# Patient Record
Sex: FEMALE | Race: BLACK OR AFRICAN AMERICAN | NOT HISPANIC OR LATINO | Employment: OTHER | URBAN - METROPOLITAN AREA
[De-identification: names, ages, dates, MRNs, and addresses within clinical notes are randomized per-mention and may not be internally consistent; named-entity substitution may affect disease eponyms.]

---

## 2017-01-18 ENCOUNTER — OFFICE VISIT (OUTPATIENT)
Dept: FAMILY MEDICINE | Facility: CLINIC | Age: 72
End: 2017-01-18
Payer: MEDICARE

## 2017-01-18 VITALS
OXYGEN SATURATION: 98 % | WEIGHT: 272.5 LBS | HEART RATE: 72 BPM | SYSTOLIC BLOOD PRESSURE: 134 MMHG | HEIGHT: 65 IN | TEMPERATURE: 98 F | BODY MASS INDEX: 45.4 KG/M2 | DIASTOLIC BLOOD PRESSURE: 84 MMHG

## 2017-01-18 DIAGNOSIS — E11.311 DIABETIC MACULAR EDEMA, BOTH EYES: ICD-10-CM

## 2017-01-18 DIAGNOSIS — E11.22 TYPE 2 DIABETES MELLITUS WITH STAGE 3 CHRONIC KIDNEY DISEASE, WITH LONG-TERM CURRENT USE OF INSULIN: ICD-10-CM

## 2017-01-18 DIAGNOSIS — E03.9 HYPOTHYROIDISM (ACQUIRED): ICD-10-CM

## 2017-01-18 DIAGNOSIS — G47.33 OBSTRUCTIVE SLEEP APNEA ON CPAP: ICD-10-CM

## 2017-01-18 DIAGNOSIS — I27.20 PULMONARY HYPERTENSION: ICD-10-CM

## 2017-01-18 DIAGNOSIS — E11.40 TYPE 2 DIABETES, CONTROLLED, WITH NEUROPATHY: ICD-10-CM

## 2017-01-18 DIAGNOSIS — Z79.4 LONG-TERM INSULIN USE: ICD-10-CM

## 2017-01-18 DIAGNOSIS — E66.01 MORBID OBESITY WITH BMI OF 40.0-44.9, ADULT: ICD-10-CM

## 2017-01-18 DIAGNOSIS — I50.32 CHRONIC DIASTOLIC HEART FAILURE: Primary | ICD-10-CM

## 2017-01-18 DIAGNOSIS — Z86.73 HX-TIA (TRANSIENT ISCHEMIC ATTACK): ICD-10-CM

## 2017-01-18 DIAGNOSIS — E55.9 VITAMIN D DEFICIENCY DISEASE: ICD-10-CM

## 2017-01-18 DIAGNOSIS — N25.81 SECONDARY RENAL HYPERPARATHYROIDISM: ICD-10-CM

## 2017-01-18 DIAGNOSIS — S20.00XA BRUISE OF BREAST: ICD-10-CM

## 2017-01-18 DIAGNOSIS — D57.3 SICKLE CELL TRAIT: ICD-10-CM

## 2017-01-18 DIAGNOSIS — N18.30 TYPE 2 DIABETES MELLITUS WITH STAGE 3 CHRONIC KIDNEY DISEASE, WITH LONG-TERM CURRENT USE OF INSULIN: ICD-10-CM

## 2017-01-18 DIAGNOSIS — Z79.4 TYPE 2 DIABETES MELLITUS WITH STAGE 3 CHRONIC KIDNEY DISEASE, WITH LONG-TERM CURRENT USE OF INSULIN: ICD-10-CM

## 2017-01-18 DIAGNOSIS — H40.89 GLAUCOMA ASSOCIATED WITH VASCULAR DISORDER OF EYE: ICD-10-CM

## 2017-01-18 DIAGNOSIS — E78.5 HYPERLIPIDEMIA LDL GOAL <100: ICD-10-CM

## 2017-01-18 DIAGNOSIS — I10 ESSENTIAL HYPERTENSION: ICD-10-CM

## 2017-01-18 PROCEDURE — 99499 UNLISTED E&M SERVICE: CPT | Mod: S$GLB,,, | Performed by: NURSE PRACTITIONER

## 2017-01-18 PROCEDURE — 3075F SYST BP GE 130 - 139MM HG: CPT | Mod: S$GLB,,, | Performed by: NURSE PRACTITIONER

## 2017-01-18 PROCEDURE — 1157F ADVNC CARE PLAN IN RCRD: CPT | Mod: S$GLB,,, | Performed by: NURSE PRACTITIONER

## 2017-01-18 PROCEDURE — 1126F AMNT PAIN NOTED NONE PRSNT: CPT | Mod: S$GLB,,, | Performed by: NURSE PRACTITIONER

## 2017-01-18 PROCEDURE — 1159F MED LIST DOCD IN RCRD: CPT | Mod: S$GLB,,, | Performed by: NURSE PRACTITIONER

## 2017-01-18 PROCEDURE — 99999 PR PBB SHADOW E&M-EST. PATIENT-LVL IV: CPT | Mod: PBBFAC,,, | Performed by: NURSE PRACTITIONER

## 2017-01-18 PROCEDURE — 1160F RVW MEDS BY RX/DR IN RCRD: CPT | Mod: S$GLB,,, | Performed by: NURSE PRACTITIONER

## 2017-01-18 PROCEDURE — 3046F HEMOGLOBIN A1C LEVEL >9.0%: CPT | Mod: S$GLB,,, | Performed by: NURSE PRACTITIONER

## 2017-01-18 PROCEDURE — 3079F DIAST BP 80-89 MM HG: CPT | Mod: S$GLB,,, | Performed by: NURSE PRACTITIONER

## 2017-01-18 PROCEDURE — 3066F NEPHROPATHY DOC TX: CPT | Mod: S$GLB,,, | Performed by: NURSE PRACTITIONER

## 2017-01-18 PROCEDURE — 99214 OFFICE O/P EST MOD 30 MIN: CPT | Mod: S$GLB,,, | Performed by: NURSE PRACTITIONER

## 2017-01-18 NOTE — PROGRESS NOTES
Subjective:       Patient ID: Erica Leblanc is a 71 y.o. female.    Chief Complaint: Depression and bruise on breast    HPI Comments: 71-year-old female presents to the clinic today for follow-up on depression.  She states she feels much better since she started Celexa.  She is sleeping very good.  She denies any suicidal thoughts, hallucinations, or homicidal ideations.  She says she's getting out of the house and doing more things now.  She states last Thursday while she was walking she dropped her keys and went down to pick them up and slipped and landed on her right breast.  She has a large bruise noted to her left breast.  She denies hitting her head.  She denies any neck or back pain.  She denies any cardiac chest pain, heart palpitations, shortness of breath, or swelling to lower extremities.  She denies any headaches, dizziness, or blurred vision. She says her blood sugar has been running in the 120's.     Past Medical History   Diagnosis Date    Cataracts, bilateral     CHF (congestive heart failure)     CKD (chronic kidney disease) stage 3, GFR 30-59 ml/min     Diabetic retinopathy of both eyes     Edema     Glaucoma     History of colonic polyps     Hx-TIA (transient ischemic attack) 2008    Hyperlipidemia LDL goal < 100     Hypertension     Hypothyroidism     Major depressive disorder, single episode, mild 2016    Obesity     Obstructive sleep apnea on CPAP     Osteopenia     Proteinuria     Sickle cell trait     Type II or unspecified type diabetes mellitus with renal manifestations, uncontrolled     Urge incontinence 2016    Vitamin D deficiency disease      Past Surgical History   Procedure Laterality Date    Breast reduction Bilateral age 31    Cholecystectomy       section, low transverse       x1    Refractive surgery      Cataracts Bilateral     Eye surgery  2014, 2014     vitrectomy    Hysterectomy       MARISELA (patient is unsure if  ovaries removed)    Eye surgery Right 08/17/2016      reports that she has never smoked. She has never used smokeless tobacco. She reports that she does not drink alcohol or use illicit drugs.  Review of Systems   Respiratory: Negative for cough, shortness of breath and wheezing.    Cardiovascular: Negative for chest pain, palpitations and leg swelling.   Gastrointestinal: Negative for abdominal pain, diarrhea, nausea and vomiting.   Musculoskeletal: Negative for back pain, gait problem, neck pain and neck stiffness.   Skin:        Bruise left breast    Neurological: Negative for dizziness, light-headedness and headaches.       Objective:      Physical Exam   Constitutional: She is oriented to person, place, and time. She appears well-developed and well-nourished. No distress.   Eyes: Conjunctivae and EOM are normal. Pupils are equal, round, and reactive to light. Right eye exhibits no discharge. Left eye exhibits no discharge. No scleral icterus.   Neck: Normal range of motion. Neck supple. No JVD present.   Cardiovascular: Normal rate, regular rhythm and normal heart sounds.  Exam reveals no gallop and no friction rub.    No murmur heard.  Pulmonary/Chest: Effort normal and breath sounds normal. No respiratory distress. She has no wheezes. She has no rales.   Abdominal: Soft. Bowel sounds are normal. There is no tenderness. There is no rebound and no guarding.   Musculoskeletal: Normal range of motion. She exhibits no edema.   Neurological: She is alert and oriented to person, place, and time.   Skin: Skin is warm and dry. She is not diaphoretic.   Large bruise noted to left breast    Psychiatric: She has a normal mood and affect.       Assessment:       1. Chronic diastolic heart failure    2. Diabetic macular edema, both eyes    3. 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    4. Hyperlipidemia LDL goal <100    5. Hx-TIA (transient ischemic attack)    6. Essential hypertension    7. Hypothyroidism  (acquired)    8. Long-term insulin use    9. Morbid obesity with BMI of 40.0-44.9, adult    10. Obstructive sleep apnea on CPAP    11. Secondary renal hyperparathyroidism    12. Type 2 diabetes mellitus with stage 3 chronic kidney disease, with long-term current use of insulin    13. Type 2 diabetes, controlled, with neuropathy    14. Vitamin D deficiency disease    15. Sickle cell trait    16. Pulmonary hypertension    17. Bruise of breast        Plan:         Chronic diastolic heart failure  - The current medical regimen is effective;  continue present plan and medications.    Diabetic macular edema, both eyes  - followed by Dr. Interiano     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  - followed by Dr. Interiano     Hyperlipidemia LDL goal <100  - The current medical regimen is effective;  continue present plan and medications.    Hx-TIA (transient ischemic attack)  - stable    Essential hypertension  - The current medical regimen is effective;  continue present plan and medications.    Hypothyroidism (acquired)  - The current medical regimen is effective;  continue present plan and medications.    Long-term insulin use  - continue to monitor blood sugars    Morbid obesity with BMI of 40.0-44.9, adult  - The patient is asked to make an attempt to improve diet and exercise patterns to aid in medical management of this problem.    Obstructive sleep apnea on CPAP  - uses CPAP machine every night    Secondary renal hyperparathyroidism  - stable observe     Type 2 diabetes mellitus with stage 3 chronic kidney disease, with long-term current use of insulin  - continue current medications    Type 2 diabetes, controlled, with neuropathy  - continue current medications     Vitamin D deficiency disease  - continue vitamin d     Sickle cell trait  - stable observe    Pulmonary hypertension  - The current medical regimen is effective;  continue present plan and medications.    Bruise of breast  - observe explained to  patient will take awhile to completely resolve

## 2017-01-18 NOTE — MR AVS SNAPSHOT
[facility]  [address] [place] [address]  Phone: [phone]  Fax: [phone]                  Erica Leblanc   2017 2:40 PM   Office Visit    Description:  Female : 1945   Provider:  TAYLOR Lopez   Department:  [facility]           Reason for Visit     Depression     bruise on breast           Diagnoses this Visit        Comments    Chronic diastolic heart failure    -  Primary     Diabetic macular edema, both eyes         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         Hyperlipidemia LDL goal <100         Hx-TIA (transient ischemic attack)         Essential hypertension         Hypothyroidism (acquired)         Long-term insulin use         Morbid obesity with BMI of 40.0-44.9, adult         Obstructive sleep apnea on CPAP         Secondary renal hyperparathyroidism         Type 2 diabetes mellitus with stage 3 chronic kidney disease, with long-term current use of insulin         Type 2 diabetes, controlled, with neuropathy         Vitamin D deficiency disease         Sickle cell trait         Pulmonary hypertension                To Do List           Future Appointments        Provider Department Dept Phone    2017 10:40 AM Surinder Joseph MD [facility] [phone]    3/27/2017 2:40 PM Sendy Elaine MD [facility] [phone]    6/15/2017 4:00 PM Katia Wong MD [facility] [phone]      Goals (5 Years of Data)              16    HEMOGLOBIN A1C < 7.0   9.1  7.3  6.7    Related Problems    Type 2 diabetes with stage 3 chronic kidney disease GFR 30-59      [facility] On Call     [facility] On Call Nurse Care Line -  Assistance  Registered nurses in the [facility] provide clinical advisement, health education, appointment booking, and other advisory services.  Call for this free service at [phone].             Medications            Message regarding Medications     Verify the changes and/or additions to your medication regime listed below are the same as discussed with your clinician today.  If any of these changes or additions are incorrect, please notify your healthcare provider.             Verify that the below list of medications is an accurate representation of the medications you are currently taking.  If none reported, the list may be blank. If incorrect, please contact your healthcare provider. Carry this list with you in case of emergency.           Current Medications     amlodipine (NORVASC) 10 MG tablet Take 1 tablet (10 mg total) by mouth once daily.    atorvastatin (LIPITOR) 10 MG tablet TAKE 1 TABLET EVERY DAY    citalopram (CELEXA) 10 MG tablet Take 1 tablet (10 mg total) by mouth once daily.    clopidogrel (PLAVIX) 75 mg tablet TAKE 1 TABLET ONE TIME DAILY    docusate sodium (COLACE) 100 MG capsule Take 100 mg by mouth 2 (two) times daily.    ergocalciferol (ERGOCALCIFEROL) 50,000 unit Cap Take 50,000 Units by mouth every 7 days.     furosemide (LASIX) 40 MG tablet TAKE 1 TABLET TWICE DAILY    HUMALOG 100 unit/mL injection 20 Units.     hydrALAZINE (APRESOLINE) 50 MG tablet Take 1 tablet (50 mg total) by mouth 3 (three) times daily.    LANCETS MISC     levothyroxine (SYNTHROID) 75 MCG tablet Take 1 tablet (75 mcg total) by mouth before breakfast.    losartan (COZAAR) 100 MG tablet Take 1 tablet (100 mg total) by mouth once daily.    metoprolol tartrate (LOPRESSOR) 100 MG tablet Take 1 tablet (100 mg total) by mouth 2 (two) times daily.    oxybutynin (DITROPAN-XL) 10 MG 24 hr tablet Take 1 tablet (10 mg total) by mouth once daily.    potassium chloride SA (K-DUR,KLOR-CON) 20 MEQ tablet TAKE 1 TABLET ONE TIME DAILY    VGO 20 [place]     VICTOZA 0.6 mg/0.1 mL (18 mg/3 mL) PnIj 1.2 mg every morning.            Clinical Reference Information           Vital Signs - Last Recorded  Most recent update: 1/18/2017  3:17 PM by  "Marcos Thompson, FNP-C    BP Pulse Temp Ht Wt SpO2    134/84 72 98.4 °F (36.9 °C) (Oral) 5' 5" (1.651 m) 123.6 kg (272 lb 7.8 oz) 98%    BMI                45.34 kg/m2          Blood Pressure          Most Recent Value    BP  134/84      Allergies as of 1/18/2017     Ace Inhibitors      Immunizations Administered on Date of Encounter - 1/18/2017     None      "

## 2017-01-20 DIAGNOSIS — G47.33 OBSTRUCTIVE SLEEP APNEA ON CPAP: ICD-10-CM

## 2017-01-20 DIAGNOSIS — I27.20 PULMONARY HYPERTENSION: ICD-10-CM

## 2017-01-20 RX ORDER — ATORVASTATIN CALCIUM 10 MG/1
TABLET, FILM COATED ORAL
Qty: 90 TABLET | Refills: 0 | Status: SHIPPED | OUTPATIENT
Start: 2017-01-20 | End: 2017-02-24

## 2017-01-20 NOTE — TELEPHONE ENCOUNTER
----- Message from Sherry Mckinley sent at 1/20/2017  3:07 PM CST -----  Contact: pt called  Pt called, requesting a RX refill for hydralazine. Pt of Dr. Orellana and LOV 12/5/16. Pt will like a 90 day supply to [facility]. Ph for pt is [phone]. Thank you

## 2017-01-23 RX ORDER — HYDRALAZINE HYDROCHLORIDE 50 MG/1
50 TABLET, FILM COATED ORAL 3 TIMES DAILY
Qty: 270 TABLET | Refills: 3 | Status: SHIPPED | OUTPATIENT
Start: 2017-01-23 | End: 2018-11-09 | Stop reason: SDUPTHER

## 2017-01-24 RX ORDER — POTASSIUM CHLORIDE 20 MEQ/1
TABLET, EXTENDED RELEASE ORAL
Qty: 90 TABLET | Refills: 0 | Status: SHIPPED | OUTPATIENT
Start: 2017-01-24 | End: 2017-03-28 | Stop reason: SDUPTHER

## 2017-01-24 RX ORDER — FUROSEMIDE 40 MG/1
40 TABLET ORAL 2 TIMES DAILY
Qty: 180 TABLET | Refills: 3 | Status: SHIPPED | OUTPATIENT
Start: 2017-01-24 | End: 2018-02-22 | Stop reason: SDUPTHER

## 2017-01-24 RX ORDER — CLOPIDOGREL BISULFATE 75 MG/1
TABLET ORAL
Qty: 90 TABLET | Refills: 0 | Status: SHIPPED | OUTPATIENT
Start: 2017-01-24 | End: 2017-03-28 | Stop reason: SDUPTHER

## 2017-02-08 DIAGNOSIS — E04.2 MULTINODULAR GOITER: Primary | ICD-10-CM

## 2017-02-10 ENCOUNTER — HOSPITAL ENCOUNTER (OUTPATIENT)
Dept: RADIOLOGY | Facility: HOSPITAL | Age: 72
Discharge: HOME | End: 2017-02-10
Attending: INTERNAL MEDICINE
Payer: MEDICARE

## 2017-02-10 DIAGNOSIS — E04.2 MULTINODULAR GOITER: ICD-10-CM

## 2017-02-10 PROCEDURE — 76536 US EXAM OF HEAD AND NECK: CPT | Mod: TC

## 2017-02-10 PROCEDURE — 76536 US EXAM OF HEAD AND NECK: CPT | Mod: 26,,, | Performed by: RADIOLOGY

## 2017-02-14 ENCOUNTER — OFFICE VISIT (OUTPATIENT)
Dept: NEPHROLOGY | Facility: CLINIC | Age: 72
End: 2017-02-14
Payer: MEDICARE

## 2017-02-14 ENCOUNTER — LAB VISIT (OUTPATIENT)
Dept: LAB | Facility: HOSPITAL | Age: 72
End: 2017-02-14
Attending: INTERNAL MEDICINE
Payer: MEDICARE

## 2017-02-14 VITALS
WEIGHT: 264.56 LBS | HEIGHT: 65 IN | DIASTOLIC BLOOD PRESSURE: 90 MMHG | SYSTOLIC BLOOD PRESSURE: 142 MMHG | OXYGEN SATURATION: 95 % | HEART RATE: 81 BPM | BODY MASS INDEX: 44.08 KG/M2

## 2017-02-14 DIAGNOSIS — E11.22 CONTROLLED TYPE 2 DIABETES MELLITUS WITH STAGE 3 CHRONIC KIDNEY DISEASE, WITH LONG-TERM CURRENT USE OF INSULIN: Primary | ICD-10-CM

## 2017-02-14 DIAGNOSIS — N18.30 ANEMIA OF CHRONIC RENAL FAILURE, STAGE 3 (MODERATE): ICD-10-CM

## 2017-02-14 DIAGNOSIS — D63.1 ANEMIA OF CHRONIC RENAL FAILURE, STAGE 3 (MODERATE): ICD-10-CM

## 2017-02-14 DIAGNOSIS — Z79.4 CONTROLLED TYPE 2 DIABETES MELLITUS WITH STAGE 3 CHRONIC KIDNEY DISEASE, WITH LONG-TERM CURRENT USE OF INSULIN: Primary | ICD-10-CM

## 2017-02-14 DIAGNOSIS — N18.30 CONTROLLED TYPE 2 DIABETES MELLITUS WITH STAGE 3 CHRONIC KIDNEY DISEASE, WITH LONG-TERM CURRENT USE OF INSULIN: Primary | ICD-10-CM

## 2017-02-14 DIAGNOSIS — E03.9 UNSPECIFIED HYPOTHYROIDISM: ICD-10-CM

## 2017-02-14 DIAGNOSIS — N18.30 CHRONIC KIDNEY DISEASE, STAGE III (MODERATE): ICD-10-CM

## 2017-02-14 DIAGNOSIS — I12.9 HYPERTENSIVE KIDNEY DISEASE WITH CHRONIC KIDNEY DISEASE, STAGE 1-4 OR UNSPECIFIED CHRONIC KIDNEY DISEASE: ICD-10-CM

## 2017-02-14 DIAGNOSIS — E55.9 UNSPECIFIED VITAMIN D DEFICIENCY: ICD-10-CM

## 2017-02-14 LAB
25(OH)D3+25(OH)D2 SERPL-MCNC: 89 NG/ML
ALBUMIN SERPL BCP-MCNC: 3 G/DL
ALP SERPL-CCNC: 105 U/L
ALT SERPL W/O P-5'-P-CCNC: 29 U/L
ANION GAP SERPL CALC-SCNC: 9 MMOL/L
AST SERPL-CCNC: 19 U/L
BILIRUB SERPL-MCNC: 0.3 MG/DL
BUN SERPL-MCNC: 33 MG/DL
CALCIUM SERPL-MCNC: 8.8 MG/DL
CHLORIDE SERPL-SCNC: 108 MMOL/L
CHOLEST/HDLC SERPL: 2.4 {RATIO}
CO2 SERPL-SCNC: 25 MMOL/L
CREAT SERPL-MCNC: 2.4 MG/DL
EST. GFR  (AFRICAN AMERICAN): 22.9 ML/MIN/1.73 M^2
EST. GFR  (NON AFRICAN AMERICAN): 19.9 ML/MIN/1.73 M^2
GLUCOSE SERPL-MCNC: 123 MG/DL
HDL/CHOLESTEROL RATIO: 41.4 %
HDLC SERPL-MCNC: 133 MG/DL
HDLC SERPL-MCNC: 55 MG/DL
LDLC SERPL CALC-MCNC: 64 MG/DL
NONHDLC SERPL-MCNC: 78 MG/DL
POTASSIUM SERPL-SCNC: 4.3 MMOL/L
PROT SERPL-MCNC: 7 G/DL
SODIUM SERPL-SCNC: 142 MMOL/L
T4 FREE SERPL-MCNC: 1.35 NG/DL
TRIGL SERPL-MCNC: 70 MG/DL
TSH SERPL DL<=0.005 MIU/L-ACNC: 1.79 UIU/ML

## 2017-02-14 PROCEDURE — 1159F MED LIST DOCD IN RCRD: CPT | Mod: S$GLB,,, | Performed by: INTERNAL MEDICINE

## 2017-02-14 PROCEDURE — 1126F AMNT PAIN NOTED NONE PRSNT: CPT | Mod: S$GLB,,, | Performed by: INTERNAL MEDICINE

## 2017-02-14 PROCEDURE — 99214 OFFICE O/P EST MOD 30 MIN: CPT | Mod: S$GLB,,, | Performed by: INTERNAL MEDICINE

## 2017-02-14 PROCEDURE — 3045F PR MOST RECENT HEMOGLOBIN A1C LEVEL 7.0-9.0%: CPT | Mod: S$GLB,,, | Performed by: INTERNAL MEDICINE

## 2017-02-14 PROCEDURE — 1160F RVW MEDS BY RX/DR IN RCRD: CPT | Mod: S$GLB,,, | Performed by: INTERNAL MEDICINE

## 2017-02-14 PROCEDURE — 3066F NEPHROPATHY DOC TX: CPT | Mod: S$GLB,,, | Performed by: INTERNAL MEDICINE

## 2017-02-14 PROCEDURE — 3077F SYST BP >= 140 MM HG: CPT | Mod: S$GLB,,, | Performed by: INTERNAL MEDICINE

## 2017-02-14 PROCEDURE — 99999 PR PBB SHADOW E&M-EST. PATIENT-LVL II: CPT | Mod: PBBFAC,,, | Performed by: INTERNAL MEDICINE

## 2017-02-14 PROCEDURE — 1157F ADVNC CARE PLAN IN RCRD: CPT | Mod: S$GLB,,, | Performed by: INTERNAL MEDICINE

## 2017-02-14 PROCEDURE — 3080F DIAST BP >= 90 MM HG: CPT | Mod: S$GLB,,, | Performed by: INTERNAL MEDICINE

## 2017-02-14 PROCEDURE — 99499 UNLISTED E&M SERVICE: CPT | Mod: S$GLB,,, | Performed by: INTERNAL MEDICINE

## 2017-02-15 LAB
ESTIMATED AVG GLUCOSE: 163 MG/DL
HBA1C MFR BLD HPLC: 7.3 %

## 2017-02-16 NOTE — PROGRESS NOTES
Subjective:       Patient ID: Erica Leblanc is a 71 y.o. Black or  female who presents for follow-up evaluation of Chronic Kidney Disease    HPI      Ms. Leblanc is a 71 year old woman with past medical history of diabetes, hypertension presenting for follow up of chronic kidney disease. Patient denies any complaints since last visit (lost to f/u), denies any fever, chest pain, shortness of breath, abdominal pain, diarrhea, dysuria/hematuria. She reports her blood sugars have been moderately controlled with dietary discretion and insulin regimen per Endocrinology; blood pressure has been better controlled.    Review of Systems   Constitutional: Negative for appetite change, fatigue and fever.   Respiratory: Negative for chest tightness and shortness of breath.    Cardiovascular: Negative for chest pain and leg swelling.   Gastrointestinal: Negative for abdominal pain, constipation, diarrhea, nausea and vomiting.   Genitourinary: Negative for difficulty urinating, dysuria, flank pain, frequency, hematuria and urgency.   Musculoskeletal: Negative for arthralgias, joint swelling and myalgias.   Skin: Negative for rash and wound.   Neurological: Negative for dizziness, weakness and light-headedness.   All other systems reviewed and are negative.      Objective:      Physical Exam   Constitutional: She appears well-developed and well-nourished.   Cardiovascular: Normal rate, regular rhythm and normal heart sounds.  Exam reveals no gallop and no friction rub.    No murmur heard.  Pulmonary/Chest: Effort normal and breath sounds normal. No respiratory distress. She has no wheezes. She has no rales.   Abdominal: Soft. Bowel sounds are normal. There is no tenderness.   Musculoskeletal: She exhibits no edema.   Neurological: She is alert.   Skin: Skin is warm and dry. No rash noted. No erythema.   Psychiatric: She has a normal mood and affect.       Assessment:       1. Controlled type 2 diabetes  mellitus with stage 3 chronic kidney disease, with long-term current use of insulin    2. Chronic kidney disease, stage III (moderate)    3. Hypertensive kidney disease with chronic kidney disease, stage 1-4 or unspecified chronic kidney disease    4. Anemia of chronic renal failure, stage 3 (moderate)        Plan:      Ms. Leblanc is a 71 year old woman with past medical history of diabetes, hypertension presenting for follow up of chronic kidney disease stage III likely secondary to diabetic nephropathy.  Creatinine elevated though stable since November 2016, will continue to trend renal panel along with proteinuria (continue ARB).  Stressed importance of blood pressure/glycemic monitoring/control, along with weight loss/exercise, to prevent any further progression of kidney disease, patient voiced understanding.   - Hypertension: BP near goal, advised patient to record BP diary, stressed importance of medication compliance and weight loss/exercise, patient voiced understanding.   - Anemia: Hgb at goal, no indication for erythropoetin therapy  - Bone/mineral metabolism: patient with secondary hyperparathyroidism, along with VitD deficiency (continue daily supplement), will trend PTH (at goal for stage CKD)    Return to clinic 4-6 months pending renal panel, then with renal/heme panel, iron/TIBC/ferritin, urinalysis/culture, urine protein/creatinine ratio, PTH prior to next visit

## 2017-02-24 ENCOUNTER — HOSPITAL ENCOUNTER (EMERGENCY)
Facility: HOSPITAL | Age: 72
Discharge: HOME | End: 2017-02-24
Attending: EMERGENCY MEDICINE
Payer: MEDICARE

## 2017-02-24 VITALS
SYSTOLIC BLOOD PRESSURE: 165 MMHG | HEIGHT: 65 IN | BODY MASS INDEX: 41.65 KG/M2 | RESPIRATION RATE: 18 BRPM | WEIGHT: 250 LBS | OXYGEN SATURATION: 94 % | TEMPERATURE: 99 F | HEART RATE: 71 BPM | DIASTOLIC BLOOD PRESSURE: 79 MMHG

## 2017-02-24 DIAGNOSIS — S16.1XXA NECK STRAIN, INITIAL ENCOUNTER: ICD-10-CM
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
## 2017-02-24 PROCEDURE — 99283 EMERGENCY DEPT VISIT LOW MDM: CPT

## 2017-02-24 RX ORDER — METHOCARBAMOL 500 MG/1
1000 TABLET, FILM COATED ORAL 3 TIMES DAILY
Qty: 30 TABLET | Refills: 0 | Status: SHIPPED | OUTPATIENT
Start: 2017-02-24 | End: 2017-03-01

## 2017-02-24 RX ORDER — HYDROCODONE BITARTRATE AND ACETAMINOPHEN 5; 325 MG/1; MG/1
1 TABLET ORAL EVERY 4 HOURS PRN
Qty: 18 TABLET | Refills: 0 | Status: SHIPPED | OUTPATIENT
Start: 2017-02-24 | End: 2017-03-27
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
## 2017-03-27 ENCOUNTER — OFFICE VISIT (OUTPATIENT)
Dept: FAMILY MEDICINE | Facility: CLINIC | Age: 72
End: 2017-03-27
Payer: MEDICARE

## 2017-03-27 VITALS
HEART RATE: 76 BPM | WEIGHT: 269.81 LBS | OXYGEN SATURATION: 95 % | BODY MASS INDEX: 44.95 KG/M2 | DIASTOLIC BLOOD PRESSURE: 78 MMHG | SYSTOLIC BLOOD PRESSURE: 134 MMHG | TEMPERATURE: 98 F | HEIGHT: 65 IN

## 2017-03-27 DIAGNOSIS — M54.2 NECK PAIN: ICD-10-CM

## 2017-03-27 DIAGNOSIS — I10 ESSENTIAL HYPERTENSION: ICD-10-CM

## 2017-03-27 DIAGNOSIS — F33.0 MILD EPISODE OF RECURRENT MAJOR DEPRESSIVE DISORDER: ICD-10-CM

## 2017-03-27 DIAGNOSIS — E66.01 MORBID OBESITY WITH BMI OF 40.0-44.9, ADULT: ICD-10-CM
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
## 2017-03-27 DIAGNOSIS — Z23 NEED FOR STREPTOCOCCUS PNEUMONIAE VACCINATION: ICD-10-CM

## 2017-03-27 DIAGNOSIS — N25.81 SECONDARY RENAL HYPERPARATHYROIDISM: ICD-10-CM

## 2017-03-27 DIAGNOSIS — Z79.4 TYPE 2 DIABETES MELLITUS WITH STAGE 3 CHRONIC KIDNEY DISEASE, WITH LONG-TERM CURRENT USE OF INSULIN: Primary | ICD-10-CM

## 2017-03-27 DIAGNOSIS — N18.30 TYPE 2 DIABETES MELLITUS WITH STAGE 3 CHRONIC KIDNEY DISEASE, WITH LONG-TERM CURRENT USE OF INSULIN: Primary | ICD-10-CM

## 2017-03-27 DIAGNOSIS — E11.22 TYPE 2 DIABETES MELLITUS WITH STAGE 3 CHRONIC KIDNEY DISEASE, WITH LONG-TERM CURRENT USE OF INSULIN: Primary | ICD-10-CM

## 2017-03-27 DIAGNOSIS — E11.3519 CONTROLLED TYPE 2 DIABETES MELLITUS WITH PROLIFERATIVE RETINOPATHY AND MACULAR EDEMA, WITH LONG-TERM CURRENT USE OF INSULIN, UNSPECIFIED LATERALITY: ICD-10-CM

## 2017-03-27 DIAGNOSIS — E78.5 HYPERLIPIDEMIA LDL GOAL <100: ICD-10-CM

## 2017-03-27 DIAGNOSIS — E11.40 TYPE 2 DIABETES, CONTROLLED, WITH NEUROPATHY: ICD-10-CM

## 2017-03-27 DIAGNOSIS — G44.309 POST-TRAUMATIC HEADACHE, NOT INTRACTABLE, UNSPECIFIED CHRONICITY PATTERN: ICD-10-CM

## 2017-03-27 DIAGNOSIS — Z79.4 LONG-TERM INSULIN USE: ICD-10-CM

## 2017-03-27 DIAGNOSIS — G47.33 OBSTRUCTIVE SLEEP APNEA ON CPAP: ICD-10-CM

## 2017-03-27 DIAGNOSIS — E03.9 HYPOTHYROIDISM (ACQUIRED): ICD-10-CM

## 2017-03-27 DIAGNOSIS — I50.32 CHRONIC DIASTOLIC HEART FAILURE: ICD-10-CM

## 2017-03-27 DIAGNOSIS — Z79.4 CONTROLLED TYPE 2 DIABETES MELLITUS WITH PROLIFERATIVE RETINOPATHY AND MACULAR EDEMA, WITH LONG-TERM CURRENT USE OF INSULIN, UNSPECIFIED LATERALITY: ICD-10-CM

## 2017-03-27 DIAGNOSIS — D57.3 SICKLE CELL TRAIT: ICD-10-CM

## 2017-03-27 PROCEDURE — 3075F SYST BP GE 130 - 139MM HG: CPT | Mod: S$GLB,,, | Performed by: INTERNAL MEDICINE

## 2017-03-27 PROCEDURE — 99499 UNLISTED E&M SERVICE: CPT | Mod: S$GLB,,, | Performed by: INTERNAL MEDICINE

## 2017-03-27 PROCEDURE — 1126F AMNT PAIN NOTED NONE PRSNT: CPT | Mod: S$GLB,,, | Performed by: INTERNAL MEDICINE

## 2017-03-27 PROCEDURE — 1159F MED LIST DOCD IN RCRD: CPT | Mod: S$GLB,,, | Performed by: INTERNAL MEDICINE

## 2017-03-27 PROCEDURE — 3066F NEPHROPATHY DOC TX: CPT | Mod: S$GLB,,, | Performed by: INTERNAL MEDICINE

## 2017-03-27 PROCEDURE — 99999 PR PBB SHADOW E&M-EST. PATIENT-LVL III: CPT | Mod: PBBFAC,,, | Performed by: INTERNAL MEDICINE

## 2017-03-27 PROCEDURE — 3078F DIAST BP <80 MM HG: CPT | Mod: S$GLB,,, | Performed by: INTERNAL MEDICINE

## 2017-03-27 PROCEDURE — 1160F RVW MEDS BY RX/DR IN RCRD: CPT | Mod: S$GLB,,, | Performed by: INTERNAL MEDICINE

## 2017-03-27 PROCEDURE — 99215 OFFICE O/P EST HI 40 MIN: CPT | Mod: S$GLB,,, | Performed by: INTERNAL MEDICINE

## 2017-03-27 PROCEDURE — G0009 ADMIN PNEUMOCOCCAL VACCINE: HCPCS | Mod: S$GLB,,, | Performed by: INTERNAL MEDICINE

## 2017-03-27 PROCEDURE — 1157F ADVNC CARE PLAN IN RCRD: CPT | Mod: S$GLB,,, | Performed by: INTERNAL MEDICINE

## 2017-03-27 PROCEDURE — 90732 PPSV23 VACC 2 YRS+ SUBQ/IM: CPT | Mod: S$GLB,,, | Performed by: INTERNAL MEDICINE

## 2017-03-27 PROCEDURE — 3045F PR MOST RECENT HEMOGLOBIN A1C LEVEL 7.0-9.0%: CPT | Mod: S$GLB,,, | Performed by: INTERNAL MEDICINE

## 2017-03-27 RX ORDER — LEVOTHYROXINE SODIUM 50 UG/1
50 TABLET ORAL
COMMUNITY
End: 2020-08-10 | Stop reason: SDUPTHER
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
## 2017-03-28 RX ORDER — POTASSIUM CHLORIDE 20 MEQ/1
TABLET, EXTENDED RELEASE ORAL
Qty: 90 TABLET | Refills: 1 | Status: SHIPPED | OUTPATIENT
Start: 2017-03-28 | End: 2017-03-28 | Stop reason: SDUPTHER

## 2017-03-28 RX ORDER — ATORVASTATIN CALCIUM 10 MG/1
10 TABLET, FILM COATED ORAL DAILY
COMMUNITY
Start: 2017-03-28 | End: 2017-03-28 | Stop reason: SDUPTHER

## 2017-03-28 RX ORDER — ATORVASTATIN CALCIUM 10 MG/1
10 TABLET, FILM COATED ORAL DAILY
Qty: 90 TABLET | Refills: 1 | Status: SHIPPED | OUTPATIENT
Start: 2017-03-28 | End: 2017-03-28 | Stop reason: SDUPTHER

## 2017-03-28 RX ORDER — CLOPIDOGREL BISULFATE 75 MG/1
TABLET ORAL
Qty: 90 TABLET | Refills: 1 | Status: SHIPPED | OUTPATIENT
Start: 2017-03-28 | End: 2017-03-28 | Stop reason: SDUPTHER

## 2017-03-28 NOTE — TELEPHONE ENCOUNTER
I queued up the medications, but I do not know which [facility] to send it to - please give me the correct pharmacy.

## 2017-03-28 NOTE — TELEPHONE ENCOUNTER
[facility] is requesting a refill on patient Potassium, Clopidogrel and Atorvastatin please advise.

## 2017-03-29 RX ORDER — POTASSIUM CHLORIDE 20 MEQ/1
TABLET, EXTENDED RELEASE ORAL
Qty: 90 TABLET | Refills: 1 | Status: SHIPPED | OUTPATIENT
Start: 2017-03-29 | End: 2017-03-29 | Stop reason: SDUPTHER

## 2017-03-29 RX ORDER — CLOPIDOGREL BISULFATE 75 MG/1
TABLET ORAL
Qty: 90 TABLET | Refills: 1 | Status: SHIPPED | OUTPATIENT
Start: 2017-03-29 | End: 2017-09-29 | Stop reason: SDUPTHER

## 2017-03-29 RX ORDER — ATORVASTATIN CALCIUM 10 MG/1
10 TABLET, FILM COATED ORAL DAILY
Qty: 90 TABLET | Refills: 1 | Status: SHIPPED | OUTPATIENT
Start: 2017-03-29 | End: 2017-03-29 | Stop reason: SDUPTHER

## 2017-03-29 RX ORDER — POTASSIUM CHLORIDE 20 MEQ/1
TABLET, EXTENDED RELEASE ORAL
Qty: 90 TABLET | Refills: 1 | Status: SHIPPED | OUTPATIENT
Start: 2017-03-29 | End: 2017-10-03 | Stop reason: SDUPTHER

## 2017-03-29 RX ORDER — CLOPIDOGREL BISULFATE 75 MG/1
TABLET ORAL
Qty: 90 TABLET | Refills: 1 | Status: SHIPPED | OUTPATIENT
Start: 2017-03-29 | End: 2017-03-29 | Stop reason: SDUPTHER

## 2017-03-29 RX ORDER — ATORVASTATIN CALCIUM 10 MG/1
10 TABLET, FILM COATED ORAL DAILY
Qty: 90 TABLET | Refills: 1 | Status: SHIPPED | OUTPATIENT
Start: 2017-03-29 | End: 2017-09-29 | Stop reason: SDUPTHER

## 2017-03-30 ENCOUNTER — OFFICE VISIT (OUTPATIENT)
Dept: FAMILY MEDICINE | Facility: CLINIC | Age: 72
End: 2017-03-30
Payer: MEDICARE

## 2017-03-30 VITALS
DIASTOLIC BLOOD PRESSURE: 90 MMHG | SYSTOLIC BLOOD PRESSURE: 166 MMHG | BODY MASS INDEX: 45.12 KG/M2 | WEIGHT: 270.81 LBS | HEIGHT: 65 IN | HEART RATE: 84 BPM | OXYGEN SATURATION: 97 % | TEMPERATURE: 99 F

## 2017-03-30 DIAGNOSIS — E03.9 HYPOTHYROIDISM (ACQUIRED): ICD-10-CM

## 2017-03-30 DIAGNOSIS — H40.89 GLAUCOMA ASSOCIATED WITH VASCULAR DISORDER OF EYE: ICD-10-CM

## 2017-03-30 DIAGNOSIS — E11.3513 PROLIFERATIVE DIABETIC RETINOPATHY OF BOTH EYES WITH MACULAR EDEMA ASSOCIATED WITH TYPE 2 DIABETES MELLITUS: ICD-10-CM

## 2017-03-30 DIAGNOSIS — D57.3 SICKLE CELL TRAIT: ICD-10-CM

## 2017-03-30 DIAGNOSIS — H35.033 HYPERTENSIVE RETINOPATHY OF BOTH EYES: ICD-10-CM

## 2017-03-30 DIAGNOSIS — F33.0 MILD EPISODE OF RECURRENT MAJOR DEPRESSIVE DISORDER: ICD-10-CM

## 2017-03-30 DIAGNOSIS — G47.33 OBSTRUCTIVE SLEEP APNEA ON CPAP: ICD-10-CM

## 2017-03-30 DIAGNOSIS — N25.81 SECONDARY RENAL HYPERPARATHYROIDISM: ICD-10-CM

## 2017-03-30 DIAGNOSIS — I27.20 PULMONARY HYPERTENSION: ICD-10-CM

## 2017-03-30 DIAGNOSIS — I10 ESSENTIAL HYPERTENSION: ICD-10-CM

## 2017-03-30 DIAGNOSIS — E11.311 DIABETIC MACULAR EDEMA, BOTH EYES: ICD-10-CM

## 2017-03-30 DIAGNOSIS — N18.30 TYPE 2 DIABETES MELLITUS WITH STAGE 3 CHRONIC KIDNEY DISEASE, WITH LONG-TERM CURRENT USE OF INSULIN: ICD-10-CM

## 2017-03-30 DIAGNOSIS — N39.46 MIXED INCONTINENCE URGE AND STRESS: ICD-10-CM

## 2017-03-30 DIAGNOSIS — E11.3519 CONTROLLED TYPE 2 DIABETES MELLITUS WITH PROLIFERATIVE RETINOPATHY AND MACULAR EDEMA, WITH LONG-TERM CURRENT USE OF INSULIN, UNSPECIFIED LATERALITY: ICD-10-CM

## 2017-03-30 DIAGNOSIS — I77.1 TORTUOUS AORTA: ICD-10-CM

## 2017-03-30 DIAGNOSIS — E66.01 MORBID OBESITY WITH BMI OF 45.0-49.9, ADULT: ICD-10-CM

## 2017-03-30 DIAGNOSIS — E11.40 TYPE 2 DIABETES, CONTROLLED, WITH NEUROPATHY: ICD-10-CM

## 2017-03-30 DIAGNOSIS — Z00.00 ENCOUNTER FOR PREVENTIVE HEALTH EXAMINATION: Primary | ICD-10-CM

## 2017-03-30 DIAGNOSIS — E78.5 HYPERLIPIDEMIA LDL GOAL <100: ICD-10-CM

## 2017-03-30 DIAGNOSIS — E55.9 VITAMIN D DEFICIENCY DISEASE: ICD-10-CM

## 2017-03-30 DIAGNOSIS — I67.89 CEREBRAL MICROVASCULAR DISEASE: ICD-10-CM

## 2017-03-30 DIAGNOSIS — Z79.4 TYPE 2 DIABETES MELLITUS WITH STAGE 3 CHRONIC KIDNEY DISEASE, WITH LONG-TERM CURRENT USE OF INSULIN: ICD-10-CM

## 2017-03-30 DIAGNOSIS — Z79.4 CONTROLLED TYPE 2 DIABETES MELLITUS WITH PROLIFERATIVE RETINOPATHY AND MACULAR EDEMA, WITH LONG-TERM CURRENT USE OF INSULIN, UNSPECIFIED LATERALITY: ICD-10-CM

## 2017-03-30 DIAGNOSIS — I50.32 CHRONIC DIASTOLIC HEART FAILURE: ICD-10-CM

## 2017-03-30 DIAGNOSIS — Z79.4 LONG-TERM INSULIN USE: ICD-10-CM

## 2017-03-30 DIAGNOSIS — E11.22 TYPE 2 DIABETES MELLITUS WITH STAGE 3 CHRONIC KIDNEY DISEASE, WITH LONG-TERM CURRENT USE OF INSULIN: ICD-10-CM

## 2017-03-30 PROCEDURE — 3080F DIAST BP >= 90 MM HG: CPT | Mod: S$GLB,,, | Performed by: NURSE PRACTITIONER

## 2017-03-30 PROCEDURE — 99499 UNLISTED E&M SERVICE: CPT | Mod: S$GLB,,, | Performed by: NURSE PRACTITIONER

## 2017-03-30 PROCEDURE — 3077F SYST BP >= 140 MM HG: CPT | Mod: S$GLB,,, | Performed by: NURSE PRACTITIONER

## 2017-03-30 PROCEDURE — G0439 PPPS, SUBSEQ VISIT: HCPCS | Mod: S$GLB,,, | Performed by: NURSE PRACTITIONER

## 2017-03-30 PROCEDURE — 99999 PR PBB SHADOW E&M-EST. PATIENT-LVL IV: CPT | Mod: PBBFAC,,, | Performed by: NURSE PRACTITIONER

## 2017-03-30 NOTE — PATIENT INSTRUCTIONS
Counseling and Referral of Other Preventative  (Italic type indicates deductible and co-insurance are waived)    Patient Name: Erica Moulton  Today's Date: 3/30/2017      SERVICE LIMITATIONS RECOMMENDATION    Vaccines    · Pneumococcal (once after 65)    · Influenza (annually)    · Hepatitis B (if medium/high risk)    · Prevnar 13      Hepatitis B medium/high risk factors:       - End-stage renal disease       - Hemophiliacs who received Factor VII or         IX concentrates       - Clients of institutions for the mentally             retarded       - Persons who live in the same house as          a HepB carrier       - Homosexual men       - Illicit injectable drug abusers     Pneumococcal: Done, no repeat necessary     Influenza: Done, repeat in one year     Hepatitis B: Done, no repeat necessary     Prevnar 13: Done, no repeat necessary    Mammogram (biennial age 50-74)  Annually (age 40 or over)  Last done 11/2016, recommend to repeat every 1  years    Pap (up to age 70 and after 70 if unknown history or abnormal study last 10 years)    N/A     no clinical risk factors     Colorectal cancer screening (to age 75)    · Fecal occult blood test (annual)  · Flexible sigmoidoscopy (5y)  · Screening colonoscopy (10y)  · Barium enema   Last done 12/2012, recommend to repeat every 5  years    Diabetes self-management training (no USPSTF recommendations)  Requires referral by treating physician for patient with diabetes or renal disease. 10 hours of initial DSMT sessions of no less than 30 minutes each in a continuous 12-month period. 2 hours of follow-up DSMT in subsequent years.  Done this year, repeat every year    Bone mass measurements (age 65 & older, biennial)  Requires diagnosis related to osteoporosis or estrogen deficiency. Biennial benefit unless patient has history of long-term glucocorticoid  Last done 2/2016, recommend to repeat every 2  years    Glaucoma screening (no USPSTF recommendation)   Diabetes mellitus, family history   , age 50 or over    American, age 65 or over  completed 10/2016    Medical nutrition therapy for diabetes or renal disease (no recommended schedule)  Requires referral by treating physician for patient with diabetes or renal disease or kidney transplant within the past 3 years.  Can be provided in same year as diabetes self-management training (DSMT), and CMS recommends medical nutrition therapy take place after DSMT. Up to 3 hours for initial year and 2 hours in subsequent years.  Done this year, repeat every year    Cardiovascular screening blood tests (every 5 years)  · Fasting lipid panel  Order as a panel if possible  Done this year, repeat every year    Diabetes screening tests (at least every 3 years, Medicare covers annually or at 6-month intervals for prediabetic patients)  · Fasting blood sugar (FBS) or glucose tolerance test (GTT)  Patient must be diagnosed with one of the following:       - Hypertension       - Dyslipidemia       - Obesity (BMI 30kg/m2)       - Previous elevated impaired FBS or GTT       ... or any two of the following:       - Overweight (BMI 25 but <30)       - Family history of diabetes       - Age 65 or older       - History of gestational diabetes or birth of baby weighing more than 9 pounds  Done this year, repeat every year    Abdominal aortic aneurysm screening (once)  · Sonogram   Limited to patients who meet one of the following criteria:       - Men who are 65-75 years old and have smoked more than 100 cigarette in their lifetime       - Anyone with a family history of abdominal aortic aneurysm       - Anyone recommended for screening by the USPSTF  N/A    HIV screening (annually for increased risk patients)  · HIV-1 and HIV-2 by EIA, or JOVITA, rapid antibody test or oral mucosa transudate  Patients must be at increased risk for HIV infection per USPSTF guidelines or pregnant. Tests covered annually for patient at  increased risk or as requested by the patient. Pregnant patients may receive up to 3 tests during pregnancy.  no clinical risk factors     Smoking cessation counseling (up to 8 sessions per year)  Patients must be asymptomatic of tobacco-related conditions to receive as a preventative service.  non tobacco user     Subsequent annual wellness visit  At least 12 months since last AWV  Return in one year     The following information is provided to all patients.  This information is to help you find resources for any of the problems found today that may be affecting your health:                Living healthy guide: www.[facility].[place].[facility]      Understanding Diabetes: www.diabetes.org      Eating healthy: www.cdc.gov/healthyweight      CDC home safety checklist: www.cdc.gov/steadi/patient.html      Agency on Aging: www.goea.[place].[facility]      Alcoholics anonymous (AA): www.aa.org      Physical Activity: www.teresita.nih.gov/ft4qvxo      Tobacco use: www.quitwithusla.org

## 2017-03-31 ENCOUNTER — TELEPHONE (OUTPATIENT)
Dept: FAMILY MEDICINE | Facility: CLINIC | Age: 72
End: 2017-03-31

## 2017-03-31 DIAGNOSIS — G47.33 OBSTRUCTIVE SLEEP APNEA ON CPAP: Primary | ICD-10-CM

## 2017-03-31 NOTE — TELEPHONE ENCOUNTER
----- Message from Yu Banegas sent at 3/31/2017  1:55 PM CDT -----  Regarding: cpap Rx  Can you please send me an updated Rx for supplies and pressure setting?

## 2017-04-03 PROBLEM — I77.1 TORTUOUS AORTA: Status: ACTIVE | Noted: 2017-04-03

## 2017-04-03 NOTE — PROGRESS NOTES
"Erica Brennon Moulton presented for a  Medicare AWV and comprehensive Health Risk Assessment today. The following components were reviewed and updated:    · Medical history  · Family History  · Social history  · Allergies and Current Medications  · Health Risk Assessment  · Health Maintenance  · Care Team     ** See Completed Assessments for Annual Wellness Visit within the encounter summary.**       The following assessments were completed:  · Living Situation  · CAGE  · Depression Screening  · Timed Get Up and Go  · Whisper Test  · Cognitive Function Screening  · Nutrition Screening  · ADL Screening  · PAQ Screening    Vitals:    03/30/17 1502   BP: (!) 166/90   BP Location: Right arm   Patient Position: Sitting   BP Method: Manual   Pulse: 84   Temp: 98.6 °F (37 °C)   TempSrc: Oral   SpO2: 97%   Weight: 122.8 kg (270 lb 13.4 oz)   Height: 5' 5" (1.651 m)     Body mass index is 45.07 kg/(m^2).  Physical Exam   Cardiovascular: Normal rate.    Pulmonary/Chest: Effort normal.   Musculoskeletal: Normal range of motion.   Neurological: She is alert.   Skin: Skin is warm.   Psychiatric: She has a normal mood and affect. Her behavior is normal. Thought content normal.   Vitals reviewed.        Diagnoses and health risks identified today and associated recommendations/orders:    1. Encounter for preventive health examination  Education provided about preventive health examinations and procedures; discussed patient's health concerns, holistically addressed patient's health plan.  Reviewed medical record per discussion with patient for health risks, which are addressed in this documentation.     2. Mild episode of recurrent major depressive disorder  Reports citalopram effective, denies homicidal or suicidal ideation.   Discussed family and social dynamics, current situations in her life.   Monitor.     3. Pulmonary hypertension  Stable, followed by cardiology; monitor.     4. Chronic diastolic heart failure  Stable, followed " by [facility] cardiology, discussed worsening symptoms; advised about fluid retention; continue as advised.     5. Tortuous aorta  Stable. Asymptomatic;  monitor.     6. Sickle cell trait  Stable. Asymptomatic. Observe.     7. Type 2 diabetes mellitus with stage 3 chronic kidney disease, with long-term current use of insulin  Blood sugar control close to goal. Continue current regimen. Followed by endocrinology. She is up-to-date on her eye exam. Neuropathy pain control. Stable kidney function. Observe. Patient counseled to avoid/minimize the use of anti-inflammatory medication.     8. Secondary renal hyperparathyroidism  Stable, followed by [facility] endocrinology and nephrology, denies worsening symptoms; continue as advised.     9. Type 2 diabetes, controlled, with neuropathy  Blood sugar control close to goal. Continue current regimen. Followed by endocrinology. She is up-to-date on her eye exam. Neuropathy pain control. Stable kidney function. Observe. Patient counseled to avoid/minimize the use of anti-inflammatory medication.     10. Long-term insulin use  Blood sugar control close to goal. Continue current regimen. Followed by endocrinology. She is up-to-date on her eye exam. Neuropathy pain control. Stable kidney function. Observe. Patient counseled to avoid/minimize the use of anti-inflammatory medication.     11. Controlled type 2 diabetes mellitus with proliferative retinopathy and macular edema, with long-term current use of insulin, unspecified laterality  Blood sugar control close to goal. Continue current regimen. Followed by endocrinology. She is up-to-date on her eye exam. Neuropathy pain control. Stable kidney function. Observe. Patient counseled to avoid/minimize the use of anti-inflammatory medication.     12. Diabetic macular edema, both eyes  Stable, followed by [facility] ophthalmology, discussed worsening symptoms; continue as advised.     13. Proliferative diabetic retinopathy of both eyes with macular edema  associated with type 2 diabetes mellitus  Stable, followed by [facility] ophthalmology, discussed worsening symptoms; continue as advised.     14. Morbid Obesity   We discussed diet and exercise for weight loss.  Educated about diet, activities, and ways to avoid sedentary lifestyle.   Reminded patient of [employer]'s [employer]ne[employer] benefits with access to fitness centers and classes.     15. 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     16. Mixed incontinence urge and stress  Evaluated by OB/GYN, taking oxybutynin as directed. Continue as advised.     17. Hypertensive retinopathy of both eyes  Stable, followed by [facility] ophthalmology, discussed worsening symptoms; continue as advised.     18. Cerebral microvascular disease  Stable, asymptomatic; monitor.     19. Obstructive sleep apnea on CPAP  Contacted pulmonology to advise patient about CPAP status, pulmonary (Dr. Linares) advised that I put an order in for CPAP titration.  Discussed with patient, patient verbalized an understanding.   - CPAP titration (Must have diagnosis of HUMBERTO from previous sleep study.); Future    20. Vitamin D deficiency disease  Vitamin D level 89. Continue as advised.     21. Essential hypertension  Presently not at goal, discussed factors affecting blood pressure. Patient advised about weight modifications, dietary and lifestyle changes.     22. Hyperlipidemia LDL goal <100  At goal, continue as directed.     23. Hypothyroidism (acquired)  At goal, continue as directed.       Return in about 11 weeks (around 6/15/2017) for urology follow up .    Sánchez Piña Jr, NP

## 2017-04-05 ENCOUNTER — TELEPHONE (OUTPATIENT)
Dept: FAMILY MEDICINE | Facility: CLINIC | Age: 72
End: 2017-04-05

## 2017-04-13 DIAGNOSIS — E11.9 DIABETES MELLITUS WITHOUT COMPLICATION: ICD-10-CM

## 2017-05-25 ENCOUNTER — LAB VISIT (OUTPATIENT)
Dept: LAB | Facility: HOSPITAL | Age: 72
End: 2017-05-25
Attending: STUDENT IN AN ORGANIZED HEALTH CARE EDUCATION/TRAINING PROGRAM
Payer: MEDICARE

## 2017-05-25 DIAGNOSIS — E55.9 UNSPECIFIED VITAMIN D DEFICIENCY: ICD-10-CM

## 2017-05-25 DIAGNOSIS — E03.9 UNSPECIFIED HYPOTHYROIDISM: ICD-10-CM

## 2017-05-25 LAB
ALBUMIN SERPL BCP-MCNC: 2.5 G/DL
ALP SERPL-CCNC: 148 U/L
ALT SERPL W/O P-5'-P-CCNC: 74 U/L
ANION GAP SERPL CALC-SCNC: 7 MMOL/L
AST SERPL-CCNC: 34 U/L
BILIRUB SERPL-MCNC: 0.3 MG/DL
BUN SERPL-MCNC: 46 MG/DL
CALCIUM SERPL-MCNC: 8.5 MG/DL
CHLORIDE SERPL-SCNC: 106 MMOL/L
CHOLEST/HDLC SERPL: 2.6 {RATIO}
CO2 SERPL-SCNC: 28 MMOL/L
CREAT SERPL-MCNC: 2.4 MG/DL
EST. GFR  (AFRICAN AMERICAN): 22.7 ML/MIN/1.73 M^2
EST. GFR  (NON AFRICAN AMERICAN): 19.7 ML/MIN/1.73 M^2
ESTIMATED AVG GLUCOSE: 203 MG/DL
GLUCOSE SERPL-MCNC: 291 MG/DL
HBA1C MFR BLD HPLC: 8.7 %
HDL/CHOLESTEROL RATIO: 38.5 %
HDLC SERPL-MCNC: 130 MG/DL
HDLC SERPL-MCNC: 50 MG/DL
LDLC SERPL CALC-MCNC: 65.6 MG/DL
NONHDLC SERPL-MCNC: 80 MG/DL
POTASSIUM SERPL-SCNC: 4.8 MMOL/L
PROT SERPL-MCNC: 6.4 G/DL
SODIUM SERPL-SCNC: 141 MMOL/L
T4 FREE SERPL-MCNC: 1.16 NG/DL
TRIGL SERPL-MCNC: 72 MG/DL
TSH SERPL DL<=0.005 MIU/L-ACNC: 1.78 UIU/ML

## 2017-05-25 PROCEDURE — 84439 ASSAY OF FREE THYROXINE: CPT

## 2017-05-25 PROCEDURE — 36415 COLL VENOUS BLD VENIPUNCTURE: CPT | Mod: PO

## 2017-05-25 PROCEDURE — 80053 COMPREHEN METABOLIC PANEL: CPT

## 2017-05-25 PROCEDURE — 80061 LIPID PANEL: CPT

## 2017-05-25 PROCEDURE — 84443 ASSAY THYROID STIM HORMONE: CPT

## 2017-05-25 PROCEDURE — 83036 HEMOGLOBIN GLYCOSYLATED A1C: CPT

## 2017-06-06 DIAGNOSIS — G47.33 OBSTRUCTIVE SLEEP APNEA ON CPAP: ICD-10-CM

## 2017-06-06 DIAGNOSIS — I10 UNSPECIFIED ESSENTIAL HYPERTENSION: ICD-10-CM

## 2017-06-06 DIAGNOSIS — Z86.73 HX-TIA (TRANSIENT ISCHEMIC ATTACK): ICD-10-CM

## 2017-06-06 DIAGNOSIS — N18.30 CKD (CHRONIC KIDNEY DISEASE) STAGE 3, GFR 30-59 ML/MIN: ICD-10-CM

## 2017-06-06 RX ORDER — LOSARTAN POTASSIUM 100 MG/1
TABLET ORAL
Qty: 90 TABLET | Refills: 3 | Status: SHIPPED | OUTPATIENT
Start: 2017-06-06 | End: 2018-05-09 | Stop reason: SDUPTHER

## 2017-06-12 ENCOUNTER — HOSPITAL ENCOUNTER (INPATIENT)
Facility: HOSPITAL | Age: 72
LOS: 2 days | Discharge: HOME | DRG: 291 | End: 2017-06-14
Attending: EMERGENCY MEDICINE | Admitting: EMERGENCY MEDICINE
Payer: MEDICARE

## 2017-06-12 DIAGNOSIS — I16.0 HYPERTENSIVE URGENCY: ICD-10-CM

## 2017-06-12 DIAGNOSIS — R06.00 DYSPNEA, UNSPECIFIED TYPE: ICD-10-CM

## 2017-06-12 DIAGNOSIS — I50.9 CHF, ACUTE ON CHRONIC: ICD-10-CM

## 2017-06-12 DIAGNOSIS — R09.02 HYPOXIA: ICD-10-CM

## 2017-06-12 DIAGNOSIS — I50.33 DIASTOLIC CHF, ACUTE ON CHRONIC: Primary | ICD-10-CM

## 2017-06-12 PROBLEM — Z86.73 HISTORY OF TIAS: Status: ACTIVE | Noted: 2017-06-12

## 2017-06-12 LAB
ALBUMIN SERPL BCP-MCNC: 2.7 G/DL
ALP SERPL-CCNC: 129 U/L
ALT SERPL W/O P-5'-P-CCNC: 42 U/L
ANION GAP SERPL CALC-SCNC: 7 MMOL/L
AST SERPL-CCNC: 27 U/L
BASOPHILS # BLD AUTO: 0.01 K/UL
BASOPHILS NFR BLD: 0.1 %
BILIRUB SERPL-MCNC: 0.3 MG/DL
BNP SERPL-MCNC: 294 PG/ML
BUN SERPL-MCNC: 44 MG/DL
CALCIUM SERPL-MCNC: 9 MG/DL
CHLORIDE SERPL-SCNC: 108 MMOL/L
CO2 SERPL-SCNC: 27 MMOL/L
CREAT SERPL-MCNC: 2.2 MG/DL
DIASTOLIC DYSFUNCTION: YES
DIFFERENTIAL METHOD: ABNORMAL
EOSINOPHIL # BLD AUTO: 0.1 K/UL
EOSINOPHIL NFR BLD: 1.2 %
ERYTHROCYTE [DISTWIDTH] IN BLOOD BY AUTOMATED COUNT: 14.9 %
EST. GFR  (AFRICAN AMERICAN): 25 ML/MIN/1.73 M^2
EST. GFR  (NON AFRICAN AMERICAN): 22 ML/MIN/1.73 M^2
ESTIMATED PA SYSTOLIC PRESSURE: 17.64
GLOBAL PERICARDIAL EFFUSION: ABNORMAL
GLUCOSE SERPL-MCNC: 212 MG/DL
HCT VFR BLD AUTO: 35.4 %
HGB BLD-MCNC: 11.9 G/DL
INR PPP: 0.9
LYMPHOCYTES # BLD AUTO: 0.8 K/UL
LYMPHOCYTES NFR BLD: 8.8 %
MCH RBC QN AUTO: 26.3 PG
MCHC RBC AUTO-ENTMCNC: 33.6 %
MCV RBC AUTO: 78 FL
MITRAL VALVE MOBILITY: NORMAL
MONOCYTES # BLD AUTO: 0.9 K/UL
MONOCYTES NFR BLD: 9.4 %
NEUTROPHILS # BLD AUTO: 7.4 K/UL
NEUTROPHILS NFR BLD: 80.3 %
PLATELET # BLD AUTO: 217 K/UL
PMV BLD AUTO: 11.2 FL
POCT GLUCOSE: 103 MG/DL (ref 70–110)
POCT GLUCOSE: 216 MG/DL (ref 70–110)
POCT GLUCOSE: 268 MG/DL (ref 70–110)
POTASSIUM SERPL-SCNC: 3.9 MMOL/L
PROT SERPL-MCNC: 6.7 G/DL
PROTHROMBIN TIME: 9.9 SEC
RBC # BLD AUTO: 4.53 M/UL
RETIRED EF AND QEF - SEE NOTES: 70 (ref 55–65)
SODIUM SERPL-SCNC: 142 MMOL/L
TRICUSPID VALVE REGURGITATION: ABNORMAL
TROPONIN I SERPL DL<=0.01 NG/ML-MCNC: 0.02 NG/ML
TROPONIN I SERPL DL<=0.01 NG/ML-MCNC: 0.06 NG/ML
WBC # BLD AUTO: 9.23 K/UL

## 2017-06-12 PROCEDURE — 63600175 PHARM REV CODE 636 W HCPCS: Performed by: HOSPITALIST

## 2017-06-12 PROCEDURE — 85610 PROTHROMBIN TIME: CPT

## 2017-06-12 PROCEDURE — 25000003 PHARM REV CODE 250: Performed by: HOSPITALIST

## 2017-06-12 PROCEDURE — 25000003 PHARM REV CODE 250: Performed by: EMERGENCY MEDICINE

## 2017-06-12 PROCEDURE — 63600175 PHARM REV CODE 636 W HCPCS: Performed by: EMERGENCY MEDICINE

## 2017-06-12 PROCEDURE — 36415 COLL VENOUS BLD VENIPUNCTURE: CPT

## 2017-06-12 PROCEDURE — 93005 ELECTROCARDIOGRAM TRACING: CPT

## 2017-06-12 PROCEDURE — 85025 COMPLETE CBC W/AUTO DIFF WBC: CPT

## 2017-06-12 PROCEDURE — 83880 ASSAY OF NATRIURETIC PEPTIDE: CPT

## 2017-06-12 PROCEDURE — 93306 TTE W/DOPPLER COMPLETE: CPT

## 2017-06-12 PROCEDURE — 21400001 HC TELEMETRY ROOM

## 2017-06-12 PROCEDURE — 99291 CRITICAL CARE FIRST HOUR: CPT

## 2017-06-12 PROCEDURE — 80053 COMPREHEN METABOLIC PANEL: CPT

## 2017-06-12 PROCEDURE — 93306 TTE W/DOPPLER COMPLETE: CPT | Mod: 26,,, | Performed by: INTERNAL MEDICINE

## 2017-06-12 PROCEDURE — 84484 ASSAY OF TROPONIN QUANT: CPT | Mod: 91

## 2017-06-12 RX ORDER — IBUPROFEN 200 MG
24 TABLET ORAL
Status: DISCONTINUED | OUTPATIENT
Start: 2017-06-12 | End: 2017-06-14 | Stop reason: HOSPADM

## 2017-06-12 RX ORDER — ATORVASTATIN CALCIUM 10 MG/1
10 TABLET, FILM COATED ORAL DAILY
Status: DISCONTINUED | OUTPATIENT
Start: 2017-06-12 | End: 2017-06-14 | Stop reason: HOSPADM

## 2017-06-12 RX ORDER — FAMOTIDINE 10 MG/ML
20 INJECTION INTRAVENOUS DAILY
Status: DISCONTINUED | OUTPATIENT
Start: 2017-06-12 | End: 2017-06-12

## 2017-06-12 RX ORDER — GLUCAGON 1 MG
1 KIT INJECTION
Status: DISCONTINUED | OUTPATIENT
Start: 2017-06-12 | End: 2017-06-14 | Stop reason: HOSPADM

## 2017-06-12 RX ORDER — OXYBUTYNIN CHLORIDE 5 MG/1
10 TABLET, EXTENDED RELEASE ORAL DAILY
Status: DISCONTINUED | OUTPATIENT
Start: 2017-06-12 | End: 2017-06-14 | Stop reason: HOSPADM

## 2017-06-12 RX ORDER — IBUPROFEN 200 MG
16 TABLET ORAL
Status: DISCONTINUED | OUTPATIENT
Start: 2017-06-12 | End: 2017-06-14 | Stop reason: HOSPADM

## 2017-06-12 RX ORDER — CLOPIDOGREL BISULFATE 75 MG/1
75 TABLET ORAL DAILY
Status: DISCONTINUED | OUTPATIENT
Start: 2017-06-12 | End: 2017-06-14 | Stop reason: HOSPADM

## 2017-06-12 RX ORDER — METOPROLOL TARTRATE 50 MG/1
100 TABLET ORAL 2 TIMES DAILY
Status: DISCONTINUED | OUTPATIENT
Start: 2017-06-12 | End: 2017-06-14 | Stop reason: HOSPADM

## 2017-06-12 RX ORDER — HYDRALAZINE HYDROCHLORIDE 25 MG/1
75 TABLET, FILM COATED ORAL 3 TIMES DAILY
Status: DISCONTINUED | OUTPATIENT
Start: 2017-06-12 | End: 2017-06-14 | Stop reason: HOSPADM

## 2017-06-12 RX ORDER — FUROSEMIDE 10 MG/ML
40 INJECTION INTRAMUSCULAR; INTRAVENOUS 2 TIMES DAILY
Status: DISCONTINUED | OUTPATIENT
Start: 2017-06-12 | End: 2017-06-14

## 2017-06-12 RX ORDER — SODIUM CHLORIDE 0.9 % (FLUSH) 0.9 %
3 SYRINGE (ML) INJECTION EVERY 8 HOURS
Status: DISCONTINUED | OUTPATIENT
Start: 2017-06-12 | End: 2017-06-14 | Stop reason: HOSPADM

## 2017-06-12 RX ORDER — HYDRALAZINE HYDROCHLORIDE 25 MG/1
50 TABLET, FILM COATED ORAL 3 TIMES DAILY
Status: DISCONTINUED | OUTPATIENT
Start: 2017-06-12 | End: 2017-06-12

## 2017-06-12 RX ORDER — ENOXAPARIN SODIUM 100 MG/ML
30 INJECTION SUBCUTANEOUS EVERY 24 HOURS
Status: DISCONTINUED | OUTPATIENT
Start: 2017-06-12 | End: 2017-06-14 | Stop reason: HOSPADM

## 2017-06-12 RX ORDER — LEVOTHYROXINE SODIUM 50 UG/1
50 TABLET ORAL
Status: DISCONTINUED | OUTPATIENT
Start: 2017-06-13 | End: 2017-06-14 | Stop reason: HOSPADM

## 2017-06-12 RX ORDER — AMOXICILLIN 250 MG
1 CAPSULE ORAL 2 TIMES DAILY
Status: DISCONTINUED | OUTPATIENT
Start: 2017-06-12 | End: 2017-06-14 | Stop reason: HOSPADM

## 2017-06-12 RX ORDER — INSULIN ASPART 100 [IU]/ML
1-10 INJECTION, SOLUTION INTRAVENOUS; SUBCUTANEOUS
Status: DISCONTINUED | OUTPATIENT
Start: 2017-06-12 | End: 2017-06-14 | Stop reason: HOSPADM

## 2017-06-12 RX ORDER — CITALOPRAM 10 MG/1
10 TABLET ORAL DAILY
Status: DISCONTINUED | OUTPATIENT
Start: 2017-06-12 | End: 2017-06-14 | Stop reason: HOSPADM

## 2017-06-12 RX ORDER — ASPIRIN 325 MG
325 TABLET, DELAYED RELEASE (ENTERIC COATED) ORAL ONCE
Status: COMPLETED | OUTPATIENT
Start: 2017-06-12 | End: 2017-06-12

## 2017-06-12 RX ORDER — FUROSEMIDE 10 MG/ML
40 INJECTION INTRAMUSCULAR; INTRAVENOUS
Status: COMPLETED | OUTPATIENT
Start: 2017-06-12 | End: 2017-06-12

## 2017-06-12 RX ORDER — LEVOTHYROXINE SODIUM 50 UG/1
50 TABLET ORAL DAILY
Status: DISCONTINUED | OUTPATIENT
Start: 2017-06-12 | End: 2017-06-12

## 2017-06-12 RX ADMIN — Medication 3 ML: at 10:06

## 2017-06-12 RX ADMIN — METOPROLOL TARTRATE 100 MG: 50 TABLET ORAL at 09:06

## 2017-06-12 RX ADMIN — OXYBUTYNIN CHLORIDE 10 MG: 5 TABLET, FILM COATED, EXTENDED RELEASE ORAL at 01:06

## 2017-06-12 RX ADMIN — REGULAR STRENGTH 325 MG: 325 TABLET ORAL at 10:06

## 2017-06-12 RX ADMIN — Medication 3 ML: at 01:06

## 2017-06-12 RX ADMIN — FUROSEMIDE 40 MG: 10 INJECTION, SOLUTION INTRAMUSCULAR; INTRAVENOUS at 05:06

## 2017-06-12 RX ADMIN — FUROSEMIDE 40 MG: 10 INJECTION, SOLUTION INTRAMUSCULAR; INTRAVENOUS at 06:06

## 2017-06-12 RX ADMIN — INSULIN ASPART 3 UNITS: 100 INJECTION, SOLUTION INTRAVENOUS; SUBCUTANEOUS at 09:06

## 2017-06-12 RX ADMIN — NITROGLYCERIN 1 INCH: 20 OINTMENT TOPICAL at 06:06

## 2017-06-12 RX ADMIN — DOCUSATE SODIUM AND SENNOSIDES 1 TABLET: 8.6; 5 TABLET, FILM COATED ORAL at 01:06

## 2017-06-12 RX ADMIN — HYDRALAZINE HYDROCHLORIDE 75 MG: 25 TABLET ORAL at 09:06

## 2017-06-12 RX ADMIN — ATORVASTATIN CALCIUM 10 MG: 10 TABLET, FILM COATED ORAL at 01:06

## 2017-06-12 RX ADMIN — HYDRALAZINE HYDROCHLORIDE 50 MG: 25 TABLET ORAL at 01:06

## 2017-06-12 RX ADMIN — CLOPIDOGREL BISULFATE 75 MG: 75 TABLET ORAL at 01:06

## 2017-06-12 RX ADMIN — ENOXAPARIN SODIUM 30 MG: 100 INJECTION SUBCUTANEOUS at 05:06

## 2017-06-12 RX ADMIN — INSULIN DETEMIR 15 UNITS: 100 INJECTION, SOLUTION SUBCUTANEOUS at 09:06

## 2017-06-12 RX ADMIN — METOPROLOL TARTRATE 100 MG: 50 TABLET ORAL at 10:06

## 2017-06-12 RX ADMIN — CITALOPRAM HYDROBROMIDE 10 MG: 10 TABLET ORAL at 01:06

## 2017-06-12 RX ADMIN — DOCUSATE SODIUM AND SENNOSIDES 1 TABLET: 8.6; 5 TABLET, FILM COATED ORAL at 09:06

## 2017-06-12 NOTE — ASSESSMENT & PLAN NOTE
Pt uses victoza and humalog at home, hemoglobin A1c 8.7% (5/2017)  Uncontrolled  Will use basal-bolus insulin diabetic diet

## 2017-06-12 NOTE — PLAN OF CARE
06/12/17 1301   Discharge Assessment   Assessment Type Discharge Planning Assessment   Confirmed/corrected address and phone number on facesheet? Yes   Assessment information obtained from? Patient   Expected Length of Stay (days) 2   Communicated expected length of stay with patient/caregiver yes   If Healthcare Directive is received, is it scanned into Epic? no (comment)   Prior to hospitilization cognitive status: No Deficits;Alert/Oriented   Prior to hospitalization functional status: Independent   Current cognitive status: Alert/Oriented;No Deficits   Current Functional Status: Independent   Arrived From admitted as an inpatient   Lives With child(evonne), adult   Able to Return to Prior Arrangements yes   Is patient able to care for self after discharge? Yes   How many people do you have in your home that can help with your care after discharge? 1   Who are your caregiver(s) and their phone number(s)? (Anita Cain (Daughter) Alexandria Del Rosario (Son)  [phone]  )   Patient's perception of discharge disposition admitted as an inpatient   Readmission Within The Last 30 Days no previous admission in last 30 days   Patient currently being followed by outpatient case management? No   Patient currently receives home health services? No   Does the patient currently use HME? Yes   Equipment Currently Used at Home bedside commode;glucometer;CPAP;other (see comments)  (b/p cuff)   Do you have any problems affording any of your prescribed medications? No   Is the patient taking medications as prescribed? yes   Do you have any financial concerns preventing you from receiving the healthcare you need? No   Does the patient have transportation to healthcare appointments? Yes   Transportation Available car;family or friend will provide   On Dialysis? No   Does the patient receive services at the Coumadin Clinic? No  (Plavix)   Discharge Plan A Home with family   Discharge Plan B Home with family   Patient/Family In Agreement With  "Plan yes   TN completed discharge needs assessment. TN provided and reviewed with patient "Blue My Health Packet" , "Help At Home" and "Discharge Planning Begins on Admission" handouts. TN discussed with patient the things the patient is responsible for to manage patient's  healthcare at home. Patient verbalized understanding & teachback implemented. Patient prefers after noon doctor appointments.  .  [id number] - [place] - [address] AT [facility] [address]  [address]  [place] [address]  Phone: [phone] Fax: [phone]    [id number] - [place] - [address]  [address]  [place] [address]  Phone: [phone] Fax: [phone]    [facility] - [place], [place] - [address] [facility] [address]  [address] [facility] [address]  Phone: [phone] Fax: [phone]        "

## 2017-06-12 NOTE — ED NOTES
Pt report received from KIKI Soto. Pt is awake in bed, siderails are up x2 call light in reach, pt family at bedside.

## 2017-06-12 NOTE — ED TRIAGE NOTES
"Pt arrived via EMS after C/O  Of SOB. States she has sleep apnea. When she went to use the machine her mask was split so she couldn't use it. Later she started experiencing SOB and "mouth breathing". When she went to the restroom her breathing became worse. States it subsided just before EMS arrived. After taking her BP and Glucose, EMS urged her to come to the ER.  "

## 2017-06-12 NOTE — ED PROVIDER NOTES
"Encounter Date: 6/12/2017    SCRIBE #1 NOTE: I, Juan Carlos Zimmer, am scribing for, and in the presence of,  Nathaniel Mack MD. I have scribed the following portions of the note - Other sections scribed: HPI and ROS.       History     Chief Complaint   Patient presents with    Shortness of Breath     SOB for past hour, 88% on room air per EMS, 99% on 4L NC     Review of patient's allergies indicates:   Allergen Reactions    Ace inhibitors Other (See Comments)     Other reaction(s): cough     CC: Shortness of Breath     HPI: This 72 y.o F with DM type II, HTN, CKD, obesity, TIA, HLD, obstructive sleep apnea, hypothyroidism and CHF presents to the ED via EMS for emergent evaluation of SOB which began at approximately 0000 today. The pt reports her CPAP machine mask broke at approximately 2300 yesterday. The pt also reports mid sternal "pressure." Pt notes SOB is exacerbated when lying down flat. Pt is not currently SOB. Per EMS, pt was 88% on RA upon arrival. Pt's daughter reports intermittent increased lower extremity swelling, which is worse at night. Pt is not compliant with BP medication and furosemide. Pt denies recent long car rides, recent hospitalization and abdominal pain. Pt was given 4L NC by EMS with relief.      The history is provided by the patient and a relative. No  was used.     Past Medical History:   Diagnosis Date    Cataracts, bilateral     CHF (congestive heart failure)     CKD (chronic kidney disease) stage 3, GFR 30-59 ml/min     CKD (chronic kidney disease) stage 3, GFR 30-59 ml/min     Controlled type 2 diabetes mellitus with proteinuria or albuminuria     Depression     Diabetes with neurologic complications     Diabetic retinopathy of both eyes     Edema     Glaucoma     History of colonic polyps     Hx-TIA (transient ischemic attack) 11/2008    Hyperlipidemia LDL goal < 100     Hypertension     Hypothyroidism     Major depressive disorder, single " "episode, mild 2016    Mixed incontinence urge and stress     Obesity     Obstructive sleep apnea on CPAP     Osteopenia     Proteinuria     Sickle cell trait     Trouble in sleeping     Type 2 diabetes mellitus with ophthalmic manifestations     Type 2 diabetes with stage 3 chronic kidney disease GFR 30-59     Type II or unspecified type diabetes mellitus with renal manifestations, uncontrolled     Urge incontinence 2016    Urge incontinence     Vitamin D deficiency disease      Past Surgical History:   Procedure Laterality Date    breast reduction Bilateral age 31    cataracts Bilateral      SECTION, LOW TRANSVERSE      x1    CHOLECYSTECTOMY      EYE SURGERY  2014, 2014    vitrectomy    EYE SURGERY Right 2016    HYSTERECTOMY  1986    TAHBSO (patient is unsure if ovaries removed)    REFRACTIVE SURGERY       Family History   Problem Relation Age of Onset    Leukemia Father     Ovarian cancer Sister 35    Stroke Mother     Diabetes Mother     Hypertension Mother     Diabetes Paternal Grandmother     Breast cancer Maternal Aunt 65    HIV Brother     Achondroplasia Sister     Parkinsonism Maternal Aunt     Esophageal cancer Maternal Uncle      smoker    Amblyopia Neg Hx     Blindness Neg Hx     Cataracts Neg Hx     Glaucoma Neg Hx     Macular degeneration Neg Hx     Retinal detachment Neg Hx     Strabismus Neg Hx     Thyroid disease Neg Hx     Colon cancer Neg Hx      Social History   Substance Use Topics    Smoking status: Never Smoker    Smokeless tobacco: Never Used    Alcohol use No     Review of Systems   Constitutional: Negative for chills and fever.   HENT: Negative for sore throat and trouble swallowing.    Eyes: Negative for visual disturbance.   Respiratory: Positive for shortness of breath.    Cardiovascular: Positive for chest pain (midsternal, "pressure").   Gastrointestinal: Negative for abdominal pain, nausea and vomiting. "   Genitourinary: Negative for dysuria and urgency.   Musculoskeletal: Negative for back pain.        (+) bilateral lower extremity edema   Skin: Negative for rash.   Neurological: Negative for weakness.       Physical Exam     Initial Vitals [06/12/17 0625]   BP Pulse Resp Temp SpO2   (!) 200/100 86 (!) 22 98.9 °F (37.2 °C) 99 %     Physical Exam    Nursing note and vitals reviewed.  Constitutional: She appears well-developed and well-nourished.   Mild distress with tachypnea   Eyes: EOM are normal. Pupils are equal, round, and reactive to light.   Neck: Normal range of motion. Neck supple. No thyromegaly present. No JVD present.   Cardiovascular: Normal rate, regular rhythm, normal heart sounds and intact distal pulses. Exam reveals no gallop and no friction rub.    No murmur heard.  Pulmonary/Chest: She is in respiratory distress. She has rales.   Abdominal: Soft. Bowel sounds are normal. There is no tenderness.   Musculoskeletal: Normal range of motion. She exhibits edema. She exhibits no tenderness.   Neurological: She is alert and oriented to person, place, and time. She has normal strength.   Skin: Skin is warm and dry.         ED Course   Critical Care  Date/Time: 6/12/2017 9:37 AM  Performed by: CHANTELLE ALONZO.  Authorized by: CHANTELLE ALONZO   Direct patient critical care time: 20 minutes  Additional history critical care time: 10 minutes  Ordering / reviewing critical care time: 10 minutes  Documentation critical care time: 8 minutes  Consulting other physicians critical care time: 5 minutes  Other critical care time: 5 (admission) minutes  Total critical care time (exclusive of procedural time) : 58 minutes  Critical care time was exclusive of separately billable procedures and treating other patients and teaching time.  Critical care was necessary to treat or prevent imminent or life-threatening deterioration of the following conditions: respiratory failure and cardiac failure.  Critical care  was time spent personally by me on the following activities: development of treatment plan with patient or surrogate, interpretation of cardiac output measurements, evaluation of patient's response to treatment, examination of patient, obtaining history from patient or surrogate, ordering and performing treatments and interventions, ordering and review of laboratory studies, ordering and review of radiographic studies, pulse oximetry, re-evaluation of patient's condition and review of old charts.        Labs Reviewed   CBC W/ AUTO DIFFERENTIAL - Abnormal; Notable for the following:        Result Value    Hemoglobin 11.9 (*)     Hematocrit 35.4 (*)     MCV 78 (*)     MCH 26.3 (*)     RDW 14.9 (*)     Lymph # 0.8 (*)     Gran% 80.3 (*)     Lymph% 8.8 (*)     All other components within normal limits   COMPREHENSIVE METABOLIC PANEL - Abnormal; Notable for the following:     Glucose 212 (*)     BUN, Bld 44 (*)     Creatinine 2.2 (*)     Albumin 2.7 (*)     Anion Gap 7 (*)     eGFR if  25 (*)     eGFR if non  22 (*)     All other components within normal limits   B-TYPE NATRIURETIC PEPTIDE - Abnormal; Notable for the following:      (*)     All other components within normal limits   TROPONIN I   PROTIME-INR     EKG Readings: (Independently Interpreted)   Initial Reading: No STEMI. Rhythm: Normal Sinus Rhythm. Heart Rate: 79. Ectopy: No Ectopy. Conduction: Normal. ST Segments: Normal ST Segments. Axis: Right Axis Deviation.       X-Rays:   Independently Interpreted Readings:   Chest X-Ray: Cardiomegaly present.  Increased vascular markings consistent with CHF are present.       patient presents with shortness of breath.  Worse with lying down.  Chart review shows diastolic heart failure.  Patient presented malignantly hypertensive.  Also her CPAP mask broke last night.  Combination of effects caused the patient to be hypoxic.  Patient states she has been taking her Lasix 80 mg once  daily but has not been taking her other blood pressure medication.  We'll admit for hypertensive urgency and diuresis.  Patient denied having chest pain but did feel some discomfort and heaviness on the chest associated with breathing.  We will trend serial troponins to rule out acute coronary syndrome.          Scribe Attestation:   Scribe #1: I performed the above scribed service and the documentation accurately describes the services I performed. I attest to the accuracy of the note.    Attending Attestation:           Physician Attestation for Scribe:  Physician Attestation Statement for Scribe #1: I, Nathaniel Mack MD, reviewed documentation, as scribed by Juan Carlos Zimmer in my presence, and it is both accurate and complete.                 ED Course     Clinical Impression:   The primary encounter diagnosis was Diastolic CHF, acute on chronic. Diagnoses of Hypertensive urgency, Dyspnea, unspecified type, Hypoxia, and CHF, acute on chronic were also pertinent to this visit.          Nathaniel Mack MD  06/12/17 1673

## 2017-06-12 NOTE — ASSESSMENT & PLAN NOTE
Possibly not compliant with diet and medications  Cardiac diet  IV lasix BID  Strict I/Os  Daily weights  Case management assisting with home CHF program  ECHO reviewed:      1 - Normal left ventricular systolic function (EF 65-70%).     2 - No diagnostic regional wall motion abnormalities.     3 - Concentric remodeling.     4 - Impaired LV relaxation, elevated LAP (grade 2 diastolic dysfunction).     5 - Trivial tricuspid regurgitation.     6 - The estimated PA systolic pressure is greater than 18 mmHg.

## 2017-06-12 NOTE — SUBJECTIVE & OBJECTIVE
Past Medical History:   Diagnosis Date    Cataracts, bilateral     CHF (congestive heart failure)     CKD (chronic kidney disease) stage 3, GFR 30-59 ml/min     CKD (chronic kidney disease) stage 3, GFR 30-59 ml/min     Controlled type 2 diabetes mellitus with proteinuria or albuminuria     Depression     Diabetes with neurologic complications     Diabetic retinopathy of both eyes     Edema     Glaucoma     History of colonic polyps     Hx-TIA (transient ischemic attack) 2008    Hyperlipidemia LDL goal < 100     Hypertension     Hypothyroidism     Major depressive disorder, single episode, mild 2016    Mixed incontinence urge and stress     Obesity     Obstructive sleep apnea on CPAP     Osteopenia     Proteinuria     Sickle cell trait     Trouble in sleeping     Type 2 diabetes mellitus with ophthalmic manifestations     Type 2 diabetes with stage 3 chronic kidney disease GFR 30-59     Type II or unspecified type diabetes mellitus with renal manifestations, uncontrolled     Urge incontinence 2016    Urge incontinence     Vitamin D deficiency disease        Past Surgical History:   Procedure Laterality Date    breast reduction Bilateral age 31    cataracts Bilateral      SECTION, LOW TRANSVERSE      x1    CHOLECYSTECTOMY      EYE SURGERY  2014, 2014    vitrectomy    EYE SURGERY Right 2016    HYSTERECTOMY      TASO (patient is unsure if ovaries removed)    REFRACTIVE SURGERY         Review of patient's allergies indicates:   Allergen Reactions    Ace inhibitors Other (See Comments)     Other reaction(s): cough       No current facility-administered medications on file prior to encounter.      Current Outpatient Prescriptions on File Prior to Encounter   Medication Sig    atorvastatin (LIPITOR) 10 MG tablet Take 1 tablet (10 mg total) by mouth once daily.    citalopram (CELEXA) 10 MG tablet Take 1 tablet (10 mg total) by mouth once daily.     clopidogrel (PLAVIX) 75 mg tablet TAKE 1 TABLET ONE TIME DAILY    docusate sodium (COLACE) 100 MG capsule Take 100 mg by mouth 2 (two) times daily.    ergocalciferol (ERGOCALCIFEROL) 50,000 unit Cap Take 50,000 Units by mouth every 14 (fourteen) days.     furosemide (LASIX) 40 MG tablet Take 1 tablet (40 mg total) by mouth 2 (two) times daily.    hydrALAZINE (APRESOLINE) 50 MG tablet Take 1 tablet (50 mg total) by mouth 3 (three) times daily.    levothyroxine (SYNTHROID) 50 MCG tablet Take 50 mcg by mouth once daily.    losartan (COZAAR) 100 MG tablet TAKE 1 TABLET ONE TIME DAILY    metoprolol tartrate (LOPRESSOR) 100 MG tablet Take 1 tablet (100 mg total) by mouth 2 (two) times daily.    oxybutynin (DITROPAN-XL) 10 MG 24 hr tablet Take 1 tablet (10 mg total) by mouth once daily.    potassium chloride SA (K-DUR,KLOR-CON) 20 MEQ tablet TAKE 1 TABLET ONE TIME DAILY    VGO 20 [place]     HUMALOG 100 unit/mL injection 20 Units.     LANCETS MISC     VICTOZA 0.6 mg/0.1 mL (18 mg/3 mL) PnIj 1.2 mg every morning.      Family History     Problem Relation (Age of Onset)    Achondroplasia Sister    Breast cancer Maternal Aunt (65)    Diabetes Mother, Paternal Grandmother    Esophageal cancer Maternal Uncle    HIV Brother    Hypertension Mother    Leukemia Father    Ovarian cancer Sister (35)    Parkinsonism Maternal Aunt    Stroke Mother        Social History Main Topics    Smoking status: Never Smoker    Smokeless tobacco: Never Used    Alcohol use No    Drug use: No    Sexual activity: Not Currently     Partners: Male     Birth control/ protection: Post-menopausal, Surgical     Review of Systems   Constitutional: Negative.    HENT: Negative.    Eyes: Negative.    Respiratory: Positive for apnea, chest tightness and shortness of breath.    Cardiovascular: Positive for chest pain and leg swelling.   Gastrointestinal: Negative.    Endocrine: Negative.    Genitourinary: Positive for urgency.   Musculoskeletal:  Positive for arthralgias.   Neurological: Negative.    Psychiatric/Behavioral: Negative.      Objective:     Vital Signs (Most Recent):  Temp: 98.4 °F (36.9 °C) (06/12/17 1304)  Pulse: 67 (06/12/17 1304)  Resp: 19 (06/12/17 1304)  BP: (!) 172/87 (06/12/17 1304)  SpO2: 96 % (06/12/17 1304) Vital Signs (24h Range):  Temp:  [98.2 °F (36.8 °C)-98.9 °F (37.2 °C)] 98.4 °F (36.9 °C)  Pulse:  [67-86] 67  Resp:  [18-22] 19  SpO2:  [96 %-100 %] 96 %  BP: (163-200)/() 172/87     Weight: 113.4 kg (250 lb)  Body mass index is 41.6 kg/m².    Physical Exam   Constitutional: She is oriented to person, place, and time. She appears well-developed and well-nourished. No distress.   HENT:   Head: Normocephalic and atraumatic.   Mouth/Throat: Oropharynx is clear and moist.   Eyes: Conjunctivae and EOM are normal.   Neck: Neck supple. No JVD present.   Cardiovascular: Normal rate, regular rhythm, normal heart sounds and intact distal pulses.    No murmur heard.  Pulmonary/Chest: Effort normal. No respiratory distress. She has rales.   Abdominal: Soft. Bowel sounds are normal. She exhibits no distension. There is no tenderness.   Musculoskeletal: Normal range of motion. She exhibits edema. She exhibits no deformity.   Neurological: She is alert and oriented to person, place, and time.   Skin: Skin is warm and dry. Capillary refill takes less than 2 seconds. No rash noted.   Psychiatric: She has a normal mood and affect. Her behavior is normal.        Significant Labs:   CBC:   Recent Labs  Lab 06/12/17  0703   WBC 9.23   HGB 11.9*   HCT 35.4*        CMP:   Recent Labs  Lab 06/12/17  0703      K 3.9      CO2 27   *   BUN 44*   CREATININE 2.2*   CALCIUM 9.0   PROT 6.7   ALBUMIN 2.7*   BILITOT 0.3   ALKPHOS 129   AST 27   ALT 42   ANIONGAP 7*   EGFRNONAA 22*     Cardiac Markers:   Recent Labs  Lab 06/12/17  0703   *     POCT Glucose: No results for input(s): POCTGLUCOSE in the last 48 hours.  TSH:    Recent Labs  Lab 05/25/17  1004   TSH 1.785     Urine Culture: No results for input(s): LABURIN in the last 48 hours.  Urine Studies: No results for input(s): COLORU, APPEARANCEUA, PHUR, SPECGRAV, PROTEINUA, GLUCUA, KETONESU, BILIRUBINUA, OCCULTUA, NITRITE, UROBILINOGEN, LEUKOCYTESUR, RBCUA, WBCUA, BACTERIA, SQUAMEPITHEL, HYALINECASTS in the last 48 hours.    Invalid input(s): [place]    Significant Imaging: I have reviewed all pertinent imaging results/findings within the past 24 hours.

## 2017-06-12 NOTE — ASSESSMENT & PLAN NOTE
Acute respiratory failure with hypoxia secondary to acute on chronic diastolic heart failure   Wean oxygen as tolerated

## 2017-06-12 NOTE — H&P
[facility] Hospital Medicine  History & Physical    Patient Name: Erica Moulton  MRN: [id number]  Admission Date: 6/12/2017  Attending Physician: Jackie Ramirez MD   Primary Care Provider: Sendy Elaine MD         Patient information was obtained from patient and ER records.     Subjective:     Principal Problem:Diastolic CHF, acute on chronic    Chief Complaint:   Chief Complaint   Patient presents with    Shortness of Breath     SOB for past hour, 88% on room air per EMS, 99% on 4L NC        HPI: 73 yo female with HTN, chronic diastolic CHF, DM2 with retinopathy, neuropathy and nephropathy, obesity, CKD, TIA, HLP, hypothyroidism, and HUMBERTO on cpap Qhs presented from home with c/o worsening SOB one hour prior to arrival. On EMS arrival she was 88% on room air. She does not use home oxygen.  She reports midsternal chest pressure with orthopnea, peripheral edema and PATRICIA. Her CPAP mask broke last night and symptoms began this morning. Oxygen saturation improved with oxygen and now comfortable. CXR c/w pulmonary edema. BP on admit 200/100. Patient is non-complaint with medications.     Past Medical History:   Diagnosis Date    Cataracts, bilateral     CHF (congestive heart failure)     CKD (chronic kidney disease) stage 3, GFR 30-59 ml/min     CKD (chronic kidney disease) stage 3, GFR 30-59 ml/min     Controlled type 2 diabetes mellitus with proteinuria or albuminuria     Depression     Diabetes with neurologic complications     Diabetic retinopathy of both eyes     Edema     Glaucoma     History of colonic polyps     Hx-TIA (transient ischemic attack) 11/2008    Hyperlipidemia LDL goal < 100     Hypertension     Hypothyroidism     Major depressive disorder, single episode, mild 2/17/2016    Mixed incontinence urge and stress     Obesity     Obstructive sleep apnea on CPAP     Osteopenia     Proteinuria     Sickle cell trait     Trouble in sleeping     Type 2 diabetes  mellitus with ophthalmic manifestations     Type 2 diabetes with stage 3 chronic kidney disease GFR 30-59     Type II or unspecified type diabetes mellitus with renal manifestations, uncontrolled     Urge incontinence 2016    Urge incontinence     Vitamin D deficiency disease        Past Surgical History:   Procedure Laterality Date    breast reduction Bilateral age 31    cataracts Bilateral      SECTION, LOW TRANSVERSE      x1    CHOLECYSTECTOMY      EYE SURGERY  2014, 2014    vitrectomy    EYE SURGERY Right 2016    HYSTERECTOMY  1986    TAHBSO (patient is unsure if ovaries removed)    REFRACTIVE SURGERY         Review of patient's allergies indicates:   Allergen Reactions    Ace inhibitors Other (See Comments)     Other reaction(s): cough       No current facility-administered medications on file prior to encounter.      Current Outpatient Prescriptions on File Prior to Encounter   Medication Sig    atorvastatin (LIPITOR) 10 MG tablet Take 1 tablet (10 mg total) by mouth once daily.    citalopram (CELEXA) 10 MG tablet Take 1 tablet (10 mg total) by mouth once daily.    clopidogrel (PLAVIX) 75 mg tablet TAKE 1 TABLET ONE TIME DAILY    docusate sodium (COLACE) 100 MG capsule Take 100 mg by mouth 2 (two) times daily.    ergocalciferol (ERGOCALCIFEROL) 50,000 unit Cap Take 50,000 Units by mouth every 14 (fourteen) days.     furosemide (LASIX) 40 MG tablet Take 1 tablet (40 mg total) by mouth 2 (two) times daily.    hydrALAZINE (APRESOLINE) 50 MG tablet Take 1 tablet (50 mg total) by mouth 3 (three) times daily.    levothyroxine (SYNTHROID) 50 MCG tablet Take 50 mcg by mouth once daily.    losartan (COZAAR) 100 MG tablet TAKE 1 TABLET ONE TIME DAILY    metoprolol tartrate (LOPRESSOR) 100 MG tablet Take 1 tablet (100 mg total) by mouth 2 (two) times daily.    oxybutynin (DITROPAN-XL) 10 MG 24 hr tablet Take 1 tablet (10 mg total) by mouth once daily.    potassium chloride  SA (K-DUR,KLOR-CON) 20 MEQ tablet TAKE 1 TABLET ONE TIME DAILY    VGO 20 [place]     HUMALOG 100 unit/mL injection 20 Units.     LANCETS MISC     VICTOZA 0.6 mg/0.1 mL (18 mg/3 mL) PnIj 1.2 mg every morning.      Family History     Problem Relation (Age of Onset)    Achondroplasia Sister    Breast cancer Maternal Aunt (65)    Diabetes Mother, Paternal Grandmother    Esophageal cancer Maternal Uncle    HIV Brother    Hypertension Mother    Leukemia Father    Ovarian cancer Sister (35)    Parkinsonism Maternal Aunt    Stroke Mother        Social History Main Topics    Smoking status: Never Smoker    Smokeless tobacco: Never Used    Alcohol use No    Drug use: No    Sexual activity: Not Currently     Partners: Male     Birth control/ protection: Post-menopausal, Surgical     Review of Systems   Constitutional: Negative.    HENT: Negative.    Eyes: Negative.    Respiratory: Positive for apnea, chest tightness and shortness of breath.    Cardiovascular: Positive for chest pain and leg swelling.   Gastrointestinal: Negative.    Endocrine: Negative.    Genitourinary: Positive for urgency.   Musculoskeletal: Positive for arthralgias.   Neurological: Negative.    Psychiatric/Behavioral: Negative.      Objective:     Vital Signs (Most Recent):  Temp: 98.4 °F (36.9 °C) (06/12/17 1304)  Pulse: 67 (06/12/17 1304)  Resp: 19 (06/12/17 1304)  BP: (!) 172/87 (06/12/17 1304)  SpO2: 96 % (06/12/17 1304) Vital Signs (24h Range):  Temp:  [98.2 °F (36.8 °C)-98.9 °F (37.2 °C)] 98.4 °F (36.9 °C)  Pulse:  [67-86] 67  Resp:  [18-22] 19  SpO2:  [96 %-100 %] 96 %  BP: (163-200)/() 172/87     Weight: 113.4 kg (250 lb)  Body mass index is 41.6 kg/m².    Physical Exam   Constitutional: She is oriented to person, place, and time. She appears well-developed and well-nourished. No distress.   HENT:   Head: Normocephalic and atraumatic.   Mouth/Throat: Oropharynx is clear and moist.   Eyes: Conjunctivae and EOM are normal.   Neck: Neck  supple. No JVD present.   Cardiovascular: Normal rate, regular rhythm, normal heart sounds and intact distal pulses.    No murmur heard.  Pulmonary/Chest: Effort normal. No respiratory distress. She has rales.   Abdominal: Soft. Bowel sounds are normal. She exhibits no distension. There is no tenderness.   Musculoskeletal: Normal range of motion. She exhibits edema. She exhibits no deformity.   Neurological: She is alert and oriented to person, place, and time.   Skin: Skin is warm and dry. Capillary refill takes less than 2 seconds. No rash noted.   Psychiatric: She has a normal mood and affect. Her behavior is normal.        Significant Labs:   CBC:   Recent Labs  Lab 06/12/17  0703   WBC 9.23   HGB 11.9*   HCT 35.4*        CMP:   Recent Labs  Lab 06/12/17  0703      K 3.9      CO2 27   *   BUN 44*   CREATININE 2.2*   CALCIUM 9.0   PROT 6.7   ALBUMIN 2.7*   BILITOT 0.3   ALKPHOS 129   AST 27   ALT 42   ANIONGAP 7*   EGFRNONAA 22*     Cardiac Markers:   Recent Labs  Lab 06/12/17  0703   *     POCT Glucose: No results for input(s): POCTGLUCOSE in the last 48 hours.  TSH:   Recent Labs  Lab 05/25/17  1004   TSH 1.785     Urine Culture: No results for input(s): LABURIN in the last 48 hours.  Urine Studies: No results for input(s): COLORU, APPEARANCEUA, PHUR, SPECGRAV, PROTEINUA, GLUCUA, KETONESU, BILIRUBINUA, OCCULTUA, NITRITE, UROBILINOGEN, LEUKOCYTESUR, RBCUA, WBCUA, BACTERIA, SQUAMEPITHEL, HYALINECASTS in the last 48 hours.    Invalid input(s): WRIGHTSUR    Significant Imaging: I have reviewed all pertinent imaging results/findings within the past 24 hours.    Assessment/Plan:     * Diastolic CHF, acute on chronic    Possibly not compliant with diet and medications  Cardiac diet  IV lasix BID  Strict I/Os  Daily weights  Case management assisting with home CHF program  ECHO reviewed:      1 - Normal left ventricular systolic function (EF 65-70%).     2 - No diagnostic regional wall  motion abnormalities.     3 - Concentric remodeling.     4 - Impaired LV relaxation, elevated LAP (grade 2 diastolic dysfunction).     5 - Trivial tricuspid regurgitation.     6 - The estimated PA systolic pressure is greater than 18 mmHg.           Edema    Elevated LE  IV lasix        Hypoxia    Acute respiratory failure with hypoxia secondary to acute on chronic diastolic heart failure   Wean oxygen as tolerated           Controlled type 2 diabetes with renal manifestation    Pt uses victoza and humalog at home, hemoglobin A1c 8.7% (5/2017)  Uncontrolled  Will use basal-bolus insulin diabetic diet          Type 2 diabetes, controlled, with neuropathy    See above          Type 2 diabetes, controlled, with retinopathy    See above          Obstructive sleep apnea on CPAP    cpap qhs          Hypertensive emergency    Possibly non-compliant with diet and medication  Resume hydralazine and lopressor  Losartan held with Cr 2.2  IV diuresis             Hypothyroidism (acquired)    Resume synthroid  Chemically euthyroid          Hyperlipidemia LDL goal <100    Resume statin  Lipid panel in am          Morbid obesity with BMI of 45.0-49.9, adult    Counseled on weight loss and dietary modifications           History of TIAs    On lavix and statin  BP control  Weight management                                         VTE Risk Mitigation         Ordered     enoxaparin injection 30 mg  Daily     Route:  Subcutaneous        06/12/17 1042     Medium Risk of VTE  Once      06/12/17 1042        Jackie Ramirez MD  Department of Hospital Medicine   [facility]

## 2017-06-12 NOTE — ASSESSMENT & PLAN NOTE
Possibly non-compliant with diet and medication  Resume hydralazine and lopressor  Losartan held with Cr 2.2  IV diuresis

## 2017-06-12 NOTE — HPI
71 yo female with HTN, chronic diastolic CHF, DM2 with retinopathy, neuropathy and nephropathy, obesity, CKD, TIA, HLP, hypothyroidism, and HUMBERTO on cpap Qhs presented from home with c/o worsening SOB one hour prior to arrival. On EMS arrival she was 88% on room air. She does not use home oxygen.  She reports midsternal chest pressure with orthopnea, peripheral edema and PATRICIA. Her CPAP mask broke last night and symptoms began this morning. Oxygen saturation improved with oxygen and now comfortable. CXR c/w pulmonary edema. BP on admit 200/100. Patient is non-complaint with medications.

## 2017-06-13 LAB
ALBUMIN SERPL BCP-MCNC: 2.5 G/DL
ALP SERPL-CCNC: 96 U/L
ALT SERPL W/O P-5'-P-CCNC: 35 U/L
ANION GAP SERPL CALC-SCNC: 10 MMOL/L
AST SERPL-CCNC: 21 U/L
BASOPHILS # BLD AUTO: 0.02 K/UL
BASOPHILS NFR BLD: 0.2 %
BILIRUB SERPL-MCNC: 0.5 MG/DL
BUN SERPL-MCNC: 38 MG/DL
CALCIUM SERPL-MCNC: 9.1 MG/DL
CHLORIDE SERPL-SCNC: 106 MMOL/L
CHOLEST/HDLC SERPL: 2.8 {RATIO}
CO2 SERPL-SCNC: 27 MMOL/L
CREAT SERPL-MCNC: 2.2 MG/DL
DIFFERENTIAL METHOD: ABNORMAL
EOSINOPHIL # BLD AUTO: 0.1 K/UL
EOSINOPHIL NFR BLD: 1.2 %
ERYTHROCYTE [DISTWIDTH] IN BLOOD BY AUTOMATED COUNT: 15.2 %
EST. GFR  (AFRICAN AMERICAN): 25 ML/MIN/1.73 M^2
EST. GFR  (NON AFRICAN AMERICAN): 22 ML/MIN/1.73 M^2
GLUCOSE SERPL-MCNC: 142 MG/DL
HCT VFR BLD AUTO: 35.8 %
HDL/CHOLESTEROL RATIO: 35.4 %
HDLC SERPL-MCNC: 144 MG/DL
HDLC SERPL-MCNC: 51 MG/DL
HGB BLD-MCNC: 11.7 G/DL
LDLC SERPL CALC-MCNC: 75.2 MG/DL
LYMPHOCYTES # BLD AUTO: 1 K/UL
LYMPHOCYTES NFR BLD: 11.6 %
MCH RBC QN AUTO: 25.8 PG
MCHC RBC AUTO-ENTMCNC: 32.7 %
MCV RBC AUTO: 79 FL
MONOCYTES # BLD AUTO: 0.9 K/UL
MONOCYTES NFR BLD: 10.1 %
NEUTROPHILS # BLD AUTO: 6.6 K/UL
NEUTROPHILS NFR BLD: 76.6 %
NONHDLC SERPL-MCNC: 93 MG/DL
PLATELET # BLD AUTO: 219 K/UL
PMV BLD AUTO: 11.6 FL
POCT GLUCOSE: 166 MG/DL (ref 70–110)
POCT GLUCOSE: 232 MG/DL (ref 70–110)
POCT GLUCOSE: 265 MG/DL (ref 70–110)
POCT GLUCOSE: 314 MG/DL (ref 70–110)
POTASSIUM SERPL-SCNC: 3.8 MMOL/L
PROT SERPL-MCNC: 6.6 G/DL
RBC # BLD AUTO: 4.53 M/UL
SODIUM SERPL-SCNC: 143 MMOL/L
TRIGL SERPL-MCNC: 89 MG/DL
WBC # BLD AUTO: 8.68 K/UL

## 2017-06-13 PROCEDURE — 25000003 PHARM REV CODE 250: Performed by: EMERGENCY MEDICINE

## 2017-06-13 PROCEDURE — 94660 CPAP INITIATION&MGMT: CPT

## 2017-06-13 PROCEDURE — 36415 COLL VENOUS BLD VENIPUNCTURE: CPT

## 2017-06-13 PROCEDURE — 27000190 HC CPAP FULL FACE MASK W/VALVE

## 2017-06-13 PROCEDURE — 27000221 HC OXYGEN, UP TO 24 HOURS

## 2017-06-13 PROCEDURE — 25000003 PHARM REV CODE 250: Performed by: HOSPITALIST

## 2017-06-13 PROCEDURE — 94761 N-INVAS EAR/PLS OXIMETRY MLT: CPT

## 2017-06-13 PROCEDURE — 85025 COMPLETE CBC W/AUTO DIFF WBC: CPT

## 2017-06-13 PROCEDURE — 99900035 HC TECH TIME PER 15 MIN (STAT)

## 2017-06-13 PROCEDURE — 80061 LIPID PANEL: CPT

## 2017-06-13 PROCEDURE — 80053 COMPREHEN METABOLIC PANEL: CPT

## 2017-06-13 PROCEDURE — 63600175 PHARM REV CODE 636 W HCPCS: Performed by: EMERGENCY MEDICINE

## 2017-06-13 PROCEDURE — 21400001 HC TELEMETRY ROOM

## 2017-06-13 RX ADMIN — DOCUSATE SODIUM AND SENNOSIDES 1 TABLET: 8.6; 5 TABLET, FILM COATED ORAL at 09:06

## 2017-06-13 RX ADMIN — ATORVASTATIN CALCIUM 10 MG: 10 TABLET, FILM COATED ORAL at 08:06

## 2017-06-13 RX ADMIN — INSULIN DETEMIR 15 UNITS: 100 INJECTION, SOLUTION SUBCUTANEOUS at 09:06

## 2017-06-13 RX ADMIN — HYDRALAZINE HYDROCHLORIDE 75 MG: 25 TABLET ORAL at 09:06

## 2017-06-13 RX ADMIN — Medication 3 ML: at 06:06

## 2017-06-13 RX ADMIN — OXYBUTYNIN CHLORIDE 10 MG: 5 TABLET, FILM COATED, EXTENDED RELEASE ORAL at 08:06

## 2017-06-13 RX ADMIN — CITALOPRAM HYDROBROMIDE 10 MG: 10 TABLET ORAL at 08:06

## 2017-06-13 RX ADMIN — HYDRALAZINE HYDROCHLORIDE 75 MG: 25 TABLET ORAL at 02:06

## 2017-06-13 RX ADMIN — ENOXAPARIN SODIUM 30 MG: 100 INJECTION SUBCUTANEOUS at 05:06

## 2017-06-13 RX ADMIN — Medication 3 ML: at 02:06

## 2017-06-13 RX ADMIN — DOCUSATE SODIUM AND SENNOSIDES 1 TABLET: 8.6; 5 TABLET, FILM COATED ORAL at 08:06

## 2017-06-13 RX ADMIN — HYDRALAZINE HYDROCHLORIDE 75 MG: 25 TABLET ORAL at 05:06

## 2017-06-13 RX ADMIN — INSULIN ASPART 2 UNITS: 100 INJECTION, SOLUTION INTRAVENOUS; SUBCUTANEOUS at 05:06

## 2017-06-13 RX ADMIN — FUROSEMIDE 40 MG: 10 INJECTION, SOLUTION INTRAMUSCULAR; INTRAVENOUS at 05:06

## 2017-06-13 RX ADMIN — CLOPIDOGREL BISULFATE 75 MG: 75 TABLET ORAL at 08:06

## 2017-06-13 RX ADMIN — Medication 3 ML: at 10:06

## 2017-06-13 RX ADMIN — METOPROLOL TARTRATE 100 MG: 50 TABLET ORAL at 09:06

## 2017-06-13 RX ADMIN — LEVOTHYROXINE SODIUM 50 MCG: 50 TABLET ORAL at 05:06

## 2017-06-13 RX ADMIN — INSULIN ASPART 4 UNITS: 100 INJECTION, SOLUTION INTRAVENOUS; SUBCUTANEOUS at 08:06

## 2017-06-13 RX ADMIN — FUROSEMIDE 40 MG: 10 INJECTION, SOLUTION INTRAMUSCULAR; INTRAVENOUS at 08:06

## 2017-06-13 RX ADMIN — INSULIN ASPART 6 UNITS: 100 INJECTION, SOLUTION INTRAVENOUS; SUBCUTANEOUS at 12:06

## 2017-06-13 RX ADMIN — INSULIN ASPART 4 UNITS: 100 INJECTION, SOLUTION INTRAVENOUS; SUBCUTANEOUS at 09:06

## 2017-06-13 RX ADMIN — METOPROLOL TARTRATE 100 MG: 50 TABLET ORAL at 08:06

## 2017-06-13 NOTE — PLAN OF CARE
Problem: Patient Care Overview  Goal: Plan of Care Review  Patient resting in bed. No distress noted. Is on 2 liters oxygen. Will continue to monitor blood pressure and blood glucose levels. Is going to bathroom to urinate often. Call light in reach. Will continue to monitor.Shae

## 2017-06-13 NOTE — PLAN OF CARE
Problem: Diabetes, Type 2 (Adult)  Goal: Signs and Symptoms of Listed Potential Problems Will be Absent, Minimized or Managed (Diabetes, Type 2)  Signs and symptoms of listed potential problems will be absent, minimized or managed by discharge/transition of care (reference Diabetes, Type 2 (Adult) CPG).   Outcome: Ongoing (interventions implemented as appropriate)   06/12/17 2353   Diabetes, Type 2   Problems Assessed (Type 2 Diabetes) all   Problems Present (Type 2 Diabetes) none       Problem: Fall Risk (Adult)  Goal: Absence of Falls  Patient will demonstrate the desired outcomes by discharge/transition of care.   Outcome: Ongoing (interventions implemented as appropriate)   06/12/17 2353   Fall Risk (Adult)   Absence of Falls making progress toward outcome       Problem: Patient Care Overview  Goal: Plan of Care Review  Outcome: Ongoing (interventions implemented as appropriate)   06/12/17 2353   Coping/Psychosocial   Plan Of Care Reviewed With patient       Problem: Cardiac: Heart Failure (Adult)  Goal: Signs and Symptoms of Listed Potential Problems Will be Absent, Minimized or Managed (Cardiac: Heart Failure)  Signs and symptoms of listed potential problems will be absent, minimized or managed by discharge/transition of care (reference Cardiac: Heart Failure (Adult) CPG).  Outcome: Ongoing (interventions implemented as appropriate)   06/12/17 2353   Cardiac: Heart Failure   Problems Assessed (Heart Failure) all   Problems Present (Heart Failure) none

## 2017-06-13 NOTE — NURSING
Pt lying in bed AAOx3 watching TV.  Resp even NL.  Pt denies pain/sob.  Plan of care discussed with pt.  Advised pt she will be NPO after midnight for a fasting lipid in the am.  Pt ambulating to the bathroom with a steady gait.  Pt voids CYU.  Advised pt to call for assistance.  Call light in reach.  No distress noted.

## 2017-06-13 NOTE — PROGRESS NOTES
[place] called and spoke to Claudia at [facility] regarding cpap face mask and tubing..  Says will need to see mask first, since pt received one last yeat in  April 2017..Rossy Gonzales RN, BSN, [facility]  6/13/2017

## 2017-06-13 NOTE — ASSESSMENT & PLAN NOTE
Possibly not compliant with diet and medications  Cardiac diet  IV lasix BID  Strict I/Os  Daily weights  Case management assisting with home CHF program  ECHO reviewed:      1 - Normal left ventricular systolic function (EF 65-70%).     2 - No diagnostic regional wall motion abnormalities.     3 - Concentric remodeling.     4 - Impaired LV relaxation, elevated LAP (grade 2 diastolic dysfunction).     5 - Trivial tricuspid regurgitation.     6 - The estimated PA systolic pressure is greater than 18 mmHg.   improving with IV lasix,

## 2017-06-13 NOTE — PROGRESS NOTES
[facility] Hospital Medicine  Progress Note    Patient Name: Erica Moulton  MRN: [id number]  Patient Class: IP- Inpatient   Admission Date: 6/12/2017  Length of Stay: 1 days  Attending Physician: Avani Hernandez MD  Primary Care Provider: Sendy Elaine MD        Subjective:     Principal Problem:Diastolic CHF, acute on chronic    HPI:  73 yo female with HTN, chronic diastolic CHF, DM2 with retinopathy, neuropathy and nephropathy, obesity, CKD, TIA, HLP, hypothyroidism, and HUMBERTO on cpap Qhs presented from home with c/o worsening SOB one hour prior to arrival. On EMS arrival she was 88% on room air. She does not use home oxygen.  She reports midsternal chest pressure with orthopnea, peripheral edema and PATRICIA. Her CPAP mask broke last night and symptoms began this morning. Oxygen saturation improved with oxygen and now comfortable. CXR c/w pulmonary edema. BP on admit 200/100. Patient is non-complaint with medications.       Hospital Course:  73 yo female with HTN, chronic diastolic CHF, DM2 with retinopathy, neuropathy and nephropathy, obesity, CKD 3, TIA, HLP, hypothyroidism, and HUMBERTO on cpap Qhs presented from home with c/o worsening SOB one hour prior to arrival. On EMS arrival she was 88% on room air. She does not use home oxygen.  She reports midsternal chest pressure with orthopnea, peripheral edema and PATRICIA. Her CPAP mask broke ,Oxygen saturation improved with oxygen and now comfortable. CXR c/w pulmonary edema. BP on admit 200/100. Patient is non-complaint with medications.   She had improvement with IV lasix,SOB improved.    Past Medical History:   Diagnosis Date    Cataracts, bilateral     CHF (congestive heart failure)     CKD (chronic kidney disease) stage 3, GFR 30-59 ml/min     CKD (chronic kidney disease) stage 3, GFR 30-59 ml/min     Controlled type 2 diabetes mellitus with proteinuria or albuminuria     Depression     Diabetes with neurologic complications      Diabetic retinopathy of both eyes     Edema     Glaucoma     History of colonic polyps     Hx-TIA (transient ischemic attack) 2008    Hyperlipidemia LDL goal < 100     Hypertension     Hypothyroidism     Major depressive disorder, single episode, mild 2016    Mixed incontinence urge and stress     Obesity     Obstructive sleep apnea on CPAP     Osteopenia     Proteinuria     Sickle cell trait     Trouble in sleeping     Type 2 diabetes mellitus with ophthalmic manifestations     Type 2 diabetes with stage 3 chronic kidney disease GFR 30-59     Type II or unspecified type diabetes mellitus with renal manifestations, uncontrolled     Urge incontinence 2016    Urge incontinence     Vitamin D deficiency disease        Past Surgical History:   Procedure Laterality Date    breast reduction Bilateral age 31    cataracts Bilateral      SECTION, LOW TRANSVERSE      x1    CHOLECYSTECTOMY      EYE SURGERY  2014, 2014    vitrectomy    EYE SURGERY Right 2016    HYSTERECTOMY      TAHBSO (patient is unsure if ovaries removed)    REFRACTIVE SURGERY         Review of patient's allergies indicates:   Allergen Reactions    Ace inhibitors Other (See Comments)     Other reaction(s): cough       No current facility-administered medications on file prior to encounter.      Current Outpatient Prescriptions on File Prior to Encounter   Medication Sig    atorvastatin (LIPITOR) 10 MG tablet Take 1 tablet (10 mg total) by mouth once daily.    citalopram (CELEXA) 10 MG tablet Take 1 tablet (10 mg total) by mouth once daily.    clopidogrel (PLAVIX) 75 mg tablet TAKE 1 TABLET ONE TIME DAILY    docusate sodium (COLACE) 100 MG capsule Take 100 mg by mouth 2 (two) times daily.    ergocalciferol (ERGOCALCIFEROL) 50,000 unit Cap Take 50,000 Units by mouth every 14 (fourteen) days.     furosemide (LASIX) 40 MG tablet Take 1 tablet (40 mg total) by mouth 2 (two) times daily.     hydrALAZINE (APRESOLINE) 50 MG tablet Take 1 tablet (50 mg total) by mouth 3 (three) times daily.    levothyroxine (SYNTHROID) 50 MCG tablet Take 50 mcg by mouth once daily.    losartan (COZAAR) 100 MG tablet TAKE 1 TABLET ONE TIME DAILY    metoprolol tartrate (LOPRESSOR) 100 MG tablet Take 1 tablet (100 mg total) by mouth 2 (two) times daily.    oxybutynin (DITROPAN-XL) 10 MG 24 hr tablet Take 1 tablet (10 mg total) by mouth once daily.    potassium chloride SA (K-DUR,KLOR-CON) 20 MEQ tablet TAKE 1 TABLET ONE TIME DAILY    VGO 20 [place]     HUMALOG 100 unit/mL injection 20 Units.     LANCETS MISC     VICTOZA 0.6 mg/0.1 mL (18 mg/3 mL) PnIj 1.2 mg every morning.      Family History     Problem Relation (Age of Onset)    Achondroplasia Sister    Breast cancer Maternal Aunt (65)    Diabetes Mother, Paternal Grandmother    Esophageal cancer Maternal Uncle    HIV Brother    Hypertension Mother    Leukemia Father    Ovarian cancer Sister (35)    Parkinsonism Maternal Aunt    Stroke Mother        Social History Main Topics    Smoking status: Never Smoker    Smokeless tobacco: Never Used    Alcohol use No    Drug use: No    Sexual activity: Not Currently     Partners: Male     Birth control/ protection: Post-menopausal, Surgical     Review of Systems   Constitutional: Negative.    HENT: Negative.    Eyes: Negative.    Respiratory: Positive for apnea and shortness of breath. Negative for chest tightness.    Cardiovascular: Negative for chest pain and leg swelling.   Gastrointestinal: Negative.    Endocrine: Negative.    Genitourinary: Negative for urgency.   Musculoskeletal: Negative for arthralgias.   Neurological: Negative.    Psychiatric/Behavioral: Negative.      Objective:     Vital Signs (Most Recent):  Temp: 98.6 °F (37 °C) (06/13/17 0843)  Pulse: 77 (06/13/17 0843)  Resp: 18 (06/13/17 0843)  BP: 136/69 (06/13/17 0843)  SpO2: 95 % (06/13/17 0956) Vital Signs (24h Range):  Temp:  [98.6 °F (37 °C)-99.2 °F  (37.3 °C)] 98.6 °F (37 °C)  Pulse:  [67-78] 77  Resp:  [18-19] 18  SpO2:  [95 %-99 %] 95 %  BP: (136-187)/(69-96) 136/69     Weight: 118.5 kg (261 lb 3.2 oz)  Body mass index is 43.47 kg/m².    Physical Exam   Constitutional: She is oriented to person, place, and time. She appears well-developed and well-nourished. No distress.   HENT:   Head: Normocephalic and atraumatic.   Mouth/Throat: Oropharynx is clear and moist.   Eyes: Conjunctivae and EOM are normal.   Neck: Neck supple. No JVD present.   Cardiovascular: Normal rate, regular rhythm, normal heart sounds and intact distal pulses.    No murmur heard.  Pulmonary/Chest: Effort normal. No respiratory distress. She has rales.   Abdominal: Soft. Bowel sounds are normal. She exhibits no distension. There is no tenderness.   Musculoskeletal: Normal range of motion. She exhibits edema. She exhibits no deformity.   Neurological: She is alert and oriented to person, place, and time.   Skin: Skin is warm and dry. Capillary refill takes less than 2 seconds. No rash noted.   Psychiatric: She has a normal mood and affect. Her behavior is normal.        Significant Labs:   CBC:     Recent Labs  Lab 06/12/17  0703 06/13/17  0432   WBC 9.23 8.68   HGB 11.9* 11.7*   HCT 35.4* 35.8*    219     CMP:     Recent Labs  Lab 06/12/17  0703 06/13/17  0432    143   K 3.9 3.8    106   CO2 27 27   * 142*   BUN 44* 38*   CREATININE 2.2* 2.2*   CALCIUM 9.0 9.1   PROT 6.7 6.6   ALBUMIN 2.7* 2.5*   BILITOT 0.3 0.5   ALKPHOS 129 96   AST 27 21   ALT 42 35   ANIONGAP 7* 10   EGFRNONAA 22* 22*     Cardiac Markers:     Recent Labs  Lab 06/12/17  0703   *     POCT Glucose:     Recent Labs  Lab 06/12/17  2059 06/13/17  0818 06/13/17  1220   POCTGLUCOSE 268* 232* 265*     TSH:     Recent Labs  Lab 05/25/17  1004   TSH 1.785     Urine Culture: No results for input(s): LABURIN in the last 48 hours.  Urine Studies: No results for input(s): COLORU, APPEARANCEUA, PHUR,  SPECGRAV, PROTEINUA, GLUCUA, KETONESU, BILIRUBINUA, OCCULTUA, NITRITE, UROBILINOGEN, LEUKOCYTESUR, RBCUA, WBCUA, BACTERIA, SQUAMEPITHEL, HYALINECASTS in the last 48 hours.    Invalid input(s): [place]    Significant Imaging: I have reviewed all pertinent imaging results/findings within the past 24 hours.    Assessment/Plan:      History of TIAs    On lavix and statin  BP control  Weight management           Hypoxia    Acute respiratory failure with hypoxia secondary to acute on chronic diastolic heart failure   Wean oxygen as tolerated           Type 2 diabetes, controlled, with neuropathy    See above          Controlled type 2 diabetes with renal manifestation    Pt uses victoza and humalog at home, hemoglobin A1c 8.7% (5/2017)  Uncontrolled  Will use basal-bolus insulin diabetic diet          Type 2 diabetes, controlled, with retinopathy    See above          Hypothyroidism (acquired)    Resume synthroid  Chemically euthyroid          Obstructive sleep apnea on CPAP    Bipap qhs          Hyperlipidemia LDL goal <100    Resume statin  Lipid panel in am          Edema    Elevated LE  IV lasix        Sickle cell trait              Morbid obesity with BMI of 45.0-49.9, adult    Counseled on weight loss and dietary modifications           Hypertensive emergency    Possibly non-compliant with diet and medication  Resume hydralazine and lopressor  Losartan held with Cr 2.2  IV diuresis             Type 2 diabetes with stage 3 chronic kidney disease GFR 30-59              * Diastolic CHF, acute on chronic    Possibly not compliant with diet and medications  Cardiac diet  IV lasix BID  Strict I/Os  Daily weights  Case management assisting with home CHF program  ECHO reviewed:      1 - Normal left ventricular systolic function (EF 65-70%).     2 - No diagnostic regional wall motion abnormalities.     3 - Concentric remodeling.     4 - Impaired LV relaxation, elevated LAP (grade 2 diastolic dysfunction).     5 - Trivial  tricuspid regurgitation.     6 - The estimated PA systolic pressure is greater than 18 mmHg.   improving with IV lasix,            VTE Risk Mitigation         Ordered     enoxaparin injection 30 mg  Daily     Route:  Subcutaneous        06/12/17 1042     Medium Risk of VTE  Once      06/12/17 1042          Avani Hernandez MD  Department of Hospital Medicine   [facility]

## 2017-06-13 NOTE — HOSPITAL COURSE
73 yo female with HTN, chronic diastolic CHF, DM2 with retinopathy, neuropathy and nephropathy, obesity, CKD 3, TIA, HLP, hypothyroidism, and HUMBERTO on cpap Qhs presented from home with c/o worsening SOB one hour prior to arrival. On EMS arrival she was 88% on room air. She does not use home oxygen.  She reports midsternal chest pressure with orthopnea, peripheral edema and PATRICIA. Her CPAP mask was broken ,Oxygen saturation improved with supplemental oxygen , CXR c/w pulmonary edema. BP on admit 200/100. Patient is non-complaint with medications.   She has daija started on IV lasix with good urine out put,,SOB much improved.her brocken CPAP mask has been replaced by SW,her elevated blood pressure much better controlled,she has been consulted for compliance with medication,diet and CPAP mashine,she has been discharged home with follow up with PCP in next few days.

## 2017-06-13 NOTE — SUBJECTIVE & OBJECTIVE
Past Medical History:   Diagnosis Date    Cataracts, bilateral     CHF (congestive heart failure)     CKD (chronic kidney disease) stage 3, GFR 30-59 ml/min     CKD (chronic kidney disease) stage 3, GFR 30-59 ml/min     Controlled type 2 diabetes mellitus with proteinuria or albuminuria     Depression     Diabetes with neurologic complications     Diabetic retinopathy of both eyes     Edema     Glaucoma     History of colonic polyps     Hx-TIA (transient ischemic attack) 2008    Hyperlipidemia LDL goal < 100     Hypertension     Hypothyroidism     Major depressive disorder, single episode, mild 2016    Mixed incontinence urge and stress     Obesity     Obstructive sleep apnea on CPAP     Osteopenia     Proteinuria     Sickle cell trait     Trouble in sleeping     Type 2 diabetes mellitus with ophthalmic manifestations     Type 2 diabetes with stage 3 chronic kidney disease GFR 30-59     Type II or unspecified type diabetes mellitus with renal manifestations, uncontrolled     Urge incontinence 2016    Urge incontinence     Vitamin D deficiency disease        Past Surgical History:   Procedure Laterality Date    breast reduction Bilateral age 31    cataracts Bilateral      SECTION, LOW TRANSVERSE      x1    CHOLECYSTECTOMY      EYE SURGERY  2014, 2014    vitrectomy    EYE SURGERY Right 2016    HYSTERECTOMY      TASO (patient is unsure if ovaries removed)    REFRACTIVE SURGERY         Review of patient's allergies indicates:   Allergen Reactions    Ace inhibitors Other (See Comments)     Other reaction(s): cough       No current facility-administered medications on file prior to encounter.      Current Outpatient Prescriptions on File Prior to Encounter   Medication Sig    atorvastatin (LIPITOR) 10 MG tablet Take 1 tablet (10 mg total) by mouth once daily.    citalopram (CELEXA) 10 MG tablet Take 1 tablet (10 mg total) by mouth once daily.     clopidogrel (PLAVIX) 75 mg tablet TAKE 1 TABLET ONE TIME DAILY    docusate sodium (COLACE) 100 MG capsule Take 100 mg by mouth 2 (two) times daily.    ergocalciferol (ERGOCALCIFEROL) 50,000 unit Cap Take 50,000 Units by mouth every 14 (fourteen) days.     furosemide (LASIX) 40 MG tablet Take 1 tablet (40 mg total) by mouth 2 (two) times daily.    hydrALAZINE (APRESOLINE) 50 MG tablet Take 1 tablet (50 mg total) by mouth 3 (three) times daily.    levothyroxine (SYNTHROID) 50 MCG tablet Take 50 mcg by mouth once daily.    losartan (COZAAR) 100 MG tablet TAKE 1 TABLET ONE TIME DAILY    metoprolol tartrate (LOPRESSOR) 100 MG tablet Take 1 tablet (100 mg total) by mouth 2 (two) times daily.    oxybutynin (DITROPAN-XL) 10 MG 24 hr tablet Take 1 tablet (10 mg total) by mouth once daily.    potassium chloride SA (K-DUR,KLOR-CON) 20 MEQ tablet TAKE 1 TABLET ONE TIME DAILY    VGO 20 [place]     HUMALOG 100 unit/mL injection 20 Units.     LANCETS MISC     VICTOZA 0.6 mg/0.1 mL (18 mg/3 mL) PnIj 1.2 mg every morning.      Family History     Problem Relation (Age of Onset)    Achondroplasia Sister    Breast cancer Maternal Aunt (65)    Diabetes Mother, Paternal Grandmother    Esophageal cancer Maternal Uncle    HIV Brother    Hypertension Mother    Leukemia Father    Ovarian cancer Sister (35)    Parkinsonism Maternal Aunt    Stroke Mother        Social History Main Topics    Smoking status: Never Smoker    Smokeless tobacco: Never Used    Alcohol use No    Drug use: No    Sexual activity: Not Currently     Partners: Male     Birth control/ protection: Post-menopausal, Surgical     Review of Systems   Constitutional: Negative.    HENT: Negative.    Eyes: Negative.    Respiratory: Positive for apnea and shortness of breath. Negative for chest tightness.    Cardiovascular: Negative for chest pain and leg swelling.   Gastrointestinal: Negative.    Endocrine: Negative.    Genitourinary: Negative for urgency.    Musculoskeletal: Negative for arthralgias.   Neurological: Negative.    Psychiatric/Behavioral: Negative.      Objective:     Vital Signs (Most Recent):  Temp: 98.6 °F (37 °C) (06/13/17 0843)  Pulse: 77 (06/13/17 0843)  Resp: 18 (06/13/17 0843)  BP: 136/69 (06/13/17 0843)  SpO2: 95 % (06/13/17 0956) Vital Signs (24h Range):  Temp:  [98.6 °F (37 °C)-99.2 °F (37.3 °C)] 98.6 °F (37 °C)  Pulse:  [67-78] 77  Resp:  [18-19] 18  SpO2:  [95 %-99 %] 95 %  BP: (136-187)/(69-96) 136/69     Weight: 118.5 kg (261 lb 3.2 oz)  Body mass index is 43.47 kg/m².    Physical Exam   Constitutional: She is oriented to person, place, and time. She appears well-developed and well-nourished. No distress.   HENT:   Head: Normocephalic and atraumatic.   Mouth/Throat: Oropharynx is clear and moist.   Eyes: Conjunctivae and EOM are normal.   Neck: Neck supple. No JVD present.   Cardiovascular: Normal rate, regular rhythm, normal heart sounds and intact distal pulses.    No murmur heard.  Pulmonary/Chest: Effort normal. No respiratory distress. She has rales.   Abdominal: Soft. Bowel sounds are normal. She exhibits no distension. There is no tenderness.   Musculoskeletal: Normal range of motion. She exhibits edema. She exhibits no deformity.   Neurological: She is alert and oriented to person, place, and time.   Skin: Skin is warm and dry. Capillary refill takes less than 2 seconds. No rash noted.   Psychiatric: She has a normal mood and affect. Her behavior is normal.        Significant Labs:   CBC:     Recent Labs  Lab 06/12/17  0703 06/13/17  0432   WBC 9.23 8.68   HGB 11.9* 11.7*   HCT 35.4* 35.8*    219     CMP:     Recent Labs  Lab 06/12/17  0703 06/13/17  0432    143   K 3.9 3.8    106   CO2 27 27   * 142*   BUN 44* 38*   CREATININE 2.2* 2.2*   CALCIUM 9.0 9.1   PROT 6.7 6.6   ALBUMIN 2.7* 2.5*   BILITOT 0.3 0.5   ALKPHOS 129 96   AST 27 21   ALT 42 35   ANIONGAP 7* 10   EGFRNONAA 22* 22*     Cardiac Markers:      Recent Labs  Lab 06/12/17  0703   *     POCT Glucose:     Recent Labs  Lab 06/12/17  2059 06/13/17  0818 06/13/17  1220   POCTGLUCOSE 268* 232* 265*     TSH:     Recent Labs  Lab 05/25/17  1004   TSH 1.785     Urine Culture: No results for input(s): LABURIN in the last 48 hours.  Urine Studies: No results for input(s): COLORU, APPEARANCEUA, PHUR, SPECGRAV, PROTEINUA, GLUCUA, KETONESU, BILIRUBINUA, OCCULTUA, NITRITE, UROBILINOGEN, LEUKOCYTESUR, RBCUA, WBCUA, BACTERIA, SQUAMEPITHEL, HYALINECASTS in the last 48 hours.    Invalid input(s): [place]    Significant Imaging: I have reviewed all pertinent imaging results/findings within the past 24 hours.

## 2017-06-13 NOTE — PROGRESS NOTES
TN called to [facility] [phone], to inform them the defective cpap mask was brought to hospital.  TN spoke to Teetee.  She says she willl talkk with Brett, the  Supervisor..Rossy Gonzales RN, BSN, [facility]  6/13/2017

## 2017-06-13 NOTE — NURSING
Patient received to unit. No distress noted. Will complete admission. Oriented to unit. Call light in reach. Will continue to monitor.Shae

## 2017-06-13 NOTE — NURSING
Dr Fajardo asked that we investigate changing broken bipap mask. Spoke to pt who stated that [facility] was here this AM and refused to give new mask until she turns in old broken mask. Pt obtained old mask which is at bedside. [facility] called explained situation they will call back.

## 2017-06-14 VITALS
HEIGHT: 65 IN | HEART RATE: 76 BPM | WEIGHT: 258.19 LBS | BODY MASS INDEX: 43.02 KG/M2 | OXYGEN SATURATION: 92 % | TEMPERATURE: 99 F | DIASTOLIC BLOOD PRESSURE: 72 MMHG | RESPIRATION RATE: 18 BRPM | SYSTOLIC BLOOD PRESSURE: 121 MMHG

## 2017-06-14 PROBLEM — J96.01 ACUTE RESPIRATORY FAILURE WITH HYPOXIA: Status: ACTIVE | Noted: 2017-06-12

## 2017-06-14 PROBLEM — N18.30 CKD (CHRONIC KIDNEY DISEASE), STAGE III: Status: ACTIVE | Noted: 2017-06-14

## 2017-06-14 LAB
ANION GAP SERPL CALC-SCNC: 6 MMOL/L
BUN SERPL-MCNC: 45 MG/DL
CALCIUM SERPL-MCNC: 8.9 MG/DL
CHLORIDE SERPL-SCNC: 102 MMOL/L
CO2 SERPL-SCNC: 31 MMOL/L
CREAT SERPL-MCNC: 2.7 MG/DL
EST. GFR  (AFRICAN AMERICAN): 20 ML/MIN/1.73 M^2
EST. GFR  (NON AFRICAN AMERICAN): 17 ML/MIN/1.73 M^2
GLUCOSE SERPL-MCNC: 261 MG/DL
POCT GLUCOSE: 385 MG/DL (ref 70–110)
POTASSIUM SERPL-SCNC: 4.1 MMOL/L
SODIUM SERPL-SCNC: 139 MMOL/L

## 2017-06-14 PROCEDURE — 25000003 PHARM REV CODE 250: Performed by: EMERGENCY MEDICINE

## 2017-06-14 PROCEDURE — 25000003 PHARM REV CODE 250: Performed by: HOSPITALIST

## 2017-06-14 PROCEDURE — 80048 BASIC METABOLIC PNL TOTAL CA: CPT

## 2017-06-14 PROCEDURE — 36415 COLL VENOUS BLD VENIPUNCTURE: CPT

## 2017-06-14 RX ORDER — FUROSEMIDE 40 MG/1
40 TABLET ORAL 2 TIMES DAILY
Status: DISCONTINUED | OUTPATIENT
Start: 2017-06-14 | End: 2017-06-14 | Stop reason: HOSPADM

## 2017-06-14 RX ADMIN — METOPROLOL TARTRATE 100 MG: 50 TABLET ORAL at 09:06

## 2017-06-14 RX ADMIN — CLOPIDOGREL BISULFATE 75 MG: 75 TABLET ORAL at 09:06

## 2017-06-14 RX ADMIN — DOCUSATE SODIUM AND SENNOSIDES 1 TABLET: 8.6; 5 TABLET, FILM COATED ORAL at 09:06

## 2017-06-14 RX ADMIN — ATORVASTATIN CALCIUM 10 MG: 10 TABLET, FILM COATED ORAL at 09:06

## 2017-06-14 RX ADMIN — CITALOPRAM HYDROBROMIDE 10 MG: 10 TABLET ORAL at 09:06

## 2017-06-14 RX ADMIN — INSULIN ASPART 10 UNITS: 100 INJECTION, SOLUTION INTRAVENOUS; SUBCUTANEOUS at 09:06

## 2017-06-14 RX ADMIN — HYDRALAZINE HYDROCHLORIDE 75 MG: 25 TABLET ORAL at 05:06

## 2017-06-14 RX ADMIN — Medication 3 ML: at 06:06

## 2017-06-14 RX ADMIN — OXYBUTYNIN CHLORIDE 10 MG: 5 TABLET, FILM COATED, EXTENDED RELEASE ORAL at 09:06

## 2017-06-14 RX ADMIN — LEVOTHYROXINE SODIUM 50 MCG: 50 TABLET ORAL at 05:06

## 2017-06-14 RX ADMIN — FUROSEMIDE 40 MG: 40 TABLET ORAL at 09:06

## 2017-06-14 NOTE — PLAN OF CARE
06/14/17 1057   Final Note   Assessment Type Final Discharge Note   Discharge Disposition Home   Discharge planning education complete? Yes   What phone number can be called within the next 1-3 days to see how you are doing after discharge? (See chart.)   Hospital Follow Up  Appt(s) scheduled? Yes   Discharge plans and expectations educations in teach back method with documentation complete? Yes   Offered [facility] -- Bedside Delivery? n/a   Discharge/Hospital Encounter Summary to (non[facility]) PCP No   Referral to Outpatient Case Management complete? No   Referral to / orders for Home Health Complete? No   30 day supply of medicines given at discharge, if documented non-compliance / non-adherence? No   Any social issues identified prior to discharge? No   Did you assess the readiness or willingness of the family or caregiver to support self management of care? Yes   Right Care Referral Info   Post Acute Recommendation No Care

## 2017-06-14 NOTE — PROGRESS NOTES
Telemetry nurse informed that pt is ready for d/c from [facility]..Rossy Gonzales RN, BSN, STN [facility]  6/14/2017

## 2017-06-14 NOTE — PLAN OF CARE
Problem: Diabetes, Type 2 (Adult)  Goal: Signs and Symptoms of Listed Potential Problems Will be Absent, Minimized or Managed (Diabetes, Type 2)  Signs and symptoms of listed potential problems will be absent, minimized or managed by discharge/transition of care (reference Diabetes, Type 2 (Adult) CPG).   Outcome: Ongoing (interventions implemented as appropriate)   06/13/17 2231   Diabetes, Type 2   Problems Assessed (Type 2 Diabetes) all   Problems Present (Type 2 Diabetes) none       Problem: Fall Risk (Adult)  Goal: Absence of Falls  Patient will demonstrate the desired outcomes by discharge/transition of care.   Outcome: Ongoing (interventions implemented as appropriate)   06/13/17 2231   Fall Risk (Adult)   Absence of Falls making progress toward outcome       Problem: Patient Care Overview  Goal: Plan of Care Review  Outcome: Ongoing (interventions implemented as appropriate)   06/13/17 2231   Coping/Psychosocial   Plan Of Care Reviewed With patient       Problem: Cardiac: Heart Failure (Adult)  Goal: Signs and Symptoms of Listed Potential Problems Will be Absent, Minimized or Managed (Cardiac: Heart Failure)  Signs and symptoms of listed potential problems will be absent, minimized or managed by discharge/transition of care (reference Cardiac: Heart Failure (Adult) CPG).   Outcome: Ongoing (interventions implemented as appropriate)   06/13/17 2231   Cardiac: Heart Failure   Problems Assessed (Heart Failure) all   Problems Present (Heart Failure) none

## 2017-06-14 NOTE — NURSING
Pt lying in bed AAOx3 with family at BS.  Resp even NL at rest.  Pt denies pain/sob.  +2 edema noted to BLE's.  Plan of care discussed with pt.  Advised pt to call for assistance.  Call light in reach.

## 2017-06-14 NOTE — PROGRESS NOTES
Digital Medicine Heart Failure Program consult complete. Explained program details including home monitoring expectations, scale + router setup, weekly PharmD calls, and follow up appointment with labs. Addressed pt's questions and concerns to his/her satisfaction. Reviewed blue educational folder (Welcome letter, PharmD contact number, Heart Failure Zones, and program booklet.)    Patient lives in  House with 5 other relatives that are very supportive. Patient has a master degree in Social Work.  She still works part time with psy patients.  She is enthusiastic about starting the program.  She will see Dr. Rice 1 time.     [facility] Complaint: H&P on admit:         Chief Complaint   Patient presents with    Shortness of Breath       SOB for past hour, 88% on room air per EMS, 99% on 4L NC         HPI: 73 yo female with HTN, chronic diastolic CHF, DM2 with retinopathy, neuropathy and nephropathy, obesity, CKD, TIA, HLP, hypothyroidism, and HUMBERTO on cpap Qhs presented from home with c/o worsening SOB one hour prior to arrival. On EMS arrival she was 88% on room air. She does not use home oxygen.  She reports midsternal chest pressure with orthopnea, peripheral edema and PATRICIA. Her CPAP mask broke last night and symptoms began this morning. Oxygen saturation improved with oxygen and now comfortable. CXR c/w pulmonary edema. BP on admit 200/100. Patient is non-complaint with medications.           Past Medical History:   Diagnosis Date    Cataracts, bilateral      CHF (congestive heart failure)      CKD (chronic kidney disease) stage 3, GFR 30-59 ml/min      CKD (chronic kidney disease) stage 3, GFR 30-59 ml/min      Controlled type 2 diabetes mellitus with proteinuria or albuminuria      Depression      Diabetes with neurologic complications      Diabetic retinopathy of both eyes      Edema      Glaucoma      History of colonic polyps      Hx-TIA (transient ischemic attack) 11/2008    Hyperlipidemia LDL goal  < 100      Hypertension      Hypothyroidism      Major depressive disorder, single episode, mild 2/17/2016    Mixed incontinence urge and stress      Obesity      Obstructive sleep apnea on CPAP      Osteopenia      Proteinuria      Sickle cell trait      Trouble in sleeping      Type 2 diabetes mellitus with ophthalmic manifestations      Type 2 diabetes with stage 3 chronic kidney disease GFR 30-59      Type II or unspecified type diabetes mellitus with renal manifestations, uncontrolled      Urge incontinence 1/11/2016    Urge incontinence      Vitamin D deficiency disease       t verbalized understanding of all discussed and gave consent to enroll in 30-day monitoring program.        Issues: Patient denies any issues.    Contact information:  [phone] (home)   [phone] (mobile)    Extended Emergency Contact Information  Primary Emergency Contact: Anita Cain   [facility]  Home Phone: [phone]  Mobile Phone: [phone]  Relation: Daughter  Secondary Emergency Contact: Alexandria Del Rosario   [facility]  Home Phone: [phone]  Mobile Phone: [phone]  Relation: Son    Consults placed: Dietary  Readmission Risk: No Value exists for the : [facility]#610%    linked to pt with most recent inpatient dry weight.  [id number] LBS  Follow-up appointment scheduled on Dr. Rice 6/20/2017 at 2:20pm with labs at [facility] 6/20/2017 at 1:30 p.m.  ..Rossy Gonzales, RN, BSN, [facility]  6/14/2017

## 2017-06-14 NOTE — NURSING
Pt discharged home.  Pt teaching and dc instructions given including chf.  Pt verbalized understanding.  Tele removed.  IV removed left forearm tip in tact.  Pt tolerated well.  Pt awaiting son for ride home.  Bed locked and low.  Call light in reach.

## 2017-06-14 NOTE — PROGRESS NOTES
WRITTEN DISCHARGE INFORMATION:   Things that YOU are responsible for to Manage Your Care At Home:  1. Getting your prescriptions filled.  2. Taking you medications as directed. DO NOT MISS ANY DOSES!  3. Going to your follow-up doctor appointments. This is important because it allows the doctor to monitor your progress and to determine if any changes need to be made to your treatment plan.      Follow-up Information     Julius Nichole MD On 6/20/2017.    Specialty:  Cardiology  Why:  NEW CHF ENROLLED PATIENT: 2:20 pm follow up from the hospital  Contact information:  [address]  [address]  [place] [address]  [phone]             Marcos Thompson, FNP-C On 6/21/2017.    Specialty:  Urgent Care  Why:  work with Dr. Elaine.  NP will see at 4:00 pm  follow up from the hospital  Contact information:  [address]  [place] [address]  [phone]             [facility] LAB On 6/20/2017.    Why:  CHF PROGRAM NON FASTING LAB:  BNP, MG, CMP AT 1:30PM BEFORE SEEING DR. NICHOLE  Contact information:  [address] [facility]  1st floor lab  at [facility].  Non fasitng: BNP, CMP and MG                                                                    Help at Home  After discharge for assistance [facility] On Call Nurse Care Line 24/7 assistance  [phone]     Thank you for choosing [facility] for your care.  Please answer any calls you may receive from [facility] we want to continue to support you as you manage your healthcare needs.Sincerely, Your [facility] Healthcare Manager is,  Rossy Gonzales RN [facility] [phone]

## 2017-06-14 NOTE — ASSESSMENT & PLAN NOTE
Possibly non-compliant with diet and medication  Resume hydralazine and lopressor    IV diuresis   Resolved.

## 2017-06-14 NOTE — DISCHARGE SUMMARY
[facility] Hospital Medicine  Discharge Summary      Patient Name: Erica Moulton  MRN: [id number]  Admission Date: 6/12/2017  Hospital Length of Stay: 2 days  Discharge Date and Time:  06/14/2017 7:23 AM  Attending Physician: Avani Hernandez MD   Discharging Provider: Avani Hernandez MD  Primary Care Provider: Sendy Elaine MD      HPI:   73 yo female with HTN, chronic diastolic CHF, DM2 with retinopathy, neuropathy and nephropathy, obesity, CKD, TIA, HLP, hypothyroidism, and HUMBERTO on cpap Qhs presented from home with c/o worsening SOB one hour prior to arrival. On EMS arrival she was 88% on room air. She does not use home oxygen.  She reports midsternal chest pressure with orthopnea, peripheral edema and PATRICIA. Her CPAP mask broke last night and symptoms began this morning. Oxygen saturation improved with oxygen and now comfortable. CXR c/w pulmonary edema. BP on admit 200/100. Patient is non-complaint with medications.       * No surgery found *      Indwelling Lines/Drains at time of discharge:   Lines/Drains/Airways          No matching active lines, drains, or airways        Hospital Course:   73 yo female with HTN, chronic diastolic CHF, DM2 with retinopathy, neuropathy and nephropathy, obesity, CKD 3, TIA, HLP, hypothyroidism, and HUMBERTO on cpap Qhs presented from home with c/o worsening SOB one hour prior to arrival. On EMS arrival she was 88% on room air. She does not use home oxygen.  She reports midsternal chest pressure with orthopnea, peripheral edema and PATRICIA. Her CPAP mask was broken ,Oxygen saturation improved with supplemental oxygen , CXR c/w pulmonary edema. BP on admit 200/100. Patient is non-complaint with medications.   She has daija started on IV lasix with good urine out put,,SOB much improved.her brocken CPAP mask has been replaced by SW,her elevated blood pressure much better controlled,she has been consulted for compliance with medication,diet and CPAP  esteban,she has been discharged home with follow up with PCP in next few days.     Consults:     Significant Diagnostic Studies: Labs:   BMP:   Recent Labs  Lab 06/13/17  0432 06/14/17  0531   * 261*    139   K 3.8 4.1    102   CO2 27 31*   BUN 38* 45*   CREATININE 2.2* 2.7*   CALCIUM 9.1 8.9   , CBC   Recent Labs  Lab 06/13/17  0432   WBC 8.68   HGB 11.7*   HCT 35.8*       and Troponin   Recent Labs  Lab 06/12/17  1139   TROPONINI 0.062*     Radiology: X-Ray: CXR: X-Ray Chest 1 View (CXR): No results found for this visit on 06/12/17.    Pending Diagnostic Studies:     None        Final Active Diagnoses:    Diagnosis Date Noted POA    PRINCIPAL PROBLEM:  Diastolic CHF, acute on chronic [I50.33] 06/12/2017 Yes    CKD (chronic kidney disease), stage III [N18.3] 06/14/2017 Yes    Acute respiratory failure with hypoxia [J96.01] 06/12/2017 Yes    History of TIAs [Z86.73] 06/12/2017 Not Applicable    Controlled type 2 diabetes with renal manifestation [E11.29] 10/08/2014 Yes    Type 2 diabetes, controlled, with neuropathy [E11.40] 10/08/2014 Yes    Type 2 diabetes, controlled, with retinopathy [E11.319] 10/08/2014 Yes    Hypertensive emergency [I16.1]  Yes    Hyperlipidemia LDL goal <100 [E78.5]  Yes    Pulmonary edema, acute [J81.0]  Yes    Hypothyroidism (acquired) [E03.9]  Yes    Obstructive sleep apnea on CPAP [G47.33, Z99.89]  Not Applicable    Morbid obesity with BMI of 45.0-49.9, adult [E66.01, Z68.42]  Not Applicable    Type 2 diabetes with stage 3 chronic kidney disease GFR 30-59 [E11.22, N18.3]  Yes      Problems Resolved During this Admission:    Diagnosis Date Noted Date Resolved POA      History of TIAs    On lavix and statin  BP control  Weight management           Acute respiratory failure with hypoxia    Acute respiratory failure with hypoxia secondary to acute on chronic diastolic heart failure   Wean oxygen as tolerated           Type 2 diabetes, controlled, with  neuropathy    See above          Controlled type 2 diabetes with renal manifestation    Pt uses victoza and humalog at home, hemoglobin A1c 8.7% (5/2017)  Uncontrolled  Will use basal-bolus insulin diabetic diet          Type 2 diabetes, controlled, with retinopathy    See above          Hypothyroidism (acquired)    Resume synthroid  Chemically euthyroid          Obstructive sleep apnea on CPAP    Bipap qhs          Hyperlipidemia LDL goal <100    Resume statin            Pulmonary edema, acute    Elevated LE  IV lasix  Much improved         Morbid obesity with BMI of 45.0-49.9, adult    Counseled on weight loss and dietary modifications           Hypertensive emergency    Possibly non-compliant with diet and medication  Resume hydralazine and lopressor    IV diuresis   Resolved.            Type 2 diabetes with stage 3 chronic kidney disease GFR 30-59    On SSI,basal insulin.          * Diastolic CHF, acute on chronic    Possibly not compliant with diet and medications  Cardiac diet  IV lasix BID  Strict I/Os  Daily weights  Case management assisting with home CHF program  ECHO reviewed:      1 - Normal left ventricular systolic function (EF 65-70%).     2 - No diagnostic regional wall motion abnormalities.     3 - Concentric remodeling.     4 - Impaired LV relaxation, elevated LAP (grade 2 diastolic dysfunction).     5 - Trivial tricuspid regurgitation.     6 - The estimated PA systolic pressure is greater than 18 mmHg.   improving with IV lasix,              Discharged Condition: stable    Disposition: Home or Self Care    Follow Up:  Follow-up Information     Sendy Elaine MD In 1 week.    Specialties:  Internal Medicine, Pediatrics  Contact information:  [address] [place] [address] [phone]                 Patient Instructions:     Diet general     Activity as tolerated       Medications:  Reconciled Home Medications:   Current Discharge Medication List      CONTINUE these medications which have  NOT CHANGED    Details   atorvastatin (LIPITOR) 10 MG tablet Take 1 tablet (10 mg total) by mouth once daily.  Qty: 90 tablet, Refills: 1      citalopram (CELEXA) 10 MG tablet Take 1 tablet (10 mg total) by mouth once daily.  Qty: 90 tablet, Refills: 2    Associated Diagnoses: Moderate single current episode of major depressive disorder      clopidogrel (PLAVIX) 75 mg tablet TAKE 1 TABLET ONE TIME DAILY  Qty: 90 tablet, Refills: 1      docusate sodium (COLACE) 100 MG capsule Take 100 mg by mouth 2 (two) times daily.      ergocalciferol (ERGOCALCIFEROL) 50,000 unit Cap Take 50,000 Units by mouth every 14 (fourteen) days.       furosemide (LASIX) 40 MG tablet Take 1 tablet (40 mg total) by mouth 2 (two) times daily.  Qty: 180 tablet, Refills: 3      hydrALAZINE (APRESOLINE) 50 MG tablet Take 1 tablet (50 mg total) by mouth 3 (three) times daily.  Qty: 270 tablet, Refills: 3    Associated Diagnoses: Pulmonary hypertension; Obstructive sleep apnea on CPAP      levothyroxine (SYNTHROID) 50 MCG tablet Take 50 mcg by mouth once daily.      losartan (COZAAR) 100 MG tablet TAKE 1 TABLET ONE TIME DAILY  Qty: 90 tablet, Refills: 3    Associated Diagnoses: Obstructive sleep apnea on CPAP; Unspecified essential hypertension; CKD (chronic kidney disease) stage 3, GFR 30-59 ml/min; Hx-TIA (transient ischemic attack)      metoprolol tartrate (LOPRESSOR) 100 MG tablet Take 1 tablet (100 mg total) by mouth 2 (two) times daily.  Qty: 180 tablet, Refills: 3    Associated Diagnoses: Type II or unspecified type diabetes mellitus with renal manifestations, uncontrolled      oxybutynin (DITROPAN-XL) 10 MG 24 hr tablet Take 1 tablet (10 mg total) by mouth once daily.  Qty: 90 tablet, Refills: 3      potassium chloride SA (K-DUR,KLOR-CON) 20 MEQ tablet TAKE 1 TABLET ONE TIME DAILY  Qty: 90 tablet, Refills: 1      VGO 20 [place] Refills: 3      HUMALOG 100 unit/mL injection 20 Units.   Refills: 6      LANCETS MISC       VICTOZA 0.6 mg/0.1 mL (18  mg/3 mL) PnIj 1.2 mg every morning.            Time spent on the discharge of patient: 30  minutes    Avani Hernandez MD  Department of Hospital Medicine  [facility]

## 2017-06-15 ENCOUNTER — TELEPHONE (OUTPATIENT)
Dept: OTHER | Facility: OTHER | Age: 72
End: 2017-06-15

## 2017-06-15 ENCOUNTER — OFFICE VISIT (OUTPATIENT)
Dept: UROLOGY | Facility: CLINIC | Age: 72
End: 2017-06-15
Payer: MEDICARE

## 2017-06-15 VITALS
HEIGHT: 65 IN | BODY MASS INDEX: 42.99 KG/M2 | WEIGHT: 258 LBS | SYSTOLIC BLOOD PRESSURE: 116 MMHG | DIASTOLIC BLOOD PRESSURE: 78 MMHG

## 2017-06-15 DIAGNOSIS — I50.32 CHRONIC DIASTOLIC HEART FAILURE: Primary | ICD-10-CM

## 2017-06-15 DIAGNOSIS — N39.46 MIXED INCONTINENCE URGE AND STRESS: Primary | ICD-10-CM

## 2017-06-15 DIAGNOSIS — R33.9 INCOMPLETE BLADDER EMPTYING: ICD-10-CM

## 2017-06-15 PROCEDURE — 99999 PR PBB SHADOW E&M-EST. PATIENT-LVL III: CPT | Mod: PBBFAC,,, | Performed by: UROLOGY

## 2017-06-15 PROCEDURE — 87086 URINE CULTURE/COLONY COUNT: CPT

## 2017-06-15 PROCEDURE — 1126F AMNT PAIN NOTED NONE PRSNT: CPT | Mod: S$GLB,,, | Performed by: UROLOGY

## 2017-06-15 PROCEDURE — 99214 OFFICE O/P EST MOD 30 MIN: CPT | Mod: S$GLB,,, | Performed by: UROLOGY

## 2017-06-15 PROCEDURE — 1159F MED LIST DOCD IN RCRD: CPT | Mod: S$GLB,,, | Performed by: UROLOGY

## 2017-06-15 PROCEDURE — 87077 CULTURE AEROBIC IDENTIFY: CPT

## 2017-06-15 PROCEDURE — 87186 SC STD MICRODIL/AGAR DIL: CPT

## 2017-06-15 PROCEDURE — 87088 URINE BACTERIA CULTURE: CPT

## 2017-06-15 RX ORDER — OXYBUTYNIN CHLORIDE 15 MG/1
15 TABLET, EXTENDED RELEASE ORAL DAILY
Qty: 30 TABLET | Refills: 11 | Status: SHIPPED | OUTPATIENT
Start: 2017-06-15 | End: 2018-01-22 | Stop reason: SDUPTHER

## 2017-06-15 NOTE — PROGRESS NOTES
"  Subjective:       Erica Moulton is a 72 y.o. female who is an established patient of Dr. Barker and Dr Lerma was seen for evaluation of UI.      She is a previous pt of [facility] and saw Dr Lerma in 3/16. She has known mixed incontinence as well as chronic constipation. She has been wearing one diaper per day. She was recommended to stop Urecholine at last visit. She continues to have UUI issues. WILLIE is less bothersome.     She was noted to have PVR of 250cc by I&O cath (1/16). She denies feelings of KATY. PVR on US (2/16) was 9cc.     She was given trial of Ditropan 5mg at last visit. She had some improvement. She feels that increased stress increased her urinary frequency. She had large volume UUI when she was doing a work required function. Ditropan increased to 10mg and doing well with this. Rare UUI.     PVR (bladder scan) last visit - 90cc (~45min after void), today - 16cc.      The following portions of the patient's history were reviewed and updated as appropriate: allergies, current medications, past family history, past medical history, past social history, past surgical history and problem list.    Review of Systems  Constitutional: no fever or chills  ENT: no nasal congestion or sore throat  Respiratory: no cough or shortness of breath  Cardiovascular: no chest pain or palpitations  Gastrointestinal: no nausea or vomiting, tolerating diet  Genitourinary: as per HPI  Hematologic/Lymphatic: no easy bruising or lymphadenopathy  Musculoskeletal: no arthralgias or myalgias  Skin: no rashes or lesions  Neurological: no seizures or tremors  Behavioral/Psych: no auditory or visual hallucinations       Objective:    Vitals:   Ht 5' 5" (1.651 m)   Wt 117 kg (258 lb)   BMI 42.93 kg/m²     Physical Exam   General: well developed, well nourished in no acute distress  Head: normocephalic, atraumatic  Neck: supple, trachea midline, no obvious enlargement of thyroid  HEENT: EOMI, mucus membranes moist, sclera " anicteric, no hearing impairment  Lungs: symmetric expansion, non-labored breathing  Cardiovascular: regular rate and rhythm, normal pulses  Abdomen: soft, non tender, non distended, no palpable masses, no hepatosplenomegaly, no hernias, no CVA tenderness  Musculoskeletal: no peripheral edema, normal ROM in bilateral upper and lower extremities  Lymphatics: no cervical or inguinal lymphadenopathy  Skin: no rashes or lesions  Neuro: alert and oriented x 3, no gross deficits  Psych: normal judgment and insight, normal mood/affect and non-anxious  Genitourinary:   patient declined exam      Lab Review   Urine analysis today in clinic shows - ++protein, +nit, +LE, 5-10 RBC    Lab Results   Component Value Date    WBC 8.68 06/13/2017    HGB 11.7 (L) 06/13/2017    HCT 35.8 (L) 06/13/2017    MCV 79 (L) 06/13/2017     06/13/2017     Lab Results   Component Value Date    CREATININE 2.7 (H) 06/14/2017    BUN 45 (H) 06/14/2017       Imaging  Images and reports were personally reviewed by me and discussed with patient  US reviewed       Assessment/Plan:      1. Mixed incontinence urge and stress    - Stopped Urecholine previously   - Tight glucose control   - Weight loss   - Unable to check PVR 2/2 to unable to void   - Increase to Ditropan XL 15mg   - Continue stool softener (Colace)   - UCx today   - Timed voiding      2. Incomplete bladder emptying    - PVR acceptable       Follow up in 6 months with PVR

## 2017-06-15 NOTE — TELEPHONE ENCOUNTER
HPI:   71 yo female with HTN, chronic diastolic CHF, DM2 with retinopathy, neuropathy and nephropathy, obesity, CKD, TIA, HLP, hypothyroidism, and HUMBERTO on cpap Qhs presented from home with c/o worsening SOB one hour prior to arrival. On EMS arrival she was 88% on room air. She does not use home oxygen.  She reports midsternal chest pressure with orthopnea, peripheral edema and PATRICIA. Her CPAP mask broke last night and symptoms began this morning. Oxygen saturation improved with oxygen and now comfortable. CXR c/w pulmonary edema. BP on admit 200/100. Patient is non-complaint with medications.     Hospital Course:   71 yo female with HTN, chronic diastolic CHF, DM2 with retinopathy, neuropathy and nephropathy, obesity, CKD 3, TIA, HLP, hypothyroidism, and HUMBERTO on cpap Qhs presented from home with c/o worsening SOB one hour prior to arrival. On EMS arrival she was 88% on room air. She does not use home oxygen.  She reports midsternal chest pressure with orthopnea, peripheral edema and PATRICIA. Her CPAP mask was broken ,Oxygen saturation improved with supplemental oxygen , CXR c/w pulmonary edema. BP on admit 200/100. Patient is non-complaint with medications.   She has daija started on IV lasix with good urine out put,,SOB much improved.her brocken CPAP mask has been replaced by SW,her elevated blood pressure much better controlled,she has been consulted for compliance with medication,diet and CPAP mashine,she has been discharged home with follow up with PCP in next few days.        Ms. Moulton is enrolled in the CHF Digital Medicine program. She has not submitted a weight this morning. She is unable to speak to me when I called because she is dealing with her dog that has become ill. She asked that we call back later today or tomorrow.

## 2017-06-16 ENCOUNTER — TELEPHONE (OUTPATIENT)
Dept: OTHER | Facility: OTHER | Age: 72
End: 2017-06-16

## 2017-06-16 RX ORDER — AMLODIPINE BESYLATE 10 MG/1
10 TABLET ORAL DAILY
COMMUNITY
End: 2018-03-08 | Stop reason: SDUPTHER

## 2017-06-16 NOTE — TELEPHONE ENCOUNTER
Mrs Moulton was able to speak with us this morning for on-boarding into our CHF program.  She has not set up her scale nor has she weighed at home on her own scale.  She reports that she has not yet had a chance but will do so today.  We discussed better locations for her scale set up.  Reviewed with her how to weigh each morning to obtain a dry weight and which button to press.  We also discussed the purpose of our program and her 30 day enrollments.  She denies any HF symptoms today.  We have reviewed her medication list today.  It varies somewhat from hospital discharge.  She reports all of the medications and doses currently on her list.  The exceptions are her lasix dose, which was listed at 40 mg BID on her medication list and d/c summary.  She states that she is taking lasix 80/40 with an additional 40 mg in the evening if she has afternoon swelling.  We went over the discharge medications again to clarify and she continues to repeat the dose of 80/40 as her home dose.  I explained that we will adjust accordingly over the course of our program.  She also reports that she uses amoldipine 10 mg QD.  This was initially prescribed as Maryam (amlodipine/olmesartan) but insurance did not cover the combo med so she was switched to 2 separate components-amlodipine and losartan so that her co-pay was minimal.  Amlodipine was also not listed on her d/c meds, so I have updated today per her report.  She states that she does eat out and was eating Chinese food 2-3 x weekly.  She understands the high sodium content in that type of food and is working to change her eating habits.  We discussed her fluid restriction and I have asked her to monitor that closely.  She states that she will review our information provided at discharge regarding her diet and will also locate our card.  I have provided our phone number as well for her.  Will await her BLIP reading in the morning to being trending her weight.

## 2017-06-17 ENCOUNTER — TELEPHONE (OUTPATIENT)
Dept: OTHER | Facility: OTHER | Age: 72
End: 2017-06-17

## 2017-06-17 LAB — BACTERIA UR CULT: NORMAL

## 2017-06-17 NOTE — TELEPHONE ENCOUNTER
No BLIP transmission from Ms Brennon Moulton.  LVM asking for her to weigh or callback to troubleshoot the scale if she has weighed today.

## 2017-06-19 ENCOUNTER — TELEPHONE (OUTPATIENT)
Dept: OTHER | Facility: OTHER | Age: 72
End: 2017-06-19

## 2017-06-19 NOTE — TELEPHONE ENCOUNTER
"Ms. Willett's weight has transmitted for the first time this morning and is similar to hospital discharge weight. She says she is taking her Lasix the way she is supposed to "80 mg in the morning and 80 mg at night." Reiterated that discharge orders say Lasix 40 mg BID. She has already told us that she is taking 80 QAM and 40 QPM, but today says she is taking 80 mg BID. Asked that she not take 80 mg in the evening as she would then be doubling her discharge dose of Lasix. She will report to lab and cardiology appointments tomorrow and we will have a better idea of the appropriate dose from here. She denies LE/ABD swelling and denies any SOB. Asked that she call back with any clinical changes and we will call once lab results and clinic notes are reviewed.     Weight: 117.4 kg (258 lb 12.8 oz) (6/19/2017  8:34 AM)    "

## 2017-06-20 ENCOUNTER — LAB VISIT (OUTPATIENT)
Dept: LAB | Facility: HOSPITAL | Age: 72
End: 2017-06-20
Attending: INTERNAL MEDICINE
Payer: MEDICARE

## 2017-06-20 ENCOUNTER — TELEPHONE (OUTPATIENT)
Dept: UROLOGY | Facility: CLINIC | Age: 72
End: 2017-06-20

## 2017-06-20 ENCOUNTER — OFFICE VISIT (OUTPATIENT)
Dept: CARDIOLOGY | Facility: CLINIC | Age: 72
End: 2017-06-20
Payer: MEDICARE

## 2017-06-20 VITALS
BODY MASS INDEX: 42.99 KG/M2 | DIASTOLIC BLOOD PRESSURE: 77 MMHG | OXYGEN SATURATION: 95 % | SYSTOLIC BLOOD PRESSURE: 170 MMHG | HEIGHT: 65 IN | HEART RATE: 69 BPM | WEIGHT: 258 LBS

## 2017-06-20 DIAGNOSIS — G47.33 OBSTRUCTIVE SLEEP APNEA ON CPAP: ICD-10-CM

## 2017-06-20 DIAGNOSIS — R06.00 DYSPNEA, UNSPECIFIED TYPE: ICD-10-CM

## 2017-06-20 DIAGNOSIS — E11.22 TYPE 2 DIABETES MELLITUS WITH STAGE 3 CHRONIC KIDNEY DISEASE, WITH LONG-TERM CURRENT USE OF INSULIN: ICD-10-CM

## 2017-06-20 DIAGNOSIS — E66.01 MORBID OBESITY WITH BMI OF 40.0-44.9, ADULT: ICD-10-CM

## 2017-06-20 DIAGNOSIS — N18.30 TYPE 2 DIABETES MELLITUS WITH STAGE 3 CHRONIC KIDNEY DISEASE, WITH LONG-TERM CURRENT USE OF INSULIN: ICD-10-CM

## 2017-06-20 DIAGNOSIS — I50.33 DIASTOLIC CHF, ACUTE ON CHRONIC: Primary | ICD-10-CM

## 2017-06-20 DIAGNOSIS — R09.02 HYPOXIA: ICD-10-CM

## 2017-06-20 DIAGNOSIS — I50.32 CHRONIC DIASTOLIC HEART FAILURE: ICD-10-CM

## 2017-06-20 DIAGNOSIS — Z86.73 HX-TIA (TRANSIENT ISCHEMIC ATTACK): ICD-10-CM

## 2017-06-20 DIAGNOSIS — Z79.4 TYPE 2 DIABETES MELLITUS WITH STAGE 3 CHRONIC KIDNEY DISEASE, WITH LONG-TERM CURRENT USE OF INSULIN: ICD-10-CM

## 2017-06-20 DIAGNOSIS — I16.0 HYPERTENSIVE URGENCY: ICD-10-CM

## 2017-06-20 DIAGNOSIS — E78.5 HYPERLIPIDEMIA LDL GOAL <100: ICD-10-CM

## 2017-06-20 LAB
ALBUMIN SERPL BCP-MCNC: 2.8 G/DL
ALP SERPL-CCNC: 103 U/L
ALT SERPL W/O P-5'-P-CCNC: 36 U/L
ANION GAP SERPL CALC-SCNC: 10 MMOL/L
AST SERPL-CCNC: 32 U/L
BILIRUB SERPL-MCNC: 0.4 MG/DL
BNP SERPL-MCNC: 225 PG/ML
BUN SERPL-MCNC: 43 MG/DL
CALCIUM SERPL-MCNC: 9 MG/DL
CHLORIDE SERPL-SCNC: 107 MMOL/L
CO2 SERPL-SCNC: 26 MMOL/L
CREAT SERPL-MCNC: 2.6 MG/DL
EST. GFR  (AFRICAN AMERICAN): 21 ML/MIN/1.73 M^2
EST. GFR  (NON AFRICAN AMERICAN): 18 ML/MIN/1.73 M^2
GLUCOSE SERPL-MCNC: 88 MG/DL
MAGNESIUM SERPL-MCNC: 2 MG/DL
POTASSIUM SERPL-SCNC: 4 MMOL/L
PROT SERPL-MCNC: 7.3 G/DL
SODIUM SERPL-SCNC: 143 MMOL/L

## 2017-06-20 PROCEDURE — 1159F MED LIST DOCD IN RCRD: CPT | Mod: S$GLB,,, | Performed by: INTERNAL MEDICINE

## 2017-06-20 PROCEDURE — 3066F NEPHROPATHY DOC TX: CPT | Mod: S$GLB,,, | Performed by: INTERNAL MEDICINE

## 2017-06-20 PROCEDURE — 99999 PR PBB SHADOW E&M-EST. PATIENT-LVL III: CPT | Mod: PBBFAC,,, | Performed by: INTERNAL MEDICINE

## 2017-06-20 PROCEDURE — 99499 UNLISTED E&M SERVICE: CPT | Mod: S$GLB,,, | Performed by: INTERNAL MEDICINE

## 2017-06-20 PROCEDURE — 3045F PR MOST RECENT HEMOGLOBIN A1C LEVEL 7.0-9.0%: CPT | Mod: S$GLB,,, | Performed by: INTERNAL MEDICINE

## 2017-06-20 PROCEDURE — 1126F AMNT PAIN NOTED NONE PRSNT: CPT | Mod: S$GLB,,, | Performed by: INTERNAL MEDICINE

## 2017-06-20 PROCEDURE — 99214 OFFICE O/P EST MOD 30 MIN: CPT | Mod: S$GLB,,, | Performed by: INTERNAL MEDICINE

## 2017-06-20 RX ORDER — SULFAMETHOXAZOLE AND TRIMETHOPRIM 800; 160 MG/1; MG/1
1 TABLET ORAL 2 TIMES DAILY
Qty: 14 TABLET | Refills: 0 | Status: SHIPPED | OUTPATIENT
Start: 2017-06-20 | End: 2017-06-20

## 2017-06-20 RX ORDER — SULFAMETHOXAZOLE AND TRIMETHOPRIM 400; 80 MG/1; MG/1
1 TABLET ORAL 2 TIMES DAILY
Qty: 14 TABLET | Refills: 0 | Status: SHIPPED | OUTPATIENT
Start: 2017-06-20 | End: 2017-08-04

## 2017-06-20 NOTE — PROGRESS NOTES
Subjective:    Patient ID:  Erica Leblanc is a 72 y.o. female who presents for evaluation of No chief complaint on file.      HPI  Patient is here for follow-up from recent hospitalization for diastolic heart failure and hypertensive emergency.  Had remotely seen cardiology at [facility] After Hospital Admission.  She's Noncompliant with Medicines and Salt Restriction.  She's a Former  Here.  We Emphasized That There Is No Excuse for Not Taking These Medicines.  She's Had No Recent Ischemic Workup with Mild Troponin Elevation the Hospital.  She's Agreeable to Take a Nuclear Stress.  She Denies Any Current Chest Pain but Does Have Significant Shortness of Breath on Exertion Relieved with Rest.  She Denies Any Current PND, Orthopnea or Lower Edema.  She's Better after Diuresis in the Hospital.  She's Expresses No Dizziness, Presyncope or Syncope.      Review of Systems   Constitution: Negative.   HENT: Negative.    Eyes: Negative.    Cardiovascular: Positive for dyspnea on exertion. Negative for chest pain, irregular heartbeat, leg swelling, near-syncope, orthopnea, palpitations, paroxysmal nocturnal dyspnea and syncope.   Respiratory: Positive for shortness of breath.    Skin: Negative.    Musculoskeletal: Negative.    Gastrointestinal: Negative for abdominal pain, constipation and diarrhea.   Genitourinary: Negative for dysuria.   Neurological: Negative.    Psychiatric/Behavioral: Negative.      Past Medical History:   Diagnosis Date    Acute respiratory failure with hypoxia     Cataracts, bilateral     CHF (congestive heart failure)     CKD (chronic kidney disease) stage 3, GFR 30-59 ml/min     CKD (chronic kidney disease) stage 3, GFR 30-59 ml/min     Controlled type 2 diabetes mellitus with proteinuria or albuminuria     Depression     Diabetes with neurologic complications     Diabetic retinopathy of both eyes     Edema     Glaucoma     History of colonic polyps     Hx-TIA (transient  ischemic attack) 2008    Hyperlipidemia LDL goal < 100     Hypertension     Hypothyroidism     Major depressive disorder, single episode, mild 2016    Mixed incontinence urge and stress     Obesity     Obstructive sleep apnea on CPAP     Osteopenia     Proteinuria     Sickle cell trait     Trouble in sleeping     Type 2 diabetes mellitus with ophthalmic manifestations     Type 2 diabetes with stage 3 chronic kidney disease GFR 30-59     Type II or unspecified type diabetes mellitus with renal manifestations, uncontrolled     Urge incontinence 2016    Urge incontinence     Vitamin D deficiency disease      Past Surgical History:   Procedure Laterality Date    breast reduction Bilateral age 31    cataracts Bilateral      SECTION, LOW TRANSVERSE      x1    CHOLECYSTECTOMY      EYE SURGERY  2014, 2014    vitrectomy    EYE SURGERY Right 2016    HYSTERECTOMY      TAHBSO (patient is unsure if ovaries removed)    REFRACTIVE SURGERY       Social History   Substance Use Topics    Smoking status: Never Smoker    Smokeless tobacco: Never Used    Alcohol use No     Family History   Problem Relation Age of Onset    Leukemia Father     Ovarian cancer Sister 35    Stroke Mother     Diabetes Mother     Hypertension Mother     Diabetes Paternal Grandmother     Breast cancer Maternal Aunt 65    HIV Brother     Achondroplasia Sister     Parkinsonism Maternal Aunt     Esophageal cancer Maternal Uncle      smoker    Amblyopia Neg Hx     Blindness Neg Hx     Cataracts Neg Hx     Glaucoma Neg Hx     Macular degeneration Neg Hx     Retinal detachment Neg Hx     Strabismus Neg Hx     Thyroid disease Neg Hx     Colon cancer Neg Hx         Objective:    Physical Exam   Constitutional: She is oriented to person, place, and time. She appears well-developed and well-nourished.   HENT:   Head: Normocephalic and atraumatic.   Eyes: Conjunctivae and EOM are normal.  Pupils are equal, round, and reactive to light.   Neck: Normal range of motion. Neck supple. No thyromegaly present.   Cardiovascular: Normal rate and regular rhythm.    No murmur heard.  Pulmonary/Chest: Effort normal and breath sounds normal. No respiratory distress.   Abdominal: Soft. Bowel sounds are normal.   Musculoskeletal: She exhibits no edema.   Neurological: She is alert and oriented to person, place, and time.   Skin: Skin is warm and dry.   Psychiatric: She has a normal mood and affect. Her behavior is normal.           Echo: 6-17  CONCLUSIONS     1 - Normal left ventricular systolic function (EF 65-70%).     2 - No diagnostic regional wall motion abnormalities.     3 - Concentric remodeling.     4 - Impaired LV relaxation, elevated LAP (grade 2 diastolic dysfunction).     5 - Trivial tricuspid regurgitation.     6 - The estimated PA systolic pressure is greater than 18 mmHg.     Assessment:       1. Diastolic CHF, acute on chronic    2. Hypertensive urgency    3. Dyspnea, unspecified type    4. Hypoxia    5. Type 2 diabetes mellitus with stage 3 chronic kidney disease, with long-term current use of insulin    6. Hyperlipidemia LDL goal <100    7. Morbid obesity with BMI of 40.0-44.9, adult    8. Hx-TIA (transient ischemic attack)    9. Obstructive sleep apnea on CPAP         Plan:       -Continue current medical therapy  -Emphasized compliance with medicines and sodium restriction  -Encouraged exercise as tolerated  -Plan for baseline ischemic workup with nuclear stress recurrent exertional symptoms and risk factors with mildly elevated troponin the hospital    Return to clinic in one month with testing ASAP

## 2017-06-21 ENCOUNTER — TELEPHONE (OUTPATIENT)
Dept: OTHER | Facility: OTHER | Age: 72
End: 2017-06-21

## 2017-06-21 NOTE — TELEPHONE ENCOUNTER
Attempted to reach Ms Leblanc this morning before her appointments (PCP, Nephrology).  Weight is stable, no apparent changes from cardiology visit yesterday.  Labs are stable although cr slightly higher.  Would like to continue lasix 80/40 vs 80 BID.  Patient to have started sulfmeth/trimeth for UTI.  LVM for call back.  Weight: 117.8 kg (259 lb 12.8 oz) (6/21/2017  7:00 AM)

## 2017-06-22 ENCOUNTER — TELEPHONE (OUTPATIENT)
Dept: OTHER | Facility: OTHER | Age: 72
End: 2017-06-22

## 2017-06-22 NOTE — TELEPHONE ENCOUNTER
Ms. Moulton's weight is up today, only by 1 lb. She does notice RONNELL, but denies ABD distension. She has no changes in breathing and slept well with no PND/orthopnea overnight. She drank over 2 L of fluids yesterday. Reminded her to stay closer to 1.5 L of fluids to prevent retention. With symptoms returning, will have Ms. Moulton take Lasix 80 mg BID today only and return to 80/40 tomorrow unless instructed otherwise. She did not see PCP or nephrologist yesterday, but will be seeing PCP on Monday.     Weight: 118.3 kg (260 lb 12.8 oz) (6/22/2017  8:22 AM)

## 2017-06-23 ENCOUNTER — TELEPHONE (OUTPATIENT)
Dept: UROLOGY | Facility: CLINIC | Age: 72
End: 2017-06-23

## 2017-06-23 NOTE — TELEPHONE ENCOUNTER
----- Message from Kelsie Ortega sent at 6/23/2017  9:13 AM CDT -----  Contact: self  Calling for results of urine culture .       [phone]      LL

## 2017-06-25 ENCOUNTER — TELEPHONE (OUTPATIENT)
Dept: OTHER | Facility: OTHER | Age: 72
End: 2017-06-25

## 2017-06-25 NOTE — TELEPHONE ENCOUNTER
Ms. Leblanc's weight is 6 lbs less than yesterday's weight, she weighed a second time and weight was closer to usual range. Weight is still lowest to date, but only by 3-4 lbs over the past 10 days. She has no LH/dizziness/weakness. She confirms Lasix 80 in the morning and 40 in the evening. She has no LE/ABD swelling or SOB. She has an appointment with her PCP tomorrow and stress test scheduled for Tuesday. Will continue to monitor weight and check in. If weight continues to decline, will discuss reducing lasix dose and checking BMP.       Weight: 115.8 kg (255 lb 6.4 oz) (6/25/2017  9:42 AM)

## 2017-06-26 DIAGNOSIS — I50.32 CHRONIC DIASTOLIC HEART FAILURE: ICD-10-CM

## 2017-06-26 DIAGNOSIS — I50.32 CHRONIC DIASTOLIC HEART FAILURE: Primary | ICD-10-CM

## 2017-06-26 PROBLEM — I50.33 DIASTOLIC CHF, ACUTE ON CHRONIC: Status: RESOLVED | Noted: 2017-06-12 | Resolved: 2017-06-26

## 2017-06-26 RX ORDER — METOPROLOL TARTRATE 100 MG/1
100 TABLET ORAL 2 TIMES DAILY
Qty: 180 TABLET | Refills: 0 | Status: SHIPPED | OUTPATIENT
Start: 2017-06-26 | End: 2017-06-26 | Stop reason: SDUPTHER

## 2017-06-26 RX ORDER — METOPROLOL TARTRATE 100 MG/1
TABLET ORAL
Qty: 180 TABLET | Refills: 0 | OUTPATIENT
Start: 2017-06-26

## 2017-06-26 RX ORDER — METOPROLOL TARTRATE 100 MG/1
100 TABLET ORAL 2 TIMES DAILY
Qty: 30 TABLET | Refills: 0 | Status: SHIPPED | OUTPATIENT
Start: 2017-06-26 | End: 2017-09-27 | Stop reason: SDUPTHER

## 2017-06-28 ENCOUNTER — OFFICE VISIT (OUTPATIENT)
Dept: FAMILY MEDICINE | Facility: CLINIC | Age: 72
End: 2017-06-28
Payer: MEDICARE

## 2017-06-28 ENCOUNTER — PATIENT MESSAGE (OUTPATIENT)
Dept: FAMILY MEDICINE | Facility: CLINIC | Age: 72
End: 2017-06-28

## 2017-06-28 VITALS
WEIGHT: 257.5 LBS | DIASTOLIC BLOOD PRESSURE: 62 MMHG | HEIGHT: 65 IN | OXYGEN SATURATION: 93 % | TEMPERATURE: 98 F | SYSTOLIC BLOOD PRESSURE: 138 MMHG | BODY MASS INDEX: 42.9 KG/M2 | HEART RATE: 74 BPM

## 2017-06-28 DIAGNOSIS — I50.32 CHRONIC DIASTOLIC HEART FAILURE: ICD-10-CM

## 2017-06-28 DIAGNOSIS — G47.33 OBSTRUCTIVE SLEEP APNEA ON CPAP: ICD-10-CM

## 2017-06-28 DIAGNOSIS — E66.01 MORBID OBESITY WITH BMI OF 45.0-49.9, ADULT: ICD-10-CM

## 2017-06-28 DIAGNOSIS — I77.1 TORTUOUS AORTA: ICD-10-CM

## 2017-06-28 DIAGNOSIS — J81.0 PULMONARY EDEMA, ACUTE: ICD-10-CM

## 2017-06-28 DIAGNOSIS — Z79.4 LONG-TERM INSULIN USE: ICD-10-CM

## 2017-06-28 PROCEDURE — 99499 UNLISTED E&M SERVICE: CPT | Mod: S$GLB,,, | Performed by: NURSE PRACTITIONER

## 2017-06-28 PROCEDURE — 99999 PR PBB SHADOW E&M-EST. PATIENT-LVL III: CPT | Mod: PBBFAC,,, | Performed by: NURSE PRACTITIONER

## 2017-06-28 PROCEDURE — 99213 OFFICE O/P EST LOW 20 MIN: CPT | Mod: S$GLB,,, | Performed by: NURSE PRACTITIONER

## 2017-06-28 NOTE — PROGRESS NOTES
Transitional Care Note  Subjective:       Patient ID: Erica Leblanc is a 72 y.o. female.  Chief Complaint: Follow-up (hospital follow up)    Family and/or Caretaker present at visit?  No.  Diagnostic tests reviewed/disposition: I have reviewed all completed as well as pending diagnostic tests at the time of discharge. Have a stress test scheduled for 07/05/2017.  Disease/illness education: Heart Failure. She states she understands, however she does not accept the diagnosis.   Home health/community services discussion/referrals: Patient does not have home health established from hospital visit.  They do not need home health.  If needed, we will set up home health for the patient.   Establishment or re-establishment of referral orders for community resources: No other necessary community resources.   Discussion with other health care providers: No discussion with other health care providers necessary.   HPI  Review of Systems   Constitutional: Negative for diaphoresis and fatigue.   Respiratory: Negative for cough, chest tightness, shortness of breath and wheezing.    Cardiovascular: Negative for chest pain, palpitations and leg swelling.   Neurological: Negative for dizziness, syncope, weakness, light-headedness and headaches.   All other systems reviewed and are negative.      Objective:      Physical Exam   Constitutional: She is oriented to person, place, and time.   Neck: Normal carotid pulses and no JVD present. Carotid bruit is not present.   Cardiovascular: Normal rate, regular rhythm and normal heart sounds.    Pulmonary/Chest: Effort normal and breath sounds normal. No respiratory distress. She has no decreased breath sounds.   Neurological: She is alert and oriented to person, place, and time.   Skin: She is not diaphoretic. No pallor.   Psychiatric: She has a normal mood and affect. Her speech is normal and behavior is normal.       Assessment:       1. Chronic diastolic heart failure    2.  "Obstructive sleep apnea on CPAP    3. Morbid obesity with BMI of 45.0-49.9, adult    4. Long-term insulin use    5. Pulmonary edema, acute    6. Tortuous aorta        Plan:           Following up with Cardiology 07/20/2017  Following up with Nephrology 07/18/2017  Registered with a CHF program, she receives a call daily and to weigh daily. She is on Lasix.  Uses low salt.    Erica was seen today for follow-up.    Diagnoses and all orders for this visit:    Chronic diastolic heart failure  -     Brain natriuretic peptide; Future  -     Comprehensive metabolic panel; Future    Obstructive sleep apnea on CPAP    Morbid obesity with BMI of 45.0-49.9, adult    Long-term insulin use    Pulmonary edema, acute      Problem List Items Addressed This Visit     Tortuous aorta    Overview     "There is mild to moderate tortuosity of the thoracic aorta" - Xray Chest 7-8-2011         Current Assessment & Plan     Stable. Continue to monitor           Pulmonary edema, acute    Current Assessment & Plan     Stable and controlled. Continue current treatment plan as previously prescribed with your PCP.              Obstructive sleep apnea on CPAP    Current Assessment & Plan     Stable. Uses CPAP at night         Morbid obesity with BMI of 45.0-49.9, adult    Current Assessment & Plan     The patient is asked to make an attempt to improve diet and exercise patterns to aid in medical management of this problem.           Long-term insulin use    Overview     - on constant insulin delivery system (Ve-ga)   - followed by endocrinology, Dr. Rossi         Current Assessment & Plan     Stable  Have a Ve-ga which constantly delivers insulin  Followed by Endocrinology         Chronic diastolic heart failure    Current Assessment & Plan     Stable.   Followed by Cardiology  She has a test scheduled for 07/05/2017         Relevant Orders    Brain natriuretic peptide    Comprehensive metabolic panel      Other Visit Diagnoses    None.   "       Had in-dept conversation with patient about diagnosis. She is familiar with CHF. She reports now taking medications as prescribed.   Return if symptoms worsen or fail to improve.

## 2017-06-28 NOTE — ASSESSMENT & PLAN NOTE
Stable and controlled. Continue current treatment plan as previously prescribed with your PCP.

## 2017-06-29 ENCOUNTER — TELEPHONE (OUTPATIENT)
Dept: OTHER | Facility: OTHER | Age: 72
End: 2017-06-29

## 2017-06-29 NOTE — TELEPHONE ENCOUNTER
Ms. Leblanc's weight has remained stable the past few days. She confirms she is only taking lasix 80/40 and has not taken any more doses of 80 BID. She is complaining of an upset stomach and headache this morning but no HF s/s. She denies RONNELL and SOB/PATRICIA, but said PCP was concerned about her pulse ox being 93% yesterday. They have scheduled repeat BNP and CMP for her to do today. We will review these labs as well. Asked that she call back with any clinical changes and we will continue to monitor daily weights.       Weight: 116.9 kg (257 lb 12.8 oz) (6/29/2017  9:14 AM)

## 2017-07-02 ENCOUNTER — TELEPHONE (OUTPATIENT)
Dept: OTHER | Facility: OTHER | Age: 72
End: 2017-07-02

## 2017-07-02 NOTE — TELEPHONE ENCOUNTER
Ms Leblanc has lost about 2.5 lbs overnight.  She has no LH, dizziness or dizziness upon standing or at rest.  She has no complaints today regarding her weight loss.  She does report a cough today.  States that is started yesterday.  Her cough - non productive, and infrequent, does not bother her at night while lying down.  She denies white foamy mucus.  She does not have a BP machine at home.  States that she has been taking all medications.  Verified her lasix dose at 80/40.  No questions or concerns.  I asked her to call back today or at anytime if her cough become worse.  Weight: 115.6 kg (254 lb 12.8 oz) (7/2/2017 10:24 AM)

## 2017-07-05 ENCOUNTER — HOSPITAL ENCOUNTER (OUTPATIENT)
Dept: RADIOLOGY | Facility: HOSPITAL | Age: 72
Discharge: HOME | End: 2017-07-05
Attending: INTERNAL MEDICINE
Payer: MEDICARE

## 2017-07-05 ENCOUNTER — HOSPITAL ENCOUNTER (OUTPATIENT)
Dept: CARDIOLOGY | Facility: HOSPITAL | Age: 72
Discharge: HOME | End: 2017-07-05
Attending: INTERNAL MEDICINE
Payer: MEDICARE

## 2017-07-05 DIAGNOSIS — R06.00 DYSPNEA, UNSPECIFIED TYPE: ICD-10-CM

## 2017-07-05 DIAGNOSIS — I50.33 DIASTOLIC CHF, ACUTE ON CHRONIC: ICD-10-CM

## 2017-07-05 LAB — DIASTOLIC DYSFUNCTION: NO

## 2017-07-05 PROCEDURE — 93018 CV STRESS TEST I&R ONLY: CPT | Mod: ,,, | Performed by: INTERNAL MEDICINE

## 2017-07-05 PROCEDURE — 93016 CV STRESS TEST SUPVJ ONLY: CPT | Mod: ,,, | Performed by: INTERNAL MEDICINE

## 2017-07-05 PROCEDURE — 78452 HT MUSCLE IMAGE SPECT MULT: CPT | Mod: 26,,, | Performed by: INTERNAL MEDICINE

## 2017-07-05 PROCEDURE — 78452 HT MUSCLE IMAGE SPECT MULT: CPT | Mod: TC

## 2017-07-05 PROCEDURE — 93017 CV STRESS TEST TRACING ONLY: CPT

## 2017-07-05 PROCEDURE — 63600175 PHARM REV CODE 636 W HCPCS

## 2017-07-05 RX ORDER — REGADENOSON 0.08 MG/ML
INJECTION, SOLUTION INTRAVENOUS
Status: DISPENSED
Start: 2017-07-05 | End: 2017-07-05

## 2017-07-06 ENCOUNTER — TELEPHONE (OUTPATIENT)
Dept: OTHER | Facility: OTHER | Age: 72
End: 2017-07-06

## 2017-07-06 NOTE — TELEPHONE ENCOUNTER
Ms. Leblanc's weight is down 5 lbs this morning. She says she took lasix 80 mg BID yesterday because she knew she exceeded 50 oz fluid restriction. Explained that we don't want her to take additional doses of lasix if she is asymptomatic and if weight is within dry weight range. She did not have any RONNELL or ABD bloating yesterday. She also had no SOB. She had a stress test done yesterday and is awaiting results. She denies any LH/dizziness or weakness/fatigue. Instructed her to resume her usual dose of 80/40 today. Will not hold any doses as she mentioned eating egg drop soup. Advised against this as it is high in sodium, but she seemed to think it was not. Will monitor weights and follow up with any concerning changes.        Weight: 114.9 kg (253 lb 6.4 oz) (7/6/2017  8:04 AM)

## 2017-07-08 ENCOUNTER — TELEPHONE (OUTPATIENT)
Dept: OTHER | Facility: OTHER | Age: 72
End: 2017-07-08

## 2017-07-10 ENCOUNTER — TELEPHONE (OUTPATIENT)
Dept: OTHER | Facility: OTHER | Age: 72
End: 2017-07-10

## 2017-07-10 NOTE — TELEPHONE ENCOUNTER
Ms. Leblanc's weight remains stable. She has not used any additional doses of Lasix since her self-dosed pulse last week. She has not had a full BM in about 3 days but says this is due to running out of her stool softener. She denies RONNELL, does have some ABD bloating related to constipation. She denies SOB/PATRICIA. She will finish the CHF program on Friday. Explained that as long as her weight remains stable, we will check in once more to complete exit interview. I have asked that she call back with any change in symptoms.       Weight: 116.9 kg (257 lb 12.8 oz) (7/10/2017  6:34 AM)

## 2017-07-11 ENCOUNTER — TELEPHONE (OUTPATIENT)
Dept: OTHER | Facility: OTHER | Age: 72
End: 2017-07-11

## 2017-07-11 NOTE — TELEPHONE ENCOUNTER
Ms. Leblanc's weight is up 5 lbs this morning. She is surprised by the increase in weight because she has no symptoms. She denies RONNELL, SOB/PATRICIA/PND. She still has ABD distension from lack of BM. She was not able to get any Miralax yesterday but will try to get it today. She also ate spaghetti with red gravy cooked by her daughter in law. She said she did used canned/allyson sauce to make the red sauce. She also drank more fluids yesterday and may have exceeded 50 oz. She denies missing her PM dose of Lasix, but says she did take it later than usual as she was working late yesterday. Instructed her to take Lasix 80 mg BID today and return to 80/40 tomorrow unless instructed otherwise. Also advised she avoid any leftovers and stick to 50 oz fluid restriction. Asked that she call back with any clinical changes today.      Weight: 119.2 kg (262 lb 12.8 oz) (7/11/2017  6:42 AM)

## 2017-07-13 ENCOUNTER — TELEPHONE (OUTPATIENT)
Dept: OTHER | Facility: OTHER | Age: 72
End: 2017-07-13

## 2017-07-13 NOTE — TELEPHONE ENCOUNTER
Ms. Leblanc has submitted a 2 lb weight gain today after submitted a drop in weight yesterday. She has had a BM and feels that is the reason her weight decline yesterday. She did not take additional dose of Lasix as instructed on Tuesday due fluid loss from frequent BMs. She has continued to eat processed foods; had a friedman and turkey sandwich for breakfast yesterday. Reviewed low sodium food options again as she seems to eat a lot of processed foods. Advised she review the sample meal plan provided to her by the program as her final day in the program is tomorrow. Also asked that she ensure she is drinking adequate fluids but not exceeding 50 oz/day. Explained that her kidney function has declined and the best way to maintain fluid balance is by controlling her salt and fluid intake rather than requiring additional lasix doses. She verbalized understanding of this. Instructed her to take Lasix 80 mg BID today. We will make final check in tomorrow to complete exit interview.       Weight: 118.7 kg (261 lb 9.6 oz) (7/13/2017  6:22 AM)

## 2017-07-14 ENCOUNTER — TELEPHONE (OUTPATIENT)
Dept: OTHER | Facility: OTHER | Age: 72
End: 2017-07-14

## 2017-07-14 NOTE — TELEPHONE ENCOUNTER
I spoke to patient and offered an apt for Monday- the patient declined and stated that she wanted to know if the oxybutynin could make her retain fluid, that is why she spoke to the consultant prior. She then states that she took a stool softener and produced a BM and felt much better and her abd bloating went down significantly. I told her that if she changed her mind about an apt, to give me a call back and I would work her in and I would pass along this information to you.

## 2017-07-14 NOTE — TELEPHONE ENCOUNTER
Mrs Leblanc has submitted a similar weight as yesterday to our program.  She has an issue today with additional ABD bloating.  She does not feel short of breath but can see and feel some distention.  Her pants, however, are not tighter that normal.  She is wondering if her increased dose of oxybutynin is making her retain fluid.  She did take an increased dose of lasix yesterday but she feels that she is not completely voiding each time she goes.  She states that her prior urinary urgency was making it difficult and embarrassing for her to work.  She is trying to put herself on a voiding schedule now to try and relieve her bladder even if she does not feel the urge.  We discussed the fine balance of avoiding ABD distention/fluid retention and urinary incontinence.  Will alert Dr Wong today she is does not have time to call the office about these concerns.  She reports having a BM now x 3 days after taking dulcolax.  She will return to her scheduled lasix dose of 80/40 as it sounds as though she has ABD distention and some weight gain secondary dietary choices but also to bladder retention for her UI medication.  She does have a home scale to continue to weigh on daily.  We again discussed diet and reviewed some lower sodium choices.   She knows to return her scale on 7/20 with Dr Rice's appointment in the hospital.   She has no other questions or concerns today.  Exit interview complete as today is her final day in our program.  Weight: 118.6 kg (261 lb 6.4 oz) (7/14/2017  6:48 AM)

## 2017-07-18 ENCOUNTER — OFFICE VISIT (OUTPATIENT)
Dept: NEPHROLOGY | Facility: CLINIC | Age: 72
End: 2017-07-18
Payer: MEDICARE

## 2017-07-18 VITALS
HEIGHT: 65 IN | OXYGEN SATURATION: 96 % | HEART RATE: 65 BPM | DIASTOLIC BLOOD PRESSURE: 80 MMHG | SYSTOLIC BLOOD PRESSURE: 160 MMHG | BODY MASS INDEX: 44.63 KG/M2 | WEIGHT: 267.88 LBS

## 2017-07-18 DIAGNOSIS — N18.30 CONTROLLED TYPE 2 DIABETES MELLITUS WITH STAGE 3 CHRONIC KIDNEY DISEASE, WITH LONG-TERM CURRENT USE OF INSULIN: Primary | ICD-10-CM

## 2017-07-18 DIAGNOSIS — E11.22 CONTROLLED TYPE 2 DIABETES MELLITUS WITH STAGE 3 CHRONIC KIDNEY DISEASE, WITH LONG-TERM CURRENT USE OF INSULIN: Primary | ICD-10-CM

## 2017-07-18 DIAGNOSIS — D63.1 ANEMIA OF CHRONIC RENAL FAILURE, STAGE 3 (MODERATE): ICD-10-CM

## 2017-07-18 DIAGNOSIS — N18.30 ANEMIA OF CHRONIC RENAL FAILURE, STAGE 3 (MODERATE): ICD-10-CM

## 2017-07-18 DIAGNOSIS — N39.0 UTI (URINARY TRACT INFECTION), UNCOMPLICATED: ICD-10-CM

## 2017-07-18 DIAGNOSIS — I12.9 HYPERTENSIVE KIDNEY DISEASE WITH CHRONIC KIDNEY DISEASE, STAGE 1-4 OR UNSPECIFIED CHRONIC KIDNEY DISEASE: ICD-10-CM

## 2017-07-18 DIAGNOSIS — Z79.4 CONTROLLED TYPE 2 DIABETES MELLITUS WITH STAGE 3 CHRONIC KIDNEY DISEASE, WITH LONG-TERM CURRENT USE OF INSULIN: Primary | ICD-10-CM

## 2017-07-18 PROCEDURE — 99999 PR PBB SHADOW E&M-EST. PATIENT-LVL II: CPT | Mod: PBBFAC,,, | Performed by: INTERNAL MEDICINE

## 2017-07-18 PROCEDURE — 99499 UNLISTED E&M SERVICE: CPT | Mod: S$GLB,,, | Performed by: INTERNAL MEDICINE

## 2017-07-18 PROCEDURE — 99214 OFFICE O/P EST MOD 30 MIN: CPT | Mod: S$GLB,,, | Performed by: INTERNAL MEDICINE

## 2017-07-18 PROCEDURE — 3066F NEPHROPATHY DOC TX: CPT | Mod: S$GLB,,, | Performed by: INTERNAL MEDICINE

## 2017-07-18 PROCEDURE — 1159F MED LIST DOCD IN RCRD: CPT | Mod: S$GLB,,, | Performed by: INTERNAL MEDICINE

## 2017-07-18 PROCEDURE — 3045F PR MOST RECENT HEMOGLOBIN A1C LEVEL 7.0-9.0%: CPT | Mod: S$GLB,,, | Performed by: INTERNAL MEDICINE

## 2017-07-18 PROCEDURE — 1126F AMNT PAIN NOTED NONE PRSNT: CPT | Mod: S$GLB,,, | Performed by: INTERNAL MEDICINE

## 2017-07-18 NOTE — PROGRESS NOTES
Subjective:       Patient ID: Erica Leblanc is a 72 y.o. Black or  female who presents for follow-up evaluation of Chronic Kidney Disease    HPI      Ms. Leblanc is a 72 year old woman with past medical history of diabetes, hypertension presenting for follow up of chronic kidney disease.  Patient recently admitted for decompensated heart failure (diastolic dysfunction), symptoms improved with diuresis and better BP control.  Patient denies any complaints since discharge, denies any fever, chest pain, shortness of breath, abdominal pain, diarrhea, dysuria/hematuria. She reports her blood sugars have been moderately controlled with dietary discretion and insulin regimen per Endocrinology; blood pressure has been better controlled (did not take meds until now).  Patient recently treated for UTI, reports occasional incontinence (followed by Urology).     Review of Systems   Constitutional: Negative for appetite change, fatigue and fever.   Respiratory: Negative for chest tightness and shortness of breath.    Cardiovascular: Negative for chest pain and leg swelling.   Gastrointestinal: Negative for abdominal pain, constipation, diarrhea, nausea and vomiting.   Genitourinary: Negative for difficulty urinating, dysuria, flank pain, frequency, hematuria and urgency.   Musculoskeletal: Negative for arthralgias, joint swelling and myalgias.   Skin: Negative for rash and wound.   Neurological: Negative for dizziness, weakness and light-headedness.   All other systems reviewed and are negative.      Objective:      Physical Exam   Constitutional: She appears well-developed and well-nourished.   Cardiovascular: Normal rate, regular rhythm and normal heart sounds.  Exam reveals no gallop and no friction rub.    No murmur heard.  Pulmonary/Chest: Effort normal and breath sounds normal. No respiratory distress. She has no wheezes. She has no rales.   Abdominal: Soft. Bowel sounds are normal. There is no  tenderness.   Musculoskeletal: She exhibits no edema.   Neurological: She is alert.   Skin: Skin is warm and dry. No rash noted. No erythema.   Psychiatric: She has a normal mood and affect.       Assessment:       1. Controlled type 2 diabetes mellitus with stage 3 chronic kidney disease, with long-term current use of insulin    2. Hypertensive kidney disease with chronic kidney disease, stage 1-4 or unspecified chronic kidney disease    3. Anemia of chronic renal failure, stage 3 (moderate)    4. UTI (urinary tract infection), uncomplicated        Plan:      Ms. Leblanc is a 72 year old woman with past medical history of diabetes, hypertension presenting for follow up of chronic kidney disease stage III likely secondary to diabetic nephropathy.  Creatinine previously elevated though stable since November 2016, will continue to trend renal panel along with proteinuria (continue ARB).  Recent elevation likely due to UTI, TMP/SMX, v. secondary to cardiomyopathy, will repeat.  Stressed importance of blood pressure/glycemic monitoring/control, along with weight loss/exercise, to prevent any further progression of kidney disease, patient voiced understanding.   - Hypertension: BP at goal, advised patient to record BP diary, stressed importance of medication compliance and weight loss/exercise, patient voiced understanding.   - Anemia: Hgb at goal, no indication for erythropoetin therapy  - Bone/mineral metabolism: patient with secondary hyperparathyroidism, along with VitD deficiency (continue daily supplement), will trend PTH (at goal for stage CKD)    Return to clinic 4-6 months pending renal panel, then with renal/heme panel, iron/TIBC/ferritin, urinalysis/culture, urine protein/creatinine ratio, PTH prior to next visit

## 2017-07-20 ENCOUNTER — OFFICE VISIT (OUTPATIENT)
Dept: CARDIOLOGY | Facility: CLINIC | Age: 72
End: 2017-07-20
Payer: MEDICARE

## 2017-07-20 VITALS
SYSTOLIC BLOOD PRESSURE: 166 MMHG | DIASTOLIC BLOOD PRESSURE: 77 MMHG | HEIGHT: 65 IN | HEART RATE: 69 BPM | OXYGEN SATURATION: 95 % | WEIGHT: 263 LBS | BODY MASS INDEX: 43.82 KG/M2

## 2017-07-20 DIAGNOSIS — R09.02 HYPOXIA: ICD-10-CM

## 2017-07-20 DIAGNOSIS — E78.5 HYPERLIPIDEMIA LDL GOAL <100: ICD-10-CM

## 2017-07-20 DIAGNOSIS — E11.22 TYPE 2 DIABETES MELLITUS WITH STAGE 3 CHRONIC KIDNEY DISEASE, WITH LONG-TERM CURRENT USE OF INSULIN: ICD-10-CM

## 2017-07-20 DIAGNOSIS — I16.0 HYPERTENSIVE URGENCY: ICD-10-CM

## 2017-07-20 DIAGNOSIS — R06.00 DYSPNEA, UNSPECIFIED TYPE: ICD-10-CM

## 2017-07-20 DIAGNOSIS — I50.33 DIASTOLIC CHF, ACUTE ON CHRONIC: Primary | ICD-10-CM

## 2017-07-20 DIAGNOSIS — E66.01 MORBID OBESITY WITH BMI OF 40.0-44.9, ADULT: ICD-10-CM

## 2017-07-20 DIAGNOSIS — N18.30 TYPE 2 DIABETES MELLITUS WITH STAGE 3 CHRONIC KIDNEY DISEASE, WITH LONG-TERM CURRENT USE OF INSULIN: ICD-10-CM

## 2017-07-20 DIAGNOSIS — G47.33 OBSTRUCTIVE SLEEP APNEA ON CPAP: ICD-10-CM

## 2017-07-20 DIAGNOSIS — Z79.4 TYPE 2 DIABETES MELLITUS WITH STAGE 3 CHRONIC KIDNEY DISEASE, WITH LONG-TERM CURRENT USE OF INSULIN: ICD-10-CM

## 2017-07-20 DIAGNOSIS — Z86.73 HX-TIA (TRANSIENT ISCHEMIC ATTACK): ICD-10-CM

## 2017-07-20 PROCEDURE — 1159F MED LIST DOCD IN RCRD: CPT | Mod: S$GLB,,, | Performed by: INTERNAL MEDICINE

## 2017-07-20 PROCEDURE — 3045F PR MOST RECENT HEMOGLOBIN A1C LEVEL 7.0-9.0%: CPT | Mod: S$GLB,,, | Performed by: INTERNAL MEDICINE

## 2017-07-20 PROCEDURE — 1126F AMNT PAIN NOTED NONE PRSNT: CPT | Mod: S$GLB,,, | Performed by: INTERNAL MEDICINE

## 2017-07-20 PROCEDURE — 3066F NEPHROPATHY DOC TX: CPT | Mod: S$GLB,,, | Performed by: INTERNAL MEDICINE

## 2017-07-20 PROCEDURE — 99214 OFFICE O/P EST MOD 30 MIN: CPT | Mod: S$GLB,,, | Performed by: INTERNAL MEDICINE

## 2017-07-20 PROCEDURE — 99999 PR PBB SHADOW E&M-EST. PATIENT-LVL III: CPT | Mod: PBBFAC,,, | Performed by: INTERNAL MEDICINE

## 2017-07-20 PROCEDURE — 99499 UNLISTED E&M SERVICE: CPT | Mod: S$GLB,,, | Performed by: INTERNAL MEDICINE

## 2017-07-20 NOTE — PROGRESS NOTES
Subjective:    Patient ID:  Erica Leblanc is a 72 y.o. female who presents for evaluation of Follow-up and Results      HPI   previous history:  Patient is here for follow-up from recent hospitalization for diastolic heart failure and hypertensive emergency.  Had remotely seen cardiology at [facility] After Hospital Admission.  She's Noncompliant with Medicines and Salt Restriction.  She's a Former  Here.  We Emphasized That There Is No Excuse for Not Taking These Medicines.  She's Had No Recent Ischemic Workup with Mild Troponin Elevation the Hospital.  She's Agreeable to Take a Nuclear Stress.  She Denies Any Current Chest Pain but Does Have Significant Shortness of Breath on Exertion Relieved with Rest.  She Denies Any Current PND, Orthopnea or Lower Edema.  She's Better after Diuresis in the Hospital.  She's Expresses No Dizziness, Presyncope or Syncope.    Today:  Here for follow-up of diastolic heart failure and previous admission for hypertensive emergency.  She had a follow-up nuclear stress test for risk stratification that was within normal limits as below.  She denies any worsening cardiopulmonary complaints.  Again we talked about mainly health maintenance issues.  We discussed compliance with medicines.  We also discussed sodium restriction.  She's willing to give up the Chinese buffet.  She denies any chest pain, shortness breath or palpitations.  She's not expressing PND, orthopnea or lower edema.  She's not expressing dizziness, presyncope or syncope.  She's had no issues with her medicines.  She does have a gym membership through her insurance that she is agreeable to try and participate.    Review of Systems   Constitution: Negative.   HENT: Negative.    Eyes: Negative.    Cardiovascular: Positive for dyspnea on exertion. Negative for chest pain, irregular heartbeat, leg swelling, near-syncope, orthopnea, palpitations, paroxysmal nocturnal dyspnea and syncope.   Respiratory: Positive  for shortness of breath.    Skin: Negative.    Musculoskeletal: Negative.    Gastrointestinal: Negative for abdominal pain, constipation and diarrhea.   Genitourinary: Negative for dysuria.   Neurological: Negative.    Psychiatric/Behavioral: Negative.         Objective:    Physical Exam   Constitutional: She is oriented to person, place, and time. She appears well-developed and well-nourished.   HENT:   Head: Normocephalic and atraumatic.   Eyes: Conjunctivae and EOM are normal. Pupils are equal, round, and reactive to light.   Neck: Normal range of motion. Neck supple. No thyromegaly present.   Cardiovascular: Normal rate and regular rhythm.    No murmur heard.  Pulmonary/Chest: Effort normal and breath sounds normal. No respiratory distress.   Abdominal: Soft. Bowel sounds are normal.   Musculoskeletal: She exhibits no edema.   Neurological: She is alert and oriented to person, place, and time.   Skin: Skin is warm and dry.   Psychiatric: She has a normal mood and affect. Her behavior is normal.           Echo: 6-17  CONCLUSIONS     1 - Normal left ventricular systolic function (EF 65-70%).     2 - No diagnostic regional wall motion abnormalities.     3 - Concentric remodeling.     4 - Impaired LV relaxation, elevated LAP (grade 2 diastolic dysfunction).     5 - Trivial tricuspid regurgitation.     6 - The estimated PA systolic pressure is greater than 18 mmHg.     NST:  Impression: NORMAL MYOCARDIAL PERFUSION  1. The perfusion scan is free of evidence for myocardial ischemia or injury.   2. Resting wall motion is physiologic.   3. Visually estimated LV function is normal.   4. The ventricular volumes are normal at rest and stress.   5. The extracardiac distribution of radioactivity is normal.     Assessment:       1. Diastolic CHF, acute on chronic    2. Dyspnea, unspecified type    3. Hypertensive urgency    4. Hypoxia    5. Type 2 diabetes mellitus with stage 3 chronic kidney disease, with long-term current  use of insulin    6. Hyperlipidemia LDL goal <100    7. Morbid obesity with BMI of 40.0-44.9, adult    8. Hx-TIA (transient ischemic attack)    9. Obstructive sleep apnea on CPAP         Plan:       -Continue current medical therapy  -Emphasized compliance with medicines and sodium restriction  -Encouraged exercise as tolerated      Return to clinic in 3 mos

## 2017-08-04 ENCOUNTER — HOSPITAL ENCOUNTER (EMERGENCY)
Facility: HOSPITAL | Age: 72
Discharge: HOME | End: 2017-08-04
Attending: EMERGENCY MEDICINE
Payer: MEDICARE

## 2017-08-04 VITALS
HEART RATE: 60 BPM | SYSTOLIC BLOOD PRESSURE: 159 MMHG | BODY MASS INDEX: 41.65 KG/M2 | HEIGHT: 65 IN | TEMPERATURE: 99 F | WEIGHT: 250 LBS | RESPIRATION RATE: 18 BRPM | OXYGEN SATURATION: 95 % | DIASTOLIC BLOOD PRESSURE: 75 MMHG
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
## 2017-08-04 DIAGNOSIS — R07.89 CHEST WALL PAIN: Primary | ICD-10-CM

## 2017-08-04 DIAGNOSIS — R73.09 ELEVATED GLUCOSE: ICD-10-CM

## 2017-08-04 LAB
ALBUMIN SERPL BCP-MCNC: 2.7 G/DL
ALP SERPL-CCNC: 127 U/L
ALT SERPL W/O P-5'-P-CCNC: 70 U/L
ANION GAP SERPL CALC-SCNC: 8 MMOL/L
AST SERPL-CCNC: 62 U/L
BASOPHILS # BLD AUTO: 0.01 K/UL
BASOPHILS NFR BLD: 0.2 %
BILIRUB SERPL-MCNC: 0.3 MG/DL
BUN SERPL-MCNC: 46 MG/DL
CALCIUM SERPL-MCNC: 8.6 MG/DL
CHLORIDE SERPL-SCNC: 104 MMOL/L
CO2 SERPL-SCNC: 29 MMOL/L
CREAT SERPL-MCNC: 2.9 MG/DL
DIFFERENTIAL METHOD: ABNORMAL
EOSINOPHIL # BLD AUTO: 0.1 K/UL
EOSINOPHIL NFR BLD: 1.2 %
ERYTHROCYTE [DISTWIDTH] IN BLOOD BY AUTOMATED COUNT: 15.6 %
EST. GFR  (AFRICAN AMERICAN): 18 ML/MIN/1.73 M^2
EST. GFR  (NON AFRICAN AMERICAN): 16 ML/MIN/1.73 M^2
GLUCOSE SERPL-MCNC: 308 MG/DL
GLUCOSE SERPL-MCNC: 323 MG/DL (ref 70–110)
HCT VFR BLD AUTO: 35.8 %
HGB BLD-MCNC: 11.5 G/DL
LYMPHOCYTES # BLD AUTO: 1 K/UL
LYMPHOCYTES NFR BLD: 15.6 %
MCH RBC QN AUTO: 25.6 PG
MCHC RBC AUTO-ENTMCNC: 32.1 G/DL
MCV RBC AUTO: 80 FL
MONOCYTES # BLD AUTO: 0.7 K/UL
MONOCYTES NFR BLD: 11.4 %
NEUTROPHILS # BLD AUTO: 4.6 K/UL
NEUTROPHILS NFR BLD: 71.6 %
PLATELET # BLD AUTO: 324 K/UL
PMV BLD AUTO: 10.6 FL
POTASSIUM SERPL-SCNC: 4.2 MMOL/L
PROT SERPL-MCNC: 6.9 G/DL
RBC # BLD AUTO: 4.5 M/UL
SODIUM SERPL-SCNC: 141 MMOL/L
WBC # BLD AUTO: 6.41 K/UL

## 2017-08-04 PROCEDURE — 82962 GLUCOSE BLOOD TEST: CPT

## 2017-08-04 PROCEDURE — 80053 COMPREHEN METABOLIC PANEL: CPT

## 2017-08-04 PROCEDURE — 93005 ELECTROCARDIOGRAM TRACING: CPT

## 2017-08-04 PROCEDURE — 85025 COMPLETE CBC W/AUTO DIFF WBC: CPT

## 2017-08-04 PROCEDURE — 99285 EMERGENCY DEPT VISIT HI MDM: CPT

## 2017-08-04 RX ORDER — METHOCARBAMOL 500 MG/1
500 TABLET, FILM COATED ORAL 3 TIMES DAILY
Qty: 6 TABLET | Refills: 0 | Status: SHIPPED | OUTPATIENT
Start: 2017-08-04 | End: 2017-08-06
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
## 2017-08-07 LAB — POCT GLUCOSE: 323 MG/DL (ref 70–110)

## 2017-08-08 ENCOUNTER — OUTPATIENT CASE MANAGEMENT (OUTPATIENT)
Dept: ADMINISTRATIVE | Facility: OTHER | Age: 72
End: 2017-08-08

## 2017-08-08 NOTE — PROGRESS NOTES
For your information:    The following patient has been assigned to Baylee Kitchen, RN with Outpatient Complex Care Management for high risk screening.    Reason: High Risk    Please contact [facility] at [phone] with any questions.    Thank you,  Gini Strong

## 2017-08-14 ENCOUNTER — OUTPATIENT CASE MANAGEMENT (OUTPATIENT)
Dept: ADMINISTRATIVE | Facility: OTHER | Age: 72
End: 2017-08-14

## 2017-08-15 ENCOUNTER — OUTPATIENT CASE MANAGEMENT (OUTPATIENT)
Dept: ADMINISTRATIVE | Facility: OTHER | Age: 72
End: 2017-08-15

## 2017-08-15 NOTE — PROGRESS NOTES
Pt reports that she has [facility] at this [facility].

## 2017-09-27 DIAGNOSIS — I50.32 CHRONIC DIASTOLIC HEART FAILURE: ICD-10-CM

## 2017-09-27 RX ORDER — METOPROLOL TARTRATE 100 MG/1
TABLET ORAL
Qty: 180 TABLET | Refills: 0 | Status: SHIPPED | OUTPATIENT
Start: 2017-09-27 | End: 2018-01-18 | Stop reason: SDUPTHER

## 2017-09-29 DIAGNOSIS — E78.5 HYPERLIPIDEMIA LDL GOAL <100: Primary | ICD-10-CM

## 2017-09-29 RX ORDER — ATORVASTATIN CALCIUM 10 MG/1
10 TABLET, FILM COATED ORAL DAILY
Qty: 90 TABLET | Refills: 0 | Status: SHIPPED | OUTPATIENT
Start: 2017-09-29 | End: 2018-04-04 | Stop reason: SDUPTHER

## 2017-09-29 RX ORDER — CLOPIDOGREL BISULFATE 75 MG/1
TABLET ORAL
Qty: 90 TABLET | Refills: 0 | Status: SHIPPED | OUTPATIENT
Start: 2017-09-29 | End: 2018-04-18 | Stop reason: SDUPTHER

## 2017-09-29 NOTE — TELEPHONE ENCOUNTER
[facility] is requesting a 90 day refill on patient Clopidogrel 75 mg and Atorvastatin 10 mg  please advise.

## 2017-10-03 DIAGNOSIS — F32.1 MODERATE SINGLE CURRENT EPISODE OF MAJOR DEPRESSIVE DISORDER: ICD-10-CM

## 2017-10-03 RX ORDER — CITALOPRAM 10 MG/1
10 TABLET ORAL DAILY
Qty: 90 TABLET | Refills: 0 | Status: SHIPPED | OUTPATIENT
Start: 2017-10-03 | End: 2018-02-07 | Stop reason: SDUPTHER

## 2017-10-03 RX ORDER — POTASSIUM CHLORIDE 20 MEQ/1
TABLET, EXTENDED RELEASE ORAL
Qty: 90 TABLET | Refills: 0 | Status: SHIPPED | OUTPATIENT
Start: 2017-10-03 | End: 2018-05-05 | Stop reason: SDUPTHER

## 2017-10-03 NOTE — TELEPHONE ENCOUNTER
[facility] is requesting a 90 day refill on patient Citalopram and Potassium please advise.

## 2017-10-11 ENCOUNTER — LAB VISIT (OUTPATIENT)
Dept: LAB | Facility: HOSPITAL | Age: 72
End: 2017-10-11
Attending: NURSE PRACTITIONER
Payer: MEDICARE

## 2017-10-11 ENCOUNTER — OFFICE VISIT (OUTPATIENT)
Dept: FAMILY MEDICINE | Facility: CLINIC | Age: 72
End: 2017-10-11
Payer: MEDICARE

## 2017-10-11 VITALS
BODY MASS INDEX: 42.86 KG/M2 | TEMPERATURE: 99 F | SYSTOLIC BLOOD PRESSURE: 132 MMHG | HEART RATE: 62 BPM | OXYGEN SATURATION: 95 % | HEIGHT: 65 IN | DIASTOLIC BLOOD PRESSURE: 72 MMHG | WEIGHT: 257.25 LBS

## 2017-10-11 DIAGNOSIS — N18.30 CKD (CHRONIC KIDNEY DISEASE), STAGE III: ICD-10-CM

## 2017-10-11 DIAGNOSIS — I50.32 CHRONIC DIASTOLIC HEART FAILURE: Primary | ICD-10-CM

## 2017-10-11 DIAGNOSIS — E66.01 MORBID OBESITY WITH BMI OF 45.0-49.9, ADULT: ICD-10-CM

## 2017-10-11 DIAGNOSIS — E03.9 HYPOTHYROIDISM (ACQUIRED): ICD-10-CM

## 2017-10-11 DIAGNOSIS — E78.5 HYPERLIPIDEMIA LDL GOAL <100: ICD-10-CM

## 2017-10-11 DIAGNOSIS — N39.46 MIXED INCONTINENCE URGE AND STRESS: ICD-10-CM

## 2017-10-11 DIAGNOSIS — H40.89 GLAUCOMA ASSOCIATED WITH VASCULAR DISORDER OF EYE: ICD-10-CM

## 2017-10-11 DIAGNOSIS — N25.81 SECONDARY RENAL HYPERPARATHYROIDISM: ICD-10-CM

## 2017-10-11 DIAGNOSIS — Z12.39 SCREENING FOR BREAST CANCER: ICD-10-CM

## 2017-10-11 DIAGNOSIS — E11.22 CONTROLLED TYPE 2 DIABETES MELLITUS WITH STAGE 3 CHRONIC KIDNEY DISEASE, WITH LONG-TERM CURRENT USE OF INSULIN: ICD-10-CM

## 2017-10-11 DIAGNOSIS — Z79.4 CONTROLLED TYPE 2 DIABETES MELLITUS WITH STAGE 3 CHRONIC KIDNEY DISEASE, WITH LONG-TERM CURRENT USE OF INSULIN: ICD-10-CM

## 2017-10-11 DIAGNOSIS — I27.20 PULMONARY HYPERTENSION: ICD-10-CM

## 2017-10-11 DIAGNOSIS — G47.33 OBSTRUCTIVE SLEEP APNEA ON CPAP: ICD-10-CM

## 2017-10-11 DIAGNOSIS — N18.30 CONTROLLED TYPE 2 DIABETES MELLITUS WITH STAGE 3 CHRONIC KIDNEY DISEASE, WITH LONG-TERM CURRENT USE OF INSULIN: ICD-10-CM

## 2017-10-11 DIAGNOSIS — E11.3513 PROLIFERATIVE DIABETIC RETINOPATHY OF BOTH EYES WITH MACULAR EDEMA ASSOCIATED WITH TYPE 2 DIABETES MELLITUS: ICD-10-CM

## 2017-10-11 DIAGNOSIS — Z23 NEED FOR IMMUNIZATION AGAINST INFLUENZA: ICD-10-CM

## 2017-10-11 DIAGNOSIS — H35.033 HYPERTENSIVE RETINOPATHY OF BOTH EYES: ICD-10-CM

## 2017-10-11 DIAGNOSIS — F33.0 MILD EPISODE OF RECURRENT MAJOR DEPRESSIVE DISORDER: ICD-10-CM

## 2017-10-11 PROBLEM — J96.01 ACUTE RESPIRATORY FAILURE WITH HYPOXIA: Status: RESOLVED | Noted: 2017-06-12 | Resolved: 2017-10-11

## 2017-10-11 LAB
CHOLEST SERPL-MCNC: 130 MG/DL
CHOLEST/HDLC SERPL: 2.5 {RATIO}
ESTIMATED AVG GLUCOSE: 212 MG/DL
HBA1C MFR BLD HPLC: 9 %
HDLC SERPL-MCNC: 53 MG/DL
HDLC SERPL: 40.8 %
LDLC SERPL CALC-MCNC: 60.4 MG/DL
NONHDLC SERPL-MCNC: 77 MG/DL
TRIGL SERPL-MCNC: 83 MG/DL

## 2017-10-11 PROCEDURE — 36415 COLL VENOUS BLD VENIPUNCTURE: CPT | Mod: PO

## 2017-10-11 PROCEDURE — 80061 LIPID PANEL: CPT

## 2017-10-11 PROCEDURE — 83036 HEMOGLOBIN GLYCOSYLATED A1C: CPT

## 2017-10-11 PROCEDURE — 99214 OFFICE O/P EST MOD 30 MIN: CPT | Mod: S$GLB,,, | Performed by: NURSE PRACTITIONER

## 2017-10-11 PROCEDURE — G0008 ADMIN INFLUENZA VIRUS VAC: HCPCS | Mod: S$GLB,,, | Performed by: NURSE PRACTITIONER

## 2017-10-11 PROCEDURE — 99999 PR PBB SHADOW E&M-EST. PATIENT-LVL V: CPT | Mod: PBBFAC,,, | Performed by: NURSE PRACTITIONER

## 2017-10-11 PROCEDURE — 90662 IIV NO PRSV INCREASED AG IM: CPT | Mod: S$GLB,,, | Performed by: NURSE PRACTITIONER

## 2017-10-11 PROCEDURE — 99499 UNLISTED E&M SERVICE: CPT | Mod: S$GLB,,, | Performed by: NURSE PRACTITIONER

## 2017-10-11 NOTE — PATIENT INSTRUCTIONS
Call Dr. Viveros and schedule a follow up appointment   Watch diet closely  Try to exercise more   Continue all current medications

## 2017-10-11 NOTE — PROGRESS NOTES
Subjective:       Patient ID: Erica Leblanc is a 72 y.o. female.    Chief Complaint: Diabetes (F/U)    72-year-old female presents to the clinic today for follow-up on diabetes, hypertension, and chronic kidney disease. Her blood sugars have been averaging 120-130's. She has fair dietary habits.  She walks 3 times a week with her granddaughter for about 20 minutes.  She is compliant with all of her medications.  Her diabetes is followed by Dr. Rossi. 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  She denies any headaches, dizziness, or blurred vision.  She request her hemoglobin A1c and lipid panel be drawn today so Dr. Rossi will have the results.      Past Medical History:   Diagnosis Date    Acute respiratory failure with hypoxia     Cataracts, bilateral     CHF (congestive heart failure)     CKD (chronic kidney disease) stage 3, GFR 30-59 ml/min     CKD (chronic kidney disease) stage 3, GFR 30-59 ml/min     Controlled type 2 diabetes mellitus with proteinuria or albuminuria     Depression     Diabetes with neurologic complications     Diabetic retinopathy of both eyes     Edema     Glaucoma     History of colonic polyps     Hx-TIA (transient ischemic attack) 11/2008    Hyperlipidemia LDL goal < 100     Hypertension     Hypothyroidism     Major depressive disorder, single episode, mild 2/17/2016    Mixed incontinence urge and stress     Obesity     Obstructive sleep apnea on CPAP     Osteopenia     Proteinuria     Sickle cell trait     Trouble in sleeping     Type 2 diabetes mellitus with ophthalmic manifestations     Type 2 diabetes with stage 3 chronic kidney disease GFR 30-59     Type II or unspecified type diabetes mellitus with renal manifestations, uncontrolled(250.42)     Urge incontinence 1/11/2016    Urge incontinence     Vitamin D deficiency disease      Past Surgical  History:   Procedure Laterality Date    breast reduction Bilateral age 31    cataracts Bilateral      SECTION, LOW TRANSVERSE      x1    CHOLECYSTECTOMY      EYE SURGERY  2014, 2014    vitrectomy    EYE SURGERY Right 2016    HYSTERECTOMY  1986    TAHBSO (patient is unsure if ovaries removed)    REFRACTIVE SURGERY        reports that she has never smoked. She has never used smokeless tobacco. She reports that she does not drink alcohol or use drugs.  Review of Systems   Respiratory: Negative for cough, shortness of breath and wheezing.    Cardiovascular: Negative for chest pain, palpitations and leg swelling.   Gastrointestinal: Negative for abdominal pain, diarrhea, nausea and vomiting.   Musculoskeletal: Negative for gait problem, neck pain and neck stiffness.   Neurological: Negative for dizziness, light-headedness and headaches.       Objective:      Physical Exam   Constitutional: She is oriented to person, place, and time. She appears well-developed and well-nourished. No distress.   Eyes: Conjunctivae and EOM are normal. Pupils are equal, round, and reactive to light. Right eye exhibits no discharge. Left eye exhibits no discharge. No scleral icterus.   Neck: Normal range of motion. Neck supple. No JVD present.   Cardiovascular: Normal rate, regular rhythm and normal heart sounds.  Exam reveals no gallop and no friction rub.    No murmur heard.  Pulmonary/Chest: Effort normal and breath sounds normal. No respiratory distress. She has no wheezes. She has no rales.   Abdominal: Soft. Bowel sounds are normal. There is no tenderness.   Musculoskeletal: Normal range of motion. She exhibits no edema.   Protective Sensation (w/ 10 gram monofilament):  Right: Intact  Left: Intact    Visual Inspection:  Normal visual exam     Pedal Pulses:   Right: Present  Left: Present    Posterior tibialis:   Right:Present  Left: Present     Neurological: She is alert and oriented to person, place, and  time.   Skin: Skin is warm and dry. She is not diaphoretic.   Psychiatric: She has a normal mood and affect.       Assessment:       1. Chronic diastolic heart failure    2. CKD (chronic kidney disease), stage III    3. Controlled type 2 diabetes mellitus with stage 3 chronic kidney disease, with long-term current use of insulin    4. 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    5. Hyperlipidemia LDL goal <100    6. Hypertensive retinopathy of both eyes    7. Hypothyroidism (acquired)    8. Mild episode of recurrent major depressive disorder    9. Mixed incontinence urge and stress    10. Morbid obesity with BMI of 45.0-49.9, adult    11. Obstructive sleep apnea on CPAP    12. 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 Pulmonary hypertension    14. Secondary renal hyperparathyroidism    15. Need for immunization against influenza    16. Screening for breast cancer        Plan:         Chronic diastolic heart failure  - The current medical regimen is effective;  continue present plan and medications.  - followed by Dr. Rice    CKD (chronic kidney disease), stage III  - followed by Dr. Serrato   - avoid all anti-inflammatories     Controlled type 2 diabetes mellitus with stage 3 chronic kidney disease, with long-term current use of insulin  -     Hemoglobin A1c; Future; Expected date: 10/11/2017  - on [employer] Delivery system followed by Dr. 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  - followed by opthalmology     Hyperlipidemia LDL goal <100  -     Lipid panel; Future; Expected date: 10/11/2017  - The current medical regimen is effective;  continue present plan and medications.    Hypertensive retinopathy of both eyes  - followed by ophthalmology    Hypothyroidism (acquired)  - The current medical regimen is effective;  continue present  plan and medications.    Mild episode of recurrent major depressive disorder  - The current medical regimen is effective;  continue present plan and medications.    Mixed incontinence urge and stress  - followed by Dr. Wong     Morbid obesity with BMI of 45.0-49.9, adult  - The patient is asked to make an attempt to improve diet and exercise patterns to aid in medical management of this problem.    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 Rice    Secondary renal hyperparathyroidism  - stable asymptomatic observe    Need for immunization against influenza  -     Influenza - High Dose (65+) (PF) (IM)    Screening for breast cancer  -     Mammo Digital Screening Bilat with CAD; Future; Expected date: 10/11/2017

## 2017-10-13 ENCOUNTER — TELEPHONE (OUTPATIENT)
Dept: FAMILY MEDICINE | Facility: CLINIC | Age: 72
End: 2017-10-13

## 2017-10-13 NOTE — TELEPHONE ENCOUNTER
I spoke with the patient and explained that her HgBA1C has gone from 8.7 to 9.0. I told her that her cholesterol was at goal. She said she would call and schedule a appointment with Dr. Rossi for further evaluation.

## 2017-12-21 ENCOUNTER — TELEPHONE (OUTPATIENT)
Dept: ENDOCRINOLOGY | Facility: CLINIC | Age: 72
End: 2017-12-21

## 2017-12-21 NOTE — TELEPHONE ENCOUNTER
Left message on patient's voicemail to return call to clinic regarding scheduling Diabetes management appointment with Liana Cruz NP for elevated A1c. Waiting to hear back.

## 2018-01-06 ENCOUNTER — LAB VISIT (OUTPATIENT)
Dept: LAB | Facility: HOSPITAL | Age: 73
End: 2018-01-06
Attending: INTERNAL MEDICINE
Payer: MEDICARE

## 2018-01-06 DIAGNOSIS — E03.9 MYXEDEMA HEART DISEASE: ICD-10-CM

## 2018-01-06 DIAGNOSIS — I51.9 MYXEDEMA HEART DISEASE: ICD-10-CM

## 2018-01-06 DIAGNOSIS — E11.29 TYPE II DIABETES MELLITUS WITH RENAL MANIFESTATIONS: Primary | ICD-10-CM

## 2018-01-06 DIAGNOSIS — E55.9 AVITAMINOSIS D: ICD-10-CM

## 2018-01-06 LAB
25(OH)D3+25(OH)D2 SERPL-MCNC: 73 NG/ML
ALBUMIN SERPL BCP-MCNC: 2.5 G/DL
ALP SERPL-CCNC: 139 U/L
ALT SERPL W/O P-5'-P-CCNC: 34 U/L
ANION GAP SERPL CALC-SCNC: 9 MMOL/L
AST SERPL-CCNC: 17 U/L
BILIRUB SERPL-MCNC: 0.4 MG/DL
BUN SERPL-MCNC: 42 MG/DL
CALCIUM SERPL-MCNC: 8.8 MG/DL
CHLORIDE SERPL-SCNC: 99 MMOL/L
CHOLEST SERPL-MCNC: 122 MG/DL
CHOLEST/HDLC SERPL: 2.7 {RATIO}
CO2 SERPL-SCNC: 29 MMOL/L
CREAT SERPL-MCNC: 3.1 MG/DL
EST. GFR  (AFRICAN AMERICAN): 16.7 ML/MIN/1.73 M^2
EST. GFR  (NON AFRICAN AMERICAN): 14.5 ML/MIN/1.73 M^2
ESTIMATED AVG GLUCOSE: 292 MG/DL
GLUCOSE SERPL-MCNC: 367 MG/DL
HBA1C MFR BLD HPLC: 11.8 %
HDLC SERPL-MCNC: 46 MG/DL
HDLC SERPL: 37.7 %
LDLC SERPL CALC-MCNC: 57.6 MG/DL
NONHDLC SERPL-MCNC: 76 MG/DL
POTASSIUM SERPL-SCNC: 4.8 MMOL/L
PROT SERPL-MCNC: 6.8 G/DL
SODIUM SERPL-SCNC: 137 MMOL/L
T4 FREE SERPL-MCNC: 1.21 NG/DL
TRIGL SERPL-MCNC: 92 MG/DL
TSH SERPL DL<=0.005 MIU/L-ACNC: 2.24 UIU/ML

## 2018-01-06 PROCEDURE — 36415 COLL VENOUS BLD VENIPUNCTURE: CPT | Mod: PO

## 2018-01-06 PROCEDURE — 80061 LIPID PANEL: CPT

## 2018-01-06 PROCEDURE — 82306 VITAMIN D 25 HYDROXY: CPT

## 2018-01-06 PROCEDURE — 84443 ASSAY THYROID STIM HORMONE: CPT

## 2018-01-06 PROCEDURE — 84439 ASSAY OF FREE THYROXINE: CPT

## 2018-01-06 PROCEDURE — 83036 HEMOGLOBIN GLYCOSYLATED A1C: CPT

## 2018-01-06 PROCEDURE — 80053 COMPREHEN METABOLIC PANEL: CPT

## 2018-01-18 ENCOUNTER — TELEPHONE (OUTPATIENT)
Dept: FAMILY MEDICINE | Facility: CLINIC | Age: 73
End: 2018-01-18

## 2018-01-18 DIAGNOSIS — Z12.31 ENCOUNTER FOR SCREENING MAMMOGRAM FOR MALIGNANT NEOPLASM OF BREAST: Primary | ICD-10-CM

## 2018-01-18 DIAGNOSIS — I50.32 CHRONIC DIASTOLIC HEART FAILURE: ICD-10-CM

## 2018-01-18 RX ORDER — METOPROLOL TARTRATE 100 MG/1
TABLET ORAL
Qty: 180 TABLET | Refills: 0 | Status: SHIPPED | OUTPATIENT
Start: 2018-01-18 | End: 2018-03-23 | Stop reason: SDUPTHER

## 2018-01-22 ENCOUNTER — CLINICAL SUPPORT (OUTPATIENT)
Dept: DIABETES | Facility: CLINIC | Age: 73
End: 2018-01-22
Payer: MEDICARE

## 2018-01-22 ENCOUNTER — OFFICE VISIT (OUTPATIENT)
Dept: UROLOGY | Facility: CLINIC | Age: 73
End: 2018-01-22
Payer: MEDICARE

## 2018-01-22 VITALS
DIASTOLIC BLOOD PRESSURE: 70 MMHG | HEART RATE: 60 BPM | SYSTOLIC BLOOD PRESSURE: 138 MMHG | HEIGHT: 65 IN | RESPIRATION RATE: 18 BRPM | BODY MASS INDEX: 41.8 KG/M2 | WEIGHT: 250.88 LBS

## 2018-01-22 VITALS — BODY MASS INDEX: 39.85 KG/M2 | WEIGHT: 239.5 LBS

## 2018-01-22 DIAGNOSIS — R33.9 INCOMPLETE BLADDER EMPTYING: ICD-10-CM

## 2018-01-22 DIAGNOSIS — E11.40 TYPE 2 DIABETES, CONTROLLED, WITH NEUROPATHY: Primary | ICD-10-CM

## 2018-01-22 DIAGNOSIS — N39.46 MIXED INCONTINENCE URGE AND STRESS: Primary | ICD-10-CM

## 2018-01-22 PROCEDURE — G0108 DIAB MANAGE TRN  PER INDIV: HCPCS | Mod: S$GLB,,, | Performed by: DIETITIAN, REGISTERED

## 2018-01-22 PROCEDURE — 99999 PR PBB SHADOW E&M-EST. PATIENT-LVL III: CPT | Mod: PBBFAC,,, | Performed by: UROLOGY

## 2018-01-22 PROCEDURE — 99214 OFFICE O/P EST MOD 30 MIN: CPT | Mod: S$GLB,,, | Performed by: UROLOGY

## 2018-01-22 RX ORDER — OXYBUTYNIN CHLORIDE 15 MG/1
15 TABLET, EXTENDED RELEASE ORAL DAILY
Qty: 90 TABLET | Refills: 3 | Status: SHIPPED | OUTPATIENT
Start: 2018-01-22 | End: 2019-01-28 | Stop reason: SDUPTHER

## 2018-01-22 NOTE — PROGRESS NOTES
Diabetes Education  Author: Shara Zaman RD  Date: 1/22/2018    Diabetes Education Visit  Diabetes Education Record Assessment/Progress: Initial  Pt visit today focused onCHO awareness and counting , meal planning, exercise and SMBG  Diabetes Type  Diabetes Type : Type II    Diabetes History  Diabetes Diagnosis: >10 years    Nutrition- diet hx  Meal Planning: skipping meals, drinks regular soda, diet drinks  What type of sweetener do you use?: Equal, Splenda  What type of beverages do you drink?: diet soda/tea, water, milk, juice  Meal Plan 24 Hour Recall - Breakfast: 9-10am: boiled egg, toast, or grits, scrambled eggs, ham, water or diet drink  Meal Plan 24 Hour Recall - Lunch: noon; chili w beans, salad, water  Meal Plan 24 Hour Recall - Dinner: 6pm: chili w beans  Meal Plan 24 Hour Recall - Snack: banana    Monitoring   Self Monitoring : not checked at all because lacked desire to check it  Blood Glucose Logs: No    Exercise   Exercise Type: none    Current Diabetes Treatment   Current Treatment: Diet, Insulin, Injectable    Social History  Preferred Learning Method: Face to Face  Primary Support: Self  Educational Level: College Graduate  Occupation: retired   Smoking Status: Never a Smoker  Alcohol Use: Never    PHQ-2 Total Score: 0   DDS-2 Score  ( > 3 = SIGNIFICANT DISTRESS): 2  Barriers to Change: None  Learning Challenges : None  Readiness to Learn : Acceptance    Cultural Influences  Cultural Influences: No    Diabetes Education Assessment/Progress  Diabetes Disease Process (diabetes disease process and treatment options): Discussion, Individual Session, Written Materials Provided, Instructed, Demonstrates Understanding/Competency(verbalizes/demonstrates)  -Reviewed diabetes progression and self-management; Provided copy of Diabetes and Chronic Kidney Disease, Knowing your Numbers and High/Low BG guides    Nutrition (Incorporating nutritional management into one's lifestyle): Discussion,  "Individual Session, Written Materials Provided, Instructed, Comprehends Key Points  -diet hx indicates a regular intake of high CHO foods and limited intake of non-starchy veg, fresh fruit and lean meats/dairy. Pt stated in recent past she just ate/drank  what she wanted without concern of BG  -Reviewed eating 3 meals daily (30-45 gCHO/meal), spacing meals 4-5 hours apart, choosing appropriate sources of CHO with acceptable serving sizes, label reading and using the plate method of meal planning.Rec'd limiting snacks to mostly 0-5g CHO or if consumes  a between meal CHO snack,limit it to no more than 15gCHO +1oz protein/snack.  -Rec'd lower intake of sodium and K+ foods d/t CKD. Pt stated understanding of the recommendations    Physical Activity (incorporating physical activity into one's lifestyle): Discussion, Individual Session, Written Materials Provided, Instructed, Comprehends Key Points  -Discussed benefits of physical activity on BG control. Encouraged starting with small manageable goals such as waking 2 to 3 ten-minute walKs per day while keeping 3 components in mind: Frequency- 3-5x/wk, Duration- 30 min, Intensity- can say name but "not sing a song"    Medications (states correct name, dose, onset, peak, duration, side effects & timing of meds): Discussion, Individual Session, Instructed, Comprehends Key Points, Written Materials Provided  -Pt state she has been stretching out her VGO due to cost. Urged pt to discuss these medication chances with PCP    Monitoring (monitoring blood glucose/other parameters & using results): Discussion, Individual Session, Written Materials Provided, Instructed, Comprehends Key Points  -pt admits to never checking BG and is totally unaware of her BG readings before taking I nsulin. Reviewed goals of SMBG, BG desired readings and keeping BG log    Acute Complications (preventing, detecting, and treating acute complications): Discussion, Individual Session, Written Materials " "Provided, Instructed, Comprehends Key Points  -Reviewed s/s, causes, and treatment of hyperglycemia and hypoglycemia and use of  "rule of 15" w/ hypoglycemia.Pt encourage to carry hypoglycemia treatment at all time and to have hypoglycemic treatment supplies at bedside and while out/traveling.    Chronic Complications (preventing, detecting, and treating chronic complications): Discussion, Individual Session, Written Materials Provided, Instructed, Comprehends Key Points  -reviewed care schedule and foot care    Clinical (diabetes and other pertinent medical history): Discussion, Individual Session  Cognitive (knowledge of self-management skills, functional health literacy): Discussion, Individual Session  Psychosocial (emotional response to diabetes): Not Covered/Deferred  Diabetes Distress and Support Systems: Not Covered/Deferred  Behavioral (readiness for change, lifestyle practices, self-care behaviors): Discussion, Individual Session, Instructed    Goals  Patient has selected/evaluated goals during today's session: Yes, selected  Healthy Eating: Set (limit CHO intake to 30-45 g/meal)  Start Date: 01/22/18  Monitoring: Set (SMBG 2x daily)  Start Date: 01/22/18  Diabetes Care Plan/Intervention  Education Plan/Intervention: Individual Follow-Up DSMT    Diabetes Meal Plan  Restrictions: Restricted Carbohydrate, Low Potassium, Low Sodium  Carbohydrate Per Meal: 30-45g  Carbohydrate Per Snack : 15-20g    Education Units of Time   Time Spent: 60 min      Health Maintenance Topics with due status: Not Due       Topic Last Completion Date    TETANUS VACCINE 08/08/2016    Eye Exam 06/20/2017    Foot Exam 10/11/2017    Lipid Panel 01/06/2018    Hemoglobin A1c 01/06/2018     Health Maintenance Due   Topic Date Due    Zoster Vaccine  02/25/2006    Mammogram  11/09/2017    Colonoscopy  01/16/2018    DEXA SCAN  02/22/2018     "

## 2018-01-22 NOTE — PROGRESS NOTES
"  Subjective:       Erica Leblanc is a 72 y.o. female who is an established patient of Dr. Barker and Dr Lerma was seen for evaluation of UI.      She is a previous pt of [facility] and saw Dr Lerma in 3/16. She has known mixed incontinence as well as chronic constipation. She has been wearing one diaper per day. She was recommended to stop Urecholine at last visit. She continues to have UUI issues. WILLIE is less bothersome.     She was noted to have PVR of 250cc by I&O cath (1/16). She denies feelings of KATY. PVR on US (2/16) was 9cc.     She was given trial of Ditropan 5mg initially. She had some improvement. She feels that increased stress increased her urinary frequency. She had large volume UUI when she was doing a work required function. Ditropan increased to 15mg and doing well with this. Rare UUI. She is having insensate UI as well. Still wearing Depends.      PVR (bladder scan) last visit - 90cc (~45min after void), last visit - 16cc.      The following portions of the patient's history were reviewed and updated as appropriate: allergies, current medications, past family history, past medical history, past social history, past surgical history and problem list.    Review of Systems  Constitutional: no fever or chills  ENT: no nasal congestion or sore throat  Respiratory: no cough or shortness of breath  Cardiovascular: no chest pain or palpitations  Gastrointestinal: no nausea or vomiting, tolerating diet  Genitourinary: as per HPI  Hematologic/Lymphatic: no easy bruising or lymphadenopathy  Musculoskeletal: no arthralgias or myalgias  Skin: no rashes or lesions  Neurological: no seizures or tremors  Behavioral/Psych: no auditory or visual hallucinations       Objective:    Vitals:   /70   Pulse 60   Resp 18   Ht 5' 5" (1.651 m)   Wt 113.8 kg (250 lb 14.1 oz)   BMI 41.75 kg/m²     Physical Exam   General: well developed, well nourished in no acute distress  Head: normocephalic, " atraumatic  Neck: supple, trachea midline, no obvious enlargement of thyroid  HEENT: EOMI, mucus membranes moist, sclera anicteric, no hearing impairment  Lungs: symmetric expansion, non-labored breathing  Cardiovascular: regular rate and rhythm, normal pulses  Abdomen: soft, non tender, non distended, no palpable masses, no hepatosplenomegaly, no hernias, no CVA tenderness  Musculoskeletal: no peripheral edema, normal ROM in bilateral upper and lower extremities  Lymphatics: no cervical or inguinal lymphadenopathy  Skin: no rashes or lesions  Neuro: alert and oriented x 3, no gross deficits  Psych: normal judgment and insight, normal mood/affect and non-anxious  Genitourinary:   patient declined exam      Lab Review   Urine analysis today in clinic shows - +++protein, 100 glucose, small ketones, 50 RBC, ++LE      Lab Results   Component Value Date    WBC 6.41 08/04/2017    HGB 11.5 (L) 08/04/2017    HCT 35.8 (L) 08/04/2017    MCV 80 (L) 08/04/2017     08/04/2017     Lab Results   Component Value Date    CREATININE 3.1 (H) 01/06/2018    BUN 42 (H) 01/06/2018       Imaging  Images and reports were personally reviewed by me and discussed with patient  US reviewed       Assessment/Plan:      1. Mixed incontinence urge and stress    - Stopped Urecholine previously   - Tight glucose control   - Weight loss   - Increase to Ditropan XL 15mg - doing well with this. Still with significant UUI. Wearing Depends.    - Complicated by Lasix   - Consider Myrbetriq addition - she declines for now   - Continue stool softener (Colace) (one episode of FI)   - Timed voiding   - Cysto/UDS? InterStim?      2. Incomplete bladder emptying    - PVR acceptable       Follow up in 6 months with PVR

## 2018-02-01 ENCOUNTER — PES CALL (OUTPATIENT)
Dept: ADMINISTRATIVE | Facility: CLINIC | Age: 73
End: 2018-02-01

## 2018-02-07 DIAGNOSIS — F32.1 MODERATE SINGLE CURRENT EPISODE OF MAJOR DEPRESSIVE DISORDER: ICD-10-CM

## 2018-02-07 RX ORDER — CITALOPRAM 10 MG/1
10 TABLET ORAL DAILY
Qty: 90 TABLET | Refills: 0 | Status: SHIPPED | OUTPATIENT
Start: 2018-02-07 | End: 2018-04-13 | Stop reason: SDUPTHER

## 2018-02-12 NOTE — TELEPHONE ENCOUNTER
Yes, her last colonoscopy was in 2013 which is 5 years ago. Does she want to have her colonoscopy on the [address]?  She is also due for her BMD.

## 2018-02-12 NOTE — TELEPHONE ENCOUNTER
Patient stated that she is scheduled for her mammogram on 3/31/18. Also patient stated that she thought her colonoscopy was every 5 years.

## 2018-02-22 DIAGNOSIS — Z12.31 ENCOUNTER FOR SCREENING MAMMOGRAM FOR MALIGNANT NEOPLASM OF BREAST: ICD-10-CM

## 2018-02-22 DIAGNOSIS — E11.22 CONTROLLED TYPE 2 DIABETES MELLITUS WITH STAGE 3 CHRONIC KIDNEY DISEASE, WITH LONG-TERM CURRENT USE OF INSULIN: ICD-10-CM

## 2018-02-22 DIAGNOSIS — M94.9 DISORDER OF BONE AND CARTILAGE: ICD-10-CM

## 2018-02-22 DIAGNOSIS — E78.5 HYPERLIPIDEMIA LDL GOAL <100: ICD-10-CM

## 2018-02-22 DIAGNOSIS — N18.30 CONTROLLED TYPE 2 DIABETES MELLITUS WITH STAGE 3 CHRONIC KIDNEY DISEASE, WITH LONG-TERM CURRENT USE OF INSULIN: ICD-10-CM

## 2018-02-22 DIAGNOSIS — M89.9 DISORDER OF BONE AND CARTILAGE: ICD-10-CM

## 2018-02-22 DIAGNOSIS — H35.033 HYPERTENSIVE RETINOPATHY OF BOTH EYES: Primary | ICD-10-CM

## 2018-02-22 DIAGNOSIS — Z79.4 CONTROLLED TYPE 2 DIABETES MELLITUS WITH STAGE 3 CHRONIC KIDNEY DISEASE, WITH LONG-TERM CURRENT USE OF INSULIN: ICD-10-CM

## 2018-02-22 RX ORDER — FUROSEMIDE 40 MG/1
40 TABLET ORAL 2 TIMES DAILY
Qty: 30 TABLET | Refills: 0 | Status: SHIPPED | OUTPATIENT
Start: 2018-02-22 | End: 2018-03-08 | Stop reason: SDUPTHER

## 2018-02-22 NOTE — TELEPHONE ENCOUNTER
----- Message from Delma Pittman sent at 2/22/2018 10:29 AM CST -----  Contact: self   Patient needs a prescription for her generic Lasix 40mg called into [facility] until her mail order prescription comes in.

## 2018-03-03 ENCOUNTER — LAB VISIT (OUTPATIENT)
Dept: LAB | Facility: HOSPITAL | Age: 73
End: 2018-03-03
Attending: INTERNAL MEDICINE
Payer: MEDICARE

## 2018-03-03 DIAGNOSIS — Z79.4 CONTROLLED TYPE 2 DIABETES MELLITUS WITH STAGE 3 CHRONIC KIDNEY DISEASE, WITH LONG-TERM CURRENT USE OF INSULIN: ICD-10-CM

## 2018-03-03 DIAGNOSIS — E78.5 HYPERLIPIDEMIA LDL GOAL <100: ICD-10-CM

## 2018-03-03 DIAGNOSIS — E11.22 CONTROLLED TYPE 2 DIABETES MELLITUS WITH STAGE 3 CHRONIC KIDNEY DISEASE, WITH LONG-TERM CURRENT USE OF INSULIN: ICD-10-CM

## 2018-03-03 DIAGNOSIS — N18.30 CONTROLLED TYPE 2 DIABETES MELLITUS WITH STAGE 3 CHRONIC KIDNEY DISEASE, WITH LONG-TERM CURRENT USE OF INSULIN: ICD-10-CM

## 2018-03-03 LAB
ALBUMIN SERPL BCP-MCNC: 2.7 G/DL
ALP SERPL-CCNC: 132 U/L
ALT SERPL W/O P-5'-P-CCNC: 120 U/L
ANION GAP SERPL CALC-SCNC: 8 MMOL/L
AST SERPL-CCNC: 99 U/L
BASOPHILS # BLD AUTO: 0.03 K/UL
BASOPHILS NFR BLD: 0.4 %
BILIRUB SERPL-MCNC: 0.5 MG/DL
BUN SERPL-MCNC: 54 MG/DL
CALCIUM SERPL-MCNC: 8.7 MG/DL
CHLORIDE SERPL-SCNC: 107 MMOL/L
CHOLEST SERPL-MCNC: 113 MG/DL
CHOLEST/HDLC SERPL: 2.2 {RATIO}
CO2 SERPL-SCNC: 28 MMOL/L
CREAT SERPL-MCNC: 2.4 MG/DL
DIFFERENTIAL METHOD: ABNORMAL
EOSINOPHIL # BLD AUTO: 0 K/UL
EOSINOPHIL NFR BLD: 0.3 %
ERYTHROCYTE [DISTWIDTH] IN BLOOD BY AUTOMATED COUNT: 15.9 %
EST. GFR  (AFRICAN AMERICAN): 22.6 ML/MIN/1.73 M^2
EST. GFR  (NON AFRICAN AMERICAN): 19.6 ML/MIN/1.73 M^2
ESTIMATED AVG GLUCOSE: 226 MG/DL
GLUCOSE SERPL-MCNC: 65 MG/DL
HBA1C MFR BLD HPLC: 9.5 %
HCT VFR BLD AUTO: 34.7 %
HDLC SERPL-MCNC: 51 MG/DL
HDLC SERPL: 45.1 %
HGB BLD-MCNC: 10.9 G/DL
IMM GRANULOCYTES # BLD AUTO: 0.02 K/UL
IMM GRANULOCYTES NFR BLD AUTO: 0.3 %
LDLC SERPL CALC-MCNC: 50.6 MG/DL
LYMPHOCYTES # BLD AUTO: 1 K/UL
LYMPHOCYTES NFR BLD: 13.7 %
MCH RBC QN AUTO: 25.9 PG
MCHC RBC AUTO-ENTMCNC: 31.4 G/DL
MCV RBC AUTO: 82 FL
MONOCYTES # BLD AUTO: 0.9 K/UL
MONOCYTES NFR BLD: 12 %
NEUTROPHILS # BLD AUTO: 5.3 K/UL
NEUTROPHILS NFR BLD: 73.3 %
NONHDLC SERPL-MCNC: 62 MG/DL
NRBC BLD-RTO: 0 /100 WBC
PLATELET # BLD AUTO: 239 K/UL
PMV BLD AUTO: 10.9 FL
POTASSIUM SERPL-SCNC: 4.2 MMOL/L
PROT SERPL-MCNC: 6.9 G/DL
RBC # BLD AUTO: 4.21 M/UL
SODIUM SERPL-SCNC: 143 MMOL/L
TRIGL SERPL-MCNC: 57 MG/DL
WBC # BLD AUTO: 7.22 K/UL

## 2018-03-03 PROCEDURE — 80053 COMPREHEN METABOLIC PANEL: CPT

## 2018-03-03 PROCEDURE — 83036 HEMOGLOBIN GLYCOSYLATED A1C: CPT

## 2018-03-03 PROCEDURE — 85025 COMPLETE CBC W/AUTO DIFF WBC: CPT

## 2018-03-03 PROCEDURE — 36415 COLL VENOUS BLD VENIPUNCTURE: CPT | Mod: PO

## 2018-03-03 PROCEDURE — 80061 LIPID PANEL: CPT

## 2018-03-08 ENCOUNTER — OFFICE VISIT (OUTPATIENT)
Dept: FAMILY MEDICINE | Facility: CLINIC | Age: 73
End: 2018-03-08
Payer: MEDICARE

## 2018-03-08 VITALS
SYSTOLIC BLOOD PRESSURE: 136 MMHG | TEMPERATURE: 98 F | BODY MASS INDEX: 44.13 KG/M2 | HEIGHT: 65 IN | DIASTOLIC BLOOD PRESSURE: 72 MMHG | OXYGEN SATURATION: 95 % | HEART RATE: 67 BPM | WEIGHT: 264.88 LBS

## 2018-03-08 DIAGNOSIS — H35.033 HYPERTENSIVE RETINOPATHY OF BOTH EYES: ICD-10-CM

## 2018-03-08 DIAGNOSIS — N18.30 TYPE 2 DIABETES MELLITUS WITH STAGE 3 CHRONIC KIDNEY DISEASE, WITH LONG-TERM CURRENT USE OF INSULIN: ICD-10-CM

## 2018-03-08 DIAGNOSIS — Z12.11 COLON CANCER SCREENING: ICD-10-CM

## 2018-03-08 DIAGNOSIS — E66.01 MORBID OBESITY WITH BMI OF 45.0-49.9, ADULT: ICD-10-CM

## 2018-03-08 DIAGNOSIS — E03.9 HYPOTHYROIDISM (ACQUIRED): ICD-10-CM

## 2018-03-08 DIAGNOSIS — E55.9 VITAMIN D DEFICIENCY DISEASE: ICD-10-CM

## 2018-03-08 DIAGNOSIS — G47.33 OBSTRUCTIVE SLEEP APNEA ON CPAP: ICD-10-CM

## 2018-03-08 DIAGNOSIS — M94.9 DISORDER OF BONE AND CARTILAGE: ICD-10-CM

## 2018-03-08 DIAGNOSIS — M89.9 DISORDER OF BONE AND CARTILAGE: ICD-10-CM

## 2018-03-08 DIAGNOSIS — N39.46 MIXED INCONTINENCE URGE AND STRESS: ICD-10-CM

## 2018-03-08 DIAGNOSIS — E11.22 TYPE 2 DIABETES MELLITUS WITH STAGE 3 CHRONIC KIDNEY DISEASE, WITH LONG-TERM CURRENT USE OF INSULIN: ICD-10-CM

## 2018-03-08 DIAGNOSIS — Z79.4 UNCONTROLLED TYPE 2 DIABETES MELLITUS WITH RETINOPATHY, WITH LONG-TERM CURRENT USE OF INSULIN, MACULAR EDEMA PRESENCE UNSPECIFIED, UNSPECIFIED LATERALITY, UNSPECIFIED RETINOPATHY SEVERITY: ICD-10-CM

## 2018-03-08 DIAGNOSIS — N18.30 CKD (CHRONIC KIDNEY DISEASE), STAGE III: ICD-10-CM

## 2018-03-08 DIAGNOSIS — I67.89 CEREBRAL MICROVASCULAR DISEASE: ICD-10-CM

## 2018-03-08 DIAGNOSIS — I77.1 TORTUOUS AORTA: ICD-10-CM

## 2018-03-08 DIAGNOSIS — R33.9 INCOMPLETE BLADDER EMPTYING: ICD-10-CM

## 2018-03-08 DIAGNOSIS — H40.89 GLAUCOMA ASSOCIATED WITH VASCULAR DISORDER OF EYE: ICD-10-CM

## 2018-03-08 DIAGNOSIS — N25.81 SECONDARY RENAL HYPERPARATHYROIDISM: ICD-10-CM

## 2018-03-08 DIAGNOSIS — E78.5 HYPERLIPIDEMIA LDL GOAL <100: ICD-10-CM

## 2018-03-08 DIAGNOSIS — Z79.4 LONG-TERM INSULIN USE: ICD-10-CM

## 2018-03-08 DIAGNOSIS — E11.65 UNCONTROLLED TYPE 2 DIABETES MELLITUS WITH RETINOPATHY, WITH LONG-TERM CURRENT USE OF INSULIN, MACULAR EDEMA PRESENCE UNSPECIFIED, UNSPECIFIED LATERALITY, UNSPECIFIED RETINOPATHY SEVERITY: ICD-10-CM

## 2018-03-08 DIAGNOSIS — Z00.00 ROUTINE PHYSICAL EXAMINATION: Primary | ICD-10-CM

## 2018-03-08 DIAGNOSIS — Z79.4 TYPE 2 DIABETES MELLITUS WITH STAGE 3 CHRONIC KIDNEY DISEASE, WITH LONG-TERM CURRENT USE OF INSULIN: ICD-10-CM

## 2018-03-08 DIAGNOSIS — I27.20 PULMONARY HYPERTENSION: ICD-10-CM

## 2018-03-08 DIAGNOSIS — E11.319 UNCONTROLLED TYPE 2 DIABETES MELLITUS WITH RETINOPATHY, WITH LONG-TERM CURRENT USE OF INSULIN, MACULAR EDEMA PRESENCE UNSPECIFIED, UNSPECIFIED LATERALITY, UNSPECIFIED RETINOPATHY SEVERITY: ICD-10-CM

## 2018-03-08 DIAGNOSIS — I50.32 CHRONIC DIASTOLIC HEART FAILURE: ICD-10-CM

## 2018-03-08 PROCEDURE — 99999 PR PBB SHADOW E&M-EST. PATIENT-LVL V: CPT | Mod: PBBFAC,,, | Performed by: NURSE PRACTITIONER

## 2018-03-08 PROCEDURE — 3075F SYST BP GE 130 - 139MM HG: CPT | Mod: S$GLB,,, | Performed by: NURSE PRACTITIONER

## 2018-03-08 PROCEDURE — 99499 UNLISTED E&M SERVICE: CPT | Mod: S$GLB,,, | Performed by: NURSE PRACTITIONER

## 2018-03-08 PROCEDURE — 3078F DIAST BP <80 MM HG: CPT | Mod: S$GLB,,, | Performed by: NURSE PRACTITIONER

## 2018-03-08 PROCEDURE — 99397 PER PM REEVAL EST PAT 65+ YR: CPT | Mod: S$GLB,,, | Performed by: NURSE PRACTITIONER

## 2018-03-08 RX ORDER — AMLODIPINE BESYLATE 10 MG/1
10 TABLET ORAL DAILY
Qty: 90 TABLET | Refills: 3 | Status: SHIPPED | OUTPATIENT
Start: 2018-03-08 | End: 2018-08-31 | Stop reason: SDUPTHER

## 2018-03-08 RX ORDER — FUROSEMIDE 40 MG/1
40 TABLET ORAL 2 TIMES DAILY
Qty: 30 TABLET | Refills: 0 | Status: SHIPPED | OUTPATIENT
Start: 2018-03-08 | End: 2018-03-12 | Stop reason: SDUPTHER

## 2018-03-08 RX ORDER — FUROSEMIDE 40 MG/1
40 TABLET ORAL 2 TIMES DAILY
Qty: 30 TABLET | Refills: 0 | Status: SHIPPED | OUTPATIENT
Start: 2018-03-08 | End: 2018-03-08 | Stop reason: SDUPTHER

## 2018-03-08 RX ORDER — AMLODIPINE BESYLATE 10 MG/1
TABLET ORAL
Qty: 90 TABLET | Refills: 3 | Status: CANCELLED | OUTPATIENT
Start: 2018-03-08

## 2018-03-08 NOTE — PROGRESS NOTES
Subjective:       Patient ID: Erica Leblanc is a 73 y.o. female.    Chief Complaint: Annual Exam    73-year-old female resents clinic today for follow-up on diabetes, hypertension, and chronic kidney disease.  She is here for her routine annual visit.  She says she has poor dietary habits.  She states she is currently not exercising.  She states her blood sugars run anywhere from .  She saw Dr. Viveros today and her Victozia was increased from 1.2 to 1.8 and her Humalog was increased from 20 units to 40 units.  She denies any cardiac chest pain, heart palpitations, or shortness of breath.  She has chronic pedal edema.  She denies any headaches, dizziness, or blurred vision.  She says she does not have any colon polys would like to just check her stool.       Past Medical History:   Diagnosis Date    Acute respiratory failure with hypoxia     Cataracts, bilateral     CHF (congestive heart failure)     CKD (chronic kidney disease) stage 3, GFR 30-59 ml/min     CKD (chronic kidney disease) stage 3, GFR 30-59 ml/min     Controlled type 2 diabetes mellitus with proteinuria or albuminuria     Depression     Diabetes with neurologic complications     Diabetic retinopathy of both eyes     Edema     Glaucoma     History of colonic polyps     Hx-TIA (transient ischemic attack) 11/2008    Hyperlipidemia LDL goal < 100     Hypertension     Hypothyroidism     Major depressive disorder, single episode, mild 2/17/2016    Mixed incontinence urge and stress     Obesity     Obstructive sleep apnea on CPAP     Osteopenia     Proteinuria     Sickle cell trait     Trouble in sleeping     Type 2 diabetes mellitus with ophthalmic manifestations     Type 2 diabetes with stage 3 chronic kidney disease GFR 30-59     Type II or unspecified type diabetes mellitus with renal manifestations, uncontrolled(250.42)     Urge incontinence 1/11/2016    Urge incontinence     Vitamin D deficiency disease       Past Surgical History:   Procedure Laterality Date    breast reduction Bilateral age 31    cataracts Bilateral      SECTION, LOW TRANSVERSE      x1    CHOLECYSTECTOMY      EYE SURGERY  2014, 2014    vitrectomy    EYE SURGERY Right 2016    HYSTERECTOMY  1986    TAHBSO (patient is unsure if ovaries removed)    REFRACTIVE SURGERY        reports that she has never smoked. She has never used smokeless tobacco. She reports that she does not drink alcohol or use drugs.  Review of Systems   Respiratory: Negative for cough, shortness of breath and wheezing.    Cardiovascular: Positive for leg swelling. Negative for chest pain and palpitations.   Gastrointestinal: Negative for abdominal pain, blood in stool, constipation, diarrhea, nausea and vomiting.   Musculoskeletal: Negative for gait problem.   Skin: Negative for rash.   Neurological: Negative for dizziness, light-headedness and headaches.       Objective:      Physical Exam   Constitutional: She is oriented to person, place, and time. She appears well-developed and well-nourished. No distress.   Eyes: Conjunctivae and EOM are normal. Pupils are equal, round, and reactive to light. Right eye exhibits no discharge. Left eye exhibits no discharge. No scleral icterus.   Neck: Normal range of motion. Neck supple. No JVD present.   Cardiovascular: Normal rate, regular rhythm and normal heart sounds.    No murmur heard.  Pulmonary/Chest: Effort normal and breath sounds normal. No respiratory distress. She has no wheezes. She has no rales.   Abdominal: Soft. Bowel sounds are normal. There is no tenderness.   Musculoskeletal: Normal range of motion. She exhibits edema.   Plus one pedal edema which is chronic    Neurological: She is alert and oriented to person, place, and time.   Skin: Skin is warm and dry. She is not diaphoretic.   Psychiatric: She has a normal mood and affect.       Assessment:       1. Routine physical examination    2. Cerebral  microvascular disease    3. Chronic diastolic heart failure    4. CKD (chronic kidney disease), stage III    5. 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    6. Hyperlipidemia LDL goal <100    7. Hypothyroidism (acquired)    8. Incomplete bladder emptying    9. Long-term insulin use    10. Mixed incontinence urge and stress    11. Morbid obesity with BMI of 45.0-49.9, adult    12. Obstructive sleep apnea on CPAP    13. Secondary renal hyperparathyroidism    14. Tortuous aorta    15. Pulmonary hypertension    16. Type 2 diabetes mellitus with stage 3 chronic kidney disease, with long-term current use of insulin    17. Vitamin D deficiency disease    18. Disorder of bone and cartilage    19. Colon cancer screening    20. Uncontrolled type 2 diabetes mellitus with retinopathy, with long-term current use of insulin, macular edema presence unspecified, unspecified laterality, unspecified retinopathy severity    21. Insulin dependent type 2 diabetes mellitus, uncontrolled        Plan:         Routine physical examination    Cerebral microvascular disease  - stable on Plavix    Chronic diastolic heart failure  - The current medical regimen is effective;  continue present plan and medications.  - followed by     CKD (chronic kidney disease), stage III  - followed by Dr. 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 Mcgraw    Hyperlipidemia LDL goal <100  - The current medical regimen is effective;  continue present plan and medications.    Hypothyroidism (acquired)  - The current medical regimen is effective;  continue present plan and medications.    Incomplete bladder emptying  - followed by Dr. Wong    Long-term insulin use  - continue to monitor blood sugars closely    Mixed incontinence urge and stress  - followed by Dr. Wong    Morbid obesity with BMI of 45.0-49.9, adult  - The patient is asked to  make an attempt to improve diet and exercise patterns to aid in medical management of this problem.    Obstructive sleep apnea on CPAP  - uses CPAP machine every night    Secondary renal hyperparathyroidism  - stable observe asymptomatic     Tortuous aorta  - Stable / Asymptomatic is on blood pressure and cholesterol lowering medications    Pulmonary hypertension  - The current medical regimen is effective;  continue present plan and medications.  - followed by Dr. Rice    Type 2 diabetes mellitus with stage 3 chronic kidney disease, with long-term current use of insulin  - continue current medications   - followed by Dr. Viveros     Vitamin D deficiency disease  - The current medical regimen is effective;  continue present plan and medications.    Disorder of bone and cartilage  - will schedule dexa scan ]    Colon cancer screening  -     Fecal Immunochemical Test (iFOBT); Future; Expected date: 03/08/2018    Uncontrolled type 2 diabetes mellitus with retinopathy, with long-term current use of insulin, macular edema presence unspecified, unspecified laterality, unspecified retinopathy severity  - continue current medications  - followed by Dr. Viveros     Insulin dependent type 2 diabetes mellitus, uncontrolled  - watch blood sugars closely

## 2018-03-08 NOTE — PATIENT INSTRUCTIONS
Watch diet closely  Try to start a exercise program   Call and schedule a appointment with Dr. Aquino

## 2018-03-12 DIAGNOSIS — H35.033 HYPERTENSIVE RETINOPATHY OF BOTH EYES: ICD-10-CM

## 2018-03-13 ENCOUNTER — HOSPITAL ENCOUNTER (OUTPATIENT)
Dept: RADIOLOGY | Facility: CLINIC | Age: 73
Discharge: HOME | End: 2018-03-13
Attending: INTERNAL MEDICINE
Payer: MEDICARE

## 2018-03-13 DIAGNOSIS — M94.9 DISORDER OF BONE AND CARTILAGE: ICD-10-CM

## 2018-03-13 DIAGNOSIS — M89.9 DISORDER OF BONE AND CARTILAGE: ICD-10-CM

## 2018-03-13 PROCEDURE — 77080 DXA BONE DENSITY AXIAL: CPT | Mod: TC,PO

## 2018-03-13 PROCEDURE — 77080 DXA BONE DENSITY AXIAL: CPT | Mod: 26,,, | Performed by: INTERNAL MEDICINE

## 2018-03-13 RX ORDER — FUROSEMIDE 40 MG/1
40 TABLET ORAL 2 TIMES DAILY
Qty: 30 TABLET | Refills: 0 | Status: SHIPPED | OUTPATIENT
Start: 2018-03-13 | End: 2018-03-26 | Stop reason: SDUPTHER

## 2018-03-14 ENCOUNTER — OFFICE VISIT (OUTPATIENT)
Dept: CARDIOLOGY | Facility: CLINIC | Age: 73
End: 2018-03-14
Payer: MEDICARE

## 2018-03-14 VITALS
WEIGHT: 257.94 LBS | BODY MASS INDEX: 42.92 KG/M2 | SYSTOLIC BLOOD PRESSURE: 172 MMHG | RESPIRATION RATE: 22 BRPM | DIASTOLIC BLOOD PRESSURE: 100 MMHG | OXYGEN SATURATION: 91 % | HEART RATE: 73 BPM

## 2018-03-14 DIAGNOSIS — R09.02 HYPOXIA: ICD-10-CM

## 2018-03-14 DIAGNOSIS — E78.5 HYPERLIPIDEMIA LDL GOAL <100: ICD-10-CM

## 2018-03-14 DIAGNOSIS — E11.22 TYPE 2 DIABETES MELLITUS WITH STAGE 3 CHRONIC KIDNEY DISEASE, WITH LONG-TERM CURRENT USE OF INSULIN: ICD-10-CM

## 2018-03-14 DIAGNOSIS — N18.30 TYPE 2 DIABETES MELLITUS WITH STAGE 3 CHRONIC KIDNEY DISEASE, WITH LONG-TERM CURRENT USE OF INSULIN: ICD-10-CM

## 2018-03-14 DIAGNOSIS — E66.01 MORBID OBESITY WITH BMI OF 40.0-44.9, ADULT: ICD-10-CM

## 2018-03-14 DIAGNOSIS — G47.33 OBSTRUCTIVE SLEEP APNEA ON CPAP: ICD-10-CM

## 2018-03-14 DIAGNOSIS — Z79.4 TYPE 2 DIABETES MELLITUS WITH STAGE 3 CHRONIC KIDNEY DISEASE, WITH LONG-TERM CURRENT USE OF INSULIN: ICD-10-CM

## 2018-03-14 DIAGNOSIS — R06.00 DYSPNEA, UNSPECIFIED TYPE: ICD-10-CM

## 2018-03-14 DIAGNOSIS — I50.33 DIASTOLIC CHF, ACUTE ON CHRONIC: Primary | ICD-10-CM

## 2018-03-14 DIAGNOSIS — I16.0 HYPERTENSIVE URGENCY: ICD-10-CM

## 2018-03-14 DIAGNOSIS — I67.81 ACUTE CEREBROVASCULAR INSUFFICIENCY: ICD-10-CM

## 2018-03-14 DIAGNOSIS — I67.89 CEREBRAL MICROVASCULAR DISEASE: ICD-10-CM

## 2018-03-14 DIAGNOSIS — Z86.73 HX-TIA (TRANSIENT ISCHEMIC ATTACK): ICD-10-CM

## 2018-03-14 PROCEDURE — 3080F DIAST BP >= 90 MM HG: CPT | Mod: CPTII,S$GLB,, | Performed by: INTERNAL MEDICINE

## 2018-03-14 PROCEDURE — 99499 UNLISTED E&M SERVICE: CPT | Mod: S$GLB,,, | Performed by: INTERNAL MEDICINE

## 2018-03-14 PROCEDURE — 3077F SYST BP >= 140 MM HG: CPT | Mod: CPTII,S$GLB,, | Performed by: INTERNAL MEDICINE

## 2018-03-14 PROCEDURE — 99999 PR PBB SHADOW E&M-EST. PATIENT-LVL III: CPT | Mod: PBBFAC,,, | Performed by: INTERNAL MEDICINE

## 2018-03-14 PROCEDURE — 99214 OFFICE O/P EST MOD 30 MIN: CPT | Mod: S$GLB,,, | Performed by: INTERNAL MEDICINE

## 2018-03-14 PROCEDURE — 93000 ELECTROCARDIOGRAM COMPLETE: CPT | Mod: S$GLB,,, | Performed by: INTERNAL MEDICINE

## 2018-03-14 NOTE — PROGRESS NOTES
Subjective:    Patient ID:  Erica Leblanc is a 73 y.o. female who presents for evaluation of Follow-up      HPI   previous history:  Here for follow-up of diastolic heart failure and previous admission for hypertensive emergency.  She had a follow-up nuclear stress test for risk stratification that was within normal limits as below.  She denies any worsening cardiopulmonary complaints.  Again we talked about mainly health maintenance issues.  We discussed compliance with medicines.  We also discussed sodium restriction.  She's willing to give up the Chinese buffet.  She denies any chest pain, shortness breath or palpitations.  She's not expressing PND, orthopnea or lower edema.  She's not expressing dizziness, presyncope or syncope.  She's had no issues with her medicines.  She does have a gym membership through her insurance that she is agreeable to try and participate.    Today:  Here for follow-up of diastolic heart failure.  She has a history of noncompliance and presentations with volume overload and hypertensive emergency.  She was last seen in clinic 7-17.  She's been compliant with medicines and trying to restrict sodium.  She recently ran out of [place] and was accumulate fluid.  She just recently got some medicines back and her blood pressure is been stable.  She says she's been compliant with all her medicines and staying out of the hospital.  She denies any current PND, orthopnea or lower edema.  She is not expressing dizziness, presyncope or syncope.  She's mainly limited by back pain and unable to exercise regularly.      Review of Systems   Constitution: Negative.   HENT: Negative.    Eyes: Negative.    Cardiovascular: Positive for dyspnea on exertion. Negative for chest pain, irregular heartbeat, leg swelling, near-syncope, orthopnea, palpitations, paroxysmal nocturnal dyspnea and syncope.   Respiratory: Positive for shortness of breath.    Skin: Negative.    Musculoskeletal: Negative.     Gastrointestinal: Negative for abdominal pain, constipation and diarrhea.   Genitourinary: Negative for dysuria.   Neurological: Negative.    Psychiatric/Behavioral: Negative.         Objective:    Physical Exam   Constitutional: She is oriented to person, place, and time. She appears well-developed and well-nourished.   HENT:   Head: Normocephalic and atraumatic.   Eyes: Conjunctivae and EOM are normal. Pupils are equal, round, and reactive to light.   Neck: Normal range of motion. Neck supple. No thyromegaly present.   Cardiovascular: Normal rate and regular rhythm.    No murmur heard.  Pulmonary/Chest: Effort normal and breath sounds normal. No respiratory distress.   Abdominal: Soft. Bowel sounds are normal.   Musculoskeletal: She exhibits no edema.   Neurological: She is alert and oriented to person, place, and time.   Skin: Skin is warm and dry.   Psychiatric: She has a normal mood and affect. Her behavior is normal.           Echo: 6-17  CONCLUSIONS     1 - Normal left ventricular systolic function (EF 65-70%).     2 - No diagnostic regional wall motion abnormalities.     3 - Concentric remodeling.     4 - Impaired LV relaxation, elevated LAP (grade 2 diastolic dysfunction).     5 - Trivial tricuspid regurgitation.     6 - The estimated PA systolic pressure is greater than 18 mmHg.     NST: 7-17  Impression: NORMAL MYOCARDIAL PERFUSION  1. The perfusion scan is free of evidence for myocardial ischemia or injury.   2. Resting wall motion is physiologic.   3. Visually estimated LV function is normal.   4. The ventricular volumes are normal at rest and stress.   5. The extracardiac distribution of radioactivity is normal.     LDL-50    3-18  Assessment:       1. Diastolic CHF, acute on chronic    2. Dyspnea, unspecified type    3. Hypertensive urgency    4. Hypoxia    5. Type 2 diabetes mellitus with stage 3 chronic kidney disease, with long-term current use of insulin    6. Hyperlipidemia LDL goal <100    7.  Morbid obesity with BMI of 40.0-44.9, adult    8. Hx-TIA (transient ischemic attack)    9. Obstructive sleep apnea on CPAP         Plan:       -Continue current medical therapy  -Emphasized compliance with medicines and sodium restriction  -Encouraged exercise as tolerated  -Check carotid ultrasound (*call for any abnormal results)    Return to clinic in 6 mos

## 2018-03-22 ENCOUNTER — TELEPHONE (OUTPATIENT)
Dept: FAMILY MEDICINE | Facility: CLINIC | Age: 73
End: 2018-03-22

## 2018-03-22 NOTE — TELEPHONE ENCOUNTER
Patient Result Comments     Written by Sendy Elaine MD on 3/16/2018  5:13 PM   Your Bone Mineral Density test showed osteopenia, but no need for prescription medication at this time - please see attached report for details. I recommend getting adequate calcium in your diet daily and vitamin D supplement 2000 units daily. See attached report for additional details.   It is recommended that we check a back X-ray to rule out a compression fracture - please call to schedule at your earliest convenience.       Informed the patient of these results.  Patient stated she is taking 1000 units daily of vitamin D and she is taking the 50,000 unit of vitamin D, monthly.  Scheduled an x-ray of the back, per .  .Patient verbalized understandings.

## 2018-03-23 ENCOUNTER — LAB VISIT (OUTPATIENT)
Dept: LAB | Facility: HOSPITAL | Age: 73
End: 2018-03-23
Attending: INTERNAL MEDICINE
Payer: MEDICARE

## 2018-03-23 DIAGNOSIS — I50.32 CHRONIC DIASTOLIC HEART FAILURE: ICD-10-CM

## 2018-03-23 DIAGNOSIS — Z12.11 COLON CANCER SCREENING: ICD-10-CM

## 2018-03-23 PROCEDURE — 82274 ASSAY TEST FOR BLOOD FECAL: CPT

## 2018-03-23 RX ORDER — METOPROLOL TARTRATE 100 MG/1
TABLET ORAL
Qty: 180 TABLET | Refills: 0 | Status: SHIPPED | OUTPATIENT
Start: 2018-03-23 | End: 2018-04-18 | Stop reason: SDUPTHER

## 2018-03-26 DIAGNOSIS — H35.033 HYPERTENSIVE RETINOPATHY OF BOTH EYES: ICD-10-CM

## 2018-03-26 LAB — HEMOCCULT STL QL IA: NEGATIVE

## 2018-03-26 RX ORDER — FUROSEMIDE 40 MG/1
40 TABLET ORAL 2 TIMES DAILY
Qty: 60 TABLET | Refills: 0 | Status: SHIPPED | OUTPATIENT
Start: 2018-03-26 | End: 2018-03-27 | Stop reason: SDUPTHER

## 2018-03-26 NOTE — TELEPHONE ENCOUNTER
----- Message from Renata Gallardo sent at 3/26/2018  1:14 PM CDT -----  Contact: Self   Patient returned your call. Please call again at [phone].

## 2018-03-26 NOTE — TELEPHONE ENCOUNTER
----- Message from Renata Gallardo sent at 3/26/2018  1:14 PM CDT -----  Contact: Self   Patient returned your call. Please call again at [phone].

## 2018-03-27 ENCOUNTER — TELEPHONE (OUTPATIENT)
Dept: FAMILY MEDICINE | Facility: CLINIC | Age: 73
End: 2018-03-27

## 2018-03-27 DIAGNOSIS — H35.033 HYPERTENSIVE RETINOPATHY OF BOTH EYES: ICD-10-CM

## 2018-03-27 RX ORDER — FUROSEMIDE 40 MG/1
40 TABLET ORAL 2 TIMES DAILY
Qty: 60 TABLET | Refills: 0 | Status: SHIPPED | OUTPATIENT
Start: 2018-03-27 | End: 2018-04-19 | Stop reason: SDUPTHER

## 2018-03-27 NOTE — TELEPHONE ENCOUNTER
I left a message on her voice mail that her fecal blood test was negative and no further testing needed to be done. If she had any questions please feel free to call me back at the office.

## 2018-03-29 ENCOUNTER — TELEPHONE (OUTPATIENT)
Dept: FAMILY MEDICINE | Facility: CLINIC | Age: 73
End: 2018-03-29

## 2018-03-29 ENCOUNTER — HOSPITAL ENCOUNTER (OUTPATIENT)
Dept: RADIOLOGY | Facility: HOSPITAL | Age: 73
Discharge: HOME | End: 2018-03-29
Attending: INTERNAL MEDICINE
Payer: MEDICARE

## 2018-03-29 ENCOUNTER — OFFICE VISIT (OUTPATIENT)
Dept: FAMILY MEDICINE | Facility: CLINIC | Age: 73
End: 2018-03-29
Payer: MEDICARE

## 2018-03-29 VITALS
DIASTOLIC BLOOD PRESSURE: 70 MMHG | HEART RATE: 71 BPM | SYSTOLIC BLOOD PRESSURE: 142 MMHG | HEIGHT: 65 IN | OXYGEN SATURATION: 94 % | BODY MASS INDEX: 43.33 KG/M2 | WEIGHT: 260.06 LBS

## 2018-03-29 DIAGNOSIS — Z79.4 UNCONTROLLED TYPE 2 DIABETES MELLITUS WITH RETINOPATHY, WITH LONG-TERM CURRENT USE OF INSULIN, MACULAR EDEMA PRESENCE UNSPECIFIED, UNSPECIFIED LATERALITY, UNSPECIFIED RETINOPATHY SEVERITY: ICD-10-CM

## 2018-03-29 DIAGNOSIS — M54.9 BACK PAIN, UNSPECIFIED BACK LOCATION, UNSPECIFIED BACK PAIN LATERALITY, UNSPECIFIED CHRONICITY: Primary | ICD-10-CM

## 2018-03-29 DIAGNOSIS — M54.9 BACK PAIN, UNSPECIFIED BACK LOCATION, UNSPECIFIED BACK PAIN LATERALITY, UNSPECIFIED CHRONICITY: ICD-10-CM

## 2018-03-29 DIAGNOSIS — E11.40 TYPE 2 DIABETES, CONTROLLED, WITH NEUROPATHY: ICD-10-CM

## 2018-03-29 DIAGNOSIS — N25.81 SECONDARY RENAL HYPERPARATHYROIDISM: ICD-10-CM

## 2018-03-29 DIAGNOSIS — E55.9 VITAMIN D DEFICIENCY DISEASE: ICD-10-CM

## 2018-03-29 DIAGNOSIS — Z79.4 LONG-TERM INSULIN USE: ICD-10-CM

## 2018-03-29 DIAGNOSIS — E11.65 UNCONTROLLED TYPE 2 DIABETES MELLITUS WITH RETINOPATHY, WITH LONG-TERM CURRENT USE OF INSULIN, MACULAR EDEMA PRESENCE UNSPECIFIED, UNSPECIFIED LATERALITY, UNSPECIFIED RETINOPATHY SEVERITY: ICD-10-CM

## 2018-03-29 DIAGNOSIS — Z79.4 TYPE 2 DIABETES MELLITUS WITH STAGE 3 CHRONIC KIDNEY DISEASE, WITH LONG-TERM CURRENT USE OF INSULIN: ICD-10-CM

## 2018-03-29 DIAGNOSIS — I77.1 TORTUOUS AORTA: ICD-10-CM

## 2018-03-29 DIAGNOSIS — I50.32 CHRONIC DIASTOLIC HEART FAILURE: ICD-10-CM

## 2018-03-29 DIAGNOSIS — E66.01 MORBID OBESITY WITH BODY MASS INDEX (BMI) OF 40.0 TO 44.9 IN ADULT: ICD-10-CM

## 2018-03-29 DIAGNOSIS — E11.3513 PROLIFERATIVE DIABETIC RETINOPATHY OF BOTH EYES WITH MACULAR EDEMA ASSOCIATED WITH TYPE 2 DIABETES MELLITUS: ICD-10-CM

## 2018-03-29 DIAGNOSIS — F33.0 MILD EPISODE OF RECURRENT MAJOR DEPRESSIVE DISORDER: ICD-10-CM

## 2018-03-29 DIAGNOSIS — M85.80 OSTEOPENIA, UNSPECIFIED LOCATION: ICD-10-CM

## 2018-03-29 DIAGNOSIS — Z00.00 ENCOUNTER FOR PREVENTIVE HEALTH EXAMINATION: Primary | ICD-10-CM

## 2018-03-29 DIAGNOSIS — E03.9 HYPOTHYROIDISM (ACQUIRED): ICD-10-CM

## 2018-03-29 DIAGNOSIS — Z12.31 ENCOUNTER FOR SCREENING MAMMOGRAM FOR MALIGNANT NEOPLASM OF BREAST: ICD-10-CM

## 2018-03-29 DIAGNOSIS — D57.3 SICKLE CELL TRAIT: ICD-10-CM

## 2018-03-29 DIAGNOSIS — E11.319 UNCONTROLLED TYPE 2 DIABETES MELLITUS WITH RETINOPATHY, WITH LONG-TERM CURRENT USE OF INSULIN, MACULAR EDEMA PRESENCE UNSPECIFIED, UNSPECIFIED LATERALITY, UNSPECIFIED RETINOPATHY SEVERITY: ICD-10-CM

## 2018-03-29 DIAGNOSIS — N18.30 TYPE 2 DIABETES MELLITUS WITH STAGE 3 CHRONIC KIDNEY DISEASE, WITH LONG-TERM CURRENT USE OF INSULIN: ICD-10-CM

## 2018-03-29 DIAGNOSIS — E11.22 TYPE 2 DIABETES MELLITUS WITH STAGE 3 CHRONIC KIDNEY DISEASE, WITH LONG-TERM CURRENT USE OF INSULIN: ICD-10-CM

## 2018-03-29 DIAGNOSIS — H40.89 GLAUCOMA ASSOCIATED WITH VASCULAR DISORDER OF EYE: ICD-10-CM

## 2018-03-29 DIAGNOSIS — G47.33 OBSTRUCTIVE SLEEP APNEA ON CPAP: ICD-10-CM

## 2018-03-29 PROCEDURE — 77067 SCR MAMMO BI INCL CAD: CPT | Mod: TC,PO

## 2018-03-29 PROCEDURE — 77063 BREAST TOMOSYNTHESIS BI: CPT | Mod: 26,,, | Performed by: RADIOLOGY

## 2018-03-29 PROCEDURE — 99999 PR PBB SHADOW E&M-EST. PATIENT-LVL V: CPT | Mod: PBBFAC,,, | Performed by: NURSE PRACTITIONER

## 2018-03-29 PROCEDURE — 99499 UNLISTED E&M SERVICE: CPT | Mod: S$GLB,,, | Performed by: NURSE PRACTITIONER

## 2018-03-29 PROCEDURE — 72070 X-RAY EXAM THORAC SPINE 2VWS: CPT | Mod: 26,,, | Performed by: RADIOLOGY

## 2018-03-29 PROCEDURE — 72070 X-RAY EXAM THORAC SPINE 2VWS: CPT | Mod: TC,FY,PO

## 2018-03-29 PROCEDURE — G0439 PPPS, SUBSEQ VISIT: HCPCS | Mod: S$GLB,,, | Performed by: NURSE PRACTITIONER

## 2018-03-29 PROCEDURE — 72100 X-RAY EXAM L-S SPINE 2/3 VWS: CPT | Mod: TC,FY,PO

## 2018-03-29 PROCEDURE — 77067 SCR MAMMO BI INCL CAD: CPT | Mod: 26,,, | Performed by: RADIOLOGY

## 2018-03-29 PROCEDURE — 72100 X-RAY EXAM L-S SPINE 2/3 VWS: CPT | Mod: 26,,, | Performed by: RADIOLOGY

## 2018-03-29 NOTE — TELEPHONE ENCOUNTER
I spoke with the patient and explained that her x-rays showed arthritis otherwise normal. Patient verbalized understanding of above.

## 2018-03-29 NOTE — PATIENT INSTRUCTIONS
Counseling and Referral of Other Preventative  (Italic type indicates deductible and co-insurance are waived)    Patient Name: Erica Leblanc  Today's Date: 3/29/2018    Health Maintenance       Date Due Completion Date    Zoster Vaccine 02/25/2006 ---    Mammogram 11/09/2017 11/9/2016    Override on 8/7/2008: Done    Eye Exam 06/20/2018 6/20/2017    Override on 1/3/2011: Done    Hemoglobin A1c 09/03/2018 3/3/2018    Foot Exam 10/11/2018 10/11/2017    Override on 10/10/2016: Done    Lipid Panel 03/03/2019 3/3/2018    High Dose Statin 03/14/2019 3/14/2018    Fecal Occult Blood Test (FOBT)/[employer] 03/23/2019 3/23/2018    DEXA SCAN 03/13/2020 3/13/2018    Override on 8/7/2008: Done    TETANUS VACCINE 08/08/2026 8/8/2016        No orders of the defined types were placed in this encounter.    The following information is provided to all patients.  This information is to help you find resources for any of the problems found today that may be affecting your health:                Living healthy guide: www.[facility].[place].gov      Understanding Diabetes: www.diabetes.org      Eating healthy: www.cdc.gov/healthyweight      CDC home safety checklist: www.cdc.gov/steadi/patient.html      Agency on Aging: www.goea.[place].[facility]      Alcoholics anonymous (AA): www.aa.org      Physical Activity: www.teresita.nih.gov/rh3qjpj      Tobacco use: www.quitwithusla.org

## 2018-03-29 NOTE — PROGRESS NOTES
"Erica Leblanc presented for a  Medicare AWV and comprehensive Health Risk Assessment today. The following components were reviewed and updated:    · Medical history  · Family History  · Social history  · Allergies and Current Medications  · Health Risk Assessment  · Health Maintenance  · Care Team     ** See Completed Assessments for Annual Wellness Visit within the encounter summary.**       The following assessments were completed:  · Living Situation  · CAGE  · Depression Screening  · Timed Get Up and Go  · Whisper Test  · Cognitive Function Screening  · Nutrition Screening  · ADL Screening  · PAQ Screening    Vitals:    03/29/18 1334   BP: (!) 142/70   BP Location: Left arm   Patient Position: Sitting   BP Method: X-Large (Manual)   Pulse: 71   SpO2: (!) 94%   Weight: 117.9 kg (260 lb 0.5 oz)   Height: 5' 5" (1.651 m)     Body mass index is 43.27 kg/m².  Physical Exam   Constitutional: She is oriented to person, place, and time.   Cardiovascular: Normal rate, regular rhythm and normal heart sounds.    Pulmonary/Chest: Effort normal and breath sounds normal. She has no wheezes.   Musculoskeletal: Normal range of motion.   Neurological: She is alert and oriented to person, place, and time.   Skin: Skin is warm.   Psychiatric: She has a normal mood and affect. Her behavior is normal. Thought content normal.   Vitals reviewed.        Diagnoses and health risks identified today and associated recommendations/orders:    1. Encounter for preventive health examination  Education provided about preventive health examinations and procedures; addressed and discussed patient's health concerns. Additionally, reviewed medical record for risk factors and documented the results during this encounter.    2. Insulin dependent type 2 diabetes mellitus, uncontrolled  Education provided about diabetes, management of blood glucose with diet and activities, monitoring for worsening effects of diabetes.  Reviewed most recent Ha1c " and informed patient of complications associated with uncontrolled diabetes.     3. Uncontrolled type 2 diabetes mellitus with retinopathy, with long-term current use of insulin, macular edema presence unspecified, unspecified laterality, unspecified retinopathy severity  Education provided about diabetes, management of blood glucose with diet and activities, monitoring for worsening effects of diabetes.  Reviewed most recent Ha1c and informed patient of complications associated with uncontrolled diabetes. She's followed by [facility] ophthalmology dept.     4. Proliferative diabetic retinopathy of both eyes with macular edema associated with type 2 diabetes mellitus  Education provided about diabetes, management of blood glucose with diet and activities, monitoring for worsening effects of diabetes.  Reviewed most recent Ha1c and informed patient of complications associated with uncontrolled diabetes. She's followed by [facility] ophthalmology de[facility].     5. Type 2 diabetes mellitus with stage 3 chronic kidney disease, with long-term current use of insulin  Education provided about diabetes, management of blood glucose with diet and activities, monitoring for worsening effects of diabetes.  Reviewed most recent Ha1c and informed patient of complications associated with uncontrolled diabetes. She's followed by [facility] nephrology dept.     6. Type 2 diabetes, controlled, with neuropathy  Education provided about diabetes, management of blood glucose with diet and activities, monitoring for worsening effects of diabetes.  Reviewed most recent Ha1c and informed patient of complications associated with uncontrolled diabetes. She's followed by [facility] diabetic, endocrinology, neurology, and podiatry departments.     7. Long-term insulin use  Stable, using as directed. We discussed current medicinal regimen; continue as advised.     8. Tortuous aorta  This is a chronic medical condition that is stable under the current  regimen.    9. Chronic diastolic heart failure  Stable, followed by [facility] cardiology dept, denies worsening symptoms; continue as advised.     10. Mild episode of recurrent major depressive disorder   Denies homicidal or suicidal ideation, we discussed family and social dynamics, stress relieving activities. Continue as advised.     11. Secondary renal hyperparathyroidism  Stable, followed by [facility] nephrology dept; continue as advised.     12. Morbid obesity with body mass index (BMI) of 40.0 to 44.9 in adult  Patient aware of health complications and risks associated with sedentary lifestyle. Patient aware of [employer] benefits with access to fitness centers and classes.     13. 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     14. Sickle cell trait  Stable, asymptomatic; monitor.     15. Osteopenia, unspecified location  This is a chronic medical condition that is stable under the current regimen.    16. Hypothyroidism (acquired)  Clinically euthyroid 2.240 (January 2018). This is a chronic medical condition that is stable under the current regimen.    17. Obstructive sleep apnea on CPAP  This is a chronic medical condition that is stable under the current regimen.    18. Vitamin D deficiency disease  Stable, taking ergocalciferol as directed; we discussed diet and activities.       Provided Erica with a 5-10 year written screening schedule and personal prevention plan. Recommendations were developed using the USPSTF age appropriate recommendations. Education, counseling, and referrals were provided as needed. After Visit Summary printed and given to patient which includes a list of additional screenings\tests needed.    Follow-up in about 1 year (around 3/29/2019) for assessment .    Sánchez Piña Jr, NP

## 2018-04-04 DIAGNOSIS — E78.5 HYPERLIPIDEMIA LDL GOAL <100: ICD-10-CM

## 2018-04-04 RX ORDER — ATORVASTATIN CALCIUM 10 MG/1
10 TABLET, FILM COATED ORAL DAILY
Qty: 90 TABLET | Refills: 1 | Status: SHIPPED | OUTPATIENT
Start: 2018-04-04 | End: 2018-09-07 | Stop reason: SDUPTHER

## 2018-04-09 ENCOUNTER — TELEPHONE (OUTPATIENT)
Dept: FAMILY MEDICINE | Facility: CLINIC | Age: 73
End: 2018-04-09

## 2018-04-09 DIAGNOSIS — G47.33 OBSTRUCTIVE SLEEP APNEA: Primary | ICD-10-CM

## 2018-04-09 NOTE — TELEPHONE ENCOUNTER
----- Message from Rossy Wagoner sent at 4/9/2018  9:48 AM CDT -----  Contact: self  Refill request for CPAP equipment---   To [facility].   Pt call back # [phone].

## 2018-04-13 DIAGNOSIS — E11.9 DIABETES MELLITUS WITHOUT COMPLICATION: ICD-10-CM

## 2018-04-13 DIAGNOSIS — F32.1 MODERATE SINGLE CURRENT EPISODE OF MAJOR DEPRESSIVE DISORDER: ICD-10-CM

## 2018-04-13 RX ORDER — CITALOPRAM 10 MG/1
TABLET ORAL
Qty: 90 TABLET | Refills: 0 | Status: SHIPPED | OUTPATIENT
Start: 2018-04-13 | End: 2018-06-19 | Stop reason: SDUPTHER

## 2018-04-18 DIAGNOSIS — I50.32 CHRONIC DIASTOLIC HEART FAILURE: ICD-10-CM

## 2018-04-19 DIAGNOSIS — H35.033 HYPERTENSIVE RETINOPATHY OF BOTH EYES: ICD-10-CM

## 2018-04-19 RX ORDER — METOPROLOL TARTRATE 100 MG/1
TABLET ORAL
Qty: 180 TABLET | Refills: 1 | Status: SHIPPED | OUTPATIENT
Start: 2018-04-19 | End: 2018-10-24 | Stop reason: SDUPTHER

## 2018-04-19 RX ORDER — CLOPIDOGREL BISULFATE 75 MG/1
TABLET ORAL
Qty: 90 TABLET | Refills: 1 | Status: SHIPPED | OUTPATIENT
Start: 2018-04-19 | End: 2018-09-20 | Stop reason: SDUPTHER

## 2018-04-19 NOTE — TELEPHONE ENCOUNTER
----- Message from Kira Marlow sent at 4/19/2018  4:21 PM CDT -----  Contact: [facility] [phone]   Pharmacy is following up on the furosemide (LASIX) 40 MG tablet pt is almost out of this medication pt is requesting a 2 week supply sent [facility] [address] [place] [address] [phone] AT [facility]  Please call  at your earliest convenience.  Thanks !

## 2018-04-20 RX ORDER — FUROSEMIDE 40 MG/1
40 TABLET ORAL 2 TIMES DAILY
Qty: 60 TABLET | Refills: 0 | Status: SHIPPED | OUTPATIENT
Start: 2018-04-20 | End: 2018-04-27 | Stop reason: SDUPTHER

## 2018-04-27 DIAGNOSIS — H35.033 HYPERTENSIVE RETINOPATHY OF BOTH EYES: ICD-10-CM

## 2018-04-27 RX ORDER — FUROSEMIDE 40 MG/1
40 TABLET ORAL 2 TIMES DAILY
Qty: 180 TABLET | Refills: 2 | Status: SHIPPED | OUTPATIENT
Start: 2018-04-27 | End: 2018-11-02 | Stop reason: SDUPTHER

## 2018-05-01 ENCOUNTER — LAB VISIT (OUTPATIENT)
Dept: LAB | Facility: HOSPITAL | Age: 73
End: 2018-05-01
Attending: INTERNAL MEDICINE
Payer: MEDICARE

## 2018-05-01 ENCOUNTER — OFFICE VISIT (OUTPATIENT)
Dept: NEPHROLOGY | Facility: CLINIC | Age: 73
End: 2018-05-01
Payer: MEDICARE

## 2018-05-01 VITALS
SYSTOLIC BLOOD PRESSURE: 150 MMHG | OXYGEN SATURATION: 95 % | DIASTOLIC BLOOD PRESSURE: 80 MMHG | BODY MASS INDEX: 44.34 KG/M2 | HEIGHT: 65 IN | WEIGHT: 266.13 LBS | HEART RATE: 78 BPM

## 2018-05-01 DIAGNOSIS — N18.30 ANEMIA OF CHRONIC RENAL FAILURE, STAGE 3 (MODERATE): ICD-10-CM

## 2018-05-01 DIAGNOSIS — D63.1 ANEMIA OF CHRONIC RENAL FAILURE, STAGE 3 (MODERATE): ICD-10-CM

## 2018-05-01 DIAGNOSIS — I12.9 HYPERTENSIVE KIDNEY DISEASE WITH CHRONIC KIDNEY DISEASE, STAGE 1-4 OR UNSPECIFIED CHRONIC KIDNEY DISEASE: ICD-10-CM

## 2018-05-01 LAB
BACTERIA #/AREA URNS AUTO: ABNORMAL /HPF
BILIRUB UR QL STRIP: NEGATIVE
CLARITY UR REFRACT.AUTO: ABNORMAL
COLOR UR AUTO: YELLOW
CREAT UR-MCNC: 47 MG/DL
GLUCOSE UR QL STRIP: NEGATIVE
HGB UR QL STRIP: NEGATIVE
HYALINE CASTS UR QL AUTO: 0 /LPF
KETONES UR QL STRIP: NEGATIVE
LEUKOCYTE ESTERASE UR QL STRIP: ABNORMAL
MICROSCOPIC COMMENT: ABNORMAL
NITRITE UR QL STRIP: NEGATIVE
PH UR STRIP: 6 [PH] (ref 5–8)
PROT UR QL STRIP: ABNORMAL
PROT UR-MCNC: 72 MG/DL
PROT/CREAT RATIO, UR: 1.53
RBC #/AREA URNS AUTO: 0 /HPF (ref 0–4)
SP GR UR STRIP: 1.01 (ref 1–1.03)
SQUAMOUS #/AREA URNS AUTO: 5 /HPF
URN SPEC COLLECT METH UR: ABNORMAL
UROBILINOGEN UR STRIP-ACNC: NEGATIVE EU/DL
WBC #/AREA URNS AUTO: 17 /HPF (ref 0–5)

## 2018-05-01 PROCEDURE — 3077F SYST BP >= 140 MM HG: CPT | Mod: CPTII,S$GLB,, | Performed by: INTERNAL MEDICINE

## 2018-05-01 PROCEDURE — 99213 OFFICE O/P EST LOW 20 MIN: CPT | Mod: S$GLB,,, | Performed by: INTERNAL MEDICINE

## 2018-05-01 PROCEDURE — 81001 URINALYSIS AUTO W/SCOPE: CPT

## 2018-05-01 PROCEDURE — 99499 UNLISTED E&M SERVICE: CPT | Mod: S$PBB,,, | Performed by: INTERNAL MEDICINE

## 2018-05-01 PROCEDURE — 84156 ASSAY OF PROTEIN URINE: CPT

## 2018-05-01 PROCEDURE — 3079F DIAST BP 80-89 MM HG: CPT | Mod: CPTII,S$GLB,, | Performed by: INTERNAL MEDICINE

## 2018-05-01 PROCEDURE — 3046F HEMOGLOBIN A1C LEVEL >9.0%: CPT | Mod: CPTII,S$GLB,, | Performed by: INTERNAL MEDICINE

## 2018-05-01 PROCEDURE — 99999 PR PBB SHADOW E&M-EST. PATIENT-LVL II: CPT | Mod: PBBFAC,,, | Performed by: INTERNAL MEDICINE

## 2018-05-01 NOTE — PROGRESS NOTES
Subjective:       Patient ID: Erica Leblanc is a 73 y.o. Black or  female who presents for follow-up evaluation of Chronic Kidney Disease    HPI      Ms. Leblanc is a 73 year old woman with past medical history of diabetes, hypertension presenting for follow up of chronic kidney disease.  Patient denies any fever, chest pain, shortness of breath, abdominal pain, diarrhea, dysuria/hematuria. She reports her blood sugars have been moderately controlled with dietary discretion and insulin regimen per Endocrinology; blood pressure has been better controlled.      Review of Systems   Constitutional: Negative for appetite change, fatigue and fever.   Respiratory: Negative for chest tightness and shortness of breath.    Cardiovascular: Negative for chest pain and leg swelling.   Gastrointestinal: Negative for abdominal pain, constipation, diarrhea, nausea and vomiting.   Genitourinary: Negative for difficulty urinating, dysuria, flank pain, frequency, hematuria and urgency.   Musculoskeletal: Negative for arthralgias, joint swelling and myalgias.   Skin: Negative for rash and wound.   Neurological: Negative for dizziness, weakness and light-headedness.   All other systems reviewed and are negative.      Objective:      Physical Exam   Constitutional: She appears well-developed and well-nourished.   Cardiovascular: Normal rate, regular rhythm and normal heart sounds.  Exam reveals no gallop and no friction rub.    No murmur heard.  Pulmonary/Chest: Effort normal and breath sounds normal. No respiratory distress. She has no wheezes. She has no rales.   Abdominal: Soft. Bowel sounds are normal. There is no tenderness.   Musculoskeletal: She exhibits no edema.   Neurological: She is alert.   Skin: Skin is warm and dry. No rash noted. No erythema.   Psychiatric: She has a normal mood and affect.       Assessment:       1. Uncontrolled type 2 diabetes mellitus with stage 3 chronic kidney disease, with  long-term current use of insulin    2. Hypertensive kidney disease with chronic kidney disease, stage 1-4 or unspecified chronic kidney disease    3. Anemia of chronic renal failure, stage 3 (moderate)        Plan:      Ms. Leblanc is a 73 year old woman with past medical history of diabetes, hypertension presenting for follow up of chronic kidney disease stage III likely secondary to diabetic nephropathy.  Creatinine previously elevated though stable since November 2016, will continue to trend renal panel along with proteinuria (continue ARB).  Prior elevation likely due to UTI, TMP/SMX, v. secondary to cardiomyopathy, improved since last visit, will repeat to trend.  Stressed importance of blood pressure/glycemic monitoring/control, along with weight loss/exercise, to prevent any further progression of kidney disease, patient voiced understanding.   - Hypertension: BP at goal, advised patient to record BP diary, stressed importance of medication compliance and weight loss/exercise, patient voiced understanding.   - Anemia: Hgb at goal, no indication for erythropoetin therapy  - Bone/mineral metabolism: patient with secondary hyperparathyroidism, along with VitD deficiency (continue daily supplement), will trend PTH (at goal for stage CKD)    Return to clinic 4-6 months pending renal panel, then with renal/heme panel, iron/TIBC/ferritin, urinalysis/culture, urine protein/creatinine ratio, PTH prior to next visit

## 2018-05-02 ENCOUNTER — HOSPITAL ENCOUNTER (OUTPATIENT)
Dept: CARDIOLOGY | Facility: HOSPITAL | Age: 73
Discharge: HOME | End: 2018-05-02
Attending: INTERNAL MEDICINE
Payer: MEDICARE

## 2018-05-02 DIAGNOSIS — I67.81 ACUTE CEREBROVASCULAR INSUFFICIENCY: ICD-10-CM

## 2018-05-02 DIAGNOSIS — Z86.73 HX-TIA (TRANSIENT ISCHEMIC ATTACK): ICD-10-CM

## 2018-05-02 DIAGNOSIS — I50.33 DIASTOLIC CHF, ACUTE ON CHRONIC: ICD-10-CM

## 2018-05-02 DIAGNOSIS — I67.89 CEREBRAL MICROVASCULAR DISEASE: ICD-10-CM

## 2018-05-02 DIAGNOSIS — I16.0 HYPERTENSIVE URGENCY: ICD-10-CM

## 2018-05-02 LAB — INTERNAL CAROTID STENOSIS: NORMAL

## 2018-05-02 PROCEDURE — 93880 EXTRACRANIAL BILAT STUDY: CPT

## 2018-05-02 PROCEDURE — 93880 EXTRACRANIAL BILAT STUDY: CPT | Mod: 26,,, | Performed by: INTERNAL MEDICINE

## 2018-05-07 RX ORDER — POTASSIUM CHLORIDE 20 MEQ/1
TABLET, EXTENDED RELEASE ORAL
Qty: 90 TABLET | Refills: 0 | Status: SHIPPED | OUTPATIENT
Start: 2018-05-07 | End: 2018-07-10 | Stop reason: SDUPTHER

## 2018-05-09 DIAGNOSIS — N18.30 CKD (CHRONIC KIDNEY DISEASE) STAGE 3, GFR 30-59 ML/MIN: ICD-10-CM

## 2018-05-09 DIAGNOSIS — I10 ESSENTIAL HYPERTENSION: ICD-10-CM

## 2018-05-09 DIAGNOSIS — G47.33 OBSTRUCTIVE SLEEP APNEA ON CPAP: ICD-10-CM

## 2018-05-09 DIAGNOSIS — Z86.73 HX-TIA (TRANSIENT ISCHEMIC ATTACK): ICD-10-CM

## 2018-05-09 RX ORDER — LOSARTAN POTASSIUM 100 MG/1
TABLET ORAL
Qty: 90 TABLET | Refills: 3 | Status: SHIPPED | OUTPATIENT
Start: 2018-05-09 | End: 2019-05-30 | Stop reason: SDUPTHER

## 2018-06-11 ENCOUNTER — HOSPITAL ENCOUNTER (EMERGENCY)
Facility: HOSPITAL | Age: 73
Discharge: HOME | End: 2018-06-11
Attending: EMERGENCY MEDICINE
Payer: MEDICARE

## 2018-06-11 VITALS
DIASTOLIC BLOOD PRESSURE: 86 MMHG | RESPIRATION RATE: 20 BRPM | BODY MASS INDEX: 41.65 KG/M2 | HEIGHT: 65 IN | OXYGEN SATURATION: 95 % | HEART RATE: 62 BPM | SYSTOLIC BLOOD PRESSURE: 185 MMHG | TEMPERATURE: 98 F | WEIGHT: 250 LBS

## 2018-06-11 DIAGNOSIS — L03.113 CELLULITIS OF RIGHT FOREARM: Primary | ICD-10-CM

## 2018-06-11 LAB — GLUCOSE SERPL-MCNC: 213 MG/DL (ref 70–110)

## 2018-06-11 PROCEDURE — 96372 THER/PROPH/DIAG INJ SC/IM: CPT

## 2018-06-11 PROCEDURE — 63600175 PHARM REV CODE 636 W HCPCS: Performed by: EMERGENCY MEDICINE

## 2018-06-11 PROCEDURE — 25000003 PHARM REV CODE 250: Performed by: EMERGENCY MEDICINE

## 2018-06-11 PROCEDURE — 99284 EMERGENCY DEPT VISIT MOD MDM: CPT | Mod: 25

## 2018-06-11 PROCEDURE — 82962 GLUCOSE BLOOD TEST: CPT

## 2018-06-11 RX ORDER — FAMOTIDINE 20 MG/1
20 TABLET, FILM COATED ORAL 2 TIMES DAILY
Qty: 20 TABLET | Refills: 0 | Status: SHIPPED | OUTPATIENT
Start: 2018-06-11 | End: 2018-08-06

## 2018-06-11 RX ORDER — DIPHENHYDRAMINE HCL 25 MG
25 CAPSULE ORAL EVERY 6 HOURS PRN
Qty: 20 CAPSULE | Refills: 0 | Status: SHIPPED | OUTPATIENT
Start: 2018-06-11 | End: 2018-08-06

## 2018-06-11 RX ORDER — MUPIROCIN 20 MG/G
OINTMENT TOPICAL 3 TIMES DAILY
Qty: 1 TUBE | Refills: 0 | Status: SHIPPED | OUTPATIENT
Start: 2018-06-11 | End: 2018-06-21

## 2018-06-11 RX ORDER — DIPHENHYDRAMINE HYDROCHLORIDE 50 MG/ML
25 INJECTION INTRAMUSCULAR; INTRAVENOUS
Status: COMPLETED | OUTPATIENT
Start: 2018-06-11 | End: 2018-06-11

## 2018-06-11 RX ORDER — MUPIROCIN 20 MG/G
OINTMENT TOPICAL
Status: COMPLETED | OUTPATIENT
Start: 2018-06-11 | End: 2018-06-11

## 2018-06-11 RX ORDER — SULFAMETHOXAZOLE AND TRIMETHOPRIM 800; 160 MG/1; MG/1
2 TABLET ORAL 2 TIMES DAILY
Qty: 40 TABLET | Refills: 0 | Status: SHIPPED | OUTPATIENT
Start: 2018-06-11 | End: 2018-06-21

## 2018-06-11 RX ADMIN — DIPHENHYDRAMINE HYDROCHLORIDE 25 MG: 50 INJECTION INTRAMUSCULAR; INTRAVENOUS at 06:06

## 2018-06-11 RX ADMIN — MUPIROCIN: 20 OINTMENT TOPICAL at 06:06

## 2018-06-11 NOTE — ED PROVIDER NOTES
Encounter Date: 6/11/2018       History     Chief Complaint   Patient presents with    Insect Bite     pt reports being stung by bee on friday right hand and forearm, c/o itching/swelling and warm to touch     73-year-old female chief complaint right forearm redness pain and swelling. Patient states she was bit by a bee Friday.  She has been cleaning her arm with alcohol and rubbing it with tobacco.  Patient states usually tobacco applied to the arm makes it feel better.  This time it is making everything worse.  The redness pain and swelling is getting worse.            Review of patient's allergies indicates:   Allergen Reactions    Ace inhibitors Other (See Comments)     Other reaction(s): cough     Past Medical History:   Diagnosis Date    Acute respiratory failure with hypoxia     Cataracts, bilateral     CHF (congestive heart failure)     CKD (chronic kidney disease) stage 3, GFR 30-59 ml/min     CKD (chronic kidney disease) stage 3, GFR 30-59 ml/min     Controlled type 2 diabetes mellitus with proteinuria or albuminuria     Depression     Diabetes with neurologic complications     Diabetic retinopathy of both eyes     Edema     Glaucoma     History of colonic polyps     Hx-TIA (transient ischemic attack) 11/2008    Hyperlipidemia LDL goal < 100     Hypertension     Hypothyroidism     Major depressive disorder, single episode, mild 2/17/2016    Mixed incontinence urge and stress     Obesity     Obstructive sleep apnea on CPAP     Osteopenia     Proteinuria     Sickle cell trait     Trouble in sleeping     Type 2 diabetes mellitus with ophthalmic manifestations     Type 2 diabetes with stage 3 chronic kidney disease GFR 30-59     Type II or unspecified type diabetes mellitus with renal manifestations, uncontrolled(250.42)     Urge incontinence 1/11/2016    Urge incontinence     Vitamin D deficiency disease      Past Surgical History:   Procedure Laterality Date    breast  reduction Bilateral age 31    cataracts Bilateral      SECTION, LOW TRANSVERSE      x1    CHOLECYSTECTOMY      EYE SURGERY  2014, 2014    vitrectomy    EYE SURGERY Right 2016    HYSTERECTOMY  1986    TAHBSO (patient is unsure if ovaries removed)    OOPHORECTOMY      REFRACTIVE SURGERY      TOTAL REDUCTION MAMMOPLASTY       Family History   Problem Relation Age of Onset    Leukemia Father     Ovarian cancer Sister 35    Stroke Mother     Diabetes Mother     Hypertension Mother     Diabetes Paternal Grandmother     Breast cancer Maternal Aunt 65    HIV Brother     Achondroplasia Sister     Parkinsonism Maternal Aunt     Esophageal cancer Maternal Uncle         smoker    Amblyopia Neg Hx     Blindness Neg Hx     Cataracts Neg Hx     Glaucoma Neg Hx     Macular degeneration Neg Hx     Retinal detachment Neg Hx     Strabismus Neg Hx     Thyroid disease Neg Hx     Colon cancer Neg Hx      Social History   Substance Use Topics    Smoking status: Never Smoker    Smokeless tobacco: Never Used    Alcohol use No     Review of Systems   Constitutional: Negative for fever.   HENT: Negative for sore throat and trouble swallowing.    Respiratory: Negative for shortness of breath and wheezing.    Cardiovascular: Negative for chest pain.   Gastrointestinal: Negative for nausea.   Genitourinary: Negative for dysuria.   Musculoskeletal: Negative for back pain.   Skin: Positive for rash and wound.   Neurological: Negative for weakness.   Hematological: Does not bruise/bleed easily.   All other systems reviewed and are negative.      Physical Exam     Initial Vitals [18 1653]   BP Pulse Resp Temp SpO2   (!) 138/93 70 20 97.9 °F (36.6 °C) 95 %      MAP       --         Physical Exam    Nursing note and vitals reviewed.  Constitutional: She appears well-developed and well-nourished.   HENT:   Head: Normocephalic and atraumatic.   Right Ear: External ear normal.   Left Ear: External  ear normal.   Eyes: Conjunctivae and EOM are normal. Pupils are equal, round, and reactive to light. Right eye exhibits no discharge. Left eye exhibits no discharge.   Neck: Normal range of motion. Neck supple. No JVD present.   Cardiovascular: Normal rate, regular rhythm, normal heart sounds and intact distal pulses. Exam reveals no gallop and no friction rub.    No murmur heard.  Pulmonary/Chest: Breath sounds normal. No respiratory distress. She has no wheezes. She has no rhonchi. She has no rales. She exhibits no tenderness.   Abdominal: Soft. Bowel sounds are normal. She exhibits no distension. There is no tenderness. There is no rebound.   Musculoskeletal: Normal range of motion. She exhibits no edema or tenderness.   Lymphadenopathy:     She has no cervical adenopathy.   Neurological: She is alert and oriented to person, place, and time. She has normal strength and normal reflexes. She displays normal reflexes. No cranial nerve deficit or sensory deficit.   Skin: Skin is warm and dry. Capillary refill takes less than 2 seconds. Rash noted. No abscess noted. There is erythema. No pallor.        Psychiatric: She has a normal mood and affect.         ED Course   Procedures  Labs Reviewed   POCT GLUCOSE - Abnormal; Notable for the following:        Result Value    POC Glucose 213 (*)     All other components within normal limits   POCT GLUCOSE - Abnormal; Notable for the following:     POCT Glucose 213 (*)     All other components within normal limits          No orders to display                        Medical decision making   Chief complaint: ight forearm redness pain and swelling. Patient states she was bit by a bee Friday.  She has been cleaning her arm with alcohol and rubbing it with tobacco.  Patient states usually tobacco applied to the arm makes it feel better.  Differential diagnosis:  Cellulitis, abscess, and allergic reaction  Treatment in the ED Physical Exam, Benadryl and Bactroban  Patient reports  feeling better after medication.    Discussed outpatient treatment plan.    Fill and take prescriptions as directed.  Return to the ED if symptoms worsen or do not resolve.   Answered questions and discussed discharge plan.    Patient feels much better and is ready for discharge.  Follow up with PCP in 1 days.       Clinical Impression:   The encounter diagnosis was Cellulitis of right forearm.                             Linnea Bermudez DO  06/14/18 0754

## 2018-06-12 LAB — POCT GLUCOSE: 213 MG/DL (ref 70–110)

## 2018-06-19 DIAGNOSIS — F32.1 MODERATE SINGLE CURRENT EPISODE OF MAJOR DEPRESSIVE DISORDER: ICD-10-CM

## 2018-06-20 RX ORDER — CITALOPRAM 10 MG/1
TABLET ORAL
Qty: 90 TABLET | Refills: 0 | Status: SHIPPED | OUTPATIENT
Start: 2018-06-20 | End: 2018-08-22 | Stop reason: SDUPTHER

## 2018-07-10 RX ORDER — POTASSIUM CHLORIDE 20 MEQ/1
TABLET, EXTENDED RELEASE ORAL
Qty: 90 TABLET | Refills: 1 | Status: SHIPPED | OUTPATIENT
Start: 2018-07-10 | End: 2018-11-12 | Stop reason: SDUPTHER

## 2018-07-13 ENCOUNTER — OFFICE VISIT (OUTPATIENT)
Dept: FAMILY MEDICINE | Facility: CLINIC | Age: 73
End: 2018-07-13
Payer: MEDICARE

## 2018-07-13 VITALS
WEIGHT: 265.75 LBS | HEART RATE: 67 BPM | HEIGHT: 65 IN | OXYGEN SATURATION: 95 % | DIASTOLIC BLOOD PRESSURE: 70 MMHG | SYSTOLIC BLOOD PRESSURE: 120 MMHG | BODY MASS INDEX: 44.28 KG/M2 | TEMPERATURE: 98 F

## 2018-07-13 DIAGNOSIS — Z79.4 TYPE 2 DIABETES MELLITUS WITH STAGE 3 CHRONIC KIDNEY DISEASE, WITH LONG-TERM CURRENT USE OF INSULIN: Primary | ICD-10-CM

## 2018-07-13 DIAGNOSIS — N18.30 TYPE 2 DIABETES MELLITUS WITH STAGE 3 CHRONIC KIDNEY DISEASE, WITH LONG-TERM CURRENT USE OF INSULIN: Primary | ICD-10-CM

## 2018-07-13 DIAGNOSIS — Z71.89 ADVANCED DIRECTIVES, COUNSELING/DISCUSSION: ICD-10-CM

## 2018-07-13 DIAGNOSIS — E78.5 HYPERLIPIDEMIA LDL GOAL <100: ICD-10-CM

## 2018-07-13 DIAGNOSIS — E11.40 TYPE 2 DIABETES, CONTROLLED, WITH NEUROPATHY: ICD-10-CM

## 2018-07-13 DIAGNOSIS — E11.65 UNCONTROLLED TYPE 2 DIABETES MELLITUS WITH RETINOPATHY, WITH LONG-TERM CURRENT USE OF INSULIN, MACULAR EDEMA PRESENCE UNSPECIFIED, UNSPECIFIED LATERALITY, UNSPECIFIED RETINOPATHY SEVERITY: ICD-10-CM

## 2018-07-13 DIAGNOSIS — I27.20 PULMONARY HYPERTENSION: ICD-10-CM

## 2018-07-13 DIAGNOSIS — Z79.4 UNCONTROLLED TYPE 2 DIABETES MELLITUS WITH RETINOPATHY, WITH LONG-TERM CURRENT USE OF INSULIN, MACULAR EDEMA PRESENCE UNSPECIFIED, UNSPECIFIED LATERALITY, UNSPECIFIED RETINOPATHY SEVERITY: ICD-10-CM

## 2018-07-13 DIAGNOSIS — E66.01 MORBID OBESITY WITH BODY MASS INDEX (BMI) OF 40.0 TO 44.9 IN ADULT: ICD-10-CM

## 2018-07-13 DIAGNOSIS — I77.1 TORTUOUS AORTA: ICD-10-CM

## 2018-07-13 DIAGNOSIS — E11.319 UNCONTROLLED TYPE 2 DIABETES MELLITUS WITH RETINOPATHY, WITH LONG-TERM CURRENT USE OF INSULIN, MACULAR EDEMA PRESENCE UNSPECIFIED, UNSPECIFIED LATERALITY, UNSPECIFIED RETINOPATHY SEVERITY: ICD-10-CM

## 2018-07-13 DIAGNOSIS — E03.9 HYPOTHYROIDISM (ACQUIRED): ICD-10-CM

## 2018-07-13 DIAGNOSIS — E11.22 TYPE 2 DIABETES MELLITUS WITH STAGE 3 CHRONIC KIDNEY DISEASE, WITH LONG-TERM CURRENT USE OF INSULIN: Primary | ICD-10-CM

## 2018-07-13 PROCEDURE — 99999 PR PBB SHADOW E&M-EST. PATIENT-LVL IV: CPT | Mod: PBBFAC,,, | Performed by: INTERNAL MEDICINE

## 2018-07-13 PROCEDURE — 3046F HEMOGLOBIN A1C LEVEL >9.0%: CPT | Mod: CPTII,S$GLB,, | Performed by: INTERNAL MEDICINE

## 2018-07-13 PROCEDURE — 99215 OFFICE O/P EST HI 40 MIN: CPT | Mod: S$GLB,,, | Performed by: INTERNAL MEDICINE

## 2018-07-13 PROCEDURE — 3074F SYST BP LT 130 MM HG: CPT | Mod: CPTII,S$GLB,, | Performed by: INTERNAL MEDICINE

## 2018-07-13 PROCEDURE — 3078F DIAST BP <80 MM HG: CPT | Mod: CPTII,S$GLB,, | Performed by: INTERNAL MEDICINE

## 2018-07-13 NOTE — PROGRESS NOTES
HISTORY OF PRESENT ILLNESS:  Erica Leblanc is a 73 y.o. female who presents to the clinic today for Diabetes and Hypertension  .  The patient presents to clinic today for follow-up of her type 2 diabetes mellitus complicated by chronic kidney disease stage 3, neuropathy, and retinopathy as well as hyperlipidemia and hypothyroidism.  She is followed by endocrinology.  Her diabetes has not been well controlled.  She admits to sometimes poor dietary habits.  She does not exercise regularly.  She does report being compliant with medication.  She states she has been under a lot of stress.  Her blood pressures have not been well controlled.  She states that she has been without air conditioning for the last 2 months.  She did not really have the money to get it fixed.  She had to borrow money and there was a lot of stress surrounding this occurrence.  She is very overdue for eye exam.  She has known retinal disease. She denies any significant changes in her vision but her vision is overall poor.  She denies cardiac chest pain.  She has mild baseline shortness of breath.  Mood is fair.  She denies suicidal homicidal ideation.  She sleeps okay at night.      PAST MEDICAL HISTORY:  Past Medical History:   Diagnosis Date    Acute respiratory failure with hypoxia     Cataracts, bilateral     CHF (congestive heart failure)     CKD (chronic kidney disease) stage 3, GFR 30-59 ml/min     CKD (chronic kidney disease) stage 3, GFR 30-59 ml/min     Controlled type 2 diabetes mellitus with proteinuria or albuminuria     Depression     Diabetes with neurologic complications     Diabetic retinopathy of both eyes     Edema     Glaucoma     History of colonic polyps     Hx-TIA (transient ischemic attack) 11/2008    Hyperlipidemia LDL goal < 100     Hypertension     Hypothyroidism     Major depressive disorder, single episode, mild 2/17/2016    Mixed incontinence urge and stress     Obesity     Obstructive sleep  apnea on CPAP     Osteopenia     Proteinuria     Sickle cell trait     Trouble in sleeping     Type 2 diabetes mellitus with ophthalmic manifestations     Type 2 diabetes with stage 3 chronic kidney disease GFR 30-59     Type II or unspecified type diabetes mellitus with renal manifestations, uncontrolled(250.42)     Urge incontinence 2016    Urge incontinence     Vitamin D deficiency disease        PAST SURGICAL HISTORY:  Past Surgical History:   Procedure Laterality Date    breast reduction Bilateral age 31    cataracts Bilateral      SECTION, LOW TRANSVERSE      x1    CHOLECYSTECTOMY      EYE SURGERY  2014, 2014    vitrectomy    EYE SURGERY Right 2016    HYSTERECTOMY  1986    TAHBSO (patient is unsure if ovaries removed)    OOPHORECTOMY      REFRACTIVE SURGERY      TOTAL REDUCTION MAMMOPLASTY         SOCIAL HISTORY:  Social History     Social History    Marital status: Single     Spouse name: N/A    Number of children: 5    Years of education: N/A     Occupational History     [employer]     part-time     Social History Main Topics    Smoking status: Never Smoker    Smokeless tobacco: Never Used    Alcohol use No    Drug use: No    Sexual activity: Not Currently     Partners: Male     Birth control/ protection: Post-menopausal, Surgical     Other Topics Concern    Not on file     Social History Narrative    No narrative on file       FAMILY HISTORY:  Family History   Problem Relation Age of Onset    Leukemia Father     Ovarian cancer Sister 35    Stroke Mother     Diabetes Mother     Hypertension Mother     Diabetes Paternal Grandmother     Breast cancer Maternal Aunt 65    HIV Brother     Achondroplasia Sister     Parkinsonism Maternal Aunt     Esophageal cancer Maternal Uncle         smoker    Amblyopia Neg Hx     Blindness Neg Hx     Cataracts Neg Hx     Glaucoma Neg Hx     Macular degeneration Neg Hx     Retinal  detachment Neg Hx     Strabismus Neg Hx     Thyroid disease Neg Hx     Colon cancer Neg Hx        ALLERGIES AND MEDICATIONS: updated and reviewed.  Review of patient's allergies indicates:   Allergen Reactions    Ace inhibitors Other (See Comments)     Other reaction(s): cough     Medication List with Changes/Refills   Current Medications    AMLODIPINE (NORVASC) 10 MG TABLET    Take 1 tablet (10 mg total) by mouth once daily.    ATORVASTATIN (LIPITOR) 10 MG TABLET    Take 1 tablet (10 mg total) by mouth once daily.    CITALOPRAM (CELEXA) 10 MG TABLET    TAKE 1 TABLET EVERY DAY    CLOPIDOGREL (PLAVIX) 75 MG TABLET    TAKE 1 TABLET EVERY DAY    DIPHENHYDRAMINE (BENADRYL) 25 MG CAPSULE    Take 1 each (25 mg total) by mouth every 6 (six) hours as needed for Itching.    DOCUSATE SODIUM (COLACE) 100 MG CAPSULE    Take 200 mg by mouth once daily.     ERGOCALCIFEROL (ERGOCALCIFEROL) 50,000 UNIT CAP    Take 50,000 Units by mouth every 30 days.     FAMOTIDINE (PEPCID) 20 MG TABLET    Take 1 tablet (20 mg total) by mouth 2 (two) times daily.    FUROSEMIDE (LASIX) 40 MG TABLET    Take 1 tablet (40 mg total) by mouth 2 (two) times daily.    HUMALOG 100 UNIT/ML INJECTION    Inject 40 Units into the skin once daily.     HYDRALAZINE (APRESOLINE) 50 MG TABLET    Take 1 tablet (50 mg total) by mouth 3 (three) times daily.    LANCETS MISC        LEVOTHYROXINE (SYNTHROID) 50 MCG TABLET    Take 50 mcg by mouth once daily.    LOSARTAN (COZAAR) 100 MG TABLET    TAKE 1 TABLET EVERY DAY    METOPROLOL TARTRATE (LOPRESSOR) 100 MG TABLET    TAKE 1 TABLET TWICE DAILY    MULTIVITAMIN WITH MINERALS (HAIR,SKIN AND NAILS ORAL)    Take 3 tablets by mouth once daily.    OXYBUTYNIN (DITROPAN XL) 15 MG TR24    Take 1 tablet (15 mg total) by mouth once daily.    POTASSIUM CHLORIDE SA (K-DUR,KLOR-CON) 20 MEQ TABLET    TAKE 1 TABLET EVERY DAY    VGO 20 DENNYS        VICTOZA 0.6 MG/0.1 ML (18 MG/3 ML) PNIJ    1.8 mg every morning.           CARE  "TEAM:  Patient Care Team:  Sendy Elaine MD as PCP - General (Internal Medicine)  Brittanie Orellana MD (Cardiology)  Nicole Gray DPM as Consulting Physician (Podiatry)  Surinder Joseph MD as Consulting Physician (Nephrology)  Katia Wong MD as Consulting Physician (Urology)  Coleen Gillis MD as Consulting Physician (Obstetrics)  LILLIANA Coello MD as Consulting Physician (Ophthalmology)  Yolis Rossi MD as Consulting Physician (Endocrinology)         REVIEW OF SYSTEMS:  Review of Systems   Constitutional: Positive for fatigue (- chronic). Negative for chills, fever and unexpected weight change.   HENT: Negative for congestion, ear discharge, ear pain and postnasal drip.    Eyes: Positive for visual disturbance (- chronic - overdue for eye exam...). Negative for photophobia and pain.   Respiratory: Negative for cough, shortness of breath and wheezing.    Cardiovascular: Negative for chest pain, palpitations and leg swelling.        - reports blood pressures controlled at home   Gastrointestinal: Negative for abdominal pain, constipation, diarrhea, nausea and vomiting.   Endocrine:        -  Blood sugars are elevated - followed by endocrinology.   Genitourinary: Negative for dysuria, frequency, urgency and vaginal discharge.   Musculoskeletal: Positive for arthralgias (- chronic). Negative for back pain, joint swelling and neck stiffness.   Skin: Negative for rash.   Neurological: Negative for weakness and headaches.   Psychiatric/Behavioral: Negative for dysphoric mood and sleep disturbance. The patient is not nervous/anxious.          PHYSICAL EXAM:   Vitals:    07/13/18 1049   BP: 120/70   Pulse:    Temp:      Weight: 120.5 kg (265 lb 12.2 oz)   Height: 5' 5" (165.1 cm)   Body mass index is 44.23 kg/m².     General appearance - alert, well appearing, and in no distress and morbidly obese  Mental status - alert, oriented to person, place, and time, normal mood, behavior, " speech, dress, motor activity, and thought processes  Eyes - not examined  Mouth - mucous membranes moist, pharynx normal without lesions  Neck - supple, no significant adenopathy, carotids upstroke normal bilaterally, no bruits, thyroid exam: thyroid is normal in size without nodules or tenderness  Lymphatics - no palpable lymphadenopathy  Chest - clear to auscultation, no wheezes, rales or rhonchi, symmetric air entry  Heart - normal rate and regular rhythm, no gallops noted  Back exam - mild limited range of motion, no significant pain with motion noted during exam  Neurological - alert, oriented, normal speech, no focal findings or movement disorder noted, cranial nerves II through XII intact  Musculoskeletal - no muscular tenderness noted, Moderate osteoarthritic changes noted to both knee joints. No joint effusions noted.   Extremities - pedal edema trace +  Skin - normal coloration and turgor, no rashes, no suspicious skin lesions noted      ASSESSMENT AND PLAN:  1. Type 2 diabetes mellitus with stage 3 chronic kidney disease, with long-term current use of insulin/2. Type 2 diabetes, controlled, with neuropathy/3. Uncontrolled type 2 diabetes mellitus with retinopathy, with long-term current use of insulin, macular edema presence unspecified, unspecified laterality, unspecified retinopathy severity  Diabetes currently is not controlled for age and comorbid conditions. We discussed diabetic diet and regular exercise. We discussed home blood sugar monitoring, if appropriate. She is followed by endocrinology.   - Ambulatory referral to Podiatry  - Ambulatory referral to Ophthalmology    4. Hyperlipidemia LDL goal <100  We discussed low fat diet and regular exercise.The current medical regimen is effective;  continue present plan and medications.      5. Hypothyroidism (acquired)  Patient is clinically euthyroid. Continue current regimen.     6. Pulmonary hypertension  Stable. Observe.     7. Tortuous  aorta  Patient with tortuous Aorta.  Stable/asymptomatic. Currently stable on lipid lowering medication and b/p monitoring.      8. Morbid obesity with body mass index (BMI) of 40.0 to 44.9 in adult  The patient is asked to make an attempt to improve diet and exercise patterns to aid in medical management of this problem.     9. Advanced directives, counseling/discussion  I had a discussion today with the patient regarding advanced directives.  Advanced directives were discussed due to patient's age and/or chronic medical conditions. Prognosis based on current condition: fair. Paperwork was given to complete living will and medical POA. [place] was discussed only. Approximately 10 minute(s) spent on counseling/discussion regarding end of life decision making.            Follow-up in about 6 months (around 1/13/2019), or if symptoms worsen or fail to improve, for annual exam. or sooner as needed.

## 2018-07-17 ENCOUNTER — TELEPHONE (OUTPATIENT)
Dept: FAMILY MEDICINE | Facility: CLINIC | Age: 73
End: 2018-07-17

## 2018-07-17 NOTE — TELEPHONE ENCOUNTER
Spoke with the pt, I informed her that she will need to come in and sign a PATRICIA.  Patient verbalized understandings.

## 2018-08-06 ENCOUNTER — OFFICE VISIT (OUTPATIENT)
Dept: FAMILY MEDICINE | Facility: CLINIC | Age: 73
End: 2018-08-06
Payer: MEDICARE

## 2018-08-06 VITALS
SYSTOLIC BLOOD PRESSURE: 198 MMHG | WEIGHT: 268.94 LBS | DIASTOLIC BLOOD PRESSURE: 94 MMHG | TEMPERATURE: 98 F | HEART RATE: 58 BPM | OXYGEN SATURATION: 96 % | HEIGHT: 65 IN | BODY MASS INDEX: 44.81 KG/M2

## 2018-08-06 DIAGNOSIS — E11.22 TYPE 2 DIABETES MELLITUS WITH STAGE 3 CHRONIC KIDNEY DISEASE, WITH LONG-TERM CURRENT USE OF INSULIN: ICD-10-CM

## 2018-08-06 DIAGNOSIS — S80.822A BLISTER OF LEFT LOWER LEG, INITIAL ENCOUNTER: Primary | ICD-10-CM

## 2018-08-06 DIAGNOSIS — Z79.4 TYPE 2 DIABETES MELLITUS WITH STAGE 3 CHRONIC KIDNEY DISEASE, WITH LONG-TERM CURRENT USE OF INSULIN: ICD-10-CM

## 2018-08-06 DIAGNOSIS — N18.30 TYPE 2 DIABETES MELLITUS WITH STAGE 3 CHRONIC KIDNEY DISEASE, WITH LONG-TERM CURRENT USE OF INSULIN: ICD-10-CM

## 2018-08-06 DIAGNOSIS — I10 HYPERTENSION, UNSPECIFIED TYPE: ICD-10-CM

## 2018-08-06 PROCEDURE — 3080F DIAST BP >= 90 MM HG: CPT | Mod: CPTII,S$GLB,, | Performed by: NURSE PRACTITIONER

## 2018-08-06 PROCEDURE — 3046F HEMOGLOBIN A1C LEVEL >9.0%: CPT | Mod: CPTII,S$GLB,, | Performed by: NURSE PRACTITIONER

## 2018-08-06 PROCEDURE — 99214 OFFICE O/P EST MOD 30 MIN: CPT | Mod: S$GLB,,, | Performed by: NURSE PRACTITIONER

## 2018-08-06 PROCEDURE — 3077F SYST BP >= 140 MM HG: CPT | Mod: CPTII,S$GLB,, | Performed by: NURSE PRACTITIONER

## 2018-08-06 PROCEDURE — 99999 PR PBB SHADOW E&M-EST. PATIENT-LVL V: CPT | Mod: PBBFAC,,, | Performed by: NURSE PRACTITIONER

## 2018-08-06 RX ORDER — MUPIROCIN 20 MG/G
OINTMENT TOPICAL 3 TIMES DAILY
Qty: 15 G | Refills: 0 | Status: SHIPPED | OUTPATIENT
Start: 2018-08-06 | End: 2018-08-16

## 2018-08-06 RX ORDER — SULFAMETHOXAZOLE AND TRIMETHOPRIM 800; 160 MG/1; MG/1
1 TABLET ORAL 2 TIMES DAILY
Qty: 20 TABLET | Refills: 0 | Status: SHIPPED | OUTPATIENT
Start: 2018-08-06 | End: 2018-08-16

## 2018-08-06 NOTE — PATIENT INSTRUCTIONS
Blister (Adult)  A blister is a raised area of skin with clear, watery fluid inside. A blister can occur when the skin is damaged. Blisters can hurt when they are pressed, or if they break open.  Blisters can be caused in many ways. This can happen if the skin is rubbed too hard or often. Or they can occur if the skin is hurt by the sun, a virus, or even a medicine.  Most blisters need little treatment. They often dry up and go away in a few days to weeks after the cause is stopped. A blister may need to be cleaned. A broken (open) blister may be bandaged to prevent infection. Blisters caused by insect bites or drug reactions may be more serious. These should be looked at by a healthcare provider.  Home care  Your healthcare provider may prescribe pain medicine. He or she may also prescribe an antibiotic cream or ointment to put on an open blister. Follow all instructions when using these medicines.  General care:  · Follow all instructions on how to care for the blister. If a bandage was put on, change the bandage as instructed. .  · If the blister breaks, the area will leak a clear fluid for a day or 2. Wash the area with soap and water every day or as advised by your healthcare provider.  · You may use over-the-counter pain medicines to control pain, unless another medicine was prescribed. If you have chronic liver or kidney disease, talk with your healthcare provider before using these medicines. Also talk with your provider if you've had a stomach ulcer or gastrointestinal bleeding.  Follow-up care  Follow up with your healthcare provider, or as advised.  When to seek medical advice  Call your healthcare provider right away if any of these occur:  · Fever of 100.4°F (38°C) or higher  · Redness or swelling that is new or gets worse  · Foul-smelling fluid leaking from the blister  · Pain doesnt go away, or gets worse  · Increase in size of the blister  · Blister doesnt get better after several days  Date Last  Reviewed: 9/1/2016  © 8771-7115 [employer], [employer]. [address] [place] [address], [place] [address]. All rights reserved. This information is not intended as a substitute for professional medical care. Always follow your healthcare professional's instructions.

## 2018-08-07 NOTE — PROGRESS NOTES
Subjective:       Patient ID: Erica Leblanc is a 73 y.o. female.    Chief Complaint: Blister (on right leg)    Patient is a 73  year old  female known to me who presented to clinic with complaints of blister to the left lower leg. Patient with a history of diabetes was concerned with a blister that appeared on her left lower leg. The blister recently ruptured and drained clear fluid. Patient states the area surrounding is mildly tender to touch. No other complaints.          Review of Systems   Constitutional: Negative for chills, diaphoresis, fatigue and fever.   Respiratory: Negative for cough, chest tightness and shortness of breath.    Cardiovascular: Negative for chest pain, palpitations and leg swelling.   Musculoskeletal: Negative for arthralgias, back pain and myalgias.   Skin: Positive for wound. Negative for color change and rash.   Neurological: Negative for dizziness, weakness, light-headedness and headaches.       Objective:      Physical Exam   Constitutional: She is oriented to person, place, and time. Vital signs are normal. She appears well-developed and well-nourished.   Cardiovascular: Normal rate, regular rhythm and normal heart sounds.   Pulmonary/Chest: Effort normal and breath sounds normal.   Neurological: She is alert and oriented to person, place, and time.   Skin: Skin is warm, dry and intact. Lesion noted. There is erythema (mild).        Psychiatric: She has a normal mood and affect.       .    Assessment:       1. Blister of left lower leg, initial encounter    2. Type 2 diabetes mellitus with stage 3 chronic kidney disease, with long-term current use of insulin    3. Hypertension, unspecified type        Plan:       Erica was seen today for blister.    Diagnoses and all orders for this visit:    Blister of left lower leg, initial encounter  -     mupirocin (BACTROBAN) 2 % ointment; Apply topically 3 (three) times daily. for 10 days  -     sulfamethoxazole-trimethoprim 800-160mg  (BACTRIM DS) 800-160 mg Tab; Take 1 tablet by mouth 2 (two) times daily. for 10 days  Will treat with antibiotics secondary to Diabetes    Type 2 diabetes mellitus with stage 3 chronic kidney disease, with long-term current use of insulin  The current medical regimen is effective;  continue present plan and medications.     Hypertension, unspecified type  .Discussed sodium restriction, maintaining ideal body weight and regular exercise program as physiologic means to achieve blood pressure control. The patient will strive towards this. The current medical regimen is effective; continue present plan and medications. atient was educated that both decongestant and anti-inflammatory medication may raise blood pressure

## 2018-08-22 DIAGNOSIS — F32.1 MODERATE SINGLE CURRENT EPISODE OF MAJOR DEPRESSIVE DISORDER: ICD-10-CM

## 2018-08-22 RX ORDER — CITALOPRAM 10 MG/1
TABLET ORAL
Qty: 90 TABLET | Refills: 1 | Status: SHIPPED | OUTPATIENT
Start: 2018-08-22 | End: 2019-01-25 | Stop reason: SDUPTHER

## 2018-08-23 ENCOUNTER — OFFICE VISIT (OUTPATIENT)
Dept: PODIATRY | Facility: CLINIC | Age: 73
End: 2018-08-23
Payer: MEDICARE

## 2018-08-23 VITALS
WEIGHT: 268.94 LBS | SYSTOLIC BLOOD PRESSURE: 130 MMHG | BODY MASS INDEX: 44.81 KG/M2 | HEIGHT: 65 IN | DIASTOLIC BLOOD PRESSURE: 80 MMHG

## 2018-08-23 DIAGNOSIS — M20.41 HAMMER TOES OF BOTH FEET: ICD-10-CM

## 2018-08-23 DIAGNOSIS — M20.12 HALLUX ABDUCTO VALGUS, LEFT: ICD-10-CM

## 2018-08-23 DIAGNOSIS — E11.9 COMPREHENSIVE DIABETIC FOOT EXAMINATION, TYPE 2 DM, ENCOUNTER FOR: Primary | ICD-10-CM

## 2018-08-23 DIAGNOSIS — L90.9 PLANTAR FAT PAD ATROPHY: ICD-10-CM

## 2018-08-23 DIAGNOSIS — M20.11 HALLUX ABDUCTO VALGUS, RIGHT: ICD-10-CM

## 2018-08-23 DIAGNOSIS — B35.1 NAIL DERMATOPHYTOSIS: ICD-10-CM

## 2018-08-23 DIAGNOSIS — M20.42 HAMMER TOES OF BOTH FEET: ICD-10-CM

## 2018-08-23 DIAGNOSIS — L97.212 VENOUS STASIS ULCER OF RIGHT CALF WITH FAT LAYER EXPOSED WITHOUT VARICOSE VEINS: ICD-10-CM

## 2018-08-23 DIAGNOSIS — I87.2 VENOUS STASIS ULCER OF RIGHT CALF WITH FAT LAYER EXPOSED WITHOUT VARICOSE VEINS: ICD-10-CM

## 2018-08-23 PROCEDURE — 99999 PR PBB SHADOW E&M-EST. PATIENT-LVL III: CPT | Mod: PBBFAC,,, | Performed by: PODIATRIST

## 2018-08-23 PROCEDURE — 3046F HEMOGLOBIN A1C LEVEL >9.0%: CPT | Mod: CPTII,S$GLB,, | Performed by: PODIATRIST

## 2018-08-23 PROCEDURE — 99214 OFFICE O/P EST MOD 30 MIN: CPT | Mod: S$GLB,,, | Performed by: PODIATRIST

## 2018-08-23 PROCEDURE — 3079F DIAST BP 80-89 MM HG: CPT | Mod: CPTII,S$GLB,, | Performed by: PODIATRIST

## 2018-08-23 PROCEDURE — 99499 UNLISTED E&M SERVICE: CPT | Mod: HCNC,S$GLB,, | Performed by: PODIATRIST

## 2018-08-23 PROCEDURE — 3075F SYST BP GE 130 - 139MM HG: CPT | Mod: CPTII,S$GLB,, | Performed by: PODIATRIST

## 2018-08-23 NOTE — PATIENT INSTRUCTIONS
Please keep football dressing clean, dry, and intact.    If dressing gets wet please contact our office.    Wear special shoe every time foot is placed on the floor.    Elevate affected foot as much as possible    Stay hydrated.      Nutrition and MyPlate: Protein Foods  This group includes foods that are high in protein. Protein helps the body build new cells and keeps tissues healthy. Most Americans get enough protein without even trying. It can be harder for vegetarians, but plenty of non-meat foods are rich in protein, too. Its best to get protein from a variety of sources.    Nutrient-Rich Choices  Theres a lot more to this food group than just meat and beans. It also includes nuts, seeds, and eggs. There are all sorts of nutrient-rich choices:  · Chicken and turkey with the skin removed  · Fish and shellfish  · Lean beef, pork, or lamb (without visible fat)  · Soy products, such as tofu, soybeans (edamame), tempeh, or soymilk  · Black beans, kidney beans, hayes beans, chickpeas (garbanzo beans), and lentils (Note: beans and peas count as both a protein and a vegetable)  · Peanuts, almonds, walnuts, sesame seeds, and sunflower  seeds, as well as foods made from these (such as peanut butter or tahini)  · Eggs and foods made with eggs (such as quiche or frittata)  What Makes Meat and Beans Less Healthy?  · Fatty meat is not healthy. Before you cook meat, trim off all the fat you can see. Chicken and turkey skin is also high in fat, and should be removed before cooking.  · Breading and frying make food less healthy. This includes dishes like fried chicken, fried fish, and refried beans.  · Sausage and lunch meats tend to be high in fat and salt. Buy low-fat, low-sodium versions.  One Small Change  Make a meal that includes a non-meat source of protein (such as tofu, lentils, or any other food listed above). Have a better idea? Write it here:  _____________________________________________________________  ©  7820-9442 [employer]. [address] [place] [address], [place] [address]. All rights reserved. This information is not intended as a substitute for professional medical care. Always follow your healthcare professional's instructions.            Recommend lotions: eucerin, eucerin for diabetics, aquaphor, A&D ointment, gold bond for diabetics, sween, [place]'s Bees all purpose baby ointment,  urea 40 with aloe (found on amazon.com)    Shoe recommendations: (try 6pm.com, zappos.com , nordstromrack.Good Deal, or shoes.com for discounted prices) you can visit [employer]SW shoes in [place]  or [employer] in the [facility] (there are also several shoe brand outlets in the [facility])    Asics (GT 2000 or gel foundations), new balance stability type shoes, saucony (stabil c3),  Hernandez (GTS or Beast or transcend), vionic, propet (tennis shoe)    sofft brand, clarks, crocs, aerosoles, naturalizers, SAS, ecco, born, elvia storey, rockports (dress shoes)    Vionic, burkenstocks, fitflops, propet (sandals)  Nike comfort thong sandals, crocs, propet (house shoes)    Nail Home remedy:  Vicks Vapor rub to nails for easier managability    Occasional soaks for 15-20 mins in luke warm water with 1 cup of listerine and 1 cup of apple cider vinegar are ok You may add several drops of oil of oregano or tea tree oil as well        Diabetes: Inspecting Your Feet  Diabetes increases your chances of developing foot problems. So inspect your feet every day. This helps you find small skin irritations before they become serious infections. If you have trouble seeing the bottoms of your feet, use a mirror or ask a family member or friend to help.     Pressure spots on the bottom of the foot are common areas where problems develop.   How to check your feet  Below are tips to help you look for foot problems. Try to check your feet at the same time each day, such as when you get out of bed in the morning:  · Check the top of each foot. The tops of toes, back  of the heel, and outer edge of the foot can get a lot of rubbing from poor-fitting shoes.  · Check the bottom of each foot. Daily wear and tear often leads to problems at pressure spots.  · Check the toes and nails. Fungal infections often occur between toes. Toenail problems can also be a sign of fungal infections or lead to breaks in the skin.  · Check your shoes, too. Loose objects inside a shoe can injure the foot. Use your hand to feel inside your shoes for things like goldy, loose stitching, or rough areas that could irritate your skin.  Warning signs  Look for any color changes in the foot. Redness with streaks can signal a severe infection, which needs immediate medical attention. Tell your doctor right away if you have any of these problems:  · Swelling, sometimes with color changes, may be a sign of poor blood flow or infection. Symptoms include tenderness and an increase in the size of your foot.  · Warm or hot areas on your feet may be signs of infection. A foot that is cold may not be getting enough blood.  · Sensations such as burning, tingling, or pins and needles can be signs of a problem. Also check for areas that may be numb.  · Hot spots are caused by friction or pressure. Look for hot spots in areas that get a lot of rubbing. Hot spots can turn into blisters, calluses, or sores.  · Cracks and sores are caused by dry or irritated skin. They are a sign that the skin is breaking down, which can lead to infection.  · Toenail problems to watch for include nails growing into the skin (ingrown toenail) and causing redness or pain. Thick, yellow, or discolored nails can signal a fungal infection.  · Drainage and odor can develop from untreated sores and ulcers. Call your doctor right away if you notice white or yellow drainage, bleeding, or unpleasant odor.   © 9024-7356 [employer]. [address] [place] [address], [place] [address]. All rights reserved. This information is not intended as a  substitute for professional medical care. Always follow your healthcare professional's instructions.        Step-by-Step:  Inspecting Your Feet (Diabetes)    Date Last Reviewed: 10/1/2016  © 3307-4240 [employer]. [address] [place] [address], [place] [address]. All rights reserved. This information is not intended as a substitute for professional medical care. Always follow your healthcare professional's instructions.

## 2018-08-23 NOTE — PROGRESS NOTES
"Subjective:      Patient ID: Erica Leblanc is a 73 y.o. female.    Chief Complaint: Diabetes Mellitus (pcp [place] 7-13-18); Diabetic Foot Exam; and Nail Care    Erica is a 73 y.o. female who presents to the clinic for evaluation and treatment of high risk feet. Erica has a past medical history of Acute respiratory failure with hypoxia, Cataracts, bilateral, CHF (congestive heart failure), CKD (chronic kidney disease) stage 3, GFR 30-59 ml/min, CKD (chronic kidney disease) stage 3, GFR 30-59 ml/min, Controlled type 2 diabetes mellitus with proteinuria or albuminuria, Depression, Diabetes with neurologic complications, Diabetic retinopathy of both eyes, Edema, Glaucoma, History of colonic polyps, TIA (transient ischemic attack) (11/2008), Hyperlipidemia LDL goal < 100, Hypertension, Hypothyroidism, Major depressive disorder, single episode, mild (2/17/2016), Mixed incontinence urge and stress, Obesity, Obstructive sleep apnea on CPAP, Osteopenia, Proteinuria, Sickle cell trait, Trouble in sleeping, Type 2 diabetes mellitus with ophthalmic manifestations, Type 2 diabetes with stage 3 chronic kidney disease GFR 30-59, Type II or unspecified type diabetes mellitus with renal manifestations, uncontrolled(250.42), Urge incontinence (1/11/2016), Urge incontinence, and Vitamin D deficiency disease. The patient has no major complaints with feet. Chief concern is how to care for feet as a diabetic.    Patient relates that she burned posterior right leg at nail salon ~ 2 weeks ago.  She was seen by NP who placed her on 10 days of PO abx and a "salve."     This patient has documented high risk feet requiring routine maintenance secondary to diabetes mellitis and those secondary complications of diabetes, as mentioned..    PCP: Sendy Elaine MD    Date Last Seen by PCP:   Chief Complaint   Patient presents with    Diabetes Mellitus     pcp [place] 7-13-18    Diabetic Foot Exam    Nail Care     Current shoe gear:  " Casual slide on shoes     Hemoglobin A1C   Date Value Ref Range Status   03/03/2018 9.5 (H) 4.0 - 5.6 % Final     Comment:     According to ADA guidelines, hemoglobin A1c <7.0% represents  optimal control in non-pregnant diabetic patients. Different  metrics may apply to specific patient populations.   Standards of Medical Care in Diabetes-2016.  For the purpose of screening for the presence of diabetes:  <5.7%     Consistent with the absence of diabetes  5.7-6.4%  Consistent with increasing risk for diabetes   (prediabetes)  >or=6.5%  Consistent with diabetes  Currently, no consensus exists for use of hemoglobin A1c  for diagnosis of diabetes for children.  This Hemoglobin A1c assay has significant interference with fetal   hemoglobin   (HbF). The results are invalid for patients with abnormal amounts of   HbF,   including those with known Hereditary Persistence   of Fetal Hemoglobin. Heterozygous hemoglobin variants (HbAS, HbAC,   HbAD, HbAE, HbA2) do not significantly interfere with this assay;   however, presence of multiple variants in a sample may impact the %   interference.     01/06/2018 11.8 (H) 4.0 - 5.6 % Final     Comment:     According to ADA guidelines, hemoglobin A1c <7.0% represents  optimal control in non-pregnant diabetic patients. Different  metrics may apply to specific patient populations.   Standards of Medical Care in Diabetes-2016.  For the purpose of screening for the presence of diabetes:  <5.7%     Consistent with the absence of diabetes  5.7-6.4%  Consistent with increasing risk for diabetes   (prediabetes)  >or=6.5%  Consistent with diabetes  Currently, no consensus exists for use of hemoglobin A1c  for diagnosis of diabetes for children.  This Hemoglobin A1c assay has significant interference with fetal   hemoglobin   (HbF). The results are invalid for patients with abnormal amounts of   HbF,   including those with known Hereditary Persistence   of Fetal Hemoglobin. Heterozygous  hemoglobin variants (HbAS, HbAC,   HbAD, HbAE, HbA2) do not significantly interfere with this assay;   however, presence of multiple variants in a sample may impact the %   interference.     10/11/2017 9.0 (H) 4.0 - 5.6 % Final     Comment:     According to ADA guidelines, hemoglobin A1c <7.0% represents  optimal control in non-pregnant diabetic patients. Different  metrics may apply to specific patient populations.   Standards of Medical Care in Diabetes-2016.  For the purpose of screening for the presence of diabetes:  <5.7%     Consistent with the absence of diabetes  5.7-6.4%  Consistent with increasing risk for diabetes   (prediabetes)  >or=6.5%  Consistent with diabetes  Currently, no consensus exists for use of hemoglobin A1c  for diagnosis of diabetes for children.  This Hemoglobin A1c assay has significant interference with fetal   hemoglobin   (HbF). The results are invalid for patients with abnormal amounts of   HbF,   including those with known Hereditary Persistence   of Fetal Hemoglobin. Heterozygous hemoglobin variants (HbAS, HbAC,   HbAD, HbAE, HbA2) do not significantly interfere with this assay;   however, presence of multiple variants in a sample may impact the %   interference.         Patient Active Problem List   Diagnosis    Type 2 diabetes with stage 3 chronic kidney disease GFR 30-59    Morbid obesity with body mass index (BMI) of 40.0 to 44.9 in adult    Sickle cell trait    Osteopenia    Hyperlipidemia LDL goal <100    Obstructive sleep apnea on CPAP    Hypothyroidism (acquired)    Hx-TIA (transient ischemic attack)    Vitamin D deficiency disease    Proliferative diabetic retinopathy, both eyes    Diabetic macular edema, both eyes    Hypertensive retinopathy of both eyes    Cerebral microvascular disease    CME (cystoid macular edema)    Hyphema    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    Pulmonary hypertension    Long-term insulin use    Type 2 diabetes,  controlled, with neuropathy    Secondary renal hyperparathyroidism    Incomplete bladder emptying    Slow transit constipation    Postmenopausal atrophic vaginitis    Rectocele    Exotropia of right eye    Mixed incontinence urge and stress    Strabismus    Chronic diastolic heart failure    Mild episode of recurrent major depressive disorder    Tortuous aorta    History of TIAs    CKD (chronic kidney disease), stage III    Uncontrolled type 2 diabetes mellitus with retinopathy     Current Outpatient Medications on File Prior to Visit   Medication Sig Dispense Refill    amLODIPine (NORVASC) 10 MG tablet Take 1 tablet (10 mg total) by mouth once daily. 90 tablet 3    atorvastatin (LIPITOR) 10 MG tablet Take 1 tablet (10 mg total) by mouth once daily. 90 tablet 1    citalopram (CELEXA) 10 MG tablet TAKE 1 TABLET EVERY DAY 90 tablet 1    clopidogrel (PLAVIX) 75 mg tablet TAKE 1 TABLET EVERY DAY 90 tablet 1    docusate sodium (COLACE) 100 MG capsule Take 200 mg by mouth once daily.       ergocalciferol (ERGOCALCIFEROL) 50,000 unit Cap Take 50,000 Units by mouth every 30 days.       furosemide (LASIX) 40 MG tablet Take 1 tablet (40 mg total) by mouth 2 (two) times daily. 180 tablet 2    HUMALOG 100 unit/mL injection Inject 40 Units into the skin once daily.   6    hydrALAZINE (APRESOLINE) 50 MG tablet Take 1 tablet (50 mg total) by mouth 3 (three) times daily. 270 tablet 3    LANCETS MISC       levothyroxine (SYNTHROID) 50 MCG tablet Take 50 mcg by mouth once daily.      losartan (COZAAR) 100 MG tablet TAKE 1 TABLET EVERY DAY 90 tablet 3    metoprolol tartrate (LOPRESSOR) 100 MG tablet TAKE 1 TABLET TWICE DAILY 180 tablet 1    MULTIVITAMIN WITH MINERALS (HAIR,SKIN AND NAILS ORAL) Take 3 tablets by mouth once daily.      oxybutynin (DITROPAN XL) 15 MG TR24 Take 1 tablet (15 mg total) by mouth once daily. 90 tablet 3    potassium chloride SA (K-DUR,KLOR-CON) 20 MEQ tablet TAKE 1 TABLET EVERY  DAY 90 tablet 1    VGO 20 [place]   3    VICTOZA 0.6 mg/0.1 mL (18 mg/3 mL) PnIj 1.8 mg every morning.        No current facility-administered medications on file prior to visit.      Review of patient's allergies indicates:   Allergen Reactions    Ace inhibitors Other (See Comments)     Other reaction(s): cough     Past Surgical History:   Procedure Laterality Date    breast reduction Bilateral age 31    cataracts Bilateral      SECTION, LOW TRANSVERSE      x1    CHOLECYSTECTOMY      EYE SURGERY  2014, 2014    vitrectomy    EYE SURGERY Right 2016    HYSTERECTOMY  1986    TAHBSO (patient is unsure if ovaries removed)    OOPHORECTOMY      REFRACTIVE SURGERY      TOTAL REDUCTION MAMMOPLASTY       Family History   Problem Relation Age of Onset    Leukemia Father     Ovarian cancer Sister 35    Stroke Mother     Diabetes Mother     Hypertension Mother     Diabetes Paternal Grandmother     Breast cancer Maternal Aunt 65    HIV Brother     Achondroplasia Sister     Parkinsonism Maternal Aunt     Esophageal cancer Maternal Uncle         smoker    Amblyopia Neg Hx     Blindness Neg Hx     Cataracts Neg Hx     Glaucoma Neg Hx     Macular degeneration Neg Hx     Retinal detachment Neg Hx     Strabismus Neg Hx     Thyroid disease Neg Hx     Colon cancer Neg Hx        Review of Systems   Constitution: Negative for chills, fever and weakness.   Cardiovascular: Positive for leg swelling. Negative for chest pain and claudication.   Respiratory: Negative for cough and shortness of breath.    Skin: Positive for dry skin and nail changes. Negative for itching and rash.   Musculoskeletal: Positive for arthritis and joint pain. Negative for falls, joint swelling, muscle cramps and muscle weakness.   Gastrointestinal: Negative for diarrhea, nausea and vomiting.   Neurological: Positive for numbness and paresthesias. Negative for tremors.   Psychiatric/Behavioral: Negative for altered  "mental status and hallucinations.         Objective:       Vitals:    08/23/18 0906   BP: 130/80   Weight: 122 kg (268 lb 15.4 oz)   Height: 5' 5" (1.651 m)   PainSc:   8   PainLoc: Leg     Physical Exam   Constitutional:   General: Pt. is well-developed, well-nourished, appears stated age, in no acute distress, alert and oriented x 3. No evidence of depression, anxiety, or agitation. Calm, cooperative, and communicative. Appropriate interactions and affect.       Cardiovascular:   Pulses:       Dorsalis pedis pulses are 1+ on the right side, and 1+ on the left side.        Posterior tibial pulses are 1+ on the right side, and 1+ on the left side.   There is decreased digital hair.    Musculoskeletal:        Right ankle: She exhibits normal range of motion and no swelling. No tenderness. Achilles tendon exhibits no pain and no defect.        Left ankle: She exhibits normal range of motion and no swelling. No tenderness. Achilles tendon exhibits no pain and no defect.        Right foot: There is normal range of motion and no tenderness.        Left foot: There is normal range of motion and no tenderness.   Muscle strength is 5/5 in all groups bilaterally.    Decreased stride, station of gait.  apropulsive toe off.  Increased angle and base of gait.    Patient has hammertoes of digits 2-5 bilateral partially reducible without symptom today.    Visible and palpable bunion without pain at dorsomedial 1st metatarsal head right and left.  Hallux abducted right and left partially reducible, tracks laterally without being track bound.  No ecchymosis, erythema, edema, or cardinal signs infection or signs of trauma same foot.    Fat pad atrophy to heels and met heads bilateral     Neurological: No sensory deficit.   Lamar-Gloria 5.07 monofilament is intact bilateral feet. Sharp/dull sensation is also intact Bilateral feet.           Skin: Skin is warm and dry. Lesion (posterior right leg as pictured) noted. No abrasion, " no ecchymosis and no rash noted. She is not diaphoretic. No cyanosis or erythema. No pallor. Nails show no clubbing.   Xerosis bilaterally     Toenails 1-5 bilaterally thickened by 2-3 mm, discolored/yellowed, dystrophic, brittle with subungual debris.    Interdigital Spaces clean, dry and without evidence of break in skin integrity   Psychiatric: She has a normal mood and affect. Her speech is normal.   Nursing note and vitals reviewed.                  Assessment:       Encounter Diagnoses   Name Primary?    Comprehensive diabetic foot examination, type 2 DM, encounter for Yes    Venous stasis ulcer of right calf with fat layer exposed without varicose veins     Hammer toes of both feet     Plantar fat pad atrophy     Hallux abducto valgus, left     Hallux abducto valgus, right     Nail dermatophytosis          Plan:       Erica was seen today for diabetes mellitus, diabetic foot exam and nail care.    Diagnoses and all orders for this visit:    Comprehensive diabetic foot examination, type 2 DM, encounter for    Venous stasis ulcer of right calf with fat layer exposed without varicose veins    Hammer toes of both feet    Plantar fat pad atrophy    Hallux abducto valgus, left    Hallux abducto valgus, right    Nail dermatophytosis      I counseled the patient on her conditions, their implications and medical management. Greater than 20 minutes spent on education about the diabetic foot, neuropathy, and prevention of limb loss.    Greater than 50% of this visit spent on counseling and coordination of care.    Education about the diabetic foot, neuropathy, and prevention of limb loss.    Shoe inspection. Diabetic Foot Education. Patient reminded of the importance of good nutrition and blood sugar control to help prevent podiatric complications of diabetes. Patient instructed on proper foot hygeine. We discussed wearing proper shoe gear, daily foot inspections, never walking without protective shoe gear, never  putting sharp instruments to feet.      Discussed condition and treatment options with pt, as well as poor results with most treatments. Discussed filing nails, using antifungal topical ointment vs oral treatment options. Instructed patient on the importance of keeping fungus off of skin while treating nails. Patient instructed to use absorbent cotton socks and change them if they become sweaty; or wear an open-toe shoe or sandal. Wash the feet at least once a day with soap and water. Patient wants to try home remedies.  Instructed patient that it takes time for symptoms to completely dissipate. Patient instructed to use lysol or over-the-counter antifungal powders or sprays to shoes daily and allow them to air dry, switching shoes from every other day would be optimal. Patient is to avoid barefoot walking in  high-risk environments (public showers, gyms and locker rooms) may prevent future infections.     Discussed wound healing cycle, skin integrity, ways to care for skin.Counseled patient on the effects of  PVD and high blood glucose on healing. She verbalizes understanding that it can increase the chances of delayed healing and this prolonged exposure leads to infection or progression of infection which subsequently can result in loss of limb.    Adequate vitamin supplementation, protein intake, and hydration - discussed with patient    The wound is cleansed of foreign material as much as possible and the base inspected for bone or abscess. None noted    Dressings: [id number]  Offloading: Unna boot and darco shoe    Follow-up: 1 week but should call [facility] immediately if any signs of infection, such as fever, chills, sweats, increased redness or pain.    Short-term goals include maintaining good offloading and minimizing bioburden, promoting granulation and epithelialization to healing.  Long-term goals include keeping the wound healed by good offloading and medical management under the direction of  internist.    Procedures

## 2018-08-23 NOTE — LETTER
August 23, 2018      Sendy Elaine MD  [address] [place]  [place] [address]           [facility]  [address]  [place] [address]  Phone: [phone]          Patient: Erica Leblanc   MR Number: [id number]   YOB: 1945   Date of Visit: 8/23/2018       Dear Dr. Sendy Elaine:    Thank you for referring Erica Leblanc to me for evaluation. Attached you will find relevant portions of my assessment and plan of care.    If you have questions, please do not hesitate to call me. I look forward to following Erica Leblanc along with you.    Sincerely,    Nicole Gray DPM    Enclosure  CC:  No Recipients    If you would like to receive this communication electronically, please contact [email or web address] or [phone] to request more information on [employer] access.    For providers and/or their staff who would like to refer a patient to [facility], please contact us through our one-stop-shop provider referral line, [facility], at [phone].    If you feel you have received this communication in error or would no longer like to receive these types of communications, please e-mail [email or web address]

## 2018-08-24 ENCOUNTER — PATIENT MESSAGE (OUTPATIENT)
Dept: ENDOSCOPY | Facility: HOSPITAL | Age: 73
End: 2018-08-24

## 2018-08-24 ENCOUNTER — TELEPHONE (OUTPATIENT)
Dept: FAMILY MEDICINE | Facility: CLINIC | Age: 73
End: 2018-08-24

## 2018-08-24 DIAGNOSIS — E11.65 UNCONTROLLED TYPE 2 DIABETES MELLITUS WITH RETINOPATHY, WITH LONG-TERM CURRENT USE OF INSULIN, MACULAR EDEMA PRESENCE UNSPECIFIED, UNSPECIFIED LATERALITY, UNSPECIFIED RETINOPATHY SEVERITY: Primary | ICD-10-CM

## 2018-08-24 DIAGNOSIS — Z79.4 UNCONTROLLED TYPE 2 DIABETES MELLITUS WITH RETINOPATHY, WITH LONG-TERM CURRENT USE OF INSULIN, MACULAR EDEMA PRESENCE UNSPECIFIED, UNSPECIFIED LATERALITY, UNSPECIFIED RETINOPATHY SEVERITY: Primary | ICD-10-CM

## 2018-08-24 DIAGNOSIS — E11.319 UNCONTROLLED TYPE 2 DIABETES MELLITUS WITH RETINOPATHY, WITH LONG-TERM CURRENT USE OF INSULIN, MACULAR EDEMA PRESENCE UNSPECIFIED, UNSPECIFIED LATERALITY, UNSPECIFIED RETINOPATHY SEVERITY: Primary | ICD-10-CM

## 2018-08-24 NOTE — TELEPHONE ENCOUNTER
----- Message from Abida Cabrera sent at 8/24/2018  1:47 PM CDT -----  Contact: Self/ [phone]  Pt returning call to office. Thank you.

## 2018-08-24 NOTE — TELEPHONE ENCOUNTER
Please call patient - she needs to reschedule her eye appointment. Does she want to see the retina specialist that comes to [place]?  \  Spoke with the pt, she would be interested in seeing this specialist, from [facility].  Patient also states that she will see another provider, if they come to the [place] office.  Patient verbalized understandings.

## 2018-08-24 NOTE — TELEPHONE ENCOUNTER
Left a message for the message with the bottom details listed.            ----- Message from Sendy Elaine MD sent at 8/24/2018  9:23 AM CDT -----  Please call patient - she needs to reschedule her eye appointment. Does she want to see the retina specialist that comes to [facility]?

## 2018-08-24 NOTE — TELEPHONE ENCOUNTER
----- Message from Abida Cabrera sent at 8/24/2018  1:47 PM CDT -----  Contact: Self/ [phone]  Pt returning call to office. Thank you.

## 2018-08-30 ENCOUNTER — OFFICE VISIT (OUTPATIENT)
Dept: UROLOGY | Facility: CLINIC | Age: 73
End: 2018-08-30
Payer: MEDICARE

## 2018-08-30 ENCOUNTER — OFFICE VISIT (OUTPATIENT)
Dept: PODIATRY | Facility: CLINIC | Age: 73
End: 2018-08-30
Payer: MEDICARE

## 2018-08-30 VITALS
HEART RATE: 68 BPM | SYSTOLIC BLOOD PRESSURE: 138 MMHG | DIASTOLIC BLOOD PRESSURE: 60 MMHG | WEIGHT: 269.81 LBS | RESPIRATION RATE: 14 BRPM | BODY MASS INDEX: 44.95 KG/M2 | HEIGHT: 65 IN

## 2018-08-30 VITALS — WEIGHT: 268 LBS | BODY MASS INDEX: 44.6 KG/M2

## 2018-08-30 DIAGNOSIS — E11.22 TYPE 2 DIABETES MELLITUS WITH STAGE 3 CHRONIC KIDNEY DISEASE, WITH LONG-TERM CURRENT USE OF INSULIN: ICD-10-CM

## 2018-08-30 DIAGNOSIS — R33.9 INCOMPLETE BLADDER EMPTYING: ICD-10-CM

## 2018-08-30 DIAGNOSIS — N39.46 MIXED INCONTINENCE URGE AND STRESS: Primary | ICD-10-CM

## 2018-08-30 DIAGNOSIS — N18.30 TYPE 2 DIABETES MELLITUS WITH STAGE 3 CHRONIC KIDNEY DISEASE, WITH LONG-TERM CURRENT USE OF INSULIN: ICD-10-CM

## 2018-08-30 DIAGNOSIS — I87.2 VENOUS STASIS ULCER OF RIGHT CALF WITH FAT LAYER EXPOSED WITHOUT VARICOSE VEINS: Primary | ICD-10-CM

## 2018-08-30 DIAGNOSIS — Z79.4 TYPE 2 DIABETES MELLITUS WITH STAGE 3 CHRONIC KIDNEY DISEASE, WITH LONG-TERM CURRENT USE OF INSULIN: ICD-10-CM

## 2018-08-30 DIAGNOSIS — L97.212 VENOUS STASIS ULCER OF RIGHT CALF WITH FAT LAYER EXPOSED WITHOUT VARICOSE VEINS: Primary | ICD-10-CM

## 2018-08-30 PROCEDURE — 99214 OFFICE O/P EST MOD 30 MIN: CPT | Mod: S$GLB,,, | Performed by: UROLOGY

## 2018-08-30 PROCEDURE — 3078F DIAST BP <80 MM HG: CPT | Mod: CPTII,S$GLB,, | Performed by: PODIATRIST

## 2018-08-30 PROCEDURE — 3078F DIAST BP <80 MM HG: CPT | Mod: CPTII,S$GLB,, | Performed by: UROLOGY

## 2018-08-30 PROCEDURE — 99214 OFFICE O/P EST MOD 30 MIN: CPT | Mod: S$GLB,,, | Performed by: PODIATRIST

## 2018-08-30 PROCEDURE — 3074F SYST BP LT 130 MM HG: CPT | Mod: CPTII,S$GLB,, | Performed by: PODIATRIST

## 2018-08-30 PROCEDURE — 3075F SYST BP GE 130 - 139MM HG: CPT | Mod: CPTII,S$GLB,, | Performed by: UROLOGY

## 2018-08-30 PROCEDURE — 99999 PR PBB SHADOW E&M-EST. PATIENT-LVL III: CPT | Mod: PBBFAC,,, | Performed by: PODIATRIST

## 2018-08-30 PROCEDURE — 99499 UNLISTED E&M SERVICE: CPT | Mod: HCNC,S$GLB,, | Performed by: PODIATRIST

## 2018-08-30 PROCEDURE — 3046F HEMOGLOBIN A1C LEVEL >9.0%: CPT | Mod: CPTII,S$GLB,, | Performed by: PODIATRIST

## 2018-08-30 PROCEDURE — 99999 PR PBB SHADOW E&M-EST. PATIENT-LVL III: CPT | Mod: PBBFAC,,, | Performed by: UROLOGY

## 2018-08-30 NOTE — PROGRESS NOTES
Subjective:      Patient ID: Erica Leblanc is a 73 y.o. female.    Chief Complaint: Wound Care (right leg )    Erica Leblanc is a 73 y.o. female returns to clinic for follow up of tight leg ulcers.  Patient has left football dressing intact, however she relates urine was able to get onto the dressing secondary to incontinence.  Patient denies pain. No new pedal complaints.     This patient has documented high risk feet requiring routine maintenance secondary to diabetes mellitis and those secondary complications of diabetes, as mentioned..    PCP: Sendy Elaine MD    Date Last Seen by PCP:   Chief Complaint   Patient presents with    Wound Care     right leg      Current shoe gear:  Casual slide on shoes     Hemoglobin A1C   Date Value Ref Range Status   03/03/2018 9.5 (H) 4.0 - 5.6 % Final     Comment:     According to ADA guidelines, hemoglobin A1c <7.0% represents  optimal control in non-pregnant diabetic patients. Different  metrics may apply to specific patient populations.   Standards of Medical Care in Diabetes-2016.  For the purpose of screening for the presence of diabetes:  <5.7%     Consistent with the absence of diabetes  5.7-6.4%  Consistent with increasing risk for diabetes   (prediabetes)  >or=6.5%  Consistent with diabetes  Currently, no consensus exists for use of hemoglobin A1c  for diagnosis of diabetes for children.  This Hemoglobin A1c assay has significant interference with fetal   hemoglobin   (HbF). The results are invalid for patients with abnormal amounts of   HbF,   including those with known Hereditary Persistence   of Fetal Hemoglobin. Heterozygous hemoglobin variants (HbAS, HbAC,   HbAD, HbAE, HbA2) do not significantly interfere with this assay;   however, presence of multiple variants in a sample may impact the %   interference.     01/06/2018 11.8 (H) 4.0 - 5.6 % Final     Comment:     According to ADA guidelines, hemoglobin A1c <7.0% represents  optimal control in  non-pregnant diabetic patients. Different  metrics may apply to specific patient populations.   Standards of Medical Care in Diabetes-2016.  For the purpose of screening for the presence of diabetes:  <5.7%     Consistent with the absence of diabetes  5.7-6.4%  Consistent with increasing risk for diabetes   (prediabetes)  >or=6.5%  Consistent with diabetes  Currently, no consensus exists for use of hemoglobin A1c  for diagnosis of diabetes for children.  This Hemoglobin A1c assay has significant interference with fetal   hemoglobin   (HbF). The results are invalid for patients with abnormal amounts of   HbF,   including those with known Hereditary Persistence   of Fetal Hemoglobin. Heterozygous hemoglobin variants (HbAS, HbAC,   HbAD, HbAE, HbA2) do not significantly interfere with this assay;   however, presence of multiple variants in a sample may impact the %   interference.     10/11/2017 9.0 (H) 4.0 - 5.6 % Final     Comment:     According to ADA guidelines, hemoglobin A1c <7.0% represents  optimal control in non-pregnant diabetic patients. Different  metrics may apply to specific patient populations.   Standards of Medical Care in Diabetes-2016.  For the purpose of screening for the presence of diabetes:  <5.7%     Consistent with the absence of diabetes  5.7-6.4%  Consistent with increasing risk for diabetes   (prediabetes)  >or=6.5%  Consistent with diabetes  Currently, no consensus exists for use of hemoglobin A1c  for diagnosis of diabetes for children.  This Hemoglobin A1c assay has significant interference with fetal   hemoglobin   (HbF). The results are invalid for patients with abnormal amounts of   HbF,   including those with known Hereditary Persistence   of Fetal Hemoglobin. Heterozygous hemoglobin variants (HbAS, HbAC,   HbAD, HbAE, HbA2) do not significantly interfere with this assay;   however, presence of multiple variants in a sample may impact the %   interference.         Patient Active  Problem List   Diagnosis    Type 2 diabetes with stage 3 chronic kidney disease GFR 30-59    Morbid obesity with body mass index (BMI) of 40.0 to 44.9 in adult    Sickle cell trait    Osteopenia    Hyperlipidemia LDL goal <100    Obstructive sleep apnea on CPAP    Hypothyroidism (acquired)    Hx-TIA (transient ischemic attack)    Vitamin D deficiency disease    Proliferative diabetic retinopathy, both eyes    Diabetic macular edema, both eyes    Hypertensive retinopathy of both eyes    Cerebral microvascular disease    CME (cystoid macular edema)    Hyphema    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    Pulmonary hypertension    Long-term insulin use    Type 2 diabetes, controlled, with neuropathy    Secondary renal hyperparathyroidism    Incomplete bladder emptying    Slow transit constipation    Postmenopausal atrophic vaginitis    Rectocele    Exotropia of right eye    Mixed incontinence urge and stress    Strabismus    Chronic diastolic heart failure    Mild episode of recurrent major depressive disorder    Tortuous aorta    History of TIAs    CKD (chronic kidney disease), stage III    Uncontrolled type 2 diabetes mellitus with retinopathy     Current Outpatient Medications on File Prior to Visit   Medication Sig Dispense Refill    atorvastatin (LIPITOR) 10 MG tablet Take 1 tablet (10 mg total) by mouth once daily. 90 tablet 1    citalopram (CELEXA) 10 MG tablet TAKE 1 TABLET EVERY DAY 90 tablet 1    clopidogrel (PLAVIX) 75 mg tablet TAKE 1 TABLET EVERY DAY 90 tablet 1    docusate sodium (COLACE) 100 MG capsule Take 200 mg by mouth once daily.       ergocalciferol (ERGOCALCIFEROL) 50,000 unit Cap Take 50,000 Units by mouth every 30 days.       furosemide (LASIX) 40 MG tablet Take 1 tablet (40 mg total) by mouth 2 (two) times daily. 180 tablet 2    HUMALOG 100 unit/mL injection Inject 40 Units into the skin once daily.   6    hydrALAZINE (APRESOLINE) 50 MG tablet  Take 1 tablet (50 mg total) by mouth 3 (three) times daily. 270 tablet 3    LANCETS MISC       levothyroxine (SYNTHROID) 50 MCG tablet Take 50 mcg by mouth once daily.      losartan (COZAAR) 100 MG tablet TAKE 1 TABLET EVERY DAY 90 tablet 3    metoprolol tartrate (LOPRESSOR) 100 MG tablet TAKE 1 TABLET TWICE DAILY 180 tablet 1    MULTIVITAMIN WITH MINERALS (HAIR,SKIN AND NAILS ORAL) Take 3 tablets by mouth once daily.      oxybutynin (DITROPAN XL) 15 MG TR24 Take 1 tablet (15 mg total) by mouth once daily. 90 tablet 3    potassium chloride SA (K-DUR,KLOR-CON) 20 MEQ tablet TAKE 1 TABLET EVERY DAY 90 tablet 1    VGO 20 [place]   3    VICTOZA 0.6 mg/0.1 mL (18 mg/3 mL) PnIj 1.8 mg every morning.        No current facility-administered medications on file prior to visit.      Review of patient's allergies indicates:   Allergen Reactions    Ace inhibitors Other (See Comments)     Other reaction(s): cough     Past Surgical History:   Procedure Laterality Date    breast reduction Bilateral age 31    cataracts Bilateral      SECTION, LOW TRANSVERSE      x1    CHOLECYSTECTOMY      EYE SURGERY  2014, 2014    vitrectomy    EYE SURGERY Right 2016    HYSTERECTOMY  1986    TAHBSO (patient is unsure if ovaries removed)    OOPHORECTOMY      REFRACTIVE SURGERY      TOTAL REDUCTION MAMMOPLASTY       Family History   Problem Relation Age of Onset    Leukemia Father     Ovarian cancer Sister 35    Stroke Mother     Diabetes Mother     Hypertension Mother     Diabetes Paternal Grandmother     Breast cancer Maternal Aunt 65    HIV Brother     Achondroplasia Sister     Parkinsonism Maternal Aunt     Esophageal cancer Maternal Uncle         smoker    Amblyopia Neg Hx     Blindness Neg Hx     Cataracts Neg Hx     Glaucoma Neg Hx     Macular degeneration Neg Hx     Retinal detachment Neg Hx     Strabismus Neg Hx     Thyroid disease Neg Hx     Colon cancer Neg Hx        Review of Systems    Constitution: Negative for chills, fever and weakness.   Cardiovascular: Positive for leg swelling. Negative for chest pain and claudication.   Respiratory: Negative for cough and shortness of breath.    Skin: Positive for dry skin and nail changes. Negative for itching and rash.   Musculoskeletal: Positive for arthritis and joint pain. Negative for falls, joint swelling, muscle cramps and muscle weakness.   Gastrointestinal: Negative for diarrhea, nausea and vomiting.   Neurological: Positive for numbness and paresthesias. Negative for tremors.   Psychiatric/Behavioral: Negative for altered mental status and hallucinations.         Objective:       Vitals:    08/30/18 1036   Weight: 121.6 kg (268 lb)   PainSc:   5   PainLoc: Leg     Physical Exam   Constitutional:   General: Pt. is well-developed, well-nourished, appears stated age, in no acute distress, alert and oriented x 3. No evidence of depression, anxiety, or agitation. Calm, cooperative, and communicative. Appropriate interactions and affect.       Cardiovascular:   Pulses:       Dorsalis pedis pulses are 1+ on the right side, and 1+ on the left side.        Posterior tibial pulses are 1+ on the right side, and 1+ on the left side.   There is decreased digital hair.    Musculoskeletal:        Right ankle: She exhibits normal range of motion and no swelling. No tenderness. Achilles tendon exhibits no pain and no defect.        Left ankle: She exhibits normal range of motion and no swelling. No tenderness. Achilles tendon exhibits no pain and no defect.        Right foot: There is normal range of motion and no tenderness.        Left foot: There is normal range of motion and no tenderness.   Muscle strength is 5/5 in all groups bilaterally.    Decreased stride, station of gait.  apropulsive toe off.  Increased angle and base of gait.    Patient has hammertoes of digits 2-5 bilateral partially reducible without symptom today.    Visible and palpable bunion  without pain at dorsomedial 1st metatarsal head right and left.  Hallux abducted right and left partially reducible, tracks laterally without being track bound.  No ecchymosis, erythema, edema, or cardinal signs infection or signs of trauma same foot.    Fat pad atrophy to heels and met heads bilateral     Neurological: No sensory deficit.   Stratford-Gloria 5.07 monofilament is intact bilateral feet. Sharp/dull sensation is also intact Bilateral feet.           Skin: Skin is warm and dry. Lesion (posterior right leg as pictured) noted. No abrasion, no ecchymosis and no rash noted. She is not diaphoretic. No cyanosis or erythema. No pallor. Nails show no clubbing.   Xerosis bilaterally     Toenails 1-5 bilaterally thickened by 2-3 mm, discolored/yellowed, dystrophic, brittle with subungual debris.    Interdigital Spaces clean, dry and without evidence of break in skin integrity   Psychiatric: She has a normal mood and affect. Her speech is normal.   Nursing note and vitals reviewed.    08/30 08/23              Assessment:       Encounter Diagnoses   Name Primary?    Venous stasis ulcer of right calf with fat layer exposed without varicose veins Yes    Type 2 diabetes mellitus with stage 3 chronic kidney disease, with long-term current use of insulin          Plan:       Erica was seen today for wound care.    Diagnoses and all orders for this visit:    Venous stasis ulcer of right calf with fat layer exposed without varicose veins  -     Ambulatory consult to Wound Clinic  -     POCT Glucose    Type 2 diabetes mellitus with stage 3 chronic kidney disease, with long-term current use of insulin  -     Ambulatory consult to Wound Clinic  -     POCT Glucose      I counseled the patient on her conditions, their implications and medical management. Greater than 20 minutes spent on education about the diabetic foot, neuropathy, and prevention of limb loss.    Greater than 50% of this visit spent on counseling  and coordination of care.    Education about the diabetic foot, neuropathy, and prevention of limb loss.    Shoe inspection. Diabetic Foot Education. Patient reminded of the importance of good nutrition and blood sugar control to help prevent podiatric complications of diabetes. Patient instructed on proper foot hygeine. We discussed wearing proper shoe gear, daily foot inspections, never walking without protective shoe gear, never putting sharp instruments to feet.      Discussed condition and treatment options with pt, as well as poor results with most treatments. Discussed filing nails, using antifungal topical ointment vs oral treatment options. Instructed patient on the importance of keeping fungus off of skin while treating nails. Patient instructed to use absorbent cotton socks and change them if they become sweaty; or wear an open-toe shoe or sandal. Wash the feet at least once a day with soap and water. Patient wants to try home remedies.  Instructed patient that it takes time for symptoms to completely dissipate. Patient instructed to use lysol or over-the-counter antifungal powders or sprays to shoes daily and allow them to air dry, switching shoes from every other day would be optimal. Patient is to avoid barefoot walking in  high-risk environments (public showers, gyms and locker rooms) may prevent future infections.     Discussed wound healing cycle, skin integrity, ways to care for skin.Counseled patient on the effects of  PVD and high blood glucose on healing. She verbalizes understanding that it can increase the chances of delayed healing and this prolonged exposure leads to infection or progression of infection which subsequently can result in loss of limb.    Adequate vitamin supplementation, protein intake, and hydration - discussed with patient    The wound is cleansed of foreign material as much as possible and the base inspected for bone or abscess. None noted    Some improvement noted in LE  edema and wound edges. Due to her incontinence she should have more frequent dressing changes and does not qualify for HH     Dressings: jacqueline  Offloading: Unna boot and darco shoe    Follow-up: 1 week in [facility] for more frequent dressing changes but should call [facility] immediately if any signs of infection, such as fever, chills, sweats, increased redness or pain.    Short-term goals include maintaining good offloading and minimizing bioburden, promoting granulation and epithelialization to healing.  Long-term goals include keeping the wound healed by good offloading and medical management under the direction of internist.    Procedures

## 2018-08-30 NOTE — PATIENT INSTRUCTIONS
Please keep football dressing clean, dry, and intact.  If dressing gets wet please contact our office.    Wear special shoe every time foot is placed on the floor.    Elevate affected foot as much as possible    Stay hydrated.      Nutrition and MyPlate: Protein Foods  This group includes foods that are high in protein. Protein helps the body build new cells and keeps tissues healthy. Most Americans get enough protein without even trying. It can be harder for vegetarians, but plenty of non-meat foods are rich in protein, too. Its best to get protein from a variety of sources.    Nutrient-Rich Choices  Theres a lot more to this food group than just meat and beans. It also includes nuts, seeds, and eggs. There are all sorts of nutrient-rich choices:  · Chicken and turkey with the skin removed  · Fish and shellfish  · Lean beef, pork, or lamb (without visible fat)  · Soy products, such as tofu, soybeans (edamame), tempeh, or soymilk  · Black beans, kidney beans, hayes beans, chickpeas (garbanzo beans), and lentils (Note: beans and peas count as both a protein and a vegetable)  · Peanuts, almonds, walnuts, sesame seeds, and sunflower  seeds, as well as foods made from these (such as peanut butter or tahini)  · Eggs and foods made with eggs (such as quiche or frittata)  What Makes Meat and Beans Less Healthy?  · Fatty meat is not healthy. Before you cook meat, trim off all the fat you can see. Chicken and turkey skin is also high in fat, and should be removed before cooking.  · Breading and frying make food less healthy. This includes dishes like fried chicken, fried fish, and refried beans.  · Sausage and lunch meats tend to be high in fat and salt. Buy low-fat, low-sodium versions.  One Small Change  Make a meal that includes a non-meat source of protein (such as tofu, lentils, or any other food listed above). Have a better idea? Write it here:  _____________________________________________________________  ©  3628-7692 [email or web address]. [address] [place] [address], [place] [address]. All rights reserved. This information is not intended as a substitute for professional medical care. Always follow your healthcare professional's instructions.

## 2018-08-30 NOTE — PROGRESS NOTES
"  Subjective:       Erica Leblanc is a 73 y.o. female who is an established patient of Dr. Barker and Dr Lerma was seen for evaluation of UI.      She is a previous pt of [facility] and saw Dr Lerma in 3/16. She has known mixed incontinence as well as chronic constipation. She has been wearing one diaper per day. She was recommended to stop Urecholine at last visit. She continues to have UUI issues. WILLIE is less bothersome.     She was noted to have PVR of 250cc by I&O cath (1/16). She denies feelings of KATY. PVR on US (2/16) was 9cc.     She was given trial of Ditropan 5mg initially. She had some improvement. She feels that increased stress increased her urinary frequency. She had large volume UUI when she was doing a work required function. Ditropan increased to 15mg and doing well with this. Rare UUI. She is having insensate UI as well. Still wearing Depends.      She is able to make it bathroom a little better. She is doing timed voiding at work.     PVR (bladder scan) last visit - 90cc (~45min after void), last visit - 16cc. Today - 54cc    UCx:  1/16 - neg  7/17 - >100k E coli      The following portions of the patient's history were reviewed and updated as appropriate: allergies, current medications, past family history, past medical history, past social history, past surgical history and problem list.    Review of Systems  Constitutional: no fever or chills  ENT: no nasal congestion or sore throat  Respiratory: no cough or shortness of breath  Cardiovascular: no chest pain or palpitations  Gastrointestinal: no nausea or vomiting, tolerating diet  Genitourinary: as per HPI  Hematologic/Lymphatic: no easy bruising or lymphadenopathy  Musculoskeletal: no arthralgias or myalgias  Skin: no rashes or lesions  Neurological: no seizures or tremors  Behavioral/Psych: no auditory or visual hallucinations       Objective:    Vitals:   Ht 5' 5" (1.651 m)   Wt 122.4 kg (269 lb 13.5 oz)   BMI 44.90 kg/m²     Physical " Exam   General: well developed, well nourished in no acute distress  Head: normocephalic, atraumatic  Neck: supple, trachea midline, no obvious enlargement of thyroid  HEENT: EOMI, mucus membranes moist, sclera anicteric, no hearing impairment  Lungs: symmetric expansion, non-labored breathing  Cardiovascular: regular rate and rhythm, normal pulses  Abdomen: soft, non tender, non distended, no palpable masses, no hepatosplenomegaly, no hernias, no CVA tenderness  Musculoskeletal: no peripheral edema, normal ROM in bilateral upper and lower extremities  Lymphatics: no cervical or inguinal lymphadenopathy  Skin: no rashes or lesions  Neuro: alert and oriented x 3, no gross deficits  Psych: normal judgment and insight, normal mood/affect and non-anxious  Genitourinary:   patient declined exam      Lab Review   Urine analysis today in clinic shows - trace protein, 500 glucose, 50 RBC, +LE      Lab Results   Component Value Date    WBC 7.22 03/03/2018    HGB 10.9 (L) 03/03/2018    HCT 34.7 (L) 03/03/2018    MCV 82 03/03/2018     03/03/2018     Lab Results   Component Value Date    CREATININE 2.4 (H) 03/03/2018    BUN 54 (H) 03/03/2018       Imaging  Images and reports were personally reviewed by me and discussed with patient  US reviewed       Assessment/Plan:      1. Mixed incontinence urge and stress    - Stopped Urecholine previously   - Tight glucose control   - Weight loss   - Increased to Ditropan XL 15mg - doing well with this. Still with significant UUI. Wearing Depends.    - Complicated by Lasix   - Consider Myrbetriq addition - she declines for now as things are stable   - Continue stool softener (Colace) (one episode of FI)   - Timed voiding - doing well with this   - Cysto/UDS? InterStim?       2. Incomplete bladder emptying    - PVR acceptable       Follow up in 6 months with PVR

## 2018-08-31 RX ORDER — AMLODIPINE BESYLATE 10 MG/1
10 TABLET ORAL DAILY
Qty: 90 TABLET | Refills: 2 | Status: SHIPPED | OUTPATIENT
Start: 2018-08-31 | End: 2018-09-05 | Stop reason: SDUPTHER

## 2018-09-05 ENCOUNTER — HOSPITAL ENCOUNTER (OUTPATIENT)
Dept: WOUND CARE | Facility: HOSPITAL | Age: 73
Discharge: HOME | End: 2018-09-05
Attending: PODIATRIST
Payer: MEDICARE

## 2018-09-05 ENCOUNTER — TELEPHONE (OUTPATIENT)
Dept: OPHTHALMOLOGY | Facility: CLINIC | Age: 73
End: 2018-09-05

## 2018-09-05 DIAGNOSIS — L97.912 DIABETIC ULCER OF RIGHT LOWER LEG ASSOCIATED WITH TYPE 2 DIABETES MELLITUS, WITH FAT LAYER EXPOSED: Primary | ICD-10-CM

## 2018-09-05 DIAGNOSIS — I10 ESSENTIAL HYPERTENSION: ICD-10-CM

## 2018-09-05 DIAGNOSIS — E11.622 DIABETIC ULCER OF RIGHT LOWER LEG ASSOCIATED WITH TYPE 2 DIABETES MELLITUS, WITH FAT LAYER EXPOSED: Primary | ICD-10-CM

## 2018-09-05 DIAGNOSIS — I27.20 PULMONARY HYPERTENSION: Primary | ICD-10-CM

## 2018-09-05 PROCEDURE — 11042 DBRDMT SUBQ TIS 1ST 20SQCM/<: CPT | Mod: ,,, | Performed by: FAMILY MEDICINE

## 2018-09-05 PROCEDURE — 99215 OFFICE O/P EST HI 40 MIN: CPT | Mod: 25,,, | Performed by: FAMILY MEDICINE

## 2018-09-05 PROCEDURE — 99213 OFFICE O/P EST LOW 20 MIN: CPT | Performed by: FAMILY MEDICINE

## 2018-09-05 PROCEDURE — 11042 DBRDMT SUBQ TIS 1ST 20SQCM/<: CPT | Performed by: FAMILY MEDICINE

## 2018-09-05 PROCEDURE — 87205 SMEAR GRAM STAIN: CPT

## 2018-09-05 PROCEDURE — 87070 CULTURE OTHR SPECIMN AEROBIC: CPT

## 2018-09-05 RX ORDER — AMLODIPINE BESYLATE 10 MG/1
10 TABLET ORAL DAILY
Qty: 90 TABLET | Refills: 1 | Status: SHIPPED | OUTPATIENT
Start: 2018-09-05 | End: 2019-10-14 | Stop reason: SDUPTHER

## 2018-09-05 NOTE — TELEPHONE ENCOUNTER
Spoke with pt regarding message need to schedule retina clinic appt, pt [place] [facility]ello do [place] [facility] [place] an appt with Dr. Amaral  On 10/3/18 @ 2:45.  Sending appt letter to pt.

## 2018-09-05 NOTE — TELEPHONE ENCOUNTER
----- Message from Taylor Negron sent at 9/4/2018 11:52 AM CDT -----  Contact: Sera Rios calling to speak to a nurse regarding pt meds. [phone].

## 2018-09-05 NOTE — TELEPHONE ENCOUNTER
----- Message from Damaris Christianson sent at 9/5/2018  4:07 PM CDT -----  Contact: Erica  Patient Returning Call from [facility]    Who Left Message for Patient:Miranda  Communication Preference:[phone] cell  Additional Information:

## 2018-09-07 DIAGNOSIS — E78.5 HYPERLIPIDEMIA LDL GOAL <100: ICD-10-CM

## 2018-09-07 LAB
BACTERIA THROAT CULT: NORMAL
GRAM STN SPEC: NORMAL
GRAM STN SPEC: NORMAL

## 2018-09-07 RX ORDER — ATORVASTATIN CALCIUM 10 MG/1
10 TABLET, FILM COATED ORAL DAILY
Qty: 90 TABLET | Refills: 1 | Status: SHIPPED | OUTPATIENT
Start: 2018-09-07 | End: 2019-02-13 | Stop reason: SDUPTHER

## 2018-09-11 ENCOUNTER — TELEPHONE (OUTPATIENT)
Dept: WOUND CARE | Facility: HOSPITAL | Age: 73
End: 2018-09-11

## 2018-09-11 NOTE — TELEPHONE ENCOUNTER
----- Message from Lizz Marie MD sent at 9/7/2018 11:43 AM CDT -----  Culture negative.  Antibiotics not needed at this time  Blood works hows a1c is 8.2. Pt needs to f/u with pcp for diabetes control.

## 2018-09-12 ENCOUNTER — HOSPITAL ENCOUNTER (OUTPATIENT)
Dept: WOUND CARE | Facility: HOSPITAL | Age: 73
Discharge: HOME | End: 2018-09-12
Attending: FAMILY MEDICINE
Payer: MEDICARE

## 2018-09-12 DIAGNOSIS — L97.912 DIABETIC ULCER OF RIGHT LOWER LEG ASSOCIATED WITH TYPE 2 DIABETES MELLITUS, WITH FAT LAYER EXPOSED: Primary | ICD-10-CM

## 2018-09-12 DIAGNOSIS — E11.622 DIABETIC ULCER OF RIGHT LOWER LEG ASSOCIATED WITH TYPE 2 DIABETES MELLITUS, WITH FAT LAYER EXPOSED: Primary | ICD-10-CM

## 2018-09-12 DIAGNOSIS — I10 ESSENTIAL HYPERTENSION: ICD-10-CM

## 2018-09-12 PROCEDURE — 99214 OFFICE O/P EST MOD 30 MIN: CPT | Mod: 25,,, | Performed by: FAMILY MEDICINE

## 2018-09-12 PROCEDURE — 11042 DBRDMT SUBQ TIS 1ST 20SQCM/<: CPT | Mod: ,,, | Performed by: FAMILY MEDICINE

## 2018-09-12 PROCEDURE — 11042 DBRDMT SUBQ TIS 1ST 20SQCM/<: CPT | Performed by: FAMILY MEDICINE

## 2018-09-12 PROCEDURE — 25000003 PHARM REV CODE 250

## 2018-09-17 ENCOUNTER — TELEPHONE (OUTPATIENT)
Dept: ENDOSCOPY | Facility: HOSPITAL | Age: 73
End: 2018-09-17

## 2018-09-17 NOTE — TELEPHONE ENCOUNTER
2nd attempt- Left voicemail for pt to call endoscopy scheduling to schedule a follow-up colonoscopy.  Pt had a colonoscopy performed in 12/2012 and it was recommended to be repeated in 5 years.

## 2018-09-18 ENCOUNTER — TELEPHONE (OUTPATIENT)
Dept: ENDOSCOPY | Facility: HOSPITAL | Age: 73
End: 2018-09-18

## 2018-09-18 DIAGNOSIS — Z86.010 ENCOUNTER FOR COLONOSCOPY DUE TO HISTORY OF COLONIC POLYP: Primary | ICD-10-CM

## 2018-09-18 DIAGNOSIS — Z12.11 ENCOUNTER FOR COLONOSCOPY DUE TO HISTORY OF COLONIC POLYP: Primary | ICD-10-CM

## 2018-09-18 NOTE — TELEPHONE ENCOUNTER
Pt needs to be scheduled for a colonoscopy.  Pt is currently taking Plavix.  Per endoscopy protocol it should be held x 5 days prior to procedure.  Ok for pt to hold?  Please advise.  Thanks

## 2018-09-19 ENCOUNTER — TELEPHONE (OUTPATIENT)
Dept: ENDOSCOPY | Facility: HOSPITAL | Age: 73
End: 2018-09-19

## 2018-09-19 ENCOUNTER — HOSPITAL ENCOUNTER (OUTPATIENT)
Dept: WOUND CARE | Facility: HOSPITAL | Age: 73
Discharge: HOME | End: 2018-09-19
Attending: FAMILY MEDICINE
Payer: MEDICARE

## 2018-09-19 DIAGNOSIS — E04.9 GOITER: Primary | ICD-10-CM

## 2018-09-19 DIAGNOSIS — I10 ESSENTIAL HYPERTENSION: ICD-10-CM

## 2018-09-19 DIAGNOSIS — E11.622 DIABETIC ULCER OF RIGHT LOWER LEG ASSOCIATED WITH TYPE 2 DIABETES MELLITUS, WITH FAT LAYER EXPOSED: Primary | ICD-10-CM

## 2018-09-19 DIAGNOSIS — L97.912 DIABETIC ULCER OF RIGHT LOWER LEG ASSOCIATED WITH TYPE 2 DIABETES MELLITUS, WITH FAT LAYER EXPOSED: Primary | ICD-10-CM

## 2018-09-19 PROCEDURE — 25000003 PHARM REV CODE 250

## 2018-09-19 PROCEDURE — 99214 OFFICE O/P EST MOD 30 MIN: CPT | Mod: 25,,, | Performed by: FAMILY MEDICINE

## 2018-09-19 PROCEDURE — 11042 DBRDMT SUBQ TIS 1ST 20SQCM/<: CPT | Performed by: FAMILY MEDICINE

## 2018-09-19 PROCEDURE — 11042 DBRDMT SUBQ TIS 1ST 20SQCM/<: CPT | Mod: ,,, | Performed by: FAMILY MEDICINE

## 2018-09-19 NOTE — TELEPHONE ENCOUNTER
Ok to hold Plavix x 5 days prior to colonoscopy.  Pt has a f/u with cardiology on 10/9/18.  Pt instructed that we need to wait for pt to see cardiology prior to scheduling the procedure.

## 2018-09-19 NOTE — TELEPHONE ENCOUNTER
Sendy Elaine MD routed this conversation to Me    Sendy Elaine MD          9/18/18 2:38 PM   Note      Ok to hold plavix for 5 days for colonoscopy.                 9/18/18 2:29 PM   You routed this conversation to Sendy Elaine MD    Me          9/18/18 2:28 PM   Note      Pt needs to be scheduled for a colonoscopy.  Pt is currently taking Plavix.  Per endoscopy protocol it should be held x 5 days prior to procedure.  Ok for pt to hold?  Please advise.  Thanks

## 2018-09-21 RX ORDER — CLOPIDOGREL BISULFATE 75 MG/1
TABLET ORAL
Qty: 90 TABLET | Refills: 0 | Status: SHIPPED | OUTPATIENT
Start: 2018-09-21 | End: 2019-05-22 | Stop reason: SDUPTHER

## 2018-09-26 ENCOUNTER — HOSPITAL ENCOUNTER (OUTPATIENT)
Dept: WOUND CARE | Facility: HOSPITAL | Age: 73
Discharge: HOME | End: 2018-09-26
Attending: FAMILY MEDICINE
Payer: MEDICARE

## 2018-09-26 DIAGNOSIS — I10 ESSENTIAL HYPERTENSION: ICD-10-CM

## 2018-09-26 DIAGNOSIS — L97.912 DIABETIC ULCER OF RIGHT LOWER LEG ASSOCIATED WITH TYPE 2 DIABETES MELLITUS, WITH FAT LAYER EXPOSED: Primary | ICD-10-CM

## 2018-09-26 DIAGNOSIS — E11.622 DIABETIC ULCER OF RIGHT LOWER LEG ASSOCIATED WITH TYPE 2 DIABETES MELLITUS, WITH FAT LAYER EXPOSED: Primary | ICD-10-CM

## 2018-09-26 PROCEDURE — 99214 OFFICE O/P EST MOD 30 MIN: CPT | Mod: 25,,, | Performed by: FAMILY MEDICINE

## 2018-09-26 PROCEDURE — 25000003 PHARM REV CODE 250

## 2018-09-26 PROCEDURE — 17250 CHEM CAUT OF GRANLTJ TISSUE: CPT | Performed by: FAMILY MEDICINE

## 2018-09-27 ENCOUNTER — TELEPHONE (OUTPATIENT)
Dept: FAMILY MEDICINE | Facility: CLINIC | Age: 73
End: 2018-09-27

## 2018-09-27 NOTE — TELEPHONE ENCOUNTER
Patient has HUMBERTO and should have a CPAP machine (I ordered new supplies 4/2018). If she is having problems I would recommend a consultation with the sleep specialists for re-evalaution.

## 2018-09-27 NOTE — TELEPHONE ENCOUNTER
----- Message from Eunice Gutierrez sent at 9/27/2018  3:02 PM CDT -----  Contact: Self/[phone]  Patient would like to speak to the staff about a Sleep Study. She did not leave a detailed message. Thank you.

## 2018-10-02 ENCOUNTER — TELEPHONE (OUTPATIENT)
Dept: FAMILY MEDICINE | Facility: CLINIC | Age: 73
End: 2018-10-02

## 2018-10-02 NOTE — TELEPHONE ENCOUNTER
----- Message from Rossy Wagoner sent at 10/1/2018  2:00 PM CDT -----  Contact: self - [phone]  Pt returned staff's call.

## 2018-10-02 NOTE — TELEPHONE ENCOUNTER
----- Message from Yen Camp sent at 10/2/2018 12:29 PM CDT -----  Contact: Self  Pt is returning a call. Please call pt at [phone]

## 2018-10-03 ENCOUNTER — HOSPITAL ENCOUNTER (EMERGENCY)
Facility: HOSPITAL | Age: 73
Discharge: HOME | End: 2018-10-03
Attending: EMERGENCY MEDICINE
Payer: MEDICARE

## 2018-10-03 ENCOUNTER — TELEPHONE (OUTPATIENT)
Dept: FAMILY MEDICINE | Facility: CLINIC | Age: 73
End: 2018-10-03

## 2018-10-03 ENCOUNTER — OFFICE VISIT (OUTPATIENT)
Dept: OPHTHALMOLOGY | Facility: CLINIC | Age: 73
End: 2018-10-03
Attending: OPHTHALMOLOGY
Payer: MEDICARE

## 2018-10-03 VITALS
RESPIRATION RATE: 18 BRPM | BODY MASS INDEX: 41.65 KG/M2 | TEMPERATURE: 98 F | WEIGHT: 250 LBS | OXYGEN SATURATION: 97 % | SYSTOLIC BLOOD PRESSURE: 200 MMHG | HEART RATE: 87 BPM | HEIGHT: 65 IN | DIASTOLIC BLOOD PRESSURE: 97 MMHG

## 2018-10-03 VITALS — DIASTOLIC BLOOD PRESSURE: 99 MMHG | HEART RATE: 80 BPM | SYSTOLIC BLOOD PRESSURE: 210 MMHG

## 2018-10-03 DIAGNOSIS — E11.3593 TYPE 2 DIABETES MELLITUS WITH BOTH EYES AFFECTED BY PROLIFERATIVE RETINOPATHY WITHOUT MACULAR EDEMA, WITH LONG-TERM CURRENT USE OF INSULIN: Primary | ICD-10-CM

## 2018-10-03 DIAGNOSIS — G47.33 OBSTRUCTIVE SLEEP APNEA ON CPAP: Primary | ICD-10-CM

## 2018-10-03 DIAGNOSIS — Z79.4 TYPE 2 DIABETES MELLITUS WITH BOTH EYES AFFECTED BY PROLIFERATIVE RETINOPATHY WITHOUT MACULAR EDEMA, WITH LONG-TERM CURRENT USE OF INSULIN: Primary | ICD-10-CM

## 2018-10-03 DIAGNOSIS — I10 ELEVATED BLOOD PRESSURE READING WITH DIAGNOSIS OF HYPERTENSION: Primary | ICD-10-CM

## 2018-10-03 LAB
LEFT EYE DM RETINOPATHY: POSITIVE
POCT GLUCOSE: 131 MG/DL (ref 70–110)
RIGHT EYE DM RETINOPATHY: POSITIVE

## 2018-10-03 PROCEDURE — 25000003 PHARM REV CODE 250: Performed by: NURSE PRACTITIONER

## 2018-10-03 PROCEDURE — 92226 PR SPECIAL EYE EXAM, SUBSEQUENT: CPT | Mod: 50,S$PBB,, | Performed by: OPHTHALMOLOGY

## 2018-10-03 PROCEDURE — 92014 COMPRE OPH EXAM EST PT 1/>: CPT | Mod: S$PBB,,, | Performed by: OPHTHALMOLOGY

## 2018-10-03 PROCEDURE — 92226 PR SPECIAL EYE EXAM, SUBSEQUENT: CPT | Mod: 50,PBBFAC,PO | Performed by: OPHTHALMOLOGY

## 2018-10-03 PROCEDURE — 92134 CPTRZ OPH DX IMG PST SGM RTA: CPT | Mod: PBBFAC,PO | Performed by: OPHTHALMOLOGY

## 2018-10-03 PROCEDURE — 99283 EMERGENCY DEPT VISIT LOW MDM: CPT | Mod: 25,27

## 2018-10-03 PROCEDURE — 99213 OFFICE O/P EST LOW 20 MIN: CPT | Mod: PBBFAC,PO | Performed by: OPHTHALMOLOGY

## 2018-10-03 PROCEDURE — 99999 PR PBB SHADOW E&M-EST. PATIENT-LVL III: CPT | Mod: PBBFAC,,, | Performed by: OPHTHALMOLOGY

## 2018-10-03 RX ORDER — AMLODIPINE BESYLATE 5 MG/1
10 TABLET ORAL
Status: COMPLETED | OUTPATIENT
Start: 2018-10-03 | End: 2018-10-03

## 2018-10-03 RX ORDER — METOPROLOL TARTRATE 50 MG/1
100 TABLET ORAL
Status: COMPLETED | OUTPATIENT
Start: 2018-10-03 | End: 2018-10-03

## 2018-10-03 RX ORDER — HYDRALAZINE HYDROCHLORIDE 25 MG/1
50 TABLET, FILM COATED ORAL
Status: COMPLETED | OUTPATIENT
Start: 2018-10-03 | End: 2018-10-03

## 2018-10-03 RX ORDER — LOSARTAN POTASSIUM 25 MG/1
100 TABLET ORAL
Status: COMPLETED | OUTPATIENT
Start: 2018-10-03 | End: 2018-10-03

## 2018-10-03 RX ADMIN — HYDRALAZINE HYDROCHLORIDE 50 MG: 25 TABLET, FILM COATED ORAL at 05:10

## 2018-10-03 RX ADMIN — LOSARTAN POTASSIUM 100 MG: 25 TABLET, FILM COATED ORAL at 05:10

## 2018-10-03 RX ADMIN — METOPROLOL TARTRATE 100 MG: 50 TABLET ORAL at 05:10

## 2018-10-03 RX ADMIN — AMLODIPINE BESYLATE 10 MG: 5 TABLET ORAL at 05:10

## 2018-10-03 NOTE — PROGRESS NOTES
HPI     DLS: 3/29/16     Pt here to establish care on [facility]    Pt states that vision and eyes are doing about the same as last visit no   major  changes notices.  Happy with current Va OU.  No f/f/pain OU.   No   diplopia.     Eyemed(s) - Thera tears - ou prn      1. PDR OU S/P PRP OU     2. DME OU S/P Focal OU   S/P Avastin OD x8 (6/12/14)   S/p Avastin OS x5 (8/18/15)    3. ERM OU   S/P PPV/MP/SF6 OS 1/8/14   S/P 25gPPV/MP/AFX OD 6/4/14; 7/10/14      Hemoglobin A1C       Date                     Value               Ref Range             Status                09/05/2018               8.2 (H)             4.0 - 5.6 %           Final                03/03/2018               9.5 (H)             4.0 - 5.6 %         Final                   01/06/2018               11.8 (H)            4.0 - 5.6 %           Final                  Last edited by Sorin Amaral MD on 10/3/2018  4:36 PM. (History)        [place] SDOCT:   OD: good quality, few temporal cystic changes, irregular contour, no sig thickening, improved since 2016 scan  OS: good quality, few temporal cystic changes, irregular contour, no sig thickening, improved since 2016 scan        Assessment /Plan     For exam results, see Encounter Report.    Type 2 diabetes mellitus with both eyes affected by proliferative retinopathy without macular edema, with long-term current use of insulin  -     Posterior Segment OCT Retina-Both eyes  -     Posterior Segment OCT Retina-Both eyes; Future    PDR stable OU s/p PRP, vitrectomy OU  Excellent results  ME improved since last visit in 2016    H/o strabismus surgery    Diabetic Retinopathy discussed in detail, all questions answered  Stressed importance of good BS/BP/Chol Control  RTC 6 months, sooner PRN

## 2018-10-03 NOTE — TELEPHONE ENCOUNTER
----- Message from Renata Gallardo sent at 10/3/2018  2:48 PM CDT -----  Contact: Self   Patient returned your call. Please call again at [phone] ( Patient says she is in the clinic but down stairs)

## 2018-10-03 NOTE — ED PROVIDER NOTES
Encounter Date: 10/3/2018    SCRIBE #1 NOTE: I, Dereck Seo, am scribing for, and in the presence of,  Toussaint Battley, FNP. I have scribed the following portions of the note - Other sections scribed: HPI, ROS, PE.       History     Chief Complaint   Patient presents with    Elevated Blood Pressure     pt reports she was at the urgent care where she was told her BP was elevated. pt reports last dose of BP medicine was taken last night around 11pm. pt denies headache, SOB, chest pain, dizziness or any other associated symptoms     This is a 73 y.o. female who presents to the ED with a complaint of elevated blood pressure that began this morning. Patient notes that she missed her HTN medication today, with her last dose taken last night at 11:00 P.M. Patient notes that she was seen at an urgent care for this issue earlier today and was told to visit the ED to bring her blood pressure down. The patient is asymptomatic, and denies any chest pain, SOB, headache, dizziness, weakness, or visual disturbance.       The history is provided by the patient. No  was used.   Hypertension    This is a chronic problem. Pertinent negatives include no chest pain, no orthopnea, no palpitations, no PND, no anxiety, no confusion, no malaise/fatigue, no blurred vision, no headaches, no tinnitus, no neck pain, no peripheral edema, no dizziness and no shortness of breath.     Review of patient's allergies indicates:   Allergen Reactions    Ace inhibitors Other (See Comments)     Other reaction(s): cough     Past Medical History:   Diagnosis Date    Acute respiratory failure with hypoxia     Cataracts, bilateral     CHF (congestive heart failure)     CKD (chronic kidney disease) stage 3, GFR 30-59 ml/min     CKD (chronic kidney disease) stage 3, GFR 30-59 ml/min     Controlled type 2 diabetes mellitus with proteinuria or albuminuria     Depression     Diabetes with neurologic complications     Diabetic  retinopathy of both eyes     Edema     Glaucoma     History of colonic polyps     Hx-TIA (transient ischemic attack) 2008    Hyperlipidemia LDL goal < 100     Hypertension     Hypothyroidism     Major depressive disorder, single episode, mild 2016    Mixed incontinence urge and stress     Obesity     Obstructive sleep apnea on CPAP     Osteopenia     Proteinuria     Sickle cell trait     Trouble in sleeping     Type 2 diabetes mellitus with ophthalmic manifestations     Type 2 diabetes with stage 3 chronic kidney disease GFR 30-59     Type II or unspecified type diabetes mellitus with renal manifestations, uncontrolled(250.42)     Urge incontinence 2016    Urge incontinence     Vitamin D deficiency disease      Past Surgical History:   Procedure Laterality Date    breast reduction Bilateral age 31    cataracts Bilateral      SECTION, LOW TRANSVERSE      x1    CHOLECYSTECTOMY      COLONOSCOPY N/A 2012    Performed by Keron Gunderson MD at [facility] (4TH FLR)    EYE SURGERY  2014, 2014    vitrectomy    EYE SURGERY Right 2016    HYSTERECTOMY  1986    TAHBSO (patient is unsure if ovaries removed)    OOPHORECTOMY      REFRACTIVE SURGERY      REPAIR, RETINAL DETACHMENT, WITH VITRECTOMY Right 2014    Performed by LILLIANA Coello MD at [facility] OR 1ST FLR    REPAIR, RETINAL DETACHMENT, WITH VITRECTOMY Left 2014    Performed by LILLIANA Coello MD at [facility] OR 1ST FLR    REPAIR-RETINA (VITRECTOMY) Right 2014    Performed by LILL[facility]NA Coello [facility] at [facility] OR 1ST FLR    STRABISMUS REPAIR/ADJUSTABLE SUTURES Bilateral 2016    Performed by RABIA Danielson Jr., MD at [facility] OR 1ST FLR    TOTAL REDUCTION MAMMOPLASTY       Family History   Problem Relation Age of Onset    Leukemia Father     Ovarian cancer Sister 35    Stroke Mother     Diabetes Mother     Hypertension Mother     Diabetes Paternal Grandmother     Breast cancer  Maternal Aunt 65    HIV Brother     Achondroplasia Sister     Parkinsonism Maternal Aunt     Esophageal cancer Maternal Uncle         smoker    Amblyopia Neg Hx     Blindness Neg Hx     Cataracts Neg Hx     Glaucoma Neg Hx     Macular degeneration Neg Hx     Retinal detachment Neg Hx     Strabismus Neg Hx     Thyroid disease Neg Hx     Colon cancer Neg Hx      Social History     Tobacco Use    Smoking status: Never Smoker    Smokeless tobacco: Never Used   Substance Use Topics    Alcohol use: No     Alcohol/week: 0.0 oz    Drug use: No     Review of Systems   Constitutional: Negative.  Negative for appetite change, fever and malaise/fatigue.   HENT: Negative.  Negative for congestion, dental problem, ear discharge, hearing loss, mouth sores, rhinorrhea, tinnitus and trouble swallowing.    Eyes: Negative.  Negative for blurred vision, pain, discharge and visual disturbance.   Respiratory: Negative.  Negative for shortness of breath.    Cardiovascular: Negative.  Negative for chest pain, palpitations, orthopnea and PND.   Gastrointestinal: Negative.  Negative for abdominal distention, abdominal pain, constipation, diarrhea, nausea, rectal pain and vomiting.   Endocrine: Negative.    Genitourinary: Negative.  Negative for dyspareunia, dysuria, hematuria, vaginal bleeding, vaginal discharge and vaginal pain.   Musculoskeletal: Negative for back pain and neck pain.   Skin: Negative.  Negative for rash.   Allergic/Immunologic: Negative.    Neurological: Negative.  Negative for dizziness, facial asymmetry, speech difficulty, weakness, light-headedness and headaches.   Hematological: Negative.    Psychiatric/Behavioral: Negative.  Negative for agitation, confusion, dysphoric mood and sleep disturbance.   All other systems reviewed and are negative.      Physical Exam     Initial Vitals [10/03/18 1724]   BP Pulse Resp Temp SpO2   (!) 200/97 87 18 97.8 °F (36.6 °C) 97 %      MAP       --         Physical  Exam    Nursing note and vitals reviewed.  Constitutional: She appears well-developed and well-nourished. She is not diaphoretic.  Non-toxic appearance. She does not appear ill. No distress.   HENT:   Head: Normocephalic and atraumatic.   Right Ear: External ear normal.   Left Ear: External ear normal.   Nose: Nose normal.   Mouth/Throat: Oropharynx is clear and moist. No oropharyngeal exudate.   Eyes: Conjunctivae and EOM are normal. Pupils are equal, round, and reactive to light. Right eye exhibits no discharge. Left eye exhibits no discharge.   Neck: Normal range of motion. Neck supple.   Cardiovascular: Normal rate, regular rhythm, normal heart sounds and intact distal pulses. Exam reveals no gallop and no friction rub.    No murmur heard.  Pulmonary/Chest: Effort normal and breath sounds normal. No respiratory distress. She has no wheezes. She has no rhonchi. She has no rales. She exhibits no tenderness.   Musculoskeletal: Normal range of motion.   Neurological: She is alert and oriented to person, place, and time. She has normal strength and normal reflexes. She displays no atrophy, no tremor and normal reflexes. No cranial nerve deficit or sensory deficit. She exhibits normal muscle tone. She displays a negative Romberg sign. She displays no seizure activity. Coordination and gait normal. GCS score is 15. GCS eye subscore is 4. GCS verbal subscore is 5. GCS motor subscore is 6.   Skin: Skin is warm and dry. Capillary refill takes less than 2 seconds. No rash noted.   Psychiatric: She has a normal mood and affect. Her behavior is normal. Judgment and thought content normal.         ED Course   Procedures  Labs Reviewed   POCT GLUCOSE - Abnormal; Notable for the following components:       Result Value    POCT Glucose 131 (*)     All other components within normal limits   POCT GLUCOSE          Imaging Results    None          Medical Decision Making:   Initial Assessment:   Elevated blood pressure with  diagnosis of hypertension  Differential Diagnosis:   Headache, neurologic abnormality  Clinical Tests:   Lab Tests: Ordered  ED Management:  The patient is asymptomatic for hypertension.  Therefore per ACEP guidelines no acute management of the patient's blood pressure is warranted.  The patient will be given her missed dose of blood pressure medicines in the ER with instructions to follow up with her primary care provider in the next 1-2 days, return to the ER as needed if symptoms worsen or fail to improve, and take her blood pressure medications every day as prescribed.  Patient verbalized understanding of discharge instructions and treatment plan.            Scribe Attestation:   Scribe #1: I performed the above scribed service and the documentation accurately describes the services I performed. I attest to the accuracy of the note.               Clinical Impression:     1. Elevated blood pressure reading with diagnosis of hypertension                                   Toussaint Battley III, [facility]  10/03/18 4763

## 2018-10-03 NOTE — TELEPHONE ENCOUNTER
Spoke with pt states she needs order order for a new CPAP machine. States supplies for her machine has been discontinued. Please advise.

## 2018-10-03 NOTE — PROGRESS NOTES
1650- Pt has uncontrolled high bp, currently asymptomatic. Pt stated that she missed 2 doses of her bp meds because she was busy and rushing. She also reported eating salty food recently. Informed pt of health risks associated with uncontrolled high blood pressure. Strongly encouraged med compliance and to avoid salty foods. Pt expressed understanding. Recommended pt go to Urgent Care or ED for bp control, otherwise pt could closely monitor bp at home after taking bp medication. Pt expressed understanding and plans to go to nearby ED for eval/treatment.---Patsy

## 2018-10-04 ENCOUNTER — HOSPITAL ENCOUNTER (OUTPATIENT)
Dept: WOUND CARE | Facility: HOSPITAL | Age: 73
Discharge: HOME | End: 2018-10-04
Attending: FAMILY MEDICINE
Payer: MEDICARE

## 2018-10-04 DIAGNOSIS — N18.30 CKD (CHRONIC KIDNEY DISEASE), STAGE III: ICD-10-CM

## 2018-10-04 DIAGNOSIS — E66.01 MORBID OBESITY WITH BODY MASS INDEX (BMI) OF 40.0 TO 44.9 IN ADULT: ICD-10-CM

## 2018-10-04 DIAGNOSIS — E11.622 DIABETIC ULCER OF RIGHT LOWER LEG ASSOCIATED WITH TYPE 2 DIABETES MELLITUS, WITH FAT LAYER EXPOSED: Primary | ICD-10-CM

## 2018-10-04 DIAGNOSIS — L97.912 DIABETIC ULCER OF RIGHT LOWER LEG ASSOCIATED WITH TYPE 2 DIABETES MELLITUS, WITH FAT LAYER EXPOSED: Primary | ICD-10-CM

## 2018-10-04 PROCEDURE — 99213 OFFICE O/P EST LOW 20 MIN: CPT | Performed by: INTERNAL MEDICINE

## 2018-10-04 PROCEDURE — 99214 OFFICE O/P EST MOD 30 MIN: CPT | Mod: ,,, | Performed by: INTERNAL MEDICINE

## 2018-10-04 PROCEDURE — 25000003 PHARM REV CODE 250

## 2018-10-10 ENCOUNTER — HOSPITAL ENCOUNTER (OUTPATIENT)
Dept: WOUND CARE | Facility: HOSPITAL | Age: 73
Discharge: HOME | End: 2018-10-10
Attending: FAMILY MEDICINE
Payer: MEDICARE

## 2018-10-10 DIAGNOSIS — L97.912 DIABETIC ULCER OF RIGHT LOWER LEG ASSOCIATED WITH TYPE 2 DIABETES MELLITUS, WITH FAT LAYER EXPOSED: Primary | ICD-10-CM

## 2018-10-10 DIAGNOSIS — I10 ESSENTIAL HYPERTENSION: ICD-10-CM

## 2018-10-10 DIAGNOSIS — E11.622 DIABETIC ULCER OF RIGHT LOWER LEG ASSOCIATED WITH TYPE 2 DIABETES MELLITUS, WITH FAT LAYER EXPOSED: Primary | ICD-10-CM

## 2018-10-10 PROCEDURE — 11042 DBRDMT SUBQ TIS 1ST 20SQCM/<: CPT | Mod: ,,, | Performed by: FAMILY MEDICINE

## 2018-10-10 PROCEDURE — 11045 DBRDMT SUBQ TISS EACH ADDL: CPT | Mod: ,,, | Performed by: FAMILY MEDICINE

## 2018-10-10 PROCEDURE — 11045 DBRDMT SUBQ TISS EACH ADDL: CPT | Performed by: FAMILY MEDICINE

## 2018-10-10 PROCEDURE — 11042 DBRDMT SUBQ TIS 1ST 20SQCM/<: CPT | Performed by: FAMILY MEDICINE

## 2018-10-10 PROCEDURE — 25000003 PHARM REV CODE 250

## 2018-10-10 PROCEDURE — 99214 OFFICE O/P EST MOD 30 MIN: CPT | Mod: 25,,, | Performed by: FAMILY MEDICINE

## 2018-10-17 ENCOUNTER — HOSPITAL ENCOUNTER (OUTPATIENT)
Dept: WOUND CARE | Facility: HOSPITAL | Age: 73
Discharge: HOME | End: 2018-10-17
Attending: FAMILY MEDICINE
Payer: MEDICARE

## 2018-10-17 DIAGNOSIS — E11.622 DIABETIC ULCER OF RIGHT LOWER LEG ASSOCIATED WITH TYPE 2 DIABETES MELLITUS, WITH FAT LAYER EXPOSED: Primary | ICD-10-CM

## 2018-10-17 DIAGNOSIS — L97.912 DIABETIC ULCER OF RIGHT LOWER LEG ASSOCIATED WITH TYPE 2 DIABETES MELLITUS, WITH FAT LAYER EXPOSED: Primary | ICD-10-CM

## 2018-10-17 DIAGNOSIS — N18.30 CKD (CHRONIC KIDNEY DISEASE), STAGE III: ICD-10-CM

## 2018-10-17 DIAGNOSIS — I10 ESSENTIAL HYPERTENSION: ICD-10-CM

## 2018-10-17 PROCEDURE — 11045 DBRDMT SUBQ TISS EACH ADDL: CPT | Performed by: FAMILY MEDICINE

## 2018-10-17 PROCEDURE — 11042 DBRDMT SUBQ TIS 1ST 20SQCM/<: CPT | Performed by: FAMILY MEDICINE

## 2018-10-17 PROCEDURE — 99214 OFFICE O/P EST MOD 30 MIN: CPT | Mod: 25,,, | Performed by: FAMILY MEDICINE

## 2018-10-17 PROCEDURE — 25000003 PHARM REV CODE 250

## 2018-10-17 PROCEDURE — 11042 DBRDMT SUBQ TIS 1ST 20SQCM/<: CPT | Mod: ,,, | Performed by: FAMILY MEDICINE

## 2018-10-17 PROCEDURE — 11045 DBRDMT SUBQ TISS EACH ADDL: CPT | Mod: ,,, | Performed by: FAMILY MEDICINE

## 2018-10-24 ENCOUNTER — HOSPITAL ENCOUNTER (OUTPATIENT)
Dept: WOUND CARE | Facility: HOSPITAL | Age: 73
Discharge: HOME | End: 2018-10-24
Attending: FAMILY MEDICINE
Payer: MEDICARE

## 2018-10-24 ENCOUNTER — HOSPITAL ENCOUNTER (OUTPATIENT)
Dept: CARDIOLOGY | Facility: HOSPITAL | Age: 73
Discharge: HOME | End: 2018-10-24
Attending: FAMILY MEDICINE
Payer: MEDICARE

## 2018-10-24 DIAGNOSIS — E11.622 DIABETIC ULCER OF RIGHT LOWER LEG ASSOCIATED WITH TYPE 2 DIABETES MELLITUS, WITH FAT LAYER EXPOSED: ICD-10-CM

## 2018-10-24 DIAGNOSIS — E11.622 DIABETIC ULCER OF RIGHT LOWER LEG ASSOCIATED WITH TYPE 2 DIABETES MELLITUS, WITH FAT LAYER EXPOSED: Primary | ICD-10-CM

## 2018-10-24 DIAGNOSIS — I10 ESSENTIAL HYPERTENSION: ICD-10-CM

## 2018-10-24 DIAGNOSIS — I50.32 CHRONIC DIASTOLIC HEART FAILURE: ICD-10-CM

## 2018-10-24 DIAGNOSIS — L97.912 DIABETIC ULCER OF RIGHT LOWER LEG ASSOCIATED WITH TYPE 2 DIABETES MELLITUS, WITH FAT LAYER EXPOSED: Primary | ICD-10-CM

## 2018-10-24 DIAGNOSIS — L97.912 DIABETIC ULCER OF RIGHT LOWER LEG ASSOCIATED WITH TYPE 2 DIABETES MELLITUS, WITH FAT LAYER EXPOSED: ICD-10-CM

## 2018-10-24 DIAGNOSIS — I73.9 PVD (PERIPHERAL VASCULAR DISEASE): ICD-10-CM

## 2018-10-24 LAB
LEFT ABI: 1.36
LEFT ARM BP: 151 MMHG
LEFT DORSALIS PEDIS: 206 MMHG
LEFT POSTERIOR TIBIAL: 194 MMHG
LEFT TBI: 1.19
LEFT TOE PRESSURE: 180 MMHG
RIGHT ABI: 1.68
RIGHT ARM BP: 146 MMHG
RIGHT DORSALIS PEDIS: 203 MMHG
RIGHT POSTERIOR TIBIAL: 254 MMHG
RIGHT TBI: 1.29
RIGHT TOE PRESSURE: 195 MMHG

## 2018-10-24 PROCEDURE — 99214 OFFICE O/P EST MOD 30 MIN: CPT | Mod: 25,,, | Performed by: FAMILY MEDICINE

## 2018-10-24 PROCEDURE — 11042 DBRDMT SUBQ TIS 1ST 20SQCM/<: CPT | Performed by: FAMILY MEDICINE

## 2018-10-24 PROCEDURE — 25000003 PHARM REV CODE 250

## 2018-10-24 PROCEDURE — 93922 UPR/L XTREMITY ART 2 LEVELS: CPT

## 2018-10-24 PROCEDURE — 93922 UPR/L XTREMITY ART 2 LEVELS: CPT | Mod: 26,,, | Performed by: INTERNAL MEDICINE

## 2018-10-24 PROCEDURE — 11042 DBRDMT SUBQ TIS 1ST 20SQCM/<: CPT | Mod: ,,, | Performed by: FAMILY MEDICINE

## 2018-10-24 PROCEDURE — 11045 DBRDMT SUBQ TISS EACH ADDL: CPT | Performed by: FAMILY MEDICINE

## 2018-10-24 RX ORDER — METOPROLOL TARTRATE 100 MG/1
TABLET ORAL
Qty: 180 TABLET | Refills: 0 | Status: SHIPPED | OUTPATIENT
Start: 2018-10-24 | End: 2018-12-26 | Stop reason: SDUPTHER

## 2018-10-30 ENCOUNTER — OFFICE VISIT (OUTPATIENT)
Dept: CARDIOLOGY | Facility: CLINIC | Age: 73
End: 2018-10-30
Payer: MEDICARE

## 2018-10-30 VITALS
BODY MASS INDEX: 42.19 KG/M2 | HEART RATE: 78 BPM | DIASTOLIC BLOOD PRESSURE: 74 MMHG | RESPIRATION RATE: 20 BRPM | WEIGHT: 253.5 LBS | OXYGEN SATURATION: 98 % | SYSTOLIC BLOOD PRESSURE: 122 MMHG

## 2018-10-30 DIAGNOSIS — I50.33 DIASTOLIC CHF, ACUTE ON CHRONIC: Primary | ICD-10-CM

## 2018-10-30 DIAGNOSIS — E11.622 DIABETIC ULCER OF RIGHT LOWER LEG ASSOCIATED WITH TYPE 2 DIABETES MELLITUS, WITH FAT LAYER EXPOSED: ICD-10-CM

## 2018-10-30 DIAGNOSIS — E11.22 TYPE 2 DIABETES MELLITUS WITH STAGE 3 CHRONIC KIDNEY DISEASE, WITH LONG-TERM CURRENT USE OF INSULIN: ICD-10-CM

## 2018-10-30 DIAGNOSIS — I67.81 ACUTE CEREBROVASCULAR INSUFFICIENCY: ICD-10-CM

## 2018-10-30 DIAGNOSIS — N18.30 TYPE 2 DIABETES MELLITUS WITH STAGE 3 CHRONIC KIDNEY DISEASE, WITH LONG-TERM CURRENT USE OF INSULIN: ICD-10-CM

## 2018-10-30 DIAGNOSIS — R06.02 SOB (SHORTNESS OF BREATH): ICD-10-CM

## 2018-10-30 DIAGNOSIS — L97.912 DIABETIC ULCER OF RIGHT LOWER LEG ASSOCIATED WITH TYPE 2 DIABETES MELLITUS, WITH FAT LAYER EXPOSED: ICD-10-CM

## 2018-10-30 DIAGNOSIS — Z79.4 TYPE 2 DIABETES MELLITUS WITH STAGE 3 CHRONIC KIDNEY DISEASE, WITH LONG-TERM CURRENT USE OF INSULIN: ICD-10-CM

## 2018-10-30 DIAGNOSIS — E78.5 HYPERLIPIDEMIA LDL GOAL <100: ICD-10-CM

## 2018-10-30 DIAGNOSIS — E66.01 MORBID OBESITY WITH BMI OF 40.0-44.9, ADULT: ICD-10-CM

## 2018-10-30 DIAGNOSIS — I16.0 HYPERTENSIVE URGENCY: ICD-10-CM

## 2018-10-30 PROCEDURE — 3074F SYST BP LT 130 MM HG: CPT | Mod: CPTII,,, | Performed by: INTERNAL MEDICINE

## 2018-10-30 PROCEDURE — 3078F DIAST BP <80 MM HG: CPT | Mod: CPTII,,, | Performed by: INTERNAL MEDICINE

## 2018-10-30 PROCEDURE — 99214 OFFICE O/P EST MOD 30 MIN: CPT | Mod: S$PBB,,, | Performed by: INTERNAL MEDICINE

## 2018-10-30 PROCEDURE — 99999 PR PBB SHADOW E&M-EST. PATIENT-LVL III: CPT | Mod: PBBFAC,,, | Performed by: INTERNAL MEDICINE

## 2018-10-30 PROCEDURE — 3045F PR MOST RECENT HEMOGLOBIN A1C LEVEL 7.0-9.0%: CPT | Mod: CPTII,,, | Performed by: INTERNAL MEDICINE

## 2018-10-30 PROCEDURE — 1101F PT FALLS ASSESS-DOCD LE1/YR: CPT | Mod: CPTII,,, | Performed by: INTERNAL MEDICINE

## 2018-10-30 PROCEDURE — 93000 ELECTROCARDIOGRAM COMPLETE: CPT | Mod: S$GLB,,, | Performed by: INTERNAL MEDICINE

## 2018-10-30 PROCEDURE — 99213 OFFICE O/P EST LOW 20 MIN: CPT | Mod: PBBFAC | Performed by: INTERNAL MEDICINE

## 2018-10-30 PROCEDURE — 99499 UNLISTED E&M SERVICE: CPT | Mod: HCNC,S$GLB,, | Performed by: INTERNAL MEDICINE

## 2018-10-30 NOTE — PROGRESS NOTES
Subjective:    Patient ID:  Erica Leblanc is a 73 y.o. female who presents for evaluation of Follow-up (6 mo )      HPI   previous history:  Here for follow-up of diastolic heart failure.  She has a history of noncompliance and presentations with volume overload and hypertensive emergency.  She was last seen in clinic 7-17.  She's been compliant with medicines and trying to restrict sodium.  She recently ran out of Lasix and was accumulate fluid.  She just recently got some medicines back and her blood pressure is been stable.  She says she's been compliant with all her medicines and staying out of the hospital.  She denies any current PND, orthopnea or lower edema.  She is not expressing dizziness, presyncope or syncope.  She's mainly limited by back pain and unable to exercise regularly.    Today:  Here for follow-up of diastolic heart failure.  She says she has been compliant with medicines and sodium restriction.  She has been unable to do any exercise secondary to diabetic foot wound being followed in Wound Care Clinic.  She had 1 wound that healed and is currently wrapped for another layer that got exposed.  She denies any PND orthopnea.  She has experienced now dizziness to the point of presyncope or syncope nurse fluid in the swelling for lower extremities stable.        Review of Systems   Constitution: Negative.   HENT: Negative.    Eyes: Negative.    Cardiovascular: Positive for dyspnea on exertion. Negative for chest pain, irregular heartbeat, leg swelling, near-syncope, orthopnea, palpitations, paroxysmal nocturnal dyspnea and syncope.   Respiratory: Positive for shortness of breath.    Skin: Negative.    Musculoskeletal: Negative.    Gastrointestinal: Negative for abdominal pain, constipation and diarrhea.   Genitourinary: Negative for dysuria.   Neurological: Negative.    Psychiatric/Behavioral: Negative.         Objective:    Physical Exam   Constitutional: She is oriented to person, place, and  time. She appears well-developed and well-nourished.   HENT:   Head: Normocephalic and atraumatic.   Eyes: Conjunctivae and EOM are normal. Pupils are equal, round, and reactive to light.   Neck: Normal range of motion. Neck supple. No thyromegaly present.   Cardiovascular: Normal rate and regular rhythm.   No murmur heard.  Pulmonary/Chest: Effort normal and breath sounds normal. No respiratory distress.   Abdominal: Soft. Bowel sounds are normal.   Musculoskeletal: She exhibits no edema.   Neurological: She is alert and oriented to person, place, and time.   Skin: Skin is warm and dry.   Psychiatric: She has a normal mood and affect. Her behavior is normal.           Echo: 6-17  CONCLUSIONS     1 - Normal left ventricular systolic function (EF 65-70%).     2 - No diagnostic regional wall motion abnormalities.     3 - Concentric remodeling.     4 - Impaired LV relaxation, elevated LAP (grade 2 diastolic dysfunction).     5 - Trivial tricuspid regurgitation.     6 - The estimated PA systolic pressure is greater than 18 mmHg.     NST: 7-17  Impression: NORMAL MYOCARDIAL PERFUSION  1. The perfusion scan is free of evidence for myocardial ischemia or injury.   2. Resting wall motion is physiologic.   3. Visually estimated LV function is normal.   4. The ventricular volumes are normal at rest and stress.   5. The extracardiac distribution of radioactivity is normal.     Carotid ultrasound:  5-18  CONCLUSIONS   There is 0 - 19% right Internal Carotid stenosis.  There is 0 - 19% left Internal Carotid stenosis.    PAM:  10-18  · Elevated bilateral PAM suggesting calcified noncompressible vessels     LDL-50    3-18    Assessment:       1. Diastolic CHF, acute on chronic    2. Hypertensive urgency    3. Diabetic ulcer of right lower leg associated with type 2 diabetes mellitus, with fat layer exposed    4. Acute cerebrovascular insufficiency     5. Type 2 diabetes mellitus with stage 3 chronic kidney disease, with long-term  current use of insulin    6. Hyperlipidemia LDL goal <100    7. Morbid obesity with BMI of 40.0-44.9, adult         Plan:       -Continue current medical therapy  -Emphasized compliance with medicines and sodium restriction  -Encouraged exercise as tolerated  -check lower extremity arterial ultrasound  -follow-up with wound care clinic, if poor wound healing will consider angio      Return to clinic in 6 mos

## 2018-10-31 ENCOUNTER — HOSPITAL ENCOUNTER (OUTPATIENT)
Dept: WOUND CARE | Facility: HOSPITAL | Age: 73
Discharge: HOME | End: 2018-10-31
Attending: FAMILY MEDICINE
Payer: MEDICARE

## 2018-10-31 DIAGNOSIS — I73.9 PVD (PERIPHERAL VASCULAR DISEASE): ICD-10-CM

## 2018-10-31 DIAGNOSIS — L97.912 DIABETIC ULCER OF RIGHT LOWER LEG ASSOCIATED WITH TYPE 2 DIABETES MELLITUS, WITH FAT LAYER EXPOSED: Primary | ICD-10-CM

## 2018-10-31 DIAGNOSIS — I10 ESSENTIAL HYPERTENSION: ICD-10-CM

## 2018-10-31 DIAGNOSIS — E11.622 DIABETIC ULCER OF RIGHT LOWER LEG ASSOCIATED WITH TYPE 2 DIABETES MELLITUS, WITH FAT LAYER EXPOSED: Primary | ICD-10-CM

## 2018-10-31 PROCEDURE — 17250 CHEM CAUT OF GRANLTJ TISSUE: CPT | Mod: ,,, | Performed by: FAMILY MEDICINE

## 2018-10-31 PROCEDURE — 17250 CHEM CAUT OF GRANLTJ TISSUE: CPT

## 2018-10-31 PROCEDURE — 99214 OFFICE O/P EST MOD 30 MIN: CPT | Mod: 25,,, | Performed by: FAMILY MEDICINE

## 2018-10-31 PROCEDURE — 25000003 PHARM REV CODE 250

## 2018-11-02 DIAGNOSIS — H35.033 HYPERTENSIVE RETINOPATHY OF BOTH EYES: ICD-10-CM

## 2018-11-02 RX ORDER — FUROSEMIDE 40 MG/1
40 TABLET ORAL 2 TIMES DAILY
Qty: 180 TABLET | Refills: 2 | Status: ON HOLD | OUTPATIENT
Start: 2018-11-02 | End: 2019-07-25 | Stop reason: HOSPADM

## 2018-11-05 ENCOUNTER — TELEPHONE (OUTPATIENT)
Dept: ENDOSCOPY | Facility: HOSPITAL | Age: 73
End: 2018-11-05

## 2018-11-07 ENCOUNTER — HOSPITAL ENCOUNTER (OUTPATIENT)
Dept: WOUND CARE | Facility: HOSPITAL | Age: 73
Discharge: HOME | End: 2018-11-07
Attending: FAMILY MEDICINE
Payer: MEDICARE

## 2018-11-07 DIAGNOSIS — I73.9 PVD (PERIPHERAL VASCULAR DISEASE): ICD-10-CM

## 2018-11-07 DIAGNOSIS — L97.912 DIABETIC ULCER OF RIGHT LOWER LEG ASSOCIATED WITH TYPE 2 DIABETES MELLITUS, WITH FAT LAYER EXPOSED: Primary | ICD-10-CM

## 2018-11-07 DIAGNOSIS — I10 ESSENTIAL HYPERTENSION: ICD-10-CM

## 2018-11-07 DIAGNOSIS — E11.622 DIABETIC ULCER OF RIGHT LOWER LEG ASSOCIATED WITH TYPE 2 DIABETES MELLITUS, WITH FAT LAYER EXPOSED: Primary | ICD-10-CM

## 2018-11-07 PROCEDURE — 25000003 PHARM REV CODE 250

## 2018-11-07 PROCEDURE — 11042 DBRDMT SUBQ TIS 1ST 20SQCM/<: CPT | Mod: ,,, | Performed by: FAMILY MEDICINE

## 2018-11-07 PROCEDURE — 11045 DBRDMT SUBQ TISS EACH ADDL: CPT | Performed by: FAMILY MEDICINE

## 2018-11-07 PROCEDURE — 11042 DBRDMT SUBQ TIS 1ST 20SQCM/<: CPT | Performed by: FAMILY MEDICINE

## 2018-11-07 PROCEDURE — 11045 DBRDMT SUBQ TISS EACH ADDL: CPT | Mod: ,,, | Performed by: FAMILY MEDICINE

## 2018-11-07 PROCEDURE — 99214 OFFICE O/P EST MOD 30 MIN: CPT | Mod: 25,,, | Performed by: FAMILY MEDICINE

## 2018-11-09 ENCOUNTER — HOSPITAL ENCOUNTER (OUTPATIENT)
Dept: WOUND CARE | Facility: HOSPITAL | Age: 73
Discharge: HOME | End: 2018-11-09
Attending: FAMILY MEDICINE
Payer: MEDICARE

## 2018-11-09 DIAGNOSIS — G47.33 OBSTRUCTIVE SLEEP APNEA ON CPAP: ICD-10-CM

## 2018-11-09 DIAGNOSIS — I27.20 PULMONARY HYPERTENSION: ICD-10-CM

## 2018-11-09 PROCEDURE — 29581 APPL MULTLAYER CMPRN SYS LEG: CPT

## 2018-11-09 RX ORDER — HYDRALAZINE HYDROCHLORIDE 50 MG/1
50 TABLET, FILM COATED ORAL 3 TIMES DAILY
Qty: 270 TABLET | Refills: 3 | Status: SHIPPED | OUTPATIENT
Start: 2018-11-09 | End: 2018-11-12 | Stop reason: SDUPTHER

## 2018-11-09 NOTE — TELEPHONE ENCOUNTER
----- Message from Renata Gallardo sent at 11/9/2018  1:36 PM CST -----  Contact: Self   Patient need a refill on her medication. Please call at [phone]      hydrALAZINE (APRESOLINE) 50 MG tablet      [facility] [place], [place] [facility] [address]

## 2018-11-12 ENCOUNTER — HOSPITAL ENCOUNTER (OUTPATIENT)
Dept: WOUND CARE | Facility: HOSPITAL | Age: 73
Discharge: HOME | End: 2018-11-12
Attending: FAMILY MEDICINE
Payer: MEDICARE

## 2018-11-12 DIAGNOSIS — G47.33 OBSTRUCTIVE SLEEP APNEA ON CPAP: ICD-10-CM

## 2018-11-12 DIAGNOSIS — I27.20 PULMONARY HYPERTENSION: ICD-10-CM

## 2018-11-12 PROCEDURE — 29581 APPL MULTLAYER CMPRN SYS LEG: CPT | Performed by: FAMILY MEDICINE

## 2018-11-12 RX ORDER — HYDRALAZINE HYDROCHLORIDE 50 MG/1
50 TABLET, FILM COATED ORAL 3 TIMES DAILY
Qty: 90 TABLET | Refills: 0 | Status: SHIPPED | OUTPATIENT
Start: 2018-11-12 | End: 2018-12-11 | Stop reason: SDUPTHER

## 2018-11-12 NOTE — TELEPHONE ENCOUNTER
----- Message from Eunice Gutierrez sent at 11/12/2018 10:09 AM CST -----  Contact: Self/[phone]  Patient would like the staff to send her prescription:  hydrALAZINE (APRESOLINE) 50 MG tablet to:      [facility] [id number]  [place] - [address] AT [facility]  [address]  [place] [address]  Phone: [phone] Fax: [phone]    She stated that she's completely out and can't wait for her prescription from [facility]. Thank you.

## 2018-11-13 RX ORDER — POTASSIUM CHLORIDE 1500 MG/1
TABLET, EXTENDED RELEASE ORAL
Qty: 90 TABLET | Refills: 0 | Status: SHIPPED | OUTPATIENT
Start: 2018-11-13 | End: 2019-01-16 | Stop reason: SDUPTHER

## 2018-11-14 ENCOUNTER — HOSPITAL ENCOUNTER (OUTPATIENT)
Dept: WOUND CARE | Facility: HOSPITAL | Age: 73
Discharge: HOME | End: 2018-11-14
Attending: FAMILY MEDICINE
Payer: MEDICARE

## 2018-11-14 DIAGNOSIS — E11.622 DIABETIC ULCER OF RIGHT LOWER LEG ASSOCIATED WITH TYPE 2 DIABETES MELLITUS, WITH FAT LAYER EXPOSED: Primary | ICD-10-CM

## 2018-11-14 DIAGNOSIS — I10 ESSENTIAL HYPERTENSION: ICD-10-CM

## 2018-11-14 DIAGNOSIS — I73.9 PVD (PERIPHERAL VASCULAR DISEASE): ICD-10-CM

## 2018-11-14 DIAGNOSIS — L97.912 DIABETIC ULCER OF RIGHT LOWER LEG ASSOCIATED WITH TYPE 2 DIABETES MELLITUS, WITH FAT LAYER EXPOSED: Primary | ICD-10-CM

## 2018-11-14 PROCEDURE — 99214 OFFICE O/P EST MOD 30 MIN: CPT | Mod: ,,, | Performed by: FAMILY MEDICINE

## 2018-11-14 PROCEDURE — 99213 OFFICE O/P EST LOW 20 MIN: CPT | Performed by: FAMILY MEDICINE

## 2018-11-20 ENCOUNTER — HOSPITAL ENCOUNTER (OUTPATIENT)
Dept: CARDIOLOGY | Facility: HOSPITAL | Age: 73
Discharge: HOME | End: 2018-11-20
Attending: INTERNAL MEDICINE
Payer: MEDICARE

## 2018-11-20 DIAGNOSIS — L97.912 DIABETIC ULCER OF RIGHT LOWER LEG ASSOCIATED WITH TYPE 2 DIABETES MELLITUS, WITH FAT LAYER EXPOSED: ICD-10-CM

## 2018-11-20 DIAGNOSIS — E11.622 DIABETIC ULCER OF RIGHT LOWER LEG ASSOCIATED WITH TYPE 2 DIABETES MELLITUS, WITH FAT LAYER EXPOSED: ICD-10-CM

## 2018-11-20 PROCEDURE — 93925 LOWER EXTREMITY STUDY: CPT | Mod: 50

## 2018-11-20 PROCEDURE — 93925 LOWER EXTREMITY STUDY: CPT | Mod: 26,,, | Performed by: INTERNAL MEDICINE

## 2018-11-21 ENCOUNTER — HOSPITAL ENCOUNTER (OUTPATIENT)
Dept: WOUND CARE | Facility: HOSPITAL | Age: 73
Discharge: HOME | End: 2018-11-21
Attending: FAMILY MEDICINE
Payer: MEDICARE

## 2018-11-21 DIAGNOSIS — L97.912 DIABETIC ULCER OF RIGHT LOWER LEG ASSOCIATED WITH TYPE 2 DIABETES MELLITUS, WITH FAT LAYER EXPOSED: Primary | ICD-10-CM

## 2018-11-21 DIAGNOSIS — I10 ESSENTIAL HYPERTENSION: ICD-10-CM

## 2018-11-21 DIAGNOSIS — I73.9 PVD (PERIPHERAL VASCULAR DISEASE): ICD-10-CM

## 2018-11-21 DIAGNOSIS — E11.622 DIABETIC ULCER OF RIGHT LOWER LEG ASSOCIATED WITH TYPE 2 DIABETES MELLITUS, WITH FAT LAYER EXPOSED: Primary | ICD-10-CM

## 2018-11-21 PROCEDURE — 99214 OFFICE O/P EST MOD 30 MIN: CPT | Mod: 25,,, | Performed by: FAMILY MEDICINE

## 2018-11-21 PROCEDURE — 11042 DBRDMT SUBQ TIS 1ST 20SQCM/<: CPT | Mod: ,,, | Performed by: FAMILY MEDICINE

## 2018-11-21 PROCEDURE — 11042 DBRDMT SUBQ TIS 1ST 20SQCM/<: CPT | Performed by: FAMILY MEDICINE

## 2018-11-23 LAB
LEFT ANT TIBIAL SYS PSV: 88 CM/S
LEFT CFA PSV: 113 CM/S
LEFT EXTERNAL ILIAC PSV: 72 CM/S
LEFT PERONEAL SYS PSV: 52 CM/S
LEFT POPLITEAL PSV: 88 CM/S
LEFT POST TIBIAL SYS PSV: 34 CM/S
LEFT PROFUNDA SYS PSV: 63 CM/S
LEFT SUPER FEMORAL DIST SYS PSV: 79 CM/S
LEFT SUPER FEMORAL MID SYS PSV: 105 CM/S
LEFT SUPER FEMORAL OSTIAL SYS PSV: 106 CM/S
LEFT SUPER FEMORAL PROX SYS PSV: 146 CM/S
LEFT TIB/PER TRUNK SYS PSV: 58 CM/S
OHS CV LEFT LOWER EXTREMITY ABI (NO CALC): 1.36
RIGHT ANT TIBIAL SYS PSV: 88 CM/S
RIGHT CFA PSV: 91 CM/S
RIGHT EXTERNAL ILLIAC PSV: 131 CM/S
RIGHT PERONEAL SYS PSV: 48 CM/S
RIGHT POPLITEAL PSV: 91 CM/S
RIGHT POST TIBIAL SYS PSV: 46 CM/S
RIGHT PROFUNDA SYS PSV: 109 CM/S
RIGHT SUPER FEMORAL DIST SYS PSV: 95 CM/S
RIGHT SUPER FEMORAL MID SYS PSV: 121 CM/S
RIGHT SUPER FEMORAL OSTIAL SYS PSV: 113 CM/S
RIGHT SUPER FEMORAL PROX SYS PSV: 126 CM/S
RIGHT TIB/PER TRUNK SYS PSV: 93 CM/S

## 2018-11-28 ENCOUNTER — HOSPITAL ENCOUNTER (OUTPATIENT)
Dept: WOUND CARE | Facility: HOSPITAL | Age: 73
Discharge: HOME | End: 2018-11-28
Attending: FAMILY MEDICINE
Payer: MEDICARE

## 2018-11-28 DIAGNOSIS — L97.912 DIABETIC ULCER OF RIGHT LOWER LEG ASSOCIATED WITH TYPE 2 DIABETES MELLITUS, WITH FAT LAYER EXPOSED: Primary | ICD-10-CM

## 2018-11-28 DIAGNOSIS — I10 ESSENTIAL HYPERTENSION: ICD-10-CM

## 2018-11-28 DIAGNOSIS — I73.9 PVD (PERIPHERAL VASCULAR DISEASE): ICD-10-CM

## 2018-11-28 DIAGNOSIS — E11.622 DIABETIC ULCER OF RIGHT LOWER LEG ASSOCIATED WITH TYPE 2 DIABETES MELLITUS, WITH FAT LAYER EXPOSED: Primary | ICD-10-CM

## 2018-11-28 PROCEDURE — 11042 DBRDMT SUBQ TIS 1ST 20SQCM/<: CPT | Performed by: FAMILY MEDICINE

## 2018-11-28 PROCEDURE — 99214 OFFICE O/P EST MOD 30 MIN: CPT | Mod: 25,,, | Performed by: FAMILY MEDICINE

## 2018-11-28 PROCEDURE — 11045 DBRDMT SUBQ TISS EACH ADDL: CPT | Performed by: FAMILY MEDICINE

## 2018-11-28 PROCEDURE — 11045 DBRDMT SUBQ TISS EACH ADDL: CPT | Mod: ,,, | Performed by: FAMILY MEDICINE

## 2018-11-28 PROCEDURE — 11042 DBRDMT SUBQ TIS 1ST 20SQCM/<: CPT | Mod: ,,, | Performed by: FAMILY MEDICINE

## 2018-11-28 PROCEDURE — 25000003 PHARM REV CODE 250

## 2018-12-05 ENCOUNTER — HOSPITAL ENCOUNTER (OUTPATIENT)
Dept: WOUND CARE | Facility: HOSPITAL | Age: 73
Discharge: HOME | End: 2018-12-05
Attending: FAMILY MEDICINE
Payer: MEDICARE

## 2018-12-05 DIAGNOSIS — E11.622 DIABETIC ULCER OF RIGHT LOWER LEG ASSOCIATED WITH TYPE 2 DIABETES MELLITUS, WITH FAT LAYER EXPOSED: Primary | ICD-10-CM

## 2018-12-05 DIAGNOSIS — I10 ESSENTIAL HYPERTENSION: ICD-10-CM

## 2018-12-05 DIAGNOSIS — L97.912 DIABETIC ULCER OF RIGHT LOWER LEG ASSOCIATED WITH TYPE 2 DIABETES MELLITUS, WITH FAT LAYER EXPOSED: Primary | ICD-10-CM

## 2018-12-05 DIAGNOSIS — I73.9 PVD (PERIPHERAL VASCULAR DISEASE): ICD-10-CM

## 2018-12-05 PROCEDURE — 25000003 PHARM REV CODE 250

## 2018-12-05 PROCEDURE — 11042 DBRDMT SUBQ TIS 1ST 20SQCM/<: CPT | Performed by: FAMILY MEDICINE

## 2018-12-05 PROCEDURE — 11042 DBRDMT SUBQ TIS 1ST 20SQCM/<: CPT | Mod: ,,, | Performed by: FAMILY MEDICINE

## 2018-12-05 PROCEDURE — 99214 OFFICE O/P EST MOD 30 MIN: CPT | Mod: 25,,, | Performed by: FAMILY MEDICINE

## 2018-12-05 PROCEDURE — 11045 DBRDMT SUBQ TISS EACH ADDL: CPT | Mod: ,,, | Performed by: FAMILY MEDICINE

## 2018-12-05 PROCEDURE — 11045 DBRDMT SUBQ TISS EACH ADDL: CPT | Performed by: FAMILY MEDICINE

## 2018-12-11 DIAGNOSIS — I27.20 PULMONARY HYPERTENSION: ICD-10-CM

## 2018-12-11 DIAGNOSIS — G47.33 OBSTRUCTIVE SLEEP APNEA ON CPAP: ICD-10-CM

## 2018-12-12 ENCOUNTER — HOSPITAL ENCOUNTER (OUTPATIENT)
Dept: WOUND CARE | Facility: HOSPITAL | Age: 73
Discharge: HOME | End: 2018-12-12
Attending: FAMILY MEDICINE
Payer: MEDICARE

## 2018-12-12 DIAGNOSIS — L97.212 NON-PRESSURE CHRONIC ULCER OF RIGHT CALF WITH FAT LAYER EXPOSED: Primary | ICD-10-CM

## 2018-12-12 DIAGNOSIS — E11.622 TYPE 2 DIABETES MELLITUS WITH OTHER SKIN ULCER: ICD-10-CM

## 2018-12-12 DIAGNOSIS — L98.499 TYPE 2 DIABETES MELLITUS WITH OTHER SKIN ULCER: ICD-10-CM

## 2018-12-12 PROCEDURE — 11042 DBRDMT SUBQ TIS 1ST 20SQCM/<: CPT | Performed by: FAMILY MEDICINE

## 2018-12-12 PROCEDURE — 25000003 PHARM REV CODE 250

## 2018-12-12 RX ORDER — HYDRALAZINE HYDROCHLORIDE 50 MG/1
50 TABLET, FILM COATED ORAL 3 TIMES DAILY
Qty: 90 TABLET | Refills: 2 | Status: SHIPPED | OUTPATIENT
Start: 2018-12-12 | End: 2019-08-15

## 2018-12-15 ENCOUNTER — LAB VISIT (OUTPATIENT)
Dept: LAB | Facility: HOSPITAL | Age: 73
End: 2018-12-15
Attending: INTERNAL MEDICINE
Payer: MEDICARE

## 2018-12-15 DIAGNOSIS — E11.29 TYPE II DIABETES MELLITUS WITH RENAL MANIFESTATIONS: Primary | ICD-10-CM

## 2018-12-15 DIAGNOSIS — E11.40 DIABETIC NEUROPATHY: ICD-10-CM

## 2018-12-15 DIAGNOSIS — E11.69 DIABETES MELLITUS ASSOCIATED WITH HORMONAL ETIOLOGY: ICD-10-CM

## 2018-12-15 LAB
25(OH)D3+25(OH)D2 SERPL-MCNC: 46 NG/ML
ALBUMIN SERPL BCP-MCNC: 2.9 G/DL
ALP SERPL-CCNC: 109 U/L
ALT SERPL W/O P-5'-P-CCNC: 33 U/L
ANION GAP SERPL CALC-SCNC: 10 MMOL/L
AST SERPL-CCNC: 18 U/L
BILIRUB SERPL-MCNC: 0.4 MG/DL
BUN SERPL-MCNC: 39 MG/DL
CALCIUM SERPL-MCNC: 9.3 MG/DL
CHLORIDE SERPL-SCNC: 110 MMOL/L
CO2 SERPL-SCNC: 22 MMOL/L
CREAT SERPL-MCNC: 2.6 MG/DL
EST. GFR  (AFRICAN AMERICAN): 20.5 ML/MIN/1.73 M^2
EST. GFR  (NON AFRICAN AMERICAN): 17.8 ML/MIN/1.73 M^2
ESTIMATED AVG GLUCOSE: 192 MG/DL
GLUCOSE SERPL-MCNC: 191 MG/DL
HBA1C MFR BLD HPLC: 8.3 %
POTASSIUM SERPL-SCNC: 4.6 MMOL/L
PROT SERPL-MCNC: 7.5 G/DL
SODIUM SERPL-SCNC: 142 MMOL/L
T4 FREE SERPL-MCNC: 1.12 NG/DL
TSH SERPL DL<=0.005 MIU/L-ACNC: 1.72 UIU/ML

## 2018-12-15 PROCEDURE — 82306 VITAMIN D 25 HYDROXY: CPT

## 2018-12-15 PROCEDURE — 80053 COMPREHEN METABOLIC PANEL: CPT

## 2018-12-15 PROCEDURE — 84443 ASSAY THYROID STIM HORMONE: CPT

## 2018-12-15 PROCEDURE — 84439 ASSAY OF FREE THYROXINE: CPT

## 2018-12-15 PROCEDURE — 36415 COLL VENOUS BLD VENIPUNCTURE: CPT | Mod: PO

## 2018-12-15 PROCEDURE — 83036 HEMOGLOBIN GLYCOSYLATED A1C: CPT

## 2018-12-19 ENCOUNTER — HOSPITAL ENCOUNTER (OUTPATIENT)
Dept: WOUND CARE | Facility: HOSPITAL | Age: 73
Discharge: HOME | End: 2018-12-19
Attending: FAMILY MEDICINE
Payer: MEDICARE

## 2018-12-19 DIAGNOSIS — I10 ESSENTIAL HYPERTENSION: ICD-10-CM

## 2018-12-19 DIAGNOSIS — L97.212 NON-PRESSURE CHRONIC ULCER OF RIGHT CALF WITH FAT LAYER EXPOSED: Primary | ICD-10-CM

## 2018-12-19 DIAGNOSIS — E11.622 DIABETIC ULCER OF RIGHT LOWER LEG ASSOCIATED WITH TYPE 2 DIABETES MELLITUS, WITH FAT LAYER EXPOSED: ICD-10-CM

## 2018-12-19 DIAGNOSIS — L97.912 DIABETIC ULCER OF RIGHT LOWER LEG ASSOCIATED WITH TYPE 2 DIABETES MELLITUS, WITH FAT LAYER EXPOSED: ICD-10-CM

## 2018-12-19 PROCEDURE — 99214 OFFICE O/P EST MOD 30 MIN: CPT | Mod: 25,,, | Performed by: FAMILY MEDICINE

## 2018-12-19 PROCEDURE — 11042 DBRDMT SUBQ TIS 1ST 20SQCM/<: CPT | Performed by: FAMILY MEDICINE

## 2018-12-19 PROCEDURE — 11045 DBRDMT SUBQ TISS EACH ADDL: CPT | Performed by: FAMILY MEDICINE

## 2018-12-19 PROCEDURE — 11042 DBRDMT SUBQ TIS 1ST 20SQCM/<: CPT | Mod: ,,, | Performed by: FAMILY MEDICINE

## 2018-12-19 PROCEDURE — 25000003 PHARM REV CODE 250

## 2018-12-19 PROCEDURE — 11045 DBRDMT SUBQ TISS EACH ADDL: CPT | Mod: ,,, | Performed by: FAMILY MEDICINE

## 2018-12-26 ENCOUNTER — HOSPITAL ENCOUNTER (OUTPATIENT)
Dept: WOUND CARE | Facility: HOSPITAL | Age: 73
Discharge: HOME | End: 2018-12-26
Attending: FAMILY MEDICINE
Payer: MEDICARE

## 2018-12-26 DIAGNOSIS — L97.912 DIABETIC ULCER OF RIGHT LOWER LEG ASSOCIATED WITH TYPE 2 DIABETES MELLITUS, WITH FAT LAYER EXPOSED: ICD-10-CM

## 2018-12-26 DIAGNOSIS — E11.622 DIABETIC ULCER OF RIGHT LOWER LEG ASSOCIATED WITH TYPE 2 DIABETES MELLITUS, WITH FAT LAYER EXPOSED: ICD-10-CM

## 2018-12-26 DIAGNOSIS — I10 ESSENTIAL HYPERTENSION: ICD-10-CM

## 2018-12-26 DIAGNOSIS — I50.32 CHRONIC DIASTOLIC HEART FAILURE: ICD-10-CM

## 2018-12-26 DIAGNOSIS — L97.212 NON-PRESSURE CHRONIC ULCER OF RIGHT CALF WITH FAT LAYER EXPOSED: Primary | ICD-10-CM

## 2018-12-26 PROCEDURE — 25000003 PHARM REV CODE 250

## 2018-12-26 PROCEDURE — 99214 OFFICE O/P EST MOD 30 MIN: CPT | Mod: 25,,, | Performed by: FAMILY MEDICINE

## 2018-12-26 PROCEDURE — 11045 DBRDMT SUBQ TISS EACH ADDL: CPT | Performed by: FAMILY MEDICINE

## 2018-12-26 PROCEDURE — 11042 DBRDMT SUBQ TIS 1ST 20SQCM/<: CPT | Mod: ,,, | Performed by: FAMILY MEDICINE

## 2018-12-26 PROCEDURE — 11042 DBRDMT SUBQ TIS 1ST 20SQCM/<: CPT | Performed by: FAMILY MEDICINE

## 2018-12-26 PROCEDURE — 11045 DBRDMT SUBQ TISS EACH ADDL: CPT | Mod: ,,, | Performed by: FAMILY MEDICINE

## 2018-12-27 RX ORDER — METOPROLOL TARTRATE 100 MG/1
TABLET ORAL
Qty: 180 TABLET | Refills: 0 | Status: SHIPPED | OUTPATIENT
Start: 2018-12-27 | End: 2019-02-27 | Stop reason: SDUPTHER

## 2019-01-02 ENCOUNTER — HOSPITAL ENCOUNTER (OUTPATIENT)
Dept: WOUND CARE | Facility: HOSPITAL | Age: 74
Discharge: HOME | End: 2019-01-02
Attending: FAMILY MEDICINE
Payer: MEDICARE

## 2019-01-02 ENCOUNTER — TELEPHONE (OUTPATIENT)
Dept: ENDOSCOPY | Facility: HOSPITAL | Age: 74
End: 2019-01-02

## 2019-01-02 DIAGNOSIS — E11.622 DIABETIC ULCER OF RIGHT LOWER LEG ASSOCIATED WITH TYPE 2 DIABETES MELLITUS, WITH FAT LAYER EXPOSED: ICD-10-CM

## 2019-01-02 DIAGNOSIS — L97.912 DIABETIC ULCER OF RIGHT LOWER LEG ASSOCIATED WITH TYPE 2 DIABETES MELLITUS, WITH FAT LAYER EXPOSED: ICD-10-CM

## 2019-01-02 DIAGNOSIS — I10 ESSENTIAL HYPERTENSION: ICD-10-CM

## 2019-01-02 DIAGNOSIS — L97.212 NON-PRESSURE CHRONIC ULCER OF RIGHT CALF WITH FAT LAYER EXPOSED: Primary | ICD-10-CM

## 2019-01-02 PROCEDURE — 11045 DBRDMT SUBQ TISS EACH ADDL: CPT | Performed by: FAMILY MEDICINE

## 2019-01-02 PROCEDURE — 99214 PR OFFICE/OUTPT VISIT, EST, LEVL IV, 30-39 MIN: ICD-10-PCS | Mod: 25,,, | Performed by: FAMILY MEDICINE

## 2019-01-02 PROCEDURE — 11042 DBRDMT SUBQ TIS 1ST 20SQCM/<: CPT | Mod: ,,, | Performed by: FAMILY MEDICINE

## 2019-01-02 PROCEDURE — 11042 PR DEBRIDEMENT, SKIN, SUB-Q TISSUE,=<20 SQ CM: ICD-10-PCS | Mod: ,,, | Performed by: FAMILY MEDICINE

## 2019-01-02 PROCEDURE — 11042 DBRDMT SUBQ TIS 1ST 20SQCM/<: CPT | Performed by: FAMILY MEDICINE

## 2019-01-02 PROCEDURE — 11045 PR DEB SUBQ TISSUE ADD-ON: ICD-10-PCS | Mod: ,,, | Performed by: FAMILY MEDICINE

## 2019-01-02 PROCEDURE — 25000003 PHARM REV CODE 250

## 2019-01-02 PROCEDURE — 99214 OFFICE O/P EST MOD 30 MIN: CPT | Mod: 25,,, | Performed by: FAMILY MEDICINE

## 2019-01-02 PROCEDURE — 11045 DBRDMT SUBQ TISS EACH ADDL: CPT | Mod: ,,, | Performed by: FAMILY MEDICINE

## 2019-01-09 ENCOUNTER — HOSPITAL ENCOUNTER (OUTPATIENT)
Dept: WOUND CARE | Facility: HOSPITAL | Age: 74
Discharge: HOME | End: 2019-01-09
Attending: FAMILY MEDICINE
Payer: MEDICARE

## 2019-01-09 DIAGNOSIS — E11.622 DIABETIC ULCER OF RIGHT LOWER LEG ASSOCIATED WITH TYPE 2 DIABETES MELLITUS, WITH FAT LAYER EXPOSED: ICD-10-CM

## 2019-01-09 DIAGNOSIS — I10 ESSENTIAL HYPERTENSION: ICD-10-CM

## 2019-01-09 DIAGNOSIS — L97.212 NON-PRESSURE CHRONIC ULCER OF RIGHT CALF WITH FAT LAYER EXPOSED: Primary | ICD-10-CM

## 2019-01-09 DIAGNOSIS — L97.912 DIABETIC ULCER OF RIGHT LOWER LEG ASSOCIATED WITH TYPE 2 DIABETES MELLITUS, WITH FAT LAYER EXPOSED: ICD-10-CM

## 2019-01-09 PROCEDURE — 15271 PR SKIN SUB GRAFT TRNK/ARM/LEG: ICD-10-PCS | Mod: ,,, | Performed by: FAMILY MEDICINE

## 2019-01-09 PROCEDURE — 99214 OFFICE O/P EST MOD 30 MIN: CPT | Mod: 25,,, | Performed by: FAMILY MEDICINE

## 2019-01-09 PROCEDURE — 15272 PR SKIN SUB GRAFT T/A/L ADD-ON: ICD-10-PCS | Mod: ,,, | Performed by: FAMILY MEDICINE

## 2019-01-09 PROCEDURE — 15272 SKIN SUB GRAFT T/A/L ADD-ON: CPT | Performed by: FAMILY MEDICINE

## 2019-01-09 PROCEDURE — 25000003 PHARM REV CODE 250

## 2019-01-09 PROCEDURE — 15272 SKIN SUB GRAFT T/A/L ADD-ON: CPT | Mod: ,,, | Performed by: FAMILY MEDICINE

## 2019-01-09 PROCEDURE — 11042 DBRDMT SUBQ TIS 1ST 20SQCM/<: CPT

## 2019-01-09 PROCEDURE — 15271 SKIN SUB GRAFT TRNK/ARM/LEG: CPT | Mod: ,,, | Performed by: FAMILY MEDICINE

## 2019-01-09 PROCEDURE — 99214 PR OFFICE/OUTPT VISIT, EST, LEVL IV, 30-39 MIN: ICD-10-PCS | Mod: 25,,, | Performed by: FAMILY MEDICINE

## 2019-01-09 PROCEDURE — 15271 SKIN SUB GRAFT TRNK/ARM/LEG: CPT | Performed by: FAMILY MEDICINE

## 2019-01-15 ENCOUNTER — PES CALL (OUTPATIENT)
Dept: ADMINISTRATIVE | Facility: CLINIC | Age: 74
End: 2019-01-15

## 2019-01-16 ENCOUNTER — HOSPITAL ENCOUNTER (OUTPATIENT)
Dept: WOUND CARE | Facility: HOSPITAL | Age: 74
Discharge: HOME | End: 2019-01-16
Attending: FAMILY MEDICINE
Payer: MEDICARE

## 2019-01-16 DIAGNOSIS — I10 ESSENTIAL HYPERTENSION: ICD-10-CM

## 2019-01-16 DIAGNOSIS — L97.912 DIABETIC ULCER OF RIGHT LOWER LEG ASSOCIATED WITH TYPE 2 DIABETES MELLITUS, WITH FAT LAYER EXPOSED: ICD-10-CM

## 2019-01-16 DIAGNOSIS — E11.622 DIABETIC ULCER OF RIGHT LOWER LEG ASSOCIATED WITH TYPE 2 DIABETES MELLITUS, WITH FAT LAYER EXPOSED: ICD-10-CM

## 2019-01-16 DIAGNOSIS — L97.212 NON-PRESSURE CHRONIC ULCER OF RIGHT CALF WITH FAT LAYER EXPOSED: Primary | ICD-10-CM

## 2019-01-16 PROCEDURE — 11042 PR DEBRIDEMENT, SKIN, SUB-Q TISSUE,=<20 SQ CM: ICD-10-PCS | Mod: ,,, | Performed by: FAMILY MEDICINE

## 2019-01-16 PROCEDURE — 25000003 PHARM REV CODE 250

## 2019-01-16 PROCEDURE — 11042 DBRDMT SUBQ TIS 1ST 20SQCM/<: CPT | Performed by: FAMILY MEDICINE

## 2019-01-16 PROCEDURE — 99214 OFFICE O/P EST MOD 30 MIN: CPT | Mod: 25,,, | Performed by: FAMILY MEDICINE

## 2019-01-16 PROCEDURE — 99214 PR OFFICE/OUTPT VISIT, EST, LEVL IV, 30-39 MIN: ICD-10-PCS | Mod: 25,,, | Performed by: FAMILY MEDICINE

## 2019-01-16 PROCEDURE — 11042 DBRDMT SUBQ TIS 1ST 20SQCM/<: CPT | Mod: ,,, | Performed by: FAMILY MEDICINE

## 2019-01-16 RX ORDER — POTASSIUM CHLORIDE 1500 MG/1
TABLET, EXTENDED RELEASE ORAL
Qty: 90 TABLET | Refills: 0 | Status: SHIPPED | OUTPATIENT
Start: 2019-01-16 | End: 2019-03-20 | Stop reason: SDUPTHER

## 2019-01-24 ENCOUNTER — HOSPITAL ENCOUNTER (OUTPATIENT)
Dept: WOUND CARE | Facility: HOSPITAL | Age: 74
Discharge: HOME | End: 2019-01-24
Attending: FAMILY MEDICINE
Payer: MEDICARE

## 2019-01-24 DIAGNOSIS — E11.622 DIABETIC ULCER OF RIGHT LOWER LEG ASSOCIATED WITH TYPE 2 DIABETES MELLITUS, WITH FAT LAYER EXPOSED: ICD-10-CM

## 2019-01-24 DIAGNOSIS — L97.912 DIABETIC ULCER OF RIGHT LOWER LEG ASSOCIATED WITH TYPE 2 DIABETES MELLITUS, WITH FAT LAYER EXPOSED: ICD-10-CM

## 2019-01-24 DIAGNOSIS — L97.212 NON-PRESSURE CHRONIC ULCER OF RIGHT CALF WITH FAT LAYER EXPOSED: Primary | ICD-10-CM

## 2019-01-24 PROCEDURE — 99214 OFFICE O/P EST MOD 30 MIN: CPT | Mod: ,,, | Performed by: INTERNAL MEDICINE

## 2019-01-24 PROCEDURE — 99214 PR OFFICE/OUTPT VISIT, EST, LEVL IV, 30-39 MIN: ICD-10-PCS | Mod: ,,, | Performed by: INTERNAL MEDICINE

## 2019-01-24 PROCEDURE — 99214 OFFICE O/P EST MOD 30 MIN: CPT | Performed by: INTERNAL MEDICINE

## 2019-01-25 DIAGNOSIS — F32.1 MODERATE SINGLE CURRENT EPISODE OF MAJOR DEPRESSIVE DISORDER: ICD-10-CM

## 2019-01-25 RX ORDER — CITALOPRAM 10 MG/1
TABLET ORAL
Qty: 90 TABLET | Refills: 0 | Status: SHIPPED | OUTPATIENT
Start: 2019-01-25 | End: 2019-04-01 | Stop reason: SDUPTHER

## 2019-01-28 RX ORDER — OXYBUTYNIN CHLORIDE 15 MG/1
TABLET, EXTENDED RELEASE ORAL
Qty: 90 TABLET | Refills: 3 | Status: SHIPPED | OUTPATIENT
Start: 2019-01-28 | End: 2020-11-30

## 2019-01-30 ENCOUNTER — HOSPITAL ENCOUNTER (OUTPATIENT)
Dept: WOUND CARE | Facility: HOSPITAL | Age: 74
Discharge: HOME | End: 2019-01-30
Attending: FAMILY MEDICINE
Payer: MEDICARE

## 2019-01-30 DIAGNOSIS — E11.622 DIABETIC ULCER OF RIGHT LOWER LEG ASSOCIATED WITH TYPE 2 DIABETES MELLITUS, WITH FAT LAYER EXPOSED: ICD-10-CM

## 2019-01-30 DIAGNOSIS — L97.912 DIABETIC ULCER OF RIGHT LOWER LEG ASSOCIATED WITH TYPE 2 DIABETES MELLITUS, WITH FAT LAYER EXPOSED: ICD-10-CM

## 2019-01-30 DIAGNOSIS — I10 ESSENTIAL HYPERTENSION: ICD-10-CM

## 2019-01-30 DIAGNOSIS — L97.212 NON-PRESSURE CHRONIC ULCER OF RIGHT CALF WITH FAT LAYER EXPOSED: Primary | ICD-10-CM

## 2019-01-30 PROCEDURE — 15271 SKIN SUB GRAFT TRNK/ARM/LEG: CPT | Mod: HCNC,,, | Performed by: FAMILY MEDICINE

## 2019-01-30 PROCEDURE — 25000003 PHARM REV CODE 250: Mod: HCNC

## 2019-01-30 PROCEDURE — 11042 DBRDMT SUBQ TIS 1ST 20SQCM/<: CPT

## 2019-01-30 PROCEDURE — 99214 PR OFFICE/OUTPT VISIT, EST, LEVL IV, 30-39 MIN: ICD-10-PCS | Mod: 25,HCNC,, | Performed by: FAMILY MEDICINE

## 2019-01-30 PROCEDURE — 99214 OFFICE O/P EST MOD 30 MIN: CPT | Mod: 25,HCNC,, | Performed by: FAMILY MEDICINE

## 2019-01-30 PROCEDURE — 15271 SKIN SUB GRAFT TRNK/ARM/LEG: CPT | Performed by: FAMILY MEDICINE

## 2019-01-30 PROCEDURE — 15272 SKIN SUB GRAFT T/A/L ADD-ON: CPT

## 2019-01-30 PROCEDURE — 15271 PR SKIN SUB GRAFT TRNK/ARM/LEG: ICD-10-PCS | Mod: HCNC,,, | Performed by: FAMILY MEDICINE

## 2019-02-06 ENCOUNTER — HOSPITAL ENCOUNTER (OUTPATIENT)
Dept: WOUND CARE | Facility: HOSPITAL | Age: 74
Discharge: HOME | End: 2019-02-06
Attending: FAMILY MEDICINE
Payer: MEDICARE

## 2019-02-06 ENCOUNTER — HOSPITAL ENCOUNTER (OUTPATIENT)
Dept: RADIOLOGY | Facility: HOSPITAL | Age: 74
Discharge: HOME | End: 2019-02-06
Attending: INTERNAL MEDICINE
Payer: MEDICARE

## 2019-02-06 DIAGNOSIS — I10 ESSENTIAL HYPERTENSION: ICD-10-CM

## 2019-02-06 DIAGNOSIS — L97.212 NON-PRESSURE CHRONIC ULCER OF RIGHT CALF WITH FAT LAYER EXPOSED: Primary | ICD-10-CM

## 2019-02-06 DIAGNOSIS — E11.622 DIABETIC ULCER OF RIGHT LOWER LEG ASSOCIATED WITH TYPE 2 DIABETES MELLITUS, WITH FAT LAYER EXPOSED: ICD-10-CM

## 2019-02-06 DIAGNOSIS — L97.912 DIABETIC ULCER OF RIGHT LOWER LEG ASSOCIATED WITH TYPE 2 DIABETES MELLITUS, WITH FAT LAYER EXPOSED: ICD-10-CM

## 2019-02-06 DIAGNOSIS — E04.9 GOITER: ICD-10-CM

## 2019-02-06 PROCEDURE — 99214 OFFICE O/P EST MOD 30 MIN: CPT | Mod: 25,HCNC,, | Performed by: FAMILY MEDICINE

## 2019-02-06 PROCEDURE — 76536 US EXAM OF HEAD AND NECK: CPT | Mod: 26,HCNC,, | Performed by: RADIOLOGY

## 2019-02-06 PROCEDURE — 17250 CHEM CAUT OF GRANLTJ TISSUE: CPT | Performed by: FAMILY MEDICINE

## 2019-02-06 PROCEDURE — 76536 US EXAM OF HEAD AND NECK: CPT | Mod: TC,HCNC

## 2019-02-06 PROCEDURE — 17250 CHEM CAUT OF GRANLTJ TISSUE: CPT | Mod: HCNC,,, | Performed by: FAMILY MEDICINE

## 2019-02-06 PROCEDURE — 99214 PR OFFICE/OUTPT VISIT, EST, LEVL IV, 30-39 MIN: ICD-10-PCS | Mod: 25,HCNC,, | Performed by: FAMILY MEDICINE

## 2019-02-06 PROCEDURE — 25000003 PHARM REV CODE 250: Mod: HCNC

## 2019-02-06 PROCEDURE — 17250 PR CHEM CAUTERY GRANULATN TISSUE: ICD-10-PCS | Mod: HCNC,,, | Performed by: FAMILY MEDICINE

## 2019-02-06 PROCEDURE — 76536 US SOFT TISSUE HEAD NECK THYROID: ICD-10-PCS | Mod: 26,HCNC,, | Performed by: RADIOLOGY

## 2019-02-12 ENCOUNTER — OFFICE VISIT (OUTPATIENT)
Dept: NEPHROLOGY | Facility: CLINIC | Age: 74
End: 2019-02-12
Payer: MEDICARE

## 2019-02-12 VITALS
BODY MASS INDEX: 40.73 KG/M2 | HEIGHT: 65 IN | SYSTOLIC BLOOD PRESSURE: 140 MMHG | HEART RATE: 81 BPM | WEIGHT: 244.5 LBS | DIASTOLIC BLOOD PRESSURE: 80 MMHG | OXYGEN SATURATION: 95 %

## 2019-02-12 DIAGNOSIS — N18.4 CONTROLLED TYPE 2 DIABETES MELLITUS WITH STAGE 4 CHRONIC KIDNEY DISEASE, WITH LONG-TERM CURRENT USE OF INSULIN: Primary | ICD-10-CM

## 2019-02-12 DIAGNOSIS — D63.1 ANEMIA OF CHRONIC RENAL FAILURE, STAGE 4 (SEVERE): ICD-10-CM

## 2019-02-12 DIAGNOSIS — I12.9 HYPERTENSIVE KIDNEY DISEASE WITH CHRONIC KIDNEY DISEASE, STAGE 1-4 OR UNSPECIFIED CHRONIC KIDNEY DISEASE: ICD-10-CM

## 2019-02-12 DIAGNOSIS — E11.22 CONTROLLED TYPE 2 DIABETES MELLITUS WITH STAGE 4 CHRONIC KIDNEY DISEASE, WITH LONG-TERM CURRENT USE OF INSULIN: Primary | ICD-10-CM

## 2019-02-12 DIAGNOSIS — Z79.4 CONTROLLED TYPE 2 DIABETES MELLITUS WITH STAGE 4 CHRONIC KIDNEY DISEASE, WITH LONG-TERM CURRENT USE OF INSULIN: Primary | ICD-10-CM

## 2019-02-12 DIAGNOSIS — N18.4 ANEMIA OF CHRONIC RENAL FAILURE, STAGE 4 (SEVERE): ICD-10-CM

## 2019-02-12 DIAGNOSIS — N18.4 CHRONIC KIDNEY DISEASE (CKD), STAGE IV (SEVERE): ICD-10-CM

## 2019-02-12 PROCEDURE — 99999 PR PBB SHADOW E&M-EST. PATIENT-LVL II: ICD-10-PCS | Mod: PBBFAC,HCNC,, | Performed by: INTERNAL MEDICINE

## 2019-02-12 PROCEDURE — 3045F PR MOST RECENT HEMOGLOBIN A1C LEVEL 7.0-9.0%: CPT | Mod: HCNC,CPTII,S$GLB, | Performed by: INTERNAL MEDICINE

## 2019-02-12 PROCEDURE — 99999 PR PBB SHADOW E&M-EST. PATIENT-LVL II: CPT | Mod: PBBFAC,HCNC,, | Performed by: INTERNAL MEDICINE

## 2019-02-12 PROCEDURE — 99499 RISK ADDL DX/OHS AUDIT: ICD-10-PCS | Mod: S$PBB,,, | Performed by: INTERNAL MEDICINE

## 2019-02-12 PROCEDURE — 3079F DIAST BP 80-89 MM HG: CPT | Mod: HCNC,CPTII,S$GLB, | Performed by: INTERNAL MEDICINE

## 2019-02-12 PROCEDURE — 3077F SYST BP >= 140 MM HG: CPT | Mod: HCNC,CPTII,S$GLB, | Performed by: INTERNAL MEDICINE

## 2019-02-12 PROCEDURE — 99214 PR OFFICE/OUTPT VISIT, EST, LEVL IV, 30-39 MIN: ICD-10-PCS | Mod: HCNC,S$GLB,, | Performed by: INTERNAL MEDICINE

## 2019-02-12 PROCEDURE — 99214 OFFICE O/P EST MOD 30 MIN: CPT | Mod: HCNC,S$GLB,, | Performed by: INTERNAL MEDICINE

## 2019-02-12 PROCEDURE — 3045F PR MOST RECENT HEMOGLOBIN A1C LEVEL 7.0-9.0%: ICD-10-PCS | Mod: HCNC,CPTII,S$GLB, | Performed by: INTERNAL MEDICINE

## 2019-02-12 PROCEDURE — 3079F PR MOST RECENT DIASTOLIC BLOOD PRESSURE 80-89 MM HG: ICD-10-PCS | Mod: HCNC,CPTII,S$GLB, | Performed by: INTERNAL MEDICINE

## 2019-02-12 PROCEDURE — 99499 UNLISTED E&M SERVICE: CPT | Mod: S$PBB,,, | Performed by: INTERNAL MEDICINE

## 2019-02-12 PROCEDURE — 1101F PT FALLS ASSESS-DOCD LE1/YR: CPT | Mod: HCNC,CPTII,S$GLB, | Performed by: INTERNAL MEDICINE

## 2019-02-12 PROCEDURE — 3077F PR MOST RECENT SYSTOLIC BLOOD PRESSURE >= 140 MM HG: ICD-10-PCS | Mod: HCNC,CPTII,S$GLB, | Performed by: INTERNAL MEDICINE

## 2019-02-12 PROCEDURE — 1101F PR PT FALLS ASSESS DOC 0-1 FALLS W/OUT INJ PAST YR: ICD-10-PCS | Mod: HCNC,CPTII,S$GLB, | Performed by: INTERNAL MEDICINE

## 2019-02-12 NOTE — PROGRESS NOTES
Subjective:       Patient ID: Erica Leblanc is a 73 y.o. Black or  female who presents for follow-up evaluation of Chronic Kidney Disease    HPI      Ms. Leblanc is a 73 year old woman with past medical history of diabetes, hypertension presenting for follow up of chronic kidney disease.  Patient reports lower extremity swelling, has follow up with wound management on right foot.  She otherwise denies any fever, chest pain, shortness of breath, abdominal pain, diarrhea, dysuria/hematuria.  She reports her blood sugars have been better controlled with dietary discretion and insulin regimen per Endocrinology (also with active weight loss); blood pressure has been better controlled.      Review of Systems   Constitutional: Negative for appetite change, fatigue and fever.   Respiratory: Negative for chest tightness and shortness of breath.    Cardiovascular: Positive for leg swelling. Negative for chest pain.   Gastrointestinal: Negative for abdominal pain, constipation, diarrhea, nausea and vomiting.   Genitourinary: Negative for difficulty urinating, dysuria, flank pain, frequency, hematuria and urgency.   Musculoskeletal: Negative for arthralgias, joint swelling and myalgias.   Skin: Negative for rash and wound.   Neurological: Negative for dizziness, weakness and light-headedness.   All other systems reviewed and are negative.      Objective:      Physical Exam   Constitutional: She appears well-developed and well-nourished.   Cardiovascular: Normal rate, regular rhythm and normal heart sounds. Exam reveals no gallop and no friction rub.   No murmur heard.  Pulmonary/Chest: Effort normal and breath sounds normal. No respiratory distress. She has no wheezes. She has no rales.   Abdominal: Soft. Bowel sounds are normal. There is no tenderness.   Musculoskeletal: She exhibits edema (2+ bilateral lower extremity).   Neurological: She is alert.   Skin: Skin is warm and dry. No rash noted. No  erythema.   Psychiatric: She has a normal mood and affect.       Assessment:       1. Controlled type 2 diabetes mellitus with stage 4 chronic kidney disease, with long-term current use of insulin    2. Chronic kidney disease (CKD), stage IV (severe)    3. Hypertensive kidney disease with chronic kidney disease, stage 1-4 or unspecified chronic kidney disease    4. Anemia of chronic renal failure, stage 4 (severe)        Plan:      Ms. Leblanc is a 73 year old woman with past medical history of diabetes, hypertension presenting for follow up of chronic kidney disease stage III likely secondary to diabetic nephropathy.  Creatinine previously elevated though stable since November 2016, will continue to trend renal panel along with proteinuria (continue ARB).  Prior elevation likely due to UTI, TMP/SMX, v. secondary to cardiomyopathy, improved since last visit, will repeat to trend.  Stressed importance of blood pressure/glycemic monitoring/control, along with weight loss/exercise, to prevent any further progression of kidney disease, patient voiced understanding.   - Hypertension: BP at goal, advised patient to record BP diary, stressed importance of medication compliance and weight loss/exercise, patient voiced understanding.  Will increase pm dose of furosemide x3days, will phone review BP diary/daily weights  - Anemia: Hgb at goal, no indication for erythropoetin therapy  - Bone/mineral metabolism: patient with secondary hyperparathyroidism, along with VitD deficiency (continue daily supplement), will trend PTH (at goal for stage CKD)    Return to clinic 4-6 months pending renal panel next week, then with renal/heme panel, iron/TIBC/ferritin, urinalysis/culture, urine protein/creatinine ratio, PTH prior to next visit

## 2019-02-13 ENCOUNTER — HOSPITAL ENCOUNTER (OUTPATIENT)
Dept: WOUND CARE | Facility: HOSPITAL | Age: 74
Discharge: HOME | End: 2019-02-13
Attending: FAMILY MEDICINE
Payer: MEDICARE

## 2019-02-13 DIAGNOSIS — L30.9 DERMATITIS: ICD-10-CM

## 2019-02-13 DIAGNOSIS — I10 ESSENTIAL HYPERTENSION: ICD-10-CM

## 2019-02-13 DIAGNOSIS — L97.212 NON-PRESSURE CHRONIC ULCER OF RIGHT CALF WITH FAT LAYER EXPOSED: Primary | ICD-10-CM

## 2019-02-13 DIAGNOSIS — E11.622 DIABETIC ULCER OF RIGHT LOWER LEG ASSOCIATED WITH TYPE 2 DIABETES MELLITUS, WITH FAT LAYER EXPOSED: ICD-10-CM

## 2019-02-13 DIAGNOSIS — E78.5 HYPERLIPIDEMIA LDL GOAL <100: ICD-10-CM

## 2019-02-13 DIAGNOSIS — L97.912 DIABETIC ULCER OF RIGHT LOWER LEG ASSOCIATED WITH TYPE 2 DIABETES MELLITUS, WITH FAT LAYER EXPOSED: ICD-10-CM

## 2019-02-13 PROCEDURE — 11000 PR DEBRIDEMENT, INFECTED SKIN, UP TO 10% BSA: ICD-10-PCS | Mod: HCNC,,, | Performed by: FAMILY MEDICINE

## 2019-02-13 PROCEDURE — 11000 DBRDMT ECZ/INFECTED SKIN<10%: CPT | Mod: HCNC,,, | Performed by: FAMILY MEDICINE

## 2019-02-13 PROCEDURE — 11000 DBRDMT ECZ/INFECTED SKIN<10%: CPT | Performed by: FAMILY MEDICINE

## 2019-02-13 PROCEDURE — 99214 PR OFFICE/OUTPT VISIT, EST, LEVL IV, 30-39 MIN: ICD-10-PCS | Mod: 25,HCNC,, | Performed by: FAMILY MEDICINE

## 2019-02-13 PROCEDURE — 87075 CULTR BACTERIA EXCEPT BLOOD: CPT | Mod: HCNC

## 2019-02-13 PROCEDURE — 87070 CULTURE OTHR SPECIMN AEROBIC: CPT | Mod: HCNC

## 2019-02-13 PROCEDURE — 99214 OFFICE O/P EST MOD 30 MIN: CPT | Performed by: FAMILY MEDICINE

## 2019-02-13 PROCEDURE — 99214 OFFICE O/P EST MOD 30 MIN: CPT | Mod: 25,HCNC,, | Performed by: FAMILY MEDICINE

## 2019-02-14 RX ORDER — ATORVASTATIN CALCIUM 10 MG/1
TABLET, FILM COATED ORAL
Qty: 90 TABLET | Refills: 0 | Status: SHIPPED | OUTPATIENT
Start: 2019-02-14 | End: 2019-04-17 | Stop reason: SDUPTHER

## 2019-02-17 LAB
BACTERIA SPEC AEROBE CULT: NO GROWTH
BACTERIA SPEC ANAEROBE CULT: NORMAL

## 2019-02-20 ENCOUNTER — HOSPITAL ENCOUNTER (OUTPATIENT)
Dept: WOUND CARE | Facility: HOSPITAL | Age: 74
Discharge: HOME | End: 2019-02-20
Attending: FAMILY MEDICINE
Payer: MEDICARE

## 2019-02-20 DIAGNOSIS — L97.912 DIABETIC ULCER OF RIGHT LOWER LEG ASSOCIATED WITH TYPE 2 DIABETES MELLITUS, WITH FAT LAYER EXPOSED: ICD-10-CM

## 2019-02-20 DIAGNOSIS — I10 ESSENTIAL HYPERTENSION: ICD-10-CM

## 2019-02-20 DIAGNOSIS — L97.212 NON-PRESSURE CHRONIC ULCER OF RIGHT CALF WITH FAT LAYER EXPOSED: Primary | ICD-10-CM

## 2019-02-20 DIAGNOSIS — E11.622 DIABETIC ULCER OF RIGHT LOWER LEG ASSOCIATED WITH TYPE 2 DIABETES MELLITUS, WITH FAT LAYER EXPOSED: ICD-10-CM

## 2019-02-20 PROCEDURE — 17250 CHEM CAUT OF GRANLTJ TISSUE: CPT | Performed by: FAMILY MEDICINE

## 2019-02-20 PROCEDURE — 17250 CHEM CAUT OF GRANLTJ TISSUE: CPT | Mod: HCNC,,, | Performed by: FAMILY MEDICINE

## 2019-02-20 PROCEDURE — 17250 PR CHEM CAUTERY GRANULATN TISSUE: ICD-10-PCS | Mod: HCNC,,, | Performed by: FAMILY MEDICINE

## 2019-02-20 PROCEDURE — 99214 OFFICE O/P EST MOD 30 MIN: CPT | Mod: 25,HCNC,, | Performed by: FAMILY MEDICINE

## 2019-02-20 PROCEDURE — 25000003 PHARM REV CODE 250: Mod: HCNC

## 2019-02-20 PROCEDURE — 99214 PR OFFICE/OUTPT VISIT, EST, LEVL IV, 30-39 MIN: ICD-10-PCS | Mod: 25,HCNC,, | Performed by: FAMILY MEDICINE

## 2019-02-21 ENCOUNTER — OFFICE VISIT (OUTPATIENT)
Dept: UROLOGY | Facility: CLINIC | Age: 74
End: 2019-02-21
Payer: MEDICARE

## 2019-02-21 VITALS — BODY MASS INDEX: 40.65 KG/M2 | HEIGHT: 65 IN | WEIGHT: 244 LBS

## 2019-02-21 DIAGNOSIS — N39.0 RECURRENT UTI: ICD-10-CM

## 2019-02-21 DIAGNOSIS — R33.9 INCOMPLETE BLADDER EMPTYING: ICD-10-CM

## 2019-02-21 DIAGNOSIS — N39.46 MIXED INCONTINENCE URGE AND STRESS: Primary | ICD-10-CM

## 2019-02-21 PROCEDURE — 1101F PT FALLS ASSESS-DOCD LE1/YR: CPT | Mod: HCNC,CPTII,S$GLB, | Performed by: UROLOGY

## 2019-02-21 PROCEDURE — 99999 PR PBB SHADOW E&M-EST. PATIENT-LVL III: CPT | Mod: PBBFAC,HCNC,, | Performed by: UROLOGY

## 2019-02-21 PROCEDURE — 99214 OFFICE O/P EST MOD 30 MIN: CPT | Mod: HCNC,S$GLB,, | Performed by: UROLOGY

## 2019-02-21 PROCEDURE — 99999 PR PBB SHADOW E&M-EST. PATIENT-LVL III: ICD-10-PCS | Mod: PBBFAC,HCNC,, | Performed by: UROLOGY

## 2019-02-21 PROCEDURE — 1101F PR PT FALLS ASSESS DOC 0-1 FALLS W/OUT INJ PAST YR: ICD-10-PCS | Mod: HCNC,CPTII,S$GLB, | Performed by: UROLOGY

## 2019-02-21 PROCEDURE — 87186 SC STD MICRODIL/AGAR DIL: CPT | Mod: HCNC

## 2019-02-21 PROCEDURE — 87088 URINE BACTERIA CULTURE: CPT | Mod: HCNC

## 2019-02-21 PROCEDURE — 99214 PR OFFICE/OUTPT VISIT, EST, LEVL IV, 30-39 MIN: ICD-10-PCS | Mod: HCNC,S$GLB,, | Performed by: UROLOGY

## 2019-02-21 PROCEDURE — 87086 URINE CULTURE/COLONY COUNT: CPT | Mod: HCNC

## 2019-02-21 PROCEDURE — 87077 CULTURE AEROBIC IDENTIFY: CPT | Mod: HCNC

## 2019-02-21 NOTE — PROGRESS NOTES
"  Subjective:       Erica Leblanc is a 73 y.o. female who is an established patient of Dr. Barker and Dr Lerma was seen for evaluation of UI.      She is a previous pt of [facility] and saw Dr Lerma in 3/16. She has known mixed incontinence as well as chronic constipation. She has been wearing one diaper per day. She was recommended to stop Urecholine at last visit. She continues to have UUI issues. WILLIE is less bothersome.     She was noted to have PVR of 250cc by I&O cath (1/16). She denies feelings of KATY. PVR on US (2/16) was 9cc.     She was given trial of Ditropan 5mg initially. She had some improvement. She feels that increased stress increased her urinary frequency. She had large volume UUI when she was doing a work required function. Ditropan increased to 15mg and doing well with this. Rare UUI. She is having insensate UI as well. Still wearing Depends.      She is able to make it bathroom a little better. She is doing timed voiding at work.     Increased frequency due to increased fluid intake.     PVR (bladder scan) last visit - 90cc (~45min after void), last visit - 16cc, 54cc.    UCx:  1/16 - neg  7/17 - >100k E coli      The following portions of the patient's history were reviewed and updated as appropriate: allergies, current medications, past family history, past medical history, past social history, past surgical history and problem list.    Review of Systems  Constitutional: no fever or chills  ENT: no nasal congestion or sore throat  Respiratory: no cough or shortness of breath  Cardiovascular: no chest pain or palpitations  Gastrointestinal: no nausea or vomiting, tolerating diet  Genitourinary: as per HPI  Hematologic/Lymphatic: no easy bruising or lymphadenopathy  Musculoskeletal: no arthralgias or myalgias  Skin: no rashes or lesions  Neurological: no seizures or tremors  Behavioral/Psych: no auditory or visual hallucinations       Objective:    Vitals:   Ht 5' 5" (1.651 m)   Wt 110.7 kg " (244 lb)   BMI 40.60 kg/m²     Physical Exam   General: well developed, well nourished in no acute distress  Head: normocephalic, atraumatic  Neck: supple, trachea midline, no obvious enlargement of thyroid  HEENT: EOMI, mucus membranes moist, sclera anicteric, no hearing impairment  Lungs: symmetric expansion, non-labored breathing  Cardiovascular: regular rate and rhythm, normal pulses  Abdomen: soft, non tender, non distended, no palpable masses, no hepatosplenomegaly, no hernias, no CVA tenderness  Musculoskeletal: no peripheral edema, normal ROM in bilateral upper and lower extremities  Lymphatics: no cervical or inguinal lymphadenopathy  Skin: no rashes or lesions  Neuro: alert and oriented x 3, no gross deficits  Psych: normal judgment and insight, normal mood/affect and non-anxious  Genitourinary:   patient declined exam      Lab Review   Urine analysis today in clinic shows - protein 500, glucose 250, RBC 5-10, trace LE, +nit      Lab Results   Component Value Date    WBC 5.68 09/05/2018    HGB 12.0 09/05/2018    HCT 36.8 (L) 09/05/2018    MCV 81 (L) 09/05/2018     09/05/2018     Lab Results   Component Value Date    CREATININE 2.6 (H) 12/15/2018    BUN 39 (H) 12/15/2018       Imaging  Images and reports were personally reviewed by me and discussed with patient  US reviewed       Assessment/Plan:      1. Mixed incontinence urge and stress    - Stopped Urecholine previously   - Tight glucose control   - Weight loss   - Increased to Ditropan XL 15mg - doing well with this. Still with significant UUI. Wearing Depends.    - Complicated by Lasix   - Consider Myrbetriq addition - she declines for now as things are stable   - Continue stool softener (Colace) (one episode of FI)   - Timed voiding - doing well with this   - Cysto/UDS? InterStim?       2. Incomplete bladder emptying    - PVR acceptable     3. Recurrent UTI   - Likely related to UI/Depends   - Discussed Estrace   - UCx today      Follow up in  6 months with PVR

## 2019-02-23 LAB — BACTERIA UR CULT: NORMAL

## 2019-02-25 ENCOUNTER — TELEPHONE (OUTPATIENT)
Dept: UROLOGY | Facility: CLINIC | Age: 74
End: 2019-02-25

## 2019-02-25 ENCOUNTER — TELEPHONE (OUTPATIENT)
Dept: UROLOGY | Facility: HOSPITAL | Age: 74
End: 2019-02-25

## 2019-02-25 RX ORDER — SULFAMETHOXAZOLE AND TRIMETHOPRIM 400; 80 MG/1; MG/1
1 TABLET ORAL 2 TIMES DAILY
Qty: 14 TABLET | Refills: 0 | Status: SHIPPED | OUTPATIENT
Start: 2019-02-25 | End: 2019-03-04

## 2019-02-27 ENCOUNTER — HOSPITAL ENCOUNTER (OUTPATIENT)
Dept: WOUND CARE | Facility: HOSPITAL | Age: 74
Discharge: HOME | End: 2019-02-27
Attending: FAMILY MEDICINE
Payer: MEDICARE

## 2019-02-27 DIAGNOSIS — E11.622 DIABETIC ULCER OF RIGHT LOWER LEG ASSOCIATED WITH TYPE 2 DIABETES MELLITUS, WITH FAT LAYER EXPOSED: ICD-10-CM

## 2019-02-27 DIAGNOSIS — I50.32 CHRONIC DIASTOLIC HEART FAILURE: ICD-10-CM

## 2019-02-27 DIAGNOSIS — L97.212 NON-PRESSURE CHRONIC ULCER OF RIGHT CALF WITH FAT LAYER EXPOSED: Primary | ICD-10-CM

## 2019-02-27 DIAGNOSIS — I10 ESSENTIAL HYPERTENSION: ICD-10-CM

## 2019-02-27 DIAGNOSIS — L97.912 DIABETIC ULCER OF RIGHT LOWER LEG ASSOCIATED WITH TYPE 2 DIABETES MELLITUS, WITH FAT LAYER EXPOSED: ICD-10-CM

## 2019-02-27 PROCEDURE — 15271 SKIN SUB GRAFT TRNK/ARM/LEG: CPT | Mod: HCNC,,, | Performed by: FAMILY MEDICINE

## 2019-02-27 PROCEDURE — 15271 SKIN SUB GRAFT TRNK/ARM/LEG: CPT | Performed by: FAMILY MEDICINE

## 2019-02-27 PROCEDURE — 99214 PR OFFICE/OUTPT VISIT, EST, LEVL IV, 30-39 MIN: ICD-10-PCS | Mod: 25,HCNC,, | Performed by: FAMILY MEDICINE

## 2019-02-27 PROCEDURE — 99214 OFFICE O/P EST MOD 30 MIN: CPT | Mod: 25,HCNC,, | Performed by: FAMILY MEDICINE

## 2019-02-27 PROCEDURE — 15271 PR SKIN SUB GRAFT TRNK/ARM/LEG: ICD-10-PCS | Mod: HCNC,,, | Performed by: FAMILY MEDICINE

## 2019-02-28 RX ORDER — METOPROLOL TARTRATE 100 MG/1
TABLET ORAL
Qty: 180 TABLET | Refills: 0 | Status: SHIPPED | OUTPATIENT
Start: 2019-02-28 | End: 2019-08-27

## 2019-03-06 ENCOUNTER — HOSPITAL ENCOUNTER (OUTPATIENT)
Dept: WOUND CARE | Facility: HOSPITAL | Age: 74
Discharge: HOME | End: 2019-03-06
Attending: FAMILY MEDICINE
Payer: MEDICARE

## 2019-03-06 DIAGNOSIS — L97.912 DIABETIC ULCER OF RIGHT LOWER LEG ASSOCIATED WITH TYPE 2 DIABETES MELLITUS, WITH FAT LAYER EXPOSED: ICD-10-CM

## 2019-03-06 DIAGNOSIS — L97.212 NON-PRESSURE CHRONIC ULCER OF RIGHT CALF WITH FAT LAYER EXPOSED: Primary | ICD-10-CM

## 2019-03-06 DIAGNOSIS — E11.622 DIABETIC ULCER OF RIGHT LOWER LEG ASSOCIATED WITH TYPE 2 DIABETES MELLITUS, WITH FAT LAYER EXPOSED: ICD-10-CM

## 2019-03-06 DIAGNOSIS — I10 ESSENTIAL HYPERTENSION: ICD-10-CM

## 2019-03-06 PROCEDURE — 99214 PR OFFICE/OUTPT VISIT, EST, LEVL IV, 30-39 MIN: ICD-10-PCS | Mod: HCNC,,, | Performed by: FAMILY MEDICINE

## 2019-03-06 PROCEDURE — 99215 OFFICE O/P EST HI 40 MIN: CPT | Performed by: FAMILY MEDICINE

## 2019-03-06 PROCEDURE — 99214 OFFICE O/P EST MOD 30 MIN: CPT | Mod: HCNC,,, | Performed by: FAMILY MEDICINE

## 2019-03-20 ENCOUNTER — HOSPITAL ENCOUNTER (OUTPATIENT)
Dept: WOUND CARE | Facility: HOSPITAL | Age: 74
Discharge: HOME | End: 2019-03-20
Attending: FAMILY MEDICINE
Payer: MEDICARE

## 2019-03-20 DIAGNOSIS — E11.622 DIABETIC ULCER OF RIGHT LOWER LEG ASSOCIATED WITH TYPE 2 DIABETES MELLITUS, WITH FAT LAYER EXPOSED: ICD-10-CM

## 2019-03-20 DIAGNOSIS — L97.212 NON-PRESSURE CHRONIC ULCER OF RIGHT CALF WITH FAT LAYER EXPOSED: Primary | ICD-10-CM

## 2019-03-20 DIAGNOSIS — I10 ESSENTIAL HYPERTENSION: ICD-10-CM

## 2019-03-20 DIAGNOSIS — L97.912 DIABETIC ULCER OF RIGHT LOWER LEG ASSOCIATED WITH TYPE 2 DIABETES MELLITUS, WITH FAT LAYER EXPOSED: ICD-10-CM

## 2019-03-20 PROCEDURE — 99214 PR OFFICE/OUTPT VISIT, EST, LEVL IV, 30-39 MIN: ICD-10-PCS | Mod: HCNC,,, | Performed by: FAMILY MEDICINE

## 2019-03-20 PROCEDURE — 99214 OFFICE O/P EST MOD 30 MIN: CPT | Performed by: FAMILY MEDICINE

## 2019-03-20 PROCEDURE — 99214 OFFICE O/P EST MOD 30 MIN: CPT | Mod: HCNC,,, | Performed by: FAMILY MEDICINE

## 2019-03-21 RX ORDER — POTASSIUM CHLORIDE 1500 MG/1
TABLET, EXTENDED RELEASE ORAL
Qty: 90 TABLET | Refills: 0 | Status: ON HOLD | OUTPATIENT
Start: 2019-03-21 | End: 2019-07-25 | Stop reason: HOSPADM

## 2019-03-22 ENCOUNTER — PATIENT OUTREACH (OUTPATIENT)
Dept: ADMINISTRATIVE | Facility: HOSPITAL | Age: 74
End: 2019-03-22

## 2019-03-22 DIAGNOSIS — E78.5 HYPERLIPIDEMIA, UNSPECIFIED HYPERLIPIDEMIA TYPE: ICD-10-CM

## 2019-03-22 DIAGNOSIS — E11.9 DIABETES MELLITUS WITHOUT COMPLICATION: Primary | ICD-10-CM

## 2019-03-22 DIAGNOSIS — Z12.11 SPECIAL SCREENING FOR MALIGNANT NEOPLASM OF COLON: ICD-10-CM

## 2019-03-22 DIAGNOSIS — Z12.31 ENCOUNTER FOR SCREENING MAMMOGRAM FOR MALIGNANT NEOPLASM OF BREAST: ICD-10-CM

## 2019-03-23 ENCOUNTER — LAB VISIT (OUTPATIENT)
Dept: LAB | Facility: HOSPITAL | Age: 74
End: 2019-03-23
Attending: INTERNAL MEDICINE
Payer: MEDICARE

## 2019-03-23 DIAGNOSIS — E78.5 HYPERLIPEMIA: ICD-10-CM

## 2019-03-23 DIAGNOSIS — E11.40 DIABETIC NEUROPATHY: Primary | ICD-10-CM

## 2019-03-23 LAB
ALBUMIN SERPL BCP-MCNC: 2.6 G/DL
ALP SERPL-CCNC: 124 U/L
ALT SERPL W/O P-5'-P-CCNC: 39 U/L
ANION GAP SERPL CALC-SCNC: 9 MMOL/L
AST SERPL-CCNC: 22 U/L
BILIRUB SERPL-MCNC: 0.6 MG/DL
BUN SERPL-MCNC: 47 MG/DL
CALCIUM SERPL-MCNC: 8.7 MG/DL
CHLORIDE SERPL-SCNC: 111 MMOL/L
CHOLEST SERPL-MCNC: 123 MG/DL
CHOLEST/HDLC SERPL: 2.2 {RATIO}
CO2 SERPL-SCNC: 21 MMOL/L
CREAT SERPL-MCNC: 3 MG/DL
EST. GFR  (AFRICAN AMERICAN): 17.1 ML/MIN/1.73 M^2
EST. GFR  (NON AFRICAN AMERICAN): 14.8 ML/MIN/1.73 M^2
ESTIMATED AVG GLUCOSE: 189 MG/DL
GLUCOSE SERPL-MCNC: 146 MG/DL
HBA1C MFR BLD HPLC: 8.2 %
HDLC SERPL-MCNC: 57 MG/DL
HDLC SERPL: 46.3 %
LDLC SERPL CALC-MCNC: 52.8 MG/DL
NONHDLC SERPL-MCNC: 66 MG/DL
POTASSIUM SERPL-SCNC: 4.6 MMOL/L
PROT SERPL-MCNC: 7.1 G/DL
SODIUM SERPL-SCNC: 141 MMOL/L
T4 FREE SERPL-MCNC: 1.13 NG/DL
TRIGL SERPL-MCNC: 66 MG/DL
TSH SERPL DL<=0.005 MIU/L-ACNC: 3.3 UIU/ML

## 2019-03-23 PROCEDURE — 36415 COLL VENOUS BLD VENIPUNCTURE: CPT | Mod: HCNC,PO

## 2019-03-23 PROCEDURE — 80053 COMPREHEN METABOLIC PANEL: CPT | Mod: HCNC

## 2019-03-23 PROCEDURE — 80061 LIPID PANEL: CPT | Mod: HCNC

## 2019-03-23 PROCEDURE — 84443 ASSAY THYROID STIM HORMONE: CPT | Mod: HCNC

## 2019-03-23 PROCEDURE — 84439 ASSAY OF FREE THYROXINE: CPT | Mod: HCNC

## 2019-03-23 PROCEDURE — 83036 HEMOGLOBIN GLYCOSYLATED A1C: CPT | Mod: HCNC

## 2019-03-27 ENCOUNTER — HOSPITAL ENCOUNTER (OUTPATIENT)
Dept: WOUND CARE | Facility: HOSPITAL | Age: 74
Discharge: HOME | End: 2019-03-27
Attending: FAMILY MEDICINE
Payer: MEDICARE

## 2019-03-27 DIAGNOSIS — E11.622 DIABETIC ULCER OF RIGHT LOWER LEG ASSOCIATED WITH TYPE 2 DIABETES MELLITUS, WITH FAT LAYER EXPOSED: Primary | ICD-10-CM

## 2019-03-27 DIAGNOSIS — I10 ESSENTIAL HYPERTENSION: ICD-10-CM

## 2019-03-27 DIAGNOSIS — L97.912 DIABETIC ULCER OF RIGHT LOWER LEG ASSOCIATED WITH TYPE 2 DIABETES MELLITUS, WITH FAT LAYER EXPOSED: Primary | ICD-10-CM

## 2019-03-27 PROCEDURE — 17250 CHEM CAUT OF GRANLTJ TISSUE: CPT | Mod: HCNC,,, | Performed by: FAMILY MEDICINE

## 2019-03-27 PROCEDURE — 17250 PR CHEM CAUTERY GRANULATN TISSUE: ICD-10-PCS | Mod: HCNC,,, | Performed by: FAMILY MEDICINE

## 2019-03-27 PROCEDURE — 99214 PR OFFICE/OUTPT VISIT, EST, LEVL IV, 30-39 MIN: ICD-10-PCS | Mod: 25,HCNC,, | Performed by: FAMILY MEDICINE

## 2019-03-27 PROCEDURE — 17250 CHEM CAUT OF GRANLTJ TISSUE: CPT | Performed by: FAMILY MEDICINE

## 2019-03-27 PROCEDURE — 99214 OFFICE O/P EST MOD 30 MIN: CPT | Mod: 25,HCNC,, | Performed by: FAMILY MEDICINE

## 2019-04-01 DIAGNOSIS — F32.1 MODERATE SINGLE CURRENT EPISODE OF MAJOR DEPRESSIVE DISORDER: ICD-10-CM

## 2019-04-01 RX ORDER — CITALOPRAM 10 MG/1
TABLET ORAL
Qty: 90 TABLET | Refills: 0 | Status: SHIPPED | OUTPATIENT
Start: 2019-04-01 | End: 2019-06-03 | Stop reason: SDUPTHER

## 2019-04-03 ENCOUNTER — HOSPITAL ENCOUNTER (OUTPATIENT)
Dept: WOUND CARE | Facility: HOSPITAL | Age: 74
Discharge: HOME | End: 2019-04-03
Attending: FAMILY MEDICINE
Payer: MEDICARE

## 2019-04-03 DIAGNOSIS — L30.9 ACUTE DERMATITIS: ICD-10-CM

## 2019-04-03 DIAGNOSIS — E11.622 DIABETIC ULCER OF RIGHT LOWER LEG ASSOCIATED WITH TYPE 2 DIABETES MELLITUS, WITH FAT LAYER EXPOSED: Primary | ICD-10-CM

## 2019-04-03 DIAGNOSIS — L97.912 DIABETIC ULCER OF RIGHT LOWER LEG ASSOCIATED WITH TYPE 2 DIABETES MELLITUS, WITH FAT LAYER EXPOSED: Primary | ICD-10-CM

## 2019-04-03 DIAGNOSIS — I10 ESSENTIAL HYPERTENSION: ICD-10-CM

## 2019-04-03 DIAGNOSIS — L97.212 NON-PRESSURE CHRONIC ULCER OF RIGHT CALF WITH FAT LAYER EXPOSED: ICD-10-CM

## 2019-04-03 PROCEDURE — 11000 DBRDMT ECZ/INFECTED SKIN<10%: CPT | Mod: HCNC,,, | Performed by: FAMILY MEDICINE

## 2019-04-03 PROCEDURE — 11000 DBRDMT ECZ/INFECTED SKIN<10%: CPT | Performed by: FAMILY MEDICINE

## 2019-04-03 PROCEDURE — 99214 PR OFFICE/OUTPT VISIT, EST, LEVL IV, 30-39 MIN: ICD-10-PCS | Mod: 25,HCNC,, | Performed by: FAMILY MEDICINE

## 2019-04-03 PROCEDURE — 99214 OFFICE O/P EST MOD 30 MIN: CPT | Mod: 25,HCNC,, | Performed by: FAMILY MEDICINE

## 2019-04-03 PROCEDURE — 11000 PR DEBRIDEMENT, INFECTED SKIN, UP TO 10% BSA: ICD-10-PCS | Mod: HCNC,,, | Performed by: FAMILY MEDICINE

## 2019-04-05 ENCOUNTER — HOSPITAL ENCOUNTER (OUTPATIENT)
Dept: RADIOLOGY | Facility: HOSPITAL | Age: 74
Discharge: HOME | End: 2019-04-05
Attending: INTERNAL MEDICINE
Payer: MEDICARE

## 2019-04-05 ENCOUNTER — OFFICE VISIT (OUTPATIENT)
Dept: FAMILY MEDICINE | Facility: CLINIC | Age: 74
End: 2019-04-05
Payer: MEDICARE

## 2019-04-05 VITALS
BODY MASS INDEX: 42.76 KG/M2 | WEIGHT: 256.63 LBS | TEMPERATURE: 98 F | SYSTOLIC BLOOD PRESSURE: 144 MMHG | HEIGHT: 65 IN | DIASTOLIC BLOOD PRESSURE: 80 MMHG | HEART RATE: 69 BPM | OXYGEN SATURATION: 92 %

## 2019-04-05 DIAGNOSIS — E03.9 HYPOTHYROIDISM (ACQUIRED): ICD-10-CM

## 2019-04-05 DIAGNOSIS — N18.4 ANEMIA OF CHRONIC KIDNEY FAILURE, STAGE 4 (SEVERE): ICD-10-CM

## 2019-04-05 DIAGNOSIS — I11.0 HYPERTENSIVE HEART DISEASE WITH CHRONIC DIASTOLIC CONGESTIVE HEART FAILURE: ICD-10-CM

## 2019-04-05 DIAGNOSIS — N25.81 SECONDARY RENAL HYPERPARATHYROIDISM: ICD-10-CM

## 2019-04-05 DIAGNOSIS — I77.1 TORTUOUS AORTA: ICD-10-CM

## 2019-04-05 DIAGNOSIS — I50.32 CHRONIC DIASTOLIC HEART FAILURE: ICD-10-CM

## 2019-04-05 DIAGNOSIS — E66.01 MORBID OBESITY WITH BODY MASS INDEX (BMI) OF 40.0 TO 44.9 IN ADULT: ICD-10-CM

## 2019-04-05 DIAGNOSIS — Z12.11 ENCOUNTER FOR FIT (FECAL IMMUNOCHEMICAL TEST) SCREENING: ICD-10-CM

## 2019-04-05 DIAGNOSIS — D63.1 ANEMIA OF CHRONIC KIDNEY FAILURE, STAGE 4 (SEVERE): ICD-10-CM

## 2019-04-05 DIAGNOSIS — Z79.4 LONG-TERM INSULIN USE: ICD-10-CM

## 2019-04-05 DIAGNOSIS — Z12.31 ENCOUNTER FOR SCREENING MAMMOGRAM FOR BREAST CANCER: ICD-10-CM

## 2019-04-05 DIAGNOSIS — E55.9 VITAMIN D DEFICIENCY DISEASE: ICD-10-CM

## 2019-04-05 DIAGNOSIS — Z12.31 ENCOUNTER FOR SCREENING MAMMOGRAM FOR MALIGNANT NEOPLASM OF BREAST: ICD-10-CM

## 2019-04-05 DIAGNOSIS — I50.32 HYPERTENSIVE HEART DISEASE WITH CHRONIC DIASTOLIC CONGESTIVE HEART FAILURE: ICD-10-CM

## 2019-04-05 DIAGNOSIS — E78.5 HYPERLIPIDEMIA LDL GOAL <100: ICD-10-CM

## 2019-04-05 DIAGNOSIS — I27.20 PULMONARY HYPERTENSION: ICD-10-CM

## 2019-04-05 DIAGNOSIS — D57.3 SICKLE CELL TRAIT: ICD-10-CM

## 2019-04-05 DIAGNOSIS — F33.0 MILD EPISODE OF RECURRENT MAJOR DEPRESSIVE DISORDER: ICD-10-CM

## 2019-04-05 DIAGNOSIS — Z00.00 ROUTINE MEDICAL EXAM: Primary | ICD-10-CM

## 2019-04-05 PROBLEM — N18.30 CKD (CHRONIC KIDNEY DISEASE), STAGE III: Status: RESOLVED | Noted: 2017-06-14 | Resolved: 2019-04-05

## 2019-04-05 PROCEDURE — 99499 RISK ADDL DX/OHS AUDIT: ICD-10-PCS | Mod: HCNC,S$GLB,, | Performed by: INTERNAL MEDICINE

## 2019-04-05 PROCEDURE — 77067 SCR MAMMO BI INCL CAD: CPT | Mod: 26,HCNC,, | Performed by: RADIOLOGY

## 2019-04-05 PROCEDURE — 3079F DIAST BP 80-89 MM HG: CPT | Mod: HCNC,CPTII,S$GLB, | Performed by: INTERNAL MEDICINE

## 2019-04-05 PROCEDURE — 3045F PR MOST RECENT HEMOGLOBIN A1C LEVEL 7.0-9.0%: ICD-10-PCS | Mod: HCNC,CPTII,S$GLB, | Performed by: INTERNAL MEDICINE

## 2019-04-05 PROCEDURE — 99999 PR PBB SHADOW E&M-EST. PATIENT-LVL III: ICD-10-PCS | Mod: PBBFAC,HCNC,, | Performed by: INTERNAL MEDICINE

## 2019-04-05 PROCEDURE — 99499 UNLISTED E&M SERVICE: CPT | Mod: HCNC,S$GLB,, | Performed by: INTERNAL MEDICINE

## 2019-04-05 PROCEDURE — 3077F PR MOST RECENT SYSTOLIC BLOOD PRESSURE >= 140 MM HG: ICD-10-PCS | Mod: HCNC,CPTII,S$GLB, | Performed by: INTERNAL MEDICINE

## 2019-04-05 PROCEDURE — 77067 MAMMO DIGITAL SCREENING BILAT WITH TOMOSYNTHESIS_CAD: ICD-10-PCS | Mod: 26,HCNC,, | Performed by: RADIOLOGY

## 2019-04-05 PROCEDURE — 3079F PR MOST RECENT DIASTOLIC BLOOD PRESSURE 80-89 MM HG: ICD-10-PCS | Mod: HCNC,CPTII,S$GLB, | Performed by: INTERNAL MEDICINE

## 2019-04-05 PROCEDURE — 99397 PER PM REEVAL EST PAT 65+ YR: CPT | Mod: HCNC,S$GLB,, | Performed by: INTERNAL MEDICINE

## 2019-04-05 PROCEDURE — 77063 BREAST TOMOSYNTHESIS BI: CPT | Mod: 26,HCNC,, | Performed by: RADIOLOGY

## 2019-04-05 PROCEDURE — 77063 MAMMO DIGITAL SCREENING BILAT WITH TOMOSYNTHESIS_CAD: ICD-10-PCS | Mod: 26,HCNC,, | Performed by: RADIOLOGY

## 2019-04-05 PROCEDURE — 77067 SCR MAMMO BI INCL CAD: CPT | Mod: TC,HCNC,PO

## 2019-04-05 PROCEDURE — 99999 PR PBB SHADOW E&M-EST. PATIENT-LVL III: CPT | Mod: PBBFAC,HCNC,, | Performed by: INTERNAL MEDICINE

## 2019-04-05 PROCEDURE — 3045F PR MOST RECENT HEMOGLOBIN A1C LEVEL 7.0-9.0%: CPT | Mod: HCNC,CPTII,S$GLB, | Performed by: INTERNAL MEDICINE

## 2019-04-05 PROCEDURE — 3077F SYST BP >= 140 MM HG: CPT | Mod: HCNC,CPTII,S$GLB, | Performed by: INTERNAL MEDICINE

## 2019-04-05 PROCEDURE — 99397 PR PREVENTIVE VISIT,EST,65 & OVER: ICD-10-PCS | Mod: HCNC,S$GLB,, | Performed by: INTERNAL MEDICINE

## 2019-04-05 RX ORDER — ERGOCALCIFEROL 1.25 MG/1
50000 CAPSULE ORAL
Qty: 90 CAPSULE | Refills: 3 | Status: ON HOLD | OUTPATIENT
Start: 2019-04-05 | End: 2019-07-25 | Stop reason: HOSPADM

## 2019-04-05 NOTE — PROGRESS NOTES
HISTORY OF PRESENT ILLNESS:  Erica Leblanc is a 74 y.o. female who presents to the clinic today for a routine medical physical exam. Her last physical exam was approximately 1 years(s) ago.        PAST MEDICAL HISTORY:  Past Medical History:   Diagnosis Date    Acute respiratory failure with hypoxia     Anemia of chronic kidney failure, stage 4 (severe) 2019    Cataracts, bilateral     CHF (congestive heart failure)     CKD (chronic kidney disease) stage 3, GFR 30-59 ml/min     CKD (chronic kidney disease) stage 3, GFR 30-59 ml/min     Controlled type 2 diabetes mellitus with proteinuria or albuminuria     Depression     Diabetes with neurologic complications     Diabetic retinopathy of both eyes     Edema     Glaucoma     History of colonic polyps     Hx-TIA (transient ischemic attack) 2008    Hyperlipidemia LDL goal < 100     Hypertension     Hypothyroidism     Major depressive disorder, single episode, mild 2016    Mixed incontinence urge and stress     Obesity     Obstructive sleep apnea on CPAP     Osteopenia     Proteinuria     Sickle cell trait     Trouble in sleeping     Type 2 diabetes mellitus with ophthalmic manifestations     Type 2 diabetes with stage 3 chronic kidney disease GFR 30-59     Type II or unspecified type diabetes mellitus with renal manifestations, uncontrolled(250.42)     Uncontrolled type 2 diabetes mellitus with peripheral circulatory disorder 2019    Urge incontinence 2016    Urge incontinence     Vitamin D deficiency disease        PAST SURGICAL HISTORY:  Past Surgical History:   Procedure Laterality Date    BREAST BIOPSY      breast reduction Bilateral age 31    cataracts Bilateral      SECTION, LOW TRANSVERSE      x1    CHOLECYSTECTOMY      COLONOSCOPY N/A 2012    Performed by Keron Gunderson [facility] at [facility] ([address] [place] [address])    EYE SURGERY  2014, 2014    vitrectomy    EYE SURGERY Right 2016     HYSTERECTOMY  1986    TAHBSO (patient is unsure if ovaries removed)    OOPHORECTOMY      REFRACTIVE SURGERY      REPAIR, RETINAL DETACHMENT, WITH VITRECTOMY Right 6/4/2014    Performed by [facility]ILL[facility]ello [facility] at [facility]    REPAIR, RETINAL DETACHMENT, WITH VITRECTOMY Left 1/8/2014    Performed by [facility] at [facility]    REPAIR-RETINA (VITRECTOMY) Right 7/16/2014    [facility]    STRABISMUS REPAIR/ADJUSTABLE SUTURES Bilateral 8/17/2016    Performed by RABIA Danielson Jr., MD at [facility] OR [facility]    TOTAL REDUCTION MAMMOPLASTY      approx 10 yrs ago       SOCIAL HISTORY:  Social History     Socioeconomic History    Marital status: Single     Spouse name: Not on file    Number of children: 5    Years of education: Not on file    Highest education level: Not on file   Occupational History    Occupation:      Employer: [employer]     Comment: part-time   Social Needs    Financial resource strain: Not on file    Food insecurity:     Worry: Not on file     Inability: Not on file    Transportation needs:     Medical: Not on file     Non-medical: Not on file   Tobacco Use    Smoking status: Never Smoker    Smokeless tobacco: Never Used   Substance and Sexual Activity    Alcohol use: No     Alcohol/week: 0.0 oz    Drug use: No    Sexual activity: Not Currently     Partners: Male     Birth control/protection: Post-menopausal, Surgical   Lifestyle    Physical activity:     Days per week: Not on file     Minutes per session: Not on file    Stress: Not on file   Relationships    Social connections:     Talks on phone: Not on file     Gets together: Not on file     Attends Muslim service: Not on file     Active member of club or organization: Not on file     Attends meetings of clubs or organizations: Not on file     Relationship status: Not on file   Other Topics Concern    Not on file   Social  History Narrative    Not on file       FAMILY HISTORY:  Family History   Problem Relation Age of Onset    Leukemia Father     Ovarian cancer Sister 35    Stroke Mother     Diabetes Mother     Hypertension Mother     Diabetes Paternal Grandmother     Breast cancer Maternal Aunt 65    HIV Brother     Achondroplasia Sister     Parkinsonism Maternal Aunt     Esophageal cancer Maternal Uncle         smoker    Amblyopia Neg Hx     Blindness Neg Hx     Cataracts Neg Hx     Glaucoma Neg Hx     Macular degeneration Neg Hx     Retinal detachment Neg Hx     Strabismus Neg Hx     Thyroid disease Neg Hx     Colon cancer Neg Hx        ALLERGIES AND MEDICATIONS: updated and reviewed.  Review of patient's allergies indicates:   Allergen Reactions    Ace inhibitors Other (See Comments)     Other reaction(s): cough     Medication List with Changes/Refills   Current Medications    AMLODIPINE (NORVASC) 10 MG TABLET    Take 1 tablet (10 mg total) by mouth once daily.    ATORVASTATIN (LIPITOR) 10 MG TABLET    TAKE 1 TABLET EVERY DAY    CITALOPRAM (CELEXA) 10 MG TABLET    TAKE 1 TABLET EVERY DAY    CLOPIDOGREL (PLAVIX) 75 MG TABLET    TAKE 1 TABLET EVERY DAY    DOCUSATE SODIUM (COLACE) 100 MG CAPSULE    Take 200 mg by mouth once daily.     FUROSEMIDE (LASIX) 40 MG TABLET    Take 1 tablet (40 mg total) by mouth 2 (two) times daily.    HUMALOG 100 UNIT/ML INJECTION    Inject 40 Units into the skin once daily.     HYDRALAZINE (APRESOLINE) 50 MG TABLET    Take 1 tablet (50 mg total) by mouth 3 (three) times daily.    KLOR-CON M20 20 MEQ TABLET    TAKE 1 TABLET EVERY DAY    LANCETS MISC        LEVOTHYROXINE (SYNTHROID) 50 MCG TABLET    Take 50 mcg by mouth once daily.    LOSARTAN (COZAAR) 100 MG TABLET    TAKE 1 TABLET EVERY DAY    METOPROLOL TARTRATE (LOPRESSOR) 100 MG TABLET    TAKE 1 TABLET TWICE DAILY    MULTIVITAMIN WITH MINERALS (HAIR,SKIN AND NAILS ORAL)    Take 3 tablets by mouth once daily.    OXYBUTYNIN  (DITROPAN XL) 15 MG TR24    TAKE 1 TABLET EVERY DAY    VGO 20 DENNYS        VICTOZA 0.6 MG/0.1 ML (18 MG/3 ML) PNIJ    1.8 mg every morning.    Changed and/or Refilled Medications    Modified Medication Previous Medication    ERGOCALCIFEROL (ERGOCALCIFEROL) 50,000 UNIT CAP ergocalciferol (ERGOCALCIFEROL) 50,000 unit Cap       Take 1 capsule (50,000 Units total) by mouth every 30 days.    Take 50,000 Units by mouth every 30 days.          CARE TEAM:  Patient Care Team:  Sendy Elaine MD as PCP - General (Internal Medicine)  Brittanie Orellana MD (Cardiology)  Nicole Gray DPM as Consulting Physician (Podiatry)  Surinder Joseph MD as Consulting Physician (Nephrology)  Katia Wong MD as Consulting Physician (Urology)  Coleen Gillis MD as Consulting Physician (Obstetrics)  LILLIANA Coello MD as Consulting Physician (Ophthalmology)  Yolis Rossi MD as Consulting Physician (Endocrinology)           SCREENING HISTORY:  Health Maintenance       Date Due Completion Date    Fecal Occult Blood Test (FOBT)/FitKit 03/23/2019 3/23/2018    Mammogram 03/29/2019 3/29/2018    Override on 8/7/2008: Done    Hemoglobin A1c 06/23/2019 3/23/2019    Foot Exam 08/23/2019 8/23/2018 (Done)    Override on 8/23/2018: Done    Override on 10/10/2016: Done    Eye Exam 10/03/2019 10/3/2018    Override on 1/3/2011: Done    DEXA SCAN 03/13/2020 3/13/2018    Override on 8/7/2008: Done    Lipid Panel 03/23/2020 3/23/2019    High Dose Statin 04/05/2020 4/5/2019    TETANUS VACCINE 08/08/2026 8/8/2016            REVIEW OF SYSTEMS:   The patient reports: fair dietary habits.  The patient reports : that they are unable to exercise because of wound issues on LEs.  Review of Systems   Constitutional: Positive for fatigue. Negative for chills, fever and unexpected weight change.   HENT: Negative for congestion and postnasal drip.    Eyes: Negative for pain and visual disturbance.   Respiratory: Negative for cough, shortness  "of breath and wheezing.    Cardiovascular: Positive for leg swelling. Negative for chest pain and palpitations.   Gastrointestinal: Negative for abdominal pain, constipation, diarrhea, nausea and vomiting.   Genitourinary: Negative for dysuria.   Musculoskeletal: Positive for arthralgias. Negative for back pain.   Skin:        Currently in wound care for LE ulcers.   Neurological: Negative for weakness and headaches.   Psychiatric/Behavioral: Positive for dysphoric mood. Negative for sleep disturbance. The patient is not nervous/anxious.      Breast ROS: negative for breast lumps             Physical Examination:   Vitals:    04/05/19 0933   BP: (!) 144/80   Pulse: 69   Temp: 98.3 °F (36.8 °C)     Weight: 116.4 kg (256 lb 9.9 oz)   Height: 5' 5" (165.1 cm)   Body mass index is 42.7 kg/m².      Patient did not require to have a chaperone present during the exam today.  General appearance - alert, well appearing, and in no distress and morbidly obese.   Mental status - alert, oriented to person, place, and time, normal mood, behavior, speech, dress, motor activity, and thought processes  Eyes - pupils equal and reactive, extraocular eye movements intact, sclera anicteric  Mouth - mucous membranes moist, pharynx normal without lesions  Neck - supple, no significant adenopathy, carotids upstroke normal bilaterally, no bruits, thyroid exam: thyroid is normal in size without nodules or tenderness  Lymphatics - no palpable lymphadenopathy  Chest - clear to auscultation, no wheezes, rales or rhonchi, symmetric air entry  Heart - normal rate and regular rhythm, no gallops noted  Abdomen - not examined  Breasts - not examined  Back exam - Mild limited range of motion, no pain with motion noted during exam; in depth exam deferred   Neurological - alert, oriented, normal speech, no focal findings or movement disorder noted, cranial nerves II through XII intact  Musculoskeletal - not examined  Extremities - pedal edema 1 +  Skin " - normal coloration and turgor, no rashes, no suspicious skin lesions noted; BLEs wrapped and not examined      Lab Results   Component Value Date    HGBA1C 8.2 (H) 03/23/2019    HGBA1C 8.3 (H) 12/15/2018    HGBA1C 8.2 (H) 09/05/2018      Lab Results   Component Value Date    CHOL 123 03/23/2019    CHOL 113 (L) 03/03/2018    CHOL 122 01/06/2018     Lab Results   Component Value Date    LDLCALC 52.8 (L) 03/23/2019    LDLCALC 50.6 (L) 03/03/2018    LDLCALC 57.6 (L) 01/06/2018          ASSESSMENT AND PLAN:  1. Routine medical exam  Counseled on age appropriate medical preventative services including age appropriate cancer screenings, age appropriate eye and dental exams, over all nutritional health, need for a consistent exercise regimen, and an over all push towards maintaining a vigorous and active lifestyle.  Counseled on age appropriate vaccines and discussed upcoming health care needs based on age/gender. Discussed good sleep hygiene and stress management.     2. Uncontrolled type 2 diabetes with stage 4 chronic kidney disease GFR 15-29/3. Type 2 diabetes, uncontrolled, with neuropathy/4. Uncontrolled diabetes mellitus with proliferative retinopathy/5. Uncontrolled type 2 diabetes mellitus with peripheral circulatory disorder/6. Long-term insulin use  Diabetes currently is not controlled for age and comorbid conditions. We discussed diabetic diet and regular exercise. We discussed home blood sugar monitoring, if appropriate. Continue current medication regimen. and Followed by endocrinology.  Diabetic complications addressed: Stable decreased kidney function. Observe. Patient counseled to avoid/minimize the use of anti-inflammatory  Medication. Discussed to stay well hydrated. Also discussed with patient that good control of blood pressure and/or diabetes, if present, will help to prevent progression., Neuropathy pain controlled. and Patient denies claudication symptoms at this time.  Patient was counseled on the  need for yearly diabetic retinopathy exam and yearly diabetic foot exam.     7. Hypertensive heart disease with chronic diastolic congestive heart failure/8. Chronic diastolic heart failure/9. Pulmonary hypertension  BP not at goal. She is on maximum dosage of most of her medications. We discussed low salt diet and regular exercise. I asked her to follow up with cardiology for further evaluation and treatment options.    10. Hyperlipidemia LDL goal <100  We discussed low fat diet and regular exercise.The current medical regimen is effective;  continue present plan and medications.      11. Hypothyroidism (acquired)  Patient is clinically euthyroid. Continue current regimen.     12. Sickle cell trait  Stable. Asymptomatic. Observe. The patient is up to date on pneumonia vaccination.     13. Secondary renal hyperparathyroidism  Stable. Asymptomatic. Observe.     14. Tortuous aorta  Patient with tortuous Aorta.  Stable/asymptomatic. Currently stable on lipid lowering medication and b/p monitoring.      15. Mild episode of recurrent major depressive disorder  The current medical regimen is effective;  continue present plan and medications.     16. Anemia of chronic kidney failure, stage 4 (severe)  Stable. Asymptomatic. Observe.     17. Vitamin D deficiency disease  The current medical regimen is effective;  continue present plan and medications.   - ergocalciferol (ERGOCALCIFEROL) 50,000 unit Cap; Take 1 capsule (50,000 Units total) by mouth every 30 days.  Dispense: 90 capsule; Refill: 3    18. Morbid obesity with body mass index (BMI) of 40.0 to 44.9 in adult  The patient is asked to make an attempt to improve diet and exercise patterns to aid in medical management of this problem.     19. Encounter for FIT (fecal immunochemical test) screening    - Fecal Immunochemical Test (iFOBT); Future    20. Encounter for screening mammogram for breast cancer    - Mammo Digital Screening Bilat; Future          Follow up in  about 6 months (around 10/5/2019), or if symptoms worsen or fail to improve, for follow up chronic medical conditions.. or sooner as needed.

## 2019-04-10 ENCOUNTER — HOSPITAL ENCOUNTER (OUTPATIENT)
Dept: WOUND CARE | Facility: HOSPITAL | Age: 74
Discharge: HOME | End: 2019-04-10
Attending: FAMILY MEDICINE
Payer: MEDICARE

## 2019-04-10 DIAGNOSIS — I89.0 LYMPHEDEMA: ICD-10-CM

## 2019-04-10 DIAGNOSIS — L97.912 DIABETIC ULCER OF RIGHT LOWER LEG ASSOCIATED WITH TYPE 2 DIABETES MELLITUS, WITH FAT LAYER EXPOSED: Primary | ICD-10-CM

## 2019-04-10 DIAGNOSIS — E11.622 DIABETIC ULCER OF RIGHT LOWER LEG ASSOCIATED WITH TYPE 2 DIABETES MELLITUS, WITH FAT LAYER EXPOSED: Primary | ICD-10-CM

## 2019-04-10 DIAGNOSIS — I10 ESSENTIAL HYPERTENSION: ICD-10-CM

## 2019-04-10 PROCEDURE — 99214 OFFICE O/P EST MOD 30 MIN: CPT | Performed by: FAMILY MEDICINE

## 2019-04-10 PROCEDURE — 99214 OFFICE O/P EST MOD 30 MIN: CPT | Mod: HCNC,,, | Performed by: FAMILY MEDICINE

## 2019-04-10 PROCEDURE — 99214 PR OFFICE/OUTPT VISIT, EST, LEVL IV, 30-39 MIN: ICD-10-PCS | Mod: HCNC,,, | Performed by: FAMILY MEDICINE

## 2019-04-13 ENCOUNTER — LAB VISIT (OUTPATIENT)
Dept: LAB | Facility: HOSPITAL | Age: 74
End: 2019-04-13
Attending: INTERNAL MEDICINE
Payer: MEDICARE

## 2019-04-13 DIAGNOSIS — Z12.11 ENCOUNTER FOR FIT (FECAL IMMUNOCHEMICAL TEST) SCREENING: ICD-10-CM

## 2019-04-13 PROCEDURE — 82274 ASSAY TEST FOR BLOOD FECAL: CPT | Mod: HCNC

## 2019-04-15 LAB — HEMOCCULT STL QL IA: NEGATIVE

## 2019-04-17 ENCOUNTER — HOSPITAL ENCOUNTER (OUTPATIENT)
Dept: WOUND CARE | Facility: HOSPITAL | Age: 74
Discharge: HOME | End: 2019-04-17
Attending: FAMILY MEDICINE
Payer: MEDICARE

## 2019-04-17 DIAGNOSIS — E78.5 HYPERLIPIDEMIA LDL GOAL <100: ICD-10-CM

## 2019-04-17 DIAGNOSIS — I89.0 LYMPHEDEMA: ICD-10-CM

## 2019-04-17 DIAGNOSIS — E11.622 DIABETIC ULCER OF RIGHT LOWER LEG ASSOCIATED WITH TYPE 2 DIABETES MELLITUS, WITH FAT LAYER EXPOSED: Primary | ICD-10-CM

## 2019-04-17 DIAGNOSIS — L97.912 DIABETIC ULCER OF RIGHT LOWER LEG ASSOCIATED WITH TYPE 2 DIABETES MELLITUS, WITH FAT LAYER EXPOSED: Primary | ICD-10-CM

## 2019-04-17 DIAGNOSIS — I10 ESSENTIAL HYPERTENSION: ICD-10-CM

## 2019-04-17 PROCEDURE — 99213 OFFICE O/P EST LOW 20 MIN: CPT | Performed by: FAMILY MEDICINE

## 2019-04-17 RX ORDER — ATORVASTATIN CALCIUM 10 MG/1
TABLET, FILM COATED ORAL
Qty: 90 TABLET | Refills: 1 | Status: SHIPPED | OUTPATIENT
Start: 2019-04-17 | End: 2020-09-30

## 2019-04-24 ENCOUNTER — HOSPITAL ENCOUNTER (OUTPATIENT)
Dept: WOUND CARE | Facility: HOSPITAL | Age: 74
Discharge: HOME | End: 2019-04-24
Attending: FAMILY MEDICINE
Payer: MEDICARE

## 2019-04-24 DIAGNOSIS — L97.912 DIABETIC ULCER OF RIGHT LOWER LEG ASSOCIATED WITH TYPE 2 DIABETES MELLITUS, WITH FAT LAYER EXPOSED: Primary | ICD-10-CM

## 2019-04-24 DIAGNOSIS — I89.0 LYMPHEDEMA: ICD-10-CM

## 2019-04-24 DIAGNOSIS — E11.622 DIABETIC ULCER OF RIGHT LOWER LEG ASSOCIATED WITH TYPE 2 DIABETES MELLITUS, WITH FAT LAYER EXPOSED: Primary | ICD-10-CM

## 2019-04-24 DIAGNOSIS — I10 ESSENTIAL HYPERTENSION: ICD-10-CM

## 2019-04-24 PROCEDURE — 99214 OFFICE O/P EST MOD 30 MIN: CPT | Mod: HCNC,,, | Performed by: FAMILY MEDICINE

## 2019-04-24 PROCEDURE — 99214 PR OFFICE/OUTPT VISIT, EST, LEVL IV, 30-39 MIN: ICD-10-PCS | Mod: HCNC,,, | Performed by: FAMILY MEDICINE

## 2019-04-24 PROCEDURE — 99213 OFFICE O/P EST LOW 20 MIN: CPT | Performed by: FAMILY MEDICINE

## 2019-05-15 ENCOUNTER — HOSPITAL ENCOUNTER (OUTPATIENT)
Dept: WOUND CARE | Facility: HOSPITAL | Age: 74
Discharge: HOME | End: 2019-05-15
Attending: FAMILY MEDICINE
Payer: MEDICARE

## 2019-05-15 ENCOUNTER — TELEPHONE (OUTPATIENT)
Dept: NEPHROLOGY | Facility: CLINIC | Age: 74
End: 2019-05-15

## 2019-05-15 DIAGNOSIS — I10 ESSENTIAL HYPERTENSION: ICD-10-CM

## 2019-05-15 DIAGNOSIS — N39.0 UTI (URINARY TRACT INFECTION), UNCOMPLICATED: ICD-10-CM

## 2019-05-15 DIAGNOSIS — L97.912 DIABETIC ULCER OF RIGHT LOWER LEG ASSOCIATED WITH TYPE 2 DIABETES MELLITUS, WITH FAT LAYER EXPOSED: Primary | ICD-10-CM

## 2019-05-15 DIAGNOSIS — I89.0 LYMPHEDEMA: ICD-10-CM

## 2019-05-15 DIAGNOSIS — N18.4 CHRONIC KIDNEY DISEASE, STAGE IV (SEVERE): Primary | ICD-10-CM

## 2019-05-15 DIAGNOSIS — E11.622 DIABETIC ULCER OF RIGHT LOWER LEG ASSOCIATED WITH TYPE 2 DIABETES MELLITUS, WITH FAT LAYER EXPOSED: Primary | ICD-10-CM

## 2019-05-15 PROCEDURE — 99214 PR OFFICE/OUTPT VISIT, EST, LEVL IV, 30-39 MIN: ICD-10-PCS | Mod: HCNC,,, | Performed by: FAMILY MEDICINE

## 2019-05-15 PROCEDURE — 99214 OFFICE O/P EST MOD 30 MIN: CPT | Performed by: FAMILY MEDICINE

## 2019-05-15 PROCEDURE — 99214 OFFICE O/P EST MOD 30 MIN: CPT | Mod: HCNC,,, | Performed by: FAMILY MEDICINE

## 2019-05-22 ENCOUNTER — HOSPITAL ENCOUNTER (OUTPATIENT)
Dept: WOUND CARE | Facility: HOSPITAL | Age: 74
Discharge: HOME | End: 2019-05-22
Attending: FAMILY MEDICINE
Payer: MEDICARE

## 2019-05-22 DIAGNOSIS — E11.622 DIABETIC ULCER OF RIGHT LOWER LEG ASSOCIATED WITH TYPE 2 DIABETES MELLITUS, WITH FAT LAYER EXPOSED: Primary | ICD-10-CM

## 2019-05-22 DIAGNOSIS — I10 ESSENTIAL HYPERTENSION: ICD-10-CM

## 2019-05-22 DIAGNOSIS — I89.0 LYMPHEDEMA: ICD-10-CM

## 2019-05-22 DIAGNOSIS — L97.912 DIABETIC ULCER OF RIGHT LOWER LEG ASSOCIATED WITH TYPE 2 DIABETES MELLITUS, WITH FAT LAYER EXPOSED: Primary | ICD-10-CM

## 2019-05-22 PROCEDURE — 99214 PR OFFICE/OUTPT VISIT, EST, LEVL IV, 30-39 MIN: ICD-10-PCS | Mod: HCNC,,, | Performed by: FAMILY MEDICINE

## 2019-05-22 PROCEDURE — 99214 OFFICE O/P EST MOD 30 MIN: CPT | Mod: HCNC,,, | Performed by: FAMILY MEDICINE

## 2019-05-22 PROCEDURE — 99213 OFFICE O/P EST LOW 20 MIN: CPT | Performed by: FAMILY MEDICINE

## 2019-05-22 RX ORDER — CLOPIDOGREL BISULFATE 75 MG/1
TABLET ORAL
Qty: 90 TABLET | Refills: 1 | Status: SHIPPED | OUTPATIENT
Start: 2019-05-22 | End: 2020-02-05

## 2019-05-27 ENCOUNTER — TELEPHONE (OUTPATIENT)
Dept: NEPHROLOGY | Facility: CLINIC | Age: 74
End: 2019-05-27

## 2019-05-27 ENCOUNTER — TELEPHONE (OUTPATIENT)
Dept: PODIATRY | Facility: CLINIC | Age: 74
End: 2019-05-27

## 2019-05-27 RX ORDER — AMOXICILLIN AND CLAVULANATE POTASSIUM 500; 125 MG/1; MG/1
1 TABLET, FILM COATED ORAL 2 TIMES DAILY
Qty: 20 TABLET | Refills: 1 | Status: SHIPPED | OUTPATIENT
Start: 2019-05-27 | End: 2019-05-27

## 2019-05-27 RX ORDER — AMOXICILLIN AND CLAVULANATE POTASSIUM 500; 125 MG/1; MG/1
1 TABLET, FILM COATED ORAL 2 TIMES DAILY
Qty: 20 TABLET | Refills: 1 | Status: SHIPPED | OUTPATIENT
Start: 2019-05-27 | End: 2019-06-06

## 2019-05-27 NOTE — TELEPHONE ENCOUNTER
----- Message from Rossy [email or web address] sent at 5/27/2019  3:23 PM CDT -----  ..Type:  Sooner Appointment Request    Patient is requesting a sooner appointment.  Patient declined first available appointment listed as well as another facility and provider .  Patient will not accept being placed on the waitlist and is requesting a message be sent to doctor.    Name of Caller: Erica    When is the first available appointment?  06/17    Symptoms: toe nail clipping    Would the patient rather a call back or a response via [facility]? Call back     Best Call Back Number: [phone]

## 2019-05-29 ENCOUNTER — HOSPITAL ENCOUNTER (OUTPATIENT)
Dept: WOUND CARE | Facility: HOSPITAL | Age: 74
Discharge: HOME | End: 2019-05-29
Attending: FAMILY MEDICINE
Payer: MEDICARE

## 2019-05-29 DIAGNOSIS — I10 ESSENTIAL HYPERTENSION: ICD-10-CM

## 2019-05-29 DIAGNOSIS — E11.622 DIABETIC ULCER OF RIGHT LOWER LEG ASSOCIATED WITH TYPE 2 DIABETES MELLITUS, WITH FAT LAYER EXPOSED: Primary | ICD-10-CM

## 2019-05-29 DIAGNOSIS — L97.912 DIABETIC ULCER OF RIGHT LOWER LEG ASSOCIATED WITH TYPE 2 DIABETES MELLITUS, WITH FAT LAYER EXPOSED: Primary | ICD-10-CM

## 2019-05-29 DIAGNOSIS — I89.0 LYMPHEDEMA: ICD-10-CM

## 2019-05-29 PROCEDURE — 99214 PR OFFICE/OUTPT VISIT, EST, LEVL IV, 30-39 MIN: ICD-10-PCS | Mod: HCNC,,, | Performed by: FAMILY MEDICINE

## 2019-05-29 PROCEDURE — 99214 OFFICE O/P EST MOD 30 MIN: CPT | Mod: HCNC,,, | Performed by: FAMILY MEDICINE

## 2019-05-29 PROCEDURE — 99213 OFFICE O/P EST LOW 20 MIN: CPT | Performed by: FAMILY MEDICINE

## 2019-05-30 DIAGNOSIS — N18.30 CKD (CHRONIC KIDNEY DISEASE) STAGE 3, GFR 30-59 ML/MIN: ICD-10-CM

## 2019-05-30 DIAGNOSIS — Z86.73 HX-TIA (TRANSIENT ISCHEMIC ATTACK): ICD-10-CM

## 2019-05-30 DIAGNOSIS — I10 ESSENTIAL HYPERTENSION: ICD-10-CM

## 2019-05-30 DIAGNOSIS — G47.33 OBSTRUCTIVE SLEEP APNEA ON CPAP: ICD-10-CM

## 2019-05-30 RX ORDER — LOSARTAN POTASSIUM 100 MG/1
TABLET ORAL
Qty: 90 TABLET | Refills: 3 | Status: ON HOLD | OUTPATIENT
Start: 2019-05-30 | End: 2019-07-25 | Stop reason: HOSPADM

## 2019-06-03 DIAGNOSIS — F32.1 MODERATE SINGLE CURRENT EPISODE OF MAJOR DEPRESSIVE DISORDER: ICD-10-CM

## 2019-06-03 RX ORDER — CITALOPRAM 10 MG/1
TABLET ORAL
Qty: 90 TABLET | Refills: 1 | Status: SHIPPED | OUTPATIENT
Start: 2019-06-03 | End: 2019-12-30

## 2019-06-05 ENCOUNTER — HOSPITAL ENCOUNTER (OUTPATIENT)
Dept: WOUND CARE | Facility: HOSPITAL | Age: 74
Discharge: HOME | End: 2019-06-05
Attending: FAMILY MEDICINE
Payer: MEDICARE

## 2019-06-05 ENCOUNTER — OFFICE VISIT (OUTPATIENT)
Dept: CARDIOLOGY | Facility: CLINIC | Age: 74
End: 2019-06-05
Payer: MEDICARE

## 2019-06-05 VITALS
HEART RATE: 62 BPM | WEIGHT: 277.75 LBS | DIASTOLIC BLOOD PRESSURE: 85 MMHG | SYSTOLIC BLOOD PRESSURE: 191 MMHG | BODY MASS INDEX: 46.28 KG/M2 | HEIGHT: 65 IN | OXYGEN SATURATION: 96 %

## 2019-06-05 DIAGNOSIS — I89.0 LYMPHEDEMA: ICD-10-CM

## 2019-06-05 DIAGNOSIS — E66.01 MORBID OBESITY WITH BODY MASS INDEX (BMI) OF 40.0 TO 44.9 IN ADULT: ICD-10-CM

## 2019-06-05 DIAGNOSIS — E78.5 HYPERLIPIDEMIA LDL GOAL <100: ICD-10-CM

## 2019-06-05 DIAGNOSIS — I50.33 DIASTOLIC CHF, ACUTE ON CHRONIC: Primary | ICD-10-CM

## 2019-06-05 DIAGNOSIS — L97.912 DIABETIC ULCER OF RIGHT LOWER LEG ASSOCIATED WITH TYPE 2 DIABETES MELLITUS, WITH FAT LAYER EXPOSED: Primary | ICD-10-CM

## 2019-06-05 DIAGNOSIS — I10 ESSENTIAL HYPERTENSION: ICD-10-CM

## 2019-06-05 DIAGNOSIS — I10 HTN (HYPERTENSION): ICD-10-CM

## 2019-06-05 DIAGNOSIS — E11.622 DIABETIC ULCER OF RIGHT LOWER LEG ASSOCIATED WITH TYPE 2 DIABETES MELLITUS, WITH FAT LAYER EXPOSED: Primary | ICD-10-CM

## 2019-06-05 DIAGNOSIS — E66.01 MORBID OBESITY WITH BMI OF 40.0-44.9, ADULT: ICD-10-CM

## 2019-06-05 DIAGNOSIS — N18.30 TYPE 2 DIABETES MELLITUS WITH STAGE 3 CHRONIC KIDNEY DISEASE, WITH LONG-TERM CURRENT USE OF INSULIN: ICD-10-CM

## 2019-06-05 DIAGNOSIS — Z79.4 TYPE 2 DIABETES MELLITUS WITH STAGE 3 CHRONIC KIDNEY DISEASE, WITH LONG-TERM CURRENT USE OF INSULIN: ICD-10-CM

## 2019-06-05 DIAGNOSIS — E11.22 TYPE 2 DIABETES MELLITUS WITH STAGE 3 CHRONIC KIDNEY DISEASE, WITH LONG-TERM CURRENT USE OF INSULIN: ICD-10-CM

## 2019-06-05 PROCEDURE — 99499 RISK ADDL DX/OHS AUDIT: ICD-10-PCS | Mod: HCNC,S$GLB,, | Performed by: INTERNAL MEDICINE

## 2019-06-05 PROCEDURE — 3045F PR MOST RECENT HEMOGLOBIN A1C LEVEL 7.0-9.0%: ICD-10-PCS | Mod: HCNC,CPTII,S$GLB, | Performed by: INTERNAL MEDICINE

## 2019-06-05 PROCEDURE — 99999 PR PBB SHADOW E&M-EST. PATIENT-LVL III: ICD-10-PCS | Mod: PBBFAC,HCNC,, | Performed by: INTERNAL MEDICINE

## 2019-06-05 PROCEDURE — 99214 OFFICE O/P EST MOD 30 MIN: CPT | Performed by: FAMILY MEDICINE

## 2019-06-05 PROCEDURE — 3077F SYST BP >= 140 MM HG: CPT | Mod: HCNC,CPTII,S$GLB, | Performed by: INTERNAL MEDICINE

## 2019-06-05 PROCEDURE — 99214 PR OFFICE/OUTPT VISIT, EST, LEVL IV, 30-39 MIN: ICD-10-PCS | Mod: HCNC,,, | Performed by: FAMILY MEDICINE

## 2019-06-05 PROCEDURE — 99214 OFFICE O/P EST MOD 30 MIN: CPT | Mod: HCNC,,, | Performed by: FAMILY MEDICINE

## 2019-06-05 PROCEDURE — 3079F DIAST BP 80-89 MM HG: CPT | Mod: HCNC,CPTII,S$GLB, | Performed by: INTERNAL MEDICINE

## 2019-06-05 PROCEDURE — 3079F PR MOST RECENT DIASTOLIC BLOOD PRESSURE 80-89 MM HG: ICD-10-PCS | Mod: HCNC,CPTII,S$GLB, | Performed by: INTERNAL MEDICINE

## 2019-06-05 PROCEDURE — 1101F PT FALLS ASSESS-DOCD LE1/YR: CPT | Mod: HCNC,CPTII,S$GLB, | Performed by: INTERNAL MEDICINE

## 2019-06-05 PROCEDURE — 99999 PR PBB SHADOW E&M-EST. PATIENT-LVL III: CPT | Mod: PBBFAC,HCNC,, | Performed by: INTERNAL MEDICINE

## 2019-06-05 PROCEDURE — 3045F PR MOST RECENT HEMOGLOBIN A1C LEVEL 7.0-9.0%: CPT | Mod: HCNC,CPTII,S$GLB, | Performed by: INTERNAL MEDICINE

## 2019-06-05 PROCEDURE — 93000 EKG 12-LEAD: ICD-10-PCS | Mod: HCNC,S$GLB,, | Performed by: INTERNAL MEDICINE

## 2019-06-05 PROCEDURE — 99214 OFFICE O/P EST MOD 30 MIN: CPT | Mod: HCNC,S$GLB,, | Performed by: INTERNAL MEDICINE

## 2019-06-05 PROCEDURE — 1101F PR PT FALLS ASSESS DOC 0-1 FALLS W/OUT INJ PAST YR: ICD-10-PCS | Mod: HCNC,CPTII,S$GLB, | Performed by: INTERNAL MEDICINE

## 2019-06-05 PROCEDURE — 99499 UNLISTED E&M SERVICE: CPT | Mod: HCNC,S$GLB,, | Performed by: INTERNAL MEDICINE

## 2019-06-05 PROCEDURE — 99214 PR OFFICE/OUTPT VISIT, EST, LEVL IV, 30-39 MIN: ICD-10-PCS | Mod: HCNC,S$GLB,, | Performed by: INTERNAL MEDICINE

## 2019-06-05 PROCEDURE — 3077F PR MOST RECENT SYSTOLIC BLOOD PRESSURE >= 140 MM HG: ICD-10-PCS | Mod: HCNC,CPTII,S$GLB, | Performed by: INTERNAL MEDICINE

## 2019-06-05 PROCEDURE — 93000 ELECTROCARDIOGRAM COMPLETE: CPT | Mod: HCNC,S$GLB,, | Performed by: INTERNAL MEDICINE

## 2019-06-05 RX ORDER — METOLAZONE 2.5 MG/1
2.5 TABLET ORAL DAILY
Qty: 30 TABLET | Refills: 0 | Status: SHIPPED | OUTPATIENT
Start: 2019-06-05 | End: 2019-07-19

## 2019-06-05 RX ORDER — METOLAZONE 2.5 MG/1
2.5 TABLET ORAL DAILY
Qty: 30 TABLET | Refills: 0 | Status: SHIPPED | OUTPATIENT
Start: 2019-06-05 | End: 2019-06-05 | Stop reason: SDUPTHER

## 2019-06-05 NOTE — PROGRESS NOTES
Subjective:    Patient ID:  Erica Leblanc is a 74 y.o. female who presents for evaluation of No chief complaint on file.      HPI     previous history:  Here for follow-up of diastolic heart failure.  She says she has been compliant with medicines and sodium restriction.  She has been unable to do any exercise secondary to diabetic foot wound being followed in [facility].  She had 1 wound that healed and is currently wrapped for another layer that got exposed.  She denies any PND orthopnea.  She has experienced now dizziness to the point of presyncope or syncope nurse fluid in the swelling for lower extremities stable.    Today:  Her follow-up of diastolic heart failure.  Since we saw her last she has gained over 20 lb says she is retaining fluid.  She did initially double up on her Lasix but was told to decrease when she followed up with wound care clinic.  She denies any worsening cardiopulmonary complaints.  She has experienced no PND, orthopnea or worsening lower extremity edema currently.  She denies any dizziness, presyncope or syncope.  She says she has been compliant with medicines well as sodium restriction.        Review of Systems   Constitution: Negative.   HENT: Negative.    Eyes: Negative.    Cardiovascular: Positive for dyspnea on exertion. Negative for chest pain, irregular heartbeat, leg swelling, near-syncope, orthopnea, palpitations, paroxysmal nocturnal dyspnea and syncope.   Respiratory: Positive for shortness of breath.    Skin: Negative.    Musculoskeletal: Negative.    Gastrointestinal: Negative for abdominal pain, constipation and diarrhea.   Genitourinary: Negative for dysuria.   Neurological: Negative.    Psychiatric/Behavioral: Negative.         Objective:    Physical Exam   Constitutional: She is oriented to person, place, and time. She appears well-developed and well-nourished.   HENT:   Head: Normocephalic and atraumatic.   Eyes: Pupils are equal, round, and reactive to  light. Conjunctivae and EOM are normal.   Neck: Normal range of motion. Neck supple. No thyromegaly present.   Cardiovascular: Normal rate and regular rhythm.   No murmur heard.  Pulmonary/Chest: Effort normal and breath sounds normal. No respiratory distress.   Abdominal: Soft. Bowel sounds are normal.   Musculoskeletal: She exhibits no edema.   Neurological: She is alert and oriented to person, place, and time.   Skin: Skin is warm and dry.   Psychiatric: She has a normal mood and affect. Her behavior is normal.           Echo: 6-17  CONCLUSIONS     1 - Normal left ventricular systolic function (EF 65-70%).     2 - No diagnostic regional wall motion abnormalities.     3 - Concentric remodeling.     4 - Impaired LV relaxation, elevated LAP (grade 2 diastolic dysfunction).     5 - Trivial tricuspid regurgitation.     6 - The estimated PA systolic pressure is greater than 18 mmHg.     NST: 7-17  Impression: NORMAL MYOCARDIAL PERFUSION  1. The perfusion scan is free of evidence for myocardial ischemia or injury.   2. Resting wall motion is physiologic.   3. Visually estimated LV function is normal.   4. The ventricular volumes are normal at rest and stress.   5. The extracardiac distribution of radioactivity is normal.     Carotid ultrasound:  5-18  CONCLUSIONS   There is 0 - 19% right Internal Carotid stenosis.  There is 0 - 19% left Internal Carotid stenosis.    PAM:  10-18  · Elevated bilateral PAM suggesting calcified noncompressible vessels     LDL-53    3-19    Lower extremity arterial ultrasound:  · No hemodynamically significant plaque bilaterally  · PAM consistent with medial calcinosis  Assessment:       1. Diastolic CHF, acute on chronic    2. Type 2 diabetes mellitus with stage 3 chronic kidney disease, with long-term current use of insulin    3. Hyperlipidemia LDL goal <100    4. Morbid obesity with BMI of 40.0-44.9, adult    5. Uncontrolled type 2 diabetes mellitus with peripheral circulatory disorder     6. Morbid obesity with body mass index (BMI) of 40.0 to 44.9 in adult         Plan:       -Continue current medical therapy  -Emphasized compliance with medicines and sodium restriction  -Encouraged exercise as tolerated  -follow-up with wound care clinic, if poor wound healing will consider angio  -enroll digital hypertension clinic  -renal artery ultrasound  -metolazone x5 days in addition to current Lasix regimen  -worsening issues go to ED    Return to clinic in 6 mos

## 2019-06-12 ENCOUNTER — HOSPITAL ENCOUNTER (OUTPATIENT)
Dept: WOUND CARE | Facility: HOSPITAL | Age: 74
Discharge: HOME | End: 2019-06-12
Attending: FAMILY MEDICINE
Payer: MEDICARE

## 2019-06-12 ENCOUNTER — HOSPITAL ENCOUNTER (OUTPATIENT)
Dept: CARDIOLOGY | Facility: HOSPITAL | Age: 74
Discharge: HOME | End: 2019-06-12
Attending: INTERNAL MEDICINE
Payer: MEDICARE

## 2019-06-12 DIAGNOSIS — I10 ESSENTIAL HYPERTENSION: ICD-10-CM

## 2019-06-12 DIAGNOSIS — I89.0 LYMPHEDEMA: ICD-10-CM

## 2019-06-12 DIAGNOSIS — I10 HTN (HYPERTENSION): ICD-10-CM

## 2019-06-12 DIAGNOSIS — I73.9 PVD (PERIPHERAL VASCULAR DISEASE): ICD-10-CM

## 2019-06-12 DIAGNOSIS — E11.622 DIABETIC ULCER OF RIGHT LOWER LEG ASSOCIATED WITH TYPE 2 DIABETES MELLITUS, WITH FAT LAYER EXPOSED: Primary | ICD-10-CM

## 2019-06-12 DIAGNOSIS — L97.912 DIABETIC ULCER OF RIGHT LOWER LEG ASSOCIATED WITH TYPE 2 DIABETES MELLITUS, WITH FAT LAYER EXPOSED: Primary | ICD-10-CM

## 2019-06-12 LAB
ABDOMINAL AORTA PROX EDV: 12 CM/S
ABDOMINAL AORTA PROX PSV: 94 CM/S
LEFT RENAL DIST DIAS: 18 CM/S
LEFT RENAL DIST SYS: 68 CM/S
LEFT RENAL MID DIAS: 20 CM/S
LEFT RENAL MID SYS: 46 CM/S
LEFT RENAL PROX DIAS: 17 CM/S
LEFT RENAL PROX RAR: 0.83
LEFT RENAL PROX SYS: 78 CM/S
LEFT RENAL ULTRASOUND ACCELERATION TIME MEASUREMENT 1: 144 MS
LEFT RENAL ULTRASOUND ACCELERATION TIME MEASUREMENT 2: 114 MS
LEFT RENAL ULTRASOUND ACCELERATION TIME MEASUREMENT 3: 24 MS
LEFT RENAL ULTRASOUND ACCELERATION TIME MEASUREMENT AVERAGE: 144 MS
LEFT RENAL ULTRASOUND KIDNEY SIZE MEASUREMENT 1: 11.8 CM
LEFT RENAL ULTRASOUND KIDNEY SIZE MEASUREMENT 2: 6.2 CM
LEFT RENAL ULTRASOUND KIDNEY SIZE MEASUREMENT 3: 4.7 CM
LEFT RENAL ULTRASOUND KIDNEY SIZE MEASUREMENT AVERAGE: 11.8 CM
LEFT RENAL ULTRASOUND RESISTIVE INDEX MEASUREMENT 1: 1
LEFT RENAL ULTRASOUND RESISTIVE INDEX MEASUREMENT 2: 0.64
LEFT RENAL ULTRASOUND RESISTIVE INDEX MEASUREMENT 3: 1
LEFT RENAL ULTRASOUND RESISTIVE INDEX MEASUREMENT AVERAGE: 1
OHS CV LEFT RENAL RAR: 0.83
OHS CV RIGHT RENAL RAR: 1.33
OHS CV US LEFT RENAL HIGHEST EDV: 20
OHS CV US LEFT RENAL HIGHEST PSV: 78
OHS CV US RIGHT RENAL HIGHEST EDV: 19
OHS CV US RIGHT RENAL HIGHEST PSV: 125
RIGHT RENAL DIST DIAS: 9 CM/S
RIGHT RENAL DIST SYS: 52 CM/S
RIGHT RENAL MID DIAS: 19 CM/S
RIGHT RENAL MID SYS: 125 CM/S
RIGHT RENAL ULTRASOUND ACCELERATION TIME MEASUREMENT 1: 42 MS
RIGHT RENAL ULTRASOUND ACCELERATION TIME MEASUREMENT 2: 84 MS
RIGHT RENAL ULTRASOUND ACCELERATION TIME MEASUREMENT 3: 60 MS
RIGHT RENAL ULTRASOUND ACCELERATION TIME MEASUREMENT AVERAGE: 84 MS
RIGHT RENAL ULTRASOUND KIDNEY SIZE MEASUREMENT 1: 12 CM
RIGHT RENAL ULTRASOUND KIDNEY SIZE MEASUREMENT 2: 4.5 CM
RIGHT RENAL ULTRASOUND KIDNEY SIZE MEASUREMENT 3: 5.5 CM
RIGHT RENAL ULTRASOUND KIDNEY SIZE MEASUREMENT AVERAGE: 12 CM
RIGHT RENAL ULTRASOUND RESISTIVE INDEX MEASUREMENT 1: 1
RIGHT RENAL ULTRASOUND RESISTIVE INDEX MEASUREMENT 2: 1
RIGHT RENAL ULTRASOUND RESISTIVE INDEX MEASUREMENT 3: 1
RIGHT RENAL ULTRASOUND RESISTIVE INDEX MEASUREMENT AVERAGE: 1

## 2019-06-12 PROCEDURE — 93975 VASCULAR STUDY: CPT | Mod: 26,HCNC,, | Performed by: INTERNAL MEDICINE

## 2019-06-12 PROCEDURE — 99214 OFFICE O/P EST MOD 30 MIN: CPT | Mod: 25,HCNC,, | Performed by: FAMILY MEDICINE

## 2019-06-12 PROCEDURE — 99215 OFFICE O/P EST HI 40 MIN: CPT | Performed by: FAMILY MEDICINE

## 2019-06-12 PROCEDURE — 99214 PR OFFICE/OUTPT VISIT, EST, LEVL IV, 30-39 MIN: ICD-10-PCS | Mod: 25,HCNC,, | Performed by: FAMILY MEDICINE

## 2019-06-12 PROCEDURE — 93975 CV US RENAL ARTERY STENOSIS HYPERTENSION COMPLETE (CUPID ONLY): ICD-10-PCS | Mod: 26,HCNC,, | Performed by: INTERNAL MEDICINE

## 2019-06-12 PROCEDURE — 93975 VASCULAR STUDY: CPT | Mod: 50,HCNC

## 2019-06-19 ENCOUNTER — HOSPITAL ENCOUNTER (OUTPATIENT)
Dept: WOUND CARE | Facility: HOSPITAL | Age: 74
Discharge: HOME | End: 2019-06-19
Attending: FAMILY MEDICINE
Payer: MEDICARE

## 2019-06-19 ENCOUNTER — OFFICE VISIT (OUTPATIENT)
Dept: CARDIOLOGY | Facility: CLINIC | Age: 74
End: 2019-06-19
Payer: MEDICARE

## 2019-06-19 VITALS
RESPIRATION RATE: 16 BRPM | DIASTOLIC BLOOD PRESSURE: 90 MMHG | WEIGHT: 262.38 LBS | HEART RATE: 87 BPM | BODY MASS INDEX: 43.66 KG/M2 | SYSTOLIC BLOOD PRESSURE: 148 MMHG | OXYGEN SATURATION: 89 %

## 2019-06-19 DIAGNOSIS — I73.9 PVD (PERIPHERAL VASCULAR DISEASE): ICD-10-CM

## 2019-06-19 DIAGNOSIS — I50.32 CHRONIC DIASTOLIC HEART FAILURE: ICD-10-CM

## 2019-06-19 DIAGNOSIS — I11.0 HYPERTENSIVE HEART DISEASE WITH CHRONIC DIASTOLIC CONGESTIVE HEART FAILURE: ICD-10-CM

## 2019-06-19 DIAGNOSIS — E78.5 HYPERLIPIDEMIA LDL GOAL <100: Primary | ICD-10-CM

## 2019-06-19 DIAGNOSIS — I50.32 HYPERTENSIVE HEART DISEASE WITH CHRONIC DIASTOLIC CONGESTIVE HEART FAILURE: ICD-10-CM

## 2019-06-19 DIAGNOSIS — I89.0 LYMPHEDEMA: ICD-10-CM

## 2019-06-19 DIAGNOSIS — I10 ESSENTIAL HYPERTENSION: ICD-10-CM

## 2019-06-19 DIAGNOSIS — E11.622 DIABETIC ULCER OF RIGHT LOWER LEG ASSOCIATED WITH TYPE 2 DIABETES MELLITUS, WITH FAT LAYER EXPOSED: Primary | ICD-10-CM

## 2019-06-19 DIAGNOSIS — I77.1 TORTUOUS AORTA: ICD-10-CM

## 2019-06-19 DIAGNOSIS — L97.912 DIABETIC ULCER OF RIGHT LOWER LEG ASSOCIATED WITH TYPE 2 DIABETES MELLITUS, WITH FAT LAYER EXPOSED: Primary | ICD-10-CM

## 2019-06-19 PROCEDURE — 99214 OFFICE O/P EST MOD 30 MIN: CPT | Mod: HCNC,S$GLB,, | Performed by: INTERNAL MEDICINE

## 2019-06-19 PROCEDURE — 3080F PR MOST RECENT DIASTOLIC BLOOD PRESSURE >= 90 MM HG: ICD-10-PCS | Mod: HCNC,CPTII,S$GLB, | Performed by: INTERNAL MEDICINE

## 2019-06-19 PROCEDURE — 1101F PR PT FALLS ASSESS DOC 0-1 FALLS W/OUT INJ PAST YR: ICD-10-PCS | Mod: HCNC,CPTII,S$GLB, | Performed by: INTERNAL MEDICINE

## 2019-06-19 PROCEDURE — 3077F SYST BP >= 140 MM HG: CPT | Mod: HCNC,CPTII,S$GLB, | Performed by: INTERNAL MEDICINE

## 2019-06-19 PROCEDURE — 99214 OFFICE O/P EST MOD 30 MIN: CPT | Performed by: FAMILY MEDICINE

## 2019-06-19 PROCEDURE — 99999 PR PBB SHADOW E&M-EST. PATIENT-LVL III: ICD-10-PCS | Mod: PBBFAC,HCNC,, | Performed by: INTERNAL MEDICINE

## 2019-06-19 PROCEDURE — 99214 PR OFFICE/OUTPT VISIT, EST, LEVL IV, 30-39 MIN: ICD-10-PCS | Mod: HCNC,S$GLB,, | Performed by: INTERNAL MEDICINE

## 2019-06-19 PROCEDURE — 99999 PR PBB SHADOW E&M-EST. PATIENT-LVL III: CPT | Mod: PBBFAC,HCNC,, | Performed by: INTERNAL MEDICINE

## 2019-06-19 PROCEDURE — 3077F PR MOST RECENT SYSTOLIC BLOOD PRESSURE >= 140 MM HG: ICD-10-PCS | Mod: HCNC,CPTII,S$GLB, | Performed by: INTERNAL MEDICINE

## 2019-06-19 PROCEDURE — 1101F PT FALLS ASSESS-DOCD LE1/YR: CPT | Mod: HCNC,CPTII,S$GLB, | Performed by: INTERNAL MEDICINE

## 2019-06-19 PROCEDURE — 3080F DIAST BP >= 90 MM HG: CPT | Mod: HCNC,CPTII,S$GLB, | Performed by: INTERNAL MEDICINE

## 2019-06-19 NOTE — PROGRESS NOTES
CARDIOVASCULAR CONSULTATION    REASON FOR CONSULT:   Erica Leblanc is a 74 y.o. female who presents for establishing care with me..      HISTORY OF PRESENT ILLNESS:     Patient is a very pleasant 74-year-old lady with a past medical history of heart failure with preserved ejection fraction, diabetes, chronic kidney disease, leg wounds, hypertension who is here for follow-up.  She denies any chest pains at rest on exertion, orthopnea, PND.  Denies any claudication like symptoms.  States that she goes to the [facility] Care [facility] regularly.  Has been doing salt restriction diet.      PAST MEDICAL HISTORY:     Past Medical History:   Diagnosis Date    Acute respiratory failure with hypoxia     Anemia of chronic kidney failure, stage 4 (severe) 4/5/2019    Cataracts, bilateral     CHF (congestive heart failure)     CKD (chronic kidney disease) stage 3, GFR 30-59 ml/min     CKD (chronic kidney disease) stage 3, GFR 30-59 ml/min     Controlled type 2 diabetes mellitus with proteinuria or albuminuria     Depression     Diabetes with neurologic complications     Diabetic retinopathy of both eyes     Edema     Glaucoma     History of colonic polyps     Hx-TIA (transient ischemic attack) 11/2008    Hyperlipidemia LDL goal < 100     Hypertension     Hypothyroidism     Major depressive disorder, single episode, mild 2/17/2016    Mixed incontinence urge and stress     Obesity     Obstructive sleep apnea on CPAP     Osteopenia     Proteinuria     Sickle cell trait     Trouble in sleeping     Type 2 diabetes mellitus with ophthalmic manifestations     Type 2 diabetes with stage 3 chronic kidney disease GFR 30-59     Type II or unspecified type diabetes mellitus with renal manifestations, uncontrolled(250.42)     Uncontrolled type 2 diabetes mellitus with peripheral circulatory disorder 4/5/2019    Urge incontinence 1/11/2016    Urge incontinence     Vitamin D deficiency disease        PAST  SURGICAL HISTORY:     Past Surgical History:   Procedure Laterality Date    BREAST BIOPSY      breast reduction Bilateral age 31    cataracts Bilateral      SECTION, LOW TRANSVERSE      x1    CHOLECYSTECTOMY      COLONOSCOPY N/A 2012    Performed by Keron Gunderson [facility] at [facility] (4TH FLR)    EYE SURGERY  2014, 2014    vitrectomy    EYE SURGERY Right 2016    HYSTERECTOMY  1986    TAHBSO (patient is unsure if ovaries removed)    OOPHORECTOMY      REFRACTIVE SURGERY      REPAIR, RETINAL DETACHMENT, WITH VITRECTOMY Right 2014    Performed by LILLIANA Coello MD at [facility] OR 1ST FLR    REPAIR, RETINAL DETACHMENT, WITH VITRECTOMY Left 2014    Performed by LILLIANA Coello MD at [facility] OR 1ST FLR    REPAIR-RETINA (VITRECTOMY) Right 2014    Performed by LILLIANA Coello MD at [facility] OR 1ST FLR    STRABISMUS REPAIR/ADJUSTABLE SUTURES Bilateral 2016    Performed by RABIA Danielson Jr., MD at [facility] OR 1ST FLR    TOTAL REDUCTION MAMMOPLASTY      approx 10 yrs ago       ALLERGIES AND MEDICATION:     Review of patient's allergies indicates:   Allergen Reactions    Ace inhibitors Other (See Comments)     Other reaction(s): cough        Medication List           Accurate as of 19  8:58 AM. If you have any questions, ask your nurse or doctor.               CONTINUE taking these medications    amLODIPine 10 MG tablet  Commonly known as:  NORVASC  Take 1 tablet (10 mg total) by mouth once daily.     atorvastatin 10 MG tablet  Commonly known as:  LIPITOR  TAKE 1 TABLET EVERY DAY     citalopram 10 MG tablet  Commonly known as:  CELEXA  TAKE 1 TABLET EVERY DAY     clopidogrel 75 mg tablet  Commonly known as:  PLAVIX  TAKE 1 TABLET EVERY DAY     docusate sodium 100 MG capsule  Commonly known as:  COLACE     ergocalciferol 50,000 unit Cap  Commonly known as:  ERGOCALCIFEROL  Take 1 capsule (50,000 Units total) by mouth every 30 days.     furosemide 40 MG  tablet  Commonly known as:  LASIX  Take 1 tablet (40 mg total) by mouth 2 (two) times daily.     HAIR,SKIN AND NAILS ORAL     HumaLOG U-100 Insulin 100 unit/mL injection  Generic drug:  insulin lispro     hydrALAZINE 50 MG tablet  Commonly known as:  APRESOLINE  Take 1 tablet (50 mg total) by mouth 3 (three) times daily.     KLOR-CON M20 20 MEQ tablet  Generic drug:  potassium chloride SA  TAKE 1 TABLET EVERY DAY     LANCETS MISC     levothyroxine 50 MCG tablet  Commonly known as:  SYNTHROID     losartan 100 MG tablet  Commonly known as:  COZAAR  TAKE 1 TABLET EVERY DAY     metOLazone 2.5 MG tablet  Commonly known as:  ZAROXOLYN  Take 1 tablet (2.5 mg total) by mouth once daily. X5 days 30 min prior to a.m. Lasix dose     metoprolol tartrate 100 MG tablet  Commonly known as:  LOPRESSOR  TAKE 1 TABLET TWICE DAILY     oxybutynin 15 MG Tr24  Commonly known as:  DITROPAN XL  TAKE 1 TABLET EVERY DAY     V-GO 20 [place]  Generic drug:  sub-q insulin device, 20 unit     VICTOZA 0.6 mg/0.1 mL (18 mg/3 mL) Pnij  Generic drug:  liraglutide 0.6 mg/0.1 mL (18 mg/3 mL) subq PNIJ            SOCIAL HISTORY:     Social History     Socioeconomic History    Marital status: Single     Spouse name: Not on file    Number of children: 5    Years of education: Not on file    Highest education level: Not on file   Occupational History    Occupation:      Employer: [employer]     Comment: part-time   Social Needs    Financial resource strain: Not on file    Food insecurity:     Worry: Not on file     Inability: Not on file    Transportation needs:     Medical: Not on file     Non-medical: Not on file   Tobacco Use    Smoking status: Never Smoker    Smokeless tobacco: Never Used   Substance and Sexual Activity    Alcohol use: No     Alcohol/week: 0.0 oz    Drug use: No    Sexual activity: Not Currently     Partners: Male     Birth control/protection: Post-menopausal, Surgical   Lifestyle    Physical  activity:     Days per week: Not on file     Minutes per session: Not on file    Stress: Not on file   Relationships    Social connections:     Talks on phone: Not on file     Gets together: Not on file     Attends Taoism service: Not on file     Active member of club or organization: Not on file     Attends meetings of clubs or organizations: Not on file     Relationship status: Not on file   Other Topics Concern    Not on file   Social History Narrative    Not on file       FAMILY HISTORY:     Family History   Problem Relation Age of Onset    Leukemia Father     Ovarian cancer Sister 35    Stroke Mother     Diabetes Mother     Hypertension Mother     Diabetes Paternal Grandmother     Breast cancer Maternal Aunt 65    HIV Brother     Achondroplasia Sister     Parkinsonism Maternal Aunt     Esophageal cancer Maternal Uncle         smoker    Amblyopia Neg Hx     Blindness Neg Hx     Cataracts Neg Hx     Glaucoma Neg Hx     Macular degeneration Neg Hx     Retinal detachment Neg Hx     Strabismus Neg Hx     Thyroid disease Neg Hx     Colon cancer Neg Hx        REVIEW OF SYSTEMS:   Review of Systems   Constitutional: Negative.    HENT: Negative.    Eyes: Negative.    Respiratory: Positive for shortness of breath.    Cardiovascular: Negative.    Gastrointestinal: Negative.    Genitourinary: Negative.    Musculoskeletal: Negative.    Skin: Negative.    Neurological: Negative.    Endo/Heme/Allergies: Negative.        A 10 point review of systems was performed and all the pertinent positives have been mentioned. Rest of review of systems was negative.        PHYSICAL EXAM:     Vitals:    06/19/19 0844   BP: (!) 148/90   Pulse: 87   Resp: 16    Body mass index is 43.66 kg/m².  Weight: 119 kg (262 lb 5.6 oz)         Physical Exam   Constitutional: She is oriented to person, place, and time. She appears well-developed and well-nourished. She is active.   HENT:   Head: Normocephalic and atraumatic.    Right Ear: Hearing normal.   Left Ear: Hearing normal.   Nose: Nose normal.   Mouth/Throat: Mucous membranes are normal.   Eyes: Pupils are equal, round, and reactive to light. Conjunctivae and lids are normal.   Neck: Normal range of motion. Neck supple.   Cardiovascular: Normal rate, regular rhythm, normal heart sounds, intact distal pulses and normal pulses.   Pulmonary/Chest: Effort normal and breath sounds normal.   Abdominal: Soft. Normal appearance. There is no tenderness.   Musculoskeletal: She exhibits edema. She exhibits no deformity.   Neurological: She is alert and oriented to person, place, and time.   Skin: Skin is warm, dry and intact.   Psychiatric: She has a normal mood and affect. Her speech is normal.   Nursing note and vitals reviewed.        DATA:     Laboratory:  CBC:  Recent Labs   Lab 03/03/18  0833 09/05/18  1656 02/21/19  1520   WHITE BLOOD CELL COUNT 7.22 5.68 6.12   HEMOGLOBIN 10.9 L 12.0 11.2 L   HEMATOCRIT 34.7 L 36.8 L 34.9 L   PLATELETS 239 240 223       CHEMISTRIES:  Recent Labs   Lab 06/20/17  1345  02/21/19  1520 03/23/19  1102 05/22/19  1659   GLUCOSE 88   < > 283 H 146 H 144 H   SODIUM 143   < > 140 141 138   POTASSIUM 4.0   < > 4.6 4.6 4.4   BUN BLD 43 H   < > 47 H 47 H 43 H   CREATININE 2.6 H   < > 3.0 H 3.0 H 3.3 H   EGFR IF  21 A   < > 17 A 17.1 A 15 A   EGFR IF NON- 18 A   < > 15 A 14.8 A 13 A   CALCIUM 9.0   < > 8.5 L 8.7 8.9   MAGNESIUM 2.0  --   --   --   --     < > = values in this interval not displayed.       CARDIAC BIOMARKERS:  Recent Labs   Lab 06/12/17  0703 06/12/17  1139   TROPONIN I 0.019 0.062 H       COAGS:  Recent Labs   Lab 06/12/17  0703   INR 0.9       LIPIDS/LFTS:  Recent Labs   Lab 01/06/18  0840 03/03/18  0833 09/05/18  1656 12/15/18  1012 03/23/19  1102   CHOLESTEROL 122 113 L  --   --  123   TRIGLYCERIDES 92 57  --   --  66   HDL 46 51  --   --  57   LDL CHOLESTEROL 57.6 L 50.6 L  --   --  52.8 L   NON-HDL CHOLESTEROL  76 62  --   --  66   AST 17 99 H 37 18 22   ALT 34 120 H 35 33 39       Hemoglobin A1C   Date Value Ref Range Status   03/23/2019 8.2 (H) 4.0 - 5.6 % Final     Comment:     ADA Screening Guidelines:  5.7-6.4%  Consistent with prediabetes  >or=6.5%  Consistent with diabetes  High levels of fetal hemoglobin interfere with the HbA1C  assay. Heterozygous hemoglobin variants (HbS, HgC, etc)do  not significantly interfere with this assay.   However, presence of multiple variants may affect accuracy.     12/15/2018 8.3 (H) 4.0 - 5.6 % Final     Comment:     ADA Screening Guidelines:  5.7-6.4%  Consistent with prediabetes  >or=6.5%  Consistent with diabetes  High levels of fetal hemoglobin interfere with the HbA1C  assay. Heterozygous hemoglobin variants (HbS, HgC, etc)do  not significantly interfere with this assay.   However, presence of multiple variants may affect accuracy.     09/05/2018 8.2 (H) 4.0 - 5.6 % Final     Comment:     ADA Screening Guidelines:  5.7-6.4%  Consistent with prediabetes  >or=6.5%  Consistent with diabetes  High levels of fetal hemoglobin interfere with the HbA1C  assay. Heterozygous hemoglobin variants (HbS, HgC, etc)do  not significantly interfere with this assay.   However, presence of multiple variants may affect accuracy.         TSH  Recent Labs   Lab 01/06/18  0840 12/15/18  1012 03/23/19  1102   TSH 2.240 1.722 3.298       The ASCVD Risk score (Norma SADIQ Jr., et al., 2013) failed to calculate for the following reasons:    The valid total cholesterol range is 130 to 320 mg/dL           Cardiovascular Testing:      Echo: 6-17  CONCLUSIONS     1 - Normal left ventricular systolic function (EF 65-70%).     2 - No diagnostic regional wall motion abnormalities.     3 - Concentric remodeling.     4 - Impaired LV relaxation, elevated LAP (grade 2 diastolic dysfunction).     5 - Trivial tricuspid regurgitation.     6 - The estimated PA systolic pressure is greater than 18 mmHg.      NST:  7-17  Impression: NORMAL MYOCARDIAL PERFUSION  1. The perfusion scan is free of evidence for myocardial ischemia or injury.   2. Resting wall motion is physiologic.   3. Visually estimated LV function is normal.   4. The ventricular volumes are normal at rest and stress.   5. The extracardiac distribution of radioactivity is normal.      Carotid ultrasound:  5-18  CONCLUSIONS   There is 0 - 19% right Internal Carotid stenosis.  There is 0 - 19% left Internal Carotid stenosis.     PAM:  10-18  · Elevated bilateral PAM suggesting calcified noncompressible vessels     LDL-53    3-19     Lower extremity arterial ultrasound:  · No hemodynamically significant plaque bilaterally  · PAM consistent with medial calcinosis    Renal artery ultrasound in June 2019:  · This was a technically difficult study. This study was limited due to excessive bowel gas.  · There is insignificant stenosis (0-59%) in the Right Renal Artery.  · Elevated right renal resistive index suggestive of intrinsic kidney disease.  · Right kidney 12.00 cm.  · There is insignificant stenosis (0-59%) in the Left Renal Artery.  · Elevated left renal resistive index suggestive of intrinsic kidney disease.  · Left kidney 11.80 cm.    Ultrasound legs in November 2018:    · No hemodynamically significant plaque bilaterally  · PAM consistent with medial calcinosis    ABIs in October 2018:    · Elevated bilateral PAM suggesting calcified noncompressible vessels          ASSESSMENT AND PLAN     Patient Active Problem List   Diagnosis    Morbid obesity with body mass index (BMI) of 40.0 to 44.9 in adult    Sickle cell trait    Osteopenia    Hyperlipidemia LDL goal <100    Obstructive sleep apnea on CPAP    Hypothyroidism (acquired)    Hx-TIA (transient ischemic attack)    Vitamin D deficiency disease    Proliferative diabetic retinopathy, both eyes    Diabetic macular edema, both eyes    Hypertensive retinopathy of both eyes    Cerebral microvascular  disease    CME (cystoid macular edema)    Hyphema    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    Pulmonary hypertension    Long-term insulin use    Type 2 diabetes, controlled, with neuropathy    Secondary renal hyperparathyroidism    Incomplete bladder emptying    Slow transit constipation    Postmenopausal atrophic vaginitis    Rectocele    Exotropia of right eye    Mixed incontinence urge and stress    Strabismus    Chronic diastolic heart failure    Mild episode of recurrent major depressive disorder    Tortuous aorta    History of TIAs    Uncontrolled diabetes mellitus with proliferative retinopathy    Diabetic ulcer of right lower leg associated with type 2 diabetes mellitus, with fat layer exposed    Non-pressure chronic ulcer of right calf with fat layer exposed    Recurrent UTI    Anemia of chronic kidney failure, stage 4 (severe)    Uncontrolled type 2 diabetes mellitus with peripheral circulatory disorder    Hypertensive heart disease with chronic diastolic congestive heart failure       1.  Hypertension:  Uncontrolled.  Increase hydralazine to 75 mg t.i.d..    2.  Heart failure with preserved ejection fraction.  On Lasix.  Have told patient to take daily weights and if weight increases more than 3-5 lb to take metolazone on top of her Lasix for 5 days.    3.  Diabetes:  Being managed by primary    4.  Continue follow-up with Wound care clinic.    5.  Dyslipidemia:  On Lipitor      Thank you very much for involving me in the care of your patient.  Please do not hesitate to contact me if there are any questions.      Murali Li MD, FACC, [facility]  Interventional Cardiologist, [facility].     Follow-up in 2 months      This note was dictated with the help of speech recognition software.  There might be un-intended errors and/or substitutions.

## 2019-06-19 NOTE — PATIENT INSTRUCTIONS

## 2019-06-26 ENCOUNTER — HOSPITAL ENCOUNTER (OUTPATIENT)
Dept: WOUND CARE | Facility: HOSPITAL | Age: 74
Discharge: HOME | End: 2019-06-26
Attending: FAMILY MEDICINE
Payer: MEDICARE

## 2019-06-26 PROCEDURE — 99214 OFFICE O/P EST MOD 30 MIN: CPT | Performed by: FAMILY MEDICINE

## 2019-06-27 RX ORDER — METOLAZONE 2.5 MG/1
TABLET ORAL
Qty: 30 TABLET | Refills: 0 | OUTPATIENT
Start: 2019-06-27

## 2019-07-02 ENCOUNTER — TELEPHONE (OUTPATIENT)
Dept: NEPHROLOGY | Facility: CLINIC | Age: 74
End: 2019-07-02

## 2019-07-02 DIAGNOSIS — N18.4 CHRONIC KIDNEY DISEASE (CKD), STAGE IV (SEVERE): Primary | ICD-10-CM

## 2019-07-03 ENCOUNTER — HOSPITAL ENCOUNTER (OUTPATIENT)
Dept: WOUND CARE | Facility: HOSPITAL | Age: 74
Discharge: HOME | End: 2019-07-03
Attending: INTERNAL MEDICINE
Payer: MEDICARE

## 2019-07-03 DIAGNOSIS — L97.212 NON-PRESSURE CHRONIC ULCER OF RIGHT CALF WITH FAT LAYER EXPOSED: Primary | ICD-10-CM

## 2019-07-03 DIAGNOSIS — L97.912 DIABETIC ULCER OF RIGHT LOWER LEG ASSOCIATED WITH TYPE 2 DIABETES MELLITUS, WITH FAT LAYER EXPOSED: ICD-10-CM

## 2019-07-03 DIAGNOSIS — E11.622 DIABETIC ULCER OF RIGHT LOWER LEG ASSOCIATED WITH TYPE 2 DIABETES MELLITUS, WITH FAT LAYER EXPOSED: ICD-10-CM

## 2019-07-03 DIAGNOSIS — I50.32 CHRONIC DIASTOLIC HEART FAILURE: ICD-10-CM

## 2019-07-03 PROCEDURE — 99214 PR OFFICE/OUTPT VISIT, EST, LEVL IV, 30-39 MIN: ICD-10-PCS | Mod: HCNC,,, | Performed by: INTERNAL MEDICINE

## 2019-07-03 PROCEDURE — 99214 OFFICE O/P EST MOD 30 MIN: CPT | Performed by: INTERNAL MEDICINE

## 2019-07-03 PROCEDURE — 99214 OFFICE O/P EST MOD 30 MIN: CPT | Mod: HCNC,,, | Performed by: INTERNAL MEDICINE

## 2019-07-10 ENCOUNTER — HOSPITAL ENCOUNTER (OUTPATIENT)
Dept: WOUND CARE | Facility: HOSPITAL | Age: 74
Discharge: HOME | End: 2019-07-10
Attending: FAMILY MEDICINE
Payer: MEDICARE

## 2019-07-10 DIAGNOSIS — E11.622 DIABETIC ULCER OF RIGHT LOWER LEG ASSOCIATED WITH TYPE 2 DIABETES MELLITUS, WITH FAT LAYER EXPOSED: ICD-10-CM

## 2019-07-10 DIAGNOSIS — L97.212 NON-PRESSURE CHRONIC ULCER OF RIGHT CALF WITH FAT LAYER EXPOSED: Primary | ICD-10-CM

## 2019-07-10 DIAGNOSIS — L97.912 DIABETIC ULCER OF RIGHT LOWER LEG ASSOCIATED WITH TYPE 2 DIABETES MELLITUS, WITH FAT LAYER EXPOSED: ICD-10-CM

## 2019-07-10 DIAGNOSIS — I10 ESSENTIAL HYPERTENSION: ICD-10-CM

## 2019-07-10 PROCEDURE — 99214 PR OFFICE/OUTPT VISIT, EST, LEVL IV, 30-39 MIN: ICD-10-PCS | Mod: HCNC,,, | Performed by: FAMILY MEDICINE

## 2019-07-10 PROCEDURE — 99214 OFFICE O/P EST MOD 30 MIN: CPT | Mod: HCNC,,, | Performed by: FAMILY MEDICINE

## 2019-07-10 PROCEDURE — 99214 OFFICE O/P EST MOD 30 MIN: CPT | Performed by: FAMILY MEDICINE

## 2019-07-19 ENCOUNTER — HOSPITAL ENCOUNTER (INPATIENT)
Facility: HOSPITAL | Age: 74
LOS: 6 days | Discharge: NURSING, REHAB OR LONG-TERM CARE | DRG: 638 | End: 2019-07-25
Attending: EMERGENCY MEDICINE | Admitting: HOSPITALIST
Payer: MEDICARE

## 2019-07-19 DIAGNOSIS — R47.01 EXPRESSIVE APHASIA: ICD-10-CM

## 2019-07-19 DIAGNOSIS — R41.82 ALTERED MENTAL STATUS: ICD-10-CM

## 2019-07-19 DIAGNOSIS — N18.9 CHRONIC KIDNEY DISEASE, UNSPECIFIED CKD STAGE: ICD-10-CM

## 2019-07-19 DIAGNOSIS — N39.0 URINARY TRACT INFECTION WITHOUT HEMATURIA, SITE UNSPECIFIED: ICD-10-CM

## 2019-07-19 DIAGNOSIS — E16.2 HYPOGLYCEMIA: Primary | ICD-10-CM

## 2019-07-19 PROBLEM — B99.9 INFECTIOUS ENCEPHALOPATHY: Status: ACTIVE | Noted: 2019-07-19

## 2019-07-19 PROBLEM — G93.49 INFECTIOUS ENCEPHALOPATHY: Status: ACTIVE | Noted: 2019-07-19

## 2019-07-19 LAB
ALBUMIN SERPL BCP-MCNC: 2.7 G/DL (ref 3.5–5.2)
ALP SERPL-CCNC: 118 U/L (ref 55–135)
ALT SERPL W/O P-5'-P-CCNC: 28 U/L (ref 10–44)
ANION GAP SERPL CALC-SCNC: 13 MMOL/L (ref 8–16)
AST SERPL-CCNC: 34 U/L (ref 10–40)
BACTERIA #/AREA URNS HPF: ABNORMAL /HPF
BASOPHILS # BLD AUTO: 0.01 K/UL (ref 0–0.2)
BASOPHILS NFR BLD: 0.2 % (ref 0–1.9)
BILIRUB SERPL-MCNC: 0.9 MG/DL (ref 0.1–1)
BILIRUB UR QL STRIP: NEGATIVE
BNP SERPL-MCNC: 2592 PG/ML (ref 0–99)
BUN SERPL-MCNC: 57 MG/DL (ref 8–23)
CALCIUM SERPL-MCNC: 8.9 MG/DL (ref 8.7–10.5)
CHLORIDE SERPL-SCNC: 105 MMOL/L (ref 95–110)
CLARITY UR: ABNORMAL
CO2 SERPL-SCNC: 25 MMOL/L (ref 23–29)
COLOR UR: YELLOW
CREAT SERPL-MCNC: 3.6 MG/DL (ref 0.5–1.4)
DIFFERENTIAL METHOD: ABNORMAL
EOSINOPHIL # BLD AUTO: 0 K/UL (ref 0–0.5)
EOSINOPHIL NFR BLD: 0 % (ref 0–8)
ERYTHROCYTE [DISTWIDTH] IN BLOOD BY AUTOMATED COUNT: 17.8 % (ref 11.5–14.5)
EST. GFR  (AFRICAN AMERICAN): 14 ML/MIN/1.73 M^2
EST. GFR  (NON AFRICAN AMERICAN): 12 ML/MIN/1.73 M^2
GLUCOSE SERPL-MCNC: 53 MG/DL (ref 70–110)
GLUCOSE UR QL STRIP: NEGATIVE
HCT VFR BLD AUTO: 42.9 % (ref 37–48.5)
HGB BLD-MCNC: 13.8 G/DL (ref 12–16)
HGB UR QL STRIP: NEGATIVE
HYALINE CASTS #/AREA URNS LPF: 0 /LPF
KETONES UR QL STRIP: NEGATIVE
LACTATE SERPL-SCNC: 1.9 MMOL/L (ref 0.5–2.2)
LACTATE SERPL-SCNC: 2.5 MMOL/L (ref 0.5–2.2)
LEUKOCYTE ESTERASE UR QL STRIP: ABNORMAL
LYMPHOCYTES # BLD AUTO: 0.5 K/UL (ref 1–4.8)
LYMPHOCYTES NFR BLD: 8.3 % (ref 18–48)
MAGNESIUM SERPL-MCNC: 2 MG/DL (ref 1.6–2.6)
MCH RBC QN AUTO: 24.9 PG (ref 27–31)
MCHC RBC AUTO-ENTMCNC: 32.2 G/DL (ref 32–36)
MCV RBC AUTO: 77 FL (ref 82–98)
MICROSCOPIC COMMENT: ABNORMAL
MONOCYTES # BLD AUTO: 0.7 K/UL (ref 0.3–1)
MONOCYTES NFR BLD: 12.3 % (ref 4–15)
NEUTROPHILS # BLD AUTO: 4.4 K/UL (ref 1.8–7.7)
NEUTROPHILS NFR BLD: 79.4 % (ref 38–73)
NITRITE UR QL STRIP: NEGATIVE
PH UR STRIP: 7 [PH] (ref 5–8)
PLATELET # BLD AUTO: 240 K/UL (ref 150–350)
PMV BLD AUTO: 9.8 FL (ref 9.2–12.9)
POCT GLUCOSE: 105 MG/DL (ref 70–110)
POCT GLUCOSE: 106 MG/DL (ref 70–110)
POCT GLUCOSE: 107 MG/DL (ref 70–110)
POCT GLUCOSE: 110 MG/DL (ref 70–110)
POCT GLUCOSE: 54 MG/DL (ref 70–110)
POCT GLUCOSE: 56 MG/DL (ref 70–110)
POCT GLUCOSE: 64 MG/DL (ref 70–110)
POTASSIUM SERPL-SCNC: 4 MMOL/L (ref 3.5–5.1)
PROT SERPL-MCNC: 8 G/DL (ref 6–8.4)
PROT UR QL STRIP: ABNORMAL
RBC # BLD AUTO: 5.55 M/UL (ref 4–5.4)
RBC #/AREA URNS HPF: 4 /HPF (ref 0–4)
SODIUM SERPL-SCNC: 143 MMOL/L (ref 136–145)
SP GR UR STRIP: 1.01 (ref 1–1.03)
SQUAMOUS #/AREA URNS HPF: 2 /HPF
TROPONIN I SERPL DL<=0.01 NG/ML-MCNC: 0.06 NG/ML (ref 0–0.03)
URN SPEC COLLECT METH UR: ABNORMAL
UROBILINOGEN UR STRIP-ACNC: NEGATIVE EU/DL
WBC # BLD AUTO: 5.55 K/UL (ref 3.9–12.7)
WBC #/AREA URNS HPF: 35 /HPF (ref 0–5)
WBC CLUMPS URNS QL MICRO: ABNORMAL

## 2019-07-19 PROCEDURE — 94761 N-INVAS EAR/PLS OXIMETRY MLT: CPT | Mod: HCNC

## 2019-07-19 PROCEDURE — 25000003 PHARM REV CODE 250: Mod: HCNC | Performed by: EMERGENCY MEDICINE

## 2019-07-19 PROCEDURE — 87186 SC STD MICRODIL/AGAR DIL: CPT | Mod: HCNC

## 2019-07-19 PROCEDURE — 27000190 HC CPAP FULL FACE MASK W/VALVE: Mod: HCNC

## 2019-07-19 PROCEDURE — 93010 ELECTROCARDIOGRAM REPORT: CPT | Mod: HCNC,,, | Performed by: INTERNAL MEDICINE

## 2019-07-19 PROCEDURE — 87077 CULTURE AEROBIC IDENTIFY: CPT | Mod: HCNC

## 2019-07-19 PROCEDURE — 85025 COMPLETE CBC W/AUTO DIFF WBC: CPT | Mod: HCNC

## 2019-07-19 PROCEDURE — 87088 URINE BACTERIA CULTURE: CPT | Mod: HCNC

## 2019-07-19 PROCEDURE — 96365 THER/PROPH/DIAG IV INF INIT: CPT | Mod: HCNC

## 2019-07-19 PROCEDURE — 84484 ASSAY OF TROPONIN QUANT: CPT | Mod: HCNC

## 2019-07-19 PROCEDURE — 96375 TX/PRO/DX INJ NEW DRUG ADDON: CPT | Mod: HCNC

## 2019-07-19 PROCEDURE — 99285 EMERGENCY DEPT VISIT HI MDM: CPT | Mod: 25,HCNC

## 2019-07-19 PROCEDURE — 87040 BLOOD CULTURE FOR BACTERIA: CPT | Mod: HCNC

## 2019-07-19 PROCEDURE — 83605 ASSAY OF LACTIC ACID: CPT | Mod: 91,HCNC

## 2019-07-19 PROCEDURE — 93010 EKG 12-LEAD: ICD-10-PCS | Mod: HCNC,,, | Performed by: INTERNAL MEDICINE

## 2019-07-19 PROCEDURE — 81000 URINALYSIS NONAUTO W/SCOPE: CPT | Mod: HCNC

## 2019-07-19 PROCEDURE — 25000003 PHARM REV CODE 250: Mod: HCNC | Performed by: HOSPITALIST

## 2019-07-19 PROCEDURE — 93005 ELECTROCARDIOGRAM TRACING: CPT | Mod: HCNC

## 2019-07-19 PROCEDURE — 82962 GLUCOSE BLOOD TEST: CPT | Mod: 59,HCNC

## 2019-07-19 PROCEDURE — 94660 CPAP INITIATION&MGMT: CPT | Mod: HCNC

## 2019-07-19 PROCEDURE — 87086 URINE CULTURE/COLONY COUNT: CPT | Mod: HCNC

## 2019-07-19 PROCEDURE — 83880 ASSAY OF NATRIURETIC PEPTIDE: CPT | Mod: HCNC

## 2019-07-19 PROCEDURE — 21400001 HC TELEMETRY ROOM: Mod: HCNC

## 2019-07-19 PROCEDURE — 80053 COMPREHEN METABOLIC PANEL: CPT | Mod: HCNC

## 2019-07-19 PROCEDURE — 83735 ASSAY OF MAGNESIUM: CPT | Mod: HCNC

## 2019-07-19 PROCEDURE — 99900035 HC TECH TIME PER 15 MIN (STAT): Mod: HCNC

## 2019-07-19 PROCEDURE — 63600175 PHARM REV CODE 636 W HCPCS: Mod: HCNC | Performed by: EMERGENCY MEDICINE

## 2019-07-19 RX ORDER — ATORVASTATIN CALCIUM 10 MG/1
10 TABLET, FILM COATED ORAL DAILY
Status: DISCONTINUED | OUTPATIENT
Start: 2019-07-20 | End: 2019-07-25 | Stop reason: HOSPADM

## 2019-07-19 RX ORDER — DOCUSATE SODIUM 100 MG/1
200 CAPSULE, LIQUID FILLED ORAL DAILY
Status: DISCONTINUED | OUTPATIENT
Start: 2019-07-20 | End: 2019-07-25 | Stop reason: HOSPADM

## 2019-07-19 RX ORDER — GLUCAGON 1 MG
1 KIT INJECTION
Status: DISCONTINUED | OUTPATIENT
Start: 2019-07-19 | End: 2019-07-25 | Stop reason: HOSPADM

## 2019-07-19 RX ORDER — ONDANSETRON 2 MG/ML
4 INJECTION INTRAMUSCULAR; INTRAVENOUS EVERY 8 HOURS PRN
Status: DISCONTINUED | OUTPATIENT
Start: 2019-07-19 | End: 2019-07-25 | Stop reason: HOSPADM

## 2019-07-19 RX ORDER — DEXTROSE 50 % IN WATER (D50W) INTRAVENOUS SYRINGE
25
Status: COMPLETED | OUTPATIENT
Start: 2019-07-19 | End: 2019-07-19

## 2019-07-19 RX ORDER — SODIUM CHLORIDE 0.9 % (FLUSH) 0.9 %
10 SYRINGE (ML) INJECTION
Status: DISCONTINUED | OUTPATIENT
Start: 2019-07-19 | End: 2019-07-25 | Stop reason: HOSPADM

## 2019-07-19 RX ORDER — CITALOPRAM 10 MG/1
10 TABLET ORAL DAILY
Status: DISCONTINUED | OUTPATIENT
Start: 2019-07-20 | End: 2019-07-21

## 2019-07-19 RX ORDER — INSULIN ASPART 100 [IU]/ML
0-5 INJECTION, SOLUTION INTRAVENOUS; SUBCUTANEOUS EVERY 6 HOURS PRN
Status: DISCONTINUED | OUTPATIENT
Start: 2019-07-19 | End: 2019-07-25 | Stop reason: HOSPADM

## 2019-07-19 RX ORDER — CLOPIDOGREL BISULFATE 75 MG/1
75 TABLET ORAL DAILY
Status: DISCONTINUED | OUTPATIENT
Start: 2019-07-20 | End: 2019-07-25 | Stop reason: HOSPADM

## 2019-07-19 RX ORDER — HYDRALAZINE HYDROCHLORIDE 25 MG/1
50 TABLET, FILM COATED ORAL 3 TIMES DAILY
Status: DISCONTINUED | OUTPATIENT
Start: 2019-07-19 | End: 2019-07-25 | Stop reason: HOSPADM

## 2019-07-19 RX ORDER — AMLODIPINE BESYLATE 5 MG/1
10 TABLET ORAL DAILY
Status: DISCONTINUED | OUTPATIENT
Start: 2019-07-20 | End: 2019-07-25 | Stop reason: HOSPADM

## 2019-07-19 RX ORDER — HYDRALAZINE HYDROCHLORIDE 20 MG/ML
10 INJECTION INTRAMUSCULAR; INTRAVENOUS
Status: DISCONTINUED | OUTPATIENT
Start: 2019-07-19 | End: 2019-07-19

## 2019-07-19 RX ORDER — DEXTROSE MONOHYDRATE AND SODIUM CHLORIDE 5; .9 G/100ML; G/100ML
INJECTION, SOLUTION INTRAVENOUS CONTINUOUS
Status: DISCONTINUED | OUTPATIENT
Start: 2019-07-19 | End: 2019-07-20

## 2019-07-19 RX ORDER — METOPROLOL TARTRATE 50 MG/1
100 TABLET ORAL 2 TIMES DAILY
Status: DISCONTINUED | OUTPATIENT
Start: 2019-07-19 | End: 2019-07-24

## 2019-07-19 RX ORDER — ASPIRIN 325 MG
325 TABLET ORAL
Status: DISCONTINUED | OUTPATIENT
Start: 2019-07-19 | End: 2019-07-19

## 2019-07-19 RX ORDER — ASPIRIN 600 MG/1
300 SUPPOSITORY RECTAL
Status: COMPLETED | OUTPATIENT
Start: 2019-07-19 | End: 2019-07-19

## 2019-07-19 RX ORDER — DEXTROSE 50 % IN WATER (D50W) INTRAVENOUS SYRINGE
Status: DISPENSED
Start: 2019-07-19 | End: 2019-07-19

## 2019-07-19 RX ORDER — LEVOTHYROXINE SODIUM 50 UG/1
50 TABLET ORAL DAILY
Status: DISCONTINUED | OUTPATIENT
Start: 2019-07-20 | End: 2019-07-25 | Stop reason: HOSPADM

## 2019-07-19 RX ADMIN — ASPIRIN 300 MG: 600 SUPPOSITORY RECTAL at 02:07

## 2019-07-19 RX ADMIN — DEXTROSE AND SODIUM CHLORIDE: 5; .9 INJECTION, SOLUTION INTRAVENOUS at 08:07

## 2019-07-19 RX ADMIN — Medication 25 G: at 10:07

## 2019-07-19 RX ADMIN — HYDRALAZINE HYDROCHLORIDE 50 MG: 25 TABLET ORAL at 08:07

## 2019-07-19 RX ADMIN — METOPROLOL TARTRATE 100 MG: 50 TABLET ORAL at 08:07

## 2019-07-19 RX ADMIN — CEFTRIAXONE SODIUM 2 G: 2 INJECTION, POWDER, FOR SOLUTION INTRAMUSCULAR; INTRAVENOUS at 01:07

## 2019-07-19 NOTE — SUBJECTIVE & OBJECTIVE
Past Medical History:   Diagnosis Date    Acute respiratory failure with hypoxia     Anemia of chronic kidney failure, stage 4 (severe) 2019    Cataracts, bilateral     CHF (congestive heart failure)     CKD (chronic kidney disease) stage 3, GFR 30-59 ml/min     CKD (chronic kidney disease) stage 3, GFR 30-59 ml/min     Controlled type 2 diabetes mellitus with proteinuria or albuminuria     Depression     Diabetes with neurologic complications     Diabetic retinopathy of both eyes     Edema     Glaucoma     History of colonic polyps     Hx-TIA (transient ischemic attack) 2008    Hyperlipidemia LDL goal < 100     Hypertension     Hypothyroidism     Major depressive disorder, single episode, mild 2016    Mixed incontinence urge and stress     Obesity     Obstructive sleep apnea on CPAP     19:  Home CPAP machine broken, per patient & son    Osteopenia     Proteinuria     Sickle cell trait     Trouble in sleeping     Type 2 diabetes mellitus with ophthalmic manifestations     Type 2 diabetes with stage 3 chronic kidney disease GFR 30-59     Type II or unspecified type diabetes mellitus with renal manifestations, uncontrolled(250.42)     Uncontrolled type 2 diabetes mellitus with peripheral circulatory disorder 2019    Urge incontinence 2016    Urge incontinence     Venous stasis ulcer     bilateral lower legs    Vitamin D deficiency disease        Past Surgical History:   Procedure Laterality Date    BREAST BIOPSY      breast reduction Bilateral age 31    BREAST SURGERY      cataracts Bilateral      SECTION, LOW TRANSVERSE      x1    CHOLECYSTECTOMY      COLONOSCOPY N/A 2012    Performed [facility] at [facility] ([facility])    EYE SURGERY  2014, 2014    vitrectomy    EYE SURGERY Right 2016    HYSTERECTOMY  1986    TAHBSO (patient is unsure if ovaries removed)    OOPHORECTOMY      REFRACTIVE SURGERY      REPAIR,  RETINAL DETACHMENT, WITH VITRECTOMY Right 6/4/2014    Performed by LILLIANA Coello MD at [facility] OR 1ST FLR    REPAIR, RETINAL DETACHMENT, WITH VITRECTOMY Left 1/8/2014    Performed by LILLIANA Coello MD at [facility] OR 1ST FLR    REPAIR-RETINA (VITRECTOMY) Right 7/16/2014    Performed by LILLIANA Coello MD at [facility] OR 1ST FLR    STRABISMUS REPAIR/ADJUSTABLE SUTURES Bilateral 8/17/2016    Performed by RABIA Danielson Jr., MD at [facility] OR 1ST FLR    TOTAL REDUCTION MAMMOPLASTY      approx 10 yrs ago       Review of patient's allergies indicates:   Allergen Reactions    Ace inhibitors Other (See Comments)     Other reaction(s): cough       No current facility-administered medications on file prior to encounter.      Current Outpatient Medications on File Prior to Encounter   Medication Sig    amLODIPine (NORVASC) 10 MG tablet Take 1 tablet (10 mg total) by mouth once daily.    atorvastatin (LIPITOR) 10 MG tablet TAKE 1 TABLET EVERY DAY    citalopram (CELEXA) 10 MG tablet TAKE 1 TABLET EVERY DAY    clopidogrel (PLAVIX) 75 mg tablet TAKE 1 TABLET EVERY DAY    docusate sodium (COLACE) 100 MG capsule Take 200 mg by mouth once daily.     ergocalciferol (ERGOCALCIFEROL) 50,000 unit Cap Take 1 capsule (50,000 Units total) by mouth every 30 days.    furosemide (LASIX) 40 MG tablet Take 1 tablet (40 mg total) by mouth 2 (two) times daily.    HUMALOG 100 unit/mL injection Inject 40 Units into the skin once daily.     hydrALAZINE (APRESOLINE) 50 MG tablet Take 1 tablet (50 mg total) by mouth 3 (three) times daily.    KLOR-CON M20 20 mEq tablet TAKE 1 TABLET EVERY DAY    levothyroxine (SYNTHROID) 50 MCG tablet Take 50 mcg by mouth once daily.    losartan (COZAAR) 100 MG tablet TAKE 1 TABLET EVERY DAY    metoprolol tartrate (LOPRESSOR) 100 MG tablet TAKE 1 TABLET TWICE DAILY    MULTIVITAMIN WITH MINERALS (HAIR,SKIN AND NAILS ORAL) Take 3 tablets by mouth once daily.    oxybutynin (DITROPAN XL) 15 MG TR24  TAKE 1 TABLET EVERY DAY    VICTOZA 0.6 mg/0.1 mL (18 mg/3 mL) PnIj 1.8 mg every morning.     LANCETS MISC     VGO 20 [place]     [DISCONTINUED] metOLazone (ZAROXOLYN) 2.5 MG tablet Take 1 tablet (2.5 mg total) by mouth once daily. X5 days 30 min prior to a.m. Lasix dose     Family History     Problem Relation (Age of Onset)    Achondroplasia Sister    Breast cancer Maternal Aunt (65)    Diabetes Mother, Paternal Grandmother    Esophageal cancer Maternal Uncle    HIV Brother    Hypertension Mother    Leukemia Father    Ovarian cancer Sister (35)    Parkinsonism Maternal Aunt    Stroke Mother        Tobacco Use    Smoking status: Never Smoker    Smokeless tobacco: Never Used   Substance and Sexual Activity    Alcohol use: No     Alcohol/week: 0.0 oz    Drug use: No    Sexual activity: Not Currently     Partners: Male     Birth control/protection: Post-menopausal, Surgical     Review of Systems   Constitutional: Positive for fatigue.   HENT: Negative.    Eyes: Negative.    Respiratory: Positive for shortness of breath.    Cardiovascular: Positive for leg swelling.   Gastrointestinal: Positive for abdominal pain.   Endocrine: Negative.    Genitourinary: Positive for difficulty urinating.   Musculoskeletal: Positive for back pain and gait problem.   Neurological: Positive for weakness.   Psychiatric/Behavioral: Positive for confusion.     Objective:     Vital Signs (Most Recent):  Temp: 98.2 °F (36.8 °C) (07/19/19 1757)  Pulse: 83 (07/19/19 1757)  Resp: 18 (07/19/19 1757)  BP: (!) 152/98 (07/19/19 1808)  SpO2: 95 % (07/19/19 1116) Vital Signs (24h Range):  Temp:  [97.5 °F (36.4 °C)-98.2 °F (36.8 °C)] 98.2 °F (36.8 °C)  Pulse:  [75-83] 83  Resp:  [18-40] 18  SpO2:  [93 %-96 %] 95 %  BP: (152-198)/() 152/98     Weight: 121.1 kg (266 lb 15.6 oz)  Body mass index is 44.43 kg/m².    Physical Exam   Constitutional: She appears well-developed and well-nourished. No distress.   HENT:   Head: Normocephalic and  atraumatic.   Mouth/Throat: Oropharynx is clear and moist.   Eyes: Pupils are equal, round, and reactive to light. EOM are normal.   Neck: Normal range of motion. Neck supple. No JVD present.   Cardiovascular: Normal rate, regular rhythm, normal heart sounds and intact distal pulses.   Pulmonary/Chest: Effort normal and breath sounds normal. No respiratory distress.   Abdominal: Soft. Bowel sounds are normal. She exhibits no distension. There is tenderness. There is no rebound.   Musculoskeletal: Normal range of motion. She exhibits edema, tenderness and deformity.   Neurological: She is alert. Coordination abnormal.   Skin: Skin is warm and dry. Capillary refill takes 2 to 3 seconds.   Diabetic healing ulcer to BLE   Psychiatric: She has a normal mood and affect. Her behavior is normal.         CRANIAL NERVES     CN III, IV, VI   Pupils are equal, round, and reactive to light.  Extraocular motions are normal.        Significant Labs:   A1C:   Recent Labs   Lab 03/23/19  1102   HGBA1C 8.2*     Blood Culture: No results for input(s): LABBLOO in the last 48 hours.  CBC:   Recent Labs   Lab 07/19/19  0934   WBC 5.55   HGB 13.8   HCT 42.9        CMP:   Recent Labs   Lab 07/19/19  0934      K 4.0      CO2 25   GLU 53*   BUN 57*   CREATININE 3.6*   CALCIUM 8.9   PROT 8.0   ALBUMIN 2.7*   BILITOT 0.9   ALKPHOS 118   AST 34   ALT 28   ANIONGAP 13   EGFRNONAA 12*     Cardiac Markers:   Recent Labs   Lab 07/19/19  0934   BNP 2,592*     Coagulation: No results for input(s): PT, INR, APTT in the last 48 hours.  Lactic Acid:   Recent Labs   Lab 07/19/19  1033 07/19/19  1243   LACTATE 2.5* 1.9     Lipid Panel: No results for input(s): CHOL, HDL, LDLCALC, TRIG, CHOLHDL in the last 48 hours.  Magnesium:   Recent Labs   Lab 07/19/19  0934   MG 2.0     POCT Glucose:   Recent Labs   Lab 07/19/19  1052 07/19/19  1143 07/19/19  1759   POCTGLUCOSE 106 105 110     Troponin:   Recent Labs   Lab 07/19/19  0934   TROPONINI  0.059*     TSH:   Recent Labs   Lab 03/23/19  1102   TSH 3.298     Urine Culture: No results for input(s): LABURIN in the last 48 hours.  Urine Studies:   Recent Labs   Lab 07/19/19  1020   COLORU Yellow   APPEARANCEUA Hazy*   PHUR 7.0   SPECGRAV 1.010   PROTEINUA 3+*   GLUCUA Negative   KETONESU Negative   BILIRUBINUA Negative   OCCULTUA Negative   NITRITE Negative   UROBILINOGEN Negative   LEUKOCYTESUR 3+*   RBCUA 4   WBCUA 35*   BACTERIA Moderate*   SQUAMEPITHEL 2   HYALINECASTS 0       Significant Imaging: Head CT:  Impression       1. No CT findings to suggest an acute major vascular distribution infarct or intracranial hemorrhage.  2. Mild chronic microvascular ischemic changes.  3. Worsening opacification of the left maxillary antrum.

## 2019-07-19 NOTE — HPI
74-year-old female who presents with decreased level of consciousness that was noticed by her son this morning.  She was unresponsive this morning.  Throughout the day yesterday, she was very lethargic.  This is a typical for her according to both her son and her brother who are both at the bedside.  She has been having sleeping difficulty due to a broken CPAP and her son felt that her increased sleeping yesterday was merely her catching up on missed sleep.Per EMS, her initial CBG was 27, but this improved to 126 after .5 AMP of D50.  Her brother reports associated slurred speech and confusion. HPI limited by the pt's confusion.    Pt admitted on D5 IVF and frequent glucose checks. Neuro checks. Urine culture pending. Start Rocephin.

## 2019-07-19 NOTE — H&P
[facility] Hospital Medicine  History & Physical    Patient Name: Erica Leblanc  MRN: [id number]  Admission Date: 7/19/2019  Attending Physician: Jackie Ramirez MD   Primary Care Provider: Sendy Elaine MD         Patient information was obtained from patient and ER records.     Subjective:     Principal Problem:Hypoglycemia    Chief Complaint:   Chief Complaint   Patient presents with    Hypoglycemia     per EMS pt lethargic with CBG of 27, 1/2 amp of D50 given . On arrival EMS report CBG 69        HPI: 74-year-old female who presents with decreased level of consciousness that was noticed by her son this morning.  She was unresponsive this morning.  Throughout the day yesterday, she was very lethargic.  This is a typical for her according to both her son and her brother who are both at the bedside.  She has been having sleeping difficulty due to a broken CPAP and her son felt that her increased sleeping yesterday was merely her catching up on missed sleep.Per EMS, her initial CBG was 27, but this improved to 126 after .5 AMP of D50.  Her brother reports associated slurred speech and confusion. HPI limited by the pt's confusion.    Pt admitted on D5 IVF and frequent glucose checks. Neuro checks. Urine culture pending. Start Rocephin.     Past Medical History:   Diagnosis Date    Acute respiratory failure with hypoxia     Anemia of chronic kidney failure, stage 4 (severe) 4/5/2019    Cataracts, bilateral     CHF (congestive heart failure)     CKD (chronic kidney disease) stage 3, GFR 30-59 ml/min     CKD (chronic kidney disease) stage 3, GFR 30-59 ml/min     Controlled type 2 diabetes mellitus with proteinuria or albuminuria     Depression     Diabetes with neurologic complications     Diabetic retinopathy of both eyes     Edema     Glaucoma     History of colonic polyps     Hx-TIA (transient ischemic attack) 11/2008    Hyperlipidemia LDL goal < 100      Hypertension     Hypothyroidism     Major depressive disorder, single episode, mild 2016    Mixed incontinence urge and stress     Obesity     Obstructive sleep apnea on CPAP     19:  Home CPAP machine broken, per patient & son    Osteopenia     Proteinuria     Sickle cell trait     Trouble in sleeping     Type 2 diabetes mellitus with ophthalmic manifestations     Type 2 diabetes with stage 3 chronic kidney disease GFR 30-59     Type II or unspecified type diabetes mellitus with renal manifestations, uncontrolled(250.42)     Uncontrolled type 2 diabetes mellitus with peripheral circulatory disorder 2019    Urge incontinence 2016    Urge incontinence     Venous stasis ulcer     bilateral lower legs    Vitamin D deficiency disease        Past Surgical History:   Procedure Laterality Date    BREAST BIOPSY      breast reduction Bilateral age 31    BREAST SURGERY      cataracts Bilateral      SECTION, LOW TRANSVERSE      x1    CHOLECYSTECTOMY      COLONOSCOPY N/A 2012    Performed by Keron Gunderson MD at [facility] (4TH FLR)    EYE SURGERY  2014, 2014    vitrectomy    EYE SURGERY Right 2016    HYSTERECTOMY  1986    TAHBSO (patient is unsure if ovaries removed)    OOPHORECTOMY      REFRACTIVE SURGERY      REPAIR, RETINAL DETACHMENT, WITH VITRECTOMY Right 2014    Performed by LILLIANA Coello MD at [facility] OR 1ST FLR    REPAIR, RETINAL DETACHMENT, WITH VITRECTOMY Left 2014    Performed by LILLIANA Coello MD at [facility] OR 1ST FLR    REPAIR-RETINA (VITRECTOMY) Right 2014    Performed by LILLIANA Coello MD at [facility] OR 1ST FLR    STRABISMUS REPAIR/ADJUSTABLE SUTURES Bilateral 2016    Performed by RABIA Danielson Jr., MD at [facility] OR 1ST FLR    TOTAL REDUCTION MAMMOPLASTY      approx 10 yrs ago       Review of patient's allergies indicates:   Allergen Reactions    Ace inhibitors Other (See Comments)     Other reaction(s):  cough       No current facility-administered medications on file prior to encounter.      Current Outpatient Medications on File Prior to Encounter   Medication Sig    amLODIPine (NORVASC) 10 MG tablet Take 1 tablet (10 mg total) by mouth once daily.    atorvastatin (LIPITOR) 10 MG tablet TAKE 1 TABLET EVERY DAY    citalopram (CELEXA) 10 MG tablet TAKE 1 TABLET EVERY DAY    clopidogrel (PLAVIX) 75 mg tablet TAKE 1 TABLET EVERY DAY    docusate sodium (COLACE) 100 MG capsule Take 200 mg by mouth once daily.     ergocalciferol (ERGOCALCIFEROL) 50,000 unit Cap Take 1 capsule (50,000 Units total) by mouth every 30 days.    furosemide (LASIX) 40 MG tablet Take 1 tablet (40 mg total) by mouth 2 (two) times daily.    HUMALOG 100 unit/mL injection Inject 40 Units into the skin once daily.     hydrALAZINE (APRESOLINE) 50 MG tablet Take 1 tablet (50 mg total) by mouth 3 (three) times daily.    KLOR-CON M20 20 mEq tablet TAKE 1 TABLET EVERY DAY    levothyroxine (SYNTHROID) 50 MCG tablet Take 50 mcg by mouth once daily.    losartan (COZAAR) 100 MG tablet TAKE 1 TABLET EVERY DAY    metoprolol tartrate (LOPRESSOR) 100 MG tablet TAKE 1 TABLET TWICE DAILY    MULTIVITAMIN WITH MINERALS (HAIR,SKIN AND NAILS ORAL) Take 3 tablets by mouth once daily.    oxybutynin (DITROPAN XL) 15 MG TR24 TAKE 1 TABLET EVERY DAY    VICTOZA 0.6 mg/0.1 mL (18 mg/3 mL) PnIj 1.8 mg every morning.     LANCETS MISC     VGO 20 [place]     [DISCONTINUED] metOLazone (ZAROXOLYN) 2.5 MG tablet Take 1 tablet (2.5 mg total) by mouth once daily. X5 days 30 min prior to a.m. Lasix dose     Family History     Problem Relation (Age of Onset)    Achondroplasia Sister    Breast cancer Maternal Aunt (65)    Diabetes Mother, Paternal Grandmother    Esophageal cancer Maternal Uncle    HIV Brother    Hypertension Mother    Leukemia Father    Ovarian cancer Sister (35)    Parkinsonism Maternal Aunt    Stroke Mother        Tobacco Use    Smoking status: Never  Smoker    Smokeless tobacco: Never Used   Substance and Sexual Activity    Alcohol use: No     Alcohol/week: 0.0 oz    Drug use: No    Sexual activity: Not Currently     Partners: Male     Birth control/protection: Post-menopausal, Surgical     Review of Systems   Constitutional: Positive for fatigue.   HENT: Negative.    Eyes: Negative.    Respiratory: Positive for shortness of breath.    Cardiovascular: Positive for leg swelling.   Gastrointestinal: Positive for abdominal pain.   Endocrine: Negative.    Genitourinary: Positive for difficulty urinating.   Musculoskeletal: Positive for back pain and gait problem.   Neurological: Positive for weakness.   Psychiatric/Behavioral: Positive for confusion.     Objective:     Vital Signs (Most Recent):  Temp: 98.2 °F (36.8 °C) (07/19/19 1757)  Pulse: 83 (07/19/19 1757)  Resp: 18 (07/19/19 1757)  BP: (!) 152/98 (07/19/19 1808)  SpO2: 95 % (07/19/19 1116) Vital Signs (24h Range):  Temp:  [97.5 °F (36.4 °C)-98.2 °F (36.8 °C)] 98.2 °F (36.8 °C)  Pulse:  [75-83] 83  Resp:  [18-40] 18  SpO2:  [93 %-96 %] 95 %  BP: (152-198)/() 152/98     Weight: 121.1 kg (266 lb 15.6 oz)  Body mass index is 44.43 kg/m².    Physical Exam   Constitutional: She appears well-developed and well-nourished. No distress.   HENT:   Head: Normocephalic and atraumatic.   Mouth/Throat: Oropharynx is clear and moist.   Eyes: Pupils are equal, round, and reactive to light. EOM are normal.   Neck: Normal range of motion. Neck supple. No JVD present.   Cardiovascular: Normal rate, regular rhythm, normal heart sounds and intact distal pulses.   Pulmonary/Chest: Effort normal and breath sounds normal. No respiratory distress.   Abdominal: Soft. Bowel sounds are normal. She exhibits no distension. There is tenderness. There is no rebound.   Musculoskeletal: Normal range of motion. She exhibits edema, tenderness and deformity.   Neurological: She is alert. Coordination abnormal.   Skin: Skin is warm and  dry. Capillary refill takes 2 to 3 seconds.   Diabetic healing ulcer to BLE   Psychiatric: She has a normal mood and affect. Her behavior is normal.         CRANIAL NERVES     CN III, IV, VI   Pupils are equal, round, and reactive to light.  Extraocular motions are normal.        Significant Labs:   A1C:   Recent Labs   Lab 03/23/19  1102   HGBA1C 8.2*     Blood Culture: No results for input(s): LABBLOO in the last 48 hours.  CBC:   Recent Labs   Lab 07/19/19  0934   WBC 5.55   HGB 13.8   HCT 42.9        CMP:   Recent Labs   Lab 07/19/19  0934      K 4.0      CO2 25   GLU 53*   BUN 57*   CREATININE 3.6*   CALCIUM 8.9   PROT 8.0   ALBUMIN 2.7*   BILITOT 0.9   ALKPHOS 118   AST 34   ALT 28   ANIONGAP 13   EGFRNONAA 12*     Cardiac Markers:   Recent Labs   Lab 07/19/19  0934   BNP 2,592*     Coagulation: No results for input(s): PT, INR, APTT in the last 48 hours.  Lactic Acid:   Recent Labs   Lab 07/19/19  1033 07/19/19  1243   LACTATE 2.5* 1.9     Lipid Panel: No results for input(s): CHOL, HDL, LDLCALC, TRIG, CHOLHDL in the last 48 hours.  Magnesium:   Recent Labs   Lab 07/19/19  0934   MG 2.0     POCT Glucose:   Recent Labs   Lab 07/19/19  1052 07/19/19  1143 07/19/19  1759   POCTGLUCOSE 106 105 110     Troponin:   Recent Labs   Lab 07/19/19  0934   TROPONINI 0.059*     TSH:   Recent Labs   Lab 03/23/19  1102   TSH 3.298     Urine Culture: No results for input(s): LABURIN in the last 48 hours.  Urine Studies:   Recent Labs   Lab 07/19/19  1020   COLORU Yellow   APPEARANCEUA Hazy*   PHUR 7.0   SPECGRAV 1.010   PROTEINUA 3+*   GLUCUA Negative   KETONESU Negative   BILIRUBINUA Negative   OCCULTUA Negative   NITRITE Negative   UROBILINOGEN Negative   LEUKOCYTESUR 3+*   RBCUA 4   WBCUA 35*   BACTERIA Moderate*   SQUAMEPITHEL 2   HYALINECASTS 0       Significant Imaging: Head CT:  Impression       1. No CT findings to suggest an acute major vascular distribution infarct or intracranial hemorrhage.  2.  Mild chronic microvascular ischemic changes.  3. Worsening opacification of the left maxillary antrum.         Assessment/Plan:     * Hypoglycemia  Stop home insulin  SSI  Glucose checks Q 4 hours until stable  D5 IVF  Resume diabetic diet       Obstructive sleep apnea on CPAP  Unsure if patient complaint with device       Hypothyroidism (acquired)  Resume synthroid       Infectious encephalopathy    Secondary to UTI      Urinary tract infection without hematuria    Await urine culture  Rocephin     Hypertensive heart disease with chronic diastolic congestive heart failure  Uncontrolled  Resume home meds except acei        Uncontrolled type 2 diabetes mellitus with peripheral circulatory disorder  SSI for now until blood sugars stable      Anemia of chronic kidney failure, stage 4 (severe)  Stable  Monitor       Diabetic ulcer of right lower leg associated with type 2 diabetes mellitus, with fat layer exposed  Pt goes to outpatient wound care  Consult wound care       Mild episode of recurrent major depressive disorder  Resume home meds       Slow transit constipation  Resume home regimen         VTE Risk Mitigation (From admission, onward)        Ordered     IP VTE HIGH RISK PATIENT  Once      07/19/19 1644     Place CHANDNI hose  Until discontinued      07/19/19 1644     Place sequential compression device  Until discontinued      07/19/19 1644             Jackie Ramirez MD  Department of Hospital Medicine   [facility]

## 2019-07-19 NOTE — NURSING
Report received from ED nurse Qi. Pt is being admitted for hypoglycemia. Yesterday pt stated to son that all she wanted to do was sleep.  EMS was called to pt's residence,  and when she arrived to ED was diaphoretic. Pt's glucose was 20 when EMS arrived and 1/2 amp of glucose was given and glucose was 150. Once pt arrived to ED, blood glucose was in the 50s. 1/2 amp glucose was given and insulin pump removed. Pt was also diaphoretic. Pt is incontinent, BLE diabetic wound ulcers that she is seeing wound care for. In and out cath was performed and pt positive for UTI, and 2g Rocephin given to pt. Pt is having some expressive dysphagia. Also, pt is NPO now because pt was having difficulty chewing food while in the ED and finally stated that she could not swallow the food that was in her mouth.

## 2019-07-19 NOTE — ED NOTES
Patient noted to be having difficulty swallowing at this time.  MD made aware.  Verbal orders to make patient NPO and hold ASA

## 2019-07-19 NOTE — ED PROVIDER NOTES
Encounter Date: 7/19/2019    SCRIBE #1 NOTE: I, Teetee Berrios, am scribing for, and in the presence of,  Dr. Pretty. I have scribed the entire note.       History     Chief Complaint   Patient presents with    Hypoglycemia     per EMS pt lethargic with CBG of 27, 1/2 amp of D50 given . On arrival EMS report CBG 69     Time of initial exam: 09:33    The patient is a 74-year-old female who presents with decreased level of consciousness that was noticed by her son this morning.  She was unresponsive this morning.  Throughout the day yesterday, she was very lethargic.  This is a typical for her according to both her son and her brother who are both at the bedside.  She has been having sleeping difficulty due to a broken CPAP and her son felt that her increased sleeping yesterday was merely her catching up on missed sleep.Per EMS, her initial CBG was 27, but this improved to 126 after .5 AMP of D50.  Her brother reports associated slurred speech and confusion. HPI limited by the pt's confusion.      The history is provided by the patient, the EMS personnel and a relative.     Review of patient's allergies indicates:   Allergen Reactions    Ace inhibitors Other (See Comments)     Other reaction(s): cough     Past Medical History:   Diagnosis Date    Acute respiratory failure with hypoxia     Anemia of chronic kidney failure, stage 4 (severe) 4/5/2019    Cataracts, bilateral     CHF (congestive heart failure)     CKD (chronic kidney disease) stage 3, GFR 30-59 ml/min     CKD (chronic kidney disease) stage 3, GFR 30-59 ml/min     Controlled type 2 diabetes mellitus with proteinuria or albuminuria     Depression     Diabetes with neurologic complications     Diabetic retinopathy of both eyes     Edema     Glaucoma     History of colonic polyps     Hx-TIA (transient ischemic attack) 11/2008    Hyperlipidemia LDL goal < 100     Hypertension     Hypothyroidism     Major depressive disorder, single  episode, mild 2016    Mixed incontinence urge and stress     Obesity     Obstructive sleep apnea on CPAP     Osteopenia     Proteinuria     Sickle cell trait     Trouble in sleeping     Type 2 diabetes mellitus with ophthalmic manifestations     Type 2 diabetes with stage 3 chronic kidney disease GFR 30-59     Type II or unspecified type diabetes mellitus with renal manifestations, uncontrolled(250.42)     Uncontrolled type 2 diabetes mellitus with peripheral circulatory disorder 2019    Urge incontinence 2016    Urge incontinence     Vitamin D deficiency disease      Past Surgical History:   Procedure Laterality Date    BREAST BIOPSY      breast reduction Bilateral age 31    cataracts Bilateral      SECTION, LOW TRANSVERSE      x1    CHOLECYSTECTOMY      COLONOSCOPY N/A 2012    Performed by Keron Gunderson MD at [facility] ENDO (4TH FLR)    EYE SURGERY  2014, 2014    vitrectomy    EYE SURGERY Right 2016    HYSTERECTOMY  1986    TAHBSO (patient is unsure if ovaries removed)    OOPHORECTOMY      REFRACTIVE SURGERY      REPAIR, RETINAL DETACHMENT, WITH VITRECTOMY Right 2014    Performed by LILLIANA Coello MD at [facility] OR 1ST FLR    REPAIR, RETINAL DETACHMENT, WITH VITRECTOMY Left 2014    Performed by LILL[facility]NA Coello MD at [facility] OR 1ST FLR    REPAIR-RETINA (VITRECTOMY) Right 2014    Performed by LILLIANA Coello MD at [facility] OR 1ST FLR    STRABISMUS REPAIR/ADJUSTABLE SUTURES Bilateral 2016    Performed by RABIA Danielson Jr., MD at [facility] OR 1ST FLR    TOTAL REDUCTION MAMMOPLASTY      approx 10 yrs ago     Family History   Problem Relation Age of Onset    Leukemia Father     Ovarian cancer Sister 35    Stroke Mother     Diabetes Mother     Hypertension Mother     Diabetes Paternal Grandmother     Breast cancer Maternal Aunt 65    HIV Brother     Achondroplasia Sister     Parkinsonism Maternal Aunt     Esophageal  cancer Maternal Uncle         smoker    Amblyopia Neg Hx     Blindness Neg Hx     Cataracts Neg Hx     Glaucoma Neg Hx     Macular degeneration Neg Hx     Retinal detachment Neg Hx     Strabismus Neg Hx     Thyroid disease Neg Hx     Colon cancer Neg Hx      Social History     Tobacco Use    Smoking status: Never Smoker    Smokeless tobacco: Never Used   Substance Use Topics    Alcohol use: No     Alcohol/week: 0.0 oz    Drug use: No     Review of Systems   Constitutional: Positive for fatigue (lethargy). Negative for chills, diaphoresis and fever.   HENT: Negative for sore throat and trouble swallowing.    Eyes: Negative for visual disturbance.   Respiratory: Negative for cough and shortness of breath.    Cardiovascular: Negative for chest pain.   Gastrointestinal: Negative for abdominal pain, diarrhea, nausea and vomiting.   Genitourinary: Negative for dysuria.   Musculoskeletal: Negative for arthralgias and myalgias.   Skin: Negative for rash.   Neurological: Positive for speech difficulty (slurred). Negative for dizziness, weakness, light-headedness, numbness and headaches.   Psychiatric/Behavioral: Positive for confusion.       Physical Exam     Initial Vitals [07/19/19 0933]   BP Pulse Resp Temp SpO2   (!) 198/95 75 (!) 22 97.5 °F (36.4 °C) 96 %      MAP       --         Physical Exam    Nursing note and vitals reviewed.  Constitutional: She appears well-developed. She is not diaphoretic. She appears distressed.   HENT:   Head: Normocephalic and atraumatic.   Mouth/Throat: Oropharynx is clear and moist.   Eyes: Conjunctivae and EOM are normal. Pupils are equal, round, and reactive to light. Right eye exhibits no discharge. Left eye exhibits no discharge. No scleral icterus. Right eye exhibits no nystagmus. Left eye exhibits no nystagmus.   Neck: Normal range of motion. Neck supple. No neck rigidity. Carotid bruit is not present. No JVD present.   Cardiovascular: Normal rate and regular rhythm.  Exam reveals no gallop and no friction rub.    No murmur heard.  Pulses:       Radial pulses are 1+ on the right side, and 1+ on the left side.        Dorsalis pedis pulses are 1+ on the right side, and 1+ on the left side.   Pulmonary/Chest: Effort normal and breath sounds normal. No stridor. No respiratory distress. She has no decreased breath sounds. She has no wheezes. She has no rhonchi. She has no rales.   Abdominal: Soft. She exhibits no distension. There is no tenderness.   Neurological: She is alert and oriented to person, place, and time. She has normal strength. No sensory deficit. Coordination normal. GCS score is 15. GCS eye subscore is 4. GCS verbal subscore is 5. GCS motor subscore is 6.   Expressive aphasia present.   Skin: Skin is warm and dry. No pallor.   Compression dressings to both lower legs.  Dressings were clean, dry, and intact prior to removal.  Venous stasis changes with several ulcers without drainage, erythema, or tenderness underlying dressings.         ED Course   Procedures  Labs Reviewed   CBC W/ AUTO DIFFERENTIAL - Abnormal; Notable for the following components:       Result Value    RBC 5.55 (*)     Mean Corpuscular Volume 77 (*)     Mean Corpuscular Hemoglobin 24.9 (*)     RDW 17.8 (*)     Lymph # 0.5 (*)     Gran% 79.4 (*)     Lymph% 8.3 (*)     All other components within normal limits   COMPREHENSIVE METABOLIC PANEL - Abnormal; Notable for the following components:    Glucose 53 (*)     BUN, Bld 57 (*)     Creatinine 3.6 (*)     Albumin 2.7 (*)     eGFR if  14 (*)     eGFR if non  12 (*)     All other components within normal limits   URINALYSIS, REFLEX TO URINE CULTURE - Abnormal; Notable for the following components:    Appearance, UA Hazy (*)     Protein, UA 3+ (*)     Leukocytes, UA 3+ (*)     All other components within normal limits    Narrative:     Preferred Collection Type->Urine, Catheterized   TROPONIN I - Abnormal; Notable for the  following components:    Troponin I 0.059 (*)     All other components within normal limits   B-TYPE NATRIURETIC PEPTIDE - Abnormal; Notable for the following components:    BNP 2,592 (*)     All other components within normal limits   LACTIC ACID, PLASMA - Abnormal; Notable for the following components:    Lactate (Lactic Acid) 2.5 (*)     All other components within normal limits   URINALYSIS MICROSCOPIC - Abnormal; Notable for the following components:    WBC, UA 35 (*)     WBC Clumps, UA Few (*)     Bacteria Moderate (*)     All other components within normal limits    Narrative:     Preferred Collection Type->Urine, Catheterized   POCT GLUCOSE - Abnormal; Notable for the following components:    POCT Glucose 54 (*)     All other components within normal limits   POCT GLUCOSE - Abnormal; Notable for the following components:    POCT Glucose 56 (*)     All other components within normal limits   POCT GLUCOSE - Abnormal; Notable for the following components:    POCT Glucose 64 (*)     All other components within normal limits   CULTURE, URINE   CULTURE, BLOOD   CULTURE, BLOOD   MAGNESIUM   LACTIC ACID, PLASMA   POCT GLUCOSE   POCT GLUCOSE     EKG Readings: (Independently Interpreted)   7/19/19 10:32    Normal sinus rhythm.  Ventricular rate 80 beats per minute. Right axis deviation.  Normal QRS and QT intervals.  No ST segment elevation or depression.  Inferolateral T-wave flattening.  Poor anterior R-wave progression.  No significant changes when compared to most recent previous study.       Imaging Results    None          Medical Decision Making:   ED Management:  13:19    Consult: Dr. Ramirez. Discussed pt's presentaion, exam, and work up. She agrees to admit the patient.    Medical decision making:  This was an emergent evaluation of this elderly patient presenting after being found with altered mental status by family.  Patient had profound hypoglycemia in the field.  She received IV dextrose prior to  arrival.  There was improvement in her mental status with this therapy.  On arrival, she was awake and alert but had expressive aphasia.  She was not made a stroke code due to hypoglycemia and unclear time of onset of symptoms. She had no motor or sensory deficits on arrival.  CT of the brain is negative for acute intracranial processes.  She had refractory hypoglycemia requiring additional IV dextrose administration.  She has acute kidney injury superimposed on chronic kidney disease.  She has a urinary tract infection.  IV Rocephin was administered.  Lactic acid is mildly elevated.  Aggressive IV fluid hydration was not initiated due to concern for volume overload.  The patient is being admitted to the hospitalist service for further evaluation and management.              Attending Attestation:         Attending Critical Care:   Critical Care Times:   Direct Patient Care (initial evaluation, reassessments, and time considering the case)................................................................20 minutes.   Ordering, Reviewing, and Interpreting Diagnostic Studies...............................................................................................................5 minutes.   Documentation..................................................................................................................................................................................5 minutes.   Consultation with other Physicians. .................................................................................................................................................5 minutes.   ==============================================================  · Total Critical Care Time - exclusive of procedural time: 35 minutes.  ==============================================================  Critical care was necessary to treat or prevent imminent or life-threatening deterioration of the following conditions: metabolic crisis and  renal failure.   Critical care was time spent personally by me on the following activities: obtaining history from patient or relative, examination of patient, review of old charts, ordering lab, x-rays, and/or EKG, development of treatment plan with patient or relative, ordering and performing treatments and interventions, evaluation of patient's response to treatment, discussion with consultants, interpretation of cardiac measurements and re-evaluation of patient's conition.   Critical Care Condition: potentially life-threatening               ED Course as of Jul 19 1323   Fri Jul 19, 2019   1301 Patient is awake and alert. Currently eating. She has a persistent expressive aphasia.    [LP]      ED Course User Index  [LP] Floyd Pretty III, MD     Clinical Impression:     1. Hypoglycemia    2. Altered mental status    3. Chronic kidney disease, unspecified CKD stage    4. Urinary tract infection without hematuria, site unspecified    5. Expressive aphasia            Disposition:   Disposition: Admitted  Condition: Fair                        Floyd Pretty III, MD  07/19/19 3123

## 2019-07-20 LAB
ESTIMATED AVG GLUCOSE: 163 MG/DL (ref 68–131)
HBA1C MFR BLD HPLC: 7.3 % (ref 4–5.6)
POCT GLUCOSE: 160 MG/DL (ref 70–110)
POCT GLUCOSE: 176 MG/DL (ref 70–110)
POCT GLUCOSE: 182 MG/DL (ref 70–110)
POCT GLUCOSE: 212 MG/DL (ref 70–110)
POCT GLUCOSE: 244 MG/DL (ref 70–110)
POCT GLUCOSE: 283 MG/DL (ref 70–110)

## 2019-07-20 PROCEDURE — 83036 HEMOGLOBIN GLYCOSYLATED A1C: CPT | Mod: HCNC

## 2019-07-20 PROCEDURE — 21400001 HC TELEMETRY ROOM: Mod: HCNC

## 2019-07-20 PROCEDURE — 27000221 HC OXYGEN, UP TO 24 HOURS: Mod: HCNC

## 2019-07-20 PROCEDURE — 99900035 HC TECH TIME PER 15 MIN (STAT): Mod: HCNC

## 2019-07-20 PROCEDURE — 36415 COLL VENOUS BLD VENIPUNCTURE: CPT | Mod: HCNC

## 2019-07-20 PROCEDURE — 63600175 PHARM REV CODE 636 W HCPCS: Mod: HCNC | Performed by: HOSPITALIST

## 2019-07-20 PROCEDURE — 94761 N-INVAS EAR/PLS OXIMETRY MLT: CPT | Mod: HCNC

## 2019-07-20 PROCEDURE — 25000003 PHARM REV CODE 250: Mod: HCNC | Performed by: HOSPITALIST

## 2019-07-20 RX ORDER — FUROSEMIDE 10 MG/ML
40 INJECTION INTRAMUSCULAR; INTRAVENOUS 2 TIMES DAILY
Status: DISCONTINUED | OUTPATIENT
Start: 2019-07-20 | End: 2019-07-21

## 2019-07-20 RX ADMIN — CEFTRIAXONE 1 G: 1 INJECTION, SOLUTION INTRAVENOUS at 01:07

## 2019-07-20 RX ADMIN — FUROSEMIDE 40 MG: 10 INJECTION, SOLUTION INTRAVENOUS at 06:07

## 2019-07-20 RX ADMIN — HYDRALAZINE HYDROCHLORIDE 50 MG: 25 TABLET ORAL at 08:07

## 2019-07-20 RX ADMIN — DOCUSATE SODIUM 200 MG: 100 CAPSULE, LIQUID FILLED ORAL at 08:07

## 2019-07-20 RX ADMIN — AMLODIPINE BESYLATE 10 MG: 5 TABLET ORAL at 08:07

## 2019-07-20 RX ADMIN — CITALOPRAM HYDROBROMIDE 10 MG: 10 TABLET ORAL at 08:07

## 2019-07-20 RX ADMIN — HYDRALAZINE HYDROCHLORIDE 50 MG: 25 TABLET ORAL at 09:07

## 2019-07-20 RX ADMIN — INSULIN ASPART 3 UNITS: 100 INJECTION, SOLUTION INTRAVENOUS; SUBCUTANEOUS at 04:07

## 2019-07-20 RX ADMIN — LEVOTHYROXINE SODIUM 50 MCG: 50 TABLET ORAL at 06:07

## 2019-07-20 RX ADMIN — HYDRALAZINE HYDROCHLORIDE 50 MG: 25 TABLET ORAL at 04:07

## 2019-07-20 RX ADMIN — METOPROLOL TARTRATE 100 MG: 50 TABLET ORAL at 09:07

## 2019-07-20 RX ADMIN — ATORVASTATIN CALCIUM 10 MG: 10 TABLET, FILM COATED ORAL at 08:07

## 2019-07-20 RX ADMIN — CLOPIDOGREL BISULFATE 75 MG: 75 TABLET ORAL at 08:07

## 2019-07-20 RX ADMIN — METOPROLOL TARTRATE 100 MG: 50 TABLET ORAL at 08:07

## 2019-07-20 RX ADMIN — INSULIN ASPART 2 UNITS: 100 INJECTION, SOLUTION INTRAVENOUS; SUBCUTANEOUS at 01:07

## 2019-07-20 RX ADMIN — DEXTROSE AND SODIUM CHLORIDE: 5; .9 INJECTION, SOLUTION INTRAVENOUS at 06:07

## 2019-07-20 NOTE — NURSING
Patient bedside report has been completed and given to KIKI Hanley. Chart check has been completed. NAD noted. Pt is currently in the shower and being bathed by family members using the sera steady.

## 2019-07-20 NOTE — PLAN OF CARE
Problem: Skin Injury Risk Increased  Goal: Skin Health and Integrity    Intervention: Optimize Skin Protection     07/20/19 0405   Prevent Additional Skin Injury   Head of Bed (HOB) HOB elevated   Pressure Reduction Devices pressure-redistributing mattress utilized   Pressure Reduction Techniques weight shift assistance provided   Monitor and Manage Hypervolemia   Skin Protection adhesive use limited;tubing/devices free from skin contact;pouching devices used;incontinence pads utilized         Problem: Diabetes Comorbidity  Goal: Blood Glucose Level Within Desired Range    Intervention: Maintain Glycemic Control     07/20/19 0405   Monitor and Manage Ketoacidosis   Glycemic Management blood glucose monitoring;oral hydration promoted

## 2019-07-20 NOTE — NURSING
Report received from KIKI Arias. Patient awake and alert. NAD noted. Safety maintained with a lot of family at bedside. Will continue to monitor.

## 2019-07-20 NOTE — PLAN OF CARE
07/20/19 0916   Discharge Assessment   Assessment Type Discharge Planning Assessment   Confirmed/corrected address and phone number on facesheet? Yes   Assessment information obtained from? Patient   Communicated expected length of stay with patient/caregiver no   Prior to hospitilization cognitive status: Alert/Oriented   Prior to hospitalization functional status: Independent;Assistive Equipment;Needs Assistance  (Shower chair, CPAP-broken now; son and family help when needed; some assist needed recently; family transports)   Current cognitive status: Alert/Oriented   Current Functional Status: Independent;Assistive Equipment;Needs Assistance  (Shower chair, CPAP-broken now; son and family help when needed; some assist needed recently; family transports)   Facility Arrived From: Home   Lives With child(evonne), adult  (Alexandria-son)   Able to Return to Prior Arrangements yes   Is patient able to care for self after discharge? Yes  (With family assist and DME)   Who are your caregiver(s) and their phone number(s)? Aid[place]: [phone]   Patient's perception of discharge disposition home or selfcare   Readmission Within the Last 30 Days no previous admission in last 30 days   Patient currently being followed by outpatient case management? No   Patient currently receives any other outside agency services? No   Equipment Currently Used at Home shower chair;CPAP  (CPAP broken)   Do you have any problems affording any of your prescribed medications? No   Is the patient taking medications as prescribed? yes   Does the patient have transportation home? Yes   Transportation Anticipated family or friend will provide   Does the patient receive services at the [facility] Clinic? No   Discharge Plan A Home with family   Discharge Plan B Other  (TBD)   DME Needed Upon Discharge  other (see comments)  (TBD)   Patient/Family in Agreement with Plan yes   SW Role explained to patient; two patient identifiers recognized; SW contact  information placed on Communication board. Discussed patient managing health care at home; determined who would be helping patient at home with recovery: Alexandria-son lives in-home and will help with recovery at home    PCP: Sendy Elaine MD Does not have preference for appointment times    Extended Emergency Contact Information  Primary Emergency Contact: Anita Cain   [facility]  Home Phone: [phone]  Mobile Phone: [phone]  Relation: Daughter  Secondary Emergency Contact: Alexandria Del Rosario   [facility]  Home Phone: [phone]  Mobile Phone: [phone]  Relation: Son     [facility] - [place], [place] - [address]  [address] [address]  Phone: [phone] Fax: [phone]    [id number] - [place] - [address]  [address]  [place] [address]  Phone: [phone] Fax: [phone]    Payor: [employer] MANAGED MEDICARE / Plan: [employer] TOTAL CARE ADVANTAGE / Product Type: Medicare Advantage /

## 2019-07-20 NOTE — PLAN OF CARE
07/20/19 0916   Discharge Assessment   Assessment Type Discharge Planning Assessment   Confirmed/corrected address and phone number on facesheet? Yes   Assessment information obtained from? Patient   Communicated expected length of stay with patient/caregiver no   Prior to hospitilization cognitive status: Alert/Oriented   Prior to hospitalization functional status: Independent;Assistive Equipment;Needs Assistance  (Shower chair, CPAP-broken now; son and family help when needed; some assist needed recently; family transports)   Current cognitive status: Alert/Oriented   Current Functional Status: Independent;Assistive Equipment;Needs Assistance  (Shower chair, CPAP-broken now; son and family help when needed; some assist needed recently; family transports)   Facility Arrived From: Home   Lives With child(evonne), adult  (Alexandria-son)   Able to Return to Prior Arrangements yes   Is patient able to care for self after discharge? Yes  (With family assist and DME)   Who are your caregiver(s) and their phone number(s)? Aidenson: [phone]   Patient's perception of discharge disposition home or selfcare   Readmission Within the Last 30 Days no previous admission in last 30 days   Patient currently being followed by outpatient case management? No   Patient currently receives any other outside agency services? No   Equipment Currently Used at Home shower chair;CPAP  (CPAP broken)   Do you have any problems affording any of your prescribed medications? No   Is the patient taking medications as prescribed? yes   Does the patient have transportation home? Yes   Transportation Anticipated family or friend will provide   Does the patient receive services at the [facility]madin Clinic? No   Discharge Plan A Home with family   Discharge Plan B Other  (TBD)   DME Needed Upon Discharge  other (see comments)  (TBD)   Patient/Family in Agreement with Plan yes

## 2019-07-21 LAB
BACTERIA UR CULT: ABNORMAL
POCT GLUCOSE: 129 MG/DL (ref 70–110)
POCT GLUCOSE: 155 MG/DL (ref 70–110)
POCT GLUCOSE: 165 MG/DL (ref 70–110)
POCT GLUCOSE: 211 MG/DL (ref 70–110)

## 2019-07-21 PROCEDURE — 27000221 HC OXYGEN, UP TO 24 HOURS: Mod: HCNC

## 2019-07-21 PROCEDURE — 21400001 HC TELEMETRY ROOM: Mod: HCNC

## 2019-07-21 PROCEDURE — 25000003 PHARM REV CODE 250: Mod: HCNC | Performed by: HOSPITALIST

## 2019-07-21 PROCEDURE — 99900035 HC TECH TIME PER 15 MIN (STAT): Mod: HCNC

## 2019-07-21 PROCEDURE — S0171 BUMETANIDE 0.5 MG: HCPCS | Mod: HCNC | Performed by: HOSPITALIST

## 2019-07-21 PROCEDURE — 94761 N-INVAS EAR/PLS OXIMETRY MLT: CPT | Mod: HCNC

## 2019-07-21 PROCEDURE — 94660 CPAP INITIATION&MGMT: CPT | Mod: HCNC

## 2019-07-21 PROCEDURE — 63600175 PHARM REV CODE 636 W HCPCS: Mod: HCNC | Performed by: HOSPITALIST

## 2019-07-21 RX ORDER — BUMETANIDE 0.25 MG/ML
2 INJECTION INTRAMUSCULAR; INTRAVENOUS 2 TIMES DAILY
Status: DISCONTINUED | OUTPATIENT
Start: 2019-07-21 | End: 2019-07-24

## 2019-07-21 RX ORDER — POLYETHYLENE GLYCOL 3350 17 G/17G
17 POWDER, FOR SOLUTION ORAL DAILY
Status: DISCONTINUED | OUTPATIENT
Start: 2019-07-21 | End: 2019-07-25 | Stop reason: HOSPADM

## 2019-07-21 RX ORDER — CIPROFLOXACIN 250 MG/1
250 TABLET, FILM COATED ORAL EVERY 24 HOURS
Status: DISCONTINUED | OUTPATIENT
Start: 2019-07-22 | End: 2019-07-25 | Stop reason: HOSPADM

## 2019-07-21 RX ADMIN — ATORVASTATIN CALCIUM 10 MG: 10 TABLET, FILM COATED ORAL at 08:07

## 2019-07-21 RX ADMIN — HYDRALAZINE HYDROCHLORIDE 50 MG: 25 TABLET ORAL at 08:07

## 2019-07-21 RX ADMIN — CITALOPRAM HYDROBROMIDE 10 MG: 10 TABLET ORAL at 08:07

## 2019-07-21 RX ADMIN — BUMETANIDE 2 MG: 0.25 INJECTION INTRAMUSCULAR; INTRAVENOUS at 09:07

## 2019-07-21 RX ADMIN — CLOPIDOGREL BISULFATE 75 MG: 75 TABLET ORAL at 08:07

## 2019-07-21 RX ADMIN — AMLODIPINE BESYLATE 10 MG: 5 TABLET ORAL at 08:07

## 2019-07-21 RX ADMIN — HYDRALAZINE HYDROCHLORIDE 50 MG: 25 TABLET ORAL at 09:07

## 2019-07-21 RX ADMIN — FUROSEMIDE 40 MG: 10 INJECTION, SOLUTION INTRAVENOUS at 08:07

## 2019-07-21 RX ADMIN — METOPROLOL TARTRATE 100 MG: 50 TABLET ORAL at 09:07

## 2019-07-21 RX ADMIN — LEVOTHYROXINE SODIUM 50 MCG: 50 TABLET ORAL at 06:07

## 2019-07-21 RX ADMIN — METOPROLOL TARTRATE 100 MG: 50 TABLET ORAL at 08:07

## 2019-07-21 RX ADMIN — POLYETHYLENE GLYCOL 3350 17 G: 17 POWDER, FOR SOLUTION ORAL at 03:07

## 2019-07-21 RX ADMIN — DOCUSATE SODIUM 200 MG: 100 CAPSULE, LIQUID FILLED ORAL at 08:07

## 2019-07-21 NOTE — PROGRESS NOTES
[facility] Hospital Medicine  Progress Note    Patient Name: Erica Leblanc  MRN: [id number]  Patient Class: IP- Inpatient   Admission Date: 7/19/2019  Length of Stay: 2 days  Attending Physician: Jackie Ramirez MD  Primary Care Provider: Sendy Elaine MD        Subjective:     Principal Problem:Hypoglycemia      HPI:  74-year-old female who presents with decreased level of consciousness that was noticed by her son this morning.  She was unresponsive this morning.  Throughout the day yesterday, she was very lethargic.  This is a typical for her according to both her son and her brother who are both at the bedside.  She has been having sleeping difficulty due to a broken CPAP and her son felt that her increased sleeping yesterday was merely her catching up on missed sleep.Per EMS, her initial CBG was 27, but this improved to 126 after .5 AMP of D50.  Her brother reports associated slurred speech and confusion. HPI limited by the pt's confusion.    Pt admitted on D5 IVF and frequent glucose checks. Neuro checks. Urine culture pending. Start Rocephin.     Overview/Hospital Course:  Pt admitted with hypoglycemia and AMS. D5 IVF dc'd. Resumed diabetic diet. Slowly resume insulin regimen. Await urine culture. Continue IV rocephin.     Interval History: pt mental status improved, c/o increased fluid retention     Review of Systems   Constitutional: Positive for fatigue.   HENT: Negative.    Eyes: Negative.    Respiratory: Positive for shortness of breath.    Cardiovascular: Positive for leg swelling.   Gastrointestinal: Positive for abdominal pain.   Endocrine: Negative.    Genitourinary: Positive for difficulty urinating.   Musculoskeletal: Positive for back pain and gait problem.   Neurological: Positive for weakness.   Psychiatric/Behavioral: Positive for confusion.     Objective:     Vital Signs (Most Recent):  Temp: 98.3 °F (36.8 °C) (07/21/19 0900)  Pulse: 63 (07/21/19 0900)  Resp: 17  (07/21/19 0900)  BP: 114/62 (07/21/19 0900)  SpO2: (!) 94 % (07/21/19 0900) Vital Signs (24h Range):  Temp:  [97.2 °F (36.2 °C)-98.6 °F (37 °C)] 98.3 °F (36.8 °C)  Pulse:  [57-66] 63  Resp:  [17-20] 17  SpO2:  [94 %-97 %] 94 %  BP: (114-143)/(62-78) 114/62     Weight: 121 kg (266 lb 12.1 oz)  Body mass index is 44.39 kg/m².    Intake/Output Summary (Last 24 hours) at 7/21/2019 1030  Last data filed at 7/20/2019 1800  Gross per 24 hour   Intake 860 ml   Output --   Net 860 ml      Physical Exam   Constitutional: She appears well-developed and well-nourished. No distress.   HENT:   Head: Normocephalic and atraumatic.   Mouth/Throat: Oropharynx is clear and moist.   Eyes: Pupils are equal, round, and reactive to light. EOM are normal.   Neck: Normal range of motion. Neck supple. No JVD present.   Cardiovascular: Normal rate, regular rhythm, normal heart sounds and intact distal pulses.   Pulmonary/Chest: Effort normal and breath sounds normal. No respiratory distress.   Abdominal: Soft. Bowel sounds are normal. She exhibits no distension. There is tenderness. There is no rebound.   Musculoskeletal: Normal range of motion. She exhibits edema, tenderness and deformity.   Neurological: She is alert. Coordination abnormal.   Skin: Skin is warm and dry. Capillary refill takes 2 to 3 seconds.   Diabetic healing ulcer to BLE   Psychiatric: She has a normal mood and affect. Her behavior is normal.       Significant Labs: All pertinent labs within the past 24 hours have been reviewed.    Significant Imaging: I have reviewed and interpreted all pertinent imaging results/findings within the past 24 hours.      Assessment/Plan:      * Hypoglycemia  Stop home insulin  SSI  Glucose checks Q ac hs  D5 IVF - dc'd  Resume diabetic diet       Obstructive sleep apnea on CPAP  Unsure if patient complaint with device       Hypothyroidism (acquired)  Resume synthroid       Infectious encephalopathy    Secondary to UTI      Urinary tract  infection without hematuria    Await urine culture  Rocephin     Hypertensive heart disease with chronic diastolic congestive heart failure  Uncontrolled  Resume home meds except acei        Uncontrolled type 2 diabetes mellitus with peripheral circulatory disorder  SSI for now until blood sugars stable      Anemia of chronic kidney failure, stage 4 (severe)  Stable  Monitor       Diabetic ulcer of right lower leg associated with type 2 diabetes mellitus, with fat layer exposed  Pt goes to outpatient wound care  Consult wound care       Mild episode of recurrent major depressive disorder  Resume home meds       Slow transit constipation  Resume home regimen         VTE Risk Mitigation (From admission, onward)        Ordered     IP VTE HIGH RISK PATIENT  Once      07/19/19 1644     Place CHANDNI hose  Until discontinued      07/19/19 1644     Place sequential compression device  Until discontinued      07/19/19 1644                Jackie Ramirez MD  Department of Hospital Medicine   [facility]

## 2019-07-21 NOTE — NURSING
Report received from KIKI Levi. Patient awake and alert. NAD noted. Safety maintained with family at bedside. Will continue to monitor.

## 2019-07-21 NOTE — SUBJECTIVE & OBJECTIVE
Interval History: pt mental status improved, c/o increased fluid retention     Review of Systems   Constitutional: Positive for fatigue.   HENT: Negative.    Eyes: Negative.    Respiratory: Positive for shortness of breath.    Cardiovascular: Positive for leg swelling.   Gastrointestinal: Positive for abdominal pain.   Endocrine: Negative.    Genitourinary: Positive for difficulty urinating.   Musculoskeletal: Positive for back pain and gait problem.   Neurological: Positive for weakness.   Psychiatric/Behavioral: Positive for confusion.     Objective:     Vital Signs (Most Recent):  Temp: 98.3 °F (36.8 °C) (07/21/19 0900)  Pulse: 63 (07/21/19 0900)  Resp: 17 (07/21/19 0900)  BP: 114/62 (07/21/19 0900)  SpO2: (!) 94 % (07/21/19 0900) Vital Signs (24h Range):  Temp:  [97.2 °F (36.2 °C)-98.6 °F (37 °C)] 98.3 °F (36.8 °C)  Pulse:  [57-66] 63  Resp:  [17-20] 17  SpO2:  [94 %-97 %] 94 %  BP: (114-143)/(62-78) 114/62     Weight: 121 kg (266 lb 12.1 oz)  Body mass index is 44.39 kg/m².    Intake/Output Summary (Last 24 hours) at 7/21/2019 1030  Last data filed at 7/20/2019 1800  Gross per 24 hour   Intake 860 ml   Output --   Net 860 ml      Physical Exam   Constitutional: She appears well-developed and well-nourished. No distress.   HENT:   Head: Normocephalic and atraumatic.   Mouth/Throat: Oropharynx is clear and moist.   Eyes: Pupils are equal, round, and reactive to light. EOM are normal.   Neck: Normal range of motion. Neck supple. No JVD present.   Cardiovascular: Normal rate, regular rhythm, normal heart sounds and intact distal pulses.   Pulmonary/Chest: Effort normal and breath sounds normal. No respiratory distress.   Abdominal: Soft. Bowel sounds are normal. She exhibits no distension. There is tenderness. There is no rebound.   Musculoskeletal: Normal range of motion. She exhibits edema, tenderness and deformity.   Neurological: She is alert. Coordination abnormal.   Skin: Skin is warm and dry. Capillary  refill takes 2 to 3 seconds.   Diabetic healing ulcer to BLE   Psychiatric: She has a normal mood and affect. Her behavior is normal.       Significant Labs: All pertinent labs within the past 24 hours have been reviewed.    Significant Imaging: I have reviewed and interpreted all pertinent imaging results/findings within the past 24 hours.

## 2019-07-21 NOTE — PLAN OF CARE
Problem: Diabetes Comorbidity  Goal: Blood Glucose Level Within Desired Range    Intervention: Maintain Glycemic Control     07/21/19 0421   Monitor and Manage Ketoacidosis   Glycemic Management blood glucose monitoring;insulin dose matched to carbohydrate intake

## 2019-07-21 NOTE — NURSING
Dr. Ramirez notified of patient's fall. PT consulted to assist patient with getting up at the bedside.

## 2019-07-21 NOTE — HOSPITAL COURSE
Pt admitted with hypoglycemia and AMS. Started on D5 IVF which was eventually dc'd. Resumed diabetic diet. Slowly resumed insulin regimen. Started on IV rocephin. BS improved and stable. Switched to oral cipro for UTI. PT/OT consulted.  Changed to bumex as she does not seem to be diuresing on lasix well. Planned for SNF placement. PPD placed. Patient accepted to SNF. Stable for d/c. SNF orders in with CPAP and wound care.

## 2019-07-21 NOTE — NURSING
Patient wants to get up in the chair. Stated at home she is ambulatory. Assisted patient to the side of the bed just to sit. Pt wanted to stand on her own with the nurse standing in front and recliner chair to the side. Patient bended over and toppled over. Assisted to the floor.

## 2019-07-22 PROBLEM — L97.921 NON-PRESSURE CHRONIC ULCER LEFT LOWER LEG, LIMITED TO BREAKDOWN SKIN: Status: ACTIVE | Noted: 2019-07-22

## 2019-07-22 LAB
POCT GLUCOSE: 121 MG/DL (ref 70–110)
POCT GLUCOSE: 181 MG/DL (ref 70–110)
POCT GLUCOSE: 209 MG/DL (ref 70–110)
POCT GLUCOSE: 220 MG/DL (ref 70–110)

## 2019-07-22 PROCEDURE — 30200315 PPD INTRADERMAL TEST REV CODE 302: Mod: HCNC | Performed by: HOSPITALIST

## 2019-07-22 PROCEDURE — 21400001 HC TELEMETRY ROOM: Mod: HCNC

## 2019-07-22 PROCEDURE — 99900035 HC TECH TIME PER 15 MIN (STAT): Mod: HCNC

## 2019-07-22 PROCEDURE — 25000003 PHARM REV CODE 250: Mod: HCNC | Performed by: HOSPITALIST

## 2019-07-22 PROCEDURE — S0171 BUMETANIDE 0.5 MG: HCPCS | Mod: HCNC | Performed by: HOSPITALIST

## 2019-07-22 PROCEDURE — 86580 TB INTRADERMAL TEST: CPT | Mod: HCNC | Performed by: HOSPITALIST

## 2019-07-22 PROCEDURE — 94660 CPAP INITIATION&MGMT: CPT | Mod: HCNC

## 2019-07-22 PROCEDURE — 97161 PT EVAL LOW COMPLEX 20 MIN: CPT | Mod: HCNC | Performed by: PHYSICAL THERAPIST

## 2019-07-22 RX ADMIN — ATORVASTATIN CALCIUM 10 MG: 10 TABLET, FILM COATED ORAL at 08:07

## 2019-07-22 RX ADMIN — CLOPIDOGREL BISULFATE 75 MG: 75 TABLET ORAL at 08:07

## 2019-07-22 RX ADMIN — METOPROLOL TARTRATE 100 MG: 50 TABLET ORAL at 08:07

## 2019-07-22 RX ADMIN — LEVOTHYROXINE SODIUM 50 MCG: 50 TABLET ORAL at 06:07

## 2019-07-22 RX ADMIN — BUMETANIDE 2 MG: 0.25 INJECTION INTRAMUSCULAR; INTRAVENOUS at 08:07

## 2019-07-22 RX ADMIN — HYDRALAZINE HYDROCHLORIDE 50 MG: 25 TABLET ORAL at 08:07

## 2019-07-22 RX ADMIN — BUMETANIDE 2 MG: 0.25 INJECTION INTRAMUSCULAR; INTRAVENOUS at 09:07

## 2019-07-22 RX ADMIN — Medication 5 UNITS: at 06:07

## 2019-07-22 RX ADMIN — HYDRALAZINE HYDROCHLORIDE 50 MG: 25 TABLET ORAL at 09:07

## 2019-07-22 RX ADMIN — CIPROFLOXACIN HYDROCHLORIDE 250 MG: 250 TABLET, FILM COATED ORAL at 08:07

## 2019-07-22 RX ADMIN — HYDRALAZINE HYDROCHLORIDE 50 MG: 25 TABLET ORAL at 03:07

## 2019-07-22 RX ADMIN — AMLODIPINE BESYLATE 10 MG: 5 TABLET ORAL at 08:07

## 2019-07-22 NOTE — NURSING
0739- bedside rounding report received from maura whelan rn on patients progress and updated handoff report sheet no acute distress. Patient clean and dry . Rails up x 3 call light in reach . Diaper dry and clean.       0921- ate all of breakfast meal took all of am medications without any problems . Denies pain legs elevated has wound care consult for lower legs . No acute change on telemetry . Call light in reach.

## 2019-07-22 NOTE — PLAN OF CARE
Problem: Fall Injury Risk  Goal: Absence of Fall and Fall-Related Injury    Intervention: Identify and Manage Contributors to Fall Injury Risk     07/22/19 0415   Manage Acute Allergic Reaction   Medication Review/Management medications reviewed   Identify and Manage Contributors to Fall Injury Risk   Self-Care Promotion independence encouraged;BADL personal objects within reach;BADL personal routines maintained         Problem: Skin Injury Risk Increased  Goal: Skin Health and Integrity    Intervention: Optimize Skin Protection     07/22/19 0415   Prevent Additional Skin Injury   Head of Bed (HOB) HOB elevated   Pressure Reduction Devices pressure-redistributing mattress utilized   Pressure Reduction Techniques weight shift assistance provided   Monitor and Manage Hypervolemia   Skin Protection adhesive use limited;tubing/devices free from skin contact;incontinence pads utilized         Problem: Diabetes Comorbidity  Goal: Blood Glucose Level Within Desired Range    Intervention: Maintain Glycemic Control     07/22/19 0415   Monitor and Manage Ketoacidosis   Glycemic Management blood glucose monitoring;insulin dose matched to carbohydrate intake

## 2019-07-22 NOTE — PLAN OF CARE
Problem: Physical Therapy Goal  Goal: Physical Therapy Goal  Goals to be met by: 2019    Patient will increase functional independence with mobility by performin. Supine to sit with Modified [place]  2. Sit to supine with Modified [place]  3. Sit to stand transfer with Modified [place]  4. Gait  x 350 feet with Modified [place] using Rolling Walker.    Recommend: SNF at time of discharge.        Outcome: Ongoing (interventions implemented as appropriate)  Lex Liu, PT  2019

## 2019-07-22 NOTE — PLAN OF CARE
Problem: Occupational Therapy Goal  Goal: Occupational Therapy Goal  Goals to be met by: 8/5/19    Patient will increase functional independence with ADLs by performing:    UE Dressing with Modified [place].  LE Dressing with Stand-by Assistance.  Grooming while standing at sink with Contact Guard Assistance.  Toileting from toilet with Contact Guard Assistance for hygiene and clothing management.   Rolling to Bilateral with Stand-by Assistance.   Supine to sit with Contact Guard Assistance.  Step transfer with Contact Guard Assistance  Toilet transfer to toilet with Contact Guard Assistance.  Upper extremity exercise program x15 reps per handout, with assistance as needed.    Outcome: Ongoing (interventions implemented as appropriate)  OT eval is complete. Patient will benefit from OT to address functional deficits.     Comments: The patient has a history of falls. The patient will benefit from SNF.

## 2019-07-22 NOTE — SUBJECTIVE & OBJECTIVE
Interval History: pt concerened not urinating well on lasix    Review of Systems   Constitutional: Positive for fatigue.   HENT: Negative.    Eyes: Negative.    Respiratory: Positive for shortness of breath.    Cardiovascular: Positive for leg swelling.   Gastrointestinal: Positive for abdominal pain.   Endocrine: Negative.    Genitourinary: Positive for difficulty urinating.   Musculoskeletal: Positive for back pain and gait problem.   Neurological: Positive for weakness.   Psychiatric/Behavioral: Positive for confusion.     Objective:     Vital Signs (Most Recent):  Temp: 99.1 °F (37.3 °C) (07/21/19 2347)  Pulse: 65 (07/22/19 0353)  Resp: 16 (07/22/19 0353)  BP: 137/79 (07/21/19 2347)  SpO2: 98 % (07/22/19 0353) Vital Signs (24h Range):  Temp:  [97.2 °F (36.2 °C)-99.1 °F (37.3 °C)] 99.1 °F (37.3 °C)  Pulse:  [57-67] 65  Resp:  [16-18] 16  SpO2:  [93 %-98 %] 98 %  BP: (114-157)/(61-86) 137/79     Weight: 121 kg (266 lb 12.1 oz)  Body mass index is 44.39 kg/m².    Intake/Output Summary (Last 24 hours) at 7/22/2019 0455  Last data filed at 7/21/2019 2100  Gross per 24 hour   Intake 600 ml   Output 1 ml   Net 599 ml      Physical Exam   Constitutional: She appears well-developed and well-nourished. No distress.   HENT:   Head: Normocephalic and atraumatic.   Mouth/Throat: Oropharynx is clear and moist.   Eyes: Pupils are equal, round, and reactive to light. EOM are normal.   Neck: Normal range of motion. Neck supple. No JVD present.   Cardiovascular: Normal rate, regular rhythm, normal heart sounds and intact distal pulses.   Pulmonary/Chest: Effort normal and breath sounds normal. No respiratory distress.   Abdominal: Soft. Bowel sounds are normal. She exhibits no distension. There is tenderness. There is no rebound.   Musculoskeletal: Normal range of motion. She exhibits edema, tenderness and deformity.   Neurological: She is alert. Coordination abnormal.   Skin: Skin is warm and dry. Capillary refill takes 2 to 3  seconds.   Diabetic healing ulcer to BLE   Psychiatric: She has a normal mood and affect. Her behavior is normal.       Significant Labs: All pertinent labs within the past 24 hours have been reviewed.    Significant Imaging: I have reviewed and interpreted all pertinent imaging results/findings within the past 24 hours.

## 2019-07-22 NOTE — PROGRESS NOTES
[facility] Hospital Medicine  Progress Note    Patient Name: Erica Leblanc  MRN: [id number]  Patient Class: IP- Inpatient   Admission Date: 7/19/2019  Length of Stay: 3 days  Attending Physician: Jackie Ramirez MD  Primary Care Provider: Sendy Elaine MD        Subjective:     Principal Problem:Hypoglycemia      HPI:  74-year-old female who presents with decreased level of consciousness that was noticed by her son this morning.  She was unresponsive this morning.  Throughout the day yesterday, she was very lethargic.  This is a typical for her according to both her son and her brother who are both at the bedside.  She has been having sleeping difficulty due to a broken CPAP and her son felt that her increased sleeping yesterday was merely her catching up on missed sleep.Per EMS, her initial CBG was 27, but this improved to 126 after .5 AMP of D50.  Her brother reports associated slurred speech and confusion. HPI limited by the pt's confusion.    Pt admitted on D5 IVF and frequent glucose checks. Neuro checks. Urine culture pending. Start Rocephin.     Overview/Hospital Course:  Pt admitted with hypoglycemia and AMS. D5 IVF dc'd. Resumed diabetic diet. Slowly resume insulin regimen. Await urine culture. Continue IV rocephin.     Pt had fall this am with nursing in room trying to get out of bed to bathroom.  BS improved and stable. Switch to oral cipro for UTI. PT/OT consulted.  Changed to bumex as she does not seem to be diuresing on lasix well.    Interval History: pt concerened not urinating well on lasix    Review of Systems   Constitutional: Positive for fatigue.   HENT: Negative.    Eyes: Negative.    Respiratory: Positive for shortness of breath.    Cardiovascular: Positive for leg swelling.   Gastrointestinal: Positive for abdominal pain.   Endocrine: Negative.    Genitourinary: Positive for difficulty urinating.   Musculoskeletal: Positive for back pain and gait problem.    Neurological: Positive for weakness.   Psychiatric/Behavioral: Positive for confusion.     Objective:     Vital Signs (Most Recent):  Temp: 99.1 °F (37.3 °C) (07/21/19 2347)  Pulse: 65 (07/22/19 0353)  Resp: 16 (07/22/19 0353)  BP: 137/79 (07/21/19 2347)  SpO2: 98 % (07/22/19 0353) Vital Signs (24h Range):  Temp:  [97.2 °F (36.2 °C)-99.1 °F (37.3 °C)] 99.1 °F (37.3 °C)  Pulse:  [57-67] 65  Resp:  [16-18] 16  SpO2:  [93 %-98 %] 98 %  BP: (114-157)/(61-86) 137/79     Weight: 121 kg (266 lb 12.1 oz)  Body mass index is 44.39 kg/m².    Intake/Output Summary (Last 24 hours) at 7/22/2019 0455  Last data filed at 7/21/2019 2100  Gross per 24 hour   Intake 600 ml   Output 1 ml   Net 599 ml      Physical Exam   Constitutional: She appears well-developed and well-nourished. No distress.   HENT:   Head: Normocephalic and atraumatic.   Mouth/Throat: Oropharynx is clear and moist.   Eyes: Pupils are equal, round, and reactive to light. EOM are normal.   Neck: Normal range of motion. Neck supple. No JVD present.   Cardiovascular: Normal rate, regular rhythm, normal heart sounds and intact distal pulses.   Pulmonary/Chest: Effort normal and breath sounds normal. No respiratory distress.   Abdominal: Soft. Bowel sounds are normal. She exhibits no distension. There is tenderness. There is no rebound.   Musculoskeletal: Normal range of motion. She exhibits edema, tenderness and deformity.   Neurological: She is alert. Coordination abnormal.   Skin: Skin is warm and dry. Capillary refill takes 2 to 3 seconds.   Diabetic healing ulcer to BLE   Psychiatric: She has a normal mood and affect. Her behavior is normal.       Significant Labs: All pertinent labs within the past 24 hours have been reviewed.    Significant Imaging: I have reviewed and interpreted all pertinent imaging results/findings within the past 24 hours.      Assessment/Plan:      * Hypoglycemia  Stop home insulin  SSI  Glucose checks Q ac hs  D5 IVF - dc'd  Resume  diabetic diet       Obstructive sleep apnea on CPAP  Unsure if patient complaint with device       Hypothyroidism (acquired)  Resume synthroid       Infectious encephalopathy    Secondary to UTI      Urinary tract infection without hematuria    Await urine culture  Rocephin     Hypertensive heart disease with chronic diastolic congestive heart failure  Uncontrolled  Resume home meds except acei        Uncontrolled type 2 diabetes mellitus with peripheral circulatory disorder  SSI for now until blood sugars stable      Anemia of chronic kidney failure, stage 4 (severe)  Stable  Monitor       Diabetic ulcer of right lower leg associated with type 2 diabetes mellitus, with fat layer exposed  Pt goes to outpatient wound care  Consult wound care       Mild episode of recurrent major depressive disorder  Resume home meds       Slow transit constipation  Resume home regimen         VTE Risk Mitigation (From admission, onward)        Ordered     IP VTE HIGH RISK PATIENT  Once      07/19/19 1644     Place CHANDNI hose  Until discontinued      07/19/19 1644     Place sequential compression device  Until discontinued      07/19/19 1644                Jackie Ramirez MD  Department of Hospital Medicine   [facility]

## 2019-07-22 NOTE — NURSING
Report received from KIKI Levi. Patient awake and alert. NAD noted. Safety maintained. Will continue to monitor.

## 2019-07-22 NOTE — PT/OT/SLP EVAL
Physical Therapy Evaluation    Patient Name:  Erica Leblanc   MRN:  [id number]    Recommendations:     Discharge Recommendations:  nursing facility, skilled   Discharge Equipment Recommendations: none   Barriers to discharge: Decreased caregiver support; pt reports that son is at work all day, where pt is at home alone.     Assessment:     Erica Leblanc is a 74 y.o. female admitted with a medical diagnosis of Hypoglycemia.  She presents with the following impairments/functional limitations:  weakness, impaired endurance, gait instability, impaired functional mobilty, decreased safety awareness, impaired coordination, impaired cardiopulmonary response to activity, impaired balance, decreased lower extremity function, decreased ROM, edema, impaired skin.    Rehab Prognosis: Good; patient would benefit from acute skilled PT services to address these deficits and reach maximum level of function.    Recent Surgery: * No surgery found *      Plan:     During this hospitalization, patient to be seen 5 x/week to address the identified rehab impairments via gait training, therapeutic activities, therapeutic exercises and progress toward the following goals:    · Plan of Care Expires:  08/04/19    Subjective     Chief Complaint: I'm a little SOB with walking.    Patient/Family Comments/goals: to return to PLOF    Pain/Comfort:  · Pain Rating 1: 0/10    Patients cultural, spiritual, [employer] conflicts given the current situation:      Living Environment:  Pt lives alone with son in a [facility] with 1 [facility].    Prior to admission, patients level of function was Independent.  Equipment used at home: shower chair, CPAP(- Pt reports that CPAP machine is broken. ).  DME owned (not currently used): none.  Upon discharge, patient will have assistance from Son and Self.    Objective:     Communicated with Nurse Loya prior to session.  Patient found supine with peripheral IV, telemetry, oxygen(- 2.0 Lpm of O2 via [place]  )  upon PT  entry to room.    General Precautions: Standard, fall, diabetic   Orthopedic Precautions:Full weight bearing   Braces: N/A     Exams:  · Cognitive Exam:  Patient is oriented to Person, Place and Situation  · Gross Motor Coordination:  WFL  · Postural Exam:  Patient presented with the following abnormalities:    · -       Rounded shoulders  · -       Forward head  · -       Abnormal trunk flexion  · Skin Integrity/Edema:      · -       Skin integrity: Wound B LE wounds; pt currently receiving Outpatient Wound Care.   · -       Edema: Mild B LE  · RLE ROM: WFL  · RLE Strength: WFL  · LLE ROM: WFL  · LLE Strength: WFL    Functional Mobility:  · Bed Mobility:     · Rolling Left:  maximal assistance  · Rolling Right: maximal assistance  · Supine to Sit: maximal assistance  · Sit to Supine: maximal assistance  · Transfers:     · Sit to Stand:  maximal assistance and of 2 persons with rolling walker  · Gait: 55' with RW and Min A for RW positioning.  Pt with standing rest break < 1 min 2* c/o sob with weight bearing task.     AM-PAC 6 CLICK MOBILITY  Total Score:11     Patient left supine with all lines intact, call button in reach and son present.    GOALS:   Multidisciplinary Problems     Physical Therapy Goals        Problem: Physical Therapy Goal    Goal Priority Disciplines Outcome Goal Variances Interventions   Physical Therapy Goal     PT, PT/OT Ongoing (interventions implemented as appropriate)     Description:  Goals to be met by: 2019    Patient will increase functional independence with mobility by performin. Supine to sit with Modified [place]  2. Sit to supine with Modified [place]  3. Sit to stand transfer with Modified [place]  4. Gait  x 350 feet with Modified [place] using Rolling Walker.    Recommend: SNF at time of discharge.                          History:     Past Medical History:   Diagnosis Date    Acute respiratory failure with hypoxia     Anemia of chronic  kidney failure, stage 4 (severe) 2019    Cataracts, bilateral     CHF (congestive heart failure)     CKD (chronic kidney disease) stage 3, GFR 30-59 ml/min     CKD (chronic kidney disease) stage 3, GFR 30-59 ml/min     Controlled type 2 diabetes mellitus with proteinuria or albuminuria     Depression     Diabetes with neurologic complications     Diabetic retinopathy of both eyes     Edema     Glaucoma     History of colonic polyps     Hx-TIA (transient ischemic attack) 2008    Hyperlipidemia LDL goal < 100     Hypertension     Hypothyroidism     Major depressive disorder, single episode, mild 2016    Mixed incontinence urge and stress     Obesity     Obstructive sleep apnea on CPAP     19:  Home CPAP machine broken, per patient & son    Osteopenia     Proteinuria     Sickle cell trait     Trouble in sleeping     Type 2 diabetes mellitus with ophthalmic manifestations     Type 2 diabetes with stage 3 chronic kidney disease GFR 30-59     Type II or unspecified type diabetes mellitus with renal manifestations, uncontrolled(250.42)     Uncontrolled type 2 diabetes mellitus with peripheral circulatory disorder 2019    Urge incontinence 2016    Urge incontinence     Venous stasis ulcer     bilateral lower legs    Vitamin D deficiency disease        Past Surgical History:   Procedure Laterality Date    BREAST BIOPSY      breast reduction Bilateral age 31    BREAST SURGERY      cataracts Bilateral      SECTION, LOW TRANSVERSE      x1    CHOLECYSTECTOMY      COLONOSCOPY N/A 2012    Performed by Keron Gunderson MD at [facility] END[facility] (4TH FLR)    EYE SURGERY  2014, 2014    vitrectomy    EYE SURGERY Right 2016    HYSTERECTOMY  1986    TAHBSO (patient is unsure if ovaries removed)    OOPHORECTOMY      REFRACTIVE SURGERY      REPAIR, RETINAL DETACHMENT, WITH VITRECTOMY Right 2014    Performed by LILLIANA Coello MD at [facility] OR [facility]  FL[facility]    REPAIR, RETINAL DETACHMENT, WITH VITRECTOMY Left 1/8/2014    Performed by L[facility]ello MD at [facility] OR 1ST FLR    REPAIR-RETINA (VITRECTOMY) Right 7/16/2014    Performed by LILLIANA Coello MD at [facility] OR 1ST FLR    STRABISMUS REPAIR/ADJUSTABLE SUTURES Bilateral 8/17/2016    Performed by RABIA Danielson Jr., MD at [facility] OR 1ST FLR    TOTAL REDUCTION MAMMOPLASTY      approx 10 yrs ago       Time Tracking:     PT Received On: 07/22/19(- co treated with OT. )  PT Start Time: 1402     PT Stop Time: 1432  PT Total Time (min): 30 min     Billable Minutes: Evaluation 30 min      Lex Liu, PT  07/22/2019

## 2019-07-22 NOTE — PT/OT/SLP EVAL
Occupational Therapy   Evaluation    Name: Erica Leblanc  MRN: [id number]  Admitting Diagnosis:  Hypoglycemia      Recommendations:     Discharge Recommendations: nursing facility, skilled  Discharge Equipment Recommendations:  none  Barriers to discharge:       Assessment:     Erica Leblanc is a 74 y.o. female with a medical diagnosis of Hypoglycemia.  She presents with self care and functional mobility deficits. Performance deficits affecting function: weakness, impaired endurance, gait instability, impaired functional mobilty, impaired self care skills, impaired balance, decreased lower extremity function, decreased upper extremity function, decreased coordination, decreased safety awareness, edema, impaired skin, impaired cardiopulmonary response to activity.      Rehab Prognosis: Good; patient would benefit from acute skilled OT services to address these deficits and reach maximum level of function.       Plan:     Patient to be seen   to address the above listed problems via self-care/home management, therapeutic activities, therapeutic exercises  · Plan of Care Expires: 08/05/19  · Plan of Care Reviewed with: son, patient    Subjective     Chief Complaint: legs give out  Patient/Family Comments/goals: return to work    Occupational Profile:  Living Environment: Pt lives with son in a [facility] with 1 IJE[facility].  The son works.  Previous level of function: Mod I amb. 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 The patient was going to wound care. The patient's son provides transportation.  Equipment Used at Home:  shower chair  Assistance upon Discharge: 2 sons (has 6 children)    Pain/Comfort:  · Pain Rating 1: 0/10    Patients cultural, spiritual, Muslim conflicts given the current situation: no    Objective:     Communicated with: Shalini jimenez prior to session.  Patient found HOB elevated with  peripheral IV, telemetry, oxygen upon OT entry to room.    General Precautions: Standard, diabetic, fall   Orthopedic Precautions:N/A   Braces: N/A     Occupational Performance:    Bed Mobility:    · Patient completed Rolling/Turning to Left with  maximal assistance  · Patient completed Rolling/Turning to Right with maximal assistance  · Patient completed Scooting/Bridging with maximal assistance  · Patient completed Supine to Sit with maximal assistance  · Patient completed Sit to Supine with maximal assistance    Functional Mobility/Transfers:  · Patient completed Sit <> Stand Transfer with maximal assistance and of 2 persons  with  rolling walker x2 trials  · Functional Mobility: The patient amb using a RW with min assist ~55' with multiple standing rest breaks and verbal cues for upright posture. The patient with SOB with amb. PSO2 on 2L 92%.    Activities of Daily Living:  · Upper Body Dressing: maximal assistance    · Lower Body Dressing: total assistance      Cognitive/Visual Perceptual:  Cognitive/Psychosocial Skills:     -       Oriented to: Person, Place and Situation   -       Follows Commands/attention:Follows two-step commands  -       Communication: clear/fluent and delayed responses  -       Memory: No Deficits noted  -       Safety awareness/insight to disability: impaired   -       Mood/Affect/Coping skills/emotional control: Appropriate to situation    Physical Exam:  Balance: -       fair+  Postural examination/scapula alignment:    -       Rounded shoulders  -       Forward head  Skin integrity: Visible skin intact and B lower legs wrapped per wound care  Edema:  BLE  Sensation:    -       Intact  Upper Extremity Range of Motion:     -       Right Upper Extremity: WFL  -       Left Upper Extremity: WFL  Upper Extremity Strength:    -       Right Upper Extremity: WFL  -       Left Upper Extremity: WFL    AMPAC 6 Click ADL:  AMPAC Total Score: 18    Treatment & Education:  Education:  The patient  participated in OT eval and was educated re: OT role and recommendation for SNF. The patient tolerated sitting on the EOB with CGA. The patient's son was present and was educated re: SNF. Patient and son in agreement. The patient's whiteboard was updated and instructed to use a bed pan until able to safely transfer to a chair.    Patient left HOB elevated with all lines intact, call button in reach, nurse, Shalini notified and son present    GOALS:   Multidisciplinary Problems     Occupational Therapy Goals        Problem: Occupational Therapy Goal    Goal Priority Disciplines Outcome Interventions   Occupational Therapy Goal     OT, PT/OT Ongoing (interventions implemented as appropriate)    Description:  Goals to be met by: 8/5/19    Patient will increase functional independence with ADLs by performing:    UE Dressing with Modified [place].  LE Dressing with Stand-by Assistance.  Grooming while standing at sink with Contact Guard Assistance.  Toileting from toilet with Contact Guard Assistance for hygiene and clothing management.   Rolling to Bilateral with Stand-by Assistance.   Supine to sit with Contact Guard Assistance.  Step transfer with Contact Guard Assistance  Toilet transfer to toilet with Contact Guard Assistance.  Upper extremity exercise program x15 reps per handout, with assistance as needed.                      History:     Past Medical History:   Diagnosis Date    Acute respiratory failure with hypoxia     Anemia of chronic kidney failure, stage 4 (severe) 4/5/2019    Cataracts, bilateral     CHF (congestive heart failure)     CKD (chronic kidney disease) stage 3, GFR 30-59 ml/min     CKD (chronic kidney disease) stage 3, GFR 30-59 ml/min     Controlled type 2 diabetes mellitus with proteinuria or albuminuria     Depression     Diabetes with neurologic complications     Diabetic retinopathy of both eyes     Edema     Glaucoma     History of colonic polyps     Hx-TIA (transient  ischemic attack) 2008    Hyperlipidemia LDL goal < 100     Hypertension     Hypothyroidism     Major depressive disorder, single episode, mild 2016    Mixed incontinence urge and stress     Obesity     Obstructive sleep apnea on CPAP     19:  Home CPAP machine broken, per patient & son    Osteopenia     Proteinuria     Sickle cell trait     Trouble in sleeping     Type 2 diabetes mellitus with ophthalmic manifestations     Type 2 diabetes with stage 3 chronic kidney disease GFR 30-59     Type II or unspecified type diabetes mellitus with renal manifestations, uncontrolled(250.42)     Uncontrolled type 2 diabetes mellitus with peripheral circulatory disorder 2019    Urge incontinence 2016    Urge incontinence     Venous stasis ulcer     bilateral lower legs    Vitamin D deficiency disease        Past Surgical History:   Procedure Laterality Date    BREAST BIOPSY      breast reduction Bilateral age 31    BREAST SURGERY      cataracts Bilateral      SECTION, LOW TRANSVERSE      x1    CHOLECYSTECTOMY      COLONOSCOPY N/A 2012    Performed by [facility]erson [facility] at [facility] (4TH FLR)    EYE SURGERY  2014, 2014    vitrectomy    EYE SURGERY Right 2016    HYSTERECTOMY  1986    TAHBSO (patient is unsure if ovaries removed)    OOPHORECTOMY      REFRACTIVE SURGERY      REPAIR, RETINAL DETACHMENT, WITH VITRECTOMY Right 2014    Performed by LILLIANA Coello MD at [facility] OR 1ST FLR    REPAIR, RETINAL DETACHMENT, WITH VITRECTOMY Left 2014    Performed by LILLIANA Coello MD at [facility] OR 1ST FLR    REPAIR-RETINA (VITRECTOMY) Right 2014    Performed by LILLIANA Coello MD at [facility] OR 1ST FLR    STRABISMUS REPAIR/ADJUSTABLE SUTURES Bilateral 2016    Performed by RABIA Danielson Jr., MD at [facility] OR 1ST FLR    TOTAL REDUCTION MAMMOPLASTY      approx 10 yrs ago       Time Tracking:     OT Date of Treatment: 19  OT Start  Time: 1502  OT Stop Time: 1532  OT Total Time (min): 30 min    Billable Minutes:Evaluation 20 (with PT)  Self Care/Home Management 10  Total Time 30    Bettina Corona OT  7/22/2019

## 2019-07-22 NOTE — CONSULTS
"A 74 year old female admitted 7/19/19 from home with hypoglycemia; HUMBERTO on CPAP; hypothyroidism; infectious encephalopathy; UTI; hypertensive heart disease with chronic diastolic CHF; anemia of CKF Stage 4; diabetic ulcer of right lower leg associated with DM II; mild episode of recurrent major depressive disorder; slow transit constipation  PMH: Anemia of CKD Stage 4; acute respiratory failure with hypoxia; CHF; CKD; DM II; depression; diabetic retinopathy; edema; colonic polyps; TIA; HTN; hypothyroidism; mixed urinary incontinence-urge and stress; obesity; HUMBERTO; sickle cell trait; venous stasis ulcers bilateral lower legs  Patient reports stopping diabetes medication recently for a couple of months and recently started back on medication.   Patient goes to [facility] for treatment of legs every Wednesday. Patient cancelled last appointment 7/17/19.   7/19  WBC 5.55 Hgb 13.8 Hct 42.9 Alb 2.7   7/20 A1C 7.3  According to MD note, patient had fall 7/21 while trying to go to bathroom  Consulted for wounds BLE  Assessment:  Photodocumentation    Right medial lower leg- Two drying areas 2.5 cm on right medial lower leg. Painted with gentian violet.     Left medial lower leg- Drying area with pink scar with 3 mm pink opening.   No edema in lower legs. Weak palpable DP pulses.   Patient reports ER MD removed compression wraps on Friday, 7/19, and patient was waiting for wound care to replace dressings.   Treatment:  Cleansed legs with bath wipes and wounds with NS. Dressed wounds with Aquacel foam 4x4 dressings and wrapped bilateral lower extremities from toes to knee with Kerlix roll followed by 4" Coban for light compression.   Plan: Replace compression wrap weekly until legs healed.  Discussed with patient managing legs with compression stockings when legs healed.       "

## 2019-07-22 NOTE — NURSING
1138- no acute distress or changes continue  To monitor. Call light in reach head of bed up rails up x 3 . Continue to monitor. Bed alarm on .

## 2019-07-23 LAB
POCT GLUCOSE: 157 MG/DL (ref 70–110)
POCT GLUCOSE: 158 MG/DL (ref 70–110)
POCT GLUCOSE: 189 MG/DL (ref 70–110)
POCT GLUCOSE: 231 MG/DL (ref 70–110)
POCT GLUCOSE: 262 MG/DL (ref 70–110)

## 2019-07-23 PROCEDURE — 99900035 HC TECH TIME PER 15 MIN (STAT): Mod: HCNC

## 2019-07-23 PROCEDURE — S0171 BUMETANIDE 0.5 MG: HCPCS | Mod: HCNC | Performed by: HOSPITALIST

## 2019-07-23 PROCEDURE — 94761 N-INVAS EAR/PLS OXIMETRY MLT: CPT | Mod: HCNC

## 2019-07-23 PROCEDURE — 97116 GAIT TRAINING THERAPY: CPT | Mod: HCNC

## 2019-07-23 PROCEDURE — 21400001 HC TELEMETRY ROOM: Mod: HCNC

## 2019-07-23 PROCEDURE — 97535 SELF CARE MNGMENT TRAINING: CPT | Mod: HCNC

## 2019-07-23 PROCEDURE — 25000003 PHARM REV CODE 250: Mod: HCNC | Performed by: HOSPITALIST

## 2019-07-23 PROCEDURE — 97530 THERAPEUTIC ACTIVITIES: CPT | Mod: HCNC

## 2019-07-23 RX ADMIN — METOPROLOL TARTRATE 100 MG: 50 TABLET ORAL at 08:07

## 2019-07-23 RX ADMIN — DOCUSATE SODIUM 200 MG: 100 CAPSULE, LIQUID FILLED ORAL at 09:07

## 2019-07-23 RX ADMIN — AMLODIPINE BESYLATE 10 MG: 5 TABLET ORAL at 09:07

## 2019-07-23 RX ADMIN — CLOPIDOGREL BISULFATE 75 MG: 75 TABLET ORAL at 09:07

## 2019-07-23 RX ADMIN — BUMETANIDE 2 MG: 0.25 INJECTION INTRAMUSCULAR; INTRAVENOUS at 08:07

## 2019-07-23 RX ADMIN — BUMETANIDE 2 MG: 0.25 INJECTION INTRAMUSCULAR; INTRAVENOUS at 09:07

## 2019-07-23 RX ADMIN — HYDRALAZINE HYDROCHLORIDE 50 MG: 25 TABLET ORAL at 08:07

## 2019-07-23 RX ADMIN — HYDRALAZINE HYDROCHLORIDE 50 MG: 25 TABLET ORAL at 09:07

## 2019-07-23 RX ADMIN — POLYETHYLENE GLYCOL 3350 17 G: 17 POWDER, FOR SOLUTION ORAL at 09:07

## 2019-07-23 RX ADMIN — HYDRALAZINE HYDROCHLORIDE 50 MG: 25 TABLET ORAL at 03:07

## 2019-07-23 RX ADMIN — METOPROLOL TARTRATE 100 MG: 50 TABLET ORAL at 09:07

## 2019-07-23 RX ADMIN — CIPROFLOXACIN HYDROCHLORIDE 250 MG: 250 TABLET, FILM COATED ORAL at 09:07

## 2019-07-23 RX ADMIN — LEVOTHYROXINE SODIUM 50 MCG: 50 TABLET ORAL at 05:07

## 2019-07-23 RX ADMIN — ATORVASTATIN CALCIUM 10 MG: 10 TABLET, FILM COATED ORAL at 09:07

## 2019-07-23 NOTE — NURSING
0710- bedside rounding report received from nahun carpenter rn on patients progress and updated handoff report sheet . Updated plan of care on board for today and reviewed with patient. Rails up x 3 bed alarm on call light in reach head of bed up.

## 2019-07-23 NOTE — PLAN OF CARE
Problem: Glycemic Control Impaired  Goal: Blood Glucose Level Within Desired Range    Intervention: Optimize Glycemic Control     07/23/19 0434   Monitor and Manage Ketoacidosis   Glycemic Management blood glucose monitoring;oral hydration promoted

## 2019-07-23 NOTE — PROGRESS NOTES
Received report from KIKI Loya. Patient AAOx4, resting  in bed w/ family at bedside,  2L O2 NC & telemetry monitoring in place, no distress noted. Safety maintained, call light in reach.

## 2019-07-23 NOTE — PT/OT/SLP PROGRESS
Physical Therapy Treatment    Patient Name:  Erica Leblanc   MRN:  [id number]    Recommendations:     Discharge Recommendations:  nursing facility, skilled   Discharge Equipment Recommendations: (TBD)   Barriers to discharge home: Decreased caregiver support    Assessment:     Erica Leblanc is a 74 y.o. female admitted with a medical diagnosis of Hypoglycemia.  She presents with the following impairments/functional limitations:  weakness, impaired endurance, impaired self care skills, impaired functional mobilty, gait instability, impaired balance, decreased lower extremity function, decreased safety awareness, pain, decreased ROM, impaired skin, edema, impaired cardiopulmonary response to activity.    Rehab Prognosis: Fair+; patient would benefit from acute skilled PT services to address these deficits and reach maximum level of function.    Recent Surgery: * No surgery found *      Plan:     During this hospitalization, patient to be seen 5 x/week to address the identified rehab impairments via gait training, therapeutic activities, therapeutic exercises and progress toward the following goals:    · Plan of Care Expires:  08/04/19    Subjective     Chief Complaint: LE weakness  Patient/Family Comments/goals: Pt would like to sit up.    Pain/Comfort:  · Pain Rating 1: (Pt c/o generalized pain to BLE.)      Objective:     Communicated with nurse Loya prior to session.  Patient found seated EOB with OT with peripheral IV, telemetry upon PT entry to room.     General Precautions: Standard, fall, diabetic   Orthopedic Precautions:N/A   Braces: N/A     Functional Mobility:  · Transfers:     · Sit to Stand:  contact guard assistance and minimum assistance with rolling walker  · Bed to Chair: contact guard assistance and minimum assistance with  rolling walker  using  Step Transfer  · Gait: Pt ambulated ~80 ft with CGA using RW and chair to follow.  Pt with decreased endurance required 2-3 brief standing rest  breaks 2* c/o SOB.  Pt with decreased step length and debbie.  · Balance: Pt with fair balance.       AM-PAC 6 CLICK MOBILITY  Turning over in bed (including adjusting bedclothes, sheets and blankets)?: 2  Sitting down on and standing up from a chair with arms (e.g., wheelchair, bedside commode, etc.): 3  Moving from lying on back to sitting on the side of the bed?: 2  Moving to and from a bed to a chair (including a wheelchair)?: 3  Need to walk in hospital room?: 3  Climbing 3-5 steps with a railing?: 1  Basic Mobility Total Score: 14       Patient left up in chair reclined with BLE elevated with all lines intact, call button in reach and nurse Shalini notified.  Lunch tray set-up.      GOALS:   Multidisciplinary Problems     Physical Therapy Goals        Problem: Physical Therapy Goal    Goal Priority Disciplines Outcome Goal Variances Interventions   Physical Therapy Goal     PT, PT/OT Ongoing (interventions implemented as appropriate)     Description:  Goals to be met by: 2019    Patient will increase functional independence with mobility by performin. Supine to sit with Modified [place]  2. Sit to supine with Modified [place]  3. Sit to stand transfer with Modified [place]  4. Gait  x 350 feet with Modified [place] using Rolling Walker.    Recommend: SNF at time of discharge.                          Time Tracking:     PT Received On: 19  PT Start Time: 1210     PT Stop Time: 1228  PT Total Time (min): 18 min     Billable Minutes: Gait Training 18 min partial co-tx with OT    [id number] (9 min - 1 TA)  Pt tolerated ~3 hours up in recliner.  Pt was mod A with RW for sit>stand from low bedside chair.  Pt ambulated ~6-7 steps from chair>bed with min A using RW.  Pt able to scoot along bedside with CGA.  Pt was min A with BLE for sit>supine.  Pt left supine with nurse Shalini present.      PTA Visit Number: 0     Clotilde Day, PT  2019

## 2019-07-23 NOTE — PLAN OF CARE
Problem: Occupational Therapy Goal  Goal: Occupational Therapy Goal  Goals to be met by: 8/5/19    Patient will increase functional independence with ADLs by performing:    UE Dressing with Modified [place].  LE Dressing with Stand-by Assistance.  Grooming while standing at sink with Contact Guard Assistance.  Toileting from toilet with Contact Guard Assistance for hygiene and clothing management.   Rolling to Bilateral with Stand-by Assistance.   Supine to sit with Contact Guard Assistance.  Step transfer with Contact Guard Assistance  Toilet transfer to toilet with Contact Guard Assistance.  Upper extremity exercise program x15 reps per handout, with assistance as needed.     Outcome: Ongoing (interventions implemented as appropriate)  The patient demo improved functional mobility sitting balance.    Comments: The patient will benefit from SNF.

## 2019-07-23 NOTE — NURSING
1310- no acute distress or changes patient sitting upright in chair watching tv show no acute distress , call light in reach . No changes on telemetry.       1545- assisted patient back to her bed with walker and gait belt and two person assistance . Tolerated fairly well. She passed small amount of stool and large amount of urine on her diaper good pericare performed too. Head of bed up rails up x 3 bed alarm on call light in reach head of bed up . tv on.       1800- ate her supper meal . Denies pain. No changes on telemetry. Changed and turned in bed pillows used for support she does assist with turning. No other areas of skin breakdown , unna boots intact and dry to both lower legs quick refill in both feet. Bed alarm on call light in reach head of bed up rails up x 3.

## 2019-07-23 NOTE — SUBJECTIVE & OBJECTIVE
Interval History: pt has no new complaints, proceed with SNF placement     Review of Systems   Constitutional: Positive for fatigue.   HENT: Negative.    Eyes: Negative.    Respiratory: Negative for shortness of breath.    Cardiovascular: Positive for leg swelling.   Gastrointestinal: Negative for abdominal pain.   Endocrine: Negative.    Genitourinary: Negative for difficulty urinating.   Musculoskeletal: Positive for back pain and gait problem.   Neurological: Positive for weakness.   Psychiatric/Behavioral: Negative for confusion.     Objective:     Vital Signs (Most Recent):  Temp: 98.2 °F (36.8 °C) (07/23/19 1536)  Pulse: 63 (07/23/19 1536)  Resp: 19 (07/23/19 1536)  BP: 139/78 (07/23/19 1536)  SpO2: (!) 93 % (07/23/19 1536) Vital Signs (24h Range):  Temp:  [97.6 °F (36.4 °C)-99 °F (37.2 °C)] 98.2 °F (36.8 °C)  Pulse:  [58-65] 63  Resp:  [18-19] 19  SpO2:  [93 %-100 %] 93 %  BP: (124-143)/(63-88) 139/78     Weight: 124.6 kg (274 lb 11.1 oz)  Body mass index is 45.71 kg/m².    Intake/Output Summary (Last 24 hours) at 7/23/2019 1644  Last data filed at 7/23/2019 0900  Gross per 24 hour   Intake 833 ml   Output --   Net 833 ml      Physical Exam   Constitutional: She is oriented to person, place, and time. She appears well-developed and well-nourished. No distress.   HENT:   Head: Normocephalic and atraumatic.   Mouth/Throat: Oropharynx is clear and moist.   Eyes: Pupils are equal, round, and reactive to light. EOM are normal.   Neck: Normal range of motion. Neck supple. No JVD present.   Cardiovascular: Normal rate, regular rhythm, normal heart sounds and intact distal pulses.   Pulmonary/Chest: Effort normal and breath sounds normal. No respiratory distress.   Abdominal: Soft. Bowel sounds are normal. She exhibits no distension. There is no tenderness. There is no rebound.   Musculoskeletal: Normal range of motion. She exhibits edema, tenderness and deformity.   Neurological: She is alert and oriented to person,  place, and time.   Skin: Skin is warm and dry. Capillary refill takes less than 2 seconds.   Diabetic healing ulcer to BLE   Psychiatric: She has a normal mood and affect. Her behavior is normal.   Nursing note and vitals reviewed.      Significant Labs:  I have reviewed and interpreted all pertinent labs results/findings within the past 24 hours.    Significant Imaging: I have reviewed and interpreted all pertinent imaging results/findings within the past 24 hours.

## 2019-07-23 NOTE — PROGRESS NOTES
Pt placed on cpap of +10 with 2L oxygen bleed inline.  Oxygen sat are 96%.  Will continue to monitor

## 2019-07-23 NOTE — PT/OT/SLP PROGRESS
Occupational Therapy   Treatment    Name: Erica Leblanc  MRN: [id number]  Admitting Diagnosis:  Hypoglycemia       Recommendations:     Discharge Recommendations: nursing facility, skilled  Discharge Equipment Recommendations:  (TBD)  Barriers to discharge:  None    Assessment:     Erica Leblanc is a 74 y.o. female with a medical diagnosis of Hypoglycemia.  She presents with improved functional mobility and activity tolerance.  Performance deficits affecting function are weakness, impaired self care skills, impaired functional mobilty, gait instability, impaired balance, decreased coordination, decreased lower extremity function, decreased upper extremity function, decreased safety awareness, pain, decreased ROM, edema, impaired cardiopulmonary response to activity, impaired skin.     Rehab Prognosis:  Good; patient would benefit from acute skilled OT services to address these deficits and reach maximum level of function.       Plan:     Patient to be seen 5 x/week to address the above listed problems via self-care/home management, therapeutic activities, therapeutic exercises  · Plan of Care Expires: 08/05/19  · Plan of Care Reviewed with: patient    Subjective     Pain/Comfort:  · Pain Rating 1: 0/10    Objective:     Communicated with: Shalini jimenez prior to session.  Patient found HOB elevated with telemetry, peripheral IV upon OT entry to room.    General Precautions: Standard, diabetic, fall   Orthopedic Precautions:N/A   Braces: N/A     Occupational Performance:     Bed Mobility:    · Patient completed Scooting/Bridging with contact guard assistance  · Patient completed Supine to Sit with moderate assistance and HOB elevated     Functional Mobility/Transfers:  · Patient completed Sit <> Stand Transfer with moderate assistance  with  rolling walker   · Functional Mobility: The patient stood x2 attempts from the EOB. The patient required mod/max assist on first attempt and mod assist on the 2nd attempt.      Activities of Daily Living:  · Feeding:  modified independence to sit on the EOB to eat her lunch  · Upper Body Dressing: minimum assistance to don back gown while seated on the EOB  · Lower Body Dressing: dependence to don B socks      [facility] 6 Click ADL: 17    Treatment & Education:  The patient was able to scoot along the EOB with CGA. The patient tolerated sitting on the EOB with (S) ~20 min.    Patient left seated EOB with PT with all lines intact, call button in reach and Clotilde,PT presentEducation:      GOALS:   Multidisciplinary Problems     Occupational Therapy Goals        Problem: Occupational Therapy Goal    Goal Priority Disciplines Outcome Interventions   Occupational Therapy Goal     OT, PT/OT Ongoing (interventions implemented as appropriate)    Description:  Goals to be met by: 8/5/19    Patient will increase functional independence with ADLs by performing:    UE Dressing with Modified [place].  LE Dressing with Stand-by Assistance.  Grooming while standing at sink with Contact Guard Assistance.  Toileting from toilet with Contact Guard Assistance for hygiene and clothing management.   Rolling to Bilateral with Stand-by Assistance.   Supine to sit with Contact Guard Assistance.  Step transfer with Contact Guard Assistance  Toilet transfer to toilet with Contact Guard Assistance.  Upper extremity exercise program x15 reps per handout, with assistance as needed.                      Time Tracking:     OT Date of Treatment: 07/23/19  OT Start Time: 1141  OT Stop Time: 1210  OT Total Time (min): 29 min    Billable Minutes:Self Care/Home Management 29 Janclarence Corona OT  7/23/2019

## 2019-07-23 NOTE — PROGRESS NOTES
[facility] Hospital Medicine  Progress Note    Patient Name: Erica Leblanc  MRN: [id number]  Patient Class: IP- Inpatient   Admission Date: 7/19/2019  Length of Stay: 3 days  Attending Physician: Jackie Ramirez MD  Primary Care Provider: Sendy Elaine MD        Subjective:     Principal Problem:Hypoglycemia      HPI:  74-year-old female who presents with decreased level of consciousness that was noticed by her son this morning.  She was unresponsive this morning.  Throughout the day yesterday, she was very lethargic.  This is a typical for her according to both her son and her brother who are both at the bedside.  She has been having sleeping difficulty due to a broken CPAP and her son felt that her increased sleeping yesterday was merely her catching up on missed sleep.Per EMS, her initial CBG was 27, but this improved to 126 after .5 AMP of D50.  Her brother reports associated slurred speech and confusion. HPI limited by the pt's confusion.    Pt admitted on D5 IVF and frequent glucose checks. Neuro checks. Urine culture pending. Start Rocephin.     Overview/Hospital Course:  Pt admitted with hypoglycemia and AMS. D5 IVF dc'd. Resumed diabetic diet. Slowly resume insulin regimen. Await urine culture. Continue IV rocephin.     Pt had fall this am with nursing in room trying to get out of bed to bathroom.  BS improved and stable. Switch to oral cipro for UTI. PT/OT consulted.  Changed to bumex as she does not seem to be diuresing on lasix well.    Proceed with SNF placement     Interval History: pt has no new complaints, proceed with SNF placement     Review of Systems   Constitutional: Positive for fatigue.   HENT: Negative.    Eyes: Negative.    Respiratory: Positive for shortness of breath.    Cardiovascular: Positive for leg swelling.   Gastrointestinal: Positive for abdominal pain.   Endocrine: Negative.    Genitourinary: Positive for difficulty urinating.   Musculoskeletal:  Positive for back pain and gait problem.   Neurological: Positive for weakness.   Psychiatric/Behavioral: Positive for confusion.     Objective:     Vital Signs (Most Recent):  Temp: 98.4 °F (36.9 °C) (07/22/19 2030)  Pulse: 65 (07/22/19 2030)  Resp: 18 (07/22/19 2030)  BP: 124/63 (07/22/19 2030)  SpO2: 96 % (07/22/19 2030) Vital Signs (24h Range):  Temp:  [97.7 °F (36.5 °C)-99.1 °F (37.3 °C)] 98.4 °F (36.9 °C)  Pulse:  [61-65] 65  Resp:  [16-19] 18  SpO2:  [94 %-98 %] 96 %  BP: (124-140)/(62-81) 124/63     Weight: 122.4 kg (269 lb 13.5 oz)  Body mass index is 44.9 kg/m².    Intake/Output Summary (Last 24 hours) at 7/22/2019 2125  Last data filed at 7/22/2019 1800  Gross per 24 hour   Intake 1073 ml   Output --   Net 1073 ml      Physical Exam   Constitutional: She appears well-developed and well-nourished. No distress.   HENT:   Head: Normocephalic and atraumatic.   Mouth/Throat: Oropharynx is clear and moist.   Eyes: Pupils are equal, round, and reactive to light. EOM are normal.   Neck: Normal range of motion. Neck supple. No JVD present.   Cardiovascular: Normal rate, regular rhythm, normal heart sounds and intact distal pulses.   Pulmonary/Chest: Effort normal and breath sounds normal. No respiratory distress.   Abdominal: Soft. Bowel sounds are normal. She exhibits no distension. There is tenderness. There is no rebound.   Musculoskeletal: Normal range of motion. She exhibits edema, tenderness and deformity.   Neurological: She is alert. Coordination abnormal.   Skin: Skin is warm and dry. Capillary refill takes 2 to 3 seconds.   Diabetic healing ulcer to BLE   Psychiatric: She has a normal mood and affect. Her behavior is normal.       Significant Labs:   POCT Glucose:   Recent Labs   Lab 07/22/19  0732 07/22/19  1137 07/22/19  1541   POCTGLUCOSE 121* 181* 220*       Significant Imaging: I have reviewed and interpreted all pertinent imaging results/findings within the past 24 hours.      Assessment/Plan:       * Hypoglycemia  Stop home insulin  SSI  Glucose checks Q ac hs  D5 IVF - dc'd  Resume diabetic diet       Obstructive sleep apnea on CPAP  Pt cpap broken  D/w CM and will need to contact company for repair       Hypothyroidism (acquired)  Resume synthroid       Non-pressure chronic ulcer left lower leg, limited to breakdown skin  See wound care notes       Infectious encephalopathy    Secondary to UTI  Improved on antibiotics       Urinary tract infection without hematuria  Urine culture grew Enterobacter  Dc rocephin and start oral cipro - renal dose     Hypertensive heart disease with chronic diastolic congestive heart failure  Uncontrolled  Resume home meds except acei        Uncontrolled type 2 diabetes mellitus with peripheral circulatory disorder  SSI for now until blood sugars stable      Anemia of chronic kidney failure, stage 4 (severe)  Stable  Monitor       Diabetic ulcer of right lower leg associated with type 2 diabetes mellitus, with fat layer exposed  Pt goes to outpatient wound care  Consult wound care       Mild episode of recurrent major depressive disorder  Resume home meds       Slow transit constipation  Resume home regimen         VTE Risk Mitigation (From admission, onward)        Ordered     IP VTE HIGH RISK PATIENT  Once      07/19/19 1644     Place CHANDNI hose  Until discontinued      07/19/19 1644     Place sequential compression device  Until discontinued      07/19/19 1644                Jackie Ramirez MD  Department of Hospital Medicine   [facility]

## 2019-07-23 NOTE — NURSING
1000- no acute distress good appetite for breakfast this am she did take her am laxatives today. Diaper clean and dry. No changes on telemetry. Unna boot dressings both are clean and dry. Bed alarm on call light in reach head of bed up. Rails up x 3.     1053- no acute changes continue to monitor no needs voiced. Repositioned in bed head of bed up rails up x 3 bed alarm on call light in reach too. Watching tv show her phone within her reach too.

## 2019-07-23 NOTE — NURSING
1315- patient tolerated meal she is very weak in her legs . Wound care did see her and dressed her lower legs . Patient encouraged to elevate legs while resting in bed. Diaper is dry and gown. Call light in reach , bed alarm on rails up x 3. Head of bed up . Visitor at bedside. Patient instructed she is not to get up with staff other than physical or occupational therapy at this time due to weakness in both legs. Patient voiced she understands. Message written on board and reported to [facility] staff and updated charge leader paty phoenix and yumiko phoenix. Continue to monitor.         1530- repositioning self in bed she uses the rails too. No acute distress rails up x 3 bed alarm on call light in reach recently changed diaper per pct. Clean and dry . Turned per self in bed. No change on telemetry ,continue to monitor.     1800- visiting with family members ate her supper meal tolerated well. No acute changes.       1915- bedside rounding report given to nahun carpenter rn on patients progress and updated handoff report sheet. No acute events or changes . Family members at bedside . Tb skin test to left forearm placed and marked.

## 2019-07-23 NOTE — SUBJECTIVE & OBJECTIVE
Interval History: pt has no new complaints, proceed with SNF placement     Review of Systems   Constitutional: Positive for fatigue.   HENT: Negative.    Eyes: Negative.    Respiratory: Positive for shortness of breath.    Cardiovascular: Positive for leg swelling.   Gastrointestinal: Positive for abdominal pain.   Endocrine: Negative.    Genitourinary: Positive for difficulty urinating.   Musculoskeletal: Positive for back pain and gait problem.   Neurological: Positive for weakness.   Psychiatric/Behavioral: Positive for confusion.     Objective:     Vital Signs (Most Recent):  Temp: 98.4 °F (36.9 °C) (07/22/19 2030)  Pulse: 65 (07/22/19 2030)  Resp: 18 (07/22/19 2030)  BP: 124/63 (07/22/19 2030)  SpO2: 96 % (07/22/19 2030) Vital Signs (24h Range):  Temp:  [97.7 °F (36.5 °C)-99.1 °F (37.3 °C)] 98.4 °F (36.9 °C)  Pulse:  [61-65] 65  Resp:  [16-19] 18  SpO2:  [94 %-98 %] 96 %  BP: (124-140)/(62-81) 124/63     Weight: 122.4 kg (269 lb 13.5 oz)  Body mass index is 44.9 kg/m².    Intake/Output Summary (Last 24 hours) at 7/22/2019 2125  Last data filed at 7/22/2019 1800  Gross per 24 hour   Intake 1073 ml   Output --   Net 1073 ml      Physical Exam   Constitutional: She appears well-developed and well-nourished. No distress.   HENT:   Head: Normocephalic and atraumatic.   Mouth/Throat: Oropharynx is clear and moist.   Eyes: Pupils are equal, round, and reactive to light. EOM are normal.   Neck: Normal range of motion. Neck supple. No JVD present.   Cardiovascular: Normal rate, regular rhythm, normal heart sounds and intact distal pulses.   Pulmonary/Chest: Effort normal and breath sounds normal. No respiratory distress.   Abdominal: Soft. Bowel sounds are normal. She exhibits no distension. There is tenderness. There is no rebound.   Musculoskeletal: Normal range of motion. She exhibits edema, tenderness and deformity.   Neurological: She is alert. Coordination abnormal.   Skin: Skin is warm and dry. Capillary refill  takes 2 to 3 seconds.   Diabetic healing ulcer to BLE   Psychiatric: She has a normal mood and affect. Her behavior is normal.       Significant Labs:   POCT Glucose:   Recent Labs   Lab 07/22/19  0732 07/22/19  1137 07/22/19  1541   POCTGLUCOSE 121* 181* 220*       Significant Imaging: I have reviewed and interpreted all pertinent imaging results/findings within the past 24 hours.

## 2019-07-23 NOTE — PLAN OF CARE
Problem: Balance Impairment (Functional Deficit)  Goal: Improved Balance and Postural Control    Intervention: Optimize Balance and Safe Activity     07/23/19 0400   Optimize Balance and Safe Activity   Safety Promotion/Fall Prevention assistive device/personal item within reach;bed alarm set;commode/urinal/bedpan at bedside;Fall Risk reviewed with patient/family;family to remain at bedside;high risk medications identified;lighting adjusted;medications reviewed;nonskid shoes/socks when out of bed;side rails raised x 3;room near unit station;instructed to call staff for mobility         Problem: Cognitive Impairment  Goal: Optimal Functional [place]    Intervention: Optimize Cognitive Function     07/22/19 0715 07/23/19 0434   Identify and Manage Contributors to Fall Injury Risk   Self-Care Promotion independence encouraged;BADL personal objects within reach;BADL personal routines maintained  --    Manage Environment and Stimulation   Environment Familiarity/Consistency  --  daily routine followed;personal clothing/items utilized   Optimize Cognitive and Communication Skills   Reorientation Measures  --  clock in view;familiar social contact encouraged;reorientation provided   Prevent or Manage Pain   Sensory Stimulation Regulation  --  care clustered;lighting decreased;music/television provided for relaxation;quiet environment promoted         Problem: Coordination Impairment (Functional Deficit)  Goal: Optimal Coordination    Intervention: Optimize Motor Coordination and Functional Ability     07/22/19 0715   Identify and Manage Contributors to Fall Injury Risk   Self-Care Promotion independence encouraged;BADL personal objects within reach;BADL personal routines maintained         Problem: Muscle Strength Impairment  Goal: Improved Muscle Strength    Intervention: Optimize Muscle Strength     07/22/19 0715   Identify and Manage Contributors to Fall Injury Risk   Self-Care Promotion independence  encouraged;BADL personal objects within reach;BADL personal routines [place]   07/22/19 0715   Identify and Manage Contributors to Fall Injury Risk   Self-Care Promotion independence encouraged;BADL personal objects within reach;BADL personal routines maintained   ined         Problem: Range of Motion Impairment (Functional Deficit)  Goal: Optimal Range of Motion    Intervention: Maintain Functional Joint Range and Position     07/23/19 0400 07/23/19 0434   Optimize Functional Ability   Positioning/Transfer Devices pillows;wedge;in use  --    Range of Motion  --  ROM (range of motion) performed         Problem: Sensory Impairment (Functional Deficit)  Goal: Compensation for Sensory Deficit    Intervention: Prevent Injury Related to Sensory Impairment     07/22/19 0715 07/23/19 0434   Prevent Additional Skin Injury   Pressure Reduction Devices  --  heel offloading device utilized;positioning supports utilized;pressure-redistributing mattress utilized   Monitor and Manage Hypervolemia   Skin Protection adhesive use limited;electrode sites changed;tubing/devices free from skin contact;transparent dressing maintained;protective footwear used;incontinence pads utilized  --

## 2019-07-23 NOTE — PROGRESS NOTES
[facility] Hospital Medicine  Progress Note    Patient Name: Erica Leblanc  MRN: [id number]  Patient Class: IP- Inpatient   Admission Date: 7/19/2019  Length of Stay: 4 days  Attending Physician: Juan David Fish MD  Primary Care Provider: Sendy Elaine MD      Subjective:     Principal Problem:Hypoglycemia      HPI:  74-year-old female who presents with decreased level of consciousness that was noticed by her son this morning.  She was unresponsive this morning.  Throughout the day yesterday, she was very lethargic.  This is a typical for her according to both her son and her brother who are both at the bedside.  She has been having sleeping difficulty due to a broken CPAP and her son felt that her increased sleeping yesterday was merely her catching up on missed sleep.Per EMS, her initial CBG was 27, but this improved to 126 after .5 AMP of D50.  Her brother reports associated slurred speech and confusion. HPI limited by the pt's confusion.    Pt admitted on D5 IVF and frequent glucose checks. Neuro checks. Urine culture pending. Start Rocephin.     Overview/Hospital Course:  Pt admitted with hypoglycemia and AMS. Started on D5 IVF which was eventually dc'd. Resumed diabetic diet. Slowly resumed insulin regimen. Started on IV rocephin. BS improved and stable. Switched to oral cipro for UTI. PT/OT consulted.  Changed to bumex as she does not seem to be diuresing on lasix well. Planned for SNF placement. Pending. PPD placed.    Interval History: pt has no new complaints, proceed with SNF placement     Review of Systems   Constitutional: Positive for fatigue.   HENT: Negative.    Eyes: Negative.    Respiratory: Negative for shortness of breath.    Cardiovascular: Positive for leg swelling.   Gastrointestinal: Negative for abdominal pain.   Endocrine: Negative.    Genitourinary: Negative for difficulty urinating.   Musculoskeletal: Positive for back pain and gait problem.   Neurological:  Positive for weakness.   Psychiatric/Behavioral: Negative for confusion.     Objective:     Vital Signs (Most Recent):  Temp: 98.2 °F (36.8 °C) (07/23/19 1536)  Pulse: 63 (07/23/19 1536)  Resp: 19 (07/23/19 1536)  BP: 139/78 (07/23/19 1536)  SpO2: (!) 93 % (07/23/19 1536) Vital Signs (24h Range):  Temp:  [97.6 °F (36.4 °C)-99 °F (37.2 °C)] 98.2 °F (36.8 °C)  Pulse:  [58-65] 63  Resp:  [18-19] 19  SpO2:  [93 %-100 %] 93 %  BP: (124-143)/(63-88) 139/78     Weight: 124.6 kg (274 lb 11.1 oz)  Body mass index is 45.71 kg/m².    Intake/Output Summary (Last 24 hours) at 7/23/2019 1644  Last data filed at 7/23/2019 0900  Gross per 24 hour   Intake 833 ml   Output --   Net 833 ml      Physical Exam   Constitutional: She is oriented to person, place, and time. She appears well-developed and well-nourished. No distress.   HENT:   Head: Normocephalic and atraumatic.   Mouth/Throat: Oropharynx is clear and moist.   Eyes: Pupils are equal, round, and reactive to light. EOM are normal.   Neck: Normal range of motion. Neck supple. No JVD present.   Cardiovascular: Normal rate, regular rhythm, normal heart sounds and intact distal pulses.   Pulmonary/Chest: Effort normal and breath sounds normal. No respiratory distress.   Abdominal: Soft. Bowel sounds are normal. She exhibits no distension. There is no tenderness. There is no rebound.   Musculoskeletal: Normal range of motion. She exhibits edema, tenderness and deformity.   Neurological: She is alert and oriented to person, place, and time.   Skin: Skin is warm and dry. Capillary refill takes less than 2 seconds.   Diabetic healing ulcer to BLE   Psychiatric: She has a normal mood and affect. Her behavior is normal.   Nursing note and vitals reviewed.      Significant Labs:  I have reviewed and interpreted all pertinent labs results/findings within the past 24 hours.    Significant Imaging: I have reviewed and interpreted all pertinent imaging results/findings within the past 24  hours.      Assessment/Plan:      * Hypoglycemia  Stop home insulin  SSI  Glucose checks Q ac hs  D5 IVF - dc'd  Resume diabetic diet    Non-pressure chronic ulcer left lower leg, limited to breakdown skin  See wound care notes     Infectious encephalopathy  Secondary to UTI  Improved on antibiotics    Urinary tract infection without hematuria  Urine culture grew Enterobacter  Dc rocephin and start oral cipro - renal dose     Hypertensive heart disease with chronic diastolic congestive heart failure  Uncontrolled  Resume home meds except acei      Uncontrolled type 2 diabetes mellitus with peripheral circulatory disorder  SSI for now until blood sugars stable    Anemia of chronic kidney failure, stage 4 (severe)  Stable. Monitor    Diabetic ulcer of right lower leg associated with type 2 diabetes mellitus, with fat layer exposed  Pt goes to outpatient wound care  Consult wound care    Mild episode of recurrent major depressive disorder  Resume home meds     Slow transit constipation  Resume home regimen     Hypothyroidism (acquired)  Resume synthroid     Obstructive sleep apnea on CPAP  Pt cpap broken  D/w CM and will need to contact company for repair       VTE Risk Mitigation (From admission, onward)        Ordered     IP VTE HIGH RISK PATIENT  Once      07/19/19 1644     Place CHANDNI hose  Until discontinued      07/19/19 1644     Place sequential compression device  Until discontinued      07/19/19 1644                Juan David Fish MD  Department of Hospital Medicine   [facility]

## 2019-07-23 NOTE — PLAN OF CARE
07/23/19 1648   Discharge Reassessment   Assessment Type Discharge Planning Reassessment   tn SPOKE WITH PATIENT TODAY regarding DC plan.  She agrees with SNF.  Referral sent via [facility] to:  [facility] (pt's first choice), [facility], [facility].    Pas[facility] completed.  [facility] called in to [facility] @ [phone].  PASSR faxed to [facility] at:  [phone].   Awaiting 142.

## 2019-07-24 LAB
BACTERIA BLD CULT: NORMAL
BACTERIA BLD CULT: NORMAL
POCT GLUCOSE: 157 MG/DL (ref 70–110)
POCT GLUCOSE: 160 MG/DL (ref 70–110)
POCT GLUCOSE: 187 MG/DL (ref 70–110)
POCT GLUCOSE: 274 MG/DL (ref 70–110)
POCT GLUCOSE: 285 MG/DL (ref 70–110)

## 2019-07-24 PROCEDURE — 97110 THERAPEUTIC EXERCISES: CPT | Mod: HCNC

## 2019-07-24 PROCEDURE — 97535 SELF CARE MNGMENT TRAINING: CPT | Mod: HCNC

## 2019-07-24 PROCEDURE — 25000003 PHARM REV CODE 250: Mod: HCNC | Performed by: INTERNAL MEDICINE

## 2019-07-24 PROCEDURE — 97116 GAIT TRAINING THERAPY: CPT | Mod: HCNC

## 2019-07-24 PROCEDURE — S0171 BUMETANIDE 0.5 MG: HCPCS | Mod: HCNC | Performed by: HOSPITALIST

## 2019-07-24 PROCEDURE — 25000003 PHARM REV CODE 250: Mod: HCNC | Performed by: HOSPITALIST

## 2019-07-24 PROCEDURE — 97530 THERAPEUTIC ACTIVITIES: CPT | Mod: HCNC

## 2019-07-24 PROCEDURE — 21400001 HC TELEMETRY ROOM: Mod: HCNC

## 2019-07-24 PROCEDURE — 94761 N-INVAS EAR/PLS OXIMETRY MLT: CPT | Mod: HCNC

## 2019-07-24 RX ORDER — BUMETANIDE 1 MG/1
2 TABLET ORAL 2 TIMES DAILY
Status: DISCONTINUED | OUTPATIENT
Start: 2019-07-24 | End: 2019-07-25 | Stop reason: HOSPADM

## 2019-07-24 RX ADMIN — HYDRALAZINE HYDROCHLORIDE 50 MG: 25 TABLET ORAL at 02:07

## 2019-07-24 RX ADMIN — ATORVASTATIN CALCIUM 10 MG: 10 TABLET, FILM COATED ORAL at 11:07

## 2019-07-24 RX ADMIN — INSULIN ASPART 1 UNITS: 100 INJECTION, SOLUTION INTRAVENOUS; SUBCUTANEOUS at 10:07

## 2019-07-24 RX ADMIN — LEVOTHYROXINE SODIUM 50 MCG: 50 TABLET ORAL at 06:07

## 2019-07-24 RX ADMIN — BUMETANIDE 2 MG: 1 TABLET ORAL at 05:07

## 2019-07-24 RX ADMIN — POLYETHYLENE GLYCOL 3350 17 G: 17 POWDER, FOR SOLUTION ORAL at 11:07

## 2019-07-24 RX ADMIN — HYDRALAZINE HYDROCHLORIDE 50 MG: 25 TABLET ORAL at 09:07

## 2019-07-24 RX ADMIN — CLOPIDOGREL BISULFATE 75 MG: 75 TABLET ORAL at 11:07

## 2019-07-24 RX ADMIN — AMLODIPINE BESYLATE 10 MG: 5 TABLET ORAL at 11:07

## 2019-07-24 RX ADMIN — METOPROLOL TARTRATE 75 MG: 50 TABLET ORAL at 09:07

## 2019-07-24 RX ADMIN — INSULIN ASPART 3 UNITS: 100 INJECTION, SOLUTION INTRAVENOUS; SUBCUTANEOUS at 05:07

## 2019-07-24 RX ADMIN — BUMETANIDE 2 MG: 0.25 INJECTION INTRAMUSCULAR; INTRAVENOUS at 10:07

## 2019-07-24 RX ADMIN — DOCUSATE SODIUM 200 MG: 100 CAPSULE, LIQUID FILLED ORAL at 11:07

## 2019-07-24 RX ADMIN — CIPROFLOXACIN HYDROCHLORIDE 250 MG: 250 TABLET, FILM COATED ORAL at 11:07

## 2019-07-24 RX ADMIN — HYDRALAZINE HYDROCHLORIDE 50 MG: 25 TABLET ORAL at 11:07

## 2019-07-24 RX ADMIN — METOPROLOL TARTRATE 100 MG: 50 TABLET ORAL at 11:07

## 2019-07-24 NOTE — PLAN OF CARE
Problem: Occupational Therapy Goal  Goal: Occupational Therapy Goal  Goals to be met by: 8/5/19    Patient will increase functional independence with ADLs by performing:    UE Dressing with Modified [place].  LE Dressing with Stand-by Assistance.  Grooming while standing at sink with Contact Guard Assistance.  Toileting from toilet with Contact Guard Assistance for hygiene and clothing management.   Rolling to Bilateral with Stand-by Assistance.   Supine to sit with Contact Guard Assistance.  Step transfer with Contact Guard Assistance  Toilet transfer to toilet with Contact Guard Assistance.  Upper extremity exercise program x15 reps per handout, with assistance as needed.     Outcome: Ongoing (interventions implemented as appropriate)  The patient is progressing in OT. The patient will benefit from SNF.

## 2019-07-24 NOTE — SUBJECTIVE & OBJECTIVE
Interval History: no acute events overnight. no new complaints. Stable. pending SNF placement     Review of Systems   Constitutional: Positive for fatigue.   HENT: Negative.    Eyes: Negative.    Respiratory: Negative for shortness of breath.    Cardiovascular: Positive for leg swelling.   Gastrointestinal: Negative for abdominal pain.   Endocrine: Negative.    Genitourinary: Negative for difficulty urinating.   Musculoskeletal: Positive for back pain and gait problem.   Neurological: Positive for weakness.   Psychiatric/Behavioral: Negative for confusion.     Objective:     Vital Signs (Most Recent):  Temp: 97.9 °F (36.6 °C) (07/24/19 1124)  Pulse: (!) 56 (07/24/19 1124)  Resp: 20 (07/24/19 1124)  BP: (!) 140/70 (07/24/19 1124)  SpO2: (!) 92 % (07/24/19 1124) Vital Signs (24h Range):  Temp:  [97.5 °F (36.4 °C)-98.4 °F (36.9 °C)] 97.9 °F (36.6 °C)  Pulse:  [56-68] 56  Resp:  [18-20] 20  SpO2:  [91 %-98 %] 92 %  BP: (137-156)/(66-80) 140/70     Weight: 124.6 kg (274 lb 11.1 oz)  Body mass index is 45.71 kg/m².    Intake/Output Summary (Last 24 hours) at 7/24/2019 1208  Last data filed at 7/24/2019 0800  Gross per 24 hour   Intake 1740 ml   Output --   Net 1740 ml      Physical Exam   Constitutional: She is oriented to person, place, and time. She appears well-developed and well-nourished. No distress.   HENT:   Head: Normocephalic and atraumatic.   Mouth/Throat: Oropharynx is clear and moist.   Eyes: Pupils are equal, round, and reactive to light. EOM are normal.   Neck: Normal range of motion. Neck supple. No JVD present.   Cardiovascular: Normal rate, regular rhythm, normal heart sounds and intact distal pulses.   Pulmonary/Chest: Effort normal and breath sounds normal. No respiratory distress.   Abdominal: Soft. Bowel sounds are normal. She exhibits no distension. There is no tenderness. There is no rebound.   Musculoskeletal: Normal range of motion. She exhibits edema, tenderness and deformity.   Neurological:  She is alert and oriented to person, place, and time.   Skin: Skin is warm and dry. Capillary refill takes less than 2 seconds.   Diabetic healing ulcer to BLE   Psychiatric: She has a normal mood and affect. Her behavior is normal.   Nursing note and vitals reviewed.      Significant Labs:  I have reviewed and interpreted all pertinent labs results/findings within the past 24 hours.    Significant Imaging: I have reviewed and interpreted all pertinent imaging results/findings within the past 24 hours.

## 2019-07-24 NOTE — NURSING
19:05 Received bedside report from KIKI Loya. Patient alert and oriented. Comfortably lying in bed. On room air. No pain. No sign of distress. IV line intact over Rt arm - saline locked. Call bell given on her reach, instructed to call for assistance as needed. Bed alarm on. Bed on lowest position. Safety maintained.

## 2019-07-24 NOTE — PLAN OF CARE
Problem: Physical Therapy Goal  Goal: Physical Therapy Goal  Goals to be met by: 2019    Patient will increase functional independence with mobility by performin. Supine to sit with Modified [place]  2. Sit to supine with Modified [place]  3. Sit to stand transfer with Modified [place]  4. Gait  x 350 feet with Modified [place] using Rolling Walker.    Recommend: SNF at time of discharge.         Outcome: Ongoing (interventions implemented as appropriate)  Pt ambulated ~80 ft with CGA using RW.  Pt with decreased endurance, required 2-3 brief standing rest breaks.

## 2019-07-24 NOTE — PLAN OF CARE
Problem: Adult Inpatient Plan of Care  Goal: Plan of Care Review  Outcome: Ongoing (interventions implemented as appropriate)  Pt is on cpap +10 and is resting comfortably.

## 2019-07-24 NOTE — PROGRESS NOTES
[facility] Hospital Medicine  Progress Note    Patient Name: Erica Leblanc  MRN: [id number]  Patient Class: IP- Inpatient   Admission Date: 7/19/2019  Length of Stay: 5 days  Attending Physician: Juan David Fish MD  Primary Care Provider: Sendy Elaine MD        Subjective:     Principal Problem:Hypoglycemia      HPI:  74-year-old female who presents with decreased level of consciousness that was noticed by her son this morning.  She was unresponsive this morning.  Throughout the day yesterday, she was very lethargic.  This is a typical for her according to both her son and her brother who are both at the bedside.  She has been having sleeping difficulty due to a broken CPAP and her son felt that her increased sleeping yesterday was merely her catching up on missed sleep.Per EMS, her initial CBG was 27, but this improved to 126 after .5 AMP of D50.  Her brother reports associated slurred speech and confusion. HPI limited by the pt's confusion.    Pt admitted on D5 IVF and frequent glucose checks. Neuro checks. Urine culture pending. Start Rocephin.     Overview/Hospital Course:  Pt admitted with hypoglycemia and AMS. Started on D5 IVF which was eventually dc'd. Resumed diabetic diet. Slowly resumed insulin regimen. Started on IV rocephin. BS improved and stable. Switched to oral cipro for UTI. PT/OT consulted.  Changed to bumex as she does not seem to be diuresing on lasix well. Planned for SNF placement. Pending. PPD placed.    Interval History: no acute events overnight. no new complaints. Stable. pending SNF placement     Review of Systems   Constitutional: Positive for fatigue.   HENT: Negative.    Eyes: Negative.    Respiratory: Negative for shortness of breath.    Cardiovascular: Positive for leg swelling.   Gastrointestinal: Negative for abdominal pain.   Endocrine: Negative.    Genitourinary: Negative for difficulty urinating.   Musculoskeletal: Positive for back pain and gait  problem.   Neurological: Positive for weakness.   Psychiatric/Behavioral: Negative for confusion.     Objective:     Vital Signs (Most Recent):  Temp: 97.9 °F (36.6 °C) (07/24/19 1124)  Pulse: (!) 56 (07/24/19 1124)  Resp: 20 (07/24/19 1124)  BP: (!) 140/70 (07/24/19 1124)  SpO2: (!) 92 % (07/24/19 1124) Vital Signs (24h Range):  Temp:  [97.5 °F (36.4 °C)-98.4 °F (36.9 °C)] 97.9 °F (36.6 °C)  Pulse:  [56-68] 56  Resp:  [18-20] 20  SpO2:  [91 %-98 %] 92 %  BP: (137-156)/(66-80) 140/70     Weight: 124.6 kg (274 lb 11.1 oz)  Body mass index is 45.71 kg/m².    Intake/Output Summary (Last 24 hours) at 7/24/2019 1208  Last data filed at 7/24/2019 0800  Gross per 24 hour   Intake 1740 ml   Output --   Net 1740 ml      Physical Exam   Constitutional: She is oriented to person, place, and time. She appears well-developed and well-nourished. No distress.   HENT:   Head: Normocephalic and atraumatic.   Mouth/Throat: Oropharynx is clear and moist.   Eyes: Pupils are equal, round, and reactive to light. EOM are normal.   Neck: Normal range of motion. Neck supple. No JVD present.   Cardiovascular: Normal rate, regular rhythm, normal heart sounds and intact distal pulses.   Pulmonary/Chest: Effort normal and breath sounds normal. No respiratory distress.   Abdominal: Soft. Bowel sounds are normal. She exhibits no distension. There is no tenderness. There is no rebound.   Musculoskeletal: Normal range of motion. She exhibits edema, tenderness and deformity.   Neurological: She is alert and oriented to person, place, and time.   Skin: Skin is warm and dry. Capillary refill takes less than 2 seconds.   Diabetic healing ulcer to BLE   Psychiatric: She has a normal mood and affect. Her behavior is normal.   Nursing note and vitals reviewed.      Significant Labs:  I have reviewed and interpreted all pertinent labs results/findings within the past 24 hours.    Significant Imaging: I have reviewed and interpreted all pertinent imaging  results/findings within the past 24 hours.      Assessment/Plan:      * Hypoglycemia  Stop home insulin  SSI  Glucose checks Q ac hs  D5 IVF - dc'd  Resume diabetic diet  Resolved    Non-pressure chronic ulcer left lower leg, limited to breakdown skin  See wound care notes     Infectious encephalopathy  Secondary to UTI  Improved on antibiotics    Urinary tract infection without hematuria  Urine culture grew Enterobacter  Dc rocephin and started oral cipro - renal dose     Hypertensive heart disease with chronic diastolic congestive heart failure  Uncontrolled  Resume home meds except acei    Uncontrolled type 2 diabetes mellitus with peripheral circulatory disorder  SSI for now until blood sugars stable    Anemia of chronic kidney failure, stage 4 (severe)  Stable. Monitor    Diabetic ulcer of right lower leg associated with type 2 diabetes mellitus, with fat layer exposed  Pt goes to outpatient wound care  Consult wound care    Mild episode of recurrent major depressive disorder  Resume home meds    Slow transit constipation  Resume home regimen     Hypothyroidism (acquired)  Resume synthroid    Obstructive sleep apnea on CPAP  Pt cpap broken  D/w CM and will need to contact company for repair       VTE Risk Mitigation (From admission, onward)        Ordered     IP VTE HIGH RISK PATIENT  Once      07/19/19 1644     Place CHANDNI hose  Until discontinued      07/19/19 1644     Place sequential compression device  Until discontinued      07/19/19 1644                Juan David Fish MD  Department of Hospital Medicine   [facility]

## 2019-07-24 NOTE — PT/OT/SLP PROGRESS
Occupational Therapy   Treatment    Name: Erica Leblanc  MRN: [id number]  Admitting Diagnosis:  Hypoglycemia       Recommendations:     Discharge Recommendations: nursing facility, skilled  Discharge Equipment Recommendations:  none  Barriers to discharge:  None    Assessment:     Erica Leblanc is a 74 y.o. female with a medical diagnosis of Hypoglycemia. The patient is progressing in OT. Performance deficits affecting function are weakness, impaired endurance, impaired self care skills, impaired balance, gait instability, impaired functional mobilty, decreased lower extremity function, decreased upper extremity function, decreased coordination, decreased safety awareness, edema, decreased ROM.     Rehab Prognosis:  Good; patient would benefit from acute skilled OT services to address these deficits and reach maximum level of function.       Plan:     Patient to be seen 5 x/week to address the above listed problems via self-care/home management, therapeutic activities, therapeutic exercises  · Plan of Care Expires: 08/05/19  · Plan of Care Reviewed with: patient    Subjective     Pain/Comfort:  · Pain Rating 1: (some left shoulder discomfort)  · Pain Addressed 1: (stated relief with exercise)    Objective:     Communicated with: Samina jimenez prior to session.  Patient found up in chair, reclined with telemetry, peripheral IV upon OT entry to room.    General Precautions: Standard, fall, diabetic   Orthopedic Precautions:N/A   Braces: N/A     Occupational Performance:     Bed Mobility:    · Patient completed Scooting/Bridging with stand by assistance  · Patient completed Sit to Supine with moderate assistance and with leg lift     Functional Mobility/Transfers:  · Patient completed Sit <> Stand Transfer with moderate assistance and maximal assistance  with  rolling walker and 2 person assist  · Functional Mobility: The patient required max assist to stand on the 2st attempt but needed to sit 2* unable to  balance. Thee patient required mod assist x2 person to stand and CGA to step from chair to bed using a RW.    Activities of Daily Living:  · Lower Body Dressing: dependence to doff B socks      [facility] 6 Click ADL: 17    Treatment & Education:  Pt was educated in use of theraband for UE therex. Pt was able to perform UE therex using red theraband. Pt was able to perform B shldr horiz abd x10, B shldr flex and abdx10 and B elbow ext and exr x10 reps. Pt was able to perform after demo and rest between reps. The patient was given red theraband and handout for HEP. The patient's dtr-in-law was present during exercise.    Patient left right sidelying with wedge with all lines intact, call button in reach and nurseSamina notifiedEducation:      GOALS:   Multidisciplinary Problems     Occupational Therapy Goals        Problem: Occupational Therapy Goal    Goal Priority Disciplines Outcome Interventions   Occupational Therapy Goal     OT, PT/OT Ongoing (interventions implemented as appropriate)    Description:  Goals to be met by: 8/5/19    Patient will increase functional independence with ADLs by performing:    UE Dressing with Modified [place].  LE Dressing with Stand-by Assistance.  Grooming while standing at sink with Contact Guard Assistance.  Toileting from toilet with Contact Guard Assistance for hygiene and clothing management.   Rolling to Bilateral with Stand-by Assistance.   Supine to sit with Contact Guard Assistance.  Step transfer with Contact Guard Assistance  Toilet transfer to toilet with Contact Guard Assistance.  Upper extremity exercise program x15 reps per handout, with assistance as needed.                      Time Tracking:     OT Date of Treatment: 07/24/19  OT Start Time: 1417  OT Stop Time: 1455  OT Total Time (min): 38 min    Billable Minutes:Self Care/Home Management 15  Therapeutic Exercise 23  Total Time 38    Bettina Corona OT  7/24/2019

## 2019-07-24 NOTE — PT/OT/SLP PROGRESS
Physical Therapy Treatment    Patient Name:  Erica Leblanc   MRN:  [id number]    Recommendations:     Discharge Recommendations:  nursing facility, skilled   Discharge Equipment Recommendations: (TBD)   Barriers to discharge home: Decreased caregiver support and Pt with decreased mobility.    Assessment:     Erica Leblanc is a 74 y.o. female admitted with a medical diagnosis of Hypoglycemia.  She presents with the following impairments/functional limitations:  weakness, impaired endurance, impaired self care skills, impaired functional mobilty, gait instability, impaired balance, decreased lower extremity function, decreased safety awareness, pain, decreased ROM, impaired skin, edema, impaired cardiopulmonary response to activity.    Rehab Prognosis: Fair+; patient would benefit from acute skilled PT services to address these deficits and reach maximum level of function.    Recent Surgery: * No surgery found *      Plan:     During this hospitalization, patient to be seen 5 x/week to address the identified rehab impairments via gait training, therapeutic activities, therapeutic exercises and progress toward the following goals:    · Plan of Care Expires:  08/04/19    Subjective     Chief Complaint: Pt reported not feeling well today.  Patient/Family Comments/goals: Pt stated that she understands that she must participate in therapy.   Pain/Comfort:  · Pain Rating 1: 0/10      Objective:     Patient found HOB elevated with peripheral IV, telemetry upon PT entry to room.     General Precautions: Standard, fall, diabetic   Orthopedic Precautions:N/A   Braces: N/A    Functional Mobility:  · Bed Mobility:     · Scooting: contact guard assistance  · Supine to Sit: minimum assistance with HOB elevated   · Transfers:     · Sit to Stand:  minimum assistance with rolling walker x 2 trials    · Bed to Chair: minimum assistance with  rolling walker  using  Step Transfer  · Toilet Transfer: minimum assistance with   rolling walker  using  Step Transfer (bed>bedside commode)  · Gait: Pt ambulated ~80 ft with CGA using RW.  Pt with decreased step length and debbie.  Pt with decreased endurance, required 2-3 brief standing rest breaks.    · Balance: Pt with fair balance.       AM-PAC 6 CLICK MOBILITY  Turning over in bed (including adjusting bedclothes, sheets and blankets)?: 3  Sitting down on and standing up from a chair with arms (e.g., wheelchair, bedside commode, etc.): 3  Moving from lying on back to sitting on the side of the bed?: 3  Moving to and from a bed to a chair (including a wheelchair)?: 3  Need to walk in hospital room?: 3  Climbing 3-5 steps with a railing?: 1  Basic Mobility Total Score: 16       Therapeutic Activities and Exercises:  BLE seated therex 10 reps: hip flex, LAQ, and AP    Patient left up in chair reclined with BLE elevated with all lines intact, call button in reach and nurse Samina notified.  Lunch tray set-up.      GOALS:   Multidisciplinary Problems     Physical Therapy Goals        Problem: Physical Therapy Goal    Goal Priority Disciplines Outcome Goal Variances Interventions   Physical Therapy Goal     PT, PT/OT Ongoing (interventions implemented as appropriate)     Description:  Goals to be met by: 2019    Patient will increase functional independence with mobility by performin. Supine to sit with Modified [place]  2. Sit to supine with Modified [place]  3. Sit to stand transfer with Modified [place]  4. Gait  x 350 feet with Modified [place] using Rolling Walker.    Recommend: SNF at time of discharge.                          Time Tracking:     PT Received On: 19  PT Start Time: 1125     PT Stop Time: 1204  PT Total Time (min): 39 min     Billable Minutes: Gait Training 15 min, Therapeutic Activity 15 min and Therapeutic Exercise 9 min             PTA Visit Number: 0     Clotilde Day, PT  2019

## 2019-07-24 NOTE — PROGRESS NOTES
Sitting up in chair at bedside. Reports ambulating in hallway with PT. Compression wraps remain in place to bilateral lower extremities without slippage. States planning for transfer to SNF. Recommend continued wound care to bilateral lower legs with foam dressings and light compression to control edema. Dressing to be changed weekly and prn slippage of compression.

## 2019-07-24 NOTE — NURSING
1900-bedside rounding report given to sebastián miller rn on patients progress and updated handoff report sheet no acute events today . Patient awake and watching her favorite game show on tv .

## 2019-07-24 NOTE — PLAN OF CARE
Problem: Skin Injury Risk Increased  Goal: Skin Health and Integrity  Outcome: Ongoing (interventions implemented as appropriate)  Intervention: Optimize Skin Protection     07/24/19 0350   Prevent Additional Skin Injury   Head of Bed (HOB) HOB elevated;HOB at 30-45 degrees   Pressure Reduction Devices pressure-redistributing mattress utilized   Pressure Reduction Techniques frequent weight shift encouraged;rest period provided between sit times;weight shift assistance provided   Monitor and Manage Hypervolemia   Skin Protection incontinence pads utilized     Intervention: Promote and Optimize Oral Intake     07/24/19 0350   Monitor and Manage Anemia   Oral Nutrition Promotion rest periods promoted

## 2019-07-25 VITALS
HEART RATE: 63 BPM | RESPIRATION RATE: 20 BRPM | TEMPERATURE: 98 F | WEIGHT: 276.25 LBS | OXYGEN SATURATION: 92 % | BODY MASS INDEX: 46.02 KG/M2 | HEIGHT: 65 IN | DIASTOLIC BLOOD PRESSURE: 80 MMHG | SYSTOLIC BLOOD PRESSURE: 166 MMHG

## 2019-07-25 PROBLEM — G93.49 INFECTIOUS ENCEPHALOPATHY: Status: RESOLVED | Noted: 2019-07-19 | Resolved: 2019-07-25

## 2019-07-25 PROBLEM — B99.9 INFECTIOUS ENCEPHALOPATHY: Status: RESOLVED | Noted: 2019-07-19 | Resolved: 2019-07-25

## 2019-07-25 PROBLEM — E16.2 HYPOGLYCEMIA: Status: RESOLVED | Noted: 2019-07-19 | Resolved: 2019-07-25

## 2019-07-25 PROBLEM — N39.0 URINARY TRACT INFECTION WITHOUT HEMATURIA: Status: RESOLVED | Noted: 2019-07-19 | Resolved: 2019-07-25

## 2019-07-25 LAB
POCT GLUCOSE: 160 MG/DL (ref 70–110)
POCT GLUCOSE: 194 MG/DL (ref 70–110)
POCT GLUCOSE: 227 MG/DL (ref 70–110)
POCT GLUCOSE: 266 MG/DL (ref 70–110)
TB INDURATION 48 - 72 HR READ: 0 MM

## 2019-07-25 PROCEDURE — 97116 GAIT TRAINING THERAPY: CPT | Mod: HCNC

## 2019-07-25 PROCEDURE — 97110 THERAPEUTIC EXERCISES: CPT | Mod: HCNC

## 2019-07-25 PROCEDURE — 97530 THERAPEUTIC ACTIVITIES: CPT | Mod: HCNC

## 2019-07-25 PROCEDURE — 63600175 PHARM REV CODE 636 W HCPCS: Mod: HCNC | Performed by: INTERNAL MEDICINE

## 2019-07-25 PROCEDURE — 99900035 HC TECH TIME PER 15 MIN (STAT): Mod: HCNC

## 2019-07-25 PROCEDURE — 25000003 PHARM REV CODE 250: Mod: HCNC | Performed by: INTERNAL MEDICINE

## 2019-07-25 PROCEDURE — 25000003 PHARM REV CODE 250: Mod: HCNC | Performed by: HOSPITALIST

## 2019-07-25 PROCEDURE — 94660 CPAP INITIATION&MGMT: CPT | Mod: HCNC

## 2019-07-25 PROCEDURE — S5571 INSULIN DISPOS PEN 3 ML: HCPCS | Mod: HCNC | Performed by: INTERNAL MEDICINE

## 2019-07-25 RX ORDER — BUMETANIDE 2 MG/1
2 TABLET ORAL 2 TIMES DAILY
Qty: 60 TABLET | Refills: 11 | Status: ON HOLD | OUTPATIENT
Start: 2019-07-25 | End: 2019-09-11 | Stop reason: HOSPADM

## 2019-07-25 RX ORDER — POLYETHYLENE GLYCOL 3350 17 G/17G
17 POWDER, FOR SOLUTION ORAL DAILY
Refills: 0
Start: 2019-07-26 | End: 2020-08-18 | Stop reason: CLARIF

## 2019-07-25 RX ADMIN — METOPROLOL TARTRATE 75 MG: 50 TABLET ORAL at 08:07

## 2019-07-25 RX ADMIN — AMLODIPINE BESYLATE 10 MG: 5 TABLET ORAL at 08:07

## 2019-07-25 RX ADMIN — BUMETANIDE 2 MG: 1 TABLET ORAL at 09:07

## 2019-07-25 RX ADMIN — INSULIN DETEMIR 5 UNITS: 100 INJECTION, SOLUTION SUBCUTANEOUS at 10:07

## 2019-07-25 RX ADMIN — DOCUSATE SODIUM 200 MG: 100 CAPSULE, LIQUID FILLED ORAL at 09:07

## 2019-07-25 RX ADMIN — HYDRALAZINE HYDROCHLORIDE 50 MG: 25 TABLET ORAL at 03:07

## 2019-07-25 RX ADMIN — ATORVASTATIN CALCIUM 10 MG: 10 TABLET, FILM COATED ORAL at 08:07

## 2019-07-25 RX ADMIN — CLOPIDOGREL BISULFATE 75 MG: 75 TABLET ORAL at 09:07

## 2019-07-25 RX ADMIN — BUMETANIDE 2 MG: 1 TABLET ORAL at 05:07

## 2019-07-25 RX ADMIN — HYDRALAZINE HYDROCHLORIDE 50 MG: 25 TABLET ORAL at 09:07

## 2019-07-25 RX ADMIN — CIPROFLOXACIN HYDROCHLORIDE 250 MG: 250 TABLET, FILM COATED ORAL at 09:07

## 2019-07-25 RX ADMIN — LEVOTHYROXINE SODIUM 50 MCG: 50 TABLET ORAL at 06:07

## 2019-07-25 NOTE — PLAN OF CARE
Problem: Occupational Therapy Goal  Goal: Occupational Therapy Goal  Goals to be met by: 8/5/19    Patient will increase functional independence with ADLs by performing:    UE Dressing with Modified [place].  LE Dressing with Stand-by Assistance.  Grooming while standing at sink with Contact Guard Assistance.  Toileting from toilet with Contact Guard Assistance for hygiene and clothing management.   Rolling to Bilateral with Stand-by Assistance.   Supine to sit with Contact Guard Assistance.  Step transfer with Contact Guard Assistance  Toilet transfer to toilet with Contact Guard Assistance.  Upper extremity exercise program x15 reps per handout, with assistance as needed.     Outcome: Ongoing (interventions implemented as appropriate)  Patient progressing toward OT goals. Patient to d/c to SNF today for further therapy.

## 2019-07-25 NOTE — PT/OT/SLP PROGRESS
Physical Therapy Treatment    Patient Name:  Erica Leblanc   MRN:  [id number]    Recommendations:     Discharge Recommendations:  nursing facility, skilled   Discharge Equipment Recommendations: (TBD)   Barriers to discharge home: Decreased caregiver support and Pt with decreased mobility.    Assessment:     Erica Leblanc is a 74 y.o. female admitted with a medical diagnosis of Hypoglycemia.  She presents with the following impairments/functional limitations:  weakness, impaired endurance, impaired self care skills, impaired functional mobilty, gait instability, impaired balance, decreased lower extremity function, decreased safety awareness, pain, decreased ROM, impaired skin, edema, impaired cardiopulmonary response to activity.    Rehab Prognosis: Fair+; patient would benefit from acute skilled PT services to address these deficits and reach maximum level of function.    Recent Surgery: * No surgery found *      Plan:     During this hospitalization, patient to be seen 5 x/week to address the identified rehab impairments via gait training, therapeutic activities, therapeutic exercises and progress toward the following goals:    · Plan of Care Expires:  08/04/19    Subjective     Chief Complaint: weakness/SOB  Patient/Family Comments/goals: to get well   Pain/Comfort:  · Pain Rating 1: 0/10      Objective:     Patient found HOB elevated with peripheral IV, telemetry, [place] upon PT entry to room.     General Precautions: Standard, fall, diabetic   Orthopedic Precautions:N/A   Braces: N/A    Functional Mobility:  · Bed Mobility:     · Scooting: contact guard assistance  · Supine to Sit: minimum assistance with HOB elevated   · Transfers:     · Sit to Stand:  minimum assistance with rolling walker  · Bed to Chair: minimum assistance with  rolling walker  using  Step Transfer  · Toilet Transfer: minimum assistance with  rolling walker  using  Step Transfer (bed>bedside commode>bedside chair)  · Gait: Pt  ambulated ~80 ft with CGA using RW.  Pt with decreased step length and debbie.  Pt continued to require 3-4 brief standing rest breaks during ambulation.   · Balance: Pt with fair balance.       AM-PAC 6 CLICK MOBILITY  Turning over in bed (including adjusting bedclothes, sheets and blankets)?: 3  Sitting down on and standing up from a chair with arms (e.g., wheelchair, bedside commode, etc.): 3  Moving from lying on back to sitting on the side of the bed?: 3  Moving to and from a bed to a chair (including a wheelchair)?: 3  Need to walk in hospital room?: 3  Climbing 3-5 steps with a railing?: 1  Basic Mobility Total Score: 16       Therapeutic Activities and Exercises:  BLE supine therex 10 reps: AP, QS, GS, hip abd/add, and SLR    Patient left up in chair reclined with BLE elevated with all lines intact, call button in reach and nurse Elizabeth notified.    GOALS:   Multidisciplinary Problems     Physical Therapy Goals        Problem: Physical Therapy Goal    Goal Priority Disciplines Outcome Goal Variances Interventions   Physical Therapy Goal     PT, PT/OT Ongoing (interventions implemented as appropriate)     Description:  Goals to be met by: 2019    Patient will increase functional independence with mobility by performin. Supine to sit with Modified [place]  2. Sit to supine with Modified [place]  3. Sit to stand transfer with Modified [place]  4. Gait  x 350 feet with Modified [place] using Rolling Walker.    Recommend: SNF at time of discharge.                          Time Tracking:     PT Received On: 19  PT Start Time: 1426     PT Stop Time: 1506  PT Total Time (min): 40 min     Billable Minutes: Gait Training 15 min, Therapeutic Activity 15 min and Therapeutic Exercise 10 min             PTA Visit Number: 0     Clotilde Day, PT  2019

## 2019-07-25 NOTE — NURSING
Report given to KIKI Contreras at [facility]. [facility] transport.

## 2019-07-25 NOTE — PLAN OF CARE
07/25/19 1658   Final Note   Assessment Type Final Discharge Note   Anticipated Discharge Disposition SNF   Hospital Follow Up  Appt(s) scheduled? Yes   Discharge plans and expectations educations in teach back method with documentation complete? Yes   Right Care Referral Info   Post Acute Recommendation SNF / Sub-Acute Rehab   Referral Type SNF   Facility Name [facility]

## 2019-07-25 NOTE — PROGRESS NOTES
Received report from KIKI Haas. Patient AAOx4, resting quietly in bed, telemetry monitoring in place, no distress noted. Safety maintained, call light in reach.

## 2019-07-25 NOTE — NURSING
Patient resting in bed. Telemetry monitoring in progress. Patient denies any complaints at this time.  Reviewed medications and POC with patient, patient verbalized understanding. Bed in lowest position, bed alarm set, wheels locked and call light within reach. Will continue to monitor.

## 2019-07-25 NOTE — PLAN OF CARE
Problem: Physical Therapy Goal  Goal: Physical Therapy Goal  Goals to be met by: 2019    Patient will increase functional independence with mobility by performin. Supine to sit with Modified [place]  2. Sit to supine with Modified [place]  3. Sit to stand transfer with Modified [place]  4. Gait  x 350 feet with Modified [place] using Rolling Walker.    Recommend: SNF at time of discharge.         Outcome: Ongoing (interventions implemented as appropriate)  Pt ambulated ~80 ft with CGA using RW, required brief standing rest breaks 2* c/o SOB.

## 2019-07-25 NOTE — PLAN OF CARE
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 [L97.921]  Yes    Hypertensive heart disease with chronic diastolic congestive heart failure [I11.0, I50.32]  Yes    Uncontrolled type 2 diabetes mellitus with peripheral circulatory disorder [E11.51, E11.65]  Yes    Anemia of chronic kidney failure, stage 4 (severe) [N18.4, D63.1]  Yes    Diabetic ulcer of right lower leg associated with type 2 diabetes mellitus, with fat layer exposed [E11.622, L97.912]  Yes    Mild episode of recurrent major depressive disorder [F33.0]  Yes    Slow transit constipation [K59.01]  Yes    Obstructive sleep apnea on CPAP [G47.33, Z99.89]  Not Applicable    Hypothyroidism (acquired) [E03.9]  Yes      Resolved Hospital Problems    Diagnosis Date Resolved POA    *Hypoglycemia [E16.2] 07/25/2019 Yes    Urinary tract infection without hematuria [N39.0] 07/25/2019 Yes    Infectious encephalopathy [G93.49, B99.9] 07/25/2019 Yes       Patient is homebound due to:  Hypoglycemia    Allergies:  Review of patient's allergies indicates:   Allergen Reactions    Ace inhibitors Other (See Comments)     Other reaction(s): cough       Vitals:      Every shift (Skilled Nursing patients)    Diet: Diabetic Renal Diet, 2000 calories      Acitivities:   - Up in a chair each morning as tolerated   - Ambulate with assistance to bathroom   - Scheduled walks once each shift (every 8 hours)   - May use walker, cane, or self-propelled wheelchair   - As per PT OT    LABS:  Per facility protocol    Nursing Precautions:  - Fall precautions per nursing home protocol  - CPAP qhs as below    CONSULTS:   Physical Therapy  "to evaluate and treat (5x per week)     Occupational Therapy to evaluate and treat (5x per week)      MISCELLANEOUS CARE:    Wound care:   Right medial lower leg- Two drying areas 2.5 cm on right medial lower leg.    Left medial lower leg- Drying area with pink scar with 3 mm pink opening.     Cleanse bioth legs with bath wipes and wounds with NS. Dresse wounds with Aquacel foam 4x4 dressings and wrapped bilateral lower extremities from toes to knee with Kerlix roll followed by 4" Coban for light compression. Replace compression wrap weekly until legs healed.          CPAP QHS   Expiratory pressure: 10       DIABETES CARE:     Check blood sugar:       Fingerstick blood sugar AC and HS   Fingerstick blood sugar every 6 hours if unable to eat      Report CBG < 60 or > 400 to physician.                                          Insulin Sliding Scale          Glucose  Novolog Insulin Subcutaneous        0 - 60   Orange juice or glucose tablet, hold insulin      No insulin   201-250  2 units   251-300  4 units   301-350  6 units   351-400  8 units   >400   10 units then call physician      Medications: Discontinue all previous medication orders, if any. See new list below.     Erica Leblanc   Home Medication Instructions FARHAT:[id number]    Printed on:07/25/19 3507   Medication Information                      amLODIPine (NORVASC) 10 MG tablet  Take 1 tablet (10 mg total) by mouth once daily.             atorvastatin (LIPITOR) 10 MG tablet  TAKE 1 TABLET by mouth EVERY NIGHT             bumetanide (BUMEX) 2 MG tablet  Take 1 tablet (2 mg total) by mouth 2 (two) times daily.             citalopram (CELEXA) 10 MG tablet  TAKE 1 TABLET by mouth EVERY DAY             clopidogrel (PLAVIX) 75 mg tablet  TAKE 1 TABLET by mouth EVERY DAY             docusate sodium (COLACE) 100 MG capsule  Take 200 mg by mouth once daily.              hydrALAZINE (APRESOLINE) 50 MG tablet  Take 1 tablet (50 mg total) by mouth 3 " (three) times daily.             insulin detemir U-100 (LEVEMIR FLEXTOUCH) 100 unit/mL (3 mL) SubQ InPn pen  Inject 5 Units into the skin once daily (AM)             LANCETS MISC               levothyroxine (SYNTHROID) 50 MCG tablet  Take 50 mcg by mouth once early AM before breakfast.             metoprolol tartrate (LOPRESSOR) 50 MG tablet  TAKE 1 TABLET by mouth TWICE DAILY             MULTIVITAMIN WITH MINERALS (HAIR,SKIN AND NAILS ORAL)  Take 1 tablet by mouth once daily.             oxybutynin (DITROPAN XL) 15 MG TR24  TAKE 1 TABLET by mouth EVERY DAY             polyethylene glycol (GLYCOLAX) 17 gram PwPk  Take 17 g by mouth once daily.                       _________________________________  Juan David Fish MD  07/25/2019

## 2019-07-25 NOTE — PLAN OF CARE
07/25/19 1450   Post-Acute Status   Post-Acute Authorization Placement   Post-Acute Placement Status Set-up Complete   Orders received for transfer to SNF and sent via [facility] to [facility].  Spoke with Bettina at [facility].  Auth received.  Awaiting call report and room number    ADT 30 order placed for  Transportation.  ETA: [phone]  If transportation does not arrive at ETA time nurse will be instructed to follow protocol for transportation below:  How can I get in touch directly with dispatch, if needed?                 Non-emergent dispatch: [phone]                                    [facility] Needs ([facility] Lead): [phone]\    1657:  cALL REPORT INFORMATION RECEIVED and given to Christie CHILDRESS.  Transport envelope with patient's chart and nurse, Elizabeth collins.

## 2019-07-25 NOTE — PT/OT/SLP PROGRESS
Occupational Therapy   Treatment    Name: Erica Leblanc  MRN: [id number]  Admitting Diagnosis:  Hypoglycemia       Recommendations:     Discharge Recommendations: nursing facility, skilled  Discharge Equipment Recommendations:  none  Barriers to discharge:       Assessment:     Erica Leblanc is a 74 y.o. female with a medical diagnosis of Hypoglycemia.  She presents with the following performance deficits affecting function: weakness, impaired endurance, impaired self care skills, impaired functional mobilty, gait instability, impaired balance, decreased coordination, decreased upper extremity function, decreased lower extremity function, decreased safety awareness, decreased ROM, edema. Patient progressing well toward OT goals. Patient to d/c to SNF today for further therapy.    Rehab Prognosis:  Good; patient would benefit from acute skilled OT services to address these deficits and reach maximum level of function.       Plan:     Patient to be seen 5 x/week to address the above listed problems via self-care/home management, therapeutic activities, therapeutic exercises  · Plan of Care Expires: 08/05/19  · Plan of Care Reviewed with: patient    Subjective     Pain/Comfort:  · Pain Rating 1: 0/10    Objective:     Communicated with:  Nurse prior to session.  Patient found up in chair with telemetry, peripheral IV upon OT entry to room.    General Precautions: Standard, fall, diabetic   Orthopedic Precautions:N/A   Braces: N/A     Occupational Performance:     Bed Mobility:    · N/T    Functional Mobility/Transfers:  · N/T    Activities of Daily Living:  · N/T      [facility] 6 Click ADL: 17    Treatment & Education:  Patient re-educated on UE therex with red theraband via HEP. Patient was able to perform x10 B shoulder flex,shoulder abd, x15 elbow flex/ext, and shoulder horizontal abs between rest. Patient was able to perform after demo and with handout. Patient tolerated well, verbalizes understanding of  HEP.    Patient left up in chair with all lines intact, call button in reach and nurse  notifiedEducation:      GOALS:   Multidisciplinary Problems     Occupational Therapy Goals        Problem: Occupational Therapy Goal    Goal Priority Disciplines Outcome Interventions   Occupational Therapy Goal     OT, PT/OT Ongoing (interventions implemented as appropriate)    Description:  Goals to be met by: 8/5/19    Patient will increase functional independence with ADLs by performing:    UE Dressing with Modified [place].  LE Dressing with Stand-by Assistance.  Grooming while standing at sink with Contact Guard Assistance.  Toileting from toilet with Contact Guard Assistance for hygiene and clothing management.   Rolling to Bilateral with Stand-by Assistance.   Supine to sit with Contact Guard Assistance.  Step transfer with Contact Guard Assistance  Toilet transfer to toilet with Contact Guard Assistance.  Upper extremity exercise program x15 reps per handout, with assistance as needed.                      Time Tracking:     OT Date of Treatment: 07/25/19  OT Start Time: 1533  OT Stop Time: 1552  OT Total Time (min): 19 min    Billable Minutes:Therapeutic Exercise 19    ADRY Wilson  7/25/2019

## 2019-07-25 NOTE — PLAN OF CARE
Problem: Skin Injury Risk Increased  Goal: Skin Health and Integrity  Outcome: Ongoing (interventions implemented as appropriate)  Intervention: Optimize Skin Protection     07/24/19 2039   Prevent Additional Skin Injury   Pressure Reduction Devices positioning supports utilized   Pressure Reduction Techniques frequent weight shift encouraged;pressure points protected;weight shift assistance provided   Monitor and Manage Hypervolemia   Skin Protection incontinence pads utilized;adhesive use limited;tubing/devices free from skin contact         Problem: Diabetes Comorbidity  Goal: Blood Glucose Level Within Desired Range    Intervention: Maintain Glycemic Control     07/24/19 2039   Monitor and Manage Ketoacidosis   Glycemic Management blood glucose monitoring;supplemental insulin given

## 2019-07-26 NOTE — PROGRESS NOTES
Transport, Radha,  arrived from [employer]. Pt has no c/o and no distress noted. Belongings packed. Family at bedside.

## 2019-07-26 NOTE — PT/OT/SLP DISCHARGE
Physical Therapy Discharge Summary    Name: Erica Leblanc  MRN: [id number]   Principal Problem: Hypoglycemia     Patient Discharged from acute Physical Therapy on 19.  Please refer to prior PT notes for functional status.     Assessment:     Patient appropriate for care in another setting.    Objective:     GOALS:   Multidisciplinary Problems     Physical Therapy Goals     Not on file          Multidisciplinary Problems (Resolved)        Problem: Physical Therapy Goal    Goal Priority Disciplines Outcome Goal Variances Interventions   Physical Therapy Goal   (Resolved)     PT, PT/OT Outcome(s) achieved     Description:  Goals to be met by: 2019    Patient will increase functional independence with mobility by performin. Supine to sit with Modified [place]  2. Sit to supine with Modified [place]  3. Sit to stand transfer with Modified [place]  4. Gait  x 350 feet with Modified [place] using Rolling Walker.    Recommend: SNF at time of discharge.                          Reasons for Discontinuation of Therapy Services  Transfer to alternate level of care.      Plan:     Patient Discharged to: Skilled Nursing Facility.    Clotilde Day, PT  2019

## 2019-07-26 NOTE — PT/OT/SLP DISCHARGE
Occupational Therapy Discharge Summary    Erica Leblanc  MRN: [id number]   Principal Problem: Hypoglycemia      Patient Discharged from acute Occupational Therapy on 7/25/19.  Please refer to prior OT notes for functional status.    Assessment:      Patient appropriate for care in another setting.    Objective:     GOALS:   Multidisciplinary Problems     Occupational Therapy Goals     Not on file          Multidisciplinary Problems (Resolved)        Problem: Occupational Therapy Goal    Goal Priority Disciplines Outcome Interventions   Occupational Therapy Goal   (Resolved)     OT, PT/OT Outcome(s) achieved    Description:  Goals to be met by: 8/5/19    Patient will increase functional independence with ADLs by performing:    UE Dressing with Modified [place].  LE Dressing with Stand-by Assistance.  Grooming while standing at sink with Contact Guard Assistance.  Toileting from toilet with Contact Guard Assistance for hygiene and clothing management.   Rolling to Bilateral with Stand-by Assistance.   Supine to sit with Contact Guard Assistance.  Step transfer with Contact Guard Assistance  Toilet transfer to toilet with Contact Guard Assistance.  Upper extremity exercise program x15 reps per handout, with assistance as needed.                      Reasons for Discontinuation of Therapy Services  Transfer to alternate level of care.      Plan:     Patient Discharged to: Skilled Nursing Facility    Bettina Corona OT  7/26/2019

## 2019-07-27 NOTE — DISCHARGE SUMMARY
[facility] Hospital Medicine  Discharge Summary      Patient Name: Erica Leblanc  MRN: [id number]  Admission Date: 7/19/2019  Hospital Length of Stay: 6 days  Discharge Date and Time: 7/25/2019  8:18 PM  Attending Physician: No att. providers found   Discharging Provider: Juan David Fish MD  Primary Care Provider: Sendy Elaine MD      HPI:   74-year-old female who presents with decreased level of consciousness that was noticed by her son this morning.  She was unresponsive this morning.  Throughout the day yesterday, she was very lethargic.  This is a typical for her according to both her son and her brother who are both at the bedside.  She has been having sleeping difficulty due to a broken CPAP and her son felt that her increased sleeping yesterday was merely her catching up on missed sleep.Per EMS, her initial CBG was 27, but this improved to 126 after .5 AMP of D50.  Her brother reports associated slurred speech and confusion. HPI limited by the pt's confusion.    Pt admitted on D5 IVF and frequent glucose checks. Neuro checks. Urine culture pending. Start Rocephin.     * No surgery found *      Hospital Course:   Pt admitted with hypoglycemia and AMS. Started on D5 IVF which was eventually dc'd. Resumed diabetic diet. Slowly resumed insulin regimen. Started on IV rocephin. BS improved and stable. Switched to oral cipro for UTI. PT/OT consulted.  Changed to bumex as she does not seem to be diuresing on lasix well. Planned for SNF placement. PPD placed. Patient accepted to SNF. Stable for d/c. SNF orders in with CPAP and wound care.     Consults:   Consults (From admission, onward)        Status Ordering Provider     Inpatient consult to Social Work  Once     Provider:  (Not yet assigned)    Completed LAURA PATIÑO     IP consult to case management  Once     Provider:  (Not yet assigned)    Completed LAURA PATIÑO          No new Assessment & Plan notes have been filed under  this hospital service since the last note was generated.  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 [L97.921] 07/22/2019 Yes    Hypertensive heart disease with chronic diastolic congestive heart failure [I11.0, I50.32] 04/05/2019 Yes    Uncontrolled type 2 diabetes mellitus with peripheral circulatory disorder [E11.51, E11.65] 04/05/2019 Yes    Anemia of chronic kidney failure, stage 4 (severe) [N18.4, D63.1] 04/05/2019 Yes    Diabetic ulcer of right lower leg associated with type 2 diabetes mellitus, with fat layer exposed [E11.622, L97.912]  Yes    Mild episode of recurrent major depressive disorder [F33.0] 03/27/2017 Yes    Slow transit constipation [K59.01] 01/11/2016 Yes    Obstructive sleep apnea on CPAP [G47.33, Z99.89]  Not Applicable    Hypothyroidism (acquired) [E03.9]  Yes      Problems Resolved During this Admission:    Diagnosis Date Noted Date Resolved POA    PRINCIPAL PROBLEM:  Hypoglycemia [E16.2] 07/19/2019 07/25/2019 Yes    Urinary tract infection without hematuria [N39.0] 07/19/2019 07/25/2019 Yes    Infectious encephalopathy [G93.49, B99.9] 07/19/2019 07/25/2019 Yes       Discharged Condition: stable    Disposition: Skilled Nursing Facility    Follow Up:  Follow-up Information     Sendy Elaine MD In 1 month.    Specialties:  Internal Medicine, Pediatrics  Why:  Post hospital fu, DM check  Contact information:  [address] [facility] [address] [phone]             [facility]    Specialties:  Nursing Home Agency, SNF Agency, LTAC  Contact information:  [address] [address] [address] [phone]                 Patient Instructions:   No discharge procedures on file.    Significant Diagnostic Studies: Labs: All labs within the past 24 hours have been reviewed    Pending Diagnostic Studies:     None         Medications:  Reconciled Home  Medications:      Medication List      START taking these medications    bumetanide 2 MG tablet  Commonly known as:  BUMEX  Take 1 tablet (2 mg total) by mouth 2 (two) times daily.     insulin detemir U-100 100 unit/mL (3 mL) Inpn pen  Commonly known as:  LEVEMIR FLEXTOUCH  Inject 5 Units into the skin once daily.     polyethylene glycol 17 gram Pwpk  Commonly known as:  GLYCOLAX  Take 17 g by mouth once daily.        CONTINUE taking these medications    amLODIPine 10 MG tablet  Commonly known as:  NORVASC  Take 1 tablet (10 mg total) by mouth once daily.     atorvastatin 10 MG tablet  Commonly known as:  LIPITOR  TAKE 1 TABLET EVERY DAY     citalopram 10 MG tablet  Commonly known as:  CELEXA  TAKE 1 TABLET EVERY DAY     clopidogrel 75 mg tablet  Commonly known as:  PLAVIX  TAKE 1 TABLET EVERY DAY     docusate sodium 100 MG capsule  Commonly known as:  COLACE  Take 200 mg by mouth once daily.     HAIR,SKIN AND NAILS ORAL  Take 3 tablets by mouth once daily.     hydrALAZINE 50 MG tablet  Commonly known as:  APRESOLINE  Take 1 tablet (50 mg total) by mouth 3 (three) times daily.     LANCETS MISC     levothyroxine 50 MCG tablet  Commonly known as:  SYNTHROID  Take 50 mcg by mouth once daily.     metoprolol tartrate 100 MG tablet  Commonly known as:  LOPRESSOR  TAKE 1 TABLET TWICE DAILY     oxybutynin 15 MG Tr24  Commonly known as:  DITROPAN XL  TAKE 1 TABLET EVERY DAY        STOP taking these medications    ergocalciferol 50,000 unit Cap  Commonly known as:  ERGOCALCIFEROL     furosemide 40 MG tablet  Commonly known as:  LASIX     HumaLOG U-100 Insulin 100 unit/mL injection  Generic drug:  insulin lispro     KLOR-CON M20 20 MEQ tablet  Generic drug:  potassium chloride SA     losartan 100 MG tablet  Commonly known as:  COZAAR     V-GO 20 [place]  Generic drug:  sub-q insulin device, 20 unit     VICTOZA 0.6 mg/0.1 mL (18 mg/3 mL) Pnij  Generic drug:  liraglutide 0.6 mg/0.1 mL (18 mg/3 mL) subq PNIJ            Indwelling  Lines/Drains at time of discharge:   Lines/Drains/Airways          None          Time spent on the discharge of patient: > 30 minutes  Patient was seen and examined on the date of discharge and determined to be suitable for discharge.         Juan David Fish MD  Department of Hospital Medicine  [facility]

## 2019-08-07 ENCOUNTER — OFFICE VISIT (OUTPATIENT)
Dept: NEPHROLOGY | Facility: CLINIC | Age: 74
End: 2019-08-07
Payer: MEDICARE

## 2019-08-07 ENCOUNTER — LAB VISIT (OUTPATIENT)
Dept: LAB | Facility: HOSPITAL | Age: 74
End: 2019-08-07
Attending: INTERNAL MEDICINE
Payer: MEDICARE

## 2019-08-07 VITALS
HEART RATE: 68 BPM | WEIGHT: 272 LBS | OXYGEN SATURATION: 95 % | SYSTOLIC BLOOD PRESSURE: 122 MMHG | BODY MASS INDEX: 45.32 KG/M2 | HEIGHT: 65 IN | DIASTOLIC BLOOD PRESSURE: 80 MMHG

## 2019-08-07 DIAGNOSIS — N18.4 CHRONIC KIDNEY DISEASE (CKD), STAGE IV (SEVERE): ICD-10-CM

## 2019-08-07 DIAGNOSIS — Z79.4 CONTROLLED TYPE 2 DIABETES MELLITUS WITH STAGE 4 CHRONIC KIDNEY DISEASE, WITH LONG-TERM CURRENT USE OF INSULIN: Primary | ICD-10-CM

## 2019-08-07 DIAGNOSIS — D63.1 ANEMIA OF CHRONIC RENAL FAILURE, STAGE 4 (SEVERE): ICD-10-CM

## 2019-08-07 DIAGNOSIS — N18.4 ANEMIA OF CHRONIC RENAL FAILURE, STAGE 4 (SEVERE): ICD-10-CM

## 2019-08-07 DIAGNOSIS — I12.9 HYPERTENSIVE KIDNEY DISEASE WITH CHRONIC KIDNEY DISEASE, STAGE 1-4 OR UNSPECIFIED CHRONIC KIDNEY DISEASE: ICD-10-CM

## 2019-08-07 DIAGNOSIS — N18.4 CONTROLLED TYPE 2 DIABETES MELLITUS WITH STAGE 4 CHRONIC KIDNEY DISEASE, WITH LONG-TERM CURRENT USE OF INSULIN: ICD-10-CM

## 2019-08-07 DIAGNOSIS — N18.4 CONTROLLED TYPE 2 DIABETES MELLITUS WITH STAGE 4 CHRONIC KIDNEY DISEASE, WITH LONG-TERM CURRENT USE OF INSULIN: Primary | ICD-10-CM

## 2019-08-07 DIAGNOSIS — Z79.4 CONTROLLED TYPE 2 DIABETES MELLITUS WITH STAGE 4 CHRONIC KIDNEY DISEASE, WITH LONG-TERM CURRENT USE OF INSULIN: ICD-10-CM

## 2019-08-07 DIAGNOSIS — E11.22 CONTROLLED TYPE 2 DIABETES MELLITUS WITH STAGE 4 CHRONIC KIDNEY DISEASE, WITH LONG-TERM CURRENT USE OF INSULIN: ICD-10-CM

## 2019-08-07 DIAGNOSIS — E11.22 CONTROLLED TYPE 2 DIABETES MELLITUS WITH STAGE 4 CHRONIC KIDNEY DISEASE, WITH LONG-TERM CURRENT USE OF INSULIN: Primary | ICD-10-CM

## 2019-08-07 LAB
ALBUMIN SERPL BCP-MCNC: 2.5 G/DL (ref 3.5–5.2)
ANION GAP SERPL CALC-SCNC: 9 MMOL/L (ref 8–16)
BUN SERPL-MCNC: 75 MG/DL (ref 8–23)
CALCIUM SERPL-MCNC: 8.7 MG/DL (ref 8.7–10.5)
CHLORIDE SERPL-SCNC: 105 MMOL/L (ref 95–110)
CO2 SERPL-SCNC: 27 MMOL/L (ref 23–29)
CREAT SERPL-MCNC: 3.8 MG/DL (ref 0.5–1.4)
EST. GFR  (AFRICAN AMERICAN): 12.9 ML/MIN/1.73 M^2
EST. GFR  (NON AFRICAN AMERICAN): 11.2 ML/MIN/1.73 M^2
GLUCOSE SERPL-MCNC: 251 MG/DL (ref 70–110)
PHOSPHATE SERPL-MCNC: 4.4 MG/DL (ref 2.7–4.5)
POTASSIUM SERPL-SCNC: 4.2 MMOL/L (ref 3.5–5.1)
SODIUM SERPL-SCNC: 141 MMOL/L (ref 136–145)

## 2019-08-07 PROCEDURE — 3079F PR MOST RECENT DIASTOLIC BLOOD PRESSURE 80-89 MM HG: ICD-10-PCS | Mod: HCNC,CPTII,S$GLB, | Performed by: INTERNAL MEDICINE

## 2019-08-07 PROCEDURE — 1101F PT FALLS ASSESS-DOCD LE1/YR: CPT | Mod: HCNC,CPTII,S$GLB, | Performed by: INTERNAL MEDICINE

## 2019-08-07 PROCEDURE — 80069 RENAL FUNCTION PANEL: CPT | Mod: HCNC

## 2019-08-07 PROCEDURE — 99999 PR PBB SHADOW E&M-EST. PATIENT-LVL II: ICD-10-PCS | Mod: PBBFAC,HCNC,, | Performed by: INTERNAL MEDICINE

## 2019-08-07 PROCEDURE — 99999 PR PBB SHADOW E&M-EST. PATIENT-LVL II: CPT | Mod: PBBFAC,HCNC,, | Performed by: INTERNAL MEDICINE

## 2019-08-07 PROCEDURE — 3045F PR MOST RECENT HEMOGLOBIN A1C LEVEL 7.0-9.0%: ICD-10-PCS | Mod: HCNC,CPTII,S$GLB, | Performed by: INTERNAL MEDICINE

## 2019-08-07 PROCEDURE — 99214 OFFICE O/P EST MOD 30 MIN: CPT | Mod: HCNC,S$GLB,, | Performed by: INTERNAL MEDICINE

## 2019-08-07 PROCEDURE — 3079F DIAST BP 80-89 MM HG: CPT | Mod: HCNC,CPTII,S$GLB, | Performed by: INTERNAL MEDICINE

## 2019-08-07 PROCEDURE — 36415 COLL VENOUS BLD VENIPUNCTURE: CPT | Mod: HCNC,PO

## 2019-08-07 PROCEDURE — 99214 PR OFFICE/OUTPT VISIT, EST, LEVL IV, 30-39 MIN: ICD-10-PCS | Mod: HCNC,S$GLB,, | Performed by: INTERNAL MEDICINE

## 2019-08-07 PROCEDURE — 1101F PR PT FALLS ASSESS DOC 0-1 FALLS W/OUT INJ PAST YR: ICD-10-PCS | Mod: HCNC,CPTII,S$GLB, | Performed by: INTERNAL MEDICINE

## 2019-08-07 PROCEDURE — 3074F PR MOST RECENT SYSTOLIC BLOOD PRESSURE < 130 MM HG: ICD-10-PCS | Mod: HCNC,CPTII,S$GLB, | Performed by: INTERNAL MEDICINE

## 2019-08-07 PROCEDURE — 3074F SYST BP LT 130 MM HG: CPT | Mod: HCNC,CPTII,S$GLB, | Performed by: INTERNAL MEDICINE

## 2019-08-07 PROCEDURE — 3045F PR MOST RECENT HEMOGLOBIN A1C LEVEL 7.0-9.0%: CPT | Mod: HCNC,CPTII,S$GLB, | Performed by: INTERNAL MEDICINE

## 2019-08-07 NOTE — PROGRESS NOTES
Subjective:       Patient ID: Erica Leblanc is a 74 y.o. Black or  female who presents for follow-up evaluation of Chronic Kidney Disease    HPI      Ms. Leblanc is a 74 year old woman with past medical history of diabetes, hypertension presenting for follow up of chronic kidney disease.  Patient seen in ED for AMS due to hypoglycemia, reports blood sugars more consistent since discharge.  Patient reports lower extremity swelling, has follow up with wound management on right foot.  She otherwise denies any fever, chest pain, shortness of breath, abdominal pain, diarrhea, dysuria/hematuria.  She reports her blood pressure has been better controlled.      Review of Systems   Constitutional: Negative for appetite change, fatigue and fever.   Respiratory: Negative for chest tightness and shortness of breath.    Cardiovascular: Positive for leg swelling. Negative for chest pain.   Gastrointestinal: Negative for abdominal pain, constipation, diarrhea, nausea and vomiting.   Genitourinary: Negative for difficulty urinating, dysuria, flank pain, frequency, hematuria and urgency.   Musculoskeletal: Negative for arthralgias, joint swelling and myalgias.   Skin: Negative for rash and wound.   Neurological: Negative for dizziness, weakness and light-headedness.   All other systems reviewed and are negative.      Objective:      Physical Exam   Constitutional: She appears well-developed and well-nourished.   Cardiovascular: Normal rate, regular rhythm and normal heart sounds. Exam reveals no gallop and no friction rub.   No murmur heard.  Pulmonary/Chest: Effort normal and breath sounds normal. No respiratory distress. She has no wheezes. She has no rales.   Abdominal: Soft. Bowel sounds are normal. There is no tenderness.   Musculoskeletal: She exhibits edema (2+ bilateral lower extremity).   Neurological: She is alert.   Skin: Skin is warm and dry. No rash noted. No erythema.   Psychiatric: She has  a normal mood and affect.       Assessment:       1. Controlled type 2 diabetes mellitus with stage 4 chronic kidney disease, with long-term current use of insulin    2. Chronic kidney disease (CKD), stage IV (severe)    3. Hypertensive kidney disease with chronic kidney disease, stage 1-4 or unspecified chronic kidney disease    4. Anemia of chronic renal failure, stage 4 (severe)        Plan:      Ms. Leblanc is a 74 year old woman with past medical history of diabetes, hypertension presenting for follow up of chronic kidney disease stage III likely secondary to diabetic nephropathy.  Creatinine previously stable since November 2016, elevated since February 2019, will continue to trend renal panel along with proteinuria (continue ARB).  Prior elevation likely due to UTI, TMP/SMX, v. secondary to cardiomyopathy, elevated during recent ED visit for hypoglycemia, will repeat to trend.  Stressed importance of blood pressure/glycemic monitoring/control, along with weight loss/exercise, to prevent any further progression of kidney disease, patient voiced understanding.   - Hypertension: BP at goal, advised patient to record BP diary, stressed importance of medication compliance and weight loss/exercise, patient voiced understanding.  Will increase pm dose of furosemide x3days, will phone review BP diary/daily weights  - Anemia: Hgb at goal, no indication for erythropoetin therapy  - Bone/mineral metabolism: patient with secondary hyperparathyroidism, along with VitD deficiency (continue daily supplement), will trend PTH (at goal for stage CKD)    Return to clinic 2-3months pending renal panel, then with renal/heme panel, iron/TIBC/ferritin, urinalysis/culture, urine protein/creatinine ratio, PTH prior to next visit

## 2019-08-15 ENCOUNTER — OFFICE VISIT (OUTPATIENT)
Dept: CARDIOLOGY | Facility: CLINIC | Age: 74
End: 2019-08-15
Payer: MEDICARE

## 2019-08-15 VITALS
DIASTOLIC BLOOD PRESSURE: 88 MMHG | WEIGHT: 271.19 LBS | OXYGEN SATURATION: 94 % | BODY MASS INDEX: 45.12 KG/M2 | RESPIRATION RATE: 16 BRPM | HEART RATE: 58 BPM | SYSTOLIC BLOOD PRESSURE: 181 MMHG

## 2019-08-15 DIAGNOSIS — I27.20 PULMONARY HYPERTENSION: ICD-10-CM

## 2019-08-15 DIAGNOSIS — G47.33 OBSTRUCTIVE SLEEP APNEA ON CPAP: ICD-10-CM

## 2019-08-15 PROCEDURE — 99999 PR PBB SHADOW E&M-EST. PATIENT-LVL III: ICD-10-PCS | Mod: PBBFAC,HCNC,, | Performed by: INTERNAL MEDICINE

## 2019-08-15 PROCEDURE — 99214 PR OFFICE/OUTPT VISIT, EST, LEVL IV, 30-39 MIN: ICD-10-PCS | Mod: HCNC,S$GLB,, | Performed by: INTERNAL MEDICINE

## 2019-08-15 PROCEDURE — 3077F PR MOST RECENT SYSTOLIC BLOOD PRESSURE >= 140 MM HG: ICD-10-PCS | Mod: HCNC,CPTII,S$GLB, | Performed by: INTERNAL MEDICINE

## 2019-08-15 PROCEDURE — 3079F PR MOST RECENT DIASTOLIC BLOOD PRESSURE 80-89 MM HG: ICD-10-PCS | Mod: HCNC,CPTII,S$GLB, | Performed by: INTERNAL MEDICINE

## 2019-08-15 PROCEDURE — 3077F SYST BP >= 140 MM HG: CPT | Mod: HCNC,CPTII,S$GLB, | Performed by: INTERNAL MEDICINE

## 2019-08-15 PROCEDURE — 3079F DIAST BP 80-89 MM HG: CPT | Mod: HCNC,CPTII,S$GLB, | Performed by: INTERNAL MEDICINE

## 2019-08-15 PROCEDURE — 1101F PR PT FALLS ASSESS DOC 0-1 FALLS W/OUT INJ PAST YR: ICD-10-PCS | Mod: HCNC,CPTII,S$GLB, | Performed by: INTERNAL MEDICINE

## 2019-08-15 PROCEDURE — 1101F PT FALLS ASSESS-DOCD LE1/YR: CPT | Mod: HCNC,CPTII,S$GLB, | Performed by: INTERNAL MEDICINE

## 2019-08-15 PROCEDURE — 99214 OFFICE O/P EST MOD 30 MIN: CPT | Mod: HCNC,S$GLB,, | Performed by: INTERNAL MEDICINE

## 2019-08-15 PROCEDURE — 99999 PR PBB SHADOW E&M-EST. PATIENT-LVL III: CPT | Mod: PBBFAC,HCNC,, | Performed by: INTERNAL MEDICINE

## 2019-08-15 RX ORDER — HYDRALAZINE HYDROCHLORIDE 100 MG/1
100 TABLET, FILM COATED ORAL 3 TIMES DAILY
Qty: 90 TABLET | Refills: 3 | Status: SHIPPED | OUTPATIENT
Start: 2019-08-15 | End: 2019-10-25 | Stop reason: SDUPTHER

## 2019-08-15 NOTE — PROGRESS NOTES
CARDIOVASCULAR CONSULTATION    REASON FOR CONSULT:   Erica Leblanc is a 74 y.o. female who presents for establishing care with me..      HISTORY OF PRESENT ILLNESS:   Notes from August 2019:  Patient doing fine.  Denies any chest pains at rest on exertion, orthopnea, PND.  Does have chronic dyspnea on exertion.  States that she feels her pedal edema is going down as she is responding to Bumex better.      Notes from June 2019:  Patient is a very pleasant 74-year-old lady with a past medical history of heart failure with preserved ejection fraction, diabetes, chronic kidney disease, leg wounds, hypertension who is here for follow-up.  She denies any chest pains at rest on exertion, orthopnea, PND.  Denies any claudication like symptoms.  States that she goes to the [facility] regularly.  Has been doing salt restriction diet.      PAST MEDICAL HISTORY:     Past Medical History:   Diagnosis Date    Acute respiratory failure with hypoxia     Anemia of chronic kidney failure, stage 4 (severe) 4/5/2019    Cataracts, bilateral     CHF (congestive heart failure)     CKD (chronic kidney disease) stage 3, GFR 30-59 ml/min     CKD (chronic kidney disease) stage 3, GFR 30-59 ml/min     Controlled type 2 diabetes mellitus with proteinuria or albuminuria     Depression     Diabetes with neurologic complications     Diabetic retinopathy of both eyes     Edema     Glaucoma     History of colonic polyps     Hx-TIA (transient ischemic attack) 11/2008    Hyperlipidemia LDL goal < 100     Hypertension     Hypothyroidism     Major depressive disorder, single episode, mild 2/17/2016    Mixed incontinence urge and stress     Obesity     Obstructive sleep apnea on CPAP     7/19/19:  Home CPAP machine broken, per patient & son    Osteopenia     Proteinuria     Sickle cell trait     Trouble in sleeping     Type 2 diabetes mellitus with ophthalmic manifestations     Type 2 diabetes with stage 3 chronic  kidney disease GFR 30-59     Type II or unspecified type diabetes mellitus with renal manifestations, uncontrolled(250.42)     Uncontrolled type 2 diabetes mellitus with peripheral circulatory disorder 2019    Urge incontinence 2016    Urge incontinence     Venous stasis ulcer     bilateral lower legs    Vitamin D deficiency disease        PAST SURGICAL HISTORY:     Past Surgical History:   Procedure Laterality Date    BREAST BIOPSY      breast reduction Bilateral age 31    BREAST SURGERY      cataracts Bilateral      SECTION, LOW TRANSVERSE      x1    CHOLECYSTECTOMY      COLONOSCOPY N/A 2012    Performed by [facility]erson [facility] at [facility] END[facility] (4TH FLR)    EYE SURGERY  2014, 2014    vitrectomy    EYE SURGERY Right 2016    HYSTERECTOMY  1986    TAHBSO (patient is unsure if ovaries removed)    OOPHORECTOMY      REFRACTIVE SURGERY      REPAIR, RETINAL DETACHMENT, WITH VITRECTOMY Right 2014    Performed by LILLIANA Coello MD at [facility] OR 1ST FLR    REPAIR, RETINAL DETACHMENT, WITH VITRECTOMY Left 2014    Performed by [facility]NA [facility]ello MD at [facility] OR 1ST FLR    REPAIR-RETINA (VITRECTOMY) Right 2014    Performed by LILL[facility]NA Coello MD at [facility] OR 1ST FLR    STRABISMUS REPAIR/ADJUSTABLE SUTURES Bilateral 2016    Performed by RABIA Danielson Jr., MD at [facility] OR 1ST FLR    TOTAL REDUCTION MAMMOPLASTY      approx 10 yrs ago       ALLERGIES AND MEDICATION:     Review of patient's allergies indicates:   Allergen Reactions    Ace inhibitors Other (See Comments)     Other reaction(s): cough        Medication List           Accurate as of 8/15/19 11:39 AM. If you have any questions, ask your nurse or doctor.               CONTINUE taking these medications    amLODIPine 10 MG tablet  Commonly known as:  NORVASC  Take 1 tablet (10 mg total) by mouth once daily.     atorvastatin 10 MG tablet  Commonly known as:  LIPITOR  TAKE 1 TABLET EVERY DAY      bumetanide 2 MG tablet  Commonly known as:  BUMEX  Take 1 tablet (2 mg total) by mouth 2 (two) times daily.     citalopram 10 MG tablet  Commonly known as:  CELEXA  TAKE 1 TABLET EVERY DAY     clopidogrel 75 mg tablet  Commonly known as:  PLAVIX  TAKE 1 TABLET EVERY DAY     docusate sodium 100 MG capsule  Commonly known as:  COLACE     HAIR,SKIN AND NAILS ORAL     hydrALAZINE 50 MG tablet  Commonly known as:  APRESOLINE  Take 1 tablet (50 mg total) by mouth 3 (three) times daily.     insulin detemir U-100 100 unit/mL (3 mL) Inpn pen  Commonly known as:  LEVEMIR FLEXTOUCH  Inject 5 Units into the skin once daily.     LANCETS MISC     levothyroxine 50 MCG tablet  Commonly known as:  SYNTHROID     metoprolol tartrate 100 MG tablet  Commonly known as:  LOPRESSOR  TAKE 1 TABLET TWICE DAILY     oxybutynin 15 MG Tr24  Commonly known as:  DITROPAN XL  TAKE 1 TABLET EVERY DAY     polyethylene glycol 17 gram Pwpk  Commonly known as:  GLYCOLAX  Take 17 g by mouth once daily.            SOCIAL HISTORY:     Social History     Socioeconomic History    Marital status: Single     Spouse name: Not on file    Number of children: 5    Years of education: Not on file    Highest education level: Not on file   Occupational History    Occupation:      Employer: [employer]     Comment: part-time   Social Needs    Financial resource strain: Not on file    Food insecurity:     Worry: Not on file     Inability: Not on file    Transportation needs:     Medical: Not on file     Non-medical: Not on file   Tobacco Use    Smoking status: Never Smoker    Smokeless tobacco: Never Used   Substance and Sexual Activity    Alcohol use: No     Alcohol/week: 0.0 oz    Drug use: No    Sexual activity: Not Currently     Partners: Male     Birth control/protection: Post-menopausal, Surgical   Lifestyle    Physical activity:     Days per week: Not on file     Minutes per session: Not on file    Stress: Not on file    Relationships    Social connections:     Talks on phone: Not on file     Gets together: Not on file     Attends Buddhist service: Not on file     Active member of club or organization: Not on file     Attends meetings of clubs or organizations: Not on file     Relationship status: Not on file   Other Topics Concern    Not on file   Social History Narrative    Not on file       FAMILY HISTORY:     Family History   Problem Relation Age of Onset    Leukemia Father     Ovarian cancer Sister 35    Stroke Mother     Diabetes Mother     Hypertension Mother     Diabetes Paternal Grandmother     Breast cancer Maternal Aunt 65    HIV Brother     Achondroplasia Sister     Parkinsonism Maternal Aunt     Esophageal cancer Maternal Uncle         smoker    Amblyopia Neg Hx     Blindness Neg Hx     Cataracts Neg Hx     Glaucoma Neg Hx     Macular degeneration Neg Hx     Retinal detachment Neg Hx     Strabismus Neg Hx     Thyroid disease Neg Hx     Colon cancer Neg Hx        REVIEW OF SYSTEMS:   Review of Systems   Constitutional: Negative.    HENT: Negative.    Eyes: Negative.    Respiratory: Positive for shortness of breath.    Cardiovascular: Negative.    Gastrointestinal: Negative.    Genitourinary: Negative.    Musculoskeletal: Negative.    Skin: Negative.    Neurological: Negative.    Endo/Heme/Allergies: Negative.        A 10 point review of systems was performed and all the pertinent positives have been mentioned. Rest of review of systems was negative.        PHYSICAL EXAM:     Vitals:    08/15/19 1055   BP: (!) 181/88   Pulse: (!) 58   Resp: 16    Body mass index is 45.12 kg/m².  Weight: 123 kg (271 lb 2.7 oz)         Physical Exam   Constitutional: She is oriented to person, place, and time. She appears well-developed and well-nourished. She is active.   HENT:   Head: Normocephalic and atraumatic.   Right Ear: Hearing normal.   Left Ear: Hearing normal.   Nose: Nose normal.   Mouth/Throat: Mucous  membranes are normal.   Eyes: Pupils are equal, round, and reactive to light. Conjunctivae and lids are normal.   Neck: Normal range of motion. Neck supple.   Cardiovascular: Normal rate, regular rhythm, normal heart sounds, intact distal pulses and normal pulses.   Pulmonary/Chest: Effort normal and breath sounds normal.   Abdominal: Soft. Normal appearance. There is no tenderness.   Musculoskeletal: She exhibits no deformity.   Neurological: She is alert and oriented to person, place, and time.   Skin: Skin is warm, dry and intact.   Psychiatric: She has a normal mood and affect. Her speech is normal.   Nursing note and vitals reviewed.        DATA:     Laboratory:  CBC:  Recent Labs   Lab 09/05/18  1656 02/21/19  1520 07/19/19  0934   WBC 5.68 6.12 5.55   Hemoglobin 12.0 11.2 L 13.8   Hematocrit 36.8 L 34.9 L 42.9   Platelets 240 223 240       CHEMISTRIES:  Recent Labs   Lab 06/20/17  1345  05/22/19  1659 07/19/19  0934 08/07/19  1016   Glucose 88   < > 144 H 53 L 251 H   Sodium 143   < > 138 143 141   Potassium 4.0   < > 4.4 4.0 4.2   BUN, Bld 43 H   < > 43 H 57 H 75 H   Creatinine 2.6 H   < > 3.3 H 3.6 H 3.8 H   eGFR if  21 A   < > 15 A 14 A 12.9 A   eGFR if non  18 A   < > 13 A 12 A 11.2 A   Calcium 9.0   < > 8.9 8.9 8.7   Magnesium 2.0  --   --  2.0  --     < > = values in this interval not displayed.       CARDIAC BIOMARKERS:  Recent Labs   Lab 06/12/17  0703 06/12/17  1139 07/19/19  0934   Troponin I 0.019 0.062 H 0.059 H       COAGS:  Recent Labs   Lab 06/12/17  0703   INR 0.9       LIPIDS/LFTS:  Recent Labs   Lab 01/06/18  0840 03/03/18  0833  12/15/18  1012 03/23/19  1102 07/19/19  0934   Cholesterol 122 113 L  --   --  123  --    Triglycerides 92 57  --   --  66  --    HDL 46 51  --   --  57  --    LDL Cholesterol 57.6 L 50.6 L  --   --  52.8 L  --    Non-HDL Cholesterol 76 62  --   --  66  --    AST 17 99 H   < > 18 22 34   ALT 34 120 H   < > 33 39 28    < > = values in  this interval not displayed.       Hemoglobin A1C   Date Value Ref Range Status   07/20/2019 7.3 (H) 4.0 - 5.6 % Final     Comment:     ADA Screening Guidelines:  5.7-6.4%  Consistent with prediabetes  >or=6.5%  Consistent with diabetes  High levels of fetal hemoglobin interfere with the HbA1C  assay. Heterozygous hemoglobin variants (HbS, HgC, etc)do  not significantly interfere with this assay.   However, presence of multiple variants may affect accuracy.     03/23/2019 8.2 (H) 4.0 - 5.6 % Final     Comment:     ADA Screening Guidelines:  5.7-6.4%  Consistent with prediabetes  >or=6.5%  Consistent with diabetes  High levels of fetal hemoglobin interfere with the HbA1C  assay. Heterozygous hemoglobin variants (HbS, HgC, etc)do  not significantly interfere with this assay.   However, presence of multiple variants may affect accuracy.     12/15/2018 8.3 (H) 4.0 - 5.6 % Final     Comment:     ADA Screening Guidelines:  5.7-6.4%  Consistent with prediabetes  >or=6.5%  Consistent with diabetes  High levels of fetal hemoglobin interfere with the HbA1C  assay. Heterozygous hemoglobin variants (HbS, HgC, etc)do  not significantly interfere with this assay.   However, presence of multiple variants may affect accuracy.         TSH  Recent Labs   Lab 01/06/18  0840 12/15/18  1012 03/23/19  1102   TSH 2.240 1.722 3.298       The ASCVD Risk score (Cayce SADIQ Jr., et al., 2013) failed to calculate for the following reasons:    The valid total cholesterol range is 130 to 320 mg/dL           Cardiovascular Testing:      Echo: 6-17  CONCLUSIONS     1 - Normal left ventricular systolic function (EF 65-70%).     2 - No diagnostic regional wall motion abnormalities.     3 - Concentric remodeling.     4 - Impaired LV relaxation, elevated LAP (grade 2 diastolic dysfunction).     5 - Trivial tricuspid regurgitation.     6 - The estimated PA systolic pressure is greater than 18 mmHg.      NST: 7-17  Impression: NORMAL MYOCARDIAL  PERFUSION  1. The perfusion scan is free of evidence for myocardial ischemia or injury.   2. Resting wall motion is physiologic.   3. Visually estimated LV function is normal.   4. The ventricular volumes are normal at rest and stress.   5. The extracardiac distribution of radioactivity is normal.      Carotid ultrasound:  5-18  CONCLUSIONS   There is 0 - 19% right Internal Carotid stenosis.  There is 0 - 19% left Internal Carotid stenosis.     PAM:  10-18  · Elevated bilateral PAM suggesting calcified noncompressible vessels     LDL-53    3-19     Lower extremity arterial ultrasound:  · No hemodynamically significant plaque bilaterally  · PAM consistent with medial calcinosis    Renal artery ultrasound in June 2019:  · This was a technically difficult study. This study was limited due to excessive bowel gas.  · There is insignificant stenosis (0-59%) in the Right Renal Artery.  · Elevated right renal resistive index suggestive of intrinsic kidney disease.  · Right kidney 12.00 cm.  · There is insignificant stenosis (0-59%) in the Left Renal Artery.  · Elevated left renal resistive index suggestive of intrinsic kidney disease.  · Left kidney 11.80 cm.    Ultrasound legs in November 2018:    · No hemodynamically significant plaque bilaterally  · PAM consistent with medial calcinosis    ABIs in October 2018:    · Elevated bilateral PAM suggesting calcified noncompressible vessels          ASSESSMENT AND PLAN     Patient Active Problem List   Diagnosis    Morbid obesity with body mass index (BMI) of 40.0 to 44.9 in adult    Sickle cell trait    Osteopenia    Hyperlipidemia LDL goal <100    Obstructive sleep apnea on CPAP    Hypothyroidism (acquired)    Hx-TIA (transient ischemic attack)    Vitamin D deficiency disease    Proliferative diabetic retinopathy, both eyes    Diabetic macular edema, both eyes    Hypertensive retinopathy of both eyes    Cerebral microvascular disease    CME (cystoid macular edema)     Hyphema    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    Pulmonary hypertension    Long-term insulin use    Type 2 diabetes, controlled, with neuropathy    Secondary renal hyperparathyroidism    Incomplete bladder emptying    Slow transit constipation    Postmenopausal atrophic vaginitis    Rectocele    Exotropia of right eye    Mixed incontinence urge and stress    Strabismus    Chronic diastolic heart failure    Mild episode of recurrent major depressive disorder    Tortuous aorta    History of TIAs    Uncontrolled diabetes mellitus with proliferative retinopathy    Diabetic ulcer of right lower leg associated with type 2 diabetes mellitus, with fat layer exposed    Non-pressure chronic ulcer of right calf with fat layer exposed    Recurrent UTI    Anemia of chronic kidney failure, stage 4 (severe)    Uncontrolled type 2 diabetes mellitus with peripheral circulatory disorder    Hypertensive heart disease with chronic diastolic congestive heart failure    Non-pressure chronic ulcer left lower leg, limited to breakdown skin       1.  Hypertension:  Uncontrolled.  Increase hydralazine to100 mg tid    2.  Heart failure with preserved ejection fraction.  On bumex.  Responding to Bumex better than Lasix.      3.  Diabetes:  Being managed by primary    4.  Continue follow-up with Wound care clinic.    5.  Dyslipidemia:  On Lipitor      Thank you very much for involving me in the care of your patient.  Please do not hesitate to contact me if there are any questions.      Murali Li MD, FACC, [facility]  Interventional Cardiologist, [facility].     Follow-up in 2 months      This note was dictated with the help of speech recognition software.  There might be un-intended errors and/or substitutions.

## 2019-08-26 ENCOUNTER — TELEPHONE (OUTPATIENT)
Dept: FAMILY MEDICINE | Facility: CLINIC | Age: 74
End: 2019-08-26

## 2019-08-26 ENCOUNTER — NURSE TRIAGE (OUTPATIENT)
Dept: ADMINISTRATIVE | Facility: CLINIC | Age: 74
End: 2019-08-26

## 2019-08-26 DIAGNOSIS — G47.33 OBSTRUCTIVE SLEEP APNEA ON CPAP: Primary | ICD-10-CM

## 2019-08-26 DIAGNOSIS — I50.9 CONGESTIVE HEART FAILURE, UNSPECIFIED HF CHRONICITY, UNSPECIFIED HEART FAILURE TYPE: ICD-10-CM

## 2019-08-26 NOTE — TELEPHONE ENCOUNTER
----- Message from Rossy Wagnoer sent at 8/26/2019  8:22 AM CDT -----  Type: Patient Call Back    Who called: ABHILASH Dee with [facility]     What is the request in detail: Rep states pt is requesting a hospital bed. He is asking for orders for it to [facility]. Fax # [phone]    Can the clinic reply by [facility]? No     Would the patient rather a call back or a response via [facility]? Call back     Best call back number:[phone]    Additional Information:

## 2019-08-27 ENCOUNTER — TELEPHONE (OUTPATIENT)
Dept: FAMILY MEDICINE | Facility: CLINIC | Age: 74
End: 2019-08-27

## 2019-08-27 ENCOUNTER — HOSPITAL ENCOUNTER (INPATIENT)
Facility: HOSPITAL | Age: 74
LOS: 15 days | Discharge: HOME HEALTH | DRG: 291 | End: 2019-09-11
Attending: EMERGENCY MEDICINE | Admitting: INTERNAL MEDICINE
Payer: MEDICARE

## 2019-08-27 DIAGNOSIS — I50.9 CHF (CONGESTIVE HEART FAILURE): ICD-10-CM

## 2019-08-27 DIAGNOSIS — I10 ESSENTIAL HYPERTENSION: Chronic | ICD-10-CM

## 2019-08-27 DIAGNOSIS — G47.33 OBSTRUCTIVE SLEEP APNEA ON CPAP: Primary | ICD-10-CM

## 2019-08-27 DIAGNOSIS — F32.A DEPRESSION: Chronic | ICD-10-CM

## 2019-08-27 DIAGNOSIS — J96.10 CHRONIC RESPIRATORY FAILURE: ICD-10-CM

## 2019-08-27 DIAGNOSIS — I50.9 ACUTE ON CHRONIC CONGESTIVE HEART FAILURE, UNSPECIFIED HEART FAILURE TYPE: ICD-10-CM

## 2019-08-27 DIAGNOSIS — Z99.2 ESRD ON HEMODIALYSIS: ICD-10-CM

## 2019-08-27 DIAGNOSIS — I50.33 ACUTE ON CHRONIC DIASTOLIC HEART FAILURE: ICD-10-CM

## 2019-08-27 DIAGNOSIS — G47.33 OSA ON CPAP: Primary | Chronic | ICD-10-CM

## 2019-08-27 DIAGNOSIS — N18.6 ESRD ON HEMODIALYSIS: ICD-10-CM

## 2019-08-27 DIAGNOSIS — E11.29 TYPE 2 DIABETES MELLITUS, CONTROLLED, WITH RENAL COMPLICATIONS: Chronic | ICD-10-CM

## 2019-08-27 DIAGNOSIS — D63.8 ANEMIA OF CHRONIC DISEASE: Chronic | ICD-10-CM

## 2019-08-27 DIAGNOSIS — I50.32 CHRONIC DIASTOLIC HEART FAILURE: Chronic | ICD-10-CM

## 2019-08-27 DIAGNOSIS — R53.81 DEBILITY: ICD-10-CM

## 2019-08-27 DIAGNOSIS — N18.5 CKD (CHRONIC KIDNEY DISEASE), SYMPTOM MANAGEMENT ONLY, STAGE 5: Chronic | ICD-10-CM

## 2019-08-27 DIAGNOSIS — E78.5 HYPERLIPIDEMIA LDL GOAL <100: Chronic | ICD-10-CM

## 2019-08-27 DIAGNOSIS — R06.02 SHORTNESS OF BREATH: ICD-10-CM

## 2019-08-27 DIAGNOSIS — E03.9 HYPOTHYROIDISM (ACQUIRED): Chronic | ICD-10-CM

## 2019-08-27 DIAGNOSIS — E66.01 MORBID OBESITY WITH BODY MASS INDEX (BMI) OF 40.0 TO 44.9 IN ADULT: Chronic | ICD-10-CM

## 2019-08-27 PROBLEM — F33.0 MILD EPISODE OF RECURRENT MAJOR DEPRESSIVE DISORDER: Status: RESOLVED | Noted: 2017-03-27 | Resolved: 2019-08-27

## 2019-08-27 PROBLEM — N39.0 RECURRENT UTI: Status: RESOLVED | Noted: 2019-02-21 | Resolved: 2019-08-27

## 2019-08-27 PROBLEM — N18.4 ANEMIA OF CHRONIC KIDNEY FAILURE, STAGE 4 (SEVERE): Status: RESOLVED | Noted: 2019-04-05 | Resolved: 2019-08-27

## 2019-08-27 PROBLEM — L97.921 NON-PRESSURE CHRONIC ULCER LEFT LOWER LEG, LIMITED TO BREAKDOWN SKIN: Status: RESOLVED | Noted: 2019-07-22 | Resolved: 2019-08-27

## 2019-08-27 PROBLEM — D63.1 ANEMIA OF CHRONIC KIDNEY FAILURE, STAGE 4 (SEVERE): Status: RESOLVED | Noted: 2019-04-05 | Resolved: 2019-08-27

## 2019-08-27 PROBLEM — I11.0 HYPERTENSIVE HEART DISEASE WITH CHRONIC DIASTOLIC CONGESTIVE HEART FAILURE: Status: RESOLVED | Noted: 2019-04-05 | Resolved: 2019-08-27

## 2019-08-27 LAB
ALBUMIN SERPL BCP-MCNC: 3 G/DL (ref 3.5–5.2)
ALP SERPL-CCNC: 168 U/L (ref 55–135)
ALT SERPL W/O P-5'-P-CCNC: 30 U/L (ref 10–44)
ANION GAP SERPL CALC-SCNC: 10 MMOL/L (ref 8–16)
AST SERPL-CCNC: 18 U/L (ref 10–40)
BASOPHILS # BLD AUTO: 0.02 K/UL (ref 0–0.2)
BASOPHILS NFR BLD: 0.5 % (ref 0–1.9)
BILIRUB SERPL-MCNC: 0.8 MG/DL (ref 0.1–1)
BNP SERPL-MCNC: 1792 PG/ML (ref 0–99)
BUN SERPL-MCNC: 72 MG/DL (ref 8–23)
CALCIUM SERPL-MCNC: 8.9 MG/DL (ref 8.7–10.5)
CHLORIDE SERPL-SCNC: 103 MMOL/L (ref 95–110)
CO2 SERPL-SCNC: 26 MMOL/L (ref 23–29)
CREAT SERPL-MCNC: 4.9 MG/DL (ref 0.5–1.4)
DIFFERENTIAL METHOD: ABNORMAL
EOSINOPHIL # BLD AUTO: 0.1 K/UL (ref 0–0.5)
EOSINOPHIL NFR BLD: 1.4 % (ref 0–8)
ERYTHROCYTE [DISTWIDTH] IN BLOOD BY AUTOMATED COUNT: 17.9 % (ref 11.5–14.5)
EST. GFR  (AFRICAN AMERICAN): 9 ML/MIN/1.73 M^2
EST. GFR  (NON AFRICAN AMERICAN): 8 ML/MIN/1.73 M^2
GLUCOSE SERPL-MCNC: 178 MG/DL (ref 70–110)
HCT VFR BLD AUTO: 36.6 % (ref 37–48.5)
HGB BLD-MCNC: 11.6 G/DL (ref 12–16)
INR PPP: 1.1 (ref 0.8–1.2)
LYMPHOCYTES # BLD AUTO: 0.6 K/UL (ref 1–4.8)
LYMPHOCYTES NFR BLD: 13.4 % (ref 18–48)
MCH RBC QN AUTO: 23.8 PG (ref 27–31)
MCHC RBC AUTO-ENTMCNC: 31.7 G/DL (ref 32–36)
MCV RBC AUTO: 75 FL (ref 82–98)
MONOCYTES # BLD AUTO: 0.5 K/UL (ref 0.3–1)
MONOCYTES NFR BLD: 11.8 % (ref 4–15)
NEUTROPHILS # BLD AUTO: 3.1 K/UL (ref 1.8–7.7)
NEUTROPHILS NFR BLD: 73.1 % (ref 38–73)
PLATELET # BLD AUTO: 235 K/UL (ref 150–350)
PMV BLD AUTO: 10.1 FL (ref 9.2–12.9)
POCT GLUCOSE: 197 MG/DL (ref 70–110)
POTASSIUM SERPL-SCNC: 4 MMOL/L (ref 3.5–5.1)
PROT SERPL-MCNC: 7.9 G/DL (ref 6–8.4)
PROTHROMBIN TIME: 11.4 SEC (ref 9–12.5)
RBC # BLD AUTO: 4.88 M/UL (ref 4–5.4)
SODIUM SERPL-SCNC: 139 MMOL/L (ref 136–145)
TROPONIN I SERPL DL<=0.01 NG/ML-MCNC: 0.02 NG/ML (ref 0–0.03)
TROPONIN I SERPL DL<=0.01 NG/ML-MCNC: 0.06 NG/ML (ref 0–0.03)
WBC # BLD AUTO: 4.25 K/UL (ref 3.9–12.7)

## 2019-08-27 PROCEDURE — 63600175 PHARM REV CODE 636 W HCPCS: Mod: HCNC | Performed by: INTERNAL MEDICINE

## 2019-08-27 PROCEDURE — 84484 ASSAY OF TROPONIN QUANT: CPT | Mod: 91,HCNC

## 2019-08-27 PROCEDURE — 85610 PROTHROMBIN TIME: CPT | Mod: HCNC

## 2019-08-27 PROCEDURE — 25000003 PHARM REV CODE 250: Mod: HCNC | Performed by: INTERNAL MEDICINE

## 2019-08-27 PROCEDURE — 83036 HEMOGLOBIN GLYCOSYLATED A1C: CPT | Mod: HCNC

## 2019-08-27 PROCEDURE — 93010 ELECTROCARDIOGRAM REPORT: CPT | Mod: HCNC,,, | Performed by: INTERNAL MEDICINE

## 2019-08-27 PROCEDURE — 84484 ASSAY OF TROPONIN QUANT: CPT | Mod: HCNC

## 2019-08-27 PROCEDURE — 99291 CRITICAL CARE FIRST HOUR: CPT | Mod: 25,HCNC

## 2019-08-27 PROCEDURE — 93005 ELECTROCARDIOGRAM TRACING: CPT | Mod: HCNC

## 2019-08-27 PROCEDURE — 85025 COMPLETE CBC W/AUTO DIFF WBC: CPT | Mod: HCNC

## 2019-08-27 PROCEDURE — 36415 COLL VENOUS BLD VENIPUNCTURE: CPT | Mod: HCNC

## 2019-08-27 PROCEDURE — 83880 ASSAY OF NATRIURETIC PEPTIDE: CPT | Mod: HCNC

## 2019-08-27 PROCEDURE — 63600175 PHARM REV CODE 636 W HCPCS: Mod: HCNC | Performed by: EMERGENCY MEDICINE

## 2019-08-27 PROCEDURE — 93010 EKG 12-LEAD: ICD-10-PCS | Mod: HCNC,,, | Performed by: INTERNAL MEDICINE

## 2019-08-27 PROCEDURE — 80053 COMPREHEN METABOLIC PANEL: CPT | Mod: HCNC

## 2019-08-27 PROCEDURE — 11000001 HC ACUTE MED/SURG PRIVATE ROOM: Mod: HCNC

## 2019-08-27 RX ORDER — METOPROLOL TARTRATE 50 MG/1
50 TABLET ORAL 2 TIMES DAILY
Status: DISCONTINUED | OUTPATIENT
Start: 2019-08-27 | End: 2019-08-27

## 2019-08-27 RX ORDER — LEVOTHYROXINE SODIUM 50 UG/1
50 TABLET ORAL
Status: DISCONTINUED | OUTPATIENT
Start: 2019-08-28 | End: 2019-09-11 | Stop reason: HOSPADM

## 2019-08-27 RX ORDER — ACETAMINOPHEN 325 MG/1
650 TABLET ORAL EVERY 4 HOURS PRN
Status: DISCONTINUED | OUTPATIENT
Start: 2019-08-27 | End: 2019-08-27

## 2019-08-27 RX ORDER — FUROSEMIDE 10 MG/ML
60 INJECTION INTRAMUSCULAR; INTRAVENOUS
Status: COMPLETED | OUTPATIENT
Start: 2019-08-27 | End: 2019-08-27

## 2019-08-27 RX ORDER — INSULIN ASPART 100 [IU]/ML
0-5 INJECTION, SOLUTION INTRAVENOUS; SUBCUTANEOUS
Status: DISCONTINUED | OUTPATIENT
Start: 2019-08-27 | End: 2019-09-11 | Stop reason: HOSPADM

## 2019-08-27 RX ORDER — METOPROLOL TARTRATE 50 MG/1
50 TABLET ORAL 2 TIMES DAILY
COMMUNITY
End: 2019-10-22 | Stop reason: SDUPTHER

## 2019-08-27 RX ORDER — ATORVASTATIN CALCIUM 10 MG/1
10 TABLET, FILM COATED ORAL DAILY
Status: DISCONTINUED | OUTPATIENT
Start: 2019-08-28 | End: 2019-09-11 | Stop reason: HOSPADM

## 2019-08-27 RX ORDER — PROCHLORPERAZINE EDISYLATE 5 MG/ML
5 INJECTION INTRAMUSCULAR; INTRAVENOUS EVERY 6 HOURS PRN
Status: DISCONTINUED | OUTPATIENT
Start: 2019-08-27 | End: 2019-09-11 | Stop reason: HOSPADM

## 2019-08-27 RX ORDER — HEPARIN SODIUM 5000 [USP'U]/ML
5000 INJECTION, SOLUTION INTRAVENOUS; SUBCUTANEOUS EVERY 12 HOURS
Status: DISCONTINUED | OUTPATIENT
Start: 2019-08-27 | End: 2019-09-09

## 2019-08-27 RX ORDER — CLONIDINE HYDROCHLORIDE 0.1 MG/1
0.1 TABLET ORAL 3 TIMES DAILY PRN
Status: DISCONTINUED | OUTPATIENT
Start: 2019-08-27 | End: 2019-09-11 | Stop reason: HOSPADM

## 2019-08-27 RX ORDER — GLUCAGON 1 MG
1 KIT INJECTION
Status: DISCONTINUED | OUTPATIENT
Start: 2019-08-27 | End: 2019-09-11 | Stop reason: HOSPADM

## 2019-08-27 RX ORDER — SODIUM CHLORIDE 0.9 % (FLUSH) 0.9 %
10 SYRINGE (ML) INJECTION
Status: DISCONTINUED | OUTPATIENT
Start: 2019-08-27 | End: 2019-08-27

## 2019-08-27 RX ORDER — ACETAMINOPHEN 500 MG
500 TABLET ORAL EVERY 6 HOURS PRN
Status: DISCONTINUED | OUTPATIENT
Start: 2019-08-27 | End: 2019-09-11 | Stop reason: HOSPADM

## 2019-08-27 RX ORDER — HYDRALAZINE HYDROCHLORIDE 25 MG/1
100 TABLET, FILM COATED ORAL 3 TIMES DAILY
Status: DISCONTINUED | OUTPATIENT
Start: 2019-08-27 | End: 2019-09-11 | Stop reason: HOSPADM

## 2019-08-27 RX ORDER — RAMELTEON 8 MG/1
8 TABLET ORAL NIGHTLY PRN
Status: DISCONTINUED | OUTPATIENT
Start: 2019-08-27 | End: 2019-09-11 | Stop reason: HOSPADM

## 2019-08-27 RX ORDER — FUROSEMIDE 10 MG/ML
60 INJECTION INTRAMUSCULAR; INTRAVENOUS 2 TIMES DAILY
Status: DISCONTINUED | OUTPATIENT
Start: 2019-08-28 | End: 2019-08-28

## 2019-08-27 RX ORDER — INSULIN ASPART 100 [IU]/ML
3 INJECTION, SOLUTION INTRAVENOUS; SUBCUTANEOUS
Status: DISCONTINUED | OUTPATIENT
Start: 2019-08-28 | End: 2019-09-06

## 2019-08-27 RX ORDER — CLOPIDOGREL BISULFATE 75 MG/1
75 TABLET ORAL DAILY
Status: DISCONTINUED | OUTPATIENT
Start: 2019-08-28 | End: 2019-09-11 | Stop reason: HOSPADM

## 2019-08-27 RX ORDER — BUMETANIDE 1 MG/1
2 TABLET ORAL 2 TIMES DAILY
Status: DISCONTINUED | OUTPATIENT
Start: 2019-08-27 | End: 2019-08-27

## 2019-08-27 RX ORDER — IBUPROFEN 200 MG
24 TABLET ORAL
Status: DISCONTINUED | OUTPATIENT
Start: 2019-08-27 | End: 2019-09-11 | Stop reason: HOSPADM

## 2019-08-27 RX ORDER — PANTOPRAZOLE SODIUM 40 MG/1
40 TABLET, DELAYED RELEASE ORAL DAILY
Status: DISCONTINUED | OUTPATIENT
Start: 2019-08-28 | End: 2019-08-27

## 2019-08-27 RX ORDER — ONDANSETRON 2 MG/ML
4 INJECTION INTRAMUSCULAR; INTRAVENOUS EVERY 8 HOURS PRN
Status: DISCONTINUED | OUTPATIENT
Start: 2019-08-27 | End: 2019-08-27

## 2019-08-27 RX ORDER — IBUPROFEN 200 MG
16 TABLET ORAL
Status: DISCONTINUED | OUTPATIENT
Start: 2019-08-27 | End: 2019-09-11 | Stop reason: HOSPADM

## 2019-08-27 RX ORDER — CITALOPRAM 10 MG/1
10 TABLET ORAL DAILY
Status: DISCONTINUED | OUTPATIENT
Start: 2019-08-28 | End: 2019-09-11 | Stop reason: HOSPADM

## 2019-08-27 RX ADMIN — HYDRALAZINE HYDROCHLORIDE 100 MG: 25 TABLET ORAL at 11:08

## 2019-08-27 RX ADMIN — FUROSEMIDE 60 MG: 10 INJECTION, SOLUTION INTRAMUSCULAR; INTRAVENOUS at 08:08

## 2019-08-27 RX ADMIN — HEPARIN SODIUM 5000 UNITS: 5000 INJECTION, SOLUTION INTRAVENOUS; SUBCUTANEOUS at 11:08

## 2019-08-27 NOTE — ED PROVIDER NOTES
Encounter Date: 8/27/2019    SCRIBE #1 NOTE: I, Heidy Mclain, am scribing for, and in the presence of,  Olinda Pantoja MD. I have scribed the following portions of the note - Other sections scribed: HPI, ROS, PE.       History     Chief Complaint   Patient presents with    Shortness of Breath     sob since saturday and worsen, hx cpap use, denies numbness tingling, n/v/d     CC: Shortness of Breath    HPI: This 74 y.o female who has DM, HTN, CKD, TIA, CHF presents to the ED for an evaluation for gradually worsening shortness of breath for the past 4 days. Patient reports her home CPAP has been malfunctioning and reports her insurance is having difficulty approving for a replacement.  Patient reports her shortness of breath is worse with ambulating and states she typically walks with a 4 wheel walker.  She reports she currently sleeps in a recliner at a 30 degree angle and reports elevating her lower extremities at night.  Patient denies the use of home O2.  Patient denies fever, chills, sweats, nausea, emesis, diarrhea, abdominal pain, cough, or any other associated symptoms.  No alleviating factors.      Of note, patient reports she recently was discharged home from rehab facility, where she was receiving PT and OT.    The history is provided by the patient. No  was used.     Review of patient's allergies indicates:   Allergen Reactions    Ace inhibitors Other (See Comments)     Other reaction(s): cough     Past Medical History:   Diagnosis Date    Acute respiratory failure with hypoxia     Anemia of chronic kidney failure, stage 4 (severe) 4/5/2019    Cataracts, bilateral     CHF (congestive heart failure)     CKD (chronic kidney disease) stage 3, GFR 30-59 ml/min     CKD (chronic kidney disease) stage 3, GFR 30-59 ml/min     Controlled type 2 diabetes mellitus with proteinuria or albuminuria     Depression     Diabetes with neurologic complications     Diabetic retinopathy of both  eyes     Edema     Glaucoma     History of colonic polyps     Hx-TIA (transient ischemic attack) 2008    Hyperlipidemia LDL goal < 100     Hypertension     Hypothyroidism     Major depressive disorder, single episode, mild 2016    Mixed incontinence urge and stress     Obesity     Obstructive sleep apnea on CPAP     19:  Home CPAP machine broken, per patient & son    Osteopenia     Proteinuria     Sickle cell trait     TIA (transient ischemic attack)     Trouble in sleeping     Type 2 diabetes mellitus with ophthalmic manifestations     Type 2 diabetes with stage 3 chronic kidney disease GFR 30-59     Type II or unspecified type diabetes mellitus with renal manifestations, uncontrolled(250.42)     Uncontrolled type 2 diabetes mellitus with peripheral circulatory disorder 2019    Urge incontinence 2016    Urge incontinence     Venous stasis ulcer     bilateral lower legs    Vitamin D deficiency disease      Past Surgical History:   Procedure Laterality Date    BREAST BIOPSY      breast reduction Bilateral age 31    BREAST SURGERY      cataracts Bilateral      SECTION, LOW TRANSVERSE      x1    CHOLECYSTECTOMY      COLONOSCOPY N/A 2012    Performed by Keron Gunderson MD at [facility] (4TH FLR)    EYE SURGERY  2014, 2014    vitrectomy    EYE SURGERY Right 2016    HYSTERECTOMY  1986    TAHBSO (patient is unsure if ovaries removed)    OOPHORECTOMY      REFRACTIVE SURGERY      REPAIR, RETINAL DETACHMENT, WITH VITRECTOMY Right 2014    Performed by LILLIANA Coello MD at [facility] OR 1ST FLR    REPAIR, RETINAL DETACHMENT, WITH VITRECTOMY Left 2014    Performed by LILLIANA Coello MD at [facility] OR 1ST FLR    REPAIR-RETINA (VITRECTOMY) Right 2014    Performed by LILLIANA Coello MD at [facility] OR 1ST FLR    STRABISMUS REPAIR/ADJUSTABLE SUTURES Bilateral 2016    Performed by RABIA Danielson Jr., MD at [facility] OR [facility]     TOTAL REDUCTION MAMMOPLASTY      approx 10 yrs ago     Family History   Problem Relation Age of Onset    Leukemia Father     Ovarian cancer Sister 35    Stroke Mother     Diabetes Mother     Hypertension Mother     Diabetes Paternal Grandmother     Breast cancer Maternal Aunt 65    HIV Brother     Achondroplasia Sister     Parkinsonism Maternal Aunt     Esophageal cancer Maternal Uncle         smoker    Amblyopia Neg Hx     Blindness Neg Hx     Cataracts Neg Hx     Glaucoma Neg Hx     Macular degeneration Neg Hx     Retinal detachment Neg Hx     Strabismus Neg Hx     Thyroid disease Neg Hx     Colon cancer Neg Hx      Social History     Tobacco Use    Smoking status: Never Smoker    Smokeless tobacco: Never Used   Substance Use Topics    Alcohol use: No     Alcohol/week: 0.0 oz    Drug use: No     Review of Systems   Constitutional: Negative for chills and fever.   HENT: Negative for congestion, ear pain, rhinorrhea and sore throat.    Eyes: Negative for pain and visual disturbance.   Respiratory: Positive for shortness of breath. Negative for cough.    Cardiovascular: Negative for chest pain.   Gastrointestinal: Negative for abdominal pain, diarrhea, nausea and vomiting.   Genitourinary: Negative for dysuria.   Musculoskeletal: Negative for back pain and neck pain.   Skin: Negative for rash.   Neurological: Negative for headaches.       Physical Exam     Initial Vitals [08/27/19 1600]   BP Pulse Resp Temp SpO2   (!) 140/72 63 (!) 22 97.9 °F (36.6 °C) (!) 93 %      MAP       --         Physical Exam    Nursing note and vitals reviewed.  Constitutional: She is not diaphoretic. She is Obese . No distress.   HENT:   Head: Normocephalic and atraumatic.   Mouth/Throat: Oropharynx is clear and moist. Mucous membranes are dry.   Eyes: EOM are normal. Pupils are equal, round, and reactive to light. No scleral icterus.   Neck: Normal range of motion. Neck supple. JVD present.   Cardiovascular:  Normal rate, regular rhythm and intact distal pulses.   Pulmonary/Chest: No stridor. No respiratory distress. She has rales.   Abdominal: Soft. Bowel sounds are normal. There is no tenderness.   Musculoskeletal: She exhibits edema. She exhibits no tenderness.   Patient has symmetrical lower extremity edema.   Neurological: She is alert and oriented to person, place, and time. She has normal strength. No cranial nerve deficit or sensory deficit.   Skin: Skin is warm and dry.   Patient has superficial venous stasis to the bilateral lower extremity.  No erythema or purulent drainage.   Psychiatric: She has a normal mood and affect.         ED Course   Critical Care  Date/Time: 8/27/2019 7:11 PM  Performed by: Olinda Pantoja MD  Authorized by: Olinda Pantoja MD   Direct patient critical care time: 5 minutes  Additional history critical care time: 5 minutes  Ordering / reviewing critical care time: 5 minutes  Documentation critical care time: 5 minutes  Consulting other physicians critical care time: 5 minutes  Consult with family critical care time: 5 minutes  Other critical care time: 5 minutes  Total critical care time (exclusive of procedural time) : 35 minutes  Critical care time was exclusive of separately billable procedures and treating other patients.  Critical care was necessary to treat or prevent imminent or life-threatening deterioration of the following conditions: cardiac failure.  Critical care was time spent personally by me on the following activities: development of treatment plan with patient or surrogate, discussions with consultants, interpretation of cardiac output measurements, evaluation of patient's response to treatment, examination of patient, obtaining history from patient or surrogate, ordering and performing treatments and interventions, ordering and review of laboratory studies, ordering and review of radiographic studies, review of old charts and re-evaluation of patient's  condition.        Labs Reviewed   CBC W/ AUTO DIFFERENTIAL - Abnormal; Notable for the following components:       Result Value    Hemoglobin 11.6 (*)     Hematocrit 36.6 (*)     Mean Corpuscular Volume 75 (*)     Mean Corpuscular Hemoglobin 23.8 (*)     Mean Corpuscular Hemoglobin Conc 31.7 (*)     RDW 17.9 (*)     Lymph # 0.6 (*)     Gran% 73.1 (*)     Lymph% 13.4 (*)     All other components within normal limits   COMPREHENSIVE METABOLIC PANEL - Abnormal; Notable for the following components:    Glucose 178 (*)     BUN, Bld 72 (*)     Creatinine 4.9 (*)     Albumin 3.0 (*)     Alkaline Phosphatase 168 (*)     eGFR if  9 (*)     eGFR if non  8 (*)     All other components within normal limits   TROPONIN I - Abnormal; Notable for the following components:    Troponin I 0.055 (*)     All other components within normal limits   B-TYPE NATRIURETIC PEPTIDE - Abnormal; Notable for the following components:    BNP 1,792 (*)     All other components within normal limits   PROTIME-INR     EKG Readings: (Independently Interpreted)   Initial Reading: No STEMI. Rhythm: Normal Sinus Rhythm. Heart Rate: 65 bpm. ST Segments: Normal ST Segments. T Waves: Normal. Axis: Right Axis Deviation. Clinical Impression: Normal Sinus Rhythm       Imaging Results          X-Ray Chest AP Portable (Final result)  Result time 08/27/19 16:21:58    Final result by Jim Fong MD (08/27/19 16:21:58)                 Impression:      Stable cardiomegaly with heterogeneous, nonsegmental opacities in the mid and lower lung zones bilaterally, which may reflect CHF pattern pulmonary edema.  Atypical pneumonia is another consideration.      Electronically signed by: Jim Fong  Date:    08/27/2019  Time:    16:21             Narrative:    EXAMINATION:  XR CHEST AP PORTABLE    CLINICAL HISTORY:  CHF;    TECHNIQUE:  Single frontal view of the chest was performed.    COMPARISON:  Radiographs  07/19/2019.    FINDINGS:  The lungs are symmetrically expanded.  Mediastinal structures are midline.  The cardiac silhouette is enlarged, stable.  Heterogeneous, nonsegmental opacities are noted in the mid and lower lung zones bilaterally, suggesting pulmonary edema or atypical pneumonia.  No pneumothorax or pleural effusion is seen.  Atherosclerotic calcification is noted at the level of the aortic arch.                              X-Rays:   Independently Interpreted Readings:   Chest X-Ray: Increased vascular markings consistent with CHF are present.     Medical Decision Making:   History:   Old Medical Records: I decided to obtain old medical records.  Old Records Summarized: records from clinic visits.       <> Summary of Records: Follows with Cardiology for pulmonary hypertension  Differential Diagnosis:   CHF exacerbation, COPD exacerbation, pneumonia, electrolyte abnormality, ACS, respiratory failure  Independently Interpreted Test(s):   I have ordered and independently interpreted X-rays - see prior notes.  I have ordered and independently interpreted EKG Reading(s) - see prior notes  Clinical Tests:   Lab Tests: Ordered and Reviewed  Radiological Study: Ordered and Reviewed  Medical Tests: Ordered and Reviewed  ED Management:  Patient is hypoxic and tachypneic at time of history and physical.  EKG is without STEMI.  Chest x-ray consistent with pulmonary edema. BNP is elevated at 17 92.  She has resting JVD.  Troponin is elevated at 0.055.  Patient given IV Lasix in the emergency department.  She is to be admitted to Hospital Medicine for further management of CHF exacerbation.  Ace inhibitor withheld due to allergy.  Patient's beta-blocker will be continue during admission  Other:   I discussed test(s) with the performing physician.  This chart was completed using dictation software, as a result there may be some transcription errors.             Scribe Attestation:   Scribe #1: I performed the above  scribed service and the documentation accurately describes the services I performed. I attest to the accuracy of the note.    I, Olinda Pantoja , personally performed the services described in this documentation. All medical record entries made by the scribe were at my direction and in my presence.  I have reviewed the chart and agree that the record reflects my personal performance and is accurate and complete.           Clinical Impression:       ICD-10-CM ICD-9-CM   1. Shortness of breath R06.02 786.05         Disposition:   Disposition: Admitted  Condition: Fair                        Olinda Pantoja MD  08/27/19 1940

## 2019-08-27 NOTE — TELEPHONE ENCOUNTER
----- Message from Rossy Wagoner sent at 8/27/2019  1:52 PM CDT -----  Type: Patient Call Back    Who called: pt     What is the request in detail:n pt asking for a call back from Ms. Ellis. She states she also needs CPAP machine and she has been 4 days without it.     Can the clinic reply by [facility]? No     Would the patient rather a call back or a response via [facility]? Call back     Best call back number: [phone]    Additional Information: pt feels this is urgent

## 2019-08-27 NOTE — TELEPHONE ENCOUNTER
Spoke with Etta on yesterday and informed her that hospital bed and cpap supplies were [facility]. She was given number to contact the DME company to see how soon these supplies will arrive.

## 2019-08-27 NOTE — ED TRIAGE NOTES
Pt presents to ER with c/o SOB.  Pt was discharged from facility on Saturday, but her cpap at home was broken.  Pt has not had cpap in 4 days.  Has not been sleeping.

## 2019-08-27 NOTE — TELEPHONE ENCOUNTER
----- Message from Renata Gallardo sent at 8/26/2019 12:37 PM CDT -----  Type: Patient Call Back    Who called: Etta - care taker     What is the request in detail: patient's care taker would like to speak with Ms. Ellis she says the patient need orders for hospital bed and Cpap Supplies     Can the clinic reply by [facility]? No     Would the patient rather a call back or a response via [facility]?  Call     Best call back number:[phone]

## 2019-08-27 NOTE — TELEPHONE ENCOUNTER
Spoke with Etta patient's caregiver and she informed me that [facility] company said that they did not receive order for CPAP machine; just the supplies. Patient need machine to. Was not aware that patient did not have CPAP machine. The request on yesterday was for CPAP supplies. Order placed for the CPAP machine.  I contact [facility] and spoke with Brandie and informed her that order was placed for CPAP machine and awaiting provider signature. Asked Brandie what would be the turn around time for patient to receive machine and supplies. She informed me at least a week.  Also patient will need to come  in for fitting for  her machine. They do not mail this out.This was informed to caregiver Etta. She informed me that patient has been without machine for 4 days and not breathing well at night and that patient is not able to go in for a fitting. She said that she will be bringing patient back to the emergency tonight.

## 2019-08-28 ENCOUNTER — TELEPHONE (OUTPATIENT)
Dept: FAMILY MEDICINE | Facility: CLINIC | Age: 74
End: 2019-08-28

## 2019-08-28 LAB
ALLENS TEST: ABNORMAL
ALLENS TEST: ABNORMAL
AORTIC ROOT ANNULUS: 2.74 CM
AORTIC VALVE CUSP SEPERATION: 1.63 CM
AV INDEX (PROSTH): 0.64
AV MEAN GRADIENT: 9 MMHG
AV PEAK GRADIENT: 15 MMHG
AV VALVE AREA: 1.79 CM2
AV VELOCITY RATIO: 0.67
BACTERIA #/AREA URNS HPF: ABNORMAL /HPF
BILIRUB UR QL STRIP: NEGATIVE
BSA FOR ECHO PROCEDURE: 2.43 M2
CLARITY UR: CLEAR
COLOR UR: YELLOW
CV ECHO LV RWT: 0.6 CM
DELSYS: ABNORMAL
DELSYS: ABNORMAL
DOP CALC AO PEAK VEL: 1.93 M/S
DOP CALC AO VTI: 43.96 CM
DOP CALC LVOT AREA: 2.8 CM2
DOP CALC LVOT DIAMETER: 1.89 CM
DOP CALC LVOT PEAK VEL: 1.3 M/S
DOP CALC LVOT STROKE VOLUME: 78.85 CM3
DOP CALCLVOT PEAK VEL VTI: 28.12 CM
E WAVE DECELERATION TIME: 240.11 MSEC
E/A RATIO: 1.47
ECHO LV POSTERIOR WALL: 1.14 CM (ref 0.6–1.1)
ESTIMATED AVG GLUCOSE: 200 MG/DL (ref 68–131)
FLOW: 2
FLOW: 2
FRACTIONAL SHORTENING: 37 % (ref 28–44)
GLUCOSE UR QL STRIP: NEGATIVE
HBA1C MFR BLD HPLC: 8.6 % (ref 4–5.6)
HCO3 UR-SCNC: 26.3 MMOL/L (ref 24–28)
HCO3 UR-SCNC: 26.4 MMOL/L (ref 24–28)
HGB UR QL STRIP: NEGATIVE
HYALINE CASTS #/AREA URNS LPF: 0 /LPF
INTERVENTRICULAR SEPTUM: 1.26 CM (ref 0.6–1.1)
IVRT: 0.08 MSEC
KETONES UR QL STRIP: NEGATIVE
LA MAJOR: 5.89 CM
LA MINOR: 7.33 CM
LA WIDTH: 4.56 CM
LEFT ATRIUM SIZE: 3.98 CM
LEFT ATRIUM VOLUME INDEX: 43.9 ML/M2
LEFT ATRIUM VOLUME: 100.76 CM3
LEFT INTERNAL DIMENSION IN SYSTOLE: 2.4 CM (ref 2.1–4)
LEFT VENTRICLE DIASTOLIC VOLUME INDEX: 26.82 ML/M2
LEFT VENTRICLE DIASTOLIC VOLUME: 61.59 ML
LEFT VENTRICLE MASS INDEX: 66 G/M2
LEFT VENTRICLE SYSTOLIC VOLUME INDEX: 8.7 ML/M2
LEFT VENTRICLE SYSTOLIC VOLUME: 20.09 ML
LEFT VENTRICULAR INTERNAL DIMENSION IN DIASTOLE: 3.79 CM (ref 3.5–6)
LEFT VENTRICULAR MASS: 152.64 G
LEUKOCYTE ESTERASE UR QL STRIP: ABNORMAL
MICROSCOPIC COMMENT: ABNORMAL
MODE: ABNORMAL
MODE: ABNORMAL
MV PEAK A VEL: 0.79 M/S
MV PEAK E VEL: 1.16 M/S
NITRITE UR QL STRIP: NEGATIVE
PCO2 BLDA: 46.7 MMHG (ref 35–45)
PCO2 BLDA: 49.6 MMHG (ref 35–45)
PH SMN: 7.33 [PH] (ref 7.35–7.45)
PH SMN: 7.36 [PH] (ref 7.35–7.45)
PH UR STRIP: 5 [PH] (ref 5–8)
PISA TR MAX VEL: 4.05 M/S
PO2 BLDA: 55 MMHG (ref 40–60)
PO2 BLDA: 92 MMHG (ref 80–100)
POC BE: 0 MMOL/L
POC BE: 0 MMOL/L
POC SATURATED O2: 85 % (ref 95–100)
POC SATURATED O2: 97 % (ref 95–100)
POC TCO2: 28 MMOL/L (ref 23–27)
POC TCO2: 28 MMOL/L (ref 24–29)
POCT GLUCOSE: 133 MG/DL (ref 70–110)
POCT GLUCOSE: 159 MG/DL (ref 70–110)
POCT GLUCOSE: 197 MG/DL (ref 70–110)
POCT GLUCOSE: 209 MG/DL (ref 70–110)
PROT UR QL STRIP: ABNORMAL
PV PEAK VELOCITY: 0.81 CM/S
RA MAJOR: 6.24 CM
RA PRESSURE: 15 MMHG
RA WIDTH: 4.29 CM
RBC #/AREA URNS HPF: 6 /HPF (ref 0–4)
RIGHT VENTRICULAR END-DIASTOLIC DIMENSION: 3.58 CM
RV TISSUE DOPPLER FREE WALL SYSTOLIC VELOCITY 1 (APICAL 4 CHAMBER VIEW): 5.33 CM/S
SAMPLE: ABNORMAL
SAMPLE: ABNORMAL
SINUS: 3.21 CM
SITE: ABNORMAL
SITE: ABNORMAL
SP GR UR STRIP: 1.01 (ref 1–1.03)
SP02: 97
STJ: 2.96 CM
TR MAX PG: 66 MMHG
TRICUSPID ANNULAR PLANE SYSTOLIC EXCURSION: 1.91 CM
TROPONIN I SERPL DL<=0.01 NG/ML-MCNC: 0.03 NG/ML (ref 0–0.03)
TV REST PULMONARY ARTERY PRESSURE: 81 MMHG
URN SPEC COLLECT METH UR: ABNORMAL
UROBILINOGEN UR STRIP-ACNC: NEGATIVE EU/DL
WBC #/AREA URNS HPF: 1 /HPF (ref 0–5)

## 2019-08-28 PROCEDURE — 11000001 HC ACUTE MED/SURG PRIVATE ROOM: Mod: HCNC

## 2019-08-28 PROCEDURE — 99900035 HC TECH TIME PER 15 MIN (STAT): Mod: HCNC

## 2019-08-28 PROCEDURE — 93010 ELECTROCARDIOGRAM REPORT: CPT | Mod: HCNC,,, | Performed by: INTERNAL MEDICINE

## 2019-08-28 PROCEDURE — 84484 ASSAY OF TROPONIN QUANT: CPT | Mod: HCNC

## 2019-08-28 PROCEDURE — 93005 ELECTROCARDIOGRAM TRACING: CPT | Mod: HCNC

## 2019-08-28 PROCEDURE — 93010 EKG 12-LEAD: ICD-10-PCS | Mod: HCNC,,, | Performed by: INTERNAL MEDICINE

## 2019-08-28 PROCEDURE — 36415 COLL VENOUS BLD VENIPUNCTURE: CPT | Mod: HCNC

## 2019-08-28 PROCEDURE — 94660 CPAP INITIATION&MGMT: CPT | Mod: HCNC

## 2019-08-28 PROCEDURE — 36600 WITHDRAWAL OF ARTERIAL BLOOD: CPT | Mod: HCNC

## 2019-08-28 PROCEDURE — S0171 BUMETANIDE 0.5 MG: HCPCS | Mod: HCNC | Performed by: INTERNAL MEDICINE

## 2019-08-28 PROCEDURE — 27000190 HC CPAP FULL FACE MASK W/VALVE: Mod: HCNC

## 2019-08-28 PROCEDURE — 81000 URINALYSIS NONAUTO W/SCOPE: CPT | Mod: HCNC

## 2019-08-28 PROCEDURE — 63600175 PHARM REV CODE 636 W HCPCS: Mod: HCNC | Performed by: INTERNAL MEDICINE

## 2019-08-28 PROCEDURE — 27000221 HC OXYGEN, UP TO 24 HOURS: Mod: HCNC

## 2019-08-28 PROCEDURE — 25000003 PHARM REV CODE 250: Mod: HCNC | Performed by: INTERNAL MEDICINE

## 2019-08-28 PROCEDURE — 82803 BLOOD GASES ANY COMBINATION: CPT | Mod: HCNC

## 2019-08-28 PROCEDURE — S5571 INSULIN DISPOS PEN 3 ML: HCPCS | Mod: HCNC | Performed by: INTERNAL MEDICINE

## 2019-08-28 RX ORDER — BUMETANIDE 0.25 MG/ML
2 INJECTION INTRAMUSCULAR; INTRAVENOUS DAILY
Status: DISCONTINUED | OUTPATIENT
Start: 2019-08-28 | End: 2019-08-29

## 2019-08-28 RX ADMIN — INSULIN ASPART 3 UNITS: 100 INJECTION, SOLUTION INTRAVENOUS; SUBCUTANEOUS at 12:08

## 2019-08-28 RX ADMIN — FUROSEMIDE 60 MG: 10 INJECTION, SOLUTION INTRAMUSCULAR; INTRAVENOUS at 08:08

## 2019-08-28 RX ADMIN — INSULIN DETEMIR 5 UNITS: 100 INJECTION, SOLUTION SUBCUTANEOUS at 08:08

## 2019-08-28 RX ADMIN — ATORVASTATIN CALCIUM 10 MG: 10 TABLET, FILM COATED ORAL at 08:08

## 2019-08-28 RX ADMIN — HYDRALAZINE HYDROCHLORIDE 100 MG: 25 TABLET ORAL at 05:08

## 2019-08-28 RX ADMIN — INSULIN ASPART 3 UNITS: 100 INJECTION, SOLUTION INTRAVENOUS; SUBCUTANEOUS at 08:08

## 2019-08-28 RX ADMIN — HEPARIN SODIUM 5000 UNITS: 5000 INJECTION, SOLUTION INTRAVENOUS; SUBCUTANEOUS at 08:08

## 2019-08-28 RX ADMIN — LEVOTHYROXINE SODIUM 50 MCG: 50 TABLET ORAL at 06:08

## 2019-08-28 RX ADMIN — BUMETANIDE 2 MG: 0.25 INJECTION INTRAMUSCULAR; INTRAVENOUS at 01:08

## 2019-08-28 RX ADMIN — INSULIN ASPART 1 UNITS: 100 INJECTION, SOLUTION INTRAVENOUS; SUBCUTANEOUS at 09:08

## 2019-08-28 RX ADMIN — HYDRALAZINE HYDROCHLORIDE 100 MG: 25 TABLET ORAL at 09:08

## 2019-08-28 RX ADMIN — INSULIN ASPART 3 UNITS: 100 INJECTION, SOLUTION INTRAVENOUS; SUBCUTANEOUS at 05:08

## 2019-08-28 RX ADMIN — HEPARIN SODIUM 5000 UNITS: 5000 INJECTION, SOLUTION INTRAVENOUS; SUBCUTANEOUS at 09:08

## 2019-08-28 RX ADMIN — CLOPIDOGREL BISULFATE 75 MG: 75 TABLET ORAL at 08:08

## 2019-08-28 RX ADMIN — CITALOPRAM HYDROBROMIDE 10 MG: 10 TABLET ORAL at 08:08

## 2019-08-28 RX ADMIN — HYDRALAZINE HYDROCHLORIDE 100 MG: 25 TABLET ORAL at 08:08

## 2019-08-28 NOTE — NURSING
Pt in bed eyes closed easy to arouse has no c/o pain/discomfort. cpap in use. No distress noted call light in reach bed alarm set will report and round with oncoming nurse. Appears more alert and engaging in conversation this am

## 2019-08-28 NOTE — NURSING
cpap in place pt skyler well no distress noted bed alarm set will continue to monitor resting quietly

## 2019-08-28 NOTE — HOSPITAL COURSE
75 y/o female admitted with acute on chronic diastolic heart failure.  Started on IV diuresis.  Patient with hx of CKD and worsening Creat.  Nephrology consulted. Patient clinically improved.  Social workers consulted to help with re-arranging broken CPAP for patient. PT/OT rec: H/H.  02 requirements increased and so lasix increased on 9/2/19. CPAP arranged for home. Patient does not wear 02 at home. Patient's renal function continued to decline despite alternative diuretic regimen. Patient consented for dialysis and tunneled cath placement. Tunneled cath placed on 9/5/2019 and dialysis started same day. Patient tolerated well. She was noted to be more alert and active with therapy. Weaned off supplemental O2. Patient will go home with home health for therapy once outpatient HD arranged.

## 2019-08-28 NOTE — NURSING
In bed eyes closed easy to arouse has no c/o pain/discomfort. No distress noted call light in reach bed alarm set will continue to monitor

## 2019-08-28 NOTE — PLAN OF CARE
"Problem: Adult Inpatient Plan of Care  Goal: Plan of Care Review     08/28/19 2207   Plan of Care Review   Plan of Care Reviewed With patient   Pt alert able to make needs known,verbalize she understands her POC,skyler meds well,remains free from falls and pressure injuries,iv diuretics remains in progress,no s/s adverse reaction noted,kemp cath p/I,pt states "I don't want this kemp catheter out b/c im on this fluid medicine and I will have to urinate to much" cath care done, continue monitoring.       "

## 2019-08-28 NOTE — PLAN OF CARE
Problem: Fall Injury Risk  Goal: Absence of Fall and Fall-Related Injury  Outcome: Ongoing (interventions implemented as appropriate)  Bed alarm set frequent rounds made

## 2019-08-28 NOTE — SUBJECTIVE & OBJECTIVE
Interval History: Feeling better.    Review of Systems   HENT: Negative for ear discharge and ear pain.    Eyes: Negative for pain and itching.   Endocrine: Negative for polyphagia and polyuria.   Neurological: Negative for seizures and syncope.     Objective:     Vital Signs (Most Recent):  Temp: 97.3 °F (36.3 °C) (08/28/19 1202)  Pulse: 66 (08/28/19 1202)  Resp: 20 (08/28/19 1202)  BP: (!) 144/71 (08/28/19 1202)  SpO2: 99 % (08/28/19 1202) Vital Signs (24h Range):  Temp:  [97.3 °F (36.3 °C)-98.1 °F (36.7 °C)] 97.3 °F (36.3 °C)  Pulse:  [52-66] 66  Resp:  [16-24] 20  SpO2:  [92 %-100 %] 99 %  BP: (133-164)/(69-82) 144/71     Weight: 128.8 kg (284 lb)  Body mass index is 47.26 kg/m².    Intake/Output Summary (Last 24 hours) at 8/28/2019 1555  Last data filed at 8/28/2019 1240  Gross per 24 hour   Intake 290 ml   Output 675 ml   Net -385 ml      Physical Exam   Constitutional: She appears well-developed and well-nourished. No distress.   Morbidly obese   HENT:   Head: Normocephalic and atraumatic.   Right Ear: External ear normal.   Left Ear: External ear normal.   Nose: Nose normal.   Eyes: Right eye exhibits no discharge. Left eye exhibits no discharge.   Neck: Normal range of motion.   Cardiovascular: Normal rate, regular rhythm, normal heart sounds and intact distal pulses. Exam reveals no gallop and no friction rub.   No murmur heard.  Pulmonary/Chest:   Prolonged expiratory phase and expiratory wheezing, shallow breath sounds without crackles appreciated   Abdominal: Soft. She exhibits no distension. There is no tenderness. There is no rebound and no guarding.   Hypoactive bowel sounds   Musculoskeletal: Normal range of motion. She exhibits no edema.   Neurological:   Drowsy, but easily arousable.  Answers questions and follows commands.   Skin: Skin is warm and dry. She is not diaphoretic. No erythema.   Nursing note and vitals reviewed.      Significant Labs:   BMP:   Recent Labs   Lab 08/27/19  1628   GLU  178*      K 4.0      CO2 26   BUN 72*   CREATININE 4.9*   CALCIUM 8.9     CBC:   Recent Labs   Lab 08/27/19  1628   WBC 4.25   HGB 11.6*   HCT 36.6*          Significant Imaging: I have reviewed all pertinent imaging results/findings within the past 24 hours.

## 2019-08-28 NOTE — CONSULTS
"A 74 year old female readmitted 8/27/19 from home with acute on chronic diastolic heart failure; essential hypertension; DM II controlled with renal complications; HLD; chronic diastolic heart failure; CKD Stage 5; hypothyroidism; anemia of chronic disease; morbid obesity; HUMBERTO on CPAP; depression  PMH: Anemia of CKD Stage 4; acute respiratory failure with hypoxia; CHF; CKD; DM II; depression; diabetic retinopathy; edema; colonic polyps; TIA; HTN; hypothyroidism; mixed urinary incontinence-urge and stress; obesity; HUMBERTO; sickle cell trait; venous stasis ulcer of bilateral lower legs  Patient discharged home from Rehab at [facility] 3 days prior to admit  7/22/19- Drying areas on bilateral lower legs- healed wounds. Treatment with light compression wraps using Kerlix roll and 4" Coban  Consulted for bilateral lower extremity venous stasis ulcers  Assessment:  Legs dressed with non adherent dressing and Kerlix roll. Edema in lower legs including thighs.   Plan:  Will evaluate legs 8/29 and develop treatment plan.   If patient doesn't have wounds, will use light compression with Tubigrip to manage legs. Will discuss compression stockings with patient.       "

## 2019-08-28 NOTE — NURSING
Telephone report received from dayanna of ed. Pt arrived to unit via stretcher accompanied by escort. Drowsy easily aroused oriented x4 has no c/o pain/discomfort. Oriented to unit room call light. Has no valuables to be locked up with security. Follows commands and communicates needs. gen weakness noted. Decreased breath sounds upon auscultation no respiratory distress noted pulses palpable +2 edema to bilal feet. Pt becomes sob upon talking. Active bowel sounds x4 quads. Has indwelling kemp cath patent/intact draining clear yellow urine. Has bilat kerlix wraps to lower exts cdi. Denies pain to legs. No distress noted call light in reach bed alarm set will continue to monitor

## 2019-08-28 NOTE — HPI
Ms. Erica Leblanc is a 74 y.o. female with essential hypertension, type 2 diabetes mellitus (HbA1c 7.3% Jul 2019), hyperlipidemia (LDL 52.8 Mar 2019), chronic diastolic heart failure (LVEF 70% Jun 2017), CKD stage 5, hypothyroidism (TSH 3.298 Mar 2019), anemia chronic disease, morbid obesity (BMI 41.6), HUMBERTO on CPAP, and depression who presents to [facility] ED with complaints of dyspnea for the past four days.  She had reported to the ED physician that her CPAP machine has been malfunctioning, a similar complaint to her presentation two months ago.  She has not been able to get it replaced due to insurance constraints.  The dyspnea is worse on exertion.  Further history is otherwise limited at this time due to hypersomnolence.

## 2019-08-28 NOTE — PROGRESS NOTES
[facility] Hospital Medicine  Progress Note    Patient Name: Erica Leblanc  MRN: [id number]  Patient Class: IP- Inpatient   Admission Date: 8/27/2019  Length of Stay: 1 days  Attending Physician: Eduin Paige MD  Primary Care Provider: Sendy Elaine MD        Subjective:     Principal Problem:Acute on chronic diastolic heart failure        HPI:  Ms. Erica Leblanc is a 74 y.o. female with essential hypertension, type 2 diabetes mellitus (HbA1c 7.3% Jul 2019), hyperlipidemia (LDL 52.8 Mar 2019), chronic diastolic heart failure (LVEF 70% Jun 2017), CKD stage 5, hypothyroidism (TSH 3.298 Mar 2019), anemia chronic disease, morbid obesity (BMI 41.6), HUMBERTO on CPAP, and depression who presents to [facility] ED with complaints of dyspnea for the past four days.  She had reported to the ED physician that her CPAP machine has been malfunctioning, a similar complaint to her presentation two months ago.  She has not been able to get it replaced due to insurance constraints.  The dyspnea is worse on exertion.  Further history is otherwise limited at this time due to hypersomnolence.    Overview/Hospital Course:  75 y/o female admitted with acute on chronic diastolic heart failure.  Started on IV diuresis.  Patient with hx of CKD and worsening Creat.  Nephrology consulted.    Interval History: Feeling better.    Review of Systems   HENT: Negative for ear discharge and ear pain.    Eyes: Negative for pain and itching.   Endocrine: Negative for polyphagia and polyuria.   Neurological: Negative for seizures and syncope.     Objective:     Vital Signs (Most Recent):  Temp: 97.3 °F (36.3 °C) (08/28/19 1202)  Pulse: 66 (08/28/19 1202)  Resp: 20 (08/28/19 1202)  BP: (!) 144/71 (08/28/19 1202)  SpO2: 99 % (08/28/19 1202) Vital Signs (24h Range):  Temp:  [97.3 °F (36.3 °C)-98.1 °F (36.7 °C)] 97.3 °F (36.3 °C)  Pulse:  [52-66] 66  Resp:  [16-24] 20  SpO2:  [92 %-100 %] 99 %  BP: (133-164)/(69-82) 144/71      Weight: 128.8 kg (284 lb)  Body mass index is 47.26 kg/m².    Intake/Output Summary (Last 24 hours) at 8/28/2019 1555  Last data filed at 8/28/2019 1240  Gross per 24 hour   Intake 290 ml   Output 675 ml   Net -385 ml      Physical Exam   Constitutional: She appears well-developed and well-nourished. No distress.   Morbidly obese   HENT:   Head: Normocephalic and atraumatic.   Right Ear: External ear normal.   Left Ear: External ear normal.   Nose: Nose normal.   Eyes: Right eye exhibits no discharge. Left eye exhibits no discharge.   Neck: Normal range of motion.   Cardiovascular: Normal rate, regular rhythm, normal heart sounds and intact distal pulses. Exam reveals no gallop and no friction rub.   No murmur heard.  Pulmonary/Chest:   Prolonged expiratory phase and expiratory wheezing, shallow breath sounds without crackles appreciated   Abdominal: Soft. She exhibits no distension. There is no tenderness. There is no rebound and no guarding.   Hypoactive bowel sounds   Musculoskeletal: Normal range of motion. She exhibits no edema.   Neurological:   Drowsy, but easily arousable.  Answers questions and follows commands.   Skin: Skin is warm and dry. She is not diaphoretic. No erythema.   Nursing note and vitals reviewed.      Significant Labs:   BMP:   Recent Labs   Lab 08/27/19  1628   *      K 4.0      CO2 26   BUN 72*   CREATININE 4.9*   CALCIUM 8.9     CBC:   Recent Labs   Lab 08/27/19  1628   WBC 4.25   HGB 11.6*   HCT 36.6*          Significant Imaging: I have reviewed all pertinent imaging results/findings within the past 24 hours.      Assessment/Plan:      * Acute on chronic diastolic heart failure  Patient was recently admitted here two months ago for dyspnea related to her malfunctioning CPAP machine.  She reports that her CPAP machine is still not functioning and reports worsening dyspnea.  She does appear volume overload.  Chest radiograph significant for mild pulmonary  edema and her BNP is 1792.    Her troponin is mildly elevated at 0.055.   She has been given intravenous diuresis with furosemide with improvement of her symptoms.  Will monitor her urine output very closely.   Check Echo.    CKD stage 5  Worsening renal function over past few months.  Monitor closely while on diuresis.  Will get Nephrology input.    Depression  Stable; will continue her home regimen of citalopram.    Anemia of chronic disease  The patient's H/H is stable and consistent with previous laboratory measurements, and the patient exhibits no signs or symptoms of acute bleeding; there is no indication for transfusion.  Will continue to monitor.    Essential hypertension  Patient's blood pressure is better-controlled; will continue home regimen of bumetanide and hydralazine, and provide as-needed clonidine.  Will hold her home regimen of amlodipine and metoprolol due to acute heart failure.    Chronic diastolic heart failure  As addressed above.    Type 2 diabetes mellitus, controlled, with renal complications  Well controlled on a home regimen of basal insulin therapy; will provide basal-prandial insulin therapy along insulin sliding scale.    Hypothyroidism (acquired)  Poorly controlled; will continue her home regimen of levothyroxine and defer dosage adjustments to her PCP.    HUMBERTO on CPAP  As addressed above.    Hyperlipidemia LDL goal <100  Well controlled; will continue her home regimen of atorvastatin.    Morbid obesity with body mass index (BMI) of 40.0 to 44.9 in adult  The patient has been counseled on the negative impact that obesity imparts on her health and was encouraged to make lifestyle changes in order to lose weight and decrease her modifiable risk factors.      VTE Risk Mitigation (From admission, onward)        Ordered     heparin (porcine) injection 5,000 Units  Every 12 hours      08/27/19 2217     IP VTE HIGH RISK PATIENT  Once      08/27/19 2217                Eduin Paige,  MD  Department of Hospital Medicine   [facility]

## 2019-08-28 NOTE — NURSING
Vital signs are stable placed on telemetry sb 58 resp thx notified of cpap at night order lopressor held secondary to hr 58-59 per order parameters. No distress noted from pt

## 2019-08-28 NOTE — ASSESSMENT & PLAN NOTE
Poorly controlled; will continue her home regimen of levothyroxine and defer dosage adjustments to her PCP.

## 2019-08-28 NOTE — ASSESSMENT & PLAN NOTE
Worsening renal function over past few months.  Monitor closely while on diuresis.  Will get Nephrology input.

## 2019-08-28 NOTE — NURSING
Dr wright to unit to see pt. Ordered stat abg's cpap nightly stat urinalysis and 12 lead ekg routine. resp thx notified and is now at bedside obtaining stat abg's and has cpap at beside ready to set up. Also informed resp thx of 12 lead ekg ordered that needs to be done. Urinalysis obtained as ordered from indwelling kemp cath pt appears to be resting quietly no distress noted bed alarm set will continue to monitor. Urine sample walked down to lab as ordered.

## 2019-08-28 NOTE — TELEPHONE ENCOUNTER
Home health nurse Earlene return call and was requesting order for CPAP machine and supplies and also to continue wound care treatment to bilateral lower legs with applying Zinc Oxide, Telfa, and wrap with Kerlix twice a week. Informed her that orders have been submitted for CPAP machine and supplies and caregiver is aware that it will take up to 1 week for her to received supplies. Home Health Nurse Earlene was not aware of this. Also informed her that as of yesterday patient was admitted to [facility] for shortness of breath. She was not aware of this either. She said that she will still need order for the wound care due to her finishing up paper work for last week admit to home health.

## 2019-08-28 NOTE — MEDICAL/APP STUDENT
Patient is a 74 y.o female with PMHx significant for HUMBERTO on CPAP, diastolic heart failure, DM, pulmonary HTN, HLD, hypothyroidism, and TIA that presents with complaints of SOB x3 days. States that she was discharged from [facility] 3 days ago where she was using the facility's CPAP machine. On discharge, she was supposed to receive a new CPAP machine to replace her broken one at home, but she never received it. Reports that she has not slept for 3 days due to worsening SOB. She states that she is usually only short of breath at night, but since she has been without a CPAP machine she has been experiencing difficulty breathing during the day. She currently endorses an improvement in her SOB, and her only complaint is that she is tired. Denies CP, swelling in her legs, cough, HA, fever, abdominal pain, n/v.     Active Ambulatory Problems     Diagnosis Date Noted    Morbid obesity with body mass index (BMI) of 40.0 to 44.9 in adult     Sickle cell trait     Osteopenia     Hyperlipidemia LDL goal <100     Obstructive sleep apnea on CPAP     Hypothyroidism (acquired)     Hx-TIA (transient ischemic attack)     Vitamin D deficiency disease     Proliferative diabetic retinopathy, both eyes 07/25/2013    Diabetic macular edema, both eyes 07/25/2013    Hypertensive retinopathy of both eyes 07/25/2013    Cerebral microvascular disease 04/08/2014    CME (cystoid macular edema) 05/08/2014    Hyphema 06/12/2014    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 07/10/2014    Pulmonary hypertension 10/03/2014    Long-term insulin use 10/08/2014    Type 2 diabetes, controlled, with neuropathy 10/08/2014    Secondary renal hyperparathyroidism 06/05/2015    Incomplete bladder emptying 01/11/2016    Slow transit constipation 01/11/2016    Postmenopausal atrophic vaginitis 01/11/2016    Rectocele 01/11/2016    Exotropia of right eye 04/14/2016    Mixed incontinence urge and stress 06/10/2016     Strabismus 08/17/2016    Chronic diastolic heart failure 12/07/2016    Mild episode of recurrent major depressive disorder 03/27/2017    Tortuous aorta 04/03/2017    History of TIAs 06/12/2017    Uncontrolled diabetes mellitus with proliferative retinopathy 03/08/2018    Diabetic ulcer of right lower leg associated with type 2 diabetes mellitus, with fat layer exposed     Non-pressure chronic ulcer of right calf with fat layer exposed     Recurrent UTI 02/21/2019    Anemia of chronic kidney failure, stage 4 (severe) 04/05/2019    Uncontrolled type 2 diabetes mellitus with peripheral circulatory disorder 04/05/2019    Hypertensive heart disease with chronic diastolic congestive heart failure 04/05/2019    Non-pressure chronic ulcer left lower leg, limited to breakdown skin 07/22/2019     Resolved Ambulatory Problems     Diagnosis Date Noted    Type 2 diabetes with stage 3 chronic kidney disease GFR 30-59     Hypertensive emergency     CKD (chronic kidney disease) stage 3, GFR 30-59 ml/min     Proteinuria     Diabetic retinopathy of both eyes     Pulmonary edema, acute     Cerebral thrombosis without mention of cerebral infarction 04/08/2014    CHF (congestive heart failure) 06/05/2014    SOB (shortness of breath) 06/05/2014    Vitreous hemorrhage, right eye 06/12/2014    Vitreous hemorrhage 07/16/2014    Type 2 diabetes, controlled, with retinopathy 10/08/2014    Controlled type 2 diabetes with renal manifestation 10/08/2014    Urge incontinence 01/11/2016    Nocturia 01/11/2016    Atonic neurogenic bladder 01/11/2016    Major depressive disorder, single episode, mild 02/17/2016    Post-operative state 10/04/2016    Acute on chronic diastolic heart failure 12/05/2016    Diastolic CHF, acute on chronic 06/12/2017    Acute respiratory failure with hypoxia 06/12/2017    CKD (chronic kidney disease), stage III 06/14/2017    Chest wall pain     Insulin dependent type 2 diabetes  mellitus, uncontrolled 2018    Hypoglycemia 2019    Urinary tract infection without hematuria 2019    Infectious encephalopathy 2019     Past Medical History:   Diagnosis Date    Acute respiratory failure with hypoxia     Anemia of chronic kidney failure, stage 4 (severe) 2019    Cataracts, bilateral     CHF (congestive heart failure)     CKD (chronic kidney disease) stage 3, GFR 30-59 ml/min     CKD (chronic kidney disease) stage 3, GFR 30-59 ml/min     Controlled type 2 diabetes mellitus with proteinuria or albuminuria     Depression     Diabetes with neurologic complications     Diabetic retinopathy of both eyes     Edema     Glaucoma     History of colonic polyps     Hx-TIA (transient ischemic attack) 2008    Hyperlipidemia LDL goal < 100     Hypertension     Hypothyroidism     Major depressive disorder, single episode, mild 2016    Mixed incontinence urge and stress     Obesity     Obstructive sleep apnea on CPAP     Osteopenia     Proteinuria     Sickle cell trait     TIA (transient ischemic attack)     Trouble in sleeping     Type 2 diabetes mellitus with ophthalmic manifestations     Type 2 diabetes with stage 3 chronic kidney disease GFR 30-59     Type II or unspecified type diabetes mellitus with renal manifestations, uncontrolled(250.42)     Uncontrolled type 2 diabetes mellitus with peripheral circulatory disorder 2019    Urge incontinence 2016    Urge incontinence     Venous stasis ulcer     Vitamin D deficiency disease      Past Surgical History:   Procedure Laterality Date    BREAST BIOPSY      breast reduction Bilateral age 31    BREAST SURGERY      cataracts Bilateral      SECTION, LOW TRANSVERSE      x1    CHOLECYSTECTOMY      COLONOSCOPY N/A 2012    Performed by Keron Gunderson [facility] at [facility] ([address] [place] [address])    EYE SURGERY  2014, 2014    vitrectomy    EYE SURGERY Right 2016     HYSTERECTOMY  1986    TAHBSO (patient is unsure if ovaries removed)    OOPHORECTOMY      REFRACTIVE SURGERY      REPAIR, RETINAL DETACHMENT, WITH VITRECTOMY Right 6/4/2014    Performed by LILLIANA Coello MD at [facility] OR [facility]    REPAIR, RETINAL DETACHMENT, WITH VITRECTOMY Left 1/8/2014    Performed by LILLIANA Coello MD at [facility] OR [facility]    REPAIR-RETINA (VITRECTOMY) Right 7/16/2014    Performed by LILLIANA Coello MD at [facility] OR [facility]ST [facility]    STRABISMUS REPAIR/ADJUSTABLE SUTURES Bilateral 8/17/2016    Performed by RABIA Danielson Jr., MD at [facility] OR [facility]ST [facility]LR    TOTAL REDUCTION MAMMOPLASTY      approx 10 yrs ago       Review of Systems   Constitutional: Negative for chills and fever.   HENT: Negative for nosebleeds and sore throat.    Eyes: Negative for blurred vision and photophobia.   Respiratory: Positive for shortness of breath. Negative for cough and wheezing.    Cardiovascular: Negative for chest pain and leg swelling.   Gastrointestinal: Negative for abdominal pain, nausea and vomiting.   Genitourinary: Negative for dysuria and urgency.   Musculoskeletal: Negative for back pain and myalgias.   Skin: Negative for itching and rash.   Neurological: Negative for loss of consciousness and headaches.        Positive for tired.    Psychiatric/Behavioral: Negative for depression. The patient is not nervous/anxious.      Physical Exam   Constitutional: She is oriented to person, place, and time and well-developed, well-nourished, and in no distress.   74 y.o. AA female that appears drowsy yet cooperative and pleasant on exam.    HENT:   Head: Normocephalic and atraumatic.   Eyes: Pupils are equal, round, and reactive to light. Conjunctivae and EOM are normal.   Neck: Normal range of motion. Neck supple. JVD present.   Cardiovascular: Regular rhythm, normal heart sounds and intact distal pulses.   Mildly bracycardic   Pulmonary/Chest: Effort normal. No respiratory distress. She has rales.   Patient  appears short of breath while talking.    Abdominal: Soft. Bowel sounds are normal. There is no tenderness.   Musculoskeletal: She exhibits no tenderness.   Bilateral lower extremity edema noted.    Neurological: She is alert and oriented to person, place, and time. No cranial nerve deficit.   Skin: Skin is warm and dry. She is not diaphoretic.   Bandages noted to bilateral lower extremities that were placed at wound care yesterday for diabetic ulcers.      MDM: 74 y.o. Female that presents with complaints of SOB x3 days following absence of CPAP use.   DDx includes, but is not limited to: CHF exacerbation, pulmonary HTN, PE, pneumothorax, pleural effusion, pneumonia, URI, MI, and pericardial effusion    Patient is most likely experiencing a CHF exacerbation vs pulmonary HTN due to signs of fluid overload (JVD, LE edema, and rales). CXR suggestive of CHF pattern pulmonary edema or possible atypical pneumonia. Patient denies cough, fever, CP and HA.   CXR shows no signs of pneumothorax or pleural effusion.   Presentation not consistent with cardiac process though elevated troponin at 0.055.   Presentation not consistent with PE.     1. Shortness of breath   - Furosemide 80 mg PO Q6H   - Continue on 3L oxygen via nasal cannula   - Keep patient in an upright position in bed   - Monitor for changes in symptoms   -Trend troponin level   -Obtain ECG   -Obtain TTE since last one was in 2017    2. Diastolic CHF   - Continue home amlodipine 10 mg PO daily   - Continue home metoprolol 50 mg PO BID   -Place on cardiac monitoring    3. HUMBERTO on CPAP   -Obtain CPAP device    3. DM   -Hold home medication (levemir flextouch 5 units daily)   -SSI PRN   -Diabetic Diet     4. HLD    -Continue home atorvastatin 10 mg daily    5. hypothryoidism   -Continue home synthroid 50 mcg daily

## 2019-08-28 NOTE — ASSESSMENT & PLAN NOTE
Patient's renal function is near her baseline; will continue to monitor urine output and encourage continued follow-up with her nephrologist.

## 2019-08-28 NOTE — TELEPHONE ENCOUNTER
----- Message from Taylor Negron sent at 8/28/2019  8:11 AM CDT -----  Contact: Earlene - home health   Type: Patient Call Back    What is the request in detail: Earlene calling to speak to a nurse regarding orders for cpap.     Can the clinic reply by [facility]? No    Would the patient rather a call back or a response via [facility]? Call back     Best call back number: [phone]

## 2019-08-28 NOTE — ASSESSMENT & PLAN NOTE
Patient's blood pressure is better-controlled; will continue home regimen of bumetanide and hydralazine, and provide as-needed clonidine.  Will hold her home regimen of amlodipine and metoprolol due to acute heart failure.

## 2019-08-28 NOTE — ASSESSMENT & PLAN NOTE
Well controlled on a home regimen of basal insulin therapy; will provide basal-prandial insulin therapy along insulin sliding scale.

## 2019-08-28 NOTE — ASSESSMENT & PLAN NOTE
Patient was recently admitted here two months ago for dyspnea related to her malfunctioning CPAP machine.  She reports that her CPAP machine is still not functioning and reports worsening dyspnea.  She does appear volume overload.  I personally reviewed her plain chest radiograph which was significant for mild pulmonary edema and her BNP is 1792.  Her troponin is mildly elevated at 0.055.  Her immediate air oxygen saturation was 93% on presentation.  She has been given intravenous diuresis with furosemide with improvement of her symptoms.  Will monitor her urine output very closely.  She is hypersomnolent--ABG is pending.Also repeat her a TTE in the morning.  Will consult Social Work in order to address the CPAP machine.

## 2019-08-28 NOTE — CONSULTS
REQUESTING CONSULT:  Eduin Paige M.D.    REASON FOR CONSULT:  Worsening CKD.    HISTORY OF PRESENT ILLNESS:  This is a 74-year-old female with a history of CKD,   stage IV.  She sees Dr. Joseph with baseline creatinine approximately 3.9, came   in with concerns of worsening shortness of breath.  She is noted to recently   have been discharged from a rehab unit with a hypoglycemic spell last month.    She knows she has more dyspnea on exertion and more swelling.  Subsequently,   upon evaluation here, she was concerned having decompensated heart failure with   also worsening renal function with BUN and creatinine of 72 and 4.9.  We are   asked for assistance in regards to the renal failure since her nephrologist does   not come here.    PAST MEDICAL HISTORY:  Significant for CKD stage IV, anemia with CKD, history of   acute respiratory failure, congestive heart failure, diabetes type 2, diabetic   nephropathy, edema, glaucoma, history of TIA, colon polyps, hyperlipidemia,   hypertension, hypothyroidism, sleep apnea, on a CPAP machine, osteopenia,   proteinuria, sickle cell trait, venous stasis ulcer and vitamin D deficiency.    PAST SURGICAL HISTORY:  Significant for breast surgery, ,   cholecystectomy, colonoscopy, eye surgery, hysterectomy, oophorectomy and   retinal repair.    SOCIAL HISTORY:  She does not smoke.    FAMILY HISTORY:  Significant for mom with diabetes.    CURRENT MEDICATIONS:  Atorvastatin, Lasix, Celexa, Plavix, heparin, hydralazine,   insulin and Synthroid.    REVIEW OF SYSTEMS:  A 10 point review of systems pertinent for dyspnea on   exertion, shortness of breath and lower extremity edema.    PHYSICAL EXAMINATION:  VITAL SIGNS:  Temp 97.3, heart rate 57, respiratory rate 18 and blood pressure   144/71.  GENERAL:  A well-developed female, in no distress.  HEENT:  Normocephalic.  Extraocular muscles are intact.  Moist mucous membranes.  NECK:  Supple.  CARDIOVASCULAR:  S1, S2.   Regular.  PULMONARY:  Diminished bases.  ABDOMEN:  Positive bowel sounds.  Soft, nontender and nondistended.  EXTREMITIES:  Show edema up to the left thigh, wrapped, cast.    LABORATORY DATA:  White count 4.2, H and H 11.6 and 36.5, MCV 75, platelet count   of 235.  Sodium 139, potassium 4, bicarbonate 26, BUN and creatinine 72 and   4.9, calcium is 8.9.  Albumin 3.  UA shows 2+ protein.  Gas; 7.3, 46 and 92.    Chest x-ray showed concerns for pulmonary edema.    ASSESSMENT AND PLAN:  1. SATHISH on CKD stage IV.  The patient appears overloaded.  We will recommend   diuresis and see how she does,  close to  dialysis I suspect.  I suspect no   acute indication for dialysis at this time.  May need dialysis in the near   future.  Concerned that she may be tipping over.  2. Acute on chronic diastolic heart failure.  We will change Lasix to Bumex and   see if we can get her a little bit drier and see how she does.  3. HUMBERTO.  I agree that she needs her CPAP machine fixed, deferred to Social Work   and primary team.  4. Anemia with CKD.  No acute indication for Epogen at this time.  5. Hypothyroidism.  Continue to treat.  6. Diabetes type 2, following sugars.  7. Obesity, needs weight loss.        LOLITA/BOYD  dd: 08/28/2019 11:58:42 (CDT)  td: 08/28/2019 22:09:59 (CDT)  Doc ID   #[id number]  Job ID #[id number]    CC:     [id number]  sathish  ckd4  Acute on diastolic hf  humberto  Anemia w/ckd  dm2  obesity

## 2019-08-28 NOTE — SUBJECTIVE & OBJECTIVE
Past Medical History:   Diagnosis Date    Acute respiratory failure with hypoxia     Anemia of chronic kidney failure, stage 4 (severe) 2019    Cataracts, bilateral     CHF (congestive heart failure)     CKD (chronic kidney disease) stage 3, GFR 30-59 ml/min     CKD (chronic kidney disease) stage 3, GFR 30-59 ml/min     Controlled type 2 diabetes mellitus with proteinuria or albuminuria     Depression     Diabetes with neurologic complications     Diabetic retinopathy of both eyes     Edema     Glaucoma     History of colonic polyps     Hx-TIA (transient ischemic attack) 2008    Hyperlipidemia LDL goal < 100     Hypertension     Hypothyroidism     Major depressive disorder, single episode, mild 2016    Mixed incontinence urge and stress     Obesity     Obstructive sleep apnea on CPAP     19:  Home CPAP machine broken, per patient & son    Osteopenia     Proteinuria     Sickle cell trait     TIA (transient ischemic attack)     Trouble in sleeping     Type 2 diabetes mellitus with ophthalmic manifestations     Type 2 diabetes with stage 3 chronic kidney disease GFR 30-59     Type II or unspecified type diabetes mellitus with renal manifestations, uncontrolled(250.42)     Uncontrolled type 2 diabetes mellitus with peripheral circulatory disorder 2019    Urge incontinence 2016    Urge incontinence     Venous stasis ulcer     bilateral lower legs    Vitamin D deficiency disease        Past Surgical History:   Procedure Laterality Date    BREAST BIOPSY      breast reduction Bilateral age 31    BREAST SURGERY      cataracts Bilateral      SECTION, LOW TRANSVERSE      x1    CHOLECYSTECTOMY      COLONOSCOPY N/A 2012    Performed by [facility]eron G[facility]erson [facility] at [facility] ([address] [place] [address])    EYE SURGERY  2014, 2014    vitrectomy    EYE SURGERY Right 2016    HYSTERECTOMY  1986    TAHBSO (patient is unsure if ovaries removed)    OOPHORECTOMY       REFRACTIVE SURGERY      REPAIR, RETINAL DETACHMENT, WITH VITRECTOMY Right 6/4/2014    Performed by LILLIANA Coello MD at [facility] OR 1ST FLR    REPAIR, RETINAL DETACHMENT, WITH VITRECTOMY Left 1/8/2014    Performed by LILL[facility]NA Coello MD at [facility] OR 1ST FLR    REPAIR-RETINA (VITRECTOMY) Right 7/16/2014    Performed by L[facility]NA Coello MD at [facility] OR 1ST FLR    STRABISMUS REPAIR/ADJUSTABLE SUTURES Bilateral 8/17/2016    Performed by RABIA Danielson Jr., MD at [facility] OR 1ST FLR    TOTAL REDUCTION MAMMOPLASTY      approx 10 yrs ago       Review of patient's allergies indicates:   Allergen Reactions    Ace inhibitors Other (See Comments)     Other reaction(s): cough       No current facility-administered medications on file prior to encounter.      Current Outpatient Medications on File Prior to Encounter   Medication Sig    amLODIPine (NORVASC) 10 MG tablet Take 1 tablet (10 mg total) by mouth once daily.    atorvastatin (LIPITOR) 10 MG tablet TAKE 1 TABLET EVERY DAY    bumetanide (BUMEX) 2 MG tablet Take 1 tablet (2 mg total) by mouth 2 (two) times daily.    clopidogrel (PLAVIX) 75 mg tablet TAKE 1 TABLET EVERY DAY    hydrALAZINE (APRESOLINE) 100 MG tablet Take 1 tablet (100 mg total) by mouth 3 (three) times daily.    insulin detemir U-100 (LEVEMIR FLEXTOUCH) 100 unit/mL (3 mL) SubQ InPn pen Inject 5 Units into the skin once daily.    levothyroxine (SYNTHROID) 50 MCG tablet Take 50 mcg by mouth once daily.    metoprolol tartrate (LOPRESSOR) 50 MG tablet Take 50 mg by mouth 2 (two) times daily.    oxybutynin (DITROPAN XL) 15 MG TR24 TAKE 1 TABLET EVERY DAY    [DISCONTINUED] metoprolol tartrate (LOPRESSOR) 100 MG tablet TAKE 1 TABLET TWICE DAILY    citalopram (CELEXA) 10 MG tablet TAKE 1 TABLET EVERY DAY    docusate sodium (COLACE) 100 MG capsule Take 200 mg by mouth once daily.     LANCETS MISC     MULTIVITAMIN WITH MINERALS (HAIR,SKIN AND NAILS ORAL) Take 3 tablets by mouth once daily.     polyethylene glycol (GLYCOLAX) 17 gram PwPk Take 17 g by mouth once daily.     Family History     Problem Relation (Age of Onset)    Achondroplasia Sister    Breast cancer Maternal Aunt (65)    Diabetes Mother, Paternal Grandmother    Esophageal cancer Maternal Uncle    HIV Brother    Hypertension Mother    Leukemia Father    Ovarian cancer Sister (35)    Parkinsonism Maternal Aunt    Stroke Mother        Tobacco Use    Smoking status: Never Smoker    Smokeless tobacco: Never Used   Substance and Sexual Activity    Alcohol use: No     Alcohol/week: 0.0 oz    Drug use: No    Sexual activity: Not Currently     Partners: Male     Birth control/protection: Post-menopausal, Surgical     Review of Systems   Unable to perform ROS: Mental status change     Objective:     Vital Signs (Most Recent):  Temp: 97.4 °F (36.3 °C) (08/27/19 2246)  Pulse: (!) 57 (08/27/19 2246)  Resp: 16 (08/27/19 2246)  BP: (!) 158/79 (08/27/19 2246)  SpO2: (!) 92 % (08/27/19 2246) Vital Signs (24h Range):  Temp:  [97.4 °F (36.3 °C)-98.1 °F (36.7 °C)] 97.4 °F (36.3 °C)  Pulse:  [57-63] 57  Resp:  [16-24] 16  SpO2:  [92 %-100 %] 92 %  BP: (133-164)/(69-82) 158/79     Weight: 127.3 kg (280 lb 10.3 oz)  Body mass index is 46.7 kg/m².    Physical Exam   Constitutional: She appears well-developed and well-nourished. No distress.   Morbidly obese   HENT:   Head: Normocephalic and atraumatic.   Right Ear: External ear normal.   Left Ear: External ear normal.   Nose: Nose normal.   Eyes: Right eye exhibits no discharge. Left eye exhibits no discharge.   Neck: Normal range of motion.   Cardiovascular: Normal rate, regular rhythm, normal heart sounds and intact distal pulses. Exam reveals no gallop and no friction rub.   No murmur heard.  Pulmonary/Chest:   Mildly increased respiratory effort with a prolonged expiratory phase and expiratory wheezing, shallow breath sounds without crackles appreciated   Abdominal: Soft. She exhibits no distension.  There is no tenderness. There is no rebound and no guarding.   Hypoactive bowel sounds   Musculoskeletal: Normal range of motion. She exhibits no edema.   Neurological:   Patient is very somnolent but arousable, protecting airway   Skin: Skin is warm and dry. She is not diaphoretic. No erythema.   Nursing note and vitals reviewed.          Significant Labs: All pertinent labs within the past 24 hours have been reviewed.    Significant Imaging: I have reviewed and interpreted all pertinent imaging results/findings within the past 24 hours.

## 2019-08-28 NOTE — H&P
[facility] Hospital Medicine  History & Physical    Patient Name: Erica Leblanc  MRN: [id number]  Admission Date: 8/27/2019  Attending Physician: Imani Chan MD   Primary Care Provider: Sendy Elaine MD         Patient information was obtained from patient.     Subjective:     Principal Problem:Acute on chronic diastolic heart failure    Chief Complaint: Shortness of breath for four days.    HPI: Ms. Erica Leblanc is a 74 y.o. female with essential hypertension, type 2 diabetes mellitus (HbA1c 7.3% Jul 2019), hyperlipidemia (LDL 52.8 Mar 2019), chronic diastolic heart failure (LVEF 70% Jun 2017), CKD stage 5, hypothyroidism (TSH 3.298 Mar 2019), anemia chronic disease, morbid obesity (BMI 41.6), HUMBERTO on CPAP, and depression who presents to [facility] ED with complaints of dyspnea for the past four days.  She had reported to the ED physician that her CPAP machine has been malfunctioning, a similar complaint to her presentation two months ago.  She has not been able to get it replaced due to insurance constraints.  The dyspnea is worse on exertion.  Further history is otherwise limited at this time due to hypersomnolence.    Chart Review:  Previous Hospitalizations  Date Hospital Diagnosis   Jul 2019 [facility] Hypoglycemia   Jun 2017 [facility] Dyspnea   Jun 2014 [facility] Acute heart failure      Outpatient Follow-Up  Date of Visit Physician Service   Aug 2019 Murali Li MD Cardiology   Aug 2019 Surinder Joseph MD Nephrology   Apr 2019 Sendy Elaine MD Primary Care   Feb 2019 PINA Wong MD Urology   Aug 2018 Nicole Gray DPM Podiatry   Apr 2014 Stephen Barlow MD Neurology       Past Medical History:   Diagnosis Date    Acute respiratory failure with hypoxia     Anemia of chronic kidney failure, stage 4 (severe) 4/5/2019    Cataracts, bilateral     CHF (congestive heart failure)     CKD (chronic kidney disease) stage 3, GFR 30-59 ml/min     CKD (chronic kidney disease) stage 3, GFR  30-59 ml/min     Controlled type 2 diabetes mellitus with proteinuria or albuminuria     Depression     Diabetes with neurologic complications     Diabetic retinopathy of both eyes     Edema     Glaucoma     History of colonic polyps     Hx-TIA (transient ischemic attack) 2008    Hyperlipidemia LDL goal < 100     Hypertension     Hypothyroidism     Major depressive disorder, single episode, mild 2016    Mixed incontinence urge and stress     Obesity     Obstructive sleep apnea on CPAP     19:  Home CPAP machine broken, per patient & son    Osteopenia     Proteinuria     Sickle cell trait     TIA (transient ischemic attack)     Trouble in sleeping     Type 2 diabetes mellitus with ophthalmic manifestations     Type 2 diabetes with stage 3 chronic kidney disease GFR 30-59     Type II or unspecified type diabetes mellitus with renal manifestations, uncontrolled(250.42)     Uncontrolled type 2 diabetes mellitus with peripheral circulatory disorder 2019    Urge incontinence 2016    Urge incontinence     Venous stasis ulcer     bilateral lower legs    Vitamin D deficiency disease        Past Surgical History:   Procedure Laterality Date    BREAST BIOPSY      breast reduction Bilateral age 31    BREAST SURGERY      cataracts Bilateral      SECTION, LOW TRANSVERSE      x1    CHOLECYSTECTOMY      COLONOSCOPY N/A 2012    Performed by Keron Gunderson MD at [facility] ENDO (4TH FLR)    EYE SURGERY  2014, 2014    vitrectomy    EYE SURGERY Right 2016    HYSTERECTOMY  1986    TAHBSO (patient is unsure if ovaries removed)    OOPHORECTOMY      REFRACTIVE SURGERY      REPAIR, RETINAL DETACHMENT, WITH VITRECTOMY Right 2014    Performed by LILLIANA Coello MD at [facility] OR 1ST FLR    REPAIR, RETINAL DETACHMENT, WITH VITRECTOMY Left 2014    Performed by LILLIANA Coello MD at [facility] OR 1ST FLR    REPAIR-RETINA (VITRECTOMY) Right 2014     [facility]    STRABISMUS REPAIR/ADJUSTABLE SUTURES Bilateral 8/17/2016    Performed by RABIA Danielson Jr., MD at [facility] OR 1ST FLR    TOTAL REDUCTION MAMMOPLASTY      approx 10 yrs ago       Review of patient's allergies indicates:   Allergen Reactions    Ace inhibitors Other (See Comments)     Other reaction(s): cough       No current facility-administered medications on file prior to encounter.      Current Outpatient Medications on File Prior to Encounter   Medication Sig    amLODIPine (NORVASC) 10 MG tablet Take 1 tablet (10 mg total) by mouth once daily.    atorvastatin (LIPITOR) 10 MG tablet TAKE 1 TABLET EVERY DAY    bumetanide (BUMEX) 2 MG tablet Take 1 tablet (2 mg total) by mouth 2 (two) times daily.    clopidogrel (PLAVIX) 75 mg tablet TAKE 1 TABLET EVERY DAY    hydrALAZINE (APRESOLINE) 100 MG tablet Take 1 tablet (100 mg total) by mouth 3 (three) times daily.    insulin detemir U-100 (LEVEMIR FLEXTOUCH) 100 unit/mL (3 mL) SubQ InPn pen Inject 5 Units into the skin once daily.    levothyroxine (SYNTHROID) 50 MCG tablet Take 50 mcg by mouth once daily.    metoprolol tartrate (LOPRESSOR) 50 MG tablet Take 50 mg by mouth 2 (two) times daily.    oxybutynin (DITROPAN XL) 15 MG TR24 TAKE 1 TABLET EVERY DAY    [DISCONTINUED] metoprolol tartrate (LOPRESSOR) 100 MG tablet TAKE 1 TABLET TWICE DAILY    citalopram (CELEXA) 10 MG tablet TAKE 1 TABLET EVERY DAY    docusate sodium (COLACE) 100 MG capsule Take 200 mg by mouth once daily.     LANCETS MISC     MULTIVITAMIN WITH MINERALS (HAIR,SKIN AND NAILS ORAL) Take 3 tablets by mouth once daily.    polyethylene glycol (GLYCOLAX) 17 gram PwPk Take 17 g by mouth once daily.     Family History     Problem Relation (Age of Onset)    Achondroplasia Sister    Breast cancer Maternal Aunt (65)    Diabetes Mother, Paternal Grandmother    Esophageal cancer Maternal Uncle    HIV Brother    Hypertension Mother     Leukemia Father    Ovarian cancer Sister (35)    Parkinsonism Maternal Aunt    Stroke Mother        Tobacco Use    Smoking status: Never Smoker    Smokeless tobacco: Never Used   Substance and Sexual Activity    Alcohol use: No     Alcohol/week: 0.0 oz    Drug use: No    Sexual activity: Not Currently     Partners: Male     Birth control/protection: Post-menopausal, Surgical     Review of Systems   Unable to perform ROS: Mental status change     Objective:     Vital Signs (Most Recent):  Temp: 97.4 °F (36.3 °C) (08/27/19 2246)  Pulse: (!) 57 (08/27/19 2246)  Resp: 16 (08/27/19 2246)  BP: (!) 158/79 (08/27/19 2246)  SpO2: (!) 92 % (08/27/19 2246) Vital Signs (24h Range):  Temp:  [97.4 °F (36.3 °C)-98.1 °F (36.7 °C)] 97.4 °F (36.3 °C)  Pulse:  [57-63] 57  Resp:  [16-24] 16  SpO2:  [92 %-100 %] 92 %  BP: (133-164)/(69-82) 158/79     Weight: 127.3 kg (280 lb 10.3 oz)  Body mass index is 46.7 kg/m².    Physical Exam   Constitutional: She appears well-developed and well-nourished. No distress.   Morbidly obese   HENT:   Head: Normocephalic and atraumatic.   Right Ear: External ear normal.   Left Ear: External ear normal.   Nose: Nose normal.   Eyes: Right eye exhibits no discharge. Left eye exhibits no discharge.   Neck: Normal range of motion.   Cardiovascular: Normal rate, regular rhythm, normal heart sounds and intact distal pulses. Exam reveals no gallop and no friction rub.   No murmur heard.  Pulmonary/Chest:   Mildly increased respiratory effort with a prolonged expiratory phase and expiratory wheezing, shallow breath sounds without crackles appreciated   Abdominal: Soft. She exhibits no distension. There is no tenderness. There is no rebound and no guarding.   Hypoactive bowel sounds   Musculoskeletal: Normal range of motion. She exhibits no edema.   Neurological:   Patient is very somnolent but arousable, protecting airway   Skin: Skin is warm and dry. She is not diaphoretic. No erythema.   Nursing note and  vitals reviewed.          Significant Labs: All pertinent labs within the past 24 hours have been reviewed.    Significant Imaging: I have reviewed and interpreted all pertinent imaging results/findings within the past 24 hours.    Assessment/Plan:     * Acute on chronic diastolic heart failure  Patient was recently admitted here two months ago for dyspnea related to her malfunctioning CPAP machine.  She reports that her CPAP machine is still not functioning and reports worsening dyspnea.  She does appear volume overload.  I personally reviewed her plain chest radiograph which was significant for mild pulmonary edema and her BNP is 1792.  Her troponin is mildly elevated at 0.055.  Her immediate air oxygen saturation was 93% on presentation.  She has been given intravenous diuresis with furosemide with improvement of her symptoms.  Will monitor her urine output very closely.  She is hypersomnolent--ABG is pending.Also repeat her a TTE in the morning.  Will consult Social Work in order to address the CPAP machine.      Essential hypertension  Patient's blood pressure is better-controlled; will continue home regimen of bumetanide and hydralazine, and provide as-needed clonidine.  Will hold her home regimen of amlodipine and metoprolol due to acute heart failure.    Type 2 diabetes mellitus, controlled, with renal complications  Well controlled on a home regimen of basal insulin therapy; will provide basal-prandial insulin therapy along insulin sliding scale.    Hyperlipidemia LDL goal <100  Well controlled; will continue her home regimen of atorvastatin.    Chronic diastolic heart failure  As addressed above.    CKD stage 5  Patient's renal function is near her baseline; will continue to monitor urine output and encourage continued follow-up with her nephrologist.    Hypothyroidism (acquired)  Poorly controlled; will continue her home regimen of levothyroxine and defer dosage adjustments to her PCP.    Anemia of  chronic disease  The patient's H/H is stable and consistent with previous laboratory measurements, and the patient exhibits no signs or symptoms of acute bleeding; there is no indication for transfusion.  Will continue to monitor.    Morbid obesity with body mass index (BMI) of 40.0 to 44.9 in adult  The patient has been counseled on the negative impact that obesity imparts on her health and was encouraged to make lifestyle changes in order to lose weight and decrease her modifiable risk factors.    HUMBERTO on CPAP  As addressed above.    Depression  Stable; will continue her home regimen of citalopram.    VTE Risk Mitigation (From admission, onward)        Ordered     heparin (porcine) injection 5,000 Units  Every 12 hours      08/27/19 2217     IP VTE HIGH RISK PATIENT  Once      08/27/19 2217             Alida Sampson M.D.  Staff Nocturnist  Department of Hospital Medicine  [facility]  Pager: [phone]          N.B.: Portions of this note was dictated using M*Modal Fluency Direct--there may be voice recognition errors occasionally missed on review.

## 2019-08-28 NOTE — ASSESSMENT & PLAN NOTE
Patient was recently admitted here two months ago for dyspnea related to her malfunctioning CPAP machine.  She reports that her CPAP machine is still not functioning and reports worsening dyspnea.  She does appear volume overload.  Chest radiograph significant for mild pulmonary edema and her BNP is 1792.    Her troponin is mildly elevated at 0.055.   She has been given intravenous diuresis with furosemide with improvement of her symptoms.  Will monitor her urine output very closely.   Check Echo.

## 2019-08-29 ENCOUNTER — TELEPHONE (OUTPATIENT)
Dept: FAMILY MEDICINE | Facility: CLINIC | Age: 74
End: 2019-08-29

## 2019-08-29 LAB
ANION GAP SERPL CALC-SCNC: 11 MMOL/L (ref 8–16)
ANION GAP SERPL CALC-SCNC: 11 MMOL/L (ref 8–16)
BUN SERPL-MCNC: 71 MG/DL (ref 8–23)
BUN SERPL-MCNC: 71 MG/DL (ref 8–23)
CALCIUM SERPL-MCNC: 8.2 MG/DL (ref 8.7–10.5)
CALCIUM SERPL-MCNC: 8.2 MG/DL (ref 8.7–10.5)
CHLORIDE SERPL-SCNC: 105 MMOL/L (ref 95–110)
CHLORIDE SERPL-SCNC: 105 MMOL/L (ref 95–110)
CO2 SERPL-SCNC: 23 MMOL/L (ref 23–29)
CO2 SERPL-SCNC: 23 MMOL/L (ref 23–29)
CREAT SERPL-MCNC: 4.9 MG/DL (ref 0.5–1.4)
CREAT SERPL-MCNC: 4.9 MG/DL (ref 0.5–1.4)
EST. GFR  (AFRICAN AMERICAN): 9 ML/MIN/1.73 M^2
EST. GFR  (AFRICAN AMERICAN): 9 ML/MIN/1.73 M^2
EST. GFR  (NON AFRICAN AMERICAN): 8 ML/MIN/1.73 M^2
EST. GFR  (NON AFRICAN AMERICAN): 8 ML/MIN/1.73 M^2
GLUCOSE SERPL-MCNC: 178 MG/DL (ref 70–110)
GLUCOSE SERPL-MCNC: 178 MG/DL (ref 70–110)
MAGNESIUM SERPL-MCNC: 2.3 MG/DL (ref 1.6–2.6)
POCT GLUCOSE: 159 MG/DL (ref 70–110)
POCT GLUCOSE: 196 MG/DL (ref 70–110)
POCT GLUCOSE: 203 MG/DL (ref 70–110)
POCT GLUCOSE: 212 MG/DL (ref 70–110)
POTASSIUM SERPL-SCNC: 4.4 MMOL/L (ref 3.5–5.1)
POTASSIUM SERPL-SCNC: 4.4 MMOL/L (ref 3.5–5.1)
SODIUM SERPL-SCNC: 139 MMOL/L (ref 136–145)
SODIUM SERPL-SCNC: 139 MMOL/L (ref 136–145)

## 2019-08-29 PROCEDURE — 25000003 PHARM REV CODE 250: Mod: HCNC | Performed by: INTERNAL MEDICINE

## 2019-08-29 PROCEDURE — 97530 THERAPEUTIC ACTIVITIES: CPT | Mod: HCNC

## 2019-08-29 PROCEDURE — 99900035 HC TECH TIME PER 15 MIN (STAT): Mod: HCNC

## 2019-08-29 PROCEDURE — 97161 PT EVAL LOW COMPLEX 20 MIN: CPT | Mod: HCNC

## 2019-08-29 PROCEDURE — 80048 BASIC METABOLIC PNL TOTAL CA: CPT | Mod: HCNC

## 2019-08-29 PROCEDURE — 83735 ASSAY OF MAGNESIUM: CPT | Mod: HCNC

## 2019-08-29 PROCEDURE — S0171 BUMETANIDE 0.5 MG: HCPCS | Mod: HCNC | Performed by: INTERNAL MEDICINE

## 2019-08-29 PROCEDURE — 97165 OT EVAL LOW COMPLEX 30 MIN: CPT | Mod: HCNC

## 2019-08-29 PROCEDURE — 94660 CPAP INITIATION&MGMT: CPT | Mod: HCNC

## 2019-08-29 PROCEDURE — 63600175 PHARM REV CODE 636 W HCPCS: Mod: HCNC | Performed by: INTERNAL MEDICINE

## 2019-08-29 PROCEDURE — 11000001 HC ACUTE MED/SURG PRIVATE ROOM: Mod: HCNC

## 2019-08-29 PROCEDURE — 36415 COLL VENOUS BLD VENIPUNCTURE: CPT | Mod: HCNC

## 2019-08-29 RX ORDER — BUMETANIDE 0.25 MG/ML
2 INJECTION INTRAMUSCULAR; INTRAVENOUS EVERY 12 HOURS
Status: DISCONTINUED | OUTPATIENT
Start: 2019-08-29 | End: 2019-09-02

## 2019-08-29 RX ADMIN — HEPARIN SODIUM 5000 UNITS: 5000 INJECTION, SOLUTION INTRAVENOUS; SUBCUTANEOUS at 09:08

## 2019-08-29 RX ADMIN — CLOPIDOGREL BISULFATE 75 MG: 75 TABLET ORAL at 09:08

## 2019-08-29 RX ADMIN — HYDRALAZINE HYDROCHLORIDE 100 MG: 25 TABLET ORAL at 04:08

## 2019-08-29 RX ADMIN — CITALOPRAM HYDROBROMIDE 10 MG: 10 TABLET ORAL at 09:08

## 2019-08-29 RX ADMIN — INSULIN ASPART 3 UNITS: 100 INJECTION, SOLUTION INTRAVENOUS; SUBCUTANEOUS at 04:08

## 2019-08-29 RX ADMIN — BUMETANIDE 2 MG: 0.25 INJECTION INTRAMUSCULAR; INTRAVENOUS at 08:08

## 2019-08-29 RX ADMIN — MICONAZOLE NITRATE: 20 OINTMENT TOPICAL at 09:08

## 2019-08-29 RX ADMIN — LEVOTHYROXINE SODIUM 50 MCG: 50 TABLET ORAL at 06:08

## 2019-08-29 RX ADMIN — INSULIN ASPART 3 UNITS: 100 INJECTION, SOLUTION INTRAVENOUS; SUBCUTANEOUS at 11:08

## 2019-08-29 RX ADMIN — INSULIN DETEMIR 5 UNITS: 100 INJECTION, SOLUTION SUBCUTANEOUS at 09:08

## 2019-08-29 RX ADMIN — BUMETANIDE 2 MG: 0.25 INJECTION INTRAMUSCULAR; INTRAVENOUS at 10:08

## 2019-08-29 RX ADMIN — HYDRALAZINE HYDROCHLORIDE 100 MG: 25 TABLET ORAL at 09:08

## 2019-08-29 RX ADMIN — INSULIN ASPART 3 UNITS: 100 INJECTION, SOLUTION INTRAVENOUS; SUBCUTANEOUS at 09:08

## 2019-08-29 RX ADMIN — ATORVASTATIN CALCIUM 10 MG: 10 TABLET, FILM COATED ORAL at 09:08

## 2019-08-29 NOTE — PLAN OF CARE
Problem: Occupational Therapy Goal  Goal: Occupational Therapy Goal  Goals to be met by: 9/6/2019     Patient will increase functional independence with ADLs by performing:    UE Dressing with Supervision.  Grooming while standing at sink with Contact Guard Assistance.  Toileting from bedside commode with Supervision for hygiene and clothing management.   Stand pivot transfers with Contact Guard Assistance.  Toilet transfer to bedside commode with Contact Guard Assistance.  Upper extremity exercise program x10 reps per handout, with supervision.    Outcome: Ongoing (interventions implemented as appropriate)  [facility] OT/PT

## 2019-08-29 NOTE — PROGRESS NOTES
TN spoke to Ashley at [facility] received order for c-pap.  Roma Moss, Director of Care Management is requesting that c-pap is delivered to the hospital/[facility]..Rossy Gonzales RN, BSN, [facility]  8/29/2019

## 2019-08-29 NOTE — ASSESSMENT & PLAN NOTE
Patient was recently admitted here two months ago for dyspnea related to her malfunctioning CPAP machine.  She reports that her CPAP machine is still not functioning and reports worsening dyspnea.  She does appear volume overload.  Chest radiograph significant for mild pulmonary edema and her BNP is 1792.    Her troponin is mildly elevated at 0.055.   She has been given intravenous diuresis with furosemide with improvement of her symptoms.  Will monitor her urine output very closely.   Check Echo.    May need another day   BMP pending

## 2019-08-29 NOTE — PT/OT/SLP EVAL
Occupational Therapy   Evaluation    Name: Erica Leblanc  MRN: [id number]  Admitting Diagnosis:  Acute on chronic diastolic heart failure      Recommendations:     Discharge Recommendations: home health OT  Discharge Equipment Recommendations:  none  Barriers to discharge:  (Pt states that someone is usually home)    Assessment:     Erica Leblanc is a 74 y.o. female with a medical diagnosis of Acute on chronic diastolic heart failure.  She presents with good motivation to participate.Pt requires max assist for bed mobility with use of bed rails. Performance deficits affecting function: weakness, impaired endurance, impaired self care skills, impaired functional mobilty, gait instability, impaired balance, decreased ROM, impaired cardiopulmonary response to activity, impaired muscle length.      Rehab Prognosis: Fair; patient would benefit from acute skilled OT services to address these deficits and reach maximum level of function.       Plan:     Patient to be seen 2 x/week to address the above listed problems via self-care/home management, therapeutic activities, therapeutic exercises  · Plan of Care Expires:    · Plan of Care Reviewed with: patient    Subjective     Chief Complaint: edema   Patient/Family Comments/goals: to have home health therapies    Occupational Profile:  Living Environment: Pt lives in a SS house with adult son with 1 step to enter.  Previous level of function: pt recently discharged to home from SNF with equipment  Roles and Routines: home with limited ambulation to bathroom  Equipment Used at Home:  walker, rolling, shower chair, bedside commode  Assistance upon Discharge: son and other family    Pain/Comfort:  · Pain Rating 1: 0/10    Patients cultural, spiritual, Yarsani conflicts given the current situation:      Objective:     Communicated with: nurse naseem GOYAL prior to session.  Patient found supine with telemetry, kemp catheter, peripheral IV upon OT entry to  room.    General Precautions: Standard, fall, respiratory   Orthopedic Precautions:N/A   Braces: N/A     Occupational Performance:    Bed Mobility:    · Patient completed Scooting/Bridging with minimum assistance  · Patient completed Supine to Sit with maximal assistance    Functional Mobility/Transfers:  · Patient completed Sit <> Stand Transfer with moderate assistance  with  rolling walker   · Patient completed Bed <> Chair Transfer using Stand Pivot technique with minimum assistance with rolling walker  · Functional Mobility: pt able to transfer to [facility] using RW (several step to right)    Activities of Daily Living:  · Feeding:  supervision    · Grooming: supervision    · Upper Body Dressing: minimum assistance    · Toileting: total assistance      Cognitive/Visual Perceptual:  Cognitive/Psychosocial Skills:     -       Oriented to: Person, Place and Time   -       Follows Commands/attention:Follows one-step commands and Follows two-step commands  -       Communication: clear/fluent  Visual/Perceptual:      -Intact      Physical Exam:  Upper Extremity Range of Motion:     -       Right Upper Extremity: WFL  -       Left Upper Extremity: WFL   Strength:    -       Right Upper Extremity: WFL  -       Left Upper Extremity: WFL    AMPAC 6 Click ADL:  AMPAC Total Score: 15    Treatment & Education:  -education on role of OT  -safety in room  Education:    Patient left up in chair with all lines intact and call button in reach    GOALS:   Multidisciplinary Problems     Occupational Therapy Goals        Problem: Occupational Therapy Goal    Goal Priority Disciplines Outcome Interventions   Occupational Therapy Goal     OT, PT/OT Ongoing (interventions implemented as appropriate)    Description:  Goals to be met by: 9/6/2019     Patient will increase functional independence with ADLs by performing:    UE Dressing with Supervision.  Grooming while standing at sink with Contact Guard Assistance.  Toileting from bedside  commode with Supervision for hygiene and clothing management.   Stand pivot transfers with Contact Guard Assistance.  Toilet transfer to bedside commode with Contact Guard Assistance.  Upper extremity exercise program x10 reps per handout, with supervision.                      History:     Past Medical History:   Diagnosis Date    Acute respiratory failure with hypoxia     Anemia of chronic kidney failure, stage 4 (severe) 2019    Cataracts, bilateral     CHF (congestive heart failure)     CKD (chronic kidney disease) stage 3, GFR 30-59 ml/min     CKD (chronic kidney disease) stage 3, GFR 30-59 ml/min     Controlled type 2 diabetes mellitus with proteinuria or albuminuria     Depression     Diabetes with neurologic complications     Diabetic retinopathy of both eyes     Edema     Glaucoma     History of colonic polyps     Hx-TIA (transient ischemic attack) 2008    Hyperlipidemia LDL goal < 100     Hypertension     Hypothyroidism     Major depressive disorder, single episode, mild 2016    Mixed incontinence urge and stress     Obesity     Obstructive sleep apnea on CPAP     19:  Home CPAP machine broken, per patient & son    Osteopenia     Proteinuria     Sickle cell trait     TIA (transient ischemic attack)     Trouble in sleeping     Type 2 diabetes mellitus with ophthalmic manifestations     Type 2 diabetes with stage 3 chronic kidney disease GFR 30-59     Type II or unspecified type diabetes mellitus with renal manifestations, uncontrolled(250.42)     Uncontrolled type 2 diabetes mellitus with peripheral circulatory disorder 2019    Urge incontinence 2016    Urge incontinence     Venous stasis ulcer     bilateral lower legs    Vitamin D deficiency disease        Past Surgical History:   Procedure Laterality Date    BREAST BIOPSY      breast reduction Bilateral age 31    BREAST SURGERY      cataracts Bilateral      SECTION, LOW TRANSVERSE       x1    CHOLECYSTECTOMY      COLONOSCOPY N/A 12/31/2012    Performed by Keron Gunderson MD at [facility] (4TH FLR)    EYE SURGERY  1/2014, 6/2014    vitrectomy    EYE SURGERY Right 08/17/2016    HYSTERECTOMY  1986    TAHBSO (patient is unsure if ovaries removed)    OOPHORECTOMY      REFRACTIVE SURGERY      REPAIR, RETINAL DETACHMENT, WITH VITRECTOMY Right 6/4/2014    Performed by LILLIANA Coello MD at [facility] OR 1ST FLR    REPAIR, RETINAL DETACHMENT, WITH VITRECTOMY Left 1/8/2014    Performed by [facility]ello [facility] at [facility] OR 1ST FLR    REPAIR-RETINA (VITRECTOMY) Right 7/16/2014    Performed by LILLIANA Coello MD at [facility] OR 1ST FLR    STRABISMUS REPAIR/ADJUSTABLE SUTURES Bilateral 8/17/2016    Performed by RABIA Danielson Jr., MD at [facility] OR 1ST FLR    TOTAL REDUCTION MAMMOPLASTY      approx 10 yrs ago       Time Tracking:     OT Date of Treatment: 08/29/19  OT Start Time: 1527  OT Stop Time: 1604  OT Total Time (min): 37 min    Billable Minutes:Evaluation 37 with co treatment with PT    Denae Sloan, OT  8/29/2019

## 2019-08-29 NOTE — SUBJECTIVE & OBJECTIVE
"Interval History: feels better.      Review of Systems   Constitutional: Negative for activity change.   HENT: Negative for congestion.    Respiratory: Negative for chest tightness and shortness of breath.    Cardiovascular: Positive for leg swelling. Negative for chest pain.   Gastrointestinal: Negative for abdominal pain.   Genitourinary: Negative for difficulty urinating.     Objective:     Vital Signs (Most Recent):  Temp: 98.6 °F (37 °C) (08/29/19 0817)  Pulse: 65 (08/29/19 0817)  Resp: 20 (08/29/19 0817)  BP: 132/76 (08/29/19 0817)  SpO2: 95 % (08/29/19 0817) Vital Signs (24h Range):  Temp:  [97.1 °F (36.2 °C)-98.6 °F (37 °C)] 98.6 °F (37 °C)  Pulse:  [61-66] 65  Resp:  [16-20] 20  SpO2:  [95 %-99 %] 95 %  BP: (132-150)/(69-79) 132/76     Weight: 131.1 kg (289 lb 0.4 oz)(pt wt in bed refused to stand states "i'm to sleepy")  Body mass index is 48.1 kg/m².    Intake/Output Summary (Last 24 hours) at 8/29/2019 0905  Last data filed at 8/29/2019 0700  Gross per 24 hour   Intake 120 ml   Output 700 ml   Net -580 ml      Physical Exam   Constitutional: She is oriented to person, place, and time. She appears well-developed and well-nourished.   HENT:   Head: Normocephalic and atraumatic.   Cardiovascular: Normal rate and regular rhythm.   Pulmonary/Chest: Effort normal. No stridor. No respiratory distress. She has no wheezes. She has no rales.   Neurological: She is alert and oriented to person, place, and time.   Psychiatric: She has a normal mood and affect. Her behavior is normal.   Nursing note and vitals reviewed.      Significant Labs:   BMP:   Recent Labs   Lab 08/27/19  1628   *      K 4.0      CO2 26   BUN 72*   CREATININE 4.9*   CALCIUM 8.9     CBC:   Recent Labs   Lab 08/27/19  1628   WBC 4.25   HGB 11.6*   HCT 36.6*          Significant Imaging:  "

## 2019-08-29 NOTE — PLAN OF CARE
"TN met with pt at bedside. TN explained her role in Care Management. Pt voiced understanding. TN inquired about HELP AT HOME. Pt stated that she will have sonAlexandria, at home to help for support. TN voiced understanding. TN inquired about responsibilities when it comes to  MANAGING HER HEALTH at home and what it entails. Pt inquired about details. TN informed pt of RESPONSIBILITIES of:    1. Follow up appointments  2. Getting Prescriptions filled  3. Taking medications as prescribed.     Pt voiced understanding. TN explained "My Health Packet" blue folder and the pink and green tabs that are on the folder as well. Pt voiced understanding.     Pt's pharmacy:   [email or web address] - [place], [place] - [address]  [address] [address]  Phone: [phone] Fax: [phone]    [id number] - [place] - [address]  [address]  [place] [address]  Phone: [phone] Fax: [phone]    Pt's preference for appointments: Afternoons       08/29/19 1445   Discharge Assessment   Assessment Type Discharge Planning Assessment   Confirmed/corrected address and phone number on facesheet? Yes   Assessment information obtained from? Patient   Communicated expected length of stay with patient/caregiver no   Prior to hospitilization cognitive status: Alert/Oriented   Prior to hospitalization functional status: Assistive Equipment;Needs Assistance   Current cognitive status: Alert/Oriented   Current Functional Status: Assistive Equipment;Needs Assistance   Lives With child(evonne), adult  (Adult son lives with the pt.)   Able to Return to Prior Arrangements yes   Is patient able to care for self after discharge? Yes   Who are your caregiver(s) and their phone number(s)?   (SonAlexandria, [phone])   Patient's perception of discharge disposition home or selfcare   Readmission Within the Last 30 Days no previous admission in last 30 days   Patient currently being followed by " outpatient case management? No   Patient currently receives any other outside agency services? No   Equipment Currently Used at Home walker, standard   Do you have any problems affording any of your prescribed medications? No   Is the patient taking medications as prescribed? yes   Does the patient have transportation home? Yes   Transportation Anticipated family or friend will provide   Does the patient receive services at the [facility]? No   Discharge Plan A Home with family   DME Needed Upon Discharge  CPAP;hospital bed   Patient/Family in Agreement with Plan yes

## 2019-08-29 NOTE — PROGRESS NOTES
[facility] Hospital Medicine  Progress Note    Patient Name: Erica Leblanc  MRN: [id number]  Patient Class: IP- Inpatient   Admission Date: 8/27/2019  Length of Stay: 2 days  Attending Physician: Luiz Little MD  Primary Care Provider: Sendy Elaine MD        Subjective:     Principal Problem:Acute on chronic diastolic heart failure        HPI:  Ms. Eirca Leblanc is a 74 y.o. female with essential hypertension, type 2 diabetes mellitus (HbA1c 7.3% Jul 2019), hyperlipidemia (LDL 52.8 Mar 2019), chronic diastolic heart failure (LVEF 70% Jun 2017), CKD stage 5, hypothyroidism (TSH 3.298 Mar 2019), anemia chronic disease, morbid obesity (BMI 41.6), HUMBERTO on CPAP, and depression who presents to [facility] ED with complaints of dyspnea for the past four days.  She had reported to the ED physician that her CPAP machine has been malfunctioning, a similar complaint to her presentation two months ago.  She has not been able to get it replaced due to insurance constraints.  The dyspnea is worse on exertion.  Further history is otherwise limited at this time due to hypersomnolence.    Overview/Hospital Course:  75 y/o female admitted with acute on chronic diastolic heart failure.  Started on IV diuresis.  Patient with hx of CKD and worsening Creat.  Nephrology consulted. Patient clinically improved.    Interval History: feels better.      Review of Systems   Constitutional: Negative for activity change.   HENT: Negative for congestion.    Respiratory: Negative for chest tightness and shortness of breath.    Cardiovascular: Positive for leg swelling. Negative for chest pain.   Gastrointestinal: Negative for abdominal pain.   Genitourinary: Negative for difficulty urinating.     Objective:     Vital Signs (Most Recent):  Temp: 98.6 °F (37 °C) (08/29/19 0817)  Pulse: 65 (08/29/19 0817)  Resp: 20 (08/29/19 0817)  BP: 132/76 (08/29/19 0817)  SpO2: 95 % (08/29/19 0817) Vital Signs (24h Range):  Temp:  " [97.1 °F (36.2 °C)-98.6 °F (37 °C)] 98.6 °F (37 °C)  Pulse:  [61-66] 65  Resp:  [16-20] 20  SpO2:  [95 %-99 %] 95 %  BP: (132-150)/(69-79) 132/76     Weight: 131.1 kg (289 lb 0.4 oz)(pt wt in bed refused to stand states "i'm to sleepy")  Body mass index is 48.1 kg/m².    Intake/Output Summary (Last 24 hours) at 8/29/2019 0905  Last data filed at 8/29/2019 0700  Gross per 24 hour   Intake 120 ml   Output 700 ml   Net -580 ml      Physical Exam   Constitutional: She is oriented to person, place, and time. She appears well-developed and well-nourished.   HENT:   Head: Normocephalic and atraumatic.   Cardiovascular: Normal rate and regular rhythm.   Pulmonary/Chest: Effort normal. No stridor. No respiratory distress. She has no wheezes. She has no rales.   Neurological: She is alert and oriented to person, place, and time.   Psychiatric: She has a normal mood and affect. Her behavior is normal.   Nursing note and vitals reviewed.      Significant Labs:   BMP:   Recent Labs   Lab 08/27/19  1628   *      K 4.0      CO2 26   BUN 72*   CREATININE 4.9*   CALCIUM 8.9     CBC:   Recent Labs   Lab 08/27/19  1628   WBC 4.25   HGB 11.6*   HCT 36.6*          Significant Imaging:      Assessment/Plan:      * Acute on chronic diastolic heart failure  Patient was recently admitted here two months ago for dyspnea related to her malfunctioning CPAP machine.  She reports that her CPAP machine is still not functioning and reports worsening dyspnea.  She does appear volume overload.  Chest radiograph significant for mild pulmonary edema and her BNP is 1792.    Her troponin is mildly elevated at 0.055.   She has been given intravenous diuresis with furosemide with improvement of her symptoms.  Will monitor her urine output very closely.   Check Echo.    May need another day   BMP pending     Depression  Stable; will continue her home regimen of citalopram.    Anemia of chronic disease  The patient's H/H is stable " and consistent with previous laboratory measurements, and the patient exhibits no signs or symptoms of acute bleeding; there is no indication for transfusion.  Will continue to monitor.    CKD stage 5  Worsening renal function over past few months.  Monitor closely while on diuresis.  Will get Nephrology input.    Essential hypertension  Patient's blood pressure is better-controlled; will continue home regimen of bumetanide and hydralazine, and provide as-needed clonidine.  Will hold her home regimen of amlodipine and metoprolol due to acute heart failure.    Chronic diastolic heart failure  As addressed above.    Type 2 diabetes mellitus, controlled, with renal complications  Well controlled on a home regimen of basal insulin therapy; will provide basal-prandial insulin therapy along insulin sliding scale.  A1c ordered     Hypothyroidism (acquired)  Poorly controlled; will continue her home regimen of levothyroxine and defer dosage adjustments to her PCP.    HUMBERTO on CPAP  As addressed above.    Hyperlipidemia LDL goal <100  Well controlled; will continue her home regimen of atorvastatin.    Morbid obesity with body mass index (BMI) of 40.0 to 44.9 in adult  The patient has been counseled on the negative impact that obesity imparts on her health and was encouraged to make lifestyle changes in order to lose weight and decrease her modifiable risk factors.      VTE Risk Mitigation (From admission, onward)        Ordered     heparin (porcine) injection 5,000 Units  Every 12 hours      08/27/19 2217     IP VTE HIGH RISK PATIENT  Once      08/27/19 2217        Continue present therapy.           Luiz Rodríguez MD  Department of Hospital Medicine   [facility]

## 2019-08-29 NOTE — PROGRESS NOTES
Sitting up in chair at bedside. Removed dressings from legs. Small amount edema in lower extremities with scars at site of previous wounds on bilateral anterior and right posterior lower legs.   Foul smell from abdominal folds. Will treat with cleansing and Critic Aid AF (miconazole).  Plan: Will size for Tubigrip for light compression.

## 2019-08-29 NOTE — TELEPHONE ENCOUNTER
----- Message from Marcela Burrell sent at 8/29/2019 12:35 PM CDT -----  Contact: Rossy Cruz  [facility] [phone]  .Type: Patient Call Back    Who called: Rossy Cruz  [facility]    What is the request in detail: Theres a pending order in other order for aug 27 and needs to be signed by Dr. Elaine. If  Signs pt will be able to go home with CPAP today. Please fax to [phone]  Please send asap bc pt was in the hosp. due to not having the machine.  Can the clinic reply by [facility]? Call back     Would the patient rather a call back or a response via [facility]? Call back     Best call back number: [phone]

## 2019-08-29 NOTE — PT/OT/SLP EVAL
Physical Therapy Evaluation    Patient Name:  Erica Leblanc   MRN:  [id number]    Recommendations:     Discharge Recommendations:  home health PT(Family assist)   Discharge Equipment Recommendations: hospital bed(CPAP)   Barriers to discharge: None    Assessment:     Erica Leblanc is a 74 y.o. female admitted with a medical diagnosis of Acute on chronic diastolic heart failure.  She presents with the following impairments/functional limitations:  weakness, impaired functional mobilty, impaired balance, decreased upper extremity function, decreased safety awareness, impaired coordination, impaired cardiopulmonary response to activity, impaired endurance, impaired self care skills, gait instability, decreased coordination, decreased lower extremity function, impaired skin .    Rehab Prognosis: Good; patient would benefit from acute skilled PT services to address these deficits and reach maximum level of function.    Recent Surgery: * No surgery found *      Plan:     During this hospitalization, patient to be seen daily to address the identified rehab impairments via gait training, therapeutic activities, therapeutic exercises and progress toward the following goals:    · Plan of Care Expires:  09/12/19    Subjective     Chief Complaint: SOB  Patient/Family Comments/goals: PLOF  Pain/Comfort:  Pain Rating 1: 0/10    Patients cultural, spiritual, Church conflicts given the current situation: no    Living Environment:  Pt lives with her son in a [facility] with 1STE  Prior to admission, patients level of function was  Mod I approx 1month ago.  Was just D/C from SNF.  Equipment used at home: walker, rolling, commode, shower chair(was supposed to recieve a CPAP).  DME owned (not currently used): none.  Upon discharge, patient will have assistance from Family.    Objective:     Communicated with nsg prior to session.  Patient found HOB elevated with oxygen, kemp catheter, bed alarm  upon PT entry to  room.    General Precautions: Standard, fall, respiratory   Orthopedic Precautions:N/A   Braces: N/A     Exams:  · Cognitive Exam:  Patient is oriented to Person, Place, Time and Situation  · Gross Motor Coordination:  impaired 2/2 obesity, weakness, and deconditioning  · Postural Exam:  Patient presented with the following abnormalities:    · -       Rounded shoulders  · -       Forward head  · -       Pendulous abdomen  · Skin Integrity/Edema:      · -       Skin integrity: B LE's wrapped  · RLE ROM: WFL  · RLE Strength: WFL  · LLE ROM: WFL  · LLE Strength: WFL    Functional Mobility:  · Bed Mobility:     · Scooting: minimum assistance  · Supine to Sit: maximal assistance  · Transfers:     · Sit to Stand:  moderate assistance and of 2 persons with rolling walker  · Gait: 12' with RW and CGA with VC for increased trunk ext and walker management/safety  · Balance: FAir+ sit, Fair stand      Therapeutic Activities and Exercises:   Pt performed commode t/f with RW and CGA with VC for hand placement/safety.   Pt was Total assist for toileting    AM-PAC 6 CLICK MOBILITY  Total Score:13     Patient left up in chair with all lines intact, call button in reach, PCT present for bath  GOALS:   Multidisciplinary Problems     Physical Therapy Goals        Problem: Physical Therapy Goal    Goal Priority Disciplines Outcome Goal Variances Interventions   Physical Therapy Goal     PT, PT/OT Ongoing (interventions implemented as appropriate)     Description:  Goals to be met by: 2019     Patient will increase functional independence with mobility by performin. Supine to sit with Stand-by Assistance  2. Sit to stand transfer with Stand-by Assistance  3. Gait  x 50 feet with Stand-by Assistance using Rolling Walker.   4. Lower extremity exercise program x10 reps per handout, with independence                      History:     Past Medical History:   Diagnosis Date    Acute respiratory failure with hypoxia     Anemia  of chronic kidney failure, stage 4 (severe) 2019    Cataracts, bilateral     CHF (congestive heart failure)     CKD (chronic kidney disease) stage 3, GFR 30-59 ml/min     CKD (chronic kidney disease) stage 3, GFR 30-59 ml/min     Controlled type 2 diabetes mellitus with proteinuria or albuminuria     Depression     Diabetes with neurologic complications     Diabetic retinopathy of both eyes     Edema     Glaucoma     History of colonic polyps     Hx-TIA (transient ischemic attack) 2008    Hyperlipidemia LDL goal < 100     Hypertension     Hypothyroidism     Major depressive disorder, single episode, mild 2016    Mixed incontinence urge and stress     Obesity     Obstructive sleep apnea on CPAP     19:  Home CPAP machine broken, per patient & son    Osteopenia     Proteinuria     Sickle cell trait     TIA (transient ischemic attack)     Trouble in sleeping     Type 2 diabetes mellitus with ophthalmic manifestations     Type 2 diabetes with stage 3 chronic kidney disease GFR 30-59     Type II or unspecified type diabetes mellitus with renal manifestations, uncontrolled(250.42)     Uncontrolled type 2 diabetes mellitus with peripheral circulatory disorder 2019    Urge incontinence 2016    Urge incontinence     Venous stasis ulcer     bilateral lower legs    Vitamin D deficiency disease        Past Surgical History:   Procedure Laterality Date    BREAST BIOPSY      breast reduction Bilateral age 31    BREAST SURGERY      cataracts Bilateral      SECTION, LOW TRANSVERSE      x1    CHOLECYSTECTOMY      COLONOSCOPY N/A 2012    [facility]    EYE SURGERY  2014, 2014    vitrectomy    EYE SURGERY Right 2016    HYSTERECTOMY  1986    TAHBSO (patient is unsure if ovaries removed)    OOPHORECTOMY      REFRACTIVE SURGERY      REPAIR, RETINAL DETACHMENT, WITH VITRECTOMY Right 2014     Performed by LILLIANA Coello [facility] at [facility] OR [facility]    REPAIR, RETINAL DETACHMENT, WITH VITRECTOMY Left 1/8/2014    Performed by LILLIANA Coello MD at [facility]    REPAIR-RETINA (VITRECTOMY) Right 7/16/2014    Performed by LILLIANA Coello MD at [facility]    STRABISMUS REPAIR/ADJUSTABLE SUTURES Bilateral 8/17/2016    Performed by RABIA Danielson Jr., MD at [facility] OR [facility]    TOTAL REDUCTION MAMMOPLASTY      approx 10 yrs ago       Time Tracking:     PT Received On: 08/29/19  PT Start Time: 1527     PT Stop Time: 1557  PT Total Time (min): 30 min     Billable Minutes: Evaluation 15 and Therapeutic Activity 15      Neeru Sun, PT  08/29/2019

## 2019-08-29 NOTE — PROGRESS NOTES
Praful from [facility] says Brett, her Manager says would not be a problem for c-pap to be delivered to patient here at the hospital:But he would like to know  when patient is discharging.  TERESITA Whittaker says Dr. Little/hospitalist says probably on tomorrow..Rossy Gonzales RN, BSN, [facility]  8/29/2019

## 2019-08-29 NOTE — PROGRESS NOTES
HOW TO MANAGE YOUR CARE  AT HOME:  TN taught Symptoms and Problems for CHF home care with pt and  with teach back:  1. 3 lbs in 1 day, 2.feet and legs swelling, 3. Chest Pain. TN placed education sheet in [employer] Packet..     HELP AT HOME TO ASSIST WITH PATIENT'S RECOVERY IS son/[place]and[place].      TN taught patient about things she is responsible for when discharged TO HELP WITH HER RECOVERY:   How to Manage Her Care At Home:  1. Getting her prescriptions filled.  2. Taking her medications as directed. DO NOT MISS ANY DOSES!  3. Going to her follow-up doctor appointments.   .  Rossy Gonzales RN, BSN, STN CCM

## 2019-08-29 NOTE — PLAN OF CARE
Problem: Physical Therapy Goal  Goal: Physical Therapy Goal  Goals to be met by: 2019     Patient will increase functional independence with mobility by performin. Supine to sit with Stand-by Assistance  2. Sit to stand transfer with Stand-by Assistance  3. Gait  x 50 feet with Stand-by Assistance using Rolling Walker.   4. Lower extremity exercise program x10 reps per handout, with independence    Outcome: Ongoing (interventions implemented as appropriate)  Initial PT evaluation completed.  Pt could benefit from Daily skilled PT services in order to maximize function prior to D/C.  HHPT and family assist recommended.

## 2019-08-29 NOTE — PLAN OF CARE
Problem: Adult Inpatient Plan of Care  Goal: Plan of Care Review     08/29/19 1657   Plan of Care Review   Plan of Care Reviewed With patient   Pt remains free from falls able to make needs known,iv  Diuretic remains in progress,kemp cath P/I draining clear yellow urine to gu bag,requires extensive assist with adl's food and fluid intake good,ambulated and walked with therapy,sat up in chair,denies pain,continue monitoring.   08/29/19 3046   Plan of Care Review   Plan of Care Reviewed With patient

## 2019-08-29 NOTE — PROGRESS NOTES
Date of Admission:8/27/2019    SUBJECTIVE: notes feeling ok this am    Current Facility-Administered Medications   Medication    acetaminophen tablet 500 mg    atorvastatin tablet 10 mg    bumetanide injection 2 mg    citalopram tablet 10 mg    cloNIDine tablet 0.1 mg    clopidogrel tablet 75 mg    dextrose 50% injection 12.5 g    dextrose 50% injection 25 g    glucagon (human recombinant) injection 1 mg    glucose chewable tablet 16 g    glucose chewable tablet 24 g    heparin (porcine) injection 5,000 Units    hydrALAZINE tablet 100 mg    insulin aspart U-100 pen 0-5 Units    insulin aspart U-100 pen 3 Units    insulin detemir U-100 pen 5 Units    levothyroxine tablet 50 mcg    prochlorperazine injection Soln 5 mg    promethazine (PHENERGAN) 6.25 mg in dextrose 5 % 50 mL IVPB    ramelteon tablet 8 mg       Wt Readings from Last 3 Encounters:   08/29/19 131.1 kg (289 lb 0.4 oz)   08/15/19 123 kg (271 lb 2.7 oz)   08/07/19 123.4 kg (272 lb)     Temp Readings from Last 3 Encounters:   08/29/19 98.6 °F (37 °C) (Oral)   07/25/19 98.1 °F (36.7 °C)   04/05/19 98.3 °F (36.8 °C) (Oral)     BP Readings from Last 3 Encounters:   08/29/19 132/76   08/15/19 (!) 181/88   08/07/19 122/80     Pulse Readings from Last 3 Encounters:   08/29/19 65   08/15/19 (!) 58   08/07/19 68       Intake/Output Summary (Last 24 hours) at 8/29/2019 1140  Last data filed at 8/29/2019 0700  Gross per 24 hour   Intake 120 ml   Output 700 ml   Net -580 ml       PE:  GEN:wd female in nad  HEENT:ncat,eomi,mm  CVS:s1s2 regular  PULM:ctab  ABD:+bs,soft,nd  EXT:2+leg edema  NEURO:awale    Recent Labs   Lab 08/29/19  1008   *  178*     139   K 4.4  4.4     105   CO2 23  23   BUN 71*  71*   CREATININE 4.9*  4.9*   CALCIUM 8.2*  8.2*   MG 2.3       Lab Results   Component Value Date    .9 (H) 05/22/2019    CALCIUM 8.2 (L) 08/29/2019    CALCIUM 8.2 (L) 08/29/2019    PHOS 4.4 08/07/2019       No results for  input(s): WBC, RBC, HGB, HCT, PLT, MCV, MCH, MCHC in the last 24 hours.      A/P:  1.curt. On ckd 4.creatinine about same. No hd yet. Cont ck labs.  2.acute  On diastolic hf.  Cont diuresis. Concern of valve. Recommend cards ck.  3.ryan. Cont bipap.  4.anemia with ckd. No epo indicated for now.  5.dm2. Following sugars.  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 Phos ok. No binders for now.  7.obesity. Wt loss is needed.  8.dm2. Poorly controlled. Defer tx to primary team.

## 2019-08-29 NOTE — ASSESSMENT & PLAN NOTE
Well controlled on a home regimen of basal insulin therapy; will provide basal-prandial insulin therapy along insulin sliding scale.  A1c ordered

## 2019-08-30 PROBLEM — R53.81 DEBILITY: Status: ACTIVE | Noted: 2019-08-30

## 2019-08-30 PROBLEM — J96.10 CHRONIC RESPIRATORY FAILURE: Status: ACTIVE | Noted: 2019-08-30

## 2019-08-30 LAB
ANION GAP SERPL CALC-SCNC: 9 MMOL/L (ref 8–16)
BUN SERPL-MCNC: 76 MG/DL (ref 8–23)
CALCIUM SERPL-MCNC: 8.1 MG/DL (ref 8.7–10.5)
CHLORIDE SERPL-SCNC: 104 MMOL/L (ref 95–110)
CO2 SERPL-SCNC: 27 MMOL/L (ref 23–29)
CREAT SERPL-MCNC: 4.8 MG/DL (ref 0.5–1.4)
EST. GFR  (AFRICAN AMERICAN): 10 ML/MIN/1.73 M^2
EST. GFR  (NON AFRICAN AMERICAN): 8 ML/MIN/1.73 M^2
GLUCOSE SERPL-MCNC: 141 MG/DL (ref 70–110)
POCT GLUCOSE: 132 MG/DL (ref 70–110)
POCT GLUCOSE: 136 MG/DL (ref 70–110)
POCT GLUCOSE: 182 MG/DL (ref 70–110)
POCT GLUCOSE: 218 MG/DL (ref 70–110)
POTASSIUM SERPL-SCNC: 3.7 MMOL/L (ref 3.5–5.1)
SODIUM SERPL-SCNC: 140 MMOL/L (ref 136–145)

## 2019-08-30 PROCEDURE — 25000003 PHARM REV CODE 250: Mod: HCNC | Performed by: INTERNAL MEDICINE

## 2019-08-30 PROCEDURE — S0171 BUMETANIDE 0.5 MG: HCPCS | Mod: HCNC | Performed by: INTERNAL MEDICINE

## 2019-08-30 PROCEDURE — 63600175 PHARM REV CODE 636 W HCPCS: Mod: HCNC | Performed by: INTERNAL MEDICINE

## 2019-08-30 PROCEDURE — 11000001 HC ACUTE MED/SURG PRIVATE ROOM: Mod: HCNC

## 2019-08-30 PROCEDURE — 94660 CPAP INITIATION&MGMT: CPT | Mod: HCNC

## 2019-08-30 PROCEDURE — 27000221 HC OXYGEN, UP TO 24 HOURS: Mod: HCNC

## 2019-08-30 PROCEDURE — 36415 COLL VENOUS BLD VENIPUNCTURE: CPT | Mod: HCNC

## 2019-08-30 PROCEDURE — 94761 N-INVAS EAR/PLS OXIMETRY MLT: CPT | Mod: HCNC

## 2019-08-30 PROCEDURE — 99900035 HC TECH TIME PER 15 MIN (STAT): Mod: HCNC

## 2019-08-30 PROCEDURE — 97535 SELF CARE MNGMENT TRAINING: CPT | Mod: HCNC

## 2019-08-30 PROCEDURE — 80048 BASIC METABOLIC PNL TOTAL CA: CPT | Mod: HCNC

## 2019-08-30 RX ADMIN — CLOPIDOGREL BISULFATE 75 MG: 75 TABLET ORAL at 08:08

## 2019-08-30 RX ADMIN — BUMETANIDE 2 MG: 0.25 INJECTION INTRAMUSCULAR; INTRAVENOUS at 08:08

## 2019-08-30 RX ADMIN — LEVOTHYROXINE SODIUM 50 MCG: 50 TABLET ORAL at 06:08

## 2019-08-30 RX ADMIN — BUMETANIDE 2 MG: 0.25 INJECTION INTRAMUSCULAR; INTRAVENOUS at 09:08

## 2019-08-30 RX ADMIN — HYDRALAZINE HYDROCHLORIDE 100 MG: 25 TABLET ORAL at 08:08

## 2019-08-30 RX ADMIN — CITALOPRAM HYDROBROMIDE 10 MG: 10 TABLET ORAL at 08:08

## 2019-08-30 RX ADMIN — MICONAZOLE NITRATE: 20 OINTMENT TOPICAL at 08:08

## 2019-08-30 RX ADMIN — HYDRALAZINE HYDROCHLORIDE 100 MG: 25 TABLET ORAL at 03:08

## 2019-08-30 RX ADMIN — INSULIN ASPART 3 UNITS: 100 INJECTION, SOLUTION INTRAVENOUS; SUBCUTANEOUS at 12:08

## 2019-08-30 RX ADMIN — INSULIN ASPART 3 UNITS: 100 INJECTION, SOLUTION INTRAVENOUS; SUBCUTANEOUS at 08:08

## 2019-08-30 RX ADMIN — INSULIN DETEMIR 5 UNITS: 100 INJECTION, SOLUTION SUBCUTANEOUS at 08:08

## 2019-08-30 RX ADMIN — HEPARIN SODIUM 5000 UNITS: 5000 INJECTION, SOLUTION INTRAVENOUS; SUBCUTANEOUS at 09:08

## 2019-08-30 RX ADMIN — INSULIN ASPART 3 UNITS: 100 INJECTION, SOLUTION INTRAVENOUS; SUBCUTANEOUS at 05:08

## 2019-08-30 RX ADMIN — MICONAZOLE NITRATE: 20 OINTMENT TOPICAL at 09:08

## 2019-08-30 RX ADMIN — INSULIN ASPART 2 UNITS: 100 INJECTION, SOLUTION INTRAVENOUS; SUBCUTANEOUS at 09:08

## 2019-08-30 RX ADMIN — ATORVASTATIN CALCIUM 10 MG: 10 TABLET, FILM COATED ORAL at 08:08

## 2019-08-30 RX ADMIN — HYDRALAZINE HYDROCHLORIDE 100 MG: 25 TABLET ORAL at 09:08

## 2019-08-30 RX ADMIN — HEPARIN SODIUM 5000 UNITS: 5000 INJECTION, SOLUTION INTRAVENOUS; SUBCUTANEOUS at 08:08

## 2019-08-30 NOTE — ASSESSMENT & PLAN NOTE
Patient was recently admitted here two months ago for dyspnea related to her malfunctioning CPAP machine.  She reports that her CPAP machine is still not functioning and reports worsening dyspnea.  She does appear volume overload.  Chest radiograph significant for mild pulmonary edema and her BNP is 1792.    Her troponin is mildly elevated at 0.055.   She has been given intravenous diuresis with furosemide with improvement of her symptoms.  Will monitor her urine output very closely.   Check Echo.    May need another day   BMP pending   Home on 8/31.

## 2019-08-30 NOTE — PHYSICIAN QUERY
PT Name: Erica Leblanc  MR #: [id number]     Physician Query Form - Documentation Clarification      CDS/: Ashlee Hector RN              Contact information:[phone]    This form is a permanent document in the medical record.     Query Date: August 30, 2019    By submitting this query, we are merely seeking further clarification of documentation. Please utilize your independent clinical judgment when addressing the question(s) below.    The Medical record reflects the following:    Supporting Clinical Findings Location in Medical Record   This is a 74-year-old female with a history of CKD,   stage IV.  She sees Dr. Joseph with baseline creatinine approximately 3.9, came   in with concerns of worsening shortnes  s of breath.      Subsequently,   upon evaluation here, she was concerned having decompensated heart failure with   also worsening renal function with BUN and creatinine of 72 and 4.9.  We are   asked for assistance in regards to the renal failure since her nephrologist does   not come here.      1.sathish. On ckd 4.creatinine about same. No hd yet. Cont ck labs.      Consults 8/28      Nephrology          Consult 8/28       Nephrology                  Progress Note 8/29      Nephrology     Patient with hx of CKD and worsening Creat.  Nephrology consulted.       CKD stage 5   Worsening renal function over past few months.   Monitor closely while on diuresis.   Will get Nephrology input.       BUN    72-->  71 --> 76  Cr       4.9--> 4.9->   4.8  GFR      9--> 8  -->  8   Progress Note 8/30      Hospital Medicine           Progress Note 8/30      Hospital Medicine          Lab 8/27, 8/29, 8/30                                                                            Doctor, Please specify diagnosis or diagnoses associated with above clinical findings.        Doctor, please clarify the above documentation regarding the renal diagnosis.    Provider Use Only    [ x  ]  SATHISH on CDK 4    [   ]  CKD 5    [    ] Other (please specify) ______________________                                                                                                               [  ] Clinically Undetermined

## 2019-08-30 NOTE — PT/OT/SLP PROGRESS
Occupational Therapy   Treatment    Name: Erica Leblanc  MRN: [id number]  Admitting Diagnosis:  Acute on chronic diastolic heart failure       Recommendations:     Discharge Recommendations: home health OT  Discharge Equipment Recommendations:  none  Barriers to discharge:  (Pt states that someone is usually home)    Assessment:     Erica Leblanc is a 74 y.o. female with a medical diagnosis of Acute on chronic diastolic heart failure.  She presents with good support and understanding of equipment needs and exercises. Performance deficits affecting function are weakness, impaired endurance, impaired self care skills, impaired functional mobilty, gait instability, decreased coordination, decreased lower extremity function, edema, impaired cardiopulmonary response to activity, impaired joint extensibility.     Rehab Prognosis:  Fair; patient would benefit from acute skilled OT services to address these deficits and reach maximum level of function.       Plan:     Patient to be seen 2 x/week to address the above listed problems via self-care/home management, therapeutic activities, therapeutic exercises  · Plan of Care Expires:    · Plan of Care Reviewed with: patient    Subjective     Pain/Comfort:  · Pain Rating 1: 0/10    Objective:     Communicated with: nurse Puente prior to session.  Patient found supine with telemetry, kemp catheter, peripheral IV upon OT entry to room.    General Precautions: Standard, fall, respiratory   Orthopedic Precautions:N/A   Braces:       Occupational Performance:     Bed Mobility:    · Patient completed Scooting/Bridging with minimum assistance  · Patient completed Supine to Sit with moderate assistance     Functional Mobility/Transfers:  · Patient completed Sit <> Stand Transfer with supervision  with  rolling walker   · Patient completed Bed <> Chair Transfer using Stand Pivot technique with supervision with rolling walker  · Functional Mobility: Pt able to tolerate sitting in  chair with supervison with good balance noted    Activities of Daily Living:  · Feeding:  modified independence and supervision    · Grooming: supervision    · Upper Body Dressing: contact guard assistance        [facility] 6 Click ADL: 17    Treatment & Education:  Pt received written exercise program for theraband exercises and AVS will print information on home safety    Patient left up in chair with all lines intact, call button in reach and nurse notifiedEducation:      GOALS:   Multidisciplinary Problems     Occupational Therapy Goals        Problem: Occupational Therapy Goal    Goal Priority Disciplines Outcome Interventions   Occupational Therapy Goal     OT, PT/OT Revised    Description:  Goals to be met by: 9/6/2019     Patient will increase functional independence with ADLs by performing:    UE Dressing with Supervision.  Grooming while standing at sink with Contact Guard Assistance.  Toileting from bedside commode with Supervision for hygiene and clothing management.   Stand pivot transfers with Contact Guard Assistance. Goal met 8/30/19  Toilet transfer to bedside commode with Contact Guard Assistance.  Upper extremity exercise program x10 reps per handout, with supervision. Goal met 8/30/19                       Time Tracking:     OT Date of Treatment: 08/30/19  OT Start Time: 1130  OT Stop Time: 1203  OT Total Time (min): 33 min    Billable Minutes:Self Care/Home Management 33    Denae Sloan OT  8/30/2019

## 2019-08-30 NOTE — PROGRESS NOTES
Awake alert oriented NAD    Denies CNS ENT CP GI  RHEUM OR DERM SX  Past Medical History:   Diagnosis Date    Acute respiratory failure with hypoxia     Anemia of chronic kidney failure, stage 4 (severe) 4/5/2019    Cataracts, bilateral     CHF (congestive heart failure)     CKD (chronic kidney disease) stage 3, GFR 30-59 ml/min     CKD (chronic kidney disease) stage 3, GFR 30-59 ml/min     Controlled type 2 diabetes mellitus with proteinuria or albuminuria     Depression     Diabetes with neurologic complications     Diabetic retinopathy of both eyes     Edema     Glaucoma     History of colonic polyps     Hx-TIA (transient ischemic attack) 11/2008    Hyperlipidemia LDL goal < 100     Hypertension     Hypothyroidism     Major depressive disorder, single episode, mild 2/17/2016    Mixed incontinence urge and stress     Obesity     Obstructive sleep apnea on CPAP     7/19/19:  Home CPAP machine broken, per patient & son    Osteopenia     Proteinuria     Sickle cell trait     TIA (transient ischemic attack)     Trouble in sleeping     Type 2 diabetes mellitus with ophthalmic manifestations     Type 2 diabetes with stage 3 chronic kidney disease GFR 30-59     Type II or unspecified type diabetes mellitus with renal manifestations, uncontrolled(250.42)     Uncontrolled type 2 diabetes mellitus with peripheral circulatory disorder 4/5/2019    Urge incontinence 1/11/2016    Urge incontinence     Venous stasis ulcer     bilateral lower legs    Vitamin D deficiency disease      Review of patient's allergies indicates:   Allergen Reactions    Ace inhibitors Other (See Comments)     Other reaction(s): cough       Current Facility-Administered Medications   Medication    acetaminophen tablet 500 mg    atorvastatin tablet 10 mg    bumetanide injection 2 mg    citalopram tablet 10 mg    cloNIDine tablet 0.1 mg    clopidogrel tablet 75 mg    dextrose 50% injection 12.5 g    dextrose  50% injection 25 g    glucagon (human recombinant) injection 1 mg    glucose chewable tablet 16 g    glucose chewable tablet 24 g    heparin (porcine) injection 5,000 Units    hydrALAZINE tablet 100 mg    insulin aspart U-100 pen 0-5 Units    insulin aspart U-100 pen 3 Units    insulin detemir U-100 pen 5 Units    levothyroxine tablet 50 mcg    miconazole nitrate 2% ointment    prochlorperazine injection Soln 5 mg    promethazine (PHENERGAN) 6.25 mg in dextrose 5 % 50 mL IVPB    ramelteon tablet 8 mg       LABS    Recent Results (from the past 24 hour(s))   POCT glucose    Collection Time: 08/29/19  4:34 PM   Result Value Ref Range    POCT Glucose 212 (H) 70 - 110 mg/dL   POCT glucose    Collection Time: 08/29/19  9:17 PM   Result Value Ref Range    POCT Glucose 203 (H) 70 - 110 mg/dL   Basic metabolic panel    Collection Time: 08/30/19  5:09 AM   Result Value Ref Range    Sodium 140 136 - 145 mmol/L    Potassium 3.7 3.5 - 5.1 mmol/L    Chloride 104 95 - 110 mmol/L    CO2 27 23 - 29 mmol/L    Glucose 141 (H) 70 - 110 mg/dL    BUN, Bld 76 (H) 8 - 23 mg/dL    Creatinine 4.8 (H) 0.5 - 1.4 mg/dL    Calcium 8.1 (L) 8.7 - 10.5 mg/dL    Anion Gap 9 8 - 16 mmol/L    eGFR if African American 10 (A) >60 mL/min/1.73 m^2    eGFR if non African American 8 (A) >60 mL/min/1.73 m^2   POCT glucose    Collection Time: 08/30/19  8:09 AM   Result Value Ref Range    POCT Glucose 132 (H) 70 - 110 mg/dL   POCT glucose    Collection Time: 08/30/19 11:27 AM   Result Value Ref Range    POCT Glucose 136 (H) 70 - 110 mg/dL   ]    I/O last 3 completed shifts:  In: 480 [P.O.:480]  Out: 1575 [Urine:1575]    Vitals:    08/30/19 0349 08/30/19 0755 08/30/19 0808 08/30/19 1130   BP: (!) 146/71  135/76 125/70   Pulse: 71  72 75   Resp: 16  17 17   Temp: 98.4 °F (36.9 °C)  98 °F (36.7 °C) 98.2 °F (36.8 °C)   TempSrc: Axillary  Oral Axillary   SpO2: 99% 98% 96% (!) 92%   Weight:       Height:           No Jvd, Thyromegaly or  Lymphadenopathy  Lungs: Fairly clear anteriorly and laterally  Cor: RRR no G or rubs  Abd: Soft benign good bowel sounds non tender  Ext: Pos edema    A)    SATHISH on ckd4  CHF  HUMBERTO  Anemia of chronic ds  T2Dm  2nd hyperpth  Obesity    P)    Renal Diet  Home meds  Adjust all meds to the degree of renal fx  Close follow up I/O and weights  Maintain Hydration   Diuretics as needed

## 2019-08-30 NOTE — PROGRESS NOTES
[facility], Brett Garcia, Manager for Respiratory [phone], requested new c-pap order for home use.   TN secure chat Dr. Little  And he says he will put in [facility].  TN will alert [facility] so that patient will receive c-pap and be fitted at bedside today in preparation for her Saturday discharge..Rossy Gonzales RN, BSN, [facility]  8/30/2019

## 2019-08-30 NOTE — PLAN OF CARE
Problem: Occupational Therapy Goal  Goal: Occupational Therapy Goal  Goals to be met by: 9/6/2019     Patient will increase functional independence with ADLs by performing:    UE Dressing with Supervision.  Grooming while standing at sink with Contact Guard Assistance.  Toileting from bedside commode with Supervision for hygiene and clothing management.   Stand pivot transfers with Contact Guard Assistance. Goal met 8/30/19  Toilet transfer to bedside commode with Contact Guard Assistance.  Upper extremity exercise program x10 reps per handout, with supervision. Goal met 8/30/19     Outcome: Revised  Pt is pro gressing with mobility selfcare

## 2019-08-30 NOTE — PROGRESS NOTES
1105 TN scheduled appts electronically with Dr. Li, cardiology, on 09/10/19 at 2:40 PM; placed on the waiting list. Scheduled PCP, Dr. Elaine, on 09/23/19 at 1:20 PM, placed on the waiting list.    1114 TN contacted [facility] at [phone]; spoke with Danita concerning the delivery of the hospital bed, informing pt will discharge on 08/31/19. Per Rossy Gonzales, [facility] needed to know the discharge date.    1141 TN was contacted by Mila of [facility] inquiring of notation regarding CPAP compliance. TN stated it was in physician's progress note per addendum. TN stated the  was Brett Foley. Mila stated they are working on it. TN asked to be contacted when it is set-up. Mila stated the hospital bed will be delivered today.

## 2019-08-30 NOTE — PROGRESS NOTES
[place] called to [facility] [phone], spoke to Chacha, c-pap for home use order is in.  Chacha says c-pap will be delivered to patient on today in room 407B..Rossy Gonzales RN, BSN, [facility]  8/30/2019

## 2019-08-30 NOTE — PROGRESS NOTES
Patient sitting up in chair with legs in dependent position. Legs edematous without open wounds. Applied single layer Tubigrip F to bilateral lower extremities for light compression. Provided patient with extra pair of Tubigrip for home and instructed on washing garments. Voiced understanding.

## 2019-08-30 NOTE — PROGRESS NOTES
[facility] Hospital Medicine  Progress Note    Patient Name: Erica Leblanc  MRN: [id number]  Patient Class: IP- Inpatient   Admission Date: 8/27/2019  Length of Stay: 3 days  Attending Physician: Luiz Little MD  Primary Care Provider: Sendy Elaine MD        Subjective:     Principal Problem:Acute on chronic diastolic heart failure        HPI:  Ms. Erica Leblanc is a 74 y.o. female with essential hypertension, type 2 diabetes mellitus (HbA1c 7.3% Jul 2019), hyperlipidemia (LDL 52.8 Mar 2019), chronic diastolic heart failure (LVEF 70% Jun 2017), CKD stage 5, hypothyroidism (TSH 3.298 Mar 2019), anemia chronic disease, morbid obesity (BMI 41.6), HUMBERTO on CPAP, and depression who presents to [facility] ED with complaints of dyspnea for the past four days.  She had reported to the ED physician that her CPAP machine has been malfunctioning, a similar complaint to her presentation two months ago.  She has not been able to get it replaced due to insurance constraints.  The dyspnea is worse on exertion.  Further history is otherwise limited at this time due to hypersomnolence.    Overview/Hospital Course:  73 y/o female admitted with acute on chronic diastolic heart failure.  Started on IV diuresis.  Patient with hx of CKD and worsening Creat.  Nephrology consulted. Patient clinically improved.  Social workers consulted to help with re-arranging broken CPAP for patient. PT/OT rec: H/H.      Interval History:  No new issues.     Review of Systems   Constitutional: Negative for activity change.   HENT: Negative for congestion.    Respiratory: Negative for chest tightness.    Cardiovascular: Negative for chest pain.   Gastrointestinal: Negative for abdominal pain.   Genitourinary: Negative for difficulty urinating.   Musculoskeletal: Negative for arthralgias.     Objective:     Vital Signs (Most Recent):  Temp: 98.4 °F (36.9 °C) (08/30/19 0349)  Pulse: 71 (08/30/19 0349)  Resp: 16 (08/30/19  "0349)  BP: (!) 146/71 (08/30/19 0349)  SpO2: 99 % (08/30/19 0349) Vital Signs (24h Range):  Temp:  [97.6 °F (36.4 °C)-98.6 °F (37 °C)] 98.4 °F (36.9 °C)  Pulse:  [64-73] 71  Resp:  [16-20] 16  SpO2:  [91 %-99 %] 99 %  BP: (132-178)/(71-88) 146/71     Weight: 131.1 kg (289 lb 0.4 oz)(pt wt in bed refused to stand states "i'm to sleepy")  Body mass index is 48.1 kg/m².    Intake/Output Summary (Last 24 hours) at 8/30/2019 0612  Last data filed at 8/29/2019 2357  Gross per 24 hour   Intake 360 ml   Output 1275 ml   Net -915 ml      Physical Exam   Constitutional: She is oriented to person, place, and time. She appears well-developed and well-nourished.   HENT:   Head: Normocephalic and atraumatic.   Cardiovascular: Normal rate and regular rhythm.   Pulmonary/Chest: Effort normal. No stridor. No respiratory distress. She has no wheezes. She has no rales.   Neurological: She is alert and oriented to person, place, and time.   Psychiatric: She has a normal mood and affect. Her behavior is normal.   Nursing note and vitals reviewed.      Significant Labs:   BMP:   Recent Labs   Lab 08/29/19  1008 08/30/19  0509   *  178* 141*     139 140   K 4.4  4.4 3.7     105 104   CO2 23  23 27   BUN 71*  71* 76*   CREATININE 4.9*  4.9* 4.8*   CALCIUM 8.2*  8.2* 8.1*   MG 2.3  --      CBC: No results for input(s): WBC, HGB, HCT, PLT in the last 48 hours.    Significant Imaging:      Assessment/Plan:      * Acute on chronic diastolic heart failure  Patient was recently admitted here two months ago for dyspnea related to her malfunctioning CPAP machine.  She reports that her CPAP machine is still not functioning and reports worsening dyspnea.  She does appear volume overload.  Chest radiograph significant for mild pulmonary edema and her BNP is 1792.    Her troponin is mildly elevated at 0.055.   She has been given intravenous diuresis with furosemide with improvement of her symptoms.  Will monitor her urine " output very closely.   Check Echo.    May need another day   BMP pending   Home on 8/31.     Chronic respiratory failure  Requiring CPAP at night- likely from [facility].       Debility  Resume H/H PT/OT on discharge.       Depression  Stable; will continue her home regimen of citalopram.    Anemia of chronic disease  The patient's H/H is stable and consistent with previous laboratory measurements, and the patient exhibits no signs or symptoms of acute bleeding; there is no indication for transfusion.  Will continue to monitor.    CKD stage 5  Worsening renal function over past few months.  Monitor closely while on diuresis.  Will get Nephrology input.    Essential hypertension  Patient's blood pressure is better-controlled; will continue home regimen of bumetanide and hydralazine, and provide as-needed clonidine.  Will hold her home regimen of amlodipine and metoprolol due to acute heart failure.    Chronic diastolic heart failure  As addressed above.    Type 2 diabetes mellitus, controlled, with renal complications  Well controlled on a home regimen of basal insulin therapy; will provide basal-prandial insulin therapy along insulin sliding scale.  A1c ordered     Hypothyroidism (acquired)  Poorly controlled; will continue her home regimen of levothyroxine and defer dosage adjustments to her PCP.    HUMBERTO on CPAP  As addressed above.    Hyperlipidemia LDL goal <100  Well controlled; will continue her home regimen of atorvastatin.    Morbid obesity with body mass index (BMI) of 40.0 to 44.9 in adult  The patient has been counseled on the negative impact that obesity imparts on her health and was encouraged to make lifestyle changes in order to lose weight and decrease her modifiable risk factors.      VTE Risk Mitigation (From admission, onward)        Ordered     heparin (porcine) injection 5,000 Units  Every 12 hours      08/27/19 2217     IP VTE HIGH RISK PATIENT  Once      08/27/19 2217          Home on 8/31.       Patient compliant with CPAP use in the past/now and would benefit from this at home on discharge.       Luiz Rodríguez MD  Department of Hospital Medicine   [facility]

## 2019-08-30 NOTE — SUBJECTIVE & OBJECTIVE
"Interval History:  No new issues.     Review of Systems   Constitutional: Negative for activity change.   HENT: Negative for congestion.    Respiratory: Negative for chest tightness.    Cardiovascular: Negative for chest pain.   Gastrointestinal: Negative for abdominal pain.   Genitourinary: Negative for difficulty urinating.   Musculoskeletal: Negative for arthralgias.     Objective:     Vital Signs (Most Recent):  Temp: 98.4 °F (36.9 °C) (08/30/19 0349)  Pulse: 71 (08/30/19 0349)  Resp: 16 (08/30/19 0349)  BP: (!) 146/71 (08/30/19 0349)  SpO2: 99 % (08/30/19 0349) Vital Signs (24h Range):  Temp:  [97.6 °F (36.4 °C)-98.6 °F (37 °C)] 98.4 °F (36.9 °C)  Pulse:  [64-73] 71  Resp:  [16-20] 16  SpO2:  [91 %-99 %] 99 %  BP: (132-178)/(71-88) 146/71     Weight: 131.1 kg (289 lb 0.4 oz)(pt wt in bed refused to stand states "i'm to sleepy")  Body mass index is 48.1 kg/m².    Intake/Output Summary (Last 24 hours) at 8/30/2019 0612  Last data filed at 8/29/2019 2357  Gross per 24 hour   Intake 360 ml   Output 1275 ml   Net -915 ml      Physical Exam   Constitutional: She is oriented to person, place, and time. She appears well-developed and well-nourished.   HENT:   Head: Normocephalic and atraumatic.   Cardiovascular: Normal rate and regular rhythm.   Pulmonary/Chest: Effort normal. No stridor. No respiratory distress. She has no wheezes. She has no rales.   Neurological: She is alert and oriented to person, place, and time.   Psychiatric: She has a normal mood and affect. Her behavior is normal.   Nursing note and vitals reviewed.      Significant Labs:   BMP:   Recent Labs   Lab 08/29/19  1008 08/30/19  0509   *  178* 141*     139 140   K 4.4  4.4 3.7     105 104   CO2 23  23 27   BUN 71*  71* 76*   CREATININE 4.9*  4.9* 4.8*   CALCIUM 8.2*  8.2* 8.1*   MG 2.3  --      CBC: No results for input(s): WBC, HGB, HCT, PLT in the last 48 hours.    Significant Imaging:  "

## 2019-08-31 LAB
ANION GAP SERPL CALC-SCNC: 12 MMOL/L (ref 8–16)
BUN SERPL-MCNC: 77 MG/DL (ref 8–23)
CALCIUM SERPL-MCNC: 8.1 MG/DL (ref 8.7–10.5)
CHLORIDE SERPL-SCNC: 103 MMOL/L (ref 95–110)
CO2 SERPL-SCNC: 24 MMOL/L (ref 23–29)
CREAT SERPL-MCNC: 4.7 MG/DL (ref 0.5–1.4)
EST. GFR  (AFRICAN AMERICAN): 10 ML/MIN/1.73 M^2
EST. GFR  (NON AFRICAN AMERICAN): 9 ML/MIN/1.73 M^2
GLUCOSE SERPL-MCNC: 114 MG/DL (ref 70–110)
POCT GLUCOSE: 100 MG/DL (ref 70–110)
POCT GLUCOSE: 132 MG/DL (ref 70–110)
POCT GLUCOSE: 132 MG/DL (ref 70–110)
POTASSIUM SERPL-SCNC: 3.8 MMOL/L (ref 3.5–5.1)
SODIUM SERPL-SCNC: 139 MMOL/L (ref 136–145)

## 2019-08-31 PROCEDURE — 99900035 HC TECH TIME PER 15 MIN (STAT): Mod: HCNC

## 2019-08-31 PROCEDURE — 25000003 PHARM REV CODE 250: Mod: HCNC | Performed by: INTERNAL MEDICINE

## 2019-08-31 PROCEDURE — 97116 GAIT TRAINING THERAPY: CPT | Mod: HCNC

## 2019-08-31 PROCEDURE — S0171 BUMETANIDE 0.5 MG: HCPCS | Mod: HCNC | Performed by: INTERNAL MEDICINE

## 2019-08-31 PROCEDURE — 94660 CPAP INITIATION&MGMT: CPT | Mod: HCNC

## 2019-08-31 PROCEDURE — 63600175 PHARM REV CODE 636 W HCPCS: Mod: HCNC | Performed by: INTERNAL MEDICINE

## 2019-08-31 PROCEDURE — 36415 COLL VENOUS BLD VENIPUNCTURE: CPT | Mod: HCNC

## 2019-08-31 PROCEDURE — 94761 N-INVAS EAR/PLS OXIMETRY MLT: CPT | Mod: HCNC

## 2019-08-31 PROCEDURE — 11000001 HC ACUTE MED/SURG PRIVATE ROOM: Mod: HCNC

## 2019-08-31 PROCEDURE — 80048 BASIC METABOLIC PNL TOTAL CA: CPT | Mod: HCNC

## 2019-08-31 RX ORDER — METOLAZONE 2.5 MG/1
10 TABLET ORAL DAILY
Status: COMPLETED | OUTPATIENT
Start: 2019-08-31 | End: 2019-09-02

## 2019-08-31 RX ADMIN — HEPARIN SODIUM 5000 UNITS: 5000 INJECTION, SOLUTION INTRAVENOUS; SUBCUTANEOUS at 08:08

## 2019-08-31 RX ADMIN — RAMELTEON 8 MG: 8 TABLET ORAL at 11:08

## 2019-08-31 RX ADMIN — METOLAZONE 10 MG: 2.5 TABLET ORAL at 02:08

## 2019-08-31 RX ADMIN — LEVOTHYROXINE SODIUM 50 MCG: 50 TABLET ORAL at 05:08

## 2019-08-31 RX ADMIN — INSULIN ASPART 3 UNITS: 100 INJECTION, SOLUTION INTRAVENOUS; SUBCUTANEOUS at 05:08

## 2019-08-31 RX ADMIN — HYDRALAZINE HYDROCHLORIDE 100 MG: 25 TABLET ORAL at 08:08

## 2019-08-31 RX ADMIN — INSULIN ASPART 3 UNITS: 100 INJECTION, SOLUTION INTRAVENOUS; SUBCUTANEOUS at 09:08

## 2019-08-31 RX ADMIN — INSULIN DETEMIR 5 UNITS: 100 INJECTION, SOLUTION SUBCUTANEOUS at 09:08

## 2019-08-31 RX ADMIN — INSULIN ASPART 3 UNITS: 100 INJECTION, SOLUTION INTRAVENOUS; SUBCUTANEOUS at 12:08

## 2019-08-31 RX ADMIN — HYDRALAZINE HYDROCHLORIDE 100 MG: 25 TABLET ORAL at 09:08

## 2019-08-31 RX ADMIN — HYDRALAZINE HYDROCHLORIDE 100 MG: 25 TABLET ORAL at 03:08

## 2019-08-31 RX ADMIN — MICONAZOLE NITRATE: 20 OINTMENT TOPICAL at 08:08

## 2019-08-31 RX ADMIN — ATORVASTATIN CALCIUM 10 MG: 10 TABLET, FILM COATED ORAL at 09:08

## 2019-08-31 RX ADMIN — BUMETANIDE 2 MG: 0.25 INJECTION INTRAMUSCULAR; INTRAVENOUS at 08:08

## 2019-08-31 RX ADMIN — CITALOPRAM HYDROBROMIDE 10 MG: 10 TABLET ORAL at 09:08

## 2019-08-31 RX ADMIN — BUMETANIDE 2 MG: 0.25 INJECTION INTRAMUSCULAR; INTRAVENOUS at 09:08

## 2019-08-31 RX ADMIN — MICONAZOLE NITRATE: 20 OINTMENT TOPICAL at 10:08

## 2019-08-31 RX ADMIN — CLOPIDOGREL BISULFATE 75 MG: 75 TABLET ORAL at 09:08

## 2019-08-31 RX ADMIN — HEPARIN SODIUM 5000 UNITS: 5000 INJECTION, SOLUTION INTRAVENOUS; SUBCUTANEOUS at 09:08

## 2019-08-31 NOTE — SUBJECTIVE & OBJECTIVE
"Interval History: states she feels fine but cant go home because she does not have a hose and mask to her CPAP     Review of Systems   Constitutional: Negative for activity change.   HENT: Negative for congestion.    Respiratory: Negative for chest tightness.    Cardiovascular: Negative for chest pain.   Gastrointestinal: Negative for abdominal pain.   Genitourinary: Negative for difficulty urinating.   Musculoskeletal: Negative for arthralgias.     Objective:     Vital Signs (Most Recent):  Temp: 98.6 °F (37 °C) (08/31/19 0740)  Pulse: 77 (08/31/19 0740)  Resp: 17 (08/31/19 0740)  BP: (!) 154/78 (08/31/19 0740)  SpO2: 95 % (08/31/19 0755) Vital Signs (24h Range):  Temp:  [98.2 °F (36.8 °C)-98.6 °F (37 °C)] 98.6 °F (37 °C)  Pulse:  [75-84] 77  Resp:  [16-18] 17  SpO2:  [92 %-97 %] 95 %  BP: (125-160)/(60-89) 154/78     Weight: 131.1 kg (289 lb 0.4 oz)(pt wt in bed refused to stand states "i'm to sleepy")  Body mass index is 48.1 kg/m².    Intake/Output Summary (Last 24 hours) at 8/31/2019 0942  Last data filed at 8/31/2019 0300  Gross per 24 hour   Intake 360 ml   Output 1700 ml   Net -1340 ml      Physical Exam   Constitutional: She is oriented to person, place, and time. She appears well-developed and well-nourished.   HENT:   Head: Normocephalic and atraumatic.   Cardiovascular: Normal rate and regular rhythm.   Pulmonary/Chest: Effort normal. No stridor. No respiratory distress. She has no wheezes. She has no rales.   Neurological: She is alert and oriented to person, place, and time.   Psychiatric: She has a normal mood and affect. Her behavior is normal.   Nursing note and vitals reviewed.      Significant Labs:   BMP:   Recent Labs   Lab 08/29/19  1008  08/31/19  0457   *  178*   < > 114*     139   < > 139   K 4.4  4.4   < > 3.8     105   < > 103   CO2 23  23   < > 24   BUN 71*  71*   < > 77*   CREATININE 4.9*  4.9*   < > 4.7*   CALCIUM 8.2*  8.2*   < > 8.1*   MG 2.3  --   --     < " > = values in this interval not displayed.     CBC: No results for input(s): WBC, HGB, HCT, PLT in the last 48 hours.    Significant Imaging:

## 2019-08-31 NOTE — PT/OT/SLP PROGRESS
"Physical Therapy Treatment    Patient Name:  Erica Leblanc   MRN:  [id number]    Recommendations:     Discharge Recommendations:  home health PT   Discharge Equipment Recommendations: hospital bed(CPAP)   Barriers to discharge: Pt with low O2sats; 88% on RA.    Assessment:     Erica Leblanc is a 74 y.o. female admitted with a medical diagnosis of Acute on chronic diastolic heart failure.  She presents with the following impairments/functional limitations:  weakness, impaired functional mobilty, impaired balance, impaired endurance, gait instability, edema .    Rehab Prognosis: Fair; patient would benefit from acute skilled PT services to address these deficits and reach maximum level of function.    Recent Surgery: * No surgery found *      Plan:     During this hospitalization, patient to be seen daily to address the identified rehab impairments via gait training, therapeutic activities, therapeutic exercises and progress toward the following goals:    · Plan of Care Expires:  08/12/19    Subjective     Chief Complaint: SOB; pt displays labored breathing without activity, increased with activity.  Patient/Family Comments/goals: "I need to sit down"  Pain/Comfort:  · Pain Rating 1: 0/10      Objective:     Communicated with nurseKristel prior to session.  Patient found up in chair with kemp catheter upon PT entry to room.  Pt unable to ambulate back to room SOB noted and pt kept leaning on RW despite correction.  PT requested a chair for pt to sit and pt rolled back to room; O2 sats 94%, hr 144 /p gait.    General Precautions: Standard, fall, respiratory   Orthopedic Precautions:N/A   Braces: N/A     Functional Mobility:  · Transfers:     · Sit to Stand:  minimum assistance with rolling walker  · Gait: 25' w/RW CGA, O2@2L.        Patient left reclined and on 2LO2 via nasal canula. with call button in reach and nurse aware...    GOALS:   Multidisciplinary Problems     Physical Therapy Goals        " Problem: Physical Therapy Goal    Goal Priority Disciplines Outcome Goal Variances Interventions   Physical Therapy Goal     PT, PT/OT Ongoing (interventions implemented as appropriate)     Description:  Goals to be met by: 2019     Patient will increase functional independence with mobility by performin. Supine to sit with Stand-by Assistance  2. Sit to stand transfer with Stand-by Assistance  3. Gait  x 50 feet with Stand-by Assistance using Rolling Walker.   4. Lower extremity exercise program x10 reps per handout, with independence                      Time Tracking:     PT Received On: 19  PT Start Time: 1130     PT Stop Time: 1150  PT Total Time (min): 20 min     Billable Minutes: Gait Training 20    Treatment Type: Treatment  PT/PTA: PT     PTA Visit Number: 0     Terinne G Coleman, PT  2019

## 2019-08-31 NOTE — PROGRESS NOTES
[facility] Hospital Medicine  Progress Note    Patient Name: Erica Leblanc  MRN: [id number]  Patient Class: IP- Inpatient   Admission Date: 8/27/2019  Length of Stay: 4 days  Attending Physician: Luiz Little MD  Primary Care Provider: Sendy Elaine MD        Subjective:     Principal Problem:Acute on chronic diastolic heart failure        HPI:  Ms. Erica Leblanc is a 74 y.o. female with essential hypertension, type 2 diabetes mellitus (HbA1c 7.3% Jul 2019), hyperlipidemia (LDL 52.8 Mar 2019), chronic diastolic heart failure (LVEF 70% Jun 2017), CKD stage 5, hypothyroidism (TSH 3.298 Mar 2019), anemia chronic disease, morbid obesity (BMI 41.6), HUMBERTO on CPAP, and depression who presents to [facility] ED with complaints of dyspnea for the past four days.  She had reported to the ED physician that her CPAP machine has been malfunctioning, a similar complaint to her presentation two months ago.  She has not been able to get it replaced due to insurance constraints.  The dyspnea is worse on exertion.  Further history is otherwise limited at this time due to hypersomnolence.    Overview/Hospital Course:  73 y/o female admitted with acute on chronic diastolic heart failure.  Started on IV diuresis.  Patient with hx of CKD and worsening Creat.  Nephrology consulted. Patient clinically improved.  Social workers consulted to help with re-arranging broken CPAP for patient. PT/OT rec: H/H.      Interval History: states she feels fine but cant go home because she does not have a hose and mask to her CPAP     Review of Systems   Constitutional: Negative for activity change.   HENT: Negative for congestion.    Respiratory: Negative for chest tightness.    Cardiovascular: Negative for chest pain.   Gastrointestinal: Negative for abdominal pain.   Genitourinary: Negative for difficulty urinating.   Musculoskeletal: Negative for arthralgias.     Objective:     Vital Signs (Most Recent):  Temp: 98.6  "°F (37 °C) (08/31/19 0740)  Pulse: 77 (08/31/19 0740)  Resp: 17 (08/31/19 0740)  BP: (!) 154/78 (08/31/19 0740)  SpO2: 95 % (08/31/19 0755) Vital Signs (24h Range):  Temp:  [98.2 °F (36.8 °C)-98.6 °F (37 °C)] 98.6 °F (37 °C)  Pulse:  [75-84] 77  Resp:  [16-18] 17  SpO2:  [92 %-97 %] 95 %  BP: (125-160)/(60-89) 154/78     Weight: 131.1 kg (289 lb 0.4 oz)(pt wt in bed refused to stand states "i'm to sleepy")  Body mass index is 48.1 kg/m².    Intake/Output Summary (Last 24 hours) at 8/31/2019 0942  Last data filed at 8/31/2019 0300  Gross per 24 hour   Intake 360 ml   Output 1700 ml   Net -1340 ml      Physical Exam   Constitutional: She is oriented to person, place, and time. She appears well-developed and well-nourished.   HENT:   Head: Normocephalic and atraumatic.   Cardiovascular: Normal rate and regular rhythm.   Pulmonary/Chest: Effort normal. No stridor. No respiratory distress. She has no wheezes. She has no rales.   Neurological: She is alert and oriented to person, place, and time.   Psychiatric: She has a normal mood and affect. Her behavior is normal.   Nursing note and vitals reviewed.      Significant Labs:   BMP:   Recent Labs   Lab 08/29/19  1008  08/31/19  0457   *  178*   < > 114*     139   < > 139   K 4.4  4.4   < > 3.8     105   < > 103   CO2 23  23   < > 24   BUN 71*  71*   < > 77*   CREATININE 4.9*  4.9*   < > 4.7*   CALCIUM 8.2*  8.2*   < > 8.1*   MG 2.3  --   --     < > = values in this interval not displayed.     CBC: No results for input(s): WBC, HGB, HCT, PLT in the last 48 hours.    Significant Imaging:       Assessment/Plan:      * Acute on chronic diastolic heart failure  Patient was recently admitted here two months ago for dyspnea related to her malfunctioning CPAP machine.  She reports that her CPAP machine is still not functioning and reports worsening dyspnea.  She does appear volume overload.  Chest radiograph significant for mild pulmonary edema and her " BNP is 1792.    Her troponin is mildly elevated at 0.055.   She has been given intravenous diuresis with furosemide with improvement of her symptoms.  Will monitor her urine output very closely.   Check Echo.    May need another day   BMP pending     Continue lasix today. Wean 02.      Chronic respiratory failure  Requiring CPAP at night- likely from [facility].     States she cannot go home as she is missing parts to her CPAP. Will consult .         Debility  Resume H/H PT/OT on discharge.       Depression  Stable; will continue her home regimen of citalopram.    Anemia of chronic disease  The patient's H/H is stable and consistent with previous laboratory measurements, and the patient exhibits no signs or symptoms of acute bleeding; there is no indication for transfusion.  Will continue to monitor.    CKD stage 5  Worsening renal function over past few months.  Monitor closely while on diuresis.  Will get Nephrology input.    Essential hypertension  Patient's blood pressure is better-controlled; will continue home regimen of bumetanide and hydralazine, and provide as-needed clonidine.  Will hold her home regimen of amlodipine and metoprolol due to acute heart failure.    Chronic diastolic heart failure  As addressed above.    Type 2 diabetes mellitus, controlled, with renal complications  Well controlled on a home regimen of basal insulin therapy; will provide basal-prandial insulin therapy along insulin sliding scale.  A1c ordered     Hypothyroidism (acquired)  Poorly controlled; will continue her home regimen of levothyroxine and defer dosage adjustments to her PCP.    HUMBERTO on CPAP  As addressed above.    Hyperlipidemia LDL goal <100  Well controlled; will continue her home regimen of atorvastatin.    Morbid obesity with body mass index (BMI) of 40.0 to 44.9 in adult  The patient has been counseled on the negative impact that obesity imparts on her health and was encouraged to make lifestyle changes in  order to lose weight and decrease her modifiable risk factors.      VTE Risk Mitigation (From admission, onward)        Ordered     heparin (porcine) injection 5,000 Units  Every 12 hours      08/27/19 2217     IP VTE HIGH RISK PATIENT  Once      08/27/19 2217        Will consult  as patient states she cannot go home because she has missing hose and mask to her CPAP. Continue Lasix today. PT/OT.   Home hopefully on 9/1          Luiz Rodríguez MD  Department of Hospital Medicine   [facility]

## 2019-08-31 NOTE — ASSESSMENT & PLAN NOTE
Patient was recently admitted here two months ago for dyspnea related to her malfunctioning CPAP machine.  She reports that her CPAP machine is still not functioning and reports worsening dyspnea.  She does appear volume overload.  Chest radiograph significant for mild pulmonary edema and her BNP is 1792.    Her troponin is mildly elevated at 0.055.   She has been given intravenous diuresis with furosemide with improvement of her symptoms.  Will monitor her urine output very closely.   Check Echo.    May need another day   BMP pending     Continue lasix today. Wean 02.

## 2019-08-31 NOTE — PROGRESS NOTES
Erica Leblanc is a 74 y.o. female patient.    Follow for SATHISH, CKD stg 4    Still having problem with SOB  Otherwise no new c/o    Scheduled Meds:   atorvastatin  10 mg Oral Daily    bumetanide  2 mg Intravenous Q12H    citalopram  10 mg Oral Daily    clopidogrel  75 mg Oral Daily    heparin (porcine)  5,000 Units Subcutaneous Q12H    hydrALAZINE  100 mg Oral TID    insulin aspart U-100  3 Units Subcutaneous TIDWM    insulin detemir U-100  5 Units Subcutaneous Daily    levothyroxine  50 mcg Oral Before breakfast    miconazole nitrate 2%   Topical (Top) BID       Review of patient's allergies indicates:   Allergen Reactions    Ace inhibitors Other (See Comments)     Other reaction(s): cough         Vital Signs Range (Last 24H):  Temp:  [98.2 °F (36.8 °C)-98.8 °F (37.1 °C)]   Pulse:  [77-91]   Resp:  [16-20]   BP: (136-160)/(60-89)   SpO2:  [93 %-97 %]     I & O (Last 24H):    Intake/Output Summary (Last 24 hours) at 8/31/2019 1156  Last data filed at 8/31/2019 0954  Gross per 24 hour   Intake 600 ml   Output 2500 ml   Net -1900 ml           Physical Exam:  General appearance: well developed, well nourished, mild distress  Lungs:  rales bilaterally  Heart: regular rate and rhythm  Abdomen: soft, non-tender non-distented; bowel sounds normal; no masses,  no organomegaly  Extremities: edema (+)    Laboratory:  CBC:   Recent Labs   Lab 08/27/19  1628   WBC 4.25   RBC 4.88   HGB 11.6*   HCT 36.6*      MCV 75*   MCH 23.8*   MCHC 31.7*     CMP:   Recent Labs   Lab 08/27/19  1628  08/31/19  0457   *   < > 114*   CALCIUM 8.9   < > 8.1*   ALBUMIN 3.0*  --   --    PROT 7.9  --   --       < > 139   K 4.0   < > 3.8   CO2 26   < > 24      < > 103   BUN 72*   < > 77*   CREATININE 4.9*   < > 4.7*   ALKPHOS 168*  --   --    ALT 30  --   --    AST 18  --   --    BILITOT 0.8  --   --     < > = values in this interval not displayed.       Imp/Plan    SATHISH on CKD stg 4 - creatinine stable,  diuresing  Acute on chronic diastolic CHF  HUMBERTO  DM type 2  Anemia of chronic disease    Aggressive diurese  Watch renal function closely  May require dialysis      Trac T Le  8/31/2019

## 2019-08-31 NOTE — NURSING
Therapy report pt O2 sat 88 on room air, pt had nasal cannula out nare,O2 @2L reapplied. Continue monitoring.

## 2019-08-31 NOTE — PLAN OF CARE
Problem: Adult Inpatient Plan of Care  Goal: Plan of Care Review     08/31/19 1610   Plan of Care Review   Plan of Care Reviewed With patient   Pt remains free from falls and pressure injuries,able to make needs known,skyler meds well,iv diuretics remains in progress,pt walked with therapy SOB noted,food and fluid intake fair,extensive assist with adl's,continue monitoring.

## 2019-09-01 LAB
ALBUMIN SERPL BCP-MCNC: 2.6 G/DL (ref 3.5–5.2)
ALP SERPL-CCNC: 129 U/L (ref 55–135)
ALT SERPL W/O P-5'-P-CCNC: 18 U/L (ref 10–44)
ANION GAP SERPL CALC-SCNC: 11 MMOL/L (ref 8–16)
ANION GAP SERPL CALC-SCNC: 11 MMOL/L (ref 8–16)
AST SERPL-CCNC: 17 U/L (ref 10–40)
BILIRUB SERPL-MCNC: 1 MG/DL (ref 0.1–1)
BUN SERPL-MCNC: 77 MG/DL (ref 8–23)
BUN SERPL-MCNC: 77 MG/DL (ref 8–23)
CALCIUM SERPL-MCNC: 8.3 MG/DL (ref 8.7–10.5)
CALCIUM SERPL-MCNC: 8.3 MG/DL (ref 8.7–10.5)
CHLORIDE SERPL-SCNC: 102 MMOL/L (ref 95–110)
CHLORIDE SERPL-SCNC: 102 MMOL/L (ref 95–110)
CO2 SERPL-SCNC: 27 MMOL/L (ref 23–29)
CO2 SERPL-SCNC: 27 MMOL/L (ref 23–29)
CREAT SERPL-MCNC: 4.7 MG/DL (ref 0.5–1.4)
CREAT SERPL-MCNC: 4.7 MG/DL (ref 0.5–1.4)
EST. GFR  (AFRICAN AMERICAN): 10 ML/MIN/1.73 M^2
EST. GFR  (AFRICAN AMERICAN): 10 ML/MIN/1.73 M^2
EST. GFR  (NON AFRICAN AMERICAN): 9 ML/MIN/1.73 M^2
EST. GFR  (NON AFRICAN AMERICAN): 9 ML/MIN/1.73 M^2
GLUCOSE SERPL-MCNC: 141 MG/DL (ref 70–110)
GLUCOSE SERPL-MCNC: 141 MG/DL (ref 70–110)
POCT GLUCOSE: 136 MG/DL (ref 70–110)
POCT GLUCOSE: 140 MG/DL (ref 70–110)
POCT GLUCOSE: 140 MG/DL (ref 70–110)
POCT GLUCOSE: 152 MG/DL (ref 70–110)
POCT GLUCOSE: 170 MG/DL (ref 70–110)
POTASSIUM SERPL-SCNC: 3.4 MMOL/L (ref 3.5–5.1)
POTASSIUM SERPL-SCNC: 3.4 MMOL/L (ref 3.5–5.1)
PROT SERPL-MCNC: 7.2 G/DL (ref 6–8.4)
SODIUM SERPL-SCNC: 140 MMOL/L (ref 136–145)
SODIUM SERPL-SCNC: 140 MMOL/L (ref 136–145)

## 2019-09-01 PROCEDURE — 27000221 HC OXYGEN, UP TO 24 HOURS: Mod: HCNC

## 2019-09-01 PROCEDURE — 25000003 PHARM REV CODE 250: Mod: HCNC | Performed by: INTERNAL MEDICINE

## 2019-09-01 PROCEDURE — 94761 N-INVAS EAR/PLS OXIMETRY MLT: CPT | Mod: HCNC

## 2019-09-01 PROCEDURE — 25000003 PHARM REV CODE 250: Mod: HCNC | Performed by: HOSPITALIST

## 2019-09-01 PROCEDURE — 99900035 HC TECH TIME PER 15 MIN (STAT): Mod: HCNC

## 2019-09-01 PROCEDURE — 63600175 PHARM REV CODE 636 W HCPCS: Mod: HCNC | Performed by: INTERNAL MEDICINE

## 2019-09-01 PROCEDURE — S0171 BUMETANIDE 0.5 MG: HCPCS | Mod: HCNC | Performed by: INTERNAL MEDICINE

## 2019-09-01 PROCEDURE — 11000001 HC ACUTE MED/SURG PRIVATE ROOM: Mod: HCNC

## 2019-09-01 PROCEDURE — 94660 CPAP INITIATION&MGMT: CPT | Mod: HCNC

## 2019-09-01 PROCEDURE — 80053 COMPREHEN METABOLIC PANEL: CPT | Mod: HCNC

## 2019-09-01 PROCEDURE — 36415 COLL VENOUS BLD VENIPUNCTURE: CPT | Mod: HCNC

## 2019-09-01 RX ORDER — AMLODIPINE BESYLATE 5 MG/1
10 TABLET ORAL DAILY
Status: DISCONTINUED | OUTPATIENT
Start: 2019-09-01 | End: 2019-09-03

## 2019-09-01 RX ORDER — POTASSIUM CHLORIDE 20 MEQ/1
40 TABLET, EXTENDED RELEASE ORAL 2 TIMES DAILY
Status: COMPLETED | OUTPATIENT
Start: 2019-09-01 | End: 2019-09-01

## 2019-09-01 RX ORDER — METOPROLOL TARTRATE 50 MG/1
50 TABLET ORAL 2 TIMES DAILY
Status: DISCONTINUED | OUTPATIENT
Start: 2019-09-01 | End: 2019-09-11 | Stop reason: HOSPADM

## 2019-09-01 RX ADMIN — HEPARIN SODIUM 5000 UNITS: 5000 INJECTION, SOLUTION INTRAVENOUS; SUBCUTANEOUS at 09:09

## 2019-09-01 RX ADMIN — INSULIN ASPART 3 UNITS: 100 INJECTION, SOLUTION INTRAVENOUS; SUBCUTANEOUS at 04:09

## 2019-09-01 RX ADMIN — INSULIN ASPART 3 UNITS: 100 INJECTION, SOLUTION INTRAVENOUS; SUBCUTANEOUS at 11:09

## 2019-09-01 RX ADMIN — LEVOTHYROXINE SODIUM 50 MCG: 50 TABLET ORAL at 05:09

## 2019-09-01 RX ADMIN — CITALOPRAM HYDROBROMIDE 10 MG: 10 TABLET ORAL at 08:09

## 2019-09-01 RX ADMIN — HYDRALAZINE HYDROCHLORIDE 100 MG: 25 TABLET ORAL at 08:09

## 2019-09-01 RX ADMIN — POTASSIUM CHLORIDE 40 MEQ: 1500 TABLET, EXTENDED RELEASE ORAL at 11:09

## 2019-09-01 RX ADMIN — HYDRALAZINE HYDROCHLORIDE 100 MG: 25 TABLET ORAL at 04:09

## 2019-09-01 RX ADMIN — BUMETANIDE 2 MG: 0.25 INJECTION INTRAMUSCULAR; INTRAVENOUS at 08:09

## 2019-09-01 RX ADMIN — METOPROLOL TARTRATE 50 MG: 50 TABLET ORAL at 08:09

## 2019-09-01 RX ADMIN — POTASSIUM CHLORIDE 40 MEQ: 1500 TABLET, EXTENDED RELEASE ORAL at 08:09

## 2019-09-01 RX ADMIN — INSULIN DETEMIR 5 UNITS: 100 INJECTION, SOLUTION SUBCUTANEOUS at 08:09

## 2019-09-01 RX ADMIN — RAMELTEON 8 MG: 8 TABLET ORAL at 08:09

## 2019-09-01 RX ADMIN — CLOPIDOGREL BISULFATE 75 MG: 75 TABLET ORAL at 08:09

## 2019-09-01 RX ADMIN — HEPARIN SODIUM 5000 UNITS: 5000 INJECTION, SOLUTION INTRAVENOUS; SUBCUTANEOUS at 08:09

## 2019-09-01 RX ADMIN — ATORVASTATIN CALCIUM 10 MG: 10 TABLET, FILM COATED ORAL at 08:09

## 2019-09-01 RX ADMIN — INSULIN ASPART 3 UNITS: 100 INJECTION, SOLUTION INTRAVENOUS; SUBCUTANEOUS at 08:09

## 2019-09-01 RX ADMIN — MICONAZOLE NITRATE: 20 OINTMENT TOPICAL at 08:09

## 2019-09-01 RX ADMIN — METOLAZONE 10 MG: 2.5 TABLET ORAL at 08:09

## 2019-09-01 RX ADMIN — AMLODIPINE BESYLATE 10 MG: 5 TABLET ORAL at 11:09

## 2019-09-01 NOTE — SUBJECTIVE & OBJECTIVE
"Interval History: No new issues     Review of Systems   Constitutional: Negative for activity change.   HENT: Negative for congestion.    Respiratory: Negative for chest tightness.    Cardiovascular: Negative for chest pain.   Gastrointestinal: Negative for abdominal pain.   Genitourinary: Negative for difficulty urinating.   Musculoskeletal: Negative for arthralgias.     Objective:     Vital Signs (Most Recent):  Temp: 98.5 °F (36.9 °C) (09/01/19 0742)  Pulse: 94 (09/01/19 0742)  Resp: 17 (09/01/19 0742)  BP: (!) 168/84 (09/01/19 0742)  SpO2: (!) 94 % (09/01/19 0800) Vital Signs (24h Range):  Temp:  [98.2 °F (36.8 °C)-99.3 °F (37.4 °C)] 98.5 °F (36.9 °C)  Pulse:  [78-98] 94  Resp:  [17-20] 17  SpO2:  [89 %-96 %] 94 %  BP: (144-176)/(74-84) 168/84     Weight: 131.1 kg (289 lb 0.4 oz)(pt wt in bed refused to stand states "i'm to sleepy")  Body mass index is 48.1 kg/m².    Intake/Output Summary (Last 24 hours) at 9/1/2019 1020  Last data filed at 9/1/2019 0831  Gross per 24 hour   Intake 720 ml   Output 2050 ml   Net -1330 ml      Physical Exam   Constitutional: She is oriented to person, place, and time. She appears well-developed and well-nourished.   HENT:   Head: Normocephalic and atraumatic.   Cardiovascular: Normal rate and regular rhythm.   Pulmonary/Chest: Effort normal. No stridor. No respiratory distress. She has no wheezes. She has no rales.   Neurological: She is alert and oriented to person, place, and time.   Psychiatric: She has a normal mood and affect. Her behavior is normal.   Nursing note and vitals reviewed.      Significant Labs:   CBC: No results for input(s): WBC, HGB, HCT, PLT in the last 48 hours.  CMP:   Recent Labs   Lab 08/31/19  0457 09/01/19  0514    140  140   K 3.8 3.4*  3.4*    102  102   CO2 24 27  27   * 141*  141*   BUN 77* 77*  77*   CREATININE 4.7* 4.7*  4.7*   CALCIUM 8.1* 8.3*  8.3*   PROT  --  7.2   ALBUMIN  --  2.6*   BILITOT  --  1.0   ALKPHOS  --  " 129   AST  --  17   ALT  --  18   ANIONGAP 12 11  11   EGFRNONAA 9* 9*  9*       Significant Imaging:

## 2019-09-01 NOTE — PLAN OF CARE
"   09/01/19 1011   Post-Acute Status   Post-Acute Authorization Home Health/Hospice   Home Health/Hospice Status Awaiting Internal Medical Clearance   Discharge Delays None known at this time     SW to pt's room to discuss d/c planning. According to pt, she would like HH arranged with [facility]. Pt confirmed she received a new cpap machine for home use, however, she does not have a mask. SW asked pt if the mask she is using in hospital room fits machine and pt stated, "yes". SW asked if pt can take the mask home with her a d/c and, Keesha (charge nurse), stated, "yes". SW notified pt's nurse, Chhaya, to please allow pt to take mask home.   "

## 2019-09-01 NOTE — NURSING
Pt AAOX4, pt noted to have increase work of breathing. Pt denies pain n/v. She is currently on oxygen 1lpm. sats on RA dropped to 89%. Fluid restriction maintained. Villarreal cath draining clear yellow urine. Frequent turns encouraged however pt states she is okay. Open wound to RLE noted, MD aware, foam dressing applied to this area. SSI/schedule insulin given. Son at bedside.

## 2019-09-01 NOTE — NURSING
Pt has open wounds on bilateral lower extremities. Scant to small amounts of serous drainage on both sites. Stocking dried to skin in spots.

## 2019-09-01 NOTE — NURSING
Pt has remained free from falls or injuries. Pt had a restless night only because she could not fall asleep. Pt did have multiple runs of SVTs and her home med metoprolol was started back. Pt c/o pain to chest during inspiration.  Breathing still labored at times. Weight shift assistance provided.

## 2019-09-01 NOTE — PROGRESS NOTES
Erica Leblanc is a 74 y.o. female patient.    Follow for SATHISH, CKD stg 4    No new c/o, feeling better  Comfortable    Scheduled Meds:   atorvastatin  10 mg Oral Daily    bumetanide  2 mg Intravenous Q12H    citalopram  10 mg Oral Daily    clopidogrel  75 mg Oral Daily    heparin (porcine)  5,000 Units Subcutaneous Q12H    hydrALAZINE  100 mg Oral TID    insulin aspart U-100  3 Units Subcutaneous TIDWM    insulin detemir U-100  5 Units Subcutaneous Daily    levothyroxine  50 mcg Oral Before breakfast    metOLazone  10 mg Oral Daily    metoprolol tartrate  50 mg Oral BID    miconazole nitrate 2%   Topical (Top) BID       Review of patient's allergies indicates:   Allergen Reactions    Ace inhibitors Other (See Comments)     Other reaction(s): cough         Vital Signs Range (Last 24H):  Temp:  [98.2 °F (36.8 °C)-99.3 °F (37.4 °C)]   Pulse:  [78-98]   Resp:  [17-20]   BP: (144-176)/(74-84)   SpO2:  [89 %-96 %]     I & O (Last 24H):    Intake/Output Summary (Last 24 hours) at 9/1/2019 0955  Last data filed at 9/1/2019 0831  Gross per 24 hour   Intake 720 ml   Output 2050 ml   Net -1330 ml           Physical Exam:  General appearance: well developed, well nourished, no distress  Lungs:  diminished breath sounds bilaterally  Heart: regular rate and rhythm  Abdomen: soft, non-tender non-distented; bowel sounds normal; no masses,  no organomegaly  Extremities: edema (+)    Laboratory:  CBC:   Recent Labs   Lab 08/27/19  1628   WBC 4.25   RBC 4.88   HGB 11.6*   HCT 36.6*      MCV 75*   MCH 23.8*   MCHC 31.7*     CMP:   Recent Labs   Lab 09/01/19  0514   *  141*   CALCIUM 8.3*  8.3*   ALBUMIN 2.6*   PROT 7.2     140   K 3.4*  3.4*   CO2 27  27     102   BUN 77*  77*   CREATININE 4.7*  4.7*   ALKPHOS 129   ALT 18   AST 17   BILITOT 1.0       Imp/Plan    SATHISH on CKD stg 4 - creatinine holding  Acute on chronic diastolic CHF - diuresing  HUMBERTO  DM type 2  Anemia of chronic  disease    Continue present Rx  CMP in am        Trac T Le  9/1/2019

## 2019-09-01 NOTE — ASSESSMENT & PLAN NOTE
Requiring CPAP at night- likely from [facility].     States she cannot go home as she is missing parts to her CPAP. Will consult .

## 2019-09-01 NOTE — PROGRESS NOTES
[facility] Hospital Medicine  Progress Note    Patient Name: Erica Leblanc  MRN: [id number]  Patient Class: IP- Inpatient   Admission Date: 8/27/2019  Length of Stay: 5 days  Attending Physician: Luiz Little MD  Primary Care Provider: Sendy Elaine MD        Subjective:     Principal Problem:Acute on chronic diastolic heart failure        HPI:  Ms. Erica Leblanc is a 74 y.o. female with essential hypertension, type 2 diabetes mellitus (HbA1c 7.3% Jul 2019), hyperlipidemia (LDL 52.8 Mar 2019), chronic diastolic heart failure (LVEF 70% Jun 2017), CKD stage 5, hypothyroidism (TSH 3.298 Mar 2019), anemia chronic disease, morbid obesity (BMI 41.6), HUMBERTO on CPAP, and depression who presents to [facility] ED with complaints of dyspnea for the past four days.  She had reported to the ED physician that her CPAP machine has been malfunctioning, a similar complaint to her presentation two months ago.  She has not been able to get it replaced due to insurance constraints.  The dyspnea is worse on exertion.  Further history is otherwise limited at this time due to hypersomnolence.    Overview/Hospital Course:  73 y/o female admitted with acute on chronic diastolic heart failure.  Started on IV diuresis.  Patient with hx of CKD and worsening Creat.  Nephrology consulted. Patient clinically improved.  Social workers consulted to help with re-arranging broken CPAP for patient. PT/OT rec: H/H.      Interval History: No new issues     Review of Systems   Constitutional: Negative for activity change.   HENT: Negative for congestion.    Respiratory: Negative for chest tightness.    Cardiovascular: Negative for chest pain.   Gastrointestinal: Negative for abdominal pain.   Genitourinary: Negative for difficulty urinating.   Musculoskeletal: Negative for arthralgias.     Objective:     Vital Signs (Most Recent):  Temp: 98.5 °F (36.9 °C) (09/01/19 0742)  Pulse: 94 (09/01/19 0742)  Resp: 17 (09/01/19  "0742)  BP: (!) 168/84 (09/01/19 0742)  SpO2: (!) 94 % (09/01/19 0800) Vital Signs (24h Range):  Temp:  [98.2 °F (36.8 °C)-99.3 °F (37.4 °C)] 98.5 °F (36.9 °C)  Pulse:  [78-98] 94  Resp:  [17-20] 17  SpO2:  [89 %-96 %] 94 %  BP: (144-176)/(74-84) 168/84     Weight: 131.1 kg (289 lb 0.4 oz)(pt wt in bed refused to stand states "i'm to sleepy")  Body mass index is 48.1 kg/m².    Intake/Output Summary (Last 24 hours) at 9/1/2019 1020  Last data filed at 9/1/2019 0831  Gross per 24 hour   Intake 720 ml   Output 2050 ml   Net -1330 ml      Physical Exam   Constitutional: She is oriented to person, place, and time. She appears well-developed and well-nourished.   HENT:   Head: Normocephalic and atraumatic.   Cardiovascular: Normal rate and regular rhythm.   Pulmonary/Chest: Effort normal. No stridor. No respiratory distress. She has no wheezes. She has no rales.   Neurological: She is alert and oriented to person, place, and time.   Psychiatric: She has a normal mood and affect. Her behavior is normal.   Nursing note and vitals reviewed.      Significant Labs:   CBC: No results for input(s): WBC, HGB, HCT, PLT in the last 48 hours.  CMP:   Recent Labs   Lab 08/31/19  0457 09/01/19  0514    140  140   K 3.8 3.4*  3.4*    102  102   CO2 24 27  27   * 141*  141*   BUN 77* 77*  77*   CREATININE 4.7* 4.7*  4.7*   CALCIUM 8.1* 8.3*  8.3*   PROT  --  7.2   ALBUMIN  --  2.6*   BILITOT  --  1.0   ALKPHOS  --  129   AST  --  17   ALT  --  18   ANIONGAP 12 11  11   EGFRNONAA 9* 9*  9*       Significant Imaging:      Assessment/Plan:      * Acute on chronic diastolic heart failure  Patient was recently admitted here two months ago for dyspnea related to her malfunctioning CPAP machine.  She reports that her CPAP machine is still not functioning and reports worsening dyspnea.  She does appear volume overload.  Chest radiograph significant for mild pulmonary edema and her BNP is 1792.    Her troponin is " mildly elevated at 0.055.   She has been given intravenous diuresis with furosemide with improvement of her symptoms.  Will monitor her urine output very closely.   Check Echo.    May need another day   BMP pending     Continue lasix today. Wean 02.      Chronic respiratory failure  Requiring CPAP at night- likely from [facility].     States she cannot go home as she is missing parts to her CPAP. Will consult .         Debility  Resume H/H PT/OT on discharge.       Depression  Stable; will continue her home regimen of citalopram.    Anemia of chronic disease  The patient's H/H is stable and consistent with previous laboratory measurements, and the patient exhibits no signs or symptoms of acute bleeding; there is no indication for transfusion.  Will continue to monitor.    CKD stage 5  Worsening renal function over past few months.  Monitor closely while on diuresis.  Will get Nephrology input.    Essential hypertension  Patient's blood pressure is better-controlled; will continue home regimen of bumetanide and hydralazine, and provide as-needed clonidine.  Will hold her home regimen of amlodipine and metoprolol due to acute heart failure.    Chronic diastolic heart failure  As addressed above.    Type 2 diabetes mellitus, controlled, with renal complications  Well controlled on a home regimen of basal insulin therapy; will provide basal-prandial insulin therapy along insulin sliding scale.  A1c ordered     Hypothyroidism (acquired)  Poorly controlled; will continue her home regimen of levothyroxine and defer dosage adjustments to her PCP.    HUMBERTO on CPAP  As addressed above.    Hyperlipidemia LDL goal <100  Well controlled; will continue her home regimen of atorvastatin.    Morbid obesity with body mass index (BMI) of 40.0 to 44.9 in adult  The patient has been counseled on the negative impact that obesity imparts on her health and was encouraged to make lifestyle changes in order to lose weight and decrease  her modifiable risk factors.      VTE Risk Mitigation (From admission, onward)        Ordered     heparin (porcine) injection 5,000 Units  Every 12 hours      08/27/19 2217     IP VTE HIGH RISK PATIENT  Once      08/27/19 2217        Says she is missing parts to her CPAP. Discussed with  this am-  When parts obtained- will d/c to home.           Luiz Rodríguez MD  Department of Hospital Medicine   [facility]

## 2019-09-02 LAB
ALBUMIN SERPL BCP-MCNC: 2.5 G/DL (ref 3.5–5.2)
ALP SERPL-CCNC: 122 U/L (ref 55–135)
ALT SERPL W/O P-5'-P-CCNC: 20 U/L (ref 10–44)
ANION GAP SERPL CALC-SCNC: 13 MMOL/L (ref 8–16)
ANION GAP SERPL CALC-SCNC: 13 MMOL/L (ref 8–16)
AST SERPL-CCNC: 22 U/L (ref 10–40)
BILIRUB SERPL-MCNC: 0.9 MG/DL (ref 0.1–1)
BNP SERPL-MCNC: 1961 PG/ML (ref 0–99)
BUN SERPL-MCNC: 80 MG/DL (ref 8–23)
BUN SERPL-MCNC: 80 MG/DL (ref 8–23)
CALCIUM SERPL-MCNC: 8.5 MG/DL (ref 8.7–10.5)
CALCIUM SERPL-MCNC: 8.5 MG/DL (ref 8.7–10.5)
CHLORIDE SERPL-SCNC: 102 MMOL/L (ref 95–110)
CHLORIDE SERPL-SCNC: 102 MMOL/L (ref 95–110)
CO2 SERPL-SCNC: 26 MMOL/L (ref 23–29)
CO2 SERPL-SCNC: 26 MMOL/L (ref 23–29)
CREAT SERPL-MCNC: 4.8 MG/DL (ref 0.5–1.4)
CREAT SERPL-MCNC: 4.8 MG/DL (ref 0.5–1.4)
EST. GFR  (AFRICAN AMERICAN): 10 ML/MIN/1.73 M^2
EST. GFR  (AFRICAN AMERICAN): 10 ML/MIN/1.73 M^2
EST. GFR  (NON AFRICAN AMERICAN): 8 ML/MIN/1.73 M^2
EST. GFR  (NON AFRICAN AMERICAN): 8 ML/MIN/1.73 M^2
GLUCOSE SERPL-MCNC: 120 MG/DL (ref 70–110)
GLUCOSE SERPL-MCNC: 120 MG/DL (ref 70–110)
POCT GLUCOSE: 138 MG/DL (ref 70–110)
POCT GLUCOSE: 173 MG/DL (ref 70–110)
POCT GLUCOSE: 191 MG/DL (ref 70–110)
POTASSIUM SERPL-SCNC: 4.1 MMOL/L (ref 3.5–5.1)
POTASSIUM SERPL-SCNC: 4.1 MMOL/L (ref 3.5–5.1)
PROT SERPL-MCNC: 7.1 G/DL (ref 6–8.4)
SODIUM SERPL-SCNC: 141 MMOL/L (ref 136–145)
SODIUM SERPL-SCNC: 141 MMOL/L (ref 136–145)

## 2019-09-02 PROCEDURE — 63600175 PHARM REV CODE 636 W HCPCS: Mod: HCNC | Performed by: INTERNAL MEDICINE

## 2019-09-02 PROCEDURE — 83880 ASSAY OF NATRIURETIC PEPTIDE: CPT | Mod: HCNC

## 2019-09-02 PROCEDURE — 25000003 PHARM REV CODE 250: Mod: HCNC | Performed by: INTERNAL MEDICINE

## 2019-09-02 PROCEDURE — 11000001 HC ACUTE MED/SURG PRIVATE ROOM: Mod: HCNC

## 2019-09-02 PROCEDURE — 27000221 HC OXYGEN, UP TO 24 HOURS: Mod: HCNC

## 2019-09-02 PROCEDURE — 36415 COLL VENOUS BLD VENIPUNCTURE: CPT | Mod: HCNC

## 2019-09-02 PROCEDURE — 94761 N-INVAS EAR/PLS OXIMETRY MLT: CPT | Mod: HCNC

## 2019-09-02 PROCEDURE — 99900035 HC TECH TIME PER 15 MIN (STAT): Mod: HCNC

## 2019-09-02 PROCEDURE — 25000003 PHARM REV CODE 250: Mod: HCNC | Performed by: HOSPITALIST

## 2019-09-02 PROCEDURE — S0171 BUMETANIDE 0.5 MG: HCPCS | Mod: HCNC | Performed by: INTERNAL MEDICINE

## 2019-09-02 PROCEDURE — 80053 COMPREHEN METABOLIC PANEL: CPT | Mod: HCNC

## 2019-09-02 RX ORDER — FUROSEMIDE 10 MG/ML
80 INJECTION INTRAMUSCULAR; INTRAVENOUS 2 TIMES DAILY
Status: DISCONTINUED | OUTPATIENT
Start: 2019-09-02 | End: 2019-09-03

## 2019-09-02 RX ADMIN — CLOPIDOGREL BISULFATE 75 MG: 75 TABLET ORAL at 09:09

## 2019-09-02 RX ADMIN — HYDRALAZINE HYDROCHLORIDE 100 MG: 25 TABLET ORAL at 09:09

## 2019-09-02 RX ADMIN — INSULIN ASPART 3 UNITS: 100 INJECTION, SOLUTION INTRAVENOUS; SUBCUTANEOUS at 05:09

## 2019-09-02 RX ADMIN — LEVOTHYROXINE SODIUM 50 MCG: 50 TABLET ORAL at 05:09

## 2019-09-02 RX ADMIN — AMLODIPINE BESYLATE 10 MG: 5 TABLET ORAL at 09:09

## 2019-09-02 RX ADMIN — HEPARIN SODIUM 5000 UNITS: 5000 INJECTION, SOLUTION INTRAVENOUS; SUBCUTANEOUS at 09:09

## 2019-09-02 RX ADMIN — METOLAZONE 10 MG: 2.5 TABLET ORAL at 09:09

## 2019-09-02 RX ADMIN — MICONAZOLE NITRATE: 20 OINTMENT TOPICAL at 09:09

## 2019-09-02 RX ADMIN — METOPROLOL TARTRATE 50 MG: 50 TABLET ORAL at 09:09

## 2019-09-02 RX ADMIN — ATORVASTATIN CALCIUM 10 MG: 10 TABLET, FILM COATED ORAL at 09:09

## 2019-09-02 RX ADMIN — INSULIN DETEMIR 5 UNITS: 100 INJECTION, SOLUTION SUBCUTANEOUS at 09:09

## 2019-09-02 RX ADMIN — HYDRALAZINE HYDROCHLORIDE 100 MG: 25 TABLET ORAL at 02:09

## 2019-09-02 RX ADMIN — CITALOPRAM HYDROBROMIDE 10 MG: 10 TABLET ORAL at 09:09

## 2019-09-02 RX ADMIN — FUROSEMIDE 80 MG: 10 INJECTION, SOLUTION INTRAVENOUS at 05:09

## 2019-09-02 RX ADMIN — BUMETANIDE 2 MG: 0.25 INJECTION INTRAMUSCULAR; INTRAVENOUS at 09:09

## 2019-09-02 RX ADMIN — RAMELTEON 8 MG: 8 TABLET ORAL at 09:09

## 2019-09-02 NOTE — SUBJECTIVE & OBJECTIVE
"Interval History: No new issues. Some increased 02 requirements       Review of Systems   Constitutional: Negative for activity change.   HENT: Negative for congestion.    Respiratory: Negative for chest tightness.    Cardiovascular: Negative for chest pain.   Gastrointestinal: Negative for abdominal pain.   Genitourinary: Negative for difficulty urinating.   Musculoskeletal: Negative for arthralgias.     Objective:     Vital Signs (Most Recent):  Temp: 97.8 °F (36.6 °C) (09/02/19 0749)  Pulse: 62 (09/02/19 0749)  Resp: 19 (09/02/19 0749)  BP: (!) 154/73 (09/02/19 0749)  SpO2: (!) 92 % (09/02/19 0749) Vital Signs (24h Range):  Temp:  [97.8 °F (36.6 °C)-98.8 °F (37.1 °C)] 97.8 °F (36.6 °C)  Pulse:  [57-67] 62  Resp:  [17-20] 19  SpO2:  [89 %-96 %] 92 %  BP: (131-156)/(66-82) 154/73     Weight: 131.1 kg (289 lb 0.4 oz)(pt wt in bed refused to stand states "i'm to sleepy")  Body mass index is 48.1 kg/m².    Intake/Output Summary (Last 24 hours) at 9/2/2019 1012  Last data filed at 9/2/2019 0500  Gross per 24 hour   Intake 600 ml   Output 2050 ml   Net -1450 ml      Physical Exam   Constitutional: She is oriented to person, place, and time. She appears well-developed and well-nourished.   HENT:   Head: Normocephalic and atraumatic.   Cardiovascular: Normal rate and regular rhythm.   Pulmonary/Chest: Effort normal. No stridor. No respiratory distress. She has no wheezes. She has no rales.   Neurological: She is alert and oriented to person, place, and time.   Psychiatric: She has a normal mood and affect. Her behavior is normal.   Nursing note and vitals reviewed.      Significant Labs:   CBC: No results for input(s): WBC, HGB, HCT, PLT in the last 48 hours.  CMP:   Recent Labs   Lab 09/01/19  0514 09/02/19  0431     140 141  141   K 3.4*  3.4* 4.1  4.1     102 102  102   CO2 27  27 26  26   *  141* 120*  120*   BUN 77*  77* 80*  80*   CREATININE 4.7*  4.7* 4.8*  4.8*   CALCIUM 8.3*  " 8.3* 8.5*  8.5*   PROT 7.2 7.1   ALBUMIN 2.6* 2.5*   BILITOT 1.0 0.9   ALKPHOS 129 122   AST 17 22   ALT 18 20   ANIONGAP 11  11 13  13   EGFRNONAA 9*  9* 8*  8*       Significant Imaging:

## 2019-09-02 NOTE — PROGRESS NOTES
[facility] Hospital Medicine  Progress Note    Patient Name: Erica Leblanc  MRN: [id number]  Patient Class: IP- Inpatient   Admission Date: 8/27/2019  Length of Stay: 6 days  Attending Physician: Luiz Little MD  Primary Care Provider: Sendy Elaine MD        Subjective:     Principal Problem:Acute on chronic diastolic heart failure        HPI:  Ms. Erica Leblanc is a 74 y.o. female with essential hypertension, type 2 diabetes mellitus (HbA1c 7.3% Jul 2019), hyperlipidemia (LDL 52.8 Mar 2019), chronic diastolic heart failure (LVEF 70% Jun 2017), CKD stage 5, hypothyroidism (TSH 3.298 Mar 2019), anemia chronic disease, morbid obesity (BMI 41.6), HUMBERTO on CPAP, and depression who presents to [facility] ED with complaints of dyspnea for the past four days.  She had reported to the ED physician that her CPAP machine has been malfunctioning, a similar complaint to her presentation two months ago.  She has not been able to get it replaced due to insurance constraints.  The dyspnea is worse on exertion.  Further history is otherwise limited at this time due to hypersomnolence.    Overview/Hospital Course:  75 y/o female admitted with acute on chronic diastolic heart failure.  Started on IV diuresis.  Patient with hx of CKD and worsening Creat.  Nephrology consulted. Patient clinically improved.  Social workers consulted to help with re-arranging broken CPAP for patient. PT/OT rec: H/H.  02 requirements increased and so lasix increased on 9/2/19. CPAP arranged for home. Patient does not wear 02 at home.      Interval History: No new issues. Some increased 02 requirements       Review of Systems   Constitutional: Negative for activity change.   HENT: Negative for congestion.    Respiratory: Negative for chest tightness.    Cardiovascular: Negative for chest pain.   Gastrointestinal: Negative for abdominal pain.   Genitourinary: Negative for difficulty urinating.   Musculoskeletal: Negative  "for arthralgias.     Objective:     Vital Signs (Most Recent):  Temp: 97.8 °F (36.6 °C) (09/02/19 0749)  Pulse: 62 (09/02/19 0749)  Resp: 19 (09/02/19 0749)  BP: (!) 154/73 (09/02/19 0749)  SpO2: (!) 92 % (09/02/19 0749) Vital Signs (24h Range):  Temp:  [97.8 °F (36.6 °C)-98.8 °F (37.1 °C)] 97.8 °F (36.6 °C)  Pulse:  [57-67] 62  Resp:  [17-20] 19  SpO2:  [89 %-96 %] 92 %  BP: (131-156)/(66-82) 154/73     Weight: 131.1 kg (289 lb 0.4 oz)(pt wt in bed refused to stand states "i'm to sleepy")  Body mass index is 48.1 kg/m².    Intake/Output Summary (Last 24 hours) at 9/2/2019 1012  Last data filed at 9/2/2019 0500  Gross per 24 hour   Intake 600 ml   Output 2050 ml   Net -1450 ml      Physical Exam   Constitutional: She is oriented to person, place, and time. She appears well-developed and well-nourished.   HENT:   Head: Normocephalic and atraumatic.   Cardiovascular: Normal rate and regular rhythm.   Pulmonary/Chest: Effort normal. No stridor. No respiratory distress. She has no wheezes. She has no rales.   Neurological: She is alert and oriented to person, place, and time.   Psychiatric: She has a normal mood and affect. Her behavior is normal.   Nursing note and vitals reviewed.      Significant Labs:   CBC: No results for input(s): WBC, HGB, HCT, PLT in the last 48 hours.  CMP:   Recent Labs   Lab 09/01/19  0514 09/02/19  0431     140 141  141   K 3.4*  3.4* 4.1  4.1     102 102  102   CO2 27  27 26  26   *  141* 120*  120*   BUN 77*  77* 80*  80*   CREATININE 4.7*  4.7* 4.8*  4.8*   CALCIUM 8.3*  8.3* 8.5*  8.5*   PROT 7.2 7.1   ALBUMIN 2.6* 2.5*   BILITOT 1.0 0.9   ALKPHOS 129 122   AST 17 22   ALT 18 20   ANIONGAP 11  11 13  13   EGFRNONAA 9*  9* 8*  8*       Significant Imaging:       Assessment/Plan:      * Acute on chronic diastolic heart failure  Patient was recently admitted here two months ago for dyspnea related to her malfunctioning CPAP machine.  She reports that " her CPAP machine is still not functioning and reports worsening dyspnea.  She does appear volume overload.  Chest radiograph significant for mild pulmonary edema and her BNP is 1792.    Her troponin is mildly elevated at 0.055.   She has been given intravenous diuresis with furosemide with improvement of her symptoms.  Will monitor her urine output very closely.   Check Echo.    May need another day   BMP pending     Continue lasix today. Wean 02.    Will check BNP.  Increase lasix.      Chronic respiratory failure  Requiring CPAP at night- likely from [facility].     States she cannot go home as she is missing parts to her CPAP. Will consult .         Debility  Resume H/H PT/OT on discharge.       Depression  Stable; will continue her home regimen of citalopram.    Anemia of chronic disease  The patient's H/H is stable and consistent with previous laboratory measurements, and the patient exhibits no signs or symptoms of acute bleeding; there is no indication for transfusion.  Will continue to monitor.    CKD stage 5  Worsening renal function over past few months.  Monitor closely while on diuresis.  Will get Nephrology input.    Essential hypertension  Patient's blood pressure is better-controlled; will continue home regimen of bumetanide and hydralazine, and provide as-needed clonidine.  Will hold her home regimen of amlodipine and metoprolol due to acute heart failure.    Chronic diastolic heart failure  As addressed above.    Type 2 diabetes mellitus, controlled, with renal complications  Well controlled on a home regimen of basal insulin therapy; will provide basal-prandial insulin therapy along insulin sliding scale.  A1c ordered     Hypothyroidism (acquired)  Poorly controlled; will continue her home regimen of levothyroxine and defer dosage adjustments to her PCP.    HUMBERTO on CPAP  As addressed above.    CPAP arranged for home.     Hyperlipidemia LDL goal <100  Well controlled; will continue her home  regimen of atorvastatin.    Morbid obesity with body mass index (BMI) of 40.0 to 44.9 in adult  The patient has been counseled on the negative impact that obesity imparts on her health and was encouraged to make lifestyle changes in order to lose weight and decrease her modifiable risk factors.      VTE Risk Mitigation (From admission, onward)        Ordered     heparin (porcine) injection 5,000 Units  Every 12 hours      08/27/19 2217     IP VTE HIGH RISK PATIENT  Once      08/27/19 2217        BNP. Increase Lasix  Hopefully home on 9/3        Luiz Rodríguez MD  Department of Hospital Medicine   [facility]

## 2019-09-02 NOTE — NURSING
Pt has remained free from falls or injuries this shift with no signs of infection. Pt in good spirits but gets SOB with just conversation. Pts saturations dropped when CPAP was put on and her oxygen had to be increased to 3L to maintain sats of 90%.

## 2019-09-02 NOTE — ASSESSMENT & PLAN NOTE
Patient was recently admitted here two months ago for dyspnea related to her malfunctioning CPAP machine.  She reports that her CPAP machine is still not functioning and reports worsening dyspnea.  She does appear volume overload.  Chest radiograph significant for mild pulmonary edema and her BNP is 1792.    Her troponin is mildly elevated at 0.055.   She has been given intravenous diuresis with furosemide with improvement of her symptoms.  Will monitor her urine output very closely.   Check Echo.    May need another day   BMP pending     Continue lasix today. Wean 02.    Will check BNP.  Increase lasix.

## 2019-09-02 NOTE — PT/OT/SLP PROGRESS
"Physical Therapy      Patient Name:  Erica Leblanc   MRN:  [id number]    Patient not seen today secondary to Pain. Encouraged pt participation and educated on benefits of therapy, however, unsuccessful.  Upon entering room, pt with lunch plate on top of stomach attempting to eat.  Pt reports she is having bad pains in her stomach and is trying to eat, but unsuccessful due to pain.  Reports pain is still present even after having a BM.  Offered to assist pt to BScommode, BSchair, EOB, however, pt declined due to increased abdominal pain. Pt states " It hurts more to move."  Pt's nurse, Aminah, notified. Will follow-up as able.    Lissette Fish, PTA    "

## 2019-09-02 NOTE — PLAN OF CARE
Problem: Adult Inpatient Plan of Care  Goal: Plan of Care Review  Outcome: Ongoing (interventions implemented as appropriate)  Monitored freq.throughout the shift. Pt. Resting at present with no complaints and no acute distress noted. Cardiac & glucose monitoring continues as ordered. Oxygen cont.in use. Villarreal cath.patent. Cont..with gen.edema. Iv diuretics cont.as ordered. Remains on fluid restrictions. Pt.turned & repositioned freq. Call light in reach.

## 2019-09-02 NOTE — PROGRESS NOTES
Erica Leblanc is a 74 y.o. female patient.    Follow for SATHISH, CKD stg 4    No new c/o, feeling better    Scheduled Meds:   amLODIPine  10 mg Oral Daily    atorvastatin  10 mg Oral Daily    citalopram  10 mg Oral Daily    clopidogrel  75 mg Oral Daily    furosemide  80 mg Intravenous BID    heparin (porcine)  5,000 Units Subcutaneous Q12H    hydrALAZINE  100 mg Oral TID    insulin aspart U-100  3 Units Subcutaneous TIDWM    insulin detemir U-100  5 Units Subcutaneous Daily    levothyroxine  50 mcg Oral Before breakfast    metoprolol tartrate  50 mg Oral BID    miconazole nitrate 2%   Topical (Top) BID       Review of patient's allergies indicates:   Allergen Reactions    Ace inhibitors Other (See Comments)     Other reaction(s): cough         Vital Signs Range (Last 24H):  Temp:  [97.8 °F (36.6 °C)-98.8 °F (37.1 °C)]   Pulse:  [54-67]   Resp:  [16-20]   BP: (131-156)/(66-82)   SpO2:  [89 %-96 %]     I & O (Last 24H):    Intake/Output Summary (Last 24 hours) at 9/2/2019 1308  Last data filed at 9/2/2019 1000  Gross per 24 hour   Intake 360 ml   Output 1250 ml   Net -890 ml           Physical Exam:  General appearance: well developed, well nourished, no distress  Lungs:  rales bibasilar  Heart: regular rate and rhythm  Abdomen: soft, non-tender non-distented; bowel sounds normal; no masses,  no organomegaly  Extremities: edema (+)    Laboratory:  CBC:   Recent Labs   Lab 08/27/19  1628   WBC 4.25   RBC 4.88   HGB 11.6*   HCT 36.6*      MCV 75*   MCH 23.8*   MCHC 31.7*     CMP:   Recent Labs   Lab 09/02/19  0431   *  120*   CALCIUM 8.5*  8.5*   ALBUMIN 2.5*   PROT 7.1     141   K 4.1  4.1   CO2 26  26     102   BUN 80*  80*   CREATININE 4.8*  4.8*   ALKPHOS 122   ALT 20   AST 22   BILITOT 0.9       Imp/Plan    SATHISH on CKD stg 4 - creatinine stable  Acute on chronic diastolic CHF - diuresing, improving  DM type 2  HUMBERTO  Anemia of chronic disease    Continue present  Rx  Watch renal function closely  CMP in am      Trac T Le  9/2/2019

## 2019-09-03 LAB
ALBUMIN SERPL BCP-MCNC: 2.4 G/DL (ref 3.5–5.2)
ALP SERPL-CCNC: 118 U/L (ref 55–135)
ALT SERPL W/O P-5'-P-CCNC: 21 U/L (ref 10–44)
ANION GAP SERPL CALC-SCNC: 9 MMOL/L (ref 8–16)
ANION GAP SERPL CALC-SCNC: 9 MMOL/L (ref 8–16)
AST SERPL-CCNC: 21 U/L (ref 10–40)
BILIRUB SERPL-MCNC: 0.6 MG/DL (ref 0.1–1)
BUN SERPL-MCNC: 86 MG/DL (ref 8–23)
BUN SERPL-MCNC: 86 MG/DL (ref 8–23)
CALCIUM SERPL-MCNC: 8.3 MG/DL (ref 8.7–10.5)
CALCIUM SERPL-MCNC: 8.3 MG/DL (ref 8.7–10.5)
CHLORIDE SERPL-SCNC: 100 MMOL/L (ref 95–110)
CHLORIDE SERPL-SCNC: 100 MMOL/L (ref 95–110)
CO2 SERPL-SCNC: 30 MMOL/L (ref 23–29)
CO2 SERPL-SCNC: 30 MMOL/L (ref 23–29)
CREAT SERPL-MCNC: 5.1 MG/DL (ref 0.5–1.4)
CREAT SERPL-MCNC: 5.1 MG/DL (ref 0.5–1.4)
EST. GFR  (AFRICAN AMERICAN): 9 ML/MIN/1.73 M^2
EST. GFR  (AFRICAN AMERICAN): 9 ML/MIN/1.73 M^2
EST. GFR  (NON AFRICAN AMERICAN): 8 ML/MIN/1.73 M^2
EST. GFR  (NON AFRICAN AMERICAN): 8 ML/MIN/1.73 M^2
GLUCOSE SERPL-MCNC: 140 MG/DL (ref 70–110)
GLUCOSE SERPL-MCNC: 140 MG/DL (ref 70–110)
POCT GLUCOSE: 137 MG/DL (ref 70–110)
POCT GLUCOSE: 152 MG/DL (ref 70–110)
POCT GLUCOSE: 173 MG/DL (ref 70–110)
POCT GLUCOSE: 184 MG/DL (ref 70–110)
POCT GLUCOSE: 193 MG/DL (ref 70–110)
POTASSIUM SERPL-SCNC: 3.8 MMOL/L (ref 3.5–5.1)
POTASSIUM SERPL-SCNC: 3.8 MMOL/L (ref 3.5–5.1)
PROT SERPL-MCNC: 7 G/DL (ref 6–8.4)
SODIUM SERPL-SCNC: 139 MMOL/L (ref 136–145)
SODIUM SERPL-SCNC: 139 MMOL/L (ref 136–145)

## 2019-09-03 PROCEDURE — 86709 HEPATITIS A IGM ANTIBODY: CPT | Mod: HCNC

## 2019-09-03 PROCEDURE — 25000003 PHARM REV CODE 250: Mod: HCNC | Performed by: INTERNAL MEDICINE

## 2019-09-03 PROCEDURE — 36415 COLL VENOUS BLD VENIPUNCTURE: CPT | Mod: HCNC

## 2019-09-03 PROCEDURE — 97535 SELF CARE MNGMENT TRAINING: CPT | Mod: HCNC

## 2019-09-03 PROCEDURE — 94660 CPAP INITIATION&MGMT: CPT | Mod: HCNC

## 2019-09-03 PROCEDURE — 86580 TB INTRADERMAL TEST: CPT | Mod: HCNC | Performed by: INTERNAL MEDICINE

## 2019-09-03 PROCEDURE — 86704 HEP B CORE ANTIBODY TOTAL: CPT | Mod: HCNC

## 2019-09-03 PROCEDURE — 80053 COMPREHEN METABOLIC PANEL: CPT | Mod: HCNC

## 2019-09-03 PROCEDURE — 97530 THERAPEUTIC ACTIVITIES: CPT | Mod: HCNC

## 2019-09-03 PROCEDURE — 99223 1ST HOSP IP/OBS HIGH 75: CPT | Mod: HCNC,,, | Performed by: INTERNAL MEDICINE

## 2019-09-03 PROCEDURE — 30200315 PPD INTRADERMAL TEST REV CODE 302: Mod: HCNC | Performed by: INTERNAL MEDICINE

## 2019-09-03 PROCEDURE — 87340 HEPATITIS B SURFACE AG IA: CPT | Mod: HCNC

## 2019-09-03 PROCEDURE — 97116 GAIT TRAINING THERAPY: CPT | Mod: HCNC

## 2019-09-03 PROCEDURE — 86706 HEP B SURFACE ANTIBODY: CPT | Mod: HCNC

## 2019-09-03 PROCEDURE — 63600175 PHARM REV CODE 636 W HCPCS: Mod: HCNC | Performed by: INTERNAL MEDICINE

## 2019-09-03 PROCEDURE — 11000001 HC ACUTE MED/SURG PRIVATE ROOM: Mod: HCNC

## 2019-09-03 PROCEDURE — 99223 PR INITIAL HOSPITAL CARE,LEVL III: ICD-10-PCS | Mod: HCNC,,, | Performed by: INTERNAL MEDICINE

## 2019-09-03 PROCEDURE — 99900035 HC TECH TIME PER 15 MIN (STAT): Mod: HCNC

## 2019-09-03 RX ORDER — BUMETANIDE 0.25 MG/ML
1 INJECTION INTRAMUSCULAR; INTRAVENOUS DAILY
Status: DISCONTINUED | OUTPATIENT
Start: 2019-09-03 | End: 2019-09-08

## 2019-09-03 RX ORDER — METOLAZONE 2.5 MG/1
10 TABLET ORAL DAILY
Status: DISCONTINUED | OUTPATIENT
Start: 2019-09-03 | End: 2019-09-08

## 2019-09-03 RX ADMIN — INSULIN ASPART 3 UNITS: 100 INJECTION, SOLUTION INTRAVENOUS; SUBCUTANEOUS at 12:09

## 2019-09-03 RX ADMIN — HEPARIN SODIUM 5000 UNITS: 5000 INJECTION, SOLUTION INTRAVENOUS; SUBCUTANEOUS at 09:09

## 2019-09-03 RX ADMIN — HYDRALAZINE HYDROCHLORIDE 100 MG: 25 TABLET ORAL at 09:09

## 2019-09-03 RX ADMIN — CITALOPRAM HYDROBROMIDE 10 MG: 10 TABLET ORAL at 09:09

## 2019-09-03 RX ADMIN — FUROSEMIDE 80 MG: 10 INJECTION, SOLUTION INTRAVENOUS at 09:09

## 2019-09-03 RX ADMIN — CLOPIDOGREL BISULFATE 75 MG: 75 TABLET ORAL at 09:09

## 2019-09-03 RX ADMIN — INSULIN ASPART 3 UNITS: 100 INJECTION, SOLUTION INTRAVENOUS; SUBCUTANEOUS at 07:09

## 2019-09-03 RX ADMIN — ATORVASTATIN CALCIUM 10 MG: 10 TABLET, FILM COATED ORAL at 09:09

## 2019-09-03 RX ADMIN — INSULIN DETEMIR 5 UNITS: 100 INJECTION, SOLUTION SUBCUTANEOUS at 08:09

## 2019-09-03 RX ADMIN — TUBERCULIN PURIFIED PROTEIN DERIVATIVE 5 UNITS: 5 INJECTION, SOLUTION INTRADERMAL at 02:09

## 2019-09-03 RX ADMIN — LEVOTHYROXINE SODIUM 50 MCG: 50 TABLET ORAL at 06:09

## 2019-09-03 RX ADMIN — RAMELTEON 8 MG: 8 TABLET ORAL at 09:09

## 2019-09-03 RX ADMIN — MICONAZOLE NITRATE: 20 OINTMENT TOPICAL at 09:09

## 2019-09-03 RX ADMIN — INSULIN ASPART 3 UNITS: 100 INJECTION, SOLUTION INTRAVENOUS; SUBCUTANEOUS at 05:09

## 2019-09-03 RX ADMIN — METOLAZONE 10 MG: 2.5 TABLET ORAL at 12:09

## 2019-09-03 NOTE — NURSING
Lisset Campbell, RN  Registered Nurse  Med/Surg  Plan of Care  Signed                 Problem: Adult Inpatient Plan of Care  Goal: Plan of Care Review  Outcome: Ongoing (interventions implemented as appropriate)  Pt has remained free from falls, injuries or signs of infection this shift. Pt has denied pain or needs. Bed remains locked and klow with call light within reach. Villarreal draining clear yellow urine. Pt remains on oxygen for SOB.

## 2019-09-03 NOTE — PLAN OF CARE
Problem: Adult Inpatient Plan of Care  Goal: Plan of Care Review  Outcome: Ongoing (interventions implemented as appropriate)  Pt has remained free from falls, injuries or signs of infection this shift. Pt has denied pain or needs. Bed remains locked and klow with call light within reach. Villarreal draining clear yellow urine. Pt remains on oxygen for SOB.

## 2019-09-03 NOTE — HPI
Ms. Erica Leblanc is a 74 y.o. female with essential hypertension, type 2 diabetes mellitus (HbA1c 7.3% Jul 2019), hyperlipidemia (LDL 52.8 Mar 2019), chronic diastolic heart failure (LVEF 70% Jun 2017), CKD stage 5, hypothyroidism (TSH 3.298 Mar 2019), anemia chronic disease, morbid obesity (BMI 41.6), HUMBERTO on CPAP, and depression who presents to [facility] ED with complaints of dyspnea for the past four days.  She had reported to the ED physician that her CPAP machine has been malfunctioning, a similar complaint to her presentation two months ago.  She has not been able to get it replaced due to insurance constraints.  The dyspnea is worse on exertion.  Further history is otherwise limited at this time due to hypersomnolence.     Overview/Hospital Course:  73 y/o female admitted with acute on chronic diastolic heart failure.  Started on IV diuresis.  Patient with hx of CKD and worsening Creat.  Nephrology consulted. Patient clinically improved.  Social workers consulted to help with re-arranging broken CPAP for patient. PT/OT rec: H/H.  02 requirements increased and so lasix increased on 9/2/19. CPAP arranged for home. Patient does not wear 02 at home.      Still SOB  Denies CP  BNP 1960  Cr 5.1    Previously followed by Dr Rice. Now sees Dr Li    Echo 8/28/19  · Normal (55-65%) left ventricular systolic function.  · Mild concentric left ventricular hypertrophy.  · Indeterminate left ventricular diastolic function.  · Mild right ventricular enlargement.  · Moderate left atrial enlargement.  · Moderate right atrial enlargement.  · Mild aortic regurgitation.  · Mild mitral regurgitation.  · Moderate to severe tricuspid regurgitation.  · Elevated central venous pressure (15 mm Hg).  · The estimated PA systolic pressure is 81 mm Hg  · Pulmonary hypertension present.     Stress test 7/5/17  Impression: NORMAL MYOCARDIAL PERFUSION  1. The perfusion scan is free of evidence for myocardial ischemia or injury.   2.  Resting wall motion is physiologic.   3. Visually estimated LV function is normal.   4. The ventricular volumes are normal at rest and stress.   5. The extracardiac distribution of radioactivity is normal.

## 2019-09-03 NOTE — PROGRESS NOTES
Date of Admission:8/27/2019    SUBJECTIVE: notes breathing ok    Current Facility-Administered Medications   Medication    acetaminophen tablet 500 mg    atorvastatin tablet 10 mg    bumetanide injection 1 mg    citalopram tablet 10 mg    cloNIDine tablet 0.1 mg    clopidogrel tablet 75 mg    dextrose 50% injection 12.5 g    dextrose 50% injection 25 g    glucagon (human recombinant) injection 1 mg    glucose chewable tablet 16 g    glucose chewable tablet 24 g    heparin (porcine) injection 5,000 Units    hydrALAZINE tablet 100 mg    insulin aspart U-100 pen 0-5 Units    insulin aspart U-100 pen 3 Units    insulin detemir U-100 pen 5 Units    levothyroxine tablet 50 mcg    metOLazone tablet 10 mg    metoprolol tartrate (LOPRESSOR) tablet 50 mg    miconazole nitrate 2% ointment    prochlorperazine injection Soln 5 mg    promethazine (PHENERGAN) 6.25 mg in dextrose 5 % 50 mL IVPB    ramelteon tablet 8 mg    tuberculin injection 5 Units       Wt Readings from Last 3 Encounters:   09/03/19 128.5 kg (283 lb 4.7 oz)   08/15/19 123 kg (271 lb 2.7 oz)   08/07/19 123.4 kg (272 lb)     Temp Readings from Last 3 Encounters:   09/03/19 97.4 °F (36.3 °C) (Oral)   07/25/19 98.1 °F (36.7 °C)   04/05/19 98.3 °F (36.8 °C) (Oral)     BP Readings from Last 3 Encounters:   09/03/19 137/70   08/15/19 (!) 181/88   08/07/19 122/80     Pulse Readings from Last 3 Encounters:   09/03/19 (!) 52   08/15/19 (!) 58   08/07/19 68       Intake/Output Summary (Last 24 hours) at 9/3/2019 1036  Last data filed at 9/3/2019 0814  Gross per 24 hour   Intake 600 ml   Output 1550 ml   Net -950 ml       PE:  GEN:wd female in nad  HEENT:ncat,eomi,mm  CVS:s1s2 regular  PULM:ctab  ABD:+bs,soft,nd  EXT: hip edema,2+leg edema  NEURO:awake, alert    Recent Labs   Lab 09/03/19  0425   *  140*     139   K 3.8  3.8     100   CO2 30*  30*   BUN 86*  86*   CREATININE 5.1*  5.1*   CALCIUM 8.3*  8.3*       Lab Results    Component Value Date    .9 (H) 05/22/2019    CALCIUM 8.3 (L) 09/03/2019    CALCIUM 8.3 (L) 09/03/2019    PHOS 4.4 08/07/2019       No results for input(s): WBC, RBC, HGB, HCT, PLT, MCV, MCH, MCHC in the last 24 hours.      A/P:  1.curt. On ckd 4.creatinine not better. Concerning of worsening valve/pulm htn. Pt still very overloaded.  2.acute  On diastolic hf.  Cont diuresis. Concern of valve. Consult cards. High pulm pressures. Ck with cards about valve and pulm htn.  Concern of pushing diuresis or hd with reducing her preload. Didn't follow fluid restriction outpt. Will cont bumex/zaoroxlyn for now.    3.ryan. Cont bipap as needed.  4.anemia with ckd. No epo indicated for now.  5.dm2. Following sugars.  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 Phos ok. No binders for now.  7.obesity. Wt loss is needed.  8.dm2.Following sugars.

## 2019-09-03 NOTE — PLAN OF CARE
Problem: Physical Therapy Goal  Goal: Physical Therapy Goal  Goals to be met by: 2019     Patient will increase functional independence with mobility by performin. Supine to sit with Stand-by Assistance  2. Sit to stand transfer with Stand-by Assistance  3. Gait  x 50 feet with Stand-by Assistance using Rolling Walker.   4. Lower extremity exercise program x10 reps per handout, with independence     Outcome: Ongoing (interventions implemented as appropriate)  Pt will benefit from further therapy in order to return to PLOF.

## 2019-09-03 NOTE — PT/OT/SLP PROGRESS
Physical Therapy Treatment    Patient Name:  Erica Leblanc   MRN:  [id number]    Recommendations:     Discharge Recommendations:  home health PT   Discharge Equipment Recommendations: hospital bed(CPAP)   Barriers to discharge:      Assessment:     Erica Leblanc is a 74 y.o. female admitted with a medical diagnosis of Acute on chronic diastolic heart failure.  She presents with the following impairments/functional limitations:  weakness, impaired endurance, gait instability, impaired balance, decreased upper extremity function, decreased lower extremity function, decreased ROM, decreased safety awareness, pain, impaired cardiopulmonary response to activity .    Rehab Prognosis: Fair; patient would benefit from acute skilled PT services to address these deficits and reach maximum level of function.    Recent Surgery: * No surgery found *      Plan:     During this hospitalization, patient to be seen daily to address the identified rehab impairments via gait training, therapeutic activities, therapeutic exercises and progress toward the following goals:    · Plan of Care Expires:  08/12/19    Subjective     Chief Complaint: SOB with activity   Patient/Family Comments/goals: pt agreeable to therapy   Pain/Comfort:  · Pain Rating 1: 0/10  · Pain Rating Post-Intervention 1: 0/10      Objective:     Communicated with nurse prior to session.  Patient found up in chair with telemetry, oxygen, kemp catheter upon PT entry to room.     General Precautions: Standard, fall, respiratory, diabetic   Orthopedic Precautions:N/A   Braces: N/A     Functional Mobility:  · Transfers:     · Sit to Stand:  moderate assistance and maximal assistance from lower chair with rolling walker. V/T cues for safety technique and walker management.   · Gait: pt ambulated ~ 20 ft with RW, CGA . Noted with decreased debbie, decreased step length. Limited with gait training 2* pt SOB despite on 3 L O2NC. VC's for pursed lip breathing  technique.    · Balance: Fair        AM-PAC 6 CLICK MOBILITY  Turning over in bed (including adjusting bedclothes, sheets and blankets)?: 4  Sitting down on and standing up from a chair with arms (e.g., wheelchair, bedside commode, etc.): 2  Moving from lying on back to sitting on the side of the bed?: 3  Moving to and from a bed to a chair (including a wheelchair)?: 3  Need to walk in hospital room?: 3  Climbing 3-5 steps with a railing?: 3  Basic Mobility Total Score: 18       Therapeutic Activities and Exercises:   BLE HEP given with instructions and demonstrations (pt verbalize understanding). Encouraged pt to perform BLE ex's per handout throughout the day.   Pt performed seated BLE 10 reps:   AP,  Hip abd/add with pillow , Hip flexion , GS . V/T's cues for technique and sequence. Pt tolerated well.   Educated pt on safety awareness with all OOB mobility , transfer and gait training.     Patient left up in chair with all lines intact, call button in reach and nurse Aminah present..    GOALS:   Multidisciplinary Problems     Physical Therapy Goals        Problem: Physical Therapy Goal    Goal Priority Disciplines Outcome Goal Variances Interventions   Physical Therapy Goal     PT, PT/OT Ongoing (interventions implemented as appropriate)     Description:  Goals to be met by: 2019     Patient will increase functional independence with mobility by performin. Supine to sit with Stand-by Assistance  2. Sit to stand transfer with Stand-by Assistance  3. Gait  x 50 feet with Stand-by Assistance using Rolling Walker.   4. Lower extremity exercise program x10 reps per handout, with independence                      Time Tracking:     PT Received On: 19  PT Start Time: 1136     PT Stop Time: 1200  PT Total Time (min): 24 min     Billable Minutes: Gait Training 10 and Therapeutic Activity 14    Treatment Type: Treatment  PT/PTA: PTA     PTA Visit Number: 1     Renata Grimm, PTA  2019

## 2019-09-03 NOTE — ASSESSMENT & PLAN NOTE
Worsening renal function over past few months.  Might need to start dialysis during hospitalization  Nephrology on board

## 2019-09-03 NOTE — PROGRESS NOTES
3620 TN met with pt to discuss CPAP and hospital bed when discharged. Pt stated she will have CPAP with the correct settings. Hospital bed will be delivered upon pt's discharge. TN will contact [facility] when pt has a discharge date.

## 2019-09-03 NOTE — PROGRESS NOTES
[facility] Hospital Medicine  Progress Note    Patient Name: Erica Leblanc  MRN: [id number]  Patient Class: IP- Inpatient   Admission Date: 8/27/2019  Length of Stay: 7 days  Attending Physician: Zahra Calero MD  Primary Care Provider: Sendy Elaine MD        Subjective:     Principal Problem:Acute on chronic diastolic heart failure        HPI:  Ms. Erica Leblanc is a 74 y.o. female with essential hypertension, type 2 diabetes mellitus (HbA1c 7.3% Jul 2019), hyperlipidemia (LDL 52.8 Mar 2019), chronic diastolic heart failure (LVEF 70% Jun 2017), CKD stage 5, hypothyroidism (TSH 3.298 Mar 2019), anemia chronic disease, morbid obesity (BMI 41.6), HUMBERTO on CPAP, and depression who presents to [facility] ED with complaints of dyspnea for the past four days.  She had reported to the ED physician that her CPAP machine has been malfunctioning, a similar complaint to her presentation two months ago.  She has not been able to get it replaced due to insurance constraints.  The dyspnea is worse on exertion.  Further history is otherwise limited at this time due to hypersomnolence.    Overview/Hospital Course:  75 y/o female admitted with acute on chronic diastolic heart failure.  Started on IV diuresis.  Patient with hx of CKD and worsening Creat.  Nephrology consulted. Patient clinically improved.  Social workers consulted to help with re-arranging broken CPAP for patient. PT/OT rec: H/H.  02 requirements increased and so lasix increased on 9/2/19. CPAP arranged for home. Patient does not wear 02 at home.      Interval History: still requiring O2. Renal function worse.     Review of Systems   Respiratory: Positive for shortness of breath.    Cardiovascular: Positive for leg swelling.   Gastrointestinal: Negative.      Objective:     Vital Signs (Most Recent):  Temp: 98.3 °F (36.8 °C) (09/03/19 1650)  Pulse: (!) 58 (09/03/19 1650)  Resp: 18 (09/03/19 1650)  BP: (!) 156/76 (09/03/19 1650)  SpO2:  (!) 92 % (09/03/19 1650) Vital Signs (24h Range):  Temp:  [97.4 °F (36.3 °C)-98.3 °F (36.8 °C)] 98.3 °F (36.8 °C)  Pulse:  [52-61] 58  Resp:  [18-20] 18  SpO2:  [92 %-95 %] 92 %  BP: (118-156)/(62-76) 156/76     Weight: 128.5 kg (283 lb 4.7 oz)  Body mass index is 47.14 kg/m².    Intake/Output Summary (Last 24 hours) at 9/3/2019 1823  Last data filed at 9/3/2019 1723  Gross per 24 hour   Intake 480 ml   Output 2075 ml   Net -1595 ml      Physical Exam   Constitutional: She is oriented to person, place, and time. She appears well-developed. No distress.   Cardiovascular:   Sinus judy   Pulmonary/Chest: She has no wheezes. She has rales.   Speaking mid sentences  Using accessory muscles   Abdominal: Soft. Bowel sounds are normal.   Musculoskeletal: She exhibits edema.   Neurological: She is alert and oriented to person, place, and time.   Skin: She is not diaphoretic.   Nursing note and vitals reviewed.      Significant Labs: All pertinent labs within the past 24 hours have been reviewed.    Significant Imaging: I have reviewed all pertinent imaging results/findings within the past 24 hours.  I have reviewed and interpreted all pertinent imaging results/findings within the past 24 hours.      Assessment/Plan:      * Acute on chronic diastolic heart failure  This is likely secondary to progressive renal failure in addition to pulmonary hypertension, tricuspid regurg and diastolic heart failure. Attempting diuresis but patient may be heading towards dialysis.  Appreciate nephrology recs regarding diuretic regimen  Cardiology also on board        Chronic respiratory failure  2/2 HUMBERTO, diastolic heart failure and pulmonary hypertension  Supplemental O2, diuresis and CPAP nightly        Debility  Resume H/H PT/OT on discharge.       Depression  Stable; will continue her home regimen of citalopram.    Anemia of chronic disease  The patient's H/H is stable and consistent with previous laboratory measurements, and the patient  exhibits no signs or symptoms of acute bleeding; there is no indication for transfusion.  Will continue to monitor.    CKD stage 5  Worsening renal function over past few months.  Might need to start dialysis during hospitalization  Nephrology on board    Essential hypertension  Continue current antihypertensive regimen    Chronic diastolic heart failure  As addressed above.    Type 2 diabetes mellitus, controlled, with renal complications  Well controlled on a home regimen of basal insulin therapy; will provide basal-prandial insulin therapy along insulin sliding scale.  A1c ordered     Hypothyroidism (acquired)  Poorly controlled; will continue her home regimen of levothyroxine and defer dosage adjustments to her PCP.    HUMBERTO on CPAP  As addressed above.    CPAP arranged for home.     Hyperlipidemia LDL goal <100  Well controlled; will continue her home regimen of atorvastatin.    Morbid obesity with body mass index (BMI) of 40.0 to 44.9 in adult  The patient has been counseled on the negative impact that obesity imparts on her health and was encouraged to make lifestyle changes in order to lose weight and decrease her modifiable risk factors.    VTE Risk Mitigation (From admission, onward)        Ordered     heparin (porcine) injection 5,000 Units  Every 12 hours      08/27/19 2217     IP VTE HIGH RISK PATIENT  Once      08/27/19 2217          Dispo: MENG Almeida MD  Department of Hospital Medicine   [facility]

## 2019-09-03 NOTE — PLAN OF CARE
Problem: Occupational Therapy Goal  Goal: Occupational Therapy Goal  Goals to be met by: 9/6/2019     Patient will increase functional independence with ADLs by performing:    UE Dressing with Supervision.  Grooming while standing at sink with Contact Guard Assistance. Met 9/3/19  Toileting from bedside commode with Supervision for hygiene and clothing management.   Stand pivot transfers with Contact Guard Assistance. Goal met 8/30/19  Toilet transfer to bedside commode with Contact Guard Assistance.  Upper extremity exercise program x10 reps per handout, with supervision. Goal met 8/30/19      Outcome: Ongoing (interventions implemented as appropriate)  Patient progressing and will benefit from continued OT to address functional deficits and return to PLOF. Continue with OT POC as indicated.

## 2019-09-03 NOTE — ASSESSMENT & PLAN NOTE
2/2 HUMBERTO, diastolic heart failure and pulmonary hypertension  Supplemental O2, diuresis and CPAP nightly

## 2019-09-03 NOTE — PT/OT/SLP PROGRESS
"Occupational Therapy   Treatment    Name: Erica Leblanc  MRN: [id number]  Admitting Diagnosis:  Acute on chronic diastolic heart failure       Recommendations:     Discharge Recommendations: home health OT  Discharge Equipment Recommendations:  none  Barriers to discharge:       Assessment:     Erica Leblanc is a 74 y.o. female with a medical diagnosis of Acute on chronic diastolic heart failure.  She presents with the following performance deficits affecting function: weakness, impaired endurance, impaired self care skills, impaired functional mobilty, gait instability, impaired balance, decreased upper extremity function, decreased lower extremity function, decreased safety awareness, impaired cardiopulmonary response to activity. Patient tolerated standing ADL's at sink today with SBA. Pt requires increased time to perform all tasks. Pt progressing and will benefit from continued OT to address functional deficits and return to PLOF.    Rehab Prognosis:  Good; patient would benefit from acute skilled OT services to address these deficits and reach maximum level of function.       Plan:     Patient to be seen 2 x/week to address the above listed problems via self-care/home management, therapeutic activities, therapeutic exercises  · Plan of Care Expires:    · Plan of Care Reviewed with: patient    Subjective   " I feel much better than I did yesterday"  Pt agreeable to therapy.   Pain/Comfort:  · Pain Rating 1: 0/10    Objective:     Communicated with: Nurse Burr prior to session.  Patient found HOB elevated with telemetry, oxygen, kemp catheter upon OT entry to room.    General Precautions: Standard, fall, respiratory   Orthopedic Precautions:N/A   Braces:N/A       Occupational Performance:     Bed Mobility: Pt required increased time to perform bed mobility tasks.   · Patient completed Scooting/Bridging with minimum assistance  · Patient completed Supine to Sit with moderate assistance     Functional " Mobility/Transfers:  · Patient completed Sit <> Stand Transfer with minimum assistance  with  rolling walker from EOB  · Patient completed Bed <> Chair Transfer using Step Transfer technique with contact guard assistance with rolling walker  · Functional Mobility: Pt able to ambulate to sink for ADL's with CGA/RW, 3 L O2 NC in place.     Activities of Daily Living:  · Grooming: stand by assistance standing at sink to wash face and perform oral care. Pt with forward flexed posture, BUE support on sink. VCs to encourage upright posture during task  · Upper Body Dressing: set up A seated EOB to merry back gown      [facility] 6 Click ADL: 17    Treatment & Education:  -Pt performed bed mobility, transfers, and ADL's as noted above.  - Pt required increased time to perform all tasks, minimal SOB with exertion, SPO2 on 3L O2 89-91% after standing ADL's, Educated patient on proper breathing technique for recovery, able to return demonstration.   encouraged patient to continue to perform UE therex HEP during day, placed handout on tray table for accessibility.    Patient left up in chair with all lines intact, call button in reach, nurse notified and nursing students presentEducation:      GOALS:   Multidisciplinary Problems     Occupational Therapy Goals        Problem: Occupational Therapy Goal    Goal Priority Disciplines Outcome Interventions   Occupational Therapy Goal     OT, PT/OT Revised    Description:  Goals to be met by: 9/6/2019     Patient will increase functional independence with ADLs by performing:    UE Dressing with Supervision.  Grooming while standing at sink with Contact Guard Assistance. Met 9/3/19  Toileting from bedside commode with Supervision for hygiene and clothing management.   Stand pivot transfers with Contact Guard Assistance. Goal met 8/30/19  Toilet transfer to bedside commode with Contact Guard Assistance.  Upper extremity exercise program x10 reps per handout, with supervision. Goal met  8/30/19                       Time Tracking:     OT Date of Treatment: 09/03/19  OT Start Time: 0945  OT Stop Time: 1012  OT Total Time (min): 27 min    Billable Minutes:Self Care/Home Management 27    ADRY Wilson  9/3/2019

## 2019-09-03 NOTE — ASSESSMENT & PLAN NOTE
This is likely secondary to progressive renal failure in addition to pulmonary hypertension, tricuspid regurg and diastolic heart failure. Attempting diuresis but patient may be heading towards dialysis.  Appreciate nephrology recs regarding diuretic regimen  Cardiology also on board

## 2019-09-03 NOTE — CONSULTS
[facility]  Cardiology  Consult Note    Patient Name: Erica Leblanc  MRN: [id number]  Admission Date: 8/27/2019  Hospital Length of Stay: 7 days  Code Status: Full Code   Attending Provider: Zahra Calero MD   Consulting Provider: Rudy Haider MD  Primary Care Physician: Sendy Elaine MD  Principal Problem:Acute on chronic diastolic heart failure    Patient information was obtained from patient and ER records.     Consults  Subjective:     Chief Complaint:  CHF     HPI:   Ms. Erica Leblanc is a 74 y.o. female with essential hypertension, type 2 diabetes mellitus (HbA1c 7.3% Jul 2019), hyperlipidemia (LDL 52.8 Mar 2019), chronic diastolic heart failure (LVEF 70% Jun 2017), CKD stage 5, hypothyroidism (TSH 3.298 Mar 2019), anemia chronic disease, morbid obesity (BMI 41.6), HUMBERTO on CPAP, and depression who presents to [facility] ED with complaints of dyspnea for the past four days.  She had reported to the ED physician that her CPAP machine has been malfunctioning, a similar complaint to her presentation two months ago.  She has not been able to get it replaced due to insurance constraints.  The dyspnea is worse on exertion.  Further history is otherwise limited at this time due to hypersomnolence.     Overview/Hospital Course:  73 y/o female admitted with acute on chronic diastolic heart failure.  Started on IV diuresis.  Patient with hx of CKD and worsening Creat.  Nephrology consulted. Patient clinically improved.  Social workers consulted to help with re-arranging broken CPAP for patient. PT/OT rec: H/H.  02 requirements increased and so lasix increased on 9/2/19. CPAP arranged for home. Patient does not wear 02 at home.      Still SOB  Denies CP  BNP 1960  Cr 5.1    Previously followed by Dr Rice. Now sees Dr Li    Echo 8/28/19  · Normal (55-65%) left ventricular systolic function.  · Mild concentric left ventricular hypertrophy.  · Indeterminate left ventricular diastolic  function.  · Mild right ventricular enlargement.  · Moderate left atrial enlargement.  · Moderate right atrial enlargement.  · Mild aortic regurgitation.  · Mild mitral regurgitation.  · Moderate to severe tricuspid regurgitation.  · Elevated central venous pressure (15 mm Hg).  · The estimated PA systolic pressure is 81 mm Hg  · Pulmonary hypertension present.     Stress test 7/5/17  Impression: NORMAL MYOCARDIAL PERFUSION  1. The perfusion scan is free of evidence for myocardial ischemia or injury.   2. Resting wall motion is physiologic.   3. Visually estimated LV function is normal.   4. The ventricular volumes are normal at rest and stress.   5. The extracardiac distribution of radioactivity is normal.        Past Medical History:   Diagnosis Date    Acute respiratory failure with hypoxia     Anemia of chronic kidney failure, stage 4 (severe) 4/5/2019    Cataracts, bilateral     CHF (congestive heart failure)     CKD (chronic kidney disease) stage 3, GFR 30-59 ml/min     CKD (chronic kidney disease) stage 3, GFR 30-59 ml/min     Controlled type 2 diabetes mellitus with proteinuria or albuminuria     Depression     Diabetes with neurologic complications     Diabetic retinopathy of both eyes     Edema     Glaucoma     History of colonic polyps     Hx-TIA (transient ischemic attack) 11/2008    Hyperlipidemia LDL goal < 100     Hypertension     Hypothyroidism     Major depressive disorder, single episode, mild 2/17/2016    Mixed incontinence urge and stress     Obesity     Obstructive sleep apnea on CPAP     7/19/19:  Home CPAP machine broken, per patient & son    Osteopenia     Proteinuria     Sickle cell trait     TIA (transient ischemic attack)     Trouble in sleeping     Type 2 diabetes mellitus with ophthalmic manifestations     Type 2 diabetes with stage 3 chronic kidney disease GFR 30-59     Type II or unspecified type diabetes mellitus with renal manifestations,  uncontrolled(250.42)     Uncontrolled type 2 diabetes mellitus with peripheral circulatory disorder 2019    Urge incontinence 2016    Urge incontinence     Venous stasis ulcer     bilateral lower legs    Vitamin D deficiency disease        Past Surgical History:   Procedure Laterality Date    BREAST BIOPSY      breast reduction Bilateral age 31    BREAST SURGERY      cataracts Bilateral      SECTION, LOW TRANSVERSE      x1    CHOLECYSTECTOMY      COLONOSCOPY N/A 2012    Performed by Keron Gunderson MD at [facility] (4TH FLR)    EYE SURGERY  2014, 2014    vitrectomy    EYE SURGERY Right 2016    HYSTERECTOMY  1986    TAHBSO (patient is unsure if ovaries removed)    OOPHORECTOMY      REFRACTIVE SURGERY      REPAIR, RETINAL DETACHMENT, WITH VITRECTOMY Right 2014    Performed by LILLIANA Coello MD at [facility] OR 1ST FLR    REPAIR, RETINAL DETACHMENT, WITH VITRECTOMY Left 2014    Performed by LILLIANA Coello MD at [facility] 1ST FLR    REPAIR-RETINA (VITRECTOMY) Right 2014    Performed by LILLIANA Coello MD at [facility] 1ST FLR    STRABISMUS REPAIR/ADJUSTABLE SUTURES Bilateral 2016    Performed by RABIA Danielson Jr., MD at [facility] OR 1ST FLR    TOTAL REDUCTION MAMMOPLASTY      approx 10 yrs ago       Review of patient's allergies indicates:   Allergen Reactions    Ace inhibitors Other (See Comments)     Other reaction(s): cough       No current facility-administered medications on file prior to encounter.      Current Outpatient Medications on File Prior to Encounter   Medication Sig    amLODIPine (NORVASC) 10 MG tablet Take 1 tablet (10 mg total) by mouth once daily.    atorvastatin (LIPITOR) 10 MG tablet TAKE 1 TABLET EVERY DAY    bumetanide (BUMEX) 2 MG tablet Take 1 tablet (2 mg total) by mouth 2 (two) times daily.    clopidogrel (PLAVIX) 75 mg tablet TAKE 1 TABLET EVERY DAY    hydrALAZINE (APRESOLINE) 100 MG tablet Take 1 tablet  (100 mg total) by mouth 3 (three) times daily.    insulin detemir U-100 (LEVEMIR FLEXTOUCH) 100 unit/mL (3 mL) SubQ InPn pen Inject 5 Units into the skin once daily.    levothyroxine (SYNTHROID) 50 MCG tablet Take 50 mcg by mouth once daily.    metoprolol tartrate (LOPRESSOR) 50 MG tablet Take 50 mg by mouth 2 (two) times daily.    oxybutynin (DITROPAN XL) 15 MG TR24 TAKE 1 TABLET EVERY DAY    citalopram (CELEXA) 10 MG tablet TAKE 1 TABLET EVERY DAY    docusate sodium (COLACE) 100 MG capsule Take 200 mg by mouth once daily.     LANCETS MISC     MULTIVITAMIN WITH MINERALS (HAIR,SKIN AND NAILS ORAL) Take 3 tablets by mouth once daily.    polyethylene glycol (GLYCOLAX) 17 gram PwPk Take 17 g by mouth once daily.     Family History     Problem Relation (Age of Onset)    Achondroplasia Sister    Breast cancer Maternal Aunt (65)    Diabetes Mother, Paternal Grandmother    Esophageal cancer Maternal Uncle    HIV Brother    Hypertension Mother    Leukemia Father    Ovarian cancer Sister (35)    Parkinsonism Maternal Aunt    Stroke Mother        Tobacco Use    Smoking status: Never Smoker    Smokeless tobacco: Never Used   Substance and Sexual Activity    Alcohol use: No     Alcohol/week: 0.0 oz    Drug use: No    Sexual activity: Not Currently     Partners: Male     Birth control/protection: Post-menopausal, Surgical     Review of Systems   Constitution: Negative for decreased appetite.   HENT: Negative for ear discharge.    Eyes: Negative for blurred vision.   Respiratory: Negative for hemoptysis.    Endocrine: Negative for polyphagia.   Hematologic/Lymphatic: Negative for adenopathy.   Skin: Negative for color change.   Musculoskeletal: Negative for joint swelling.   Genitourinary: Negative for bladder incontinence.   Neurological: Negative for brief paralysis.   Psychiatric/Behavioral: Negative for hallucinations.   Allergic/Immunologic: Negative for hives.     Objective:     Vital Signs (Most  Recent):  Temp: 97.4 °F (36.3 °C) (09/03/19 0724)  Pulse: (!) 52 (09/03/19 0724)  Resp: 20 (09/03/19 0724)  BP: 137/70 (09/03/19 0724)  SpO2: (!) 93 % (09/03/19 0724) Vital Signs (24h Range):  Temp:  [97.4 °F (36.3 °C)-98.5 °F (36.9 °C)] 97.4 °F (36.3 °C)  Pulse:  [52-61] 52  Resp:  [16-20] 20  SpO2:  [92 %-95 %] 93 %  BP: (118-143)/(62-75) 137/70     Weight: 128.5 kg (283 lb 4.7 oz)  Body mass index is 47.14 kg/m².    SpO2: (!) 93 %  O2 Device (Oxygen Therapy): CPAP      Intake/Output Summary (Last 24 hours) at 9/3/2019 1124  Last data filed at 9/3/2019 0814  Gross per 24 hour   Intake 600 ml   Output 1550 ml   Net -950 ml       Lines/Drains/Airways     Drain                 Urethral Catheter 08/27/19 1958 Non-latex 16 Fr. 6 days          Peripheral Intravenous Line                 Peripheral IV - Single Lumen 08/27/19 1627 18 G Right Wrist 6 days                Physical Exam   Constitutional: She is oriented to person, place, and time. She appears well-developed and well-nourished.   HENT:   Head: Normocephalic and atraumatic.   Eyes: Pupils are equal, round, and reactive to light. Conjunctivae are normal.   Neck: Normal range of motion. Neck supple.   Cardiovascular: Normal rate, normal heart sounds and intact distal pulses.   Pulmonary/Chest: Effort normal. She has rales.   Abdominal: Soft. Bowel sounds are normal.   Musculoskeletal: Normal range of motion.   Neurological: She is alert and oriented to person, place, and time.   Skin: Skin is warm and dry.       Significant Labs: All pertinent lab results from the last 24 hours have been reviewed.    Significant Imaging: Echocardiogram:   2D echo with color flow doppler:   Results for orders placed or performed during the hospital encounter of 06/12/17   2D echo with color flow doppler   Result Value Ref Range    QEF 70 55 - 65    Diastolic Dysfunction Yes (A)     Est. PA Systolic Pressure 17.64     Pericardial Effusion NONE     Mitral Valve Mobility NORMAL      Tricuspid Valve Regurgitation TRIVIAL     Narrative    Date of Procedure: 06/12/2017        TEST DESCRIPTION   Technical Quality: This is a technically adequate study.     Aorta: The aortic root is normal in size, measuring 2.9 cm at sinotubular junction and 3.1 cm at Sinuses of Valsalva. The proximal ascending aorta is normal in size, measuring 3.0 cm across.     Left Atrium: The left atrial volume index is mildly enlarged, measuring 41.04 cc/m2.     Left Ventricle: The left ventricle is normal in size, with an end-diastolic diameter of 4.3 cm, and an end-systolic diameter of 3.1 cm. LV wall thickness is normal, with the septum measuring 1.3 cm and the posterior wall measuring 1.0 cm across. Relative   wall thickness was increased at 0.47, and the LV mass index was 93.4 g/m2 consistent with concentric remodeling. There are no regional wall motion abnormalities. Left ventricular systolic function appears normal. Visually estimated ejection fraction is   65-70%. The LV Doppler derived stroke volume equals 89.0 ccs.     Diastolic indices: E wave velocity 1.3 m/s, E/A ratio 1.2,  msec., E/e' ratio(avg) 18. There is pseudonormalization of mitral inflow pattern consistent with significant diastolic dysfunction.     Right Atrium: The right atrium is normal in size, measuring 5.1 cm in length and 3.3 cm in width in the apical view.     Right Ventricle: The right ventricle is normal in size measuring 2.7 cm at the base in the apical right ventricle-focused view. Global right ventricular systolic function appears normal. Tricuspid annular plane systolic excursion (TAPSE) is 2.3 cm.   Tissue Doppler-derived tricuspid annular peak systolic velocity (S prime) is 16.1 cm/s. The estimated PA systolic pressure is greater than 18 mmHg.     Aortic Valve:  The aortic valve is mildly sclerotic with normal leaflet mobility. The aortic valve is tri-leaflet in structure.     Mitral Valve:  The mitral valve is normal in structure  with normal leaflet mobility.     Tricuspid Valve:  The tricuspid valve is normal in structure with normal leaflet mobility. There is trivial tricuspid regurgitation.     Pulmonary Valve:  The pulmonic valve is not well seen.     Pericardium: There is no evidence of pericardial effusion.      IVC: The IVC is not visualized.     Intracavitary: There is no evidence of intracavity mass, thrombi, or vegetation.         CONCLUSIONS     1 - Normal left ventricular systolic function (EF 65-70%).     2 - No diagnostic regional wall motion abnormalities.     3 - Concentric remodeling.     4 - Impaired LV relaxation, elevated LAP (grade 2 diastolic dysfunction).     5 - Trivial tricuspid regurgitation.     6 - The estimated PA systolic pressure is greater than 18 mmHg.             This document has been electronically    SIGNED BY: Arjun Bar MD On: 06/12/2017 12:08     Assessment and Plan:     CKD stage 5  Diuretics per renal    Essential hypertension  controlled    Chronic diastolic heart failure  Diuresis and afterload reduction as tolerated. Mod-severe TR noted on last echo due to pulmonary HTN from left sided diastolic dysfunction. No role for valve intervention. May need HD for volume control    Type 2 diabetes mellitus, controlled, with renal complications  Per primary    HUMBERTO on CPAP  Per pulmonary    Hyperlipidemia LDL goal <100  On statin        VTE Risk Mitigation (From admission, onward)        Ordered     heparin (porcine) injection 5,000 Units  Every 12 hours      08/27/19 2217     IP VTE HIGH RISK PATIENT  Once      08/27/19 2217          Thank you for your consult. I will sign off. Please contact us if you have any additional questions.    Rudy Haider MD  Cardiology   [facility]

## 2019-09-03 NOTE — PLAN OF CARE
Problem: Adult Inpatient Plan of Care  Goal: Plan of Care Review  Outcome: Ongoing (interventions implemented as appropriate)  Monitored freq.throughout the shift. Pt.resting at present with no complaints and no acute distress noted.  Sitting up in chair at present. Oxygen cont.in use. Cardiac & glucose monitoring cont.as ordered.  & Cindi visited with new orders noted.  aware of sched. med.held due to b/p or hr.  visited with no new orders. Continues with edema. Villarreal patent. Continue to monitor pt.closely. PPD skin test applied as ordered. Call light in reach.

## 2019-09-03 NOTE — SUBJECTIVE & OBJECTIVE
Interval History: still requiring O2. Renal function worse.     Review of Systems   Respiratory: Positive for shortness of breath.    Cardiovascular: Positive for leg swelling.   Gastrointestinal: Negative.      Objective:     Vital Signs (Most Recent):  Temp: 98.3 °F (36.8 °C) (09/03/19 1650)  Pulse: (!) 58 (09/03/19 1650)  Resp: 18 (09/03/19 1650)  BP: (!) 156/76 (09/03/19 1650)  SpO2: (!) 92 % (09/03/19 1650) Vital Signs (24h Range):  Temp:  [97.4 °F (36.3 °C)-98.3 °F (36.8 °C)] 98.3 °F (36.8 °C)  Pulse:  [52-61] 58  Resp:  [18-20] 18  SpO2:  [92 %-95 %] 92 %  BP: (118-156)/(62-76) 156/76     Weight: 128.5 kg (283 lb 4.7 oz)  Body mass index is 47.14 kg/m².    Intake/Output Summary (Last 24 hours) at 9/3/2019 1823  Last data filed at 9/3/2019 1723  Gross per 24 hour   Intake 480 ml   Output 2075 ml   Net -1595 ml      Physical Exam   Constitutional: She is oriented to person, place, and time. She appears well-developed. No distress.   Cardiovascular:   Sinus judy   Pulmonary/Chest: She has no wheezes. She has rales.   Speaking mid sentences  Using accessory muscles   Abdominal: Soft. Bowel sounds are normal.   Musculoskeletal: She exhibits edema.   Neurological: She is alert and oriented to person, place, and time.   Skin: She is not diaphoretic.   Nursing note and vitals reviewed.      Significant Labs: All pertinent labs within the past 24 hours have been reviewed.    Significant Imaging: I have reviewed all pertinent imaging results/findings within the past 24 hours.  I have reviewed and interpreted all pertinent imaging results/findings within the past 24 hours.

## 2019-09-03 NOTE — SUBJECTIVE & OBJECTIVE
Past Medical History:   Diagnosis Date    Acute respiratory failure with hypoxia     Anemia of chronic kidney failure, stage 4 (severe) 2019    Cataracts, bilateral     CHF (congestive heart failure)     CKD (chronic kidney disease) stage 3, GFR 30-59 ml/min     CKD (chronic kidney disease) stage 3, GFR 30-59 ml/min     Controlled type 2 diabetes mellitus with proteinuria or albuminuria     Depression     Diabetes with neurologic complications     Diabetic retinopathy of both eyes     Edema     Glaucoma     History of colonic polyps     Hx-TIA (transient ischemic attack) 2008    Hyperlipidemia LDL goal < 100     Hypertension     Hypothyroidism     Major depressive disorder, single episode, mild 2016    Mixed incontinence urge and stress     Obesity     Obstructive sleep apnea on CPAP     19:  Home CPAP machine broken, per patient & son    Osteopenia     Proteinuria     Sickle cell trait     TIA (transient ischemic attack)     Trouble in sleeping     Type 2 diabetes mellitus with ophthalmic manifestations     Type 2 diabetes with stage 3 chronic kidney disease GFR 30-59     Type II or unspecified type diabetes mellitus with renal manifestations, uncontrolled(250.42)     Uncontrolled type 2 diabetes mellitus with peripheral circulatory disorder 2019    Urge incontinence 2016    Urge incontinence     Venous stasis ulcer     bilateral lower legs    Vitamin D deficiency disease        Past Surgical History:   Procedure Laterality Date    BREAST BIOPSY      breast reduction Bilateral age 31    BREAST SURGERY      cataracts Bilateral      SECTION, LOW TRANSVERSE      x1    CHOLECYSTECTOMY      COLONOSCOPY N/A 2012    Performed by Keron Gunderson [facility] at [facility] ([address] [place] [address])    EYE SURGERY  2014, 2014    vitrectomy    EYE SURGERY Right 2016    HYSTERECTOMY  1986    TAHBSO (patient is unsure if ovaries removed)    OOPHORECTOMY       REFRACTIVE SURGERY      REPAIR, RETINAL DETACHMENT, WITH VITRECTOMY Right 6/4/2014    Performed by LILLIANA Coello MD at [facility] OR [facility]    REPAIR, RETINAL DETACHMENT, WITH VITRECTOMY Left 1/8/2014    Performed by LILLIANA Coello MD at [facility] OR [facility]    REPAIR-RETINA (VITRECTOMY) Right 7/16/2014    Performed by LILLIANA Coello MD at [facility] OR [facility]    STRABISMUS REPAIR/ADJUSTABLE SUTURES Bilateral 8/17/2016    Performed by RABIA Danielson Jr., MD at [facility] OR [facility]    TOTAL REDUCTION MAMMOPLASTY      approx 10 yrs ago       Review of patient's allergies indicates:   Allergen Reactions    Ace inhibitors Other (See Comments)     Other reaction(s): cough       No current facility-administered medications on file prior to encounter.      Current Outpatient Medications on File Prior to Encounter   Medication Sig    amLODIPine (NORVASC) 10 MG tablet Take 1 tablet (10 mg total) by mouth once daily.    atorvastatin (LIPITOR) 10 MG tablet TAKE 1 TABLET EVERY DAY    bumetanide (BUMEX) 2 MG tablet Take 1 tablet (2 mg total) by mouth 2 (two) times daily.    clopidogrel (PLAVIX) 75 mg tablet TAKE 1 TABLET EVERY DAY    hydrALAZINE (APRESOLINE) 100 MG tablet Take 1 tablet (100 mg total) by mouth 3 (three) times daily.    insulin detemir U-100 (LEVEMIR FLEXTOUCH) 100 unit/mL (3 mL) SubQ InPn pen Inject 5 Units into the skin once daily.    levothyroxine (SYNTHROID) 50 MCG tablet Take 50 mcg by mouth once daily.    metoprolol tartrate (LOPRESSOR) 50 MG tablet Take 50 mg by mouth 2 (two) times daily.    oxybutynin (DITROPAN XL) 15 MG TR24 TAKE 1 TABLET EVERY DAY    citalopram (CELEXA) 10 MG tablet TAKE 1 TABLET EVERY DAY    docusate sodium (COLACE) 100 MG capsule Take 200 mg by mouth once daily.     LANCETS MISC     MULTIVITAMIN WITH MINERALS (HAIR,SKIN AND NAILS ORAL) Take 3 tablets by mouth once daily.    polyethylene glycol (GLYCOLAX) 17 gram PwPk Take 17 g by mouth once daily.     Family  History     Problem Relation (Age of Onset)    Achondroplasia Sister    Breast cancer Maternal Aunt (65)    Diabetes Mother, Paternal Grandmother    Esophageal cancer Maternal Uncle    HIV Brother    Hypertension Mother    Leukemia Father    Ovarian cancer Sister (35)    Parkinsonism Maternal Aunt    Stroke Mother        Tobacco Use    Smoking status: Never Smoker    Smokeless tobacco: Never Used   Substance and Sexual Activity    Alcohol use: No     Alcohol/week: 0.0 oz    Drug use: No    Sexual activity: Not Currently     Partners: Male     Birth control/protection: Post-menopausal, Surgical     Review of Systems   Constitution: Negative for decreased appetite.   HENT: Negative for ear discharge.    Eyes: Negative for blurred vision.   Respiratory: Negative for hemoptysis.    Endocrine: Negative for polyphagia.   Hematologic/Lymphatic: Negative for adenopathy.   Skin: Negative for color change.   Musculoskeletal: Negative for joint swelling.   Genitourinary: Negative for bladder incontinence.   Neurological: Negative for brief paralysis.   Psychiatric/Behavioral: Negative for hallucinations.   Allergic/Immunologic: Negative for hives.     Objective:     Vital Signs (Most Recent):  Temp: 97.4 °F (36.3 °C) (09/03/19 0724)  Pulse: (!) 52 (09/03/19 0724)  Resp: 20 (09/03/19 0724)  BP: 137/70 (09/03/19 0724)  SpO2: (!) 93 % (09/03/19 0724) Vital Signs (24h Range):  Temp:  [97.4 °F (36.3 °C)-98.5 °F (36.9 °C)] 97.4 °F (36.3 °C)  Pulse:  [52-61] 52  Resp:  [16-20] 20  SpO2:  [92 %-95 %] 93 %  BP: (118-143)/(62-75) 137/70     Weight: 128.5 kg (283 lb 4.7 oz)  Body mass index is 47.14 kg/m².    SpO2: (!) 93 %  O2 Device (Oxygen Therapy): CPAP      Intake/Output Summary (Last 24 hours) at 9/3/2019 1124  Last data filed at 9/3/2019 0814  Gross per 24 hour   Intake 600 ml   Output 1550 ml   Net -950 ml       Lines/Drains/Airways     Drain                 Urethral Catheter 08/27/19 1958 Non-latex 16 Fr. 6 days           Peripheral Intravenous Line                 Peripheral IV - Single Lumen 08/27/19 1627 18 G Right Wrist 6 days                Physical Exam   Constitutional: She is oriented to person, place, and time. She appears well-developed and well-nourished.   HENT:   Head: Normocephalic and atraumatic.   Eyes: Pupils are equal, round, and reactive to light. Conjunctivae are normal.   Neck: Normal range of motion. Neck supple.   Cardiovascular: Normal rate, normal heart sounds and intact distal pulses.   Pulmonary/Chest: Effort normal. She has rales.   Abdominal: Soft. Bowel sounds are normal.   Musculoskeletal: Normal range of motion.   Neurological: She is alert and oriented to person, place, and time.   Skin: Skin is warm and dry.       Significant Labs: All pertinent lab results from the last 24 hours have been reviewed.    Significant Imaging: Echocardiogram:   2D echo with color flow doppler:   Results for orders placed or performed during the hospital encounter of 06/12/17   2D echo with color flow doppler   Result Value Ref Range    QEF 70 55 - 65    Diastolic Dysfunction Yes (A)     Est. PA Systolic Pressure 17.64     Pericardial Effusion NONE     Mitral Valve Mobility NORMAL     Tricuspid Valve Regurgitation TRIVIAL     Narrative    Date of Procedure: 06/12/2017        TEST DESCRIPTION   Technical Quality: This is a technically adequate study.     Aorta: The aortic root is normal in size, measuring 2.9 cm at sinotubular junction and 3.1 cm at Sinuses of Valsalva. The proximal ascending aorta is normal in size, measuring 3.0 cm across.     Left Atrium: The left atrial volume index is mildly enlarged, measuring 41.04 cc/m2.     Left Ventricle: The left ventricle is normal in size, with an end-diastolic diameter of 4.3 cm, and an end-systolic diameter of 3.1 cm. LV wall thickness is normal, with the septum measuring 1.3 cm and the posterior wall measuring 1.0 cm across. Relative   wall thickness was increased at 0.47,  and the LV mass index was 93.4 g/m2 consistent with concentric remodeling. There are no regional wall motion abnormalities. Left ventricular systolic function appears normal. Visually estimated ejection fraction is   65-70%. The LV Doppler derived stroke volume equals 89.0 ccs.     Diastolic indices: E wave velocity 1.3 m/s, E/A ratio 1.2,  msec., E/e' ratio(avg) 18. There is pseudonormalization of mitral inflow pattern consistent with significant diastolic dysfunction.     Right Atrium: The right atrium is normal in size, measuring 5.1 cm in length and 3.3 cm in width in the apical view.     Right Ventricle: The right ventricle is normal in size measuring 2.7 cm at the base in the apical right ventricle-focused view. Global right ventricular systolic function appears normal. Tricuspid annular plane systolic excursion (TAPSE) is 2.3 cm.   Tissue Doppler-derived tricuspid annular peak systolic velocity (S prime) is 16.1 cm/s. The estimated PA systolic pressure is greater than 18 mmHg.     Aortic Valve:  The aortic valve is mildly sclerotic with normal leaflet mobility. The aortic valve is tri-leaflet in structure.     Mitral Valve:  The mitral valve is normal in structure with normal leaflet mobility.     Tricuspid Valve:  The tricuspid valve is normal in structure with normal leaflet mobility. There is trivial tricuspid regurgitation.     Pulmonary Valve:  The pulmonic valve is not well seen.     Pericardium: There is no evidence of pericardial effusion.      IVC: The IVC is not visualized.     Intracavitary: There is no evidence of intracavity mass, thrombi, or vegetation.         CONCLUSIONS     1 - Normal left ventricular systolic function (EF 65-70%).     2 - No diagnostic regional wall motion abnormalities.     3 - Concentric remodeling.     4 - Impaired LV relaxation, elevated LAP (grade 2 diastolic dysfunction).     5 - Trivial tricuspid regurgitation.     6 - The estimated PA systolic pressure is  greater than 18 mmHg.             This document has been electronically    SIGNED BY: Arjun Bar MD On: 06/12/2017 12:08

## 2019-09-03 NOTE — PHYSICIAN QUERY
PT Name: Erica Leblanc  MR #: [id number]     Physician Query Form - Diagnosis Clarification      CDS/: Ashlee Hector RN              Contact information:[phone]    This form is a permanent document in the medical record.     Query Date: September 3, 2019    By submitting this query, we are merely seeking further clarification of documentation.  Please utilize your independent clinical judgment when addressing the question(s) below.     The medical record contains the following:      Findings Supporting Clinical Information Location in Medical Record       Chronic respiratory failure  Patient denies the use of home O2.      HUMBERTO on CPAP   As addressed above.       Chronic respiratory failure   Requiring CPAP at night- likely from [facility].      ED Provider Note 8/27      H&P 8/27      Hospital Medicine      Progress Note 8/30       Hospital Medicine                 Please clarify if the ____Chronic respiratory failure _______________________ diagnosis has been:    [ x ] Ruled In      [  ] Ruled Out     [  ] Other/Clarification of findings (please specify):       [  ] Clinically undetermined     Please document in your progress notes daily for the duration of treatment, until resolved, and include in your discharge summary.

## 2019-09-03 NOTE — ASSESSMENT & PLAN NOTE
Diuresis and afterload reduction as tolerated. Mod-severe TR noted on last echo due to pulmonary HTN from left sided diastolic dysfunction. No role for valve intervention. May need HD for volume control

## 2019-09-04 LAB
ANION GAP SERPL CALC-SCNC: 12 MMOL/L (ref 8–16)
BUN SERPL-MCNC: 89 MG/DL (ref 8–23)
CALCIUM SERPL-MCNC: 8.4 MG/DL (ref 8.7–10.5)
CHLORIDE SERPL-SCNC: 101 MMOL/L (ref 95–110)
CO2 SERPL-SCNC: 27 MMOL/L (ref 23–29)
CREAT SERPL-MCNC: 5.1 MG/DL (ref 0.5–1.4)
EST. GFR  (AFRICAN AMERICAN): 9 ML/MIN/1.73 M^2
EST. GFR  (NON AFRICAN AMERICAN): 8 ML/MIN/1.73 M^2
GLUCOSE SERPL-MCNC: 118 MG/DL (ref 70–110)
HAV IGM SERPL QL IA: NEGATIVE
HBV CORE AB SERPL QL IA: NEGATIVE
HBV SURFACE AB SER-ACNC: NEGATIVE M[IU]/ML
HBV SURFACE AG SERPL QL IA: NEGATIVE
POCT GLUCOSE: 118 MG/DL (ref 70–110)
POCT GLUCOSE: 137 MG/DL (ref 70–110)
POCT GLUCOSE: 173 MG/DL (ref 70–110)
POTASSIUM SERPL-SCNC: 3.6 MMOL/L (ref 3.5–5.1)
SODIUM SERPL-SCNC: 140 MMOL/L (ref 136–145)

## 2019-09-04 PROCEDURE — 25000003 PHARM REV CODE 250: Mod: HCNC | Performed by: HOSPITALIST

## 2019-09-04 PROCEDURE — 94761 N-INVAS EAR/PLS OXIMETRY MLT: CPT | Mod: HCNC

## 2019-09-04 PROCEDURE — 36415 COLL VENOUS BLD VENIPUNCTURE: CPT | Mod: HCNC

## 2019-09-04 PROCEDURE — 25000003 PHARM REV CODE 250: Mod: HCNC | Performed by: INTERNAL MEDICINE

## 2019-09-04 PROCEDURE — 94660 CPAP INITIATION&MGMT: CPT | Mod: HCNC

## 2019-09-04 PROCEDURE — 99900035 HC TECH TIME PER 15 MIN (STAT): Mod: HCNC

## 2019-09-04 PROCEDURE — 97116 GAIT TRAINING THERAPY: CPT | Mod: HCNC

## 2019-09-04 PROCEDURE — 80048 BASIC METABOLIC PNL TOTAL CA: CPT | Mod: HCNC

## 2019-09-04 PROCEDURE — 11000001 HC ACUTE MED/SURG PRIVATE ROOM: Mod: HCNC

## 2019-09-04 PROCEDURE — 99222 PR INITIAL HOSPITAL CARE,LEVL II: ICD-10-PCS | Mod: HCNC,,, | Performed by: SURGERY

## 2019-09-04 PROCEDURE — 97530 THERAPEUTIC ACTIVITIES: CPT | Mod: HCNC

## 2019-09-04 PROCEDURE — 63600175 PHARM REV CODE 636 W HCPCS: Mod: HCNC | Performed by: INTERNAL MEDICINE

## 2019-09-04 PROCEDURE — 99222 1ST HOSP IP/OBS MODERATE 55: CPT | Mod: HCNC,,, | Performed by: SURGERY

## 2019-09-04 PROCEDURE — 27000221 HC OXYGEN, UP TO 24 HOURS: Mod: HCNC

## 2019-09-04 PROCEDURE — S0171 BUMETANIDE 0.5 MG: HCPCS | Mod: HCNC | Performed by: INTERNAL MEDICINE

## 2019-09-04 RX ORDER — SODIUM CHLORIDE 9 MG/ML
INJECTION, SOLUTION INTRAVENOUS ONCE
Status: DISCONTINUED | OUTPATIENT
Start: 2019-09-04 | End: 2019-09-11 | Stop reason: HOSPADM

## 2019-09-04 RX ORDER — MUPIROCIN 20 MG/G
OINTMENT TOPICAL 2 TIMES DAILY
Status: COMPLETED | OUTPATIENT
Start: 2019-09-04 | End: 2019-09-09

## 2019-09-04 RX ORDER — SODIUM CHLORIDE 9 MG/ML
INJECTION, SOLUTION INTRAVENOUS
Status: DISCONTINUED | OUTPATIENT
Start: 2019-09-04 | End: 2019-09-11 | Stop reason: HOSPADM

## 2019-09-04 RX ADMIN — LEVOTHYROXINE SODIUM 50 MCG: 50 TABLET ORAL at 05:09

## 2019-09-04 RX ADMIN — CLOPIDOGREL BISULFATE 75 MG: 75 TABLET ORAL at 09:09

## 2019-09-04 RX ADMIN — INSULIN ASPART 3 UNITS: 100 INJECTION, SOLUTION INTRAVENOUS; SUBCUTANEOUS at 07:09

## 2019-09-04 RX ADMIN — MUPIROCIN: 20 OINTMENT TOPICAL at 08:09

## 2019-09-04 RX ADMIN — HEPARIN SODIUM 5000 UNITS: 5000 INJECTION, SOLUTION INTRAVENOUS; SUBCUTANEOUS at 08:09

## 2019-09-04 RX ADMIN — INSULIN ASPART 1 UNITS: 100 INJECTION, SOLUTION INTRAVENOUS; SUBCUTANEOUS at 09:09

## 2019-09-04 RX ADMIN — INSULIN DETEMIR 5 UNITS: 100 INJECTION, SOLUTION SUBCUTANEOUS at 09:09

## 2019-09-04 RX ADMIN — BUMETANIDE 1 MG: 0.25 INJECTION INTRAMUSCULAR; INTRAVENOUS at 10:09

## 2019-09-04 RX ADMIN — HEPARIN SODIUM 5000 UNITS: 5000 INJECTION, SOLUTION INTRAVENOUS; SUBCUTANEOUS at 09:09

## 2019-09-04 RX ADMIN — INSULIN ASPART 3 UNITS: 100 INJECTION, SOLUTION INTRAVENOUS; SUBCUTANEOUS at 04:09

## 2019-09-04 RX ADMIN — ATORVASTATIN CALCIUM 10 MG: 10 TABLET, FILM COATED ORAL at 09:09

## 2019-09-04 RX ADMIN — METOPROLOL TARTRATE 50 MG: 50 TABLET ORAL at 08:09

## 2019-09-04 RX ADMIN — MICONAZOLE NITRATE: 20 OINTMENT TOPICAL at 09:09

## 2019-09-04 RX ADMIN — METOPROLOL TARTRATE 50 MG: 50 TABLET ORAL at 09:09

## 2019-09-04 RX ADMIN — RAMELTEON 8 MG: 8 TABLET ORAL at 08:09

## 2019-09-04 RX ADMIN — HYDRALAZINE HYDROCHLORIDE 100 MG: 25 TABLET ORAL at 08:09

## 2019-09-04 RX ADMIN — METOLAZONE 10 MG: 2.5 TABLET ORAL at 09:09

## 2019-09-04 RX ADMIN — CITALOPRAM HYDROBROMIDE 10 MG: 10 TABLET ORAL at 09:09

## 2019-09-04 RX ADMIN — MICONAZOLE NITRATE: 20 OINTMENT TOPICAL at 08:09

## 2019-09-04 RX ADMIN — INSULIN ASPART 3 UNITS: 100 INJECTION, SOLUTION INTRAVENOUS; SUBCUTANEOUS at 11:09

## 2019-09-04 RX ADMIN — HYDRALAZINE HYDROCHLORIDE 100 MG: 25 TABLET ORAL at 09:09

## 2019-09-04 RX ADMIN — HYDRALAZINE HYDROCHLORIDE 100 MG: 25 TABLET ORAL at 04:09

## 2019-09-04 NOTE — PLAN OF CARE
Problem: Adult Inpatient Plan of Care  Goal: Plan of Care Review  Outcome: Ongoing (interventions implemented as appropriate)  Pt. AAOx3, free of falls or injuries, NAD or pain noted.  Sitting up in chair at present time. Oxygen in use.  Continues with edema. Villarreal discontinued pt has good UOP Continue to monitor pt.closely. PPD skin test read. Call light in reach safety maintained.

## 2019-09-04 NOTE — PLAN OF CARE
09/04/19 1639   Discharge Reassessment   Assessment Type Discharge Planning Reassessment   Provided patient/caregiver education on the expected discharge date and the discharge plan No   Do you have any problems affording any of your prescribed medications? No   Discharge Plan A Home with family   DME Needed Upon Discharge  CPAP;hospital bed   Anticipated Discharge Disposition Home   Can the patient answer the patient profile reliably? Yes, cognitively intact   How does the patient rate their overall health at the present time? Fair   Describe the patient's ability to walk at the present time. Walks with the help of equipment   How often would a person be available to care for the patient? Whenever needed   Number of comorbid conditions (as recorded on the chart) Four   During the past month, has the patient often been bothered by feeling down, depressed or hopeless? No   During the past month, has the patient often been bothered by little interest or pleasure in doing things? No   Post-Acute Status   Post-Acute Authorization Home Health/Hospice   Home Health/Hospice Status Awaiting Internal Medical Clearance

## 2019-09-04 NOTE — PT/OT/SLP PROGRESS
Physical Therapy Treatment    Patient Name:  Erica Leblanc   MRN:  [id number]    Recommendations:     Discharge Recommendations:  home health PT   Discharge Equipment Recommendations: hospital bed(CPAP)   Barriers to discharge:     Assessment:     Erica Leblanc is a 74 y.o. female admitted with a medical diagnosis of Acute on chronic diastolic heart failure.  She presents with the following impairments/functional limitations:  weakness, impaired endurance, impaired functional mobilty, gait instability, impaired balance, decreased coordination, decreased upper extremity function, decreased lower extremity function, pain, decreased safety awareness, edema, decreased ROM, impaired cardiopulmonary response to activity .    Rehab Prognosis: Fair; patient would benefit from acute skilled PT services to address these deficits and reach maximum level of function.    Recent Surgery: * No surgery found *      Plan:     During this hospitalization, patient to be seen daily to address the identified rehab impairments via gait training, therapeutic activities, therapeutic exercises and progress toward the following goals:    · Plan of Care Expires:  09/12/19    Subjective     Chief Complaint: none   Patient/Family Comments/goals: to get stronger   Pain/Comfort:  · Pain Rating 1: 0/10  · Pain Rating Post-Intervention 1: 0/10      Objective:     Communicated with nurse Castro prior to session.  Patient found HOB elevated with oxygen, telemetry, kemp catheter upon PT entry to room.     General Precautions: Standard, fall, respiratory   Orthopedic Precautions:N/A   Braces: N/A     Functional Mobility:  · Bed Mobility:     · Rolling Right: stand by assistance  · Scooting: contact guard assistance  · Supine to Sit:  moderate assistance, HOB elevated, bedside rail   · Transfers:     · Sit to Stand: x 3 trials from bed  moderate assistance with rolling walker. V/T cues for safety technique and walker management,   · Gait:   Patient ambulated ~  20 ft x 2  on level tile with Rolling Walker with CGA (3L O2NC) .  Pt with demonstarting a  3-point gait with decreased debbie, increased time in double stance, decreased velocity of limb motion, decreased step length, decreased swing-to-stance ratio.Impairments contributing to gait deviations include impaired balance, decreased flexibility, pain,decreased ROM and decreased strength. V/T cues for safety technique and walker management. VC's for pursed lip breathing technique.   · Balance: fair        AM-PAC 6 CLICK MOBILITY  Turning over in bed (including adjusting bedclothes, sheets and blankets)?: 4  Sitting down on and standing up from a chair with arms (e.g., wheelchair, bedside commode, etc.): 2  Moving from lying on back to sitting on the side of the bed?: 3  Moving to and from a bed to a chair (including a wheelchair)?: 3  Need to walk in hospital room?: 3  Climbing 3-5 steps with a railing?: 3  Basic Mobility Total Score: 18       Therapeutic Activities and Exercises:   Pt performed seated BLE 10 reps x 2 trials:   AP, LAQ, HS . V/T's cues for technique and sequence. Pt tolerated well.   Educated pt on safety awareness with all OOB mobility , transfer and gait training.      Patient left up in chair with lunch table set up  all lines intact, call button in reach and nurse notified..    GOALS:   Multidisciplinary Problems     Physical Therapy Goals        Problem: Physical Therapy Goal    Goal Priority Disciplines Outcome Goal Variances Interventions   Physical Therapy Goal     PT, PT/OT Ongoing (interventions implemented as appropriate)     Description:  Goals to be met by: 2019     Patient will increase functional independence with mobility by performin. Supine to sit with Stand-by Assistance  2. Sit to stand transfer with Stand-by Assistance  3. Gait  x 50 feet with Stand-by Assistance using Rolling Walker.   4. Lower extremity exercise program x10 reps per handout, with  independence                      Time Tracking:     PT Received On: 09/04/19  PT Start Time: 1121     PT Stop Time: 1149  PT Total Time (min): 28 min     Billable Minutes: Gait Training 14 and Therapeutic Activity 14    Treatment Type: Treatment  PT/PTA: PTA     PTA Visit Number: 2     Renata Grimm, PTA  09/04/2019

## 2019-09-04 NOTE — SUBJECTIVE & OBJECTIVE
Interval History: still requiring O2. Renal function remains poor. Patient's symptoms have not improved     Review of Systems   Respiratory: Positive for shortness of breath.    Cardiovascular: Positive for leg swelling.   Gastrointestinal: Negative.      Objective:     Vital Signs (Most Recent):  Temp: 97.6 °F (36.4 °C) (09/04/19 1120)  Pulse: (!) 57 (09/04/19 1546)  Resp: 16 (09/04/19 1546)  BP: 139/75 (09/04/19 1120)  SpO2: (!) 93 % (09/04/19 1546) Vital Signs (24h Range):  Temp:  [97.6 °F (36.4 °C)-98.7 °F (37.1 °C)] 97.6 °F (36.4 °C)  Pulse:  [50-58] 57  Resp:  [16-20] 16  SpO2:  [92 %-96 %] 93 %  BP: (127-157)/(72-80) 139/75     Weight: 128.5 kg (283 lb 4.7 oz)  Body mass index is 47.14 kg/m².    Intake/Output Summary (Last 24 hours) at 9/4/2019 1644  Last data filed at 9/4/2019 0830  Gross per 24 hour   Intake 240 ml   Output 925 ml   Net -685 ml      Physical Exam   Constitutional: She is oriented to person, place, and time. She appears well-developed. No distress.   Cardiovascular:   Sinus judy   Pulmonary/Chest: She has no wheezes. She has rales.   Speaking mid sentences  Using accessory muscles   Abdominal: Soft. Bowel sounds are normal.   Musculoskeletal: She exhibits edema.   Neurological: She is alert and oriented to person, place, and time.   Skin: She is not diaphoretic.   Nursing note and vitals reviewed.      Significant Labs: All pertinent labs within the past 24 hours have been reviewed.    Significant Imaging: I have reviewed all pertinent imaging results/findings within the past 24 hours.  I have reviewed and interpreted all pertinent imaging results/findings within the past 24 hours.

## 2019-09-04 NOTE — PROGRESS NOTES
[facility] Hospital Medicine  Progress Note    Patient Name: Erica Leblanc  MRN: [id number]  Patient Class: IP- Inpatient   Admission Date: 8/27/2019  Length of Stay: 8 days  Attending Physician: Zahra Calero MD  Primary Care Provider: Sendy Elaine MD        Subjective:     Principal Problem:Acute on chronic diastolic heart failure        HPI:  Ms. Erica Leblanc is a 74 y.o. female with essential hypertension, type 2 diabetes mellitus (HbA1c 7.3% Jul 2019), hyperlipidemia (LDL 52.8 Mar 2019), chronic diastolic heart failure (LVEF 70% Jun 2017), CKD stage 5, hypothyroidism (TSH 3.298 Mar 2019), anemia chronic disease, morbid obesity (BMI 41.6), HUMBERTO on CPAP, and depression who presents to [facility] ED with complaints of dyspnea for the past four days.  She had reported to the ED physician that her CPAP machine has been malfunctioning, a similar complaint to her presentation two months ago.  She has not been able to get it replaced due to insurance constraints.  The dyspnea is worse on exertion.  Further history is otherwise limited at this time due to hypersomnolence.    Overview/Hospital Course:  75 y/o female admitted with acute on chronic diastolic heart failure.  Started on IV diuresis.  Patient with hx of CKD and worsening Creat.  Nephrology consulted. Patient clinically improved.  Social workers consulted to help with re-arranging broken CPAP for patient. PT/OT rec: H/H.  02 requirements increased and so lasix increased on 9/2/19. CPAP arranged for home. Patient does not wear 02 at home.      Interval History: still requiring O2. Renal function remains poor. Patient's symptoms have not improved     Review of Systems   Respiratory: Positive for shortness of breath.    Cardiovascular: Positive for leg swelling.   Gastrointestinal: Negative.      Objective:     Vital Signs (Most Recent):  Temp: 97.6 °F (36.4 °C) (09/04/19 1120)  Pulse: (!) 57 (09/04/19 1546)  Resp: 16 (09/04/19  1546)  BP: 139/75 (09/04/19 1120)  SpO2: (!) 93 % (09/04/19 1546) Vital Signs (24h Range):  Temp:  [97.6 °F (36.4 °C)-98.7 °F (37.1 °C)] 97.6 °F (36.4 °C)  Pulse:  [50-58] 57  Resp:  [16-20] 16  SpO2:  [92 %-96 %] 93 %  BP: (127-157)/(72-80) 139/75     Weight: 128.5 kg (283 lb 4.7 oz)  Body mass index is 47.14 kg/m².    Intake/Output Summary (Last 24 hours) at 9/4/2019 1644  Last data filed at 9/4/2019 0830  Gross per 24 hour   Intake 240 ml   Output 925 ml   Net -685 ml      Physical Exam   Constitutional: She is oriented to person, place, and time. She appears well-developed. No distress.   Cardiovascular:   Sinus judy   Pulmonary/Chest: She has no wheezes. She has rales.   Speaking mid sentences  Using accessory muscles   Abdominal: Soft. Bowel sounds are normal.   Musculoskeletal: She exhibits edema.   Neurological: She is alert and oriented to person, place, and time.   Skin: She is not diaphoretic.   Nursing note and vitals reviewed.      Significant Labs: All pertinent labs within the past 24 hours have been reviewed.    Significant Imaging: I have reviewed all pertinent imaging results/findings within the past 24 hours.  I have reviewed and interpreted all pertinent imaging results/findings within the past 24 hours.      Assessment/Plan:      * Acute on chronic diastolic heart failure  This is likely secondary to progressive renal failure in addition to pulmonary hypertension, tricuspid regurg and diastolic heart failure. Attempting diuresis but patient may be heading towards dialysis.  Appreciate nephrology recs regarding diuretic regimen  Cardiology also on board        Chronic respiratory failure  2/2 HUMBERTO, diastolic heart failure and pulmonary hypertension  Supplemental O2, diuresis and CPAP nightly        Debility  Resume H/H PT/OT on discharge.       Depression  Stable; will continue her home regimen of citalopram.    Anemia of chronic disease  The patient's H/H is stable and consistent with previous  laboratory measurements, and the patient exhibits no signs or symptoms of acute bleeding; there is no indication for transfusion.  Will continue to monitor.    CKD stage 5  Worsening renal function over past few months.  Might need to start dialysis during hospitalization  Nephrology on board    Essential hypertension  Continue current antihypertensive regimen    Chronic diastolic heart failure  As addressed above.    Type 2 diabetes mellitus, controlled, with renal complications  Well controlled on a home regimen of basal insulin therapy; will provide basal-prandial insulin therapy along insulin sliding scale.  A1c ordered     Hypothyroidism (acquired)  Poorly controlled; will continue her home regimen of levothyroxine and defer dosage adjustments to her PCP.    HUMBERTO on CPAP  As addressed above.    CPAP arranged for home.     Hyperlipidemia LDL goal <100  Well controlled; will continue her home regimen of atorvastatin.    Morbid obesity with body mass index (BMI) of 40.0 to 44.9 in adult  The patient has been counseled on the negative impact that obesity imparts on her health and was encouraged to make lifestyle changes in order to lose weight and decrease her modifiable risk factors.    VTE Risk Mitigation (From admission, onward)        Ordered     heparin (porcine) injection 5,000 Units  Every 12 hours      08/27/19 2217     IP VTE HIGH RISK PATIENT  Once      08/27/19 2217            Dispo: HH +/- dialysis    Zahra Almeida MD  Department of Hospital Medicine   [facility]

## 2019-09-04 NOTE — PLAN OF CARE
Problem: Physical Therapy Goal  Goal: Physical Therapy Goal  Goals to be met by: 2019     Patient will increase functional independence with mobility by performin. Supine to sit with Stand-by Assistance  2. Sit to stand transfer with Stand-by Assistance  3. Gait  x 50 feet with Stand-by Assistance using Rolling Walker.   4. Lower extremity exercise program x10 reps per handout, with independence     Outcome: Ongoing (interventions implemented as appropriate)  Pt ambulated  20 ft x 2 with RW, CGA (3 L O2NC).

## 2019-09-04 NOTE — PROGRESS NOTES
Date of Admission:8/27/2019    SUBJECTIVE: notes feeling ok    Current Facility-Administered Medications   Medication    acetaminophen tablet 500 mg    atorvastatin tablet 10 mg    bumetanide injection 1 mg    citalopram tablet 10 mg    cloNIDine tablet 0.1 mg    clopidogrel tablet 75 mg    dextrose 50% injection 12.5 g    dextrose 50% injection 25 g    glucagon (human recombinant) injection 1 mg    glucose chewable tablet 16 g    glucose chewable tablet 24 g    heparin (porcine) injection 5,000 Units    hydrALAZINE tablet 100 mg    insulin aspart U-100 pen 0-5 Units    insulin aspart U-100 pen 3 Units    insulin detemir U-100 pen 5 Units    levothyroxine tablet 50 mcg    metOLazone tablet 10 mg    metoprolol tartrate (LOPRESSOR) tablet 50 mg    miconazole nitrate 2% ointment    prochlorperazine injection Soln 5 mg    promethazine (PHENERGAN) 6.25 mg in dextrose 5 % 50 mL IVPB    ramelteon tablet 8 mg       Wt Readings from Last 3 Encounters:   09/03/19 128.5 kg (283 lb 4.7 oz)   08/15/19 123 kg (271 lb 2.7 oz)   08/07/19 123.4 kg (272 lb)     Temp Readings from Last 3 Encounters:   09/04/19 97.6 °F (36.4 °C) (Oral)   07/25/19 98.1 °F (36.7 °C)   04/05/19 98.3 °F (36.8 °C) (Oral)     BP Readings from Last 3 Encounters:   09/04/19 139/75   08/15/19 (!) 181/88   08/07/19 122/80     Pulse Readings from Last 3 Encounters:   09/04/19 (!) 56   08/15/19 (!) 58   08/07/19 68       Intake/Output Summary (Last 24 hours) at 9/4/2019 1227  Last data filed at 9/4/2019 0830  Gross per 24 hour   Intake 240 ml   Output 925 ml   Net -685 ml       PE:  GEN:wd female in nad  HEENT:ncat,eomi,mm  CVS:s1s2 regular  PULM:ctab  ABD:+bs,soft,nd  EXT: hip edema,2+leg edema  NEURO:awake, alert    Recent Labs   Lab 09/04/19  0456   *      K 3.6      CO2 27   BUN 89*   CREATININE 5.1*   CALCIUM 8.4*       Lab Results   Component Value Date    .9 (H) 05/22/2019    CALCIUM 8.4 (L) 09/04/2019     PHOS 4.4 08/07/2019       No results for input(s): WBC, RBC, HGB, HCT, PLT, MCV, MCH, MCHC in the last 24 hours.      A/P:  1.curt. On ckd 4.creatinine not better. Pt agreeable to start hd.  2.acute  On diastolic hf.  Cont diuresis.  Will start uf with hd in am.  3.ryan. Cont bipap as needed.  4.anemia with ckd. No epo indicated for now.  5.dm2. Following sugars.  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 Phos ok. No binders for now.  7.obesity. Wt loss is needed.  8.dm2.Following sugars.  Updated son.

## 2019-09-05 LAB
ANION GAP SERPL CALC-SCNC: 10 MMOL/L (ref 8–16)
BUN SERPL-MCNC: 91 MG/DL (ref 8–23)
CALCIUM SERPL-MCNC: 8.3 MG/DL (ref 8.7–10.5)
CHLORIDE SERPL-SCNC: 98 MMOL/L (ref 95–110)
CO2 SERPL-SCNC: 31 MMOL/L (ref 23–29)
CREAT SERPL-MCNC: 5.2 MG/DL (ref 0.5–1.4)
EST. GFR  (AFRICAN AMERICAN): 9 ML/MIN/1.73 M^2
EST. GFR  (NON AFRICAN AMERICAN): 8 ML/MIN/1.73 M^2
GLUCOSE SERPL-MCNC: 139 MG/DL (ref 70–110)
POCT GLUCOSE: 105 MG/DL (ref 70–110)
POCT GLUCOSE: 236 MG/DL (ref 70–110)
POTASSIUM SERPL-SCNC: 3.4 MMOL/L (ref 3.5–5.1)
SODIUM SERPL-SCNC: 139 MMOL/L (ref 136–145)

## 2019-09-05 PROCEDURE — 36558 PR INSERT TUNNELED CV CATH W/O PORT OR PUMP: ICD-10-PCS | Mod: RT,,, | Performed by: SURGERY

## 2019-09-05 PROCEDURE — 25000003 PHARM REV CODE 250: Mod: HCNC | Performed by: INTERNAL MEDICINE

## 2019-09-05 PROCEDURE — 63600175 PHARM REV CODE 636 W HCPCS: Mod: HCNC | Performed by: INTERNAL MEDICINE

## 2019-09-05 PROCEDURE — 25000003 PHARM REV CODE 250: Mod: HCNC | Performed by: HOSPITALIST

## 2019-09-05 PROCEDURE — 90935 HEMODIALYSIS ONE EVALUATION: CPT | Mod: HCNC

## 2019-09-05 PROCEDURE — 99900035 HC TECH TIME PER 15 MIN (STAT): Mod: HCNC

## 2019-09-05 PROCEDURE — 63600175 PHARM REV CODE 636 W HCPCS: Mod: HCNC | Performed by: SURGERY

## 2019-09-05 PROCEDURE — 99152 MOD SED SAME PHYS/QHP 5/>YRS: CPT | Mod: ,,, | Performed by: SURGERY

## 2019-09-05 PROCEDURE — 36558 INSERT TUNNELED CV CATH: CPT | Mod: RT,,, | Performed by: SURGERY

## 2019-09-05 PROCEDURE — 36415 COLL VENOUS BLD VENIPUNCTURE: CPT | Mod: HCNC

## 2019-09-05 PROCEDURE — 94761 N-INVAS EAR/PLS OXIMETRY MLT: CPT | Mod: HCNC

## 2019-09-05 PROCEDURE — 11000001 HC ACUTE MED/SURG PRIVATE ROOM: Mod: HCNC

## 2019-09-05 PROCEDURE — 27000221 HC OXYGEN, UP TO 24 HOURS: Mod: HCNC

## 2019-09-05 PROCEDURE — 94660 CPAP INITIATION&MGMT: CPT | Mod: HCNC

## 2019-09-05 PROCEDURE — 25500020 PHARM REV CODE 255: Mod: HCNC | Performed by: INTERNAL MEDICINE

## 2019-09-05 PROCEDURE — 99152 PR MOD CONSCIOUS SEDATION, SAME PHYS, 5+ YRS, FIRST 15 MIN: ICD-10-PCS | Mod: ,,, | Performed by: SURGERY

## 2019-09-05 PROCEDURE — 80048 BASIC METABOLIC PNL TOTAL CA: CPT | Mod: HCNC

## 2019-09-05 RX ORDER — MIDAZOLAM HYDROCHLORIDE 1 MG/ML
INJECTION INTRAMUSCULAR; INTRAVENOUS CODE/TRAUMA/SEDATION MEDICATION
Status: COMPLETED | OUTPATIENT
Start: 2019-09-05 | End: 2019-09-05

## 2019-09-05 RX ORDER — FENTANYL CITRATE 50 UG/ML
INJECTION, SOLUTION INTRAMUSCULAR; INTRAVENOUS CODE/TRAUMA/SEDATION MEDICATION
Status: COMPLETED | OUTPATIENT
Start: 2019-09-05 | End: 2019-09-05

## 2019-09-05 RX ORDER — HEPARIN SODIUM 5000 [USP'U]/ML
5000 INJECTION, SOLUTION INTRAVENOUS; SUBCUTANEOUS
Status: DISCONTINUED | OUTPATIENT
Start: 2019-09-05 | End: 2019-09-11 | Stop reason: HOSPADM

## 2019-09-05 RX ORDER — CEFAZOLIN SODIUM 1 G/50ML
SOLUTION INTRAVENOUS
Status: COMPLETED | OUTPATIENT
Start: 2019-09-05 | End: 2019-09-05

## 2019-09-05 RX ADMIN — MUPIROCIN: 20 OINTMENT TOPICAL at 09:09

## 2019-09-05 RX ADMIN — MICONAZOLE NITRATE: 20 OINTMENT TOPICAL at 09:09

## 2019-09-05 RX ADMIN — INSULIN ASPART 3 UNITS: 100 INJECTION, SOLUTION INTRAVENOUS; SUBCUTANEOUS at 07:09

## 2019-09-05 RX ADMIN — IOHEXOL 16 ML: 300 INJECTION, SOLUTION INTRAVENOUS at 12:09

## 2019-09-05 RX ADMIN — LEVOTHYROXINE SODIUM 50 MCG: 50 TABLET ORAL at 05:09

## 2019-09-05 RX ADMIN — METOPROLOL TARTRATE 50 MG: 50 TABLET ORAL at 09:09

## 2019-09-05 RX ADMIN — HEPARIN SODIUM 5000 UNITS: 5000 INJECTION, SOLUTION INTRAVENOUS; SUBCUTANEOUS at 09:09

## 2019-09-05 RX ADMIN — HYDRALAZINE HYDROCHLORIDE 100 MG: 25 TABLET ORAL at 09:09

## 2019-09-05 RX ADMIN — HEPARIN SODIUM 5000 UNITS: 5000 INJECTION, SOLUTION INTRAVENOUS; SUBCUTANEOUS at 06:09

## 2019-09-05 RX ADMIN — INSULIN DETEMIR 5 UNITS: 100 INJECTION, SOLUTION SUBCUTANEOUS at 09:09

## 2019-09-05 RX ADMIN — FENTANYL CITRATE 50 MCG: 50 INJECTION, SOLUTION INTRAMUSCULAR; INTRAVENOUS at 11:09

## 2019-09-05 RX ADMIN — MIDAZOLAM HYDROCHLORIDE 0.5 MG: 1 INJECTION, SOLUTION INTRAMUSCULAR; INTRAVENOUS at 11:09

## 2019-09-05 RX ADMIN — CEFAZOLIN SODIUM 1 G: 1 INJECTION, SOLUTION INTRAVENOUS at 11:09

## 2019-09-05 NOTE — PLAN OF CARE
Problem: Adult Inpatient Plan of Care  Goal: Plan of Care Review  Outcome: Ongoing (interventions implemented as appropriate)     09/05/19 0351   Plan of Care Review   Plan of Care Reviewed With patient   No falls, trauma or injury this shift. Blood glucose monitored before each shift and at hour of sleep. Coverage provided per sliding scale.  C-pap at hour of sleep in use. To have dialysis cath placed for hemodialysis. Continue with plan of care and continue to monitor patient.

## 2019-09-05 NOTE — PT/OT/SLP PROGRESS
Physical Therapy      Patient Name:  Erica Leblanc   MRN:  [id number]    Patient not seen today secondary to Unavailable (pt is off the unit to IR) , second attempt , dialysis . Will follow-up tomorrow .    Renata Grimm, PTA

## 2019-09-05 NOTE — SUBJECTIVE & OBJECTIVE
Interval History: clinically unchanged    Review of Systems   Respiratory: Positive for shortness of breath.    Cardiovascular: Positive for leg swelling.   Gastrointestinal: Negative.      Objective:     Vital Signs (Most Recent):  Temp: 96.9 °F (36.1 °C) (09/05/19 0751)  Pulse: 60 (09/05/19 1145)  Resp: 17 (09/05/19 1145)  BP: (!) 170/94 (09/05/19 1145)  SpO2: 95% (09/05/19 1145) Vital Signs (24h Range):  Temp:  [96.4 °F (35.8 °C)-98.4 °F (36.9 °C)] 96.9 °F (36.1 °C)  Pulse:  [50-60] 60  Resp:  [16-20] 17  SpO2:  [6 %-99 %] 6 %  BP: (131-170)/() 170/94     Weight: 128.5 kg (283 lb 4.7 oz)  Body mass index is 47.14 kg/m².    Intake/Output Summary (Last 24 hours) at 9/5/2019 1205  Last data filed at 9/5/2019 0600  Gross per 24 hour   Intake 240 ml   Output --   Net 240 ml      Physical Exam   Constitutional: She is oriented to person, place, and time. She appears well-developed. No distress.   Cardiovascular:   Sinus judy   Pulmonary/Chest: She has no wheezes. She has rales.   Speaking mid sentences  Using accessory muscles   Abdominal: Soft. Bowel sounds are normal.   Musculoskeletal: She exhibits edema.   Neurological: She is alert and oriented to person, place, and time.   Skin: She is not diaphoretic.   Nursing note and vitals reviewed.      Significant Labs: All pertinent labs within the past 24 hours have been reviewed.    Significant Imaging: I have reviewed all pertinent imaging results/findings within the past 24 hours.  I have reviewed and interpreted all pertinent imaging results/findings within the past 24 hours.

## 2019-09-05 NOTE — ASSESSMENT & PLAN NOTE
2/2 HUMBERTO, diastolic heart failure and pulmonary hypertension  Supplemental O2, dialysis and CPAP nightly

## 2019-09-05 NOTE — NURSING
Called report to Consuelo in Dialysis as well as Yaima on floor. Pt had slow ooze and took longer than normal to find and close. Pt is now no longer bleeding. Dressing is dry and intact. No bleeding, no hematoma.

## 2019-09-05 NOTE — PROGRESS NOTES
[facility] Hospital Medicine  Progress Note    Patient Name: Eirca Leblanc  MRN: [id number]  Patient Class: IP- Inpatient   Admission Date: 8/27/2019  Length of Stay: 9 days  Attending Physician: Zahra Calero MD  Primary Care Provider: Sendy Elaine MD        Subjective:     Principal Problem:Acute on chronic diastolic heart failure        HPI:  Ms. Erica Leblanc is a 74 y.o. female with essential hypertension, type 2 diabetes mellitus (HbA1c 7.3% Jul 2019), hyperlipidemia (LDL 52.8 Mar 2019), chronic diastolic heart failure (LVEF 70% Jun 2017), CKD stage 5, hypothyroidism (TSH 3.298 Mar 2019), anemia chronic disease, morbid obesity (BMI 41.6), HUMBERTO on CPAP, and depression who presents to [facility] ED with complaints of dyspnea for the past four days.  She had reported to the ED physician that her CPAP machine has been malfunctioning, a similar complaint to her presentation two months ago.  She has not been able to get it replaced due to insurance constraints.  The dyspnea is worse on exertion.  Further history is otherwise limited at this time due to hypersomnolence.    Overview/Hospital Course:  73 y/o female admitted with acute on chronic diastolic heart failure.  Started on IV diuresis.  Patient with hx of CKD and worsening Creat.  Nephrology consulted. Patient clinically improved.  Social workers consulted to help with re-arranging broken CPAP for patient. PT/OT rec: H/H.  02 requirements increased and so lasix increased on 9/2/19. CPAP arranged for home. Patient does not wear 02 at home.      Interval History: clinically unchanged    Review of Systems   Respiratory: Positive for shortness of breath.    Cardiovascular: Positive for leg swelling.   Gastrointestinal: Negative.      Objective:     Vital Signs (Most Recent):  Temp: 96.9 °F (36.1 °C) (09/05/19 0751)  Pulse: 60 (09/05/19 1145)  Resp: 17 (09/05/19 1145)  BP: (!) 170/94 (09/05/19 1145)  SpO2: 95% (09/05/19 1145) Vital  Signs (24h Range):  Temp:  [96.4 °F (35.8 °C)-98.4 °F (36.9 °C)] 96.9 °F (36.1 °C)  Pulse:  [50-60] 60  Resp:  [16-20] 17  SpO2:  [6 %-99 %] 6 %  BP: (131-170)/() 170/94     Weight: 128.5 kg (283 lb 4.7 oz)  Body mass index is 47.14 kg/m².    Intake/Output Summary (Last 24 hours) at 9/5/2019 1205  Last data filed at 9/5/2019 0600  Gross per 24 hour   Intake 240 ml   Output --   Net 240 ml      Physical Exam   Constitutional: She is oriented to person, place, and time. She appears well-developed. No distress.   Cardiovascular:   Sinus judy   Pulmonary/Chest: She has no wheezes. She has rales.   Speaking mid sentences  Using accessory muscles   Abdominal: Soft. Bowel sounds are normal.   Musculoskeletal: She exhibits edema.   Neurological: She is alert and oriented to person, place, and time.   Skin: She is not diaphoretic.   Nursing note and vitals reviewed.      Significant Labs: All pertinent labs within the past 24 hours have been reviewed.    Significant Imaging: I have reviewed all pertinent imaging results/findings within the past 24 hours.  I have reviewed and interpreted all pertinent imaging results/findings within the past 24 hours.      Assessment/Plan:      * Acute on chronic diastolic heart failure  This is likely secondary to progressive renal failure in addition to pulmonary hypertension, tricuspid regurg and diastolic heart failure.   Tunneled cath today to initiate dialysis  SW to work on outpatient HD placement  Cardiology also on board        Chronic respiratory failure  2/2 HUMBERTO, diastolic heart failure and pulmonary hypertension  Supplemental O2, dialysis and CPAP nightly        Debility  Resume H/H PT/OT on discharge.       Depression  Stable; will continue her home regimen of citalopram.    Anemia of chronic disease  The patient's H/H is stable and consistent with previous laboratory measurements, and the patient exhibits no signs or symptoms of acute bleeding; there is no indication for  transfusion.  Will continue to monitor.    CKD stage 5  Worsening renal function over past few months.  Initiating dialysis  Nephrology on board    Essential hypertension  Continue current antihypertensive regimen    Chronic diastolic heart failure  As addressed above.    Type 2 diabetes mellitus, controlled, with renal complications  Well controlled on a home regimen of basal insulin therapy; will provide basal-prandial insulin therapy along insulin sliding scale.  A1c ordered     Hypothyroidism (acquired)  Poorly controlled; will continue her home regimen of levothyroxine and defer dosage adjustments to her PCP.    HUMBERTO on CPAP  As addressed above.    CPAP arranged for home.     Hyperlipidemia LDL goal <100  Well controlled; will continue her home regimen of atorvastatin.    Morbid obesity with body mass index (BMI) of 40.0 to 44.9 in adult  The patient has been counseled on the negative impact that obesity imparts on her health and was encouraged to make lifestyle changes in order to lose weight and decrease her modifiable risk factors.    VTE Risk Mitigation (From admission, onward)        Ordered     heparin (porcine) injection 5,000 Units  Every 12 hours      08/27/19 2217     IP VTE HIGH RISK PATIENT  Once      08/27/19 2217          Dispo: HH + outpatient HD      Zahra Almeida MD  Department of Hospital Medicine   [facility]

## 2019-09-05 NOTE — ASSESSMENT & PLAN NOTE
This is likely secondary to progressive renal failure in addition to pulmonary hypertension, tricuspid regurg and diastolic heart failure.   Tunneled cath today to initiate dialysis  SW to work on outpatient HD placement  Cardiology also on board

## 2019-09-05 NOTE — ASSESSMENT & PLAN NOTE
-Will plan for tunneled HD catheter tomorrow prior to dialysis - make NPO at midnight  -Risks/benefits d/w pt who states understanding and desires to proceed with surgical intervention

## 2019-09-05 NOTE — HPI
Keron Perez MD VI                       [facility]                         09/04/2019    HPI:  Erica Leblanc is a 74 y.o. female with   Patient Active Problem List   Diagnosis    Morbid obesity with body mass index (BMI) of 40.0 to 44.9 in adult    Sickle cell trait    Osteopenia    Hyperlipidemia LDL goal <100    HUMBERTO on CPAP    Hypothyroidism (acquired)    Hx-TIA (transient ischemic attack)    Vitamin D deficiency disease    Pulmonary hypertension    Type 2 diabetes mellitus, controlled, with renal complications    Secondary renal hyperparathyroidism    Incomplete bladder emptying    Postmenopausal atrophic vaginitis    Rectocele    Mixed incontinence urge and stress    Chronic diastolic heart failure    Tortuous aorta    History of TIAs    Acute on chronic diastolic heart failure    Essential hypertension    CKD stage 5    Anemia of chronic disease    Depression    Debility    Chronic respiratory failure    being managed by PCP and specialists who was admitted for evaluation of difficulty sleeping.  Found to be volume overloaded.  Diuresed, Cardiology consulted.  Renal function CKD stage 5 and Nephrology planning HD.  Vascular Surgery consulted for tunneled catheter placement.  Pt without current complaints.  Feels better since admission.     no MI  yes Stroke  Tobacco use: denies    Past Medical History:   Diagnosis Date    Acute respiratory failure with hypoxia     Anemia of chronic kidney failure, stage 4 (severe) 4/5/2019    Cataracts, bilateral     CHF (congestive heart failure)     CKD (chronic kidney disease) stage 3, GFR 30-59 ml/min     CKD (chronic kidney disease) stage 3, GFR 30-59 ml/min     Controlled type 2 diabetes mellitus with proteinuria or albuminuria     Depression     Diabetes with neurologic complications     Diabetic retinopathy of both eyes     Edema     Glaucoma     History of colonic polyps     Hx-TIA  (transient ischemic attack) 2008    Hyperlipidemia LDL goal < 100     Hypertension     Hypothyroidism     Major depressive disorder, single episode, mild 2016    Mixed incontinence urge and stress     Obesity     Obstructive sleep apnea on CPAP     19:  Home CPAP machine broken, per patient & son    Osteopenia     Proteinuria     Sickle cell trait     TIA (transient ischemic attack)     Trouble in sleeping     Type 2 diabetes mellitus with ophthalmic manifestations     Type 2 diabetes with stage 3 chronic kidney disease GFR 30-59     Type II or unspecified type diabetes mellitus with renal manifestations, uncontrolled(250.42)     Uncontrolled type 2 diabetes mellitus with peripheral circulatory disorder 2019    Urge incontinence 2016    Urge incontinence     Venous stasis ulcer     bilateral lower legs    Vitamin D deficiency disease      Past Surgical History:   Procedure Laterality Date    BREAST BIOPSY      breast reduction Bilateral age 31    BREAST SURGERY      cataracts Bilateral      SECTION, LOW TRANSVERSE      x1    CHOLECYSTECTOMY      COLONOSCOPY N/A 2012    Performed by Keron Gunderson MD at [facility] (4TH FLR)    EYE SURGERY  2014, 2014    vitrectomy    EYE SURGERY Right 2016    HYSTERECTOMY  1986    TAHBSO (patient is unsure if ovaries removed)    OOPHORECTOMY      REFRACTIVE SURGERY      REPAIR, RETINAL DETACHMENT, WITH VITRECTOMY Right 2014    Performed by LILLIANA Coello MD at [facility] OR 1ST FLR    REPAIR, RETINAL DETACHMENT, WITH VITRECTOMY Left 2014    Performed by LILLIANA Coello MD at [facility] OR 1ST FLR    REPAIR-RETINA (VITRECTOMY) Right 2014    Performed by LILLIANA Coello MD at [facility] OR 1ST FLR    STRABISMUS REPAIR/ADJUSTABLE SUTURES Bilateral 2016    Performed by RABIA Danielson Jr., MD at [facility] OR 1ST FLR    TOTAL REDUCTION MAMMOPLASTY      approx 10 yrs ago     Family History    Problem Relation Age of Onset    Leukemia Father     Ovarian cancer Sister 35    Stroke Mother     Diabetes Mother     Hypertension Mother     Diabetes Paternal Grandmother     Breast cancer Maternal Aunt 65    HIV Brother     Achondroplasia Sister     Parkinsonism Maternal Aunt     Esophageal cancer Maternal Uncle         smoker    Amblyopia Neg Hx     Blindness Neg Hx     Cataracts Neg Hx     Glaucoma Neg Hx     Macular degeneration Neg Hx     Retinal detachment Neg Hx     Strabismus Neg Hx     Thyroid disease Neg Hx     Colon cancer Neg Hx      Social History     Socioeconomic History    Marital status: Single     Spouse name: Not on file    Number of children: 5    Years of education: Not on file    Highest education level: Not on file   Occupational History    Occupation:      Employer: [employer]     Comment: part-time   Social Needs    Financial resource strain: Not on file    Food insecurity:     Worry: Not on file     Inability: Not on file    Transportation needs:     Medical: Not on file     Non-medical: Not on file   Tobacco Use    Smoking status: Never Smoker    Smokeless tobacco: Never Used   Substance and Sexual Activity    Alcohol use: No     Alcohol/week: 0.0 oz    Drug use: No    Sexual activity: Not Currently     Partners: Male     Birth control/protection: Post-menopausal, Surgical   Lifestyle    Physical activity:     Days per week: Not on file     Minutes per session: Not on file    Stress: Not on file   Relationships    Social connections:     Talks on phone: Not on file     Gets together: Not on file     Attends Baptism service: Not on file     Active member of club or organization: Not on file     Attends meetings of clubs or organizations: Not on file     Relationship status: Not on file   Other Topics Concern    Not on file   Social History Narrative    Not on file       Current Facility-Administered Medications:      0.9%  NaCl infusion, , Intravenous, PRN, Myla Puente MD    0.9%  NaCl infusion, , Intravenous, Once, Myla Puente MD, Stopped at 09/04/19 1330    acetaminophen tablet 500 mg, 500 mg, Oral, Q6H PRN, Alida Sampson MD    atorvastatin tablet 10 mg, 10 mg, Oral, Daily, Alida Sampson MD, 10 mg at 09/04/19 0912    bumetanide injection 1 mg, 1 mg, Intravenous, Daily, Myla Puente MD, 1 mg at 09/04/19 1022    citalopram tablet 10 mg, 10 mg, Oral, Daily, Alida Sampson MD, 10 mg at 09/04/19 0912    cloNIDine tablet 0.1 mg, 0.1 mg, Oral, TID PRN, Alida Sampson MD    clopidogrel tablet 75 mg, 75 mg, Oral, Daily, Alida Sampson MD, 75 mg at 09/04/19 0912    dextrose 50% injection 12.5 g, 12.5 g, Intravenous, PRN, Alida Sampson MD    dextrose 50% injection 25 g, 25 g, Intravenous, PRN, Alida Sampson MD    glucagon (human recombinant) injection 1 mg, 1 mg, Intramuscular, PRN, Alida Sampson MD    glucose chewable tablet 16 g, 16 g, Oral, PRN, Alida Sampson MD    glucose chewable tablet 24 g, 24 g, Oral, PRN, Alida Sampson MD    heparin (porcine) injection 5,000 Units, 5,000 Units, Subcutaneous, Q12H, Alida Sampson MD, 5,000 Units at 09/04/19 2051    hydrALAZINE tablet 100 mg, 100 mg, Oral, TID, Alida Sampson MD, 100 mg at 09/04/19 2052    insulin aspart U-100 pen 0-5 Units, 0-5 Units, Subcutaneous, PRN, Alida Sampson MD, 1 Units at 09/04/19 2104    insulin aspart U-100 pen 3 Units, 3 Units, Subcutaneous, TIDWM, Alida Sampson MD, 3 Units at 09/04/19 1645    insulin detemir U-100 pen 5 Units, 5 Units, Subcutaneous, Daily, Alida Sampson MD, 5 Units at 09/04/19 0923    levothyroxine tablet 50 mcg, 50 mcg, Oral, Before breakfast, Alida Sampson MD, 50 mcg at 09/04/19 0523    metOLazone tablet 10 mg, 10 mg, Oral, Daily, Myla Puente MD, 10 mg at 09/04/19 0911    metoprolol tartrate (LOPRESSOR) tablet 50 mg, 50 mg, Oral, BID, Arjun Jackson MD, 50 mg at 09/04/19 2053    miconazole nitrate 2% ointment, , Topical  (Top), BID, Luiz Little MD    mupirocin 2 % ointment, , Nasal, BID, Myla Puente MD    prochlorperazine injection Soln 5 mg, 5 mg, Intravenous, Q6H PRN, Alida Sampson MD    promethazine (PHENERGAN) 6.25 mg in dextrose 5 % 50 mL IVPB, 6.25 mg, Intravenous, Q6H PRN, Alida Sampson MD    ramelteon tablet 8 mg, 8 mg, Oral, Nightly PRN, Alida Sampson MD, 8 mg at 09/04/19 2052

## 2019-09-05 NOTE — CONSULTS
[facility]  Vascular Surgery  Consult Note    Inpatient consult to Vascular Surgery  Consult performed by: Keron Perez MD  Consult ordered by: Zahra Calero MD  Reason for consult: Need for HD catheter        Subjective:     Chief Complaint/Reason for Admission: SOB    History of Present Illness:             Keron Perez MD [facility]                         09/04/2019    HPI:  Erica Leblanc is a 74 y.o. female with   Patient Active Problem List   Diagnosis    Morbid obesity with body mass index (BMI) of 40.0 to 44.9 in adult    Sickle cell trait    Osteopenia    Hyperlipidemia LDL goal <100    HUMBERTO on CPAP    Hypothyroidism (acquired)    Hx-TIA (transient ischemic attack)    Vitamin D deficiency disease    Pulmonary hypertension    Type 2 diabetes mellitus, controlled, with renal complications    Secondary renal hyperparathyroidism    Incomplete bladder emptying    Postmenopausal atrophic vaginitis    Rectocele    Mixed incontinence urge and stress    Chronic diastolic heart failure    Tortuous aorta    History of TIAs    Acute on chronic diastolic heart failure    Essential hypertension    CKD stage 5    Anemia of chronic disease    Depression    Debility    Chronic respiratory failure    being managed by PCP and specialists who was admitted for evaluation of difficulty sleeping.  Found to be volume overloaded.  Diuresed, Cardiology consulted.  Renal function CKD stage 5 and Nephrology planning HD.  Vascular Surgery consulted for tunneled catheter placement.  Pt without current complaints.  Feels better since admission.     no MI  yes Stroke  Tobacco use: denies    Past Medical History:   Diagnosis Date    Acute respiratory failure with hypoxia     Anemia of chronic kidney failure, stage 4 (severe) 4/5/2019    Cataracts, bilateral     CHF (congestive heart failure)     CKD (chronic kidney disease)  stage 3, GFR 30-59 ml/min     CKD (chronic kidney disease) stage 3, GFR 30-59 ml/min     Controlled type 2 diabetes mellitus with proteinuria or albuminuria     Depression     Diabetes with neurologic complications     Diabetic retinopathy of both eyes     Edema     Glaucoma     History of colonic polyps     Hx-TIA (transient ischemic attack) 2008    Hyperlipidemia LDL goal < 100     Hypertension     Hypothyroidism     Major depressive disorder, single episode, mild 2016    Mixed incontinence urge and stress     Obesity     Obstructive sleep apnea on CPAP     19:  Home CPAP machine broken, per patient & son    Osteopenia     Proteinuria     Sickle cell trait     TIA (transient ischemic attack)     Trouble in sleeping     Type 2 diabetes mellitus with ophthalmic manifestations     Type 2 diabetes with stage 3 chronic kidney disease GFR 30-59     Type II or unspecified type diabetes mellitus with renal manifestations, uncontrolled(250.42)     Uncontrolled type 2 diabetes mellitus with peripheral circulatory disorder 2019    Urge incontinence 2016    Urge incontinence     Venous stasis ulcer     bilateral lower legs    Vitamin D deficiency disease      Past Surgical History:   Procedure Laterality Date    BREAST BIOPSY      breast reduction Bilateral age 31    BREAST SURGERY      cataracts Bilateral      SECTION, LOW TRANSVERSE      x1    CHOLECYSTECTOMY      COLONOSCOPY N/A 2012    Performed by Keron Gunderson MD at [facility] ENDO (4TH FLR)    EYE SURGERY  2014, 2014    vitrectomy    EYE SURGERY Right 2016    HYSTERECTOMY  1986    TAHBSO (patient is unsure if ovaries removed)    OOPHORECTOMY      REFRACTIVE SURGERY      REPAIR, RETINAL DETACHMENT, WITH VITRECTOMY Right 2014    Performed by LILLIANA Coello MD at [facility] OR 1ST FLR    REPAIR, RETINAL DETACHMENT, WITH VITRECTOMY Left 2014    Performed by LILLIANA Coello,  MD at [facility] OR 1ST FLR    REPAIR-RETINA (VITRECTOMY) Right 7/16/2014    Performed by LILLIANA Coello MD at [facility] OR 1ST FLR    STRABISMUS REPAIR/ADJUSTABLE SUTURES Bilateral 8/17/2016    Performed by RABIA Danielson Jr., MD at [facility] OR 1ST FLR    TOTAL REDUCTION MAMMOPLASTY      approx 10 yrs ago     Family History   Problem Relation Age of Onset    Leukemia Father     Ovarian cancer Sister 35    Stroke Mother     Diabetes Mother     Hypertension Mother     Diabetes Paternal Grandmother     Breast cancer Maternal Aunt 65    HIV Brother     Achondroplasia Sister     Parkinsonism Maternal Aunt     Esophageal cancer Maternal Uncle         smoker    Amblyopia Neg Hx     Blindness Neg Hx     Cataracts Neg Hx     Glaucoma Neg Hx     Macular degeneration Neg Hx     Retinal detachment Neg Hx     Strabismus Neg Hx     Thyroid disease Neg Hx     Colon cancer Neg Hx      Social History     Socioeconomic History    Marital status: Single     Spouse name: Not on file    Number of children: 5    Years of education: Not on file    Highest education level: Not on file   Occupational History    Occupation:      Employer: [employer]     Comment: part-time   Social Needs    Financial resource strain: Not on file    Food insecurity:     Worry: Not on file     Inability: Not on file    Transportation needs:     Medical: Not on file     Non-medical: Not on file   Tobacco Use    Smoking status: Never Smoker    Smokeless tobacco: Never Used   Substance and Sexual Activity    Alcohol use: No     Alcohol/week: 0.0 oz    Drug use: No    Sexual activity: Not Currently     Partners: Male     Birth control/protection: Post-menopausal, Surgical   Lifestyle    Physical activity:     Days per week: Not on file     Minutes per session: Not on file    Stress: Not on file   Relationships    Social connections:     Talks on phone: Not on file     Gets together: Not on file      Attends Yazdanism service: Not on file     Active member of club or organization: Not on file     Attends meetings of clubs or organizations: Not on file     Relationship status: Not on file   Other Topics Concern    Not on file   Social History Narrative    Not on file       Current Facility-Administered Medications:     0.9%  NaCl infusion, , Intravenous, PRN, Myla Puente MD    0.9%  NaCl infusion, , Intravenous, Once, Myla Puente MD, Stopped at 09/04/19 1330    acetaminophen tablet 500 mg, 500 mg, Oral, Q6H PRN, Alida Sampson MD    atorvastatin tablet 10 mg, 10 mg, Oral, Daily, Alida Sampson MD, 10 mg at 09/04/19 0912    bumetanide injection 1 mg, 1 mg, Intravenous, Daily, Myla Puente MD, 1 mg at 09/04/19 1022    citalopram tablet 10 mg, 10 mg, Oral, Daily, Alida Sampson MD, 10 mg at 09/04/19 0912    cloNIDine tablet 0.1 mg, 0.1 mg, Oral, TID PRN, Alida Sampson MD    clopidogrel tablet 75 mg, 75 mg, Oral, Daily, Alida Sampson MD, 75 mg at 09/04/19 0912    dextrose 50% injection 12.5 g, 12.5 g, Intravenous, PRN, Alida Sampson MD    dextrose 50% injection 25 g, 25 g, Intravenous, PRN, Alida Sampson MD    glucagon (human recombinant) injection 1 mg, 1 mg, Intramuscular, PRN, Alida Sampson MD    glucose chewable tablet 16 g, 16 g, Oral, PRN, Alida Sampson MD    glucose chewable tablet 24 g, 24 g, Oral, PRN, Alida Sampson MD    heparin (porcine) injection 5,000 Units, 5,000 Units, Subcutaneous, Q12H, Alida Sampson MD, 5,000 Units at 09/04/19 2051    hydrALAZINE tablet 100 mg, 100 mg, Oral, TID, Alida Sampson MD, 100 mg at 09/04/19 2052    insulin aspart U-100 pen 0-5 Units, 0-5 Units, Subcutaneous, PRN, Alida Sampson MD, 1 Units at 09/04/19 2104    insulin aspart U-100 pen 3 Units, 3 Units, Subcutaneous, TIDWM, Alida Sampson MD, 3 Units at 09/04/19 1645    insulin detemir U-100 pen 5 Units, 5 Units, Subcutaneous, Daily, Alida Sampson MD, 5 Units at 09/04/19 0923    levothyroxine tablet 50 mcg,  50 mcg, Oral, Before breakfast, Alida Sampson MD, 50 mcg at 09/04/19 0523    metOLazone tablet 10 mg, 10 mg, Oral, Daily, Myla Puente MD, 10 mg at 09/04/19 0911    metoprolol tartrate (LOPRESSOR) tablet 50 mg, 50 mg, Oral, BID, Arjun Jackson MD, 50 mg at 09/04/19 2053    miconazole nitrate 2% ointment, , Topical (Top), BID, Luiz Little MD    mupirocin 2 % ointment, , Nasal, BID, Myla Puente MD    prochlorperazine injection Soln 5 mg, 5 mg, Intravenous, Q6H PRN, Alida Sampson MD    promethazine (PHENERGAN) 6.25 mg in dextrose 5 % 50 mL IVPB, 6.25 mg, Intravenous, Q6H PRN, Alida Sampson MD    ramelteon tablet 8 mg, 8 mg, Oral, Nightly PRN, Alida Sampson MD, 8 mg at 09/04/19 2052      Medications Prior to Admission   Medication Sig Dispense Refill Last Dose    amLODIPine (NORVASC) 10 MG tablet Take 1 tablet (10 mg total) by mouth once daily. 90 tablet 1 Taking    atorvastatin (LIPITOR) 10 MG tablet TAKE 1 TABLET EVERY DAY 90 tablet 1 Taking    bumetanide (BUMEX) 2 MG tablet Take 1 tablet (2 mg total) by mouth 2 (two) times daily. 60 tablet 11 Taking    clopidogrel (PLAVIX) 75 mg tablet TAKE 1 TABLET EVERY DAY 90 tablet 1 8/27/2019    hydrALAZINE (APRESOLINE) 100 MG tablet Take 1 tablet (100 mg total) by mouth 3 (three) times daily. 90 tablet 3 8/27/2019    insulin detemir U-100 (LEVEMIR FLEXTOUCH) 100 unit/mL (3 mL) SubQ InPn pen Inject 5 Units into the skin once daily. 1.5 mL 1 8/27/2019    levothyroxine (SYNTHROID) 50 MCG tablet Take 50 mcg by mouth once daily.   Taking    metoprolol tartrate (LOPRESSOR) 50 MG tablet Take 50 mg by mouth 2 (two) times daily.       oxybutynin (DITROPAN XL) 15 MG TR24 TAKE 1 TABLET EVERY DAY 90 tablet 3 Taking    citalopram (CELEXA) 10 MG tablet TAKE 1 TABLET EVERY DAY 90 tablet 1 Taking    docusate sodium (COLACE) 100 MG capsule Take 200 mg by mouth once daily.    Taking    LANCETS MISC    Taking    MULTIVITAMIN WITH MINERALS (HAIR,SKIN AND NAILS  ORAL) Take 3 tablets by mouth once daily.   Taking    polyethylene glycol (GLYCOLAX) 17 gram PwPk Take 17 g by mouth once daily.  0 Taking       Review of patient's allergies indicates:   Allergen Reactions    Ace inhibitors Other (See Comments)     Other reaction(s): cough       Past Medical History:   Diagnosis Date    Acute respiratory failure with hypoxia     Anemia of chronic kidney failure, stage 4 (severe) 2019    Cataracts, bilateral     CHF (congestive heart failure)     CKD (chronic kidney disease) stage 3, GFR 30-59 ml/min     CKD (chronic kidney disease) stage 3, GFR 30-59 ml/min     Controlled type 2 diabetes mellitus with proteinuria or albuminuria     Depression     Diabetes with neurologic complications     Diabetic retinopathy of both eyes     Edema     Glaucoma     History of colonic polyps     Hx-TIA (transient ischemic attack) 2008    Hyperlipidemia LDL goal < 100     Hypertension     Hypothyroidism     Major depressive disorder, single episode, mild 2016    Mixed incontinence urge and stress     Obesity     Obstructive sleep apnea on CPAP     19:  Home CPAP machine broken, per patient & son    Osteopenia     Proteinuria     Sickle cell trait     TIA (transient ischemic attack)     Trouble in sleeping     Type 2 diabetes mellitus with ophthalmic manifestations     Type 2 diabetes with stage 3 chronic kidney disease GFR 30-59     Type II or unspecified type diabetes mellitus with renal manifestations, uncontrolled(250.42)     Uncontrolled type 2 diabetes mellitus with peripheral circulatory disorder 2019    Urge incontinence 2016    Urge incontinence     Venous stasis ulcer     bilateral lower legs    Vitamin D deficiency disease      Past Surgical History:   Procedure Laterality Date    BREAST BIOPSY      breast reduction Bilateral age 31    BREAST SURGERY      cataracts Bilateral      SECTION, LOW TRANSVERSE      x1     CHOLECYSTECTOMY      COLONOSCOPY N/A 12/31/2012    Performed [facility] at [facility] ([facility] FLR)    EYE SURGERY  1/2014, 6/2014    vitrectomy    EYE SURGERY Right 08/17/2016    HYSTERECTOMY  1986    TAHBSO (patient is unsure if ovaries removed)    OOPHORECTOMY      REFRACTIVE SURGERY      REPAIR, RETINAL DETACHMENT, WITH VITRECTOMY Right 6/4/2014    Performed by LILLIANA Coello MD at [facility] OR 1ST FLR    REPAIR, RETINAL DETACHMENT, WITH VITRECTOMY Left 1/8/2014    Performed by LILL[facility]NA [facility]ello MD at [facility] OR 1ST FLR    REPAIR-RETINA (VITRECTOMY) Right 7/16/2014    Performed by L[facility]NA [facility]ello MD at [facility] OR 1ST FLR    STRABISMUS REPAIR/ADJUSTABLE SUTURES Bilateral 8/17/2016    Performed by RABIA Danielson Jr., MD at [facility] OR 1ST FLR    TOTAL REDUCTION MAMMOPLASTY      approx 10 yrs ago     Family History     Problem Relation (Age of Onset)    Achondroplasia Sister    Breast cancer Maternal Aunt (65)    Diabetes Mother, Paternal Grandmother    Esophageal cancer Maternal Uncle    HIV Brother    Hypertension Mother    Leukemia Father    Ovarian cancer Sister (35)    Parkinsonism Maternal Aunt    Stroke Mother        Tobacco Use    Smoking status: Never Smoker    Smokeless tobacco: Never Used   Substance and Sexual Activity    Alcohol use: No     Alcohol/week: 0.0 oz    Drug use: No    Sexual activity: Not Currently     Partners: Male     Birth control/protection: Post-menopausal, Surgical     Review of Systems   Constitutional: Negative for chills.   HENT: Negative for congestion.    Eyes: Negative for visual disturbance.   Respiratory: Negative for shortness of breath.    Cardiovascular: Negative for chest pain.   Gastrointestinal: Negative for abdominal distention.   Endocrine: Negative for cold intolerance.   Genitourinary: Negative for flank pain.   Musculoskeletal: Negative for back pain.   Skin: Negative for color change, pallor, rash and wound.   Allergic/Immunologic:  Negative for immunocompromised state.   Neurological: Negative for dizziness.   Hematological: Does not bruise/bleed easily.   Psychiatric/Behavioral: Negative for agitation.     Objective:     Vital Signs (Most Recent):  Temp: 98.2 °F (36.8 °C) (09/04/19 1939)  Pulse: 60 (09/04/19 2100)  Resp: 20 (09/04/19 1939)  BP: (!) 158/78 (09/04/19 2100)  SpO2: (!) 94 % (09/04/19 2100) Vital Signs (24h Range):  Temp:  [97.6 °F (36.4 °C)-98.7 °F (37.1 °C)] 98.2 °F (36.8 °C)  Pulse:  [50-60] 60  Resp:  [16-20] 20  SpO2:  [93 %-96 %] 94 %  BP: (127-164)/(68-83) 158/78     Weight: 128.5 kg (283 lb 4.7 oz)  Body mass index is 47.14 kg/m².    Physical Exam   Constitutional: She is oriented to person, place, and time. She appears well-developed and well-nourished. No distress.   HENT:   Head: Normocephalic and atraumatic.   Eyes: Conjunctivae are normal.   Neck: Neck supple.   Cardiovascular: Normal rate.   Pulmonary/Chest: Effort normal. No respiratory distress.   Abdominal: Soft. She exhibits no distension and no mass. There is no tenderness. There is no rebound and no guarding. No hernia.   Musculoskeletal: Normal range of motion. She exhibits no edema, tenderness or deformity.   Neurological: She is alert and oriented to person, place, and time. No sensory deficit.   Skin: Skin is warm and dry. Capillary refill takes 2 to 3 seconds. No rash noted. She is not diaphoretic. No erythema. No pallor.   Psychiatric: She has a normal mood and affect.   Vitals reviewed.      Significant Labs:  All pertinent labs from the last 24 hours have been reviewed.    Significant Diagnostics:  I have reviewed all pertinent imaging results/findings within the past 24 hours.    Assessment/Plan:     CKD stage 5  -Will plan for tunneled HD catheter tomorrow prior to dialysis - make NPO at midnight  -Risks/benefits d/w pt who states understanding and desires to proceed with surgical intervention        Thank you for your consult. I will follow-up with  patient. Please contact us if you have any additional questions.    Keron Perez MD  Vascular Surgery  [facility]

## 2019-09-05 NOTE — OP NOTE
DATE OF PROCEDURE: 09/05/2019.    SURGEON:  Keron Perez MD.    PREOPERATIVE DIAGNOSES:  1.  Chronic kidney disease, stage V, not yet on hemodialysis.  2.  Hypertension.  3.  Hyperlipidemia.  4.  Diabetes mellitus.  5.  Obstructive sleep apnea.  6.  Obesity.    POSTOPERATIVE DIAGNOSES:  1.  Chronic kidney disease, stage V, not yet on hemodialysis.  2.  Hypertension.  3.  Hyperlipidemia.  4.  Diabetes mellitus.  5.  Obstructive sleep apnea.  6.  Obesity.    PROCEDURES PERFORMED:  1.  Ultrasound-guided right internal jugular vein percutaneous access.  2.  Placement of a 24 cm hemodialysis tunneled catheter, right IJ.  3.  Moderate sedation.  4.  Moderate sedation time, I was present for the entire procedure and monitored   the patient's hemodynamics during the moderate sedation.  Please review the   nurse's log for medication, route, and frequency.    MODERATE SEDATION TIME:  50 minutes.    ESTIMATED BLOOD LOSS:  25 mL.    DESCRIPTION OF THE PROCEDURE:  The patient was seen preoperatively and was   deemed stable for her procedure.  She was without complaints.  Her vital signs   were stable.  She was brought to the procedure room, placed supine on the   Interventional Radiology table.  She was prepped and draped in a standard   fashion.  A timeout was performed.  Ultrasound was used to identify the right   internal jugular vein and noted good compressibility.  Percutaneous   micropuncture access was obtained into the IJ and confirmed with a venogram.    There was difficulty tracking the wire initially and there was concern for the   wire and the extravascular space, for which re-axis was then performed,   confirming appropriate location via imaging.  We then advanced our micropuncture   sheath over the wire and placed a stopcock to prevent any backbleeding.  We   then made a counterincision three fingerbreadths anterior lateral I the   deltopectoral groove after local anesthetic was used.  Of note, prior to our IJ    access, we did use local anesthetic as well.  The patient also received moderate   sedation during the procedure.  We then numbed the entire tract with local   anesthetic and we made an incision with a scalpel overlying the initial tract   site location.  We also wiped the IJ access with a scalpel to assist with   tunneling.  We then used our tunneler and tacked our catheter to tunnel from the   incision on the chest to the neck access without issue.  We then dilated the IJ   access without issue and placed our peel-away sheath.  We then advanced the   catheter into the peel-away sheath without issue.  We then sewed our catheter in   place and held manual pressure on the IJ due to venous ooze.  After fifteen   minutes of manual pressure, there was still significant venous ooze.  We then   re-anesthetized the patient's neck with lidocaine and we extended our incision   with a scalpel.  We used three 3-0 Vicryl sutures to suture ligate areas of   bleeding and continued to apply manual pressure with resolution of bleeding.    The patient maintained protection of her airway and her respiratory state was   stable as well as her hemodynamics.  Her hypertension was controlled during this   process as well to assist with hemostasis.  We then closed the skin overlying   the catheter at the neck access with 3-0 Vicryl interrupted and used Dermabond   on the skin.  We then flushed the catheter again to ensure that it was marked   appropriately and there were no issues noted.  The patient was then transferred   to the stretcher and back to her room and subsequently to dialysis in stable   condition.    COMPLICATIONS:  Bleeding that was controlled with suture ligation of vein branch   and subcutaneous issue.      CCL/HN  dd: 09/05/2019 17:49:43 (CDT)  td: 09/05/2019 18:02:22 (CDT)  Doc ID   #[id number]  Job ID #[id number]    CC:

## 2019-09-05 NOTE — SUBJECTIVE & OBJECTIVE
Medications Prior to Admission   Medication Sig Dispense Refill Last Dose    amLODIPine (NORVASC) 10 MG tablet Take 1 tablet (10 mg total) by mouth once daily. 90 tablet 1 Taking    atorvastatin (LIPITOR) 10 MG tablet TAKE 1 TABLET EVERY DAY 90 tablet 1 Taking    bumetanide (BUMEX) 2 MG tablet Take 1 tablet (2 mg total) by mouth 2 (two) times daily. 60 tablet 11 Taking    clopidogrel (PLAVIX) 75 mg tablet TAKE 1 TABLET EVERY DAY 90 tablet 1 8/27/2019    hydrALAZINE (APRESOLINE) 100 MG tablet Take 1 tablet (100 mg total) by mouth 3 (three) times daily. 90 tablet 3 8/27/2019    insulin detemir U-100 (LEVEMIR FLEXTOUCH) 100 unit/mL (3 mL) SubQ InPn pen Inject 5 Units into the skin once daily. 1.5 mL 1 8/27/2019    levothyroxine (SYNTHROID) 50 MCG tablet Take 50 mcg by mouth once daily.   Taking    metoprolol tartrate (LOPRESSOR) 50 MG tablet Take 50 mg by mouth 2 (two) times daily.       oxybutynin (DITROPAN XL) 15 MG TR24 TAKE 1 TABLET EVERY DAY 90 tablet 3 Taking    citalopram (CELEXA) 10 MG tablet TAKE 1 TABLET EVERY DAY 90 tablet 1 Taking    docusate sodium (COLACE) 100 MG capsule Take 200 mg by mouth once daily.    Taking    LANCETS MISC    Taking    MULTIVITAMIN WITH MINERALS (HAIR,SKIN AND NAILS ORAL) Take 3 tablets by mouth once daily.   Taking    polyethylene glycol (GLYCOLAX) 17 gram PwPk Take 17 g by mouth once daily.  0 Taking       Review of patient's allergies indicates:   Allergen Reactions    Ace inhibitors Other (See Comments)     Other reaction(s): cough       Past Medical History:   Diagnosis Date    Acute respiratory failure with hypoxia     Anemia of chronic kidney failure, stage 4 (severe) 4/5/2019    Cataracts, bilateral     CHF (congestive heart failure)     CKD (chronic kidney disease) stage 3, GFR 30-59 ml/min     CKD (chronic kidney disease) stage 3, GFR 30-59 ml/min     Controlled type 2 diabetes mellitus with proteinuria or albuminuria     Depression     Diabetes  with neurologic complications     Diabetic retinopathy of both eyes     Edema     Glaucoma     History of colonic polyps     Hx-TIA (transient ischemic attack) 2008    Hyperlipidemia LDL goal < 100     Hypertension     Hypothyroidism     Major depressive disorder, single episode, mild 2016    Mixed incontinence urge and stress     Obesity     Obstructive sleep apnea on CPAP     19:  Home CPAP machine broken, per patient & son    Osteopenia     Proteinuria     Sickle cell trait     TIA (transient ischemic attack)     Trouble in sleeping     Type 2 diabetes mellitus with ophthalmic manifestations     Type 2 diabetes with stage 3 chronic kidney disease GFR 30-59     Type II or unspecified type diabetes mellitus with renal manifestations, uncontrolled(250.42)     Uncontrolled type 2 diabetes mellitus with peripheral circulatory disorder 2019    Urge incontinence 2016    Urge incontinence     Venous stasis ulcer     bilateral lower legs    Vitamin D deficiency disease      Past Surgical History:   Procedure Laterality Date    BREAST BIOPSY      breast reduction Bilateral age 31    BREAST SURGERY      cataracts Bilateral      SECTION, LOW TRANSVERSE      x1    CHOLECYSTECTOMY      COLONOSCOPY N/A 2012    Performed by Keron Gunderson MD at [facility] (4TH FLR)    EYE SURGERY  2014, 2014    vitrectomy    EYE SURGERY Right 2016    HYSTERECTOMY  1986    TAHBSO (patient is unsure if ovaries removed)    OOPHORECTOMY      REFRACTIVE SURGERY      REPAIR, RETINAL DETACHMENT, WITH VITRECTOMY Right 2014    Performed by LILLIANA Coello [facility] at [facility] OR [facility]ST FLR    REPAIR, RETINAL DETACHMENT, WITH VITRECTOMY Left 2014    Per[facility] at [facility]LR    REPAIR-RETINA (VITRECTOMY) Right 2014    Performed [facility] at [facility]LR    STRABISMUS REPAIR/ADJUSTABLE SUTURES Bilateral 2016     [facility]    TOTAL REDUCTION MAMMOPLASTY      approx 10 yrs ago     Family History     Problem Relation (Age of Onset)    Achondroplasia Sister    Breast cancer Maternal Aunt (65)    Diabetes Mother, Paternal Grandmother    Esophageal cancer Maternal Uncle    HIV Brother    Hypertension Mother    Leukemia Father    Ovarian cancer Sister (35)    Parkinsonism Maternal Aunt    Stroke Mother        Tobacco Use    Smoking status: Never Smoker    Smokeless tobacco: Never Used   Substance and Sexual Activity    Alcohol use: No     Alcohol/week: 0.0 oz    Drug use: No    Sexual activity: Not Currently     Partners: Male     Birth control/protection: Post-menopausal, Surgical     Review of Systems   Constitutional: Negative for chills.   HENT: Negative for congestion.    Eyes: Negative for visual disturbance.   Respiratory: Negative for shortness of breath.    Cardiovascular: Negative for chest pain.   Gastrointestinal: Negative for abdominal distention.   Endocrine: Negative for cold intolerance.   Genitourinary: Negative for flank pain.   Musculoskeletal: Negative for back pain.   Skin: Negative for color change, pallor, rash and wound.   Allergic/Immunologic: Negative for immunocompromised state.   Neurological: Negative for dizziness.   Hematological: Does not bruise/bleed easily.   Psychiatric/Behavioral: Negative for agitation.     Objective:     Vital Signs (Most Recent):  Temp: 98.2 °F (36.8 °C) (09/04/19 1939)  Pulse: 60 (09/04/19 2100)  Resp: 20 (09/04/19 1939)  BP: (!) 158/78 (09/04/19 2100)  SpO2: (!) 94 % (09/04/19 2100) Vital Signs (24h Range):  Temp:  [97.6 °F (36.4 °C)-98.7 °F (37.1 °C)] 98.2 °F (36.8 °C)  Pulse:  [50-60] 60  Resp:  [16-20] 20  SpO2:  [93 %-96 %] 94 %  BP: (127-164)/(68-83) 158/78     Weight: 128.5 kg (283 lb 4.7 oz)  Body mass index is 47.14 kg/m².    Physical Exam   Constitutional: She is oriented to person, place, and time. She  appears well-developed and well-nourished. No distress.   HENT:   Head: Normocephalic and atraumatic.   Eyes: Conjunctivae are normal.   Neck: Neck supple.   Cardiovascular: Normal rate.   Pulmonary/Chest: Effort normal. No respiratory distress.   Abdominal: Soft. She exhibits no distension and no mass. There is no tenderness. There is no rebound and no guarding. No hernia.   Musculoskeletal: Normal range of motion. She exhibits no edema, tenderness or deformity.   Neurological: She is alert and oriented to person, place, and time. No sensory deficit.   Skin: Skin is warm and dry. Capillary refill takes 2 to 3 seconds. No rash noted. She is not diaphoretic. No erythema. No pallor.   Psychiatric: She has a normal mood and affect.   Vitals reviewed.      Significant Labs:  All pertinent labs from the last 24 hours have been reviewed.    Significant Diagnostics:  I have reviewed all pertinent imaging results/findings within the past 24 hours.

## 2019-09-06 LAB
ANION GAP SERPL CALC-SCNC: 7 MMOL/L (ref 8–16)
BUN SERPL-MCNC: 66 MG/DL (ref 8–23)
CALCIUM SERPL-MCNC: 8.3 MG/DL (ref 8.7–10.5)
CHLORIDE SERPL-SCNC: 102 MMOL/L (ref 95–110)
CO2 SERPL-SCNC: 30 MMOL/L (ref 23–29)
CREAT SERPL-MCNC: 4.1 MG/DL (ref 0.5–1.4)
EST. GFR  (AFRICAN AMERICAN): 12 ML/MIN/1.73 M^2
EST. GFR  (NON AFRICAN AMERICAN): 10 ML/MIN/1.73 M^2
GLUCOSE SERPL-MCNC: 117 MG/DL (ref 70–110)
POCT GLUCOSE: 121 MG/DL (ref 70–110)
POCT GLUCOSE: 175 MG/DL (ref 70–110)
POCT GLUCOSE: 219 MG/DL (ref 70–110)
POCT GLUCOSE: 97 MG/DL (ref 70–110)
POTASSIUM SERPL-SCNC: 3.8 MMOL/L (ref 3.5–5.1)
SODIUM SERPL-SCNC: 139 MMOL/L (ref 136–145)
TB INDURATION 48 - 72 HR READ: 0 MM

## 2019-09-06 PROCEDURE — S0171 BUMETANIDE 0.5 MG: HCPCS | Mod: HCNC | Performed by: INTERNAL MEDICINE

## 2019-09-06 PROCEDURE — 97110 THERAPEUTIC EXERCISES: CPT | Mod: HCNC

## 2019-09-06 PROCEDURE — 99024 POSTOP FOLLOW-UP VISIT: CPT | Mod: HCNC,,, | Performed by: SURGERY

## 2019-09-06 PROCEDURE — 25000003 PHARM REV CODE 250: Mod: HCNC | Performed by: INTERNAL MEDICINE

## 2019-09-06 PROCEDURE — 36415 COLL VENOUS BLD VENIPUNCTURE: CPT | Mod: HCNC

## 2019-09-06 PROCEDURE — 99900035 HC TECH TIME PER 15 MIN (STAT): Mod: HCNC

## 2019-09-06 PROCEDURE — 27000221 HC OXYGEN, UP TO 24 HOURS: Mod: HCNC

## 2019-09-06 PROCEDURE — 99024 PR POST-OP FOLLOW-UP VISIT: ICD-10-PCS | Mod: HCNC,,, | Performed by: SURGERY

## 2019-09-06 PROCEDURE — 97530 THERAPEUTIC ACTIVITIES: CPT | Mod: HCNC

## 2019-09-06 PROCEDURE — 97116 GAIT TRAINING THERAPY: CPT | Mod: HCNC

## 2019-09-06 PROCEDURE — 80048 BASIC METABOLIC PNL TOTAL CA: CPT | Mod: HCNC

## 2019-09-06 PROCEDURE — 63600175 PHARM REV CODE 636 W HCPCS: Mod: HCNC | Performed by: INTERNAL MEDICINE

## 2019-09-06 PROCEDURE — 25000003 PHARM REV CODE 250: Mod: HCNC | Performed by: HOSPITALIST

## 2019-09-06 PROCEDURE — 11000001 HC ACUTE MED/SURG PRIVATE ROOM: Mod: HCNC

## 2019-09-06 PROCEDURE — 94761 N-INVAS EAR/PLS OXIMETRY MLT: CPT | Mod: HCNC

## 2019-09-06 RX ORDER — INSULIN ASPART 100 [IU]/ML
2 INJECTION, SOLUTION INTRAVENOUS; SUBCUTANEOUS
Status: DISCONTINUED | OUTPATIENT
Start: 2019-09-06 | End: 2019-09-11 | Stop reason: HOSPADM

## 2019-09-06 RX ORDER — SODIUM CHLORIDE 9 MG/ML
INJECTION, SOLUTION INTRAVENOUS ONCE
Status: DISCONTINUED | OUTPATIENT
Start: 2019-09-06 | End: 2019-09-11 | Stop reason: HOSPADM

## 2019-09-06 RX ORDER — SODIUM CHLORIDE 9 MG/ML
INJECTION, SOLUTION INTRAVENOUS
Status: DISCONTINUED | OUTPATIENT
Start: 2019-09-06 | End: 2019-09-11 | Stop reason: HOSPADM

## 2019-09-06 RX ADMIN — METOPROLOL TARTRATE 50 MG: 50 TABLET ORAL at 10:09

## 2019-09-06 RX ADMIN — METOLAZONE 10 MG: 2.5 TABLET ORAL at 10:09

## 2019-09-06 RX ADMIN — METOPROLOL TARTRATE 50 MG: 50 TABLET ORAL at 09:09

## 2019-09-06 RX ADMIN — MUPIROCIN: 20 OINTMENT TOPICAL at 10:09

## 2019-09-06 RX ADMIN — HEPARIN SODIUM 5000 UNITS: 5000 INJECTION, SOLUTION INTRAVENOUS; SUBCUTANEOUS at 09:09

## 2019-09-06 RX ADMIN — LEVOTHYROXINE SODIUM 50 MCG: 50 TABLET ORAL at 05:09

## 2019-09-06 RX ADMIN — CLOPIDOGREL BISULFATE 75 MG: 75 TABLET ORAL at 10:09

## 2019-09-06 RX ADMIN — BUMETANIDE 1 MG: 0.25 INJECTION INTRAMUSCULAR; INTRAVENOUS at 10:09

## 2019-09-06 RX ADMIN — HEPARIN SODIUM 5000 UNITS: 5000 INJECTION, SOLUTION INTRAVENOUS; SUBCUTANEOUS at 10:09

## 2019-09-06 RX ADMIN — INSULIN ASPART 2 UNITS: 100 INJECTION, SOLUTION INTRAVENOUS; SUBCUTANEOUS at 04:09

## 2019-09-06 RX ADMIN — ATORVASTATIN CALCIUM 10 MG: 10 TABLET, FILM COATED ORAL at 10:09

## 2019-09-06 RX ADMIN — HYDRALAZINE HYDROCHLORIDE 100 MG: 25 TABLET ORAL at 10:09

## 2019-09-06 RX ADMIN — MICONAZOLE NITRATE: 20 OINTMENT TOPICAL at 09:09

## 2019-09-06 RX ADMIN — MUPIROCIN: 20 OINTMENT TOPICAL at 09:09

## 2019-09-06 RX ADMIN — INSULIN DETEMIR 5 UNITS: 100 INJECTION, SOLUTION SUBCUTANEOUS at 09:09

## 2019-09-06 RX ADMIN — HYDRALAZINE HYDROCHLORIDE 100 MG: 25 TABLET ORAL at 09:09

## 2019-09-06 RX ADMIN — INSULIN ASPART 3 UNITS: 100 INJECTION, SOLUTION INTRAVENOUS; SUBCUTANEOUS at 11:09

## 2019-09-06 RX ADMIN — CITALOPRAM HYDROBROMIDE 10 MG: 10 TABLET ORAL at 10:09

## 2019-09-06 RX ADMIN — INSULIN ASPART 3 UNITS: 100 INJECTION, SOLUTION INTRAVENOUS; SUBCUTANEOUS at 07:09

## 2019-09-06 NOTE — ASSESSMENT & PLAN NOTE
-Will plan for tunneled HD catheter tomorrow prior to dialysis 9/5/19  -Cont NPO  -Risks/benefits d/w pt who states understanding and desires to proceed with surgical intervention

## 2019-09-06 NOTE — ASSESSMENT & PLAN NOTE
-s/p tunneled HD catheter 9/5/19 with need for suture ligation of bleedin vein branch - recovered well, no PTX on CXR and HD without issues via catheter  -I will f/u with pt in clinic to create long term dialysis access - AVF vs AVG and obtain vein mapping at that time

## 2019-09-06 NOTE — PT/OT/SLP PROGRESS
"Physical Therapy Treatment    Patient Name:  Erica Leblanc   MRN:  [id number]    Recommendations:     Discharge Recommendations:  home health PT   Discharge Equipment Recommendations: hospital bed(CPAP)   Barriers to discharge: pt with decreased mobility     Assessment:     Erica Leblanc is a 74 y.o. female admitted with a medical diagnosis of Acute on chronic diastolic heart failure.  She presents with the following impairments/functional limitations:  weakness, impaired endurance, gait instability, decreased upper extremity function, impaired balance, decreased lower extremity function, decreased ROM, edema, pain, decreased safety awareness, impaired cardiopulmonary response to activity, decreased coordination .    Rehab Prognosis: Fair; patient would benefit from acute skilled PT services to address these deficits and reach maximum level of function.    Recent Surgery: * No surgery found *      Plan:     During this hospitalization, patient to be seen daily to address the identified rehab impairments via gait training, therapeutic activities, therapeutic exercises and progress toward the following goals:    · Plan of Care Expires:  09/12/19    Subjective     Chief Complaint: weakness and fatigue.   Patient/Family Comments/goals:  " I am wet " pt agreeable  to therapy with encouragement and education.   Pain/Comfort:  · Pain Rating 1: 0/10  · Pain Rating Post-Intervention 1: 0/10      Objective:     Communicated with nurse Cisse prior to session.  Patient found HOB elevated with telemetry, oxygen, bed alarm upon PT entry to room.     General Precautions: Standard, fall, respiratory   Orthopedic Precautions:N/A   Braces: N/A     Functional Mobility: pt with slow movement, required extra time to complete tasks. Pt required frequent rest break throughout treatment 2* SOB . VC's for pursed lip breathing technique.   · Bed Mobility:     · Rolling Right: minimum assistance  · Scooting: minimum " assistance  · Supine to Sit: moderate assistance, HOB elevated.   · Transfers:     · Sit to Stand: x 3 trials from bed moderate assistance with rolling walker. V/T cues for safety technique and walker management.   · Bed to Chair: contact guard assistance with  rolling walker  using  Step Transfer  · Gait: Patient ambulated ~  20 ft and 8 ft  on level tile with Rolling Walker with CGA (2L O2NC) . Pt required 1 seated rest break 2* fatigue and SOB.   Pt with demonstarting a  3-point gait with decreased debbie, increased time in double stance, decreased velocity of limb motion, decreased step length, decreased swing-to-stance ratio.Impairments contributing to gait deviations include impaired balance, decreased flexibility, pain,decreased ROM and decreased strength. V/T cues for safety technique and walker management. VC's for pursed lip breathing technique.    · Balance:  Fair       AM-PAC 6 CLICK MOBILITY  Turning over in bed (including adjusting bedclothes, sheets and blankets)?: 3  Sitting down on and standing up from a chair with arms (e.g., wheelchair, bedside commode, etc.): 2  Moving from lying on back to sitting on the side of the bed?: 3  Moving to and from a bed to a chair (including a wheelchair)?: 3  Need to walk in hospital room?: 3  Climbing 3-5 steps with a railing?: 2  Basic Mobility Total Score: 16       Therapeutic Activities and Exercises:   Pt performed seated BLE 10 reps x 2 trials:   AP, LAQ, HS,  Hip abd/add with pillow , Hip flexion. V/T's cues for technique and sequence. Pt tolerated well.   Educated pt on safety awareness with all OOB mobility , transfer and gait training.   Pt tolerated standing balance with RW, CGA while getting clean up and doff/merry diaper.     Patient left up in chair with all lines intact, call button in reach and nurse notified..    GOALS:   Multidisciplinary Problems     Physical Therapy Goals        Problem: Physical Therapy Goal    Goal Priority Disciplines Outcome  Goal Variances Interventions   Physical Therapy Goal     PT, PT/OT Ongoing (interventions implemented as appropriate)     Description:  Goals to be met by: 2019     Patient will increase functional independence with mobility by performin. Supine to sit with Stand-by Assistance  2. Sit to stand transfer with Stand-by Assistance  3. Gait  x 50 feet with Stand-by Assistance using Rolling Walker.   4. Lower extremity exercise program x10 reps per handout, with independence                      Time Tracking:     PT Received On: 19  PT Start Time: 1356     PT Stop Time: 1450  PT Total Time (min): 54 min     Billable Minutes: Gait Training 16, Therapeutic Activity 23 and Therapeutic Exercise 15    Treatment Type: Treatment  PT/PTA: PTA     PTA Visit Number: 3     Renata Grimm, GERONIMO  2019

## 2019-09-06 NOTE — SUBJECTIVE & OBJECTIVE
Medications:  Continuous Infusions:  Scheduled Meds:   sodium chloride 0.9%   Intravenous Once    sodium chloride 0.9%   Intravenous Once    atorvastatin  10 mg Oral Daily    bumetanide  1 mg Intravenous Daily    citalopram  10 mg Oral Daily    clopidogrel  75 mg Oral Daily    heparin (porcine)  5,000 Units Subcutaneous Q12H    hydrALAZINE  100 mg Oral TID    insulin aspart U-100  3 Units Subcutaneous TIDWM    insulin detemir U-100  5 Units Subcutaneous Daily    levothyroxine  50 mcg Oral Before breakfast    metOLazone  10 mg Oral Daily    metoprolol tartrate  50 mg Oral BID    miconazole nitrate 2%   Topical (Top) BID    mupirocin   Nasal BID     PRN Meds:sodium chloride 0.9%, sodium chloride 0.9%, acetaminophen, cloNIDine, dextrose 50%, dextrose 50%, glucagon (human recombinant), glucose, glucose, heparin (porcine), insulin aspart U-100, prochlorperazine, promethazine (PHENERGAN) IVPB, ramelteon     Objective:     Vital Signs (Most Recent):  Temp: 98.4 °F (36.9 °C) (09/06/19 1112)  Pulse: (!) 56 (09/06/19 1112)  Resp: 18 (09/06/19 1112)  BP: 128/64 (09/06/19 1112)  SpO2: 97 % (09/06/19 1112) Vital Signs (24h Range):  Temp:  [97.9 °F (36.6 °C)-98.6 °F (37 °C)] 98.4 °F (36.9 °C)  Pulse:  [51-60] 56  Resp:  [16-20] 18  SpO2:  [94 %-98 %] 97 %  BP: (119-186)/() 128/64     Date 09/06/19 0700 - 09/07/19 0659   Shift 2253-4101 5141-3073 2337-5052 24 Hour Total   INTAKE   P.O. 0   0   Shift Total(mL/kg) 0(0)   0(0)   OUTPUT   Shift Total(mL/kg)       Weight (kg) 128.4 128.4 128.4 128.4       Physical Exam   Constitutional: She is oriented to person, place, and time. She appears well-developed and well-nourished. No distress.   HENT:   Head: Normocephalic and atraumatic.   Eyes: Conjunctivae are normal.   Neck: Neck supple.       Cardiovascular: Normal rate.   Pulmonary/Chest: Effort normal. No respiratory distress.   Abdominal: Soft. She exhibits no distension and no mass. There is no tenderness.  There is no rebound and no guarding. No hernia.   Musculoskeletal: Normal range of motion. She exhibits no edema, tenderness or deformity.   Neurological: She is alert and oriented to person, place, and time. No sensory deficit.   Skin: Skin is warm and dry. Capillary refill takes 2 to 3 seconds. No rash noted. She is not diaphoretic. No erythema. No pallor.   Psychiatric: She has a normal mood and affect.   Vitals reviewed.      Significant Labs:  All pertinent labs from the last 24 hours have been reviewed.    Significant Diagnostics:  I have reviewed all pertinent imaging results/findings within the past 24 hours.

## 2019-09-06 NOTE — PROGRESS NOTES
[facility]  Vascular Surgery  Progress Note    Patient Name: Erica Leblanc  MRN: [id number]  Admission Date: 8/27/2019  Primary Care Provider: Sendy Elaine MD    Subjective:     Interval History: No complaints today.  NPO    Post-Op Info:  * No surgery found *           Medications:  Continuous Infusions:  Scheduled Meds:   sodium chloride 0.9%   Intravenous Once    atorvastatin  10 mg Oral Daily    bumetanide  1 mg Intravenous Daily    citalopram  10 mg Oral Daily    clopidogrel  75 mg Oral Daily    heparin (porcine)  5,000 Units Subcutaneous Q12H    hydrALAZINE  100 mg Oral TID    insulin aspart U-100  3 Units Subcutaneous TIDWM    insulin detemir U-100  5 Units Subcutaneous Daily    levothyroxine  50 mcg Oral Before breakfast    metOLazone  10 mg Oral Daily    metoprolol tartrate  50 mg Oral BID    miconazole nitrate 2%   Topical (Top) BID    mupirocin   Nasal BID     PRN Meds:sodium chloride 0.9%, acetaminophen, cloNIDine, dextrose 50%, dextrose 50%, glucagon (human recombinant), glucose, glucose, heparin (porcine), insulin aspart U-100, prochlorperazine, promethazine (PHENERGAN) IVPB, ramelteon     Objective:     Vital Signs (Most Recent):  Temp: 97.9 °F (36.6 °C) (09/05/19 1934)  Pulse: (!) 57 (09/05/19 1934)  Resp: 18 (09/05/19 1934)  BP: (!) 152/71 (09/05/19 1934)  SpO2: 95 % (09/05/19 1934) Vital Signs (24h Range):  Temp:  [96.4 °F (35.8 °C)-97.9 °F (36.6 °C)] 97.9 °F (36.6 °C)  Pulse:  [50-60] 57  Resp:  [14-20] 18  SpO2:  [6 %-99 %] 95 %  BP: (131-186)/() 152/71     Date 09/05/19 0700 - 09/06/19 0659   Shift 2791-3881 9501-4052 5311-1965 24 Hour Total   INTAKE   Other  500  500   Shift Total(mL/kg)  500(3.9)  500(3.9)   OUTPUT   Other  3500  3500   Shift Total(mL/kg)  3500(27.2)  3500(27.2)   Weight (kg) 128.5 128.5 128.5 128.5       Physical Exam   Constitutional: She is oriented to person, place, and time. She appears well-developed and well-nourished. No  distress.   HENT:   Head: Normocephalic and atraumatic.   Eyes: Conjunctivae are normal.   Neck: Neck supple.   Cardiovascular: Normal rate.   Pulmonary/Chest: Effort normal. No respiratory distress.   Abdominal: Soft. She exhibits no distension and no mass. There is no tenderness. There is no rebound and no guarding. No hernia.   Musculoskeletal: Normal range of motion. She exhibits no edema, tenderness or deformity.   Neurological: She is alert and oriented to person, place, and time. No sensory deficit.   Skin: Skin is warm and dry. Capillary refill takes 2 to 3 seconds. No rash noted. She is not diaphoretic. No erythema. No pallor.   Psychiatric: She has a normal mood and affect.   Vitals reviewed.      Significant Labs:  All pertinent labs from the last 24 hours have been reviewed.    Significant Diagnostics:  I have reviewed all pertinent imaging results/findings within the past 24 hours.    Assessment/Plan:     CKD stage 5  -Will plan for tunneled HD catheter tomorrow prior to dialysis 9/5/19  -Cont NPO  -Risks/benefits d/w pt who states understanding and desires to proceed with surgical intervention        Keron Perez MD  Vascular Surgery  [facility]

## 2019-09-06 NOTE — PROGRESS NOTES
Date of Admission:8/27/2019    SUBJECTIVE: notes feeling better    Current Facility-Administered Medications   Medication    0.9%  NaCl infusion    0.9%  NaCl infusion    acetaminophen tablet 500 mg    atorvastatin tablet 10 mg    bumetanide injection 1 mg    citalopram tablet 10 mg    cloNIDine tablet 0.1 mg    clopidogrel tablet 75 mg    dextrose 50% injection 12.5 g    dextrose 50% injection 25 g    glucagon (human recombinant) injection 1 mg    glucose chewable tablet 16 g    glucose chewable tablet 24 g    heparin (porcine) injection 5,000 Units    heparin (porcine) injection 5,000 Units    hydrALAZINE tablet 100 mg    insulin aspart U-100 pen 0-5 Units    insulin aspart U-100 pen 3 Units    insulin detemir U-100 pen 5 Units    levothyroxine tablet 50 mcg    metOLazone tablet 10 mg    metoprolol tartrate (LOPRESSOR) tablet 50 mg    miconazole nitrate 2% ointment    mupirocin 2 % ointment    prochlorperazine injection Soln 5 mg    promethazine (PHENERGAN) 6.25 mg in dextrose 5 % 50 mL IVPB    ramelteon tablet 8 mg       Wt Readings from Last 3 Encounters:   09/06/19 128.4 kg (283 lb 1.1 oz)   08/15/19 123 kg (271 lb 2.7 oz)   08/07/19 123.4 kg (272 lb)     Temp Readings from Last 3 Encounters:   09/06/19 98.2 °F (36.8 °C) (Oral)   07/25/19 98.1 °F (36.7 °C)   04/05/19 98.3 °F (36.8 °C) (Oral)     BP Readings from Last 3 Encounters:   09/06/19 (!) 154/76   08/15/19 (!) 181/88   08/07/19 122/80     Pulse Readings from Last 3 Encounters:   09/06/19 (!) 54   08/15/19 (!) 58   08/07/19 68       Intake/Output Summary (Last 24 hours) at 9/6/2019 1036  Last data filed at 9/6/2019 0834  Gross per 24 hour   Intake 1220 ml   Output 3500 ml   Net -2280 ml       PE:  GEN:wd female in nad  HEENT:ncat,eomi,mm  CVS:s1s2 regular  PULM:ctab  ABD:+bs,soft,nd  EXT: hip edema,2+leg edema  NEURO:awake, alert  pc of chest  Recent Labs   Lab 09/06/19  0455   *      K 3.8      CO2 30*   BUN  66*   CREATININE 4.1*   CALCIUM 8.3*       Lab Results   Component Value Date    .9 (H) 05/22/2019    CALCIUM 8.3 (L) 09/06/2019    PHOS 4.4 08/07/2019       No results for input(s): WBC, RBC, HGB, HCT, PLT, MCV, MCH, MCHC in the last 24 hours.      A/P:  1.curt. On ckd 4.creatinine not better. Started on hd. Cont tx for am. Tolerated hd well.  2.acute  On diastolic hf.  Cont diuresis.  More uf in am.  3.ryan. Cont bipap as needed.  4.anemia with ckd. No epo indicated for now.  5.dm2. Following sugars.  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 Phos ok. No binders for now.  7.obesity. Wt loss is needed.  8.dm2.Following sugars.

## 2019-09-06 NOTE — SUBJECTIVE & OBJECTIVE
Interval History: feels much better today.     Review of Systems   Respiratory: Negative for shortness of breath.    Cardiovascular: Positive for leg swelling.   Gastrointestinal: Negative.      Objective:     Vital Signs (Most Recent):  Temp: 96.9 °F (36.1 °C) (09/05/19 0751)  Pulse: 60 (09/05/19 1145)  Resp: 17 (09/05/19 1145)  BP: (!) 170/94 (09/05/19 1145)  SpO2: 95% (09/05/19 1145) Vital Signs (24h Range):  Temp:  [97.9 °F (36.6 °C)-98.6 °F (37 °C)] 98.4 °F (36.9 °C)  Pulse:  [51-60] 56  Resp:  [16-20] 18  SpO2:  [94 %-98 %] 97 %  BP: (119-186)/() 128/64     Weight: 128.4 kg (283 lb 1.1 oz)  Body mass index is 47.11 kg/m².    Intake/Output Summary (Last 24 hours) at 9/6/2019 1439  Last data filed at 9/6/2019 0834  Gross per 24 hour   Intake 1220 ml   Output 3500 ml   Net -2280 ml      Physical Exam   Constitutional: She is oriented to person, place, and time. She appears well-developed. No distress.   Cardiovascular:   NSR with HR 67 manually   Pulmonary/Chest: She has no wheezes. She has rales.   Speaking full sentences      Abdominal: Soft. Bowel sounds are normal.   Musculoskeletal: She exhibits edema.   Neurological: She is alert and oriented to person, place, and time.   Skin: She is not diaphoretic.   Nursing note and vitals reviewed.      Significant Labs: All pertinent labs within the past 24 hours have been reviewed.    Significant Imaging: I have reviewed all pertinent imaging results/findings within the past 24 hours.  I have reviewed and interpreted all pertinent imaging results/findings within the past 24 hours.

## 2019-09-06 NOTE — PROGRESS NOTES
"[facility]  Adult Nutrition  Progress Note    SUMMARY       Recommendations    1. Continue w/ current diet order; if glycemic control becomes an issue, add consistent carbohydrate restriction to diet order  Goals: Maintain meal intake >50% daily; diet compliance  Nutrition Goal Status: new  Communication of RD Recs: (plan of care)    Reason for Assessment    Reason For Assessment: length of stay  Diagnosis: (acute on chronic HF)  Relevant Medical History: HLD, HTN, DM, CKD, CHF    General Information Comments: Pt seen this AM; she reports having a good appetite. Pt is new to HD, which was initiated yesterday; s/p THDC placement 9/5. Pt reports she is very familiar w/ DM & Low Na diet guidelines. She was able to correctly relay pertinent info of both diets to me. Per nephrology notes, no phos binder needed at this time. K has been low to wnl. Encouraged pt to meet w/ RD @ whichever HD center she goes to for dialysis to discuss necessity of monitoring intake of those foods w/ high K/Phos. NFPE performed today & pt w/ no physical s/s of malnutrition.     Nutrition Discharge Planning: Adequate intake of meals to meet nutr needs; diet compliance    Nutrition Risk Screen    Nutrition Risk Screen: no indicators present    Nutrition/Diet History    Patient Reported Diet/Restrictions/Preferences: diabetic diet, low salt  Spiritual, Cultural Beliefs, Gnosticism Practices, Values that Affect Care: no  Food Allergies: NKFA  Factors Affecting Nutritional Intake: None identified at this time    Anthropometrics    Temp: 98.4 °F (36.9 °C)  Height Method: Stated  Height: 5' 5" (165.1 cm)  Height (inches): 65 in  Weight Method: Bed Scale  Weight: 128.4 kg (283 lb 1.1 oz)  Weight (lb): 283.07 lb  Ideal Body Weight (IBW), Female: 125 lb  % Ideal Body Weight, Female (lb): 226.46 lb  BMI (Calculated): 47.2  BMI Grade: greater than 40 - morbid obesity       Lab/Procedures/Meds    Pertinent Labs Reviewed: " reviewed  Pertinent Labs Comments: BUN 66, Crt 4.1, Glu 117, POCT glu 105-121, A1C 8.6  Pertinent Medications Reviewed: reviewed  Pertinent Medications Comments: statin, bumetanide    Estimated/Assessed Needs    Weight Used For Calorie Calculations: 128.4 kg (283 lb 1.1 oz)  Energy Calorie Requirements (kcal): 1790  Energy Need Method: McDonald-St Jeor     Protein Requirements: 103 g(.8 g/kg)  Weight Used For Protein Calculations: 128.4 kg (283 lb 1.1 oz)    Estimated Fluid Requirement Method: RDA Method  RDA Method (mL): 1790  CHO Requirement: 220 g daily      Nutrition Prescription Ordered    Current Diet Order: Renal  Nutrition Order Comments: 1500 mL FR    Evaluation of Received Nutrient/Fluid Intake    I/O: reviewed  Energy Calories Required: meeting needs  Protein Required: meeting needs  Fluid Required: meeting needs  Comments: LBM 9/5; HD 9/5  Tolerance: tolerating  % Intake of Estimated Energy Needs: 75 - 100 %  % Meal Intake: 75 - 100 %    Nutrition Risk    Level of Risk/Frequency of Follow-up: (F/u 1 x weekly)     Assessment and Plan    Nutrition Problem  Increased nutrient needs      Related to (etiology):   Physiological demand w/ HD    Signs and Symptoms (as evidenced by):   EPN    Interventions:  Collaboration w/ other providers    Nutrition Diagnosis Status:   New       Monitor and Evaluation    Food and Nutrient Intake: energy intake, food and beverage intake  Food and Nutrient Adminstration: diet order  Physical Activity and Function: nutrition-related ADLs and IADLs  Anthropometric Measurements: weight change, weight  Biochemical Data, Medical Tests and Procedures: electrolyte and renal panel, glucose/endocrine profile  Nutrition-Focused Physical Findings: overall appearance     Malnutrition Assessment     Skin (Micronutrient): none           Edema (Fluid Accumulation): 2-->mild   Subcutaneous Fat Loss (Final Summary): well nourished  Muscle Loss Evaluation (Final Summary): well nourished          Nutrition Follow-Up    RD Follow-up?: Yes

## 2019-09-06 NOTE — PROGRESS NOTES
Sitting up in chair at bedside. Nursing reports removing Tubigrip from bilateral lower legs yesterday. Noted legs very edematous.   Nursing to replace Tubigrip when patient returns to bed.

## 2019-09-06 NOTE — PROGRESS NOTES
[facility] Hospital Medicine  Progress Note    Patient Name: Erica Leblanc  MRN: [id number]  Patient Class: IP- Inpatient   Admission Date: 8/27/2019  Length of Stay: 10 days  Attending Physician: Zahra Calero MD  Primary Care Provider: Sendy Elaine MD        Subjective:     Principal Problem:Acute on chronic diastolic heart failure        HPI:  Ms. Erica Leblanc is a 74 y.o. female with essential hypertension, type 2 diabetes mellitus (HbA1c 7.3% Jul 2019), hyperlipidemia (LDL 52.8 Mar 2019), chronic diastolic heart failure (LVEF 70% Jun 2017), CKD stage 5, hypothyroidism (TSH 3.298 Mar 2019), anemia chronic disease, morbid obesity (BMI 41.6), HUMBERTO on CPAP, and depression who presents to [facility] ED with complaints of dyspnea for the past four days.  She had reported to the ED physician that her CPAP machine has been malfunctioning, a similar complaint to her presentation two months ago.  She has not been able to get it replaced due to insurance constraints.  The dyspnea is worse on exertion.  Further history is otherwise limited at this time due to hypersomnolence.    Overview/Hospital Course:  75 y/o female admitted with acute on chronic diastolic heart failure.  Started on IV diuresis.  Patient with hx of CKD and worsening Creat.  Nephrology consulted. Patient clinically improved.  Social workers consulted to help with re-arranging broken CPAP for patient. PT/OT rec: H/H.  02 requirements increased and so lasix increased on 9/2/19. CPAP arranged for home. Patient does not wear 02 at home. Patient's renal function continued to decline despite alternative diuretic regimen. Patient consented for dialysis and tunneled cath placement. Tunneled cath placed on 9/5/2019 and dialysis started same day. Patient tolerated well. She was noted to be more alert and active with therapy.     Interval History: feels much better today.     Review of Systems   Respiratory: Negative for shortness  of breath.    Cardiovascular: Positive for leg swelling.   Gastrointestinal: Negative.      Objective:     Vital Signs (Most Recent):  Temp: 96.9 °F (36.1 °C) (09/05/19 0751)  Pulse: 60 (09/05/19 1145)  Resp: 17 (09/05/19 1145)  BP: (!) 170/94 (09/05/19 1145)  SpO2: 95% (09/05/19 1145) Vital Signs (24h Range):  Temp:  [97.9 °F (36.6 °C)-98.6 °F (37 °C)] 98.4 °F (36.9 °C)  Pulse:  [51-60] 56  Resp:  [16-20] 18  SpO2:  [94 %-98 %] 97 %  BP: (119-186)/() 128/64     Weight: 128.4 kg (283 lb 1.1 oz)  Body mass index is 47.11 kg/m².    Intake/Output Summary (Last 24 hours) at 9/6/2019 1439  Last data filed at 9/6/2019 0834  Gross per 24 hour   Intake 1220 ml   Output 3500 ml   Net -2280 ml      Physical Exam   Constitutional: She is oriented to person, place, and time. She appears well-developed. No distress.   Cardiovascular:   NSR with HR 67 manually   Pulmonary/Chest: She has no wheezes. She has rales.   Speaking full sentences      Abdominal: Soft. Bowel sounds are normal.   Musculoskeletal: She exhibits edema.   Neurological: She is alert and oriented to person, place, and time.   Skin: She is not diaphoretic.   Nursing note and vitals reviewed.      Significant Labs: All pertinent labs within the past 24 hours have been reviewed.    Significant Imaging: I have reviewed all pertinent imaging results/findings within the past 24 hours.  I have reviewed and interpreted all pertinent imaging results/findings within the past 24 hours.      Assessment/Plan:      * Acute on chronic diastolic heart failure  This is likely secondary to progressive renal failure in addition to pulmonary hypertension, tricuspid regurg and diastolic heart failure.   Initiated on dialysis 9/5. Doing very well and feeling much better  Is more actively participating with therapy  SW to work on outpatient HD placement  Cardiology also on board        Chronic respiratory failure  2/2 HUMBERTO, diastolic heart failure and pulmonary  hypertension  Supplemental O2, dialysis and CPAP nightly        Debility  Resume H/H PT/OT on discharge.       Depression  Stable; will continue her home regimen of citalopram.    Anemia of chronic disease  The patient's H/H is stable and consistent with previous laboratory measurements, and the patient exhibits no signs or symptoms of acute bleeding; there is no indication for transfusion.  Will continue to monitor.    CKD stage 5  Worsening renal function over past few months.  Initiating dialysis  Nephrology on board    Essential hypertension  Continue current antihypertensive regimen    Chronic diastolic heart failure  As addressed above.    Type 2 diabetes mellitus, controlled, with renal complications  Well controlled on a home regimen of basal insulin therapy; will provide basal-prandial insulin therapy along insulin sliding scale.  A1c ordered     Hypothyroidism (acquired)  Poorly controlled; will continue her home regimen of levothyroxine and defer dosage adjustments to her PCP.    HUMBERTO on CPAP  As addressed above.    CPAP arranged for home.     Hyperlipidemia LDL goal <100  Well controlled; will continue her home regimen of atorvastatin.    Morbid obesity with body mass index (BMI) of 40.0 to 44.9 in adult  The patient has been counseled on the negative impact that obesity imparts on her health and was encouraged to make lifestyle changes in order to lose weight and decrease her modifiable risk factors.    VTE Risk Mitigation (From admission, onward)        Ordered     heparin (porcine) injection 5,000 Units  As needed (PRN)      09/05/19 1754     heparin (porcine) injection 5,000 Units  Every 12 hours      08/27/19 2217     IP VTE HIGH RISK PATIENT  Once      08/27/19 2217                Zahra Almeida MD  Department of Hospital Medicine   [facility]

## 2019-09-06 NOTE — PLAN OF CARE
Problem: Adult Inpatient Plan of Care  Goal: Plan of Care Review  Outcome: Ongoing (interventions implemented as appropriate)     09/06/19 0406   Plan of Care Review   Plan of Care Reviewed With patient   No, falls, trauma or injury this shift. Dialysis today patient states she tolerated without distress. Patient is more alert today. Blood glucose monitored every 6 hours. Patient has been turned every 2 hours, no new pressure injuries observed. Continue with plan of care and continue to monitor patient.

## 2019-09-06 NOTE — PLAN OF CARE
Problem: Adult Inpatient Plan of Care  Goal: Plan of Care Review  Recommendations     1. Continue w/ current diet order; if glycemic control becomes an issue, add consistent carbohydrate restriction to diet order  Goals: Maintain meal intake >50% daily; diet compliance  Nutrition Goal Status: new

## 2019-09-06 NOTE — SUBJECTIVE & OBJECTIVE
Medications:  Continuous Infusions:  Scheduled Meds:   sodium chloride 0.9%   Intravenous Once    atorvastatin  10 mg Oral Daily    bumetanide  1 mg Intravenous Daily    citalopram  10 mg Oral Daily    clopidogrel  75 mg Oral Daily    heparin (porcine)  5,000 Units Subcutaneous Q12H    hydrALAZINE  100 mg Oral TID    insulin aspart U-100  3 Units Subcutaneous TIDWM    insulin detemir U-100  5 Units Subcutaneous Daily    levothyroxine  50 mcg Oral Before breakfast    metOLazone  10 mg Oral Daily    metoprolol tartrate  50 mg Oral BID    miconazole nitrate 2%   Topical (Top) BID    mupirocin   Nasal BID     PRN Meds:sodium chloride 0.9%, acetaminophen, cloNIDine, dextrose 50%, dextrose 50%, glucagon (human recombinant), glucose, glucose, heparin (porcine), insulin aspart U-100, prochlorperazine, promethazine (PHENERGAN) IVPB, ramelteon     Objective:     Vital Signs (Most Recent):  Temp: 97.9 °F (36.6 °C) (09/05/19 1934)  Pulse: (!) 57 (09/05/19 1934)  Resp: 18 (09/05/19 1934)  BP: (!) 152/71 (09/05/19 1934)  SpO2: 95 % (09/05/19 1934) Vital Signs (24h Range):  Temp:  [96.4 °F (35.8 °C)-97.9 °F (36.6 °C)] 97.9 °F (36.6 °C)  Pulse:  [50-60] 57  Resp:  [14-20] 18  SpO2:  [6 %-99 %] 95 %  BP: (131-186)/() 152/71     Date 09/05/19 0700 - 09/06/19 0659   Shift 0504-3407 3332-8342 4676-4475 24 Hour Total   INTAKE   Other  500  500   Shift Total(mL/kg)  500(3.9)  500(3.9)   OUTPUT   Other  3500  3500   Shift Total(mL/kg)  3500(27.2)  3500(27.2)   Weight (kg) 128.5 128.5 128.5 128.5       Physical Exam   Constitutional: She is oriented to person, place, and time. She appears well-developed and well-nourished. No distress.   HENT:   Head: Normocephalic and atraumatic.   Eyes: Conjunctivae are normal.   Neck: Neck supple.   Cardiovascular: Normal rate.   Pulmonary/Chest: Effort normal. No respiratory distress.   Abdominal: Soft. She exhibits no distension and no mass. There is no tenderness. There is no  rebound and no guarding. No hernia.   Musculoskeletal: Normal range of motion. She exhibits no edema, tenderness or deformity.   Neurological: She is alert and oriented to person, place, and time. No sensory deficit.   Skin: Skin is warm and dry. Capillary refill takes 2 to 3 seconds. No rash noted. She is not diaphoretic. No erythema. No pallor.   Psychiatric: She has a normal mood and affect.   Vitals reviewed.      Significant Labs:  All pertinent labs from the last 24 hours have been reviewed.    Significant Diagnostics:  I have reviewed all pertinent imaging results/findings within the past 24 hours.

## 2019-09-06 NOTE — ASSESSMENT & PLAN NOTE
This is likely secondary to progressive renal failure in addition to pulmonary hypertension, tricuspid regurg and diastolic heart failure.   Initiated on dialysis 9/5. Doing very well and feeling much better  Is more actively participating with therapy  SW to work on outpatient HD placement  Cardiology also on board

## 2019-09-06 NOTE — PROGRESS NOTES
[id number] TN received a call from Airam, the Renal case Manager with [facility] inquiring of an update on d/c. TN provided an update, pt started HD on yesterday.    [id number] TN faxed clinicals to Ja Beyer at [phone]; waiting review and chair time.

## 2019-09-06 NOTE — PROGRESS NOTES
[facility]  Vascular Surgery  Progress Note    Patient Name: Erica Leblanc  MRN: [id number]  Admission Date: 8/27/2019  Primary Care Provider: Sendy Elaine MD    Subjective:     Interval History: No complaints today.    Post-Op Info:  * No surgery found *           Medications:  Continuous Infusions:  Scheduled Meds:   sodium chloride 0.9%   Intravenous Once    sodium chloride 0.9%   Intravenous Once    atorvastatin  10 mg Oral Daily    bumetanide  1 mg Intravenous Daily    citalopram  10 mg Oral Daily    clopidogrel  75 mg Oral Daily    heparin (porcine)  5,000 Units Subcutaneous Q12H    hydrALAZINE  100 mg Oral TID    insulin aspart U-100  3 Units Subcutaneous TIDWM    insulin detemir U-100  5 Units Subcutaneous Daily    levothyroxine  50 mcg Oral Before breakfast    metOLazone  10 mg Oral Daily    metoprolol tartrate  50 mg Oral BID    miconazole nitrate 2%   Topical (Top) BID    mupirocin   Nasal BID     PRN Meds:sodium chloride 0.9%, sodium chloride 0.9%, acetaminophen, cloNIDine, dextrose 50%, dextrose 50%, glucagon (human recombinant), glucose, glucose, heparin (porcine), insulin aspart U-100, prochlorperazine, promethazine (PHENERGAN) IVPB, ramelteon     Objective:     Vital Signs (Most Recent):  Temp: 98.4 °F (36.9 °C) (09/06/19 1112)  Pulse: (!) 56 (09/06/19 1112)  Resp: 18 (09/06/19 1112)  BP: 128/64 (09/06/19 1112)  SpO2: 97 % (09/06/19 1112) Vital Signs (24h Range):  Temp:  [97.9 °F (36.6 °C)-98.6 °F (37 °C)] 98.4 °F (36.9 °C)  Pulse:  [51-60] 56  Resp:  [16-20] 18  SpO2:  [94 %-98 %] 97 %  BP: (119-186)/() 128/64     Date 09/06/19 0700 - 09/07/19 0659   Shift 3376-8395 4630-7805 0697-6057 24 Hour Total   INTAKE   P.O. 0   0   Shift Total(mL/kg) 0(0)   0(0)   OUTPUT   Shift Total(mL/kg)       Weight (kg) 128.4 128.4 128.4 128.4       Physical Exam   Constitutional: She is oriented to person, place, and time. She appears well-developed and well-nourished. No  distress.   HENT:   Head: Normocephalic and atraumatic.   Eyes: Conjunctivae are normal.   Neck: Neck supple.       Cardiovascular: Normal rate.   Pulmonary/Chest: Effort normal. No respiratory distress.   Abdominal: Soft. She exhibits no distension and no mass. There is no tenderness. There is no rebound and no guarding. No hernia.   Musculoskeletal: Normal range of motion. She exhibits no edema, tenderness or deformity.   Neurological: She is alert and oriented to person, place, and time. No sensory deficit.   Skin: Skin is warm and dry. Capillary refill takes 2 to 3 seconds. No rash noted. She is not diaphoretic. No erythema. No pallor.   Psychiatric: She has a normal mood and affect.   Vitals reviewed.      Significant Labs:  All pertinent labs from the last 24 hours have been reviewed.    Significant Diagnostics:  I have reviewed all pertinent imaging results/findings within the past 24 hours.    Assessment/Plan:     CKD stage 5  -s/p tunneled HD catheter 9/5/19 with need for suture ligation of bleedin vein branch - recovered well, no PTX on CXR and HD without issues via catheter  -I will f/u with pt in clinic to create long term dialysis access - AVF vs AVG and obtain vein mapping at that time        Keron Perez MD  Vascular Surgery  [facility]

## 2019-09-07 LAB
ALBUMIN SERPL BCP-MCNC: 2.2 G/DL (ref 3.5–5.2)
ANION GAP SERPL CALC-SCNC: 8 MMOL/L (ref 8–16)
BUN SERPL-MCNC: 70 MG/DL (ref 8–23)
CALCIUM SERPL-MCNC: 8.1 MG/DL (ref 8.7–10.5)
CHLORIDE SERPL-SCNC: 104 MMOL/L (ref 95–110)
CO2 SERPL-SCNC: 27 MMOL/L (ref 23–29)
CREAT SERPL-MCNC: 4.3 MG/DL (ref 0.5–1.4)
EST. GFR  (AFRICAN AMERICAN): 11 ML/MIN/1.73 M^2
EST. GFR  (NON AFRICAN AMERICAN): 10 ML/MIN/1.73 M^2
GLUCOSE SERPL-MCNC: 113 MG/DL (ref 70–110)
PHOSPHATE SERPL-MCNC: 4.4 MG/DL (ref 2.7–4.5)
POCT GLUCOSE: 101 MG/DL (ref 70–110)
POCT GLUCOSE: 121 MG/DL (ref 70–110)
POCT GLUCOSE: 142 MG/DL (ref 70–110)
POCT GLUCOSE: 204 MG/DL (ref 70–110)
POTASSIUM SERPL-SCNC: 3.3 MMOL/L (ref 3.5–5.1)
SODIUM SERPL-SCNC: 139 MMOL/L (ref 136–145)

## 2019-09-07 PROCEDURE — 25000003 PHARM REV CODE 250: Mod: HCNC | Performed by: HOSPITALIST

## 2019-09-07 PROCEDURE — 25000003 PHARM REV CODE 250: Mod: HCNC | Performed by: INTERNAL MEDICINE

## 2019-09-07 PROCEDURE — 94761 N-INVAS EAR/PLS OXIMETRY MLT: CPT | Mod: HCNC

## 2019-09-07 PROCEDURE — 27000221 HC OXYGEN, UP TO 24 HOURS: Mod: HCNC

## 2019-09-07 PROCEDURE — 11000001 HC ACUTE MED/SURG PRIVATE ROOM: Mod: HCNC

## 2019-09-07 PROCEDURE — 80069 RENAL FUNCTION PANEL: CPT | Mod: HCNC

## 2019-09-07 PROCEDURE — 99900035 HC TECH TIME PER 15 MIN (STAT): Mod: HCNC

## 2019-09-07 PROCEDURE — 94660 CPAP INITIATION&MGMT: CPT | Mod: HCNC

## 2019-09-07 PROCEDURE — S0171 BUMETANIDE 0.5 MG: HCPCS | Mod: HCNC | Performed by: INTERNAL MEDICINE

## 2019-09-07 PROCEDURE — 36415 COLL VENOUS BLD VENIPUNCTURE: CPT | Mod: HCNC

## 2019-09-07 PROCEDURE — 63600175 PHARM REV CODE 636 W HCPCS: Mod: HCNC | Performed by: INTERNAL MEDICINE

## 2019-09-07 PROCEDURE — 90935 HEMODIALYSIS ONE EVALUATION: CPT | Mod: HCNC

## 2019-09-07 RX ADMIN — CLOPIDOGREL BISULFATE 75 MG: 75 TABLET ORAL at 09:09

## 2019-09-07 RX ADMIN — CITALOPRAM HYDROBROMIDE 10 MG: 10 TABLET ORAL at 09:09

## 2019-09-07 RX ADMIN — METOPROLOL TARTRATE 50 MG: 50 TABLET ORAL at 09:09

## 2019-09-07 RX ADMIN — HEPARIN SODIUM 5000 UNITS: 5000 INJECTION, SOLUTION INTRAVENOUS; SUBCUTANEOUS at 05:09

## 2019-09-07 RX ADMIN — HYDRALAZINE HYDROCHLORIDE 100 MG: 25 TABLET ORAL at 09:09

## 2019-09-07 RX ADMIN — RAMELTEON 8 MG: 8 TABLET ORAL at 09:09

## 2019-09-07 RX ADMIN — ATORVASTATIN CALCIUM 10 MG: 10 TABLET, FILM COATED ORAL at 09:09

## 2019-09-07 RX ADMIN — MUPIROCIN: 20 OINTMENT TOPICAL at 09:09

## 2019-09-07 RX ADMIN — LEVOTHYROXINE SODIUM 50 MCG: 50 TABLET ORAL at 06:09

## 2019-09-07 RX ADMIN — INSULIN ASPART 2 UNITS: 100 INJECTION, SOLUTION INTRAVENOUS; SUBCUTANEOUS at 07:09

## 2019-09-07 RX ADMIN — HEPARIN SODIUM 5000 UNITS: 5000 INJECTION, SOLUTION INTRAVENOUS; SUBCUTANEOUS at 09:09

## 2019-09-07 RX ADMIN — INSULIN ASPART 2 UNITS: 100 INJECTION, SOLUTION INTRAVENOUS; SUBCUTANEOUS at 06:09

## 2019-09-07 RX ADMIN — INSULIN ASPART 1 UNITS: 100 INJECTION, SOLUTION INTRAVENOUS; SUBCUTANEOUS at 09:09

## 2019-09-07 RX ADMIN — INSULIN ASPART 2 UNITS: 100 INJECTION, SOLUTION INTRAVENOUS; SUBCUTANEOUS at 11:09

## 2019-09-07 RX ADMIN — MICONAZOLE NITRATE: 20 OINTMENT TOPICAL at 09:09

## 2019-09-07 RX ADMIN — INSULIN DETEMIR 5 UNITS: 100 INJECTION, SOLUTION SUBCUTANEOUS at 09:09

## 2019-09-07 RX ADMIN — BUMETANIDE 1 MG: 0.25 INJECTION INTRAMUSCULAR; INTRAVENOUS at 09:09

## 2019-09-07 RX ADMIN — METOLAZONE 10 MG: 2.5 TABLET ORAL at 09:09

## 2019-09-07 NOTE — NURSING
Pt leaving room in bed with pt transport going to dialysis NADN,no c/o pain,o2@2l in use ,safety maintained.

## 2019-09-07 NOTE — PT/OT/SLP PROGRESS
Physical Therapy      Patient Name:  Erica Leblanc   MRN:  [id number]    Patient not seen today secondary to Dialysis. Will follow-up at a later time/date. .    Marilee Osuna, PTA

## 2019-09-07 NOTE — SUBJECTIVE & OBJECTIVE
Interval History: feels well.      Review of Systems   Respiratory: Negative for shortness of breath.    Cardiovascular: Positive for leg swelling.   Gastrointestinal: Negative.      Objective:     Vital Signs (Most Recent):  Temp: 96.9 °F (36.1 °C) (09/05/19 0751)  Pulse: 60 (09/05/19 1145)  Resp: 17 (09/05/19 1145)  BP: (!) 170/94 (09/05/19 1145)  SpO2: 95% (09/05/19 1145) Vital Signs (24h Range):  Temp:  [97.1 °F (36.2 °C)-99.5 °F (37.5 °C)] 97.9 °F (36.6 °C)  Pulse:  [50-63] 60  Resp:  [17-20] 18  SpO2:  [94 %-100 %] 96 %  BP: ()/(50-67) 111/57     Weight: 128.4 kg (283 lb 1.1 oz)  Body mass index is 47.11 kg/m².    Intake/Output Summary (Last 24 hours) at 9/7/2019 1550  Last data filed at 9/7/2019 1249  Gross per 24 hour   Intake 1160 ml   Output --   Net 1160 ml      Physical Exam   Constitutional: She is oriented to person, place, and time. She appears well-developed. No distress.   Cardiovascular:   NSR with HR 60s   Pulmonary/Chest: She has no wheezes. She has no rales.   Speaking full sentences      Abdominal: Soft. Bowel sounds are normal.   Musculoskeletal: She exhibits edema.   Neurological: She is alert and oriented to person, place, and time.   Skin: She is not diaphoretic.   Nursing note and vitals reviewed.      Significant Labs: All pertinent labs within the past 24 hours have been reviewed.    Significant Imaging: I have reviewed all pertinent imaging results/findings within the past 24 hours.  I have reviewed and interpreted all pertinent imaging results/findings within the past 24 hours.

## 2019-09-07 NOTE — PROGRESS NOTES
[facility] Hospital Medicine  Progress Note    Patient Name: Erica Leblanc  MRN: [id number]  Patient Class: IP- Inpatient   Admission Date: 8/27/2019  Length of Stay: 11 days  Attending Physician: Zahra Calero MD  Primary Care Provider: Sendy Elaine MD        Subjective:     Principal Problem:Acute on chronic diastolic heart failure        HPI:  Ms. Erica Leblanc is a 74 y.o. female with essential hypertension, type 2 diabetes mellitus (HbA1c 7.3% Jul 2019), hyperlipidemia (LDL 52.8 Mar 2019), chronic diastolic heart failure (LVEF 70% Jun 2017), CKD stage 5, hypothyroidism (TSH 3.298 Mar 2019), anemia chronic disease, morbid obesity (BMI 41.6), HUMBERTO on CPAP, and depression who presents to [facility] ED with complaints of dyspnea for the past four days.  She had reported to the ED physician that her CPAP machine has been malfunctioning, a similar complaint to her presentation two months ago.  She has not been able to get it replaced due to insurance constraints.  The dyspnea is worse on exertion.  Further history is otherwise limited at this time due to hypersomnolence.    Overview/Hospital Course:  73 y/o female admitted with acute on chronic diastolic heart failure.  Started on IV diuresis.  Patient with hx of CKD and worsening Creat.  Nephrology consulted. Patient clinically improved.  Social workers consulted to help with re-arranging broken CPAP for patient. PT/OT rec: H/H.  02 requirements increased and so lasix increased on 9/2/19. CPAP arranged for home. Patient does not wear 02 at home. Patient's renal function continued to decline despite alternative diuretic regimen. Patient consented for dialysis and tunneled cath placement. Tunneled cath placed on 9/5/2019 and dialysis started same day. Patient tolerated well. She was noted to be more alert and active with therapy.     Interval History: feels well.      Review of Systems   Respiratory: Negative for shortness of breath.     Cardiovascular: Positive for leg swelling.   Gastrointestinal: Negative.      Objective:     Vital Signs (Most Recent):  Temp: 96.9 °F (36.1 °C) (09/05/19 0751)  Pulse: 60 (09/05/19 1145)  Resp: 17 (09/05/19 1145)  BP: (!) 170/94 (09/05/19 1145)  SpO2: 95% (09/05/19 1145) Vital Signs (24h Range):  Temp:  [97.1 °F (36.2 °C)-99.5 °F (37.5 °C)] 97.9 °F (36.6 °C)  Pulse:  [50-63] 60  Resp:  [17-20] 18  SpO2:  [94 %-100 %] 96 %  BP: ()/(50-67) 111/57     Weight: 128.4 kg (283 lb 1.1 oz)  Body mass index is 47.11 kg/m².    Intake/Output Summary (Last 24 hours) at 9/7/2019 1550  Last data filed at 9/7/2019 1249  Gross per 24 hour   Intake 1160 ml   Output --   Net 1160 ml      Physical Exam   Constitutional: She is oriented to person, place, and time. She appears well-developed. No distress.   Cardiovascular:   NSR with HR 60s   Pulmonary/Chest: She has no wheezes. She has no rales.   Speaking full sentences      Abdominal: Soft. Bowel sounds are normal.   Musculoskeletal: She exhibits edema.   Neurological: She is alert and oriented to person, place, and time.   Skin: She is not diaphoretic.   Nursing note and vitals reviewed.      Significant Labs: All pertinent labs within the past 24 hours have been reviewed.    Significant Imaging: I have reviewed all pertinent imaging results/findings within the past 24 hours.  I have reviewed and interpreted all pertinent imaging results/findings within the past 24 hours.      Assessment/Plan:      * Acute on chronic diastolic heart failure  This is likely secondary to progressive renal failure in addition to pulmonary hypertension, tricuspid regurg and diastolic heart failure.   Initiated on dialysis 9/5. Doing very well and feeling much better  Is more actively participating with therapy  SW to work on outpatient HD placement  Cardiology also on board        Chronic respiratory failure  2/2 HUMBERTO, diastolic heart failure and pulmonary hypertension  Supplemental O2, dialysis  and CPAP nightly        Debility  Resume H/H PT/OT on discharge.       Depression  Stable; will continue her home regimen of citalopram.    Anemia of chronic disease  The patient's H/H is stable and consistent with previous laboratory measurements, and the patient exhibits no signs or symptoms of acute bleeding; there is no indication for transfusion.  Will continue to monitor.    CKD stage 5  Worsening renal function over past few months.  Initiating dialysis  Nephrology on board    Essential hypertension  Continue current antihypertensive regimen    Chronic diastolic heart failure  As addressed above.    Type 2 diabetes mellitus, controlled, with renal complications  Well controlled on a home regimen of basal insulin therapy; will provide basal-prandial insulin therapy along insulin sliding scale.  A1c ordered     Hypothyroidism (acquired)  Poorly controlled; will continue her home regimen of levothyroxine and defer dosage adjustments to her PCP.    HUMBERTO on CPAP  As addressed above.    CPAP arranged for home.     Hyperlipidemia LDL goal <100  Well controlled; will continue her home regimen of atorvastatin.    Morbid obesity with body mass index (BMI) of 40.0 to 44.9 in adult  The patient has been counseled on the negative impact that obesity imparts on her health and was encouraged to make lifestyle changes in order to lose weight and decrease her modifiable risk factors.      VTE Risk Mitigation (From admission, onward)        Ordered     heparin (porcine) injection 5,000 Units  As needed (PRN)      09/05/19 1754     heparin (porcine) injection 5,000 Units  Every 12 hours      08/27/19 2217     IP VTE HIGH RISK PATIENT  Once      08/27/19 2217                Zahra Almeida MD  Department of Hospital Medicine   [facility]

## 2019-09-07 NOTE — PROGRESS NOTES
Date of Admission:8/27/2019    SUBJECTIVE: notes feeling better    Current Facility-Administered Medications   Medication    0.9%  NaCl infusion    0.9%  NaCl infusion    0.9%  NaCl infusion    0.9%  NaCl infusion    acetaminophen tablet 500 mg    atorvastatin tablet 10 mg    bumetanide injection 1 mg    citalopram tablet 10 mg    cloNIDine tablet 0.1 mg    clopidogrel tablet 75 mg    dextrose 50% injection 12.5 g    dextrose 50% injection 25 g    glucagon (human recombinant) injection 1 mg    glucose chewable tablet 16 g    glucose chewable tablet 24 g    heparin (porcine) injection 5,000 Units    heparin (porcine) injection 5,000 Units    hydrALAZINE tablet 100 mg    insulin aspart U-100 pen 0-5 Units    insulin aspart U-100 pen 2 Units    insulin detemir U-100 pen 5 Units    levothyroxine tablet 50 mcg    metOLazone tablet 10 mg    metoprolol tartrate (LOPRESSOR) tablet 50 mg    miconazole nitrate 2% ointment    mupirocin 2 % ointment    prochlorperazine injection Soln 5 mg    promethazine (PHENERGAN) 6.25 mg in dextrose 5 % 50 mL IVPB    ramelteon tablet 8 mg       Wt Readings from Last 3 Encounters:   09/06/19 128.4 kg (283 lb 1.1 oz)   08/15/19 123 kg (271 lb 2.7 oz)   08/07/19 123.4 kg (272 lb)     Temp Readings from Last 3 Encounters:   09/07/19 98.1 °F (36.7 °C) (Axillary)   07/25/19 98.1 °F (36.7 °C)   04/05/19 98.3 °F (36.8 °C) (Oral)     BP Readings from Last 3 Encounters:   09/07/19 (!) 134/59   08/15/19 (!) 181/88   08/07/19 122/80     Pulse Readings from Last 3 Encounters:   09/07/19 (!) 50   08/15/19 (!) 58   08/07/19 68       Intake/Output Summary (Last 24 hours) at 9/7/2019 1147  Last data filed at 9/7/2019 0831  Gross per 24 hour   Intake 1280 ml   Output --   Net 1280 ml       PE:  GEN:wd female in nad  HEENT:ncat,eomi,mm  CVS:s1s2 regular  PULM:ctab  ABD:+bs,soft,nd  EXT: 2+edema  NEURO:awake, alert  pc of chest  Recent Labs   Lab 09/07/19  0500   *       K 3.3*      CO2 27   BUN 70*   CREATININE 4.3*   CALCIUM 8.1*       Lab Results   Component Value Date    .9 (H) 05/22/2019    CALCIUM 8.1 (L) 09/07/2019    PHOS 4.4 09/07/2019       No results for input(s): WBC, RBC, HGB, HCT, PLT, MCV, MCH, MCHC in the last 24 hours.      A/P:  1.curt. On ckd 4.creatinine not better. COnt hd today. 2.acute  On diastolic hf.  Cont diuresis.  More uf again.  3.ryan. Cont bipap as needed.  4.anemia with ckd. No epo indicated for now.  5.dm2. Following sugars.  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 Phos ok. No binders for now.  7.obesity. Wt loss is needed.  8.dm2.Following sugars.  9.hypokalemia. 4k  Bath today.

## 2019-09-07 NOTE — PLAN OF CARE
Problem: Adult Inpatient Plan of Care  Goal: Plan of Care Review  Outcome: Ongoing (interventions implemented as appropriate)     09/07/19 0321   Plan of Care Review   Plan of Care Reviewed With patient   No falls, trauma or injury this shift. Blood glucose continues to be elevated and coverage per sliding scale. No complaints of pain. Patient to dialysis Saturday. c-pap at hour of sleep. Continue to monitor patient and continue with plan of care.

## 2019-09-08 LAB
ALBUMIN SERPL BCP-MCNC: 2.3 G/DL (ref 3.5–5.2)
ANION GAP SERPL CALC-SCNC: 9 MMOL/L (ref 8–16)
BUN SERPL-MCNC: 50 MG/DL (ref 8–23)
CALCIUM SERPL-MCNC: 8.1 MG/DL (ref 8.7–10.5)
CHLORIDE SERPL-SCNC: 105 MMOL/L (ref 95–110)
CO2 SERPL-SCNC: 25 MMOL/L (ref 23–29)
CREAT SERPL-MCNC: 3.6 MG/DL (ref 0.5–1.4)
EST. GFR  (AFRICAN AMERICAN): 14 ML/MIN/1.73 M^2
EST. GFR  (NON AFRICAN AMERICAN): 12 ML/MIN/1.73 M^2
GLUCOSE SERPL-MCNC: 126 MG/DL (ref 70–110)
PHOSPHATE SERPL-MCNC: 3.7 MG/DL (ref 2.7–4.5)
POCT GLUCOSE: 113 MG/DL (ref 70–110)
POCT GLUCOSE: 135 MG/DL (ref 70–110)
POCT GLUCOSE: 151 MG/DL (ref 70–110)
POCT GLUCOSE: 249 MG/DL (ref 70–110)
POTASSIUM SERPL-SCNC: 3.5 MMOL/L (ref 3.5–5.1)
SODIUM SERPL-SCNC: 139 MMOL/L (ref 136–145)

## 2019-09-08 PROCEDURE — 11000001 HC ACUTE MED/SURG PRIVATE ROOM: Mod: HCNC

## 2019-09-08 PROCEDURE — S0171 BUMETANIDE 0.5 MG: HCPCS | Mod: HCNC | Performed by: INTERNAL MEDICINE

## 2019-09-08 PROCEDURE — 94761 N-INVAS EAR/PLS OXIMETRY MLT: CPT | Mod: HCNC

## 2019-09-08 PROCEDURE — 27000221 HC OXYGEN, UP TO 24 HOURS: Mod: HCNC

## 2019-09-08 PROCEDURE — 25000003 PHARM REV CODE 250: Mod: HCNC | Performed by: HOSPITALIST

## 2019-09-08 PROCEDURE — 97116 GAIT TRAINING THERAPY: CPT | Mod: HCNC

## 2019-09-08 PROCEDURE — 94660 CPAP INITIATION&MGMT: CPT | Mod: HCNC

## 2019-09-08 PROCEDURE — 80069 RENAL FUNCTION PANEL: CPT | Mod: HCNC

## 2019-09-08 PROCEDURE — 63600175 PHARM REV CODE 636 W HCPCS: Mod: HCNC | Performed by: INTERNAL MEDICINE

## 2019-09-08 PROCEDURE — 99900035 HC TECH TIME PER 15 MIN (STAT): Mod: HCNC

## 2019-09-08 PROCEDURE — 97110 THERAPEUTIC EXERCISES: CPT | Mod: HCNC

## 2019-09-08 PROCEDURE — 25000003 PHARM REV CODE 250: Mod: HCNC | Performed by: INTERNAL MEDICINE

## 2019-09-08 PROCEDURE — 36415 COLL VENOUS BLD VENIPUNCTURE: CPT | Mod: HCNC

## 2019-09-08 RX ORDER — BUMETANIDE 1 MG/1
2 TABLET ORAL DAILY
Status: DISCONTINUED | OUTPATIENT
Start: 2019-09-08 | End: 2019-09-11 | Stop reason: HOSPADM

## 2019-09-08 RX ADMIN — METOPROLOL TARTRATE 50 MG: 50 TABLET ORAL at 08:09

## 2019-09-08 RX ADMIN — HYDRALAZINE HYDROCHLORIDE 100 MG: 25 TABLET ORAL at 08:09

## 2019-09-08 RX ADMIN — METOLAZONE 10 MG: 2.5 TABLET ORAL at 08:09

## 2019-09-08 RX ADMIN — HYDRALAZINE HYDROCHLORIDE 100 MG: 25 TABLET ORAL at 02:09

## 2019-09-08 RX ADMIN — ATORVASTATIN CALCIUM 10 MG: 10 TABLET, FILM COATED ORAL at 08:09

## 2019-09-08 RX ADMIN — HEPARIN SODIUM 5000 UNITS: 5000 INJECTION, SOLUTION INTRAVENOUS; SUBCUTANEOUS at 08:09

## 2019-09-08 RX ADMIN — INSULIN ASPART 1 UNITS: 100 INJECTION, SOLUTION INTRAVENOUS; SUBCUTANEOUS at 08:09

## 2019-09-08 RX ADMIN — INSULIN ASPART 2 UNITS: 100 INJECTION, SOLUTION INTRAVENOUS; SUBCUTANEOUS at 04:09

## 2019-09-08 RX ADMIN — BUMETANIDE 1 MG: 0.25 INJECTION INTRAMUSCULAR; INTRAVENOUS at 08:09

## 2019-09-08 RX ADMIN — CITALOPRAM HYDROBROMIDE 10 MG: 10 TABLET ORAL at 10:09

## 2019-09-08 RX ADMIN — MUPIROCIN: 20 OINTMENT TOPICAL at 08:09

## 2019-09-08 RX ADMIN — INSULIN ASPART 2 UNITS: 100 INJECTION, SOLUTION INTRAVENOUS; SUBCUTANEOUS at 12:09

## 2019-09-08 RX ADMIN — LEVOTHYROXINE SODIUM 50 MCG: 50 TABLET ORAL at 05:09

## 2019-09-08 RX ADMIN — INSULIN DETEMIR 5 UNITS: 100 INJECTION, SOLUTION SUBCUTANEOUS at 08:09

## 2019-09-08 RX ADMIN — MICONAZOLE NITRATE: 20 OINTMENT TOPICAL at 09:09

## 2019-09-08 RX ADMIN — CLOPIDOGREL BISULFATE 75 MG: 75 TABLET ORAL at 08:09

## 2019-09-08 RX ADMIN — INSULIN ASPART 2 UNITS: 100 INJECTION, SOLUTION INTRAVENOUS; SUBCUTANEOUS at 08:09

## 2019-09-08 NOTE — PROGRESS NOTES
Date of Admission:8/27/2019    SUBJECTIVE: notes doing better after hd yesterday  Current Facility-Administered Medications   Medication    0.9%  NaCl infusion    0.9%  NaCl infusion    0.9%  NaCl infusion    0.9%  NaCl infusion    acetaminophen tablet 500 mg    atorvastatin tablet 10 mg    bumetanide injection 1 mg    citalopram tablet 10 mg    cloNIDine tablet 0.1 mg    clopidogrel tablet 75 mg    dextrose 50% injection 12.5 g    dextrose 50% injection 25 g    glucagon (human recombinant) injection 1 mg    glucose chewable tablet 16 g    glucose chewable tablet 24 g    heparin (porcine) injection 5,000 Units    heparin (porcine) injection 5,000 Units    hydrALAZINE tablet 100 mg    insulin aspart U-100 pen 0-5 Units    insulin aspart U-100 pen 2 Units    insulin detemir U-100 pen 5 Units    levothyroxine tablet 50 mcg    metOLazone tablet 10 mg    metoprolol tartrate (LOPRESSOR) tablet 50 mg    miconazole nitrate 2% ointment    mupirocin 2 % ointment    prochlorperazine injection Soln 5 mg    promethazine (PHENERGAN) 6.25 mg in dextrose 5 % 50 mL IVPB    ramelteon tablet 8 mg       Wt Readings from Last 3 Encounters:   09/08/19 122.6 kg (270 lb 4.5 oz)   08/15/19 123 kg (271 lb 2.7 oz)   08/07/19 123.4 kg (272 lb)     Temp Readings from Last 3 Encounters:   09/08/19 97 °F (36.1 °C) (Axillary)   07/25/19 98.1 °F (36.7 °C)   04/05/19 98.3 °F (36.8 °C) (Oral)     BP Readings from Last 3 Encounters:   09/08/19 (!) 143/75   08/15/19 (!) 181/88   08/07/19 122/80     Pulse Readings from Last 3 Encounters:   09/08/19 (!) 49   08/15/19 (!) 58   08/07/19 68       Intake/Output Summary (Last 24 hours) at 9/8/2019 0920  Last data filed at 9/8/2019 0640  Gross per 24 hour   Intake 620 ml   Output 4500 ml   Net -3880 ml       PE:  GEN:wd female in nad  HEENT:ncat,eomi,mm  CVS:s1s2 regular  PULM:ctab  ABD:+bs,soft,nd  EXT:2+edema of leg, wrinkling  NEURO:awake, alert  pc of chest  Recent Labs   Lab  09/08/19  0512   *      K 3.5      CO2 25   BUN 50*   CREATININE 3.6*   CALCIUM 8.1*       Lab Results   Component Value Date    .9 (H) 05/22/2019    CALCIUM 8.1 (L) 09/08/2019    PHOS 3.7 09/08/2019       No results for input(s): WBC, RBC, HGB, HCT, PLT, MCV, MCH, MCHC in the last 24 hours.      A/P:  1.curt. On ckd 4. Suspect more esrd. Plan hd again in am. Dc once hd setup. Change bumex to oral. Pulled 4.5l yesterday.  3.ryan. Cont bipap as needed.  4.anemia with ckd. No epo indicated for now. Cbc for am.  5.dm2. Following sugars.  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 Phos ok. No binders for now.  7.obesity. Wt loss is needed.  8.dm2.Following sugars.  9.hypokalemia. 4k  Bath tomorrow.

## 2019-09-08 NOTE — PLAN OF CARE
Problem: Adult Inpatient Plan of Care  Goal: Plan of Care Review     09/1945   Plan of Care Review   Plan of Care Reviewed With patient   Pt remains free from falls and pressure injuries,able to make needs known,skyler meds well,sat upn in chair this shift,had dialysis 4L was removed,voided this shift,iv Bumex remains in progress,extensive assist with adl's, continue monitoring.

## 2019-09-08 NOTE — PT/OT/SLP PROGRESS
Physical Therapy Treatment    Patient Name:  Erica Leblanc   MRN:  [id number]    Recommendations:     Discharge Recommendations:  home health PT   Discharge Equipment Recommendations: hospital bed   Barriers to discharge: decreased  mobility    Assessment:     Erica Lebalnc is a 74 y.o. female admitted with a medical diagnosis of Acute on chronic diastolic heart failure.  She presents with the following impairments/functional limitations:  weakness, impaired endurance, impaired self care skills, gait instability, impaired balance, decreased lower extremity function, impaired coordination, impaired cardiopulmonary response to activity patient with improving mobility and making steady progress towards goals as indicated by increase in gait distance on this date.    Rehab Prognosis: Good; patient would benefit from acute skilled PT services to address these deficits and reach maximum level of function.    Recent Surgery: * No surgery found *      Plan:     During this hospitalization, patient to be seen daily to address the identified rehab impairments via gait training, therapeutic activities, therapeutic exercises and progress toward the following goals:    · Plan of Care Expires:  09/12/19    Subjective     Chief Complaint: None at this time   Patient/Family Comments/goals: To walk further   Pain/Comfort:  · Pain Rating 1: 0/10  · Pain Rating Post-Intervention 1: 0/10      Objective:     Communicated with Nursing  prior to session.  Patient found seated at edge of bed with bed alarm, oxygen, telemetry upon PT entry to room.     General Precautions: Standard, respiratory, fall   Orthopedic Precautions:N/A   Braces: N/A     Functional Mobility:  · Transfers:     · Sit to Stand:  contact guard assistance with rolling walker  · Bed to Chair: contact guard assistance with  rolling walker  using  Step Transfer  · Gait: 50 feet using rolling walker with CGA and cues to increase step length and base of  support  · Balance: fair+      AM-PAC 6 CLICK MOBILITY  Turning over in bed (including adjusting bedclothes, sheets and blankets)?: 3  Sitting down on and standing up from a chair with arms (e.g., wheelchair, bedside commode, etc.): 2  Moving from lying on back to sitting on the side of the bed?: 3  Moving to and from a bed to a chair (including a wheelchair)?: 3  Need to walk in hospital room?: 3  Climbing 3-5 steps with a railing?: 2  Basic Mobility Total Score: 16       Therapeutic Activities and Exercises:   Patient was instructed in and performed seated bilateral lower extremity therapeutic exercises including heel/toe raises, long arc quads, hip flexion (seated marching) ,hip abduction/adduction 2 x 10 reps each with several rest breaks throughout due to fatigue     Patient left up in chair with all lines intact, call button in reach and family present..    GOALS:   Multidisciplinary Problems     Physical Therapy Goals        Problem: Physical Therapy Goal    Goal Priority Disciplines Outcome Goal Variances Interventions   Physical Therapy Goal     PT, PT/OT Ongoing (interventions implemented as appropriate)     Description:  Goals to be met by: 2019     Patient will increase functional independence with mobility by performin. Supine to sit with Stand-by Assistance  2. Sit to stand transfer with Stand-by Assistance  3. Gait  x 50 feet with Stand-by Assistance using Rolling Walker.   4. Lower extremity exercise program x10 reps per handout, with independence                      Time Tracking:     PT Received On: 19  PT Start Time: 910     PT Stop Time: 934  PT Total Time (min): 24 min     Billable Minutes: Gait Training 10 and Therapeutic Exercise 14    Treatment Type: Treatment  PT/PTA: PTA     PTA Visit Number: 4     Marilee Osuna PTA  2019

## 2019-09-08 NOTE — PLAN OF CARE
Problem: Adult Inpatient Plan of Care  Goal: Plan of Care Review  Patient SOB while talking. Educated on positioning and taking breaks while speaking. Patient accepts and tolerates nightly C pap. Patient denies having headache. Patient fever free. BP WDL. Patient remain AOx4. No s/s of distress. No changes to LE X2 edema. No changes to skin. No falls or ijury.

## 2019-09-08 NOTE — PLAN OF CARE
Problem: Physical Therapy Goal  Goal: Physical Therapy Goal  Goals to be met by: 2019     Patient will increase functional independence with mobility by performin. Supine to sit with Stand-by Assistance  2. Sit to stand transfer with Stand-by Assistance  3. Gait  x 50 feet with Stand-by Assistance using Rolling Walker.   4. Lower extremity exercise program x10 reps per handout, with independence     Outcome: Ongoing (interventions implemented as appropriate)  Patient is making good progress towards goals. Increased gait distance on this date. 50 feet with CGA using rolling walker.

## 2019-09-08 NOTE — PLAN OF CARE
Problem: Adult Inpatient Plan of Care  Goal: Plan of Care Review  Outcome: Ongoing (interventions implemented as appropriate)  Pt remained free from falls and injury during shift, Medication administered per MD orders, pt ambulated with PT NADN, sat up in bedside chair since breakfast, PIV saline locked and flushed, no N/V/D no complaints of SOB will continue to monitor.

## 2019-09-08 NOTE — PROGRESS NOTES
Patient assessment complete. No s/s of distress. Breathing smoothly with NC in place. Family at bedside. Call light in reach. Alarms active and audible.

## 2019-09-09 PROBLEM — Z99.2 ESRD ON HEMODIALYSIS: Status: ACTIVE | Noted: 2019-08-27

## 2019-09-09 PROBLEM — N18.6 ESRD ON HEMODIALYSIS: Status: ACTIVE | Noted: 2019-08-27

## 2019-09-09 LAB
ALBUMIN SERPL BCP-MCNC: 2.4 G/DL (ref 3.5–5.2)
ALBUMIN SERPL BCP-MCNC: 2.4 G/DL (ref 3.5–5.2)
ALP SERPL-CCNC: 128 U/L (ref 55–135)
ALT SERPL W/O P-5'-P-CCNC: 24 U/L (ref 10–44)
ANION GAP SERPL CALC-SCNC: 10 MMOL/L (ref 8–16)
AST SERPL-CCNC: 24 U/L (ref 10–40)
BASOPHILS # BLD AUTO: 0.02 K/UL (ref 0–0.2)
BASOPHILS NFR BLD: 0.5 % (ref 0–1.9)
BILIRUB DIRECT SERPL-MCNC: 0.4 MG/DL (ref 0.1–0.3)
BILIRUB SERPL-MCNC: 0.6 MG/DL (ref 0.1–1)
BUN SERPL-MCNC: 59 MG/DL (ref 8–23)
CALCIUM SERPL-MCNC: 8.2 MG/DL (ref 8.7–10.5)
CHLORIDE SERPL-SCNC: 104 MMOL/L (ref 95–110)
CO2 SERPL-SCNC: 24 MMOL/L (ref 23–29)
CREAT SERPL-MCNC: 4.1 MG/DL (ref 0.5–1.4)
DIFFERENTIAL METHOD: ABNORMAL
EOSINOPHIL # BLD AUTO: 0.1 K/UL (ref 0–0.5)
EOSINOPHIL NFR BLD: 2 % (ref 0–8)
ERYTHROCYTE [DISTWIDTH] IN BLOOD BY AUTOMATED COUNT: 18.2 % (ref 11.5–14.5)
EST. GFR  (AFRICAN AMERICAN): 12 ML/MIN/1.73 M^2
EST. GFR  (NON AFRICAN AMERICAN): 10 ML/MIN/1.73 M^2
GLUCOSE SERPL-MCNC: 130 MG/DL (ref 70–110)
HCT VFR BLD AUTO: 33.4 % (ref 37–48.5)
HGB BLD-MCNC: 10.3 G/DL (ref 12–16)
LYMPHOCYTES # BLD AUTO: 0.7 K/UL (ref 1–4.8)
LYMPHOCYTES NFR BLD: 16.6 % (ref 18–48)
MCH RBC QN AUTO: 23.7 PG (ref 27–31)
MCHC RBC AUTO-ENTMCNC: 30.8 G/DL (ref 32–36)
MCV RBC AUTO: 77 FL (ref 82–98)
MONOCYTES # BLD AUTO: 0.9 K/UL (ref 0.3–1)
MONOCYTES NFR BLD: 22.2 % (ref 4–15)
NEUTROPHILS # BLD AUTO: 2.4 K/UL (ref 1.8–7.7)
NEUTROPHILS NFR BLD: 58.7 % (ref 38–73)
PHOSPHATE SERPL-MCNC: 4.3 MG/DL (ref 2.7–4.5)
PLATELET # BLD AUTO: 212 K/UL (ref 150–350)
PMV BLD AUTO: 10.8 FL (ref 9.2–12.9)
POCT GLUCOSE: 115 MG/DL (ref 70–110)
POCT GLUCOSE: 117 MG/DL (ref 70–110)
POCT GLUCOSE: 177 MG/DL (ref 70–110)
POCT GLUCOSE: 220 MG/DL (ref 70–110)
POTASSIUM SERPL-SCNC: 3.4 MMOL/L (ref 3.5–5.1)
PROT SERPL-MCNC: 7.2 G/DL (ref 6–8.4)
RBC # BLD AUTO: 4.35 M/UL (ref 4–5.4)
SODIUM SERPL-SCNC: 138 MMOL/L (ref 136–145)
WBC # BLD AUTO: 4.1 K/UL (ref 3.9–12.7)

## 2019-09-09 PROCEDURE — 63600175 PHARM REV CODE 636 W HCPCS: Mod: HCNC | Performed by: INTERNAL MEDICINE

## 2019-09-09 PROCEDURE — 99024 PR POST-OP FOLLOW-UP VISIT: ICD-10-PCS | Mod: HCNC,,, | Performed by: SURGERY

## 2019-09-09 PROCEDURE — 99024 POSTOP FOLLOW-UP VISIT: CPT | Mod: HCNC,,, | Performed by: SURGERY

## 2019-09-09 PROCEDURE — 25000003 PHARM REV CODE 250: Mod: HCNC | Performed by: INTERNAL MEDICINE

## 2019-09-09 PROCEDURE — 80100014 HC HEMODIALYSIS 1:1: Mod: HCNC

## 2019-09-09 PROCEDURE — 94761 N-INVAS EAR/PLS OXIMETRY MLT: CPT | Mod: HCNC

## 2019-09-09 PROCEDURE — 99900035 HC TECH TIME PER 15 MIN (STAT): Mod: HCNC

## 2019-09-09 PROCEDURE — 85025 COMPLETE CBC W/AUTO DIFF WBC: CPT | Mod: HCNC

## 2019-09-09 PROCEDURE — 80069 RENAL FUNCTION PANEL: CPT | Mod: HCNC

## 2019-09-09 PROCEDURE — 11000001 HC ACUTE MED/SURG PRIVATE ROOM: Mod: HCNC

## 2019-09-09 PROCEDURE — 36415 COLL VENOUS BLD VENIPUNCTURE: CPT | Mod: HCNC

## 2019-09-09 PROCEDURE — 25000003 PHARM REV CODE 250: Mod: HCNC | Performed by: HOSPITALIST

## 2019-09-09 PROCEDURE — 80076 HEPATIC FUNCTION PANEL: CPT | Mod: HCNC

## 2019-09-09 PROCEDURE — 94660 CPAP INITIATION&MGMT: CPT | Mod: HCNC

## 2019-09-09 RX ADMIN — MUPIROCIN: 20 OINTMENT TOPICAL at 08:09

## 2019-09-09 RX ADMIN — HYDRALAZINE HYDROCHLORIDE 100 MG: 25 TABLET ORAL at 08:09

## 2019-09-09 RX ADMIN — HEPARIN SODIUM 5000 UNITS: 5000 INJECTION, SOLUTION INTRAVENOUS; SUBCUTANEOUS at 08:09

## 2019-09-09 RX ADMIN — MICONAZOLE NITRATE: 20 OINTMENT TOPICAL at 08:09

## 2019-09-09 RX ADMIN — INSULIN DETEMIR 5 UNITS: 100 INJECTION, SOLUTION SUBCUTANEOUS at 08:09

## 2019-09-09 RX ADMIN — LEVOTHYROXINE SODIUM 50 MCG: 50 TABLET ORAL at 06:09

## 2019-09-09 RX ADMIN — CITALOPRAM HYDROBROMIDE 10 MG: 10 TABLET ORAL at 08:09

## 2019-09-09 RX ADMIN — HEPARIN SODIUM 5000 UNITS: 5000 INJECTION, SOLUTION INTRAVENOUS; SUBCUTANEOUS at 02:09

## 2019-09-09 RX ADMIN — ATORVASTATIN CALCIUM 10 MG: 10 TABLET, FILM COATED ORAL at 08:09

## 2019-09-09 RX ADMIN — INSULIN ASPART 1 UNITS: 100 INJECTION, SOLUTION INTRAVENOUS; SUBCUTANEOUS at 08:09

## 2019-09-09 RX ADMIN — INSULIN ASPART 2 UNITS: 100 INJECTION, SOLUTION INTRAVENOUS; SUBCUTANEOUS at 08:09

## 2019-09-09 RX ADMIN — CLOPIDOGREL BISULFATE 75 MG: 75 TABLET ORAL at 08:09

## 2019-09-09 RX ADMIN — BUMETANIDE 2 MG: 1 TABLET ORAL at 08:09

## 2019-09-09 RX ADMIN — RAMELTEON 8 MG: 8 TABLET ORAL at 09:09

## 2019-09-09 RX ADMIN — METOPROLOL TARTRATE 50 MG: 50 TABLET ORAL at 08:09

## 2019-09-09 NOTE — PROGRESS NOTES
Erica Lebalnc is a 74 y.o. female patient.    Follow for SATHISH on CKD4, dialysis    Patient seen while on dialysis  No new c/o, comfortable    Scheduled Meds:   sodium chloride 0.9%   Intravenous Once    sodium chloride 0.9%   Intravenous Once    atorvastatin  10 mg Oral Daily    bumetanide  2 mg Oral Daily    citalopram  10 mg Oral Daily    clopidogrel  75 mg Oral Daily    heparin (porcine)  5,000 Units Subcutaneous Q12H    hydrALAZINE  100 mg Oral TID    insulin aspart U-100  2 Units Subcutaneous TIDWM    insulin detemir U-100  5 Units Subcutaneous Daily    levothyroxine  50 mcg Oral Before breakfast    metoprolol tartrate  50 mg Oral BID    miconazole nitrate 2%   Topical (Top) BID       Review of patient's allergies indicates:   Allergen Reactions    Ace inhibitors Other (See Comments)     Other reaction(s): cough         Vital Signs Range (Last 24H):  Temp:  [97.3 °F (36.3 °C)-99.3 °F (37.4 °C)]   Pulse:  [49-61]   Resp:  [17-22]   BP: (117-141)/(56-77)   SpO2:  [92 %-98 %]     I & O (Last 24H):    Intake/Output Summary (Last 24 hours) at 9/9/2019 1039  Last data filed at 9/9/2019 0000  Gross per 24 hour   Intake 180 ml   Output --   Net 180 ml           Physical Exam:  General appearance: well developed, well nourished, no distress  Lungs:  clear to auscultation bilaterally and normal respiratory effort  Heart: regular rate and rhythm  Abdomen: soft, non-tender non-distented; bowel sounds normal; no masses,  no organomegaly  Extremities: edema (+)    Laboratory:  CBC:   Recent Labs   Lab 09/09/19 0419   WBC 4.10   RBC 4.35   HGB 10.3*   HCT 33.4*      MCV 77*   MCH 23.7*   MCHC 30.8*     CMP:   Recent Labs   Lab 09/03/19 0425 09/09/19 0419   *  140*   < > 130*   CALCIUM 8.3*  8.3*   < > 8.2*   ALBUMIN 2.4*   < > 2.4*   PROT 7.0  --   --      139   < > 138   K 3.8  3.8   < > 3.4*   CO2 30*  30*   < > 24     100   < > 104   BUN 86*  86*   < > 59*    CREATININE 5.1*  5.1*   < > 4.1*   ALKPHOS 118  --   --    ALT 21  --   --    AST 21  --   --    BILITOT 0.6  --   --     < > = values in this interval not displayed.       Imp/Plan    SATHISH on CKD stg 4 - more like CKD stg 5/ESRD - on dialysis, tolerated well  Acute on chronic diastolic CHF - UF as tolerated, fluid status improving  HUMBERTO  DM type 2  Anemia of CKD    Continue HD q MWF  Outpatient dialysis arrangement  We'll continue follow for dialysis      Charlie Bolanos  9/9/2019

## 2019-09-09 NOTE — NURSING
Patient back from HD, central line HD access to right chest has moderate amount of bleeding from site, notified Srikanth, came in room, says to change transparent dressing to pressure dressing with paper tape and notify MD if continues to bleed.  Changed central line dressing per protocol, applied gauze pressure dressing, paper tape and transparent tape on top. Pt tolerated well. Continue to monitor.

## 2019-09-09 NOTE — PROGRESS NOTES
[facility] Hospital Medicine  Progress Note    Patient Name: Erica Leblanc  MRN: [id number]  Patient Class: IP- Inpatient   Admission Date: 8/27/2019  Length of Stay: 13 days  Attending Physician: Zahra Calero MD  Primary Care Provider: Sendy Elaine MD        Subjective:     Principal Problem:Acute on chronic diastolic heart failure        HPI:  Ms. Erica Leblanc is a 74 y.o. female with essential hypertension, type 2 diabetes mellitus (HbA1c 7.3% Jul 2019), hyperlipidemia (LDL 52.8 Mar 2019), chronic diastolic heart failure (LVEF 70% Jun 2017), CKD stage 5, hypothyroidism (TSH 3.298 Mar 2019), anemia chronic disease, morbid obesity (BMI 41.6), HUMBERTO on CPAP, and depression who presents to [facility] ED with complaints of dyspnea for the past four days.  She had reported to the ED physician that her CPAP machine has been malfunctioning, a similar complaint to her presentation two months ago.  She has not been able to get it replaced due to insurance constraints.  The dyspnea is worse on exertion.  Further history is otherwise limited at this time due to hypersomnolence.    Overview/Hospital Course:  75 y/o female admitted with acute on chronic diastolic heart failure.  Started on IV diuresis.  Patient with hx of CKD and worsening Creat.  Nephrology consulted. Patient clinically improved.  Social workers consulted to help with re-arranging broken CPAP for patient. PT/OT rec: H/H.  02 requirements increased and so lasix increased on 9/2/19. CPAP arranged for home. Patient does not wear 02 at home. Patient's renal function continued to decline despite alternative diuretic regimen. Patient consented for dialysis and tunneled cath placement. Tunneled cath placed on 9/5/2019 and dialysis started same day. Patient tolerated well. She was noted to be more alert and active with therapy. Weaned off supplemental O2. Patient will go home with home health for therapy once outpatient HD arranged.      Interval History: feels well.      Review of Systems   Respiratory: Negative for shortness of breath.    Cardiovascular: Positive for leg swelling.   Gastrointestinal: Negative.      Objective:     Vital Signs (Most Recent):  Temp: 96.9 °F (36.1 °C) (09/05/19 0751)  Pulse: 60 (09/05/19 1145)  Resp: 17 (09/05/19 1145)  BP: (!) 170/94 (09/05/19 1145)  SpO2: 95% (09/05/19 1145) Vital Signs (24h Range):  Temp:  [97.3 °F (36.3 °C)-99.3 °F (37.4 °C)] 97.6 °F (36.4 °C)  Pulse:  [49-61] 59  Resp:  [18-22] 20  SpO2:  [92 %-98 %] 94 %  BP: (124-165)/() 124/65     Weight: 122.3 kg (269 lb 10 oz)  Body mass index is 44.87 kg/m².    Intake/Output Summary (Last 24 hours) at 9/9/2019 1807  Last data filed at 9/9/2019 1520  Gross per 24 hour   Intake 740 ml   Output 4501 ml   Net -3761 ml      Physical Exam   Constitutional: She is oriented to person, place, and time. She appears well-developed. No distress.   Cardiovascular:   NSR with HR 60s   Pulmonary/Chest: She has no wheezes. She has no rales.   Speaking full sentences      Abdominal: Soft. Bowel sounds are normal.   Musculoskeletal: She exhibits edema.   Neurological: She is alert and oriented to person, place, and time.   Skin: She is not diaphoretic.   Nursing note and vitals reviewed.      Significant Labs: All pertinent labs within the past 24 hours have been reviewed.    Significant Imaging: I have reviewed all pertinent imaging results/findings within the past 24 hours.  I have reviewed and interpreted all pertinent imaging results/findings within the past 24 hours.      Assessment/Plan:      * Acute on chronic diastolic heart failure  This is likely secondary to progressive renal failure in addition to pulmonary hypertension, tricuspid regurg and diastolic heart failure.   Initiated on dialysis 9/5. Doing very well and feeling much better  Is more actively participating with therapy  SW to work on outpatient HD placement  Cardiology also on  board        Chronic respiratory failure  2/2 HUMBERTO, diastolic heart failure and pulmonary hypertension  Supplemental O2, dialysis and CPAP nightly        Debility  Resume H/H PT/OT on discharge.       Depression  Stable; will continue her home regimen of citalopram.    Anemia of chronic disease  The patient's H/H is stable and consistent with previous laboratory measurements, and the patient exhibits no signs or symptoms of acute bleeding; there is no indication for transfusion.  Will continue to monitor.    ESRD on hemodialysis  Worsening renal function over past few months.  Initiating dialysis  Nephrology on board    Essential hypertension  Continue current antihypertensive regimen    Chronic diastolic heart failure  As addressed above.    Type 2 diabetes mellitus, controlled, with renal complications  Well controlled on a home regimen of basal insulin therapy; will provide basal-prandial insulin therapy along insulin sliding scale.  A1c ordered     Hypothyroidism (acquired)  Poorly controlled; will continue her home regimen of levothyroxine and defer dosage adjustments to her PCP.    HUMBERTO on CPAP  As addressed above.    CPAP arranged for home.     Hyperlipidemia LDL goal <100  Well controlled; will continue her home regimen of atorvastatin.    Morbid obesity with body mass index (BMI) of 40.0 to 44.9 in adult  The patient has been counseled on the negative impact that obesity imparts on her health and was encouraged to make lifestyle changes in order to lose weight and decrease her modifiable risk factors.      VTE Risk Mitigation (From admission, onward)        Ordered     heparin (porcine) injection 5,000 Units  As needed (PRN)      09/05/19 1754     heparin (porcine) injection 5,000 Units  Every 12 hours      08/27/19 2217     IP VTE HIGH RISK PATIENT  Once      08/27/19 2217          Dispo: home with  once outpatient HD arranged      Zahra Almeida MD  Department of Hospital Medicine   [facility]

## 2019-09-09 NOTE — SUBJECTIVE & OBJECTIVE
Medications:  Continuous Infusions:  Scheduled Meds:   sodium chloride 0.9%   Intravenous Once    sodium chloride 0.9%   Intravenous Once    atorvastatin  10 mg Oral Daily    bumetanide  2 mg Oral Daily    citalopram  10 mg Oral Daily    clopidogrel  75 mg Oral Daily    heparin (porcine)  5,000 Units Subcutaneous Q12H    hydrALAZINE  100 mg Oral TID    insulin aspart U-100  2 Units Subcutaneous TIDWM    insulin detemir U-100  5 Units Subcutaneous Daily    levothyroxine  50 mcg Oral Before breakfast    metoprolol tartrate  50 mg Oral BID    miconazole nitrate 2%   Topical (Top) BID     PRN Meds:sodium chloride 0.9%, sodium chloride 0.9%, acetaminophen, cloNIDine, dextrose 50%, dextrose 50%, glucagon (human recombinant), glucose, glucose, heparin (porcine), insulin aspart U-100, prochlorperazine, promethazine (PHENERGAN) IVPB, ramelteon     Objective:     Vital Signs (Most Recent):  Temp: 97.3 °F (36.3 °C) (09/09/19 0805)  Pulse: (!) 49 (09/09/19 0805)  Resp: (!) 22 (09/09/19 0805)  BP: 137/77 (09/09/19 0805)  SpO2: (!) 92 % (09/09/19 0805) Vital Signs (24h Range):  Temp:  [97.3 °F (36.3 °C)-99.3 °F (37.4 °C)] 97.3 °F (36.3 °C)  Pulse:  [49-61] 49  Resp:  [17-22] 22  SpO2:  [92 %-98 %] 92 %  BP: (117-141)/(56-77) 137/77         Physical Exam   Constitutional: She is oriented to person, place, and time. She appears well-developed and well-nourished. No distress.   HENT:   Head: Normocephalic and atraumatic.   Eyes: Conjunctivae are normal.   Neck: Neck supple.       Cardiovascular: Normal rate.   Pulmonary/Chest: Effort normal. No respiratory distress.   Abdominal: Soft. She exhibits no distension and no mass. There is no tenderness. There is no rebound and no guarding. No hernia.   Musculoskeletal: Normal range of motion. She exhibits no edema, tenderness or deformity.   Neurological: She is alert and oriented to person, place, and time. No sensory deficit.   Skin: Skin is warm and dry. Capillary refill  takes 2 to 3 seconds. No rash noted. She is not diaphoretic. No erythema. No pallor.   Psychiatric: She has a normal mood and affect.   Vitals reviewed.      Significant Labs:  All pertinent labs from the last 24 hours have been reviewed.    Significant Diagnostics:  I have reviewed all pertinent imaging results/findings within the past 24 hours.

## 2019-09-09 NOTE — PT/OT/SLP PROGRESS
Occupational Therapy      Patient Name:  Erica Leblanc     MRN:  [id number]     335p Pt eating late lunch and not able to participate at this time. Asking about discharge plans with nurse notified.    115p Patient not seen today secondary to Dialysis. Will follow-up when pt is able to participate.    Denae Sloan OT  9/9/2019

## 2019-09-09 NOTE — PLAN OF CARE
[place] call [facility] Admissions [phone] Luz Maria the [facility] rep was busy on another call.  [place] spoke [facility] Admissions Manager, Katia, says that there is nothing else needed from [facility], New faxed everything that was needed on 9/6/2019.  At this time they are waiting on approval from the medical director at [facility] on [facility] in [place], [place] for [facility].  Katia was informed that approval should occur soon. [place] will continue to follow up..Rossy Gonzales RN, BSN, STN [facility]  9/9/2019  '

## 2019-09-09 NOTE — PLAN OF CARE
Problem: Adult Inpatient Plan of Care  Goal: Plan of Care Review  Patient has has no s/s of distress during shift. patient denies headache. Patient accepts Cpap and tolerates well. No coughing during shift.  Continues to have SOB with activity. O2 remains above 90% on RA. Fall and injury free. No changes to skin.

## 2019-09-09 NOTE — PT/OT/SLP PROGRESS
Physical Therapy      Patient Name:  Erica Leblanc   MRN:  [id number]    Patient not seen today secondary to Dialysis. Will follow-up as time allows.    Neeru Sun, PT

## 2019-09-09 NOTE — PLAN OF CARE
09/09/19 1517   Discharge Reassessment   Assessment Type Discharge Planning Reassessment   Provided patient/caregiver education on the expected discharge date and the discharge plan No   Do you have any problems affording any of your prescribed medications? No   Discharge Plan A Home with family;Home Health;Other  (outpt HD)   Discharge Plan B Home with family;Home Health   DME Needed Upon Discharge  CPAP;hospital bed   Patient choice form signed by patient/caregiver Yes   Anticipated Discharge Disposition Home-Health   Can the patient answer the patient profile reliably? Yes, cognitively intact   How does the patient rate their overall health at the present time? Fair   Describe the patient's ability to walk at the present time. Walks with the help of equipment   How often would a person be available to care for the patient? Whenever needed   Number of comorbid conditions (as recorded on the chart) Five or more   During the past month, has the patient often been bothered by feeling down, depressed or hopeless? No   During the past month, has the patient often been bothered by little interest or pleasure in doing things? No   Post-Acute Status   Post-Acute Authorization Home Health/Hospice;HME   HME Status Awaiting Internal medical Clearance   Home Health/Hospice Status Awaiting Internal Medical Clearance   Discharge Delays None known at this time

## 2019-09-09 NOTE — PLAN OF CARE
Problem: Adult Inpatient Plan of Care  Goal: Plan of Care Review  Outcome: Ongoing (interventions implemented as appropriate)     09/09/19 1722   Plan of Care Review   Plan of Care Reviewed With patient   Patient is awake alert and oriented. Denies pain N/V, SOB, tolerating po intake. Blood glucose and tele monitoring as ordered. Room across from station, bed alarm on, free of falls. Instructed to call for assistance if needed. Continue with plan of care as ordered. No distress noted

## 2019-09-09 NOTE — PROGRESS NOTES
[facility]  Vascular Surgery  Progress Note    Patient Name: Erica Leblanc  MRN: [id number]  Admission Date: 8/27/2019  Primary Care Provider: Sendy Elaine MD    Subjective:     Interval History: No complaints today.  Eating without issues this morning.    Post-Op Info:  * No surgery found *           Medications:  Continuous Infusions:  Scheduled Meds:   sodium chloride 0.9%   Intravenous Once    sodium chloride 0.9%   Intravenous Once    atorvastatin  10 mg Oral Daily    bumetanide  2 mg Oral Daily    citalopram  10 mg Oral Daily    clopidogrel  75 mg Oral Daily    heparin (porcine)  5,000 Units Subcutaneous Q12H    hydrALAZINE  100 mg Oral TID    insulin aspart U-100  2 Units Subcutaneous TIDWM    insulin detemir U-100  5 Units Subcutaneous Daily    levothyroxine  50 mcg Oral Before breakfast    metoprolol tartrate  50 mg Oral BID    miconazole nitrate 2%   Topical (Top) BID     PRN Meds:sodium chloride 0.9%, sodium chloride 0.9%, acetaminophen, cloNIDine, dextrose 50%, dextrose 50%, glucagon (human recombinant), glucose, glucose, heparin (porcine), insulin aspart U-100, prochlorperazine, promethazine (PHENERGAN) IVPB, ramelteon     Objective:     Vital Signs (Most Recent):  Temp: 97.3 °F (36.3 °C) (09/09/19 0805)  Pulse: (!) 49 (09/09/19 0805)  Resp: (!) 22 (09/09/19 0805)  BP: 137/77 (09/09/19 0805)  SpO2: (!) 92 % (09/09/19 0805) Vital Signs (24h Range):  Temp:  [97.3 °F (36.3 °C)-99.3 °F (37.4 °C)] 97.3 °F (36.3 °C)  Pulse:  [49-61] 49  Resp:  [17-22] 22  SpO2:  [92 %-98 %] 92 %  BP: (117-141)/(56-77) 137/77         Physical Exam   Constitutional: She is oriented to person, place, and time. She appears well-developed and well-nourished. No distress.   HENT:   Head: Normocephalic and atraumatic.   Eyes: Conjunctivae are normal.   Neck: Neck supple.       Cardiovascular: Normal rate.   Pulmonary/Chest: Effort normal. No respiratory distress.   Abdominal: Soft. She exhibits  no distension and no mass. There is no tenderness. There is no rebound and no guarding. No hernia.   Musculoskeletal: Normal range of motion. She exhibits no edema, tenderness or deformity.   Neurological: She is alert and oriented to person, place, and time. No sensory deficit.   Skin: Skin is warm and dry. Capillary refill takes 2 to 3 seconds. No rash noted. She is not diaphoretic. No erythema. No pallor.   Psychiatric: She has a normal mood and affect.   Vitals reviewed.      Significant Labs:  All pertinent labs from the last 24 hours have been reviewed.    Significant Diagnostics:  I have reviewed all pertinent imaging results/findings within the past 24 hours.    Assessment/Plan:     ESRD on hemodialysis  -s/p tunneled HD catheter 9/5/19 with need for suture ligation of bleedin vein branch - recovered well, no PTX on CXR and HD without issues via catheter  -I will f/u with pt in clinic to create long term dialysis access - AVF vs AVG and obtain vein mapping at that time        Keron Perez MD  Vascular Surgery  [facility]

## 2019-09-09 NOTE — NURSING
Notified tele/Amaris patient is going to HD, gave report to Marianne that pt received am meds except metoprolol; HD just ordered. Verbalized understanding. Pt going to HD via bed with transporter. No distress noted

## 2019-09-10 LAB
POCT GLUCOSE: 132 MG/DL (ref 70–110)
POCT GLUCOSE: 153 MG/DL (ref 70–110)
POCT GLUCOSE: 189 MG/DL (ref 70–110)
POCT GLUCOSE: 211 MG/DL (ref 70–110)

## 2019-09-10 PROCEDURE — 94660 CPAP INITIATION&MGMT: CPT | Mod: HCNC

## 2019-09-10 PROCEDURE — 97116 GAIT TRAINING THERAPY: CPT | Mod: HCNC

## 2019-09-10 PROCEDURE — 25000003 PHARM REV CODE 250: Mod: HCNC | Performed by: HOSPITALIST

## 2019-09-10 PROCEDURE — 97530 THERAPEUTIC ACTIVITIES: CPT | Mod: HCNC

## 2019-09-10 PROCEDURE — 25000003 PHARM REV CODE 250: Mod: HCNC | Performed by: INTERNAL MEDICINE

## 2019-09-10 PROCEDURE — 94761 N-INVAS EAR/PLS OXIMETRY MLT: CPT | Mod: HCNC

## 2019-09-10 PROCEDURE — 97535 SELF CARE MNGMENT TRAINING: CPT | Mod: HCNC

## 2019-09-10 PROCEDURE — 11000001 HC ACUTE MED/SURG PRIVATE ROOM: Mod: HCNC

## 2019-09-10 PROCEDURE — 99900035 HC TECH TIME PER 15 MIN (STAT): Mod: HCNC

## 2019-09-10 RX ADMIN — MICONAZOLE NITRATE: 20 OINTMENT TOPICAL at 08:09

## 2019-09-10 RX ADMIN — INSULIN ASPART 2 UNITS: 100 INJECTION, SOLUTION INTRAVENOUS; SUBCUTANEOUS at 05:09

## 2019-09-10 RX ADMIN — CLOPIDOGREL BISULFATE 75 MG: 75 TABLET ORAL at 09:09

## 2019-09-10 RX ADMIN — HYDRALAZINE HYDROCHLORIDE 100 MG: 25 TABLET ORAL at 08:09

## 2019-09-10 RX ADMIN — METOPROLOL TARTRATE 50 MG: 50 TABLET ORAL at 08:09

## 2019-09-10 RX ADMIN — LEVOTHYROXINE SODIUM 50 MCG: 50 TABLET ORAL at 06:09

## 2019-09-10 RX ADMIN — HYDRALAZINE HYDROCHLORIDE 100 MG: 25 TABLET ORAL at 09:09

## 2019-09-10 RX ADMIN — CITALOPRAM HYDROBROMIDE 10 MG: 10 TABLET ORAL at 09:09

## 2019-09-10 RX ADMIN — MICONAZOLE NITRATE: 20 OINTMENT TOPICAL at 09:09

## 2019-09-10 RX ADMIN — INSULIN ASPART 2 UNITS: 100 INJECTION, SOLUTION INTRAVENOUS; SUBCUTANEOUS at 09:09

## 2019-09-10 RX ADMIN — INSULIN DETEMIR 5 UNITS: 100 INJECTION, SOLUTION SUBCUTANEOUS at 09:09

## 2019-09-10 RX ADMIN — INSULIN ASPART 2 UNITS: 100 INJECTION, SOLUTION INTRAVENOUS; SUBCUTANEOUS at 12:09

## 2019-09-10 RX ADMIN — ATORVASTATIN CALCIUM 10 MG: 10 TABLET, FILM COATED ORAL at 09:09

## 2019-09-10 RX ADMIN — BUMETANIDE 2 MG: 1 TABLET ORAL at 09:09

## 2019-09-10 RX ADMIN — HYDRALAZINE HYDROCHLORIDE 100 MG: 25 TABLET ORAL at 05:09

## 2019-09-10 NOTE — PROGRESS NOTES
[facility] Hospital Medicine  Progress Note    Patient Name: Erica Leblanc  MRN: [id number]  Patient Class: IP- Inpatient   Admission Date: 8/27/2019  Length of Stay: 14 days  Attending Physician: Imani Chan MD  Primary Care Provider: Sendy Elaine MD        Subjective:     Principal Problem:Acute on chronic diastolic heart failure        HPI:  Ms. Erica Leblanc is a 74 y.o. female with essential hypertension, type 2 diabetes mellitus (HbA1c 7.3% Jul 2019), hyperlipidemia (LDL 52.8 Mar 2019), chronic diastolic heart failure (LVEF 70% Jun 2017), CKD stage 5, hypothyroidism (TSH 3.298 Mar 2019), anemia chronic disease, morbid obesity (BMI 41.6), HUMBERTO on CPAP, and depression who presents to [facility] ED with complaints of dyspnea for the past four days.  She had reported to the ED physician that her CPAP machine has been malfunctioning, a similar complaint to her presentation two months ago.  She has not been able to get it replaced due to insurance constraints.  The dyspnea is worse on exertion.  Further history is otherwise limited at this time due to hypersomnolence.    Overview/Hospital Course:  73 y/o female admitted with acute on chronic diastolic heart failure.  Started on IV diuresis.  Patient with hx of CKD and worsening Creat.  Nephrology consulted. Patient clinically improved.  Social workers consulted to help with re-arranging broken CPAP for patient. PT/OT rec: H/H.  02 requirements increased and so lasix increased on 9/2/19. CPAP arranged for home. Patient does not wear 02 at home. Patient's renal function continued to decline despite alternative diuretic regimen. Patient consented for dialysis and tunneled cath placement. Tunneled cath placed on 9/5/2019 and dialysis started same day. Patient tolerated well. She was noted to be more alert and active with therapy. Weaned off supplemental O2. Patient will go home with home health for therapy once outpatient HD arranged.      Interval History: no complaints.      Review of Systems   Constitutional: Negative for chills and fever.   HENT: Negative for trouble swallowing and voice change.    Respiratory: Negative for shortness of breath.    Cardiovascular: Negative for chest pain.     Objective:     Vital Signs (Most Recent):  Temp: 96.9 °F (36.1 °C) (09/05/19 0751)  Pulse: 60 (09/05/19 1145)  Resp: 17 (09/05/19 1145)  BP: (!) 170/94 (09/05/19 1145)  SpO2: 95% (09/05/19 1145) Vital Signs (24h Range):  Temp:  [97.6 °F (36.4 °C)-98.5 °F (36.9 °C)] 98.5 °F (36.9 °C)  Pulse:  [54-63] 56  Resp:  [18-20] 20  SpO2:  [93 %-98 %] 95 %  BP: (124-145)/(60-72) 145/66     Weight: 122.3 kg (269 lb 10 oz)  Body mass index is 44.87 kg/m².    Intake/Output Summary (Last 24 hours) at 9/10/2019 1551  Last data filed at 9/10/2019 1300  Gross per 24 hour   Intake 360 ml   Output --   Net 360 ml       Physical Exam   Constitutional: She is oriented to person, place, and time. She appears well-developed. No distress.   Cardiovascular: Normal heart sounds.   No murmur heard.  Pulmonary/Chest: Effort normal and breath sounds normal. She has no wheezes. She has no rales.   Abdominal: Soft. Bowel sounds are normal.   Neurological: She is alert and oriented to person, place, and time.   Skin: She is not diaphoretic.   Nursing note and vitals reviewed.      Significant Labs: All pertinent labs within the past 24 hours have been reviewed.        Assessment/Plan:      * Acute on chronic diastolic heart failure  This is likely secondary to progressive renal failure in addition to pulmonary hypertension, tricuspid regurg and diastolic heart failure.   Initiated on dialysis 9/5. Doing very well and feeling much better  Is more actively participating with therapy  SW working on outpatient HD placement  Cardiology following      Chronic respiratory failure  2/2 HUMBERTO, diastolic heart failure and pulmonary hypertension  Supplemental O2, dialysis and CPAP  nightly    Debility  Resume H/H PT/OT on discharge.       Depression  Stable; will continue her home regimen of citalopram.    Anemia of chronic disease  The patient's H/H is stable and consistent with previous laboratory measurements, and the patient exhibits no signs or symptoms of acute bleeding; there is no indication for transfusion.  Will continue to monitor.    ESRD on hemodialysis  Worsening renal function over past few months.  Initiated dialysis  Nephrology following    Essential hypertension  Continue current antihypertensive regimen    Chronic diastolic heart failure  As addressed above.    Type 2 diabetes mellitus, controlled, with renal complications  Well controlled on a home regimen of basal insulin therapy; will provide basal-prandial insulin therapy along insulin sliding scale.  A1c 8.6%    Hypothyroidism (acquired)  Poorly controlled; will continue her home regimen of levothyroxine and defer dosage adjustments to her PCP.    HUMBERTO on CPAP  As addressed above.    CPAP arranged for home.     Hyperlipidemia LDL goal <100  Well controlled; will continue her home regimen of atorvastatin.    Morbid obesity with body mass index (BMI) of 40.0 to 44.9 in adult  The patient has been counseled on the negative impact that obesity imparts on her health and was encouraged to make lifestyle changes in order to lose weight and decrease her modifiable risk factors.    VTE Risk Mitigation (From admission, onward)        Ordered     heparin (porcine) injection 5,000 Units  As needed (PRN)      09/05/19 1754     IP VTE HIGH RISK PATIENT  Once      08/27/19 5761                Imani Chan MD  Department of Hospital Medicine   [facility]

## 2019-09-10 NOTE — ASSESSMENT & PLAN NOTE
This is likely secondary to progressive renal failure in addition to pulmonary hypertension, tricuspid regurg and diastolic heart failure.   Initiated on dialysis 9/5. Doing very well and feeling much better  Is more actively participating with therapy  SW working on outpatient HD placement  Cardiology following

## 2019-09-10 NOTE — PLAN OF CARE
Problem: Physical Therapy Goal  Goal: Physical Therapy Goal  Goals to be met by: 2019     Patient will increase functional independence with mobility by performin. Supine to sit with Stand-by Assistance  2. Sit to stand transfer with Stand-by Assistance  3. Gait  x 50 feet with Stand-by Assistance using Rolling Walker.   4. Lower extremity exercise program x10 reps per handout, with independence     Outcome: Ongoing (interventions implemented as appropriate)  Pt ambulated ~ 40 ft with RW, CGA . Pt O2 sats maintain 89%-94% with exertion on RA , at rest : 95 %-96% . Pt required VC's for pursed lip breathing technique throughout treatment .

## 2019-09-10 NOTE — PLAN OF CARE
Call to Ja Castillo,  Awaiting approval from Ja ROJSA, medical director.     09/10/19 1417   Post-Acute Status   Post-Acute Authorization Home Health/Hospice   HME Status Additional Clinical Requested   Home Health/Hospice Status Awaiting Internal Medical Clearance   Other Status See Comments  (awaiting ruy chair approval)   Discharge Delays None known at this time   .  [facility] - [place], [place] - [address]  [address] [address]  Phone: [phone] Fax: [phone]    [id number] - [place] - [address]  [address]  [place] [address]  Phone: [phone] Fax: [phone]

## 2019-09-10 NOTE — PLAN OF CARE
Problem: Occupational Therapy Goal  Goal: Occupational Therapy Goal  Goals to be met by: 9/19/19     Patient will increase functional independence with ADLs by performing:    UE Dressing with Supervision.  Grooming while standing at sink with Contact Guard Assistance. Met 9/3/19  Toileting from bedside commode with Supervision for hygiene and clothing management.   Stand pivot transfers with Contact Guard Assistance. Goal met 8/30/19  Toilet transfer to bedside commode with Contact Guard Assistance.  Upper extremity exercise program x10 reps per handout, with supervision. Goal met 8/30/19       Outcome: Revised  Pt would benefit from home health to address home safety and energy conservation.

## 2019-09-10 NOTE — NURSING
Gave bedside report to oncoming nurse arnaldo, HD dressing has small amount of blood not thru dressing.

## 2019-09-10 NOTE — SUBJECTIVE & OBJECTIVE
Interval History: no complaints.      Review of Systems   Constitutional: Negative for chills and fever.   HENT: Negative for trouble swallowing and voice change.    Respiratory: Negative for shortness of breath.    Cardiovascular: Negative for chest pain.     Objective:     Vital Signs (Most Recent):  Temp: 96.9 °F (36.1 °C) (09/05/19 0751)  Pulse: 60 (09/05/19 1145)  Resp: 17 (09/05/19 1145)  BP: (!) 170/94 (09/05/19 1145)  SpO2: 95% (09/05/19 1145) Vital Signs (24h Range):  Temp:  [97.6 °F (36.4 °C)-98.5 °F (36.9 °C)] 98.5 °F (36.9 °C)  Pulse:  [54-63] 56  Resp:  [18-20] 20  SpO2:  [93 %-98 %] 95 %  BP: (124-145)/(60-72) 145/66     Weight: 122.3 kg (269 lb 10 oz)  Body mass index is 44.87 kg/m².    Intake/Output Summary (Last 24 hours) at 9/10/2019 1551  Last data filed at 9/10/2019 1300  Gross per 24 hour   Intake 360 ml   Output --   Net 360 ml       Physical Exam   Constitutional: She is oriented to person, place, and time. She appears well-developed. No distress.   Cardiovascular: Normal heart sounds.   No murmur heard.  Pulmonary/Chest: Effort normal and breath sounds normal. She has no wheezes. She has no rales.   Abdominal: Soft. Bowel sounds are normal.   Neurological: She is alert and oriented to person, place, and time.   Skin: She is not diaphoretic.   Nursing note and vitals reviewed.      Significant Labs: All pertinent labs within the past 24 hours have been reviewed.

## 2019-09-10 NOTE — ASSESSMENT & PLAN NOTE
Well controlled on a home regimen of basal insulin therapy; will provide basal-prandial insulin therapy along insulin sliding scale.  A1c 8.6%

## 2019-09-10 NOTE — NURSING
More blood noted to HD dressing. Dressing reinforced. Patient up to bedside commode with 2 person assist. Back in bed resting.

## 2019-09-10 NOTE — PT/OT/SLP PROGRESS
Occupational Therapy   Treatment    Name: Erica Leblanc  MRN: [id number]  Admitting Diagnosis:  Acute on chronic diastolic heart failure       Recommendations:     Discharge Recommendations: home health OT  Discharge Equipment Recommendations:  none  Barriers to discharge:  None    Assessment:     Erica Leblanc is a 74 y.o. female with a medical diagnosis of Acute on chronic diastolic heart failure.  She presents with good understanding of energy conservation and home safety. Performance deficits affecting function are weakness, impaired endurance, impaired self care skills, impaired functional mobilty, decreased coordination, edema, impaired muscle length, impaired cardiopulmonary response to activity.     Rehab Prognosis:  Good; patient would benefit from acute skilled OT services to address these deficits and reach maximum level of function.       Plan:     Patient to be seen 2 x/week, 3 x/week to address the above listed problems via self-care/home management, therapeutic activities, therapeutic exercises  · Plan of Care Expires:    · Plan of Care Reviewed with: patient    Subjective     Pain/Comfort:  ·      Objective:     Communicated with: nurse Fang prior to session.  Patient found up in chair with telemetry, kemp catheter, peripheral IV upon OT entry to room.    General Precautions: Standard, fall, respiratory   Orthopedic Precautions:N/A   Braces:       Occupational Performance:     Activities of Daily Living:  · Feeding:  independence     · Grooming: supervision sitting in bedside chair face washing only      AMPAC 6 Click ADL: 18    Treatment & Education:  -education with written material on energy conservation. Pt verbalized understanding of techniques  -Review diaphramatic Breathing  -home safety skills        Patient left up in chair with all lines intact, call button in reach.    GOALS:   Multidisciplinary Problems     Occupational Therapy Goals        Problem: Occupational Therapy Goal     Goal Priority Disciplines Outcome Interventions   Occupational Therapy Goal     OT, PT/OT Revised    Description:  Goals to be met by: 9/19/19     Patient will increase functional independence with ADLs by performing:    UE Dressing with Supervision.  Grooming while standing at sink with Contact Guard Assistance. Met 9/3/19  Toileting from bedside commode with Supervision for hygiene and clothing management.   Stand pivot transfers with Contact Guard Assistance. Goal met 8/30/19  Toilet transfer to bedside commode with Contact Guard Assistance.  Upper extremity exercise program x10 reps per handout, with supervision. Goal met 8/30/19                         Time Tracking:     OT Date of Treatment: 09/10/19  OT Start Time: 1205  OT Stop Time: 1231  OT Total Time (min): 26 min    Billable Minutes:Self Care/Home Management 26    Denaeloren Sloan OT  9/10/2019

## 2019-09-10 NOTE — PLAN OF CARE
Problem: Adult Inpatient Plan of Care  Goal: Plan of Care Review  Outcome: Ongoing (interventions implemented as appropriate)     09/10/19 1863   Plan of Care Review   Plan of Care Reviewed With patient   Patient remains free from fall or injury. No c/o pain this shift. Scheduled meds administered as ordered. Patient up to BSC with 2 person assist. CPAP placed on patient by RRT. Patient resting during the shift, able to make needs known. Bed in low locked position, bed alarm armed and audible with call light in reach. Will continue to monitor.

## 2019-09-10 NOTE — NURSING
Notified dr brown dasilva in reference to bloody drainage from  HD access, verbalized understanding, received order to hold Heparin SQ

## 2019-09-10 NOTE — PT/OT/SLP PROGRESS
Physical Therapy Treatment    Patient Name:  Erica Leblanc   MRN:  [id number]    Recommendations:     Discharge Recommendations:  home health PT   Discharge Equipment Recommendations: hospital bed   Barriers to discharge: pt with decreased mobility     Assessment:     Erica Leblanc is a 74 y.o. female admitted with a medical diagnosis of Acute on chronic diastolic heart failure.  She presents with the following impairments/functional limitations:  weakness, impaired endurance, gait instability, impaired balance, decreased upper extremity function, decreased lower extremity function, decreased ROM, edema, decreased safety awareness, impaired cardiopulmonary response to activity .    Rehab Prognosis: Good; patient would benefit from acute skilled PT services to address these deficits and reach maximum level of function.    Recent Surgery: * No surgery found *      Plan:     During this hospitalization, patient to be seen daily to address the identified rehab impairments via gait training, therapeutic activities, therapeutic exercises and progress toward the following goals:    · Plan of Care Expires:  09/12/19    Subjective     Chief Complaint: none  Patient/Family Comments/goals: to go home  Pain/Comfort:  · Pain Rating 1: 0/10  · Pain Rating Post-Intervention 1: 0/10      Objective:     Communicated with nurse Alejandra prior to session.  Patient found up in chair with telemetry upon PT entry to room.     General Precautions: Standard, fall, respiratory   Orthopedic Precautions:N/A   Braces: N/A     Functional Mobility:  · Transfers:     · Sit to Stand:  moderate assistance from chair and CGA from bedside commode with rolling walker. V/T cues for safety technique and walker management.   · Toilet Transfer: contact guard assistance with  rolling walker  using  Stand Pivot  · Gait: Patient ambulated ~  40 ft on level tile with Rolling Walker with CGA .  Pt  with decreased debbie, decreased velocity of limb  motion, decreased step length, decreased swing-to-stance ratio.Impairments contributing to gait deviations include impaired balance, decreased flexibility, pain,decreased ROM and decreased strength. V/T cues for safety technique and walker management. VC's for pursed lip breathing technique.     · Balance: fair         AM-PAC 6 CLICK MOBILITY  Turning over in bed (including adjusting bedclothes, sheets and blankets)?: 4  Sitting down on and standing up from a chair with arms (e.g., wheelchair, bedside commode, etc.): 2  Moving from lying on back to sitting on the side of the bed?: 3  Moving to and from a bed to a chair (including a wheelchair)?: 3  Need to walk in hospital room?: 3  Climbing 3-5 steps with a railing?: 3  Basic Mobility Total Score: 18       Therapeutic Activities and Exercises:   BLE HEP re-given with instructions and demonstrations verbalize understanding). Encouraged pt to perform BLE ex's per handout throughout the day.   Pt performed seated BLE x 20 reps : AP.     Patient left up in chair with all lines intact, call button in reach, NURSE notified and OT present..    GOALS:   Multidisciplinary Problems     Physical Therapy Goals        Problem: Physical Therapy Goal    Goal Priority Disciplines Outcome Goal Variances Interventions   Physical Therapy Goal     PT, PT/OT Ongoing (interventions implemented as appropriate)     Description:  Goals to be met by: 2019     Patient will increase functional independence with mobility by performin. Supine to sit with Stand-by Assistance  2. Sit to stand transfer with Stand-by Assistance  3. Gait  x 50 feet with Stand-by Assistance using Rolling Walker.   4. Lower extremity exercise program x10 reps per handout, with independence                      Time Tracking:     PT Received On: 09/10/19  PT Start Time: 1139     PT Stop Time: 1206  PT Total Time (min): 27 min     Billable Minutes: Gait Training 13 and Therapeutic Activity 14    Treatment  Type: Treatment  PT/PTA: PTA     PTA Visit Number: 5     Renata Grimm, PTA  09/10/2019

## 2019-09-11 ENCOUNTER — TELEPHONE (OUTPATIENT)
Dept: VASCULAR SURGERY | Facility: CLINIC | Age: 74
End: 2019-09-11

## 2019-09-11 VITALS
DIASTOLIC BLOOD PRESSURE: 57 MMHG | BODY MASS INDEX: 44.92 KG/M2 | OXYGEN SATURATION: 95 % | HEIGHT: 65 IN | RESPIRATION RATE: 16 BRPM | SYSTOLIC BLOOD PRESSURE: 120 MMHG | WEIGHT: 269.63 LBS | TEMPERATURE: 98 F | HEART RATE: 56 BPM

## 2019-09-11 DIAGNOSIS — N18.6 ESRD (END STAGE RENAL DISEASE) ON DIALYSIS: Primary | ICD-10-CM

## 2019-09-11 DIAGNOSIS — Z99.2 ESRD (END STAGE RENAL DISEASE) ON DIALYSIS: Primary | ICD-10-CM

## 2019-09-11 LAB — POCT GLUCOSE: 124 MG/DL (ref 70–110)

## 2019-09-11 PROCEDURE — 25000003 PHARM REV CODE 250: Mod: HCNC | Performed by: HOSPITALIST

## 2019-09-11 PROCEDURE — 99900035 HC TECH TIME PER 15 MIN (STAT): Mod: HCNC

## 2019-09-11 PROCEDURE — 63600175 PHARM REV CODE 636 W HCPCS: Mod: HCNC | Performed by: INTERNAL MEDICINE

## 2019-09-11 PROCEDURE — 80100016 HC MAINTENANCE HEMODIALYSIS: Mod: HCNC

## 2019-09-11 PROCEDURE — 25000003 PHARM REV CODE 250: Mod: HCNC | Performed by: INTERNAL MEDICINE

## 2019-09-11 PROCEDURE — 94761 N-INVAS EAR/PLS OXIMETRY MLT: CPT | Mod: HCNC

## 2019-09-11 RX ORDER — BUMETANIDE 2 MG/1
2 TABLET ORAL DAILY
Qty: 30 TABLET | Refills: 11 | Status: SHIPPED | OUTPATIENT
Start: 2019-09-12 | End: 2020-11-30

## 2019-09-11 RX ADMIN — CITALOPRAM HYDROBROMIDE 10 MG: 10 TABLET ORAL at 08:09

## 2019-09-11 RX ADMIN — HEPARIN SODIUM 5000 UNITS: 5000 INJECTION, SOLUTION INTRAVENOUS; SUBCUTANEOUS at 01:09

## 2019-09-11 RX ADMIN — CLOPIDOGREL BISULFATE 75 MG: 75 TABLET ORAL at 08:09

## 2019-09-11 RX ADMIN — INSULIN DETEMIR 5 UNITS: 100 INJECTION, SOLUTION SUBCUTANEOUS at 09:09

## 2019-09-11 RX ADMIN — LEVOTHYROXINE SODIUM 50 MCG: 50 TABLET ORAL at 06:09

## 2019-09-11 RX ADMIN — METOPROLOL TARTRATE 50 MG: 50 TABLET ORAL at 08:09

## 2019-09-11 RX ADMIN — HYDRALAZINE HYDROCHLORIDE 100 MG: 25 TABLET ORAL at 04:09

## 2019-09-11 RX ADMIN — MICONAZOLE NITRATE: 20 OINTMENT TOPICAL at 09:09

## 2019-09-11 RX ADMIN — INSULIN ASPART 2 UNITS: 100 INJECTION, SOLUTION INTRAVENOUS; SUBCUTANEOUS at 09:09

## 2019-09-11 RX ADMIN — CLONIDINE HYDROCHLORIDE 0.1 MG: 0.1 TABLET ORAL at 12:09

## 2019-09-11 RX ADMIN — ATORVASTATIN CALCIUM 10 MG: 10 TABLET, FILM COATED ORAL at 08:09

## 2019-09-11 NOTE — PROGRESS NOTES
[facility] WRITTEN HEALTHCARE AND DISCHARGE INFORMATION.  Follow-up Information     Sendy Elaine MD On 9/23/2019.    Specialties:  Internal Medicine, Pediatrics  Why:  Outpatient Services PCP Follow-Up Monday at 1:20 PM; placed on waiting list.  Contact information:  [address] [facility] [id number] [phone]             Murali Li MD In 1 week.    Specialties:  Cardiology, INTERVENTIONAL CARDIOLOGY  Why:  Outpatient Services Cardiology Follow-Up placed on the waiting list.  Contact information:  [address]  [address]  [place] [address]  [phone]             [facility].    Specialties:  DME Provider, Home Health Services  Why:  Home Health Agency will contact patient to resume home health services  Contact information:  [address] [facility]  [address]  [place] [id number] [phone]             Keron Perez MD In 2 weeks.    Specialties:  Vascular Surgery, Surgery  Why:  with vein mapping:  DOCTOR CHIDI'S OFFICE WILL CALL PATIENT WITH AN APPOINTMENT  Contact information:  [address]  [address]  [place] [id number] [phone]             [facility]    Specialty:  DME Provider  Why:  DME: c pap in pt hospital room and hospital bed to be delivered to house  Contact information:  [address]  [address]  [facility] [address]  [phone]             [facility] On 9/12/2019.    Why:  out patient services:HD new chair. Patient to arrive at 1:00pm to fill out form. TTS schedule  Contact information:  [address] [place] [address] [phone]                 Help at Home           [phone]  After discharge for assistance [facility] On Call Nurse Care Line 24/7  Assistance   Things You are responsible For To Manage Your Care At Home:  1.    Getting your prescriptions filled   2.    Taking your medications as directed, DO NOT MISS ANY DOSES!  3.    Going to your follow-up doctor appointment. This is  important because it  allow the doctor to monitor your progress and determine if  any changes need to made to your treatment plan.   Thank you for choosing [facility] for your care.  Please answer any calls you may receive from [facility] we want to continue to support you as you manage your healthcare needs. [facility] is happy to have the opportunity to serve you.    Sincerely,  Your [facility] Team,  .Rossy Gonzales RN, BSN, [facility]  [phone]

## 2019-09-11 NOTE — PROGRESS NOTES
ADT 30 order placed for  Transportation.  ETA: 415PM TO HOME ADDRESS: [address] [place] [address]; [place] [address]  If transportation does not arrive at ETA time nurse will be instructed to follow protocol for transportation below:  How can I get in touch directly with dispatch, if needed?                 Non-emergent dispatch: [phone]                                    Emergent dispatch: [phone]  Non-emergent (stretcher): [phone]  Escalation Needs (PFC Lead): [phone]  .Rossy Gonzales, RN, BSN, STN CCM  9/11/2019

## 2019-09-11 NOTE — PROGRESS NOTES
TN spoke to Violette at [facility], says patient is approved for admission to [facility] in [place] TTS at 1:30pm and will expect pt to arrive at 1:00pm on 912/2019 to completed forms..Rossy Gonzales RN, BSN, [facility]  9/11/2019    TN requested email to be sent..Rossy Gonzales RN, BSN, STN [facility]  9/11/2019

## 2019-09-11 NOTE — PLAN OF CARE
[facility]  HOME  HEALTH ORDERS     09/11/2019    Admit to Home Health    Diagnoses:  Active Hospital Problems    Diagnosis  POA    *Acute on chronic diastolic heart failure [I50.33]  Yes    CKD (chronic kidney disease), symptom management only, stage 5 [N18.5]  Yes     Chronic    Debility [R53.81]  Yes    Chronic respiratory failure [J96.10]  Yes    Essential hypertension [I10]  Yes     Chronic    ESRD on hemodialysis [N18.6, Z99.2]  Not Applicable    Anemia of chronic disease [D63.8]  Yes     Chronic    Depression [F32.9]  Yes     Chronic    Chronic diastolic heart failure [I50.32]  Yes     Chronic    Type 2 diabetes mellitus, controlled, with renal complications [E11.29]  Yes     Chronic    Hyperlipidemia LDL goal <100 [E78.5]  Yes     Chronic    Hypothyroidism (acquired) [E03.9]  Yes     Chronic    Morbid obesity with body mass index (BMI) of 40.0 to 44.9 in adult [E66.01, Z68.41]  Not Applicable     Chronic    HUMBERTO on CPAP [G47.33, Z99.89]  Not Applicable     Chronic      Resolved Hospital Problems   No resolved problems to display.       Patient is homebound due to:  Acute on chronic diastolic heart failure    Allergies:  Review of patient's allergies indicates:   Allergen Reactions    Ace inhibitors Other (See Comments)     Other reaction(s): cough       Diet: 1800 DAVID ADA, RENAL, CARDIAC DIET    Acitivities: AS TOLERATED    Nursing:   SN to complete comprehensive assessment including routine vital signs. Instruct on disease process and s/s of complications to report to MD. Review/verify medication list sent home with the patient at time of discharge  and instruct patient/caregiver as needed. Frequency may be adjusted depending on start of care date.    Notify MD if SBP > 160 or < 90; DBP > 90 or < 50; HR > 120 or < 50; Temp > 101    CONSULTS:     PT to evaluate and treat. Evaluate for home safety and equipment needs; Establish/upgrade home exercise program. Perform / instruct  on therapeutic exercises, gait training, transfer training, and Range of Motion.    OT to evaluate and treat. Evaluate home environment for safety and equipment needs. Perform/Instruct on transfers, ADL training, ROM, and therapeutic exercises.        DIABETES CARE:     SN to perform Diabetic management with blood glucose monitoring QAC (frequency).  Report CBG < 60 or > 350 to physician.                                          Insulin Sliding Scale          Glucose  Novolog Insulin Subcutaneous        0 - 60   Orange juice or glucose tablet, hold insulin      No insulin   201-250  2 units   251-300  4 units   301-350  6 units   351-400  8 units   >400   10 units then call physician      Medications: Review discharge medications with patient and family and provide education.       Erica Leblanc   Home Medication Instructions FARHAT:[id number]    Printed on:09/11/19 1228   Medication Information                      amLODIPine (NORVASC) 10 MG tablet  Take 1 tablet (10 mg total) by mouth once daily.             atorvastatin (LIPITOR) 10 MG tablet  TAKE 1 TABLET EVERY DAY             bumetanide (BUMEX) 2 MG tablet  Take 1 tablet (2 mg total) by mouth once daily.             citalopram (CELEXA) 10 MG tablet  TAKE 1 TABLET EVERY DAY             clopidogrel (PLAVIX) 75 mg tablet  TAKE 1 TABLET EVERY DAY             docusate sodium (COLACE) 100 MG capsule  Take 200 mg by mouth once daily.              hydrALAZINE (APRESOLINE) 100 MG tablet  Take 1 tablet (100 mg total) by mouth 3 (three) times daily.             insulin detemir U-100 (LEVEMIR FLEXTOUCH) 100 unit/mL (3 mL) SubQ InPn pen  Inject 5 Units into the skin once daily.             LANCETS MISC               levothyroxine (SYNTHROID) 50 MCG tablet  Take 50 mcg by mouth once daily.             metoprolol tartrate (LOPRESSOR) 50 MG tablet  Take 50 mg by mouth 2 (two) times daily.             MULTIVITAMIN WITH MINERALS (HAIR,SKIN AND NAILS ORAL)  Take 3  tablets by mouth once daily.             oxybutynin (DITROPAN XL) 15 MG TR24  TAKE 1 TABLET EVERY DAY             polyethylene glycol (GLYCOLAX) 17 gram PwPk  Take 17 g by mouth once daily.                       _________________________________  Imani Chan MD  09/11/2019

## 2019-09-11 NOTE — PLAN OF CARE
Problem: Adult Inpatient Plan of Care  Goal: Plan of Care Review  Outcome: Ongoing (interventions implemented as appropriate)     09/11/19 1376   Plan of Care Review   Plan of Care Reviewed With patient   AOX4, free from fall or injury. No c/o pain. Scheduled meds administered as ordered. Up to BSC with assistance. CPAP at night as ordered. Blood glucose monitoredm, no SSI needed. Safety measures in place. Will continue to monitor.

## 2019-09-11 NOTE — PROGRESS NOTES
HOW TO MANAGE YOUR CARE  AT HOME:  TN taught Symptoms and Problems for CHF home care with pt and  with teach back:  1. Chest Pain, 2. SOB. TN placed education sheet in [employer] Packet..     HELP AT HOME TO ASSIST WITH PATIENT'S RECOVERY IS son/Alexandria.      TN taught patient about things she is responsible for when discharged TO HELP WITH HER RECOVERY:   How to Manage Her Care At Home:  1. Getting her prescriptions filled.  2. Taking her medications as directed. DO NOT MISS ANY DOSES!  3. Going to her follow-up doctor appointments.   .  Rossy Gonzales RN, BSN, STN CCM

## 2019-09-11 NOTE — PROGRESS NOTES
TN informed med surg nurse Thomas, that pt is cleared for discharge from cm viewpoint..Rossy Gonzales RN, BSN, [facility]  9/11/2019

## 2019-09-11 NOTE — PROGRESS NOTES
TN spoke with patient regarding d/c plan for today.  TN called patient's sons per her request.  Son/Alexandria Del Rosario [phone] says he lives with patient and will get off work at 4pm and be home by 5pm since he uses public transportation/bus. Son Andres [phone], says he is an hour away and will be able to assist mother/patient into and out of wheel chair at the pt's home.  Son/Edgardo's daughter, Kelli will come to the hospital to pack pt's belongings to bring to pt's home...Rossy Gonzales RN, BSN, [facility]  9/11/2019    Patient may need wheel chair van to home..Rossy Gonzales RN, BSN, [facility]  9/11/2019

## 2019-09-11 NOTE — PLAN OF CARE
09/11/19 1032   Post-Acute Status   Post-Acute Authorization Home Health/Hospice;HME   HME Status Pending Delivery Home  (patient to call since hospital bed has been approved)   Home Health/Hospice Status Awaiting Orders for HH   Other Status See Comments  (awaiting homehealth orders and hospital bed to be delivered.)   Discharge Delays (!) Patient Transportation

## 2019-09-11 NOTE — TELEPHONE ENCOUNTER
----- Message from Ramonita Garcia sent at 9/11/2019 12:46 PM CDT -----  Contact: Rossy/KACY DE LOS SANTOS  Rossy is requesting a 2 wk hosp fu for pt. Next available is in Oct. Please contact pt [phone] (home) to schedule the appt.    thanks

## 2019-09-11 NOTE — PT/OT/SLP PROGRESS
Physical Therapy      Patient Name:  Erica Leblanc   MRN:  [id number]    Patient not seen today secondary to Dialysis.and possible D/C home Will follow-up as time allows.    Neeru Sun, PT

## 2019-09-11 NOTE — PROGRESS NOTES
TN sent home health referral through [facility].  Patient's preference has been and is [facility] health..Rossy Gonzales RN, BSN, STN CCM  9/11/2019  '

## 2019-09-11 NOTE — TELEPHONE ENCOUNTER
Call to patient and left appointment information on VM. Also left callback number. Will mail out appointment slips.

## 2019-09-11 NOTE — PLAN OF CARE
09/11/19 1550   Final Note   Assessment Type Final Discharge Note   Anticipated Discharge Disposition Home-Health   What phone number can be called within the next 1-3 days to see how you are doing after discharge?   (see chart)   Hospital Follow Up  Appt(s) scheduled? Yes   Discharge plans and expectations educations in teach back method with documentation complete? Yes   Right Care Referral Info   Post Acute Recommendation Other   Referral Type HOME-CARE   Facility Name [facility].   NEW HD, TN called [facility] in [place].  Expect pt on9/12/2019 at 1:00pm.  Patient given copy of letter for chair, address, and time..Rossy Gonzales RN, BSN, STN [facility]  9/11/2019

## 2019-09-11 NOTE — PLAN OF CARE
09/11/19 1455   Medicare Message   Important Message from Medicare regarding Discharge Appeal Rights Given to patient/caregiver;Explained to patient/caregiver;Signed/date by patient/caregiver   Date IMM was signed 09/11/19   Time IMM was signed 1748

## 2019-09-11 NOTE — PROGRESS NOTES
TN sent secured message requesting home health orders as discussed in bed huddle this a.m. Discharge plan is today ..Rossy Gonzales RN, BSN, STN [facility]  9/11/2019

## 2019-09-11 NOTE — PROGRESS NOTES
Erica Leblanc is a 74 y.o. female patient.    Follow for ESRD, dialysis    Patient seen while on dialysis  No new c/o, comfortable    Scheduled Meds:   sodium chloride 0.9%   Intravenous Once    sodium chloride 0.9%   Intravenous Once    atorvastatin  10 mg Oral Daily    bumetanide  2 mg Oral Daily    citalopram  10 mg Oral Daily    clopidogrel  75 mg Oral Daily    hydrALAZINE  100 mg Oral TID    insulin aspart U-100  2 Units Subcutaneous TIDWM    insulin detemir U-100  5 Units Subcutaneous Daily    levothyroxine  50 mcg Oral Before breakfast    metoprolol tartrate  50 mg Oral BID    miconazole nitrate 2%   Topical (Top) BID       Review of patient's allergies indicates:   Allergen Reactions    Ace inhibitors Other (See Comments)     Other reaction(s): cough         Vital Signs Range (Last 24H):  Temp:  [96.4 °F (35.8 °C)-98.6 °F (37 °C)]   Pulse:  [53-62]   Resp:  [16-20]   BP: (133-169)/(65-81)   SpO2:  [93 %-98 %]     I & O (Last 24H):    Intake/Output Summary (Last 24 hours) at 9/11/2019 1005  Last data filed at 9/11/2019 0800  Gross per 24 hour   Intake 480 ml   Output --   Net 480 ml           Physical Exam:  General appearance: well developed, well nourished, no distress  Lungs:  clear to auscultation bilaterally and normal respiratory effort  Heart: regular rate and rhythm  Abdomen: soft, non-tender non-distented; bowel sounds normal; no masses,  no organomegaly  Extremities: (+) edema    Laboratory:  CBC:   Recent Labs   Lab 09/09/19 0419   WBC 4.10   RBC 4.35   HGB 10.3*   HCT 33.4*      MCV 77*   MCH 23.7*   MCHC 30.8*     CMP:   Recent Labs   Lab 09/09/19 0419   *   CALCIUM 8.2*   ALBUMIN 2.4*  2.4*   PROT 7.2      K 3.4*   CO2 24      BUN 59*   CREATININE 4.1*   ALKPHOS 128   ALT 24   AST 24   BILITOT 0.6       Imp/Plan    ESRD - on dialysis, stable, tolerated well  Acute on chronic diastolic CHF - improving with UF  HUMBERTO  DM type 2  Anemia of  CKD    Continue HD q MWF  Outpatient HD arrangement  We'll follow for dialysis      Charlie Bolanos  9/11/2019

## 2019-09-12 ENCOUNTER — PATIENT OUTREACH (OUTPATIENT)
Dept: ADMINISTRATIVE | Facility: CLINIC | Age: 74
End: 2019-09-12

## 2019-09-12 NOTE — PT/OT/SLP DISCHARGE
Physical Therapy Discharge Summary    Name: Erica Leblanc  MRN: [id number]   Principal Problem: Acute on chronic diastolic heart failure     Patient Discharged from acute Physical Therapy on 2019.  Please refer to prior PT noted date on 09/10/2019 for functional status.     Assessment:     Patient appropriate for care in another setting.    Objective:     GOALS:   Multidisciplinary Problems     Physical Therapy Goals        Problem: Physical Therapy Goal    Goal Priority Disciplines Outcome Goal Variances Interventions   Physical Therapy Goal     PT, PT/OT Ongoing (interventions implemented as appropriate)     Description:  Goals to be met by: 2019     Patient will increase functional independence with mobility by performin. Supine to sit with Stand-by Assistance  2. Sit to stand transfer with Stand-by Assistance  3. Gait  x 50 feet with Stand-by Assistance using Rolling Walker.   4. Lower extremity exercise program x10 reps per handout, with independence                      Reasons for Discontinuation of Therapy Services  Transfer to alternate level of care.      Plan:     Patient Discharged to: Home with Home Health Service.    Neeru Sun, PT  2019

## 2019-09-12 NOTE — PROGRESS NOTES
C3 nurse attempted to contact patient. The following occurred:   C3 nurse attempted to contact Erica Leblanc  for a TCC post hospital discharge follow up call. The patient is unable to conduct the call @ this time. The patient requested a callback.    The patient has a scheduled HOSFU appointment with Sendy Elaine MD  on 9 23 @ 120. Message sent to Physician staff.

## 2019-09-13 NOTE — PATIENT INSTRUCTIONS

## 2019-09-13 NOTE — DISCHARGE SUMMARY
[facility] Hospital Medicine  Discharge Summary      Patient Name: Erica Leblanc  MRN: [id number]  Admission Date: 8/27/2019  Hospital Length of Stay: 15 days  Discharge Date and Time: 9/11/2019  5:51 PM  Attending Physician: No att. providers found   Discharging Provider: Imani Chan MD  Primary Care Provider: Sendy Elaine MD      HPI:   Ms. Erica Leblanc is a 74 y.o. female with essential hypertension, type 2 diabetes mellitus (HbA1c 7.3% Jul 2019), hyperlipidemia (LDL 52.8 Mar 2019), chronic diastolic heart failure (LVEF 70% Jun 2017), CKD stage 5, hypothyroidism (TSH 3.298 Mar 2019), anemia chronic disease, morbid obesity (BMI 41.6), HUMBERTO on CPAP, and depression who presents to [facility] ED with complaints of dyspnea for the past four days.  She had reported to the ED physician that her CPAP machine has been malfunctioning, a similar complaint to her presentation two months ago.  She has not been able to get it replaced due to insurance constraints.  The dyspnea is worse on exertion.  Further history is otherwise limited at this time due to hypersomnolence.    * No surgery found *      Hospital Course:   73 y/o female admitted with acute on chronic diastolic heart failure.  Started on IV diuresis.  Patient with hx of CKD and worsening Creat.  Nephrology consulted. Patient clinically improved.  Social workers consulted to help with re-arranging broken CPAP for patient. PT/OT rec: H/H.  02 requirements increased and so lasix increased on 9/2/19. CPAP arranged for home. Patient does not wear 02 at home. Patient's renal function continued to decline despite alternative diuretic regimen. Patient consented for dialysis and tunneled cath placement. Tunneled cath placed on 9/5/2019 and dialysis started same day. Patient tolerated well. She was noted to be more alert and active with therapy. Weaned off supplemental O2. Patient will go home with home health for therapy once outpatient  HD arranged.      Consults:   Consults (From admission, onward)        Status Ordering Provider     Inpatient consult to Nephrology  Once     Provider:  Melony Puente MD    Completed CLAUDE BERNSTEIN     Inpatient consult to Vascular Surgery  Once     Provider:  Keron Perez MD    Completed MELONY PUENTE          No new Assessment & Plan notes have been filed under this hospital service since the last note was generated.  Service: Hospital Medicine    Final Active Diagnoses:    Diagnosis Date Noted POA    PRINCIPAL PROBLEM:  Acute on chronic diastolic heart failure [I50.33] 08/27/2019 Yes    CKD (chronic kidney disease), symptom management only, stage 5 [N18.5]  Yes     Chronic    Debility [R53.81] 08/30/2019 Yes    Chronic respiratory failure [J96.10] 08/30/2019 Yes    Essential hypertension [I10] 08/27/2019 Yes     Chronic    ESRD on hemodialysis [N18.6, Z99.2] 08/27/2019 Not Applicable    Anemia of chronic disease [D63.8] 08/27/2019 Yes     Chronic    Depression [F32.9] 08/27/2019 Yes     Chronic    Chronic diastolic heart failure [I50.32] 12/07/2016 Yes     Chronic    Type 2 diabetes mellitus, controlled, with renal complications [E11.29] 10/08/2014 Yes     Chronic    Hyperlipidemia LDL goal <100 [E78.5]  Yes     Chronic    Hypothyroidism (acquired) [E03.9]  Yes     Chronic    Morbid obesity with body mass index (BMI) of 40.0 to 44.9 in adult [E66.01, Z68.41]  Not Applicable     Chronic    HUMBERTO on CPAP [G47.33, Z99.89]  Not Applicable     Chronic      Problems Resolved During this Admission:       Discharged Condition: stable    Disposition: Home-Health Care [facility]    Follow Up:  Follow-up Information     Sendy Elaine MD On 9/23/2019.    Specialties:  Internal Medicine, Pediatrics  Why:  Outpatient Services PCP Follow-Up Monday at 1:20 PM; placed on waiting list.  Contact information:  [facility]  [place] [address] [phone]             Murali Li MD In 1 week.    Specialties:   Cardiology, INTERVENTIONAL CARDIOLOGY  Why:  Outpatient Services Cardiology Follow-Up placed on the waiting list.  Contact information:  [address]  [address]  [place] [id number] [phone]             [facility].    Specialties:  DME Provider, Home Health Services  Why:  Home Health Agency will contact patient to resume home health services  Contact information:  [address]  [address]  [place] [address]  [phone]             Keron Perez MD In 2 weeks.    Specialties:  Vascular Surgery, Surgery  Why:  with vein mapping:  DOCTOR CHIDI'S OFFICE WILL CALL PATIENT WITH AN APPOINTMENT  Contact information:  [address]  [address]  [place] [address]  [phone]             [facility]    Specialty:  DME Provider  Why:  DME: c pap in pt hospital room and hospital bed to be delivered to house  Contact information:  [address]  [address]  [facility] [address]  [phone]             [facility] On 9/12/2019.    Why:  out patient services:HD new chair. Patient to arrive at 1:00pm to fill out form. TTS schedule  Contact information:  [address] [place] [address] [phone]               Patient Instructions:      CPAP FOR HOME USE   Order Comments: ICD 10 G47.33 Z99.89 ICD 9 327.23 V46.8     Order Specific Question Answer Comments   Type: CPAP    CPAP setting (cmH20): 14    Length of need (1-99 months): 99    Humidification: Heated    Type of mask: FFM    Headgear? Yes    Tubing? Yes    Humidifier chamber? Yes    Chin strap? No    Filters? Yes    Cushions? Yes      Diet renal     Diet diabetic     Diet Cardiac     Notify your health care provider if you experience any of the following:  increased confusion or weakness     Notify your health care provider if you experience any of the following:  persistent dizziness, light-headedness, or visual disturbances     Notify your health care provider if you experience any of the following:   worsening rash     Notify your health care provider if you experience any of the following:  severe persistent headache     Notify your health care provider if you experience any of the following:  difficulty breathing or increased cough     Notify your health care provider if you experience any of the following:  redness, tenderness, or signs of infection (pain, swelling, redness, odor or green/yellow discharge around incision site)     Notify your health care provider if you experience any of the following:  severe uncontrolled pain     Notify your health care provider if you experience any of the following:  persistent nausea and vomiting or diarrhea     Notify your health care provider if you experience any of the following:  temperature >100.4     Activity as tolerated         Pending Diagnostic Studies:     Procedure Component Value Units Date/Time    IR Fluoroscopy Guided Central Venous Access Device Placement [[id number]] Resulted:  09/05/19 1122    Order Status:  Sent Lab Status:  In process Updated:  09/05/19 1255    IR Tunneled Catheter Insert w/o Port [[id number]] Resulted:  09/05/19 1122    Order Status:  Sent Lab Status:  In process Updated:  09/05/19 1255    IR Ultrasound Guidance [[id number]] Resulted:  09/05/19 1122    Order Status:  Sent Lab Status:  In process Updated:  09/05/19 1255    IR Venocavagram Superior [[id number]] Resulted:  09/05/19 1249    Order Status:  Sent Lab Status:  In process Updated:  09/05/19 1255         Medications:  Reconciled Home Medications:      Medication List      CHANGE how you take these medications    bumetanide 2 MG tablet  Commonly known as:  BUMEX  Take 1 tablet (2 mg total) by mouth once daily.  What changed:  when to take this        CONTINUE taking these medications    amLODIPine 10 MG tablet  Commonly known as:  NORVASC  Take 1 tablet (10 mg total) by mouth once daily.     atorvastatin 10 MG tablet  Commonly known as:  LIPITOR  TAKE 1 TABLET EVERY DAY      citalopram 10 MG tablet  Commonly known as:  CELEXA  TAKE 1 TABLET EVERY DAY     clopidogrel 75 mg tablet  Commonly known as:  PLAVIX  TAKE 1 TABLET EVERY DAY     docusate sodium 100 MG capsule  Commonly known as:  COLACE  Take 200 mg by mouth once daily.     HAIR,SKIN AND NAILS ORAL  Take 3 tablets by mouth once daily.     hydrALAZINE 100 MG tablet  Commonly known as:  APRESOLINE  Take 1 tablet (100 mg total) by mouth 3 (three) times daily.     insulin detemir U-100 100 unit/mL (3 mL) Inpn pen  Commonly known as:  LEVEMIR FLEXTOUCH  Inject 5 Units into the skin once daily.     LANCETS MISC     levothyroxine 50 MCG tablet  Commonly known as:  SYNTHROID  Take 50 mcg by mouth once daily.     metoprolol tartrate 50 MG tablet  Commonly known as:  LOPRESSOR  Take 50 mg by mouth 2 (two) times daily.     oxybutynin 15 MG Tr24  Commonly known as:  DITROPAN XL  TAKE 1 TABLET EVERY DAY     polyethylene glycol 17 gram Pwpk  Commonly known as:  GLYCOLAX  Take 17 g by mouth once daily.            Indwelling Lines/Drains at time of discharge:   Lines/Drains/Airways     Central Venous Catheter Line                 Hemodialysis Catheter 09/05/19 1425 right internal jugular 7 days                Time spent on the discharge of patient: 20 minutes  Patient was seen and examined on the date of discharge and determined to be suitable for discharge.         Imani Chan MD  Department of Hospital Medicine  [facility]

## 2019-09-13 NOTE — TELEPHONE ENCOUNTER
Spoke with patient's son Alexandria and informed him that patient never had appt sched  With provider on 9/16. Patient does have hospital f/u on 9/23/19.  Request appt slip to be placed in the mail. Done.

## 2019-09-16 ENCOUNTER — TELEPHONE (OUTPATIENT)
Dept: WOUND CARE | Facility: HOSPITAL | Age: 74
End: 2019-09-16

## 2019-09-16 NOTE — PROGRESS NOTES
Returned patient's call about wearing Tubigrip. Reports not having Tubigrip that was provided for light compression when discharged home from acute care. Wants legs measured for stockings. States no wounds on legs, but has a dry crusty area remaining. States has an appointment for legs to be measured at outpatient wound care this Friday.

## 2019-09-20 ENCOUNTER — HOSPITAL ENCOUNTER (OUTPATIENT)
Dept: WOUND CARE | Facility: HOSPITAL | Age: 74
Discharge: HOME | End: 2019-09-20
Attending: FAMILY MEDICINE
Payer: MEDICARE

## 2019-09-20 DIAGNOSIS — Z79.4 TYPE 2 DIABETES MELLITUS WITH FOOT ULCER, WITH LONG-TERM CURRENT USE OF INSULIN: ICD-10-CM

## 2019-09-20 DIAGNOSIS — E11.621 TYPE 2 DIABETES MELLITUS WITH FOOT ULCER, WITH LONG-TERM CURRENT USE OF INSULIN: ICD-10-CM

## 2019-09-20 DIAGNOSIS — L97.509 TYPE 2 DIABETES MELLITUS WITH FOOT ULCER, WITH LONG-TERM CURRENT USE OF INSULIN: ICD-10-CM

## 2019-09-20 PROCEDURE — 99215 PR OFFICE/OUTPT VISIT, EST, LEVL V, 40-54 MIN: ICD-10-PCS | Mod: ,,, | Performed by: FAMILY MEDICINE

## 2019-09-20 PROCEDURE — 99215 OFFICE O/P EST HI 40 MIN: CPT | Mod: ,,, | Performed by: FAMILY MEDICINE

## 2019-09-20 PROCEDURE — 99215 OFFICE O/P EST HI 40 MIN: CPT | Performed by: FAMILY MEDICINE

## 2019-09-23 ENCOUNTER — OFFICE VISIT (OUTPATIENT)
Dept: FAMILY MEDICINE | Facility: CLINIC | Age: 74
End: 2019-09-23
Payer: MEDICARE

## 2019-09-23 VITALS
DIASTOLIC BLOOD PRESSURE: 60 MMHG | OXYGEN SATURATION: 95 % | SYSTOLIC BLOOD PRESSURE: 122 MMHG | BODY MASS INDEX: 39.82 KG/M2 | TEMPERATURE: 98 F | WEIGHT: 239 LBS | HEIGHT: 65 IN | HEART RATE: 83 BPM

## 2019-09-23 DIAGNOSIS — Z79.4 TYPE 2 DIABETES MELLITUS WITH FOOT ULCER, WITH LONG-TERM CURRENT USE OF INSULIN: ICD-10-CM

## 2019-09-23 DIAGNOSIS — E11.621 TYPE 2 DIABETES MELLITUS WITH FOOT ULCER, WITH LONG-TERM CURRENT USE OF INSULIN: ICD-10-CM

## 2019-09-23 DIAGNOSIS — Z71.89 ADVANCED DIRECTIVES, COUNSELING/DISCUSSION: ICD-10-CM

## 2019-09-23 DIAGNOSIS — G47.33 OSA ON CPAP: Chronic | ICD-10-CM

## 2019-09-23 DIAGNOSIS — L97.509 TYPE 2 DIABETES MELLITUS WITH FOOT ULCER, WITH LONG-TERM CURRENT USE OF INSULIN: ICD-10-CM

## 2019-09-23 DIAGNOSIS — Z12.31 SCREENING MAMMOGRAM, ENCOUNTER FOR: ICD-10-CM

## 2019-09-23 DIAGNOSIS — I50.32 CHRONIC DIASTOLIC HEART FAILURE: Chronic | ICD-10-CM

## 2019-09-23 DIAGNOSIS — Z23 FLU VACCINE NEED: ICD-10-CM

## 2019-09-23 DIAGNOSIS — Z99.2 HEMODIALYSIS STATUS: ICD-10-CM

## 2019-09-23 DIAGNOSIS — F32.0 MILD MAJOR DEPRESSION: ICD-10-CM

## 2019-09-23 DIAGNOSIS — N25.81 SECONDARY RENAL HYPERPARATHYROIDISM: ICD-10-CM

## 2019-09-23 DIAGNOSIS — E11.3559 STABLE PROLIFERATIVE DIABETIC RETINOPATHY ASSOCIATED WITH TYPE 2 DIABETES MELLITUS, UNSPECIFIED LATERALITY: ICD-10-CM

## 2019-09-23 DIAGNOSIS — R53.81 DEBILITY: ICD-10-CM

## 2019-09-23 DIAGNOSIS — Z23 NEED FOR SHINGLES VACCINE: ICD-10-CM

## 2019-09-23 DIAGNOSIS — E11.22 TYPE 2 DIABETES MELLITUS WITH ESRD (END-STAGE RENAL DISEASE): Primary | ICD-10-CM

## 2019-09-23 DIAGNOSIS — N18.6 TYPE 2 DIABETES MELLITUS WITH ESRD (END-STAGE RENAL DISEASE): Primary | ICD-10-CM

## 2019-09-23 PROBLEM — I50.33 ACUTE ON CHRONIC DIASTOLIC HEART FAILURE: Status: RESOLVED | Noted: 2019-08-27 | Resolved: 2019-09-23

## 2019-09-23 PROCEDURE — G0008 ADMIN INFLUENZA VIRUS VAC: HCPCS | Mod: S$GLB,,, | Performed by: INTERNAL MEDICINE

## 2019-09-23 PROCEDURE — 99999 PR PBB SHADOW E&M-EST. PATIENT-LVL IV: ICD-10-PCS | Mod: PBBFAC,,, | Performed by: INTERNAL MEDICINE

## 2019-09-23 PROCEDURE — 99495 TCM SERVICES (MODERATE COMPLEXITY): ICD-10-PCS | Mod: S$GLB,,, | Performed by: INTERNAL MEDICINE

## 2019-09-23 PROCEDURE — G0008 FLU VACCINE - HIGH DOSE (65+) PRESERVATIVE FREE IM: ICD-10-PCS | Mod: S$GLB,,, | Performed by: INTERNAL MEDICINE

## 2019-09-23 PROCEDURE — 90662 FLU VACCINE - HIGH DOSE (65+) PRESERVATIVE FREE IM: ICD-10-PCS | Mod: S$GLB,,, | Performed by: INTERNAL MEDICINE

## 2019-09-23 PROCEDURE — 90662 IIV NO PRSV INCREASED AG IM: CPT | Mod: S$GLB,,, | Performed by: INTERNAL MEDICINE

## 2019-09-23 PROCEDURE — 99495 TRANSJ CARE MGMT MOD F2F 14D: CPT | Mod: S$GLB,,, | Performed by: INTERNAL MEDICINE

## 2019-09-23 PROCEDURE — 99499 RISK ADDL DX/OHS AUDIT: ICD-10-PCS | Mod: S$GLB,,, | Performed by: INTERNAL MEDICINE

## 2019-09-23 PROCEDURE — 99499 UNLISTED E&M SERVICE: CPT | Mod: S$GLB,,, | Performed by: INTERNAL MEDICINE

## 2019-09-23 PROCEDURE — 99999 PR PBB SHADOW E&M-EST. PATIENT-LVL IV: CPT | Mod: PBBFAC,,, | Performed by: INTERNAL MEDICINE

## 2019-09-23 NOTE — PROGRESS NOTES
HISTORY OF PRESENT ILLNESS:  Erica Leblanc is a 74 y.o. female who presents to the clinic today for Transitional Care  .   The patient presents to clinic today for follow-up of recent hospitalization for congestive heart failure and new diagnosis of end-stage renal disease.  She is now on hemodialysis.  She overall feels better.  She is not a short of breath.  She can't think clear.  She denies cardiac chest pain. She needs follow-up with the eye doctor for her retinopathy and a foot doctor for her foot ulcer.  She does reports some mild depression, but feels current medication is adequate.  She denies suicidal homicidal ideation.  She now has a bed at home that she can sleep in at an angle.  She also finally got her CPAP machine which she is using every night.  She did not require to go home with oxygen.      PAST MEDICAL HISTORY:  Past Medical History:   Diagnosis Date    Acute respiratory failure with hypoxia     Anemia of chronic kidney failure, stage 4 (severe) 4/5/2019    Cataracts, bilateral     CHF (congestive heart failure)     CKD (chronic kidney disease) stage 3, GFR 30-59 ml/min     CKD (chronic kidney disease) stage 3, GFR 30-59 ml/min     Controlled type 2 diabetes mellitus with proteinuria or albuminuria     Depression     Diabetes with neurologic complications     Diabetic retinopathy of both eyes     Edema     Glaucoma     History of colonic polyps     Hx-TIA (transient ischemic attack) 11/2008    Hyperlipidemia LDL goal < 100     Hypertension     Hypothyroidism     Major depressive disorder, single episode, mild 2/17/2016    Mixed incontinence urge and stress     Obesity     Obstructive sleep apnea on CPAP     7/19/19:  Home CPAP machine broken, per patient & son    Osteopenia     Proteinuria     Sickle cell trait     TIA (transient ischemic attack)     Trouble in sleeping     Type 2 diabetes mellitus with ophthalmic manifestations     Type 2 diabetes with stage 3  chronic kidney disease GFR 30-59     Type II or unspecified type diabetes mellitus with renal manifestations, uncontrolled(250.42)     Uncontrolled type 2 diabetes mellitus with peripheral circulatory disorder 2019    Urge incontinence 2016    Urge incontinence     Venous stasis ulcer     bilateral lower legs    Vitamin D deficiency disease        PAST SURGICAL HISTORY:  Past Surgical History:   Procedure Laterality Date    BREAST BIOPSY      breast reduction Bilateral age 31    BREAST SURGERY      cataracts Bilateral      SECTION, LOW TRANSVERSE      x1    CHOLECYSTECTOMY      COLONOSCOPY N/A 2012    Performed by Keron G[facility]erson [facility] at [facility] (4TH FLR)    EYE SURGERY  2014, 2014    vitrectomy    EYE SURGERY Right 2016    HYSTERECTOMY  1986    TAHBSO (patient is unsure if ovaries removed)    OOPHORECTOMY      REFRACTIVE SURGERY      REPAIR, RETINAL DETACHMENT, WITH VITRECTOMY Right 2014    Performed by LILLIANA Coello MD at [facility] OR 1ST FLR    REPAIR, RETINAL DETACHMENT, WITH VITRECTOMY Left 2014    Performed by [facility]ello [facility] at [facility] OR 1ST FLR    REPAIR-RETINA (VITRECTOMY) Right 2014    Performed by [facility]ello [facility] at [facility] OR 1ST FLR    STRABISMUS REPAIR/ADJUSTABLE SUTURES Bilateral 2016    Performed by RABIA Danielson Jr., MD at [facility] OR 1ST FLR    TOTAL REDUCTION MAMMOPLASTY      approx 10 yrs ago       SOCIAL HISTORY:  Social History     Socioeconomic History    Marital status: Single     Spouse name: Not on file    Number of children: 5    Years of education: Not on file    Highest education level: Not on file   Occupational History    Occupation:      Employer: [employer]     Comment: part-time   Social Needs    Financial resource strain: Not on file    Food insecurity:     Worry: Not on file     Inability: Not on file    Transportation needs:     Medical: Not on file      Non-medical: Not on file   Tobacco Use    Smoking status: Never Smoker    Smokeless tobacco: Never Used   Substance and Sexual Activity    Alcohol use: No     Alcohol/week: 0.0 standard drinks    Drug use: No    Sexual activity: Not Currently     Partners: Male     Birth control/protection: Post-menopausal, Surgical   Lifestyle    Physical activity:     Days per week: Not on file     Minutes per session: Not on file    Stress: Not on file   Relationships    Social connections:     Talks on phone: Not on file     Gets together: Not on file     Attends Denominational service: Not on file     Active member of club or organization: Not on file     Attends meetings of clubs or organizations: Not on file     Relationship status: Not on file   Other Topics Concern    Not on file   Social History Narrative    Not on file       FAMILY HISTORY:  Family History   Problem Relation Age of Onset    Leukemia Father     Ovarian cancer Sister 35    Stroke Mother     Diabetes Mother     Hypertension Mother     Diabetes Paternal Grandmother     Breast cancer Maternal Aunt 65    HIV Brother     Achondroplasia Sister     Parkinsonism Maternal Aunt     Esophageal cancer Maternal Uncle         smoker    Amblyopia Neg Hx     Blindness Neg Hx     Cataracts Neg Hx     Glaucoma Neg Hx     Macular degeneration Neg Hx     Retinal detachment Neg Hx     Strabismus Neg Hx     Thyroid disease Neg Hx     Colon cancer Neg Hx        ALLERGIES AND MEDICATIONS: updated and reviewed.  Review of patient's allergies indicates:   Allergen Reactions    Ace inhibitors Other (See Comments)     Other reaction(s): cough     Medication List with Changes/Refills   Current Medications    AMLODIPINE (NORVASC) 10 MG TABLET    Take 1 tablet (10 mg total) by mouth once daily.    ATORVASTATIN (LIPITOR) 10 MG TABLET    TAKE 1 TABLET EVERY DAY    BUMETANIDE (BUMEX) 2 MG TABLET    Take 1 tablet (2 mg total) by mouth once daily.    CITALOPRAM  (CELEXA) 10 MG TABLET    TAKE 1 TABLET EVERY DAY    CLOPIDOGREL (PLAVIX) 75 MG TABLET    TAKE 1 TABLET EVERY DAY    DOCUSATE SODIUM (COLACE) 100 MG CAPSULE    Take 200 mg by mouth once daily.     HYDRALAZINE (APRESOLINE) 100 MG TABLET    Take 1 tablet (100 mg total) by mouth 3 (three) times daily.    LANCETS MISC        LEVOTHYROXINE (SYNTHROID) 50 MCG TABLET    Take 50 mcg by mouth once daily.    METOPROLOL TARTRATE (LOPRESSOR) 50 MG TABLET    Take 50 mg by mouth 2 (two) times daily.    MULTIVITAMIN WITH MINERALS (HAIR,SKIN AND NAILS ORAL)    Take 3 tablets by mouth once daily.    OXYBUTYNIN (DITROPAN XL) 15 MG TR24    TAKE 1 TABLET EVERY DAY    POLYETHYLENE GLYCOL (GLYCOLAX) 17 GRAM PWPK    Take 17 g by mouth once daily.   Changed and/or Refilled Medications    Modified Medication Previous Medication    INSULIN DETEMIR U-100 (LEVEMIR FLEXTOUCH) 100 UNIT/ML (3 ML) SUBQ INPN PEN insulin detemir U-100 (LEVEMIR FLEXTOUCH) 100 unit/mL (3 mL) SubQ InPn pen       Inject 5 Units into the skin once daily.    Inject 5 Units into the skin once daily.          CARE TEAM:  Patient Care Team:  Sendy Elaine MD as PCP - General (Internal Medicine)  Brittanie Orellana MD (Cardiology)  Nicole Gray DPM as Consulting Physician (Podiatry)  Surinder Joseph MD as Consulting Physician (Nephrology)  Katia Wong MD as Consulting Physician (Urology)  Coleen Gillis MD as Consulting Physician (Obstetrics)  LILLIANA Coello MD as Consulting Physician (Ophthalmology)  Yolis Rossi MD as Consulting Physician (Endocrinology)         REVIEW OF SYSTEMS:  Review of Systems   Constitutional: Positive for unexpected weight change. Negative for chills, fatigue and fever.   HENT: Negative for congestion, ear pain and sore throat.    Respiratory: Negative for cough, shortness of breath and wheezing.    Cardiovascular: Positive for leg swelling (- chronic; improved). Negative for chest pain and palpitations.  "  Gastrointestinal: Negative for abdominal pain, constipation, diarrhea, nausea and vomiting.   Genitourinary: Negative for dysuria.   Musculoskeletal: Positive for arthralgias.   Skin: Negative for rash.   Neurological: Negative for weakness and headaches.   Psychiatric/Behavioral: Positive for dysphoric mood. Negative for sleep disturbance. The patient is not nervous/anxious.          PHYSICAL EXAM:   Vitals:    09/23/19 1327   BP: 122/60   Pulse: 83   Temp: 98.3 °F (36.8 °C)     Weight: 108.4 kg (238 lb 15.7 oz)   Height: 5' 5" (165.1 cm)   Body mass index is 39.77 kg/m².      General appearance - alert, well appearing, and in no distress, obese  Mental status - alert, oriented to person, place, and time, normal behavior, speech, dress, motor activity, mildly depressed mood  Eyes - sclera anicteric  Mouth - tacky mucous membranes noted  Chest - clear to auscultation, no wheezes, rales or rhonchi, symmetric air entry; she had a right upper chest wall catheter noted  Heart - normal rate and regular rhythm, no gallops noted  Back exam - mild limit in range of motion noted on exam, no significant pain with motion noted during exam  Neurological - alert, normal speech, no focal findings or movement disorder noted, cranial nerves II through XII intact  Musculoskeletal - patient noted to have Moderate osteoarthritic changes to both knee joints. No joint effusions noted., no muscular tenderness noted, patient ambulated with a walker  Extremities - pedal edema trace-1+, lymphedema noted - mild  Skin - normal coloration and turgor, no rashes, no suspicious skin lesions noted      Labs:  Lab Results   Component Value Date    HGBA1C 8.6 (H) 08/27/2019    HGBA1C 7.3 (H) 07/20/2019    HGBA1C 8.2 (H) 03/23/2019      Lab Results   Component Value Date    CHOL 123 03/23/2019    CHOL 113 (L) 03/03/2018    CHOL 122 01/06/2018     Lab Results   Component Value Date    LDLCALC 52.8 (L) 03/23/2019    LDLCALC 50.6 (L) 03/03/2018    " LDLCALC 57.6 (L) 01/06/2018          ASSESSMENT AND PLAN:  1. Type 2 diabetes mellitus with ESRD (end-stage renal disease)/2. Hemodialysis status/3. Stable proliferative diabetic retinopathy associated with type 2 diabetes mellitus, unspecified laterality/4. Type 2 diabetes mellitus with foot ulcer, with long-term current use of insulin  She had medication changes made during recent hospitalization.  She is now on Levemir.  I sent a prescription to the pharmacy.  No more low blood sugar reactions.  Recheck A1c in 3-4 months.  - insulin detemir U-100 (LEVEMIR FLEXTOUCH) 100 unit/mL (3 mL) SubQ InPn pen; Inject 5 Units into the skin once daily.  Dispense: 1.5 mL; Refill: 1  - Ambulatory referral to Ophthalmology  - Ambulatory referral to Podiatry  The patient has an appointment with vascular surgery in the near future to discuss dialysis access.    5. Chronic diastolic heart failure  Doing better. Continue current medication regimen. Followed by cardiology.    6. Secondary renal hyperparathyroidism  Stable. Asymptomatic. Observe.     7. Mild major depression  The current medical regimen is effective;  continue present plan and medications.     8. Debility  Has home health.    9. HUMBERTO on CPAP  CPAP compliance: yes.  We discussed the potential ramifications of untreated sleep apnea, which could include daytime sleepiness, hypertension, heart disease including CHF, sudden death while sleeping and/or stroke. The patient was advised to abstain from driving should they feel sleepy or drowsy.  We discussed potential treatment options, which could include weight loss, body positioning, continuous positive airway pressure (CPAP), or referral for surgical consideration.      10. Flu vaccine need    - Influenza - High Dose (65+) (PF) (IM)    11. Need for shingles vaccine  Patient was advised to get immunization at the pharmacy.     12. Advanced directives, counseling/discussion  Advance Care Planning   I initiated the process and  explained the importance of advance care planning today with the patient.  Advanced directives were discussed due to patient's age and/or chronic medical conditions. Prognosis based on current condition: fair.   Paperwork was given to complete living will (at this point in time, the patient does have full decision-making capacity.  We discussed different extreme health states that she could experience, and reviewed what kind of medical care she would want in those situations) and medical POA (The patient received detailed information about the importance of designating a Health Care Power of . She was also instructed to communicate with this person about their wishes for future healthcare, should she become sick and lose decision-making capacity).   [place] was not discussed.   Approximately 3 minute(s) were spent on counseling/discussion regarding end of life decision making.                   Follow up in about 4 months (around 1/23/2020). or sooner as needed.Transitional Care Note    Family and/or Caretaker present at visit?  No.  Diagnostic tests reviewed/disposition: No diagnosic tests pending after this hospitalization.  Disease/illness education: Yes  Home health/community services discussion/referrals: Patient has home health established at [place].   Establishment or re-establishment of referral orders for community resources: No other necessary community resources.   Discussion with other health care providers: No discussion with other health care providers necessary.

## 2019-09-25 ENCOUNTER — HOSPITAL ENCOUNTER (OUTPATIENT)
Dept: WOUND CARE | Facility: HOSPITAL | Age: 74
Discharge: HOME | End: 2019-09-25
Attending: FAMILY MEDICINE
Payer: MEDICARE

## 2019-09-25 DIAGNOSIS — L97.509 TYPE 2 DIABETES MELLITUS WITH FOOT ULCER, WITH LONG-TERM CURRENT USE OF INSULIN: Primary | ICD-10-CM

## 2019-09-25 DIAGNOSIS — L97.212 NON-PRESSURE CHRONIC ULCER OF RIGHT CALF WITH FAT LAYER EXPOSED: ICD-10-CM

## 2019-09-25 DIAGNOSIS — Z79.4 TYPE 2 DIABETES MELLITUS WITH FOOT ULCER, WITH LONG-TERM CURRENT USE OF INSULIN: Primary | ICD-10-CM

## 2019-09-25 DIAGNOSIS — I89.0 LYMPHEDEMA: ICD-10-CM

## 2019-09-25 DIAGNOSIS — E11.621 TYPE 2 DIABETES MELLITUS WITH FOOT ULCER, WITH LONG-TERM CURRENT USE OF INSULIN: Primary | ICD-10-CM

## 2019-09-25 PROCEDURE — 99214 OFFICE O/P EST MOD 30 MIN: CPT | Performed by: FAMILY MEDICINE

## 2019-09-25 PROCEDURE — 99214 PR OFFICE/OUTPT VISIT, EST, LEVL IV, 30-39 MIN: ICD-10-PCS | Mod: ,,, | Performed by: FAMILY MEDICINE

## 2019-09-25 PROCEDURE — 99214 OFFICE O/P EST MOD 30 MIN: CPT | Mod: ,,, | Performed by: FAMILY MEDICINE

## 2019-09-27 ENCOUNTER — HOSPITAL ENCOUNTER (OUTPATIENT)
Dept: RADIOLOGY | Facility: HOSPITAL | Age: 74
Discharge: HOME | End: 2019-09-27
Attending: SURGERY
Payer: MEDICARE

## 2019-09-27 DIAGNOSIS — Z99.2 ESRD (END STAGE RENAL DISEASE) ON DIALYSIS: ICD-10-CM

## 2019-09-27 DIAGNOSIS — N18.6 ESRD (END STAGE RENAL DISEASE) ON DIALYSIS: ICD-10-CM

## 2019-09-27 PROCEDURE — G0365 VESSEL MAPPING HEMO ACCESS: HCPCS | Mod: 26,,, | Performed by: RADIOLOGY

## 2019-09-27 PROCEDURE — G0365 VESSEL MAPPING HEMO ACCESS: HCPCS | Mod: TC

## 2019-09-27 PROCEDURE — G0365 US VEIN MAPPING: ICD-10-PCS | Mod: 26,,, | Performed by: RADIOLOGY

## 2019-09-30 ENCOUNTER — TELEPHONE (OUTPATIENT)
Dept: FAMILY MEDICINE | Facility: CLINIC | Age: 74
End: 2019-09-30

## 2019-09-30 ENCOUNTER — OFFICE VISIT (OUTPATIENT)
Dept: VASCULAR SURGERY | Facility: CLINIC | Age: 74
End: 2019-09-30
Payer: MEDICARE

## 2019-09-30 VITALS
DIASTOLIC BLOOD PRESSURE: 70 MMHG | WEIGHT: 235.31 LBS | HEART RATE: 66 BPM | BODY MASS INDEX: 39.2 KG/M2 | SYSTOLIC BLOOD PRESSURE: 126 MMHG | HEIGHT: 65 IN

## 2019-09-30 DIAGNOSIS — Z99.2 ENCOUNTER REGARDING VASCULAR ACCESS FOR DIALYSIS FOR END-STAGE RENAL DISEASE: Primary | ICD-10-CM

## 2019-09-30 DIAGNOSIS — N18.6 ENCOUNTER REGARDING VASCULAR ACCESS FOR DIALYSIS FOR END-STAGE RENAL DISEASE: Primary | ICD-10-CM

## 2019-09-30 PROCEDURE — 99214 PR OFFICE/OUTPT VISIT, EST, LEVL IV, 30-39 MIN: ICD-10-PCS | Mod: S$GLB,,, | Performed by: SURGERY

## 2019-09-30 PROCEDURE — 99214 OFFICE O/P EST MOD 30 MIN: CPT | Mod: S$GLB,,, | Performed by: SURGERY

## 2019-09-30 PROCEDURE — 99999 PR PBB SHADOW E&M-EST. PATIENT-LVL III: ICD-10-PCS | Mod: PBBFAC,,, | Performed by: SURGERY

## 2019-09-30 PROCEDURE — 99999 PR PBB SHADOW E&M-EST. PATIENT-LVL III: CPT | Mod: PBBFAC,,, | Performed by: SURGERY

## 2019-09-30 PROCEDURE — 1101F PR PT FALLS ASSESS DOC 0-1 FALLS W/OUT INJ PAST YR: ICD-10-PCS | Mod: CPTII,S$GLB,, | Performed by: SURGERY

## 2019-09-30 PROCEDURE — 1101F PT FALLS ASSESS-DOCD LE1/YR: CPT | Mod: CPTII,S$GLB,, | Performed by: SURGERY

## 2019-09-30 NOTE — PROGRESS NOTES
Keron Perez MD RPVI                       [facility]                         09/30/2019    HPI:  Erica Leblanc is a 74 y.o. female with   Patient Active Problem List   Diagnosis    Severe obesity (BMI 35.0-39.9) with comorbidity    Sickle cell trait    Osteopenia    Hyperlipidemia LDL goal <100    HUMBERTO on CPAP    Hypothyroidism (acquired)    Hx-TIA (transient ischemic attack)    Vitamin D deficiency disease    Pulmonary hypertension    Type 2 diabetes mellitus with ESRD (end-stage renal disease)    Secondary renal hyperparathyroidism    Incomplete bladder emptying    Postmenopausal atrophic vaginitis    Rectocele    Mixed incontinence urge and stress    Chronic diastolic heart failure    Tortuous aorta    History of TIAs    Essential hypertension    ESRD on hemodialysis    Anemia of chronic disease    Depression    Debility    Chronic respiratory failure    Type 2 diabetes mellitus with foot ulcer, with long-term current use of insulin    Stable proliferative diabetic retinopathy associated with type 2 diabetes mellitus    Mild major depression    being managed by PCP and specialists who is here today for evaluation of long term HD access.  S/p R IJ tunneled HD catheter, HD without issues.  Pt is R handed.  No complaints today.  Does endorse orthopnea, no chest pain.  Unable to climb 1 flight of stairs on own.    no MI  no Stroke  Tobacco use: denies    Past Medical History:   Diagnosis Date    Acute respiratory failure with hypoxia     Anemia of chronic kidney failure, stage 4 (severe) 4/5/2019    Cataracts, bilateral     CHF (congestive heart failure)     CKD (chronic kidney disease) stage 3, GFR 30-59 ml/min     CKD (chronic kidney disease) stage 3, GFR 30-59 ml/min     Controlled type 2 diabetes mellitus with proteinuria or albuminuria     Depression     Diabetes with neurologic complications     Diabetic retinopathy of both eyes     Edema      Glaucoma     History of colonic polyps     Hx-TIA (transient ischemic attack) 2008    Hyperlipidemia LDL goal < 100     Hypertension     Hypothyroidism     Major depressive disorder, single episode, mild 2016    Mixed incontinence urge and stress     Obesity     Obstructive sleep apnea on CPAP     19:  Home CPAP machine broken, per patient & son    Osteopenia     Proteinuria     Sickle cell trait     TIA (transient ischemic attack)     Trouble in sleeping     Type 2 diabetes mellitus with ophthalmic manifestations     Type 2 diabetes with stage 3 chronic kidney disease GFR 30-59     Type II or unspecified type diabetes mellitus with renal manifestations, uncontrolled(250.42)     Uncontrolled type 2 diabetes mellitus with peripheral circulatory disorder 2019    Urge incontinence 2016    Urge incontinence     Venous stasis ulcer     bilateral lower legs    Vitamin D deficiency disease      Past Surgical History:   Procedure Laterality Date    BREAST BIOPSY      breast reduction Bilateral age 31    BREAST SURGERY      cataracts Bilateral      SECTION, LOW TRANSVERSE      x1    CHOLECYSTECTOMY      EYE SURGERY  2014, 2014    vitrectomy    EYE SURGERY Right 2016    HYSTERECTOMY      TAHBSO (patient is unsure if ovaries removed)    OOPHORECTOMY      REFRACTIVE SURGERY      TOTAL REDUCTION MAMMOPLASTY      approx 10 yrs ago     Family History   Problem Relation Age of Onset    Leukemia Father     Ovarian cancer Sister 35    Stroke Mother     Diabetes Mother     Hypertension Mother     Diabetes Paternal Grandmother     Breast cancer Maternal Aunt 65    HIV Brother     Achondroplasia Sister     Parkinsonism Maternal Aunt     Esophageal cancer Maternal Uncle         smoker    Amblyopia Neg Hx     Blindness Neg Hx     Cataracts Neg Hx     Glaucoma Neg Hx     Macular degeneration Neg Hx     Retinal detachment Neg Hx      Strabismus Neg Hx     Thyroid disease Neg Hx     Colon cancer Neg Hx      Social History     Socioeconomic History    Marital status: Single     Spouse name: Not on file    Number of children: 5    Years of education: Not on file    Highest education level: Not on file   Occupational History    Occupation:      Employer: [employer]     Comment: part-time   Social Needs    Financial resource strain: Not on file    Food insecurity:     Worry: Not on file     Inability: Not on file    Transportation needs:     Medical: Not on file     Non-medical: Not on file   Tobacco Use    Smoking status: Never Smoker    Smokeless tobacco: Never Used   Substance and Sexual Activity    Alcohol use: No     Alcohol/week: 0.0 standard drinks    Drug use: No    Sexual activity: Not Currently     Partners: Male     Birth control/protection: Post-menopausal, Surgical   Lifestyle    Physical activity:     Days per week: Not on file     Minutes per session: Not on file    Stress: Not on file   Relationships    Social connections:     Talks on phone: Not on file     Gets together: Not on file     Attends Alevism service: Not on file     Active member of club or organization: Not on file     Attends meetings of clubs or organizations: Not on file     Relationship status: Not on file   Other Topics Concern    Not on file   Social History Narrative    Not on file       Current Outpatient Medications:     amLODIPine (NORVASC) 10 MG tablet, Take 1 tablet (10 mg total) by mouth once daily., Disp: 90 tablet, Rfl: 1    atorvastatin (LIPITOR) 10 MG tablet, TAKE 1 TABLET EVERY DAY, Disp: 90 tablet, Rfl: 1    bumetanide (BUMEX) 2 MG tablet, Take 1 tablet (2 mg total) by mouth once daily., Disp: 30 tablet, Rfl: 11    citalopram (CELEXA) 10 MG tablet, TAKE 1 TABLET EVERY DAY, Disp: 90 tablet, Rfl: 1    clopidogrel (PLAVIX) 75 mg tablet, TAKE 1 TABLET EVERY DAY, Disp: 90 tablet, Rfl: 1    docusate  sodium (COLACE) 100 MG capsule, Take 200 mg by mouth once daily. , Disp: , Rfl:     hydrALAZINE (APRESOLINE) 100 MG tablet, Take 1 tablet (100 mg total) by mouth 3 (three) times daily., Disp: 90 tablet, Rfl: 3    insulin detemir U-100 (LEVEMIR FLEXTOUCH) 100 unit/mL (3 mL) SubQ InPn pen, Inject 5 Units into the skin once daily., Disp: 1.5 mL, Rfl: 1    LANCETS MISC, , Disp: , Rfl:     levothyroxine (SYNTHROID) 50 MCG tablet, Take 50 mcg by mouth once daily., Disp: , Rfl:     metoprolol tartrate (LOPRESSOR) 50 MG tablet, Take 50 mg by mouth 2 (two) times daily., Disp: , Rfl:     MULTIVITAMIN WITH MINERALS (HAIR,SKIN AND NAILS ORAL), Take 3 tablets by mouth once daily., Disp: , Rfl:     oxybutynin (DITROPAN XL) 15 MG TR24, TAKE 1 TABLET EVERY DAY, Disp: 90 tablet, Rfl: 3    polyethylene glycol (GLYCOLAX) 17 gram PwPk, Take 17 g by mouth once daily., Disp: , Rfl: 0    REVIEW OF SYSTEMS:  General: No fevers or chills; ENT: No sore throat; Allergy and Immunology: no persistent infections; Hematological and Lymphatic: No history of bleeding or easy bruising; Endocrine: negative; Respiratory: no cough, shortness of breath, or wheezing; Cardiovascular: no chest pain or dyspnea on exertion; Gastrointestinal: no abdominal pain/back, change in bowel habits, or bloody stools; Genito-Urinary: no dysuria, trouble voiding, or hematuria; Musculoskeletal: negative; Neurological: no TIA or stroke symptoms; Psychiatric: no nervousness, anxiety or depression.    PHYSICAL EXAM:      Pulse: 66         General appearance:  Alert, well-appearing, and in no distress.  Oriented to person, place, and time                    Neurological: Normal speech, no focal findings noted; CN II - XII grossly intact. All extremities with sensation to light touch.            Musculoskeletal: Digits/nail without cyanosis/clubbing.  Strength 5/5 all extremities.                    Neck: Supple, no significant adenopathy, no carotid bruit can be  auscultated                  Chest:  Clear to auscultation, no wheezes, rales or rhonchi, symmetric air entry. No use of accessory muscles               Cardiac: Normal rate and regular rhythm, S1 and S2 normal            Abdomen: Soft, nontender, nondistended, no masses or organomegaly, no hernia     No rebound tenderness noted; bowel sounds normal     Pulsatile aortic mass is non palpable.     No groin adenopathy      Extremities:      2+ radial pulse bilaterally     No BUE edema, Negative Manuel's test BUE    Skin: No tissue loss    LAB RESULTS:  No results found for: CBC  Lab Results   Component Value Date    LABPROT 11.4 08/27/2019    INR 1.1 08/27/2019     Lab Results   Component Value Date     09/09/2019    K 3.4 (L) 09/09/2019     09/09/2019    CO2 24 09/09/2019     (H) 09/09/2019    BUN 59 (H) 09/09/2019    CREATININE 4.1 (H) 09/09/2019    CALCIUM 8.2 (L) 09/09/2019    ANIONGAP 10 09/09/2019    EGFRNONAA 10 (A) 09/09/2019     Lab Results   Component Value Date    WBC 4.10 09/09/2019    RBC 4.35 09/09/2019    HGB 10.3 (L) 09/09/2019    HCT 33.4 (L) 09/09/2019    MCV 77 (L) 09/09/2019    MCH 23.7 (L) 09/09/2019    MCHC 30.8 (L) 09/09/2019    RDW 18.2 (H) 09/09/2019     09/09/2019    MPV 10.8 09/09/2019    GRAN 2.4 09/09/2019    GRAN 58.7 09/09/2019    LYMPH 0.7 (L) 09/09/2019    LYMPH 16.6 (L) 09/09/2019    MONO 0.9 09/09/2019    MONO 22.2 (H) 09/09/2019    EOS 0.1 09/09/2019    BASO 0.02 09/09/2019    EOSINOPHIL 2.0 09/09/2019    BASOPHIL 0.5 09/09/2019    DIFFMETHOD Automated 09/09/2019     .  Lab Results   Component Value Date    HGBA1C 8.6 (H) 08/27/2019       IMAGING:  All pertinent imaging has been reviewed and interpreted independently.    Vein mapping 9/2019:  Adequate R superior basilic vein and axillary vein ; Adequate L basilic vein superior and inferior, adequate L axillary     IMP/PLAN:  74 y.o. female with   Patient Active Problem List   Diagnosis    Severe obesity (BMI  35.0-39.9) with comorbidity    Sickle cell trait    Osteopenia    Hyperlipidemia LDL goal <100    HUMBERTO on CPAP    Hypothyroidism (acquired)    Hx-TIA (transient ischemic attack)    Vitamin D deficiency disease    Pulmonary hypertension    Type 2 diabetes mellitus with ESRD (end-stage renal disease)    Secondary renal hyperparathyroidism    Incomplete bladder emptying    Postmenopausal atrophic vaginitis    Rectocele    Mixed incontinence urge and stress    Chronic diastolic heart failure    Tortuous aorta    History of TIAs    Essential hypertension    ESRD on hemodialysis    Anemia of chronic disease    Depression    Debility    Chronic respiratory failure    Type 2 diabetes mellitus with foot ulcer, with long-term current use of insulin    Stable proliferative diabetic retinopathy associated with type 2 diabetes mellitus    Mild major depression    being managed by PCP and specialists who is here today for evaluation of long term HD access.    -Plan for LUE AVF (brachiobasilic) vs LUE AVG - will need preop cardiology evaluation due to orthopnea, deconditioning and known cardiac disease  -RTC after evaluation for surgical planning      I spent 20 minutes evaluating this patient and greater than 50% of the time was spent counseling, coordinator care and discussing the plan of care.  All questions were answered and patient stated understanding with agreement with the above treatment plan.    Keron Perez MD [facility]  Vascular and Endovascular Surgery

## 2019-09-30 NOTE — PATIENT INSTRUCTIONS
Creating a Hemodialysis Access    Before hemodialysis can be done, a way for blood to leave and return to your body (an access) is needed. During hemodialysis, needles placed into the access carry blood to and from the dialyzer. A hemodialysis access is usually created in your arm. The 2 main types of accesses are an arteriovenous fistula (AV fistula) and an arteriovenous graft (AV graft).  Creating your access  The hemodialysis access provides a large volume of rapidly flowing blood. It involves a surgical operation under anesthesia. You may be able to go home the same day. It is made during a short procedure using one of these methods:  · A fistula is made by connecting an artery to a nearby vein. The high pressure and blood flow in the artery are transferred into the vein and help it grow in size and thickness. This enlarged vein (fistula) eventually has high blood flow and is thick enough for needles to be placed safely several times each week during hemodialysis. It may need weeks or months to develop before it's ready to be used. A fistula is more efficient than the graft and has fewer long-term problems. Therefore, it is the preferred form of access.  · A graft (piece of synthetic tube) may be sewn between an artery and a vein if a fistula is not possible because of the small size of your veins. Blood flows rapidly through the graft from the artery to the vein. A graft is usually ready to use in a few weeks. Needles can be placed into the plastic tube to obtain blood during dialysis.  Both types of access may take weeks to months before they can be used. If dialysis is necessary immediately, a temporary venous catheter is used. A catheter that allows two way blood flow is placed into a large vein and the dialysis tubing is connected to the catheter. If both the AV fistula and graft are unsuccessful, a more permanent venous catheter is used.  The most common complications for hemodialysis access are  infection, clotting and decreased blood flow from clotting or other narrowing.  Date Last Reviewed: 1/1/2017  © 7791-7049 [employer]. [address] [place] [address], [place] [address]. All rights reserved. This information is not intended as a substitute for professional medical care. Always follow your healthcare professional's instructions.

## 2019-09-30 NOTE — LETTER
September 30, 2019        Sendy Elaine MD  [address] [facility]  [place] [address]             [facility]  [address]  [facility] [place] [address]  Phone: [phone]  Fax: [phone]   Patient: Erica Leblanc   MR Number: [id number]   YOB: 1945   Date of Visit: 9/30/2019       Dear Dr. Elaine:    Thank you for referring Erica Leblanc to me for evaluation. Below are the relevant portions of my assessment and plan of care.            If you have questions, please do not hesitate to call me. I look forward to following Erica along with you.    Sincerely,      Keron Perez MD           CC  No Recipients

## 2019-09-30 NOTE — TELEPHONE ENCOUNTER
Pt requested an appointment on MW and Dr. Gray was only pulling up Tuesday appointments. Pt scheduled on 10/14 with Dr. Braun. Thanks.

## 2019-10-02 ENCOUNTER — HOSPITAL ENCOUNTER (OUTPATIENT)
Dept: WOUND CARE | Facility: HOSPITAL | Age: 74
Discharge: HOME | End: 2019-10-02
Attending: FAMILY MEDICINE
Payer: MEDICARE

## 2019-10-02 ENCOUNTER — HOSPITAL ENCOUNTER (OUTPATIENT)
Dept: CARDIOLOGY | Facility: HOSPITAL | Age: 74
Discharge: HOME | End: 2019-10-02
Attending: FAMILY MEDICINE
Payer: MEDICARE

## 2019-10-02 DIAGNOSIS — L97.212 NON-PRESSURE CHRONIC ULCER OF RIGHT CALF WITH FAT LAYER EXPOSED: ICD-10-CM

## 2019-10-02 DIAGNOSIS — I89.0 LYMPHEDEMA: ICD-10-CM

## 2019-10-02 DIAGNOSIS — E11.622 TYPE 2 DIABETES MELLITUS WITH OTHER SKIN ULCER, WITH LONG-TERM CURRENT USE OF INSULIN: Primary | ICD-10-CM

## 2019-10-02 DIAGNOSIS — Z79.4 TYPE 2 DIABETES MELLITUS WITH OTHER SKIN ULCER, WITH LONG-TERM CURRENT USE OF INSULIN: Primary | ICD-10-CM

## 2019-10-02 LAB
LEFT ABI: 2.05
LEFT ARM BP: 108 MMHG
LEFT DORSALIS PEDIS: 221 MMHG
LEFT POSTERIOR TIBIAL: 160 MMHG
LEFT TBI: 1.21
LEFT TOE PRESSURE: 131 MMHG
RIGHT ABI: 2.36
RIGHT DORSALIS PEDIS: 133 MMHG
RIGHT POSTERIOR TIBIAL: 255 MMHG
RIGHT TBI: 1.39
RIGHT TOE PRESSURE: 150 MMHG

## 2019-10-02 PROCEDURE — 93922 CV US ANKLE BRACHIAL INDICES WO STRESS W TOE TRACINGS (CUPID ONLY): ICD-10-PCS | Mod: 26,HCNC,, | Performed by: INTERNAL MEDICINE

## 2019-10-02 PROCEDURE — 99214 OFFICE O/P EST MOD 30 MIN: CPT | Performed by: FAMILY MEDICINE

## 2019-10-02 PROCEDURE — 93922 UPR/L XTREMITY ART 2 LEVELS: CPT | Mod: 26,HCNC,, | Performed by: INTERNAL MEDICINE

## 2019-10-02 PROCEDURE — 99214 OFFICE O/P EST MOD 30 MIN: CPT | Mod: HCNC,,, | Performed by: FAMILY MEDICINE

## 2019-10-02 PROCEDURE — 93922 UPR/L XTREMITY ART 2 LEVELS: CPT | Mod: 50

## 2019-10-02 PROCEDURE — 99214 PR OFFICE/OUTPT VISIT, EST, LEVL IV, 30-39 MIN: ICD-10-PCS | Mod: HCNC,,, | Performed by: FAMILY MEDICINE

## 2019-10-03 NOTE — PROGRESS NOTES
PAM shows non compressible vessels, which means this is not normal  Please follow-up with Dr. Li or Dr. Perez for further management

## 2019-10-10 ENCOUNTER — PATIENT OUTREACH (OUTPATIENT)
Dept: ADMINISTRATIVE | Facility: OTHER | Age: 74
End: 2019-10-10

## 2019-10-14 ENCOUNTER — OFFICE VISIT (OUTPATIENT)
Dept: PODIATRY | Facility: CLINIC | Age: 74
End: 2019-10-14
Payer: MEDICARE

## 2019-10-14 VITALS — BODY MASS INDEX: 39.15 KG/M2 | HEIGHT: 65 IN | WEIGHT: 235 LBS

## 2019-10-14 DIAGNOSIS — E11.22 TYPE 2 DIABETES MELLITUS WITH STAGE 3 CHRONIC KIDNEY DISEASE, WITH LONG-TERM CURRENT USE OF INSULIN: Primary | ICD-10-CM

## 2019-10-14 DIAGNOSIS — Z79.4 TYPE 2 DIABETES MELLITUS WITH STAGE 3 CHRONIC KIDNEY DISEASE, WITH LONG-TERM CURRENT USE OF INSULIN: Primary | ICD-10-CM

## 2019-10-14 DIAGNOSIS — B35.1 ONYCHOMYCOSIS DUE TO DERMATOPHYTE: ICD-10-CM

## 2019-10-14 DIAGNOSIS — N18.30 TYPE 2 DIABETES MELLITUS WITH STAGE 3 CHRONIC KIDNEY DISEASE, WITH LONG-TERM CURRENT USE OF INSULIN: Primary | ICD-10-CM

## 2019-10-14 DIAGNOSIS — I27.20 PULMONARY HYPERTENSION: ICD-10-CM

## 2019-10-14 PROCEDURE — 99214 OFFICE O/P EST MOD 30 MIN: CPT | Mod: HCNC,S$GLB,, | Performed by: PODIATRIST

## 2019-10-14 PROCEDURE — 1101F PT FALLS ASSESS-DOCD LE1/YR: CPT | Mod: HCNC,CPTII,S$GLB, | Performed by: PODIATRIST

## 2019-10-14 PROCEDURE — 99214 PR OFFICE/OUTPT VISIT, EST, LEVL IV, 30-39 MIN: ICD-10-PCS | Mod: HCNC,S$GLB,, | Performed by: PODIATRIST

## 2019-10-14 PROCEDURE — 1101F PR PT FALLS ASSESS DOC 0-1 FALLS W/OUT INJ PAST YR: ICD-10-PCS | Mod: HCNC,CPTII,S$GLB, | Performed by: PODIATRIST

## 2019-10-14 PROCEDURE — 99999 PR PBB SHADOW E&M-EST. PATIENT-LVL III: CPT | Mod: PBBFAC,HCNC,, | Performed by: PODIATRIST

## 2019-10-14 PROCEDURE — 99999 PR PBB SHADOW E&M-EST. PATIENT-LVL III: ICD-10-PCS | Mod: PBBFAC,HCNC,, | Performed by: PODIATRIST

## 2019-10-14 RX ORDER — AMLODIPINE BESYLATE 10 MG/1
10 TABLET ORAL DAILY
Qty: 90 TABLET | Refills: 3 | Status: SHIPPED | OUTPATIENT
Start: 2019-10-14 | End: 2019-10-16 | Stop reason: SDUPTHER

## 2019-10-14 NOTE — LETTER
October 15, 2019      Sendy Elaine MD  [address]  [place] [address]           [facility]  [address]  [place] [address]  Phone: [phone]          Patient: Erica Leblanc   MR Number: [id number]   YOB: 1945   Date of Visit: 10/14/2019       Dear Dr. Sendy Elaine:    Thank you for referring Erica Leblanc to me for evaluation. Attached you will find relevant portions of my assessment and plan of care.    If you have questions, please do not hesitate to call me. I look forward to following Erica Leblanc along with you.    Sincerely,    Georgette Braun DPM    Enclosure  CC:  No Recipients    If you would like to receive this communication electronically, please contact [email or web address] or [phone] to request more information on [facility] Link access.    For providers and/or their staff who would like to refer a patient to [facility], please contact us through our one-stop-shop provider referral line, [facility], at [phone].    If you feel you have received this communication in error or would no longer like to receive these types of communications, please e-mail [email or web address]

## 2019-10-15 NOTE — PROGRESS NOTES
Subjective:      Patient ID: Erica Leblanc is a 74 y.o. female.    Chief Complaint: Diabetes Mellitus (PCP Dr Elaine ); Diabetic Foot Exam; and Nail Care    Erica Leblanc is a 74 y.o. female returns to clinic for follow up of tight leg ulcers.  Patient has left football dressing intact, however she relates urine was able to get onto the dressing secondary to incontinence.  Patient denies pain. No new pedal complaints.    10/15/19: Presents with elongated nails difficult to trim. Previously followed at  wound care for leg ulcers now healed. Previously followed by Dr. Gray. Patient new to me.      This patient has documented high risk feet requiring routine maintenance secondary to diabetes mellitis and those secondary complications of diabetes, as mentioned..    PCP: Sendy Elaine MD    Date Last Seen by PCP:   Chief Complaint   Patient presents with    Diabetes Mellitus     PCP Dr Elaine     Diabetic Foot Exam    Nail Care     Current shoe gear:  Casual slide on shoes     Hemoglobin A1C   Date Value Ref Range Status   08/27/2019 8.6 (H) 4.0 - 5.6 % Final     Comment:     ADA Screening Guidelines:  5.7-6.4%  Consistent with prediabetes  >or=6.5%  Consistent with diabetes  High levels of fetal hemoglobin interfere with the HbA1C  assay. Heterozygous hemoglobin variants (HbS, HgC, etc)do  not significantly interfere with this assay.   However, presence of multiple variants may affect accuracy.     07/20/2019 7.3 (H) 4.0 - 5.6 % Final     Comment:     ADA Screening Guidelines:  5.7-6.4%  Consistent with prediabetes  >or=6.5%  Consistent with diabetes  High levels of fetal hemoglobin interfere with the HbA1C  assay. Heterozygous hemoglobin variants (HbS, HgC, etc)do  not significantly interfere with this assay.   However, presence of multiple variants may affect accuracy.     03/23/2019 8.2 (H) 4.0 - 5.6 % Final     Comment:     ADA Screening Guidelines:  5.7-6.4%  Consistent with  prediabetes  >or=6.5%  Consistent with diabetes  High levels of fetal hemoglobin interfere with the HbA1C  assay. Heterozygous hemoglobin variants (HbS, HgC, etc)do  not significantly interfere with this assay.   However, presence of multiple variants may affect accuracy.         Patient Active Problem List   Diagnosis    Severe obesity (BMI 35.0-39.9) with comorbidity    Sickle cell trait    Osteopenia    Hyperlipidemia LDL goal <100    HUMBERTO on CPAP    Hypothyroidism (acquired)    Hx-TIA (transient ischemic attack)    Vitamin D deficiency disease    Pulmonary hypertension    Type 2 diabetes mellitus with ESRD (end-stage renal disease)    Secondary renal hyperparathyroidism    Incomplete bladder emptying    Postmenopausal atrophic vaginitis    Rectocele    Mixed incontinence urge and stress    Chronic diastolic heart failure    Tortuous aorta    History of TIAs    Essential hypertension    ESRD on hemodialysis    Anemia of chronic disease    Depression    Debility    Chronic respiratory failure    Type 2 diabetes mellitus with foot ulcer, with long-term current use of insulin    Stable proliferative diabetic retinopathy associated with type 2 diabetes mellitus    Mild major depression     Current Outpatient Medications on File Prior to Visit   Medication Sig Dispense Refill    atorvastatin (LIPITOR) 10 MG tablet TAKE 1 TABLET EVERY DAY 90 tablet 1    bumetanide (BUMEX) 2 MG tablet Take 1 tablet (2 mg total) by mouth once daily. 30 tablet 11    citalopram (CELEXA) 10 MG tablet TAKE 1 TABLET EVERY DAY 90 tablet 1    clopidogrel (PLAVIX) 75 mg tablet TAKE 1 TABLET EVERY DAY 90 tablet 1    docusate sodium (COLACE) 100 MG capsule Take 200 mg by mouth once daily.       hydrALAZINE (APRESOLINE) 100 MG tablet Take 1 tablet (100 mg total) by mouth 3 (three) times daily. 90 tablet 3    insulin detemir U-100 (LEVEMIR FLEXTOUCH) 100 unit/mL (3 mL) SubQ InPn pen Inject 5 Units into the skin once  daily. 1.5 mL 1    LANCETS MISC       levothyroxine (SYNTHROID) 50 MCG tablet Take 50 mcg by mouth once daily.      metoprolol tartrate (LOPRESSOR) 50 MG tablet Take 50 mg by mouth 2 (two) times daily.      MULTIVITAMIN WITH MINERALS (HAIR,SKIN AND NAILS ORAL) Take 3 tablets by mouth once daily.      oxybutynin (DITROPAN XL) 15 MG TR24 TAKE 1 TABLET EVERY DAY 90 tablet 3    polyethylene glycol (GLYCOLAX) 17 gram PwPk Take 17 g by mouth once daily.  0     No current facility-administered medications on file prior to visit.      Review of patient's allergies indicates:   Allergen Reactions    Ace inhibitors Other (See Comments)     Other reaction(s): cough     Past Surgical History:   Procedure Laterality Date    BREAST BIOPSY      breast reduction Bilateral age 31    BREAST SURGERY      cataracts Bilateral      SECTION, LOW TRANSVERSE      x1    CHOLECYSTECTOMY      EYE SURGERY  2014, 2014    vitrectomy    EYE SURGERY Right 2016    HYSTERECTOMY      TAHBSO (patient is unsure if ovaries removed)    OOPHORECTOMY      REFRACTIVE SURGERY      TOTAL REDUCTION MAMMOPLASTY      approx 10 yrs ago     Family History   Problem Relation Age of Onset    Leukemia Father     Ovarian cancer Sister 35    Stroke Mother     Diabetes Mother     Hypertension Mother     Diabetes Paternal Grandmother     Breast cancer Maternal Aunt 65    HIV Brother     Achondroplasia Sister     Parkinsonism Maternal Aunt     Esophageal cancer Maternal Uncle         smoker    Amblyopia Neg Hx     Blindness Neg Hx     Cataracts Neg Hx     Glaucoma Neg Hx     Macular degeneration Neg Hx     Retinal detachment Neg Hx     Strabismus Neg Hx     Thyroid disease Neg Hx     Colon cancer Neg Hx        Review of Systems   Constitution: Negative for chills and fever.   Cardiovascular: Positive for leg swelling. Negative for chest pain and claudication.   Respiratory: Negative for cough and shortness of  "breath.    Skin: Positive for dry skin and nail changes. Negative for itching and rash.   Musculoskeletal: Positive for arthritis and joint pain. Negative for falls, joint swelling, muscle cramps and muscle weakness.   Gastrointestinal: Negative for diarrhea, nausea and vomiting.   Neurological: Positive for numbness and paresthesias. Negative for tremors and weakness.   Psychiatric/Behavioral: Negative for altered mental status and hallucinations.         Objective:       Vitals:    10/14/19 1419   Weight: 106.6 kg (235 lb)   Height: 5' 5" (1.651 m)   PainSc: 0-No pain     Physical Exam   Constitutional:   General: Pt. is well-developed, well-nourished, appears stated age, in no acute distress, alert and oriented x 3. No evidence of depression, anxiety, or agitation. Calm, cooperative, and communicative. Appropriate interactions and affect.       Cardiovascular:   Pulses:       Dorsalis pedis pulses are 1+ on the right side, and 1+ on the left side.        Posterior tibial pulses are 1+ on the right side, and 1+ on the left side.   There is decreased digital hair.    Musculoskeletal:        Right ankle: She exhibits normal range of motion and no swelling. No tenderness. Achilles tendon exhibits no pain and no defect.        Left ankle: She exhibits normal range of motion and no swelling. No tenderness. Achilles tendon exhibits no pain and no defect.        Right foot: There is normal range of motion and no tenderness.        Left foot: There is normal range of motion and no tenderness.   Muscle strength is 5/5 in all groups bilaterally.    Decreased stride, station of gait.  apropulsive toe off.  Increased angle and base of gait.    Patient has hammertoes of digits 2-5 bilateral partially reducible without symptom today.    Visible and palpable bunion without pain at dorsomedial 1st metatarsal head right and left.  Hallux abducted right and left partially reducible, tracks laterally without being track bound.  No " ecchymosis, erythema, edema, or cardinal signs infection or signs of trauma same foot.    Fat pad atrophy to heels and met heads bilateral     Neurological: No sensory deficit.   Questa-Gloria 5.07 monofilament is intact bilateral feet. Sharp/dull sensation is also intact Bilateral feet.           Skin: Skin is warm and dry. No abrasion, no ecchymosis, no lesion (No wounds noted. ) and no rash noted. She is not diaphoretic. No cyanosis or erythema. No pallor. Nails show no clubbing.   Xerosis bilaterally     Toenails 1-5 bilaterally thickened by 2-3 mm, discolored/yellowed, dystrophic, brittle with subungual debris.    Interdigital Spaces clean, dry and without evidence of break in skin integrity   Psychiatric: She has a normal mood and affect. Her speech is normal.   Nursing note and vitals reviewed.                  Assessment:       Encounter Diagnoses   Name Primary?    Type 2 diabetes mellitus with stage 3 chronic kidney disease, with long-term current use of insulin Yes    Onychomycosis due to dermatophyte          Plan:       Erica was seen today for diabetes mellitus, diabetic foot exam and nail care.    Diagnoses and all orders for this visit:    Type 2 diabetes mellitus with stage 3 chronic kidney disease, with long-term current use of insulin    Onychomycosis due to dermatophyte      I counseled the patient on her conditions, their implications and medical management. Greater than 20 minutes spent on education about the diabetic foot, neuropathy, and prevention of limb loss.    Greater than 50% of this visit spent on counseling and coordination of care.    Education about the diabetic foot, neuropathy, and prevention of limb loss.    - Shoe inspection. Diabetic Foot Education. Patient reminded of the importance of good nutrition and blood sugar control to help prevent podiatric complications of diabetes. Patient instructed on proper foot hygeine. We discussed wearing proper shoe gear, daily foot  inspections, never walking without protective shoe gear, never putting sharp instruments to feet, routine podiatric nail visits every 2-3 months.   - With patient's permission, nails were aggressively reduced and debrided x 10 to their soft tissue attachment mechanically and with electric , removing all offending nail and debris. Patient relates relief following the procedure. He will continue to monitor the areas daily, inspect his feet, wear protective shoe gear when ambulatory, moisturizer to maintain skin integrity and follow in this office in approximately 3 months, sooner p.r.n.     Measurements obtained for compression stockings.     F/u 3 months.     Georgette Braun DPM

## 2019-10-16 ENCOUNTER — PATIENT OUTREACH (OUTPATIENT)
Dept: ADMINISTRATIVE | Facility: OTHER | Age: 74
End: 2019-10-16

## 2019-10-16 DIAGNOSIS — I27.20 PULMONARY HYPERTENSION: ICD-10-CM

## 2019-10-16 RX ORDER — AMLODIPINE BESYLATE 10 MG/1
10 TABLET ORAL DAILY
Qty: 90 TABLET | Refills: 3 | Status: SHIPPED | OUTPATIENT
Start: 2019-10-16 | End: 2020-11-30

## 2019-10-18 ENCOUNTER — OFFICE VISIT (OUTPATIENT)
Dept: CARDIOLOGY | Facility: CLINIC | Age: 74
End: 2019-10-18
Payer: MEDICARE

## 2019-10-18 VITALS
SYSTOLIC BLOOD PRESSURE: 176 MMHG | HEART RATE: 65 BPM | WEIGHT: 222.88 LBS | BODY MASS INDEX: 37.09 KG/M2 | DIASTOLIC BLOOD PRESSURE: 84 MMHG | OXYGEN SATURATION: 90 % | RESPIRATION RATE: 16 BRPM

## 2019-10-18 DIAGNOSIS — I50.32 CHRONIC DIASTOLIC HEART FAILURE: Chronic | ICD-10-CM

## 2019-10-18 DIAGNOSIS — I10 ESSENTIAL HYPERTENSION: Chronic | ICD-10-CM

## 2019-10-18 DIAGNOSIS — E78.5 HYPERLIPIDEMIA LDL GOAL <100: Chronic | ICD-10-CM

## 2019-10-18 DIAGNOSIS — Z01.810 PREOP CARDIOVASCULAR EXAM: Primary | ICD-10-CM

## 2019-10-18 PROCEDURE — 99999 PR PBB SHADOW E&M-EST. PATIENT-LVL III: CPT | Mod: PBBFAC,HCNC,, | Performed by: INTERNAL MEDICINE

## 2019-10-18 PROCEDURE — 99214 PR OFFICE/OUTPT VISIT, EST, LEVL IV, 30-39 MIN: ICD-10-PCS | Mod: HCNC,S$GLB,, | Performed by: INTERNAL MEDICINE

## 2019-10-18 PROCEDURE — 1101F PR PT FALLS ASSESS DOC 0-1 FALLS W/OUT INJ PAST YR: ICD-10-PCS | Mod: HCNC,CPTII,S$GLB, | Performed by: INTERNAL MEDICINE

## 2019-10-18 PROCEDURE — 99999 PR PBB SHADOW E&M-EST. PATIENT-LVL III: ICD-10-PCS | Mod: PBBFAC,HCNC,, | Performed by: INTERNAL MEDICINE

## 2019-10-18 PROCEDURE — 99214 OFFICE O/P EST MOD 30 MIN: CPT | Mod: HCNC,S$GLB,, | Performed by: INTERNAL MEDICINE

## 2019-10-18 PROCEDURE — 1101F PT FALLS ASSESS-DOCD LE1/YR: CPT | Mod: HCNC,CPTII,S$GLB, | Performed by: INTERNAL MEDICINE

## 2019-10-18 NOTE — PROGRESS NOTES
CARDIOVASCULAR CONSULTATION    REASON FOR CONSULT:   Erica Leblanc is a 74 y.o. female who presents for establishing care with me..      HISTORY OF PRESENT ILLNESS:     Notes from October 2019:  Patient here for follow-up.  Denies any chest pains at rest on exertion, orthopnea, PND.  Has been started on dialysis since her last visit with me.  States that she feels great after starting dialysis and feels like she has more appetite and a breathing has gotten better.  Walks slowly with the help of a walker.  Denies PND.  States that since the dialysis her orthopnea has gotten much better.      Notes from August 2019:  Patient doing fine.  Denies any chest pains at rest on exertion, orthopnea, PND.  Does have chronic dyspnea on exertion.  States that she feels her pedal edema is going down as she is responding to Bumex better.      Notes from June 2019:  Patient is a very pleasant 74-year-old lady with a past medical history of heart failure with preserved ejection fraction, diabetes, chronic kidney disease, leg wounds, hypertension who is here for follow-up.  She denies any chest pains at rest on exertion, orthopnea, PND.  Denies any claudication like symptoms.  States that she [facility] to [facility].  Has been doing salt restriction diet.      PAST MEDICAL HISTORY:     Past Medical History:   Diagnosis Date    Acute respiratory failure with hypoxia     Anemia of chronic kidney failure, stage 4 (severe) 4/5/2019    Cataracts, bilateral     CHF (congestive heart failure)     CKD (chronic kidney disease) stage 3, GFR 30-59 ml/min     CKD (chronic kidney disease) stage 3, GFR 30-59 ml/min     Controlled type 2 diabetes mellitus with proteinuria or albuminuria     Depression     Diabetes with neurologic complications     Diabetic retinopathy of both eyes     Edema     Glaucoma     History of colonic polyps     Hx-TIA (transient ischemic attack) 11/2008    Hyperlipidemia LDL goal < 100      Hypertension     Hypothyroidism     Major depressive disorder, single episode, mild 2016    Mixed incontinence urge and stress     Obesity     Obstructive sleep apnea on CPAP     19:  Home CPAP machine broken, per patient & son    Osteopenia     Proteinuria     Sickle cell trait     TIA (transient ischemic attack)     Trouble in sleeping     Type 2 diabetes mellitus with ophthalmic manifestations     Type 2 diabetes with stage 3 chronic kidney disease GFR 30-59     Type II or unspecified type diabetes mellitus with renal manifestations, uncontrolled(250.42)     Uncontrolled type 2 diabetes mellitus with peripheral circulatory disorder 2019    Urge incontinence 2016    Urge incontinence     Venous stasis ulcer     bilateral lower legs    Vitamin D deficiency disease        PAST SURGICAL HISTORY:     Past Surgical History:   Procedure Laterality Date    BREAST BIOPSY      breast reduction Bilateral age 31    BREAST SURGERY      cataracts Bilateral      SECTION, LOW TRANSVERSE      x1    CHOLECYSTECTOMY      EYE SURGERY  2014, 2014    vitrectomy    EYE SURGERY Right 2016    HYSTERECTOMY      TAHBSO (patient is unsure if ovaries removed)    OOPHORECTOMY      REFRACTIVE SURGERY      TOTAL REDUCTION MAMMOPLASTY      approx 10 yrs ago       ALLERGIES AND MEDICATION:     Review of patient's allergies indicates:   Allergen Reactions    Ace inhibitors Other (See Comments)     Other reaction(s): cough        Medication List           Accurate as of 2019  1:23 PM. If you have any questions, ask your nurse or doctor.               CONTINUE taking these medications    amLODIPine 10 MG tablet  Commonly known as:  NORVASC  Take 1 tablet (10 mg total) by mouth once daily.     atorvastatin 10 MG tablet  Commonly known as:  LIPITOR  TAKE 1 TABLET EVERY DAY     bumetanide 2 MG tablet  Commonly known as:  BUMEX  Take 1 tablet (2 mg total) by mouth  once daily.     citalopram 10 MG tablet  Commonly known as:  CELEXA  TAKE 1 TABLET EVERY DAY     clopidogrel 75 mg tablet  Commonly known as:  PLAVIX  TAKE 1 TABLET EVERY DAY     docusate sodium 100 MG capsule  Commonly known as:  COLACE     HAIR,SKIN AND NAILS ORAL     hydrALAZINE 100 MG tablet  Commonly known as:  APRESOLINE  Take 1 tablet (100 mg total) by mouth 3 (three) times daily.     insulin detemir U-100 100 unit/mL (3 mL) Inpn pen  Commonly known as:  LEVEMIR FLEXTOUCH  Inject 5 Units into the skin once daily.     LANCETS MISC     levothyroxine 50 MCG tablet  Commonly known as:  SYNTHROID     metoprolol tartrate 50 MG tablet  Commonly known as:  LOPRESSOR     oxybutynin 15 MG Tr24  Commonly known as:  DITROPAN XL  TAKE 1 TABLET EVERY DAY     polyethylene glycol 17 gram Pwpk  Commonly known as:  GLYCOLAX  Take 17 g by mouth once daily.            SOCIAL HISTORY:     Social History     Socioeconomic History    Marital status: Single     Spouse name: Not on file    Number of children: 5    Years of education: Not on file    Highest education level: Not on file   Occupational History    Occupation:      Employer: [employer]     Comment: part-time   Social Needs    Financial resource strain: Not on file    Food insecurity:     Worry: Not on file     Inability: Not on file    Transportation needs:     Medical: Not on file     Non-medical: Not on file   Tobacco Use    Smoking status: Never Smoker    Smokeless tobacco: Never Used   Substance and Sexual Activity    Alcohol use: No     Alcohol/week: 0.0 standard drinks    Drug use: No    Sexual activity: Not Currently     Partners: Male     Birth control/protection: Post-menopausal, Surgical   Lifestyle    Physical activity:     Days per week: Not on file     Minutes per session: Not on file    Stress: Not on file   Relationships    Social connections:     Talks on phone: Not on file     Gets together: Not on file      Attends Restorationist service: Not on file     Active member of club or organization: Not on file     Attends meetings of clubs or organizations: Not on file     Relationship status: Not on file   Other Topics Concern    Not on file   Social History Narrative    Not on file       FAMILY HISTORY:     Family History   Problem Relation Age of Onset    Leukemia Father     Ovarian cancer Sister 35    Stroke Mother     Diabetes Mother     Hypertension Mother     Diabetes Paternal Grandmother     Breast cancer Maternal Aunt 65    HIV Brother     Achondroplasia Sister     Parkinsonism Maternal Aunt     Esophageal cancer Maternal Uncle         smoker    Amblyopia Neg Hx     Blindness Neg Hx     Cataracts Neg Hx     Glaucoma Neg Hx     Macular degeneration Neg Hx     Retinal detachment Neg Hx     Strabismus Neg Hx     Thyroid disease Neg Hx     Colon cancer Neg Hx        REVIEW OF SYSTEMS:   Review of Systems   Constitutional: Negative.    HENT: Negative.    Eyes: Negative.    Respiratory: Positive for shortness of breath.    Cardiovascular: Negative.    Gastrointestinal: Negative.    Genitourinary: Negative.    Musculoskeletal: Negative.    Skin: Negative.    Neurological: Negative.    Endo/Heme/Allergies: Negative.        A 10 point review of systems was performed and all the pertinent positives have been mentioned. Rest of review of systems was negative.        PHYSICAL EXAM:     Vitals:    10/18/19 1317   BP: (!) 176/84   Pulse: 65   Resp: 16    Body mass index is 37.09 kg/m².  Weight: 101.1 kg (222 lb 14.2 oz)         Physical Exam   Constitutional: She is oriented to person, place, and time. She appears well-developed and well-nourished. She is active.   HENT:   Head: Normocephalic and atraumatic.   Right Ear: Hearing normal.   Left Ear: Hearing normal.   Nose: Nose normal.   Mouth/Throat: Mucous membranes are normal.   Eyes: Pupils are equal, round, and reactive to light. Conjunctivae and lids are  normal.   Neck: Normal range of motion. Neck supple.   Cardiovascular: Normal rate, regular rhythm, normal heart sounds, intact distal pulses and normal pulses.   Pulmonary/Chest: Effort normal and breath sounds normal.   Abdominal: Soft. Normal appearance. There is no tenderness.   Musculoskeletal: She exhibits no deformity.   Neurological: She is alert and oriented to person, place, and time.   Skin: Skin is warm, dry and intact.   Psychiatric: She has a normal mood and affect. Her speech is normal.   Nursing note and vitals reviewed.        DATA:     Laboratory:  CBC:  Recent Labs   Lab 07/19/19  0934 08/27/19  1628 09/09/19  0419   WBC 5.55 4.25 4.10   Hemoglobin 13.8 11.6 L 10.3 L   Hematocrit 42.9 36.6 L 33.4 L   Platelets 240 235 212       CHEMISTRIES:  Recent Labs   Lab 06/20/17  1345  07/19/19  0934  08/29/19  1008  09/07/19  0500 09/08/19  0512 09/09/19  0419   Glucose 88   < > 53 L   < > 178 H  178 H   < > 113 H 126 H 130 H   Sodium 143   < > 143   < > 139  139   < > 139 139 138   Potassium 4.0   < > 4.0   < > 4.4  4.4   < > 3.3 L 3.5 3.4 L   BUN, Bld 43 H   < > 57 H   < > 71 H  71 H   < > 70 H 50 H 59 H   Creatinine 2.6 H   < > 3.6 H   < > 4.9 H  4.9 H   < > 4.3 H 3.6 H 4.1 H   eGFR if  21 A   < > 14 A   < > 9 A  9 A   < > 11 A 14 A 12 A   eGFR if non  18 A   < > 12 A   < > 8 A  8 A   < > 10 A 12 A 10 A   Calcium 9.0   < > 8.9   < > 8.2 L  8.2 L   < > 8.1 L 8.1 L 8.2 L   Magnesium 2.0  --  2.0  --  2.3  --   --   --   --     < > = values in this interval not displayed.       CARDIAC BIOMARKERS:  Recent Labs   Lab 08/27/19  1628 08/27/19  2240 08/28/19  0413   Troponin I 0.055 H 0.021 0.034 H       COAGS:  Recent Labs   Lab 06/12/17  0703 08/27/19  1628   INR 0.9 1.1       LIPIDS/LFTS:  Recent Labs   Lab 01/06/18  0840 03/03/18  0833  03/23/19  1102  09/02/19  0431 09/03/19  0425 09/09/19  0419   Cholesterol 122 113 L  --  123  --   --   --   --    Triglycerides 92  57  --  66  --   --   --   --    HDL 46 51  --  57  --   --   --   --    LDL Cholesterol 57.6 L 50.6 L  --  52.8 L  --   --   --   --    Non-HDL Cholesterol 76 62  --  66  --   --   --   --    AST 17 99 H   < > 22   < > 22 21 24   ALT 34 120 H   < > 39   < > 20 21 24    < > = values in this interval not displayed.       Hemoglobin A1C   Date Value Ref Range Status   08/27/2019 8.6 (H) 4.0 - 5.6 % Final     Comment:     ADA Screening Guidelines:  5.7-6.4%  Consistent with prediabetes  >or=6.5%  Consistent with diabetes  High levels of fetal hemoglobin interfere with the HbA1C  assay. Heterozygous hemoglobin variants (HbS, HgC, etc)do  not significantly interfere with this assay.   However, presence of multiple variants may affect accuracy.     07/20/2019 7.3 (H) 4.0 - 5.6 % Final     Comment:     ADA Screening Guidelines:  5.7-6.4%  Consistent with prediabetes  >or=6.5%  Consistent with diabetes  High levels of fetal hemoglobin interfere with the HbA1C  assay. Heterozygous hemoglobin variants (HbS, HgC, etc)do  not significantly interfere with this assay.   However, presence of multiple variants may affect accuracy.     03/23/2019 8.2 (H) 4.0 - 5.6 % Final     Comment:     ADA Screening Guidelines:  5.7-6.4%  Consistent with prediabetes  >or=6.5%  Consistent with diabetes  High levels of fetal hemoglobin interfere with the HbA1C  assay. Heterozygous hemoglobin variants (HbS, HgC, etc)do  not significantly interfere with this assay.   However, presence of multiple variants may affect accuracy.         TSH  Recent Labs   Lab 01/06/18  0840 12/15/18  1012 03/23/19  1102   TSH 2.240 1.722 3.298       The ASCVD Risk score (Norma DC Jr., et al., 2013) failed to calculate for the following reasons:    The valid total cholesterol range is 130 to 320 mg/dL           Cardiovascular Testing:      Echo: 6-17  CONCLUSIONS     1 - Normal left ventricular systolic function (EF 65-70%).     2 - No diagnostic regional wall motion  abnormalities.     3 - Concentric remodeling.     4 - Impaired LV relaxation, elevated LAP (grade 2 diastolic dysfunction).     5 - Trivial tricuspid regurgitation.     6 - The estimated PA systolic pressure is greater than 18 mmHg.      NST: 7-17  Impression: NORMAL MYOCARDIAL PERFUSION  1. The perfusion scan is free of evidence for myocardial ischemia or injury.   2. Resting wall motion is physiologic.   3. Visually estimated LV function is normal.   4. The ventricular volumes are normal at rest and stress.   5. The extracardiac distribution of radioactivity is normal.      Carotid ultrasound:  5-18  CONCLUSIONS   There is 0 - 19% right Internal Carotid stenosis.  There is 0 - 19% left Internal Carotid stenosis.     PAM:  10-18  · Elevated bilateral PAM suggesting calcified noncompressible vessels     LDL-53    3-19     Lower extremity arterial ultrasound:  · No hemodynamically significant plaque bilaterally  · PAM consistent with medial calcinosis    Renal artery ultrasound in June 2019:  · This was a technically difficult study. This study was limited due to excessive bowel gas.  · There is insignificant stenosis (0-59%) in the Right Renal Artery.  · Elevated right renal resistive index suggestive of intrinsic kidney disease.  · Right kidney 12.00 cm.  · There is insignificant stenosis (0-59%) in the Left Renal Artery.  · Elevated left renal resistive index suggestive of intrinsic kidney disease.  · Left kidney 11.80 cm.    Ultrasound legs in November 2018:    · No hemodynamically significant plaque bilaterally  · PAM consistent with medial calcinosis    ABIs in October 2018:    · Elevated bilateral PAM suggesting calcified noncompressible vessels          ASSESSMENT AND PLAN     Patient Active Problem List   Diagnosis    Severe obesity (BMI 35.0-39.9) with comorbidity    Sickle cell trait    Osteopenia    Hyperlipidemia LDL goal <100    HUMBERTO on CPAP    Hypothyroidism (acquired)    Hx-TIA (transient  ischemic attack)    Vitamin D deficiency disease    Pulmonary hypertension    Type 2 diabetes mellitus with ESRD (end-stage renal disease)    Secondary renal hyperparathyroidism    Incomplete bladder emptying    Postmenopausal atrophic vaginitis    Rectocele    Mixed incontinence urge and stress    Chronic diastolic heart failure    Tortuous aorta    History of TIAs    Essential hypertension    ESRD on hemodialysis    Anemia of chronic disease    Depression    Debility    Chronic respiratory failure    Type 2 diabetes mellitus with foot ulcer, with long-term current use of insulin    Stable proliferative diabetic retinopathy associated with type 2 diabetes mellitus    Mild major depression       1.  Hypertension:  Uncontrolled.  Has been taking hydralazine 50 mg t.i.d..  Go back to 100 mg t.i.d..    2.  Heart failure with preserved ejection fraction.  Now on dialysis.  Continue current therapy.    3.  Diabetes:  Being managed by primary    4.  Continue follow-up with Wound care clinic.    5.  Dyslipidemia:  On Lipitor    6.  Preop:  Patient with limited exercise tolerance.  Check nuclear stress test prior to risk assessment for vascular surgery.      Thank you very much for involving me in the care of your patient.  Please do not hesitate to contact me if there are any questions.      Murali Li MD, FACC, [facility]  Interventional Cardiologist, [facility].     Follow-up after stress test      This note was dictated with the help of speech recognition software.  There might be un-intended errors and/or substitutions.

## 2019-10-22 NOTE — TELEPHONE ENCOUNTER
----- Message from Tabitha Young sent at 10/21/2019  4:23 PM CDT -----  Contact: Self  [phone]  Type: RX Refill Request    Who Called: Self    Have you contacted your pharmacy:yes    Refill or New Rx:Refill    RX Name and Strength:metoprolol tartrate (LOPRESSOR) 50 MG tablet    Preferred Pharmacy with phone number:   [facility]/pharmacy #[id number] - [place] [address]  [address]  [place] [address]  Phone: [phone] Fax: [phone]    Local or Mail Order:Local    Would the patient rather a call back or a response via [facility]? Call back    Best Call Back Number: [phone]

## 2019-10-24 RX ORDER — METOPROLOL TARTRATE 50 MG/1
50 TABLET ORAL 2 TIMES DAILY
Qty: 100 TABLET | Refills: 3 | Status: SHIPPED | OUTPATIENT
Start: 2019-10-24 | End: 2020-07-17 | Stop reason: SDUPTHER

## 2019-10-25 DIAGNOSIS — G47.33 OBSTRUCTIVE SLEEP APNEA ON CPAP: ICD-10-CM

## 2019-10-25 DIAGNOSIS — I27.20 PULMONARY HYPERTENSION: ICD-10-CM

## 2019-10-25 RX ORDER — HYDRALAZINE HYDROCHLORIDE 100 MG/1
100 TABLET, FILM COATED ORAL 3 TIMES DAILY
Qty: 90 TABLET | Refills: 3 | Status: SHIPPED | OUTPATIENT
Start: 2019-10-25 | End: 2019-11-07 | Stop reason: SDUPTHER

## 2019-10-25 NOTE — TELEPHONE ENCOUNTER
----- Message from Fina Piña sent at 10/25/2019  2:33 PM CDT -----  Contact: Patient ph [phone]  Type: RX Refill Request    Who Called: Patient    Have you contacted your pharmacy: Yes    Refill or New Rx: Refill    RX Name and Strength: hydrALAZINE (APRESOLINE) 100 MG tablet    Is this a 30 day or 90 day RX: 90 day    Preferred Pharmacy with phone number: .    [facility]/pharmacy #[id number] - [place] - [address]  [address] [place] [address]  Phone: [phone] Fax: [phone]    Local or Mail Order: Local    Would the patient rather a call back or a response via [facility]? Call back    Best Call Back Number: [phone]    Additional Information: Patient states she took the last one this morning and she need 2 more dosages this afternoon. Need medication called in ASAP!

## 2019-10-25 NOTE — TELEPHONE ENCOUNTER
----- Message from iFna Piña sent at 10/25/2019  2:33 PM CDT -----  Contact: Patient ph [phone]  Type: RX Refill Request    Who Called: Patient    Have you contacted your pharmacy: Yes    Refill or New Rx: Refill    RX Name and Strength: hydrALAZINE (APRESOLINE) 100 MG tablet    Is this a 30 day or 90 day RX: 90 day    Preferred Pharmacy with phone number: .    [facility]/pharmacy #[id number] - [place] - [address]  [address] [place] [address]  Phone: [phone] Fax: [phone]    Local or Mail Order: Local    Would the patient rather a call back or a response via [facility]? Call back    Best Call Back Number: [phone]    Additional Information: Patient states she took the last one this morning and she need 2 more dosages this afternoon. Need medication called in ASAP!

## 2019-10-28 ENCOUNTER — HOSPITAL ENCOUNTER (OUTPATIENT)
Dept: CARDIOLOGY | Facility: HOSPITAL | Age: 74
Discharge: HOME | End: 2019-10-28
Attending: INTERNAL MEDICINE
Payer: MEDICARE

## 2019-10-28 ENCOUNTER — HOSPITAL ENCOUNTER (OUTPATIENT)
Dept: RADIOLOGY | Facility: HOSPITAL | Age: 74
Discharge: HOME | End: 2019-10-28
Attending: INTERNAL MEDICINE
Payer: MEDICARE

## 2019-10-28 DIAGNOSIS — Z01.810 PREOP CARDIOVASCULAR EXAM: ICD-10-CM

## 2019-10-28 LAB
CV STRESS BASE HR: 63 BPM
DIASTOLIC BLOOD PRESSURE: 66 MMHG
NUC STRESS DIASTOLIC VOLUME INDEX: 78
NUC STRESS EJECTION FRACTION: 76 %
NUC STRESS SYSTOLIC VOLUME INDEX: 19
OHS CV CPX 85 PERCENT MAX PREDICTED HEART RATE MALE: 120
OHS CV CPX MAX PREDICTED HEART RATE: 142
OHS CV CPX PATIENT IS FEMALE: 1
OHS CV CPX PATIENT IS MALE: 0
OHS CV CPX PEAK DIASTOLIC BLOOD PRESSURE: 63 MMHG
OHS CV CPX PEAK HEAR RATE: 74 BPM
OHS CV CPX PEAK RATE PRESSURE PRODUCT: 7548
OHS CV CPX PEAK SYSTOLIC BLOOD PRESSURE: 102 MMHG
OHS CV CPX PERCENT MAX PREDICTED HEART RATE ACHIEVED: 52
OHS CV CPX RATE PRESSURE PRODUCT PRESENTING: 8442
STRESS ECHO TARGET HR: 125 BPM
SYSTOLIC BLOOD PRESSURE: 134 MMHG

## 2019-10-28 PROCEDURE — 78452 STRESS TEST WITH MYOCARDIAL PERFUSION (CUPID ONLY): ICD-10-PCS | Mod: 26,HCNC,, | Performed by: INTERNAL MEDICINE

## 2019-10-28 PROCEDURE — 93018 STRESS TEST WITH MYOCARDIAL PERFUSION (CUPID ONLY): ICD-10-PCS | Mod: HCNC,,, | Performed by: INTERNAL MEDICINE

## 2019-10-28 PROCEDURE — 93016 CV STRESS TEST SUPVJ ONLY: CPT | Mod: HCNC,,, | Performed by: INTERNAL MEDICINE

## 2019-10-28 PROCEDURE — 93017 CV STRESS TEST TRACING ONLY: CPT | Mod: HCNC

## 2019-10-28 PROCEDURE — 63600175 PHARM REV CODE 636 W HCPCS: Mod: HCNC | Performed by: INTERNAL MEDICINE

## 2019-10-28 PROCEDURE — 93016 STRESS TEST WITH MYOCARDIAL PERFUSION (CUPID ONLY): ICD-10-PCS | Mod: HCNC,,, | Performed by: INTERNAL MEDICINE

## 2019-10-28 PROCEDURE — 93018 CV STRESS TEST I&R ONLY: CPT | Mod: HCNC,,, | Performed by: INTERNAL MEDICINE

## 2019-10-28 PROCEDURE — 78452 HT MUSCLE IMAGE SPECT MULT: CPT | Mod: 26,HCNC,, | Performed by: INTERNAL MEDICINE

## 2019-10-28 PROCEDURE — A9502 TC99M TETROFOSMIN: HCPCS | Mod: HCNC

## 2019-10-28 RX ORDER — REGADENOSON 0.08 MG/ML
0.4 INJECTION, SOLUTION INTRAVENOUS ONCE
Status: COMPLETED | OUTPATIENT
Start: 2019-10-28 | End: 2019-10-28

## 2019-10-28 RX ADMIN — REGADENOSON 0.4 MG: 0.08 INJECTION, SOLUTION INTRAVENOUS at 10:10

## 2019-10-30 ENCOUNTER — TELEPHONE (OUTPATIENT)
Dept: VASCULAR SURGERY | Facility: CLINIC | Age: 74
End: 2019-10-30

## 2019-10-30 NOTE — TELEPHONE ENCOUNTER
----- Message from Angela Nayak sent at 10/30/2019 12:32 PM CDT -----  Contact: Alycia/ Ja/ [phone]  Type: Patient Call Back    Who called:  Alycia    What is the request in detail:  Alycia would like staff to give her a call, she stated patient needs to have a Access appointment.  I wasn't sure if that was something I could schedule.  Thank you    Would the patient rather a call back or a response via [facility]?   Call back    Best call back number:  [phone]

## 2019-10-30 NOTE — TELEPHONE ENCOUNTER
Call to dialysis center to let them know that waiting for Dr. Perez to see if patient needs appointment or if surgery can be scheduled. Explained would let patient know as soon as any information was received.

## 2019-11-07 ENCOUNTER — PATIENT OUTREACH (OUTPATIENT)
Dept: ADMINISTRATIVE | Facility: OTHER | Age: 74
End: 2019-11-07

## 2019-11-07 DIAGNOSIS — I27.20 PULMONARY HYPERTENSION: ICD-10-CM

## 2019-11-07 DIAGNOSIS — G47.33 OBSTRUCTIVE SLEEP APNEA ON CPAP: ICD-10-CM

## 2019-11-07 RX ORDER — HYDRALAZINE HYDROCHLORIDE 100 MG/1
100 TABLET, FILM COATED ORAL 3 TIMES DAILY
Qty: 90 TABLET | Refills: 3 | Status: SHIPPED | OUTPATIENT
Start: 2019-11-07 | End: 2020-01-23

## 2019-11-08 ENCOUNTER — OFFICE VISIT (OUTPATIENT)
Dept: CARDIOLOGY | Facility: CLINIC | Age: 74
End: 2019-11-08
Payer: MEDICARE

## 2019-11-08 VITALS
OXYGEN SATURATION: 97 % | SYSTOLIC BLOOD PRESSURE: 126 MMHG | WEIGHT: 217.5 LBS | BODY MASS INDEX: 36.19 KG/M2 | HEART RATE: 63 BPM | RESPIRATION RATE: 16 BRPM | DIASTOLIC BLOOD PRESSURE: 84 MMHG

## 2019-11-08 DIAGNOSIS — I10 ESSENTIAL HYPERTENSION: ICD-10-CM

## 2019-11-08 DIAGNOSIS — Z01.810 PREOP CARDIOVASCULAR EXAM: Primary | ICD-10-CM

## 2019-11-08 DIAGNOSIS — I50.30 HEART FAILURE WITH PRESERVED EJECTION FRACTION, UNSPECIFIED HF CHRONICITY: ICD-10-CM

## 2019-11-08 PROCEDURE — 99999 PR PBB SHADOW E&M-EST. PATIENT-LVL III: CPT | Mod: PBBFAC,HCNC,, | Performed by: INTERNAL MEDICINE

## 2019-11-08 PROCEDURE — 99999 PR PBB SHADOW E&M-EST. PATIENT-LVL III: ICD-10-PCS | Mod: PBBFAC,HCNC,, | Performed by: INTERNAL MEDICINE

## 2019-11-08 PROCEDURE — 99499 UNLISTED E&M SERVICE: CPT | Mod: S$GLB,,, | Performed by: INTERNAL MEDICINE

## 2019-11-08 PROCEDURE — 99214 OFFICE O/P EST MOD 30 MIN: CPT | Mod: HCNC,S$GLB,, | Performed by: INTERNAL MEDICINE

## 2019-11-08 PROCEDURE — 99499 RISK ADDL DX/OHS AUDIT: ICD-10-PCS | Mod: S$GLB,,, | Performed by: INTERNAL MEDICINE

## 2019-11-08 PROCEDURE — 1101F PR PT FALLS ASSESS DOC 0-1 FALLS W/OUT INJ PAST YR: ICD-10-PCS | Mod: HCNC,CPTII,S$GLB, | Performed by: INTERNAL MEDICINE

## 2019-11-08 PROCEDURE — 1101F PT FALLS ASSESS-DOCD LE1/YR: CPT | Mod: HCNC,CPTII,S$GLB, | Performed by: INTERNAL MEDICINE

## 2019-11-08 PROCEDURE — 99214 PR OFFICE/OUTPT VISIT, EST, LEVL IV, 30-39 MIN: ICD-10-PCS | Mod: HCNC,S$GLB,, | Performed by: INTERNAL MEDICINE

## 2019-11-08 NOTE — PROGRESS NOTES
CARDIOVASCULAR CONSULTATION    REASON FOR CONSULT:   Erica Leblanc is a 74 y.o. female who presents for establishing care with me..      HISTORY OF PRESENT ILLNESS:     Notes from November 2019:  Patient here for follow-up.  Denies any chest pains at rest on exertion, orthopnea, PND.  Stress test did not show any significant ischemia.        Notes from October 2019:  Patient here for follow-up.  Denies any chest pains at rest on exertion, orthopnea, PND.  Has been started on dialysis since her last visit with me.  States that she feels great after starting dialysis and feels like she has more appetite and a breathing has gotten better.  Walks slowly with the help of a walker.  Denies PND.  States that since the dialysis her orthopnea has gotten much better.      Notes from August 2019:  Patient doing fine.  Denies any chest pains at rest on exertion, orthopnea, PND.  Does have chronic dyspnea on exertion.  States that she feels her pedal edema is going down as she is responding to Bumex better.      Notes from June 2019:  Patient is a very pleasant 74-year-old lady with a past medical history of heart failure with preserved ejection fraction, diabetes, chronic kidney disease, leg wounds, hypertension who is here for follow-up.  She denies any chest pains at rest on exertion, orthopnea, PND.  Denies any claudication like symptoms.  States that she [facility].  Has been doing salt restriction diet.      PAST MEDICAL HISTORY:     Past Medical History:   Diagnosis Date    Acute respiratory failure with hypoxia     Anemia of chronic kidney failure, stage 4 (severe) 4/5/2019    Cataracts, bilateral     CHF (congestive heart failure)     CKD (chronic kidney disease) stage 3, GFR 30-59 ml/min     CKD (chronic kidney disease) stage 3, GFR 30-59 ml/min     Controlled type 2 diabetes mellitus with proteinuria or albuminuria     Depression     Diabetes with neurologic complications      Diabetic retinopathy of both eyes     Edema     Glaucoma     History of colonic polyps     Hx-TIA (transient ischemic attack) 2008    Hyperlipidemia LDL goal < 100     Hypertension     Hypothyroidism     Major depressive disorder, single episode, mild 2016    Mixed incontinence urge and stress     Obesity     Obstructive sleep apnea on CPAP     19:  Home CPAP machine broken, per patient & son    Osteopenia     Proteinuria     Sickle cell trait     TIA (transient ischemic attack)     Trouble in sleeping     Type 2 diabetes mellitus with ophthalmic manifestations     Type 2 diabetes with stage 3 chronic kidney disease GFR 30-59     Type II or unspecified type diabetes mellitus with renal manifestations, uncontrolled(250.42)     Uncontrolled type 2 diabetes mellitus with peripheral circulatory disorder 2019    Urge incontinence 2016    Urge incontinence     Venous stasis ulcer     bilateral lower legs    Vitamin D deficiency disease        PAST SURGICAL HISTORY:     Past Surgical History:   Procedure Laterality Date    BREAST BIOPSY      breast reduction Bilateral age 31    BREAST SURGERY      cataracts Bilateral      SECTION, LOW TRANSVERSE      x1    CHOLECYSTECTOMY      EYE SURGERY  2014, 2014    vitrectomy    EYE SURGERY Right 2016    [facility]    OOPHORECTOMY      REFRACTIVE SURGERY      TOTAL REDUCTION MAMMOPLASTY      approx 10 yrs ago       ALLERGIES AND MEDICATION:     Review of patient's allergies indicates:   Allergen Reactions    Ace inhibitors Other (See Comments)     Other reaction(s): cough        Medication List           Accurate as of 2019  1:49 PM. If you have any questions, ask your nurse or doctor.               CONTINUE taking these medications    amLODIPine 10 MG tablet  Commonly known as:  NORVASC  Take 1 tablet (10 mg total) by mouth once  daily.     atorvastatin 10 MG tablet  Commonly known as:  LIPITOR  TAKE 1 TABLET EVERY DAY     bumetanide 2 MG tablet  Commonly known as:  BUMEX  Take 1 tablet (2 mg total) by mouth once daily.     citalopram 10 MG tablet  Commonly known as:  CELEXA  TAKE 1 TABLET EVERY DAY     clopidogrel 75 mg tablet  Commonly known as:  PLAVIX  TAKE 1 TABLET EVERY DAY     docusate sodium 100 MG capsule  Commonly known as:  COLACE     HAIR,SKIN AND NAILS ORAL     hydrALAZINE 100 MG tablet  Commonly known as:  APRESOLINE  Take 1 tablet (100 mg total) by mouth 3 (three) times daily.     insulin detemir U-100 100 unit/mL (3 mL) Inpn pen  Commonly known as:  LEVEMIR FLEXTOUCH  Inject 5 Units into the skin once daily.     LANCETS MISC     levothyroxine 50 MCG tablet  Commonly known as:  SYNTHROID     metoprolol tartrate 50 MG tablet  Commonly known as:  LOPRESSOR  Take 1 tablet (50 mg total) by mouth 2 (two) times daily.     oxybutynin 15 MG Tr24  Commonly known as:  DITROPAN XL  TAKE 1 TABLET EVERY DAY     polyethylene glycol 17 gram Pwpk  Commonly known as:  GLYCOLAX  Take 17 g by mouth once daily.            SOCIAL HISTORY:     Social History     Socioeconomic History    Marital status: Single     Spouse name: Not on file    Number of children: 5    Years of education: Not on file    Highest education level: Not on file   Occupational History    Occupation:      Employer: [employer]     Comment: part-time   Social Needs    Financial resource strain: Not on file    Food insecurity:     Worry: Not on file     Inability: Not on file    Transportation needs:     Medical: Not on file     Non-medical: Not on file   Tobacco Use    Smoking status: Never Smoker    Smokeless tobacco: Never Used   Substance and Sexual Activity    Alcohol use: No     Alcohol/week: 0.0 standard drinks    Drug use: No    Sexual activity: Not Currently     Partners: Male     Birth control/protection: Post-menopausal,  Surgical   Lifestyle    Physical activity:     Days per week: Not on file     Minutes per session: Not on file    Stress: Not on file   Relationships    Social connections:     Talks on phone: Not on file     Gets together: Not on file     Attends Congregation service: Not on file     Active member of club or organization: Not on file     Attends meetings of clubs or organizations: Not on file     Relationship status: Not on file   Other Topics Concern    Not on file   Social History Narrative    Not on file       FAMILY HISTORY:     Family History   Problem Relation Age of Onset    Leukemia Father     Ovarian cancer Sister 35    Stroke Mother     Diabetes Mother     Hypertension Mother     Diabetes Paternal Grandmother     Breast cancer Maternal Aunt 65    HIV Brother     Achondroplasia Sister     Parkinsonism Maternal Aunt     Esophageal cancer Maternal Uncle         smoker    Amblyopia Neg Hx     Blindness Neg Hx     Cataracts Neg Hx     Glaucoma Neg Hx     Macular degeneration Neg Hx     Retinal detachment Neg Hx     Strabismus Neg Hx     Thyroid disease Neg Hx     Colon cancer Neg Hx        REVIEW OF SYSTEMS:   Review of Systems   Constitutional: Negative.    HENT: Negative.    Eyes: Negative.    Respiratory: Positive for shortness of breath.    Cardiovascular: Negative.    Gastrointestinal: Negative.    Genitourinary: Negative.    Musculoskeletal: Negative.    Skin: Negative.    Neurological: Negative.    Endo/Heme/Allergies: Negative.        A 10 point review of systems was performed and all the pertinent positives have been mentioned. Rest of review of systems was negative.        PHYSICAL EXAM:     Vitals:    11/08/19 1345   BP: 126/84   Pulse: 63   Resp: 16    Body mass index is 36.19 kg/m².  Weight: 98.6 kg (217 lb 7.7 oz)         Physical Exam   Constitutional: She is oriented to person, place, and time. She appears well-developed and well-nourished. She is active.   HENT:   Head:  Normocephalic and atraumatic.   Right Ear: Hearing normal.   Left Ear: Hearing normal.   Nose: Nose normal.   Mouth/Throat: Mucous membranes are normal.   Eyes: Pupils are equal, round, and reactive to light. Conjunctivae and lids are normal.   Neck: Normal range of motion. Neck supple.   Cardiovascular: Normal rate, regular rhythm, normal heart sounds, intact distal pulses and normal pulses.   Pulmonary/Chest: Effort normal and breath sounds normal.   Abdominal: Soft. Normal appearance. There is no tenderness.   Musculoskeletal: She exhibits no deformity.   Neurological: She is alert and oriented to person, place, and time.   Skin: Skin is warm, dry and intact.   Psychiatric: She has a normal mood and affect. Her speech is normal.   Nursing note and vitals reviewed.        DATA:     Laboratory:  CBC:  Recent Labs   Lab 07/19/19  0934 08/27/19  1628 09/09/19  0419   WBC 5.55 4.25 4.10   Hemoglobin 13.8 11.6 L 10.3 L   Hematocrit 42.9 36.6 L 33.4 L   Platelets 240 235 212       CHEMISTRIES:  Recent Labs   Lab 06/20/17  1345  07/19/19  0934  08/29/19  1008  09/07/19  0500 09/08/19  0512 09/09/19  0419   Glucose 88   < > 53 L   < > 178 H  178 H   < > 113 H 126 H 130 H   Sodium 143   < > 143   < > 139  139   < > 139 139 138   Potassium 4.0   < > 4.0   < > 4.4  4.4   < > 3.3 L 3.5 3.4 L   BUN, Bld 43 H   < > 57 H   < > 71 H  71 H   < > 70 H 50 H 59 H   Creatinine 2.6 H   < > 3.6 H   < > 4.9 H  4.9 H   < > 4.3 H 3.6 H 4.1 H   eGFR if  21 A   < > 14 A   < > 9 A  9 A   < > 11 A 14 A 12 A   eGFR if non  18 A   < > 12 A   < > 8 A  8 A   < > 10 A 12 A 10 A   Calcium 9.0   < > 8.9   < > 8.2 L  8.2 L   < > 8.1 L 8.1 L 8.2 L   Magnesium 2.0  --  2.0  --  2.3  --   --   --   --     < > = values in this interval not displayed.       CARDIAC BIOMARKERS:  Recent Labs   Lab 08/27/19  1628 08/27/19  2240 08/28/19  0413   Troponin I 0.055 H 0.021 0.034 H       COAGS:  Recent Labs   Lab  06/12/17  0703 08/27/19  1628   INR 0.9 1.1       LIPIDS/LFTS:  Recent Labs   Lab 01/06/18  0840 03/03/18  0833  03/23/19  1102  09/02/19  0431 09/03/19  0425 09/09/19  0419   Cholesterol 122 113 L  --  123  --   --   --   --    Triglycerides 92 57  --  66  --   --   --   --    HDL 46 51  --  57  --   --   --   --    LDL Cholesterol 57.6 L 50.6 L  --  52.8 L  --   --   --   --    Non-HDL Cholesterol 76 62  --  66  --   --   --   --    AST 17 99 H   < > 22   < > 22 21 24   ALT 34 120 H   < > 39   < > 20 21 24    < > = values in this interval not displayed.       Hemoglobin A1C   Date Value Ref Range Status   08/27/2019 8.6 (H) 4.0 - 5.6 % Final     Comment:     ADA Screening Guidelines:  5.7-6.4%  Consistent with prediabetes  >or=6.5%  Consistent with diabetes  High levels of fetal hemoglobin interfere with the HbA1C  assay. Heterozygous hemoglobin variants (HbS, HgC, etc)do  not significantly interfere with this assay.   However, presence of multiple variants may affect accuracy.     07/20/2019 7.3 (H) 4.0 - 5.6 % Final     Comment:     ADA Screening Guidelines:  5.7-6.4%  Consistent with prediabetes  >or=6.5%  Consistent with diabetes  High levels of fetal hemoglobin interfere with the HbA1C  assay. Heterozygous hemoglobin variants (HbS, HgC, etc)do  not significantly interfere with this assay.   However, presence of multiple variants may affect accuracy.     03/23/2019 8.2 (H) 4.0 - 5.6 % Final     Comment:     ADA Screening Guidelines:  5.7-6.4%  Consistent with prediabetes  >or=6.5%  Consistent with diabetes  High levels of fetal hemoglobin interfere with the HbA1C  assay. Heterozygous hemoglobin variants (HbS, HgC, etc)do  not significantly interfere with this assay.   However, presence of multiple variants may affect accuracy.         TSH  Recent Labs   Lab 01/06/18  0840 12/15/18  1012 03/23/19  1102   TSH 2.240 1.722 3.298       The ASCVD Risk score (Norma BABCOCK Jr., et al., 2013) failed to calculate for the  following reasons:    The valid total cholesterol range is 130 to 320 mg/dL           Cardiovascular Testing:      Echo: 6-17  CONCLUSIONS     1 - Normal left ventricular systolic function (EF 65-70%).     2 - No diagnostic regional wall motion abnormalities.     3 - Concentric remodeling.     4 - Impaired LV relaxation, elevated LAP (grade 2 diastolic dysfunction).     5 - Trivial tricuspid regurgitation.     6 - The estimated PA systolic pressure is greater than 18 mmHg.      NST: 7-17  Impression: NORMAL MYOCARDIAL PERFUSION  1. The perfusion scan is free of evidence for myocardial ischemia or injury.   2. Resting wall motion is physiologic.   3. Visually estimated LV function is normal.   4. The ventricular volumes are normal at rest and stress.   5. The extracardiac distribution of radioactivity is normal.      Carotid ultrasound:  5-18  CONCLUSIONS   There is 0 - 19% right Internal Carotid stenosis.  There is 0 - 19% left Internal Carotid stenosis.     PAM:  10-18  · Elevated bilateral PAM suggesting calcified noncompressible vessels     LDL-53    3-19     Lower extremity arterial ultrasound:  · No hemodynamically significant plaque bilaterally  · PAM consistent with medial calcinosis    Renal artery ultrasound in June 2019:  · This was a technically difficult study. This study was limited due to excessive bowel gas.  · There is insignificant stenosis (0-59%) in the Right Renal Artery.  · Elevated right renal resistive index suggestive of intrinsic kidney disease.  · Right kidney 12.00 cm.  · There is insignificant stenosis (0-59%) in the Left Renal Artery.  · Elevated left renal resistive index suggestive of intrinsic kidney disease.  · Left kidney 11.80 cm.    Ultrasound legs in November 2018:    · No hemodynamically significant plaque bilaterally  · PAM consistent with medial calcinosis    ABIs in October 2018:    · Elevated bilateral PAM suggesting calcified noncompressible vessels          ASSESSMENT AND  PLAN     Patient Active Problem List   Diagnosis    Severe obesity (BMI 35.0-39.9) with comorbidity    Sickle cell trait    Osteopenia    Hyperlipidemia LDL goal <100    HUMBERTO on CPAP    Hypothyroidism (acquired)    Hx-TIA (transient ischemic attack)    Vitamin D deficiency disease    Pulmonary hypertension    Type 2 diabetes mellitus with ESRD (end-stage renal disease)    Secondary renal hyperparathyroidism    Incomplete bladder emptying    Postmenopausal atrophic vaginitis    Rectocele    Mixed incontinence urge and stress    Chronic diastolic heart failure    Tortuous aorta    History of TIAs    Essential hypertension    ESRD on hemodialysis    Anemia of chronic disease    Depression    Debility    Chronic respiratory failure    Type 2 diabetes mellitus with foot ulcer, with long-term current use of insulin    Stable proliferative diabetic retinopathy associated with type 2 diabetes mellitus    Mild major depression       1.  Hypertension:  Controlled on current therapy    2.  Heart failure with preserved ejection fraction.  Now on dialysis.  Continue current therapy.    3.  Diabetes:  Being managed by primary    4.  Continue follow-up with Wound care clinic.    5.  Dyslipidemia:  On Lipitor    6.  Preop:  Patient with limited exercise tolerance.  Stress test did not show any significant ischemia.  May proceed for surgery at low to intermediate risk of coronary ischemia.    7.  End-stage renal disease.      Thank you very much for involving me in the care of your patient.  Please do not hesitate to contact me if there are any questions.      Murali Li MD, FACC, [facility]  Interventional Cardiologist, [facility].     Follow-up in 3-4 months      This note was dictated with the help of speech recognition software.  There might be un-intended errors and/or substitutions.

## 2019-11-11 DIAGNOSIS — Z99.2 ENCOUNTER REGARDING VASCULAR ACCESS FOR DIALYSIS FOR END-STAGE RENAL DISEASE: Primary | ICD-10-CM

## 2019-11-11 DIAGNOSIS — N18.6 ENCOUNTER REGARDING VASCULAR ACCESS FOR DIALYSIS FOR END-STAGE RENAL DISEASE: Primary | ICD-10-CM

## 2019-11-13 ENCOUNTER — TELEPHONE (OUTPATIENT)
Dept: VASCULAR SURGERY | Facility: CLINIC | Age: 74
End: 2019-11-13

## 2019-11-13 NOTE — TELEPHONE ENCOUNTER
Call to patient. She stated that she was returning Dr. Perez's call and stated that the 26th would work with the holiday. Explained that her surgery was scheduled for the 26th so would let Dr. Perez know.

## 2019-11-15 ENCOUNTER — TELEPHONE (OUTPATIENT)
Dept: VASCULAR SURGERY | Facility: CLINIC | Age: 74
End: 2019-11-15

## 2019-11-15 NOTE — TELEPHONE ENCOUNTER
Call to patient to let her know that Dr. Perez asked to move her surgery date to the 27th. Explained that spoke to dialysis and they stated they could accomodate her with the thanksgiving holiday and moving her surgery. Explained spoke with a nurse at the dialysis center. Patient wanted to know if this was the confirmed surgery date. Explained that it was on the surgery book for the 27th. Explained that Dr. Perez stated he had spoken to her about moving the date today correct? She stated yes. Explained then it was moved to the 27th.

## 2019-11-20 ENCOUNTER — HOSPITAL ENCOUNTER (OUTPATIENT)
Dept: PREADMISSION TESTING | Facility: HOSPITAL | Age: 74
Discharge: HOME | End: 2019-11-20
Attending: SURGERY
Payer: MEDICARE

## 2019-11-20 ENCOUNTER — OFFICE VISIT (OUTPATIENT)
Dept: FAMILY MEDICINE | Facility: CLINIC | Age: 74
End: 2019-11-20
Payer: MEDICARE

## 2019-11-20 VITALS — BODY MASS INDEX: 35.71 KG/M2 | HEIGHT: 65 IN | WEIGHT: 214.31 LBS

## 2019-11-20 VITALS
TEMPERATURE: 99 F | SYSTOLIC BLOOD PRESSURE: 130 MMHG | WEIGHT: 212.06 LBS | DIASTOLIC BLOOD PRESSURE: 70 MMHG | OXYGEN SATURATION: 96 % | HEART RATE: 79 BPM | BODY MASS INDEX: 35.29 KG/M2

## 2019-11-20 DIAGNOSIS — F32.0 MILD MAJOR DEPRESSION: ICD-10-CM

## 2019-11-20 DIAGNOSIS — F32.A DEPRESSION, UNSPECIFIED DEPRESSION TYPE: Chronic | ICD-10-CM

## 2019-11-20 DIAGNOSIS — I27.20 PULMONARY HYPERTENSION: ICD-10-CM

## 2019-11-20 DIAGNOSIS — E11.3559 STABLE PROLIFERATIVE DIABETIC RETINOPATHY ASSOCIATED WITH TYPE 2 DIABETES MELLITUS, UNSPECIFIED LATERALITY: ICD-10-CM

## 2019-11-20 DIAGNOSIS — N18.6 END STAGE RENAL DISEASE: Primary | ICD-10-CM

## 2019-11-20 DIAGNOSIS — E11.621 TYPE 2 DIABETES MELLITUS WITH FOOT ULCER, WITH LONG-TERM CURRENT USE OF INSULIN: ICD-10-CM

## 2019-11-20 DIAGNOSIS — L97.509 TYPE 2 DIABETES MELLITUS WITH FOOT ULCER, WITH LONG-TERM CURRENT USE OF INSULIN: ICD-10-CM

## 2019-11-20 DIAGNOSIS — N18.6 ENCOUNTER REGARDING VASCULAR ACCESS FOR DIALYSIS FOR END-STAGE RENAL DISEASE: ICD-10-CM

## 2019-11-20 DIAGNOSIS — I77.1 TORTUOUS AORTA: ICD-10-CM

## 2019-11-20 DIAGNOSIS — D57.3 SICKLE CELL TRAIT: ICD-10-CM

## 2019-11-20 DIAGNOSIS — J96.10 CHRONIC RESPIRATORY FAILURE, UNSPECIFIED WHETHER WITH HYPOXIA OR HYPERCAPNIA: ICD-10-CM

## 2019-11-20 DIAGNOSIS — G47.33 OSA ON CPAP: Chronic | ICD-10-CM

## 2019-11-20 DIAGNOSIS — Z99.2 ESRD ON HEMODIALYSIS: ICD-10-CM

## 2019-11-20 DIAGNOSIS — N18.6 ESRD ON HEMODIALYSIS: ICD-10-CM

## 2019-11-20 DIAGNOSIS — N25.81 SECONDARY RENAL HYPERPARATHYROIDISM: ICD-10-CM

## 2019-11-20 DIAGNOSIS — I50.32 CHRONIC DIASTOLIC HEART FAILURE: Chronic | ICD-10-CM

## 2019-11-20 DIAGNOSIS — N18.6 TYPE 2 DIABETES MELLITUS WITH ESRD (END-STAGE RENAL DISEASE): ICD-10-CM

## 2019-11-20 DIAGNOSIS — Z99.2 ENCOUNTER REGARDING VASCULAR ACCESS FOR DIALYSIS FOR END-STAGE RENAL DISEASE: ICD-10-CM

## 2019-11-20 DIAGNOSIS — Z00.00 ENCOUNTER FOR PREVENTIVE HEALTH EXAMINATION: Primary | ICD-10-CM

## 2019-11-20 DIAGNOSIS — Z79.4 TYPE 2 DIABETES MELLITUS WITH FOOT ULCER, WITH LONG-TERM CURRENT USE OF INSULIN: ICD-10-CM

## 2019-11-20 DIAGNOSIS — E11.22 TYPE 2 DIABETES MELLITUS WITH ESRD (END-STAGE RENAL DISEASE): ICD-10-CM

## 2019-11-20 DIAGNOSIS — E03.9 HYPOTHYROIDISM (ACQUIRED): Chronic | ICD-10-CM

## 2019-11-20 DIAGNOSIS — Z86.73 HISTORY OF TIAS: ICD-10-CM

## 2019-11-20 DIAGNOSIS — I50.30 HEART FAILURE WITH PRESERVED EJECTION FRACTION, UNSPECIFIED HF CHRONICITY: ICD-10-CM

## 2019-11-20 DIAGNOSIS — D63.8 ANEMIA OF CHRONIC DISEASE: Chronic | ICD-10-CM

## 2019-11-20 DIAGNOSIS — I10 ESSENTIAL HYPERTENSION: Chronic | ICD-10-CM

## 2019-11-20 DIAGNOSIS — E78.5 HYPERLIPIDEMIA LDL GOAL <100: Chronic | ICD-10-CM

## 2019-11-20 DIAGNOSIS — E66.01 SEVERE OBESITY (BMI 35.0-39.9) WITH COMORBIDITY: ICD-10-CM

## 2019-11-20 LAB
ANION GAP SERPL CALC-SCNC: 9 MMOL/L (ref 8–16)
APTT BLDCRRT: 29.5 SEC (ref 21–32)
BASOPHILS # BLD AUTO: 0.02 K/UL (ref 0–0.2)
BASOPHILS NFR BLD: 0.5 % (ref 0–1.9)
BUN SERPL-MCNC: 35 MG/DL (ref 8–23)
CALCIUM SERPL-MCNC: 8.5 MG/DL (ref 8.7–10.5)
CHLORIDE SERPL-SCNC: 99 MMOL/L (ref 95–110)
CO2 SERPL-SCNC: 28 MMOL/L (ref 23–29)
CREAT SERPL-MCNC: 4.3 MG/DL (ref 0.5–1.4)
DIFFERENTIAL METHOD: ABNORMAL
EOSINOPHIL # BLD AUTO: 0.1 K/UL (ref 0–0.5)
EOSINOPHIL NFR BLD: 2.3 % (ref 0–8)
ERYTHROCYTE [DISTWIDTH] IN BLOOD BY AUTOMATED COUNT: 16.9 % (ref 11.5–14.5)
EST. GFR  (AFRICAN AMERICAN): 11 ML/MIN/1.73 M^2
EST. GFR  (NON AFRICAN AMERICAN): 10 ML/MIN/1.73 M^2
GLUCOSE SERPL-MCNC: 188 MG/DL (ref 70–110)
HCT VFR BLD AUTO: 35.3 % (ref 37–48.5)
HGB BLD-MCNC: 11 G/DL (ref 12–16)
IMM GRANULOCYTES # BLD AUTO: 0.01 K/UL (ref 0–0.04)
IMM GRANULOCYTES NFR BLD AUTO: 0.2 % (ref 0–0.5)
INR PPP: 1 (ref 0.8–1.2)
LYMPHOCYTES # BLD AUTO: 0.8 K/UL (ref 1–4.8)
LYMPHOCYTES NFR BLD: 18.1 % (ref 18–48)
MAGNESIUM SERPL-MCNC: 2.1 MG/DL (ref 1.6–2.6)
MCH RBC QN AUTO: 25.2 PG (ref 27–31)
MCHC RBC AUTO-ENTMCNC: 31.2 G/DL (ref 32–36)
MCV RBC AUTO: 81 FL (ref 82–98)
MONOCYTES # BLD AUTO: 0.7 K/UL (ref 0.3–1)
MONOCYTES NFR BLD: 16.3 % (ref 4–15)
NEUTROPHILS # BLD AUTO: 2.8 K/UL (ref 1.8–7.7)
NEUTROPHILS NFR BLD: 62.6 % (ref 38–73)
NRBC BLD-RTO: 0 /100 WBC
PHOSPHATE SERPL-MCNC: 4.4 MG/DL (ref 2.7–4.5)
PLATELET # BLD AUTO: 198 K/UL (ref 150–350)
PMV BLD AUTO: 9.3 FL (ref 9.2–12.9)
POTASSIUM SERPL-SCNC: 3.6 MMOL/L (ref 3.5–5.1)
PROTHROMBIN TIME: 10.9 SEC (ref 9–12.5)
RBC # BLD AUTO: 4.37 M/UL (ref 4–5.4)
SODIUM SERPL-SCNC: 136 MMOL/L (ref 136–145)
WBC # BLD AUTO: 4.42 K/UL (ref 3.9–12.7)

## 2019-11-20 PROCEDURE — G0439 PPPS, SUBSEQ VISIT: HCPCS | Mod: S$GLB,,, | Performed by: NURSE PRACTITIONER

## 2019-11-20 PROCEDURE — 3044F HG A1C LEVEL LT 7.0%: CPT | Mod: HCNC,CPTII,S$GLB, | Performed by: NURSE PRACTITIONER

## 2019-11-20 PROCEDURE — 99999 PR PBB SHADOW E&M-EST. PATIENT-LVL IV: ICD-10-PCS | Mod: PBBFAC,HCNC,, | Performed by: NURSE PRACTITIONER

## 2019-11-20 PROCEDURE — 99999 PR PBB SHADOW E&M-EST. PATIENT-LVL IV: CPT | Mod: PBBFAC,HCNC,, | Performed by: NURSE PRACTITIONER

## 2019-11-20 PROCEDURE — 3044F PR MOST RECENT HEMOGLOBIN A1C LEVEL <7.0%: ICD-10-PCS | Mod: HCNC,CPTII,S$GLB, | Performed by: NURSE PRACTITIONER

## 2019-11-20 PROCEDURE — G0439 PR MEDICARE ANNUAL WELLNESS SUBSEQUENT VISIT: ICD-10-PCS | Mod: S$GLB,,, | Performed by: NURSE PRACTITIONER

## 2019-11-20 PROCEDURE — 85610 PROTHROMBIN TIME: CPT | Mod: HCNC

## 2019-11-20 PROCEDURE — 85025 COMPLETE CBC W/AUTO DIFF WBC: CPT | Mod: HCNC

## 2019-11-20 PROCEDURE — 85730 THROMBOPLASTIN TIME PARTIAL: CPT | Mod: HCNC

## 2019-11-20 PROCEDURE — 83735 ASSAY OF MAGNESIUM: CPT | Mod: HCNC

## 2019-11-20 PROCEDURE — 84100 ASSAY OF PHOSPHORUS: CPT | Mod: HCNC

## 2019-11-20 PROCEDURE — 80048 BASIC METABOLIC PNL TOTAL CA: CPT | Mod: HCNC

## 2019-11-20 NOTE — PATIENT INSTRUCTIONS
Counseling and Referral of Other Preventative  (Italic type indicates deductible and co-insurance are waived)    Patient Name: Erica Leblanc  Today's Date: 11/20/2019    Health Maintenance       Date Due Completion Date    Shingles Vaccine (1 of 2) 02/25/1996 ---    Eye Exam 10/03/2019 10/3/2018    Override on 1/3/2011: Done    Hemoglobin A1c 11/27/2019 8/27/2019    DEXA SCAN 03/13/2020 3/13/2018    Override on 8/7/2008: Done    Lipid Panel 03/23/2020 3/23/2019    Mammogram 04/05/2020 4/5/2019    Override on 8/7/2008: Done    Foot Exam 10/15/2020 10/15/2019 (Done)    Override on 10/15/2019: Done    Override on 8/23/2018: Done    Override on 10/10/2016: Done    Colonoscopy 01/16/2023 1/16/2013    Override on 8/13/2009: Done    TETANUS VACCINE 08/08/2026 8/8/2016        No orders of the defined types were placed in this encounter.    The following information is provided to all patients.  This information is to help you find resources for any of the problems found today that may be affecting your health:                Living healthy guide: www.[facility].[place].gov      Understanding Diabetes: www.diabetes.org      Eating healthy: www.cdc.gov/healthyweight      CDC home safety checklist: www.cdc.gov/steadi/patient.html      Agency on Aging: www.goea.[place].[facility]      Alcoholics anonymous (AA): www.aa.org      Physical Activity: www.teresita.nih.gov/bh8maci      Tobacco use: www.quitwithusla.org

## 2019-11-20 NOTE — DISCHARGE INSTRUCTIONS
"Your procedure  is scheduled for __11/27/2019________.    Call [phone] between 2pm and 5pm on _11/26/2019______to find out your arrival time for the day of surgery.    Report to Same Day Surgery Unit at ____ AM on the 2nd floor of the hospital.  Use the front entrance of the hospital.  The front doors of the hospital open promptly at 5:30am.  If you need wheelchair assistance, call [phone] from your cell phone, or call "0" from the courtesy phone in the lobby.    Important instructions:   Do not eat or drink after 12 midnight, including water.  It is okay to brush your teeth.  Do not have gum, candy or mints.     Take your morning medications with small sips of water.    Do not take any diabetic medication on the morning of surgery unless instructed to do so by your doctor or pre op nurse.        Please shower the night before and the morning of your surgery.        Use Hibiclens soap as instructed by your pre op nurse.   Please place clean linens on your bed the night before surgery. Please wear fresh clean clothing after each shower.     No shaving of procedural area at least 4-5 days before surgery due to increased risk of skin irritation and/or possible infection.      Do not wear make- up, including mascara.     You may wear deodorant only.      Do not wear powder, body lotion or perfume/cologne.     Do not wear any jewelry or have any metal on your body.     You will be asked to remove any dentures or partials for the procedure.     Please bring any documents given to you by your doctor.     If you are going home on the same day of surgery, you must arrange for a family member or a friend to drive you home.  Public transportation is prohibited.  You will not be able to drive home if you were given anesthesia or sedation.     Wear loose fitting clothes allowing for bandages.     Please leave money and valuables home.       You may bring your cell phone.     Call the doctor if fever or illness " should occur before your surgery.    Call [phone] to contact us here if needed.

## 2019-11-22 NOTE — PROGRESS NOTES
Erica Leblanc presented for an initial Medicare AWV today. The following components were reviewed and updated:    · Medical history  · Family History  · Social history  · Allergies and Current Medications  · Health Risk Assessment  · Health Maintenance  · Care Team    **See Completed Assessments for Annual Wellness visit with in the encounter summary    The following assessments were completed:  · Depression Screening  · Cognitive function Screening  · Timed Get Up Test  · Whisper Test    Vitals:    11/20/19 1219   BP: 130/70   BP Location: Right arm   Patient Position: Sitting   BP Method: Large (Manual)   Pulse: 79   Temp: 98.6 °F (37 °C)   SpO2: 96%   Weight: 96.2 kg (212 lb 1.3 oz)     Body mass index is 35.29 kg/m².   ]          Diagnoses and health risks identified today and associated recommendations/orders:  1. Encounter for preventive health examination  Education provided about preventive health examinations and procedures; discussed patient's health concerns, holistically addressed patient's health plan.        2. Chronic respiratory failure, unspecified whether with hypoxia or hypercapnia  The current medical regimen is effective;  continue present plan and medications.      3. Chronic diastolic heart failure  The current medical regimen is effective;  continue present plan and medications.      4. Mild major depression  The current medical regimen is effective;  continue present plan and medications.      5. Sickle cell trait  Stable, asymptomatic, monitor      6. Pulmonary hypertension  The current medical regimen is effective;  continue present plan and medications.      7. ESRD on hemodialysis  The current medical regimen is effective;  continue present plan and medications.      8. 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 We discussed diabetic diet and regular exercise. We discussed home blood sugar monitoring, if  appropriate. The current medical regimen is effective;  continue present plan and medications.      9. Type 2 diabetes mellitus with foot ulcer, with long-term current use of insulin  - Hemoglobin A1c; Future  Diabetes currently is controlled. We discussed diabetic diet and regular exercise. We discussed home blood sugar monitoring, if appropriate. The current medical regimen is effective;  continue present plan and medications.      10. Secondary renal hyperparathyroidism  The current medical regimen is effective;  continue present plan and medications.]    11. Tortuous aorta  Stable, asymptomatic, monitor. Encouraged continued compliance with directives, diet and lifestyle modifications as recommended       12. Severe obesity (BMI 35.0-39.9) with comorbidity  Discussed diet, continued participation in tolerable fitness activities, health risks associated with obesity.       13. Heart failure with preserved ejection fraction, unspecified HF chronicity  The current medical regimen is effective;  continue present plan and medications.      14. Stable proliferative diabetic retinopathy associated with type 2 diabetes mellitus, unspecified laterality  Diabetes currently is controlled. We discussed diabetic diet and regular exercise. We discussed home blood sugar monitoring, if appropriate. The current medical regimen is effective;  continue present plan and medications.      15. Anemia of chronic disease  The current medical regimen is effective;  continue present plan and medications.      16. Depression, unspecified depression type  The current medical regimen is effective;  continue present plan and medications.      17. Essential hypertension  Discussed sodium restriction, maintaining ideal body weight and regular exercise program as physiologic means to achieve blood pressure control. The patient will strive towards this. The current medical regimen is effective; continue present plan and medications. Recommended  patient to check home readings to monitor. Patient was educated that both decongestant and anti-inflammatory medication may raise blood pressure      18. History of TIAs  Stable, asymptomatic, monitor      19. Hyperlipidemia LDL goal <100  We discussed low fat diet and regular exercise.The current medical regimen is effective; continue present plan and medications.       20. Hypothyroidism (acquired)  The current medical regimen is effective;  continue present plan and medications.    21. HUMBERTO on CPAP  The current medical regimen is effective;  continue present plan and medications.  She uses her CPAP regularly with good benefit.         Provided Erica with a 5-10 year written screening schedule and personal prevention plan. Recommendations were developed using the USPSTF age appropriate recommendations. Education, counseling, and referrals were provided as needed.  After Visit Summary printed and given to patient which includes a list of additional screenings\tests needed.    Follow up in about 6 months (around 5/20/2020), or if symptoms worsen or fail to improve.      Meredith Ochoa, ESTRELLA

## 2019-11-26 ENCOUNTER — ANESTHESIA EVENT (OUTPATIENT)
Dept: SURGERY | Facility: HOSPITAL | Age: 74
End: 2019-11-26
Payer: MEDICARE

## 2019-11-27 ENCOUNTER — ANESTHESIA (OUTPATIENT)
Dept: SURGERY | Facility: HOSPITAL | Age: 74
End: 2019-11-27
Payer: MEDICARE

## 2019-11-27 ENCOUNTER — HOSPITAL ENCOUNTER (OUTPATIENT)
Facility: HOSPITAL | Age: 74
Discharge: HOME | End: 2019-11-27
Attending: SURGERY | Admitting: SURGERY
Payer: MEDICARE

## 2019-11-27 VITALS
TEMPERATURE: 98 F | WEIGHT: 214.31 LBS | BODY MASS INDEX: 35.71 KG/M2 | RESPIRATION RATE: 20 BRPM | DIASTOLIC BLOOD PRESSURE: 58 MMHG | SYSTOLIC BLOOD PRESSURE: 111 MMHG | HEART RATE: 73 BPM | OXYGEN SATURATION: 95 % | HEIGHT: 65 IN

## 2019-11-27 DIAGNOSIS — Z99.2: ICD-10-CM

## 2019-11-27 DIAGNOSIS — Z01.818 PREOP TESTING: ICD-10-CM

## 2019-11-27 DIAGNOSIS — N18.6 END STAGE RENAL DISEASE: Primary | ICD-10-CM

## 2019-11-27 DIAGNOSIS — N18.6: ICD-10-CM

## 2019-11-27 DIAGNOSIS — N18.6 ESRD ON HEMODIALYSIS: ICD-10-CM

## 2019-11-27 DIAGNOSIS — Z99.2 ESRD ON HEMODIALYSIS: ICD-10-CM

## 2019-11-27 LAB
ANION GAP SERPL CALC-SCNC: 11 MMOL/L (ref 8–16)
BUN SERPL-MCNC: 24 MG/DL (ref 8–23)
CALCIUM SERPL-MCNC: 9.1 MG/DL (ref 8.7–10.5)
CHLORIDE SERPL-SCNC: 99 MMOL/L (ref 95–110)
CO2 SERPL-SCNC: 27 MMOL/L (ref 23–29)
CREAT SERPL-MCNC: 2.8 MG/DL (ref 0.5–1.4)
EST. GFR  (AFRICAN AMERICAN): 19 ML/MIN/1.73 M^2
EST. GFR  (NON AFRICAN AMERICAN): 16 ML/MIN/1.73 M^2
GLUCOSE SERPL-MCNC: 124 MG/DL (ref 70–110)
POCT GLUCOSE: 155 MG/DL (ref 70–110)
POTASSIUM SERPL-SCNC: 3.8 MMOL/L (ref 3.5–5.1)
SODIUM SERPL-SCNC: 137 MMOL/L (ref 136–145)

## 2019-11-27 PROCEDURE — 36415 COLL VENOUS BLD VENIPUNCTURE: CPT | Mod: HCNC

## 2019-11-27 PROCEDURE — D9220A PRA ANESTHESIA: Mod: HCNC,,, | Performed by: ANESTHESIOLOGY

## 2019-11-27 PROCEDURE — 37000008 HC ANESTHESIA 1ST 15 MINUTES: Mod: HCNC | Performed by: SURGERY

## 2019-11-27 PROCEDURE — 94640 AIRWAY INHALATION TREATMENT: CPT | Mod: HCNC

## 2019-11-27 PROCEDURE — 36000706: Mod: HCNC | Performed by: SURGERY

## 2019-11-27 PROCEDURE — 63600175 PHARM REV CODE 636 W HCPCS: Mod: HCNC | Performed by: NURSE ANESTHETIST, CERTIFIED REGISTERED

## 2019-11-27 PROCEDURE — D9220A PRA ANESTHESIA: Mod: HCNC,,, | Performed by: NURSE ANESTHETIST, CERTIFIED REGISTERED

## 2019-11-27 PROCEDURE — 71000015 HC POSTOP RECOV 1ST HR: Mod: HCNC | Performed by: SURGERY

## 2019-11-27 PROCEDURE — 93005 ELECTROCARDIOGRAM TRACING: CPT | Mod: HCNC

## 2019-11-27 PROCEDURE — 71000016 HC POSTOP RECOV ADDL HR: Mod: HCNC | Performed by: SURGERY

## 2019-11-27 PROCEDURE — 63600175 PHARM REV CODE 636 W HCPCS: Mod: HCNC | Performed by: SURGERY

## 2019-11-27 PROCEDURE — 36000707: Mod: HCNC | Performed by: SURGERY

## 2019-11-27 PROCEDURE — 93010 ELECTROCARDIOGRAM REPORT: CPT | Mod: HCNC,,, | Performed by: INTERNAL MEDICINE

## 2019-11-27 PROCEDURE — 63600175 PHARM REV CODE 636 W HCPCS: Mod: HCNC | Performed by: ANESTHESIOLOGY

## 2019-11-27 PROCEDURE — 25000003 PHARM REV CODE 250: Mod: HCNC | Performed by: SURGERY

## 2019-11-27 PROCEDURE — 27201423 OPTIME MED/SURG SUP & DEVICES STERILE SUPPLY: Mod: HCNC | Performed by: SURGERY

## 2019-11-27 PROCEDURE — 36821 AV FUSION DIRECT ANY SITE: CPT | Mod: HCNC,,, | Performed by: SURGERY

## 2019-11-27 PROCEDURE — 37000009 HC ANESTHESIA EA ADD 15 MINS: Mod: HCNC | Performed by: SURGERY

## 2019-11-27 PROCEDURE — D9220A PRA ANESTHESIA: ICD-10-PCS | Mod: HCNC,,, | Performed by: ANESTHESIOLOGY

## 2019-11-27 PROCEDURE — 93010 EKG 12-LEAD: ICD-10-PCS | Mod: HCNC,,, | Performed by: INTERNAL MEDICINE

## 2019-11-27 PROCEDURE — 25000003 PHARM REV CODE 250: Mod: HCNC | Performed by: ANESTHESIOLOGY

## 2019-11-27 PROCEDURE — 25000003 PHARM REV CODE 250: Mod: HCNC | Performed by: NURSE ANESTHETIST, CERTIFIED REGISTERED

## 2019-11-27 PROCEDURE — 25000242 PHARM REV CODE 250 ALT 637 W/ HCPCS: Mod: HCNC | Performed by: ANESTHESIOLOGY

## 2019-11-27 PROCEDURE — D9220A PRA ANESTHESIA: ICD-10-PCS | Mod: HCNC,,, | Performed by: NURSE ANESTHETIST, CERTIFIED REGISTERED

## 2019-11-27 PROCEDURE — 36821 PR ANASTOMOSIS,AV,ANY SITE: ICD-10-PCS | Mod: HCNC,,, | Performed by: SURGERY

## 2019-11-27 PROCEDURE — 80048 BASIC METABOLIC PNL TOTAL CA: CPT | Mod: HCNC

## 2019-11-27 RX ORDER — HEPARIN SODIUM 1000 [USP'U]/ML
INJECTION, SOLUTION INTRAVENOUS; SUBCUTANEOUS
Status: DISCONTINUED | OUTPATIENT
Start: 2019-11-27 | End: 2019-11-27 | Stop reason: HOSPADM

## 2019-11-27 RX ORDER — PROPOFOL 10 MG/ML
VIAL (ML) INTRAVENOUS CONTINUOUS PRN
Status: DISCONTINUED | OUTPATIENT
Start: 2019-11-27 | End: 2019-11-27

## 2019-11-27 RX ORDER — OXYCODONE AND ACETAMINOPHEN 5; 325 MG/1; MG/1
1 TABLET ORAL EVERY 6 HOURS PRN
Qty: 15 TABLET | Refills: 0 | Status: SHIPPED | OUTPATIENT
Start: 2019-11-27 | End: 2019-12-11

## 2019-11-27 RX ORDER — CEFAZOLIN SODIUM 2 G/50ML
2 SOLUTION INTRAVENOUS ONCE
Status: COMPLETED | OUTPATIENT
Start: 2019-11-27 | End: 2019-11-27

## 2019-11-27 RX ORDER — CEFAZOLIN SODIUM 1 G/3ML
2 INJECTION, POWDER, FOR SOLUTION INTRAMUSCULAR; INTRAVENOUS ONCE
Status: DISCONTINUED | OUTPATIENT
Start: 2019-11-27 | End: 2019-11-27 | Stop reason: SDUPTHER

## 2019-11-27 RX ORDER — PHENYLEPHRINE HYDROCHLORIDE 10 MG/ML
INJECTION INTRAVENOUS
Status: DISCONTINUED | OUTPATIENT
Start: 2019-11-27 | End: 2019-11-27

## 2019-11-27 RX ORDER — ALBUTEROL SULFATE 2.5 MG/.5ML
2.5 SOLUTION RESPIRATORY (INHALATION) ONCE
Status: COMPLETED | OUTPATIENT
Start: 2019-11-27 | End: 2019-11-27

## 2019-11-27 RX ORDER — HEPARIN SODIUM 1000 [USP'U]/ML
INJECTION, SOLUTION INTRAVENOUS; SUBCUTANEOUS
Status: DISCONTINUED | OUTPATIENT
Start: 2019-11-27 | End: 2019-11-27

## 2019-11-27 RX ORDER — SODIUM CHLORIDE 9 MG/ML
INJECTION, SOLUTION INTRAVENOUS CONTINUOUS PRN
Status: DISCONTINUED | OUTPATIENT
Start: 2019-11-27 | End: 2019-11-27

## 2019-11-27 RX ORDER — METHYLPREDNISOLONE SODIUM SUCCINATE 125 MG/2ML
125 INJECTION INTRAMUSCULAR; INTRAVENOUS EVERY 6 HOURS
Status: DISCONTINUED | OUTPATIENT
Start: 2019-11-27 | End: 2019-11-27 | Stop reason: HOSPADM

## 2019-11-27 RX ORDER — EPHEDRINE SULFATE 50 MG/ML
INJECTION, SOLUTION INTRAVENOUS
Status: DISCONTINUED | OUTPATIENT
Start: 2019-11-27 | End: 2019-11-27

## 2019-11-27 RX ORDER — BACITRACIN 50000 [IU]/1
INJECTION, POWDER, FOR SOLUTION INTRAMUSCULAR
Status: DISCONTINUED | OUTPATIENT
Start: 2019-11-27 | End: 2019-11-27 | Stop reason: HOSPADM

## 2019-11-27 RX ORDER — BUPIVACAINE HYDROCHLORIDE AND EPINEPHRINE 5; 5 MG/ML; UG/ML
INJECTION, SOLUTION EPIDURAL; INTRACAUDAL; PERINEURAL
Status: DISCONTINUED | OUTPATIENT
Start: 2019-11-27 | End: 2019-11-27

## 2019-11-27 RX ORDER — FENTANYL CITRATE 50 UG/ML
INJECTION, SOLUTION INTRAMUSCULAR; INTRAVENOUS
Status: DISCONTINUED | OUTPATIENT
Start: 2019-11-27 | End: 2019-11-27

## 2019-11-27 RX ADMIN — EPHEDRINE SULFATE 10 MG: 50 INJECTION, SOLUTION INTRAMUSCULAR; INTRAVENOUS; SUBCUTANEOUS at 10:11

## 2019-11-27 RX ADMIN — EPHEDRINE SULFATE 5 MG: 50 INJECTION, SOLUTION INTRAMUSCULAR; INTRAVENOUS; SUBCUTANEOUS at 07:11

## 2019-11-27 RX ADMIN — METHYLPREDNISOLONE SODIUM SUCCINATE 125 MG: 125 INJECTION, POWDER, FOR SOLUTION INTRAMUSCULAR; INTRAVENOUS at 07:11

## 2019-11-27 RX ADMIN — FENTANYL CITRATE 25 MCG: 50 INJECTION INTRAMUSCULAR; INTRAVENOUS at 09:11

## 2019-11-27 RX ADMIN — ALBUTEROL SULFATE 2.5 MG: 2.5 SOLUTION RESPIRATORY (INHALATION) at 06:11

## 2019-11-27 RX ADMIN — HEPARIN SODIUM 3000 UNITS: 1000 INJECTION, SOLUTION INTRAVENOUS; SUBCUTANEOUS at 09:11

## 2019-11-27 RX ADMIN — EPHEDRINE SULFATE 5 MG: 50 INJECTION, SOLUTION INTRAMUSCULAR; INTRAVENOUS; SUBCUTANEOUS at 08:11

## 2019-11-27 RX ADMIN — FENTANYL CITRATE 25 MCG: 50 INJECTION INTRAMUSCULAR; INTRAVENOUS at 07:11

## 2019-11-27 RX ADMIN — CEFAZOLIN SODIUM 1 G: 2 SOLUTION INTRAVENOUS at 07:11

## 2019-11-27 RX ADMIN — EPHEDRINE SULFATE 10 MG: 50 INJECTION, SOLUTION INTRAMUSCULAR; INTRAVENOUS; SUBCUTANEOUS at 07:11

## 2019-11-27 RX ADMIN — BUPIVACAINE HYDROCHLORIDE AND EPINEPHRINE BITARTRATE 30 ML: 5; .0091 INJECTION, SOLUTION EPIDURAL; INTRACAUDAL; PERINEURAL at 07:11

## 2019-11-27 RX ADMIN — PHENYLEPHRINE HYDROCHLORIDE 100 MCG: 10 INJECTION INTRAVENOUS at 10:11

## 2019-11-27 RX ADMIN — PROPOFOL 50 MCG/KG/MIN: 10 INJECTION, EMULSION INTRAVENOUS at 07:11

## 2019-11-27 RX ADMIN — SODIUM CHLORIDE: 0.9 INJECTION, SOLUTION INTRAVENOUS at 10:11

## 2019-11-27 RX ADMIN — EPHEDRINE SULFATE 5 MG: 50 INJECTION, SOLUTION INTRAMUSCULAR; INTRAVENOUS; SUBCUTANEOUS at 10:11

## 2019-11-27 RX ADMIN — SODIUM CHLORIDE: 0.9 INJECTION, SOLUTION INTRAVENOUS at 06:11

## 2019-11-27 RX ADMIN — PHENYLEPHRINE HYDROCHLORIDE 150 MCG: 10 INJECTION INTRAVENOUS at 10:11

## 2019-11-27 NOTE — DISCHARGE INSTRUCTIONS
"DIET: You may resume your home diet. If nausea is present, increase your diet gradually with fluids and bland foods    ACTIVITY LEVEL: You have received sedation or an anesthetic, you may feel sleepy for several hours. Rest until you are more awake.       If Oozing occurs at the injection site, lie down. Apply pressure with a clean wash cloth for 20-30 minutes. Call the doctor.     If severe bleeding occurs, lie down, apply pressure. Call 911    DRESSING: Remove dressing in 48 hours, and you may shower.       Medications: Pain medication should be taken only if needed and as directed. If antibiotics are prescribed, the medication should be taken until completed. You will be given an updated list of you medications.    No driving, alcoholic beverages or signing legal documents for next 24 hours or while taking pain medication.       CALL 911  · Chest pain   · Shortness of breath           CALL THE DOCTOR:    Fever of 100.4 degrees Fahrenheit or higher, or as directed by your healthcare provider.   Red, hot, or swollen area around the site.    Loss of pulsing feeling ("thrill") over the fistula   Pain, cold, or numbness in the hand          If any unusual problems or difficulties occur contact your doctor. If you cannot contact your doctor but feel your signs and symptoms warrant a physicians attention return to the emergency room.            You have received a type of anesthesia that leaves your arm with numbness and/or limited control.    Keep your arm in the sling while numbness and immobility is present (and longer if instructed by your doctor)    Always pay attention to the location of your arm.  It can slip from the sling and get hit against doorways or furniture.    Be cautious around the stove, hot liquids and cigarettes.  It can be burned and you may not be aware of it.    Do not lay on that arm or rest with it pressed against anything. Pressure injuries can occur.    Take your pain medications as " directed as soon as you start to regain feeling of your arm.  It may be harder to get the pain under control if you wait until full sensation has returned.        Arteriovenous (AV) Fistula for Dialysis  An AV fistula is a connection between an artery and a vein. For this procedure, an AV fistula is surgically created using an artery and a vein in your arm. (Your healthcare provider will let you know if another site is to be used.) When the artery and vein are joined, blood flow increases from the artery into the vein. As a result, the vein gets bigger over time. The enlarged vein provides easier access to the blood for a treatment for kidney failure (dialysis). This sheet explains the procedure and what to expect.     An AV fistula increases blood flow from the artery into the vein. Over time, the vein becomes stronger and enlarged.   Preparing for the procedure  Prepare as you have been told. In addition:  · Tell your healthcare provider about all the medicines you take. This includes all over-the-counter and prescription medicines, and street drugs. It also includes herbs, vitamins, and other supplements. You may need to stop taking some or all of them before the procedure.  · Follow any directions youre given for not eating or drinking before the procedure.  · Do not allow anyone to draw blood from or take blood pressure on the arm that will have the fistula before the procedure.  The day of the procedure  The procedure takes about 1 to 2 hours. Youll likely go home the same day.  Before the procedure begins:  · An IV (intravenous) line is put into a vein in the arm or hand not being used for the procedure. This line supplies fluids and medicines.  · To keep you free of pain during the procedure, youre given general anesthesia. This medicine puts you into a state like a deep sleep through the procedure. Or a nerve block may be used. This medicine numbs the arm. With it, you may also be given medicine that  makes you relaxed and drowsy through the procedure.  During the procedure:  · The skin over your arm may be injected with numbing medicine.  · One or more small cuts (incisions) are then made through the numbed skin. This depends on the size of your arm and the depth of the vein in your arm.  · The vein is attached to the selected artery.  · Any incisions made are then closed with stitches (sutures), staples, surgical glue, or strips of surgical tape.  After the procedure:  · Youll be asked to keep your arm raised (elevated) as often as possible for at least a week after the procedure.  · Youll be given medicines to manage pain as needed.  · Your arm and hand will be checked to make sure blood is flowing through the fistula properly. The feeling of blood rushing through the fistula is called a thrill. It is somewhat similar to the purring of a cat. Youll be taught how to check for this feeling each day to make sure there are no problems with your fistula. Youll also be taught how to care for your fistula at home.  · When its time for you to leave the hospital, have an adult family member or friend ready to drive you home.  Recovering at home  Once at home, follow all of the instructions youve been given. Be sure to:  · Take all medicines as directed.  · Care for your incision as instructed.  · Check for signs of infection at the incision site (see below).  · Avoid heavy lifting and strenuous activities as directed.  · Monitor and care for your fistula as instructed.  · Do your hand and arm exercises as instructed. This usually involves squeezing a ball in your hand for a few minutes each hour.  Call your healthcare provider if you have any of the following:  · Fever of 100.4°F (38°C) or higher  · Signs of infection at the incision site, such as increased redness or swelling, warmth, worsening pain, bleeding, or bad-smelling drainage  · You cant feel a thrill (the vibration of blood going through your  arm)  · Pain or numbness in your fingers, hand, or arm  · Bleeding, redness, or warmth around your fistula  · Sudden bulging of the fistula (more than usual; a slight bulge is normal)   Follow-Up  Your healthcare provider will check your fistula within 1 to 2 weeks after the procedure. It will likely take about 6 to 8 weeks for the fistula to enlarge enough to start dialysis. After that, make sure the fistula is checked each time you have dialysis. Your healthcare provider may also suggest checkups every 6 months.  Risks and possible complications include:  · The fistula not working properly  · Long wait before the fistula is ready (up to 6 months)  · Coldness or numbness in the hand (due to blood flowing away from the hand and into the fistula)  · An unsightly bump under the skin (due to enlargement of the fistula)  · Prolonged bleeding from the fistula after dialysis  · Narrowing or weakening of the blood vessels used for the fistula  · Formation of blood clots in the blood vessels used for the fistula  · Risks of anesthesia or any other medicines used during the procedure   Living with an AV Fistula  A problem, such as a narrowing (stricture) of the vein or an infection, can make the fistula unusable. If this happens, you may need other treatments to repair or make a new fistula. To protect your fistula, follow these and any other guidelines youre given:  · Check your fistula as often as your healthcare provider says. If you cant feel your thrill, let your provider know right away.  · Make sure your fistula is checked before each dialysis treatment.  · Dont let anyone draw blood from or take blood pressure on the arm that has the fistula.  · Wash your hands often and keep the area around your fistula clean.  · Dont sleep on the arm that has the fistula.  · Dont wear tight jewelry or a watch on the arm with your fistula.  · Protect your fistula from cuts, scrapes, or blows.  Date Last Reviewed: 1/1/2017  ©  8535-8882 [employer]. [address] [place] [address], [place] [address]. All rights reserved. This information is not intended as a substitute for professional medical care. Always follow your healthcare professional's instructions.          Fall Prevention  Millions of people fall every year and injure themselves. You may have had anesthesia or sedation which may increase your risk of falling. You may have health issues that put you at an increased risk of falling.     Here are ways to reduce your risk of falling.  ·   · Make your home safe by keeping walkways clear of objects you may trip over.  · Use non-slip pads under rugs. Do not use area rugs or small throw rugs.  · Use non-slip mats in bathtubs and showers.  · Install handrails and lights on staircases.  · Do not walk in poorly lit areas.  · Do not stand on chairs or wobbly ladders.  · Use caution when reaching overhead or looking upward. This position can cause a loss of balance.  · Be sure your shoes fit properly, have non-slip bottoms and are in good condition.   · Wear shoes both inside and out. Avoid going barefoot or wearing slippers.  · Be cautious when going up and down stairs, curbs, and when walking on uneven sidewalks.  · If your balance is poor, consider using a cane or walker.  · If your fall was related to alcohol use, stop or limit alcohol intake.   · If your fall was related to use of sleeping medicines, talk to your doctor about this. You may need to reduce your dosage at bedtime if you awaken during the night to go to the bathroom.    · To reduce the need for nighttime bathroom trips:  ¨ Avoid drinking fluids for several hours before going to bed  ¨ Empty your bladder before going to bed  ¨ Men can keep a urinal at the bedside  · Stay as active as you can. Balance, flexibility, strength, and endurance all come from exercise. They all play a role in preventing falls. Ask your healthcare provider which types of activity are right for  you.  · Get your vision checked on a regular basis.  · If you have pets, know where they are before you stand up or walk so you don't trip over them.  · Use night lights.

## 2019-11-27 NOTE — ANESTHESIA PROCEDURE NOTES
Peripheral Block    Patient location during procedure: pre-op   Block not for primary anesthetic.  Reason for block: at surgeon's request   Post-op Pain Location: L arm  Start time: 11/27/2019 7:16 AM  Timeout: 11/27/2019 7:15 AM   End time: 11/27/2019 7:23 AM    Staffing  Authorizing Provider: Ida De La Rosa MD  Performing Provider: Ida De La Rosa MD    Preanesthetic Checklist  Completed: patient identified, site marked, surgical consent, pre-op evaluation, timeout performed, IV checked, risks and benefits discussed and monitors and equipment checked  Peripheral Block  Patient position: supine  Prep: ChloraPrep  Patient monitoring: heart rate, cardiac monitor, continuous pulse ox, continuous capnometry and frequent blood pressure checks  Block type: supraclavicular  Laterality: left  Injection technique: single shot  Needle  Needle type: Stimuplex   Needle gauge: 22 G  Needle length: 4 in  Needle localization: anatomical landmarks and ultrasound guidance     Assessment  Injection assessment: negative aspiration, negative parasthesia and local visualized surrounding nerve  Paresthesia pain: none  Heart rate change: no  Slow fractionated injection: yes  Additional Notes  VSS.  [facility] RN monitoring vitals throughout procedure.  Patient tolerated procedure well.

## 2019-11-27 NOTE — OR NURSING
Informed consent obtained, patient connected to monitors, timeout was performed for anesthesia block.  Patient tolerated well.

## 2019-11-27 NOTE — H&P
Keron Perez MD VI                       [facility]                         11/27/2019    HPI:  Erica Leblanc is a 74 y.o. female with   Patient Active Problem List   Diagnosis    Severe obesity (BMI 35.0-39.9) with comorbidity    Sickle cell trait    Osteopenia    Hyperlipidemia LDL goal <100    HUMBERTO on CPAP    Hypothyroidism (acquired)    Hx-TIA (transient ischemic attack)    Vitamin D deficiency disease    Pulmonary hypertension    Type 2 diabetes mellitus with ESRD (end-stage renal disease)    Secondary renal hyperparathyroidism    Incomplete bladder emptying    Postmenopausal atrophic vaginitis    Rectocele    Mixed incontinence urge and stress    Chronic diastolic heart failure    Tortuous aorta    History of TIAs    Essential hypertension    ESRD on hemodialysis    Anemia of chronic disease    Depression    Debility    Chronic respiratory failure    Type 2 diabetes mellitus with foot ulcer, with long-term current use of insulin    Stable proliferative diabetic retinopathy associated with type 2 diabetes mellitus    Mild major depression    Heart failure with preserved ejection fraction    Preop cardiovascular exam    Encounter for peritoneal dialysis for end-stage renal disease    End stage renal disease    being managed by PCP and specialists who is here today for evaluation of long term HD access.  S/p R IJ tunneled HD catheter, HD without issues.  Pt is R handed.  No complaints today.  Does endorse orthopnea, no chest pain.  Unable to climb 1 flight of stairs on own.    no MI  no Stroke  Tobacco use: denies    Interval history: No new issues or complaints.      Past Medical History:   Diagnosis Date    Acute respiratory failure with hypoxia     Anemia of chronic kidney failure, stage 4 (severe) 4/5/2019    Cataracts, bilateral     CHF (congestive heart failure)     CKD (chronic kidney disease) stage 3, GFR 30-59 ml/min     CKD  (chronic kidney disease) stage 3, GFR 30-59 ml/min     Controlled type 2 diabetes mellitus with proteinuria or albuminuria     Depression     Diabetes with neurologic complications     Diabetic retinopathy of both eyes     Edema     Glaucoma     History of colonic polyps     Hx-TIA (transient ischemic attack) 2008    Hyperlipidemia LDL goal < 100     Hypertension     Hypothyroidism     Major depressive disorder, single episode, mild 2016    Mixed incontinence urge and stress     Obesity     Obstructive sleep apnea on CPAP     19:  Home CPAP machine broken, per patient & son    Osteopenia     Proteinuria     Sickle cell trait     TIA (transient ischemic attack)     Trouble in sleeping     Type 2 diabetes mellitus with ophthalmic manifestations     Type 2 diabetes with stage 3 chronic kidney disease GFR 30-59     Type II or unspecified type diabetes mellitus with renal manifestations, uncontrolled(250.42)     Uncontrolled type 2 diabetes mellitus with peripheral circulatory disorder 2019    Urge incontinence 2016    Urge incontinence     Venous stasis ulcer     bilateral lower legs    Vitamin D deficiency disease      Past Surgical History:   Procedure Laterality Date    BREAST BIOPSY      breast reduction Bilateral age 31    BREAST SURGERY      cataracts Bilateral      SECTION, LOW TRANSVERSE      x1    CHOLECYSTECTOMY      EYE SURGERY  2014, 2014    vitrectomy    EYE SURGERY Right 2016    HYSTERECTOMY      [facility] (patient is unsure if ovaries removed)    OOPHORECTOMY      REFRACTIVE SURGERY      TOTAL REDUCTION MAMMOPLASTY      approx 10 yrs ago     Family History   Problem Relation Age of Onset    Leukemia Father     Ovarian cancer Sister 35    Stroke Mother     Diabetes Mother     Hypertension Mother     Diabetes Paternal Grandmother     Breast cancer Maternal Aunt 65    HIV Brother     Achondroplasia Sister      Parkinsonism Maternal Aunt     Esophageal cancer Maternal Uncle         smoker    Amblyopia Neg Hx     Blindness Neg Hx     Cataracts Neg Hx     Glaucoma Neg Hx     Macular degeneration Neg Hx     Retinal detachment Neg Hx     Strabismus Neg Hx     Thyroid disease Neg Hx     Colon cancer Neg Hx      Social History     Socioeconomic History    Marital status: Single     Spouse name: Not on file    Number of children: 5    Years of education: Not on file    Highest education level: Not on file   Occupational History    Occupation:      Employer: [employer]     Comment: part-time   Social Needs    Financial resource strain: Not on file    Food insecurity:     Worry: Not on file     Inability: Not on file    Transportation needs:     Medical: Not on file     Non-medical: Not on file   Tobacco Use    Smoking status: Never Smoker    Smokeless tobacco: Never Used   Substance and Sexual Activity    Alcohol use: No     Alcohol/week: 0.0 standard drinks    Drug use: No    Sexual activity: Not Currently     Partners: Male     Birth control/protection: Post-menopausal, Surgical   Lifestyle    Physical activity:     Days per week: Not on file     Minutes per session: Not on file    Stress: Not on file   Relationships    Social connections:     Talks on phone: Not on file     Gets together: Not on file     Attends Rastafari service: Not on file     Active member of club or organization: Not on file     Attends meetings of clubs or organizations: Not on file     Relationship status: Not on file   Other Topics Concern    Not on file   Social History Narrative    Not on file       Current Facility-Administered Medications:     cefazolin (ANCEF) 2 gram in dextrose 5% 50 mL IVPB (premix), 2 g, Intravenous, Once, Keron Perez MD    methylPREDNISolone sodium succinate injection 125 mg, 125 mg, Intravenous, Q6H, Liborio Damon MD    REVIEW OF SYSTEMS:  General: No  fevers or chills; ENT: No sore throat; Allergy and Immunology: no persistent infections; Hematological and Lymphatic: No history of bleeding or easy bruising; Endocrine: negative; Respiratory: no cough, shortness of breath, or wheezing; Cardiovascular: no chest pain or dyspnea on exertion; Gastrointestinal: no abdominal pain/back, change in bowel habits, or bloody stools; Genito-Urinary: no dysuria, trouble voiding, or hematuria; Musculoskeletal: negative; Neurological: no TIA or stroke symptoms; Psychiatric: no nervousness, anxiety or depression.    PHYSICAL EXAM:      Pulse: 60  Temp: 98.7 °F (37.1 °C)      General appearance:  Alert, well-appearing, and in no distress.  Oriented to person, place, and time                    Neurological: Normal speech, no focal findings noted; CN II - XII grossly intact. All extremities with sensation to light touch.            Musculoskeletal: Digits/nail without cyanosis/clubbing.  Strength 5/5 all extremities.                    Neck: Supple, no significant adenopathy, no carotid bruit can be auscultated                  Chest:  Clear to auscultation, no wheezes, rales or rhonchi, symmetric air entry. No use of accessory muscles               Cardiac: Normal rate and regular rhythm, S1 and S2 normal            Abdomen: Soft, nontender, nondistended, no masses or organomegaly, no hernia     No rebound tenderness noted; bowel sounds normal     Pulsatile aortic mass is non palpable.     No groin adenopathy      Extremities:      2+ radial pulse bilaterally     No BUE edema, Negative Manuel's test BUE    Skin: No tissue loss    LAB RESULTS:  No results found for: CBC  Lab Results   Component Value Date    LABPROT 10.9 11/20/2019    INR 1.0 11/20/2019     Lab Results   Component Value Date     11/27/2019    K 3.8 11/27/2019    CL 99 11/27/2019    CO2 27 11/27/2019     (H) 11/27/2019    BUN 24 (H) 11/27/2019    CREATININE 2.8 (H) 11/27/2019    CALCIUM 9.1 11/27/2019     ANIONGAP 11 11/27/2019    EGFRNONAA 16 (A) 11/27/2019     Lab Results   Component Value Date    WBC 4.42 11/20/2019    RBC 4.37 11/20/2019    HGB 11.0 (L) 11/20/2019    HCT 35.3 (L) 11/20/2019    MCV 81 (L) 11/20/2019    MCH 25.2 (L) 11/20/2019    MCHC 31.2 (L) 11/20/2019    RDW 16.9 (H) 11/20/2019     11/20/2019    MPV 9.3 11/20/2019    GRAN 2.8 11/20/2019    GRAN 62.6 11/20/2019    LYMPH 0.8 (L) 11/20/2019    LYMPH 18.1 11/20/2019    MONO 0.7 11/20/2019    MONO 16.3 (H) 11/20/2019    EOS 0.1 11/20/2019    BASO 0.02 11/20/2019    EOSINOPHIL 2.3 11/20/2019    BASOPHIL 0.5 11/20/2019    DIFFMETHOD Automated 11/20/2019     .  Lab Results   Component Value Date    HGBA1C 6.9 (H) 11/20/2019       IMAGING:  All pertinent imaging has been reviewed and interpreted independently.    Vein mapping 9/2019:  Adequate R superior basilic vein and axillary vein ; Adequate L basilic vein superior and inferior, adequate L axillary     IMP/PLAN:  74 y.o. female with   Patient Active Problem List   Diagnosis    Severe obesity (BMI 35.0-39.9) with comorbidity    Sickle cell trait    Osteopenia    Hyperlipidemia LDL goal <100    HUMBERTO on CPAP    Hypothyroidism (acquired)    Hx-TIA (transient ischemic attack)    Vitamin D deficiency disease    Pulmonary hypertension    Type 2 diabetes mellitus with ESRD (end-stage renal disease)    Secondary renal hyperparathyroidism    Incomplete bladder emptying    Postmenopausal atrophic vaginitis    Rectocele    Mixed incontinence urge and stress    Chronic diastolic heart failure    Tortuous aorta    History of TIAs    Essential hypertension    ESRD on hemodialysis    Anemia of chronic disease    Depression    Debility    Chronic respiratory failure    Type 2 diabetes mellitus with foot ulcer, with long-term current use of insulin    Stable proliferative diabetic retinopathy associated with type 2 diabetes mellitus    Mild major depression    Heart failure with  preserved ejection fraction    Preop cardiovascular exam    Encounter for peritoneal dialysis for end-stage renal disease    End stage renal disease    being managed by PCP and specialists who is here today for evaluation of long term HD access.    -To OR for LUE AVF (brachiobasilic) vs LUE AVG   -NPO    Keron Perez MD RPVI  Vascular and Endovascular Surgery

## 2019-11-27 NOTE — BRIEF OP NOTE
[facility]  Brief Operative Note     SUMMARY     Surgery Date: 11/27/2019     Surgeon(s) and Role:     * Keron Perez MD - Primary    Assisting Surgeon: None    Pre-op Diagnosis:  Encounter regarding vascular access for dialysis for end-stage renal disease [N18.6, Z99.2]    Post-op Diagnosis:  Post-Op Diagnosis Codes:     * Encounter regarding vascular access for dialysis for end-stage renal disease [N18.6, Z99.2]    Procedure:  1. Left radiocephalic fistula    Anesthesia: Regional    Description of the findings of the procedure: adequate artery and vein for fistula    Findings/Key Components: good thrill and 2+ radial pulse at conclusion of case    Estimated Blood Loss: <5cc         Specimens:   Specimen (12h ago, onward)    None          Discharge Note    SUMMARY     Admit Date: 11/27/2019    Discharge Date and Time:  11/27/2019 10:48 AM    Hospital Course (synopsis of major diagnoses, care, treatment, and services provided during the course of the hospital stay): No new issues or acute events postoperatively.        Final Diagnosis: Post-Op Diagnosis Codes:     * Encounter regarding vascular access for dialysis for end-stage renal disease [N18.6, Z99.2]    Disposition: Home or Self Care    Follow Up/Patient Instructions: Resume renal diet, follow up in 4 weeks with HD ultrasound, resume normal activities in 2 days.      Medications:  Reconciled Home Medications:      Medication List      ASK your doctor about these medications    amLODIPine 10 MG tablet  Commonly known as:  NORVASC  Take 1 tablet (10 mg total) by mouth once daily.     atorvastatin 10 MG tablet  Commonly known as:  LIPITOR  TAKE 1 TABLET EVERY DAY     bumetanide 2 MG tablet  Commonly known as:  BUMEX  Take 1 tablet (2 mg total) by mouth once daily.     citalopram 10 MG tablet  Commonly known as:  CELEXA  TAKE 1 TABLET EVERY DAY     clopidogrel 75 mg tablet  Commonly known as:  PLAVIX  TAKE 1 TABLET EVERY DAY     docusate  sodium 100 MG capsule  Commonly known as:  COLACE  Take 200 mg by mouth once daily.     HAIR,SKIN AND NAILS ORAL  Take 3 tablets by mouth once daily.     hydrALAZINE 100 MG tablet  Commonly known as:  APRESOLINE  Take 1 tablet (100 mg total) by mouth 3 (three) times daily.     insulin detemir U-100 100 unit/mL (3 mL) Inpn pen  Commonly known as:  LEVEMIR FLEXTOUCH  Inject 5 Units into the skin once daily.     LANCETS MISC     levothyroxine 50 MCG tablet  Commonly known as:  SYNTHROID  Take 50 mcg by mouth once daily.     metoprolol tartrate 50 MG tablet  Commonly known as:  LOPRESSOR  Take 1 tablet (50 mg total) by mouth 2 (two) times daily.     oxybutynin 15 MG Tr24  Commonly known as:  DITROPAN XL  TAKE 1 TABLET EVERY DAY     polyethylene glycol 17 gram Pwpk  Commonly known as:  GLYCOLAX  Take 17 g by mouth once daily.          No discharge procedures on file.

## 2019-11-27 NOTE — ANESTHESIA PREPROCEDURE EVALUATION
11/27/2019    Pre-operative evaluation for Procedure(s) (LRB):  CREATION, AV FISTULA, LEFT UPPER EXTREMITY (Left)    Erica Leblanc is a 74 y.o. female     Patient Active Problem List   Diagnosis    Severe obesity (BMI 35.0-39.9) with comorbidity    Sickle cell trait    Osteopenia    Hyperlipidemia LDL goal <100    HUMBERTO on CPAP    Hypothyroidism (acquired)    Hx-TIA (transient ischemic attack)    Vitamin D deficiency disease    Pulmonary hypertension    Type 2 diabetes mellitus with ESRD (end-stage renal disease)    Secondary renal hyperparathyroidism    Incomplete bladder emptying    Postmenopausal atrophic vaginitis    Rectocele    Mixed incontinence urge and stress    Chronic diastolic heart failure    Tortuous aorta    History of TIAs    Essential hypertension    ESRD on hemodialysis    Anemia of chronic disease    Depression    Debility    Chronic respiratory failure    Type 2 diabetes mellitus with foot ulcer, with long-term current use of insulin    Stable proliferative diabetic retinopathy associated with type 2 diabetes mellitus    Mild major depression    Heart failure with preserved ejection fraction    Preop cardiovascular exam    Encounter for peritoneal dialysis for end-stage renal disease    End stage renal disease       Review of patient's allergies indicates:   Allergen Reactions    Ace inhibitors Other (See Comments)     Other reaction(s): cough       No current facility-administered medications on file prior to encounter.      Current Outpatient Medications on File Prior to Encounter   Medication Sig Dispense Refill    amLODIPine (NORVASC) 10 MG tablet Take 1 tablet (10 mg total) by mouth once daily. 90 tablet 3    atorvastatin (LIPITOR) 10 MG tablet TAKE 1 TABLET EVERY DAY 90 tablet 1    bumetanide (BUMEX) 2 MG tablet Take 1 tablet (2 mg total) by  mouth once daily. 30 tablet 11    citalopram (CELEXA) 10 MG tablet TAKE 1 TABLET EVERY DAY 90 tablet 1    clopidogrel (PLAVIX) 75 mg tablet TAKE 1 TABLET EVERY DAY 90 tablet 1    docusate sodium (COLACE) 100 MG capsule Take 200 mg by mouth once daily.       hydrALAZINE (APRESOLINE) 100 MG tablet Take 1 tablet (100 mg total) by mouth 3 (three) times daily. 90 tablet 3    insulin detemir U-100 (LEVEMIR FLEXTOUCH) 100 unit/mL (3 mL) SubQ InPn pen Inject 5 Units into the skin once daily. 1.5 mL 1    LANCETS MISC       levothyroxine (SYNTHROID) 50 MCG tablet Take 50 mcg by mouth once daily.      metoprolol tartrate (LOPRESSOR) 50 MG tablet Take 1 tablet (50 mg total) by mouth 2 (two) times daily. 100 tablet 3    oxybutynin (DITROPAN XL) 15 MG TR24 TAKE 1 TABLET EVERY DAY 90 tablet 3    MULTIVITAMIN WITH MINERALS (HAIR,SKIN AND NAILS ORAL) Take 3 tablets by mouth once daily.      polyethylene glycol (GLYCOLAX) 17 gram PwPk Take 17 g by mouth once daily.  0       Past Surgical History:   Procedure Laterality Date    BREAST BIOPSY      breast reduction Bilateral age 31    BREAST SURGERY      cataracts Bilateral      SECTION, LOW TRANSVERSE      x1    CHOLECYSTECTOMY      EYE SURGERY  2014, 2014    vitrectomy    EYE SURGERY Right 2016    HYSTERECTOMY  1986    [facility] (patient is unsure if ovaries removed)    OOPHORECTOMY      REFRACTIVE SURGERY      TOTAL REDUCTION MAMMOPLASTY      approx 10 yrs ago       Social History     Socioeconomic History    Marital status: Single     Spouse name: Not on file    Number of children: 5    Years of education: Not on file    Highest education level: Not on file   Occupational History    Occupation:      Employer: [employer]     Comment: part-time   Social Needs    Financial resource strain: Not on file    Food insecurity:     Worry: Not on file     Inability: Not on file    Transportation needs:     Medical: Not  on file     Non-medical: Not on file   Tobacco Use    Smoking status: Never Smoker    Smokeless tobacco: Never Used   Substance and Sexual Activity    Alcohol use: No     Alcohol/week: 0.0 standard drinks    Drug use: No    Sexual activity: Not Currently     Partners: Male     Birth control/protection: Post-menopausal, Surgical   Lifestyle    Physical activity:     Days per week: Not on file     Minutes per session: Not on file    Stress: Not on file   Relationships    Social connections:     Talks on phone: Not on file     Gets together: Not on file     Attends Restorationist service: Not on file     Active member of club or organization: Not on file     Attends meetings of clubs or organizations: Not on file     Relationship status: Not on file   Other Topics Concern    Not on file   Social History Narrative    Not on file         CBC: No results for input(s): WBC, RBC, HGB, HCT, PLT, MCV, MCH, MCHC in the last 72 hours.    CMP:   Recent Labs     19  0541      K 3.8   CL 99   CO2 27   BUN 24*   CREATININE 2.8*   *   CALCIUM 9.1       INR  No results for input(s): PT, INR, PROTIME, APTT in the last 72 hours.        Diagnostic Studies:      EKD Echo:  Results for orders placed or performed during the hospital encounter of 17   2D echo with color flow doppler   Result Value Ref Range    QEF 70 55 - 65    Diastolic Dysfunction Yes (A)     Est. PA Systolic Pressure 17.64     Pericardial Effusion NONE     Mitral Valve Mobility NORMAL     Tricuspid Valve Regurgitation TRIVIAL          Anesthesia Evaluation    I have reviewed the Patient Summary Reports.    I have reviewed the Nursing Notes.   I have reviewed the Medications.     Review of Systems  Anesthesia Hx:  No problems with previous Anesthesia  History of prior surgery of interest to airway management or planning: Previous anesthesia: General, MAC Denies Family Hx of Anesthesia complications.   Denies Personal Hx of Anesthesia  complications.   Social:  Non-Smoker    Hematology/Oncology:         -- Anemia:   Cardiovascular:   Hypertension, well controlled CHF    Pulmonary:   Denies Shortness of breath. Sleep Apnea, CPAP Pressures of 14   Renal/:   Chronic Renal Disease, ESRD    Neurological:   TIA,    Endocrine:   Diabetes, poorly controlled, type 2    Psych:   Psychiatric History depression          Physical Exam  General:  Well nourished    Airway/Jaw/Neck:  Airway Findings: Mouth Opening: Normal Tongue: Normal  General Airway Assessment: Adult  Mallampati: I  TM Distance: Normal, at least 6 cm  Jaw/Neck Findings:  Neck ROM: Normal ROM     Eyes/Ears/Nose:  Eyes/Ears/Nose Findings:    Dental:  Dental Findings: In tact    Chest/Lungs:  Chest/Lungs Findings: Clear to auscultation, Normal Respiratory Rate     Heart/Vascular:  Heart Findings: Rate: Normal  Rhythm: Regular Rhythm      Musculoskeletal:  Baseline morning edema of BLE per patient    Mental Status:  Mental Status Findings:  Cooperative, Alert and Oriented         Anesthesia Plan  Type of Anesthesia, risks & benefits discussed:  Anesthesia Type:  regional  Patient's Preference:   Intra-op Monitoring Plan: standard ASA monitors  Intra-op Monitoring Plan Comments:   Post Op Pain Control Plan:   Post Op Pain Control Plan Comments:   Induction:   IV  Beta Blocker:  Patient is on a Beta-Blocker and has received one dose within the past 24 hours (No further documentation required).       Informed Consent: Patient understands risks and agrees with Anesthesia plan.  Questions answered. Anesthesia consent signed with patient.  ASA Score: 3     Day of Surgery Review of History & Physical: I have interviewed and examined the patient. I have reviewed the patient's H&P dated:    H&P update referred to the surgeon.     Anesthesia Plan Notes:           Ready For Surgery From Anesthesia Perspective.

## 2019-11-27 NOTE — OP NOTE
11/27/2019    Indications: Erica Leblanc is a 74 y.o. female with end stage renal disease and need for long-term dialysis access.    Pre-operative Diagnosis:  ESRD.    Post-operative Diagnosis: Same    Operation: Left Radio-Cephalic AVF (Lyssa)          Surgeon: Keron Perez MD, RPVI    Assistants:  Sadaf Barillas    Anesthesia: IV + Regional    Findings:  Adequate artery and vein for AVF    Estimate Blood Loss:  Less than 10 mL            Specimens: None               Complications:  None; patient tolerated the procedure well.           Disposition: PACU, then home.           Attending Attestation: I was present and scrubbed for the entire procedure.    Procedure Details   The patient was seen in the Holding Room. The risks, benefits, complications, treatment options, and expected outcomes were discussed with the patient. The patient concurred with the proposed plan, giving informed consent.  The site of surgery properly noted/marked. The patient was taken to the Operating Room, identified correctly and the procedure verified. A Time Out was held and the above information confirmed.    The arm was prepped and draped in a sterile fashion. An initial skin incision was made on the radial aspect of the wrist and the cephalic vein was explored and appeared adequate for an AVF. The radial artery was then dissected and proximal and distal control was obtained by placing vessel loops around it. After flushing the vessels with heparin the vessel loops were clamped and a end-to-side anastomosis between the cephalic vein and the radial artery was created using 6-0 Prolene sutures in a continuous running manner. After completion of the anastomosis, the vessel loops were released. Hemostasis was secured. Good thrill was noted in the fistula and the incision was then closed in two layers, subcutaneous tissue with 3-0 Vicryl and skin with 4-0 Monocryl. Dermabond was applied.

## 2019-11-27 NOTE — TRANSFER OF CARE
"Anesthesia Transfer of Care Note    Patient: Erica Leblanc    Procedure(s) Performed: Procedure(s) (LRB):  CREATION, AV FISTULA, LEFT UPPER EXTREMITY (Left)    Patient location: OPS    Anesthesia Type: MAC and regional    Transport from OR: Transported from OR on room air with adequate spontaneous ventilation    Post pain: adequate analgesia    Post assessment: no apparent anesthetic complications and tolerated procedure well    Post vital signs: stable    Level of consciousness: awake, alert and oriented    Nausea/Vomiting: no nausea/vomiting    Complications: none    Transfer of care protocol was followed      Last vitals:   Visit Vitals  BP (!) 108/55   Pulse 70   Temp 36.7 °C (98 °F)   Resp 16   Ht 5' 5" (1.651 m)   Wt 97.2 kg (214 lb 4.6 oz)   SpO2 100%   Breastfeeding? No   BMI 35.66 kg/m²     "

## 2019-11-27 NOTE — ANESTHESIA POSTPROCEDURE EVALUATION
Anesthesia Post Evaluation    Patient: Erica Leblanc    Procedure(s) Performed: Procedure(s) (LRB):  CREATION, AV FISTULA, LEFT UPPER EXTREMITY (Left)    Final Anesthesia Type: regional    Patient location during evaluation: PACU  Patient participation: Yes- Able to Participate  Level of consciousness: awake and alert and oriented  Post-procedure vital signs: reviewed and stable  Pain management: adequate  Airway patency: patent    PONV status at discharge: No PONV  Anesthetic complications: no      Cardiovascular status: blood pressure returned to baseline, hemodynamically stable and stable  Respiratory status: unassisted, spontaneous ventilation and room air  Hydration status: euvolemic  Follow-up not needed.          Vitals Value Taken Time   /55 11/27/2019 11:04 AM   Temp 36.7 °C (98 °F) 11/27/2019 11:04 AM   Pulse 70 11/27/2019 11:04 AM   Resp 16 11/27/2019 11:04 AM   SpO2 100 % 11/27/2019 11:04 AM         No case tracking events are documented in the log.      Pain/Lyndsey Score: No data recorded

## 2019-12-03 ENCOUNTER — TELEPHONE (OUTPATIENT)
Dept: FAMILY MEDICINE | Facility: CLINIC | Age: 74
End: 2019-12-03

## 2019-12-03 NOTE — TELEPHONE ENCOUNTER
----- Message from Eb Britton sent at 12/2/2019  3:34 PM CST -----  Contact: MICKEY BERTRAND [[id number]]  Name of Who is Calling:MICKEY BERTRAND [[id number]]       What is the request in detail: Pt is requesting a call back from clinical team in regard to Cpap  Please contact to further discuss and advise.          Can the clinic reply by [facility]:       What Number to Call Back if not in [facility]: [phone]

## 2019-12-03 NOTE — TELEPHONE ENCOUNTER
I know that this can often be done remotely through a chip in the machine. I usually defer this to sleep medicine who sees patients on a regular basis for their sleep apnea.

## 2019-12-03 NOTE — TELEPHONE ENCOUNTER
Spoke with pt states she is wondering why no one has ever checked on her usage of the CPAP machine.    Pt states they did it once when she first received the machine, pt wants to know is it because she is on dialysis that no one has checked on the usage of her sleep apnea    Please advise

## 2019-12-03 NOTE — TELEPHONE ENCOUNTER
Patient notified of this and is advise to contact Sleep medicine clinic if she still have concern. Patient verbalize understanding.

## 2019-12-10 ENCOUNTER — TELEPHONE (OUTPATIENT)
Dept: FAMILY MEDICINE | Facility: CLINIC | Age: 74
End: 2019-12-10

## 2019-12-10 NOTE — TELEPHONE ENCOUNTER
----- Message from Fina Piña sent at 12/9/2019  4:13 PM CST -----  Contact: Patient :[phone]  Type: Patient Call Back    Who called: Patient    What is the request in detail: Need to talk to nurse in regards to a note she need added to her chart. Regarding the C Pap Machine, and yearly physical that was done on 11-20-19. The note must state that she is still using the machine and that it is still effective. This is her 2nd time calling regarding this. Need this done within 2 weeks in order for her insurance to cover the C Pap Machine. Please call pt in regards to this.    Would the patient rather a call back or a response via [facility]? Call back    Best call back number:[phone]

## 2019-12-10 NOTE — SUBJECTIVE & OBJECTIVE
Review of Systems   Respiratory: Negative for shortness of breath.    Cardiovascular: Positive for leg swelling.   Gastrointestinal: Negative.      Objective:     Vital Signs (Most Recent):  Temp: 96.9 °F (36.1 °C) (09/05/19 0751)  Pulse: 60 (09/05/19 1145)  Resp: 17 (09/05/19 1145)  BP: (!) 170/94 (09/05/19 1145)  SpO2: 95% (09/05/19 1145) Vital Signs (24h Range):        Weight: 122.3 kg (269 lb 10 oz)  Body mass index is 44.87 kg/m².  No intake or output data in the 24 hours ending 12/10/19 0611   Physical Exam   Constitutional: She is oriented to person, place, and time. She appears well-developed. No distress.   Cardiovascular:   NSR with HR 60s   Pulmonary/Chest: She has no wheezes. She has no rales.   Speaking full sentences      Abdominal: Soft. Bowel sounds are normal.   Musculoskeletal: She exhibits edema.   Neurological: She is alert and oriented to person, place, and time.   Skin: She is not diaphoretic.   Nursing note and vitals reviewed.      Significant Labs: All pertinent labs within the past 24 hours have been reviewed.    Significant Imaging: I have reviewed all pertinent imaging results/findings within the past 24 hours.  I have reviewed and interpreted all pertinent imaging results/findings within the past 24 hours.

## 2019-12-10 NOTE — TELEPHONE ENCOUNTER
Pt. Was notified that use of C-PAP was added to 11/20/19 office visit. Copy of AVS was also mailed to pt.

## 2019-12-10 NOTE — PROGRESS NOTES
[facility] Hospital Medicine  Progress Note    Patient Name: Erica Leblanc  MRN: [id number]  Patient Class: IP- Inpatient   Admission Date: 8/27/2019  Length of Stay: 15 days  Attending Physician: No att. providers found  Primary Care Provider: Sendy Elaine MD        Subjective:     Principal Problem:Acute on chronic diastolic heart failure        HPI:  Ms. Erica Leblanc is a 74 y.o. female with essential hypertension, type 2 diabetes mellitus (HbA1c 7.3% Jul 2019), hyperlipidemia (LDL 52.8 Mar 2019), chronic diastolic heart failure (LVEF 70% Jun 2017), CKD stage 5, hypothyroidism (TSH 3.298 Mar 2019), anemia chronic disease, morbid obesity (BMI 41.6), HUMBERTO on CPAP, and depression who presents to [facility] ED with complaints of dyspnea for the past four days.  She had reported to the ED physician that her CPAP machine has been malfunctioning, a similar complaint to her presentation two months ago.  She has not been able to get it replaced due to insurance constraints.  The dyspnea is worse on exertion.  Further history is otherwise limited at this time due to hypersomnolence.    Overview/Hospital Course:  75 y/o female admitted with acute on chronic diastolic heart failure.  Started on IV diuresis.  Patient with hx of CKD and worsening Creat.  Nephrology consulted. Patient clinically improved.  Social workers consulted to help with re-arranging broken CPAP for patient. PT/OT rec: H/H.  02 requirements increased and so lasix increased on 9/2/19. CPAP arranged for home. Patient does not wear 02 at home. Patient's renal function continued to decline despite alternative diuretic regimen. Patient consented for dialysis and tunneled cath placement. Tunneled cath placed on 9/5/2019 and dialysis started same day. Patient tolerated well. She was noted to be more alert and active with therapy. Weaned off supplemental O2. Patient will go home with home health for therapy once outpatient HD  arranged.         Review of Systems   Respiratory: Negative for shortness of breath.    Cardiovascular: Positive for leg swelling.   Gastrointestinal: Negative.      Objective:     Vital Signs (Most Recent):  Temp: 96.9 °F (36.1 °C) (09/05/19 0751)  Pulse: 60 (09/05/19 1145)  Resp: 17 (09/05/19 1145)  BP: (!) 170/94 (09/05/19 1145)  SpO2: 95% (09/05/19 1145) Vital Signs (24h Range):        Weight: 122.3 kg (269 lb 10 oz)  Body mass index is 44.87 kg/m².  No intake or output data in the 24 hours ending 12/10/19 0611   Physical Exam   Constitutional: She is oriented to person, place, and time. She appears well-developed. No distress.   Cardiovascular:   NSR with HR 60s   Pulmonary/Chest: She has no wheezes. She has no rales.   Speaking full sentences      Abdominal: Soft. Bowel sounds are normal.   Musculoskeletal: She exhibits edema.   Neurological: She is alert and oriented to person, place, and time.   Skin: She is not diaphoretic.   Nursing note and vitals reviewed.      Significant Labs: All pertinent labs within the past 24 hours have been reviewed.    Significant Imaging: I have reviewed all pertinent imaging results/findings within the past 24 hours.  I have reviewed and interpreted all pertinent imaging results/findings within the past 24 hours.      Assessment/Plan:      Chronic respiratory failure  2/2 HUMBERTO, diastolic heart failure and pulmonary hypertension  Supplemental O2, dialysis and CPAP nightly    Debility  Resume H/H PT/OT on discharge.       Depression  Stable; will continue her home regimen of citalopram.    Anemia of chronic disease  The patient's H/H is stable and consistent with previous laboratory measurements, and the patient exhibits no signs or symptoms of acute bleeding; there is no indication for transfusion.  Will continue to monitor.    ESRD on hemodialysis  Worsening renal function over past few months.  Initiated dialysis  Nephrology following    Essential hypertension  Continue  current antihypertensive regimen    Chronic diastolic heart failure  As addressed above.    Type 2 diabetes mellitus with ESRD (end-stage renal disease)  Well controlled on a home regimen of basal insulin therapy; will provide basal-prandial insulin therapy along insulin sliding scale.  A1c 8.6%    Hypothyroidism (acquired)  Poorly controlled; will continue her home regimen of levothyroxine and defer dosage adjustments to her PCP.    HUMBERTO on CPAP  As addressed above.    CPAP arranged for home.     Hyperlipidemia LDL goal <100  Well controlled; will continue her home regimen of atorvastatin.    Severe obesity (BMI 35.0-39.9) with comorbidity  The patient has been counseled on the negative impact that obesity imparts on her health and was encouraged to make lifestyle changes in order to lose weight and decrease her modifiable risk factors.      VTE Risk Mitigation (From admission, onward)         Ordered     IP VTE HIGH RISK PATIENT  Once      08/27/19 6761                      Zahra Almeida MD  Department of Hospital Medicine   [facility]

## 2019-12-30 DIAGNOSIS — F32.1 MODERATE SINGLE CURRENT EPISODE OF MAJOR DEPRESSIVE DISORDER: ICD-10-CM

## 2019-12-30 RX ORDER — CITALOPRAM 10 MG/1
TABLET ORAL
Qty: 90 TABLET | Refills: 1 | Status: SHIPPED | OUTPATIENT
Start: 2019-12-30 | End: 2020-07-03

## 2019-12-31 NOTE — TELEPHONE ENCOUNTER
Spoke with the pt and she already had an eye appt on 1/6/20.  Pt asked to reschedule this appt to 1/15/2020.  Patient verbalized understandings.

## 2020-01-03 ENCOUNTER — PATIENT OUTREACH (OUTPATIENT)
Dept: ADMINISTRATIVE | Facility: HOSPITAL | Age: 75
End: 2020-01-03

## 2020-01-03 DIAGNOSIS — E11.22 TYPE 2 DIABETES MELLITUS WITH ESRD (END-STAGE RENAL DISEASE): Primary | ICD-10-CM

## 2020-01-03 DIAGNOSIS — N18.6 TYPE 2 DIABETES MELLITUS WITH ESRD (END-STAGE RENAL DISEASE): Primary | ICD-10-CM

## 2020-01-09 ENCOUNTER — TELEPHONE (OUTPATIENT)
Dept: OPTOMETRY | Facility: CLINIC | Age: 75
End: 2020-01-09

## 2020-01-09 NOTE — TELEPHONE ENCOUNTER
[facility] Benefits as of 1/9/2020    Member Name: MICKEY VAZQUEZ  Member ID: [id number]  Social Security Number: [id number]  YOB: 1945  Address: [address] [place] [address] [address] [address]  Phone Number:   Gender: Female  Responsible Member: MICKEY VAZQUEZ    Network: [id number]  Group: [employer] [place] ([id number])  Benefit Level: 767

## 2020-01-13 ENCOUNTER — HOSPITAL ENCOUNTER (OUTPATIENT)
Dept: CARDIOLOGY | Facility: HOSPITAL | Age: 75
Discharge: HOME | End: 2020-01-13
Attending: SURGERY
Payer: MEDICARE

## 2020-01-13 ENCOUNTER — PATIENT OUTREACH (OUTPATIENT)
Dept: ADMINISTRATIVE | Facility: OTHER | Age: 75
End: 2020-01-13

## 2020-01-13 DIAGNOSIS — N18.6 ESRD ON HEMODIALYSIS: ICD-10-CM

## 2020-01-13 DIAGNOSIS — Z99.2 ESRD ON HEMODIALYSIS: ICD-10-CM

## 2020-01-13 LAB
HD ANASTAMOSIS LOCATION: NORMAL
HD FISTULA OUTFLOW VEIN VESSEL: NORMAL
HD INFLOW ARTERY VESSEL: NORMAL
RIGHT DIS GRAFT DEPTH: 0.5 CM
RIGHT DIS GRAFT DIA: 0.7 CM
RIGHT DIS GRAFT PSV: 104 CM/ SEC
RIGHT INFLOW PSV: 203 CM/S
RIGHT MID GRAFT DEPTH: 0.2 CM
RIGHT MID GRAFT DIA: 0.6 CM
RIGHT MID GRAFT PSV: 140 CM/S
RIGHT OUTFLOW VEIN PSV: 48 CM/ SEC
RIGHT PROX ANA PSV: 557 CM/S
RIGHT PROX GRAFT DEPTH: 0.2 CM
RIGHT PROX GRAFT DIA: 0.5 CM
RIGHT PROX GRAFT PSV: 375 CM/S
RIGHT VOLUME FLOW PSV: 658 ML/MIN

## 2020-01-13 PROCEDURE — 93990 CV US HEMODIALYSIS ACCESS (CUPID ONLY): ICD-10-PCS | Mod: 26,HCNC,, | Performed by: SURGERY

## 2020-01-13 PROCEDURE — 93990 DOPPLER FLOW TESTING: CPT | Mod: TC,HCNC

## 2020-01-13 PROCEDURE — 93990 DOPPLER FLOW TESTING: CPT | Mod: 26,HCNC,, | Performed by: SURGERY

## 2020-01-15 ENCOUNTER — OFFICE VISIT (OUTPATIENT)
Dept: OPTOMETRY | Facility: CLINIC | Age: 75
End: 2020-01-15
Payer: COMMERCIAL

## 2020-01-15 DIAGNOSIS — E11.3553 STABLE PROLIFERATIVE DIABETIC RETINOPATHY OF BOTH EYES ASSOCIATED WITH TYPE 2 DIABETES MELLITUS: ICD-10-CM

## 2020-01-15 DIAGNOSIS — H52.7 REFRACTIVE ERROR: ICD-10-CM

## 2020-01-15 DIAGNOSIS — E11.9 DIABETIC EYE EXAM: Primary | ICD-10-CM

## 2020-01-15 DIAGNOSIS — Z01.00 DIABETIC EYE EXAM: Primary | ICD-10-CM

## 2020-01-15 DIAGNOSIS — Z96.1 PSEUDOPHAKIA: ICD-10-CM

## 2020-01-15 LAB
LEFT EYE DM RETINOPATHY: POSITIVE
RIGHT EYE DM RETINOPATHY: POSITIVE

## 2020-01-15 PROCEDURE — 92015 DETERMINE REFRACTIVE STATE: CPT | Mod: HCNC,S$GLB,, | Performed by: OPTOMETRIST

## 2020-01-15 PROCEDURE — 99999 PR PBB SHADOW E&M-EST. PATIENT-LVL II: CPT | Mod: PBBFAC,HCNC,, | Performed by: OPTOMETRIST

## 2020-01-15 PROCEDURE — 92014 PR EYE EXAM, EST PATIENT,COMPREHESV: ICD-10-PCS | Mod: HCNC,S$GLB,, | Performed by: OPTOMETRIST

## 2020-01-15 PROCEDURE — 92014 COMPRE OPH EXAM EST PT 1/>: CPT | Mod: HCNC,S$GLB,, | Performed by: OPTOMETRIST

## 2020-01-15 PROCEDURE — 99999 PR PBB SHADOW E&M-EST. PATIENT-LVL II: ICD-10-PCS | Mod: PBBFAC,HCNC,, | Performed by: OPTOMETRIST

## 2020-01-15 PROCEDURE — 92015 PR REFRACTION: ICD-10-PCS | Mod: HCNC,S$GLB,, | Performed by: OPTOMETRIST

## 2020-01-15 NOTE — PROGRESS NOTES
Subjective:       Patient ID: Erica Leblanc is a 74 y.o. female      Chief Complaint   Patient presents with    Concerns About Ocular Health    Diabetic Eye Exam     History of Present Illness  Dls: 10/3/18 Dr. Amaral     75 y/o female presents today for diabetic eye exam.   Pt states no changes in vision. Pt wears otc readers.      x 3 days ago     No tearing  No itching  No burning  No pain  No ha's  No floaters  No flashes    Eye meds  None    Hemoglobin A1C       Date                     Value               Ref Range           Status             11/20/2019               6.9 (H)             4.0 - 5.6 %         Final          08/27/2019               8.6 (H)             4.0 - 5.6 %         Final          07/20/2019               7.3 (H)             4.0 - 5.6 %         Final        Assessment/Plan:     1. Diabetic eye exam  [facility] vision.     2. Stable proliferative diabetic retinopathy of both eyes associated with type 2 diabetes mellitus  S/p PRP and vitrectomy OU. Stable. Pt to follow up with Dr. Amaral in 6 months, sooner PRN.    3. Pseudophakia  Well-centered. Clear.     4. Refractive error  Optional Rx. Readers PRN.    Eyeglass Final Rx     Eyeglass Final Rx       Sphere Cylinder Axis Add    Right -1.00 +0.25 180 +2.50    Left [place] +0.50 180 +2.50    Expiration Date:  1/15/2021                  Follow up in about 6 months (around 7/15/2020) for re-establish care with Dr. Amaral.

## 2020-01-20 ENCOUNTER — OFFICE VISIT (OUTPATIENT)
Dept: VASCULAR SURGERY | Facility: CLINIC | Age: 75
End: 2020-01-20
Payer: MEDICARE

## 2020-01-20 VITALS
HEART RATE: 64 BPM | DIASTOLIC BLOOD PRESSURE: 64 MMHG | HEIGHT: 65 IN | SYSTOLIC BLOOD PRESSURE: 118 MMHG | BODY MASS INDEX: 35.98 KG/M2 | WEIGHT: 215.94 LBS

## 2020-01-20 DIAGNOSIS — T82.858A ARTERIOVENOUS FISTULA STENOSIS, INITIAL ENCOUNTER: Primary | ICD-10-CM

## 2020-01-20 PROCEDURE — 99999 PR PBB SHADOW E&M-EST. PATIENT-LVL III: CPT | Mod: PBBFAC,HCNC,, | Performed by: SURGERY

## 2020-01-20 PROCEDURE — 99499 UNLISTED E&M SERVICE: CPT | Mod: S$GLB,,, | Performed by: SURGERY

## 2020-01-20 PROCEDURE — 99499 RISK ADDL DX/OHS AUDIT: ICD-10-PCS | Mod: S$GLB,,, | Performed by: SURGERY

## 2020-01-20 PROCEDURE — 99024 POSTOP FOLLOW-UP VISIT: CPT | Mod: HCNC,S$GLB,, | Performed by: SURGERY

## 2020-01-20 PROCEDURE — 99024 PR POST-OP FOLLOW-UP VISIT: ICD-10-PCS | Mod: HCNC,S$GLB,, | Performed by: SURGERY

## 2020-01-20 PROCEDURE — 99999 PR PBB SHADOW E&M-EST. PATIENT-LVL III: ICD-10-PCS | Mod: PBBFAC,HCNC,, | Performed by: SURGERY

## 2020-01-20 NOTE — PROGRESS NOTES
Keron Perez MD RPVI                       [facility]                         01/20/2020    HPI:  Erica Leblanc is a 74 y.o. female with   Patient Active Problem List   Diagnosis    Severe obesity (BMI 35.0-39.9) with comorbidity    Sickle cell trait    Osteopenia    Hyperlipidemia LDL goal <100    HUMBERTO on CPAP    Hypothyroidism (acquired)    Hx-TIA (transient ischemic attack)    Vitamin D deficiency disease    Pulmonary hypertension    Type 2 diabetes mellitus with ESRD (end-stage renal disease)    Secondary renal hyperparathyroidism    Incomplete bladder emptying    Postmenopausal atrophic vaginitis    Rectocele    Mixed incontinence urge and stress    Chronic diastolic heart failure    Tortuous aorta    History of TIAs    Essential hypertension    ESRD on hemodialysis    Anemia of chronic disease    Depression    Debility    Chronic respiratory failure    Type 2 diabetes mellitus with foot ulcer, with long-term current use of insulin    Stable proliferative diabetic retinopathy associated with type 2 diabetes mellitus    Mild major depression    Heart failure with preserved ejection fraction    Preop cardiovascular exam    End stage renal disease    Pseudophakia    being managed by PCP and specialists who is here today for evaluation of long term HD access.  S/p R IJ tunneled HD catheter, HD without issues.  Pt is R handed.  No complaints today.  Does endorse orthopnea, no chest pain.  Unable to climb 1 flight of stairs on own.    no MI  no Stroke  Tobacco use: denies    Past Medical History:   Diagnosis Date    Acute respiratory failure with hypoxia     Anemia of chronic kidney failure, stage 4 (severe) 4/5/2019    Cataracts, bilateral     CHF (congestive heart failure)     CKD (chronic kidney disease) stage 3, GFR 30-59 ml/min     CKD (chronic kidney disease) stage 3, GFR 30-59 ml/min     Controlled type 2 diabetes mellitus with proteinuria  or albuminuria     Depression     Diabetes with neurologic complications     Diabetic retinopathy of both eyes     Edema     Glaucoma     History of colonic polyps     Hx-TIA (transient ischemic attack) 2008    Hyperlipidemia LDL goal < 100     Hypertension     Hypothyroidism     Major depressive disorder, single episode, mild 2016    Mixed incontinence urge and stress     Obesity     Obstructive sleep apnea on CPAP     19:  Home CPAP machine broken, per patient & son    Osteopenia     Proteinuria     Sickle cell trait     Strabismus     TIA (transient ischemic attack)     Trouble in sleeping     Type 2 diabetes mellitus with ophthalmic manifestations     Type 2 diabetes with stage 3 chronic kidney disease GFR 30-59     Type II or unspecified type diabetes mellitus with renal manifestations, uncontrolled(250.42)     Uncontrolled type 2 diabetes mellitus with peripheral circulatory disorder 2019    Urge incontinence 2016    Urge incontinence     Venous stasis ulcer     bilateral lower legs    Vitamin D deficiency disease      Past Surgical History:   Procedure Laterality Date    AV FISTULA PLACEMENT Left 2019    Procedure: CREATION, AV FISTULA, LEFT UPPER EXTREMITY;  Surgeon: Keron Perez MD;  Location: [facility];  Service: Vascular;  Laterality: Left;    BREAST BIOPSY      breast reduction Bilateral age 31    BREAST SURGERY      CATARACT EXTRACTION Bilateral     cataracts Bilateral      SECTION, LOW TRANSVERSE      x1    CHOLECYSTECTOMY      EYE SURGERY  2014, 2014    vitrectomy    EYE SURGERY Right 2016    HYSTERECTOMY      TAHBSO (patient is unsure if ovaries removed)    OOPHORECTOMY      REFRACTIVE SURGERY      TOTAL REDUCTION MAMMOPLASTY      approx 10 yrs ago     Family History   Problem Relation Age of Onset    Leukemia Father     Cataracts Father     Ovarian cancer Sister 35    Stroke Mother     Diabetes  Mother     Hypertension Mother     Cataracts Mother     Diabetes Paternal Grandmother     Cataracts Paternal Grandmother     Breast cancer Maternal Aunt 65    No Known Problems Paternal Grandfather     Cataracts Maternal Grandmother     Cataracts Maternal Grandfather     HIV Brother     Achondroplasia Sister     Parkinsonism Maternal Aunt     Esophageal cancer Maternal Uncle         smoker    No Known Problems Paternal Aunt     Cataracts Paternal Uncle     Amblyopia Neg Hx     Blindness Neg Hx     Glaucoma Neg Hx     Macular degeneration Neg Hx     Retinal detachment Neg Hx     Strabismus Neg Hx     Thyroid disease Neg Hx     Colon cancer Neg Hx     Cancer Neg Hx      Social History     Socioeconomic History    Marital status: Single     Spouse name: Not on file    Number of children: 5    Years of education: Not on file    Highest education level: Not on file   Occupational History    Occupation:      Employer: [employer]     Comment: part-time   Social Needs    Financial resource strain: Not on file    Food insecurity:     Worry: Not on file     Inability: Not on file    Transportation needs:     Medical: Not on file     Non-medical: Not on file   Tobacco Use    Smoking status: Never Smoker    Smokeless tobacco: Never Used   Substance and Sexual Activity    Alcohol use: No     Alcohol/week: 0.0 standard drinks    Drug use: No    Sexual activity: Not Currently     Partners: Male     Birth control/protection: Post-menopausal, Surgical   Lifestyle    Physical activity:     Days per week: Not on file     Minutes per session: Not on file    Stress: Not on file   Relationships    Social connections:     Talks on phone: Not on file     Gets together: Not on file     Attends Mosque service: Not on file     Active member of club or organization: Not on file     Attends meetings of clubs or organizations: Not on file     Relationship status: Not on file    Other Topics Concern    Not on file   Social History Narrative    Not on file       Current Outpatient Medications:     amLODIPine (NORVASC) 10 MG tablet, Take 1 tablet (10 mg total) by mouth once daily., Disp: 90 tablet, Rfl: 3    atorvastatin (LIPITOR) 10 MG tablet, TAKE 1 TABLET EVERY DAY, Disp: 90 tablet, Rfl: 1    bumetanide (BUMEX) 2 MG tablet, Take 1 tablet (2 mg total) by mouth once daily., Disp: 30 tablet, Rfl: 11    citalopram (CELEXA) 10 MG tablet, TAKE 1 TABLET EVERY DAY, Disp: 90 tablet, Rfl: 1    clopidogrel (PLAVIX) 75 mg tablet, TAKE 1 TABLET EVERY DAY, Disp: 90 tablet, Rfl: 1    docusate sodium (COLACE) 100 MG capsule, Take 200 mg by mouth once daily. , Disp: , Rfl:     hydrALAZINE (APRESOLINE) 100 MG tablet, Take 1 tablet (100 mg total) by mouth 3 (three) times daily., Disp: 90 tablet, Rfl: 3    LANCETS MISC, , Disp: , Rfl:     levothyroxine (SYNTHROID) 50 MCG tablet, Take 50 mcg by mouth once daily., Disp: , Rfl:     metoprolol tartrate (LOPRESSOR) 50 MG tablet, Take 1 tablet (50 mg total) by mouth 2 (two) times daily., Disp: 100 tablet, Rfl: 3    MULTIVITAMIN WITH MINERALS (HAIR,SKIN AND NAILS ORAL), Take 3 tablets by mouth once daily., Disp: , Rfl:     oxybutynin (DITROPAN XL) 15 MG TR24, TAKE 1 TABLET EVERY DAY, Disp: 90 tablet, Rfl: 3    polyethylene glycol (GLYCOLAX) 17 gram PwPk, Take 17 g by mouth once daily., Disp: , Rfl: 0    insulin detemir U-100 (LEVEMIR FLEXTOUCH) 100 unit/mL (3 mL) SubQ InPn pen, Inject 5 Units into the skin once daily., Disp: 1.5 mL, Rfl: 1    REVIEW OF SYSTEMS:  General: No fevers or chills; ENT: No sore throat; Allergy and Immunology: no persistent infections; Hematological and Lymphatic: No history of bleeding or easy bruising; Endocrine: negative; Respiratory: no cough, shortness of breath, or wheezing; Cardiovascular: no chest pain or dyspnea on exertion; Gastrointestinal: no abdominal pain/back, change in bowel habits, or bloody stools;  Genito-Urinary: no dysuria, trouble voiding, or hematuria; Musculoskeletal: negative; Neurological: no TIA or stroke symptoms; Psychiatric: no nervousness, anxiety or depression.    PHYSICAL EXAM:      Pulse: 64         General appearance:  Alert, well-appearing, and in no distress.  Oriented to person, place, and time                    Neurological: Normal speech, no focal findings noted; CN II - XII grossly intact. All extremities with sensation to light touch.            Musculoskeletal: Digits/nail without cyanosis/clubbing.  Strength 5/5 all extremities.                    Neck: Supple, no significant adenopathy, no carotid bruit can be auscultated                  Chest:  Clear to auscultation, no wheezes, rales or rhonchi, symmetric air entry. No use of accessory muscles               Cardiac: Normal rate and regular rhythm, S1 and S2 normal            Abdomen: Soft, nontender, nondistended, no masses or organomegaly, no hernia     No rebound tenderness noted; bowel sounds normal     Pulsatile aortic mass is non palpable.     No groin adenopathy      Extremities:      2+ radial pulse bilaterally     No BUE edema, Negative Manuel's test BUE    Skin: No tissue loss    LAB RESULTS:  No results found for: CBC  Lab Results   Component Value Date    LABPROT 10.9 11/20/2019    INR 1.0 11/20/2019     Lab Results   Component Value Date     11/27/2019    K 3.8 11/27/2019    CL 99 11/27/2019    CO2 27 11/27/2019     (H) 11/27/2019    BUN 24 (H) 11/27/2019    CREATININE 2.8 (H) 11/27/2019    CALCIUM 9.1 11/27/2019    ANIONGAP 11 11/27/2019    EGFRNONAA 16 (A) 11/27/2019     Lab Results   Component Value Date    WBC 4.42 11/20/2019    RBC 4.37 11/20/2019    HGB 11.0 (L) 11/20/2019    HCT 35.3 (L) 11/20/2019    MCV 81 (L) 11/20/2019    MCH 25.2 (L) 11/20/2019    MCHC 31.2 (L) 11/20/2019    RDW 16.9 (H) 11/20/2019     11/20/2019    MPV 9.3 11/20/2019    GRAN 2.8 11/20/2019    GRAN 62.6 11/20/2019     LYMPH 0.8 (L) 11/20/2019    LYMPH 18.1 11/20/2019    MONO 0.7 11/20/2019    MONO 16.3 (H) 11/20/2019    EOS 0.1 11/20/2019    BASO 0.02 11/20/2019    EOSINOPHIL 2.3 11/20/2019    BASOPHIL 0.5 11/20/2019    DIFFMETHOD Automated 11/20/2019     .  Lab Results   Component Value Date    HGBA1C 6.9 (H) 11/20/2019       IMAGING:  All pertinent imaging has been reviewed and interpreted independently.    Vein mapping 9/2019:  Adequate R superior basilic vein and axillary vein ; Adequate L basilic vein superior and inferior, adequate L axillary     IMP/PLAN:  74 y.o. female with   Patient Active Problem List   Diagnosis    Severe obesity (BMI 35.0-39.9) with comorbidity    Sickle cell trait    Osteopenia    Hyperlipidemia LDL goal <100    HUMBERTO on CPAP    Hypothyroidism (acquired)    Hx-TIA (transient ischemic attack)    Vitamin D deficiency disease    Pulmonary hypertension    Type 2 diabetes mellitus with ESRD (end-stage renal disease)    Secondary renal hyperparathyroidism    Incomplete bladder emptying    Postmenopausal atrophic vaginitis    Rectocele    Mixed incontinence urge and stress    Chronic diastolic heart failure    Tortuous aorta    History of TIAs    Essential hypertension    ESRD on hemodialysis    Anemia of chronic disease    Depression    Debility    Chronic respiratory failure    Type 2 diabetes mellitus with foot ulcer, with long-term current use of insulin    Stable proliferative diabetic retinopathy associated with type 2 diabetes mellitus    Mild major depression    Heart failure with preserved ejection fraction    Preop cardiovascular exam    End stage renal disease    Pseudophakia    being managed by PCP and specialists who is here today for evaluation of long term HD access.    -s/p L RC AV fistula with proximal fistula measuring 5 mm 1/13/20, adequate flow - will repeat US and determine if fistula can be accessed vs need for fistulogram   -cont HD via catheter  -RTC  after US  -Cont renal diet    I spent 20 minutes evaluating this patient and greater than 50% of the time was spent counseling, coordinator care and discussing the plan of care.  All questions were answered and patient stated understanding with agreement with the above treatment plan.    Keron Perez MD [facility]  Vascular and Endovascular Surgery

## 2020-01-20 NOTE — PATIENT INSTRUCTIONS
Caring for Your Hemodialysis Access  A problem such as an infection or a blood clot may make the access unusable. Typically, this happens more often with an arteriovenous graft than with an arteriovenous fistula. If this happens, youll need a new access. To help your access last, you will need to follow certain guidelines.  Watching for problems  Call your healthcare provider right away if you:  · Cant feel the blood flowing in the access (this sensation is called a thrill)  · Have pain or numbness in your hand or arm  · Have bleeding, redness, bluish discoloration, or warmth around your access  · Notice your access suddenly bulging out more than usual (a slight bulge is normal)  · Have a fever of 100.4°F (38°C) or higher, or as directed by your healthcare provider   Follow these and any other guidelines youre given  · Dont wear tight clothes or jewelry around your access.  · Dont let anyone take your blood pressure on or draw blood from the arm with the access. Also, dont let anyone put IV lines into it.  · Protect your access from being hit or cut.  · Wash your hands often and keep the area around the access clean.  · Do not carry anything heavy or do anything that would put pressure on the access.  Feeling for your thrill    If you put your fingers over your access, you should feel the blood rushing through it. This is called a thrill, and it feels like a vibration. Feel for the thrill as often as you're told, usually once or twice a day. If you can't feel it, tell your healthcare provider right away. Blood may not be flowing through your access the way it should.  Important numbers  Write the names and numbers of your healthcare providers below. That way you will know how to get in touch with them.  Doctor:  Name ___________________ Phone ___________________  Surgeon:  Name ___________________ Phone ___________________  Dialysis Center:  Name ___________________ Phone ___________________   Date Last  Reviewed: 1/1/2017  © 5650-1024 [employer]. [address] [place] [address], [place] [address]. All rights reserved. This information is not intended as a substitute for professional medical care. Always follow your healthcare professional's instructions.

## 2020-01-20 NOTE — LETTER
January 20, 2020        Sendy Elaine MD  [address] [facility]  [place] [address]             [facility]  [address]  [facility] [place] [address]  Phone: [phone]  Fax: [phone]   Patient: Erica Leblanc   MR Number: [id number]   YOB: 1945   Date of Visit: 1/20/2020       Dear Dr. Elaine:    Thank you for referring Erica Leblanc to me for evaluation. Below are the relevant portions of my assessment and plan of care.            If you have questions, please do not hesitate to call me. I look forward to following Erica along with you.    Sincerely,      Keron Perez MD           CC  No Recipients

## 2020-01-23 DIAGNOSIS — G47.33 OBSTRUCTIVE SLEEP APNEA ON CPAP: ICD-10-CM

## 2020-01-23 DIAGNOSIS — I27.20 PULMONARY HYPERTENSION: ICD-10-CM

## 2020-01-23 RX ORDER — HYDRALAZINE HYDROCHLORIDE 100 MG/1
TABLET, FILM COATED ORAL
Qty: 270 TABLET | Refills: 1 | Status: SHIPPED | OUTPATIENT
Start: 2020-01-23 | End: 2021-06-06

## 2020-01-27 ENCOUNTER — HOSPITAL ENCOUNTER (OUTPATIENT)
Dept: CARDIOLOGY | Facility: HOSPITAL | Age: 75
Discharge: HOME | End: 2020-01-27
Attending: SURGERY
Payer: MEDICARE

## 2020-01-27 ENCOUNTER — OFFICE VISIT (OUTPATIENT)
Dept: VASCULAR SURGERY | Facility: CLINIC | Age: 75
End: 2020-01-27
Payer: MEDICARE

## 2020-01-27 VITALS
BODY MASS INDEX: 36.1 KG/M2 | DIASTOLIC BLOOD PRESSURE: 64 MMHG | WEIGHT: 216.69 LBS | HEART RATE: 62 BPM | HEIGHT: 65 IN | SYSTOLIC BLOOD PRESSURE: 122 MMHG

## 2020-01-27 DIAGNOSIS — T82.858A ARTERIOVENOUS FISTULA STENOSIS, INITIAL ENCOUNTER: ICD-10-CM

## 2020-01-27 DIAGNOSIS — Z98.890 S/P ARTERIOVENOUS (AV) FISTULA REPAIR: Primary | ICD-10-CM

## 2020-01-27 DIAGNOSIS — Z86.79 S/P ARTERIOVENOUS (AV) FISTULA REPAIR: Primary | ICD-10-CM

## 2020-01-27 LAB
HD ANASTAMOSIS LOCATION: NORMAL
HD DISTAL FISTULA LOCATION: NORMAL
HD FISTULA OUTFLOW VEIN LOCATION: NORMAL
HD FISTULA OUTFLOW VEIN VESSEL: NORMAL
HD INFLOW ARTERY VESSEL: NORMAL
HD PROXIMAL FISTULA LOCATION: NORMAL
RIGHT DIS GRAFT DEPTH: 0.2 CM
RIGHT DIS GRAFT DIA: 0.7 CM
RIGHT DIS GRAFT PSV: 118 CM/ SEC
RIGHT INFLOW PSV: 342 CM/S
RIGHT MID GRAFT DEPTH: 0.2 CM
RIGHT MID GRAFT DIA: 0.7 CM
RIGHT MID GRAFT PSV: 112 CM/S
RIGHT OUTFLOW VEIN PSV: 36 CM/ SEC
RIGHT PROX ANA PSV: 500 CM/S
RIGHT PROX GRAFT DEPTH: 0.2 CM
RIGHT PROX GRAFT DIA: 0.6 CM
RIGHT PROX GRAFT PSV: 204 CM/S
RIGHT VOLUME FLOW PSV: 1083 ML/MIN

## 2020-01-27 PROCEDURE — 99999 PR PBB SHADOW E&M-EST. PATIENT-LVL III: CPT | Mod: PBBFAC,HCNC,, | Performed by: SURGERY

## 2020-01-27 PROCEDURE — 93990 DOPPLER FLOW TESTING: CPT | Mod: TC,HCNC

## 2020-01-27 PROCEDURE — 99024 PR POST-OP FOLLOW-UP VISIT: ICD-10-PCS | Mod: HCNC,S$GLB,, | Performed by: SURGERY

## 2020-01-27 PROCEDURE — 93990 DOPPLER FLOW TESTING: CPT | Mod: 26,HCNC,, | Performed by: SURGERY

## 2020-01-27 PROCEDURE — 93990 CV US HEMODIALYSIS ACCESS (CUPID ONLY): ICD-10-PCS | Mod: 26,HCNC,, | Performed by: SURGERY

## 2020-01-27 PROCEDURE — 99024 POSTOP FOLLOW-UP VISIT: CPT | Mod: HCNC,S$GLB,, | Performed by: SURGERY

## 2020-01-27 PROCEDURE — 99999 PR PBB SHADOW E&M-EST. PATIENT-LVL III: ICD-10-PCS | Mod: PBBFAC,HCNC,, | Performed by: SURGERY

## 2020-01-27 NOTE — PATIENT INSTRUCTIONS
Caring for Your Hemodialysis Access  A problem such as an infection or a blood clot may make the access unusable. Typically, this happens more often with an arteriovenous graft than with an arteriovenous fistula. If this happens, youll need a new access. To help your access last, you will need to follow certain guidelines.  Watching for problems  Call your healthcare provider right away if you:  · Cant feel the blood flowing in the access (this sensation is called a thrill)  · Have pain or numbness in your hand or arm  · Have bleeding, redness, bluish discoloration, or warmth around your access  · Notice your access suddenly bulging out more than usual (a slight bulge is normal)  · Have a fever of 100.4°F (38°C) or higher, or as directed by your healthcare provider   Follow these and any other guidelines youre given  · Dont wear tight clothes or jewelry around your access.  · Dont let anyone take your blood pressure on or draw blood from the arm with the access. Also, dont let anyone put IV lines into it.  · Protect your access from being hit or cut.  · Wash your hands often and keep the area around the access clean.  · Do not carry anything heavy or do anything that would put pressure on the access.  Feeling for your thrill    If you put your fingers over your access, you should feel the blood rushing through it. This is called a thrill, and it feels like a vibration. Feel for the thrill as often as you're told, usually once or twice a day. If you can't feel it, tell your healthcare provider right away. Blood may not be flowing through your access the way it should.  Important numbers  Write the names and numbers of your healthcare providers below. That way you will know how to get in touch with them.  Doctor:  Name ___________________ Phone ___________________  Surgeon:  Name ___________________ Phone ___________________  Dialysis Center:  Name ___________________ Phone ___________________   Date Last  Reviewed: 1/1/2017  © 2024-6111 [employer], [facility]. [address] [place] [address], [place] [address]. All rights reserved. This information is not intended as a substitute for professional medical care. Always follow your healthcare professional's instructions.

## 2020-01-27 NOTE — LETTER
January 27, 2020        Sendy Elaine MD  [address] [facility]  [place] [address]             [facility]  [address]  [facility] [place] [address]  Phone: [phone]  Fax: [phone]   Patient: Erica Leblanc   MR Number: [id number]   YOB: 1945   Date of Visit: 1/27/2020       Dear Dr. Elaine:    Thank you for referring Erica Leblanc to me for evaluation. Below are the relevant portions of my assessment and plan of care.            If you have questions, please do not hesitate to call me. I look forward to following Erica along with you.    Sincerely,      Keron Perez MD           CC  No Recipients

## 2020-01-27 NOTE — PROGRESS NOTES
Keron Perez MD RPVI                       [facility]                         01/27/2020    HPI:  Erica Leblanc is a 74 y.o. female with   Patient Active Problem List   Diagnosis    Severe obesity (BMI 35.0-39.9) with comorbidity    Sickle cell trait    Osteopenia    Hyperlipidemia LDL goal <100    HUMBERTO on CPAP    Hypothyroidism (acquired)    Hx-TIA (transient ischemic attack)    Vitamin D deficiency disease    Pulmonary hypertension    Type 2 diabetes mellitus with ESRD (end-stage renal disease)    Secondary renal hyperparathyroidism    Incomplete bladder emptying    Postmenopausal atrophic vaginitis    Rectocele    Mixed incontinence urge and stress    Chronic diastolic heart failure    Tortuous aorta    History of TIAs    Essential hypertension    ESRD on hemodialysis    Anemia of chronic disease    Depression    Debility    Chronic respiratory failure    Type 2 diabetes mellitus with foot ulcer, with long-term current use of insulin    Stable proliferative diabetic retinopathy associated with type 2 diabetes mellitus    Mild major depression    Heart failure with preserved ejection fraction    Preop cardiovascular exam    End stage renal disease    Pseudophakia    being managed by PCP and specialists who is here today for evaluation of long term HD access.  S/p R IJ tunneled HD catheter, HD without issues.  Pt is R handed.  No complaints today.  Does endorse orthopnea, no chest pain.  Unable to climb 1 flight of stairs on own.    no MI  no Stroke  Tobacco use: denies    Interval history: No new issues.    Past Medical History:   Diagnosis Date    Acute respiratory failure with hypoxia     Anemia of chronic kidney failure, stage 4 (severe) 4/5/2019    Cataracts, bilateral     CHF (congestive heart failure)     CKD (chronic kidney disease) stage 3, GFR 30-59 ml/min     CKD (chronic kidney disease) stage 3, GFR 30-59 ml/min     Controlled type  2 diabetes mellitus with proteinuria or albuminuria     Depression     Diabetes with neurologic complications     Diabetic retinopathy of both eyes     Edema     Glaucoma     History of colonic polyps     Hx-TIA (transient ischemic attack) 2008    Hyperlipidemia LDL goal < 100     Hypertension     Hypothyroidism     Major depressive disorder, single episode, mild 2016    Mixed incontinence urge and stress     Obesity     Obstructive sleep apnea on CPAP     19:  Home CPAP machine broken, per patient & son    Osteopenia     Proteinuria     Sickle cell trait     Strabismus     TIA (transient ischemic attack)     Trouble in sleeping     Type 2 diabetes mellitus with ophthalmic manifestations     Type 2 diabetes with stage 3 chronic kidney disease GFR 30-59     Type II or unspecified type diabetes mellitus with renal manifestations, uncontrolled(250.42)     Uncontrolled type 2 diabetes mellitus with peripheral circulatory disorder 2019    Urge incontinence 2016    Urge incontinence     Venous stasis ulcer     bilateral lower legs    Vitamin D deficiency disease      Past Surgical History:   Procedure Laterality Date    AV FISTULA PLACEMENT Left 2019    Procedure: CREATION, AV FISTULA, LEFT UPPER EXTREMITY;  Surgeon: Keron Perez MD;  Location: [facility];  Service: Vascular;  Laterality: Left;    BREAST BIOPSY      breast reduction Bilateral age 31    BREAST SURGERY      CATARACT EXTRACTION Bilateral     cataracts Bilateral      SECTION, LOW TRANSVERSE      x1    CHOLECYSTECTOMY      EYE SURGERY  2014, 2014    vitrectomy    EYE SURGERY Right 2016    HYSTERECTOMY      TASO (patient is unsure if ovaries removed)    OOPHORECTOMY      REFRACTIVE SURGERY      TOTAL REDUCTION MAMMOPLASTY      approx 10 yrs ago     Family History   Problem Relation Age of Onset    Leukemia Father     Cataracts Father     Ovarian cancer Sister  35    Stroke Mother     Diabetes Mother     Hypertension Mother     Cataracts Mother     Diabetes Paternal Grandmother     Cataracts Paternal Grandmother     Breast cancer Maternal Aunt 65    No Known Problems Paternal Grandfather     Cataracts Maternal Grandmother     Cataracts Maternal Grandfather     HIV Brother     Achondroplasia Sister     Parkinsonism Maternal Aunt     Esophageal cancer Maternal Uncle         smoker    No Known Problems Paternal Aunt     Cataracts Paternal Uncle     Amblyopia Neg Hx     Blindness Neg Hx     Glaucoma Neg Hx     Macular degeneration Neg Hx     Retinal detachment Neg Hx     Strabismus Neg Hx     Thyroid disease Neg Hx     Colon cancer Neg Hx     Cancer Neg Hx      Social History     Socioeconomic History    Marital status: Single     Spouse name: Not on file    Number of children: 5    Years of education: Not on file    Highest education level: Not on file   Occupational History    Occupation:      Employer: [employer]     Comment: part-time   Social Needs    Financial resource strain: Not on file    Food insecurity:     Worry: Not on file     Inability: Not on file    Transportation needs:     Medical: Not on file     Non-medical: Not on file   Tobacco Use    Smoking status: Never Smoker    Smokeless tobacco: Never Used   Substance and Sexual Activity    Alcohol use: No     Alcohol/week: 0.0 standard drinks    Drug use: No    Sexual activity: Not Currently     Partners: Male     Birth control/protection: Post-menopausal, Surgical   Lifestyle    Physical activity:     Days per week: Not on file     Minutes per session: Not on file    Stress: Not on file   Relationships    Social connections:     Talks on phone: Not on file     Gets together: Not on file     Attends Gnosticist service: Not on file     Active member of club or organization: Not on file     Attends meetings of clubs or organizations: Not on file      Relationship status: Not on file   Other Topics Concern    Not on file   Social History Narrative    Not on file       Current Outpatient Medications:     amLODIPine (NORVASC) 10 MG tablet, Take 1 tablet (10 mg total) by mouth once daily., Disp: 90 tablet, Rfl: 3    atorvastatin (LIPITOR) 10 MG tablet, TAKE 1 TABLET EVERY DAY, Disp: 90 tablet, Rfl: 1    bumetanide (BUMEX) 2 MG tablet, Take 1 tablet (2 mg total) by mouth once daily., Disp: 30 tablet, Rfl: 11    citalopram (CELEXA) 10 MG tablet, TAKE 1 TABLET EVERY DAY, Disp: 90 tablet, Rfl: 1    clopidogrel (PLAVIX) 75 mg tablet, TAKE 1 TABLET EVERY DAY, Disp: 90 tablet, Rfl: 1    docusate sodium (COLACE) 100 MG capsule, Take 200 mg by mouth once daily. , Disp: , Rfl:     hydrALAZINE (APRESOLINE) 100 MG tablet, TAKE 1 TABLET BY MOUTH THREE TIMES A DAY, Disp: 270 tablet, Rfl: 1    LANCETS MISC, , Disp: , Rfl:     levothyroxine (SYNTHROID) 50 MCG tablet, Take 50 mcg by mouth once daily., Disp: , Rfl:     metoprolol tartrate (LOPRESSOR) 50 MG tablet, Take 1 tablet (50 mg total) by mouth 2 (two) times daily., Disp: 100 tablet, Rfl: 3    MULTIVITAMIN WITH MINERALS (HAIR,SKIN AND NAILS ORAL), Take 3 tablets by mouth once daily., Disp: , Rfl:     oxybutynin (DITROPAN XL) 15 MG TR24, TAKE 1 TABLET EVERY DAY, Disp: 90 tablet, Rfl: 3    polyethylene glycol (GLYCOLAX) 17 gram PwPk, Take 17 g by mouth once daily., Disp: , Rfl: 0    insulin detemir U-100 (LEVEMIR FLEXTOUCH) 100 unit/mL (3 mL) SubQ InPn pen, Inject 5 Units into the skin once daily., Disp: 1.5 mL, Rfl: 1    REVIEW OF SYSTEMS:  General: No fevers or chills; ENT: No sore throat; Allergy and Immunology: no persistent infections; Hematological and Lymphatic: No history of bleeding or easy bruising; Endocrine: negative; Respiratory: no cough, shortness of breath, or wheezing; Cardiovascular: no chest pain or dyspnea on exertion; Gastrointestinal: no abdominal pain/back, change in bowel habits, or bloody  stools; Genito-Urinary: no dysuria, trouble voiding, or hematuria; Musculoskeletal: negative; Neurological: no TIA or stroke symptoms; Psychiatric: no nervousness, anxiety or depression.    PHYSICAL EXAM:      Pulse: 62         General appearance:  Alert, well-appearing, and in no distress.  Oriented to person, place, and time                    Neurological: Normal speech, no focal findings noted; CN II - XII grossly intact. All extremities with sensation to light touch.            Musculoskeletal: Digits/nail without cyanosis/clubbing.  Strength 5/5 all extremities.                    Neck: Supple, no significant adenopathy, no carotid bruit can be auscultated                  Chest:  Clear to auscultation, no wheezes, rales or rhonchi, symmetric air entry. No use of accessory muscles               Cardiac: Normal rate and regular rhythm, S1 and S2 normal            Abdomen: Soft, nontender, nondistended, no masses or organomegaly, no hernia     No rebound tenderness noted; bowel sounds normal     Pulsatile aortic mass is non palpable.     No groin adenopathy      Extremities:      2+ radial pulse bilaterally, LUE AVF +thrill     No BUE edema, Negative Manuel's test BUE    Skin: No tissue loss    LAB RESULTS:  No results found for: CBC  Lab Results   Component Value Date    LABPROT 10.9 11/20/2019    INR 1.0 11/20/2019     Lab Results   Component Value Date     11/27/2019    K 3.8 11/27/2019    CL 99 11/27/2019    CO2 27 11/27/2019     (H) 11/27/2019    BUN 24 (H) 11/27/2019    CREATININE 2.8 (H) 11/27/2019    CALCIUM 9.1 11/27/2019    ANIONGAP 11 11/27/2019    EGFRNONAA 16 (A) 11/27/2019     Lab Results   Component Value Date    WBC 4.42 11/20/2019    RBC 4.37 11/20/2019    HGB 11.0 (L) 11/20/2019    HCT 35.3 (L) 11/20/2019    MCV 81 (L) 11/20/2019    MCH 25.2 (L) 11/20/2019    MCHC 31.2 (L) 11/20/2019    RDW 16.9 (H) 11/20/2019     11/20/2019    MPV 9.3 11/20/2019    GRAN 2.8 11/20/2019     GRAN 62.6 11/20/2019    LYMPH 0.8 (L) 11/20/2019    LYMPH 18.1 11/20/2019    MONO 0.7 11/20/2019    MONO 16.3 (H) 11/20/2019    EOS 0.1 11/20/2019    BASO 0.02 11/20/2019    EOSINOPHIL 2.3 11/20/2019    BASOPHIL 0.5 11/20/2019    DIFFMETHOD Automated 11/20/2019     .  Lab Results   Component Value Date    HGBA1C 6.9 (H) 11/20/2019       IMAGING:  All pertinent imaging has been reviewed and interpreted independently.    Vein mapping 9/2019:  Adequate R superior basilic vein and axillary vein ; Adequate L basilic vein superior and inferior, adequate L axillary     1/27/20 Left upper extremity dialysis ultrasound shows no hemodynamically significant stenosis.  Flow is 1083 ml/min.  Depth and diameter are appropriate.    IMP/PLAN:  74 y.o. female with   Patient Active Problem List   Diagnosis    Severe obesity (BMI 35.0-39.9) with comorbidity    Sickle cell trait    Osteopenia    Hyperlipidemia LDL goal <100    HUMBERTO on CPAP    Hypothyroidism (acquired)    Hx-TIA (transient ischemic attack)    Vitamin D deficiency disease    Pulmonary hypertension    Type 2 diabetes mellitus with ESRD (end-stage renal disease)    Secondary renal hyperparathyroidism    Incomplete bladder emptying    Postmenopausal atrophic vaginitis    Rectocele    Mixed incontinence urge and stress    Chronic diastolic heart failure    Tortuous aorta    History of TIAs    Essential hypertension    ESRD on hemodialysis    Anemia of chronic disease    Depression    Debility    Chronic respiratory failure    Type 2 diabetes mellitus with foot ulcer, with long-term current use of insulin    Stable proliferative diabetic retinopathy associated with type 2 diabetes mellitus    Mild major depression    Heart failure with preserved ejection fraction    Preop cardiovascular exam    End stage renal disease    Pseudophakia    being managed by PCP and specialists who is here today for evaluation of long term HD access s/p L RC AV  fistula .    -s/p L RC AV fistula with proximal fistula measuring 5 mm 1/13/20, adequate flow - can begin using fistula with one 17g needle for 1 week followed by two 17g needles for 1 week  -cont HD via catheter  -RTC 3 weeks  -Cont renal diet    I spent 20 minutes evaluating this patient and greater than 50% of the time was spent counseling, coordinator care and discussing the plan of care.  All questions were answered and patient stated understanding with agreement with the above treatment plan.    Keron Perez MD [facility]  Vascular and Endovascular Surgery

## 2020-02-03 ENCOUNTER — OFFICE VISIT (OUTPATIENT)
Dept: PODIATRY | Facility: CLINIC | Age: 75
End: 2020-02-03
Payer: MEDICARE

## 2020-02-03 VITALS — WEIGHT: 216 LBS | BODY MASS INDEX: 35.99 KG/M2 | HEIGHT: 65 IN

## 2020-02-03 DIAGNOSIS — Z99.2 ESRD (END STAGE RENAL DISEASE) ON DIALYSIS: ICD-10-CM

## 2020-02-03 DIAGNOSIS — L90.9 PLANTAR FAT PAD ATROPHY: ICD-10-CM

## 2020-02-03 DIAGNOSIS — N18.30 TYPE 2 DIABETES MELLITUS WITH STAGE 3 CHRONIC KIDNEY DISEASE, WITH LONG-TERM CURRENT USE OF INSULIN: Primary | ICD-10-CM

## 2020-02-03 DIAGNOSIS — B35.1 NAIL DERMATOPHYTOSIS: ICD-10-CM

## 2020-02-03 DIAGNOSIS — M20.41 HAMMER TOES OF BOTH FEET: ICD-10-CM

## 2020-02-03 DIAGNOSIS — E11.22 TYPE 2 DIABETES MELLITUS WITH STAGE 3 CHRONIC KIDNEY DISEASE, WITH LONG-TERM CURRENT USE OF INSULIN: Primary | ICD-10-CM

## 2020-02-03 DIAGNOSIS — M20.12 HALLUX ABDUCTO VALGUS, LEFT: ICD-10-CM

## 2020-02-03 DIAGNOSIS — I87.2 VENOUS STASIS DERMATITIS OF BOTH LOWER EXTREMITIES: ICD-10-CM

## 2020-02-03 DIAGNOSIS — M20.11 HALLUX ABDUCTO VALGUS, RIGHT: ICD-10-CM

## 2020-02-03 DIAGNOSIS — N18.6 ESRD (END STAGE RENAL DISEASE) ON DIALYSIS: ICD-10-CM

## 2020-02-03 DIAGNOSIS — E11.9 COMPREHENSIVE DIABETIC FOOT EXAMINATION, TYPE 2 DM, ENCOUNTER FOR: ICD-10-CM

## 2020-02-03 DIAGNOSIS — Z79.4 TYPE 2 DIABETES MELLITUS WITH STAGE 3 CHRONIC KIDNEY DISEASE, WITH LONG-TERM CURRENT USE OF INSULIN: Primary | ICD-10-CM

## 2020-02-03 DIAGNOSIS — M20.42 HAMMER TOES OF BOTH FEET: ICD-10-CM

## 2020-02-03 PROCEDURE — 1126F AMNT PAIN NOTED NONE PRSNT: CPT | Mod: HCNC,S$GLB,, | Performed by: PODIATRIST

## 2020-02-03 PROCEDURE — 99499 UNLISTED E&M SERVICE: CPT | Mod: HCNC,S$GLB,, | Performed by: PODIATRIST

## 2020-02-03 PROCEDURE — 1159F PR MEDICATION LIST DOCUMENTED IN MEDICAL RECORD: ICD-10-PCS | Mod: HCNC,S$GLB,, | Performed by: PODIATRIST

## 2020-02-03 PROCEDURE — 99999 PR PBB SHADOW E&M-EST. PATIENT-LVL III: CPT | Mod: PBBFAC,HCNC,, | Performed by: PODIATRIST

## 2020-02-03 PROCEDURE — 1159F MED LIST DOCD IN RCRD: CPT | Mod: HCNC,S$GLB,, | Performed by: PODIATRIST

## 2020-02-03 PROCEDURE — 1101F PT FALLS ASSESS-DOCD LE1/YR: CPT | Mod: HCNC,CPTII,S$GLB, | Performed by: PODIATRIST

## 2020-02-03 PROCEDURE — 99214 OFFICE O/P EST MOD 30 MIN: CPT | Mod: HCNC,S$GLB,, | Performed by: PODIATRIST

## 2020-02-03 PROCEDURE — 1101F PR PT FALLS ASSESS DOC 0-1 FALLS W/OUT INJ PAST YR: ICD-10-PCS | Mod: HCNC,CPTII,S$GLB, | Performed by: PODIATRIST

## 2020-02-03 PROCEDURE — 1126F PR PAIN SEVERITY QUANTIFIED, NO PAIN PRESENT: ICD-10-PCS | Mod: HCNC,S$GLB,, | Performed by: PODIATRIST

## 2020-02-03 PROCEDURE — 99499 RISK ADDL DX/OHS AUDIT: ICD-10-PCS | Mod: HCNC,S$GLB,, | Performed by: PODIATRIST

## 2020-02-03 PROCEDURE — 3044F PR MOST RECENT HEMOGLOBIN A1C LEVEL <7.0%: ICD-10-PCS | Mod: HCNC,CPTII,S$GLB, | Performed by: PODIATRIST

## 2020-02-03 PROCEDURE — 99214 PR OFFICE/OUTPT VISIT, EST, LEVL IV, 30-39 MIN: ICD-10-PCS | Mod: HCNC,S$GLB,, | Performed by: PODIATRIST

## 2020-02-03 PROCEDURE — 3044F HG A1C LEVEL LT 7.0%: CPT | Mod: HCNC,CPTII,S$GLB, | Performed by: PODIATRIST

## 2020-02-03 PROCEDURE — 99999 PR PBB SHADOW E&M-EST. PATIENT-LVL III: ICD-10-PCS | Mod: PBBFAC,HCNC,, | Performed by: PODIATRIST

## 2020-02-03 NOTE — PROGRESS NOTES
Subjective:      Patient ID: Erica Leblanc is a 74 y.o. female.    Chief Complaint: Diabetes Mellitus (Pcp Dr. Elaine 9/23/19); Diabetic Foot Exam; and Nail Care    Erica Leblanc is a 74 y.o. femalewho presents to the clinic for evaluation and treatment of high risk feet. Erica Leblanc   has a past medical history of Acute respiratory failure with hypoxia, Anemia of chronic kidney failure, stage 4 (severe) (4/5/2019), Cataracts, bilateral, CHF (congestive heart failure), CKD (chronic kidney disease) stage 3, GFR 30-59 ml/min, CKD (chronic kidney disease) stage 3, GFR 30-59 ml/min, Controlled type 2 diabetes mellitus with proteinuria or albuminuria, Depression, Diabetes with neurologic complications, Diabetic retinopathy of both eyes, Edema, Glaucoma, History of colonic polyps, TIA (transient ischemic attack) (11/2008), Hyperlipidemia LDL goal < 100, Hypertension, Hypothyroidism, Major depressive disorder, single episode, mild (2/17/2016), Mixed incontinence urge and stress, Obesity, Obstructive sleep apnea on CPAP, Osteopenia, Proteinuria, Sickle cell trait, Strabismus, TIA (transient ischemic attack), Trouble in sleeping, Type 2 diabetes mellitus with ophthalmic manifestations, Type 2 diabetes with stage 3 chronic kidney disease GFR 30-59, Type II or unspecified type diabetes mellitus with renal manifestations, uncontrolled(250.42), Uncontrolled type 2 diabetes mellitus with peripheral circulatory disorder (4/5/2019), Urge incontinence (1/11/2016), Urge incontinence, Venous stasis ulcer, and Vitamin D deficiency disease.   The patient's chief complaint is long, thick toenails. This patient has documented high risk feet requiring routine maintenance secondary to diabetes mellitis and those secondary complications of diabetes, as mentioned..    PCP: Sendy Elaine MD    Date Last Seen by PCP:   Chief Complaint   Patient presents with    Diabetes Mellitus     Pcp Dr. Elaine 9/23/19    Diabetic  Foot Exam    Nail Care     Current shoe gear:  Casual slide on shoes     Hemoglobin A1C   Date Value Ref Range Status   11/20/2019 6.9 (H) 4.0 - 5.6 % Final     Comment:     ADA Screening Guidelines:  5.7-6.4%  Consistent with prediabetes  >or=6.5%  Consistent with diabetes  High levels of fetal hemoglobin interfere with the HbA1C  assay. Heterozygous hemoglobin variants (HbS, HgC, etc)do  not significantly interfere with this assay.   However, presence of multiple variants may affect accuracy.     08/27/2019 8.6 (H) 4.0 - 5.6 % Final     Comment:     ADA Screening Guidelines:  5.7-6.4%  Consistent with prediabetes  >or=6.5%  Consistent with diabetes  High levels of fetal hemoglobin interfere with the HbA1C  assay. Heterozygous hemoglobin variants (HbS, HgC, etc)do  not significantly interfere with this assay.   However, presence of multiple variants may affect accuracy.     07/20/2019 7.3 (H) 4.0 - 5.6 % Final     Comment:     ADA Screening Guidelines:  5.7-6.4%  Consistent with prediabetes  >or=6.5%  Consistent with diabetes  High levels of fetal hemoglobin interfere with the HbA1C  assay. Heterozygous hemoglobin variants (HbS, HgC, etc)do  not significantly interfere with this assay.   However, presence of multiple variants may affect accuracy.         Patient Active Problem List   Diagnosis    Severe obesity (BMI 35.0-39.9) with comorbidity    Sickle cell trait    Osteopenia    Hyperlipidemia LDL goal <100    HUMBERTO on CPAP    Hypothyroidism (acquired)    Hx-TIA (transient ischemic attack)    Vitamin D deficiency disease    Pulmonary hypertension    Type 2 diabetes mellitus with ESRD (end-stage renal disease)    Secondary renal hyperparathyroidism    Incomplete bladder emptying    Postmenopausal atrophic vaginitis    Rectocele    Mixed incontinence urge and stress    Chronic diastolic heart failure    Tortuous aorta    History of TIAs    Essential hypertension    ESRD on hemodialysis    Anemia  of chronic disease    Depression    Debility    Chronic respiratory failure    Type 2 diabetes mellitus with foot ulcer, with long-term current use of insulin    Stable proliferative diabetic retinopathy associated with type 2 diabetes mellitus    Mild major depression    Heart failure with preserved ejection fraction    Preop cardiovascular exam    End stage renal disease    Pseudophakia     Current Outpatient Medications on File Prior to Visit   Medication Sig Dispense Refill    amLODIPine (NORVASC) 10 MG tablet Take 1 tablet (10 mg total) by mouth once daily. 90 tablet 3    atorvastatin (LIPITOR) 10 MG tablet TAKE 1 TABLET EVERY DAY 90 tablet 1    bumetanide (BUMEX) 2 MG tablet Take 1 tablet (2 mg total) by mouth once daily. 30 tablet 11    citalopram (CELEXA) 10 MG tablet TAKE 1 TABLET EVERY DAY 90 tablet 1    clopidogrel (PLAVIX) 75 mg tablet TAKE 1 TABLET EVERY DAY 90 tablet 1    docusate sodium (COLACE) 100 MG capsule Take 200 mg by mouth once daily.       hydrALAZINE (APRESOLINE) 100 MG tablet TAKE 1 TABLET BY MOUTH THREE TIMES A  tablet 1    LANCETS MISC       levothyroxine (SYNTHROID) 50 MCG tablet Take 50 mcg by mouth once daily.      metoprolol tartrate (LOPRESSOR) 50 MG tablet Take 1 tablet (50 mg total) by mouth 2 (two) times daily. 100 tablet 3    MULTIVITAMIN WITH MINERALS (HAIR,SKIN AND NAILS ORAL) Take 3 tablets by mouth once daily.      oxybutynin (DITROPAN XL) 15 MG TR24 TAKE 1 TABLET EVERY DAY 90 tablet 3    polyethylene glycol (GLYCOLAX) 17 gram PwPk Take 17 g by mouth once daily.  0    insulin detemir U-100 (LEVEMIR FLEXTOUCH) 100 unit/mL (3 mL) SubQ InPn pen Inject 5 Units into the skin once daily. 1.5 mL 1     No current facility-administered medications on file prior to visit.      Review of patient's allergies indicates:   Allergen Reactions    Ace inhibitors Other (See Comments)     Other reaction(s): cough     Past Surgical History:   Procedure Laterality  Date    AV FISTULA PLACEMENT Left 2019    Procedure: CREATION, AV FISTULA, LEFT UPPER EXTREMITY;  Surgeon: Keron Perez MD;  Location: [facility] OR;  Service: Vascular;  Laterality: Left;    BREAST BIOPSY      breast reduction Bilateral age 31    BREAST SURGERY      CATARACT EXTRACTION Bilateral     cataracts Bilateral      SECTION, LOW TRANSVERSE      x1    CHOLECYSTECTOMY      EYE SURGERY  2014, 2014    vitrectomy    EYE SURGERY Right 2016    HYSTERECTOMY  1986    TAHBSO (patient is unsure if ovaries removed)    OOPHORECTOMY      REFRACTIVE SURGERY      TOTAL REDUCTION MAMMOPLASTY      approx 10 yrs ago     Family History   Problem Relation Age of Onset    Leukemia Father     Cataracts Father     Ovarian cancer Sister 35    Stroke Mother     Diabetes Mother     Hypertension Mother     Cataracts Mother     Diabetes Paternal Grandmother     Cataracts Paternal Grandmother     Breast cancer Maternal Aunt 65    No Known Problems Paternal Grandfather     Cataracts Maternal Grandmother     Cataracts Maternal Grandfather     HIV Brother     Achondroplasia Sister     Parkinsonism Maternal Aunt     Esophageal cancer Maternal Uncle         smoker    No Known Problems Paternal Aunt     Cataracts Paternal Uncle     Amblyopia Neg Hx     Blindness Neg Hx     Glaucoma Neg Hx     Macular degeneration Neg Hx     Retinal detachment Neg Hx     Strabismus Neg Hx     Thyroid disease Neg Hx     Colon cancer Neg Hx     Cancer Neg Hx        Review of Systems   Constitution: Negative for chills and fever.   Cardiovascular: Positive for leg swelling. Negative for chest pain and claudication.   Respiratory: Negative for cough and shortness of breath.    Skin: Positive for dry skin and nail changes. Negative for itching and rash.   Musculoskeletal: Positive for arthritis and joint pain. Negative for falls, joint swelling, muscle cramps and muscle weakness.  "  Gastrointestinal: Negative for diarrhea, nausea and vomiting.   Neurological: Positive for numbness and paresthesias. Negative for tremors and weakness.   Psychiatric/Behavioral: Negative for altered mental status and hallucinations.         Objective:       Vitals:    02/03/20 1733   Weight: 98 kg (216 lb)   Height: 5' 5" (1.651 m)   PainSc: 0-No pain     Physical Exam   Constitutional:   General: Pt. is well-developed, well-nourished, appears stated age, in no acute distress, alert and oriented x 3. No evidence of depression, anxiety, or agitation. Calm, cooperative, and communicative. Appropriate interactions and affect.       Cardiovascular:   Pulses:       Dorsalis pedis pulses are 1+ on the right side, and 1+ on the left side.        Posterior tibial pulses are 1+ on the right side, and 1+ on the left side.   There is decreased digital hair.    Musculoskeletal:        Right ankle: She exhibits swelling. No tenderness. Achilles tendon exhibits no pain and no defect.        Left ankle: She exhibits swelling. No tenderness. Achilles tendon exhibits no pain and no defect.        Right foot: There is normal range of motion and no tenderness.        Left foot: There is normal range of motion and no tenderness.   Muscle strength is 5/5 in all groups bilaterally.    Decreased stride, station of gait.  apropulsive toe off.  Increased angle and base of gait.    Patient has hammertoes of digits 2-5 bilateral partially reducible without symptom today.    Visible and palpable bunion without pain at dorsomedial 1st metatarsal head right and left.  Hallux abducted right and left partially reducible, tracks laterally without being track bound.  No ecchymosis, erythema, edema, or cardinal signs infection or signs of trauma same foot.    Fat pad atrophy to heels and met heads bilateral     Neurological: No sensory deficit.   [place] 5.07 monofilament is intact bilateral feet. Sharp/dull sensation is also intact " Bilateral feet.           Skin: Skin is warm and dry. No abrasion, no ecchymosis, no lesion (posterior right leg healed) and no rash noted. She is not diaphoretic. No cyanosis or erythema. No pallor. Nails show no clubbing.   Xerosis bilaterally     Toenails 1-5 bilaterally thickened by 2-3 mm, discolored/yellowed, dystrophic, brittle with subungual debris.    Interdigital Spaces clean, dry and without evidence of break in skin integrity   Psychiatric: She has a normal mood and affect. Her speech is normal.   Nursing note and vitals reviewed.          Assessment:       Encounter Diagnoses   Name Primary?    Type 2 diabetes mellitus with stage 3 chronic kidney disease, with long-term current use of insulin Yes    Hammer toes of both feet     Plantar fat pad atrophy     Hallux abducto valgus, left     Hallux abducto valgus, right     Nail dermatophytosis     Comprehensive diabetic foot examination, type 2 DM, encounter for     Venous stasis dermatitis of both lower extremities     ESRD (end stage renal disease) on dialysis          Plan:       Eirca was seen today for diabetes mellitus, diabetic foot exam and nail care.    Diagnoses and all orders for this visit:    Type 2 diabetes mellitus with stage 3 chronic kidney disease, with long-term current use of insulin  -     DIABETIC SHOES FOR HOME USE    Hammer toes of both feet  -     DIABETIC SHOES FOR HOME USE    Plantar fat pad atrophy  -     DIABETIC SHOES FOR HOME USE    Hallux abducto valgus, left  -     DIABETIC SHOES FOR HOME USE    Hallux abducto valgus, right  -     DIABETIC SHOES FOR HOME USE    Nail dermatophytosis    Comprehensive diabetic foot examination, type 2 DM, encounter for  -     DIABETIC SHOES FOR HOME USE    Venous stasis dermatitis of both lower extremities    ESRD (end stage renal disease) on dialysis      I counseled the patient on her conditions, their implications and medical management. Education about the diabetic foot,  neuropathy, and prevention of limb loss.    Shoe inspection. Diabetic Foot Education. Patient reminded of the importance of good nutrition/healthy diet/weight management and blood sugar control to help prevent podiatric complications of diabetes. Patient instructed on proper foot hygeine. Wear comfortable, proper fitting shoes. Wash feet daily. Dry well. After drying, apply moisturizer to feet (no lotion to webspaces). Inspect feet daily for skin breaks, blisters, swelling, or redness. Wear cotton socks (preferably white)  Change socks every day. Do NOT walk barefoot. Do NOT use heating pads or hot water soaks. We discussed wearing proper shoe gear, daily foot inspections, never walking without protective shoe gear.     Discussed edema control and the importance of daily moisturizer to the feet such as Gold bonds diabetic foot cream    Recommend applying vicks vaporub to thick abnormal toenails daily x 6 months to treat fungal nail infection.    Tubigrips to BLE; patient is to elevate legs. When sleeping, place a pillow under lower extremities. When sitting, support the legs so that they are level with the waist.    Rx diabetic shoes for protection and support    She will continue to monitor the areas daily, inspect her feet, wear protective shoe gear when ambulatory, moisturizer to maintain skin integrity and follow in this office in approximately 4-7 months, sooner p.r.n.

## 2020-02-03 NOTE — PATIENT INSTRUCTIONS
Recommend lotions: eucerin, eucerin for diabetics, aquaphor, A&D ointment, gold bond for diabetics, sween, [place]'s Bees all purpose baby ointment,  urea 40 with aloe (found on amazon.com)    Shoe recommendations: (try 6pm.com, zappos.com , nordstromrack.ABOVE Solutions, or shoes.[employer] for discounted prices) you can visit DSW shoes in [place]  or [employer] in the [facility] (there are also several shoe brand outlets in the [facility])    Asics (GT 2000 or gel foundations), new balance stability type shoes, saucony (stabil c3),  Hernandez (GTS or Beast or transcend), propet (tennis shoe)    Sofft Brand or axxiom (women) Kierra&Corey (men), clarks, crocs, aerosoles, naturalizers, SAS, ecco, born, elvia storey, rockports (dress shoes)    Vionic, burkenstocks, fitflops, propet (sandals)  Nike comfort thong sandals, crocs, propet (house shoes)    Nail Home remedy:  Vicks Vapor rub to nails for easier manageability    Diabetes: Inspecting Your Feet  Diabetes increases your chances of developing foot problems. So inspect your feet every day. This helps you find small skin irritations before they become serious infections. If you have trouble seeing the bottoms of your feet, use a mirror or ask a family member or friend to help.     Pressure spots on the bottom of the foot are common areas where problems develop.   How to check your feet  Below are tips to help you look for foot problems. Try to check your feet at the same time each day, such as when you get out of bed in the morning:  · Check the top of each foot. The tops of toes, back of the heel, and outer edge of the foot can get a lot of rubbing from poor-fitting shoes.  · Check the bottom of each foot. Daily wear and tear often leads to problems at pressure spots.  · Check the toes and nails. Fungal infections often occur between toes. Toenail problems can also be a sign of fungal infections or lead to breaks in the skin.  · Check your shoes, too. Loose objects inside a shoe can  injure the foot. Use your hand to feel inside your shoes for things like goldy, loose stitching, or rough areas that could irritate your skin.  Warning signs  Look for any color changes in the foot. Redness with streaks can signal a severe infection, which needs immediate medical attention. Tell your doctor right away if you have any of these problems:  · Swelling, sometimes with color changes, may be a sign of poor blood flow or infection. Symptoms include tenderness and an increase in the size of your foot.  · Warm or hot areas on your feet may be signs of infection. A foot that is cold may not be getting enough blood.  · Sensations such as burning, tingling, or pins and needles can be signs of a problem. Also check for areas that may be numb.  · Hot spots are caused by friction or pressure. Look for hot spots in areas that get a lot of rubbing. Hot spots can turn into blisters, calluses, or sores.  · Cracks and sores are caused by dry or irritated skin. They are a sign that the skin is breaking down, which can lead to infection.  · Toenail problems to watch for include nails growing into the skin (ingrown toenail) and causing redness or pain. Thick, yellow, or discolored nails can signal a fungal infection.  · Drainage and odor can develop from untreated sores and ulcers. Call your doctor right away if you notice white or yellow drainage, bleeding, or unpleasant odor.   © 7038-7615 [employer]. [address] [place] [address]. All rights reserved. This information is not intended as a substitute for professional medical care. Always follow your healthcare professional's instructions.        Step-by-Step:  Inspecting Your Feet (Diabetes)    Date Last Reviewed: 10/1/2016  © 6332-0458 [employer]. [address] [place] [address]. All rights reserved. This information is not intended as a substitute for professional medical care. Always follow your healthcare  professional's instructions.

## 2020-02-05 RX ORDER — CLOPIDOGREL BISULFATE 75 MG/1
TABLET ORAL
Qty: 90 TABLET | Refills: 1 | Status: SHIPPED | OUTPATIENT
Start: 2020-02-05 | End: 2020-06-22

## 2020-02-07 NOTE — PROGRESS NOTES
Patient, Erica Leblanc (MRN #[id number]), presented with a recorded BMI of 35.94 kg/m^2 and a documented comorbidity(s):  - Diabetes Mellitus Type 2  to which the severe obesity is a contributing factor. This is consistent with the definition of severe obesity (BMI 35.0-39.9) with comorbidity (ICD-10 E66.01, Z68.35). The patient's severe obesity was monitored, evaluated, addressed and/or treated. This addendum to the medical record is made on 02/07/2020.

## 2020-02-10 ENCOUNTER — PES CALL (OUTPATIENT)
Dept: ADMINISTRATIVE | Facility: CLINIC | Age: 75
End: 2020-02-10

## 2020-02-17 ENCOUNTER — TELEPHONE (OUTPATIENT)
Dept: FAMILY MEDICINE | Facility: CLINIC | Age: 75
End: 2020-02-17

## 2020-02-17 NOTE — TELEPHONE ENCOUNTER
Patient contacted me in regards to having last office visit notes faxed in order to obtain an appointment with [facility] for diabetic shoes. Last OV note faxed.    .

## 2020-02-20 ENCOUNTER — TELEPHONE (OUTPATIENT)
Dept: VASCULAR SURGERY | Facility: CLINIC | Age: 75
End: 2020-02-20

## 2020-02-20 DIAGNOSIS — N18.6 ESRD (END STAGE RENAL DISEASE) ON DIALYSIS: Primary | ICD-10-CM

## 2020-02-20 DIAGNOSIS — Z99.2 ESRD (END STAGE RENAL DISEASE) ON DIALYSIS: Primary | ICD-10-CM

## 2020-02-20 NOTE — TELEPHONE ENCOUNTER
Call to patient to see why she had canceled her appointment for her cath to be removed. She stated they had some problems with her access and the nurse told her to reschedule. Explained would try to move her appointment up. Call to the dialysis center who stated that Ms. Leblanc had infiltrated on Sat and did not ice her arm as instructed and so they had some problems today. Nurse stated that once the swelling goes down it would be better. Explained that the soonest will be March 2nd. She stated understanding.

## 2020-03-01 ENCOUNTER — PATIENT OUTREACH (OUTPATIENT)
Dept: ADMINISTRATIVE | Facility: OTHER | Age: 75
End: 2020-03-01

## 2020-03-02 ENCOUNTER — OFFICE VISIT (OUTPATIENT)
Dept: VASCULAR SURGERY | Facility: CLINIC | Age: 75
End: 2020-03-02
Payer: MEDICARE

## 2020-03-02 ENCOUNTER — HOSPITAL ENCOUNTER (OUTPATIENT)
Dept: CARDIOLOGY | Facility: HOSPITAL | Age: 75
Discharge: HOME | End: 2020-03-02
Attending: SURGERY
Payer: MEDICARE

## 2020-03-02 VITALS
SYSTOLIC BLOOD PRESSURE: 122 MMHG | HEIGHT: 65 IN | BODY MASS INDEX: 35.54 KG/M2 | WEIGHT: 213.31 LBS | HEART RATE: 73 BPM | DIASTOLIC BLOOD PRESSURE: 62 MMHG

## 2020-03-02 DIAGNOSIS — Z99.2 ESRD (END STAGE RENAL DISEASE) ON DIALYSIS: ICD-10-CM

## 2020-03-02 DIAGNOSIS — T82.858A ARTERIOVENOUS FISTULA STENOSIS, INITIAL ENCOUNTER: Primary | ICD-10-CM

## 2020-03-02 DIAGNOSIS — N18.6 ESRD (END STAGE RENAL DISEASE) ON DIALYSIS: ICD-10-CM

## 2020-03-02 LAB
HD ANASTAMOSIS LOCATION: NORMAL
HD FISTULA OUTFLOW VEIN VESSEL: NORMAL
HD INFLOW ARTERY VESSEL: NORMAL
RIGHT DIS GRAFT PSV: 160 CM/ SEC
RIGHT INFLOW PSV: 296 CM/S
RIGHT MID GRAFT PSV: 174 CM/S
RIGHT OUTFLOW VEIN PSV: 60 CM/ SEC
RIGHT PROX ANA PSV: 263 CM/S
RIGHT PROX GRAFT PSV: 656 CM/S
RIGHT VOLUME FLOW DIA: 0.7 CM
RIGHT VOLUME FLOW PSV: 1376 ML/MIN

## 2020-03-02 PROCEDURE — 1159F PR MEDICATION LIST DOCUMENTED IN MEDICAL RECORD: ICD-10-PCS | Mod: HCNC,S$GLB,, | Performed by: SURGERY

## 2020-03-02 PROCEDURE — 99999 PR PBB SHADOW E&M-EST. PATIENT-LVL III: ICD-10-PCS | Mod: PBBFAC,HCNC,, | Performed by: SURGERY

## 2020-03-02 PROCEDURE — 99499 UNLISTED E&M SERVICE: CPT | Mod: S$GLB,,, | Performed by: SURGERY

## 2020-03-02 PROCEDURE — 99999 PR PBB SHADOW E&M-EST. PATIENT-LVL III: CPT | Mod: PBBFAC,HCNC,, | Performed by: SURGERY

## 2020-03-02 PROCEDURE — 99214 PR OFFICE/OUTPT VISIT, EST, LEVL IV, 30-39 MIN: ICD-10-PCS | Mod: HCNC,S$GLB,, | Performed by: SURGERY

## 2020-03-02 PROCEDURE — 1159F MED LIST DOCD IN RCRD: CPT | Mod: HCNC,S$GLB,, | Performed by: SURGERY

## 2020-03-02 PROCEDURE — 93990 DOPPLER FLOW TESTING: CPT | Mod: TC,HCNC

## 2020-03-02 PROCEDURE — 1126F AMNT PAIN NOTED NONE PRSNT: CPT | Mod: HCNC,S$GLB,, | Performed by: SURGERY

## 2020-03-02 PROCEDURE — 1126F PR PAIN SEVERITY QUANTIFIED, NO PAIN PRESENT: ICD-10-PCS | Mod: HCNC,S$GLB,, | Performed by: SURGERY

## 2020-03-02 PROCEDURE — 1101F PT FALLS ASSESS-DOCD LE1/YR: CPT | Mod: HCNC,CPTII,S$GLB, | Performed by: SURGERY

## 2020-03-02 PROCEDURE — 1101F PR PT FALLS ASSESS DOC 0-1 FALLS W/OUT INJ PAST YR: ICD-10-PCS | Mod: HCNC,CPTII,S$GLB, | Performed by: SURGERY

## 2020-03-02 PROCEDURE — 93990 DOPPLER FLOW TESTING: CPT | Mod: 26,HCNC,, | Performed by: SURGERY

## 2020-03-02 PROCEDURE — 93990 CV US HEMODIALYSIS ACCESS (CUPID ONLY): ICD-10-PCS | Mod: 26,HCNC,, | Performed by: SURGERY

## 2020-03-02 PROCEDURE — 99499 RISK ADDL DX/OHS AUDIT: ICD-10-PCS | Mod: S$GLB,,, | Performed by: SURGERY

## 2020-03-02 PROCEDURE — 99214 OFFICE O/P EST MOD 30 MIN: CPT | Mod: HCNC,S$GLB,, | Performed by: SURGERY

## 2020-03-02 NOTE — PROGRESS NOTES
Keron Perez MD RPVI                       [facility]                         03/02/2020    HPI:  Erica Lebalnc is a 75 y.o. female with   Patient Active Problem List   Diagnosis    Severe obesity (BMI 35.0-39.9) with comorbidity    Sickle cell trait    Osteopenia    Hyperlipidemia LDL goal <100    HUMBERTO on CPAP    Hypothyroidism (acquired)    Hx-TIA (transient ischemic attack)    Vitamin D deficiency disease    Pulmonary hypertension    Type 2 diabetes mellitus with ESRD (end-stage renal disease)    Secondary renal hyperparathyroidism    Incomplete bladder emptying    Postmenopausal atrophic vaginitis    Rectocele    Mixed incontinence urge and stress    Chronic diastolic heart failure    Tortuous aorta    History of TIAs    Essential hypertension    ESRD on hemodialysis    Anemia of chronic disease    Depression    Debility    Chronic respiratory failure    Type 2 diabetes mellitus with foot ulcer, with long-term current use of insulin    Stable proliferative diabetic retinopathy associated with type 2 diabetes mellitus    Mild major depression    Heart failure with preserved ejection fraction    Preop cardiovascular exam    End stage renal disease    Pseudophakia    being managed by PCP and specialists who is here today for evaluation of long term HD access.  S/p R IJ tunneled HD catheter, HD without issues.  Pt is R handed.  No complaints today.  Does endorse orthopnea, no chest pain.  Unable to climb 1 flight of stairs on own.    no MI  no Stroke  Tobacco use: denies    1/20/20: No new issues.    Interval history: Dialysis without issues.    Past Medical History:   Diagnosis Date    Acute respiratory failure with hypoxia     Anemia of chronic kidney failure, stage 4 (severe) 4/5/2019    Cataracts, bilateral     CHF (congestive heart failure)     CKD (chronic kidney disease) stage 3, GFR 30-59 ml/min     CKD (chronic kidney disease) stage 3,  GFR 30-59 ml/min     Controlled type 2 diabetes mellitus with proteinuria or albuminuria     Depression     Diabetes with neurologic complications     Diabetic retinopathy of both eyes     Edema     Glaucoma     History of colonic polyps     Hx-TIA (transient ischemic attack) 2008    Hyperlipidemia LDL goal < 100     Hypertension     Hypothyroidism     Major depressive disorder, single episode, mild 2016    Mixed incontinence urge and stress     Obesity     Obstructive sleep apnea on CPAP     19:  Home CPAP machine broken, per patient & son    Osteopenia     Proteinuria     Sickle cell trait     Strabismus     TIA (transient ischemic attack)     Trouble in sleeping     Type 2 diabetes mellitus with ophthalmic manifestations     Type 2 diabetes with stage 3 chronic kidney disease GFR 30-59     Type II or unspecified type diabetes mellitus with renal manifestations, uncontrolled(250.42)     Uncontrolled type 2 diabetes mellitus with peripheral circulatory disorder 2019    Urge incontinence 2016    Urge incontinence     Venous stasis ulcer     bilateral lower legs    Vitamin D deficiency disease      Past Surgical History:   Procedure Laterality Date    AV FISTULA PLACEMENT Left 2019    Procedure: CREATION, AV FISTULA, LEFT UPPER EXTREMITY;  Surgeon: Keron Perez MD;  Location: [facility];  Service: Vascular;  Laterality: Left;    BREAST BIOPSY      breast reduction Bilateral age 31    BREAST SURGERY      CATARACT EXTRACTION Bilateral     cataracts Bilateral      SECTION, LOW TRANSVERSE      x1    CHOLECYSTECTOMY      EYE SURGERY  2014, 2014    vitrectomy    EYE SURGERY Right 2016    HYSTERECTOMY      TAHBSO (patient is unsure if ovaries removed)    OOPHORECTOMY      REFRACTIVE SURGERY      TOTAL REDUCTION MAMMOPLASTY      approx 10 yrs ago     Family History   Problem Relation Age of Onset    Leukemia Father      Cataracts Father     Ovarian cancer Sister 35    Stroke Mother     Diabetes Mother     Hypertension Mother     Cataracts Mother     Diabetes Paternal Grandmother     Cataracts Paternal Grandmother     Breast cancer Maternal Aunt 65    No Known Problems Paternal Grandfather     Cataracts Maternal Grandmother     Cataracts Maternal Grandfather     HIV Brother     Achondroplasia Sister     Parkinsonism Maternal Aunt     Esophageal cancer Maternal Uncle         smoker    No Known Problems Paternal Aunt     Cataracts Paternal Uncle     Amblyopia Neg Hx     Blindness Neg Hx     Glaucoma Neg Hx     Macular degeneration Neg Hx     Retinal detachment Neg Hx     Strabismus Neg Hx     Thyroid disease Neg Hx     Colon cancer Neg Hx     Cancer Neg Hx      Social History     Socioeconomic History    Marital status: Single     Spouse name: Not on file    Number of children: 5    Years of education: Not on file    Highest education level: Not on file   Occupational History    Occupation:      Employer: [employer]     Comment: part-time   Social Needs    Financial resource strain: Not on file    Food insecurity:     Worry: Not on file     Inability: Not on file    Transportation needs:     Medical: Not on file     Non-medical: Not on file   Tobacco Use    Smoking status: Never Smoker    Smokeless tobacco: Never Used   Substance and Sexual Activity    Alcohol use: No     Alcohol/week: 0.0 standard drinks    Drug use: No    Sexual activity: Not Currently     Partners: Male     Birth control/protection: Post-menopausal, Surgical   Lifestyle    Physical activity:     Days per week: Not on file     Minutes per session: Not on file    Stress: Not on file   Relationships    Social connections:     Talks on phone: Not on file     Gets together: Not on file     Attends [employer] service: Not on file     Active member of club or organization: Not on file     Attends  meetings of clubs or organizations: Not on file     Relationship status: Not on file   Other Topics Concern    Not on file   Social History Narrative    Not on file       Current Outpatient Medications:     amLODIPine (NORVASC) 10 MG tablet, Take 1 tablet (10 mg total) by mouth once daily., Disp: 90 tablet, Rfl: 3    atorvastatin (LIPITOR) 10 MG tablet, TAKE 1 TABLET EVERY DAY, Disp: 90 tablet, Rfl: 1    bumetanide (BUMEX) 2 MG tablet, Take 1 tablet (2 mg total) by mouth once daily., Disp: 30 tablet, Rfl: 11    citalopram (CELEXA) 10 MG tablet, TAKE 1 TABLET EVERY DAY, Disp: 90 tablet, Rfl: 1    clopidogreL (PLAVIX) 75 mg tablet, TAKE 1 TABLET EVERY DAY, Disp: 90 tablet, Rfl: 1    docusate sodium (COLACE) 100 MG capsule, Take 200 mg by mouth once daily. , Disp: , Rfl:     hydrALAZINE (APRESOLINE) 100 MG tablet, TAKE 1 TABLET BY MOUTH THREE TIMES A DAY, Disp: 270 tablet, Rfl: 1    LANCETS MISC, , Disp: , Rfl:     levothyroxine (SYNTHROID) 50 MCG tablet, Take 50 mcg by mouth once daily., Disp: , Rfl:     metoprolol tartrate (LOPRESSOR) 50 MG tablet, Take 1 tablet (50 mg total) by mouth 2 (two) times daily., Disp: 100 tablet, Rfl: 3    MULTIVITAMIN WITH MINERALS (HAIR,SKIN AND NAILS ORAL), Take 3 tablets by mouth once daily., Disp: , Rfl:     oxybutynin (DITROPAN XL) 15 MG TR24, TAKE 1 TABLET EVERY DAY, Disp: 90 tablet, Rfl: 3    polyethylene glycol (GLYCOLAX) 17 gram PwPk, Take 17 g by mouth once daily., Disp: , Rfl: 0    insulin detemir U-100 (LEVEMIR FLEXTOUCH) 100 unit/mL (3 mL) SubQ InPn pen, Inject 5 Units into the skin once daily., Disp: 1.5 mL, Rfl: 1    REVIEW OF SYSTEMS:  General: No fevers or chills; ENT: No sore throat; Allergy and Immunology: no persistent infections; Hematological and Lymphatic: No history of bleeding or easy bruising; Endocrine: negative; Respiratory: no cough, shortness of breath, or wheezing; Cardiovascular: no chest pain or dyspnea on exertion; Gastrointestinal: no  abdominal pain/back, change in bowel habits, or bloody stools; Genito-Urinary: no dysuria, trouble voiding, or hematuria; Musculoskeletal: negative; Neurological: no TIA or stroke symptoms; Psychiatric: no nervousness, anxiety or depression.    PHYSICAL EXAM:      Pulse: 73         General appearance:  Alert, well-appearing, and in no distress.  Oriented to person, place, and time                    Neurological: Normal speech, no focal findings noted; CN II - XII grossly intact. All extremities with sensation to light touch.            Musculoskeletal: Digits/nail without cyanosis/clubbing.  Strength 5/5 all extremities.                    Neck: Supple, no significant adenopathy, no carotid bruit can be auscultated                  Chest:  Clear to auscultation, no wheezes, rales or rhonchi, symmetric air entry. No use of accessory muscles               Cardiac: Normal rate and regular rhythm, S1 and S2 normal            Abdomen: Soft, nontender, nondistended, no masses or organomegaly, no hernia     No rebound tenderness noted; bowel sounds normal     Pulsatile aortic mass is non palpable.     No groin adenopathy      Extremities:      2+ radial pulse bilaterally, LUE AVF +thrill     No BUE edema, Negative Manuel's test BUE    Skin: No tissue loss    LAB RESULTS:  No results found for: CBC  Lab Results   Component Value Date    LABPROT 10.9 11/20/2019    INR 1.0 11/20/2019     Lab Results   Component Value Date     11/27/2019    K 3.8 11/27/2019    CL 99 11/27/2019    CO2 27 11/27/2019     (H) 11/27/2019    BUN 24 (H) 11/27/2019    CREATININE 2.8 (H) 11/27/2019    CALCIUM 9.1 11/27/2019    ANIONGAP 11 11/27/2019    EGFRNONAA 16 (A) 11/27/2019     Lab Results   Component Value Date    WBC 4.42 11/20/2019    RBC 4.37 11/20/2019    HGB 11.0 (L) 11/20/2019    HCT 35.3 (L) 11/20/2019    MCV 81 (L) 11/20/2019    MCH 25.2 (L) 11/20/2019    MCHC 31.2 (L) 11/20/2019    RDW 16.9 (H) 11/20/2019      11/20/2019    MPV 9.3 11/20/2019    GRAN 2.8 11/20/2019    GRAN 62.6 11/20/2019    LYMPH 0.8 (L) 11/20/2019    LYMPH 18.1 11/20/2019    MONO 0.7 11/20/2019    MONO 16.3 (H) 11/20/2019    EOS 0.1 11/20/2019    BASO 0.02 11/20/2019    EOSINOPHIL 2.3 11/20/2019    BASOPHIL 0.5 11/20/2019    DIFFMETHOD Automated 11/20/2019     .  Lab Results   Component Value Date    HGBA1C 6.9 (H) 11/20/2019       IMAGING:  All pertinent imaging has been reviewed and interpreted independently.    Vein mapping 9/2019:  Adequate R superior basilic vein and axillary vein ; Adequate L basilic vein superior and inferior, adequate L axillary     1/27/20 Left upper extremity dialysis ultrasound shows no hemodynamically significant stenosis.  Flow is 1083 ml/min.  Depth and diameter are appropriate.    IMP/PLAN:  75 y.o. female with   Patient Active Problem List   Diagnosis    Severe obesity (BMI 35.0-39.9) with comorbidity    Sickle cell trait    Osteopenia    Hyperlipidemia LDL goal <100    HUMBERTO on CPAP    Hypothyroidism (acquired)    Hx-TIA (transient ischemic attack)    Vitamin D deficiency disease    Pulmonary hypertension    Type 2 diabetes mellitus with ESRD (end-stage renal disease)    Secondary renal hyperparathyroidism    Incomplete bladder emptying    Postmenopausal atrophic vaginitis    Rectocele    Mixed incontinence urge and stress    Chronic diastolic heart failure    Tortuous aorta    History of TIAs    Essential hypertension    ESRD on hemodialysis    Anemia of chronic disease    Depression    Debility    Chronic respiratory failure    Type 2 diabetes mellitus with foot ulcer, with long-term current use of insulin    Stable proliferative diabetic retinopathy associated with type 2 diabetes mellitus    Mild major depression    Heart failure with preserved ejection fraction    Preop cardiovascular exam    End stage renal disease    Pseudophakia    being managed by PCP and specialists who is here  today for evaluation of long term HD access s/p L RC AV fistula .    -s/p L RC AV fistula with proximal fistula measuring 5 mm 1/13/20, adequate flow without issues during HD - catheter removed today  -RTC for routine surveillance of fistula with HD US  -Cont renal diet    I spent 20 minutes evaluating this patient and greater than 50% of the time was spent counseling, coordinator care and discussing the plan of care.  All questions were answered and patient stated understanding with agreement with the above treatment plan.    Keron Perez MD [facility]  Vascular and Endovascular Surgery

## 2020-03-02 NOTE — PATIENT INSTRUCTIONS
Caring for Your Hemodialysis Access  A problem such as an infection or a blood clot may make the access unusable. Typically, this happens more often with an arteriovenous graft than with an arteriovenous fistula. If this happens, youll need a new access. To help your access last, you will need to follow certain guidelines.  Watching for problems  Call your healthcare provider right away if you:  · Cant feel the blood flowing in the access (this sensation is called a thrill)  · Have pain or numbness in your hand or arm  · Have bleeding, redness, bluish discoloration, or warmth around your access  · Notice your access suddenly bulging out more than usual (a slight bulge is normal)  · Have a fever of 100.4°F (38°C) or higher, or as directed by your healthcare provider   Follow these and any other guidelines youre given  · Dont wear tight clothes or jewelry around your access.  · Dont let anyone take your blood pressure on or draw blood from the arm with the access. Also, dont let anyone put IV lines into it.  · Protect your access from being hit or cut.  · Wash your hands often and keep the area around the access clean.  · Do not carry anything heavy or do anything that would put pressure on the access.  Feeling for your thrill    If you put your fingers over your access, you should feel the blood rushing through it. This is called a thrill, and it feels like a vibration. Feel for the thrill as often as you're told, usually once or twice a day. If you can't feel it, tell your healthcare provider right away. Blood may not be flowing through your access the way it should.  Important numbers  Write the names and numbers of your healthcare providers below. That way you will know how to get in touch with them.  Doctor:  Name ___________________ Phone ___________________  Surgeon:  Name ___________________ Phone ___________________  Dialysis Center:  Name ___________________ Phone ___________________   Date Last  Reviewed: 1/1/2017  © 9527-1681 [employer]. [address] [place] [address], [place] [address]. All rights reserved. This information is not intended as a substitute for professional medical care. Always follow your healthcare professional's instructions.

## 2020-03-02 NOTE — LETTER
March 2, 2020        Sendy Elaine MD  [address] [facility]  [place] [address]             [facility]  [address]  [facility] [place] [address]  Phone: [phone]  Fax: [phone]   Patient: Erica Leblanc   MR Number: [id number]   YOB: 1945   Date of Visit: 3/2/2020       Dear Dr. Elaine:    Thank you for referring Erica Leblanc to me for evaluation. Below are the relevant portions of my assessment and plan of care.            If you have questions, please do not hesitate to call me. I look forward to following Erica along with you.    Sincerely,      Keron Perez MD           CC  No Recipients

## 2020-03-03 ENCOUNTER — TELEPHONE (OUTPATIENT)
Dept: VASCULAR SURGERY | Facility: CLINIC | Age: 75
End: 2020-03-03

## 2020-03-03 DIAGNOSIS — Z99.2 ESRD (END STAGE RENAL DISEASE) ON DIALYSIS: Primary | ICD-10-CM

## 2020-03-03 DIAGNOSIS — N18.6 ESRD (END STAGE RENAL DISEASE) ON DIALYSIS: Primary | ICD-10-CM

## 2020-03-03 NOTE — TELEPHONE ENCOUNTER
Called pt to give her, her pre op date and time pt didn't answer. Pt didn't have a voicemail for me to leave her a message.

## 2020-03-05 ENCOUNTER — LAB VISIT (OUTPATIENT)
Dept: LAB | Facility: HOSPITAL | Age: 75
End: 2020-03-05
Attending: SURGERY
Payer: MEDICARE

## 2020-03-05 ENCOUNTER — TELEPHONE (OUTPATIENT)
Dept: VASCULAR SURGERY | Facility: CLINIC | Age: 75
End: 2020-03-05

## 2020-03-05 DIAGNOSIS — Z99.2 ESRD (END STAGE RENAL DISEASE) ON DIALYSIS: Primary | ICD-10-CM

## 2020-03-05 DIAGNOSIS — N18.6 ESRD (END STAGE RENAL DISEASE) ON DIALYSIS: Primary | ICD-10-CM

## 2020-03-05 DIAGNOSIS — Z99.2 ESRD (END STAGE RENAL DISEASE) ON DIALYSIS: ICD-10-CM

## 2020-03-05 DIAGNOSIS — T82.858A ARTERIOVENOUS FISTULA STENOSIS, INITIAL ENCOUNTER: Primary | ICD-10-CM

## 2020-03-05 DIAGNOSIS — N18.6 ESRD (END STAGE RENAL DISEASE) ON DIALYSIS: ICD-10-CM

## 2020-03-05 LAB — POTASSIUM SERPL-SCNC: 4 MMOL/L (ref 3.5–5.1)

## 2020-03-05 PROCEDURE — 84132 ASSAY OF SERUM POTASSIUM: CPT | Mod: HCNC

## 2020-03-05 PROCEDURE — 36415 COLL VENOUS BLD VENIPUNCTURE: CPT | Mod: HCNC

## 2020-03-05 NOTE — TELEPHONE ENCOUNTER
Call to patient and explained that her potassium was within limits. Dr. Perez has her ordered for a fistulagram tomorrow at [facility] and she needed to be there at 1:30pm as she will be a afternoon case. Explained to be NPO after midnight with a light breakfast but must have nothing in her system 8 hrs prior to her arrival. She stated understanding. Explained to take all her medications but hold the plavix that morning. Explained to be sure to take her blood pressure medication that morning as ordered. She stated understanding. She stated that she was worried about her dialysis. Explained would contact them tomorrow and let them know the plan. She stated understanding.

## 2020-03-06 ENCOUNTER — ANESTHESIA EVENT (OUTPATIENT)
Dept: SURGERY | Facility: HOSPITAL | Age: 75
End: 2020-03-06
Payer: MEDICARE

## 2020-03-06 ENCOUNTER — HOSPITAL ENCOUNTER (OUTPATIENT)
Facility: HOSPITAL | Age: 75
Discharge: HOME | End: 2020-03-06
Attending: SURGERY | Admitting: SURGERY
Payer: MEDICARE

## 2020-03-06 ENCOUNTER — ANESTHESIA (OUTPATIENT)
Dept: SURGERY | Facility: HOSPITAL | Age: 75
End: 2020-03-06
Payer: MEDICARE

## 2020-03-06 VITALS
DIASTOLIC BLOOD PRESSURE: 70 MMHG | TEMPERATURE: 98 F | HEART RATE: 59 BPM | BODY MASS INDEX: 34.53 KG/M2 | WEIGHT: 207.25 LBS | HEIGHT: 65 IN | SYSTOLIC BLOOD PRESSURE: 130 MMHG | RESPIRATION RATE: 18 BRPM | OXYGEN SATURATION: 97 %

## 2020-03-06 DIAGNOSIS — N18.6 ESRD ON HEMODIALYSIS: ICD-10-CM

## 2020-03-06 DIAGNOSIS — Z99.2 ESRD ON HEMODIALYSIS: ICD-10-CM

## 2020-03-06 DIAGNOSIS — T82.858D ARTERIOVENOUS GRAFT STENOSIS, SUBSEQUENT ENCOUNTER: Primary | ICD-10-CM

## 2020-03-06 LAB
ANION GAP SERPL CALC-SCNC: 11 MMOL/L (ref 8–16)
BUN SERPL-MCNC: 95 MG/DL (ref 8–23)
CALCIUM SERPL-MCNC: 9.4 MG/DL (ref 8.7–10.5)
CHLORIDE SERPL-SCNC: 102 MMOL/L (ref 95–110)
CO2 SERPL-SCNC: 27 MMOL/L (ref 23–29)
CREAT SERPL-MCNC: 6.8 MG/DL (ref 0.5–1.4)
EST. GFR  (AFRICAN AMERICAN): 6.3 ML/MIN/1.73 M^2
EST. GFR  (NON AFRICAN AMERICAN): 5.5 ML/MIN/1.73 M^2
GLUCOSE SERPL-MCNC: 129 MG/DL (ref 70–110)
POTASSIUM SERPL-SCNC: 4.7 MMOL/L (ref 3.5–5.1)
SODIUM SERPL-SCNC: 140 MMOL/L (ref 136–145)

## 2020-03-06 PROCEDURE — 63600175 PHARM REV CODE 636 W HCPCS: Mod: HCNC | Performed by: SURGERY

## 2020-03-06 PROCEDURE — 63600175 PHARM REV CODE 636 W HCPCS: Mod: HCNC | Performed by: NURSE ANESTHETIST, CERTIFIED REGISTERED

## 2020-03-06 PROCEDURE — 82962 GLUCOSE BLOOD TEST: CPT | Mod: HCNC | Performed by: SURGERY

## 2020-03-06 PROCEDURE — C1894 INTRO/SHEATH, NON-LASER: HCPCS | Mod: HCNC | Performed by: SURGERY

## 2020-03-06 PROCEDURE — D9220A PRA ANESTHESIA: Mod: HCNC,CRNA,, | Performed by: NURSE ANESTHETIST, CERTIFIED REGISTERED

## 2020-03-06 PROCEDURE — 71000015 HC POSTOP RECOV 1ST HR: Mod: HCNC | Performed by: SURGERY

## 2020-03-06 PROCEDURE — 37000009 HC ANESTHESIA EA ADD 15 MINS: Mod: HCNC | Performed by: SURGERY

## 2020-03-06 PROCEDURE — 37000008 HC ANESTHESIA 1ST 15 MINUTES: Mod: HCNC | Performed by: SURGERY

## 2020-03-06 PROCEDURE — 71000044 HC DOSC ROUTINE RECOVERY FIRST HOUR: Mod: HCNC | Performed by: SURGERY

## 2020-03-06 PROCEDURE — D9220A PRA ANESTHESIA: Mod: HCNC,ANES,, | Performed by: ANESTHESIOLOGY

## 2020-03-06 PROCEDURE — 36832 PR AV FISTULA REVISION, OPEN, W/O THROMBECTOMY: ICD-10-PCS | Mod: HCNC,,, | Performed by: SURGERY

## 2020-03-06 PROCEDURE — C1769 GUIDE WIRE: HCPCS | Mod: HCNC | Performed by: SURGERY

## 2020-03-06 PROCEDURE — 36000707: Mod: HCNC | Performed by: SURGERY

## 2020-03-06 PROCEDURE — 80048 BASIC METABOLIC PNL TOTAL CA: CPT | Mod: HCNC

## 2020-03-06 PROCEDURE — 36000706: Mod: HCNC | Performed by: SURGERY

## 2020-03-06 PROCEDURE — 71000016 HC POSTOP RECOV ADDL HR: Mod: HCNC | Performed by: SURGERY

## 2020-03-06 PROCEDURE — 25500020 PHARM REV CODE 255: Mod: HCNC | Performed by: SURGERY

## 2020-03-06 PROCEDURE — D9220A PRA ANESTHESIA: ICD-10-PCS | Mod: HCNC,CRNA,, | Performed by: NURSE ANESTHETIST, CERTIFIED REGISTERED

## 2020-03-06 PROCEDURE — 25000003 PHARM REV CODE 250: Mod: HCNC | Performed by: SURGERY

## 2020-03-06 PROCEDURE — D9220A PRA ANESTHESIA: ICD-10-PCS | Mod: HCNC,ANES,, | Performed by: ANESTHESIOLOGY

## 2020-03-06 PROCEDURE — 36832 AV FISTULA REVISION OPEN: CPT | Mod: HCNC,,, | Performed by: SURGERY

## 2020-03-06 PROCEDURE — 27201423 OPTIME MED/SURG SUP & DEVICES STERILE SUPPLY: Mod: HCNC | Performed by: SURGERY

## 2020-03-06 RX ORDER — LIDOCAINE HYDROCHLORIDE 10 MG/ML
INJECTION, SOLUTION EPIDURAL; INFILTRATION; INTRACAUDAL; PERINEURAL
Status: DISCONTINUED | OUTPATIENT
Start: 2020-03-06 | End: 2020-03-06 | Stop reason: HOSPADM

## 2020-03-06 RX ORDER — HYDROCODONE BITARTRATE AND ACETAMINOPHEN 5; 325 MG/1; MG/1
1 TABLET ORAL EVERY 6 HOURS PRN
Qty: 10 TABLET | Refills: 0 | Status: SHIPPED | OUTPATIENT
Start: 2020-03-06 | End: 2020-08-18 | Stop reason: CLARIF

## 2020-03-06 RX ORDER — LIDOCAINE HYDROCHLORIDE 10 MG/ML
1 INJECTION, SOLUTION EPIDURAL; INFILTRATION; INTRACAUDAL; PERINEURAL ONCE
Status: COMPLETED | OUTPATIENT
Start: 2020-03-06 | End: 2020-03-06

## 2020-03-06 RX ORDER — FENTANYL CITRATE 50 UG/ML
INJECTION, SOLUTION INTRAMUSCULAR; INTRAVENOUS
Status: DISCONTINUED | OUTPATIENT
Start: 2020-03-06 | End: 2020-03-09

## 2020-03-06 RX ORDER — NITROGLYCERIN 5 MG/ML
INJECTION, SOLUTION INTRAVENOUS
Status: DISCONTINUED | OUTPATIENT
Start: 2020-03-06 | End: 2020-03-06 | Stop reason: HOSPADM

## 2020-03-06 RX ORDER — HEPARIN SODIUM 1000 [USP'U]/ML
INJECTION, SOLUTION INTRAVENOUS; SUBCUTANEOUS
Status: DISCONTINUED | OUTPATIENT
Start: 2020-03-06 | End: 2020-03-09

## 2020-03-06 RX ORDER — MIDAZOLAM HYDROCHLORIDE 1 MG/ML
INJECTION, SOLUTION INTRAMUSCULAR; INTRAVENOUS
Status: DISCONTINUED | OUTPATIENT
Start: 2020-03-06 | End: 2020-03-09

## 2020-03-06 RX ORDER — SODIUM CHLORIDE 9 MG/ML
INJECTION, SOLUTION INTRAVENOUS CONTINUOUS
Status: DISCONTINUED | OUTPATIENT
Start: 2020-03-06 | End: 2020-03-06 | Stop reason: HOSPADM

## 2020-03-06 RX ORDER — CEFAZOLIN SODIUM 1 G/3ML
2 INJECTION, POWDER, FOR SOLUTION INTRAMUSCULAR; INTRAVENOUS ONCE
Status: COMPLETED | OUTPATIENT
Start: 2020-03-06 | End: 2020-03-06

## 2020-03-06 RX ORDER — VERAPAMIL HYDROCHLORIDE 2.5 MG/ML
INJECTION, SOLUTION INTRAVENOUS
Status: DISCONTINUED | OUTPATIENT
Start: 2020-03-06 | End: 2020-03-06 | Stop reason: HOSPADM

## 2020-03-06 RX ORDER — IODIXANOL 320 MG/ML
INJECTION, SOLUTION INTRAVASCULAR
Status: DISCONTINUED | OUTPATIENT
Start: 2020-03-06 | End: 2020-03-06 | Stop reason: HOSPADM

## 2020-03-06 RX ORDER — HEPARIN SODIUM 1000 [USP'U]/ML
INJECTION, SOLUTION INTRAVENOUS; SUBCUTANEOUS
Status: DISCONTINUED | OUTPATIENT
Start: 2020-03-06 | End: 2020-03-06 | Stop reason: HOSPADM

## 2020-03-06 RX ORDER — DIPHENHYDRAMINE HYDROCHLORIDE 50 MG/ML
INJECTION INTRAMUSCULAR; INTRAVENOUS
Status: DISCONTINUED | OUTPATIENT
Start: 2020-03-06 | End: 2020-03-09

## 2020-03-06 RX ADMIN — DIPHENHYDRAMINE HYDROCHLORIDE 25 MG: 50 INJECTION, SOLUTION INTRAMUSCULAR; INTRAVENOUS at 04:03

## 2020-03-06 RX ADMIN — MIDAZOLAM HYDROCHLORIDE 1 MG: 1 INJECTION, SOLUTION INTRAMUSCULAR; INTRAVENOUS at 04:03

## 2020-03-06 RX ADMIN — FENTANYL CITRATE 25 MCG: 50 INJECTION, SOLUTION INTRAMUSCULAR; INTRAVENOUS at 04:03

## 2020-03-06 RX ADMIN — HEPARIN SODIUM 2000 UNITS: 1000 INJECTION, SOLUTION INTRAVENOUS; SUBCUTANEOUS at 04:03

## 2020-03-06 RX ADMIN — CEFAZOLIN 2 G: 330 INJECTION, POWDER, FOR SOLUTION INTRAMUSCULAR; INTRAVENOUS at 04:03

## 2020-03-06 RX ADMIN — LIDOCAINE HYDROCHLORIDE 0.5 MG: 10 INJECTION, SOLUTION EPIDURAL; INFILTRATION; INTRACAUDAL; PERINEURAL at 03:03

## 2020-03-06 RX ADMIN — MIDAZOLAM HYDROCHLORIDE 1 MG: 1 INJECTION, SOLUTION INTRAMUSCULAR; INTRAVENOUS at 05:03

## 2020-03-06 RX ADMIN — SODIUM CHLORIDE: 0.9 INJECTION, SOLUTION INTRAVENOUS at 03:03

## 2020-03-06 NOTE — ANESTHESIA PREPROCEDURE EVALUATION
03/06/2020  Erica Leblanc is a 75 y.o., female with ESRD here for fistulogram.    Pre-operative evaluation for Procedure(s) (LRB):  Fistulogram, left upper extremity, possible intervention (Left)        Patient Active Problem List   Diagnosis    Severe obesity (BMI 35.0-39.9) with comorbidity    Sickle cell trait    Osteopenia    Hyperlipidemia LDL goal <100    HUMBERTO on CPAP    Hypothyroidism (acquired)    Hx-TIA (transient ischemic attack)    Vitamin D deficiency disease    Pulmonary hypertension    Type 2 diabetes mellitus with ESRD (end-stage renal disease)    Secondary renal hyperparathyroidism    Incomplete bladder emptying    Postmenopausal atrophic vaginitis    Rectocele    Mixed incontinence urge and stress    Chronic diastolic heart failure    Tortuous aorta    History of TIAs    Essential hypertension    ESRD on hemodialysis    Anemia of chronic disease    Depression    Debility    Chronic respiratory failure    Type 2 diabetes mellitus with foot ulcer, with long-term current use of insulin    Stable proliferative diabetic retinopathy associated with type 2 diabetes mellitus    Mild major depression    Heart failure with preserved ejection fraction    Preop cardiovascular exam    End stage renal disease    Pseudophakia    Arteriovenous graft stenosis, subsequent encounter       Review of patient's allergies indicates:   Allergen Reactions    Ace inhibitors Other (See Comments)     Other reaction(s): cough       No current facility-administered medications on file prior to encounter.      Current Outpatient Medications on File Prior to Encounter   Medication Sig Dispense Refill    amLODIPine (NORVASC) 10 MG tablet Take 1 tablet (10 mg total) by mouth once daily. 90 tablet 3    atorvastatin (LIPITOR) 10 MG tablet TAKE 1 TABLET EVERY DAY 90 tablet 1    bumetanide  (BUMEX) 2 MG tablet Take 1 tablet (2 mg total) by mouth once daily. 30 tablet 11    citalopram (CELEXA) 10 MG tablet TAKE 1 TABLET EVERY DAY 90 tablet 1    clopidogreL (PLAVIX) 75 mg tablet TAKE 1 TABLET EVERY DAY 90 tablet 1    docusate sodium (COLACE) 100 MG capsule Take 200 mg by mouth once daily.       hydrALAZINE (APRESOLINE) 100 MG tablet TAKE 1 TABLET BY MOUTH THREE TIMES A  tablet 1    insulin detemir U-100 (LEVEMIR FLEXTOUCH) 100 unit/mL (3 mL) SubQ InPn pen Inject 5 Units into the skin once daily. 1.5 mL 1    levothyroxine (SYNTHROID) 50 MCG tablet Take 50 mcg by mouth once daily.      oxybutynin (DITROPAN XL) 15 MG TR24 TAKE 1 TABLET EVERY DAY 90 tablet 3    LANCETS MISC       metoprolol tartrate (LOPRESSOR) 50 MG tablet Take 1 tablet (50 mg total) by mouth 2 (two) times daily. 100 tablet 3    MULTIVITAMIN WITH MINERALS (HAIR,SKIN AND NAILS ORAL) Take 3 tablets by mouth once daily.      polyethylene glycol (GLYCOLAX) 17 gram PwPk Take 17 g by mouth once daily.  0       Past Surgical History:   Procedure Laterality Date    AV FISTULA PLACEMENT Left 2019    Procedure: CREATION, AV FISTULA, LEFT UPPER EXTREMITY;  Surgeon: Keron Perez MD;  Location: [facility];  Service: Vascular;  Laterality: Left;    BREAST BIOPSY      breast reduction Bilateral age 31    BREAST SURGERY      CATARACT EXTRACTION Bilateral     cataracts Bilateral      SECTION, LOW TRANSVERSE      x1    CHOLECYSTECTOMY      EYE SURGERY  2014, 2014    vitrectomy    EYE SURGERY Right 2016    HYSTERECTOMY  1986    TAHBSO (patient is unsure if ovaries removed)    OOPHORECTOMY      REFRACTIVE SURGERY      TOTAL REDUCTION MAMMOPLASTY      approx 10 yrs ago       Social History     Socioeconomic History    Marital status: Single     Spouse name: Not on file    Number of children: 5    Years of education: Not on file    Highest education level: Not on file   Occupational History     Occupation:      Employer: [employer]     Comment: part-time   Social Needs    Financial resource strain: Not on file    Food insecurity:     Worry: Not on file     Inability: Not on file    Transportation needs:     Medical: Not on file     Non-medical: Not on file   Tobacco Use    Smoking status: Never Smoker    Smokeless tobacco: Never Used   Substance and Sexual Activity    Alcohol use: No     Alcohol/week: 0.0 standard drinks    Drug use: No    Sexual activity: Not Currently     Partners: Male     Birth control/protection: Post-menopausal, Surgical   Lifestyle    Physical activity:     Days per week: Not on file     Minutes per session: Not on file    Stress: Not on file   Relationships    Social connections:     Talks on phone: Not on file     Gets together: Not on file     Attends [employer] service: Not on file     Active member of club or organization: Not on file     Attends meetings of clubs or organizations: Not on file     Relationship status: Not on file   Other Topics Concern    Not on file   Social History Narrative    Not on file         CBC: No results for input(s): WBC, RBC, HGB, HCT, PLT, MCV, MCH, MCHC in the last 72 hours.    CMP:   Recent Labs     03/05/20  1222   K 4.0       INR  No results for input(s): PT, INR, PROTIME, APTT in the last 72 hours.            2D Echo:  Results for orders placed or performed during the hospital encounter of 06/12/17   2D echo with color flow doppler   Result Value Ref Range    QEF 70 55 - 65    Diastolic Dysfunction Yes (A)     Est. PA Systolic Pressure 17.64     Pericardial Effusion NONE     Mitral Valve Mobility NORMAL     Tricuspid Valve Regurgitation TRIVIAL          Anesthesia Evaluation    I have reviewed the Patient Summary Reports.    I have reviewed the Nursing Notes.   I have reviewed the Medications.     Review of Systems  Anesthesia Hx:  No problems with previous Anesthesia    Hematology/Oncology:  Hematology  Normal   Oncology Normal     EENT/Dental:EENT/Dental Normal   Cardiovascular:   Hypertension CHF    Pulmonary:   Sleep Apnea, CPAP    Renal/:   Chronic Renal Disease, ESRD, Dialysis    Hepatic/GI:  Hepatic/GI Normal    Musculoskeletal:  Musculoskeletal Normal    Neurological:   TIA,    Endocrine:   Diabetes Hypothyroidism    Dermatological:  Skin Normal    Psych:   Psychiatric History          Physical Exam  General:  Well nourished    Airway/Jaw/Neck:  Airway Findings: Mouth Opening: Normal Tongue: Normal  General Airway Assessment: Adult  Mallampati: II  Jaw/Neck Findings:  Neck ROM: Normal ROM     Eyes/Ears/Nose:  Eyes/Ears/Nose Findings:    Dental:  Dental Findings: In tact   Chest/Lungs:  Chest/Lungs Findings: Clear to auscultation, Normal Respiratory Rate     Heart/Vascular:  Heart Findings: Rate: Normal  Rhythm: Regular Rhythm  Sounds: Normal  Heart Murmur  Vascular Findings:  Dialysis Access: Left Upper Arm Graft        Mental Status:  Mental Status Findings:  Cooperative, Alert and Oriented         Anesthesia Plan  Type of Anesthesia, risks & benefits discussed:  Anesthesia Type:  general, MAC  Patient's Preference: General/MAC  Intra-op Monitoring Plan: standard ASA monitors  Intra-op Monitoring Plan Comments:   Post Op Pain Control Plan:   Post Op Pain Control Plan Comments: IV meds as needed  Induction:   IV  Beta Blocker:  Patient is not currently on a Beta-Blocker (No further documentation required).       Informed Consent: Patient understands risks and agrees with Anesthesia plan.  Questions answered. Anesthesia consent signed with patient.  ASA Score: 4     Day of Surgery Review of History & Physical:    H&P update referred to the surgeon.     Anesthesia Plan Notes: Discussed anesthetic options, pt understands and agrees with plan        Ready For Surgery From Anesthesia Perspective.

## 2020-03-06 NOTE — H&P
H&P completed on 3/2/20 has been reviewed, the patient has been examined and:  I concur with the findings and changes have been noted since the H&P was written: patient schedule for fistulogram today to evaulated  left upper extremity AV fistula with possible intervention     Active Hospital Problems    Diagnosis  POA    Arteriovenous graft stenosis, subsequent encounter [U81.218X]  Not Applicable      Resolved Hospital Problems   No resolved problems to display.     FALGUNI Benavides MD   General Surgery- PGYIII  [phone]      [facility]   03/02/2020   HPI:   Erica Leblanc is a 75 y.o. female with       Patient Active Problem List   Diagnosis    Severe obesity (BMI 35.0-39.9) with comorbidity    Sickle cell trait    Osteopenia    Hyperlipidemia LDL goal <100    HUMBERTO on CPAP    Hypothyroidism (acquired)    Hx-TIA (transient ischemic attack)    Vitamin D deficiency disease    Pulmonary hypertension    Type 2 diabetes mellitus with ESRD (end-stage renal disease)    Secondary renal hyperparathyroidism    Incomplete bladder emptying    Postmenopausal atrophic vaginitis    Rectocele    Mixed incontinence urge and stress    Chronic diastolic heart failure    Tortuous aorta    History of TIAs    Essential hypertension    ESRD on hemodialysis    Anemia of chronic disease    Depression    Debility    Chronic respiratory failure    Type 2 diabetes mellitus with foot ulcer, with long-term current use of insulin    Stable proliferative diabetic retinopathy associated with type 2 diabetes mellitus    Mild major depression    Heart failure with preserved ejection fraction    Preop cardiovascular exam    End stage renal disease    Pseudophakia   being managed by PCP and specialists who is here today for evaluation of long term HD access. S/p R IJ tunneled HD catheter, HD without issues. Pt is R handed. No complaints today. Does endorse orthopnea, no chest pain. Unable to climb 1  flight of stairs on own.   no MI   no Stroke   Tobacco use: denies   Interval history: No new issues.        Past Medical History:   Diagnosis Date    Acute respiratory failure with hypoxia     Anemia of chronic kidney failure, stage 4 (severe) 4/5/2019    Cataracts, bilateral     CHF (congestive heart failure)     CKD (chronic kidney disease) stage 3, GFR 30-59 ml/min     CKD (chronic kidney disease) stage 3, GFR 30-59 ml/min     Controlled type 2 diabetes mellitus with proteinuria or albuminuria     Depression     Diabetes with neurologic complications     Diabetic retinopathy of both eyes     Edema     Glaucoma     History of colonic polyps     Hx-TIA (transient ischemic attack) 11/2008    Hyperlipidemia LDL goal < 100     Hypertension     Hypothyroidism     Major depressive disorder, single episode, mild 2/17/2016    Mixed incontinence urge and stress     Obesity     Obstructive sleep apnea on CPAP     7/19/19: Home CPAP machine broken, per patient & son    Osteopenia     Proteinuria     Sickle cell trait     Strabismus     TIA (transient ischemic attack)     Trouble in sleeping     Type 2 diabetes mellitus with ophthalmic manifestations     Type 2 diabetes with stage 3 chronic kidney disease GFR 30-59     Type II or unspecified type diabetes mellitus with renal manifestations, uncontrolled(250.42)     Uncontrolled type 2 diabetes mellitus with peripheral circulatory disorder 4/5/2019    Urge incontinence 1/11/2016    Urge incontinence     Venous stasis ulcer     bilateral lower legs    Vitamin D deficiency disease            Past Surgical History:   Procedure Laterality Date    AV FISTULA PLACEMENT Left 11/27/2019    Procedure: CREATION, AV FISTULA, LEFT UPPER EXTREMITY; Surgeon: Keron Perez MD; Location: [facility]; Service: Vascular; Laterality: Left;    BREAST BIOPSY      breast reduction Bilateral age 31    BREAST SURGERY      CATARACT EXTRACTION Bilateral      cataracts Bilateral      SECTION, LOW TRANSVERSE      x1    CHOLECYSTECTOMY      EYE SURGERY  2014, 2014    vitrectomy    EYE SURGERY Right 2016    HYSTERECTOMY  1986    TAHBSO (patient is unsure if ovaries removed)    OOPHORECTOMY      REFRACTIVE SURGERY      TOTAL REDUCTION MAMMOPLASTY      approx 10 yrs ago           Family History   Problem Relation Age of Onset    Leukemia Father     Cataracts Father     Ovarian cancer Sister 35    Stroke Mother     Diabetes Mother     Hypertension Mother     Cataracts Mother     Diabetes Paternal Grandmother     Cataracts Paternal Grandmother     Breast cancer Maternal Aunt 65    No Known Problems Paternal Grandfather     Cataracts Maternal Grandmother     Cataracts Maternal Grandfather     HIV Brother     Achondroplasia Sister     Parkinsonism Maternal Aunt     Esophageal cancer Maternal Uncle     smoker    No Known Problems Paternal Aunt     Cataracts Paternal Uncle     Amblyopia Neg Hx     Blindness Neg Hx     Glaucoma Neg Hx     Macular degeneration Neg Hx     Retinal detachment Neg Hx     Strabismus Neg Hx     Thyroid disease Neg Hx     Colon cancer Neg Hx     Cancer Neg Hx      Social History                                                                                                                                                                                                                                                                                      Current Outpatient Medications:    amLODIPine (NORVASC) 10 MG tablet, Take 1 tablet (10 mg total) by mouth once daily., Disp: 90 tablet, Rfl: 3    atorvastatin (LIPITOR) 10 MG tablet, TAKE 1 TABLET EVERY DAY, Disp: 90 tablet, Rfl: 1    bumetanide (BUMEX) 2 MG tablet, Take 1 tablet (2 mg total) by mouth once daily., Disp: 30 tablet, Rfl: 11    citalopram (CELEXA) 10 MG tablet, TAKE 1 TABLET EVERY DAY, Disp: 90 tablet, Rfl: 1    clopidogreL (PLAVIX) 75 mg  tablet, TAKE 1 TABLET EVERY DAY, Disp: 90 tablet, Rfl: 1    docusate sodium (COLACE) 100 MG capsule, Take 200 mg by mouth once daily. , Disp: , Rfl:    hydrALAZINE (APRESOLINE) 100 MG tablet, TAKE 1 TABLET BY MOUTH THREE TIMES A DAY, Disp: 270 tablet, Rfl: 1    LANCETS MISC, , Disp: , Rfl:    levothyroxine (SYNTHROID) 50 MCG tablet, Take 50 mcg by mouth once daily., Disp: , Rfl:    metoprolol tartrate (LOPRESSOR) 50 MG tablet, Take 1 tablet (50 mg total) by mouth 2 (two) times daily., Disp: 100 tablet, Rfl: 3    MULTIVITAMIN WITH MINERALS (HAIR,SKIN AND NAILS ORAL), Take 3 tablets by mouth once daily., Disp: , Rfl:    oxybutynin (DITROPAN XL) 15 MG TR24, TAKE 1 TABLET EVERY DAY, Disp: 90 tablet, Rfl: 3    polyethylene glycol (GLYCOLAX) 17 gram PwPk, Take 17 g by mouth once daily., Disp: , Rfl: 0    insulin detemir U-100 (LEVEMIR FLEXTOUCH) 100 unit/mL (3 mL) SubQ InPn pen, Inject 5 Units into the skin once daily., Disp: 1.5 mL, Rfl: 1   REVIEW OF SYSTEMS:   General: No fevers or chills; ENT: No sore throat; Allergy and Immunology: no persistent infections; Hematological and Lymphatic: No history of bleeding or easy bruising; Endocrine: negative; Respiratory: no cough, shortness of breath, or wheezing; Cardiovascular: no chest pain or dyspnea on exertion; Gastrointestinal: no abdominal pain/back, change in bowel habits, or bloody stools; Genito-Urinary: no dysuria, trouble voiding, or hematuria; Musculoskeletal: negative; Neurological: no TIA or stroke symptoms; Psychiatric: no nervousness, anxiety or depression.   PHYSICAL EXAM:     Pulse: 73     General appearance: Alert, well-appearing, and in no distress. Oriented to person, place, and time   Neurological: Normal speech, no focal findings noted; CN II - XII grossly intact. All extremities with sensation to light touch.   Musculoskeletal: Digits/nail without cyanosis/clubbing. Strength 5/5 all extremities.   Neck: Supple, no significant adenopathy, no  carotid bruit can be auscultated   Chest: Clear to auscultation, no wheezes, rales or rhonchi, symmetric air entry. No use of accessory muscles   Cardiac: Normal rate and regular rhythm, S1 and S2 normal   Abdomen: Soft, nontender, nondistended, no masses or organomegaly, no hernia   No rebound tenderness noted; bowel sounds normal   Pulsatile aortic mass is non palpable.   No groin adenopathy   Extremities: 2+ radial pulse bilaterally, LUE AVF +thrill   No BUE edema, Negative Manuel's test BUE   Skin: No tissue loss   LAB RESULTS:   No results found for: CBC         Lab Results   Component Value Date    LABPROT 10.9 11/20/2019    INR 1.0 11/20/2019           Lab Results   Component Value Date     11/27/2019    K 3.8 11/27/2019    CL 99 11/27/2019    CO2 27 11/27/2019     (H) 11/27/2019    BUN 24 (H) 11/27/2019    CREATININE 2.8 (H) 11/27/2019    CALCIUM 9.1 11/27/2019    ANIONGAP 11 11/27/2019    EGFRNONAA 16 (A) 11/27/2019           Lab Results   Component Value Date    WBC 4.42 11/20/2019    RBC 4.37 11/20/2019    HGB 11.0 (L) 11/20/2019    HCT 35.3 (L) 11/20/2019    MCV 81 (L) 11/20/2019    MCH 25.2 (L) 11/20/2019    MCHC 31.2 (L) 11/20/2019    RDW 16.9 (H) 11/20/2019     11/20/2019    MPV 9.3 11/20/2019    GRAN 2.8 11/20/2019    GRAN 62.6 11/20/2019    LYMPH 0.8 (L) 11/20/2019    LYMPH 18.1 11/20/2019    MONO 0.7 11/20/2019    MONO 16.3 (H) 11/20/2019    EOS 0.1 11/20/2019    BASO 0.02 11/20/2019    EOSINOPHIL 2.3 11/20/2019    BASOPHIL 0.5 11/20/2019    DIFFMETHOD Automated 11/20/2019     .         Lab Results   Component Value Date    HGBA1C 6.9 (H) 11/20/2019     IMAGING:   All pertinent imaging has been reviewed and interpreted independently.   Vein mapping 9/2019: Adequate R superior basilic vein and axillary vein ; Adequate L basilic vein superior and inferior, adequate L axillary   1/27/20 Left upper extremity dialysis ultrasound shows no hemodynamically significant stenosis. Flow is  1083 ml/min. Depth and diameter are appropriate.  IMP/PLAN:   75 y.o. female with       Patient Active Problem List   Diagnosis    Severe obesity (BMI 35.0-39.9) with comorbidity    Sickle cell trait    Osteopenia    Hyperlipidemia LDL goal <100    HUMBERTO on CPAP    Hypothyroidism (acquired)    Hx-TIA (transient ischemic attack)    Vitamin D deficiency disease    Pulmonary hypertension    Type 2 diabetes mellitus with ESRD (end-stage renal disease)    Secondary renal hyperparathyroidism    Incomplete bladder emptying    Postmenopausal atrophic vaginitis    Rectocele    Mixed incontinence urge and stress    Chronic diastolic heart failure    Tortuous aorta    History of TIAs    Essential hypertension    ESRD on hemodialysis    Anemia of chronic disease    Depression    Debility    Chronic respiratory failure    Type 2 diabetes mellitus with foot ulcer, with long-term current use of insulin    Stable proliferative diabetic retinopathy associated with type 2 diabetes mellitus    Mild major depression    Heart failure with preserved ejection fraction    Preop cardiovascular exam    End stage renal disease    Pseudophakia   being managed by PCP and specialists who is here today for evaluation of long term HD access s/p L RC AV fistula .   -s/p L RC AV fistula with proximal fistula measuring 5 mm 1/13/20, adequate flow   -RTC 3 weeks   -Cont renal diet   I spent 20 minutes evaluating this patient and greater than 50% of the time was spent counseling, coordinator care and discussing the plan of care. All questions were answered and patient stated understanding with agreement with the above treatment plan.

## 2020-03-07 NOTE — PLAN OF CARE
Pt and daughter received dc instructions and script for pain. Note for dialysis also given to daughter. Pt with no c/o pain. No nausea. Left artery stick without bleed, no hematoma.

## 2020-03-07 NOTE — BRIEF OP NOTE
[facility]  Brief Operative Note    Surgery Date: 3/6/2020     Surgeon(s) and Role:     * Keron Perez MD - Primary     * Fco Trujillo MD - Assisting    Pre-op Diagnosis:  Arteriovenous fistula stenosis, initial encounter [T82.858A]    Post-op Diagnosis:  Post-Op Diagnosis Codes:     * Arteriovenous fistula stenosis, initial encounter [T82.858A]    Procedure(s) (LRB):  Fistulogram, left upper extremity, with branch ligation (Left)    Anesthesia: Local MAC    Description of the findings of the procedure(s): adequate artery for access.  Improved flow in fistula after side branch ligation x 2    Estimated Blood Loss: <5 cc         Specimens:   Specimen (12h ago, onward)    None            Discharge Note    OUTCOME: Patient tolerated treatment/procedure well without complication and is now ready for discharge.    DISPOSITION: Home or Self Care    FINAL DIAGNOSIS:  <principal problem not specified>    FOLLOWUP: In clinic    DISCHARGE INSTRUCTIONS:  No discharge procedures on file.

## 2020-03-07 NOTE — OP NOTE
Date: 3/6/2020     Surgeon(s) and Role:   Keron Perez MD    Assistant: Fco Trujillo MD    Pre-op Diagnosis:   1. T82.858A: Stenosis of vascular prosthetic devices, implants and grafts, initial encounter  2. Final diagnoses:  [T82.858D] Arteriovenous graft stenosis, subsequent encounter    Post-op Diagnosis: Same     Procedure(s):   1. US guided L radial artery access  2. LUE fistulogram  3. Central venogram  4. Ligation of medial side branch cephalic vein   5. Ligation of lateral side branch cephalic vein    Anesthesia: Local MAC     Findings/Key Components:   Improved flow after side branch ligation  Strong palpable thrill     EBL: Minimal    PROCEDURE IN DETAIL:After an informed consent was obtained the patient was taken to the interventional suite and placed in the supine position.  The patient was brought to the IR suite, placed in supine. Arm was prepped and draped in the standard surgical fashion. Under ultrasound guidance, the proximal aspect of the proximal L radial artery was accessed with a micropuncture needle; ultrasound confirmed vessel patency, followed by placement of 4/3-[place] micropuncture dilator. Through this, an 0.035-inch wire was placed in the short 6-[place] sheath.   A fistulogram and central venogram was performed.  After independent review and interpretation, based upon the fistulogram results, I decided to intervene. Side branches were marked by ultrasound at the proximal aspect of the fistula.  Local anesthetic with 1% lidocaine was used to anesthetize the skin. A scalpel used to incise overlying the side branches and fistula.  Incision was deepened with electrocautery and Metzenbaum scissors.  The side branches were identified and ligated with 2 0 silk ties..  Strong thrill could be felt.  A repeat fistulogram was performed showing improved flow in the fistula.  The sheath was removed, a vascular band was placed with good hemostasis.

## 2020-03-08 NOTE — PROGRESS NOTES
Date: 3/2/20     PROCEDURE:  Removal of right internal jugular vein tunneled, cuffed, catheter.     PREOPERATIVE DIAGNOSIS:  End-stage renal disease.     POSTOPERATIVE DIAGNOSIS:  End-stage renal disease.     SURGEON: Keron Perez M.D.     ASSISTANT: none.     ANESTHESIA:  5 mL of 1% plain lidocaine.     ESTIMATED BLOOD LOSS:  Less than 5 mL.     COMPLICATIONS:  None.     SPECIMEN:  None.     DISPOSITION:  Return home.     HISTORY: 75 year old black female with end-stage renal disease. The patient's access is functioning well, and comes in today for removal of the catheter.  The risks and benefits were explained, and the patient agreed to proceed with the minor procedure.     DESCRIPTION OF PROCEDURE:  After prepping and draping the catheter in the standard fashion, the area around the insertion site on the right chest wall was infiltrated with 1% lidocaine. Blunt dissection was then used to separate the cuff from the surrounding soft tissues.  Gentle pressure was then used to remove the catheter in its entirety.  Pressure was held over the insertion site at the jugular vein as well as on the chest wall. Sterile dressing was applied.  Hemostasis was achieved.  The patient was returned home in good stable condition.

## 2020-03-09 LAB — POCT GLUCOSE: 122 MG/DL (ref 70–110)

## 2020-03-16 NOTE — ANESTHESIA POSTPROCEDURE EVALUATION
Anesthesia Post Evaluation    Patient: Erica Leblanc    Procedure(s) Performed: Procedure(s) (LRB):  Fistulogram, left upper extremity, with branch ligation (Left)    Final Anesthesia Type: general    Patient location during evaluation: PACU  Patient participation: Yes- Able to Participate  Level of consciousness: awake and alert  Post-procedure vital signs: reviewed and stable  Pain management: adequate  Airway patency: patent    PONV status at discharge: No PONV  Anesthetic complications: no      Cardiovascular status: blood pressure returned to baseline  Respiratory status: unassisted, room air and spontaneous ventilation  Hydration status: euvolemic  Follow-up not needed.          Vitals Value Taken Time   BP ** 3/16/2020  1:24 PM   Temp * 3/16/2020  1:24 PM   Pulse ** 3/16/2020  1:24 PM   Resp ** 3/16/2020  1:24 PM   SpO2 ** 3/16/2020  1:24 PM         No case tracking events are documented in the log.      Pain/Lyndsey Score: No data recorded

## 2020-03-20 ENCOUNTER — TELEPHONE (OUTPATIENT)
Dept: VASCULAR SURGERY | Facility: CLINIC | Age: 75
End: 2020-03-20

## 2020-03-23 ENCOUNTER — HOSPITAL ENCOUNTER (OUTPATIENT)
Dept: CARDIOLOGY | Facility: HOSPITAL | Age: 75
Discharge: HOME | End: 2020-03-23
Attending: SURGERY
Payer: MEDICARE

## 2020-03-23 ENCOUNTER — OFFICE VISIT (OUTPATIENT)
Dept: VASCULAR SURGERY | Facility: CLINIC | Age: 75
End: 2020-03-23
Attending: OPHTHALMOLOGY
Payer: MEDICARE

## 2020-03-23 VITALS
WEIGHT: 207.25 LBS | SYSTOLIC BLOOD PRESSURE: 110 MMHG | DIASTOLIC BLOOD PRESSURE: 68 MMHG | BODY MASS INDEX: 34.53 KG/M2 | HEIGHT: 65 IN

## 2020-03-23 DIAGNOSIS — N18.6 ESRD (END STAGE RENAL DISEASE) ON DIALYSIS: ICD-10-CM

## 2020-03-23 DIAGNOSIS — Z86.79 S/P ARTERIOVENOUS (AV) FISTULA REPAIR: Primary | ICD-10-CM

## 2020-03-23 DIAGNOSIS — Z98.890 S/P ARTERIOVENOUS (AV) FISTULA REPAIR: Primary | ICD-10-CM

## 2020-03-23 DIAGNOSIS — Z99.2 ESRD (END STAGE RENAL DISEASE) ON DIALYSIS: ICD-10-CM

## 2020-03-23 LAB
HD ANASTAMOSIS LOCATION: NORMAL
HD DISTAL FISTULA LOCATION: NORMAL
HD FISTULA OUTFLOW VEIN LOCATION: NORMAL
HD FISTULA OUTFLOW VEIN VESSEL: NORMAL
HD INFLOW ARTERY VESSEL: NORMAL
HD PROXIMAL FISTULA LOCATION: NORMAL
RIGHT DIS GRAFT PSV: 65 CM/ SEC
RIGHT INFLOW PSV: 151 CM/S
RIGHT MID GRAFT DIA: 0.8 CM
RIGHT MID GRAFT PSV: 127 CM/S
RIGHT OUTFLOW VEIN PSV: 55 CM/ SEC
RIGHT PROX ANA PSV: 560 CM/S
RIGHT PROX GRAFT PSV: 413 CM/S
RIGHT VOLUME FLOW PSV: 955 ML/MIN

## 2020-03-23 PROCEDURE — 99024 POSTOP FOLLOW-UP VISIT: CPT | Mod: HCNC,S$GLB,, | Performed by: SURGERY

## 2020-03-23 PROCEDURE — 93990 CV US HEMODIALYSIS ACCESS (CUPID ONLY): ICD-10-PCS | Mod: 26,HCNC,, | Performed by: SURGERY

## 2020-03-23 PROCEDURE — 93990 DOPPLER FLOW TESTING: CPT | Mod: TC,HCNC

## 2020-03-23 PROCEDURE — 93990 DOPPLER FLOW TESTING: CPT | Mod: 26,HCNC,, | Performed by: SURGERY

## 2020-03-23 PROCEDURE — 99024 PR POST-OP FOLLOW-UP VISIT: ICD-10-PCS | Mod: HCNC,S$GLB,, | Performed by: SURGERY

## 2020-03-23 PROCEDURE — 99499 RISK ADDL DX/OHS AUDIT: ICD-10-PCS | Mod: S$GLB,,, | Performed by: SURGERY

## 2020-03-23 PROCEDURE — 99999 PR PBB SHADOW E&M-EST. PATIENT-LVL III: CPT | Mod: PBBFAC,HCNC,, | Performed by: SURGERY

## 2020-03-23 PROCEDURE — 99499 UNLISTED E&M SERVICE: CPT | Mod: S$GLB,,, | Performed by: SURGERY

## 2020-03-23 PROCEDURE — 99999 PR PBB SHADOW E&M-EST. PATIENT-LVL III: ICD-10-PCS | Mod: PBBFAC,HCNC,, | Performed by: SURGERY

## 2020-03-23 NOTE — PATIENT INSTRUCTIONS
Caring for Your Hemodialysis Access  A problem such as an infection or a blood clot may make the access unusable. Typically, this happens more often with an arteriovenous graft than with an arteriovenous fistula. If this happens, youll need a new access. To help your access last, you will need to follow certain guidelines.  Watching for problems  Call your healthcare provider right away if you:  · Cant feel the blood flowing in the access (this sensation is called a thrill)  · Have pain or numbness in your hand or arm  · Have bleeding, redness, bluish discoloration, or warmth around your access  · Notice your access suddenly bulging out more than usual (a slight bulge is normal)  · Have a fever of 100.4°F (38°C) or higher, or as directed by your healthcare provider   Follow these and any other guidelines youre given  · Dont wear tight clothes or jewelry around your access.  · Dont let anyone take your blood pressure on or draw blood from the arm with the access. Also, dont let anyone put IV lines into it.  · Protect your access from being hit or cut.  · Wash your hands often and keep the area around the access clean.  · Do not carry anything heavy or do anything that would put pressure on the access.  Feeling for your thrill    If you put your fingers over your access, you should feel the blood rushing through it. This is called a thrill, and it feels like a vibration. Feel for the thrill as often as you're told, usually once or twice a day. If you can't feel it, tell your healthcare provider right away. Blood may not be flowing through your access the way it should.  Important numbers  Write the names and numbers of your healthcare providers below. That way you will know how to get in touch with them.  Doctor:  Name ___________________ Phone ___________________  Surgeon:  Name ___________________ Phone ___________________  Dialysis Center:  Name ___________________ Phone ___________________   Date Last  Reviewed: 1/1/2017  © 9522-3483 [employer] [address] [place] [address], [place] [address]. All rights reserved. This information is not intended as a substitute for professional medical care. Always follow your healthcare professional's instructions.

## 2020-03-23 NOTE — LETTER
March 23, 2020        Sendy Elaine MD  [address] [facility]  [place] [address]             [facility]  [address]  [facility] [place] [address]  Phone: [phone]  Fax: [phone]   Patient: Erica Leblanc   MR Number: [id number]   YOB: 1945   Date of Visit: 3/23/2020       Dear Dr. Elaine:    Thank you for referring Erica Leblanc to me for evaluation. Below are the relevant portions of my assessment and plan of care.            If you have questions, please do not hesitate to call me. I look forward to following Erica along with you.    Sincerely,      Keron Perez MD           CC  No Recipients

## 2020-03-23 NOTE — PROGRESS NOTES
Keron Perez MD VI                       [facility]                         03/23/2020    HPI:  Erica Leblanc is a 75 y.o. female with   Patient Active Problem List   Diagnosis    Severe obesity (BMI 35.0-39.9) with comorbidity    Sickle cell trait    Osteopenia    Hyperlipidemia LDL goal <100    HUMBERTO on CPAP    Hypothyroidism (acquired)    Hx-TIA (transient ischemic attack)    Vitamin D deficiency disease    Pulmonary hypertension    Type 2 diabetes mellitus with ESRD (end-stage renal disease)    Secondary renal hyperparathyroidism    Incomplete bladder emptying    Postmenopausal atrophic vaginitis    Rectocele    Mixed incontinence urge and stress    Chronic diastolic heart failure    Tortuous aorta    History of TIAs    Essential hypertension    ESRD on hemodialysis    Anemia of chronic disease    Depression    Debility    Chronic respiratory failure    Type 2 diabetes mellitus with foot ulcer, with long-term current use of insulin    Stable proliferative diabetic retinopathy associated with type 2 diabetes mellitus    Mild major depression    Heart failure with preserved ejection fraction    Preop cardiovascular exam    End stage renal disease    Pseudophakia    being managed by PCP and specialists who is here today for evaluation of long term HD access.  S/p R IJ tunneled HD catheter, HD without issues.  Pt is R handed.  No complaints today.  Does endorse orthopnea, no chest pain.  Unable to climb 1 flight of stairs on own.    no MI  no Stroke  Tobacco use: denies    1/20/20: No new issues.    3/2/20:  Dialysis without issues.    Interval history:  No issues with HD.  S/p side branch ligation 3/6/20 and fistulogram with good flow.    Past Medical History:   Diagnosis Date    Acute respiratory failure with hypoxia     Anemia of chronic kidney failure, stage 4 (severe) 4/5/2019    Cataracts, bilateral     CHF (congestive heart failure)      CKD (chronic kidney disease) stage 3, GFR 30-59 ml/min     CKD (chronic kidney disease) stage 3, GFR 30-59 ml/min     Controlled type 2 diabetes mellitus with proteinuria or albuminuria     Depression     Diabetes with neurologic complications     Diabetic retinopathy of both eyes     Edema     Glaucoma     History of colonic polyps     Hx-TIA (transient ischemic attack) 2008    Hyperlipidemia LDL goal < 100     Hypertension     Hypothyroidism     Major depressive disorder, single episode, mild 2016    Mixed incontinence urge and stress     Obesity     Obstructive sleep apnea on CPAP     19:  Home CPAP machine broken, per patient & son    Osteopenia     Proteinuria     Sickle cell trait     Strabismus     TIA (transient ischemic attack)     Trouble in sleeping     Type 2 diabetes mellitus with ophthalmic manifestations     Type 2 diabetes with stage 3 chronic kidney disease GFR 30-59     Type II or unspecified type diabetes mellitus with renal manifestations, uncontrolled(250.42)     Uncontrolled type 2 diabetes mellitus with peripheral circulatory disorder 2019    Urge incontinence 2016    Urge incontinence     Venous stasis ulcer     bilateral lower legs    Vitamin D deficiency disease      Past Surgical History:   Procedure Laterality Date    AV FISTULA PLACEMENT Left 2019    Procedure: CREATION, AV FISTULA, LEFT UPPER EXTREMITY;  Surgeon: Keron Perez MD;  Location: [facility] OR;  Service: Vascular;  Laterality: Left;    BREAST BIOPSY      breast reduction Bilateral age 31    BREAST SURGERY      CATARACT EXTRACTION Bilateral     cataracts Bilateral      SECTION, LOW TRANSVERSE      x1    CHOLECYSTECTOMY      EYE SURGERY  2014, 2014    vitrectomy    EYE SURGERY Right 2016    FISTULOGRAM Left 3/6/2020    Procedure: Fistulogram, left upper extremity, with branch ligation;  Surgeon: Keron Perez MD;  Location: [facility] OR  2ND FLR;  Service: Vascular;  Laterality: Left;  Time 1.1 Minute  42.10 mGy    HYSTERECTOMY  1986    TAHBSO (patient is unsure if ovaries removed)    OOPHORECTOMY      REFRACTIVE SURGERY      TOTAL REDUCTION MAMMOPLASTY      approx 10 yrs ago     Family History   Problem Relation Age of Onset    Leukemia Father     Cataracts Father     Ovarian cancer Sister 35    Stroke Mother     Diabetes Mother     Hypertension Mother     Cataracts Mother     Diabetes Paternal Grandmother     Cataracts Paternal Grandmother     Breast cancer Maternal Aunt 65    No Known Problems Paternal Grandfather     Cataracts Maternal Grandmother     Cataracts Maternal Grandfather     HIV Brother     Achondroplasia Sister     Parkinsonism Maternal Aunt     Esophageal cancer Maternal Uncle         smoker    No Known Problems Paternal Aunt     Cataracts Paternal Uncle     Amblyopia Neg Hx     Blindness Neg Hx     Glaucoma Neg Hx     Macular degeneration Neg Hx     Retinal detachment Neg Hx     Strabismus Neg Hx     Thyroid disease Neg Hx     Colon cancer Neg Hx     Cancer Neg Hx      Social History     Socioeconomic History    Marital status: Single     Spouse name: Not on file    Number of children: 5    Years of education: Not on file    Highest education level: Not on file   Occupational History    Occupation:      Employer: [employer]     Comment: part-time   Social Needs    Financial resource strain: Not on file    Food insecurity:     Worry: Not on file     Inability: Not on file    Transportation needs:     Medical: Not on file     Non-medical: Not on file   Tobacco Use    Smoking status: Never Smoker    Smokeless tobacco: Never Used   Substance and Sexual Activity    Alcohol use: No     Alcohol/week: 0.0 standard drinks    Drug use: No    Sexual activity: Not Currently     Partners: Male     Birth control/protection: Post-menopausal, Surgical   Lifestyle    Physical  activity:     Days per week: Not on file     Minutes per session: Not on file    Stress: Not on file   Relationships    Social connections:     Talks on phone: Not on file     Gets together: Not on file     Attends [employer] service: Not on file     Active member of club or organization: Not on file     Attends meetings of clubs or organizations: Not on file     Relationship status: Not on file   Other Topics Concern    Not on file   Social History Narrative    Not on file       Current Outpatient Medications:     amLODIPine (NORVASC) 10 MG tablet, Take 1 tablet (10 mg total) by mouth once daily., Disp: 90 tablet, Rfl: 3    atorvastatin (LIPITOR) 10 MG tablet, TAKE 1 TABLET EVERY DAY, Disp: 90 tablet, Rfl: 1    bumetanide (BUMEX) 2 MG tablet, Take 1 tablet (2 mg total) by mouth once daily., Disp: 30 tablet, Rfl: 11    citalopram (CELEXA) 10 MG tablet, TAKE 1 TABLET EVERY DAY, Disp: 90 tablet, Rfl: 1    clopidogreL (PLAVIX) 75 mg tablet, TAKE 1 TABLET EVERY DAY, Disp: 90 tablet, Rfl: 1    hydrALAZINE (APRESOLINE) 100 MG tablet, TAKE 1 TABLET BY MOUTH THREE TIMES A DAY, Disp: 270 tablet, Rfl: 1    levothyroxine (SYNTHROID) 50 MCG tablet, Take 50 mcg by mouth once daily., Disp: , Rfl:     metoprolol tartrate (LOPRESSOR) 50 MG tablet, Take 1 tablet (50 mg total) by mouth 2 (two) times daily., Disp: 100 tablet, Rfl: 3    MULTIVITAMIN WITH MINERALS (HAIR,SKIN AND NAILS ORAL), Take 3 tablets by mouth once daily., Disp: , Rfl:     oxybutynin (DITROPAN XL) 15 MG TR24, TAKE 1 TABLET EVERY DAY, Disp: 90 tablet, Rfl: 3    docusate sodium (COLACE) 100 MG capsule, Take 200 mg by mouth once daily. , Disp: , Rfl:     HYDROcodone-acetaminophen (NORCO) 5-325 mg per tablet, Take 1 tablet by mouth every 6 (six) hours as needed for Pain. (Patient not taking: Reported on 3/23/2020), Disp: 10 tablet, Rfl: 0    insulin detemir U-100 (LEVEMIR FLEXTOUCH) 100 unit/mL (3 mL) SubQ InPn pen, Inject 5 Units into the skin once  daily., Disp: 1.5 mL, Rfl: 1    LANCETS MISC, , Disp: , Rfl:     polyethylene glycol (GLYCOLAX) 17 gram PwPk, Take 17 g by mouth once daily. (Patient not taking: Reported on 3/23/2020), Disp: , Rfl: 0    REVIEW OF SYSTEMS:  General: No fevers or chills; ENT: No sore throat; Allergy and Immunology: no persistent infections; Hematological and Lymphatic: No history of bleeding or easy bruising; Endocrine: negative; Respiratory: no cough, shortness of breath, or wheezing; Cardiovascular: no chest pain or dyspnea on exertion; Gastrointestinal: no abdominal pain/back, change in bowel habits, or bloody stools; Genito-Urinary: no dysuria, trouble voiding, or hematuria; Musculoskeletal: negative; Neurological: no TIA or stroke symptoms; Psychiatric: no nervousness, anxiety or depression.    PHYSICAL EXAM:                General appearance:  Alert, well-appearing, and in no distress.  Oriented to person, place, and time                    Neurological: Normal speech, no focal findings noted; CN II - XII grossly intact. All extremities with sensation to light touch.            Musculoskeletal: Digits/nail without cyanosis/clubbing.  Strength 5/5 all extremities.                    Neck: Supple, no significant adenopathy, no carotid bruit can be auscultated                  Chest:  Clear to auscultation, no wheezes, rales or rhonchi, symmetric air entry. No use of accessory muscles               Cardiac: Normal rate and regular rhythm, S1 and S2 normal            Abdomen: Soft, nontender, nondistended, no masses or organomegaly, no hernia     No rebound tenderness noted; bowel sounds normal     Pulsatile aortic mass is non palpable.     No groin adenopathy      Extremities:      2+ radial pulse bilaterally, LUE AVF +thrill, inc c/d/i +sutures in place without infection     No BUE edema, Negative Manuel's test BUE    Skin: No tissue loss    LAB RESULTS:  No results found for: CBC  Lab Results   Component Value Date    LABPROT  10.9 11/20/2019    INR 1.0 11/20/2019     Lab Results   Component Value Date     03/06/2020    K 4.7 03/06/2020     03/06/2020    CO2 27 03/06/2020     (H) 03/06/2020    BUN 95 (H) 03/06/2020    CREATININE 6.8 (H) 03/06/2020    CALCIUM 9.4 03/06/2020    ANIONGAP 11 03/06/2020    EGFRNONAA 5.5 (A) 03/06/2020     Lab Results   Component Value Date    WBC 4.42 11/20/2019    RBC 4.37 11/20/2019    HGB 11.0 (L) 11/20/2019    HCT 35.3 (L) 11/20/2019    MCV 81 (L) 11/20/2019    MCH 25.2 (L) 11/20/2019    MCHC 31.2 (L) 11/20/2019    RDW 16.9 (H) 11/20/2019     11/20/2019    MPV 9.3 11/20/2019    GRAN 2.8 11/20/2019    GRAN 62.6 11/20/2019    LYMPH 0.8 (L) 11/20/2019    LYMPH 18.1 11/20/2019    MONO 0.7 11/20/2019    MONO 16.3 (H) 11/20/2019    EOS 0.1 11/20/2019    BASO 0.02 11/20/2019    EOSINOPHIL 2.3 11/20/2019    BASOPHIL 0.5 11/20/2019    DIFFMETHOD Automated 11/20/2019     .  Lab Results   Component Value Date    HGBA1C 6.9 (H) 11/20/2019       IMAGING:  All pertinent imaging has been reviewed and interpreted independently.    Vein mapping 9/2019:  Adequate R superior basilic vein and axillary vein ; Adequate L basilic vein superior and inferior, adequate L axillary     1/27/20 Left upper extremity dialysis ultrasound shows no hemodynamically significant stenosis.  Flow is 1083 ml/min.  Depth and diameter are appropriate.    3/23/20 HD US:  Left upper extremity dialysis ultrasound shows anastomotic hemodynamically significant stenosis.  Flow is 955 ml/min.      IMP/PLAN:  75 y.o. female with   Patient Active Problem List   Diagnosis    Severe obesity (BMI 35.0-39.9) with comorbidity    Sickle cell trait    Osteopenia    Hyperlipidemia LDL goal <100    HUMBERTO on CPAP    Hypothyroidism (acquired)    Hx-TIA (transient ischemic attack)    Vitamin D deficiency disease    Pulmonary hypertension    Type 2 diabetes mellitus with ESRD (end-stage renal disease)    Secondary renal  hyperparathyroidism    Incomplete bladder emptying    Postmenopausal atrophic vaginitis    Rectocele    Mixed incontinence urge and stress    Chronic diastolic heart failure    Tortuous aorta    History of TIAs    Essential hypertension    ESRD on hemodialysis    Anemia of chronic disease    Depression    Debility    Chronic respiratory failure    Type 2 diabetes mellitus with foot ulcer, with long-term current use of insulin    Stable proliferative diabetic retinopathy associated with type 2 diabetes mellitus    Mild major depression    Heart failure with preserved ejection fraction    Preop cardiovascular exam    End stage renal disease    Pseudophakia    being managed by PCP and specialists who is here today for evaluation of long term HD access s/p L RC AV fistula S/p side branch ligation 3/6/20 and fistulogram.    -s/p L RC AV fistula with adequate flow without issues during HD - continue HD via AVF  -RTC for routine surveillance of fistula with HD US  -Cont renal diet  -RTC 2 weeks for suture removal    I spent 20 minutes evaluating this patient and greater than 50% of the time was spent counseling, coordinator care and discussing the plan of care.  All questions were answered and patient stated understanding with agreement with the above treatment plan.    Keron Perez MD VI  Vascular and Endovascular Surgery

## 2020-04-03 ENCOUNTER — PATIENT OUTREACH (OUTPATIENT)
Dept: ADMINISTRATIVE | Facility: OTHER | Age: 75
End: 2020-04-03

## 2020-04-06 ENCOUNTER — OFFICE VISIT (OUTPATIENT)
Dept: VASCULAR SURGERY | Facility: CLINIC | Age: 75
End: 2020-04-06
Payer: MEDICARE

## 2020-04-06 VITALS
WEIGHT: 207 LBS | SYSTOLIC BLOOD PRESSURE: 142 MMHG | DIASTOLIC BLOOD PRESSURE: 62 MMHG | BODY MASS INDEX: 34.49 KG/M2 | HEIGHT: 65 IN

## 2020-04-06 DIAGNOSIS — Z98.890 S/P ARTERIOVENOUS (AV) FISTULA REPAIR: Primary | ICD-10-CM

## 2020-04-06 DIAGNOSIS — Z86.79 S/P ARTERIOVENOUS (AV) FISTULA REPAIR: Primary | ICD-10-CM

## 2020-04-06 PROCEDURE — 99999 PR PBB SHADOW E&M-EST. PATIENT-LVL III: ICD-10-PCS | Mod: PBBFAC,HCNC,, | Performed by: SURGERY

## 2020-04-06 PROCEDURE — 99024 POSTOP FOLLOW-UP VISIT: CPT | Mod: HCNC,S$GLB,, | Performed by: SURGERY

## 2020-04-06 PROCEDURE — 99999 PR PBB SHADOW E&M-EST. PATIENT-LVL III: CPT | Mod: PBBFAC,HCNC,, | Performed by: SURGERY

## 2020-04-06 PROCEDURE — 99024 PR POST-OP FOLLOW-UP VISIT: ICD-10-PCS | Mod: HCNC,S$GLB,, | Performed by: SURGERY

## 2020-04-06 NOTE — PROGRESS NOTES
Keron Perez MD VI                       [facility]                         04/06/2020    HPI:  Erica Leblanc is a 75 y.o. female with   Patient Active Problem List   Diagnosis    Severe obesity (BMI 35.0-39.9) with comorbidity    Sickle cell trait    Osteopenia    Hyperlipidemia LDL goal <100    HUMBERTO on CPAP    Hypothyroidism (acquired)    Hx-TIA (transient ischemic attack)    Vitamin D deficiency disease    Pulmonary hypertension    Type 2 diabetes mellitus with ESRD (end-stage renal disease)    Secondary renal hyperparathyroidism    Incomplete bladder emptying    Postmenopausal atrophic vaginitis    Rectocele    Mixed incontinence urge and stress    Chronic diastolic heart failure    Tortuous aorta    History of TIAs    Essential hypertension    ESRD on hemodialysis    Anemia of chronic disease    Depression    Debility    Chronic respiratory failure    Type 2 diabetes mellitus with foot ulcer, with long-term current use of insulin    Stable proliferative diabetic retinopathy associated with type 2 diabetes mellitus    Mild major depression    Heart failure with preserved ejection fraction    Preop cardiovascular exam    End stage renal disease    Pseudophakia    being managed by PCP and specialists who is here today for evaluation of long term HD access.  S/p R IJ tunneled HD catheter, HD without issues.  Pt is R handed.  No complaints today.  Does endorse orthopnea, no chest pain.  Unable to climb 1 flight of stairs on own.    no MI  no Stroke  Tobacco use: denies    1/20/20: No new issues.    3/2020: Dialysis without issues.    Interval history:  S/p LUE fistulogram, central venogram, ligation of medial side branch cephalic vein, ligation of lateral side branch cephalic vein.  No issues with HD for several weeks since procedure.    Past Medical History:   Diagnosis Date    Acute respiratory failure with hypoxia     Anemia of chronic kidney  failure, stage 4 (severe) 2019    Cataracts, bilateral     CHF (congestive heart failure)     CKD (chronic kidney disease) stage 3, GFR 30-59 ml/min     CKD (chronic kidney disease) stage 3, GFR 30-59 ml/min     Controlled type 2 diabetes mellitus with proteinuria or albuminuria     Depression     Diabetes with neurologic complications     Diabetic retinopathy of both eyes     Edema     Glaucoma     History of colonic polyps     Hx-TIA (transient ischemic attack) 2008    Hyperlipidemia LDL goal < 100     Hypertension     Hypothyroidism     Major depressive disorder, single episode, mild 2016    Mixed incontinence urge and stress     Obesity     Obstructive sleep apnea on CPAP     19:  Home CPAP machine broken, per patient & son    Osteopenia     Proteinuria     Sickle cell trait     Strabismus     TIA (transient ischemic attack)     Trouble in sleeping     Type 2 diabetes mellitus with ophthalmic manifestations     Type 2 diabetes with stage 3 chronic kidney disease GFR 30-59     Type II or unspecified type diabetes mellitus with renal manifestations, uncontrolled(250.42)     Uncontrolled type 2 diabetes mellitus with peripheral circulatory disorder 2019    Urge incontinence 2016    Urge incontinence     Venous stasis ulcer     bilateral lower legs    Vitamin D deficiency disease      Past Surgical History:   Procedure Laterality Date    AV FISTULA PLACEMENT Left 2019    Procedure: CREATION, AV FISTULA, LEFT UPPER EXTREMITY;  Surgeon: Keron Perez MD;  Location: [facility];  Service: Vascular;  Laterality: Left;    BREAST BIOPSY      breast reduction Bilateral age 31    BREAST SURGERY      CATARACT EXTRACTION Bilateral     cataracts Bilateral      SECTION, LOW TRANSVERSE      x1    CHOLECYSTECTOMY      EYE SURGERY  2014, 2014    vitrectomy    EYE SURGERY Right 2016    FISTULOGRAM Left 3/6/2020    Procedure:  Fistulogram, left upper extremity, with branch ligation;  Surgeon: Keron Perez MD;  Location: [facility] OR [address] [place] [address];  Service: Vascular;  Laterality: Left;  Time 1.1 Minute  42.10 mGy    HYSTERECTOMY  1986    TAHBSO (patient is unsure if ovaries removed)    OOPHORECTOMY      REFRACTIVE SURGERY      TOTAL REDUCTION MAMMOPLASTY      approx 10 yrs ago     Family History   Problem Relation Age of Onset    Leukemia Father     Cataracts Father     Ovarian cancer Sister 35    Stroke Mother     Diabetes Mother     Hypertension Mother     Cataracts Mother     Diabetes Paternal Grandmother     Cataracts Paternal Grandmother     Breast cancer Maternal Aunt 65    No Known Problems Paternal Grandfather     Cataracts Maternal Grandmother     Cataracts Maternal Grandfather     HIV Brother     Achondroplasia Sister     Parkinsonism Maternal Aunt     Esophageal cancer Maternal Uncle         smoker    No Known Problems Paternal Aunt     Cataracts Paternal Uncle     Amblyopia Neg Hx     Blindness Neg Hx     Glaucoma Neg Hx     Macular degeneration Neg Hx     Retinal detachment Neg Hx     Strabismus Neg Hx     Thyroid disease Neg Hx     Colon cancer Neg Hx     Cancer Neg Hx      Social History     Socioeconomic History    Marital status: Single     Spouse name: Not on file    Number of children: 5    Years of education: Not on file    Highest education level: Not on file   Occupational History    Occupation:      Employer: [employer]     Comment: part-time   Social Needs    Financial resource strain: Not on file    Food insecurity:     Worry: Not on file     Inability: Not on file    Transportation needs:     Medical: Not on file     Non-medical: Not on file   Tobacco Use    Smoking status: Never Smoker    Smokeless tobacco: Never Used   Substance and Sexual Activity    Alcohol use: No     Alcohol/week: 0.0 standard drinks    Drug use: No    Sexual activity:  Not Currently     Partners: Male     Birth control/protection: Post-menopausal, Surgical   Lifestyle    Physical activity:     Days per week: Not on file     Minutes per session: Not on file    Stress: Not on file   Relationships    Social connections:     Talks on phone: Not on file     Gets together: Not on file     Attends Latter day service: Not on file     Active member of club or organization: Not on file     Attends meetings of clubs or organizations: Not on file     Relationship status: Not on file   Other Topics Concern    Not on file   Social History Narrative    Not on file       Current Outpatient Medications:     amLODIPine (NORVASC) 10 MG tablet, Take 1 tablet (10 mg total) by mouth once daily., Disp: 90 tablet, Rfl: 3    atorvastatin (LIPITOR) 10 MG tablet, TAKE 1 TABLET EVERY DAY, Disp: 90 tablet, Rfl: 1    bumetanide (BUMEX) 2 MG tablet, Take 1 tablet (2 mg total) by mouth once daily., Disp: 30 tablet, Rfl: 11    citalopram (CELEXA) 10 MG tablet, TAKE 1 TABLET EVERY DAY, Disp: 90 tablet, Rfl: 1    clopidogreL (PLAVIX) 75 mg tablet, TAKE 1 TABLET EVERY DAY, Disp: 90 tablet, Rfl: 1    docusate sodium (COLACE) 100 MG capsule, Take 200 mg by mouth once daily. , Disp: , Rfl:     hydrALAZINE (APRESOLINE) 100 MG tablet, TAKE 1 TABLET BY MOUTH THREE TIMES A DAY, Disp: 270 tablet, Rfl: 1    HYDROcodone-acetaminophen (NORCO) 5-325 mg per tablet, Take 1 tablet by mouth every 6 (six) hours as needed for Pain., Disp: 10 tablet, Rfl: 0    LANCETS MISC, , Disp: , Rfl:     levothyroxine (SYNTHROID) 50 MCG tablet, Take 50 mcg by mouth once daily., Disp: , Rfl:     metoprolol tartrate (LOPRESSOR) 50 MG tablet, Take 1 tablet (50 mg total) by mouth 2 (two) times daily., Disp: 100 tablet, Rfl: 3    MULTIVITAMIN WITH MINERALS (HAIR,SKIN AND NAILS ORAL), Take 3 tablets by mouth once daily., Disp: , Rfl:     oxybutynin (DITROPAN XL) 15 MG TR24, TAKE 1 TABLET EVERY DAY, Disp: 90 tablet, Rfl: 3     polyethylene glycol (GLYCOLAX) 17 gram PwPk, Take 17 g by mouth once daily., Disp: , Rfl: 0    insulin detemir U-100 (LEVEMIR FLEXTOUCH) 100 unit/mL (3 mL) SubQ InPn pen, Inject 5 Units into the skin once daily., Disp: 1.5 mL, Rfl: 1    REVIEW OF SYSTEMS:  General: No fevers or chills; ENT: No sore throat; Allergy and Immunology: no persistent infections; Hematological and Lymphatic: No history of bleeding or easy bruising; Endocrine: negative; Respiratory: no cough, shortness of breath, or wheezing; Cardiovascular: no chest pain or dyspnea on exertion; Gastrointestinal: no abdominal pain/back, change in bowel habits, or bloody stools; Genito-Urinary: no dysuria, trouble voiding, or hematuria; Musculoskeletal: negative; Neurological: no TIA or stroke symptoms; Psychiatric: no nervousness, anxiety or depression.    PHYSICAL EXAM:                General appearance:  Alert, well-appearing, and in no distress.  Oriented to person, place, and time                    Neurological: Normal speech, no focal findings noted; CN II - XII grossly intact. All extremities with sensation to light touch.            Musculoskeletal: Digits/nail without cyanosis/clubbing.  Strength 5/5 all extremities.                    Neck: Supple, no significant adenopathy, no carotid bruit can be auscultated                  Chest:  Clear to auscultation, no wheezes, rales or rhonchi, symmetric air entry. No use of accessory muscles               Cardiac: Normal rate and regular rhythm, S1 and S2 normal            Abdomen: Soft, nontender, nondistended, no masses or organomegaly, no hernia     No rebound tenderness noted; bowel sounds normal     Pulsatile aortic mass is non palpable.     No groin adenopathy      Extremities:      2+ radial pulse bilaterally, LUE AVF +thrill, inc healing well without infection     No BUE edema, Negative Manuel's test BUE    Skin: No tissue loss    LAB RESULTS:  No results found for: CBC  Lab Results   Component  Value Date    LABPROT 10.9 11/20/2019    INR 1.0 11/20/2019     Lab Results   Component Value Date     03/06/2020    K 4.7 03/06/2020     03/06/2020    CO2 27 03/06/2020     (H) 03/06/2020    BUN 95 (H) 03/06/2020    CREATININE 6.8 (H) 03/06/2020    CALCIUM 9.4 03/06/2020    ANIONGAP 11 03/06/2020    EGFRNONAA 5.5 (A) 03/06/2020     Lab Results   Component Value Date    WBC 4.42 11/20/2019    RBC 4.37 11/20/2019    HGB 11.0 (L) 11/20/2019    HCT 35.3 (L) 11/20/2019    MCV 81 (L) 11/20/2019    MCH 25.2 (L) 11/20/2019    MCHC 31.2 (L) 11/20/2019    RDW 16.9 (H) 11/20/2019     11/20/2019    MPV 9.3 11/20/2019    GRAN 2.8 11/20/2019    GRAN 62.6 11/20/2019    LYMPH 0.8 (L) 11/20/2019    LYMPH 18.1 11/20/2019    MONO 0.7 11/20/2019    MONO 16.3 (H) 11/20/2019    EOS 0.1 11/20/2019    BASO 0.02 11/20/2019    EOSINOPHIL 2.3 11/20/2019    BASOPHIL 0.5 11/20/2019    DIFFMETHOD Automated 11/20/2019     .  Lab Results   Component Value Date    HGBA1C 6.9 (H) 11/20/2019       IMAGING:  All pertinent imaging has been reviewed and interpreted independently.    Vein mapping 9/2019:  Adequate R superior basilic vein and axillary vein ; Adequate L basilic vein superior and inferior, adequate L axillary     1/27/20 Left upper extremity dialysis ultrasound shows no hemodynamically significant stenosis.  Flow is 1083 ml/min.  Depth and diameter are appropriate.    3/23/20:  Left upper extremity dialysis ultrasound shows anastomotic and proximal fistula hemodynamically significant stenosis.  Flow is 955 ml/min.      IMP/PLAN:  75 y.o. female with   Patient Active Problem List   Diagnosis    Severe obesity (BMI 35.0-39.9) with comorbidity    Sickle cell trait    Osteopenia    Hyperlipidemia LDL goal <100    HUMBERTO on CPAP    Hypothyroidism (acquired)    Hx-TIA (transient ischemic attack)    Vitamin D deficiency disease    Pulmonary hypertension    Type 2 diabetes mellitus with ESRD (end-stage renal  disease)    Secondary renal hyperparathyroidism    Incomplete bladder emptying    Postmenopausal atrophic vaginitis    Rectocele    Mixed incontinence urge and stress    Chronic diastolic heart failure    Tortuous aorta    History of TIAs    Essential hypertension    ESRD on hemodialysis    Anemia of chronic disease    Depression    Debility    Chronic respiratory failure    Type 2 diabetes mellitus with foot ulcer, with long-term current use of insulin    Stable proliferative diabetic retinopathy associated with type 2 diabetes mellitus    Mild major depression    Heart failure with preserved ejection fraction    Preop cardiovascular exam    End stage renal disease    Pseudophakia    being managed by PCP and specialists who is here today for evaluation of long term HD access s/p L RC AV fistula.    -s/p L RC AV fistula with proximal fistula measuring 5 mm 1/13/20, adequate flow without issues during HD s/p LUE fistulogram, central venogram, ligation of medial side branch cephalic vein, ligation of lateral side branch cephalic vein 3/2/20 - recovering well, no issues with HD  -RTC for routine surveillance of fistula with HD US  -Cont renal diet    I spent 20 minutes evaluating this patient and greater than 50% of the time was spent counseling, coordinator care and discussing the plan of care.  All questions were answered and patient stated understanding with agreement with the above treatment plan.    Keron Perez MD [facility]  Vascular and Endovascular Surgery

## 2020-04-06 NOTE — LETTER
April 6, 2020        Sendy Elaine MD  [address] [facility]  [place] [address]             [facility]  [address]  [facility] [place] [address]  Phone: [phone]  Fax: [phone]   Patient: Erica Leblanc   MR Number: [id number]   YOB: 1945   Date of Visit: 4/6/2020       Dear Dr. Elaine:    Thank you for referring Erica Leblanc to me for evaluation. Below are the relevant portions of my assessment and plan of care.            If you have questions, please do not hesitate to call me. I look forward to following Erica along with you.    Sincerely,      Keron Perez MD           CC  No Recipients

## 2020-04-06 NOTE — PATIENT INSTRUCTIONS
Caring for Your Hemodialysis Access  A problem such as an infection or a blood clot may make the access unusable. Typically, this happens more often with an arteriovenous graft than with an arteriovenous fistula. If this happens, youll need a new access. To help your access last, you will need to follow certain guidelines.  Watching for problems  Call your healthcare provider right away if you:  · Cant feel the blood flowing in the access (this sensation is called a thrill)  · Have pain or numbness in your hand or arm  · Have bleeding, redness, bluish discoloration, or warmth around your access  · Notice your access suddenly bulging out more than usual (a slight bulge is normal)  · Have a fever of 100.4°F (38°C) or higher, or as directed by your healthcare provider   Follow these and any other guidelines youre given  · Dont wear tight clothes or jewelry around your access.  · Dont let anyone take your blood pressure on or draw blood from the arm with the access. Also, dont let anyone put IV lines into it.  · Protect your access from being hit or cut.  · Wash your hands often and keep the area around the access clean.  · Do not carry anything heavy or do anything that would put pressure on the access.  Feeling for your thrill    If you put your fingers over your access, you should feel the blood rushing through it. This is called a thrill, and it feels like a vibration. Feel for the thrill as often as you're told, usually once or twice a day. If you can't feel it, tell your healthcare provider right away. Blood may not be flowing through your access the way it should.  Important numbers  Write the names and numbers of your healthcare providers below. That way you will know how to get in touch with them.  Doctor:  Name ___________________ Phone ___________________  Surgeon:  Name ___________________ Phone ___________________  Dialysis Center:  Name ___________________ Phone ___________________   Date Last  Reviewed: 1/1/2017  © 0991-8721 [employer] [place]. [address] [place] [address], [place] [address]. All rights reserved. This information is not intended as a substitute for professional medical care. Always follow your healthcare professional's instructions.

## 2020-06-09 LAB
CHOL/HDLC RATIO: 2.3 (ref 3.3–5)
CHOLESTEROL, TOTAL: 114 (ref 0–199)
HDLC SERPL-MCNC: 50 MG/DL (ref 40–60)
LDLC SERPL CALC-MCNC: 47 MG/DL (ref 0–99)
TRIGL SERPL-MCNC: 83 MG/DL (ref 0–149)
VLDL CHOLESTEROL: 17 MG/DL (ref 0–29)

## 2020-07-02 DIAGNOSIS — F32.1 MODERATE SINGLE CURRENT EPISODE OF MAJOR DEPRESSIVE DISORDER: ICD-10-CM

## 2020-07-02 DIAGNOSIS — Z78.0 MENOPAUSE: Primary | ICD-10-CM

## 2020-07-03 RX ORDER — CITALOPRAM 10 MG/1
TABLET ORAL
Qty: 90 TABLET | Refills: 0 | Status: SHIPPED | OUTPATIENT
Start: 2020-07-03 | End: 2021-03-02 | Stop reason: SDUPTHER

## 2020-07-06 ENCOUNTER — OFFICE VISIT (OUTPATIENT)
Dept: VASCULAR SURGERY | Facility: CLINIC | Age: 75
End: 2020-07-06
Payer: MEDICARE

## 2020-07-06 ENCOUNTER — HOSPITAL ENCOUNTER (OUTPATIENT)
Dept: CARDIOLOGY | Facility: HOSPITAL | Age: 75
Discharge: HOME | End: 2020-07-06
Attending: SURGERY
Payer: MEDICARE

## 2020-07-06 VITALS
DIASTOLIC BLOOD PRESSURE: 82 MMHG | SYSTOLIC BLOOD PRESSURE: 120 MMHG | WEIGHT: 211.44 LBS | HEIGHT: 65 IN | BODY MASS INDEX: 35.23 KG/M2

## 2020-07-06 DIAGNOSIS — Z98.890 S/P ARTERIOVENOUS (AV) FISTULA REPAIR: ICD-10-CM

## 2020-07-06 DIAGNOSIS — Z86.79 S/P ARTERIOVENOUS (AV) FISTULA REPAIR: ICD-10-CM

## 2020-07-06 DIAGNOSIS — Z99.2 ESRD (END STAGE RENAL DISEASE) ON DIALYSIS: ICD-10-CM

## 2020-07-06 DIAGNOSIS — T82.858A ARTERIOVENOUS FISTULA STENOSIS, INITIAL ENCOUNTER: Primary | ICD-10-CM

## 2020-07-06 DIAGNOSIS — N18.6 ESRD (END STAGE RENAL DISEASE) ON DIALYSIS: ICD-10-CM

## 2020-07-06 PROCEDURE — 1126F AMNT PAIN NOTED NONE PRSNT: CPT | Mod: HCNC,S$GLB,, | Performed by: SURGERY

## 2020-07-06 PROCEDURE — 1159F PR MEDICATION LIST DOCUMENTED IN MEDICAL RECORD: ICD-10-PCS | Mod: HCNC,S$GLB,, | Performed by: SURGERY

## 2020-07-06 PROCEDURE — 1101F PR PT FALLS ASSESS DOC 0-1 FALLS W/OUT INJ PAST YR: ICD-10-PCS | Mod: HCNC,CPTII,S$GLB, | Performed by: SURGERY

## 2020-07-06 PROCEDURE — 3008F BODY MASS INDEX DOCD: CPT | Mod: HCNC,CPTII,S$GLB, | Performed by: SURGERY

## 2020-07-06 PROCEDURE — 93990 CV US HEMODIALYSIS ACCESS (CUPID ONLY): ICD-10-PCS | Mod: 26,HCNC,, | Performed by: SURGERY

## 2020-07-06 PROCEDURE — 3008F PR BODY MASS INDEX (BMI) DOCUMENTED: ICD-10-PCS | Mod: HCNC,CPTII,S$GLB, | Performed by: SURGERY

## 2020-07-06 PROCEDURE — 1101F PT FALLS ASSESS-DOCD LE1/YR: CPT | Mod: HCNC,CPTII,S$GLB, | Performed by: SURGERY

## 2020-07-06 PROCEDURE — 93990 DOPPLER FLOW TESTING: CPT | Mod: TC,HCNC

## 2020-07-06 PROCEDURE — 1159F MED LIST DOCD IN RCRD: CPT | Mod: HCNC,S$GLB,, | Performed by: SURGERY

## 2020-07-06 PROCEDURE — 93990 DOPPLER FLOW TESTING: CPT | Mod: 26,HCNC,, | Performed by: SURGERY

## 2020-07-06 PROCEDURE — 1126F PR PAIN SEVERITY QUANTIFIED, NO PAIN PRESENT: ICD-10-PCS | Mod: HCNC,S$GLB,, | Performed by: SURGERY

## 2020-07-06 PROCEDURE — 99999 PR PBB SHADOW E&M-EST. PATIENT-LVL V: CPT | Mod: PBBFAC,HCNC,, | Performed by: SURGERY

## 2020-07-06 PROCEDURE — 99214 PR OFFICE/OUTPT VISIT, EST, LEVL IV, 30-39 MIN: ICD-10-PCS | Mod: HCNC,S$GLB,, | Performed by: SURGERY

## 2020-07-06 PROCEDURE — 99214 OFFICE O/P EST MOD 30 MIN: CPT | Mod: HCNC,S$GLB,, | Performed by: SURGERY

## 2020-07-06 PROCEDURE — 99999 PR PBB SHADOW E&M-EST. PATIENT-LVL V: ICD-10-PCS | Mod: PBBFAC,HCNC,, | Performed by: SURGERY

## 2020-07-06 NOTE — TELEPHONE ENCOUNTER
Spoke with patient OV scheduled 7/10/20 at 1:20 PM. BMD addressed and scheduled. Patient verbalized understanding.

## 2020-07-06 NOTE — PATIENT INSTRUCTIONS
Caring for Your Hemodialysis Access  A problem such as an infection or a blood clot may make the access unusable. Typically, this happens more often with an arteriovenous graft than with an arteriovenous fistula. If this happens, youll need a new access. To help your access last, you will need to follow certain guidelines.  Watching for problems  Call your healthcare provider right away if you:  · Cant feel the blood flowing in the access (this sensation is called a thrill)  · Have pain or numbness in your hand or arm  · Have bleeding, redness, bluish discoloration, or warmth around your access  · Notice your access suddenly bulging out more than usual (a slight bulge is normal)  · Have a fever of 100.4°F (38°C) or higher, or as directed by your healthcare provider   Follow these and any other guidelines youre given  · Dont wear tight clothes or jewelry around your access.  · Dont let anyone take your blood pressure on or draw blood from the arm with the access. Also, dont let anyone put IV lines into it.  · Protect your access from being hit or cut.  · Wash your hands often and keep the area around the access clean.  · Do not carry anything heavy or do anything that would put pressure on the access.  Feeling for your thrill    If you put your fingers over your access, you should feel the blood rushing through it. This is called a thrill, and it feels like a vibration. Feel for the thrill as often as you're told, usually once or twice a day. If you can't feel it, tell your healthcare provider right away. Blood may not be flowing through your access the way it should.  Important numbers  Write the names and numbers of your healthcare providers below. That way you will know how to get in touch with them.  Doctor:  Name ___________________ Phone ___________________  Surgeon:  Name ___________________ Phone ___________________  Dialysis Center:  Name ___________________ Phone ___________________   Date Last  Reviewed: 1/1/2017  © 9160-7744 [employer], [place]. [address] [place] [address], [place] [address]. All rights reserved. This information is not intended as a substitute for professional medical care. Always follow your healthcare professional's instructions.

## 2020-07-06 NOTE — PROGRESS NOTES
Keron Perez MD [facility]                       [facility]                         07/06/2020    HPI:  Erica Leblanc is a 75 y.o. female with   Patient Active Problem List   Diagnosis    Severe obesity (BMI 35.0-39.9) with comorbidity    Sickle cell trait    Osteopenia    Hyperlipidemia LDL goal <100    HUMBERTO on CPAP    Hypothyroidism (acquired)    Hx-TIA (transient ischemic attack)    Vitamin D deficiency disease    Pulmonary hypertension    Type 2 diabetes mellitus with ESRD (end-stage renal disease)    Secondary renal hyperparathyroidism    Incomplete bladder emptying    Postmenopausal atrophic vaginitis    Rectocele    Mixed incontinence urge and stress    Chronic diastolic heart failure    Tortuous aorta    History of TIAs    Essential hypertension    ESRD on hemodialysis    Anemia of chronic disease    Depression    Debility    Chronic respiratory failure    Type 2 diabetes mellitus with foot ulcer, with long-term current use of insulin    Stable proliferative diabetic retinopathy associated with type 2 diabetes mellitus    Mild major depression    Heart failure with preserved ejection fraction    Preop cardiovascular exam    End stage renal disease    Pseudophakia    being managed by PCP and specialists who is here today for evaluation of long term HD access.  S/p R IJ tunneled HD catheter, HD without issues.  Pt is R handed.  No complaints today.  Does endorse orthopnea, no chest pain.  Unable to climb 1 flight of stairs on own.    no MI  no Stroke  Tobacco use: denies    1/20/20: No new issues.    3/2020: Dialysis without issues.    4/6/20:  S/p LUE fistulogram, central venogram, ligation of medial side branch cephalic vein, ligation of lateral side branch cephalic vein.  No issues with HD for several weeks since procedure.    7/6/20:  No issues with HD.    Past Medical History:   Diagnosis Date    Acute respiratory failure with hypoxia     Anemia  of chronic kidney failure, stage 4 (severe) 2019    Cataracts, bilateral     CHF (congestive heart failure)     CKD (chronic kidney disease) stage 3, GFR 30-59 ml/min     CKD (chronic kidney disease) stage 3, GFR 30-59 ml/min     Controlled type 2 diabetes mellitus with proteinuria or albuminuria     Depression     Diabetes with neurologic complications     Diabetic retinopathy of both eyes     Edema     Glaucoma     History of colonic polyps     Hx-TIA (transient ischemic attack) 2008    Hyperlipidemia LDL goal < 100     Hypertension     Hypothyroidism     Major depressive disorder, single episode, mild 2016    Mixed incontinence urge and stress     Obesity     Obstructive sleep apnea on CPAP     19:  Home CPAP machine broken, per patient & son    Osteopenia     Proteinuria     Sickle cell trait     Strabismus     TIA (transient ischemic attack)     Trouble in sleeping     Type 2 diabetes mellitus with ophthalmic manifestations     Type 2 diabetes with stage 3 chronic kidney disease GFR 30-59     Type II or unspecified type diabetes mellitus with renal manifestations, uncontrolled(250.42)     Uncontrolled type 2 diabetes mellitus with peripheral circulatory disorder 2019    Urge incontinence 2016    Urge incontinence     Venous stasis ulcer     bilateral lower legs    Vitamin D deficiency disease      Past Surgical History:   Procedure Laterality Date    AV FISTULA PLACEMENT Left 2019    Procedure: CREATION, AV FISTULA, LEFT UPPER EXTREMITY;  Surgeon: Keron Perez MD;  Location: [facility];  Service: Vascular;  Laterality: Left;    BREAST BIOPSY      breast reduction Bilateral age 31    BREAST SURGERY      CATARACT EXTRACTION Bilateral     cataracts Bilateral      SECTION, LOW TRANSVERSE      x1    CHOLECYSTECTOMY      EYE SURGERY  2014, 2014    vitrectomy    EYE SURGERY Right 2016    FISTULOGRAM Left 3/6/2020     Procedure: Fistulogram, left upper extremity, with branch ligation;  Surgeon: Keron Perez MD;  Location: [facility] OR [address] [place] [address];  Service: Vascular;  Laterality: Left;  Time 1.1 Minute  42.10 mGy    HYSTERECTOMY  1986    TAHBSO (patient is unsure if ovaries removed)    OOPHORECTOMY      REFRACTIVE SURGERY      TOTAL REDUCTION MAMMOPLASTY      approx 10 yrs ago     Family History   Problem Relation Age of Onset    Leukemia Father     Cataracts Father     Ovarian cancer Sister 35    Stroke Mother     Diabetes Mother     Hypertension Mother     Cataracts Mother     Diabetes Paternal Grandmother     Cataracts Paternal Grandmother     Breast cancer Maternal Aunt 65    No Known Problems Paternal Grandfather     Cataracts Maternal Grandmother     Cataracts Maternal Grandfather     HIV Brother     Achondroplasia Sister     Parkinsonism Maternal Aunt     Esophageal cancer Maternal Uncle         smoker    No Known Problems Paternal Aunt     Cataracts Paternal Uncle     Amblyopia Neg Hx     Blindness Neg Hx     Glaucoma Neg Hx     Macular degeneration Neg Hx     Retinal detachment Neg Hx     Strabismus Neg Hx     Thyroid disease Neg Hx     Colon cancer Neg Hx     Cancer Neg Hx      Social History     Socioeconomic History    Marital status: Single     Spouse name: Not on file    Number of children: 5    Years of education: Not on file    Highest education level: Not on file   Occupational History    Occupation:      Employer: [employer]     Comment: part-time   Social Needs    Financial resource strain: Not on file    Food insecurity     Worry: Not on file     Inability: Not on file    Transportation needs     Medical: Not on file     Non-medical: Not on file   Tobacco Use    Smoking status: Never Smoker    Smokeless tobacco: Never Used   Substance and Sexual Activity    Alcohol use: No     Alcohol/week: 0.0 standard drinks    Drug use: No    Sexual  activity: Not Currently     Partners: Male     Birth control/protection: Post-menopausal, Surgical   Lifestyle    Physical activity     Days per week: Not on file     Minutes per session: Not on file    Stress: Not on file   Relationships    Social connections     Talks on phone: Not on file     Gets together: Not on file     Attends Bahai service: Not on file     Active member of club or organization: Not on file     Attends meetings of clubs or organizations: Not on file     Relationship status: Not on file   Other Topics Concern    Not on file   Social History Narrative    Not on file       Current Outpatient Medications:     amLODIPine (NORVASC) 10 MG tablet, Take 1 tablet (10 mg total) by mouth once daily., Disp: 90 tablet, Rfl: 3    atorvastatin (LIPITOR) 10 MG tablet, TAKE 1 TABLET EVERY DAY, Disp: 90 tablet, Rfl: 1    bumetanide (BUMEX) 2 MG tablet, Take 1 tablet (2 mg total) by mouth once daily., Disp: 30 tablet, Rfl: 11    citalopram (CELEXA) 10 MG tablet, TAKE 1 TABLET EVERY DAY, Disp: 90 tablet, Rfl: 0    clopidogreL (PLAVIX) 75 mg tablet, TAKE 1 TABLET EVERY DAY, Disp: 90 tablet, Rfl: 0    docusate sodium (COLACE) 100 MG capsule, Take 200 mg by mouth once daily. , Disp: , Rfl:     hydrALAZINE (APRESOLINE) 100 MG tablet, TAKE 1 TABLET BY MOUTH THREE TIMES A DAY, Disp: 270 tablet, Rfl: 1    HYDROcodone-acetaminophen (NORCO) 5-325 mg per tablet, Take 1 tablet by mouth every 6 (six) hours as needed for Pain., Disp: 10 tablet, Rfl: 0    LANCETS MISC, , Disp: , Rfl:     levothyroxine (SYNTHROID) 50 MCG tablet, Take 50 mcg by mouth once daily., Disp: , Rfl:     metoprolol tartrate (LOPRESSOR) 50 MG tablet, Take 1 tablet (50 mg total) by mouth 2 (two) times daily., Disp: 100 tablet, Rfl: 3    MULTIVITAMIN WITH MINERALS (HAIR,SKIN AND NAILS ORAL), Take 3 tablets by mouth once daily., Disp: , Rfl:     oxybutynin (DITROPAN XL) 15 MG TR24, TAKE 1 TABLET EVERY DAY, Disp: 90 tablet, Rfl: 3     polyethylene glycol (GLYCOLAX) 17 gram PwPk, Take 17 g by mouth once daily., Disp: , Rfl: 0    insulin detemir U-100 (LEVEMIR FLEXTOUCH) 100 unit/mL (3 mL) SubQ InPn pen, Inject 5 Units into the skin once daily., Disp: 1.5 mL, Rfl: 1    REVIEW OF SYSTEMS:  General: No fevers or chills; ENT: No sore throat; Allergy and Immunology: no persistent infections; Hematological and Lymphatic: No history of bleeding or easy bruising; Endocrine: negative; Respiratory: no cough, shortness of breath, or wheezing; Cardiovascular: no chest pain or dyspnea on exertion; Gastrointestinal: no abdominal pain/back, change in bowel habits, or bloody stools; Genito-Urinary: no dysuria, trouble voiding, or hematuria; Musculoskeletal: negative; Neurological: no TIA or stroke symptoms; Psychiatric: no nervousness, anxiety or depression.    PHYSICAL EXAM:                General appearance:  Alert, well-appearing, and in no distress.  Oriented to person, place, and time                    Neurological: Normal speech, no focal findings noted; CN II - XII grossly intact. All extremities with sensation to light touch.            Musculoskeletal: Digits/nail without cyanosis/clubbing.  Strength 5/5 all extremities.                    Neck: Supple, no significant adenopathy, no carotid bruit can be auscultated                  Chest:  Clear to auscultation, no wheezes, rales or rhonchi, symmetric air entry. No use of accessory muscles               Cardiac: Normal rate and regular rhythm, S1 and S2 normal            Abdomen: Soft, nontender, nondistended, no masses or organomegaly, no hernia     No rebound tenderness noted; bowel sounds normal     Pulsatile aortic mass is non palpable.     No groin adenopathy      Extremities:      2+ radial pulse bilaterally, LUE AVF +thrill, inc well healed     No BUE edema, Negative Manuel's test BUE    Skin: No tissue loss    LAB RESULTS:  No results found for: CBC  Lab Results   Component Value Date    LABPROT  10.9 11/20/2019    INR 1.0 11/20/2019     Lab Results   Component Value Date     03/06/2020    K 4.7 03/06/2020     03/06/2020    CO2 27 03/06/2020     (H) 03/06/2020    BUN 95 (H) 03/06/2020    CREATININE 6.8 (H) 03/06/2020    CALCIUM 9.4 03/06/2020    ANIONGAP 11 03/06/2020    EGFRNONAA 5.5 (A) 03/06/2020     Lab Results   Component Value Date    WBC 4.42 11/20/2019    RBC 4.37 11/20/2019    HGB 11.0 (L) 11/20/2019    HCT 35.3 (L) 11/20/2019    MCV 81 (L) 11/20/2019    MCH 25.2 (L) 11/20/2019    MCHC 31.2 (L) 11/20/2019    RDW 16.9 (H) 11/20/2019     11/20/2019    MPV 9.3 11/20/2019    GRAN 2.8 11/20/2019    GRAN 62.6 11/20/2019    LYMPH 0.8 (L) 11/20/2019    LYMPH 18.1 11/20/2019    MONO 0.7 11/20/2019    MONO 16.3 (H) 11/20/2019    EOS 0.1 11/20/2019    BASO 0.02 11/20/2019    EOSINOPHIL 2.3 11/20/2019    BASOPHIL 0.5 11/20/2019    DIFFMETHOD Automated 11/20/2019     .  Lab Results   Component Value Date    HGBA1C 6.9 (H) 11/20/2019       IMAGING:  All pertinent imaging has been reviewed and interpreted independently.    Vein mapping 9/2019:  Adequate R superior basilic vein and axillary vein ; Adequate L basilic vein superior and inferior, adequate L axillary     1/27/20 Left upper extremity dialysis ultrasound shows no hemodynamically significant stenosis.  Flow is 1083 ml/min.  Depth and diameter are appropriate.    3/23/20:  Left upper extremity dialysis ultrasound shows anastomotic and proximal fistula hemodynamically significant stenosis.  Flow is 955 ml/min.      7/2020: Left upper extremity dialysis ultrasound shows proximal fistula hemodynamically significant stenosis.  Flow is 1257 ml/min.      IMP/PLAN:  75 y.o. female with   Patient Active Problem List   Diagnosis    Severe obesity (BMI 35.0-39.9) with comorbidity    Sickle cell trait    Osteopenia    Hyperlipidemia LDL goal <100    HUMBERTO on CPAP    Hypothyroidism (acquired)    Hx-TIA (transient ischemic attack)     Vitamin D deficiency disease    Pulmonary hypertension    Type 2 diabetes mellitus with ESRD (end-stage renal disease)    Secondary renal hyperparathyroidism    Incomplete bladder emptying    Postmenopausal atrophic vaginitis    Rectocele    Mixed incontinence urge and stress    Chronic diastolic heart failure    Tortuous aorta    History of TIAs    Essential hypertension    ESRD on hemodialysis    Anemia of chronic disease    Depression    Debility    Chronic respiratory failure    Type 2 diabetes mellitus with foot ulcer, with long-term current use of insulin    Stable proliferative diabetic retinopathy associated with type 2 diabetes mellitus    Mild major depression    Heart failure with preserved ejection fraction    Preop cardiovascular exam    End stage renal disease    Pseudophakia    being managed by PCP and specialists who is here today for evaluation of long term HD access s/p L RC AV fistula.    -s/p L RC AV fistula with proximal fistula measuring 5 mm 1/13/20, adequate flow without issues during HD s/p LUE fistulogram, central venogram, ligation of medial side branch cephalic vein, ligation of lateral side branch cephalic vein 3/2/20 with prox AVF stenosis - rec LUE fistulogram with possible intervention  -To preop - plan for 7/21/20  -Cont renal diet    I spent 8 minutes evaluating this patient and greater than 50% of the time was spent counseling, coordinator care and discussing the plan of care.  All questions were answered and patient stated understanding with agreement with the above treatment plan.    Keron Perez MD [facility]  Vascular and Endovascular Surgery

## 2020-07-06 NOTE — LETTER
July 6, 2020        Sendy Elaine MD  [address] [facility]  [place] [address]             [facility]  [address]  [facility] [place] [address]  Phone: [phone]  Fax: [phone]   Patient: Erica Leblanc   MR Number: [id number]   YOB: 1945   Date of Visit: 7/6/2020       Dear Dr. Elaine:    Thank you for referring Erica Leblanc to me for evaluation. Below are the relevant portions of my assessment and plan of care.            If you have questions, please do not hesitate to call me. I look forward to following Erica along with you.    Sincerely,      Keron Perez MD           CC  No Recipients

## 2020-07-06 NOTE — H&P (VIEW-ONLY)
Keron Perez MD RPVI                       [facility]                         07/06/2020    HPI:  Erica Leblanc is a 75 y.o. female with   Patient Active Problem List   Diagnosis    Severe obesity (BMI 35.0-39.9) with comorbidity    Sickle cell trait    Osteopenia    Hyperlipidemia LDL goal <100    HUMBERTO on CPAP    Hypothyroidism (acquired)    Hx-TIA (transient ischemic attack)    Vitamin D deficiency disease    Pulmonary hypertension    Type 2 diabetes mellitus with ESRD (end-stage renal disease)    Secondary renal hyperparathyroidism    Incomplete bladder emptying    Postmenopausal atrophic vaginitis    Rectocele    Mixed incontinence urge and stress    Chronic diastolic heart failure    Tortuous aorta    History of TIAs    Essential hypertension    ESRD on hemodialysis    Anemia of chronic disease    Depression    Debility    Chronic respiratory failure    Type 2 diabetes mellitus with foot ulcer, with long-term current use of insulin    Stable proliferative diabetic retinopathy associated with type 2 diabetes mellitus    Mild major depression    Heart failure with preserved ejection fraction    Preop cardiovascular exam    End stage renal disease    Pseudophakia    being managed by PCP and specialists who is here today for evaluation of long term HD access.  S/p R IJ tunneled HD catheter, HD without issues.  Pt is R handed.  No complaints today.  Does endorse orthopnea, no chest pain.  Unable to climb 1 flight of stairs on own.    no MI  no Stroke  Tobacco use: denies    1/20/20: No new issues.    3/2020: Dialysis without issues.    4/6/20:  S/p LUE fistulogram, central venogram, ligation of medial side branch cephalic vein, ligation of lateral side branch cephalic vein.  No issues with HD for several weeks since procedure.    7/6/20:  No issues with HD.    Past Medical History:   Diagnosis Date    Acute respiratory failure with hypoxia     Anemia  of chronic kidney failure, stage 4 (severe) 2019    Cataracts, bilateral     CHF (congestive heart failure)     CKD (chronic kidney disease) stage 3, GFR 30-59 ml/min     CKD (chronic kidney disease) stage 3, GFR 30-59 ml/min     Controlled type 2 diabetes mellitus with proteinuria or albuminuria     Depression     Diabetes with neurologic complications     Diabetic retinopathy of both eyes     Edema     Glaucoma     History of colonic polyps     Hx-TIA (transient ischemic attack) 2008    Hyperlipidemia LDL goal < 100     Hypertension     Hypothyroidism     Major depressive disorder, single episode, mild 2016    Mixed incontinence urge and stress     Obesity     Obstructive sleep apnea on CPAP     19:  Home CPAP machine broken, per patient & son    Osteopenia     Proteinuria     Sickle cell trait     Strabismus     TIA (transient ischemic attack)     Trouble in sleeping     Type 2 diabetes mellitus with ophthalmic manifestations     Type 2 diabetes with stage 3 chronic kidney disease GFR 30-59     Type II or unspecified type diabetes mellitus with renal manifestations, uncontrolled(250.42)     Uncontrolled type 2 diabetes mellitus with peripheral circulatory disorder 2019    Urge incontinence 2016    Urge incontinence     Venous stasis ulcer     bilateral lower legs    Vitamin D deficiency disease      Past Surgical History:   Procedure Laterality Date    AV FISTULA PLACEMENT Left 2019    Procedure: CREATION, AV FISTULA, LEFT UPPER EXTREMITY;  Surgeon: Keron Perez MD;  Location: [facility];  Service: Vascular;  Laterality: Left;    BREAST BIOPSY      breast reduction Bilateral age 31    BREAST SURGERY      CATARACT EXTRACTION Bilateral     cataracts Bilateral      SECTION, LOW TRANSVERSE      x1    CHOLECYSTECTOMY      EYE SURGERY  2014, 2014    vitrectomy    EYE SURGERY Right 2016    FISTULOGRAM Left 3/6/2020     Procedure: Fistulogram, left upper extremity, with branch ligation;  Surgeon: Keron Perez MD;  Location: [facility] OR [address] [place] [address];  Service: Vascular;  Laterality: Left;  Time 1.1 Minute  42.10 mGy    HYSTERECTOMY  1986    TAHBSO (patient is unsure if ovaries removed)    OOPHORECTOMY      REFRACTIVE SURGERY      TOTAL REDUCTION MAMMOPLASTY      approx 10 yrs ago     Family History   Problem Relation Age of Onset    Leukemia Father     Cataracts Father     Ovarian cancer Sister 35    Stroke Mother     Diabetes Mother     Hypertension Mother     Cataracts Mother     Diabetes Paternal Grandmother     Cataracts Paternal Grandmother     Breast cancer Maternal Aunt 65    No Known Problems Paternal Grandfather     Cataracts Maternal Grandmother     Cataracts Maternal Grandfather     HIV Brother     Achondroplasia Sister     Parkinsonism Maternal Aunt     Esophageal cancer Maternal Uncle         smoker    No Known Problems Paternal Aunt     Cataracts Paternal Uncle     Amblyopia Neg Hx     Blindness Neg Hx     Glaucoma Neg Hx     Macular degeneration Neg Hx     Retinal detachment Neg Hx     Strabismus Neg Hx     Thyroid disease Neg Hx     Colon cancer Neg Hx     Cancer Neg Hx      Social History     Socioeconomic History    Marital status: Single     Spouse name: Not on file    Number of children: 5    Years of education: Not on file    Highest education level: Not on file   Occupational History    Occupation:      Employer: [employer]     Comment: part-time   Social Needs    Financial resource strain: Not on file    Food insecurity     Worry: Not on file     Inability: Not on file    Transportation needs     Medical: Not on file     Non-medical: Not on file   Tobacco Use    Smoking status: Never Smoker    Smokeless tobacco: Never Used   Substance and Sexual Activity    Alcohol use: No     Alcohol/week: 0.0 standard drinks    Drug use: No    Sexual  activity: Not Currently     Partners: Male     Birth control/protection: Post-menopausal, Surgical   Lifestyle    Physical activity     Days per week: Not on file     Minutes per session: Not on file    Stress: Not on file   Relationships    Social connections     Talks on phone: Not on file     Gets together: Not on file     Attends Jain service: Not on file     Active member of club or organization: Not on file     Attends meetings of clubs or organizations: Not on file     Relationship status: Not on file   Other Topics Concern    Not on file   Social History Narrative    Not on file       Current Outpatient Medications:     amLODIPine (NORVASC) 10 MG tablet, Take 1 tablet (10 mg total) by mouth once daily., Disp: 90 tablet, Rfl: 3    atorvastatin (LIPITOR) 10 MG tablet, TAKE 1 TABLET EVERY DAY, Disp: 90 tablet, Rfl: 1    bumetanide (BUMEX) 2 MG tablet, Take 1 tablet (2 mg total) by mouth once daily., Disp: 30 tablet, Rfl: 11    citalopram (CELEXA) 10 MG tablet, TAKE 1 TABLET EVERY DAY, Disp: 90 tablet, Rfl: 0    clopidogreL (PLAVIX) 75 mg tablet, TAKE 1 TABLET EVERY DAY, Disp: 90 tablet, Rfl: 0    docusate sodium (COLACE) 100 MG capsule, Take 200 mg by mouth once daily. , Disp: , Rfl:     hydrALAZINE (APRESOLINE) 100 MG tablet, TAKE 1 TABLET BY MOUTH THREE TIMES A DAY, Disp: 270 tablet, Rfl: 1    HYDROcodone-acetaminophen (NORCO) 5-325 mg per tablet, Take 1 tablet by mouth every 6 (six) hours as needed for Pain., Disp: 10 tablet, Rfl: 0    LANCETS MISC, , Disp: , Rfl:     levothyroxine (SYNTHROID) 50 MCG tablet, Take 50 mcg by mouth once daily., Disp: , Rfl:     metoprolol tartrate (LOPRESSOR) 50 MG tablet, Take 1 tablet (50 mg total) by mouth 2 (two) times daily., Disp: 100 tablet, Rfl: 3    MULTIVITAMIN WITH MINERALS (HAIR,SKIN AND NAILS ORAL), Take 3 tablets by mouth once daily., Disp: , Rfl:     oxybutynin (DITROPAN XL) 15 MG TR24, TAKE 1 TABLET EVERY DAY, Disp: 90 tablet, Rfl: 3     polyethylene glycol (GLYCOLAX) 17 gram PwPk, Take 17 g by mouth once daily., Disp: , Rfl: 0    insulin detemir U-100 (LEVEMIR FLEXTOUCH) 100 unit/mL (3 mL) SubQ InPn pen, Inject 5 Units into the skin once daily., Disp: 1.5 mL, Rfl: 1    REVIEW OF SYSTEMS:  General: No fevers or chills; ENT: No sore throat; Allergy and Immunology: no persistent infections; Hematological and Lymphatic: No history of bleeding or easy bruising; Endocrine: negative; Respiratory: no cough, shortness of breath, or wheezing; Cardiovascular: no chest pain or dyspnea on exertion; Gastrointestinal: no abdominal pain/back, change in bowel habits, or bloody stools; Genito-Urinary: no dysuria, trouble voiding, or hematuria; Musculoskeletal: negative; Neurological: no TIA or stroke symptoms; Psychiatric: no nervousness, anxiety or depression.    PHYSICAL EXAM:                General appearance:  Alert, well-appearing, and in no distress.  Oriented to person, place, and time                    Neurological: Normal speech, no focal findings noted; CN II - XII grossly intact. All extremities with sensation to light touch.            Musculoskeletal: Digits/nail without cyanosis/clubbing.  Strength 5/5 all extremities.                    Neck: Supple, no significant adenopathy, no carotid bruit can be auscultated                  Chest:  Clear to auscultation, no wheezes, rales or rhonchi, symmetric air entry. No use of accessory muscles               Cardiac: Normal rate and regular rhythm, S1 and S2 normal            Abdomen: Soft, nontender, nondistended, no masses or organomegaly, no hernia     No rebound tenderness noted; bowel sounds normal     Pulsatile aortic mass is non palpable.     No groin adenopathy      Extremities:      2+ radial pulse bilaterally, LUE AVF +thrill, inc well healed     No BUE edema, Negative Manuel's test BUE    Skin: No tissue loss    LAB RESULTS:  No results found for: CBC  Lab Results   Component Value Date    LABPROT  10.9 11/20/2019    INR 1.0 11/20/2019     Lab Results   Component Value Date     03/06/2020    K 4.7 03/06/2020     03/06/2020    CO2 27 03/06/2020     (H) 03/06/2020    BUN 95 (H) 03/06/2020    CREATININE 6.8 (H) 03/06/2020    CALCIUM 9.4 03/06/2020    ANIONGAP 11 03/06/2020    EGFRNONAA 5.5 (A) 03/06/2020     Lab Results   Component Value Date    WBC 4.42 11/20/2019    RBC 4.37 11/20/2019    HGB 11.0 (L) 11/20/2019    HCT 35.3 (L) 11/20/2019    MCV 81 (L) 11/20/2019    MCH 25.2 (L) 11/20/2019    MCHC 31.2 (L) 11/20/2019    RDW 16.9 (H) 11/20/2019     11/20/2019    MPV 9.3 11/20/2019    GRAN 2.8 11/20/2019    GRAN 62.6 11/20/2019    LYMPH 0.8 (L) 11/20/2019    LYMPH 18.1 11/20/2019    MONO 0.7 11/20/2019    MONO 16.3 (H) 11/20/2019    EOS 0.1 11/20/2019    BASO 0.02 11/20/2019    EOSINOPHIL 2.3 11/20/2019    BASOPHIL 0.5 11/20/2019    DIFFMETHOD Automated 11/20/2019     .  Lab Results   Component Value Date    HGBA1C 6.9 (H) 11/20/2019       IMAGING:  All pertinent imaging has been reviewed and interpreted independently.    Vein mapping 9/2019:  Adequate R superior basilic vein and axillary vein ; Adequate L basilic vein superior and inferior, adequate L axillary     1/27/20 Left upper extremity dialysis ultrasound shows no hemodynamically significant stenosis.  Flow is 1083 ml/min.  Depth and diameter are appropriate.    3/23/20:  Left upper extremity dialysis ultrasound shows anastomotic and proximal fistula hemodynamically significant stenosis.  Flow is 955 ml/min.      7/2020: Left upper extremity dialysis ultrasound shows proximal fistula hemodynamically significant stenosis.  Flow is 1257 ml/min.      IMP/PLAN:  75 y.o. female with   Patient Active Problem List   Diagnosis    Severe obesity (BMI 35.0-39.9) with comorbidity    Sickle cell trait    Osteopenia    Hyperlipidemia LDL goal <100    HUMBERTO on CPAP    Hypothyroidism (acquired)    Hx-TIA (transient ischemic attack)     Vitamin D deficiency disease    Pulmonary hypertension    Type 2 diabetes mellitus with ESRD (end-stage renal disease)    Secondary renal hyperparathyroidism    Incomplete bladder emptying    Postmenopausal atrophic vaginitis    Rectocele    Mixed incontinence urge and stress    Chronic diastolic heart failure    Tortuous aorta    History of TIAs    Essential hypertension    ESRD on hemodialysis    Anemia of chronic disease    Depression    Debility    Chronic respiratory failure    Type 2 diabetes mellitus with foot ulcer, with long-term current use of insulin    Stable proliferative diabetic retinopathy associated with type 2 diabetes mellitus    Mild major depression    Heart failure with preserved ejection fraction    Preop cardiovascular exam    End stage renal disease    Pseudophakia    being managed by PCP and specialists who is here today for evaluation of long term HD access s/p L RC AV fistula.    -s/p L RC AV fistula with proximal fistula measuring 5 mm 1/13/20, adequate flow without issues during HD s/p LUE fistulogram, central venogram, ligation of medial side branch cephalic vein, ligation of lateral side branch cephalic vein 3/2/20 with prox AVF stenosis - rec LUE fistulogram with possible intervention  -To preop - plan for 7/21/20  -Cont renal diet    I spent 8 minutes evaluating this patient and greater than 50% of the time was spent counseling, coordinator care and discussing the plan of care.  All questions were answered and patient stated understanding with agreement with the above treatment plan.    Keron Perez MD [facility]  Vascular and Endovascular Surgery

## 2020-07-07 ENCOUNTER — TELEPHONE (OUTPATIENT)
Dept: VASCULAR SURGERY | Facility: CLINIC | Age: 75
End: 2020-07-07

## 2020-07-07 LAB
HD ANASTAMOSIS LOCATION: NORMAL
HD FISTULA OUTFLOW VEIN VESSEL: NORMAL
HD INFLOW ARTERY VESSEL: NORMAL
RIGHT DIS GRAFT PSV: 115 CM/ SEC
RIGHT INFLOW PSV: 220 CM/S
RIGHT MID GRAFT PSV: 104 CM/S
RIGHT OUTFLOW VEIN PSV: 56 CM/ SEC
RIGHT PROX ANA PSV: 378 CM/S
RIGHT PROX GRAFT PSV: 709 CM/S
RIGHT VOLUME FLOW DIA: 0.9 CM
RIGHT VOLUME FLOW PSV: 1257 ML/MIN

## 2020-07-07 NOTE — TELEPHONE ENCOUNTER
Tried calling pt to give her, her appointment for her covid test pt didn't answer I couldn't leave a voicemail.

## 2020-07-09 ENCOUNTER — OFFICE VISIT (OUTPATIENT)
Dept: PODIATRY | Facility: CLINIC | Age: 75
End: 2020-07-09
Payer: MEDICARE

## 2020-07-09 VITALS
WEIGHT: 211 LBS | DIASTOLIC BLOOD PRESSURE: 73 MMHG | SYSTOLIC BLOOD PRESSURE: 146 MMHG | BODY MASS INDEX: 35.16 KG/M2 | HEIGHT: 65 IN

## 2020-07-09 DIAGNOSIS — B35.1 NAIL DERMATOPHYTOSIS: ICD-10-CM

## 2020-07-09 DIAGNOSIS — L90.9 PLANTAR FAT PAD ATROPHY: ICD-10-CM

## 2020-07-09 DIAGNOSIS — M20.41 HAMMER TOES OF BOTH FEET: ICD-10-CM

## 2020-07-09 DIAGNOSIS — Z99.2 ESRD (END STAGE RENAL DISEASE) ON DIALYSIS: ICD-10-CM

## 2020-07-09 DIAGNOSIS — M20.12 HALLUX ABDUCTO VALGUS, LEFT: ICD-10-CM

## 2020-07-09 DIAGNOSIS — M20.11 HALLUX ABDUCTO VALGUS, RIGHT: ICD-10-CM

## 2020-07-09 DIAGNOSIS — N18.6 ESRD (END STAGE RENAL DISEASE) ON DIALYSIS: ICD-10-CM

## 2020-07-09 DIAGNOSIS — E11.22 TYPE 2 DIABETES MELLITUS WITH STAGE 3 CHRONIC KIDNEY DISEASE, WITH LONG-TERM CURRENT USE OF INSULIN: Primary | ICD-10-CM

## 2020-07-09 DIAGNOSIS — Z79.4 TYPE 2 DIABETES MELLITUS WITH STAGE 3 CHRONIC KIDNEY DISEASE, WITH LONG-TERM CURRENT USE OF INSULIN: Primary | ICD-10-CM

## 2020-07-09 DIAGNOSIS — M20.42 HAMMER TOES OF BOTH FEET: ICD-10-CM

## 2020-07-09 DIAGNOSIS — N18.30 TYPE 2 DIABETES MELLITUS WITH STAGE 3 CHRONIC KIDNEY DISEASE, WITH LONG-TERM CURRENT USE OF INSULIN: Primary | ICD-10-CM

## 2020-07-09 PROCEDURE — 99499 RISK ADDL DX/OHS AUDIT: ICD-10-PCS | Mod: S$GLB,,, | Performed by: PODIATRIST

## 2020-07-09 PROCEDURE — 99499 UNLISTED E&M SERVICE: CPT | Mod: HCNC,S$GLB,, | Performed by: PODIATRIST

## 2020-07-09 PROCEDURE — 11721 DEBRIDE NAIL 6 OR MORE: CPT | Mod: Q9,HCNC,S$GLB, | Performed by: PODIATRIST

## 2020-07-09 PROCEDURE — 11721 PR DEBRIDEMENT OF NAILS, 6 OR MORE: ICD-10-PCS | Mod: Q9,HCNC,S$GLB, | Performed by: PODIATRIST

## 2020-07-09 PROCEDURE — 99999 PR PBB SHADOW E&M-EST. PATIENT-LVL III: CPT | Mod: PBBFAC,HCNC,, | Performed by: PODIATRIST

## 2020-07-09 PROCEDURE — 99499 UNLISTED E&M SERVICE: CPT | Mod: S$GLB,,, | Performed by: PODIATRIST

## 2020-07-09 PROCEDURE — 99999 PR PBB SHADOW E&M-EST. PATIENT-LVL III: ICD-10-PCS | Mod: PBBFAC,HCNC,, | Performed by: PODIATRIST

## 2020-07-09 NOTE — PROGRESS NOTES
Subjective:      Patient ID: Erica Leblanc is a 75 y.o. female.    Chief Complaint: Diabetes Mellitus (PCP  (11/20/2019 last seen NP Don)) and Foot Problem    Erica Leblanc is a 75 y.o. femalewho presents to the clinic for evaluation and treatment of high risk feet. Erica Leblanc   has a past medical history of Acute respiratory failure with hypoxia, Anemia of chronic kidney failure, stage 4 (severe) (4/5/2019), Cataracts, bilateral, CHF (congestive heart failure), CKD (chronic kidney disease) stage 3, GFR 30-59 ml/min, CKD (chronic kidney disease) stage 3, GFR 30-59 ml/min, Controlled type 2 diabetes mellitus with proteinuria or albuminuria, Depression, Diabetes with neurologic complications, Diabetic retinopathy of both eyes, Edema, Glaucoma, History of colonic polyps, TIA (transient ischemic attack) (11/2008), Hyperlipidemia LDL goal < 100, Hypertension, Hypothyroidism, Major depressive disorder, single episode, mild (2/17/2016), Mixed incontinence urge and stress, Obesity, Obstructive sleep apnea on CPAP, Osteopenia, Proteinuria, Sickle cell trait, Strabismus, TIA (transient ischemic attack), Trouble in sleeping, Type 2 diabetes mellitus with ophthalmic manifestations, Type 2 diabetes with stage 3 chronic kidney disease GFR 30-59, Type II or unspecified type diabetes mellitus with renal manifestations, uncontrolled(250.42), Uncontrolled type 2 diabetes mellitus with peripheral circulatory disorder (4/5/2019), Urge incontinence (1/11/2016), Urge incontinence, Venous stasis ulcer, and Vitamin D deficiency disease.  The patient's chief complaint is long, thick toenails. This patient has documented high risk feet requiring routine maintenance secondary to diabetes mellitis and those secondary complications of diabetes, as mentioned..    PCP: Sendy Elaine MD    Date Last Seen by PCP:   Chief Complaint   Patient presents with    Diabetes Mellitus     PCP  (11/20/2019 last  seen NP Don)    Foot Problem     Current shoe gear:  Casual slide on shoes     Hemoglobin A1C   Date Value Ref Range Status   11/20/2019 6.9 (H) 4.0 - 5.6 % Final     Comment:     ADA Screening Guidelines:  5.7-6.4%  Consistent with prediabetes  >or=6.5%  Consistent with diabetes  High levels of fetal hemoglobin interfere with the HbA1C  assay. Heterozygous hemoglobin variants (HbS, HgC, etc)do  not significantly interfere with this assay.   However, presence of multiple variants may affect accuracy.     08/27/2019 8.6 (H) 4.0 - 5.6 % Final     Comment:     ADA Screening Guidelines:  5.7-6.4%  Consistent with prediabetes  >or=6.5%  Consistent with diabetes  High levels of fetal hemoglobin interfere with the HbA1C  assay. Heterozygous hemoglobin variants (HbS, HgC, etc)do  not significantly interfere with this assay.   However, presence of multiple variants may affect accuracy.     07/20/2019 7.3 (H) 4.0 - 5.6 % Final     Comment:     ADA Screening Guidelines:  5.7-6.4%  Consistent with prediabetes  >or=6.5%  Consistent with diabetes  High levels of fetal hemoglobin interfere with the HbA1C  assay. Heterozygous hemoglobin variants (HbS, HgC, etc)do  not significantly interfere with this assay.   However, presence of multiple variants may affect accuracy.         Patient Active Problem List   Diagnosis    Severe obesity (BMI 35.0-39.9) with comorbidity    Sickle cell trait    Osteopenia    Hyperlipidemia LDL goal <100    HUMBERTO on CPAP    Hypothyroidism (acquired)    Hx-TIA (transient ischemic attack)    Vitamin D deficiency disease    Pulmonary hypertension    Type 2 diabetes mellitus with ESRD (end-stage renal disease)    Secondary renal hyperparathyroidism    Incomplete bladder emptying    Postmenopausal atrophic vaginitis    Rectocele    Mixed incontinence urge and stress    Chronic diastolic heart failure    Tortuous aorta    History of TIAs    Essential hypertension    ESRD on hemodialysis     Anemia of chronic disease    Depression    Debility    Chronic respiratory failure    Type 2 diabetes mellitus with foot ulcer, with long-term current use of insulin    Stable proliferative diabetic retinopathy associated with type 2 diabetes mellitus    Mild major depression    Heart failure with preserved ejection fraction    Preop cardiovascular exam    End stage renal disease    Pseudophakia     Current Outpatient Medications on File Prior to Visit   Medication Sig Dispense Refill    amLODIPine (NORVASC) 10 MG tablet Take 1 tablet (10 mg total) by mouth once daily. 90 tablet 3    atorvastatin (LIPITOR) 10 MG tablet TAKE 1 TABLET EVERY DAY 90 tablet 1    bumetanide (BUMEX) 2 MG tablet Take 1 tablet (2 mg total) by mouth once daily. 30 tablet 11    citalopram (CELEXA) 10 MG tablet TAKE 1 TABLET EVERY DAY 90 tablet 0    clopidogreL (PLAVIX) 75 mg tablet TAKE 1 TABLET EVERY DAY 90 tablet 0    docusate sodium (COLACE) 100 MG capsule Take 200 mg by mouth once daily.       hydrALAZINE (APRESOLINE) 100 MG tablet TAKE 1 TABLET BY MOUTH THREE TIMES A  tablet 1    HYDROcodone-acetaminophen (NORCO) 5-325 mg per tablet Take 1 tablet by mouth every 6 (six) hours as needed for Pain. 10 tablet 0    insulin detemir U-100 (LEVEMIR FLEXTOUCH) 100 unit/mL (3 mL) SubQ InPn pen Inject 5 Units into the skin once daily. 1.5 mL 1    LANCETS MISC       levothyroxine (SYNTHROID) 50 MCG tablet Take 50 mcg by mouth once daily.      metoprolol tartrate (LOPRESSOR) 50 MG tablet Take 1 tablet (50 mg total) by mouth 2 (two) times daily. 100 tablet 3    MULTIVITAMIN WITH MINERALS (HAIR,SKIN AND NAILS ORAL) Take 3 tablets by mouth once daily.      oxybutynin (DITROPAN XL) 15 MG TR24 TAKE 1 TABLET EVERY DAY 90 tablet 3    polyethylene glycol (GLYCOLAX) 17 gram PwPk Take 17 g by mouth once daily.  0     No current facility-administered medications on file prior to visit.      Review of patient's allergies  indicates:   Allergen Reactions    Ace inhibitors Other (See Comments)     Other reaction(s): cough     Past Surgical History:   Procedure Laterality Date    AV FISTULA PLACEMENT Left 2019    Procedure: CREATION, AV FISTULA, LEFT UPPER EXTREMITY;  Surgeon: Keron Perez MD;  Location: [facility] OR;  Service: Vascular;  Laterality: Left;    BREAST BIOPSY      breast reduction Bilateral age 31    BREAST SURGERY      CATARACT EXTRACTION Bilateral     cataracts Bilateral      SECTION, LOW TRANSVERSE      x1    CHOLECYSTECTOMY      EYE SURGERY  2014, 2014    vitrectomy    EYE SURGERY Right 2016    FISTULOGRAM Left 3/6/2020    Procedure: Fistulogram, left upper extremity, with branch ligation;  Surgeon: Keron Perez MD;  Location: [address];  Service: Vascular;  Laterality: Left;  Time 1.1 Minute  42.10 mGy    HYSTERECTOMY      TAHBSO (patient is unsure if ovaries removed)    OOPHORECTOMY      REFRACTIVE SURGERY      TOTAL REDUCTION MAMMOPLASTY      approx 10 yrs ago     Family History   Problem Relation Age of Onset    Leukemia Father     Cataracts Father     Ovarian cancer Sister 35    Stroke Mother     Diabetes Mother     Hypertension Mother     Cataracts Mother     Diabetes Paternal Grandmother     Cataracts Paternal Grandmother     Breast cancer Maternal Aunt 65    No Known Problems Paternal Grandfather     Cataracts Maternal Grandmother     Cataracts Maternal Grandfather     HIV Brother     Achondroplasia Sister     Parkinsonism Maternal Aunt     Esophageal cancer Maternal Uncle         smoker    No Known Problems Paternal Aunt     Cataracts Paternal Uncle     Amblyopia Neg Hx     Blindness Neg Hx     Glaucoma Neg Hx     Macular degeneration Neg Hx     Retinal detachment Neg Hx     Strabismus Neg Hx     Thyroid disease Neg Hx     Colon cancer Neg Hx     Cancer Neg Hx        Review of Systems   Constitution: Negative for chills  "and fever.   Cardiovascular: Positive for leg swelling. Negative for chest pain and claudication.   Respiratory: Negative for cough and shortness of breath.    Skin: Positive for dry skin and nail changes. Negative for itching and rash.   Musculoskeletal: Positive for arthritis and joint pain. Negative for falls, joint swelling, muscle cramps and muscle weakness.   Gastrointestinal: Negative for diarrhea, nausea and vomiting.   Neurological: Positive for numbness and paresthesias. Negative for tremors and weakness.   Psychiatric/Behavioral: Negative for altered mental status and hallucinations.         Objective:       Vitals:    07/09/20 1539   BP: (!) 146/73   Weight: 95.7 kg (211 lb)   Height: 5' 5" (1.651 m)   PainSc: 0-No pain     Physical Exam  Vitals signs and nursing note reviewed.   Constitutional:       Appearance: She is not diaphoretic.      Comments: General: Pt. is well-developed, well-nourished, appears stated age, in no acute distress, alert and oriented x 3. No evidence of depression, anxiety, or agitation. Calm, cooperative, and communicative. Appropriate interactions and affect.       Cardiovascular:      Pulses:           Dorsalis pedis pulses are 1+ on the right side and 1+ on the left side.        Posterior tibial pulses are 1+ on the right side and 1+ on the left side.      Comments: There is decreased digital hair.   Musculoskeletal:      Right ankle: She exhibits swelling. No tenderness. Achilles tendon exhibits no pain and no defect.      Left ankle: She exhibits swelling. No tenderness. Achilles tendon exhibits no pain and no defect.      Right foot: Normal range of motion. No tenderness.      Left foot: Normal range of motion. No tenderness.      Comments: Muscle strength is 5/5 in all groups bilaterally.    Decreased stride, station of gait.  apropulsive toe off.  Increased angle and base of gait.    Patient has hammertoes of digits 2-5 bilateral partially reducible without symptom " today.    Visible and palpable bunion without pain at dorsomedial 1st metatarsal head right and left.  Hallux abducted right and left partially reducible, tracks laterally without being track bound.  No ecchymosis, erythema, edema, or cardinal signs infection or signs of trauma same foot.    Fat pad atrophy to heels and met heads bilateral     Skin:     General: Skin is warm and dry.      Coloration: Skin is not pale.      Findings: No abrasion, ecchymosis, erythema, lesion (posterior right leg healed) or rash.      Nails: There is no clubbing.        Comments: Xerosis bilaterally     Toenails 1-5 bilaterally are elongated by 2-3 mm, thickened by 2-3 mm, discolored/yellowed, dystrophic, brittle with subungual debris.     Interdigital Spaces clean, dry and without evidence of break in skin integrity   Neurological:      Sensory: No sensory deficit.      Comments: Limestone-Gloria 5.07 monofilament is intact bilateral feet. Sharp/dull sensation is also intact Bilateral feet.           Psychiatric:         Speech: Speech normal.             Assessment:       Encounter Diagnoses   Name Primary?    Type 2 diabetes mellitus with stage 3 chronic kidney disease, with long-term current use of insulin Yes    Hammer toes of both feet     Plantar fat pad atrophy     Hallux abducto valgus, left     Hallux abducto valgus, right     Nail dermatophytosis     ESRD (end stage renal disease) on dialysis          Plan:       Erica was seen today for diabetes mellitus and foot problem.    Diagnoses and all orders for this visit:    Type 2 diabetes mellitus with stage 3 chronic kidney disease, with long-term current use of insulin    Hammer toes of both feet    Plantar fat pad atrophy    Hallux abducto valgus, left    Hallux abducto valgus, right    Nail dermatophytosis    ESRD (end stage renal disease) on dialysis      I counseled the patient on her conditions, their implications and medical management. Education about the  diabetic foot, neuropathy, and prevention of limb loss.      Greater than 50% of this visit spent on counseling and coordination of care.    Education about the diabetic foot, neuropathy, and prevention of limb loss.    Shoe inspection. Diabetic Foot Education. Patient reminded of the importance of good nutrition/healthy diet/weight management and blood sugar control to help prevent podiatric complications of diabetes. Patient instructed on proper foot hygeine. Wear comfortable, proper fitting shoes. Wash feet daily. Dry well. After drying, apply moisturizer to feet (no lotion to webspaces). Inspect feet daily for skin breaks, blisters, swelling, or redness. Wear cotton socks (preferably white)  Change socks every day. Do NOT walk barefoot. Do NOT use heating pads or hot water soaks. We discussed wearing proper shoe gear, daily foot inspections, never walking without protective shoe gear.     Discussed edema control and the importance of daily moisturizer to the feet such as Gold bonds diabetic foot cream    Recommend applying vicks vaporub to thick abnormal toenails daily x 6 months to treat fungal nail infection.    With patient's permission, nails were aggressively reduced and debrided x 10 to their soft tissue attachment mechanically and with electric , removing all offending nail and debris. Patient relates relief following the procedure.     She will continue to monitor the areas daily, inspect her feet, wear protective shoe gear when ambulatory, moisturizer to maintain skin integrity and follow in this office in approximately 4-6 months, sooner p.r.n.

## 2020-07-13 ENCOUNTER — TELEPHONE (OUTPATIENT)
Dept: VASCULAR SURGERY | Facility: CLINIC | Age: 75
End: 2020-07-13

## 2020-07-13 NOTE — TELEPHONE ENCOUNTER
Daughter called and stated that her mothers phone has not been working. Explained that her mom did need a covid test. Gave date and time of appointment. Updated phone numbers in the system. She stated that preop can call her son's number first and then her if cannot get her mom. She was unsure of when her phone would be working again.

## 2020-07-17 RX ORDER — METOPROLOL TARTRATE 50 MG/1
50 TABLET ORAL 2 TIMES DAILY
Qty: 180 TABLET | Refills: 0 | Status: ON HOLD | OUTPATIENT
Start: 2020-07-17 | End: 2021-06-09 | Stop reason: HOSPADM

## 2020-07-18 ENCOUNTER — LAB VISIT (OUTPATIENT)
Dept: URGENT CARE | Facility: CLINIC | Age: 75
End: 2020-07-18
Payer: MEDICARE

## 2020-07-18 VITALS — TEMPERATURE: 97 F

## 2020-07-18 DIAGNOSIS — T82.858A ARTERIOVENOUS FISTULA STENOSIS, INITIAL ENCOUNTER: ICD-10-CM

## 2020-07-18 PROCEDURE — U0003 INFECTIOUS AGENT DETECTION BY NUCLEIC ACID (DNA OR RNA); SEVERE ACUTE RESPIRATORY SYNDROME CORONAVIRUS 2 (SARS-COV-2) (CORONAVIRUS DISEASE [COVID-19]), AMPLIFIED PROBE TECHNIQUE, MAKING USE OF HIGH THROUGHPUT TECHNOLOGIES AS DESCRIBED BY CMS-2020-01-R: HCPCS | Mod: HCNC

## 2020-07-19 LAB — SARS-COV-2 RNA RESP QL NAA+PROBE: NOT DETECTED

## 2020-07-20 NOTE — PRE-PROCEDURE INSTRUCTIONS
PRE-OP INSTRUCTIONS GIVEN TO PATIENT'S SON:Instructed to have nothing by mouth past midnight.It is ok to take AM medications with a few sips of water.Instructed to shower the night before surgery as well as the morning of surgery with an antibacterial soap like dial or hibiclens from the neck down.Encouraged to wear loose fitting, comfortable clothing .Medication instructions for pm prior to and am of surgery reviewed.(Patient's son wasn't sure what time of the day the patient took all of the medications)Instructed to avoid taking vitamins,supplements,aspirin or ibuprofen the am of surgery.Patient's son stated an understanding.Informed of the current visitor policy and advised that one visitor may accompany them into the hospital and wait (socially distanced) until a member of the medical team provides an update at the conclusion of the procedure.When they enter the hospital both patient and visitor will have their temperature checked.All visitors are asked to arrive with a mask and to keep their mask on throughout the visit.    Covid test completed 7/18/2020-Negative    Patient's son denies patient having any side effects or issues with anesthesia or sedation.    Patient's son does not know arrival time.Explained that this information comes from the surgeon's office and if they haven't heard from them by 2 or 3 pm to call the office.Patient's son stated an understanding.

## 2020-07-21 ENCOUNTER — ANESTHESIA (OUTPATIENT)
Dept: SURGERY | Facility: HOSPITAL | Age: 75
End: 2020-07-21
Payer: MEDICARE

## 2020-07-21 ENCOUNTER — HOSPITAL ENCOUNTER (OUTPATIENT)
Facility: HOSPITAL | Age: 75
Discharge: HOME | End: 2020-07-21
Attending: SURGERY | Admitting: SURGERY
Payer: MEDICARE

## 2020-07-21 ENCOUNTER — ANESTHESIA EVENT (OUTPATIENT)
Dept: SURGERY | Facility: HOSPITAL | Age: 75
End: 2020-07-21
Payer: MEDICARE

## 2020-07-21 VITALS
WEIGHT: 206 LBS | TEMPERATURE: 99 F | HEIGHT: 65 IN | HEART RATE: 80 BPM | OXYGEN SATURATION: 94 % | SYSTOLIC BLOOD PRESSURE: 151 MMHG | DIASTOLIC BLOOD PRESSURE: 70 MMHG | RESPIRATION RATE: 17 BRPM | BODY MASS INDEX: 34.32 KG/M2

## 2020-07-21 DIAGNOSIS — T82.858A ARTERIOVENOUS FISTULA STENOSIS: Primary | ICD-10-CM

## 2020-07-21 DIAGNOSIS — T82.858D ARTERIOVENOUS FISTULA STENOSIS, SUBSEQUENT ENCOUNTER: ICD-10-CM

## 2020-07-21 LAB
GLUCOSE SERPL-MCNC: 133 MG/DL (ref 70–110)
HCO3 UR-SCNC: 28.2 MMOL/L (ref 24–28)
HCT VFR BLD CALC: 35 %PCV (ref 36–54)
PCO2 BLDA: 40.3 MMHG (ref 35–45)
PH SMN: 7.45 [PH] (ref 7.35–7.45)
PO2 BLDA: 47 MMHG (ref 40–60)
POC BE: 4 MMOL/L
POC IONIZED CALCIUM: 1.14 MMOL/L (ref 1.06–1.42)
POC SATURATED O2: 85 % (ref 95–100)
POC TCO2: 29 MMOL/L (ref 24–29)
POCT GLUCOSE: 102 MG/DL (ref 70–110)
POCT GLUCOSE: 132 MG/DL (ref 70–110)
POTASSIUM BLD-SCNC: 3.5 MMOL/L (ref 3.5–5.1)
SAMPLE: ABNORMAL
SODIUM BLD-SCNC: 141 MMOL/L (ref 136–145)

## 2020-07-21 PROCEDURE — D9220A PRA ANESTHESIA: Mod: HCNC,ANES,, | Performed by: ANESTHESIOLOGY

## 2020-07-21 PROCEDURE — 63600175 PHARM REV CODE 636 W HCPCS: Mod: HCNC | Performed by: NURSE ANESTHETIST, CERTIFIED REGISTERED

## 2020-07-21 PROCEDURE — 63600175 PHARM REV CODE 636 W HCPCS: Mod: HCNC | Performed by: SURGERY

## 2020-07-21 PROCEDURE — 82962 GLUCOSE BLOOD TEST: CPT | Mod: HCNC | Performed by: SURGERY

## 2020-07-21 PROCEDURE — 36902 PR INTRO CATH, DIALYSIS CIRCUIT W/TRANSLML BALLOON ANGIO: ICD-10-PCS | Mod: HCNC,,, | Performed by: SURGERY

## 2020-07-21 PROCEDURE — C1894 INTRO/SHEATH, NON-LASER: HCPCS | Mod: HCNC | Performed by: SURGERY

## 2020-07-21 PROCEDURE — 25000003 PHARM REV CODE 250: Mod: HCNC | Performed by: SURGERY

## 2020-07-21 PROCEDURE — 36902 INTRO CATH DIALYSIS CIRCUIT: CPT | Mod: HCNC,,, | Performed by: SURGERY

## 2020-07-21 PROCEDURE — D9220A PRA ANESTHESIA: Mod: HCNC,CRNA,, | Performed by: NURSE ANESTHETIST, CERTIFIED REGISTERED

## 2020-07-21 PROCEDURE — 27201423 OPTIME MED/SURG SUP & DEVICES STERILE SUPPLY: Mod: HCNC | Performed by: SURGERY

## 2020-07-21 PROCEDURE — 36000707: Mod: HCNC | Performed by: SURGERY

## 2020-07-21 PROCEDURE — 36000706: Mod: HCNC | Performed by: SURGERY

## 2020-07-21 PROCEDURE — 71000016 HC POSTOP RECOV ADDL HR: Mod: HCNC | Performed by: SURGERY

## 2020-07-21 PROCEDURE — 71000015 HC POSTOP RECOV 1ST HR: Mod: HCNC | Performed by: SURGERY

## 2020-07-21 PROCEDURE — C1769 GUIDE WIRE: HCPCS | Mod: HCNC | Performed by: SURGERY

## 2020-07-21 PROCEDURE — C1725 CATH, TRANSLUMIN NON-LASER: HCPCS | Mod: HCNC | Performed by: SURGERY

## 2020-07-21 PROCEDURE — 37000009 HC ANESTHESIA EA ADD 15 MINS: Mod: HCNC | Performed by: SURGERY

## 2020-07-21 PROCEDURE — 37000008 HC ANESTHESIA 1ST 15 MINUTES: Mod: HCNC | Performed by: SURGERY

## 2020-07-21 PROCEDURE — D9220A PRA ANESTHESIA: ICD-10-PCS | Mod: HCNC,CRNA,, | Performed by: NURSE ANESTHETIST, CERTIFIED REGISTERED

## 2020-07-21 PROCEDURE — D9220A PRA ANESTHESIA: ICD-10-PCS | Mod: HCNC,ANES,, | Performed by: ANESTHESIOLOGY

## 2020-07-21 PROCEDURE — 25500020 PHARM REV CODE 255: Mod: HCNC | Performed by: SURGERY

## 2020-07-21 RX ORDER — IODIXANOL 320 MG/ML
INJECTION, SOLUTION INTRAVASCULAR
Status: DISCONTINUED | OUTPATIENT
Start: 2020-07-21 | End: 2020-07-21 | Stop reason: HOSPADM

## 2020-07-21 RX ORDER — HYDROMORPHONE HYDROCHLORIDE 1 MG/ML
0.2 INJECTION, SOLUTION INTRAMUSCULAR; INTRAVENOUS; SUBCUTANEOUS EVERY 5 MIN PRN
Status: DISCONTINUED | OUTPATIENT
Start: 2020-07-21 | End: 2020-07-21 | Stop reason: HOSPADM

## 2020-07-21 RX ORDER — CEFAZOLIN SODIUM 1 G/3ML
2 INJECTION, POWDER, FOR SOLUTION INTRAMUSCULAR; INTRAVENOUS ONCE
Status: COMPLETED | OUTPATIENT
Start: 2020-07-21 | End: 2020-07-21

## 2020-07-21 RX ORDER — HEPARIN SODIUM 1000 [USP'U]/ML
INJECTION, SOLUTION INTRAVENOUS; SUBCUTANEOUS
Status: DISCONTINUED | OUTPATIENT
Start: 2020-07-21 | End: 2020-07-21

## 2020-07-21 RX ORDER — MIDAZOLAM HYDROCHLORIDE 1 MG/ML
INJECTION, SOLUTION INTRAMUSCULAR; INTRAVENOUS
Status: DISCONTINUED | OUTPATIENT
Start: 2020-07-21 | End: 2020-07-21

## 2020-07-21 RX ORDER — SODIUM CHLORIDE 9 MG/ML
INJECTION, SOLUTION INTRAVENOUS CONTINUOUS
Status: DISCONTINUED | OUTPATIENT
Start: 2020-07-21 | End: 2020-07-21 | Stop reason: HOSPADM

## 2020-07-21 RX ORDER — SODIUM CHLORIDE 0.9 % (FLUSH) 0.9 %
3 SYRINGE (ML) INJECTION
Status: DISCONTINUED | OUTPATIENT
Start: 2020-07-21 | End: 2020-07-21 | Stop reason: HOSPADM

## 2020-07-21 RX ORDER — DIPHENHYDRAMINE HYDROCHLORIDE 50 MG/ML
INJECTION INTRAMUSCULAR; INTRAVENOUS
Status: DISCONTINUED | OUTPATIENT
Start: 2020-07-21 | End: 2020-07-21

## 2020-07-21 RX ORDER — HEPARIN SODIUM 1000 [USP'U]/ML
INJECTION, SOLUTION INTRAVENOUS; SUBCUTANEOUS
Status: DISCONTINUED | OUTPATIENT
Start: 2020-07-21 | End: 2020-07-21 | Stop reason: HOSPADM

## 2020-07-21 RX ORDER — FENTANYL CITRATE 50 UG/ML
INJECTION, SOLUTION INTRAMUSCULAR; INTRAVENOUS
Status: DISCONTINUED | OUTPATIENT
Start: 2020-07-21 | End: 2020-07-21

## 2020-07-21 RX ORDER — LIDOCAINE HYDROCHLORIDE 10 MG/ML
INJECTION, SOLUTION EPIDURAL; INFILTRATION; INTRACAUDAL; PERINEURAL
Status: DISCONTINUED | OUTPATIENT
Start: 2020-07-21 | End: 2020-07-21 | Stop reason: HOSPADM

## 2020-07-21 RX ADMIN — FENTANYL CITRATE 25 MCG: 50 INJECTION, SOLUTION INTRAMUSCULAR; INTRAVENOUS at 10:07

## 2020-07-21 RX ADMIN — MIDAZOLAM HYDROCHLORIDE 1 MG: 1 INJECTION, SOLUTION INTRAMUSCULAR; INTRAVENOUS at 10:07

## 2020-07-21 RX ADMIN — DIPHENHYDRAMINE HYDROCHLORIDE 12.5 MG: 50 INJECTION, SOLUTION INTRAMUSCULAR; INTRAVENOUS at 11:07

## 2020-07-21 RX ADMIN — SODIUM CHLORIDE: 0.9 INJECTION, SOLUTION INTRAVENOUS at 09:07

## 2020-07-21 RX ADMIN — HEPARIN SODIUM 3000 UNITS: 1000 INJECTION, SOLUTION INTRAVENOUS; SUBCUTANEOUS at 10:07

## 2020-07-21 RX ADMIN — CEFAZOLIN 2 G: 330 INJECTION, POWDER, FOR SOLUTION INTRAMUSCULAR; INTRAVENOUS at 10:07

## 2020-07-21 NOTE — BRIEF OP NOTE
[facility]  Brief Operative Note    Surgery Date: 7/21/2020     Surgeon(s) and Role:     * Keron Perez MD - Primary    Assistant: KENZIE Arias    Pre-op Diagnosis:  Arteriovenous fistula stenosis, initial encounter [T82.858A]    Post-op Diagnosis:  Post-Op Diagnosis Codes:     * Arteriovenous fistula stenosis, initial encounter [T82.858A]    Procedure:  1. US guided L radial artery access  2. LUE fistulogram   3. L proximal fistula angioplasty with a 5x40 [place] balloon   4. L proximal fistula angioplasty with a 6x20 [place] balloon  5. L proximal fistula angioplasty with a 6x20 [place] balloon  6. L proximal fistula angioplasty with a 6x40 [place] balloon  7. Central venogram    Anesthesia: Local MAC    Description of the findings of the procedure(s): adequate artery for access, improved flow after intervention    Estimated Blood Loss: 5 mL         Specimens:   Specimen (12h ago, onward)    None            Discharge Note    OUTCOME: Patient tolerated treatment/procedure well without complication and is now ready for discharge.    DISPOSITION: Home or Self Care    FINAL DIAGNOSIS:  <principal problem not specified>    FOLLOWUP: In clinic    DISCHARGE INSTRUCTIONS:  No discharge procedures on file.

## 2020-07-21 NOTE — INTERVAL H&P NOTE
The patient has been examined and the H&P has been reviewed:    I concur with the findings and no changes have occurred since H&P was written.    Anesthesia/Surgery risks, benefits and alternative options discussed and understood by patient/family.          Active Hospital Problems    Diagnosis  POA    Arteriovenous fistula stenosis [A92.350J]  Yes      Resolved Hospital Problems   No resolved problems to display.

## 2020-07-21 NOTE — PROGRESS NOTES
Spoke with resident on call for Dr. Perez regarding having released the patient TR band per protocol then attempted 3 times after that and wrist is still bleeding slightly when TR band is removed completely. Resident stated to leave TR band on for 30min more. If still bleeding notify again.    Pphone call placed again to Dr. Perez regarding wrist still bleeding when TR band is release completely. MD stated to call and have Dr. Trujillo come and see her at bedside.

## 2020-07-21 NOTE — ANESTHESIA POSTPROCEDURE EVALUATION
Anesthesia Post Evaluation    Patient: Erica Leblanc    Procedure(s) Performed: Procedure(s) (LRB):  Fistulogram, left upper extremity, transradial access with possible intervention (Left)  CENTRAL VENOGRAM (Left)  PTA, AV FISTULA (Left)    Final Anesthesia Type: MAC    Patient location during evaluation: PACU  Patient participation: Yes- Able to Participate  Level of consciousness: awake and alert and oriented  Post-procedure vital signs: reviewed and stable  Pain management: adequate  Airway patency: patent    PONV status at discharge: No PONV  Anesthetic complications: no      Cardiovascular status: stable  Respiratory status: unassisted, spontaneous ventilation and room air  Hydration status: euvolemic  Follow-up not needed.          Vitals Value Taken Time   /66 07/21/20 1243   Temp 37.1 °C (98.7 °F) 07/21/20 1158   Pulse 84 07/21/20 1243   Resp 18 07/21/20 1243   SpO2 91 % 07/21/20 1243   Vitals shown include unvalidated device data.      No case tracking events are documented in the log.      Pain/Lyndsey Score: Lyndsey Score: 9 (7/21/2020 11:58 AM)

## 2020-07-21 NOTE — ANESTHESIA PREPROCEDURE EVALUATION
07/21/2020  Erica Leblanc is a 75 y.o., female.      Pre-operative evaluation for Procedure(s) (LRB):  Fistulogram, left upper extremity, transradial access with possible intervention (Left)    Encounter Diagnoses   Name Primary?    Arteriovenous fistula stenosis, subsequent encounter Yes    Arteriovenous fistula stenosis        Review of patient's allergies indicates:   Allergen Reactions    Ace inhibitors Other (See Comments)     Other reaction(s): cough       No current facility-administered medications on file prior to encounter.      Current Outpatient Medications on File Prior to Encounter   Medication Sig Dispense Refill    amLODIPine (NORVASC) 10 MG tablet Take 1 tablet (10 mg total) by mouth once daily. (Patient taking differently: Take 10 mg by mouth every morning. ) 90 tablet 3    atorvastatin (LIPITOR) 10 MG tablet TAKE 1 TABLET EVERY DAY 90 tablet 1    bumetanide (BUMEX) 2 MG tablet Take 1 tablet (2 mg total) by mouth once daily. 30 tablet 11    citalopram (CELEXA) 10 MG tablet TAKE 1 TABLET EVERY DAY 90 tablet 0    clopidogreL (PLAVIX) 75 mg tablet TAKE 1 TABLET EVERY DAY 90 tablet 0    docusate sodium (COLACE) 100 MG capsule Take 200 mg by mouth once daily.       hydrALAZINE (APRESOLINE) 100 MG tablet TAKE 1 TABLET BY MOUTH THREE TIMES A  tablet 1    insulin detemir U-100 (LEVEMIR FLEXTOUCH) 100 unit/mL (3 mL) SubQ InPn pen Inject 5 Units into the skin once daily. (Patient taking differently: Inject 5 Units into the skin every morning. ) 1.5 mL 1    levothyroxine (SYNTHROID) 50 MCG tablet Take 50 mcg by mouth before breakfast.       HYDROcodone-acetaminophen (NORCO) 5-325 mg per tablet Take 1 tablet by mouth every 6 (six) hours as needed for Pain. 10 tablet 0    LANCETS MISC       MULTIVITAMIN WITH MINERALS (HAIR,SKIN AND NAILS ORAL) Take 3 tablets by mouth every  morning.       oxybutynin (DITROPAN XL) 15 MG TR24 TAKE 1 TABLET EVERY DAY 90 tablet 3    polyethylene glycol (GLYCOLAX) 17 gram PwPk Take 17 g by mouth once daily.  0       Social History     Tobacco Use   Smoking Status Never Smoker   Smokeless Tobacco Never Used       Social History     Substance and Sexual Activity   Alcohol Use No    Alcohol/week: 0.0 standard drinks       Patient Active Problem List   Diagnosis    Severe obesity (BMI 35.0-39.9) with comorbidity    Sickle cell trait    Osteopenia    Hyperlipidemia LDL goal <100    HUMBERTO on CPAP    Hypothyroidism (acquired)    Hx-TIA (transient ischemic attack)    Vitamin D deficiency disease    Pulmonary hypertension    Type 2 diabetes mellitus with ESRD (end-stage renal disease)    Secondary renal hyperparathyroidism    Incomplete bladder emptying    Postmenopausal atrophic vaginitis    Rectocele    Mixed incontinence urge and stress    Chronic diastolic heart failure    Tortuous aorta    History of TIAs    Essential hypertension    ESRD on hemodialysis    Anemia of chronic disease    Depression    Debility    Chronic respiratory failure    Type 2 diabetes mellitus with foot ulcer, with long-term current use of insulin    Stable proliferative diabetic retinopathy associated with type 2 diabetes mellitus    Mild major depression    Heart failure with preserved ejection fraction    Preop cardiovascular exam    End stage renal disease    Pseudophakia    Arteriovenous fistula stenosis       Past Surgical History:   Procedure Laterality Date    AV FISTULA PLACEMENT Left 2019    Procedure: CREATION, AV FISTULA, LEFT UPPER EXTREMITY;  Surgeon: Keron Perez MD;  Location: [facility] OR;  Service: Vascular;  Laterality: Left;    BREAST BIOPSY      breast reduction Bilateral age 31    BREAST SURGERY      CATARACT EXTRACTION Bilateral     cataracts Bilateral      SECTION, LOW TRANSVERSE      x1    CHOLECYSTECTOMY       EYE SURGERY  1/2014, 6/2014    vitrectomy    EYE SURGERY Right 08/17/2016    FISTULOGRAM Left 3/6/2020    Procedure: Fistulogram, left upper extremity, with branch ligation;  Surgeon: Keron Perez MD;  Location: [facility] OR [address] [place] [address];  Service: Vascular;  Laterality: Left;  Time 1.1 Minute  42.10 mGy    HYSTERECTOMY  1986    TAHBSO (patient is unsure if ovaries removed)    OOPHORECTOMY      REFRACTIVE SURGERY      TOTAL REDUCTION MAMMOPLASTY      approx 10 yrs ago           No results for input(s): HCT in the last 72 hours.  No results for input(s): PLT in the last 72 hours.  No results for input(s): K in the last 72 hours.  No results for input(s): CREATININE in the last 72 hours.  No results for input(s): GLU in the last 72 hours.  No results for input(s): PT in the last 72 hours.                    Anesthesia Evaluation          Review of Systems  Anesthesia Hx:  No problems with previous Anesthesia   Hematology/Oncology:     Oncology Normal   Hematology Comments: On plavix she thinks for fistula, Last took yesterday    Cardiovascular:   Hypertension, well controlled Denies MI.    Denies Angina. CHF    Pulmonary:   Denies COPD.  Denies Asthma. Shortness of breath Sleep Apnea, CPAP    Renal/:   Chronic Renal Disease, ESRD, Dialysis T,th,sa, last hd yesterday, feels in good fluid balance.  Reports they take her pottassium once a month at dialysis and it is always good.   Hepatic/GI:   Denies Liver Disease.    Neurological:   TIA, (2010, complete recoverly) Denies CVA. Denies Seizures.    Endocrine:   Denies Diabetes.        Physical Exam  General:  Well nourished    Airway/Jaw/Neck:  Airway Findings: Mouth Opening: Normal Tongue: Normal  General Airway Assessment: Adult, Average  Mallampati: III  TM Distance: Normal, at least 6 cm  Jaw/Neck Findings:  Neck ROM: Normal ROM            Mental Status:  Mental Status Findings:  Cooperative, Alert and Oriented         Anesthesia Plan  Type of Anesthesia,  risks & benefits discussed:  Anesthesia Type:  general  Patient's Preference:   Intra-op Monitoring Plan:   Intra-op Monitoring Plan Comments:   Post Op Pain Control Plan:   Post Op Pain Control Plan Comments: As per surgeon's plan  Induction:   IV  Beta Blocker:  Patient is not currently on a Beta-Blocker (No further documentation required).       Informed Consent: Patient understands risks and agrees with Anesthesia plan.  Questions answered. Anesthesia consent signed with patient.  ASA Score: 3     Day of Surgery Review of History & Physical:    H&P update referred to the surgeon.         Ready For Surgery From Anesthesia Perspective.     Conclusion         Normal Alfreda myocardial perfusion study.    The perfusion scan is free of evidence from myocardial ischemia or injury.    Gated perfusion images showed an ejection fraction of 76 % post stress.    There is normal wall motion post stress.       Conclusion    · Normal (55-65%) left ventricular systolic function.  · Mild concentric left ventricular hypertrophy.  · Indeterminate left ventricular diastolic function.  · Mild right ventricular enlargement.  · Moderate left atrial enlargement.  · Moderate right atrial enlargement.  · Mild aortic regurgitation.  · Mild mitral regurgitation.  · Moderate to severe tricuspid regurgitation.  · Elevated central venous pressure (15 mm Hg).  · The estimated PA systolic pressure is 81 mm Hg  · Pulmonary hypertension present.

## 2020-07-21 NOTE — ANESTHESIA RELEASE NOTE
"Anesthesia Release from PACU Note    Patient: Erica Leblanc    Procedure(s) Performed: Procedure(s) (LRB):  Fistulogram, left upper extremity, transradial access with possible intervention (Left)  CENTRAL VENOGRAM (Left)  PTA, AV FISTULA (Left)    Anesthesia type: mac      Post pain: Adequate analgesia reported    Post assessment: no apparent anesthetic complications, tolerated procedure well and no evidence of recall    Post vital signs: BP (!) 142/73 (BP Location: Right arm, Patient Position: Lying)   Pulse 87   Temp 37.1 °C (98.7 °F) (Temporal)   Resp 17   Ht 5' 5" (1.651 m)   Wt 93.4 kg (206 lb)   SpO2 (!) 91%   Breastfeeding No   BMI 34.28 kg/m²     Level of consciousness: awake, alert and oriented    Nausea/Vomiting: no nausea/no vomiting    Complications: none    Airway Patency: patent    Respiratory: unassisted, spontaneous ventilation, room air    Cardiovascular: stable and blood pressure at baseline    Hydration: euvolemic    "

## 2020-07-21 NOTE — DISCHARGE INSTRUCTIONS
Peripheral Angiography  Peripheral angiography is an outpatient procedure that makes a map of the vessels (arteries) in your lower body, legs, and arms, using X-ray and dye.This map can show where blood flow may be blocked.  Talk with your healthcare provider about the risks and complications of angiography.   Before the procedure  Prepare for the peripheral angiography as follows:   · Tell your healthcare provider about all medicines you take and any allergies you may have.  · Follow any directions youre given for not eating or drinking before the procedure. If your provider says to take your normal medicines, swallow them with only small sips of water.  · Arrange for a family member or friend to drive you home.  During the procedure  Here is what to expect:  ·   You may get medicine through an IV (intravenous) line to relax you. Youre given an injection to numb the insertion site. Then, a tiny skin cut (incision) is made near an artery in your groin.  · Your provider inserts a thin tube (catheter) through the incision. He or she then threads the catheter into an artery while looking at a video monitor.  · Contrast dye is injected into the catheter to confirm position. You may feel warmth or pressure in your legs and back. You lie still as X-rays are taken. The catheter is then taken out.  After the procedure  Youll be taken to a recovery area. A healthcare provider will apply pressure to the site for about 10 minutes. Your healthcare provider will tell you how long to lie down and keep the insertion site still. Your healthcare provider will discuss the results with you soon after the procedure.  Back at home  On the day you get home, dont drive, dont exercise, avoid walking and taking stairs, and avoid bending and lifting. Your healthcare provider may give you other care instructions.  Call your healthcare provider  Call your healthcare provider right away if:  · You notice a lump or bleeding at the  insertion site  · You feel pain at the insertion site  · You become lightheaded or dizzy  · You have leg pain or numbness  · You do not urinate in 8 hours    Date Last Reviewed: 5/1/2016  © 7621-9702 [employer]. [address] [place] [address], [place] [address]. All rights reserved. This information is not intended as a substitute for professional medical care. Always follow your healthcare professional's instructions.

## 2020-07-21 NOTE — OP NOTE
Date: 7/21/2020     Surgeon(s) and Role:   Keron Perez MD    Assistant: Gini Tabor MS3    Pre-op Diagnosis:   1. T82.858A: Stenosis of vascular prosthetic devices, implants and grafts, initial encounter  2. Final diagnoses:  [T82.858A] Arteriovenous fistula stenosis (Primary)   3. HTN  4. HLD  5. DM  6. ESRD on HD      Post-op Diagnosis: Same     Procedure(s):   1. US guided L radial artery access  2. LUE fistulogram   3. L proximal fistula angioplasty with a 5x40 [place] balloon   4. L proximal fistula angioplasty with a 6x20 [place] balloon  5. L proximal fistula angioplasty with a 6x20 Ultraverse balloon  6. L proximal fistula angioplasty with a 6x40 Ultraverse balloon  7. Central venogram    Anesthesia: Local MAC     Findings/Key Components:   Successful treatment of > 70% stenosis  Strong palpable thrill     EBL: Minimal    PROCEDURE IN DETAIL:After an informed consent was obtained the patient was taken to the interventional suite and placed in the supine position.  The patient was brought to the IR suite, placed in supine. Arm was prepped and draped in the standard surgical fashion. Under ultrasound guidance, the proximal aspect of the left radial artery was accessed with a micropuncture needle; ultrasound confirmed vessel patency, followed by placement of 4/3-Frisian micropuncture dilator. Through this, an 0.035-inch wire was placed in the short 6-Frisian sheath.   A fistulogram and central venogram was performed.  After independent review and interpretation, based upon the fistulogram results, I decided to intervene. Through this, an 0.035-inch Glidewire was placed through the high-grade stenosis, which was demonstrated by the angiogram.  A high-grade stenosis in the proximal fistula was found, which was crossed with a hyrophilic 0.035-in glidewire. This was treated with multiple high-pressure, noncompliant balloons listed above. Resolution of the stenosis was noted. Strong thrill could be felt. The  sheath was removed, a vascular band was placed with good hemostasis.

## 2020-08-03 ENCOUNTER — HOSPITAL ENCOUNTER (OUTPATIENT)
Dept: CARDIOLOGY | Facility: HOSPITAL | Age: 75
Discharge: HOME | End: 2020-08-03
Attending: SURGERY
Payer: MEDICARE

## 2020-08-03 ENCOUNTER — OFFICE VISIT (OUTPATIENT)
Dept: VASCULAR SURGERY | Facility: CLINIC | Age: 75
End: 2020-08-03
Payer: MEDICARE

## 2020-08-03 VITALS
DIASTOLIC BLOOD PRESSURE: 68 MMHG | WEIGHT: 209.44 LBS | BODY MASS INDEX: 34.89 KG/M2 | HEART RATE: 75 BPM | HEIGHT: 65 IN | OXYGEN SATURATION: 96 % | SYSTOLIC BLOOD PRESSURE: 130 MMHG

## 2020-08-03 DIAGNOSIS — T82.858D ARTERIOVENOUS FISTULA STENOSIS, SUBSEQUENT ENCOUNTER: Primary | ICD-10-CM

## 2020-08-03 DIAGNOSIS — T82.858A ARTERIOVENOUS FISTULA STENOSIS: ICD-10-CM

## 2020-08-03 PROCEDURE — 93990 CV US HEMODIALYSIS ACCESS (CUPID ONLY): ICD-10-PCS | Mod: 26,HCNC,, | Performed by: SURGERY

## 2020-08-03 PROCEDURE — 93990 DOPPLER FLOW TESTING: CPT | Mod: TC,HCNC

## 2020-08-03 PROCEDURE — 1101F PR PT FALLS ASSESS DOC 0-1 FALLS W/OUT INJ PAST YR: ICD-10-PCS | Mod: HCNC,CPTII,S$GLB, | Performed by: SURGERY

## 2020-08-03 PROCEDURE — 1126F AMNT PAIN NOTED NONE PRSNT: CPT | Mod: HCNC,S$GLB,, | Performed by: SURGERY

## 2020-08-03 PROCEDURE — 99214 OFFICE O/P EST MOD 30 MIN: CPT | Mod: HCNC,S$GLB,, | Performed by: SURGERY

## 2020-08-03 PROCEDURE — 3008F BODY MASS INDEX DOCD: CPT | Mod: HCNC,CPTII,S$GLB, | Performed by: SURGERY

## 2020-08-03 PROCEDURE — 3008F PR BODY MASS INDEX (BMI) DOCUMENTED: ICD-10-PCS | Mod: HCNC,CPTII,S$GLB, | Performed by: SURGERY

## 2020-08-03 PROCEDURE — 99214 PR OFFICE/OUTPT VISIT, EST, LEVL IV, 30-39 MIN: ICD-10-PCS | Mod: HCNC,S$GLB,, | Performed by: SURGERY

## 2020-08-03 PROCEDURE — 99999 PR PBB SHADOW E&M-EST. PATIENT-LVL IV: CPT | Mod: PBBFAC,HCNC,, | Performed by: SURGERY

## 2020-08-03 PROCEDURE — 1159F PR MEDICATION LIST DOCUMENTED IN MEDICAL RECORD: ICD-10-PCS | Mod: HCNC,S$GLB,, | Performed by: SURGERY

## 2020-08-03 PROCEDURE — 1159F MED LIST DOCD IN RCRD: CPT | Mod: HCNC,S$GLB,, | Performed by: SURGERY

## 2020-08-03 PROCEDURE — 99999 PR PBB SHADOW E&M-EST. PATIENT-LVL IV: ICD-10-PCS | Mod: PBBFAC,HCNC,, | Performed by: SURGERY

## 2020-08-03 PROCEDURE — 93990 DOPPLER FLOW TESTING: CPT | Mod: 26,HCNC,, | Performed by: SURGERY

## 2020-08-03 PROCEDURE — 1126F PR PAIN SEVERITY QUANTIFIED, NO PAIN PRESENT: ICD-10-PCS | Mod: HCNC,S$GLB,, | Performed by: SURGERY

## 2020-08-03 PROCEDURE — 1101F PT FALLS ASSESS-DOCD LE1/YR: CPT | Mod: HCNC,CPTII,S$GLB, | Performed by: SURGERY

## 2020-08-03 NOTE — PROGRESS NOTES
Keron Perez MD RPVI                       [facility]                         08/03/2020    HPI:  Erica Leblanc is a 75 y.o. female with   Patient Active Problem List   Diagnosis    Severe obesity (BMI 35.0-39.9) with comorbidity    Sickle cell trait    Osteopenia    Hyperlipidemia LDL goal <100    HUMBERTO on CPAP    Hypothyroidism (acquired)    Hx-TIA (transient ischemic attack)    Vitamin D deficiency disease    Pulmonary hypertension    Type 2 diabetes mellitus with ESRD (end-stage renal disease)    Secondary renal hyperparathyroidism    Incomplete bladder emptying    Postmenopausal atrophic vaginitis    Rectocele    Mixed incontinence urge and stress    Chronic diastolic heart failure    Tortuous aorta    History of TIAs    Essential hypertension    ESRD on hemodialysis    Anemia of chronic disease    Depression    Debility    Chronic respiratory failure    Type 2 diabetes mellitus with foot ulcer, with long-term current use of insulin    Stable proliferative diabetic retinopathy associated with type 2 diabetes mellitus    Mild major depression    Heart failure with preserved ejection fraction    Preop cardiovascular exam    End stage renal disease    Pseudophakia    being managed by PCP and specialists who is here today for evaluation of long term HD access.  S/p R IJ tunneled HD catheter, HD without issues.  Pt is R handed.  No complaints today.  Does endorse orthopnea, no chest pain.  Unable to climb 1 flight of stairs on own.    no MI  no Stroke  Tobacco use: denies    1/20/20: No new issues.    3/2020: Dialysis without issues.    4/6/20:  S/p LUE fistulogram, central venogram, ligation of medial side branch cephalic vein, ligation of lateral side branch cephalic vein.  No issues with HD for several weeks since procedure.    7/6/20:  No issues with HD.    8/3/20: s/p L proximal fistula angioplasty 7/21/20.  No issues with HD.    Past Medical  History:   Diagnosis Date    Acute respiratory failure with hypoxia     Anemia of chronic kidney failure, stage 4 (severe) 2019    Cataracts, bilateral     CHF (congestive heart failure)     CKD (chronic kidney disease) stage 3, GFR 30-59 ml/min     CKD (chronic kidney disease) stage 3, GFR 30-59 ml/min     Controlled type 2 diabetes mellitus with proteinuria or albuminuria     Depression     Diabetes with neurologic complications     Diabetic retinopathy of both eyes     Edema     Glaucoma     History of colonic polyps     Hx-TIA (transient ischemic attack) 2008    Hyperlipidemia LDL goal < 100     Hypertension     Hypothyroidism     Major depressive disorder, single episode, mild 2016    Mixed incontinence urge and stress     Obesity     Obstructive sleep apnea on CPAP     19:  Home CPAP machine broken, per patient & son    Osteopenia     Proteinuria     Sickle cell trait     Strabismus     TIA (transient ischemic attack)     Trouble in sleeping     Type 2 diabetes mellitus with ophthalmic manifestations     Type 2 diabetes with stage 3 chronic kidney disease GFR 30-59     Type II or unspecified type diabetes mellitus with renal manifestations, uncontrolled(250.42)     Uncontrolled type 2 diabetes mellitus with peripheral circulatory disorder 2019    Urge incontinence 2016    Urge incontinence     Venous stasis ulcer     bilateral lower legs    Vitamin D deficiency disease      Past Surgical History:   Procedure Laterality Date    AV FISTULA PLACEMENT Left 2019    Procedure: CREATION, AV FISTULA, LEFT UPPER EXTREMITY;  Surgeon: Keron Perez MD;  Location: [facility];  Service: Vascular;  Laterality: Left;    BREAST BIOPSY      breast reduction Bilateral age 31    BREAST SURGERY      CATARACT EXTRACTION Bilateral     cataracts Bilateral      SECTION, LOW TRANSVERSE      x1    CHOLECYSTECTOMY      EYE SURGERY  2014, 2014     vitrectomy    EYE SURGERY Right 08/17/2016    FISTULOGRAM Left 3/6/2020    Procedure: Fistulogram, left upper extremity, with branch ligation;  Surgeon: Keron Perez MD;  Location: [facility] OR [address] [place] [address];  Service: Vascular;  Laterality: Left;  Time 1.1 Minute  42.10 mGy    FISTULOGRAM Left 7/21/2020    Procedure: Fistulogram, left upper extremity, transradial access with possible intervention;  Surgeon: Keron Perez MD;  Location: [facility] OR [address] [place] [address];  Service: Vascular;  Laterality: Left;    HYSTERECTOMY  1986    TAHBSO (patient is unsure if ovaries removed)    OOPHORECTOMY      PERCUTANEOUS TRANSLUMINAL ANGIOPLASTY OF ARTERIOVENOUS FISTULA Left 7/21/2020    Procedure: PTA, AV FISTULA;  Surgeon: Keron Perez MD;  Location: [address];  Service: Vascular;  Laterality: Left;  15.9 minutes of fluro  41.12  mGy  7.9060 Gy cm2  32ml  contrast    PHLEBOGRAPHY Left 7/21/2020    Procedure: CENTRAL VENOGRAM;  Surgeon: Keron Perez MD;  Location: [address];  Service: Vascular;  Laterality: Left;    REFRACTIVE SURGERY      TOTAL REDUCTION MAMMOPLASTY      approx 10 yrs ago     Family History   Problem Relation Age of Onset    Leukemia Father     Cataracts Father     Ovarian cancer Sister 35    Stroke Mother     Diabetes Mother     Hypertension Mother     Cataracts Mother     Diabetes Paternal Grandmother     Cataracts Paternal Grandmother     Breast cancer Maternal Aunt 65    No Known Problems Paternal Grandfather     Cataracts Maternal Grandmother     Cataracts Maternal Grandfather     HIV Brother     Achondroplasia Sister     Parkinsonism Maternal Aunt     Esophageal cancer Maternal Uncle         smoker    No Known Problems Paternal Aunt     Cataracts Paternal Uncle     Amblyopia Neg Hx     Blindness Neg Hx     Glaucoma Neg Hx     Macular degeneration Neg Hx     Retinal detachment Neg Hx     Strabismus Neg Hx     Thyroid disease Neg Hx     Colon cancer Neg  Hx     Cancer Neg Hx      Social History     Socioeconomic History    Marital status: Single     Spouse name: Not on file    Number of children: 5    Years of education: Not on file    Highest education level: Not on file   Occupational History    Occupation:      Employer: [employer]     Comment: part-time   Social Needs    Financial resource strain: Not on file    Food insecurity     Worry: Not on file     Inability: Not on file    Transportation needs     Medical: Not on file     Non-medical: Not on file   Tobacco Use    Smoking status: Never Smoker    Smokeless tobacco: Never Used   Substance and Sexual Activity    Alcohol use: No     Alcohol/week: 0.0 standard drinks    Drug use: No    Sexual activity: Not Currently     Partners: Male     Birth control/protection: Post-menopausal, Surgical   Lifestyle    Physical activity     Days per week: Not on file     Minutes per session: Not on file    Stress: Not on file   Relationships    Social connections     Talks on phone: Not on file     Gets together: Not on file     Attends Caodaism service: Not on file     Active member of club or organization: Not on file     Attends meetings of clubs or organizations: Not on file     Relationship status: Not on file   Other Topics Concern    Not on file   Social History Narrative    Not on file       Current Outpatient Medications:     amLODIPine (NORVASC) 10 MG tablet, Take 1 tablet (10 mg total) by mouth once daily. (Patient taking differently: Take 10 mg by mouth every morning. ), Disp: 90 tablet, Rfl: 3    atorvastatin (LIPITOR) 10 MG tablet, TAKE 1 TABLET EVERY DAY, Disp: 90 tablet, Rfl: 1    bumetanide (BUMEX) 2 MG tablet, Take 1 tablet (2 mg total) by mouth once daily., Disp: 30 tablet, Rfl: 11    citalopram (CELEXA) 10 MG tablet, TAKE 1 TABLET EVERY DAY, Disp: 90 tablet, Rfl: 0    clopidogreL (PLAVIX) 75 mg tablet, TAKE 1 TABLET EVERY DAY, Disp: 90 tablet, Rfl: 0     docusate sodium (COLACE) 100 MG capsule, Take 200 mg by mouth once daily. , Disp: , Rfl:     hydrALAZINE (APRESOLINE) 100 MG tablet, TAKE 1 TABLET BY MOUTH THREE TIMES A DAY, Disp: 270 tablet, Rfl: 1    HYDROcodone-acetaminophen (NORCO) 5-325 mg per tablet, Take 1 tablet by mouth every 6 (six) hours as needed for Pain., Disp: 10 tablet, Rfl: 0    LANCETS MISC, , Disp: , Rfl:     levothyroxine (SYNTHROID) 50 MCG tablet, Take 50 mcg by mouth before breakfast. , Disp: , Rfl:     metoprolol tartrate (LOPRESSOR) 50 MG tablet, Take 1 tablet (50 mg total) by mouth 2 (two) times daily., Disp: 180 tablet, Rfl: 0    MULTIVITAMIN WITH MINERALS (HAIR,SKIN AND NAILS ORAL), Take 3 tablets by mouth every morning. , Disp: , Rfl:     oxybutynin (DITROPAN XL) 15 MG TR24, TAKE 1 TABLET EVERY DAY, Disp: 90 tablet, Rfl: 3    polyethylene glycol (GLYCOLAX) 17 gram PwPk, Take 17 g by mouth once daily., Disp: , Rfl: 0    insulin detemir U-100 (LEVEMIR FLEXTOUCH) 100 unit/mL (3 mL) SubQ InPn pen, Inject 5 Units into the skin once daily. (Patient taking differently: Inject 5 Units into the skin every morning. ), Disp: 1.5 mL, Rfl: 1    REVIEW OF SYSTEMS:  General: No fevers or chills; ENT: No sore throat; Allergy and Immunology: no persistent infections; Hematological and Lymphatic: No history of bleeding or easy bruising; Endocrine: negative; Respiratory: no cough, shortness of breath, or wheezing; Cardiovascular: no chest pain or dyspnea on exertion; Gastrointestinal: no abdominal pain/back, change in bowel habits, or bloody stools; Genito-Urinary: no dysuria, trouble voiding, or hematuria; Musculoskeletal: negative; Neurological: no TIA or stroke symptoms; Psychiatric: no nervousness, anxiety or depression.    PHYSICAL EXAM:      Pulse: 75         General appearance:  Alert, well-appearing, and in no distress.  Oriented to person, place, and time                    Neurological: Normal speech, no focal findings noted; CN II - XII  grossly intact. All extremities with sensation to light touch.            Musculoskeletal: Digits/nail without cyanosis/clubbing.  Strength 5/5 all extremities.                    Neck: Supple, no significant adenopathy, no carotid bruit can be auscultated                  Chest:  Clear to auscultation, no wheezes, rales or rhonchi, symmetric air entry. No use of accessory muscles               Cardiac: Normal rate and regular rhythm, S1 and S2 normal            Abdomen: Soft, nontender, nondistended, no masses or organomegaly, no hernia     No rebound tenderness noted; bowel sounds normal     Pulsatile aortic mass is non palpable.     No groin adenopathy      Extremities:      2+ radial pulse bilaterally, LUE AVF +thrill, inc well healed     No BUE edema, Negative Manuel's test BUE    Skin: No tissue loss    LAB RESULTS:  No results found for: CBC  Lab Results   Component Value Date    LABPROT 10.9 11/20/2019    INR 1.0 11/20/2019     Lab Results   Component Value Date     03/06/2020    K 4.7 03/06/2020     03/06/2020    CO2 27 03/06/2020     (H) 03/06/2020    BUN 95 (H) 03/06/2020    CREATININE 6.8 (H) 03/06/2020    CALCIUM 9.4 03/06/2020    ANIONGAP 11 03/06/2020    EGFRNONAA 5.5 (A) 03/06/2020     Lab Results   Component Value Date    WBC 4.42 11/20/2019    RBC 4.37 11/20/2019    HGB 11.0 (L) 11/20/2019    HCT 35 (L) 07/21/2020    MCV 81 (L) 11/20/2019    MCH 25.2 (L) 11/20/2019    MCHC 31.2 (L) 11/20/2019    RDW 16.9 (H) 11/20/2019     11/20/2019    MPV 9.3 11/20/2019    GRAN 2.8 11/20/2019    GRAN 62.6 11/20/2019    LYMPH 0.8 (L) 11/20/2019    LYMPH 18.1 11/20/2019    MONO 0.7 11/20/2019    MONO 16.3 (H) 11/20/2019    EOS 0.1 11/20/2019    BASO 0.02 11/20/2019    EOSINOPHIL 2.3 11/20/2019    BASOPHIL 0.5 11/20/2019    DIFFMETHOD Automated 11/20/2019     .  Lab Results   Component Value Date    HGBA1C 6.9 (H) 11/20/2019       IMAGING:  All pertinent imaging has been reviewed and  interpreted independently.    Vein mapping 9/2019:  Adequate R superior basilic vein and axillary vein ; Adequate L basilic vein superior and inferior, adequate L axillary     1/27/20 Left upper extremity dialysis ultrasound shows no hemodynamically significant stenosis.  Flow is 1083 ml/min.  Depth and diameter are appropriate.    3/23/20:  Left upper extremity dialysis ultrasound shows anastomotic and proximal fistula hemodynamically significant stenosis.  Flow is 955 ml/min.      7/2020: Left upper extremity dialysis ultrasound shows proximal fistula hemodynamically significant stenosis.  Flow is 1257 ml/min.      8/2020: Left upper extremity dialysis ultrasound shows proximal hemodynamically significant stenosis.  Flow is 1999 ml/min.        IMP/PLAN:  75 y.o. female with   Patient Active Problem List   Diagnosis    Severe obesity (BMI 35.0-39.9) with comorbidity    Sickle cell trait    Osteopenia    Hyperlipidemia LDL goal <100    HUMBERTO on CPAP    Hypothyroidism (acquired)    Hx-TIA (transient ischemic attack)    Vitamin D deficiency disease    Pulmonary hypertension    Type 2 diabetes mellitus with ESRD (end-stage renal disease)    Secondary renal hyperparathyroidism    Incomplete bladder emptying    Postmenopausal atrophic vaginitis    Rectocele    Mixed incontinence urge and stress    Chronic diastolic heart failure    Tortuous aorta    History of TIAs    Essential hypertension    ESRD on hemodialysis    Anemia of chronic disease    Depression    Debility    Chronic respiratory failure    Type 2 diabetes mellitus with foot ulcer, with long-term current use of insulin    Stable proliferative diabetic retinopathy associated with type 2 diabetes mellitus    Mild major depression    Heart failure with preserved ejection fraction    Preop cardiovascular exam    End stage renal disease    Pseudophakia    being managed by PCP and specialists who is here today for evaluation of long term  HD access s/p L RC AV fistula.    -s/p L RC AV fistula with proximal fistula measuring 5 mm 1/13/20, adequate flow without issues during HD s/p LUE fistulogram, central venogram, ligation of medial side branch cephalic vein, ligation of lateral side branch cephalic vein 3/2/20 with prox AVF stenosis s/p L proximal fistula angioplasty 7/21/20  -Cont renal diet    I spent 7 minutes evaluating this patient and greater than 50% of the time was spent counseling, coordinator care and discussing the plan of care.  All questions were answered and patient stated understanding with agreement with the above treatment plan.    Keron Perez MD [facility]  Vascular and Endovascular Surgery

## 2020-08-03 NOTE — PATIENT INSTRUCTIONS
Caring for Your Hemodialysis Access  A problem such as an infection or a blood clot may make the access unusable. Typically, this happens more often with an arteriovenous graft than with an arteriovenous fistula. If this happens, youll need a new access. To help your access last, you will need to follow certain guidelines.  Watching for problems  Call your healthcare provider right away if you:  · Cant feel the blood flowing in the access (this sensation is called a thrill)  · Have pain or numbness in your hand or arm  · Have bleeding, redness, bluish discoloration, or warmth around your access  · Notice your access suddenly bulging out more than usual (a slight bulge is normal)  · Have a fever of 100.4°F (38°C) or higher, or as directed by your healthcare provider   Follow these and any other guidelines youre given  · Dont wear tight clothes or jewelry around your access.  · Dont let anyone take your blood pressure on or draw blood from the arm with the access. Also, dont let anyone put IV lines into it.  · Protect your access from being hit or cut.  · Wash your hands often and keep the area around the access clean.  · Do not carry anything heavy or do anything that would put pressure on the access.  Feeling for your thrill    If you put your fingers over your access, you should feel the blood rushing through it. This is called a thrill, and it feels like a vibration. Feel for the thrill as often as you're told, usually once or twice a day. If you can't feel it, tell your healthcare provider right away. Blood may not be flowing through your access the way it should.  Important numbers  Write the names and numbers of your healthcare providers below. That way you will know how to get in touch with them.  Doctor:  Name ___________________ Phone ___________________  Surgeon:  Name ___________________ Phone ___________________  Dialysis Center:  Name ___________________ Phone ___________________   Date Last  Reviewed: 1/1/2017  © 6434-7970 [employer]. [address] [place] [address], [place] [address]. All rights reserved. This information is not intended as a substitute for professional medical care. Always follow your healthcare professional's instructions.

## 2020-08-03 NOTE — LETTER
August 3, 2020        Sendy Elaine MD  [address] [facility]  [place] [id number]             [facility]  [address]  [facility] [place] [address]  Phone: [phone]  Fax: [phone]   Patient: Erica Leblanc   MR Number: [id number]   YOB: 1945   Date of Visit: 8/3/2020       Dear Dr. Elaine:    Thank you for referring Erica Leblanc to me for evaluation. Below are the relevant portions of my assessment and plan of care.            If you have questions, please do not hesitate to call me. I look forward to following Erica along with you.    Sincerely,      Keron Perez MD           CC  No Recipients

## 2020-08-03 NOTE — H&P (VIEW-ONLY)
Keron Perez MD RPVI                       [facility]                         08/03/2020    HPI:  Erica Leblanc is a 75 y.o. female with   Patient Active Problem List   Diagnosis    Severe obesity (BMI 35.0-39.9) with comorbidity    Sickle cell trait    Osteopenia    Hyperlipidemia LDL goal <100    HUMBERTO on CPAP    Hypothyroidism (acquired)    Hx-TIA (transient ischemic attack)    Vitamin D deficiency disease    Pulmonary hypertension    Type 2 diabetes mellitus with ESRD (end-stage renal disease)    Secondary renal hyperparathyroidism    Incomplete bladder emptying    Postmenopausal atrophic vaginitis    Rectocele    Mixed incontinence urge and stress    Chronic diastolic heart failure    Tortuous aorta    History of TIAs    Essential hypertension    ESRD on hemodialysis    Anemia of chronic disease    Depression    Debility    Chronic respiratory failure    Type 2 diabetes mellitus with foot ulcer, with long-term current use of insulin    Stable proliferative diabetic retinopathy associated with type 2 diabetes mellitus    Mild major depression    Heart failure with preserved ejection fraction    Preop cardiovascular exam    End stage renal disease    Pseudophakia    being managed by PCP and specialists who is here today for evaluation of long term HD access.  S/p R IJ tunneled HD catheter, HD without issues.  Pt is R handed.  No complaints today.  Does endorse orthopnea, no chest pain.  Unable to climb 1 flight of stairs on own.    no MI  no Stroke  Tobacco use: denies    1/20/20: No new issues.    3/2020: Dialysis without issues.    4/6/20:  S/p LUE fistulogram, central venogram, ligation of medial side branch cephalic vein, ligation of lateral side branch cephalic vein.  No issues with HD for several weeks since procedure.    7/6/20:  No issues with HD.    8/3/20: s/p L proximal fistula angioplasty 7/21/20.  No issues with HD.    Past Medical  History:   Diagnosis Date    Acute respiratory failure with hypoxia     Anemia of chronic kidney failure, stage 4 (severe) 2019    Cataracts, bilateral     CHF (congestive heart failure)     CKD (chronic kidney disease) stage 3, GFR 30-59 ml/min     CKD (chronic kidney disease) stage 3, GFR 30-59 ml/min     Controlled type 2 diabetes mellitus with proteinuria or albuminuria     Depression     Diabetes with neurologic complications     Diabetic retinopathy of both eyes     Edema     Glaucoma     History of colonic polyps     Hx-TIA (transient ischemic attack) 2008    Hyperlipidemia LDL goal < 100     Hypertension     Hypothyroidism     Major depressive disorder, single episode, mild 2016    Mixed incontinence urge and stress     Obesity     Obstructive sleep apnea on CPAP     19:  Home CPAP machine broken, per patient & son    Osteopenia     Proteinuria     Sickle cell trait     Strabismus     TIA (transient ischemic attack)     Trouble in sleeping     Type 2 diabetes mellitus with ophthalmic manifestations     Type 2 diabetes with stage 3 chronic kidney disease GFR 30-59     Type II or unspecified type diabetes mellitus with renal manifestations, uncontrolled(250.42)     Uncontrolled type 2 diabetes mellitus with peripheral circulatory disorder 2019    Urge incontinence 2016    Urge incontinence     Venous stasis ulcer     bilateral lower legs    Vitamin D deficiency disease      Past Surgical History:   Procedure Laterality Date    AV FISTULA PLACEMENT Left 2019    Procedure: CREATION, AV FISTULA, LEFT UPPER EXTREMITY;  Surgeon: Keron Perez MD;  [facility];  Service: Vascular;  Laterality: Left;    BREAST BIOPSY      breast reduction Bilateral age 31    BREAST SURGERY      CATARACT EXTRACTION Bilateral     cataracts Bilateral      SECTION, LOW TRANSVERSE      x1    CHOLECYSTECTOMY      EYE SURGERY  2014, 2014     vitrectomy    EYE SURGERY Right 08/17/2016    FISTULOGRAM Left 3/6/2020    Procedure: Fistulogram, left upper extremity, with branch ligation;  Surgeon: Keron Perez MD;  Location: [facility] OR [address] [place] [address];  Service: Vascular;  Laterality: Left;  Time 1.1 Minute  42.10 mGy    FISTULOGRAM Left 7/21/2020    Procedure: Fistulogram, left upper extremity, transradial access with possible intervention;  Surgeon: Keron Perez MD;  Location: [facility] OR [address] [place] [address];  Service: Vascular;  Laterality: Left;    HYSTERECTOMY  1986    TAHBSO (patient is unsure if ovaries removed)    OOPHORECTOMY      PERCUTANEOUS TRANSLUMINAL ANGIOPLASTY OF ARTERIOVENOUS FISTULA Left 7/21/2020    Procedure: PTA, AV FISTULA;  Surgeon: Keron Perez MD;  Location: [address];  Service: Vascular;  Laterality: Left;  15.9 minutes of fluro  41.12  mGy  7.9060 Gy cm2  32ml  contrast    PHLEBOGRAPHY Left 7/21/2020    Procedure: CENTRAL VENOGRAM;  Surgeon: Keron Perez MD;  Location: [address];  Service: Vascular;  Laterality: Left;    REFRACTIVE SURGERY      TOTAL REDUCTION MAMMOPLASTY      approx 10 yrs ago     Family History   Problem Relation Age of Onset    Leukemia Father     Cataracts Father     Ovarian cancer Sister 35    Stroke Mother     Diabetes Mother     Hypertension Mother     Cataracts Mother     Diabetes Paternal Grandmother     Cataracts Paternal Grandmother     Breast cancer Maternal Aunt 65    No Known Problems Paternal Grandfather     Cataracts Maternal Grandmother     Cataracts Maternal Grandfather     HIV Brother     Achondroplasia Sister     Parkinsonism Maternal Aunt     Esophageal cancer Maternal Uncle         smoker    No Known Problems Paternal Aunt     Cataracts Paternal Uncle     Amblyopia Neg Hx     Blindness Neg Hx     Glaucoma Neg Hx     Macular degeneration Neg Hx     Retinal detachment Neg Hx     Strabismus Neg Hx     Thyroid disease Neg Hx     Colon cancer Neg  Hx     Cancer Neg Hx      Social History     Socioeconomic History    Marital status: Single     Spouse name: Not on file    Number of children: 5    Years of education: Not on file    Highest education level: Not on file   Occupational History    Occupation:      Employer: [employer]     Comment: part-time   Social Needs    Financial resource strain: Not on file    Food insecurity     Worry: Not on file     Inability: Not on file    Transportation needs     Medical: Not on file     Non-medical: Not on file   Tobacco Use    Smoking status: Never Smoker    Smokeless tobacco: Never Used   Substance and Sexual Activity    Alcohol use: No     Alcohol/week: 0.0 standard drinks    Drug use: No    Sexual activity: Not Currently     Partners: Male     Birth control/protection: Post-menopausal, Surgical   Lifestyle    Physical activity     Days per week: Not on file     Minutes per session: Not on file    Stress: Not on file   Relationships    Social connections     Talks on phone: Not on file     Gets together: Not on file     Attends Jew service: Not on file     Active member of club or organization: Not on file     Attends meetings of clubs or organizations: Not on file     Relationship status: Not on file   Other Topics Concern    Not on file   Social History Narrative    Not on file       Current Outpatient Medications:     amLODIPine (NORVASC) 10 MG tablet, Take 1 tablet (10 mg total) by mouth once daily. (Patient taking differently: Take 10 mg by mouth every morning. ), Disp: 90 tablet, Rfl: 3    atorvastatin (LIPITOR) 10 MG tablet, TAKE 1 TABLET EVERY DAY, Disp: 90 tablet, Rfl: 1    bumetanide (BUMEX) 2 MG tablet, Take 1 tablet (2 mg total) by mouth once daily., Disp: 30 tablet, Rfl: 11    citalopram (CELEXA) 10 MG tablet, TAKE 1 TABLET EVERY DAY, Disp: 90 tablet, Rfl: 0    clopidogreL (PLAVIX) 75 mg tablet, TAKE 1 TABLET EVERY DAY, Disp: 90 tablet, Rfl: 0     docusate sodium (COLACE) 100 MG capsule, Take 200 mg by mouth once daily. , Disp: , Rfl:     hydrALAZINE (APRESOLINE) 100 MG tablet, TAKE 1 TABLET BY MOUTH THREE TIMES A DAY, Disp: 270 tablet, Rfl: 1    HYDROcodone-acetaminophen (NORCO) 5-325 mg per tablet, Take 1 tablet by mouth every 6 (six) hours as needed for Pain., Disp: 10 tablet, Rfl: 0    LANCETS MISC, , Disp: , Rfl:     levothyroxine (SYNTHROID) 50 MCG tablet, Take 50 mcg by mouth before breakfast. , Disp: , Rfl:     metoprolol tartrate (LOPRESSOR) 50 MG tablet, Take 1 tablet (50 mg total) by mouth 2 (two) times daily., Disp: 180 tablet, Rfl: 0    MULTIVITAMIN WITH MINERALS (HAIR,SKIN AND NAILS ORAL), Take 3 tablets by mouth every morning. , Disp: , Rfl:     oxybutynin (DITROPAN XL) 15 MG TR24, TAKE 1 TABLET EVERY DAY, Disp: 90 tablet, Rfl: 3    polyethylene glycol (GLYCOLAX) 17 gram PwPk, Take 17 g by mouth once daily., Disp: , Rfl: 0    insulin detemir U-100 (LEVEMIR FLEXTOUCH) 100 unit/mL (3 mL) SubQ InPn pen, Inject 5 Units into the skin once daily. (Patient taking differently: Inject 5 Units into the skin every morning. ), Disp: 1.5 mL, Rfl: 1    REVIEW OF SYSTEMS:  General: No fevers or chills; ENT: No sore throat; Allergy and Immunology: no persistent infections; Hematological and Lymphatic: No history of bleeding or easy bruising; Endocrine: negative; Respiratory: no cough, shortness of breath, or wheezing; Cardiovascular: no chest pain or dyspnea on exertion; Gastrointestinal: no abdominal pain/back, change in bowel habits, or bloody stools; Genito-Urinary: no dysuria, trouble voiding, or hematuria; Musculoskeletal: negative; Neurological: no TIA or stroke symptoms; Psychiatric: no nervousness, anxiety or depression.    PHYSICAL EXAM:      Pulse: 75         General appearance:  Alert, well-appearing, and in no distress.  Oriented to person, place, and time                    Neurological: Normal speech, no focal findings noted; CN II - XII  grossly intact. All extremities with sensation to light touch.            Musculoskeletal: Digits/nail without cyanosis/clubbing.  Strength 5/5 all extremities.                    Neck: Supple, no significant adenopathy, no carotid bruit can be auscultated                  Chest:  Clear to auscultation, no wheezes, rales or rhonchi, symmetric air entry. No use of accessory muscles               Cardiac: Normal rate and regular rhythm, S1 and S2 normal            Abdomen: Soft, nontender, nondistended, no masses or organomegaly, no hernia     No rebound tenderness noted; bowel sounds normal     Pulsatile aortic mass is non palpable.     No groin adenopathy      Extremities:      2+ radial pulse bilaterally, LUE AVF +thrill, inc well healed     No BUE edema, Negative Manuel's test BUE    Skin: No tissue loss    LAB RESULTS:  No results found for: CBC  Lab Results   Component Value Date    LABPROT 10.9 11/20/2019    INR 1.0 11/20/2019     Lab Results   Component Value Date     03/06/2020    K 4.7 03/06/2020     03/06/2020    CO2 27 03/06/2020     (H) 03/06/2020    BUN 95 (H) 03/06/2020    CREATININE 6.8 (H) 03/06/2020    CALCIUM 9.4 03/06/2020    ANIONGAP 11 03/06/2020    EGFRNONAA 5.5 (A) 03/06/2020     Lab Results   Component Value Date    WBC 4.42 11/20/2019    RBC 4.37 11/20/2019    HGB 11.0 (L) 11/20/2019    HCT 35 (L) 07/21/2020    MCV 81 (L) 11/20/2019    MCH 25.2 (L) 11/20/2019    MCHC 31.2 (L) 11/20/2019    RDW 16.9 (H) 11/20/2019     11/20/2019    MPV 9.3 11/20/2019    GRAN 2.8 11/20/2019    GRAN 62.6 11/20/2019    LYMPH 0.8 (L) 11/20/2019    LYMPH 18.1 11/20/2019    MONO 0.7 11/20/2019    MONO 16.3 (H) 11/20/2019    EOS 0.1 11/20/2019    BASO 0.02 11/20/2019    EOSINOPHIL 2.3 11/20/2019    BASOPHIL 0.5 11/20/2019    DIFFMETHOD Automated 11/20/2019     .  Lab Results   Component Value Date    HGBA1C 6.9 (H) 11/20/2019       IMAGING:  All pertinent imaging has been reviewed and  interpreted independently.    Vein mapping 9/2019:  Adequate R superior basilic vein and axillary vein ; Adequate L basilic vein superior and inferior, adequate L axillary     1/27/20 Left upper extremity dialysis ultrasound shows no hemodynamically significant stenosis.  Flow is 1083 ml/min.  Depth and diameter are appropriate.    3/23/20:  Left upper extremity dialysis ultrasound shows anastomotic and proximal fistula hemodynamically significant stenosis.  Flow is 955 ml/min.      7/2020: Left upper extremity dialysis ultrasound shows proximal fistula hemodynamically significant stenosis.  Flow is 1257 ml/min.      8/2020: Left upper extremity dialysis ultrasound shows proximal hemodynamically significant stenosis.  Flow is 1999 ml/min.        IMP/PLAN:  75 y.o. female with   Patient Active Problem List   Diagnosis    Severe obesity (BMI 35.0-39.9) with comorbidity    Sickle cell trait    Osteopenia    Hyperlipidemia LDL goal <100    HUMBERTO on CPAP    Hypothyroidism (acquired)    Hx-TIA (transient ischemic attack)    Vitamin D deficiency disease    Pulmonary hypertension    Type 2 diabetes mellitus with ESRD (end-stage renal disease)    Secondary renal hyperparathyroidism    Incomplete bladder emptying    Postmenopausal atrophic vaginitis    Rectocele    Mixed incontinence urge and stress    Chronic diastolic heart failure    Tortuous aorta    History of TIAs    Essential hypertension    ESRD on hemodialysis    Anemia of chronic disease    Depression    Debility    Chronic respiratory failure    Type 2 diabetes mellitus with foot ulcer, with long-term current use of insulin    Stable proliferative diabetic retinopathy associated with type 2 diabetes mellitus    Mild major depression    Heart failure with preserved ejection fraction    Preop cardiovascular exam    End stage renal disease    Pseudophakia    being managed by PCP and specialists who is here today for evaluation of long term  HD access s/p L RC AV fistula.    -s/p L RC AV fistula with proximal fistula measuring 5 mm 1/13/20, adequate flow without issues during HD s/p LUE fistulogram, central venogram, ligation of medial side branch cephalic vein, ligation of lateral side branch cephalic vein 3/2/20 with prox AVF stenosis s/p L proximal fistula angioplasty 7/21/20  -Cont renal diet    I spent 7 minutes evaluating this patient and greater than 50% of the time was spent counseling, coordinator care and discussing the plan of care.  All questions were answered and patient stated understanding with agreement with the above treatment plan.    Keron Perez MD [facility]  Vascular and Endovascular Surgery

## 2020-08-05 LAB
HD ANASTAMOSIS LOCATION: NORMAL
HD FISTULA OUTFLOW VEIN VESSEL: NORMAL
HD INFLOW ARTERY VESSEL: NORMAL
RIGHT DIS GRAFT PSV: 56 CM/ SEC
RIGHT INFLOW PSV: 193 CM/S
RIGHT MID GRAFT PSV: 100 CM/S
RIGHT OUTFLOW VEIN PSV: 61 CM/ SEC
RIGHT PROX ANA PSV: 209 CM/S
RIGHT PROX GRAFT PSV: 741 CM/S
RIGHT VOLUME FLOW DIA: 1.1 CM
RIGHT VOLUME FLOW PSV: 1999 ML/MIN

## 2020-08-10 RX ORDER — LEVOTHYROXINE SODIUM 50 UG/1
50 TABLET ORAL
Qty: 30 TABLET | Refills: 0 | Status: SHIPPED | OUTPATIENT
Start: 2020-08-10 | End: 2020-09-02

## 2020-08-10 RX ORDER — LEVOTHYROXINE SODIUM 50 UG/1
50 TABLET ORAL
Qty: 90 TABLET | Refills: 0 | Status: SHIPPED | OUTPATIENT
Start: 2020-08-10 | End: 2021-05-12

## 2020-08-10 NOTE — TELEPHONE ENCOUNTER
Spoke with patient she states she is completely out of her medication she needs one refill to go to [facility] pharmacy her original order but while it takes awhile for her medication to come in she needs a refill sent to [facility] locally as well. Please advise

## 2020-08-17 ENCOUNTER — TELEPHONE (OUTPATIENT)
Dept: VASCULAR SURGERY | Facility: CLINIC | Age: 75
End: 2020-08-17

## 2020-08-17 ENCOUNTER — HOSPITAL ENCOUNTER (OUTPATIENT)
Dept: RADIOLOGY | Facility: HOSPITAL | Age: 75
Discharge: HOME | End: 2020-08-17
Attending: SURGERY
Payer: MEDICARE

## 2020-08-17 DIAGNOSIS — Z99.2 ESRD (END STAGE RENAL DISEASE) ON DIALYSIS: Primary | ICD-10-CM

## 2020-08-17 DIAGNOSIS — N18.6 ESRD (END STAGE RENAL DISEASE) ON DIALYSIS: ICD-10-CM

## 2020-08-17 DIAGNOSIS — T82.858D ARTERIOVENOUS FISTULA STENOSIS, SUBSEQUENT ENCOUNTER: Primary | ICD-10-CM

## 2020-08-17 DIAGNOSIS — N18.6 ESRD (END STAGE RENAL DISEASE) ON DIALYSIS: Primary | ICD-10-CM

## 2020-08-17 DIAGNOSIS — Z99.2 ESRD (END STAGE RENAL DISEASE) ON DIALYSIS: ICD-10-CM

## 2020-08-17 PROCEDURE — 93990 DOPPLER FLOW TESTING: CPT | Mod: 26,HCNC,, | Performed by: RADIOLOGY

## 2020-08-17 PROCEDURE — 93990 DOPPLER FLOW TESTING: CPT | Mod: TC,HCNC

## 2020-08-17 PROCEDURE — 93990 US HEMODIALYSIS ACCESS: ICD-10-PCS | Mod: 26,HCNC,, | Performed by: RADIOLOGY

## 2020-08-17 NOTE — TELEPHONE ENCOUNTER
Call to patient and explained that Dr. Perez stated she will need a fistulagram and he can do it on Wed. Explained that the office is trying to get the details and will call her tomorrow. Explained that her potassium was within limits at this time so she is fine to be without dialysis right now. She stated understanding.

## 2020-08-17 NOTE — TELEPHONE ENCOUNTER
Patient called and stated they had problems on Sat doing her dialysis and she didn't get it done. Explained would call dailysis center. Center stated that they were pulling clots and made her a appointment today. Explained would need a ultrasound first. Call to patient and explained that she had a ultrasound scheduled for 1:00 today and to also get labs. Explained that Dr. Perez would contact her with plans after that. She stated understanding.

## 2020-08-18 ENCOUNTER — TELEPHONE (OUTPATIENT)
Dept: VASCULAR SURGERY | Facility: CLINIC | Age: 75
End: 2020-08-18

## 2020-08-18 ENCOUNTER — HOSPITAL ENCOUNTER (OUTPATIENT)
Dept: PREADMISSION TESTING | Facility: HOSPITAL | Age: 75
Discharge: HOME | End: 2020-08-18
Attending: RADIOLOGY
Payer: MEDICARE

## 2020-08-18 VITALS
HEIGHT: 65 IN | DIASTOLIC BLOOD PRESSURE: 77 MMHG | WEIGHT: 216.25 LBS | OXYGEN SATURATION: 95 % | RESPIRATION RATE: 18 BRPM | TEMPERATURE: 98 F | SYSTOLIC BLOOD PRESSURE: 152 MMHG | BODY MASS INDEX: 36.03 KG/M2 | HEART RATE: 74 BPM

## 2020-08-18 DIAGNOSIS — Z01.818 PRE-OP TESTING: ICD-10-CM

## 2020-08-18 LAB — SARS-COV-2 RDRP RESP QL NAA+PROBE: NEGATIVE

## 2020-08-18 PROCEDURE — U0002 COVID-19 LAB TEST NON-CDC: HCPCS | Mod: HCNC

## 2020-08-18 NOTE — DISCHARGE INSTRUCTIONS
"  Your surgery is scheduled for _Wedneday Aug. 19, 2020_.    Call [phone] between 2 p.m. and 5 p.m. on   _Today_ to find out your arrival time for the day of your surgery.      Please report to Emergency Room SAME DAY SURGERY UNIT on the 2nd FLOOR at _6:00__ a.m.    If you need WHEELCHAIR assistance please call  [phone] from your cell phone or "[phone]  from the  hospital courtesy phone     INSTRUCTIONS IMPORTANT!!!  ¨ Do not eat or drink after 12 midnight-including water. OK to brush teeth, no   gum, candy or mints!    ¨ Take only these medicines with a small swallow of water-morning of surgery.  Take Amlodipine and Metoprolol with water morning of procedure.      _x___  Prep instructions:  SHOWER     _x___  Please shower using Hibiclens soap the night before AND  the morning of your surgery/procedure. Do not use Hibiclens on your face or genitals      _x___  Do not wear makeup, including mascara. WEARING EYE MAKEUP MAY LEAD TO SERIOUS EYE INJURY during surgery.  __x__  No powder, lotions or creams to your body.  _x___  You may wear only deodorant on the day of surgery.  _x___  Please remove all jewelry, including piercings and leave at home.  __x__  No money or valuables needed. Please leave at home.  You may bring your  cell phone.  ____  Please bring any documents given by your doctor.  __x__  If going home the same day, arrange for a ride home. You will not be able to   drive if Anesthesia was used.    __x__  Wear loose fitting clothing. Allow for dressings, bandages.  __x__  Stop Aspirin, Ibuprofen, Motrin and Aleve at least 3-5 days before  surgery, unless otherwise instructed by your doctor, or the nurse.              You MAY use Tylenol/acetaminophen until day of surgery.  _x___  If you take diabetic medication, do not take am of surgery unless instructed by   Doctor.  _x___  Call MD for temperature above 101 degrees.        _x___ Stop taking any Fish Oil supplement or any Vitamins that contain Vitamin   E at " least 5 days prior to surgery.          I have read or had read and explained to me, and understand the above information.  Additional comments or instructions:Please call   [phone] if you have any questions regarding the instructions above.

## 2020-08-18 NOTE — TELEPHONE ENCOUNTER
Call to patient to explained that her surgery would be tomorrow. Explained she needed to come for her preop appointment today. Explained if she could come as soon as she could. She stated she needed transportation so she was going to call and get to her appointment as soon as possible.

## 2020-08-19 ENCOUNTER — HOSPITAL ENCOUNTER (OUTPATIENT)
Dept: INTERVENTIONAL RADIOLOGY/VASCULAR | Facility: HOSPITAL | Age: 75
Discharge: HOME | End: 2020-08-19
Attending: SURGERY | Admitting: SURGERY
Payer: MEDICARE

## 2020-08-19 ENCOUNTER — HOSPITAL ENCOUNTER (OUTPATIENT)
Facility: HOSPITAL | Age: 75
Discharge: HOME | End: 2020-08-19
Attending: SURGERY | Admitting: SURGERY
Payer: MEDICARE

## 2020-08-19 VITALS
TEMPERATURE: 98 F | OXYGEN SATURATION: 94 % | RESPIRATION RATE: 16 BRPM | BODY MASS INDEX: 35.99 KG/M2 | HEART RATE: 64 BPM | DIASTOLIC BLOOD PRESSURE: 59 MMHG | WEIGHT: 216.25 LBS | SYSTOLIC BLOOD PRESSURE: 119 MMHG

## 2020-08-19 VITALS
SYSTOLIC BLOOD PRESSURE: 155 MMHG | DIASTOLIC BLOOD PRESSURE: 87 MMHG | HEART RATE: 64 BPM | RESPIRATION RATE: 16 BRPM | OXYGEN SATURATION: 97 %

## 2020-08-19 DIAGNOSIS — T82.590A DIALYSIS AV FISTULA MALFUNCTION: ICD-10-CM

## 2020-08-19 DIAGNOSIS — T82.858D ARTERIOVENOUS FISTULA STENOSIS, SUBSEQUENT ENCOUNTER: ICD-10-CM

## 2020-08-19 DIAGNOSIS — Z01.818 PRE-OP TESTING: Primary | ICD-10-CM

## 2020-08-19 PROBLEM — T82.858A ARTERIOVENOUS FISTULA STENOSIS: Status: RESOLVED | Noted: 2020-08-19 | Resolved: 2020-08-19

## 2020-08-19 PROBLEM — T82.858A ARTERIOVENOUS FISTULA STENOSIS: Status: ACTIVE | Noted: 2020-08-19

## 2020-08-19 LAB — POCT GLUCOSE: 166 MG/DL (ref 70–110)

## 2020-08-19 PROCEDURE — 25000003 PHARM REV CODE 250: Mod: HCNC | Performed by: SURGERY

## 2020-08-19 PROCEDURE — 36902 PR INTRO CATH, DIALYSIS CIRCUIT W/TRANSLML BALLOON ANGIO: ICD-10-PCS | Mod: HCNC,,, | Performed by: SURGERY

## 2020-08-19 PROCEDURE — C1894 INTRO/SHEATH, NON-LASER: HCPCS | Mod: HCNC

## 2020-08-19 PROCEDURE — 36902 INTRO CATH DIALYSIS CIRCUIT: CPT | Mod: HCNC | Performed by: SURGERY

## 2020-08-19 PROCEDURE — 25500020 PHARM REV CODE 255: Mod: HCNC | Performed by: SURGERY

## 2020-08-19 PROCEDURE — 99152 PR MOD CONSCIOUS SEDATION, SAME PHYS, 5+ YRS, FIRST 15 MIN: ICD-10-PCS | Mod: HCNC,,, | Performed by: SURGERY

## 2020-08-19 PROCEDURE — 99152 MOD SED SAME PHYS/QHP 5/>YRS: CPT | Mod: HCNC,,, | Performed by: SURGERY

## 2020-08-19 PROCEDURE — 82962 GLUCOSE BLOOD TEST: CPT | Mod: HCNC | Performed by: SURGERY

## 2020-08-19 PROCEDURE — C2623 CATH, TRANSLUMIN, DRUG-COAT: HCPCS | Mod: HCNC

## 2020-08-19 PROCEDURE — 36902 INTRO CATH DIALYSIS CIRCUIT: CPT | Mod: HCNC,,, | Performed by: SURGERY

## 2020-08-19 PROCEDURE — 99152 MOD SED SAME PHYS/QHP 5/>YRS: CPT | Mod: HCNC | Performed by: SURGERY

## 2020-08-19 PROCEDURE — 99153 MOD SED SAME PHYS/QHP EA: CPT | Mod: HCNC | Performed by: SURGERY

## 2020-08-19 PROCEDURE — 63600175 PHARM REV CODE 636 W HCPCS: Mod: HCNC | Performed by: SURGERY

## 2020-08-19 RX ORDER — FENTANYL CITRATE 50 UG/ML
INJECTION, SOLUTION INTRAMUSCULAR; INTRAVENOUS CODE/TRAUMA/SEDATION MEDICATION
Status: COMPLETED | OUTPATIENT
Start: 2020-08-19 | End: 2020-08-19

## 2020-08-19 RX ORDER — NITROGLYCERIN 20 MG/100ML
INJECTION INTRAVENOUS
Status: COMPLETED | OUTPATIENT
Start: 2020-08-19 | End: 2020-08-19

## 2020-08-19 RX ORDER — VERAPAMIL HYDROCHLORIDE 2.5 MG/ML
INJECTION, SOLUTION INTRAVENOUS CODE/TRAUMA/SEDATION MEDICATION
Status: COMPLETED | OUTPATIENT
Start: 2020-08-19 | End: 2020-08-19

## 2020-08-19 RX ORDER — CEFAZOLIN SODIUM 2 G/50ML
2 SOLUTION INTRAVENOUS ONCE
Status: DISCONTINUED | OUTPATIENT
Start: 2020-08-19 | End: 2020-10-02

## 2020-08-19 RX ORDER — MIDAZOLAM HYDROCHLORIDE 1 MG/ML
INJECTION INTRAMUSCULAR; INTRAVENOUS CODE/TRAUMA/SEDATION MEDICATION
Status: COMPLETED | OUTPATIENT
Start: 2020-08-19 | End: 2020-08-19

## 2020-08-19 RX ORDER — CEFAZOLIN SODIUM 1 G/50ML
SOLUTION INTRAVENOUS
Status: COMPLETED | OUTPATIENT
Start: 2020-08-19 | End: 2020-08-19

## 2020-08-19 RX ORDER — HEPARIN SODIUM 1000 [USP'U]/ML
INJECTION, SOLUTION INTRAVENOUS; SUBCUTANEOUS CODE/TRAUMA/SEDATION MEDICATION
Status: COMPLETED | OUTPATIENT
Start: 2020-08-19 | End: 2020-08-19

## 2020-08-19 RX ADMIN — FENTANYL CITRATE 50 MCG: 50 INJECTION INTRAMUSCULAR; INTRAVENOUS at 10:08

## 2020-08-19 RX ADMIN — FENTANYL CITRATE 50 MCG: 50 INJECTION INTRAMUSCULAR; INTRAVENOUS at 11:08

## 2020-08-19 RX ADMIN — VERAPAMIL HYDROCHLORIDE 2.5 MG: 2.5 INJECTION, SOLUTION INTRAVENOUS at 11:08

## 2020-08-19 RX ADMIN — CEFAZOLIN SODIUM 2 G: 1 SOLUTION INTRAVENOUS at 10:08

## 2020-08-19 RX ADMIN — MIDAZOLAM HYDROCHLORIDE 1 MG: 1 INJECTION, SOLUTION INTRAMUSCULAR; INTRAVENOUS at 11:08

## 2020-08-19 RX ADMIN — HEPARIN SODIUM 2500 UNITS: 1000 INJECTION, SOLUTION INTRAVENOUS; SUBCUTANEOUS at 11:08

## 2020-08-19 RX ADMIN — MIDAZOLAM HYDROCHLORIDE 1 MG: 1 INJECTION, SOLUTION INTRAMUSCULAR; INTRAVENOUS at 10:08

## 2020-08-19 RX ADMIN — IOHEXOL 42 ML: 300 INJECTION, SOLUTION INTRAVENOUS at 11:08

## 2020-08-19 RX ADMIN — HEPARIN SODIUM 3000 UNITS: 1000 INJECTION, SOLUTION INTRAVENOUS; SUBCUTANEOUS at 10:08

## 2020-08-19 RX ADMIN — NITROGLYCERIN 100 MCG/MIN: 20 INJECTION INTRAVENOUS at 11:08

## 2020-08-19 NOTE — INTERVAL H&P NOTE
The patient has been examined and the H&P has been reviewed:    I concur with the findings and no changes have occurred since H&P was written.    Surgery risks, benefits and alternative options discussed and understood by patient/family.          Active Hospital Problems    Diagnosis  POA    Arteriovenous fistula stenosis [T89.167F]  Yes      Resolved Hospital Problems   No resolved problems to display.

## 2020-08-19 NOTE — SEDATION DOCUMENTATION
Sheath inserted into vessel. Guidewire advanced through vessel. Tolerating well. No arrhythmias noted.

## 2020-08-19 NOTE — SEDATION DOCUMENTATION
Procedure end. TR band placed to left wrist. Inflated to 15 cc air per MD. OK to begin deflating @ 13:05. Tolerated interventions well. Site CDI, no redness, swelling or hematoma noted. CARLOS EDUARDO FENTON.

## 2020-08-19 NOTE — DISCHARGE INSTRUCTIONS
Limit movement of the wrist.  Do not bend wrist or perform heavy lifting for 24 hours.      NO blood pressure cuff or venipuncture to affected arm for 24 hours. (  Although cleared by Dr Valencia for dialysis tomorrow )     If bleeding occurs, apply pressure to site for 20 minutes. Apply band aid once bleeding stops.  Call 911 if unable to stop bleeding with pressure.     Keep your follow up appointment.      Do not drive, drink alcohol, or sign legal documents for 24 hours, or if taking narcotic pain medication.  Fall Prevention  Millions of people fall every year and injure themselves. You may have had anesthesia or sedation which may increase your risk of falling. You may have health issues that put you at an increased risk of falling.     Here are ways to reduce your risk of falling.  ·   · Make your home safe by keeping walkways clear of objects you may trip over.  · Use non-slip pads under rugs. Do not use area rugs or small throw rugs.  · Use non-slip mats in bathtubs and showers.  · Install handrails and lights on staircases.  · Do not walk in poorly lit areas.  · Do not stand on chairs or wobbly ladders.  · Use caution when reaching overhead or looking upward. This position can cause a loss of balance.  · Be sure your shoes fit properly, have non-slip bottoms and are in good condition.   · Wear shoes both inside and out. Avoid going barefoot or wearing slippers.  · Be cautious when going up and down stairs, curbs, and when walking on uneven sidewalks.  · If your balance is poor, consider using a cane or walker.  · If your fall was related to alcohol use, stop or limit alcohol intake.   · If your fall was related to use of sleeping medicines, talk to your doctor about this. You may need to reduce your dosage at bedtime if you awaken during the night to go to the bathroom.    · To reduce the need for nighttime bathroom trips:  ¨ Avoid drinking fluids for several hours before going to bed  ¨ Empty your  bladder before going to bed  ¨ Men can keep a urinal at the bedside  · Stay as active as you can. Balance, flexibility, strength, and endurance all come from exercise. They all play a role in preventing falls. Ask your healthcare provider which types of activity are right for you.  · Get your vision checked on a regular basis.  · If you have pets, know where they are before you stand up or walk so you don't trip over them.  · Use night lights.

## 2020-08-19 NOTE — OP NOTE
Date: 8/19/2020     Surgeon(s) and Role:   Keron Perez MD    Assistant: KENZIE Crocker    Pre-op Diagnosis:   1. T82.858A: Stenosis of vascular prosthetic devices, implants and grafts, initial encounter  2. Final diagnoses:  [T82.858D] Arteriovenous fistula stenosis, subsequent encounter      Post-op Diagnosis: Same     Procedure(s):   1. US guided L radial artery access  2. LUE fistulogram  3. Central venogram  4. Balloon angioplasty of the proximal LUE AVF with a 4x40 Angiosculpt balloon  5. Balloon angioplasty of the proximal LUE AVF with a 5x40 Angiosculpt balloon  6. Balloon angioplasty of the proximal LUE AVF with a 6x60 Stellerex balloon  7. Moderate sedation    Anesthesia: Local MAC     Findings/Key Components:   Successful treatment of > 70% stenosis  Strong palpable thrill     EBL: Minimal    PROCEDURE IN DETAIL:After an informed consent was obtained the patient was taken to the interventional suite and placed in the supine position.  The patient was brought to the IR suite, placed in supine. Arm was prepped and draped in the standard surgical fashion. Under ultrasound guidance, the distal aspect of the left radial artery was accessed with a micropuncture needle; ultrasound confirmed vessel patency, followed by placement of 4/3-Uruguayan micropuncture dilator. Through this, an 0.018-inch wire was placed in the short 6-Uruguayan sheath.   A fistulogram and central venogram was performed.  After independent review and interpretation, based upon the fistulogram results, I decided to intervene. Through this, an 0.018-inch Glidewire was placed through the high-grade stenosis, which was demonstrated by the angiogram.  A high-grade stenosis in the proximal fistula was found, which was crossed with a hyrophilic 0.018-in glidewire. This was treated with multiple high-pressure, noncompliant balloons listed above. Resolution of the stenosis was noted. Strong thrill could be felt. The sheath was removed, a  vascular band was placed with good hemostasis.    MODERATE SEDATION   I was present for and monitored the patients cardio respiratory functions during the moderate sedation. See nurses notes for Intra-service start and end times, the medications their doseage and route.    Time of sedation:  75 mins.

## 2020-08-19 NOTE — SEDATION DOCUMENTATION
MD assessing left radial artery for optimal access point. Lidocaine administered to left wrist per MD. Tolerated well.

## 2020-08-19 NOTE — BRIEF OP NOTE
[facility]  Brief Operative Note    Surgery Date: 8/19/2020     Surgeon(s) and Role:     * Keron Perez MD - Primary    Assisting Surgeon: None    Pre-op Diagnosis:  Arteriovenous fistula stenosis, subsequent encounter [T82.858D]    Post-op Diagnosis:  Post-Op Diagnosis Codes:     * Arteriovenous fistula stenosis, subsequent encounter [T82.858D]    Procedure:  1. US guided L radial artery access  2. LUE fistulogram  3. Central venogram  4. Balloon angioplasty of the proximal LUE AVF with a 4x40 Angiosculpt balloon  5. Balloon angioplasty of the proximal LUE AVF with a 5x40 Angiosculpt balloon  6. Balloon angioplasty of the proximal LUE AVF with a 6x60 Stellerex balloon  7. Moderate sedation    Description of the findings of the procedure(s): adequate artery for access    Estimated Blood Loss: <5 cc         Specimens:   Specimen (12h ago, onward)    None            Discharge Note    OUTCOME: Patient tolerated treatment/procedure well without complication and is now ready for discharge.    DISPOSITION: Home or Self Care    FINAL DIAGNOSIS:  <principal problem not specified>    FOLLOWUP: In clinic    DISCHARGE INSTRUCTIONS:  No discharge procedures on file.

## 2020-08-19 NOTE — SEDATION DOCUMENTATION
Consent obtained per MD and verified per RN. Pt wheeled into IR procedure room and assisted to fluoro procedure table. Attached to vitals monitoring. VSS. NADN. Prepped in sterile fashion per RT.

## 2020-08-31 ENCOUNTER — OFFICE VISIT (OUTPATIENT)
Dept: VASCULAR SURGERY | Facility: CLINIC | Age: 75
End: 2020-08-31
Payer: MEDICARE

## 2020-08-31 ENCOUNTER — HOSPITAL ENCOUNTER (OUTPATIENT)
Dept: CARDIOLOGY | Facility: HOSPITAL | Age: 75
Discharge: HOME | End: 2020-08-31
Attending: SURGERY
Payer: MEDICARE

## 2020-08-31 VITALS
BODY MASS INDEX: 34.85 KG/M2 | HEIGHT: 65 IN | DIASTOLIC BLOOD PRESSURE: 60 MMHG | SYSTOLIC BLOOD PRESSURE: 108 MMHG | WEIGHT: 209.19 LBS

## 2020-08-31 DIAGNOSIS — T82.858D ARTERIOVENOUS FISTULA STENOSIS, SUBSEQUENT ENCOUNTER: Primary | ICD-10-CM

## 2020-08-31 DIAGNOSIS — Z98.890 S/P ARTERIOVENOUS (AV) FISTULA REPAIR: ICD-10-CM

## 2020-08-31 DIAGNOSIS — Z86.79 S/P ARTERIOVENOUS (AV) FISTULA REPAIR: ICD-10-CM

## 2020-08-31 PROCEDURE — 3008F BODY MASS INDEX DOCD: CPT | Mod: HCNC,CPTII,S$GLB, | Performed by: SURGERY

## 2020-08-31 PROCEDURE — 99999 PR PBB SHADOW E&M-EST. PATIENT-LVL III: ICD-10-PCS | Mod: PBBFAC,HCNC,, | Performed by: SURGERY

## 2020-08-31 PROCEDURE — 99214 PR OFFICE/OUTPT VISIT, EST, LEVL IV, 30-39 MIN: ICD-10-PCS | Mod: HCNC,S$GLB,, | Performed by: SURGERY

## 2020-08-31 PROCEDURE — 3008F PR BODY MASS INDEX (BMI) DOCUMENTED: ICD-10-PCS | Mod: HCNC,CPTII,S$GLB, | Performed by: SURGERY

## 2020-08-31 PROCEDURE — 93990 DOPPLER FLOW TESTING: CPT | Mod: TC,HCNC

## 2020-08-31 PROCEDURE — 1126F AMNT PAIN NOTED NONE PRSNT: CPT | Mod: HCNC,S$GLB,, | Performed by: SURGERY

## 2020-08-31 PROCEDURE — 1101F PT FALLS ASSESS-DOCD LE1/YR: CPT | Mod: HCNC,CPTII,S$GLB, | Performed by: SURGERY

## 2020-08-31 PROCEDURE — 99999 PR PBB SHADOW E&M-EST. PATIENT-LVL III: CPT | Mod: PBBFAC,HCNC,, | Performed by: SURGERY

## 2020-08-31 PROCEDURE — 1159F PR MEDICATION LIST DOCUMENTED IN MEDICAL RECORD: ICD-10-PCS | Mod: HCNC,S$GLB,, | Performed by: SURGERY

## 2020-08-31 PROCEDURE — 1159F MED LIST DOCD IN RCRD: CPT | Mod: HCNC,S$GLB,, | Performed by: SURGERY

## 2020-08-31 PROCEDURE — 1101F PR PT FALLS ASSESS DOC 0-1 FALLS W/OUT INJ PAST YR: ICD-10-PCS | Mod: HCNC,CPTII,S$GLB, | Performed by: SURGERY

## 2020-08-31 PROCEDURE — 99214 OFFICE O/P EST MOD 30 MIN: CPT | Mod: HCNC,S$GLB,, | Performed by: SURGERY

## 2020-08-31 PROCEDURE — 93990 CV US HEMODIALYSIS ACCESS (CUPID ONLY): ICD-10-PCS | Mod: 26,HCNC,, | Performed by: SURGERY

## 2020-08-31 PROCEDURE — 1126F PR PAIN SEVERITY QUANTIFIED, NO PAIN PRESENT: ICD-10-PCS | Mod: HCNC,S$GLB,, | Performed by: SURGERY

## 2020-08-31 PROCEDURE — 93990 DOPPLER FLOW TESTING: CPT | Mod: 26,HCNC,, | Performed by: SURGERY

## 2020-08-31 NOTE — PATIENT INSTRUCTIONS
Caring for Your Hemodialysis Access  A problem such as an infection or a blood clot may make the access unusable. Typically, this happens more often with an arteriovenous graft than with an arteriovenous fistula. If this happens, youll need a new access. To help your access last, you will need to follow certain guidelines.  Watching for problems  Call your healthcare provider right away if you:  · Cant feel the blood flowing in the access (this sensation is called a thrill)  · Have pain or numbness in your hand or arm  · Have bleeding, redness, bluish discoloration, or warmth around your access  · Notice your access suddenly bulging out more than usual (a slight bulge is normal)  · Have a fever of 100.4°F (38°C) or higher, or as directed by your healthcare provider   Follow these and any other guidelines youre given  · Dont wear tight clothes or jewelry around your access.  · Dont let anyone take your blood pressure on or draw blood from the arm with the access. Also, dont let anyone put IV lines into it.  · Protect your access from being hit or cut.  · Wash your hands often and keep the area around the access clean.  · Do not carry anything heavy or do anything that would put pressure on the access.  Feeling for your thrill    If you put your fingers over your access, you should feel the blood rushing through it. This is called a thrill, and it feels like a vibration. Feel for the thrill as often as you're told, usually once or twice a day. If you can't feel it, tell your healthcare provider right away. Blood may not be flowing through your access the way it should.  Important numbers  Write the names and numbers of your healthcare providers below. That way you will know how to get in touch with them.  Doctor:  Name ___________________ Phone ___________________  Surgeon:  Name ___________________ Phone ___________________  Dialysis Center:  Name ___________________ Phone ___________________   Date Last  Reviewed: 1/1/2017  © 7182-5316 [employer]. [address] [place] [address], [place] [address]. All rights reserved. This information is not intended as a substitute for professional medical care. Always follow your healthcare professional's instructions.

## 2020-08-31 NOTE — PROGRESS NOTES
Keron Perez MD RPVI                       [facility]                         08/31/2020    HPI:  Erica Leblanc is a 75 y.o. female with   Patient Active Problem List   Diagnosis    Severe obesity (BMI 35.0-39.9) with comorbidity    Sickle cell trait    Osteopenia    Hyperlipidemia LDL goal <100    HUMBERTO on CPAP    Hypothyroidism (acquired)    Hx-TIA (transient ischemic attack)    Vitamin D deficiency disease    Pulmonary hypertension    Type 2 diabetes mellitus with ESRD (end-stage renal disease)    Secondary renal hyperparathyroidism    Incomplete bladder emptying    Postmenopausal atrophic vaginitis    Rectocele    Mixed incontinence urge and stress    Chronic diastolic heart failure    Tortuous aorta    History of TIAs    Essential hypertension    ESRD on hemodialysis    Anemia of chronic disease    Depression    Debility    Chronic respiratory failure    Type 2 diabetes mellitus with foot ulcer, with long-term current use of insulin    Stable proliferative diabetic retinopathy associated with type 2 diabetes mellitus    Mild major depression    Heart failure with preserved ejection fraction    Preop cardiovascular exam    End stage renal disease    Pseudophakia    being managed by PCP and specialists who is here today for evaluation of long term HD access.  S/p R IJ tunneled HD catheter, HD without issues.  Pt is R handed.  No complaints today.  Does endorse orthopnea, no chest pain.  Unable to climb 1 flight of stairs on own.    no MI  no Stroke  Tobacco use: denies    1/20/20: No new issues.    3/2020: Dialysis without issues.    4/6/20:  S/p LUE fistulogram, central venogram, ligation of medial side branch cephalic vein, ligation of lateral side branch cephalic vein.  No issues with HD for several weeks since procedure.    7/6/20:  No issues with HD.    8/3/20: s/p L proximal fistula angioplasty 7/21/20.  No issues with HD.    8/31/20:  S/p  8/19/29 Balloon angioplasty of the proximal LUE AVF with a 6x60 Angiosculpt and Stellerex balloon.      Past Medical History:   Diagnosis Date    Acute respiratory failure with hypoxia     Anemia of chronic kidney failure, stage 4 (severe) 4/5/2019    Cataracts, bilateral     CHF (congestive heart failure)     CKD (chronic kidney disease) stage 3, GFR 30-59 ml/min     CKD (chronic kidney disease) stage 3, GFR 30-59 ml/min     Controlled type 2 diabetes mellitus with proteinuria or albuminuria     Depression     Diabetes with neurologic complications     Diabetic retinopathy of both eyes     Edema     Glaucoma     History of colonic polyps     Hx-TIA (transient ischemic attack) 11/2008    Hyperlipidemia LDL goal < 100     Hypertension     Hypothyroidism     Major depressive disorder, single episode, mild 2/17/2016    Mixed incontinence urge and stress     Obesity     Obstructive sleep apnea on CPAP     7/19/19:  Home CPAP machine broken, per patient & son    Osteopenia     Proteinuria     Sickle cell trait     Strabismus     TIA (transient ischemic attack)     Trouble in sleeping     Type 2 diabetes mellitus with ophthalmic manifestations     Type 2 diabetes with stage 3 chronic kidney disease GFR 30-59     Type II or unspecified type diabetes mellitus with renal manifestations, uncontrolled(250.42)     Uncontrolled type 2 diabetes mellitus with peripheral circulatory disorder 4/5/2019    Urge incontinence 1/11/2016    Urge incontinence     Venous stasis ulcer     bilateral lower legs    Vitamin D deficiency disease      Past Surgical History:   Procedure Laterality Date    AV FISTULA PLACEMENT Left 11/27/2019    Procedure: CREATION, AV FISTULA, LEFT UPPER EXTREMITY;  Surgeon: Keron Perez MD;  Location: [facility];  Service: Vascular;  Laterality: Left;    BREAST BIOPSY      breast reduction Bilateral age 31    BREAST SURGERY      CATARACT EXTRACTION Bilateral      cataracts Bilateral      SECTION, LOW TRANSVERSE      x1    CHOLECYSTECTOMY      EYE SURGERY  2014, 2014    vitrectomy    EYE SURGERY Right 2016    FISTULOGRAM Left 3/6/2020    Procedure: Fistulogram, left upper extremity, with branch ligation;  Surgeon: Keron Perez MD;  Location: [address];  Service: Vascular;  Laterality: Left;  Time 1.1 Minute  42.10 mGy    FISTULOGRAM Left 2020    Procedure: Fistulogram, left upper extremity, transradial access with possible intervention;  Surgeon: Keron Perez MD;  Location: [address];  Service: Vascular;  Laterality: Left;    FISTULOGRAM Left 2020    Procedure: Fistulogram, left upper extremity, possible intervention;  Surgeon: Keron Perez MD;  Location: [facility];  Service: Vascular;  Laterality: Left;  930 AM START  RN PRE OP  ---COVID NEGATIVE ON  2020. [place]    HYSTERECTOMY      TAHBSO (patient is unsure if ovaries removed)    OOPHORECTOMY      PERCUTANEOUS TRANSLUMINAL ANGIOPLASTY OF ARTERIOVENOUS FISTULA Left 2020    Procedure: PTA, AV FISTULA;  Surgeon: Keron Perez MD;  Location: [address];  Service: Vascular;  Laterality: Left;  15.9 minutes of fluro  41.12  mGy  7.9060 Gy cm2  32ml  contrast    PHLEBOGRAPHY Left 2020    Procedure: CENTRAL VENOGRAM;  Surgeon: Keron Perez MD;  Location: [address];  Service: Vascular;  Laterality: Left;    REFRACTIVE SURGERY      TOTAL REDUCTION MAMMOPLASTY      approx 10 yrs ago     Family History   Problem Relation Age of Onset    Leukemia Father     Cataracts Father     Ovarian cancer Sister 35    Stroke Mother     Diabetes Mother     Hypertension Mother     Cataracts Mother     Diabetes Paternal Grandmother     Cataracts Paternal Grandmother     Breast cancer Maternal Aunt 65    No Known Problems Paternal Grandfather     Cataracts Maternal Grandmother     Cataracts Maternal Grandfather     HIV  Brother     Achondroplasia Sister     Parkinsonism Maternal Aunt     Esophageal cancer Maternal Uncle         smoker    No Known Problems Paternal Aunt     Cataracts Paternal Uncle     Amblyopia Neg Hx     Blindness Neg Hx     Glaucoma Neg Hx     Macular degeneration Neg Hx     Retinal detachment Neg Hx     Strabismus Neg Hx     Thyroid disease Neg Hx     Colon cancer Neg Hx     Cancer Neg Hx      Social History     Socioeconomic History    Marital status: Single     Spouse name: Not on file    Number of children: 5    Years of education: Not on file    Highest education level: Not on file   Occupational History    Occupation:      Employer: [employer]     Comment: part-time   Social Needs    Financial resource strain: Not on file    Food insecurity     Worry: Not on file     Inability: Not on file    Transportation needs     Medical: Not on file     Non-medical: Not on file   Tobacco Use    Smoking status: Never Smoker    Smokeless tobacco: Never Used   Substance and Sexual Activity    Alcohol use: No     Alcohol/week: 0.0 standard drinks    Drug use: No    Sexual activity: Not Currently     Partners: Male     Birth control/protection: Post-menopausal, Surgical   Lifestyle    Physical activity     Days per week: Not on file     Minutes per session: Not on file    Stress: Not on file   Relationships    Social connections     Talks on phone: Not on file     Gets together: Not on file     Attends Qu[employer] service: Not on file     Active member of club or organization: Not on file     Attends meetings of clubs or organizations: Not on file     Relationship status: Not on file   Other Topics Concern    Not on file   Social History Narrative    Not on file       Current Outpatient Medications:     amLODIPine (NORVASC) 10 MG tablet, Take 1 tablet (10 mg total) by mouth once daily. (Patient taking differently: Take 10 mg by mouth every morning. ), Disp: 90  tablet, Rfl: 3    atorvastatin (LIPITOR) 10 MG tablet, TAKE 1 TABLET EVERY DAY, Disp: 90 tablet, Rfl: 1    bumetanide (BUMEX) 2 MG tablet, Take 1 tablet (2 mg total) by mouth once daily., Disp: 30 tablet, Rfl: 11    citalopram (CELEXA) 10 MG tablet, TAKE 1 TABLET EVERY DAY, Disp: 90 tablet, Rfl: 0    clopidogreL (PLAVIX) 75 mg tablet, TAKE 1 TABLET EVERY DAY, Disp: 90 tablet, Rfl: 0    docusate sodium (COLACE) 100 MG capsule, Take 200 mg by mouth once daily. , Disp: , Rfl:     hydrALAZINE (APRESOLINE) 100 MG tablet, TAKE 1 TABLET BY MOUTH THREE TIMES A DAY, Disp: 270 tablet, Rfl: 1    LANCETS MISC, , Disp: , Rfl:     levothyroxine (SYNTHROID) 50 MCG tablet, Take 1 tablet (50 mcg total) by mouth before breakfast., Disp: 90 tablet, Rfl: 0    levothyroxine (SYNTHROID) 50 MCG tablet, Take 1 tablet (50 mcg total) by mouth before breakfast., Disp: 30 tablet, Rfl: 0    metoprolol tartrate (LOPRESSOR) 50 MG tablet, Take 1 tablet (50 mg total) by mouth 2 (two) times daily., Disp: 180 tablet, Rfl: 0    oxybutynin (DITROPAN XL) 15 MG TR24, TAKE 1 TABLET EVERY DAY, Disp: 90 tablet, Rfl: 3  No current facility-administered medications for this visit.     Facility-Administered Medications Ordered in Other Visits:     cefazolin (ANCEF) 2 gram in dextrose 5% 50 mL IVPB (premix), 2 g, Intravenous, Once, Keron Perez MD    REVIEW OF SYSTEMS:  General: No fevers or chills; ENT: No sore throat; Allergy and Immunology: no persistent infections; Hematological and Lymphatic: No history of bleeding or easy bruising; Endocrine: negative; Respiratory: no cough, shortness of breath, or wheezing; Cardiovascular: no chest pain or dyspnea on exertion; Gastrointestinal: no abdominal pain/back, change in bowel habits, or bloody stools; Genito-Urinary: no dysuria, trouble voiding, or hematuria; Musculoskeletal: negative; Neurological: no TIA or stroke symptoms; Psychiatric: no nervousness, anxiety or depression.    PHYSICAL  EXAM:                General appearance:  Alert, well-appearing, and in no distress.  Oriented to person, place, and time                    Neurological: Normal speech, no focal findings noted; CN II - XII grossly intact. All extremities with sensation to light touch.            Musculoskeletal: Digits/nail without cyanosis/clubbing.  Strength 5/5 all extremities.                    Neck: Supple, no significant adenopathy, no carotid bruit can be auscultated                  Chest:  Clear to auscultation, no wheezes, rales or rhonchi, symmetric air entry. No use of accessory muscles               Cardiac: Normal rate and regular rhythm, S1 and S2 normal            Abdomen: Soft, nontender, nondistended, no masses or organomegaly, no hernia     No rebound tenderness noted; bowel sounds normal     Pulsatile aortic mass is non palpable.     No groin adenopathy      Extremities:      2+ radial pulse bilaterally, LUE AVF +thrill, inc well healed     No BUE edema, Negative Manuel's test BUE    Skin: No tissue loss    LAB RESULTS:  No results found for: CBC  Lab Results   Component Value Date    LABPROT 10.9 11/20/2019    INR 1.0 11/20/2019     Lab Results   Component Value Date     08/17/2020    K 4.0 08/17/2020     08/17/2020    CO2 29 08/17/2020     (H) 08/17/2020    BUN 78 (H) 08/17/2020    CREATININE 6.7 (H) 08/17/2020    CALCIUM 8.2 (L) 08/17/2020    ANIONGAP 10 08/17/2020    EGFRNONAA 6 (A) 08/17/2020     Lab Results   Component Value Date    WBC 4.42 11/20/2019    RBC 4.37 11/20/2019    HGB 11.0 (L) 11/20/2019    HCT 35 (L) 07/21/2020    MCV 81 (L) 11/20/2019    MCH 25.2 (L) 11/20/2019    MCHC 31.2 (L) 11/20/2019    RDW 16.9 (H) 11/20/2019     11/20/2019    MPV 9.3 11/20/2019    GRAN 2.8 11/20/2019    GRAN 62.6 11/20/2019    LYMPH 0.8 (L) 11/20/2019    LYMPH 18.1 11/20/2019    MONO 0.7 11/20/2019    MONO 16.3 (H) 11/20/2019    EOS 0.1 11/20/2019    BASO 0.02 11/20/2019    EOSINOPHIL 2.3  11/20/2019    BASOPHIL 0.5 11/20/2019    DIFFMETHOD Automated 11/20/2019     .  Lab Results   Component Value Date    HGBA1C 6.9 (H) 11/20/2019       IMAGING:  All pertinent imaging has been reviewed and interpreted independently.    Vein mapping 9/2019:  Adequate R superior basilic vein and axillary vein ; Adequate L basilic vein superior and inferior, adequate L axillary     1/27/20 Left upper extremity dialysis ultrasound shows no hemodynamically significant stenosis.  Flow is 1083 ml/min.  Depth and diameter are appropriate.    3/23/20:  Left upper extremity dialysis ultrasound shows anastomotic and proximal fistula hemodynamically significant stenosis.  Flow is 955 ml/min.      7/2020: Left upper extremity dialysis ultrasound shows proximal fistula hemodynamically significant stenosis.  Flow is 1257 ml/min.      8/2020: Left upper extremity dialysis ultrasound shows proximal hemodynamically significant stenosis.  Flow is 1999 ml/min.      8/31/20: Left upper extremity dialysis ultrasound shows proximal fistula hemodynamically significant stenosis.  Flow is 2209 ml/min.      IMP/PLAN:  75 y.o. female with   Patient Active Problem List   Diagnosis    Severe obesity (BMI 35.0-39.9) with comorbidity    Sickle cell trait    Osteopenia    Hyperlipidemia LDL goal <100    HUMBERTO on CPAP    Hypothyroidism (acquired)    Hx-TIA (transient ischemic attack)    Vitamin D deficiency disease    Pulmonary hypertension    Type 2 diabetes mellitus with ESRD (end-stage renal disease)    Secondary renal hyperparathyroidism    Incomplete bladder emptying    Postmenopausal atrophic vaginitis    Rectocele    Mixed incontinence urge and stress    Chronic diastolic heart failure    Tortuous aorta    History of TIAs    Essential hypertension    ESRD on hemodialysis    Anemia of chronic disease    Depression    Debility    Chronic respiratory failure    Type 2 diabetes mellitus with foot ulcer, with long-term current  use of insulin    Stable proliferative diabetic retinopathy associated with type 2 diabetes mellitus    Mild major depression    Heart failure with preserved ejection fraction    Preop cardiovascular exam    End stage renal disease    Pseudophakia    being managed by PCP and specialists who is here today for evaluation of long term HD access s/p L RC AV fistula.    -s/p L RC AV fistula with proximal fistula measuring 5 mm 1/13/20, adequate flow without issues during HD s/p LUE fistulogram, central venogram, ligation of medial side branch cephalic vein, ligation of lateral side branch cephalic vein 3/2/20 with prox AVF stenosis s/p L proximal fistula angioplasty 7/21/20 with persistent flow issues s/p proximal angioplasty 8/19/20 with scoring balloon/DCB with improvement in stenosis/flow  -Pt states issues with last HD session due to machine clotting - will call center to obtain further details as flow is 2209 ml/min   -Cont renal diet    I spent 7 minutes evaluating this patient and greater than 50% of the time was spent counseling, coordinator care and discussing the plan of care.  All questions were answered and patient stated understanding with agreement with the above treatment plan.    Keron Perez MD [facility]  Vascular and Endovascular Surgery

## 2020-09-01 LAB
HD ANASTAMOSIS LOCATION: NORMAL
HD FISTULA OUTFLOW VEIN VESSEL: NORMAL
HD INFLOW ARTERY VESSEL: NORMAL
RIGHT DIS GRAFT PSV: 70 CM/ SEC
RIGHT INFLOW PSV: 333 CM/S
RIGHT MID GRAFT PSV: 131 CM/S
RIGHT OUTFLOW VEIN PSV: 65 CM/ SEC
RIGHT PROX ANA PSV: 314 CM/S
RIGHT PROX GRAFT PSV: 508 CM/S
RIGHT VOLUME FLOW DIA: 1.1 CM
RIGHT VOLUME FLOW PSV: 2209 ML/MIN

## 2020-09-29 DIAGNOSIS — E78.5 HYPERLIPIDEMIA LDL GOAL <100: Primary | Chronic | ICD-10-CM

## 2020-09-29 DIAGNOSIS — N18.6 TYPE 2 DIABETES MELLITUS WITH ESRD (END-STAGE RENAL DISEASE): ICD-10-CM

## 2020-09-29 DIAGNOSIS — E11.22 TYPE 2 DIABETES MELLITUS WITH ESRD (END-STAGE RENAL DISEASE): ICD-10-CM

## 2020-09-29 PROBLEM — Z01.810 PREOP CARDIOVASCULAR EXAM: Status: RESOLVED | Noted: 2019-11-08 | Resolved: 2020-09-29

## 2020-09-29 PROBLEM — F32.A DEPRESSION: Chronic | Status: RESOLVED | Noted: 2019-08-27 | Resolved: 2020-09-29

## 2020-09-30 ENCOUNTER — NURSE TRIAGE (OUTPATIENT)
Dept: ADMINISTRATIVE | Facility: CLINIC | Age: 75
End: 2020-09-30

## 2020-09-30 NOTE — TELEPHONE ENCOUNTER
Spoke with pt:     Requesting refill atorvastatin 10 mg daily. I instructed pt I would send message to MD re request. Verbalizes understanding. states has none left.     Reason for Disposition   [1] Caller requesting a NON-URGENT new prescription or refill AND [2] triager unable to refill per unit policy    Protocols used: MEDICATION QUESTION CALL-A-AH

## 2020-10-02 ENCOUNTER — OFFICE VISIT (OUTPATIENT)
Dept: FAMILY MEDICINE | Facility: CLINIC | Age: 75
End: 2020-10-02
Payer: MEDICARE

## 2020-10-02 ENCOUNTER — LAB VISIT (OUTPATIENT)
Dept: LAB | Facility: HOSPITAL | Age: 75
End: 2020-10-02
Attending: INTERNAL MEDICINE
Payer: MEDICARE

## 2020-10-02 VITALS
TEMPERATURE: 98 F | OXYGEN SATURATION: 99 % | WEIGHT: 210.44 LBS | HEART RATE: 69 BPM | BODY MASS INDEX: 35.06 KG/M2 | SYSTOLIC BLOOD PRESSURE: 130 MMHG | HEIGHT: 65 IN | DIASTOLIC BLOOD PRESSURE: 62 MMHG

## 2020-10-02 DIAGNOSIS — N18.6 TYPE 2 DIABETES MELLITUS WITH ESRD (END-STAGE RENAL DISEASE): ICD-10-CM

## 2020-10-02 DIAGNOSIS — E78.5 HYPERLIPIDEMIA LDL GOAL <100: Chronic | ICD-10-CM

## 2020-10-02 DIAGNOSIS — I27.20 PULMONARY HYPERTENSION: ICD-10-CM

## 2020-10-02 DIAGNOSIS — F32.0 MILD MAJOR DEPRESSION: ICD-10-CM

## 2020-10-02 DIAGNOSIS — Z78.0 MENOPAUSE: ICD-10-CM

## 2020-10-02 DIAGNOSIS — Z00.00 ROUTINE MEDICAL EXAM: Primary | ICD-10-CM

## 2020-10-02 DIAGNOSIS — L97.509 TYPE 2 DIABETES MELLITUS WITH FOOT ULCER, WITH LONG-TERM CURRENT USE OF INSULIN: ICD-10-CM

## 2020-10-02 DIAGNOSIS — E11.22 TYPE 2 DIABETES MELLITUS WITH ESRD (END-STAGE RENAL DISEASE): ICD-10-CM

## 2020-10-02 DIAGNOSIS — I77.1 TORTUOUS AORTA: ICD-10-CM

## 2020-10-02 DIAGNOSIS — Z71.89 ADVANCED DIRECTIVES, COUNSELING/DISCUSSION: ICD-10-CM

## 2020-10-02 DIAGNOSIS — J96.10 CHRONIC RESPIRATORY FAILURE, UNSPECIFIED WHETHER WITH HYPOXIA OR HYPERCAPNIA: ICD-10-CM

## 2020-10-02 DIAGNOSIS — Z99.2 ESRD ON HEMODIALYSIS: ICD-10-CM

## 2020-10-02 DIAGNOSIS — Z23 FLU VACCINE NEED: ICD-10-CM

## 2020-10-02 DIAGNOSIS — Z12.11 COLON CANCER SCREENING: ICD-10-CM

## 2020-10-02 DIAGNOSIS — N18.6 ESRD ON HEMODIALYSIS: ICD-10-CM

## 2020-10-02 DIAGNOSIS — R53.81 DEBILITY: ICD-10-CM

## 2020-10-02 DIAGNOSIS — Z23 NEED FOR SHINGLES VACCINE: ICD-10-CM

## 2020-10-02 DIAGNOSIS — M85.80 OSTEOPENIA AFTER MENOPAUSE: ICD-10-CM

## 2020-10-02 DIAGNOSIS — G47.33 OSA ON CPAP: Chronic | ICD-10-CM

## 2020-10-02 DIAGNOSIS — D57.3 SICKLE CELL TRAIT: ICD-10-CM

## 2020-10-02 DIAGNOSIS — E11.3553 STABLE PROLIFERATIVE DIABETIC RETINOPATHY OF BOTH EYES ASSOCIATED WITH TYPE 2 DIABETES MELLITUS: ICD-10-CM

## 2020-10-02 DIAGNOSIS — E11.621 TYPE 2 DIABETES MELLITUS WITH FOOT ULCER, WITH LONG-TERM CURRENT USE OF INSULIN: ICD-10-CM

## 2020-10-02 DIAGNOSIS — Z99.89 WALKER AS AMBULATION AID: ICD-10-CM

## 2020-10-02 DIAGNOSIS — E66.01 SEVERE OBESITY (BMI 35.0-39.9) WITH COMORBIDITY: ICD-10-CM

## 2020-10-02 DIAGNOSIS — Z79.4 TYPE 2 DIABETES MELLITUS WITH FOOT ULCER, WITH LONG-TERM CURRENT USE OF INSULIN: ICD-10-CM

## 2020-10-02 DIAGNOSIS — N25.81 SECONDARY RENAL HYPERPARATHYROIDISM: ICD-10-CM

## 2020-10-02 DIAGNOSIS — Z78.0 OSTEOPENIA AFTER MENOPAUSE: ICD-10-CM

## 2020-10-02 DIAGNOSIS — E03.9 HYPOTHYROIDISM (ACQUIRED): Chronic | ICD-10-CM

## 2020-10-02 DIAGNOSIS — I50.32 CHRONIC DIASTOLIC HEART FAILURE: Chronic | ICD-10-CM

## 2020-10-02 DIAGNOSIS — E55.9 VITAMIN D DEFICIENCY DISEASE: ICD-10-CM

## 2020-10-02 DIAGNOSIS — I10 ESSENTIAL HYPERTENSION: Chronic | ICD-10-CM

## 2020-10-02 LAB
BASOPHILS # BLD AUTO: 0.04 K/UL (ref 0–0.2)
BASOPHILS NFR BLD: 0.6 % (ref 0–1.9)
CHOLEST SERPL-MCNC: 144 MG/DL (ref 120–199)
CHOLEST/HDLC SERPL: 2.5 {RATIO} (ref 2–5)
DIFFERENTIAL METHOD: ABNORMAL
EOSINOPHIL # BLD AUTO: 0.1 K/UL (ref 0–0.5)
EOSINOPHIL NFR BLD: 1.5 % (ref 0–8)
ERYTHROCYTE [DISTWIDTH] IN BLOOD BY AUTOMATED COUNT: 14.1 % (ref 11.5–14.5)
ESTIMATED AVG GLUCOSE: 154 MG/DL (ref 68–131)
HBA1C MFR BLD HPLC: 7 % (ref 4–5.6)
HCT VFR BLD AUTO: 35.2 % (ref 37–48.5)
HDLC SERPL-MCNC: 58 MG/DL (ref 40–75)
HDLC SERPL: 40.3 % (ref 20–50)
HGB BLD-MCNC: 11.1 G/DL (ref 12–16)
IMM GRANULOCYTES # BLD AUTO: 0.03 K/UL (ref 0–0.04)
IMM GRANULOCYTES NFR BLD AUTO: 0.5 % (ref 0–0.5)
LDLC SERPL CALC-MCNC: 71.2 MG/DL (ref 63–159)
LYMPHOCYTES # BLD AUTO: 1 K/UL (ref 1–4.8)
LYMPHOCYTES NFR BLD: 15.3 % (ref 18–48)
MCH RBC QN AUTO: 27.9 PG (ref 27–31)
MCHC RBC AUTO-ENTMCNC: 31.5 G/DL (ref 32–36)
MCV RBC AUTO: 88 FL (ref 82–98)
MONOCYTES # BLD AUTO: 0.7 K/UL (ref 0.3–1)
MONOCYTES NFR BLD: 10.2 % (ref 4–15)
NEUTROPHILS # BLD AUTO: 4.8 K/UL (ref 1.8–7.7)
NEUTROPHILS NFR BLD: 71.9 % (ref 38–73)
NONHDLC SERPL-MCNC: 86 MG/DL
NRBC BLD-RTO: 0 /100 WBC
PLATELET # BLD AUTO: 194 K/UL (ref 150–350)
PMV BLD AUTO: 11.6 FL (ref 9.2–12.9)
RBC # BLD AUTO: 3.98 M/UL (ref 4–5.4)
TRIGL SERPL-MCNC: 74 MG/DL (ref 30–150)
WBC # BLD AUTO: 6.65 K/UL (ref 3.9–12.7)

## 2020-10-02 PROCEDURE — 99397 PER PM REEVAL EST PAT 65+ YR: CPT | Mod: HCNC,S$GLB,, | Performed by: INTERNAL MEDICINE

## 2020-10-02 PROCEDURE — 3044F PR MOST RECENT HEMOGLOBIN A1C LEVEL <7.0%: ICD-10-PCS | Mod: HCNC,CPTII,S$GLB, | Performed by: INTERNAL MEDICINE

## 2020-10-02 PROCEDURE — 99999 PR PBB SHADOW E&M-EST. PATIENT-LVL IV: ICD-10-PCS | Mod: PBBFAC,HCNC,, | Performed by: INTERNAL MEDICINE

## 2020-10-02 PROCEDURE — 3044F HG A1C LEVEL LT 7.0%: CPT | Mod: HCNC,CPTII,S$GLB, | Performed by: INTERNAL MEDICINE

## 2020-10-02 PROCEDURE — 83036 HEMOGLOBIN GLYCOSYLATED A1C: CPT | Mod: HCNC

## 2020-10-02 PROCEDURE — 99397 PR PREVENTIVE VISIT,EST,65 & OVER: ICD-10-PCS | Mod: HCNC,S$GLB,, | Performed by: INTERNAL MEDICINE

## 2020-10-02 PROCEDURE — 99999 PR PBB SHADOW E&M-EST. PATIENT-LVL IV: CPT | Mod: PBBFAC,HCNC,, | Performed by: INTERNAL MEDICINE

## 2020-10-02 PROCEDURE — 99499 RISK ADDL DX/OHS AUDIT: ICD-10-PCS | Mod: S$GLB,,, | Performed by: INTERNAL MEDICINE

## 2020-10-02 PROCEDURE — 80061 LIPID PANEL: CPT | Mod: HCNC

## 2020-10-02 PROCEDURE — 85025 COMPLETE CBC W/AUTO DIFF WBC: CPT | Mod: HCNC

## 2020-10-02 PROCEDURE — 99499 UNLISTED E&M SERVICE: CPT | Mod: S$GLB,,, | Performed by: INTERNAL MEDICINE

## 2020-10-02 PROCEDURE — 36415 COLL VENOUS BLD VENIPUNCTURE: CPT | Mod: HCNC,PO

## 2020-10-02 RX ORDER — ATORVASTATIN CALCIUM 10 MG/1
10 TABLET, FILM COATED ORAL DAILY
Qty: 90 TABLET | Refills: 1 | Status: SHIPPED | OUTPATIENT
Start: 2020-10-02 | End: 2021-03-08 | Stop reason: SDUPTHER

## 2020-10-02 NOTE — PROGRESS NOTES
HISTORY OF PRESENT ILLNESS:  Erica Leblanc is a 75 y.o. female who presents to the clinic today for a routine physical exam. Her last physical exam was approximately 1 years(s) ago.        PAST MEDICAL HISTORY:  Past Medical History:   Diagnosis Date    Acute respiratory failure with hypoxia     Anemia of chronic kidney failure, stage 4 (severe) 4/5/2019    Cataracts, bilateral     CHF (congestive heart failure)     CKD (chronic kidney disease) stage 3, GFR 30-59 ml/min     CKD (chronic kidney disease) stage 3, GFR 30-59 ml/min     Controlled type 2 diabetes mellitus with proteinuria or albuminuria     Depression     Diabetes with neurologic complications     Diabetic retinopathy of both eyes     Edema     Glaucoma     History of colonic polyps     Hx-TIA (transient ischemic attack) 11/2008    Hyperlipidemia LDL goal < 100     Hypertension     Hypothyroidism     Major depressive disorder, single episode, mild 2/17/2016    Mixed incontinence urge and stress     Obesity     Obstructive sleep apnea on CPAP     7/19/19:  Home CPAP machine broken, per patient & son    Osteopenia     Proteinuria     Sickle cell trait     Strabismus     TIA (transient ischemic attack)     Trouble in sleeping     Type 2 diabetes mellitus with ophthalmic manifestations     Type 2 diabetes with stage 3 chronic kidney disease GFR 30-59     Type II or unspecified type diabetes mellitus with renal manifestations, uncontrolled(250.42)     Uncontrolled type 2 diabetes mellitus with peripheral circulatory disorder 4/5/2019    Urge incontinence 1/11/2016    Urge incontinence     Venous stasis ulcer     bilateral lower legs    Vitamin D deficiency disease        PAST SURGICAL HISTORY:  Past Surgical History:   Procedure Laterality Date    AV FISTULA PLACEMENT Left 11/27/2019    Procedure: CREATION, AV FISTULA, LEFT UPPER EXTREMITY;  Surgeon: Keron Perez MD;  Location: [facility] OR;  Service: Vascular;   Laterality: Left;    BREAST BIOPSY      breast reduction Bilateral age 31    BREAST SURGERY      CATARACT EXTRACTION Bilateral     cataracts Bilateral      SECTION, LOW TRANSVERSE      x1    CHOLECYSTECTOMY      EYE SURGERY  2014, 2014    vitrectomy    EYE SURGERY Right 2016    FISTULOGRAM Left 3/6/2020    Procedure: Fistulogram, left upper extremity, with branch ligation;  Surgeon: Keron Perez MD;  Location[facility] OR [address] [place] [address];  Service: Vascular;  Laterality: Left;  Time 1.1 Minute  42.10 mGy    FISTULOGRAM Left 2020    Procedure: Fistulogram, left upper extremity, transradial access with possible intervention;  Surgeon: Keron Perez MD;  Location: [facility] OR [address] [place] [address];  Service: Vascular;  Laterality: Left;    FISTULOGRAM Left 2020    Procedure: Fistulogram, left upper extremity, possible intervention;  Surgeon: Keron Perez MD;  Location: [facility];  Service: Vascular;  Laterality: Left;  930 AM START  RN PRE OP  ---COVID NEGATIVE ON  2020. [place]    HYSTERECTOMY      TAHBSO (patient is unsure if ovaries removed)    OOPHORECTOMY      PERCUTANEOUS TRANSLUMINAL ANGIOPLASTY OF ARTERIOVENOUS FISTULA Left 2020    Procedure: PTA, AV FISTULA;  Surgeon: Keron Perez MD;  Location: [facility] OR [address] [place] [address];  Service: Vascular;  Laterality: Left;  15.9 minutes of fluro  41.12  mGy  7.9060 Gy cm2  32ml  contrast    PHLEBOGRAPHY Left 2020    Procedure: CENTRAL VENOGRAM;  Surgeon: Keron Perez MD;  Location: [facility] OR [address] [place] [address];  Service: Vascular;  Laterality: Left;    REFRACTIVE SURGERY      TOTAL REDUCTION MAMMOPLASTY      approx 10 yrs ago       SOCIAL HISTORY:  Social History     Socioeconomic History    Marital status: Single     Spouse name: Not on file    Number of children: 5    Years of education: Not on file    Highest education level: Not on file   Occupational History    Occupation:      Employer: [employer]  [employer]     Comment: part-time   Social Needs    Financial resource strain: Not on file    Food insecurity     Worry: Not on file     Inability: Not on file    Transportation needs     Medical: Not on file     Non-medical: Not on file   Tobacco Use    Smoking status: Never Smoker    Smokeless tobacco: Never Used   Substance and Sexual Activity    Alcohol use: No     Alcohol/week: 0.0 standard drinks    Drug use: No    Sexual activity: Not Currently     Partners: Male     Birth control/protection: Post-menopausal, Surgical   Lifestyle    Physical activity     Days per week: Not on file     Minutes per session: Not on file    Stress: Not on file   Relationships    Social connections     Talks on phone: Not on file     Gets together: Not on file     Attends Orthodox service: Not on file     Active member of club or organization: Not on file     Attends meetings of clubs or organizations: Not on file     Relationship status: Not on file   Other Topics Concern    Not on file   Social History Narrative    Not on file       FAMILY HISTORY:  Family History   Problem Relation Age of Onset    Leukemia Father     Cataracts Father     Ovarian cancer Sister 35    Stroke Mother     Diabetes Mother     Hypertension Mother     Cataracts Mother     Diabetes Paternal Grandmother     Cataracts Paternal Grandmother     Breast cancer Maternal Aunt 65    No Known Problems Paternal Grandfather     Cataracts Maternal Grandmother     Cataracts Maternal Grandfather     HIV Brother     Achondroplasia Sister     Parkinsonism Maternal Aunt     Esophageal cancer Maternal Uncle         smoker    No Known Problems Paternal Aunt     Cataracts Paternal Uncle     Amblyopia Neg Hx     Blindness Neg Hx     Glaucoma Neg Hx     Macular degeneration Neg Hx     Retinal detachment Neg Hx     Strabismus Neg Hx     Thyroid disease Neg Hx     Colon cancer Neg Hx     Cancer Neg Hx        ALLERGIES AND MEDICATIONS:  updated and reviewed.  Review of patient's allergies indicates:   Allergen Reactions    Ace inhibitors Other (See Comments)     Other reaction(s): cough     Medication List with Changes/Refills   Current Medications    AMLODIPINE (NORVASC) 10 MG TABLET    Take 1 tablet (10 mg total) by mouth once daily.    BUMETANIDE (BUMEX) 2 MG TABLET    Take 1 tablet (2 mg total) by mouth once daily.    CITALOPRAM (CELEXA) 10 MG TABLET    TAKE 1 TABLET EVERY DAY    CLOPIDOGREL (PLAVIX) 75 MG TABLET    TAKE 1 TABLET EVERY DAY    DOCUSATE SODIUM (COLACE) 100 MG CAPSULE    Take 200 mg by mouth once daily.     HYDRALAZINE (APRESOLINE) 100 MG TABLET    TAKE 1 TABLET BY MOUTH THREE TIMES A DAY    LANCETS MISC        LEVOTHYROXINE (SYNTHROID) 50 MCG TABLET    Take 1 tablet (50 mcg total) by mouth before breakfast.    METOPROLOL TARTRATE (LOPRESSOR) 50 MG TABLET    Take 1 tablet (50 mg total) by mouth 2 (two) times daily.    OXYBUTYNIN (DITROPAN XL) 15 MG TR24    TAKE 1 TABLET EVERY DAY   Changed and/or Refilled Medications    Modified Medication Previous Medication    ATORVASTATIN (LIPITOR) 10 MG TABLET atorvastatin (LIPITOR) 10 MG tablet       Take 1 tablet (10 mg total) by mouth once daily.    Take 1 tablet (10 mg total) by mouth once daily.   Discontinued Medications    LEVOTHYROXINE (SYNTHROID) 50 MCG TABLET    TAKE 1 TABLET (50 MCG TOTAL) BY MOUTH BEFORE BREAKFAST.          CARE TEAM:  Patient Care Team:  Sendy Elaine MD as PCP - General (Internal Medicine)  Brittanie Orellana MD (Cardiology)  Nicole Gray DPM as Consulting Physician (Podiatry)  Surinder Joseph MD as Consulting Physician (Nephrology)  Katia Wong MD as Consulting Physician (Urology)  Coleen Gillis MD as Consulting Physician (Obstetrics)  LILLIANA Coello MD as Consulting Physician (Ophthalmology)  Yolis Rossi MD as Consulting Physician (Endocrinology)  Gianna Hinson LPN as Licensed Practical Nurse  Keron Perez,  MD as Consulting Physician (Vascular Surgery)           SCREENING HISTORY:  Health Maintenance       Date Due Completion Date    Shingles Vaccine (1 of 2) 02/25/1996 ---    Colorectal Cancer Screening 04/14/2019 4/13/2019    Override on 8/13/2009: Done    DEXA SCAN 03/13/2020 3/13/2018    Override on 8/7/2008: Done    Hemoglobin A1c 09/09/2020 6/9/2020    Lipid Panel 09/19/2020 9/19/2019    Eye Exam 01/15/2021 1/15/2020    Override on 1/15/2020: Done    Override on 1/3/2011: Done    Foot Exam 02/03/2021 2/3/2020 (Done)    Override on 2/3/2020: Done    Override on 10/15/2019: Done    Override on 8/23/2018: Done    Override on 10/10/2016: Done    Mammogram 04/05/2021 4/5/2019    Override on 8/7/2008: Done    TETANUS VACCINE 08/08/2026 8/8/2016            REVIEW OF SYSTEMS:   The patient reports: good dietary habits.  The patient reports : that they are unable to exercise because  of orthopaedic limitations.  Review of Systems   Constitutional: Negative for chills, fatigue, fever and unexpected weight change.   HENT: Negative for congestion and postnasal drip.    Eyes: Negative for pain and visual disturbance.   Respiratory: Negative for cough, shortness of breath and wheezing.    Cardiovascular: Negative for chest pain, palpitations and leg swelling.        Is her last office visit with me she now has a left forearm AV graft.   Gastrointestinal: Negative for abdominal pain, constipation, diarrhea, nausea and vomiting.   Genitourinary: Negative for dysuria.   Musculoskeletal: Positive for arthralgias, back pain and gait problem.   Skin: Negative for rash.   Neurological: Negative for weakness and headaches.   Psychiatric/Behavioral: Positive for dysphoric mood (- doing better with celexa). Negative for sleep disturbance. The patient is not nervous/anxious.       ROS (Optional)-: no pelvic pain  Breast ROS (Optional)-: negative for breast lumps/discharge            Physical Examination:   Vitals:    10/02/20 1042  "  BP: 130/62   Pulse: 69   Temp: 98.1 °F (36.7 °C)     Weight: 95.5 kg (210 lb 6.9 oz)   Height: 5' 5" (165.1 cm)   Body mass index is 35.02 kg/m².      Patient did not require to have a chaperone present during the exam today.    General appearance - alert, well appearing, and in no distress, obese, pleasant elderly female, exam limited as patient could not get onto the examination table  Psychiatric - alert, oriented to person, place, and time, normal mood, behavior, speech, dress, motor activity, and thought processes, mildly depressed mood (baseline)  Eyes - sclera anicteric  Mouth - not examined; patient wearing mask due to Covid 19 pandemic  Neck - supple, no significant adenopathy, carotids upstroke normal bilaterally, no bruits  Lymphatics - no palpable cervical lymphadenopathy  Chest - clear to auscultation, no wheezes, rales or rhonchi, symmetric air entry  Heart - normal rate and regular rhythm, no gallops noted  Abdomen - not examined  Breasts - not examined  Back exam - mild limit in range of motion noted on exam due to body habitus and age, in depth exam deferred  Neurological - alert, normal speech, no focal findings or movement disorder noted, cranial nerves II through XII intact  Musculoskeletal - patient noted to have Moderate osteoarthritic changes to both knee joints. No joint effusions noted., no muscular tenderness noted, patient ambulated with a walker  Extremities - peripheral pulses normal, no pedal edema, no clubbing or cyanosis  Skin - normal coloration and turgor, no rashes, no suspicious skin lesions noted, she had a thrill noted over the AV fistula in her left forearm      Labs:  Lab Results   Component Value Date    HGBA1C 6.9 (H) 11/20/2019    HGBA1C 8.6 (H) 08/27/2019    HGBA1C 7.3 (H) 07/20/2019      Lab Results   Component Value Date    CHOL 123 03/23/2019    CHOL 113 (L) 03/03/2018    CHOL 122 01/06/2018     Lab Results   Component Value Date    LDLCALC 52.8 (L) 03/23/2019    " LDLCALC 50.6 (L) 03/03/2018    LDLCALC 57.6 (L) 01/06/2018         ASSESSMENT AND PLAN:  1. Routine medical exam  Counseled on age appropriate medical preventative services including age appropriate cancer screenings, age appropriate eye and dental exams, over all nutritional health, need for a consistent exercise regimen, and an over all push towards maintaining a vigorous and active lifestyle.  Counseled on age appropriate vaccines and discussed upcoming health care needs based on age/gender. Discussed good sleep hygiene and stress management.    2. Type 2 diabetes mellitus with foot ulcer, with long-term current use of insulin/3. Type 2 diabetes mellitus with ESRD (end-stage renal disease)/4. Stable proliferative diabetic retinopathy of both eyes associated with type 2 diabetes mellitus  Diabetes is under good control control at this time for age and comorbid conditions. We discussed diabetic diet and regular exercise. We discussed home blood sugar monitoring, if appropriate - the patient should test once daily and as needed. Continue current medication regimen. Recheck A1c in 6 months.  Diabetic complications addressed: Not applicable.  Patient was counseled on the need for yearly eye exam to screen for/monitor diabetic retinopathy and yearly diabetic foot exam.    5. Essential hypertension/6. Chronic diastolic heart failure  The current medical regimen is effective;  continue present plan and medications.   Followed by: Cardiology.     7. Hyperlipidemia LDL goal <100  We discussed low fat diet and regular exercise.The current medical regimen is effective;  continue present plan and medications.   - atorvastatin (LIPITOR) 10 MG tablet; Take 1 tablet (10 mg total) by mouth once daily.  Dispense: 90 tablet; Refill: 1    8. Hypothyroidism (acquired)  Patient is clinically euthyroid. Continue current regimen.    9. Pulmonary hypertension  Stable. Asymptomatic. Observe.    10. Chronic respiratory failure, unspecified  whether with hypoxia or hypercapnia  Stable. Asymptomatic. Observe.    11. ESRD on hemodialysis  The current medical regimen is effective;  continue present plan and medications.   Followed by: Nephrology.     12. Secondary renal hyperparathyroidism  Stable. Asymptomatic. Observe.    13. Sickle cell trait  Stable. Asymptomatic. Observe.    14. Osteopenia after menopause/15. Vitamin D deficiency disease  We discussed adequate calcium and vitamin D supplementation. We discussed fall precautions. She is scheduled for her BMD. Further treatment plan will be based on results of bone density testing    16. HUMBERTO on CPAP  CPAP compliance: yes. The patient reports that they continue to benefit from regular use of their CPAP machine..  We discussed the potential ramifications of untreated sleep apnea, which could include daytime sleepiness, hypertension, heart disease including CHF, sudden death while sleeping and/or stroke. The patient was advised to abstain from driving should they feel sleepy or drowsy.  We discussed potential treatment options, which could include weight loss, body positioning, continuous positive airway pressure (CPAP), or referral for surgical consideration.     17. Tortuous aorta  Patient with tortuous/ectatic Aorta.  Stable/asymptomatic. Currently stable on lipid lowering medication and b/p monitoring.     18. Mild major depression  She feels like she is doing much better on the Celexa.  Continue current regimen.    19. Severe obesity (BMI 35.0-39.9) with comorbidity  The patient is asked to make an attempt to improve diet and exercise patterns to aid in medical management of this problem.    20. Debility  We discussed fall precautions.    21. Flu vaccine need  Completed today.    22. Need for shingles vaccine  Patient was advised to get immunization at the pharmacy.    23. Colon cancer screening  Deferred due to current COVID-19 pandemic and chronic medical conditions.    24. Advanced directives,  counseling/discussion  She states she is in the process of speaking with her children and completing her paperwork.    25. Menopause  BMD scheduled.    26. Walker as ambulation aid  We discussed fall precautions.          Follow up in about 6 months (around 4/2/2021), or if symptoms worsen or fail to improve, for follow up chronic medical conditions.. or sooner as needed.

## 2020-10-19 ENCOUNTER — HOSPITAL ENCOUNTER (OUTPATIENT)
Dept: RADIOLOGY | Facility: CLINIC | Age: 75
Discharge: HOME | End: 2020-10-19
Attending: INTERNAL MEDICINE
Payer: MEDICARE

## 2020-10-19 ENCOUNTER — HOSPITAL ENCOUNTER (OUTPATIENT)
Dept: CARDIOLOGY | Facility: HOSPITAL | Age: 75
Discharge: HOME | End: 2020-10-19
Attending: SURGERY
Payer: MEDICARE

## 2020-10-19 ENCOUNTER — OFFICE VISIT (OUTPATIENT)
Dept: VASCULAR SURGERY | Facility: CLINIC | Age: 75
End: 2020-10-19
Payer: MEDICARE

## 2020-10-19 VITALS
SYSTOLIC BLOOD PRESSURE: 116 MMHG | WEIGHT: 208.25 LBS | HEIGHT: 65 IN | BODY MASS INDEX: 34.7 KG/M2 | DIASTOLIC BLOOD PRESSURE: 60 MMHG | OXYGEN SATURATION: 98 % | HEART RATE: 85 BPM

## 2020-10-19 DIAGNOSIS — T82.858D ARTERIOVENOUS FISTULA STENOSIS, SUBSEQUENT ENCOUNTER: Primary | ICD-10-CM

## 2020-10-19 DIAGNOSIS — T82.858D ARTERIOVENOUS FISTULA STENOSIS, SUBSEQUENT ENCOUNTER: ICD-10-CM

## 2020-10-19 DIAGNOSIS — Z78.0 MENOPAUSE: ICD-10-CM

## 2020-10-19 LAB
HD ANASTAMOSIS LOCATION: NORMAL
HD FISTULA OUTFLOW VEIN VESSEL: NORMAL
HD INFLOW ARTERY VESSEL: NORMAL
RIGHT DIS GRAFT PSV: 86 CM/ SEC
RIGHT INFLOW PSV: 301 CM/S
RIGHT MID GRAFT PSV: 164 CM/S
RIGHT OUTFLOW VEIN PSV: 90 CM/ SEC
RIGHT PROX ANA PSV: 522 CM/S
RIGHT PROX GRAFT PSV: 639 CM/S
RIGHT VOLUME FLOW DIA: 1.1 CM
RIGHT VOLUME FLOW PSV: 3650 ML/MIN

## 2020-10-19 PROCEDURE — 1101F PR PT FALLS ASSESS DOC 0-1 FALLS W/OUT INJ PAST YR: ICD-10-PCS | Mod: HCNC,CPTII,S$GLB, | Performed by: SURGERY

## 2020-10-19 PROCEDURE — 1126F AMNT PAIN NOTED NONE PRSNT: CPT | Mod: HCNC,S$GLB,, | Performed by: SURGERY

## 2020-10-19 PROCEDURE — 1159F PR MEDICATION LIST DOCUMENTED IN MEDICAL RECORD: ICD-10-PCS | Mod: HCNC,S$GLB,, | Performed by: SURGERY

## 2020-10-19 PROCEDURE — 1101F PT FALLS ASSESS-DOCD LE1/YR: CPT | Mod: HCNC,CPTII,S$GLB, | Performed by: SURGERY

## 2020-10-19 PROCEDURE — 99999 PR PBB SHADOW E&M-EST. PATIENT-LVL IV: ICD-10-PCS | Mod: PBBFAC,HCNC,, | Performed by: SURGERY

## 2020-10-19 PROCEDURE — 99214 OFFICE O/P EST MOD 30 MIN: CPT | Mod: HCNC,S$GLB,, | Performed by: SURGERY

## 2020-10-19 PROCEDURE — 77080 DEXA BONE DENSITY SPINE HIP: ICD-10-PCS | Mod: 26,HCNC,, | Performed by: INTERNAL MEDICINE

## 2020-10-19 PROCEDURE — 77080 DXA BONE DENSITY AXIAL: CPT | Mod: TC,HCNC,PO

## 2020-10-19 PROCEDURE — 3008F BODY MASS INDEX DOCD: CPT | Mod: HCNC,CPTII,S$GLB, | Performed by: SURGERY

## 2020-10-19 PROCEDURE — 3008F PR BODY MASS INDEX (BMI) DOCUMENTED: ICD-10-PCS | Mod: HCNC,CPTII,S$GLB, | Performed by: SURGERY

## 2020-10-19 PROCEDURE — 1159F MED LIST DOCD IN RCRD: CPT | Mod: HCNC,S$GLB,, | Performed by: SURGERY

## 2020-10-19 PROCEDURE — 93990 DOPPLER FLOW TESTING: CPT | Mod: 26,HCNC,, | Performed by: SURGERY

## 2020-10-19 PROCEDURE — 99214 PR OFFICE/OUTPT VISIT, EST, LEVL IV, 30-39 MIN: ICD-10-PCS | Mod: HCNC,S$GLB,, | Performed by: SURGERY

## 2020-10-19 PROCEDURE — 77080 DXA BONE DENSITY AXIAL: CPT | Mod: 26,HCNC,, | Performed by: INTERNAL MEDICINE

## 2020-10-19 PROCEDURE — 1126F PR PAIN SEVERITY QUANTIFIED, NO PAIN PRESENT: ICD-10-PCS | Mod: HCNC,S$GLB,, | Performed by: SURGERY

## 2020-10-19 PROCEDURE — 93990 CV US HEMODIALYSIS ACCESS (CUPID ONLY): ICD-10-PCS | Mod: 26,HCNC,, | Performed by: SURGERY

## 2020-10-19 PROCEDURE — 93990 DOPPLER FLOW TESTING: CPT | Mod: TC,HCNC

## 2020-10-19 PROCEDURE — 99999 PR PBB SHADOW E&M-EST. PATIENT-LVL IV: CPT | Mod: PBBFAC,HCNC,, | Performed by: SURGERY

## 2020-10-19 NOTE — LETTER
October 19, 2020        Sendy Elaine MD  [address] [facility]  [place] [address]             [facility]  [address]  [facility] [place] [address]  Phone: [phone]  Fax: [phone]   Patient: Erica Leblanc   MR Number: [id number]   YOB: 1945   Date of Visit: 10/19/2020       Dear Dr. Elaine:    Thank you for referring Erica Leblanc to me for evaluation. Below are the relevant portions of my assessment and plan of care.            If you have questions, please do not hesitate to call me. I look forward to following Erica along with you.    Sincerely,      Kerno Perez MD           CC  No Recipients

## 2020-10-19 NOTE — PATIENT INSTRUCTIONS
Caring for Your Hemodialysis Access  A problem such as an infection or a blood clot may make the access unusable. Typically, this happens more often with an arteriovenous graft than with an arteriovenous fistula. If this happens, youll need a new access. To help your access last, you will need to follow certain guidelines.  Watching for problems  Call your healthcare provider right away if you:  · Cant feel the blood flowing in the access (this sensation is called a thrill)  · Have pain or numbness in your hand or arm  · Have bleeding, redness, bluish discoloration, or warmth around your access  · Notice your access suddenly bulging out more than usual (a slight bulge is normal)  · Have a fever of 100.4°F (38°C) or higher, or as directed by your healthcare provider   Follow these and any other guidelines youre given  · Dont wear tight clothes or jewelry around your access.  · Dont let anyone take your blood pressure on or draw blood from the arm with the access. Also, dont let anyone put IV lines into it.  · Protect your access from being hit or cut.  · Wash your hands often and keep the area around the access clean.  · Do not carry anything heavy or do anything that would put pressure on the access.  Feeling for your thrill    If you put your fingers over your access, you should feel the blood rushing through it. This is called a thrill, and it feels like a vibration. Feel for the thrill as often as you're told, usually once or twice a day. If you can't feel it, tell your healthcare provider right away. Blood may not be flowing through your access the way it should.  Important numbers  Write the names and numbers of your healthcare providers below. That way you will know how to get in touch with them.  Doctor:  Name ___________________ Phone ___________________  Surgeon:  Name ___________________ Phone ___________________  Dialysis Center:  Name ___________________ Phone ___________________   Date Last  Reviewed: 1/1/2017  © 2558-2930 [employer] [address] [place] [address], [place] [address]. All rights reserved. This information is not intended as a substitute for professional medical care. Always follow your healthcare professional's instructions.

## 2020-10-19 NOTE — PROGRESS NOTES
Keron Perez MD VI                       [facility]                         10/19/2020    HPI:  Erica Leblanc is a 75 y.o. female with   Patient Active Problem List   Diagnosis    Severe obesity (BMI 35.0-39.9) with comorbidity    Sickle cell trait    Osteopenia after menopause    Hyperlipidemia LDL goal <100    HUMBERTO on CPAP    Hypothyroidism (acquired)    Hx-TIA (transient ischemic attack)    Vitamin D deficiency disease    Pulmonary hypertension    Type 2 diabetes mellitus with ESRD (end-stage renal disease)    Secondary renal hyperparathyroidism    Incomplete bladder emptying    Postmenopausal atrophic vaginitis    Rectocele    Mixed incontinence urge and stress    Chronic diastolic heart failure    Tortuous aorta    History of TIAs    Essential hypertension    ESRD on hemodialysis    Anemia of chronic disease    Debility    Chronic respiratory failure    Type 2 diabetes mellitus with foot ulcer, with long-term current use of insulin    Stable proliferative diabetic retinopathy associated with type 2 diabetes mellitus    Mild major depression    Heart failure with preserved ejection fraction    Pseudophakia    being managed by PCP and specialists who is here today for evaluation of long term HD access.  S/p R IJ tunneled HD catheter, HD without issues.  Pt is R handed.  No complaints today.  Does endorse orthopnea, no chest pain.  Unable to climb 1 flight of stairs on own.    no MI  no Stroke  Tobacco use: denies    1/20/20: No new issues.    3/2020: Dialysis without issues.    4/6/20:  S/p LUE fistulogram, central venogram, ligation of medial side branch cephalic vein, ligation of lateral side branch cephalic vein.  No issues with HD for several weeks since procedure.    7/6/20:  No issues with HD.    8/3/20: s/p L proximal fistula angioplasty 7/21/20.  No issues with HD.    8/31/20:  S/p 8/19/29 Balloon angioplasty of the proximal LUE AVF with a  6x60 Angiosculpt and Stellerex balloon.      10/2020:  No issues with HD    Past Medical History:   Diagnosis Date    Acute respiratory failure with hypoxia     Anemia of chronic kidney failure, stage 4 (severe) 2019    Cataracts, bilateral     CHF (congestive heart failure)     CKD (chronic kidney disease) stage 3, GFR 30-59 ml/min     CKD (chronic kidney disease) stage 3, GFR 30-59 ml/min     Controlled type 2 diabetes mellitus with proteinuria or albuminuria     Depression     Diabetes with neurologic complications     Diabetic retinopathy of both eyes     Edema     Glaucoma     History of colonic polyps     Hx-TIA (transient ischemic attack) 2008    Hyperlipidemia LDL goal < 100     Hypertension     Hypothyroidism     Major depressive disorder, single episode, mild 2016    Mixed incontinence urge and stress     Obesity     Obstructive sleep apnea on CPAP     19:  Home CPAP machine broken, per patient & son    Osteopenia     Proteinuria     Sickle cell trait     Strabismus     TIA (transient ischemic attack)     Trouble in sleeping     Type 2 diabetes mellitus with ophthalmic manifestations     Type 2 diabetes with stage 3 chronic kidney disease GFR 30-59     Type II or unspecified type diabetes mellitus with renal manifestations, uncontrolled(250.42)     Uncontrolled type 2 diabetes mellitus with peripheral circulatory disorder 2019    Urge incontinence 2016    Urge incontinence     Venous stasis ulcer     bilateral lower legs    Vitamin D deficiency disease      Past Surgical History:   Procedure Laterality Date    AV FISTULA PLACEMENT Left 2019    Procedure: CREATION, AV FISTULA, LEFT UPPER EXTREMITY;  Surgeon: Keron Perez MD;  Location: [facility];  Service: Vascular;  Laterality: Left;    BREAST BIOPSY      breast reduction Bilateral age 31    BREAST SURGERY      CATARACT EXTRACTION Bilateral     cataracts Bilateral       SECTION, LOW TRANSVERSE      x1    CHOLECYSTECTOMY      EYE SURGERY  1/2014, 6/2014    vitrectomy    EYE SURGERY Right 08/17/2016    FISTULOGRAM Left 3/6/2020    Procedure: Fistulogram, left upper extremity, with branch ligation;  Surgeon: Keron Perez MD;  Location: [address];  Service: Vascular;  Laterality: Left;  Time 1.1 Minute  42.10 mGy    FISTULOGRAM Left 7/21/2020    Procedure: Fistulogram, left upper extremity, transradial access with possible intervention;  Surgeon: Keron Perez MD;  Location: [address];  Service: Vascular;  Laterality: Left;    FISTULOGRAM Left 8/19/2020    Procedure: Fistulogram, left upper extremity, possible intervention;  Surgeon: Keron Perez MD;  Location: [facility];  Service: Vascular;  Laterality: Left;  930 AM START  RN PRE OP  ---COVID NEGATIVE ON  8-. CA    HYSTERECTOMY  1986    TAHBSO (patient is unsure if ovaries removed)    OOPHORECTOMY      PERCUTANEOUS TRANSLUMINAL ANGIOPLASTY OF ARTERIOVENOUS FISTULA Left 7/21/2020    Procedure: PTA, AV FISTULA;  Surgeon: Keron Perez MD;  Location: [address];  Service: Vascular;  Laterality: Left;  15.9 minutes of fluro  41.12  mGy  7.9060 Gy cm2  32ml  contrast    PHLEBOGRAPHY Left 7/21/2020    Procedure: CENTRAL VENOGRAM;  Surgeon: Keron Perez MD;  Location: [address];  Service: Vascular;  Laterality: Left;    REFRACTIVE SURGERY      TOTAL REDUCTION MAMMOPLASTY      approx 10 yrs ago     Family History   Problem Relation Age of Onset    Leukemia Father     Cataracts Father     Ovarian cancer Sister 35    Stroke Mother     Diabetes Mother     Hypertension Mother     Cataracts Mother     Diabetes Paternal Grandmother     Cataracts Paternal Grandmother     Breast cancer Maternal Aunt 65    No Known Problems Paternal Grandfather     Cataracts Maternal Grandmother     Cataracts Maternal Grandfather     HIV Brother     Achondroplasia Sister      Parkinsonism Maternal Aunt     Esophageal cancer Maternal Uncle         smoker    No Known Problems Paternal Aunt     Cataracts Paternal Uncle     Amblyopia Neg Hx     Blindness Neg Hx     Glaucoma Neg Hx     Macular degeneration Neg Hx     Retinal detachment Neg Hx     Strabismus Neg Hx     Thyroid disease Neg Hx     Colon cancer Neg Hx     Cancer Neg Hx      Social History     Socioeconomic History    Marital status: Single     Spouse name: Not on file    Number of children: 5    Years of education: Not on file    Highest education level: Not on file   Occupational History    Occupation:      Employer: [employer]     Comment: part-time   Social Needs    Financial resource strain: Not on file    Food insecurity     Worry: Not on file     Inability: Not on file    Transportation needs     Medical: Not on file     Non-medical: Not on file   Tobacco Use    Smoking status: Never Smoker    Smokeless tobacco: Never Used   Substance and Sexual Activity    Alcohol use: No     Alcohol/week: 0.0 standard drinks    Drug use: No    Sexual activity: Not Currently     Partners: Male     Birth control/protection: Post-menopausal, Surgical   Lifestyle    Physical activity     Days per week: Not on file     Minutes per session: Not on file    Stress: Not on file   Relationships    Social connections     Talks on phone: Not on file     Gets together: Not on file     Attends Faith service: Not on file     Active member of club or organization: Not on file     Attends meetings of clubs or organizations: Not on file     Relationship status: Not on file   Other Topics Concern    Not on file   Social History Narrative    Not on file       Current Outpatient Medications:     amLODIPine (NORVASC) 10 MG tablet, Take 1 tablet (10 mg total) by mouth once daily. (Patient taking differently: Take 10 mg by mouth every morning. ), Disp: 90 tablet, Rfl: 3    atorvastatin (LIPITOR) 10  MG tablet, Take 1 tablet (10 mg total) by mouth once daily., Disp: 90 tablet, Rfl: 1    citalopram (CELEXA) 10 MG tablet, TAKE 1 TABLET EVERY DAY, Disp: 90 tablet, Rfl: 0    clopidogreL (PLAVIX) 75 mg tablet, TAKE 1 TABLET EVERY DAY, Disp: 90 tablet, Rfl: 0    docusate sodium (COLACE) 100 MG capsule, Take 200 mg by mouth once daily. , Disp: , Rfl:     hydrALAZINE (APRESOLINE) 100 MG tablet, TAKE 1 TABLET BY MOUTH THREE TIMES A DAY, Disp: 270 tablet, Rfl: 1    LANCETS MISC, , Disp: , Rfl:     levothyroxine (SYNTHROID) 50 MCG tablet, Take 1 tablet (50 mcg total) by mouth before breakfast., Disp: 90 tablet, Rfl: 0    metoprolol tartrate (LOPRESSOR) 50 MG tablet, Take 1 tablet (50 mg total) by mouth 2 (two) times daily., Disp: 180 tablet, Rfl: 0    bumetanide (BUMEX) 2 MG tablet, Take 1 tablet (2 mg total) by mouth once daily. (Patient not taking: Reported on 10/2/2020), Disp: 30 tablet, Rfl: 11    oxybutynin (DITROPAN XL) 15 MG TR24, TAKE 1 TABLET EVERY DAY (Patient not taking: Reported on 10/2/2020), Disp: 90 tablet, Rfl: 3    REVIEW OF SYSTEMS:  General: No fevers or chills; ENT: No sore throat; Allergy and Immunology: no persistent infections; Hematological and Lymphatic: No history of bleeding or easy bruising; Endocrine: negative; Respiratory: no cough, shortness of breath, or wheezing; Cardiovascular: no chest pain or dyspnea on exertion; Gastrointestinal: no abdominal pain/back, change in bowel habits, or bloody stools; Genito-Urinary: no dysuria, trouble voiding, or hematuria; Musculoskeletal: negative; Neurological: no TIA or stroke symptoms; Psychiatric: no nervousness, anxiety or depression.    PHYSICAL EXAM:      Pulse: 85         General appearance:  Alert, well-appearing, and in no distress.  Oriented to person, place, and time                    Neurological: Normal speech, no focal findings noted; CN II - XII grossly intact. All extremities with sensation to light touch.             Musculoskeletal: Digits/nail without cyanosis/clubbing.  Strength 5/5 all extremities.                    Neck: Supple, no significant adenopathy, no carotid bruit can be auscultated                  Chest:  Clear to auscultation, no wheezes, rales or rhonchi, symmetric air entry. No use of accessory muscles               Cardiac: Normal rate and regular rhythm, S1 and S2 normal            Abdomen: Soft, nontender, nondistended, no masses or organomegaly, no hernia     No rebound tenderness noted; bowel sounds normal     Pulsatile aortic mass is non palpable.     No groin adenopathy      Extremities:      2+ radial pulse bilaterally, LUE AVF +thrill, inc well healed     No BUE edema, Negative Manuel's test BUE    Skin: No tissue loss    LAB RESULTS:  No results found for: CBC  Lab Results   Component Value Date    LABPROT 10.9 11/20/2019    INR 1.0 11/20/2019     Lab Results   Component Value Date     08/17/2020    K 4.0 08/17/2020     08/17/2020    CO2 29 08/17/2020     (H) 08/17/2020    BUN 78 (H) 08/17/2020    CREATININE 6.7 (H) 08/17/2020    CALCIUM 8.2 (L) 08/17/2020    ANIONGAP 10 08/17/2020    EGFRNONAA 6 (A) 08/17/2020     Lab Results   Component Value Date    WBC 6.65 10/02/2020    RBC 3.98 (L) 10/02/2020    HGB 11.1 (L) 10/02/2020    HCT 35.2 (L) 10/02/2020    MCV 88 10/02/2020    MCH 27.9 10/02/2020    MCHC 31.5 (L) 10/02/2020    RDW 14.1 10/02/2020     10/02/2020    MPV 11.6 10/02/2020    GRAN 4.8 10/02/2020    GRAN 71.9 10/02/2020    LYMPH 1.0 10/02/2020    LYMPH 15.3 (L) 10/02/2020    MONO 0.7 10/02/2020    MONO 10.2 10/02/2020    EOS 0.1 10/02/2020    BASO 0.04 10/02/2020    EOSINOPHIL 1.5 10/02/2020    BASOPHIL 0.6 10/02/2020    DIFFMETHOD Automated 10/02/2020     .  Lab Results   Component Value Date    HGBA1C 7.0 (H) 10/02/2020       IMAGING:  All pertinent imaging has been reviewed and interpreted independently.    Vein mapping 9/2019:  Adequate R superior basilic vein and  axillary vein ; Adequate L basilic vein superior and inferior, adequate L axillary     1/27/20 Left upper extremity dialysis ultrasound shows no hemodynamically significant stenosis.  Flow is 1083 ml/min.  Depth and diameter are appropriate.    3/23/20:  Left upper extremity dialysis ultrasound shows anastomotic and proximal fistula hemodynamically significant stenosis.  Flow is 955 ml/min.      7/2020: Left upper extremity dialysis ultrasound shows proximal fistula hemodynamically significant stenosis.  Flow is 1257 ml/min.      8/2020: Left upper extremity dialysis ultrasound shows proximal hemodynamically significant stenosis.  Flow is 1999 ml/min.      8/31/20: Left upper extremity dialysis ultrasound shows proximal fistula hemodynamically significant stenosis.  Flow is 2209 ml/min.      10/2020:  Prox stenosis, flow > 3000 ml/min    IMP/PLAN:  75 y.o. female with   Patient Active Problem List   Diagnosis    Severe obesity (BMI 35.0-39.9) with comorbidity    Sickle cell trait    Osteopenia after menopause    Hyperlipidemia LDL goal <100    HUMBERTO on CPAP    Hypothyroidism (acquired)    Hx-TIA (transient ischemic attack)    Vitamin D deficiency disease    Pulmonary hypertension    Type 2 diabetes mellitus with ESRD (end-stage renal disease)    Secondary renal hyperparathyroidism    Incomplete bladder emptying    Postmenopausal atrophic vaginitis    Rectocele    Mixed incontinence urge and stress    Chronic diastolic heart failure    Tortuous aorta    History of TIAs    Essential hypertension    ESRD on hemodialysis    Anemia of chronic disease    Debility    Chronic respiratory failure    Type 2 diabetes mellitus with foot ulcer, with long-term current use of insulin    Stable proliferative diabetic retinopathy associated with type 2 diabetes mellitus    Mild major depression    Heart failure with preserved ejection fraction    Pseudophakia    being managed by PCP and specialists who is  here today for evaluation of long term HD access s/p L RC AV fistula.    -s/p L RC AV fistula with proximal fistula measuring 5 mm 1/13/20, adequate flow without issues during HD s/p LUE fistulogram, central venogram, ligation of medial side branch cephalic vein, ligation of lateral side branch cephalic vein 3/2/20 with prox AVF stenosis s/p L proximal fistula angioplasty 7/21/20 with persistent flow issues s/p proximal angioplasty 8/19/20 with scoring balloon/DCB with improvement in stenosis/flow  -Cont renal diet  -RTC 3 mo with HD US    I spent 8 minutes evaluating this patient and greater than 50% of the time was spent counseling, coordinator care and discussing the plan of care.  All questions were answered and patient stated understanding with agreement with the above treatment plan.    Keron Perez MD [facility]  Vascular and Endovascular Surgery

## 2020-11-05 ENCOUNTER — TELEPHONE (OUTPATIENT)
Dept: FAMILY MEDICINE | Facility: CLINIC | Age: 75
End: 2020-11-05

## 2020-11-05 NOTE — TELEPHONE ENCOUNTER
Is she seeing anyone as an outpatient for her diabetes?  If not, I can refer her to our new [facility] endocrinologist at the [place] office to discuss treatment options for her osteoporosis.

## 2020-11-05 NOTE — TELEPHONE ENCOUNTER
----- Message from Sendy Elaine MD sent at 11/4/2020  4:53 PM CST -----  Please call patient:  Her Bone Mineral Density test showed osteoporosis. It is recommended that she discuss possibly starting prescription medication with her endocrinologist, Dr. Rossi.

## 2020-11-05 NOTE — TELEPHONE ENCOUNTER
----- Message from Angela Nayak sent at 11/5/2020  1:41 PM CST -----  Regarding: Self/  [phone]  Type: Patient Call Back    Who called:  Patient    What is the request in detail:  Pt would like staff to give her a call regarding ordering medication.  She stated PCP has to speak to Dr. Puente before ordering medication for her.   She would like staff to give her a call.  Thank you    Would the patient rather a call back or a response via [facility]?   Call back    Best call back number:  [phone]      Thank you

## 2020-11-05 NOTE — TELEPHONE ENCOUNTER
Spoke with patient informed of results, patient states she hasn't spoken to or seen Dr. Rossi since she was in the hospital.    Patient wants to know what should she do    Please advise

## 2020-11-06 NOTE — TELEPHONE ENCOUNTER
Would recommend we send her a copy of the bone density test and she can bring to Dr. Puente for discussion.

## 2020-11-06 NOTE — TELEPHONE ENCOUNTER
Spoke with patient states she sees Dr. Puente at the [place] [facility] and was told that she needed Dr. Elaine to call Dr. Puente to discuss her medical treatments..      Patient states she would like to see someone concerning her osteoporosis      Please advise

## 2020-11-16 ENCOUNTER — OFFICE VISIT (OUTPATIENT)
Dept: PODIATRY | Facility: CLINIC | Age: 75
End: 2020-11-16
Payer: MEDICARE

## 2020-11-16 ENCOUNTER — TELEPHONE (OUTPATIENT)
Dept: FAMILY MEDICINE | Facility: CLINIC | Age: 75
End: 2020-11-16

## 2020-11-16 ENCOUNTER — PATIENT OUTREACH (OUTPATIENT)
Dept: ADMINISTRATIVE | Facility: OTHER | Age: 75
End: 2020-11-16

## 2020-11-16 VITALS
DIASTOLIC BLOOD PRESSURE: 64 MMHG | SYSTOLIC BLOOD PRESSURE: 115 MMHG | BODY MASS INDEX: 34.68 KG/M2 | WEIGHT: 208.13 LBS | HEIGHT: 65 IN | HEART RATE: 74 BPM

## 2020-11-16 DIAGNOSIS — M20.42 HAMMER TOES OF BOTH FEET: ICD-10-CM

## 2020-11-16 DIAGNOSIS — N18.6 TYPE 2 DIABETES MELLITUS WITH ESRD (END-STAGE RENAL DISEASE): Primary | ICD-10-CM

## 2020-11-16 DIAGNOSIS — M20.12 HALLUX ABDUCTO VALGUS, LEFT: ICD-10-CM

## 2020-11-16 DIAGNOSIS — E11.22 TYPE 2 DIABETES MELLITUS WITH ESRD (END-STAGE RENAL DISEASE): Primary | ICD-10-CM

## 2020-11-16 DIAGNOSIS — B35.1 NAIL DERMATOPHYTOSIS: ICD-10-CM

## 2020-11-16 DIAGNOSIS — M20.11 HALLUX ABDUCTO VALGUS, RIGHT: ICD-10-CM

## 2020-11-16 DIAGNOSIS — M20.41 HAMMER TOES OF BOTH FEET: ICD-10-CM

## 2020-11-16 DIAGNOSIS — L90.9 PLANTAR FAT PAD ATROPHY: ICD-10-CM

## 2020-11-16 PROCEDURE — 3288F PR FALLS RISK ASSESSMENT DOCUMENTED: ICD-10-PCS | Mod: HCNC,CPTII,S$GLB, | Performed by: PODIATRIST

## 2020-11-16 PROCEDURE — 3288F FALL RISK ASSESSMENT DOCD: CPT | Mod: HCNC,CPTII,S$GLB, | Performed by: PODIATRIST

## 2020-11-16 PROCEDURE — 3008F BODY MASS INDEX DOCD: CPT | Mod: HCNC,CPTII,S$GLB, | Performed by: PODIATRIST

## 2020-11-16 PROCEDURE — 99999 PR PBB SHADOW E&M-EST. PATIENT-LVL IV: ICD-10-PCS | Mod: PBBFAC,HCNC,, | Performed by: PODIATRIST

## 2020-11-16 PROCEDURE — 1126F AMNT PAIN NOTED NONE PRSNT: CPT | Mod: HCNC,S$GLB,, | Performed by: PODIATRIST

## 2020-11-16 PROCEDURE — 11721 DEBRIDE NAIL 6 OR MORE: CPT | Mod: Q9,HCNC,S$GLB, | Performed by: PODIATRIST

## 2020-11-16 PROCEDURE — 99499 UNLISTED E&M SERVICE: CPT | Mod: HCNC,S$GLB,, | Performed by: PODIATRIST

## 2020-11-16 PROCEDURE — 3008F PR BODY MASS INDEX (BMI) DOCUMENTED: ICD-10-PCS | Mod: HCNC,CPTII,S$GLB, | Performed by: PODIATRIST

## 2020-11-16 PROCEDURE — 99499 NO LOS: ICD-10-PCS | Mod: HCNC,S$GLB,, | Performed by: PODIATRIST

## 2020-11-16 PROCEDURE — 99999 PR PBB SHADOW E&M-EST. PATIENT-LVL IV: CPT | Mod: PBBFAC,HCNC,, | Performed by: PODIATRIST

## 2020-11-16 PROCEDURE — 1126F PR PAIN SEVERITY QUANTIFIED, NO PAIN PRESENT: ICD-10-PCS | Mod: HCNC,S$GLB,, | Performed by: PODIATRIST

## 2020-11-16 PROCEDURE — 1101F PR PT FALLS ASSESS DOC 0-1 FALLS W/OUT INJ PAST YR: ICD-10-PCS | Mod: HCNC,CPTII,S$GLB, | Performed by: PODIATRIST

## 2020-11-16 PROCEDURE — 1101F PT FALLS ASSESS-DOCD LE1/YR: CPT | Mod: HCNC,CPTII,S$GLB, | Performed by: PODIATRIST

## 2020-11-16 PROCEDURE — 11721 PR DEBRIDEMENT OF NAILS, 6 OR MORE: ICD-10-PCS | Mod: Q9,HCNC,S$GLB, | Performed by: PODIATRIST

## 2020-11-16 NOTE — TELEPHONE ENCOUNTER
----- Message from Maty Cadena sent at 11/16/2020  1:16 PM CST -----  Regarding: referral to another endocrinologist  Type: Patient Call Back    Who called:Erica     What is the request in detail: the patient is requesting a call back from the staff. She stated that her endocrinologist , Dr. Stone is no longer an [facility] doctor. Dr. Stone is now a [facility] doctor. The patient would like to continue staying under the care of [facility]. She would like to be referred to another endocrinologist, [place] [facility].    Can the clinic reply by [facility]?no    Would the patient rather a call back or a response via [facility]? Call back     Best call back number:[phone]

## 2020-11-16 NOTE — PROGRESS NOTES
Is Subjective:      Patient ID: Erica Leblanc is a 75 y.o. female.    Chief Complaint: Diabetes Mellitus (PCP Dr. Elaine 10/02/2020), Diabetic Foot Exam, and Nail Care    Erica Leblanc is a 75 y.o. femalewho presents to the clinic for evaluation and treatment of high risk feet. Erica Leblanc   has a past medical history of Acute respiratory failure with hypoxia, Anemia of chronic kidney failure, stage 4 (severe) (4/5/2019), Cataracts, bilateral, CHF (congestive heart failure), CKD (chronic kidney disease) stage 3, GFR 30-59 ml/min, CKD (chronic kidney disease) stage 3, GFR 30-59 ml/min, Controlled type 2 diabetes mellitus with proteinuria or albuminuria, Depression, Diabetes with neurologic complications, Diabetic retinopathy of both eyes, Edema, Glaucoma, History of colonic polyps, TIA (transient ischemic attack) (11/2008), Hyperlipidemia LDL goal < 100, Hypertension, Hypothyroidism, Major depressive disorder, single episode, mild (2/17/2016), Mixed incontinence urge and stress, Obesity, Obstructive sleep apnea on CPAP, Osteopenia, Proteinuria, Sickle cell trait, Strabismus, TIA (transient ischemic attack), Trouble in sleeping, Type 2 diabetes mellitus with ophthalmic manifestations, Type 2 diabetes with stage 3 chronic kidney disease GFR 30-59, Type II or unspecified type diabetes mellitus with renal manifestations, uncontrolled(250.42), Uncontrolled type 2 diabetes mellitus with peripheral circulatory disorder (4/5/2019), Urge incontinence (1/11/2016), Urge incontinence, Venous stasis ulcer, and Vitamin D deficiency disease.  The patient's chief complaint is long, thick toenails. This patient has documented high risk feet requiring routine maintenance secondary to diabetes mellitis and those secondary complications of diabetes, as mentioned..    PCP: Sendy Elaine MD    Date Last Seen by PCP:   Chief Complaint   Patient presents with    Diabetes Mellitus     PCP Dr. Elaine 10/02/2020     Diabetic Foot Exam    Nail Care     Current shoe gear: worn shoes     Hemoglobin A1C   Date Value Ref Range Status   10/02/2020 7.0 (H) 4.0 - 5.6 % Final     Comment:     ADA Screening Guidelines:  5.7-6.4%  Consistent with prediabetes  >or=6.5%  Consistent with diabetes  High levels of fetal hemoglobin interfere with the HbA1C  assay. Heterozygous hemoglobin variants (HbS, HgC, etc)do  not significantly interfere with this assay.   However, presence of multiple variants may affect accuracy.     11/20/2019 6.9 (H) 4.0 - 5.6 % Final     Comment:     ADA Screening Guidelines:  5.7-6.4%  Consistent with prediabetes  >or=6.5%  Consistent with diabetes  High levels of fetal hemoglobin interfere with the HbA1C  assay. Heterozygous hemoglobin variants (HbS, HgC, etc)do  not significantly interfere with this assay.   However, presence of multiple variants may affect accuracy.     08/27/2019 8.6 (H) 4.0 - 5.6 % Final     Comment:     ADA Screening Guidelines:  5.7-6.4%  Consistent with prediabetes  >or=6.5%  Consistent with diabetes  High levels of fetal hemoglobin interfere with the HbA1C  assay. Heterozygous hemoglobin variants (HbS, HgC, etc)do  not significantly interfere with this assay.   However, presence of multiple variants may affect accuracy.         Patient Active Problem List   Diagnosis    Severe obesity (BMI 35.0-39.9) with comorbidity    Sickle cell trait    Osteopenia after menopause    Hyperlipidemia LDL goal <100    HUMBERTO on CPAP    Hypothyroidism (acquired)    Hx-TIA (transient ischemic attack)    Vitamin D deficiency disease    Pulmonary hypertension    Type 2 diabetes mellitus with ESRD (end-stage renal disease)    Secondary renal hyperparathyroidism    Incomplete bladder emptying    Postmenopausal atrophic vaginitis    Rectocele    Mixed incontinence urge and stress    Chronic diastolic heart failure    Tortuous aorta    History of TIAs    Essential hypertension    ESRD on  hemodialysis    Anemia of chronic disease    Debility    Chronic respiratory failure    Type 2 diabetes mellitus with foot ulcer, with long-term current use of insulin    Stable proliferative diabetic retinopathy associated with type 2 diabetes mellitus    Mild major depression    Heart failure with preserved ejection fraction    Pseudophakia     Current Outpatient Medications on File Prior to Visit   Medication Sig Dispense Refill    amLODIPine (NORVASC) 10 MG tablet Take 1 tablet (10 mg total) by mouth once daily. (Patient taking differently: Take 10 mg by mouth every morning. ) 90 tablet 3    atorvastatin (LIPITOR) 10 MG tablet Take 1 tablet (10 mg total) by mouth once daily. 90 tablet 1    bumetanide (BUMEX) 2 MG tablet Take 1 tablet (2 mg total) by mouth once daily. (Patient not taking: Reported on 10/2/2020) 30 tablet 11    citalopram (CELEXA) 10 MG tablet TAKE 1 TABLET EVERY DAY 90 tablet 0    clopidogreL (PLAVIX) 75 mg tablet TAKE 1 TABLET EVERY DAY 90 tablet 0    docusate sodium (COLACE) 100 MG capsule Take 200 mg by mouth once daily.       hydrALAZINE (APRESOLINE) 100 MG tablet TAKE 1 TABLET BY MOUTH THREE TIMES A  tablet 1    LANCETS MISC       levothyroxine (SYNTHROID) 50 MCG tablet Take 1 tablet (50 mcg total) by mouth before breakfast. 90 tablet 0    metoprolol tartrate (LOPRESSOR) 50 MG tablet Take 1 tablet (50 mg total) by mouth 2 (two) times daily. 180 tablet 0    oxybutynin (DITROPAN XL) 15 MG TR24 TAKE 1 TABLET EVERY DAY (Patient not taking: Reported on 10/2/2020) 90 tablet 3     No current facility-administered medications on file prior to visit.      Review of patient's allergies indicates:   Allergen Reactions    Ace inhibitors Other (See Comments)     Other reaction(s): cough     Past Surgical History:   Procedure Laterality Date    AV FISTULA PLACEMENT Left 11/27/2019    Procedure: CREATION, AV FISTULA, LEFT UPPER EXTREMITY;  Surgeon: Keron Perez MD;   Location: [facility] OR;  Service: Vascular;  Laterality: Left;    BREAST BIOPSY      breast reduction Bilateral age 31    BREAST SURGERY      CATARACT EXTRACTION Bilateral     cataracts Bilateral      SECTION, LOW TRANSVERSE      x1    CHOLECYSTECTOMY      EYE SURGERY  2014, 2014    vitrectomy    EYE SURGERY Right 2016    FISTULOGRAM Left 3/6/2020    Procedure: Fistulogram, left upper extremity, with branch ligation;  Surgeon: Keron Perez MD;  [facility];  Service: Vascular;  Laterality: Left;  Time 1.1 Minute  42.10 mGy    FISTULOGRAM Left 2020    Procedure: Fistulogram, left upper extremity, transradial access with possible intervention;  Surgeon: Keron Perez MD;  [facility];  Service: Vascular;  Laterality: Left;    FISTULOGRAM Left 2020    Procedure: Fistulogram, left upper extremity, possible intervention;  Surgeon: Keron Perez MD;  Location: [facility] OR;  Service: Vascular;  Laterality: Left;  930 AM START  RN PRE OP  ---COVID NEGATIVE ON  2020. [place]    HYSTERECTOMY      TAHBSO (patient is unsure if ovaries removed)    OOPHORECTOMY      PERCUTANEOUS TRANSLUMINAL ANGIOPLASTY OF ARTERIOVENOUS FISTULA Left 2020    Procedure: PTA, AV FISTULA;  Surgeon: Keron Perez MD;  [facility];  Service: Vascular;  Laterality: Left;  15.9 minutes of fluro  41.12  mGy  7.9060 Gy cm2  32ml  contrast    PHLEBOGRAPHY Left 2020    Procedure: CENTRAL VENOGRAM;  Surgeon: Keron Perez MD;  [facility];  Service: Vascular;  Laterality: Left;    REFRACTIVE SURGERY      TOTAL REDUCTION MAMMOPLASTY      approx 10 yrs ago     Family History   Problem Relation Age of Onset    Leukemia Father     Cataracts Father     Ovarian cancer Sister 35    Stroke Mother     Diabetes Mother     Hypertension Mother     Cataracts Mother     Diabetes Paternal Grandmother     Cataracts Paternal  "Grandmother     Breast cancer Maternal Aunt 65    No Known Problems Paternal Grandfather     Cataracts Maternal Grandmother     Cataracts Maternal Grandfather     HIV Brother     Achondroplasia Sister     Parkinsonism Maternal Aunt     Esophageal cancer Maternal Uncle         smoker    No Known Problems Paternal Aunt     Cataracts Paternal Uncle     Amblyopia Neg Hx     Blindness Neg Hx     Glaucoma Neg Hx     Macular degeneration Neg Hx     Retinal detachment Neg Hx     Strabismus Neg Hx     Thyroid disease Neg Hx     Colon cancer Neg Hx     Cancer Neg Hx        Review of Systems   Constitution: Negative for chills and fever.   Cardiovascular: Positive for leg swelling. Negative for chest pain and claudication.   Respiratory: Negative for cough and shortness of breath.    Skin: Positive for dry skin and nail changes. Negative for itching and rash.   Musculoskeletal: Positive for arthritis and joint pain. Negative for falls, joint swelling, muscle cramps and muscle weakness.   Gastrointestinal: Negative for diarrhea, nausea and vomiting.   Neurological: Positive for numbness and paresthesias. Negative for tremors and weakness.   Psychiatric/Behavioral: Negative for altered mental status and hallucinations.         Objective:       Vitals:    11/16/20 1042   BP: 115/64   Pulse: 74   Weight: 94.4 kg (208 lb 1.8 oz)   Height: 5' 5" (1.651 m)   PainSc: 0-No pain     Physical Exam  Vitals signs and nursing note reviewed.   Constitutional:       Appearance: She is not diaphoretic.      Comments: General: Pt. is well-developed, well-nourished, appears stated age, in no acute distress, alert and oriented x 3. No evidence of depression, anxiety, or agitation. Calm, cooperative, and communicative. Appropriate interactions and affect.       Cardiovascular:      Pulses:           Dorsalis pedis pulses are 1+ on the right side and 1+ on the left side.        Posterior tibial pulses are 1+ on the right side and " 1+ on the left side.      Comments: There is decreased digital hair.   Musculoskeletal:      Right ankle: She exhibits swelling. No tenderness. Achilles tendon exhibits no pain and no defect.      Left ankle: She exhibits swelling. No tenderness. Achilles tendon exhibits no pain and no defect.      Right foot: Normal range of motion. No tenderness.      Left foot: Normal range of motion. No tenderness.      Comments: Muscle strength is 5/5 in all groups bilaterally.    Decreased stride, station of gait.  apropulsive toe off.  Increased angle and base of gait.    Patient has hammertoes of digits 2-5 bilateral partially reducible without symptom today.    Visible and palpable bunion without pain at dorsomedial 1st metatarsal head right and left.  Hallux abducted right and left partially reducible, tracks laterally without being track bound.  No ecchymosis, erythema, edema, or cardinal signs infection or signs of trauma same foot.    Fat pad atrophy to heels and met heads bilateral     Skin:     General: Skin is warm and dry.      Coloration: Skin is not pale.      Findings: No abrasion, ecchymosis, erythema, lesion (posterior right leg healed) or rash.      Nails: There is no clubbing.        Comments: Xerosis bilaterally     Toenails 1-5 bilaterally are elongated by 2-3 mm, thickened by 2-3 mm, discolored/yellowed, dystrophic, brittle with subungual debris.     Interdigital Spaces clean, dry and without evidence of break in skin integrity   Neurological:      Sensory: No sensory deficit.      Comments: [place] 5.07 monofilament is intact bilateral feet. Sharp/dull sensation is also intact Bilateral feet.           Psychiatric:         Speech: Speech normal.             Assessment:       Encounter Diagnoses   Name Primary?    Type 2 diabetes mellitus with ESRD (end-stage renal disease) Yes    Hammer toes of both feet     Plantar fat pad atrophy     Hallux abducto valgus, left     Hallux abducto valgus,  right     Nail dermatophytosis          Plan:       Erica was seen today for diabetes mellitus, diabetic foot exam and nail care.    Diagnoses and all orders for this visit:    Type 2 diabetes mellitus with ESRD (end-stage renal disease)    Hammer toes of both feet    Plantar fat pad atrophy    Hallux abducto valgus, left    Hallux abducto valgus, right    Nail dermatophytosis      I counseled the patient on her conditions, their implications and medical management. Education about the diabetic foot, neuropathy, and prevention of limb loss.      Greater than 50% of this visit spent on counseling and coordination of care.    Education about the diabetic foot, neuropathy, and prevention of limb loss.    Shoe inspection. Diabetic Foot Education. Patient reminded of the importance of good nutrition/healthy diet/weight management and blood sugar control to help prevent podiatric complications of diabetes. Patient instructed on proper foot hygeine. Wear comfortable, proper fitting shoes. Wash feet daily. Dry well. After drying, apply moisturizer to feet (no lotion to webspaces). Inspect feet daily for skin breaks, blisters, swelling, or redness. Wear cotton socks (preferably white)  Change socks every day. Do NOT walk barefoot. Do NOT use heating pads or hot water soaks. We discussed wearing proper shoe gear, daily foot inspections, never walking without protective shoe gear.     Discussed edema control and the importance of daily moisturizer to the feet such as Gold bonds diabetic foot cream    Recommend applying vicks vaporub to thick abnormal toenails daily x 6 months to treat fungal nail infection.    With patient's permission, nails were aggressively reduced and debrided x 10 to their soft tissue attachment mechanically and with electric , removing all offending nail and debris. Patient relates relief following the procedure.     She will continue to monitor the areas daily, inspect her feet, wear protective  shoe gear when ambulatory, moisturizer to maintain skin integrity and follow in this office in approximately 4-6 months, sooner p.r.n.

## 2020-11-16 NOTE — PATIENT INSTRUCTIONS
Recommend lotions: eucerin, eucerin for diabetics, aquaphor, A&D ointment, gold bond for diabetics, sween, Thousandsticks's Bees all purpose baby ointment,  urea 40 with aloe (found on amazon.com)    Shoe recommendations: (try 6pm.com, zappos.com , nordstromrack.YDreams - Inform[employer]¡tica, or shoes[employer] - Inform[employer]tica for discounted prices) you can visit DSW shoes in [place]  or [place] in the [facility] (there are also several shoe brand outlets in the [facility])    Asics (GT 2000 or gel foundations), new balance stability type shoes, saucony (stabil c3),  Hernandez (GTS or Beast or transcend), propet (tennis shoe)    Sofft Brand or axxiom (women) Kierra&Corey (men), clarks, crocs, aerosoles, naturalizers, SAS, ecco, born, elvia storey, rockports (dress shoes)    Vionic, burkenstocks, fitflops, propet (sandals)  Nike comfort thong sandals, crocs, propet (house shoes)    Nail Home remedy:  Vicks Vapor rub to nails for easier manageability    Diabetes: Inspecting Your Feet  Diabetes increases your chances of developing foot problems. So inspect your feet every day. This helps you find small skin irritations before they become serious infections. If you have trouble seeing the bottoms of your feet, use a mirror or ask a family member or friend to help.     Pressure spots on the bottom of the foot are common areas where problems develop.   How to check your feet  Below are tips to help you look for foot problems. Try to check your feet at the same time each day, such as when you get out of bed in the morning:  · Check the top of each foot. The tops of toes, back of the heel, and outer edge of the foot can get a lot of rubbing from poor-fitting shoes.  · Check the bottom of each foot. Daily wear and tear often leads to problems at pressure spots.  · Check the toes and nails. Fungal infections often occur between toes. Toenail problems can also be a sign of fungal infections or lead to breaks in the skin.  · Check your shoes, too. Loose objects inside a shoe can  injure the foot. Use your hand to feel inside your shoes for things like goldy, loose stitching, or rough areas that could irritate your skin.  Warning signs  Look for any color changes in the foot. Redness with streaks can signal a severe infection, which needs immediate medical attention. Tell your doctor right away if you have any of these problems:  · Swelling, sometimes with color changes, may be a sign of poor blood flow or infection. Symptoms include tenderness and an increase in the size of your foot.  · Warm or hot areas on your feet may be signs of infection. A foot that is cold may not be getting enough blood.  · Sensations such as burning, tingling, or pins and needles can be signs of a problem. Also check for areas that may be numb.  · Hot spots are caused by friction or pressure. Look for hot spots in areas that get a lot of rubbing. Hot spots can turn into blisters, calluses, or sores.  · Cracks and sores are caused by dry or irritated skin. They are a sign that the skin is breaking down, which can lead to infection.  · Toenail problems to watch for include nails growing into the skin (ingrown toenail) and causing redness or pain. Thick, yellow, or discolored nails can signal a fungal infection.  · Drainage and odor can develop from untreated sores and ulcers. Call your doctor right away if you notice white or yellow drainage, bleeding, or unpleasant odor.   © 9099-2785 [employer]. [address] [place] [address]. All rights reserved. This information is not intended as a substitute for professional medical care. Always follow your healthcare professional's instructions.        Step-by-Step:  Inspecting Your Feet (Diabetes)    Date Last Reviewed: 10/1/2016  © 4495-1475 [employer]. [address] [place] [address]. All rights reserved. This information is not intended as a substitute for professional medical care. Always follow your healthcare  professional's instructions.          He of

## 2020-11-16 NOTE — TELEPHONE ENCOUNTER
Spoke with patient, states Dr. smyth is no longer an [facility]  And wantes to stay within [facility].    Patient states she would like a referral to an [facility] endocrinologist     Please advise

## 2020-11-30 ENCOUNTER — OFFICE VISIT (OUTPATIENT)
Dept: ENDOCRINOLOGY | Facility: CLINIC | Age: 75
End: 2020-11-30
Payer: MEDICARE

## 2020-11-30 VITALS
HEART RATE: 75 BPM | DIASTOLIC BLOOD PRESSURE: 69 MMHG | WEIGHT: 218.31 LBS | BODY MASS INDEX: 36.33 KG/M2 | TEMPERATURE: 98 F | SYSTOLIC BLOOD PRESSURE: 117 MMHG

## 2020-11-30 DIAGNOSIS — M89.9 CHRONIC KIDNEY DISEASE-MINERAL AND BONE DISORDER: Primary | ICD-10-CM

## 2020-11-30 DIAGNOSIS — E03.9 HYPOTHYROIDISM (ACQUIRED): Chronic | ICD-10-CM

## 2020-11-30 DIAGNOSIS — Z99.2 ESRD ON HEMODIALYSIS: ICD-10-CM

## 2020-11-30 DIAGNOSIS — R53.81 DEBILITY: ICD-10-CM

## 2020-11-30 DIAGNOSIS — M81.0 OSTEOPOROSIS, UNSPECIFIED OSTEOPOROSIS TYPE, UNSPECIFIED PATHOLOGICAL FRACTURE PRESENCE: ICD-10-CM

## 2020-11-30 DIAGNOSIS — E83.9 CHRONIC KIDNEY DISEASE-MINERAL AND BONE DISORDER: Primary | ICD-10-CM

## 2020-11-30 DIAGNOSIS — N18.9 CHRONIC KIDNEY DISEASE-MINERAL AND BONE DISORDER: Primary | ICD-10-CM

## 2020-11-30 DIAGNOSIS — N18.6 ESRD ON HEMODIALYSIS: ICD-10-CM

## 2020-11-30 DIAGNOSIS — N25.81 SECONDARY RENAL HYPERPARATHYROIDISM: ICD-10-CM

## 2020-11-30 DIAGNOSIS — N18.6 TYPE 2 DIABETES MELLITUS WITH ESRD (END-STAGE RENAL DISEASE): ICD-10-CM

## 2020-11-30 DIAGNOSIS — E11.22 TYPE 2 DIABETES MELLITUS WITH ESRD (END-STAGE RENAL DISEASE): ICD-10-CM

## 2020-11-30 DIAGNOSIS — E66.01 SEVERE OBESITY (BMI 35.0-39.9) WITH COMORBIDITY: ICD-10-CM

## 2020-11-30 PROCEDURE — 99999 PR PBB SHADOW E&M-EST. PATIENT-LVL III: CPT | Mod: PBBFAC,HCNC,, | Performed by: HOSPITALIST

## 2020-11-30 PROCEDURE — 3008F BODY MASS INDEX DOCD: CPT | Mod: HCNC,CPTII,S$GLB, | Performed by: HOSPITALIST

## 2020-11-30 PROCEDURE — 1126F PR PAIN SEVERITY QUANTIFIED, NO PAIN PRESENT: ICD-10-PCS | Mod: HCNC,S$GLB,, | Performed by: HOSPITALIST

## 2020-11-30 PROCEDURE — 3008F PR BODY MASS INDEX (BMI) DOCUMENTED: ICD-10-PCS | Mod: HCNC,CPTII,S$GLB, | Performed by: HOSPITALIST

## 2020-11-30 PROCEDURE — 99204 OFFICE O/P NEW MOD 45 MIN: CPT | Mod: HCNC,S$GLB,, | Performed by: HOSPITALIST

## 2020-11-30 PROCEDURE — 1159F MED LIST DOCD IN RCRD: CPT | Mod: HCNC,S$GLB,, | Performed by: HOSPITALIST

## 2020-11-30 PROCEDURE — 99999 PR PBB SHADOW E&M-EST. PATIENT-LVL III: ICD-10-PCS | Mod: PBBFAC,HCNC,, | Performed by: HOSPITALIST

## 2020-11-30 PROCEDURE — 3051F HG A1C>EQUAL 7.0%<8.0%: CPT | Mod: HCNC,CPTII,S$GLB, | Performed by: HOSPITALIST

## 2020-11-30 PROCEDURE — 3051F PR MOST RECENT HEMOGLOBIN A1C LEVEL 7.0 - < 8.0%: ICD-10-PCS | Mod: HCNC,CPTII,S$GLB, | Performed by: HOSPITALIST

## 2020-11-30 PROCEDURE — 1126F AMNT PAIN NOTED NONE PRSNT: CPT | Mod: HCNC,S$GLB,, | Performed by: HOSPITALIST

## 2020-11-30 PROCEDURE — 99204 PR OFFICE/OUTPT VISIT, NEW, LEVL IV, 45-59 MIN: ICD-10-PCS | Mod: HCNC,S$GLB,, | Performed by: HOSPITALIST

## 2020-11-30 PROCEDURE — 1159F PR MEDICATION LIST DOCUMENTED IN MEDICAL RECORD: ICD-10-PCS | Mod: HCNC,S$GLB,, | Performed by: HOSPITALIST

## 2020-11-30 RX ORDER — SEVELAMER HYDROCHLORIDE 800 MG/1
1600 TABLET, FILM COATED ORAL
Status: ON HOLD | COMMUNITY
End: 2021-06-09 | Stop reason: HOSPADM

## 2020-11-30 NOTE — LETTER
November 30, 2020      Sendy Elaine MD  [address]  [place] [address]           [facility]  [address] [place] [address]  Phone: [phone]  Fax: [phone]          Patient: Erica Leblanc   MR Number: [id number]   YOB: 1945   Date of Visit: 11/30/2020       Dear Dr. Sendy Elaine:    Thank you for referring Erica Leblanc to me for evaluation. Attached you will find relevant portions of my assessment and plan of care.    If you have questions, please do not hesitate to call me. I look forward to following Erica Leblanc along with you.    Sincerely,    Carl Day MD    Enclosure  CC:  No Recipients    If you would like to receive this communication electronically, please contact [email or web address] or [phone] to request more information on [employer] Link access.    For providers and/or their staff who would like to refer a patient to [facility], please contact us through our one-stop-shop provider referral line, [facility], at [phone].    If you feel you have received this communication in error or would no longer like to receive these types of communications, please e-mail [email or web address]

## 2020-11-30 NOTE — Clinical Note
Please make sure to set up lab work for patient, 8 AM, fasting. Please schedule follow up with me in clinic 3 mo, please fax over information and medication list request to [facility], thanks

## 2020-11-30 NOTE — PROGRESS NOTES
Subjective:      Patient ID: Erica Leblanc is a 75 y.o. female presented to Endocrinology clinic on 11/30/2020.    Chief Complaint:  Osteoporosis      History of Present Illness: Erica Leblanc is a 75 y.o. female with ESRD on HD on TTS for 1 year, her nephrologist is  Dr Myla Puente, diabetes type 2, controlled. Here for evaluation of metabolic bone disease.     Seen on recent bone density scan.  Patient denies prior history of any bone disorder.  Patient reports possibly getting vitamin D 3 times a week at dialysis, patient called the dialysis nurse today in clinic, and was told she was on Zemplar, dosage is unclear.  Patient is on phosphate binder:  Sevelamer 1600mg TIDWM>> blood forget to take medication at least 1x a day if she is busy    Patient denies back pain, No falls  Never had fractures  Use walker to help with gait and ambulation    Lab work review showing hyperphosphatemia, elevated alkaline phosphatase, hypocalcemia, normal vitamin-D.      DXA done on 10/2020  Comparison study done on 03/13/2018.  Lumbar spine (L1-L4): BMD is 0.943 g/cm2, T-score is -0.7, and Z-score is 1.0.  Total hip: BMD is 0.680 g/cm2, T-score is -2.1, and Z-score is -1.0.  Femoral neck: BMD is 0.511 g/cm2, T-score is -3.0, and Z-score is -1.5.    Impression:  1. Osteoporosis with likely chronic kidney disease mineral and bone disorder  2. Compared with previous DXA, BMD at the lumbar spine has declined by 13.9%, and the BMD at the total hip has declined by 23.2%.    Height loss (>2 inches)? no  Family hx of Osteoporosis: no    Asymptomatic menopausal state.  She had a hysterectomy at 42 y/o and menopausal symptoms at 47 y/o.     Modifiable  Low: Calcium intake?  4249-2851 mg elemental Ca2+ per day   Low: Vit D intake?  2000 IU a day   Diet: renal diet    Tobacco use ?  no   EtOH use? no    Caffeine use ? yes , but limit caffeine intake to 1 servings a day)  Weight bearing exercise?   Yes, walking, but not enough  Recent  falls? no  Fall Precautions? yes, get out the chair without using their arms  Current diarrhea or h/o malabsorption? no    Medications  Estrogen Therapy? no   Chronic steroids? no  Anticoagulants? No    Malignancy involving bone (active or history)? no  Prior radiation treatment? no      With regards to type 2 diabetes:  Diagnosis around 10 years ago  Patient was on insulin prior to HD, has been off of all therapy since that time.  Checking glucose 1x a day  Family hx of diabetes  Care everywhere lab work:  9/2020 A1c 7.0    With regards to hypothyroidism  Levothyroxin 50mcg  For many years  Reports compliance    Reviewed past surgical, medical, family, social history and updated as appropriate.      Review of Systems   Constitutional: Negative for activity change and unexpected weight change.   HENT: Negative for sore throat and voice change.    Eyes: Negative for visual disturbance.   Respiratory: Negative for shortness of breath.    Cardiovascular: Negative for chest pain.   Gastrointestinal: Negative for abdominal pain, constipation, diarrhea, nausea and vomiting.   Genitourinary: Negative for urgency.   Musculoskeletal: Negative for arthralgias.   Skin: Negative for wound.   Neurological: Negative for headaches.   Psychiatric/Behavioral: Negative for confusion and sleep disturbance.       Objective:   /69 (BP Location: Right arm, Patient Position: Sitting, BP Method: Large (Automatic))   Pulse 75   Temp 97.6 °F (36.4 °C) (Temporal)   Wt 99 kg (218 lb 4.8 oz)   BMI 36.33 kg/m²     Body mass index is 36.33 kg/m².    Physical Exam  Vitals signs and nursing note reviewed.   Constitutional:       General: She is not in acute distress.     Appearance: She is well-developed.   HENT:      Head: Normocephalic and atraumatic.      Right Ear: External ear normal.      Left Ear: External ear normal.      Nose: Nose normal.   Eyes:      General: No scleral icterus.     Conjunctiva/sclera: Conjunctivae normal.    Neck:      Musculoskeletal: Normal range of motion and neck supple. No neck rigidity.      Thyroid: No thyromegaly.      Trachea: No tracheal deviation.      Comments: No thyroid enlargement noted on exam  Cardiovascular:      Rate and Rhythm: Normal rate.      Heart sounds: Normal heart sounds. No murmur.   Pulmonary:      Effort: Pulmonary effort is normal.      Breath sounds: Normal breath sounds.   Abdominal:      Palpations: Abdomen is soft. There is no mass.      Tenderness: There is no abdominal tenderness.   Musculoskeletal: Normal range of motion.         General: No swelling or deformity.      Comments: Left forearm AV fistula   Lymphadenopathy:      Cervical: No cervical adenopathy.   Skin:     General: Skin is warm.      Findings: No rash.   Neurological:      Mental Status: She is alert and oriented to person, place, and time.      Sensory: No sensory deficit.      Coordination: Coordination normal.   Psychiatric:         Judgment: Judgment normal.         Lab Review:  Outside lab work reviewed :  Care everywhere, from dialysis unit  November, October, and September of 2020  Phosphorus elevation:  6.5, 8.1, 7.6, 5.9, 5.9  PTH levels:  739, 583, 475  Calcium:  8.4, 8.2, 8.7  Alkaline phosphatase:  155, 148, 158, 155  Vitamin-D:  53.2              Assessment and Plan     Erica Leblanc is a 75 y.o. female here for management of the follow endocrinology disorder: Chronic kidney disease-mineral and bone disorder, diabetes type 2, hypothyroidism.    Chronic kidney disease-mineral and bone disorder  Patient present to clinic for evaluation and treatment of Chronic kidney disease-mineral and bone disorder as the cause of her low bone density/osteoporosis  Risk factors include ESRD on dialysis, hyperphosphatemia, hypocalcemia, vitamin-D deficiency (reported in the past)  High risk of falls given knee pain, poor gait, the need to use walker  Patient isn't taking her phosphate lowering binder as directed,  will often missed dose daily to 2 times a day  Possibly is on Zemplar (vitamin D analog)    Plan:  Difficult case of mineral bone disease, due to poor compliance of phosphate binder  At this time advised patient to be compliant, need to follow low phosphate diet  Will need more information from dialysis center, patient was not aware of her regimen there. Will send request  We will repeat lab work, monitor PTH level  Close follow up, given high risk, may benefit from medical therapy        ESRD on hemodialysis  - at [facility]; Dr. Barrientos  - Tuesday, Thursday and Saturday  - has AV graft in left UE  - see plan above given hyperphosphatemia    Debility  Patient reports chronic knee pain, use walker for long ambulation    Type 2 diabetes mellitus with ESRD (end-stage renal disease)  Type 2 diabetes, A1c of 7.0 (given ESRD status, can be falsely low)  Will check fructosamine level  Continue lifestyle modification, and diet at this time  Will discuss further at next office visit    Secondary renal hyperparathyroidism  Hyperparathyroidism due to renal disease  On Zemplar?  Checking PTH level    Severe obesity (BMI 35.0-39.9) with comorbidity  Will monitor weight    Hypothyroidism (acquired)  Patient with history of hypothyroidism etiology unclear  On level thyroxine 50 mcg daily (low-dose)  Repeat TFT  Will titrate level if needed      RTC in 3 mo      Carl Day MD  Endocrinology- [facility]  11/30/2020      Disclaimer: This note has been generated using voice-recognition software. There may be typographical errors that have been missed during proof-reading.

## 2020-12-01 DIAGNOSIS — Z12.11 SPECIAL SCREENING FOR MALIGNANT NEOPLASMS, COLON: Primary | ICD-10-CM

## 2020-12-07 ENCOUNTER — TELEPHONE (OUTPATIENT)
Dept: ENDOCRINOLOGY | Facility: CLINIC | Age: 75
End: 2020-12-07

## 2020-12-07 RX ORDER — DOXERCALCIFEROL 4 UG/2ML
4.5 INJECTION INTRAVENOUS
COMMUNITY

## 2020-12-07 NOTE — TELEPHONE ENCOUNTER
Fax from [facility] received and reviewed    Medication list updated  Deviated chart show patient is on Levemir 5 units once daily, will need to clarify with the patient at next office visit    Patient is receiving: Hectoro (doxercalciferol) for  treatment of secondary hyperparathyroidism    Will schedule lab work    Carl Day

## 2020-12-15 LAB — HBA1C MFR BLD: 7.9 % (ref 0–5.6)

## 2021-01-22 ENCOUNTER — PATIENT OUTREACH (OUTPATIENT)
Dept: ADMINISTRATIVE | Facility: OTHER | Age: 76
End: 2021-01-22

## 2021-01-22 DIAGNOSIS — Z12.31 BREAST CANCER SCREENING BY MAMMOGRAM: Primary | ICD-10-CM

## 2021-01-25 ENCOUNTER — OFFICE VISIT (OUTPATIENT)
Dept: VASCULAR SURGERY | Facility: CLINIC | Age: 76
End: 2021-01-25
Payer: MEDICARE

## 2021-01-25 ENCOUNTER — HOSPITAL ENCOUNTER (OUTPATIENT)
Dept: CARDIOLOGY | Facility: HOSPITAL | Age: 76
Discharge: HOME | End: 2021-01-25
Attending: SURGERY
Payer: MEDICARE

## 2021-01-25 VITALS
HEART RATE: 86 BPM | BODY MASS INDEX: 36 KG/M2 | OXYGEN SATURATION: 91 % | DIASTOLIC BLOOD PRESSURE: 56 MMHG | WEIGHT: 216.06 LBS | HEIGHT: 65 IN | SYSTOLIC BLOOD PRESSURE: 102 MMHG

## 2021-01-25 DIAGNOSIS — T82.858D ARTERIOVENOUS FISTULA STENOSIS, SUBSEQUENT ENCOUNTER: Primary | ICD-10-CM

## 2021-01-25 DIAGNOSIS — T82.858D ARTERIOVENOUS FISTULA STENOSIS, SUBSEQUENT ENCOUNTER: ICD-10-CM

## 2021-01-25 LAB
HD ANASTAMOSIS LOCATION: NORMAL
HD FISTULA OUTFLOW VEIN VESSEL: NORMAL
HD INFLOW ARTERY VESSEL: NORMAL
RIGHT DIS GRAFT PSV: 70 CM/ SEC
RIGHT INFLOW PSV: 275 CM/S
RIGHT MID GRAFT DIA: 1.3 CM
RIGHT MID GRAFT PSV: 170 CM/S
RIGHT OUTFLOW VEIN PSV: 62 CM/ SEC
RIGHT PROX ANA PSV: 711 CM/S
RIGHT PROX GRAFT PSV: 579 CM/S
RIGHT VOLUME FLOW PSV: 4414 ML/MIN

## 2021-01-25 PROCEDURE — 99999 PR PBB SHADOW E&M-EST. PATIENT-LVL IV: ICD-10-PCS | Mod: PBBFAC,,, | Performed by: SURGERY

## 2021-01-25 PROCEDURE — 1159F MED LIST DOCD IN RCRD: CPT | Mod: S$GLB,,, | Performed by: SURGERY

## 2021-01-25 PROCEDURE — 3008F BODY MASS INDEX DOCD: CPT | Mod: CPTII,S$GLB,, | Performed by: SURGERY

## 2021-01-25 PROCEDURE — 1126F PR PAIN SEVERITY QUANTIFIED, NO PAIN PRESENT: ICD-10-PCS | Mod: S$GLB,,, | Performed by: SURGERY

## 2021-01-25 PROCEDURE — 99499 RISK ADDL DX/OHS AUDIT: ICD-10-PCS | Mod: S$GLB,,, | Performed by: SURGERY

## 2021-01-25 PROCEDURE — 93990 CV US HEMODIALYSIS ACCESS (CUPID ONLY): ICD-10-PCS | Mod: 26,,, | Performed by: SURGERY

## 2021-01-25 PROCEDURE — 99499 UNLISTED E&M SERVICE: CPT | Mod: S$GLB,,, | Performed by: SURGERY

## 2021-01-25 PROCEDURE — 99214 PR OFFICE/OUTPT VISIT, EST, LEVL IV, 30-39 MIN: ICD-10-PCS | Mod: S$GLB,,, | Performed by: SURGERY

## 2021-01-25 PROCEDURE — 1159F PR MEDICATION LIST DOCUMENTED IN MEDICAL RECORD: ICD-10-PCS | Mod: S$GLB,,, | Performed by: SURGERY

## 2021-01-25 PROCEDURE — 93990 DOPPLER FLOW TESTING: CPT | Mod: 26,,, | Performed by: SURGERY

## 2021-01-25 PROCEDURE — 99214 OFFICE O/P EST MOD 30 MIN: CPT | Mod: S$GLB,,, | Performed by: SURGERY

## 2021-01-25 PROCEDURE — 93990 DOPPLER FLOW TESTING: CPT | Mod: TC

## 2021-01-25 PROCEDURE — 3288F PR FALLS RISK ASSESSMENT DOCUMENTED: ICD-10-PCS | Mod: CPTII,S$GLB,, | Performed by: SURGERY

## 2021-01-25 PROCEDURE — 1101F PR PT FALLS ASSESS DOC 0-1 FALLS W/OUT INJ PAST YR: ICD-10-PCS | Mod: CPTII,S$GLB,, | Performed by: SURGERY

## 2021-01-25 PROCEDURE — 1126F AMNT PAIN NOTED NONE PRSNT: CPT | Mod: S$GLB,,, | Performed by: SURGERY

## 2021-01-25 PROCEDURE — 99999 PR PBB SHADOW E&M-EST. PATIENT-LVL IV: CPT | Mod: PBBFAC,,, | Performed by: SURGERY

## 2021-01-25 PROCEDURE — 1101F PT FALLS ASSESS-DOCD LE1/YR: CPT | Mod: CPTII,S$GLB,, | Performed by: SURGERY

## 2021-01-25 PROCEDURE — 3288F FALL RISK ASSESSMENT DOCD: CPT | Mod: CPTII,S$GLB,, | Performed by: SURGERY

## 2021-01-25 PROCEDURE — 3008F PR BODY MASS INDEX (BMI) DOCUMENTED: ICD-10-PCS | Mod: CPTII,S$GLB,, | Performed by: SURGERY

## 2021-02-03 NOTE — ASSESSMENT & PLAN NOTE
Clinic Care Coordination Contact    Clinic Care Coordination Contact  OUTREACH    Referral Information:  Referral Source: IP Report    Primary Diagnosis: CHF    Chief Complaint   Patient presents with     Clinic Care Coordination - Post Hospital     CHF        Universal Utilization: 54% Admission or ED Risk  Clinic Utilization  Difficulty keeping appointments:: No  Compliance Concerns: No  No-Show Concerns: No  No PCP office visit in Past Year: No  Utilization    Last refreshed: 2/3/2021  1:25 PM: Hospital Admissions 4           Last refreshed: 2/3/2021  1:25 PM: ED Visits 3           Last refreshed: 2/3/2021  1:25 PM: No Show Count (past year) 5              Current as of: 2/3/2021  1:25 PM              Clinical Concerns:  Current Medical Concerns:   Admit Date:     01/26/2021   Discharge Date:     02/02/2021      PRINCIPAL FINAL DIAGNOSES:   1.  Acute hypoxic respiratory failure secondary to recurrent bilateral pleural effusions.   2.  Acute diastolic congestive heart failure exacerbation.   3.  Diabetes mellitus.   4.  Chronic kidney disease, with a creatinine near 3 at baseline.   5.  Obstructive sleep apnea, on nocturnal continuous positive airway pressure.   6.  Chronic anemia, baseline hemoglobin near 8.   7.  Paroxysmal atrial fibrillation, on Eliquis for anticoagulation.   8.  Anxiety.   9.  Hyperlipidemia.   10.  Neuropathy.   11.  Mild aortic stenosis.   12.  Peripheral neuropathy.   13.  Right bundle branch block.        PRINCIPAL PROCEDURES THIS ADMISSION:   1.  Thoracentesis.   2.  Echocardiogram.   3.  Chest CT scan.   4.  Pulmonary consultation.   5.  Diuresis.   6.  Cultures of pleural fluid that were negative for infection.      FOLLOWUP INSTRUCTIONS:   1.  Bilateral PleurX catheter placement scheduled for 02/08/2021 at 11:00 a.m.  She was seen by Dr. Odonnell of Pulmonology this admission, who is arranging this procedure.   2.  Follow-up with your primary MD in 5-7 days.  Given medication changes  SSI for now until blood sugars stable     this admission, recommend recheck labs at that visit, including electrolytes, potassium, and kidney function.  Creatinine is 2.6 on discharge from the hospital.   3.  Do not take Eliquis on Sunday, 02/07/2021, and Monday, 02/08/2021, prior to catheter placement.   4.  Discuss diabetic management with primary MD at next clinic visit.  The patient was using insulin at the rehabilitation center recently as well as this hospitalization, but is declining insulin therapy on discharge from the hospital.  Can follow-up with primary MD regarding further diabetic management.  She does take glipizide at home.  She had previously been on metformin, but was advised to stop this medication given her renal disease.      On discharge from the hospital, patient is being prescribed oxygen 1 liter via nasal cannula with activity.      On discharge, home OT, home PT, home health nurse were ordered.      [facility] spoke by phone with Ирина.  She has received a flury of calls this am and not sure if one was from home care to schedule visit.  She will contact [facility] if she doesn't hear from them.  Her first night home was ok but getting out of bed was difficult this am.  She has her oxygen equipment and the long cord is taking some getting used to.  She has a follow up PCP appointment tomorrow.        Current Behavioral Concerns: Reports no concerns during this encounter..      Education Provided to patient: AVANI JORGENSEN covering for [place] while she is out, shazia delivered meal resources   Pain  Pain (GOAL):: No  Health Maintenance Reviewed: Due/Overdue   HF ACTION PLAN  URINE DRUG SCREEN  COVID-19 Vaccine (1 of 2)  ZOSTER IMMUNIZATION     Clinical Pathway: None    Medication Management:  DISCHARGE MEDICATIONS:   1.  Acetaminophen as needed for pain.   2.  Albuterol as needed for shortness of breath.   3.  Eliquis 2.5 mg twice a day.   4.  Calcium acetate 667 mg 3 times a day.   5.  Carvedilol 3.125 mg twice a day.   6.  Diltiazem long-acting 180 mg  twice a day.   7.  Fish oil.   8.  Gabapentin 100 mg twice a day.   9.  Hydralazine 25 mg 3 times a day.   10.  Multivitamin daily.   11.  MiraLax 17 grams daily as needed.   12.  Zoloft 50 mg daily.   13.  Simvastatin 10 mg at bedtime.   14.  Torsemide 40 mg daily, dose increased from 20 mg daily previously.      Functional Status:  Dependent ADLs:: Ambulation-walker  Bed or wheelchair confined:: No  Mobility Status: Independent w/Device  Fallen 2 or more times in the past year?: No    Living Situation:  Current living arrangement:: I live in a private home with spouse  Type of residence:: Private home - stairs    Lifestyle & Psychosocial Needs:  Lifestyle     Physical activity     Days per week: 0 days     Minutes per session: 0 min     Stress: Only a little     Social Needs     Financial resource strain: Not hard at all     Food insecurity     Worry: Never true     Inability: Never true     Transportation needs     Medical: No     Non-medical: No     Inadequate nutrition (GOAL):: No  Tube Feeding: No  Inadequate activity/exercise (GOAL):: No  Significant changes in sleep pattern (GOAL): No  Transportation means:: Family     Sikhism or spiritual beliefs that impact treatment:: No  Mental health DX:: No  Mental health DX how managed:: None  Mental health management concern (GOAL):: No  Chemical Dependency Status: No Current Concerns  Informal Support system:: Family, Neighbors, Children   Socioeconomic History     Marital status:      Spouse name: Not on file     Number of children: Not on file     Years of education: Not on file     Highest education level: 12th grade   Occupational History     Occupation:  from home     Employer: RETIRED   Relationships     Social connections     Talks on phone: Three times a week     Gets together: Never     Attends Buddhist service: Never     Active member of club or organization: No     Attends meetings of clubs or organizations: Never     Relationship  status:      Intimate partner violence     Fear of current or ex partner: Not on file     Emotionally abused: Not on file     Physically abused: Not on file     Forced sexual activity: Not on file     Tobacco Use     Smoking status: Never Smoker     Smokeless tobacco: Never Used   Substance and Sexual Activity     Alcohol use: No     Frequency: Never     Binge frequency: Never     Drug use: No     Sexual activity: Yes     Partners: Male             Resources and Interventions:  Current Resources:      Community Resources: None  Supplies used at home:: Oxygen Tubing/Supplies  Equipment Currently Used at Home: walker, rolling, grab bar, tub/shower, shower chair  Employment Status: retired)   )    Advance Care Plan/Directive  Advanced Care Plans/Directives on file:: Yes          Goals:   Goals        General    1. Improve chronic symptoms (pt-stated)     Notes - Note edited  2/3/2021  3:18 PM by Sandra Savage LSW    Goal Statement: I would like support in managing my chronic health conditions to maintain my health and wellness and prevent readmission to the hospital.   Date Goal set:  Updated on 2/3/21  Barriers: weak due to chronic health conditions.   Strengths: motivated, engaged in care coordination, home care therapies.   Date to Achieve By: 06/2021  Patient expressed understanding of goal: yes  Action steps to achieve this goal:  1. I will follow up with my providers as scheduled/recommended. (Primary Care Provider , CT 02/05, Pulmonology  etc.)  2. I will take my medications as prescribed.   3. I will contact my care team to report questions, concerns, support needs. 24/7 after hours services available.   4. Care Coordinator will collaborate with care team as needed.               Patient/Caregiver understanding: Yes    Outreach Frequency: monthly  Future Appointments              Tomorrow  PHARMACY LAB [facility], [place] [facility]    In 2 days Qi Jurado Ra, APRN  CNP [facility]    In 2 days [id number], [facility]          Plan: Ирина will attend her upcoming medical appointments including one with her PCP tomorrow.  She will begin with homecare to include SNV, PHYSICAL THERAPY and OCCUPATIONAL THERAPY and will let  CC know if she has any barriers to getting that started. She was provided this writers contact information if she needs additional support or resources.  CC RN will outreach again in one month.    JOSE FRANCISCO Dickey   Care Coordination Team  [phone]

## 2021-02-10 ENCOUNTER — IMMUNIZATION (OUTPATIENT)
Dept: PRIMARY CARE CLINIC | Facility: CLINIC | Age: 76
End: 2021-02-10
Payer: MEDICARE

## 2021-02-10 DIAGNOSIS — Z23 NEED FOR VACCINATION: Primary | ICD-10-CM

## 2021-02-10 PROCEDURE — 91300 PR SARS-COV- 2 COVID-19 VACCINE, NO PRSV, 30MCG/0.3ML, IM: CPT | Mod: PBBFAC | Performed by: INTERNAL MEDICINE

## 2021-02-10 PROCEDURE — 0001A PR IMMUNIZ ADMIN, SARS-COV-2 COVID-19 VACC, 30MCG/0.3ML, 1ST DOSE: CPT | Mod: PBBFAC | Performed by: INTERNAL MEDICINE

## 2021-02-10 RX ADMIN — RNA INGREDIENT BNT-162B2 0.3 ML: 0.23 INJECTION, SUSPENSION INTRAMUSCULAR at 12:02

## 2021-02-26 ENCOUNTER — PATIENT OUTREACH (OUTPATIENT)
Dept: ADMINISTRATIVE | Facility: OTHER | Age: 76
End: 2021-02-26

## 2021-03-01 ENCOUNTER — LAB VISIT (OUTPATIENT)
Dept: LAB | Facility: HOSPITAL | Age: 76
End: 2021-03-01
Attending: HOSPITALIST
Payer: MEDICARE

## 2021-03-01 ENCOUNTER — OFFICE VISIT (OUTPATIENT)
Dept: PODIATRY | Facility: CLINIC | Age: 76
End: 2021-03-01
Payer: MEDICARE

## 2021-03-01 VITALS — BODY MASS INDEX: 35.99 KG/M2 | HEIGHT: 65 IN | WEIGHT: 216 LBS

## 2021-03-01 DIAGNOSIS — N18.6 TYPE 2 DIABETES MELLITUS WITH ESRD (END-STAGE RENAL DISEASE): ICD-10-CM

## 2021-03-01 DIAGNOSIS — L90.9 PLANTAR FAT PAD ATROPHY: ICD-10-CM

## 2021-03-01 DIAGNOSIS — M20.42 HAMMER TOES OF BOTH FEET: ICD-10-CM

## 2021-03-01 DIAGNOSIS — N18.6 TYPE 2 DIABETES MELLITUS WITH ESRD (END-STAGE RENAL DISEASE): Primary | ICD-10-CM

## 2021-03-01 DIAGNOSIS — E11.22 TYPE 2 DIABETES MELLITUS WITH ESRD (END-STAGE RENAL DISEASE): Primary | ICD-10-CM

## 2021-03-01 DIAGNOSIS — M20.12 HALLUX ABDUCTO VALGUS, LEFT: ICD-10-CM

## 2021-03-01 DIAGNOSIS — M81.0 OSTEOPOROSIS, UNSPECIFIED OSTEOPOROSIS TYPE, UNSPECIFIED PATHOLOGICAL FRACTURE PRESENCE: ICD-10-CM

## 2021-03-01 DIAGNOSIS — E11.22 TYPE 2 DIABETES MELLITUS WITH ESRD (END-STAGE RENAL DISEASE): ICD-10-CM

## 2021-03-01 DIAGNOSIS — M20.11 HALLUX ABDUCTO VALGUS, RIGHT: ICD-10-CM

## 2021-03-01 DIAGNOSIS — M89.9 CHRONIC KIDNEY DISEASE-MINERAL AND BONE DISORDER: ICD-10-CM

## 2021-03-01 DIAGNOSIS — Z99.2 ESRD ON HEMODIALYSIS: ICD-10-CM

## 2021-03-01 DIAGNOSIS — B35.1 NAIL DERMATOPHYTOSIS: ICD-10-CM

## 2021-03-01 DIAGNOSIS — M20.41 HAMMER TOES OF BOTH FEET: ICD-10-CM

## 2021-03-01 DIAGNOSIS — N18.9 CHRONIC KIDNEY DISEASE-MINERAL AND BONE DISORDER: ICD-10-CM

## 2021-03-01 DIAGNOSIS — E83.9 CHRONIC KIDNEY DISEASE-MINERAL AND BONE DISORDER: ICD-10-CM

## 2021-03-01 DIAGNOSIS — N18.6 ESRD ON HEMODIALYSIS: ICD-10-CM

## 2021-03-01 LAB
25(OH)D3+25(OH)D2 SERPL-MCNC: 34 NG/ML (ref 30–96)
ALBUMIN SERPL BCP-MCNC: 3.2 G/DL (ref 3.5–5.2)
ALP SERPL-CCNC: 168 U/L (ref 55–135)
ALT SERPL W/O P-5'-P-CCNC: 10 U/L (ref 10–44)
ANION GAP SERPL CALC-SCNC: 19 MMOL/L (ref 8–16)
AST SERPL-CCNC: 14 U/L (ref 10–40)
BASOPHILS # BLD AUTO: 0.03 K/UL (ref 0–0.2)
BASOPHILS NFR BLD: 0.4 % (ref 0–1.9)
BILIRUB SERPL-MCNC: 0.5 MG/DL (ref 0.1–1)
BUN SERPL-MCNC: 75 MG/DL (ref 8–23)
CALCIUM SERPL-MCNC: 8.2 MG/DL (ref 8.7–10.5)
CHLORIDE SERPL-SCNC: 97 MMOL/L (ref 95–110)
CO2 SERPL-SCNC: 24 MMOL/L (ref 23–29)
CREAT SERPL-MCNC: 7.9 MG/DL (ref 0.5–1.4)
DIFFERENTIAL METHOD: ABNORMAL
EOSINOPHIL # BLD AUTO: 0.1 K/UL (ref 0–0.5)
EOSINOPHIL NFR BLD: 1.1 % (ref 0–8)
ERYTHROCYTE [DISTWIDTH] IN BLOOD BY AUTOMATED COUNT: 14.7 % (ref 11.5–14.5)
EST. GFR  (AFRICAN AMERICAN): 5 ML/MIN/1.73 M^2
EST. GFR  (NON AFRICAN AMERICAN): 5 ML/MIN/1.73 M^2
GLUCOSE SERPL-MCNC: 160 MG/DL (ref 70–110)
HCT VFR BLD AUTO: 37.9 % (ref 37–48.5)
HGB BLD-MCNC: 11.9 G/DL (ref 12–16)
IMM GRANULOCYTES # BLD AUTO: 0.03 K/UL (ref 0–0.04)
IMM GRANULOCYTES NFR BLD AUTO: 0.4 % (ref 0–0.5)
LYMPHOCYTES # BLD AUTO: 1.1 K/UL (ref 1–4.8)
LYMPHOCYTES NFR BLD: 15.6 % (ref 18–48)
MAGNESIUM SERPL-MCNC: 2.4 MG/DL (ref 1.6–2.6)
MCH RBC QN AUTO: 27.8 PG (ref 27–31)
MCHC RBC AUTO-ENTMCNC: 31.4 G/DL (ref 32–36)
MCV RBC AUTO: 89 FL (ref 82–98)
MONOCYTES # BLD AUTO: 0.6 K/UL (ref 0.3–1)
MONOCYTES NFR BLD: 9 % (ref 4–15)
NEUTROPHILS # BLD AUTO: 5.1 K/UL (ref 1.8–7.7)
NEUTROPHILS NFR BLD: 73.5 % (ref 38–73)
NRBC BLD-RTO: 0 /100 WBC
PHOSPHATE SERPL-MCNC: 6.9 MG/DL (ref 2.7–4.5)
PLATELET # BLD AUTO: 214 K/UL (ref 150–350)
PMV BLD AUTO: 11.4 FL (ref 9.2–12.9)
POTASSIUM SERPL-SCNC: 4.3 MMOL/L (ref 3.5–5.1)
PROT SERPL-MCNC: 8 G/DL (ref 6–8.4)
PTH-INTACT SERPL-MCNC: 960.7 PG/ML (ref 9–77)
RBC # BLD AUTO: 4.28 M/UL (ref 4–5.4)
SODIUM SERPL-SCNC: 140 MMOL/L (ref 136–145)
TSH SERPL DL<=0.005 MIU/L-ACNC: 1.75 UIU/ML (ref 0.4–4)
WBC # BLD AUTO: 6.98 K/UL (ref 3.9–12.7)

## 2021-03-01 PROCEDURE — 1126F AMNT PAIN NOTED NONE PRSNT: CPT | Mod: S$GLB,,, | Performed by: PODIATRIST

## 2021-03-01 PROCEDURE — 99999 PR PBB SHADOW E&M-EST. PATIENT-LVL III: CPT | Mod: PBBFAC,,, | Performed by: PODIATRIST

## 2021-03-01 PROCEDURE — 84100 ASSAY OF PHOSPHORUS: CPT

## 2021-03-01 PROCEDURE — 11721 DEBRIDE NAIL 6 OR MORE: CPT | Mod: Q9,S$GLB,, | Performed by: PODIATRIST

## 2021-03-01 PROCEDURE — 85025 COMPLETE CBC W/AUTO DIFF WBC: CPT

## 2021-03-01 PROCEDURE — 83970 ASSAY OF PARATHORMONE: CPT

## 2021-03-01 PROCEDURE — 84165 PROTEIN E-PHORESIS SERUM: CPT | Mod: 26,,, | Performed by: PATHOLOGY

## 2021-03-01 PROCEDURE — 99499 UNLISTED E&M SERVICE: CPT | Mod: S$GLB,,, | Performed by: PODIATRIST

## 2021-03-01 PROCEDURE — 80053 COMPREHEN METABOLIC PANEL: CPT

## 2021-03-01 PROCEDURE — 11721 PR DEBRIDEMENT OF NAILS, 6 OR MORE: ICD-10-PCS | Mod: Q9,S$GLB,, | Performed by: PODIATRIST

## 2021-03-01 PROCEDURE — 36415 COLL VENOUS BLD VENIPUNCTURE: CPT | Mod: PO

## 2021-03-01 PROCEDURE — 84075 ASSAY ALKALINE PHOSPHATASE: CPT

## 2021-03-01 PROCEDURE — 82985 ASSAY OF GLYCATED PROTEIN: CPT

## 2021-03-01 PROCEDURE — 99999 PR PBB SHADOW E&M-EST. PATIENT-LVL III: ICD-10-PCS | Mod: PBBFAC,,, | Performed by: PODIATRIST

## 2021-03-01 PROCEDURE — 84165 PATHOLOGIST INTERPRETATION SPE: ICD-10-PCS | Mod: 26,,, | Performed by: PATHOLOGY

## 2021-03-01 PROCEDURE — 1101F PR PT FALLS ASSESS DOC 0-1 FALLS W/OUT INJ PAST YR: ICD-10-PCS | Mod: CPTII,S$GLB,, | Performed by: PODIATRIST

## 2021-03-01 PROCEDURE — 1101F PT FALLS ASSESS-DOCD LE1/YR: CPT | Mod: CPTII,S$GLB,, | Performed by: PODIATRIST

## 2021-03-01 PROCEDURE — 3288F PR FALLS RISK ASSESSMENT DOCUMENTED: ICD-10-PCS | Mod: CPTII,S$GLB,, | Performed by: PODIATRIST

## 2021-03-01 PROCEDURE — 84165 PROTEIN E-PHORESIS SERUM: CPT

## 2021-03-01 PROCEDURE — 3288F FALL RISK ASSESSMENT DOCD: CPT | Mod: CPTII,S$GLB,, | Performed by: PODIATRIST

## 2021-03-01 PROCEDURE — 84443 ASSAY THYROID STIM HORMONE: CPT

## 2021-03-01 PROCEDURE — 83735 ASSAY OF MAGNESIUM: CPT

## 2021-03-01 PROCEDURE — 99499 NO LOS: ICD-10-PCS | Mod: S$GLB,,, | Performed by: PODIATRIST

## 2021-03-01 PROCEDURE — 82306 VITAMIN D 25 HYDROXY: CPT

## 2021-03-01 PROCEDURE — 1126F PR PAIN SEVERITY QUANTIFIED, NO PAIN PRESENT: ICD-10-PCS | Mod: S$GLB,,, | Performed by: PODIATRIST

## 2021-03-01 PROCEDURE — 83516 IMMUNOASSAY NONANTIBODY: CPT | Mod: 59

## 2021-03-02 DIAGNOSIS — F32.1 MODERATE SINGLE CURRENT EPISODE OF MAJOR DEPRESSIVE DISORDER: ICD-10-CM

## 2021-03-02 LAB
ALBUMIN SERPL ELPH-MCNC: 3.58 G/DL (ref 3.35–5.55)
ALPHA1 GLOB SERPL ELPH-MCNC: 0.35 G/DL (ref 0.17–0.41)
ALPHA2 GLOB SERPL ELPH-MCNC: 0.86 G/DL (ref 0.43–0.99)
B-GLOBULIN SERPL ELPH-MCNC: 0.97 G/DL (ref 0.5–1.1)
GAMMA GLOB SERPL ELPH-MCNC: 1.84 G/DL (ref 0.67–1.58)
PROT SERPL-MCNC: 7.6 G/DL (ref 6–8.4)

## 2021-03-02 RX ORDER — CITALOPRAM 10 MG/1
10 TABLET ORAL DAILY
Qty: 90 TABLET | Refills: 0 | Status: SHIPPED | OUTPATIENT
Start: 2021-03-02 | End: 2021-03-11 | Stop reason: SDUPTHER

## 2021-03-03 ENCOUNTER — TELEPHONE (OUTPATIENT)
Dept: FAMILY MEDICINE | Facility: CLINIC | Age: 76
End: 2021-03-03

## 2021-03-03 ENCOUNTER — IMMUNIZATION (OUTPATIENT)
Dept: PRIMARY CARE CLINIC | Facility: CLINIC | Age: 76
End: 2021-03-03
Payer: MEDICARE

## 2021-03-03 DIAGNOSIS — Z23 NEED FOR VACCINATION: Primary | ICD-10-CM

## 2021-03-03 LAB
ALP BONE SERPL-MCNC: 33.9 UG/L (ref 5.6–29)
GLIADIN PEPTIDE IGA SER-ACNC: 9 UNITS
GLIADIN PEPTIDE IGG SER-ACNC: 2 UNITS
IGA SERPL-MCNC: 487 MG/DL (ref 70–400)
PATHOLOGIST INTERPRETATION SPE: NORMAL
TTG IGA SER-ACNC: 7 UNITS
TTG IGG SER-ACNC: 5 UNITS

## 2021-03-03 PROCEDURE — 91300 PR SARS-COV- 2 COVID-19 VACCINE, NO PRSV, 30MCG/0.3ML, IM: ICD-10-PCS | Mod: S$GLB,,, | Performed by: INTERNAL MEDICINE

## 2021-03-03 PROCEDURE — 0002A PR IMMUNIZ ADMIN, SARS-COV-2 COVID-19 VACC, 30MCG/0.3ML, 2ND DOSE: CPT | Mod: CV19,S$GLB,, | Performed by: INTERNAL MEDICINE

## 2021-03-03 PROCEDURE — 91300 PR SARS-COV- 2 COVID-19 VACCINE, NO PRSV, 30MCG/0.3ML, IM: CPT | Mod: S$GLB,,, | Performed by: INTERNAL MEDICINE

## 2021-03-03 PROCEDURE — 0002A PR IMMUNIZ ADMIN, SARS-COV-2 COVID-19 VACC, 30MCG/0.3ML, 2ND DOSE: ICD-10-PCS | Mod: CV19,S$GLB,, | Performed by: INTERNAL MEDICINE

## 2021-03-03 RX ADMIN — Medication 0.3 ML: at 10:03

## 2021-03-05 LAB — FRUCTOSAMINE SERPL-SCNC: 580 UMOL /L

## 2021-03-08 ENCOUNTER — OFFICE VISIT (OUTPATIENT)
Dept: ENDOCRINOLOGY | Facility: CLINIC | Age: 76
End: 2021-03-08
Payer: MEDICARE

## 2021-03-08 ENCOUNTER — TELEPHONE (OUTPATIENT)
Dept: ENDOCRINOLOGY | Facility: CLINIC | Age: 76
End: 2021-03-08

## 2021-03-08 VITALS
SYSTOLIC BLOOD PRESSURE: 114 MMHG | BODY MASS INDEX: 38.84 KG/M2 | HEART RATE: 85 BPM | WEIGHT: 219.19 LBS | TEMPERATURE: 98 F | HEIGHT: 63 IN | DIASTOLIC BLOOD PRESSURE: 64 MMHG

## 2021-03-08 DIAGNOSIS — E04.9 GOITER: ICD-10-CM

## 2021-03-08 DIAGNOSIS — N18.9 CHRONIC KIDNEY DISEASE-MINERAL AND BONE DISORDER: Primary | ICD-10-CM

## 2021-03-08 DIAGNOSIS — N18.6 ESRD ON HEMODIALYSIS: ICD-10-CM

## 2021-03-08 DIAGNOSIS — E78.5 HYPERLIPIDEMIA LDL GOAL <100: Chronic | ICD-10-CM

## 2021-03-08 DIAGNOSIS — Z99.2 ESRD ON HEMODIALYSIS: ICD-10-CM

## 2021-03-08 DIAGNOSIS — E66.01 SEVERE OBESITY (BMI 35.0-39.9) WITH COMORBIDITY: ICD-10-CM

## 2021-03-08 DIAGNOSIS — N25.81 SECONDARY RENAL HYPERPARATHYROIDISM: ICD-10-CM

## 2021-03-08 DIAGNOSIS — N18.6 TYPE 2 DIABETES MELLITUS WITH ESRD (END-STAGE RENAL DISEASE): ICD-10-CM

## 2021-03-08 DIAGNOSIS — E83.9 CHRONIC KIDNEY DISEASE-MINERAL AND BONE DISORDER: Primary | ICD-10-CM

## 2021-03-08 DIAGNOSIS — E11.22 TYPE 2 DIABETES MELLITUS WITH ESRD (END-STAGE RENAL DISEASE): ICD-10-CM

## 2021-03-08 DIAGNOSIS — M81.0 AGE-RELATED OSTEOPOROSIS WITHOUT CURRENT PATHOLOGICAL FRACTURE: ICD-10-CM

## 2021-03-08 DIAGNOSIS — E03.9 HYPOTHYROIDISM (ACQUIRED): ICD-10-CM

## 2021-03-08 DIAGNOSIS — M89.9 CHRONIC KIDNEY DISEASE-MINERAL AND BONE DISORDER: Primary | ICD-10-CM

## 2021-03-08 PROCEDURE — 3051F PR MOST RECENT HEMOGLOBIN A1C LEVEL 7.0 - < 8.0%: ICD-10-PCS | Mod: CPTII,S$GLB,, | Performed by: HOSPITALIST

## 2021-03-08 PROCEDURE — 1159F MED LIST DOCD IN RCRD: CPT | Mod: S$GLB,,, | Performed by: HOSPITALIST

## 2021-03-08 PROCEDURE — 99214 PR OFFICE/OUTPT VISIT, EST, LEVL IV, 30-39 MIN: ICD-10-PCS | Mod: S$GLB,,, | Performed by: HOSPITALIST

## 2021-03-08 PROCEDURE — 99499 UNLISTED E&M SERVICE: CPT | Mod: S$GLB,,, | Performed by: HOSPITALIST

## 2021-03-08 PROCEDURE — 3051F HG A1C>EQUAL 7.0%<8.0%: CPT | Mod: CPTII,S$GLB,, | Performed by: HOSPITALIST

## 2021-03-08 PROCEDURE — 1159F PR MEDICATION LIST DOCUMENTED IN MEDICAL RECORD: ICD-10-PCS | Mod: S$GLB,,, | Performed by: HOSPITALIST

## 2021-03-08 PROCEDURE — 1126F PR PAIN SEVERITY QUANTIFIED, NO PAIN PRESENT: ICD-10-PCS | Mod: S$GLB,,, | Performed by: HOSPITALIST

## 2021-03-08 PROCEDURE — 99999 PR PBB SHADOW E&M-EST. PATIENT-LVL IV: ICD-10-PCS | Mod: PBBFAC,,, | Performed by: HOSPITALIST

## 2021-03-08 PROCEDURE — 1126F AMNT PAIN NOTED NONE PRSNT: CPT | Mod: S$GLB,,, | Performed by: HOSPITALIST

## 2021-03-08 PROCEDURE — 99999 PR PBB SHADOW E&M-EST. PATIENT-LVL IV: CPT | Mod: PBBFAC,,, | Performed by: HOSPITALIST

## 2021-03-08 PROCEDURE — 99214 OFFICE O/P EST MOD 30 MIN: CPT | Mod: S$GLB,,, | Performed by: HOSPITALIST

## 2021-03-08 PROCEDURE — 99499 RISK ADDL DX/OHS AUDIT: ICD-10-PCS | Mod: S$GLB,,, | Performed by: HOSPITALIST

## 2021-03-08 RX ORDER — INSULIN PUMP SYRINGE, 3 ML
EACH MISCELLANEOUS
Qty: 1 EACH | Refills: 0 | Status: SHIPPED | OUTPATIENT
Start: 2021-03-08

## 2021-03-08 RX ORDER — LANCETS
EACH MISCELLANEOUS
Qty: 100 EACH | Refills: 11 | Status: SHIPPED | OUTPATIENT
Start: 2021-03-08

## 2021-03-08 RX ORDER — LANCING DEVICE
EACH MISCELLANEOUS
Qty: 1 EACH | Refills: 0 | Status: SHIPPED | OUTPATIENT
Start: 2021-03-08

## 2021-03-09 ENCOUNTER — TELEPHONE (OUTPATIENT)
Dept: ENDOCRINOLOGY | Facility: CLINIC | Age: 76
End: 2021-03-09

## 2021-03-09 LAB — HBA1C MFR BLD: 8.5 % (ref 0–5.6)

## 2021-03-09 RX ORDER — ATORVASTATIN CALCIUM 10 MG/1
10 TABLET, FILM COATED ORAL DAILY
Qty: 90 TABLET | Refills: 3 | Status: ON HOLD | OUTPATIENT
Start: 2021-03-09 | End: 2021-06-09 | Stop reason: HOSPADM

## 2021-03-10 ENCOUNTER — TELEPHONE (OUTPATIENT)
Dept: ENDOCRINOLOGY | Facility: CLINIC | Age: 76
End: 2021-03-10

## 2021-03-11 DIAGNOSIS — F32.1 MODERATE SINGLE CURRENT EPISODE OF MAJOR DEPRESSIVE DISORDER: ICD-10-CM

## 2021-03-11 RX ORDER — LANCING DEVICE/LANCETS
KIT MISCELLANEOUS
COMMUNITY
Start: 2021-03-09

## 2021-03-11 RX ORDER — CITALOPRAM 10 MG/1
10 TABLET ORAL DAILY
Qty: 90 TABLET | Refills: 0 | Status: SHIPPED | OUTPATIENT
Start: 2021-03-11 | End: 2021-04-28

## 2021-03-12 DIAGNOSIS — E78.5 HYPERLIPIDEMIA LDL GOAL <100: Chronic | ICD-10-CM

## 2021-03-16 RX ORDER — ATORVASTATIN CALCIUM 10 MG/1
10 TABLET, FILM COATED ORAL DAILY
Qty: 90 TABLET | Refills: 3 | OUTPATIENT
Start: 2021-03-16

## 2021-03-22 ENCOUNTER — PATIENT OUTREACH (OUTPATIENT)
Dept: ADMINISTRATIVE | Facility: HOSPITAL | Age: 76
End: 2021-03-22

## 2021-03-22 DIAGNOSIS — E11.22 TYPE 2 DIABETES MELLITUS WITH ESRD (END-STAGE RENAL DISEASE): Primary | ICD-10-CM

## 2021-03-22 DIAGNOSIS — N18.6 TYPE 2 DIABETES MELLITUS WITH ESRD (END-STAGE RENAL DISEASE): Primary | ICD-10-CM

## 2021-03-24 DIAGNOSIS — E11.22 TYPE 2 DIABETES MELLITUS WITH ESRD (END-STAGE RENAL DISEASE): ICD-10-CM

## 2021-03-24 DIAGNOSIS — N18.6 TYPE 2 DIABETES MELLITUS WITH ESRD (END-STAGE RENAL DISEASE): ICD-10-CM

## 2021-03-30 ENCOUNTER — TELEPHONE (OUTPATIENT)
Dept: FAMILY MEDICINE | Facility: CLINIC | Age: 76
End: 2021-03-30

## 2021-03-31 ENCOUNTER — HOSPITAL ENCOUNTER (OUTPATIENT)
Dept: RADIOLOGY | Facility: HOSPITAL | Age: 76
Discharge: HOME | End: 2021-03-31
Attending: HOSPITALIST
Payer: MEDICARE

## 2021-03-31 DIAGNOSIS — N18.9 CHRONIC KIDNEY DISEASE-MINERAL AND BONE DISORDER: ICD-10-CM

## 2021-03-31 DIAGNOSIS — M89.9 CHRONIC KIDNEY DISEASE-MINERAL AND BONE DISORDER: ICD-10-CM

## 2021-03-31 DIAGNOSIS — E83.9 CHRONIC KIDNEY DISEASE-MINERAL AND BONE DISORDER: ICD-10-CM

## 2021-03-31 DIAGNOSIS — N25.81 SECONDARY RENAL HYPERPARATHYROIDISM: ICD-10-CM

## 2021-03-31 PROCEDURE — 76536 US EXAM OF HEAD AND NECK: CPT | Mod: TC

## 2021-03-31 PROCEDURE — 76536 US EXAM OF HEAD AND NECK: CPT | Mod: 26,,, | Performed by: RADIOLOGY

## 2021-03-31 PROCEDURE — 78071 PARATHYRD PLANAR W/WO SUBTRJ: CPT | Mod: 26,,, | Performed by: RADIOLOGY

## 2021-03-31 PROCEDURE — A9500 TC99M SESTAMIBI: HCPCS

## 2021-03-31 PROCEDURE — 76536 US SOFT TISSUE HEAD NECK THYROID: ICD-10-PCS | Mod: 26,,, | Performed by: RADIOLOGY

## 2021-03-31 PROCEDURE — 78071 NM PARATHYROID SCAN WITH SPECT ROUTINE: ICD-10-PCS | Mod: 26,,, | Performed by: RADIOLOGY

## 2021-03-31 PROCEDURE — 78071 PARATHYRD PLANAR W/WO SUBTRJ: CPT | Mod: TC

## 2021-04-15 ENCOUNTER — PATIENT OUTREACH (OUTPATIENT)
Dept: ADMINISTRATIVE | Facility: HOSPITAL | Age: 76
End: 2021-04-15

## 2021-04-22 ENCOUNTER — TELEPHONE (OUTPATIENT)
Dept: ENDOSCOPY | Facility: HOSPITAL | Age: 76
End: 2021-04-22

## 2021-04-22 ENCOUNTER — PATIENT OUTREACH (OUTPATIENT)
Dept: ADMINISTRATIVE | Facility: OTHER | Age: 76
End: 2021-04-22

## 2021-04-22 ENCOUNTER — TELEPHONE (OUTPATIENT)
Dept: ENDOCRINOLOGY | Facility: CLINIC | Age: 76
End: 2021-04-22

## 2021-04-26 ENCOUNTER — OFFICE VISIT (OUTPATIENT)
Dept: VASCULAR SURGERY | Facility: CLINIC | Age: 76
End: 2021-04-26
Payer: MEDICARE

## 2021-04-26 ENCOUNTER — HOSPITAL ENCOUNTER (OUTPATIENT)
Dept: CARDIOLOGY | Facility: HOSPITAL | Age: 76
Discharge: HOME | End: 2021-04-26
Attending: SURGERY
Payer: MEDICARE

## 2021-04-26 ENCOUNTER — TELEPHONE (OUTPATIENT)
Dept: VASCULAR SURGERY | Facility: CLINIC | Age: 76
End: 2021-04-26

## 2021-04-26 VITALS
SYSTOLIC BLOOD PRESSURE: 132 MMHG | WEIGHT: 221.69 LBS | BODY MASS INDEX: 40.8 KG/M2 | HEIGHT: 62 IN | DIASTOLIC BLOOD PRESSURE: 82 MMHG

## 2021-04-26 DIAGNOSIS — T82.858D ARTERIOVENOUS FISTULA STENOSIS, SUBSEQUENT ENCOUNTER: Primary | ICD-10-CM

## 2021-04-26 DIAGNOSIS — T82.858D ARTERIOVENOUS FISTULA STENOSIS, SUBSEQUENT ENCOUNTER: ICD-10-CM

## 2021-04-26 PROCEDURE — 1126F PR PAIN SEVERITY QUANTIFIED, NO PAIN PRESENT: ICD-10-PCS | Mod: S$GLB,,, | Performed by: SURGERY

## 2021-04-26 PROCEDURE — 99214 PR OFFICE/OUTPT VISIT, EST, LEVL IV, 30-39 MIN: ICD-10-PCS | Mod: S$GLB,,, | Performed by: SURGERY

## 2021-04-26 PROCEDURE — 1101F PT FALLS ASSESS-DOCD LE1/YR: CPT | Mod: CPTII,S$GLB,, | Performed by: SURGERY

## 2021-04-26 PROCEDURE — 1101F PR PT FALLS ASSESS DOC 0-1 FALLS W/OUT INJ PAST YR: ICD-10-PCS | Mod: CPTII,S$GLB,, | Performed by: SURGERY

## 2021-04-26 PROCEDURE — 1126F AMNT PAIN NOTED NONE PRSNT: CPT | Mod: S$GLB,,, | Performed by: SURGERY

## 2021-04-26 PROCEDURE — 93990 DOPPLER FLOW TESTING: CPT | Mod: TC

## 2021-04-26 PROCEDURE — 3288F FALL RISK ASSESSMENT DOCD: CPT | Mod: CPTII,S$GLB,, | Performed by: SURGERY

## 2021-04-26 PROCEDURE — 3288F PR FALLS RISK ASSESSMENT DOCUMENTED: ICD-10-PCS | Mod: CPTII,S$GLB,, | Performed by: SURGERY

## 2021-04-26 PROCEDURE — 99999 PR PBB SHADOW E&M-EST. PATIENT-LVL IV: ICD-10-PCS | Mod: PBBFAC,,, | Performed by: SURGERY

## 2021-04-26 PROCEDURE — 99214 OFFICE O/P EST MOD 30 MIN: CPT | Mod: S$GLB,,, | Performed by: SURGERY

## 2021-04-26 PROCEDURE — 93990 CV US HEMODIALYSIS ACCESS (CUPID ONLY): ICD-10-PCS | Mod: 26,,, | Performed by: SURGERY

## 2021-04-26 PROCEDURE — 1159F PR MEDICATION LIST DOCUMENTED IN MEDICAL RECORD: ICD-10-PCS | Mod: S$GLB,,, | Performed by: SURGERY

## 2021-04-26 PROCEDURE — 93990 DOPPLER FLOW TESTING: CPT | Mod: 26,,, | Performed by: SURGERY

## 2021-04-26 PROCEDURE — 1159F MED LIST DOCD IN RCRD: CPT | Mod: S$GLB,,, | Performed by: SURGERY

## 2021-04-26 PROCEDURE — 99999 PR PBB SHADOW E&M-EST. PATIENT-LVL IV: CPT | Mod: PBBFAC,,, | Performed by: SURGERY

## 2021-04-28 ENCOUNTER — HOSPITAL ENCOUNTER (OUTPATIENT)
Dept: RADIOLOGY | Facility: HOSPITAL | Age: 76
Discharge: HOME | End: 2021-04-28
Attending: INTERNAL MEDICINE
Payer: MEDICARE

## 2021-04-28 ENCOUNTER — OFFICE VISIT (OUTPATIENT)
Dept: FAMILY MEDICINE | Facility: CLINIC | Age: 76
End: 2021-04-28
Payer: MEDICARE

## 2021-04-28 ENCOUNTER — TELEPHONE (OUTPATIENT)
Dept: FAMILY MEDICINE | Facility: CLINIC | Age: 76
End: 2021-04-28

## 2021-04-28 VITALS
DIASTOLIC BLOOD PRESSURE: 60 MMHG | OXYGEN SATURATION: 98 % | SYSTOLIC BLOOD PRESSURE: 108 MMHG | HEART RATE: 87 BPM | TEMPERATURE: 98 F

## 2021-04-28 DIAGNOSIS — N18.6 TYPE 2 DIABETES MELLITUS WITH ESRD (END-STAGE RENAL DISEASE): Primary | ICD-10-CM

## 2021-04-28 DIAGNOSIS — E03.9 HYPOTHYROIDISM (ACQUIRED): Chronic | ICD-10-CM

## 2021-04-28 DIAGNOSIS — E55.9 VITAMIN D DEFICIENCY DISEASE: ICD-10-CM

## 2021-04-28 DIAGNOSIS — N18.6 ESRD ON HEMODIALYSIS: ICD-10-CM

## 2021-04-28 DIAGNOSIS — E66.01 SEVERE OBESITY (BMI 35.0-39.9) WITH COMORBIDITY: ICD-10-CM

## 2021-04-28 DIAGNOSIS — M79.604 RIGHT LEG PAIN: ICD-10-CM

## 2021-04-28 DIAGNOSIS — Z86.73 HX-TIA (TRANSIENT ISCHEMIC ATTACK): ICD-10-CM

## 2021-04-28 DIAGNOSIS — I10 ESSENTIAL HYPERTENSION: Chronic | ICD-10-CM

## 2021-04-28 DIAGNOSIS — R53.81 DEBILITY: ICD-10-CM

## 2021-04-28 DIAGNOSIS — I27.20 PULMONARY HYPERTENSION: ICD-10-CM

## 2021-04-28 DIAGNOSIS — I50.32 CHRONIC DIASTOLIC HEART FAILURE: Chronic | ICD-10-CM

## 2021-04-28 DIAGNOSIS — M79.672 LEFT FOOT PAIN: ICD-10-CM

## 2021-04-28 DIAGNOSIS — Z99.2 ESRD ON HEMODIALYSIS: ICD-10-CM

## 2021-04-28 DIAGNOSIS — M25.572 ACUTE LEFT ANKLE PAIN: ICD-10-CM

## 2021-04-28 DIAGNOSIS — D57.3 SICKLE CELL TRAIT: ICD-10-CM

## 2021-04-28 DIAGNOSIS — E11.3553 STABLE PROLIFERATIVE DIABETIC RETINOPATHY OF BOTH EYES ASSOCIATED WITH TYPE 2 DIABETES MELLITUS: ICD-10-CM
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
## 2021-04-28 DIAGNOSIS — F32.0 MILD MAJOR DEPRESSION: ICD-10-CM

## 2021-04-28 DIAGNOSIS — M81.0 AGE-RELATED OSTEOPOROSIS WITHOUT CURRENT PATHOLOGICAL FRACTURE: ICD-10-CM

## 2021-04-28 DIAGNOSIS — Z71.89 ADVANCED DIRECTIVES, COUNSELING/DISCUSSION: ICD-10-CM

## 2021-04-28 DIAGNOSIS — G47.33 OSA ON CPAP: Chronic | ICD-10-CM

## 2021-04-28 DIAGNOSIS — E78.5 HYPERLIPIDEMIA LDL GOAL <100: Chronic | ICD-10-CM

## 2021-04-28 DIAGNOSIS — I77.1 TORTUOUS AORTA: ICD-10-CM

## 2021-04-28 DIAGNOSIS — J96.10 CHRONIC RESPIRATORY FAILURE, UNSPECIFIED WHETHER WITH HYPOXIA OR HYPERCAPNIA: ICD-10-CM

## 2021-04-28 DIAGNOSIS — E11.22 TYPE 2 DIABETES MELLITUS WITH ESRD (END-STAGE RENAL DISEASE): Primary | ICD-10-CM

## 2021-04-28 DIAGNOSIS — D63.8 ANEMIA OF CHRONIC DISEASE: Chronic | ICD-10-CM

## 2021-04-28 PROCEDURE — 1125F PR PAIN SEVERITY QUANTIFIED, PAIN PRESENT: ICD-10-PCS | Mod: S$GLB,,, | Performed by: INTERNAL MEDICINE

## 2021-04-28 PROCEDURE — 3078F PR MOST RECENT DIASTOLIC BLOOD PRESSURE < 80 MM HG: ICD-10-PCS | Mod: CPTII,S$GLB,, | Performed by: INTERNAL MEDICINE

## 2021-04-28 PROCEDURE — 73590 X-RAY EXAM OF LOWER LEG: CPT | Mod: TC,FY,PO,RT

## 2021-04-28 PROCEDURE — 99499 RISK ADDL DX/OHS AUDIT: ICD-10-PCS | Mod: S$GLB,,, | Performed by: INTERNAL MEDICINE

## 2021-04-28 PROCEDURE — 3074F PR MOST RECENT SYSTOLIC BLOOD PRESSURE < 130 MM HG: ICD-10-PCS | Mod: CPTII,S$GLB,, | Performed by: INTERNAL MEDICINE

## 2021-04-28 PROCEDURE — 99215 OFFICE O/P EST HI 40 MIN: CPT | Mod: S$GLB,,, | Performed by: INTERNAL MEDICINE

## 2021-04-28 PROCEDURE — 73610 XR ANKLE COMPLETE 3 VIEW LEFT: ICD-10-PCS | Mod: 26,LT,, | Performed by: RADIOLOGY

## 2021-04-28 PROCEDURE — 73610 X-RAY EXAM OF ANKLE: CPT | Mod: TC,FY,PO,LT

## 2021-04-28 PROCEDURE — 99215 PR OFFICE/OUTPT VISIT, EST, LEVL V, 40-54 MIN: ICD-10-PCS | Mod: S$GLB,,, | Performed by: INTERNAL MEDICINE

## 2021-04-28 PROCEDURE — 1159F PR MEDICATION LIST DOCUMENTED IN MEDICAL RECORD: ICD-10-PCS | Mod: S$GLB,,, | Performed by: INTERNAL MEDICINE

## 2021-04-28 PROCEDURE — 1125F AMNT PAIN NOTED PAIN PRSNT: CPT | Mod: S$GLB,,, | Performed by: INTERNAL MEDICINE

## 2021-04-28 PROCEDURE — 73630 X-RAY EXAM OF FOOT: CPT | Mod: TC,FY,PO,LT

## 2021-04-28 PROCEDURE — 73590 XR TIBIA FIBULA 2 VIEW RIGHT: ICD-10-PCS | Mod: 26,RT,, | Performed by: RADIOLOGY

## 2021-04-28 PROCEDURE — 1159F MED LIST DOCD IN RCRD: CPT | Mod: S$GLB,,, | Performed by: INTERNAL MEDICINE

## 2021-04-28 PROCEDURE — 73630 XR FOOT COMPLETE 3 VIEW LEFT: ICD-10-PCS | Mod: 26,LT,, | Performed by: RADIOLOGY

## 2021-04-28 PROCEDURE — 3074F SYST BP LT 130 MM HG: CPT | Mod: CPTII,S$GLB,, | Performed by: INTERNAL MEDICINE

## 2021-04-28 PROCEDURE — 73590 X-RAY EXAM OF LOWER LEG: CPT | Mod: 26,RT,, | Performed by: RADIOLOGY

## 2021-04-28 PROCEDURE — 99499 UNLISTED E&M SERVICE: CPT | Mod: S$GLB,,, | Performed by: INTERNAL MEDICINE

## 2021-04-28 PROCEDURE — 3052F PR MOST RECENT HEMOGLOBIN A1C LEVEL 8.0 - < 9.0%: ICD-10-PCS | Mod: CPTII,S$GLB,, | Performed by: INTERNAL MEDICINE

## 2021-04-28 PROCEDURE — 1100F PTFALLS ASSESS-DOCD GE2>/YR: CPT | Mod: CPTII,S$GLB,, | Performed by: INTERNAL MEDICINE

## 2021-04-28 PROCEDURE — 1100F PR PT FALLS ASSESS DOC 2+ FALLS/FALL W/INJURY/YR: ICD-10-PCS | Mod: CPTII,S$GLB,, | Performed by: INTERNAL MEDICINE

## 2021-04-28 PROCEDURE — 99999 PR PBB SHADOW E&M-EST. PATIENT-LVL IV: ICD-10-PCS | Mod: PBBFAC,,, | Performed by: INTERNAL MEDICINE

## 2021-04-28 PROCEDURE — 3052F HG A1C>EQUAL 8.0%<EQUAL 9.0%: CPT | Mod: CPTII,S$GLB,, | Performed by: INTERNAL MEDICINE

## 2021-04-28 PROCEDURE — 73610 X-RAY EXAM OF ANKLE: CPT | Mod: 26,LT,, | Performed by: RADIOLOGY

## 2021-04-28 PROCEDURE — 3288F PR FALLS RISK ASSESSMENT DOCUMENTED: ICD-10-PCS | Mod: CPTII,S$GLB,, | Performed by: INTERNAL MEDICINE

## 2021-04-28 PROCEDURE — 73630 X-RAY EXAM OF FOOT: CPT | Mod: 26,LT,, | Performed by: RADIOLOGY

## 2021-04-28 PROCEDURE — 3078F DIAST BP <80 MM HG: CPT | Mod: CPTII,S$GLB,, | Performed by: INTERNAL MEDICINE

## 2021-04-28 PROCEDURE — 99999 PR PBB SHADOW E&M-EST. PATIENT-LVL IV: CPT | Mod: PBBFAC,,, | Performed by: INTERNAL MEDICINE

## 2021-04-28 PROCEDURE — 3288F FALL RISK ASSESSMENT DOCD: CPT | Mod: CPTII,S$GLB,, | Performed by: INTERNAL MEDICINE

## 2021-04-30 LAB
HD ANASTAMOSIS LOCATION: NORMAL
HD FISTULA OUTFLOW VEIN VESSEL: NORMAL
HD INFLOW ARTERY VESSEL: NORMAL
RIGHT DIS GRAFT PSV: 77 CM/ SEC
RIGHT INFLOW PSV: 237 CM/S
RIGHT MID GRAFT PSV: 182 CM/S
RIGHT OUTFLOW VEIN PSV: 104 CM/ SEC
RIGHT PROX ANA PSV: 410 CM/S
RIGHT PROX GRAFT PSV: 456 CM/S
RIGHT VOLUME FLOW DIA: 1.3 CM
RIGHT VOLUME FLOW PSV: 5069 ML/MIN

## 2021-05-07 ENCOUNTER — OFFICE VISIT (OUTPATIENT)
Dept: OPTOMETRY | Facility: CLINIC | Age: 76
End: 2021-05-07
Payer: MEDICARE

## 2021-05-07 ENCOUNTER — LAB VISIT (OUTPATIENT)
Dept: LAB | Facility: HOSPITAL | Age: 76
End: 2021-05-07
Attending: HOSPITALIST
Payer: MEDICARE

## 2021-05-07 DIAGNOSIS — M89.9 CHRONIC KIDNEY DISEASE-MINERAL AND BONE DISORDER: ICD-10-CM

## 2021-05-07 DIAGNOSIS — E11.9 DIABETIC EYE EXAM: Primary | ICD-10-CM

## 2021-05-07 DIAGNOSIS — Z01.00 DIABETIC EYE EXAM: Primary | ICD-10-CM

## 2021-05-07 DIAGNOSIS — N18.9 CHRONIC KIDNEY DISEASE-MINERAL AND BONE DISORDER: ICD-10-CM

## 2021-05-07 DIAGNOSIS — N18.6 TYPE 2 DIABETES MELLITUS WITH ESRD (END-STAGE RENAL DISEASE): ICD-10-CM

## 2021-05-07 DIAGNOSIS — E11.3553 STABLE PROLIFERATIVE DIABETIC RETINOPATHY OF BOTH EYES ASSOCIATED WITH TYPE 2 DIABETES MELLITUS: ICD-10-CM

## 2021-05-07 DIAGNOSIS — Z96.1 PSEUDOPHAKIA: ICD-10-CM

## 2021-05-07 DIAGNOSIS — E11.22 TYPE 2 DIABETES MELLITUS WITH ESRD (END-STAGE RENAL DISEASE): ICD-10-CM

## 2021-05-07 DIAGNOSIS — E83.9 CHRONIC KIDNEY DISEASE-MINERAL AND BONE DISORDER: ICD-10-CM

## 2021-05-07 LAB
ALBUMIN SERPL BCP-MCNC: 3.2 G/DL (ref 3.5–5.2)
ALP SERPL-CCNC: 193 U/L (ref 55–135)
ALT SERPL W/O P-5'-P-CCNC: 10 U/L (ref 10–44)
ANION GAP SERPL CALC-SCNC: 13 MMOL/L (ref 8–16)
AST SERPL-CCNC: 11 U/L (ref 10–40)
BILIRUB SERPL-MCNC: 0.4 MG/DL (ref 0.1–1)
BUN SERPL-MCNC: 41 MG/DL (ref 8–23)
CALCIUM SERPL-MCNC: 9.1 MG/DL (ref 8.7–10.5)
CHLORIDE SERPL-SCNC: 95 MMOL/L (ref 95–110)
CO2 SERPL-SCNC: 29 MMOL/L (ref 23–29)
CREAT SERPL-MCNC: 6.6 MG/DL (ref 0.5–1.4)
EST. GFR  (AFRICAN AMERICAN): 6.5 ML/MIN/1.73 M^2
EST. GFR  (NON AFRICAN AMERICAN): 5.6 ML/MIN/1.73 M^2
ESTIMATED AVG GLUCOSE: 206 MG/DL (ref 68–131)
GLUCOSE SERPL-MCNC: 208 MG/DL (ref 70–110)
HBA1C MFR BLD: 8.8 % (ref 4–5.6)
POTASSIUM SERPL-SCNC: 4.7 MMOL/L (ref 3.5–5.1)
PROT SERPL-MCNC: 8.2 G/DL (ref 6–8.4)
PTH-INTACT SERPL-MCNC: 675 PG/ML (ref 9–77)
SODIUM SERPL-SCNC: 137 MMOL/L (ref 136–145)

## 2021-05-07 PROCEDURE — 92014 PR EYE EXAM, EST PATIENT,COMPREHESV: ICD-10-PCS | Mod: S$GLB,,, | Performed by: OPTOMETRIST

## 2021-05-07 PROCEDURE — 3288F FALL RISK ASSESSMENT DOCD: CPT | Mod: CPTII,S$GLB,, | Performed by: OPTOMETRIST

## 2021-05-07 PROCEDURE — 3288F PR FALLS RISK ASSESSMENT DOCUMENTED: ICD-10-PCS | Mod: CPTII,S$GLB,, | Performed by: OPTOMETRIST

## 2021-05-07 PROCEDURE — 1101F PT FALLS ASSESS-DOCD LE1/YR: CPT | Mod: CPTII,S$GLB,, | Performed by: OPTOMETRIST

## 2021-05-07 PROCEDURE — 92014 COMPRE OPH EXAM EST PT 1/>: CPT | Mod: S$GLB,,, | Performed by: OPTOMETRIST

## 2021-05-07 PROCEDURE — 80053 COMPREHEN METABOLIC PANEL: CPT | Performed by: HOSPITALIST

## 2021-05-07 PROCEDURE — 99999 PR PBB SHADOW E&M-EST. PATIENT-LVL I: CPT | Mod: PBBFAC,,, | Performed by: OPTOMETRIST

## 2021-05-07 PROCEDURE — 3052F HG A1C>EQUAL 8.0%<EQUAL 9.0%: CPT | Mod: CPTII,S$GLB,, | Performed by: OPTOMETRIST

## 2021-05-07 PROCEDURE — 36415 COLL VENOUS BLD VENIPUNCTURE: CPT | Mod: PO | Performed by: HOSPITALIST

## 2021-05-07 PROCEDURE — 1101F PR PT FALLS ASSESS DOC 0-1 FALLS W/OUT INJ PAST YR: ICD-10-PCS | Mod: CPTII,S$GLB,, | Performed by: OPTOMETRIST

## 2021-05-07 PROCEDURE — 3052F PR MOST RECENT HEMOGLOBIN A1C LEVEL 8.0 - < 9.0%: ICD-10-PCS | Mod: CPTII,S$GLB,, | Performed by: OPTOMETRIST

## 2021-05-07 PROCEDURE — 99999 PR PBB SHADOW E&M-EST. PATIENT-LVL I: ICD-10-PCS | Mod: PBBFAC,,, | Performed by: OPTOMETRIST

## 2021-05-07 PROCEDURE — 83970 ASSAY OF PARATHORMONE: CPT | Performed by: HOSPITALIST

## 2021-05-07 PROCEDURE — 83036 HEMOGLOBIN GLYCOSYLATED A1C: CPT | Performed by: HOSPITALIST

## 2021-05-12 ENCOUNTER — OFFICE VISIT (OUTPATIENT)
Dept: ENDOCRINOLOGY | Facility: CLINIC | Age: 76
End: 2021-05-12
Payer: MEDICARE

## 2021-05-12 VITALS
TEMPERATURE: 99 F | HEART RATE: 91 BPM | HEIGHT: 62 IN | SYSTOLIC BLOOD PRESSURE: 80 MMHG | BODY MASS INDEX: 38.94 KG/M2 | WEIGHT: 211.63 LBS | DIASTOLIC BLOOD PRESSURE: 52 MMHG

## 2021-05-12 DIAGNOSIS — N18.9 CHRONIC KIDNEY DISEASE-MINERAL AND BONE DISORDER: ICD-10-CM

## 2021-05-12 DIAGNOSIS — M89.9 CHRONIC KIDNEY DISEASE-MINERAL AND BONE DISORDER: ICD-10-CM

## 2021-05-12 DIAGNOSIS — E03.9 HYPOTHYROIDISM (ACQUIRED): Chronic | ICD-10-CM

## 2021-05-12 DIAGNOSIS — N18.6 ESRD ON HEMODIALYSIS: ICD-10-CM

## 2021-05-12 DIAGNOSIS — Z99.2 ESRD ON HEMODIALYSIS: ICD-10-CM

## 2021-05-12 DIAGNOSIS — E11.65 UNCONTROLLED TYPE 2 DIABETES MELLITUS WITH HYPERGLYCEMIA: Primary | ICD-10-CM

## 2021-05-12 DIAGNOSIS — E83.9 CHRONIC KIDNEY DISEASE-MINERAL AND BONE DISORDER: ICD-10-CM

## 2021-05-12 DIAGNOSIS — E66.01 CLASS 2 SEVERE OBESITY DUE TO EXCESS CALORIES WITH SERIOUS COMORBIDITY AND BODY MASS INDEX (BMI) OF 38.0 TO 38.9 IN ADULT: ICD-10-CM

## 2021-05-12 DIAGNOSIS — M81.0 AGE-RELATED OSTEOPOROSIS WITHOUT CURRENT PATHOLOGICAL FRACTURE: ICD-10-CM

## 2021-05-12 PROCEDURE — 99214 PR OFFICE/OUTPT VISIT, EST, LEVL IV, 30-39 MIN: ICD-10-PCS | Mod: S$GLB,,, | Performed by: HOSPITALIST

## 2021-05-12 PROCEDURE — 1126F AMNT PAIN NOTED NONE PRSNT: CPT | Mod: S$GLB,,, | Performed by: HOSPITALIST

## 2021-05-12 PROCEDURE — 99214 OFFICE O/P EST MOD 30 MIN: CPT | Mod: S$GLB,,, | Performed by: HOSPITALIST

## 2021-05-12 PROCEDURE — 1159F MED LIST DOCD IN RCRD: CPT | Mod: S$GLB,,, | Performed by: HOSPITALIST

## 2021-05-12 PROCEDURE — 3052F HG A1C>EQUAL 8.0%<EQUAL 9.0%: CPT | Mod: CPTII,S$GLB,, | Performed by: HOSPITALIST

## 2021-05-12 PROCEDURE — 99999 PR PBB SHADOW E&M-EST. PATIENT-LVL IV: ICD-10-PCS | Mod: PBBFAC,,, | Performed by: HOSPITALIST

## 2021-05-12 PROCEDURE — 3052F PR MOST RECENT HEMOGLOBIN A1C LEVEL 8.0 - < 9.0%: ICD-10-PCS | Mod: CPTII,S$GLB,, | Performed by: HOSPITALIST

## 2021-05-12 PROCEDURE — 1126F PR PAIN SEVERITY QUANTIFIED, NO PAIN PRESENT: ICD-10-PCS | Mod: S$GLB,,, | Performed by: HOSPITALIST

## 2021-05-12 PROCEDURE — 99999 PR PBB SHADOW E&M-EST. PATIENT-LVL IV: CPT | Mod: PBBFAC,,, | Performed by: HOSPITALIST

## 2021-05-12 PROCEDURE — 99499 RISK ADDL DX/OHS AUDIT: ICD-10-PCS | Mod: S$GLB,,, | Performed by: HOSPITALIST

## 2021-05-12 PROCEDURE — 99499 UNLISTED E&M SERVICE: CPT | Mod: S$GLB,,, | Performed by: HOSPITALIST

## 2021-05-12 PROCEDURE — 1159F PR MEDICATION LIST DOCUMENTED IN MEDICAL RECORD: ICD-10-PCS | Mod: S$GLB,,, | Performed by: HOSPITALIST

## 2021-05-12 RX ORDER — SEVELAMER CARBONATE 800 MG/1
TABLET, FILM COATED ORAL
COMMUNITY
Start: 2021-03-18 | End: 2021-11-01 | Stop reason: SDUPTHER

## 2021-05-12 RX ORDER — LEVOTHYROXINE SODIUM 50 UG/1
50 TABLET ORAL
Qty: 90 TABLET | Refills: 3 | Status: SHIPPED | OUTPATIENT
Start: 2021-05-12 | End: 2022-03-16

## 2021-05-12 RX ORDER — DULAGLUTIDE 0.75 MG/.5ML
0.75 INJECTION, SOLUTION SUBCUTANEOUS
Qty: 4 PEN | Refills: 11 | Status: SHIPPED | OUTPATIENT
Start: 2021-05-12 | End: 2021-06-11

## 2021-06-01 ENCOUNTER — TELEPHONE (OUTPATIENT)
Dept: FAMILY MEDICINE | Facility: CLINIC | Age: 76
End: 2021-06-01

## 2021-06-01 DIAGNOSIS — Z86.010 HISTORY OF COLON POLYPS: Primary | ICD-10-CM

## 2021-06-06 ENCOUNTER — HOSPITAL ENCOUNTER (INPATIENT)
Facility: HOSPITAL | Age: 76
LOS: 3 days | Discharge: HOME HEALTH | DRG: 064 | End: 2021-06-09
Attending: EMERGENCY MEDICINE | Admitting: EMERGENCY MEDICINE
Payer: MEDICARE

## 2021-06-06 DIAGNOSIS — R11.2 NON-INTRACTABLE VOMITING WITH NAUSEA, UNSPECIFIED VOMITING TYPE: ICD-10-CM

## 2021-06-06 DIAGNOSIS — R42 DIZZINESS: ICD-10-CM

## 2021-06-06 DIAGNOSIS — I63.531 ACUTE ISCHEMIC RIGHT PCA STROKE: Primary | ICD-10-CM

## 2021-06-06 DIAGNOSIS — I63.9 STROKE: ICD-10-CM

## 2021-06-06 DIAGNOSIS — G47.33 OSA ON CPAP: ICD-10-CM

## 2021-06-06 DIAGNOSIS — Z86.73 HX-TIA (TRANSIENT ISCHEMIC ATTACK): ICD-10-CM

## 2021-06-06 PROBLEM — E87.29 HIGH ANION GAP METABOLIC ACIDOSIS: Status: ACTIVE | Noted: 2021-06-06

## 2021-06-06 PROBLEM — R09.02 HYPOXIA: Status: ACTIVE | Noted: 2021-06-06

## 2021-06-06 LAB
ALBUMIN SERPL BCP-MCNC: 3 G/DL (ref 3.5–5.2)
ALP SERPL-CCNC: 137 U/L (ref 55–135)
ALT SERPL W/O P-5'-P-CCNC: 11 U/L (ref 10–44)
ANION GAP SERPL CALC-SCNC: 14 MMOL/L (ref 8–16)
ANION GAP SERPL CALC-SCNC: 17 MMOL/L (ref 8–16)
AST SERPL-CCNC: 12 U/L (ref 10–40)
BACTERIA #/AREA URNS HPF: ABNORMAL /HPF
BASOPHILS # BLD AUTO: 0.04 K/UL (ref 0–0.2)
BASOPHILS NFR BLD: 0.5 % (ref 0–1.9)
BILIRUB SERPL-MCNC: 0.4 MG/DL (ref 0.1–1)
BILIRUB UR QL STRIP: ABNORMAL
BNP SERPL-MCNC: 115 PG/ML (ref 0–99)
BUN SERPL-MCNC: 38 MG/DL (ref 6–30)
BUN SERPL-MCNC: 40 MG/DL (ref 8–23)
CALCIUM SERPL-MCNC: 8.9 MG/DL (ref 8.7–10.5)
CHLORIDE SERPL-SCNC: 99 MMOL/L (ref 95–110)
CHLORIDE SERPL-SCNC: 99 MMOL/L (ref 95–110)
CHOLEST SERPL-MCNC: 143 MG/DL (ref 120–199)
CHOLEST/HDLC SERPL: 3 {RATIO} (ref 2–5)
CLARITY UR: ABNORMAL
CO2 SERPL-SCNC: 21 MMOL/L (ref 23–29)
COLOR UR: YELLOW
CREAT SERPL-MCNC: 6.7 MG/DL (ref 0.5–1.4)
CREAT SERPL-MCNC: 7.3 MG/DL (ref 0.5–1.4)
CTP QC/QA: YES
DIFFERENTIAL METHOD: ABNORMAL
EOSINOPHIL # BLD AUTO: 0 K/UL (ref 0–0.5)
EOSINOPHIL NFR BLD: 0.1 % (ref 0–8)
ERYTHROCYTE [DISTWIDTH] IN BLOOD BY AUTOMATED COUNT: 14.4 % (ref 11.5–14.5)
EST. GFR  (AFRICAN AMERICAN): 6 ML/MIN/1.73 M^2
EST. GFR  (NON AFRICAN AMERICAN): 6 ML/MIN/1.73 M^2
GLUCOSE SERPL-MCNC: 203 MG/DL (ref 70–110)
GLUCOSE SERPL-MCNC: 232 MG/DL (ref 70–110)
GLUCOSE SERPL-MCNC: 237 MG/DL (ref 70–110)
GLUCOSE UR QL STRIP: NEGATIVE
GRAN CASTS #/AREA URNS LPF: 3 /LPF
HCT VFR BLD AUTO: 37.1 % (ref 37–48.5)
HCT VFR BLD CALC: 37 %PCV (ref 36–54)
HDLC SERPL-MCNC: 48 MG/DL (ref 40–75)
HDLC SERPL: 33.6 % (ref 20–50)
HGB BLD-MCNC: 12.2 G/DL (ref 12–16)
HGB UR QL STRIP: NEGATIVE
HYALINE CASTS #/AREA URNS LPF: 0 /LPF
IMM GRANULOCYTES # BLD AUTO: 0.06 K/UL (ref 0–0.04)
IMM GRANULOCYTES NFR BLD AUTO: 0.7 % (ref 0–0.5)
INR PPP: 1 (ref 0.8–1.2)
KETONES UR QL STRIP: ABNORMAL
LACTATE SERPL-SCNC: 1.5 MMOL/L (ref 0.5–2.2)
LDLC SERPL CALC-MCNC: 81.2 MG/DL (ref 63–159)
LEUKOCYTE ESTERASE UR QL STRIP: NEGATIVE
LYMPHOCYTES # BLD AUTO: 0.8 K/UL (ref 1–4.8)
LYMPHOCYTES NFR BLD: 9.8 % (ref 18–48)
MCH RBC QN AUTO: 29 PG (ref 27–31)
MCHC RBC AUTO-ENTMCNC: 32.9 G/DL (ref 32–36)
MCV RBC AUTO: 88 FL (ref 82–98)
MICROSCOPIC COMMENT: ABNORMAL
MONOCYTES # BLD AUTO: 0.6 K/UL (ref 0.3–1)
MONOCYTES NFR BLD: 7.4 % (ref 4–15)
NEUTROPHILS # BLD AUTO: 6.8 K/UL (ref 1.8–7.7)
NEUTROPHILS NFR BLD: 81.5 % (ref 38–73)
NITRITE UR QL STRIP: NEGATIVE
NONHDLC SERPL-MCNC: 95 MG/DL
NRBC BLD-RTO: 0 /100 WBC
PH UR STRIP: 5 [PH] (ref 5–8)
PLATELET # BLD AUTO: 187 K/UL (ref 150–450)
PMV BLD AUTO: 10.9 FL (ref 9.2–12.9)
POC IONIZED CALCIUM: 1.11 MMOL/L (ref 1.06–1.42)
POC PTINR: 1.1 (ref 0.9–1.2)
POC PTWBT: 12.7 SEC (ref 9.7–14.3)
POC TCO2 (MEASURED): 29 MMOL/L (ref 23–29)
POCT GLUCOSE: 164 MG/DL (ref 70–110)
POCT GLUCOSE: 237 MG/DL (ref 70–110)
POTASSIUM BLD-SCNC: 4.9 MMOL/L (ref 3.5–5.1)
POTASSIUM SERPL-SCNC: 5 MMOL/L (ref 3.5–5.1)
PROCALCITONIN SERPL IA-MCNC: 0.4 NG/ML
PROT SERPL-MCNC: 7.5 G/DL (ref 6–8.4)
PROT UR QL STRIP: ABNORMAL
PROTHROMBIN TIME: 10.3 SEC (ref 9–12.5)
RBC # BLD AUTO: 4.21 M/UL (ref 4–5.4)
RBC #/AREA URNS HPF: 0 /HPF (ref 0–4)
SAMPLE: ABNORMAL
SAMPLE: NORMAL
SARS-COV-2 RDRP RESP QL NAA+PROBE: NEGATIVE
SODIUM BLD-SCNC: 136 MMOL/L (ref 136–145)
SODIUM SERPL-SCNC: 137 MMOL/L (ref 136–145)
SP GR UR STRIP: 1.02 (ref 1–1.03)
SQUAMOUS #/AREA URNS HPF: 8 /HPF
TRIGL SERPL-MCNC: 69 MG/DL (ref 30–150)
TROPONIN I SERPL DL<=0.01 NG/ML-MCNC: 0.03 NG/ML (ref 0–0.03)
TROPONIN I SERPL DL<=0.01 NG/ML-MCNC: 0.04 NG/ML (ref 0–0.03)
TSH SERPL DL<=0.005 MIU/L-ACNC: 2.2 UIU/ML (ref 0.4–4)
URN SPEC COLLECT METH UR: ABNORMAL
UROBILINOGEN UR STRIP-ACNC: NEGATIVE EU/DL
WBC # BLD AUTO: 8.35 K/UL (ref 3.9–12.7)
WBC #/AREA URNS HPF: 1 /HPF (ref 0–5)

## 2021-06-06 PROCEDURE — 99900035 HC TECH TIME PER 15 MIN (STAT)

## 2021-06-06 PROCEDURE — 80061 LIPID PANEL: CPT | Performed by: EMERGENCY MEDICINE

## 2021-06-06 PROCEDURE — 94660 CPAP INITIATION&MGMT: CPT

## 2021-06-06 PROCEDURE — G0427 INPT/ED TELECONSULT70: HCPCS | Mod: 95,,, | Performed by: PSYCHIATRY & NEUROLOGY

## 2021-06-06 PROCEDURE — 85014 HEMATOCRIT: CPT

## 2021-06-06 PROCEDURE — 25000003 PHARM REV CODE 250: Performed by: HOSPITALIST

## 2021-06-06 PROCEDURE — G0427 PR INPT TELEHEALTH CON 70/>M: ICD-10-PCS | Mod: 95,,, | Performed by: PSYCHIATRY & NEUROLOGY

## 2021-06-06 PROCEDURE — 82565 ASSAY OF CREATININE: CPT

## 2021-06-06 PROCEDURE — 85025 COMPLETE CBC W/AUTO DIFF WBC: CPT | Performed by: EMERGENCY MEDICINE

## 2021-06-06 PROCEDURE — 63600175 PHARM REV CODE 636 W HCPCS: Performed by: HOSPITALIST

## 2021-06-06 PROCEDURE — 83880 ASSAY OF NATRIURETIC PEPTIDE: CPT | Performed by: HOSPITALIST

## 2021-06-06 PROCEDURE — 93010 EKG 12-LEAD: ICD-10-PCS | Mod: ,,, | Performed by: INTERNAL MEDICINE

## 2021-06-06 PROCEDURE — 83036 HEMOGLOBIN GLYCOSYLATED A1C: CPT | Performed by: HOSPITALIST

## 2021-06-06 PROCEDURE — 84145 PROCALCITONIN (PCT): CPT | Performed by: HOSPITALIST

## 2021-06-06 PROCEDURE — 84484 ASSAY OF TROPONIN QUANT: CPT | Mod: 91 | Performed by: HOSPITALIST

## 2021-06-06 PROCEDURE — 25000003 PHARM REV CODE 250: Performed by: EMERGENCY MEDICINE

## 2021-06-06 PROCEDURE — 80053 COMPREHEN METABOLIC PANEL: CPT | Performed by: EMERGENCY MEDICINE

## 2021-06-06 PROCEDURE — 85610 PROTHROMBIN TIME: CPT | Performed by: EMERGENCY MEDICINE

## 2021-06-06 PROCEDURE — 84295 ASSAY OF SERUM SODIUM: CPT

## 2021-06-06 PROCEDURE — 83605 ASSAY OF LACTIC ACID: CPT | Performed by: HOSPITALIST

## 2021-06-06 PROCEDURE — 85610 PROTHROMBIN TIME: CPT

## 2021-06-06 PROCEDURE — 27000190 HC CPAP FULL FACE MASK W/VALVE

## 2021-06-06 PROCEDURE — 96374 THER/PROPH/DIAG INJ IV PUSH: CPT

## 2021-06-06 PROCEDURE — U0002 COVID-19 LAB TEST NON-CDC: HCPCS | Performed by: EMERGENCY MEDICINE

## 2021-06-06 PROCEDURE — 36415 COLL VENOUS BLD VENIPUNCTURE: CPT | Performed by: HOSPITALIST

## 2021-06-06 PROCEDURE — 82330 ASSAY OF CALCIUM: CPT

## 2021-06-06 PROCEDURE — 93010 ELECTROCARDIOGRAM REPORT: CPT | Mod: ,,, | Performed by: INTERNAL MEDICINE

## 2021-06-06 PROCEDURE — 84443 ASSAY THYROID STIM HORMONE: CPT | Performed by: EMERGENCY MEDICINE

## 2021-06-06 PROCEDURE — 94761 N-INVAS EAR/PLS OXIMETRY MLT: CPT

## 2021-06-06 PROCEDURE — 21400001 HC TELEMETRY ROOM

## 2021-06-06 PROCEDURE — 63600175 PHARM REV CODE 636 W HCPCS: Performed by: EMERGENCY MEDICINE

## 2021-06-06 PROCEDURE — 93005 ELECTROCARDIOGRAM TRACING: CPT

## 2021-06-06 PROCEDURE — 84132 ASSAY OF SERUM POTASSIUM: CPT

## 2021-06-06 PROCEDURE — 99291 CRITICAL CARE FIRST HOUR: CPT | Mod: 25

## 2021-06-06 PROCEDURE — 81000 URINALYSIS NONAUTO W/SCOPE: CPT | Performed by: HOSPITALIST

## 2021-06-06 PROCEDURE — 96375 TX/PRO/DX INJ NEW DRUG ADDON: CPT

## 2021-06-06 RX ORDER — MECLIZINE HYDROCHLORIDE 25 MG/1
25 TABLET ORAL
Status: COMPLETED | OUTPATIENT
Start: 2021-06-06 | End: 2021-06-06

## 2021-06-06 RX ORDER — LEVOTHYROXINE SODIUM 50 UG/1
50 TABLET ORAL
Status: DISCONTINUED | OUTPATIENT
Start: 2021-06-07 | End: 2021-06-09 | Stop reason: HOSPADM

## 2021-06-06 RX ORDER — CLOPIDOGREL BISULFATE 75 MG/1
75 TABLET ORAL DAILY
Status: DISCONTINUED | OUTPATIENT
Start: 2021-06-06 | End: 2021-06-09 | Stop reason: HOSPADM

## 2021-06-06 RX ORDER — DOCUSATE SODIUM 100 MG/1
200 CAPSULE, LIQUID FILLED ORAL DAILY
Status: DISCONTINUED | OUTPATIENT
Start: 2021-06-06 | End: 2021-06-06 | Stop reason: SDUPTHER

## 2021-06-06 RX ORDER — SEVELAMER CARBONATE 800 MG/1
1600 TABLET, FILM COATED ORAL
Status: DISCONTINUED | OUTPATIENT
Start: 2021-06-06 | End: 2021-06-07

## 2021-06-06 RX ORDER — ACETAMINOPHEN 325 MG/1
650 TABLET ORAL EVERY 6 HOURS PRN
Status: DISCONTINUED | OUTPATIENT
Start: 2021-06-06 | End: 2021-06-09 | Stop reason: HOSPADM

## 2021-06-06 RX ORDER — ATORVASTATIN CALCIUM 10 MG/1
10 TABLET, FILM COATED ORAL DAILY
Status: DISCONTINUED | OUTPATIENT
Start: 2021-06-06 | End: 2021-06-09 | Stop reason: HOSPADM

## 2021-06-06 RX ORDER — ONDANSETRON 2 MG/ML
4 INJECTION INTRAMUSCULAR; INTRAVENOUS
Status: COMPLETED | OUTPATIENT
Start: 2021-06-06 | End: 2021-06-06

## 2021-06-06 RX ORDER — AMOXICILLIN 250 MG
1 CAPSULE ORAL 2 TIMES DAILY
Status: DISCONTINUED | OUTPATIENT
Start: 2021-06-06 | End: 2021-06-09 | Stop reason: HOSPADM

## 2021-06-06 RX ORDER — ASPIRIN 325 MG
325 TABLET ORAL
Status: COMPLETED | OUTPATIENT
Start: 2021-06-06 | End: 2021-06-06

## 2021-06-06 RX ORDER — SODIUM CHLORIDE 0.9 % (FLUSH) 0.9 %
10 SYRINGE (ML) INJECTION
Status: DISCONTINUED | OUTPATIENT
Start: 2021-06-06 | End: 2021-06-09 | Stop reason: HOSPADM

## 2021-06-06 RX ORDER — ONDANSETRON 2 MG/ML
4 INJECTION INTRAMUSCULAR; INTRAVENOUS EVERY 6 HOURS PRN
Status: DISCONTINUED | OUTPATIENT
Start: 2021-06-06 | End: 2021-06-09 | Stop reason: HOSPADM

## 2021-06-06 RX ORDER — LABETALOL HYDROCHLORIDE 5 MG/ML
10 INJECTION, SOLUTION INTRAVENOUS
Status: DISCONTINUED | OUTPATIENT
Start: 2021-06-06 | End: 2021-06-08

## 2021-06-06 RX ORDER — LORAZEPAM 2 MG/ML
0.5 INJECTION INTRAMUSCULAR
Status: COMPLETED | OUTPATIENT
Start: 2021-06-06 | End: 2021-06-06

## 2021-06-06 RX ORDER — ASPIRIN 81 MG/1
81 TABLET ORAL DAILY
Status: DISCONTINUED | OUTPATIENT
Start: 2021-06-07 | End: 2021-06-09 | Stop reason: HOSPADM

## 2021-06-06 RX ADMIN — ONDANSETRON 4 MG: 2 INJECTION INTRAMUSCULAR; INTRAVENOUS at 10:06

## 2021-06-06 RX ADMIN — DOCUSATE SODIUM 50 MG AND SENNOSIDES 8.6 MG 1 TABLET: 8.6; 5 TABLET, FILM COATED ORAL at 10:06

## 2021-06-06 RX ADMIN — ONDANSETRON 4 MG: 2 INJECTION INTRAMUSCULAR; INTRAVENOUS at 05:06

## 2021-06-06 RX ADMIN — LORAZEPAM 0.5 MG: 2 INJECTION INTRAMUSCULAR; INTRAVENOUS at 11:06

## 2021-06-06 RX ADMIN — ATORVASTATIN CALCIUM 10 MG: 10 TABLET, FILM COATED ORAL at 03:06

## 2021-06-06 RX ADMIN — ASPIRIN 325 MG ORAL TABLET 325 MG: 325 PILL ORAL at 01:06

## 2021-06-06 RX ADMIN — MECLIZINE HYDROCHLORIDE 25 MG: 25 TABLET ORAL at 10:06

## 2021-06-06 RX ADMIN — CLOPIDOGREL 75 MG: 75 TABLET, FILM COATED ORAL at 03:06

## 2021-06-07 LAB
ALBUMIN SERPL BCP-MCNC: 2.9 G/DL (ref 3.5–5.2)
ALP SERPL-CCNC: 107 U/L (ref 55–135)
ALT SERPL W/O P-5'-P-CCNC: 10 U/L (ref 10–44)
ANION GAP SERPL CALC-SCNC: 15 MMOL/L (ref 8–16)
APTT BLDCRRT: 24 SEC (ref 21–32)
AST SERPL-CCNC: 10 U/L (ref 10–40)
BASOPHILS # BLD AUTO: 0.05 K/UL (ref 0–0.2)
BASOPHILS NFR BLD: 0.6 % (ref 0–1.9)
BILIRUB SERPL-MCNC: 0.4 MG/DL (ref 0.1–1)
BUN SERPL-MCNC: 55 MG/DL (ref 8–23)
CALCIUM SERPL-MCNC: 8.8 MG/DL (ref 8.7–10.5)
CHLORIDE SERPL-SCNC: 96 MMOL/L (ref 95–110)
CO2 SERPL-SCNC: 28 MMOL/L (ref 23–29)
CORTIS SERPL-MCNC: 15.2 UG/DL
CORTIS SERPL-MCNC: 25.4 UG/DL
CORTIS SERPL-MCNC: 26.2 UG/DL
CORTIS SERPL-MCNC: 7.5 UG/DL
CREAT SERPL-MCNC: 8.5 MG/DL (ref 0.5–1.4)
DIFFERENTIAL METHOD: ABNORMAL
EOSINOPHIL # BLD AUTO: 0 K/UL (ref 0–0.5)
EOSINOPHIL NFR BLD: 0.3 % (ref 0–8)
ERYTHROCYTE [DISTWIDTH] IN BLOOD BY AUTOMATED COUNT: 14.4 % (ref 11.5–14.5)
EST. GFR  (AFRICAN AMERICAN): 5 ML/MIN/1.73 M^2
EST. GFR  (NON AFRICAN AMERICAN): 4 ML/MIN/1.73 M^2
ESTIMATED AVG GLUCOSE: 186 MG/DL (ref 68–131)
GLUCOSE SERPL-MCNC: 138 MG/DL (ref 70–110)
HBA1C MFR BLD: 8.1 % (ref 4–5.6)
HCT VFR BLD AUTO: 36.3 % (ref 37–48.5)
HGB BLD-MCNC: 11.9 G/DL (ref 12–16)
IMM GRANULOCYTES # BLD AUTO: 0.03 K/UL (ref 0–0.04)
IMM GRANULOCYTES NFR BLD AUTO: 0.3 % (ref 0–0.5)
INR PPP: 1 (ref 0.8–1.2)
LYMPHOCYTES # BLD AUTO: 1.3 K/UL (ref 1–4.8)
LYMPHOCYTES NFR BLD: 14.7 % (ref 18–48)
MAGNESIUM SERPL-MCNC: 2.7 MG/DL (ref 1.6–2.6)
MCH RBC QN AUTO: 28.7 PG (ref 27–31)
MCHC RBC AUTO-ENTMCNC: 32.8 G/DL (ref 32–36)
MCV RBC AUTO: 88 FL (ref 82–98)
MONOCYTES # BLD AUTO: 0.9 K/UL (ref 0.3–1)
MONOCYTES NFR BLD: 10.2 % (ref 4–15)
NEUTROPHILS # BLD AUTO: 6.4 K/UL (ref 1.8–7.7)
NEUTROPHILS NFR BLD: 73.9 % (ref 38–73)
NRBC BLD-RTO: 0 /100 WBC
PHOSPHATE SERPL-MCNC: 6.9 MG/DL (ref 2.7–4.5)
PLATELET # BLD AUTO: 213 K/UL (ref 150–450)
PMV BLD AUTO: 10.6 FL (ref 9.2–12.9)
POCT GLUCOSE: 167 MG/DL (ref 70–110)
POTASSIUM SERPL-SCNC: 5.5 MMOL/L (ref 3.5–5.1)
PROT SERPL-MCNC: 7.2 G/DL (ref 6–8.4)
PROTHROMBIN TIME: 10.3 SEC (ref 9–12.5)
RBC # BLD AUTO: 4.15 M/UL (ref 4–5.4)
SODIUM SERPL-SCNC: 139 MMOL/L (ref 136–145)
TROPONIN I SERPL DL<=0.01 NG/ML-MCNC: 0.04 NG/ML (ref 0–0.03)
WBC # BLD AUTO: 8.63 K/UL (ref 3.9–12.7)

## 2021-06-07 PROCEDURE — 82024 ASSAY OF ACTH: CPT | Performed by: HOSPITALIST

## 2021-06-07 PROCEDURE — 99222 1ST HOSP IP/OBS MODERATE 55: CPT | Mod: ,,, | Performed by: PSYCHIATRY & NEUROLOGY

## 2021-06-07 PROCEDURE — 85025 COMPLETE CBC W/AUTO DIFF WBC: CPT | Performed by: HOSPITALIST

## 2021-06-07 PROCEDURE — 99900035 HC TECH TIME PER 15 MIN (STAT)

## 2021-06-07 PROCEDURE — 63600175 PHARM REV CODE 636 W HCPCS: Performed by: HOSPITALIST

## 2021-06-07 PROCEDURE — 25000003 PHARM REV CODE 250: Performed by: INTERNAL MEDICINE

## 2021-06-07 PROCEDURE — 85730 THROMBOPLASTIN TIME PARTIAL: CPT | Performed by: HOSPITALIST

## 2021-06-07 PROCEDURE — 27000221 HC OXYGEN, UP TO 24 HOURS

## 2021-06-07 PROCEDURE — 99222 PR INITIAL HOSPITAL CARE,LEVL II: ICD-10-PCS | Mod: ,,, | Performed by: PSYCHIATRY & NEUROLOGY

## 2021-06-07 PROCEDURE — 83735 ASSAY OF MAGNESIUM: CPT | Performed by: HOSPITALIST

## 2021-06-07 PROCEDURE — 84100 ASSAY OF PHOSPHORUS: CPT | Performed by: HOSPITALIST

## 2021-06-07 PROCEDURE — 90935 HEMODIALYSIS ONE EVALUATION: CPT

## 2021-06-07 PROCEDURE — 82533 TOTAL CORTISOL: CPT | Mod: 91 | Performed by: HOSPITALIST

## 2021-06-07 PROCEDURE — 80053 COMPREHEN METABOLIC PANEL: CPT | Performed by: HOSPITALIST

## 2021-06-07 PROCEDURE — 36415 COLL VENOUS BLD VENIPUNCTURE: CPT | Performed by: HOSPITALIST

## 2021-06-07 PROCEDURE — 84484 ASSAY OF TROPONIN QUANT: CPT | Performed by: HOSPITALIST

## 2021-06-07 PROCEDURE — 94660 CPAP INITIATION&MGMT: CPT

## 2021-06-07 PROCEDURE — 25000003 PHARM REV CODE 250: Performed by: HOSPITALIST

## 2021-06-07 PROCEDURE — 94761 N-INVAS EAR/PLS OXIMETRY MLT: CPT

## 2021-06-07 PROCEDURE — 21400001 HC TELEMETRY ROOM

## 2021-06-07 PROCEDURE — 85610 PROTHROMBIN TIME: CPT | Performed by: HOSPITALIST

## 2021-06-07 RX ORDER — COSYNTROPIN 0.25 MG/ML
0.25 INJECTION, POWDER, FOR SOLUTION INTRAMUSCULAR; INTRAVENOUS ONCE
Status: COMPLETED | OUTPATIENT
Start: 2021-06-07 | End: 2021-06-07

## 2021-06-07 RX ORDER — IBUPROFEN 200 MG
16 TABLET ORAL
Status: DISCONTINUED | OUTPATIENT
Start: 2021-06-07 | End: 2021-06-09 | Stop reason: HOSPADM

## 2021-06-07 RX ORDER — INSULIN ASPART 100 [IU]/ML
0-5 INJECTION, SOLUTION INTRAVENOUS; SUBCUTANEOUS
Status: DISCONTINUED | OUTPATIENT
Start: 2021-06-07 | End: 2021-06-09 | Stop reason: HOSPADM

## 2021-06-07 RX ORDER — SODIUM CHLORIDE 9 MG/ML
INJECTION, SOLUTION INTRAVENOUS
Status: DISCONTINUED | OUTPATIENT
Start: 2021-06-07 | End: 2021-06-09 | Stop reason: HOSPADM

## 2021-06-07 RX ORDER — GLUCAGON 1 MG
1 KIT INJECTION
Status: DISCONTINUED | OUTPATIENT
Start: 2021-06-07 | End: 2021-06-09 | Stop reason: HOSPADM

## 2021-06-07 RX ORDER — MUPIROCIN 20 MG/G
OINTMENT TOPICAL 2 TIMES DAILY
Status: DISCONTINUED | OUTPATIENT
Start: 2021-06-07 | End: 2021-06-09 | Stop reason: HOSPADM

## 2021-06-07 RX ORDER — MECLIZINE HYDROCHLORIDE 25 MG/1
25 TABLET ORAL 3 TIMES DAILY PRN
Status: DISCONTINUED | OUTPATIENT
Start: 2021-06-07 | End: 2021-06-09 | Stop reason: HOSPADM

## 2021-06-07 RX ORDER — IBUPROFEN 200 MG
24 TABLET ORAL
Status: DISCONTINUED | OUTPATIENT
Start: 2021-06-07 | End: 2021-06-09 | Stop reason: HOSPADM

## 2021-06-07 RX ORDER — SEVELAMER CARBONATE 800 MG/1
2400 TABLET, FILM COATED ORAL
Status: DISCONTINUED | OUTPATIENT
Start: 2021-06-07 | End: 2021-06-09 | Stop reason: HOSPADM

## 2021-06-07 RX ORDER — SODIUM CHLORIDE 9 MG/ML
INJECTION, SOLUTION INTRAVENOUS ONCE
Status: DISCONTINUED | OUTPATIENT
Start: 2021-06-07 | End: 2021-06-09 | Stop reason: HOSPADM

## 2021-06-07 RX ADMIN — MUPIROCIN: 20 OINTMENT TOPICAL at 09:06

## 2021-06-07 RX ADMIN — MECLIZINE HYDROCHLORIDE 25 MG: 25 TABLET ORAL at 03:06

## 2021-06-07 RX ADMIN — COSYNTROPIN 0.25 MG: 0.25 INJECTION, POWDER, LYOPHILIZED, FOR SOLUTION INTRAMUSCULAR; INTRAVENOUS at 09:06

## 2021-06-07 RX ADMIN — ASPIRIN 81 MG: 81 TABLET, COATED ORAL at 09:06

## 2021-06-07 RX ADMIN — CLOPIDOGREL 75 MG: 75 TABLET, FILM COATED ORAL at 09:06

## 2021-06-07 RX ADMIN — ATORVASTATIN CALCIUM 10 MG: 10 TABLET, FILM COATED ORAL at 09:06

## 2021-06-07 RX ADMIN — DOCUSATE SODIUM 50 MG AND SENNOSIDES 8.6 MG 1 TABLET: 8.6; 5 TABLET, FILM COATED ORAL at 09:06

## 2021-06-07 RX ADMIN — SEVELAMER CARBONATE 1600 MG: 800 TABLET, FILM COATED ORAL at 03:06

## 2021-06-07 RX ADMIN — LEVOTHYROXINE SODIUM 50 MCG: 50 TABLET ORAL at 05:06

## 2021-06-08 PROBLEM — I95.9 HYPOTENSION: Status: ACTIVE | Noted: 2019-08-27

## 2021-06-08 LAB
ACTH PLAS-MCNC: 21 PG/ML (ref 0–46)
ALBUMIN SERPL BCP-MCNC: 2.7 G/DL (ref 3.5–5.2)
ALP SERPL-CCNC: 123 U/L (ref 55–135)
ALT SERPL W/O P-5'-P-CCNC: 9 U/L (ref 10–44)
ANION GAP SERPL CALC-SCNC: 12 MMOL/L (ref 8–16)
ASCENDING AORTA: 3.33 CM
AST SERPL-CCNC: 11 U/L (ref 10–40)
AV INDEX (PROSTH): 0.77
AV MEAN GRADIENT: 10 MMHG
AV PEAK GRADIENT: 14 MMHG
AV VALVE AREA: 2.31 CM2
AV VELOCITY RATIO: 0.76
BASOPHILS # BLD AUTO: 0.05 K/UL (ref 0–0.2)
BASOPHILS NFR BLD: 0.7 % (ref 0–1.9)
BILIRUB SERPL-MCNC: 0.3 MG/DL (ref 0.1–1)
BSA FOR ECHO PROCEDURE: 2.03 M2
BUN SERPL-MCNC: 42 MG/DL (ref 8–23)
CALCIUM SERPL-MCNC: 8.2 MG/DL (ref 8.7–10.5)
CHLORIDE SERPL-SCNC: 93 MMOL/L (ref 95–110)
CO2 SERPL-SCNC: 30 MMOL/L (ref 23–29)
CREAT SERPL-MCNC: 6.4 MG/DL (ref 0.5–1.4)
CV ECHO LV RWT: 0.83 CM
DIFFERENTIAL METHOD: ABNORMAL
DOP CALC AO PEAK VEL: 1.87 M/S
DOP CALC AO VTI: 37.08 CM
DOP CALC LVOT AREA: 3 CM2
DOP CALC LVOT DIAMETER: 1.96 CM
DOP CALC LVOT PEAK VEL: 1.42 M/S
DOP CALC LVOT STROKE VOLUME: 85.61 CM3
DOP CALCLVOT PEAK VEL VTI: 28.39 CM
E WAVE DECELERATION TIME: 281.59 MSEC
E/A RATIO: 0.71
E/E' RATIO: 13.38 M/S
ECHO LV POSTERIOR WALL: 1.3 CM (ref 0.6–1.1)
EJECTION FRACTION: 55 %
EOSINOPHIL # BLD AUTO: 0.1 K/UL (ref 0–0.5)
EOSINOPHIL NFR BLD: 1.4 % (ref 0–8)
ERYTHROCYTE [DISTWIDTH] IN BLOOD BY AUTOMATED COUNT: 14.4 % (ref 11.5–14.5)
EST. GFR  (AFRICAN AMERICAN): 7 ML/MIN/1.73 M^2
EST. GFR  (NON AFRICAN AMERICAN): 6 ML/MIN/1.73 M^2
FRACTIONAL SHORTENING: 35 % (ref 28–44)
GLUCOSE SERPL-MCNC: 153 MG/DL (ref 70–110)
HCT VFR BLD AUTO: 34.5 % (ref 37–48.5)
HGB BLD-MCNC: 11 G/DL (ref 12–16)
IMM GRANULOCYTES # BLD AUTO: 0.03 K/UL (ref 0–0.04)
IMM GRANULOCYTES NFR BLD AUTO: 0.4 % (ref 0–0.5)
INTERVENTRICULAR SEPTUM: 1.55 CM (ref 0.6–1.1)
IVRT: 152.25 MSEC
LA MAJOR: 5.56 CM
LA MINOR: 5.51 CM
LA WIDTH: 5.06 CM
LEFT ATRIUM SIZE: 5.32 CM
LEFT ATRIUM VOLUME INDEX: 64.3 ML/M2
LEFT ATRIUM VOLUME: 126.65 CM3
LEFT INTERNAL DIMENSION IN SYSTOLE: 2.05 CM (ref 2.1–4)
LEFT VENTRICLE DIASTOLIC VOLUME INDEX: 19.8 ML/M2
LEFT VENTRICLE DIASTOLIC VOLUME: 39.01 ML
LEFT VENTRICLE MASS INDEX: 78 G/M2
LEFT VENTRICLE SYSTOLIC VOLUME INDEX: 6.9 ML/M2
LEFT VENTRICLE SYSTOLIC VOLUME: 13.51 ML
LEFT VENTRICULAR INTERNAL DIMENSION IN DIASTOLE: 3.14 CM (ref 3.5–6)
LEFT VENTRICULAR MASS: 153.66 G
LV LATERAL E/E' RATIO: 10.88 M/S
LV SEPTAL E/E' RATIO: 17.4 M/S
LYMPHOCYTES # BLD AUTO: 1.6 K/UL (ref 1–4.8)
LYMPHOCYTES NFR BLD: 22.6 % (ref 18–48)
MCH RBC QN AUTO: 28.4 PG (ref 27–31)
MCHC RBC AUTO-ENTMCNC: 31.9 G/DL (ref 32–36)
MCV RBC AUTO: 89 FL (ref 82–98)
MONOCYTES # BLD AUTO: 0.8 K/UL (ref 0.3–1)
MONOCYTES NFR BLD: 12.1 % (ref 4–15)
MV MEAN GRADIENT: 1 MMHG
MV PEAK A VEL: 1.23 M/S
MV PEAK E VEL: 0.87 M/S
MV PEAK GRADIENT: 6 MMHG
MV STENOSIS PRESSURE HALF TIME: 81.66 MS
MV VALVE AREA P 1/2 METHOD: 2.69 CM2
NEUTROPHILS # BLD AUTO: 4.4 K/UL (ref 1.8–7.7)
NEUTROPHILS NFR BLD: 62.8 % (ref 38–73)
NRBC BLD-RTO: 0 /100 WBC
PISA TR MAX VEL: 3.12 M/S
PLATELET # BLD AUTO: 190 K/UL (ref 150–450)
PMV BLD AUTO: 10.6 FL (ref 9.2–12.9)
POCT GLUCOSE: 127 MG/DL (ref 70–110)
POCT GLUCOSE: 146 MG/DL (ref 70–110)
POCT GLUCOSE: 154 MG/DL (ref 70–110)
POTASSIUM SERPL-SCNC: 5.2 MMOL/L (ref 3.5–5.1)
PROT SERPL-MCNC: 6.6 G/DL (ref 6–8.4)
PV PEAK VELOCITY: 1.14 CM/S
RA MAJOR: 5.16 CM
RA WIDTH: 3.25 CM
RBC # BLD AUTO: 3.88 M/UL (ref 4–5.4)
RIGHT VENTRICULAR END-DIASTOLIC DIMENSION: 3.76 CM
RV TISSUE DOPPLER FREE WALL SYSTOLIC VELOCITY 1 (APICAL 4 CHAMBER VIEW): 15.01 CM/S
SINUS: 3.18 CM
SODIUM SERPL-SCNC: 135 MMOL/L (ref 136–145)
STJ: 3.07 CM
TDI LATERAL: 0.08 M/S
TDI SEPTAL: 0.05 M/S
TDI: 0.07 M/S
TR MAX PG: 39 MMHG
TRICUSPID ANNULAR PLANE SYSTOLIC EXCURSION: 2.07 CM
WBC # BLD AUTO: 6.94 K/UL (ref 3.9–12.7)

## 2021-06-08 PROCEDURE — 85025 COMPLETE CBC W/AUTO DIFF WBC: CPT | Performed by: HOSPITALIST

## 2021-06-08 PROCEDURE — 90935 HEMODIALYSIS ONE EVALUATION: CPT

## 2021-06-08 PROCEDURE — 99900035 HC TECH TIME PER 15 MIN (STAT)

## 2021-06-08 PROCEDURE — 36415 COLL VENOUS BLD VENIPUNCTURE: CPT | Performed by: HOSPITALIST

## 2021-06-08 PROCEDURE — 97116 GAIT TRAINING THERAPY: CPT

## 2021-06-08 PROCEDURE — 80053 COMPREHEN METABOLIC PANEL: CPT | Performed by: HOSPITALIST

## 2021-06-08 PROCEDURE — 27000221 HC OXYGEN, UP TO 24 HOURS

## 2021-06-08 PROCEDURE — 97165 OT EVAL LOW COMPLEX 30 MIN: CPT

## 2021-06-08 PROCEDURE — 25000003 PHARM REV CODE 250: Performed by: INTERNAL MEDICINE

## 2021-06-08 PROCEDURE — 25000003 PHARM REV CODE 250: Performed by: HOSPITALIST

## 2021-06-08 PROCEDURE — 99232 SBSQ HOSP IP/OBS MODERATE 35: CPT | Mod: ,,, | Performed by: PSYCHIATRY & NEUROLOGY

## 2021-06-08 PROCEDURE — 99232 PR SUBSEQUENT HOSPITAL CARE,LEVL II: ICD-10-PCS | Mod: ,,, | Performed by: PSYCHIATRY & NEUROLOGY

## 2021-06-08 PROCEDURE — 94660 CPAP INITIATION&MGMT: CPT

## 2021-06-08 PROCEDURE — 97162 PT EVAL MOD COMPLEX 30 MIN: CPT

## 2021-06-08 PROCEDURE — 94761 N-INVAS EAR/PLS OXIMETRY MLT: CPT

## 2021-06-08 PROCEDURE — 21400001 HC TELEMETRY ROOM

## 2021-06-08 RX ORDER — MIDODRINE HYDROCHLORIDE 5 MG/1
5 TABLET ORAL 3 TIMES DAILY
Status: DISCONTINUED | OUTPATIENT
Start: 2021-06-08 | End: 2021-06-09 | Stop reason: HOSPADM

## 2021-06-08 RX ADMIN — MUPIROCIN: 20 OINTMENT TOPICAL at 09:06

## 2021-06-08 RX ADMIN — SEVELAMER CARBONATE 2400 MG: 800 TABLET, FILM COATED ORAL at 12:06

## 2021-06-08 RX ADMIN — SODIUM CHLORIDE 500 ML: 0.9 INJECTION, SOLUTION INTRAVENOUS at 04:06

## 2021-06-08 RX ADMIN — CLOPIDOGREL 75 MG: 75 TABLET, FILM COATED ORAL at 09:06

## 2021-06-08 RX ADMIN — LEVOTHYROXINE SODIUM 50 MCG: 50 TABLET ORAL at 05:06

## 2021-06-08 RX ADMIN — ASPIRIN 81 MG: 81 TABLET, COATED ORAL at 09:06

## 2021-06-08 RX ADMIN — DOCUSATE SODIUM 50 MG AND SENNOSIDES 8.6 MG 1 TABLET: 8.6; 5 TABLET, FILM COATED ORAL at 09:06

## 2021-06-08 RX ADMIN — ATORVASTATIN CALCIUM 10 MG: 10 TABLET, FILM COATED ORAL at 09:06

## 2021-06-08 RX ADMIN — MIDODRINE HYDROCHLORIDE 5 MG: 5 TABLET ORAL at 09:06

## 2021-06-09 ENCOUNTER — TELEPHONE (OUTPATIENT)
Dept: FAMILY MEDICINE | Facility: CLINIC | Age: 76
End: 2021-06-09

## 2021-06-09 VITALS
TEMPERATURE: 99 F | HEIGHT: 62 IN | HEART RATE: 76 BPM | DIASTOLIC BLOOD PRESSURE: 60 MMHG | OXYGEN SATURATION: 95 % | RESPIRATION RATE: 16 BRPM | BODY MASS INDEX: 39.68 KG/M2 | WEIGHT: 215.63 LBS | SYSTOLIC BLOOD PRESSURE: 107 MMHG

## 2021-06-09 LAB
POCT GLUCOSE: 102 MG/DL (ref 70–110)
POCT GLUCOSE: 125 MG/DL (ref 70–110)
POCT GLUCOSE: 132 MG/DL (ref 70–110)
POCT GLUCOSE: 140 MG/DL (ref 70–110)

## 2021-06-09 PROCEDURE — 97110 THERAPEUTIC EXERCISES: CPT | Mod: CQ

## 2021-06-09 PROCEDURE — 99232 SBSQ HOSP IP/OBS MODERATE 35: CPT | Mod: ,,, | Performed by: PSYCHIATRY & NEUROLOGY

## 2021-06-09 PROCEDURE — 97535 SELF CARE MNGMENT TRAINING: CPT

## 2021-06-09 PROCEDURE — 92610 EVALUATE SWALLOWING FUNCTION: CPT

## 2021-06-09 PROCEDURE — 25000003 PHARM REV CODE 250: Performed by: HOSPITALIST

## 2021-06-09 PROCEDURE — 99232 PR SUBSEQUENT HOSPITAL CARE,LEVL II: ICD-10-PCS | Mod: ,,, | Performed by: PSYCHIATRY & NEUROLOGY

## 2021-06-09 PROCEDURE — 94761 N-INVAS EAR/PLS OXIMETRY MLT: CPT

## 2021-06-09 PROCEDURE — 97110 THERAPEUTIC EXERCISES: CPT

## 2021-06-09 PROCEDURE — 92523 SPEECH SOUND LANG COMPREHEN: CPT

## 2021-06-09 PROCEDURE — 25000003 PHARM REV CODE 250: Performed by: INTERNAL MEDICINE

## 2021-06-09 PROCEDURE — 97116 GAIT TRAINING THERAPY: CPT | Mod: CQ

## 2021-06-09 RX ORDER — MIDODRINE HYDROCHLORIDE 5 MG/1
5 TABLET ORAL 3 TIMES DAILY
Qty: 90 TABLET | Refills: 11 | Status: SHIPPED | OUTPATIENT
Start: 2021-06-09 | End: 2022-06-17 | Stop reason: SDUPTHER

## 2021-06-09 RX ORDER — ATORVASTATIN CALCIUM 80 MG/1
80 TABLET, FILM COATED ORAL NIGHTLY
Qty: 90 TABLET | Refills: 3 | Status: SHIPPED | OUTPATIENT
Start: 2021-06-09 | End: 2021-10-27 | Stop reason: SDUPTHER

## 2021-06-09 RX ORDER — SEVELAMER CARBONATE 800 MG/1
2400 TABLET, FILM COATED ORAL
Qty: 270 TABLET | Refills: 11 | Status: SHIPPED | OUTPATIENT
Start: 2021-06-09 | End: 2023-06-09

## 2021-06-09 RX ORDER — ASPIRIN 81 MG/1
81 TABLET ORAL DAILY
Qty: 90 TABLET | Refills: 3 | Status: SHIPPED | OUTPATIENT
Start: 2021-06-10 | End: 2023-06-09

## 2021-06-09 RX ORDER — MECLIZINE HYDROCHLORIDE 25 MG/1
25 TABLET ORAL 3 TIMES DAILY PRN
Qty: 30 TABLET | Refills: 0 | Status: SHIPPED | OUTPATIENT
Start: 2021-06-09 | End: 2021-06-25 | Stop reason: SDUPTHER

## 2021-06-09 RX ORDER — CLOPIDOGREL BISULFATE 75 MG/1
75 TABLET ORAL DAILY
Qty: 21 TABLET | Refills: 0 | Status: SHIPPED | OUTPATIENT
Start: 2021-06-09 | End: 2021-11-01

## 2021-06-09 RX ADMIN — SEVELAMER CARBONATE 2400 MG: 800 TABLET, FILM COATED ORAL at 07:06

## 2021-06-09 RX ADMIN — SEVELAMER CARBONATE 2400 MG: 800 TABLET, FILM COATED ORAL at 12:06

## 2021-06-09 RX ADMIN — MUPIROCIN: 20 OINTMENT TOPICAL at 10:06

## 2021-06-09 RX ADMIN — LEVOTHYROXINE SODIUM 50 MCG: 50 TABLET ORAL at 06:06

## 2021-06-09 RX ADMIN — ATORVASTATIN CALCIUM 10 MG: 10 TABLET, FILM COATED ORAL at 10:06

## 2021-06-09 RX ADMIN — ASPIRIN 81 MG: 81 TABLET, COATED ORAL at 10:06

## 2021-06-09 RX ADMIN — DOCUSATE SODIUM 50 MG AND SENNOSIDES 8.6 MG 1 TABLET: 8.6; 5 TABLET, FILM COATED ORAL at 10:06

## 2021-06-09 RX ADMIN — CLOPIDOGREL 75 MG: 75 TABLET, FILM COATED ORAL at 10:06

## 2021-06-09 RX ADMIN — MECLIZINE HYDROCHLORIDE 25 MG: 25 TABLET ORAL at 06:06

## 2021-06-09 RX ADMIN — MIDODRINE HYDROCHLORIDE 5 MG: 5 TABLET ORAL at 10:06

## 2021-06-11 PROCEDURE — G0180 PR HOME HEALTH MD CERTIFICATION: ICD-10-PCS | Mod: ,,, | Performed by: INTERNAL MEDICINE

## 2021-06-11 PROCEDURE — G0180 MD CERTIFICATION HHA PATIENT: HCPCS | Mod: ,,, | Performed by: INTERNAL MEDICINE

## 2021-06-14 ENCOUNTER — PATIENT OUTREACH (OUTPATIENT)
Dept: ADMINISTRATIVE | Facility: CLINIC | Age: 76
End: 2021-06-14

## 2021-06-14 ENCOUNTER — PES CALL (OUTPATIENT)
Dept: ADMINISTRATIVE | Facility: CLINIC | Age: 76
End: 2021-06-14

## 2021-06-14 ENCOUNTER — TELEPHONE (OUTPATIENT)
Dept: FAMILY MEDICINE | Facility: CLINIC | Age: 76
End: 2021-06-14

## 2021-06-16 ENCOUNTER — TELEPHONE (OUTPATIENT)
Dept: FAMILY MEDICINE | Facility: CLINIC | Age: 76
End: 2021-06-16

## 2021-06-23 ENCOUNTER — TELEPHONE (OUTPATIENT)
Dept: FAMILY MEDICINE | Facility: CLINIC | Age: 76
End: 2021-06-23

## 2021-06-28 ENCOUNTER — EXTERNAL HOME HEALTH (OUTPATIENT)
Dept: HOME HEALTH SERVICES | Facility: HOSPITAL | Age: 76
End: 2021-06-28
Payer: MEDICARE

## 2021-06-28 ENCOUNTER — OFFICE VISIT (OUTPATIENT)
Dept: HOME HEALTH SERVICES | Facility: CLINIC | Age: 76
End: 2021-06-28
Payer: MEDICARE

## 2021-06-28 VITALS
OXYGEN SATURATION: 100 % | SYSTOLIC BLOOD PRESSURE: 102 MMHG | RESPIRATION RATE: 15 BRPM | HEART RATE: 71 BPM | TEMPERATURE: 99 F | DIASTOLIC BLOOD PRESSURE: 70 MMHG

## 2021-06-28 DIAGNOSIS — R42 DIZZINESS: Primary | ICD-10-CM

## 2021-06-28 DIAGNOSIS — I95.9 HYPOTENSION, UNSPECIFIED HYPOTENSION TYPE: ICD-10-CM

## 2021-06-28 DIAGNOSIS — N18.6 ESRD ON HEMODIALYSIS: ICD-10-CM

## 2021-06-28 DIAGNOSIS — Z99.2 ESRD ON HEMODIALYSIS: ICD-10-CM

## 2021-06-28 DIAGNOSIS — E03.9 HYPOTHYROIDISM (ACQUIRED): Chronic | ICD-10-CM

## 2021-06-28 PROCEDURE — 3288F PR FALLS RISK ASSESSMENT DOCUMENTED: ICD-10-PCS | Mod: CPTII,S$GLB,, | Performed by: NURSE PRACTITIONER

## 2021-06-28 PROCEDURE — 1101F PT FALLS ASSESS-DOCD LE1/YR: CPT | Mod: CPTII,S$GLB,, | Performed by: NURSE PRACTITIONER

## 2021-06-28 PROCEDURE — 1101F PR PT FALLS ASSESS DOC 0-1 FALLS W/OUT INJ PAST YR: ICD-10-PCS | Mod: CPTII,S$GLB,, | Performed by: NURSE PRACTITIONER

## 2021-06-28 PROCEDURE — 99350 PR HOME VISIT,ESTAB PATIENT,LEVEL IV: ICD-10-PCS | Mod: S$GLB,,, | Performed by: NURSE PRACTITIONER

## 2021-06-28 PROCEDURE — 1126F PR PAIN SEVERITY QUANTIFIED, NO PAIN PRESENT: ICD-10-PCS | Mod: S$GLB,,, | Performed by: NURSE PRACTITIONER

## 2021-06-28 PROCEDURE — 1126F AMNT PAIN NOTED NONE PRSNT: CPT | Mod: S$GLB,,, | Performed by: NURSE PRACTITIONER

## 2021-06-28 PROCEDURE — 99350 HOME/RES VST EST HIGH MDM 60: CPT | Mod: S$GLB,,, | Performed by: NURSE PRACTITIONER

## 2021-06-28 PROCEDURE — 3288F FALL RISK ASSESSMENT DOCD: CPT | Mod: CPTII,S$GLB,, | Performed by: NURSE PRACTITIONER

## 2021-06-28 RX ORDER — MECLIZINE HYDROCHLORIDE 25 MG/1
25 TABLET ORAL 3 TIMES DAILY PRN
Qty: 10 TABLET | Refills: 0 | Status: SHIPPED | OUTPATIENT
Start: 2021-06-28 | End: 2021-06-29

## 2021-06-29 ENCOUNTER — OUTPATIENT CASE MANAGEMENT (OUTPATIENT)
Dept: ADMINISTRATIVE | Facility: OTHER | Age: 76
End: 2021-06-29

## 2021-06-29 RX ORDER — MECLIZINE HYDROCHLORIDE 25 MG/1
25 TABLET ORAL 3 TIMES DAILY PRN
Qty: 10 TABLET | Refills: 0 | Status: SHIPPED | OUTPATIENT
Start: 2021-06-29 | End: 2021-11-01

## 2021-07-07 ENCOUNTER — OFFICE VISIT (OUTPATIENT)
Dept: PODIATRY | Facility: CLINIC | Age: 76
End: 2021-07-07
Payer: MEDICARE

## 2021-07-07 VITALS — WEIGHT: 215.63 LBS | BODY MASS INDEX: 39.68 KG/M2 | HEIGHT: 62 IN

## 2021-07-07 DIAGNOSIS — B35.1 NAIL DERMATOPHYTOSIS: ICD-10-CM

## 2021-07-07 DIAGNOSIS — L90.9 PLANTAR FAT PAD ATROPHY: ICD-10-CM

## 2021-07-07 DIAGNOSIS — M20.12 HALLUX ABDUCTO VALGUS, LEFT: ICD-10-CM

## 2021-07-07 DIAGNOSIS — L97.912 LEG ULCER, RIGHT, WITH FAT LAYER EXPOSED: ICD-10-CM

## 2021-07-07 DIAGNOSIS — N18.6 TYPE 2 DIABETES MELLITUS WITH ESRD (END-STAGE RENAL DISEASE): Primary | ICD-10-CM

## 2021-07-07 DIAGNOSIS — M20.11 HALLUX ABDUCTO VALGUS, RIGHT: ICD-10-CM

## 2021-07-07 DIAGNOSIS — M20.41 HAMMER TOES OF BOTH FEET: ICD-10-CM

## 2021-07-07 DIAGNOSIS — E11.22 TYPE 2 DIABETES MELLITUS WITH ESRD (END-STAGE RENAL DISEASE): Primary | ICD-10-CM

## 2021-07-07 DIAGNOSIS — M20.42 HAMMER TOES OF BOTH FEET: ICD-10-CM

## 2021-07-07 PROCEDURE — 99214 OFFICE O/P EST MOD 30 MIN: CPT | Mod: 25,S$GLB,, | Performed by: PODIATRIST

## 2021-07-07 PROCEDURE — 99999 PR PBB SHADOW E&M-EST. PATIENT-LVL III: CPT | Mod: PBBFAC,,, | Performed by: PODIATRIST

## 2021-07-07 PROCEDURE — 1101F PT FALLS ASSESS-DOCD LE1/YR: CPT | Mod: CPTII,S$GLB,, | Performed by: PODIATRIST

## 2021-07-07 PROCEDURE — 1125F AMNT PAIN NOTED PAIN PRSNT: CPT | Mod: S$GLB,,, | Performed by: PODIATRIST

## 2021-07-07 PROCEDURE — 1159F PR MEDICATION LIST DOCUMENTED IN MEDICAL RECORD: ICD-10-PCS | Mod: S$GLB,,, | Performed by: PODIATRIST

## 2021-07-07 PROCEDURE — 99499 UNLISTED E&M SERVICE: CPT | Mod: S$GLB,,, | Performed by: PODIATRIST

## 2021-07-07 PROCEDURE — 3288F FALL RISK ASSESSMENT DOCD: CPT | Mod: CPTII,S$GLB,, | Performed by: PODIATRIST

## 2021-07-07 PROCEDURE — 87076 CULTURE ANAEROBE IDENT EACH: CPT | Performed by: PODIATRIST

## 2021-07-07 PROCEDURE — 87075 CULTR BACTERIA EXCEPT BLOOD: CPT | Performed by: PODIATRIST

## 2021-07-07 PROCEDURE — 11721 DEBRIDE NAIL 6 OR MORE: CPT | Mod: Q9,S$GLB,, | Performed by: PODIATRIST

## 2021-07-07 PROCEDURE — 1111F PR DISCHARGE MEDS RECONCILED W/ CURRENT OUTPATIENT MED LIST: ICD-10-PCS | Mod: CPTII,S$GLB,, | Performed by: PODIATRIST

## 2021-07-07 PROCEDURE — 11721 PR DEBRIDEMENT OF NAILS, 6 OR MORE: ICD-10-PCS | Mod: Q9,S$GLB,, | Performed by: PODIATRIST

## 2021-07-07 PROCEDURE — 87070 CULTURE OTHR SPECIMN AEROBIC: CPT | Performed by: PODIATRIST

## 2021-07-07 PROCEDURE — 1159F MED LIST DOCD IN RCRD: CPT | Mod: S$GLB,,, | Performed by: PODIATRIST

## 2021-07-07 PROCEDURE — 1101F PR PT FALLS ASSESS DOC 0-1 FALLS W/OUT INJ PAST YR: ICD-10-PCS | Mod: CPTII,S$GLB,, | Performed by: PODIATRIST

## 2021-07-07 PROCEDURE — 1111F DSCHRG MED/CURRENT MED MERGE: CPT | Mod: CPTII,S$GLB,, | Performed by: PODIATRIST

## 2021-07-07 PROCEDURE — 3288F PR FALLS RISK ASSESSMENT DOCUMENTED: ICD-10-PCS | Mod: CPTII,S$GLB,, | Performed by: PODIATRIST

## 2021-07-07 PROCEDURE — 3052F PR MOST RECENT HEMOGLOBIN A1C LEVEL 8.0 - < 9.0%: ICD-10-PCS | Mod: CPTII,S$GLB,, | Performed by: PODIATRIST

## 2021-07-07 PROCEDURE — 99999 PR PBB SHADOW E&M-EST. PATIENT-LVL III: ICD-10-PCS | Mod: PBBFAC,,, | Performed by: PODIATRIST

## 2021-07-07 PROCEDURE — 87186 SC STD MICRODIL/AGAR DIL: CPT | Mod: 59 | Performed by: PODIATRIST

## 2021-07-07 PROCEDURE — 87077 CULTURE AEROBIC IDENTIFY: CPT | Performed by: PODIATRIST

## 2021-07-07 PROCEDURE — 1125F PR PAIN SEVERITY QUANTIFIED, PAIN PRESENT: ICD-10-PCS | Mod: S$GLB,,, | Performed by: PODIATRIST

## 2021-07-07 PROCEDURE — 3052F HG A1C>EQUAL 8.0%<EQUAL 9.0%: CPT | Mod: CPTII,S$GLB,, | Performed by: PODIATRIST

## 2021-07-07 PROCEDURE — 99214 PR OFFICE/OUTPT VISIT, EST, LEVL IV, 30-39 MIN: ICD-10-PCS | Mod: 25,S$GLB,, | Performed by: PODIATRIST

## 2021-07-07 PROCEDURE — 99499 RISK ADDL DX/OHS AUDIT: ICD-10-PCS | Mod: S$GLB,,, | Performed by: PODIATRIST

## 2021-07-09 ENCOUNTER — NURSE TRIAGE (OUTPATIENT)
Dept: ADMINISTRATIVE | Facility: CLINIC | Age: 76
End: 2021-07-09

## 2021-07-09 RX ORDER — DOXYCYCLINE HYCLATE 100 MG
100 TABLET ORAL 2 TIMES DAILY
Qty: 20 TABLET | Refills: 0 | Status: SHIPPED | OUTPATIENT
Start: 2021-07-09 | End: 2021-07-09

## 2021-07-09 RX ORDER — DOXYCYCLINE HYCLATE 100 MG
100 TABLET ORAL 2 TIMES DAILY
Qty: 20 TABLET | Refills: 0 | Status: SHIPPED | OUTPATIENT
Start: 2021-07-09 | End: 2021-07-12 | Stop reason: SDUPTHER

## 2021-07-11 LAB
BACTERIA SPEC AEROBE CULT: ABNORMAL
BACTERIA SPEC AEROBE CULT: ABNORMAL

## 2021-07-12 ENCOUNTER — TELEPHONE (OUTPATIENT)
Dept: FAMILY MEDICINE | Facility: CLINIC | Age: 76
End: 2021-07-12

## 2021-07-12 DIAGNOSIS — L97.912 LEG ULCER, RIGHT, WITH FAT LAYER EXPOSED: ICD-10-CM

## 2021-07-12 DIAGNOSIS — N18.6 TYPE 2 DIABETES MELLITUS WITH ESRD (END-STAGE RENAL DISEASE): Primary | ICD-10-CM

## 2021-07-12 DIAGNOSIS — E11.22 TYPE 2 DIABETES MELLITUS WITH ESRD (END-STAGE RENAL DISEASE): Primary | ICD-10-CM

## 2021-07-12 LAB — BACTERIA SPEC ANAEROBE CULT: NORMAL

## 2021-07-12 RX ORDER — DOXYCYCLINE HYCLATE 100 MG
100 TABLET ORAL 2 TIMES DAILY
Qty: 20 TABLET | Refills: 0 | Status: SHIPPED | OUTPATIENT
Start: 2021-07-12 | End: 2021-07-12 | Stop reason: SDUPTHER

## 2021-07-12 RX ORDER — DOXYCYCLINE HYCLATE 100 MG
100 TABLET ORAL 2 TIMES DAILY
Qty: 20 TABLET | Refills: 0 | Status: CANCELLED | OUTPATIENT
Start: 2021-07-12

## 2021-07-12 RX ORDER — DOXYCYCLINE HYCLATE 100 MG
100 TABLET ORAL 2 TIMES DAILY
Qty: 20 TABLET | Refills: 0 | Status: SHIPPED | OUTPATIENT
Start: 2021-07-12 | End: 2022-04-06

## 2021-07-13 ENCOUNTER — TELEPHONE (OUTPATIENT)
Dept: PULMONOLOGY | Facility: CLINIC | Age: 76
End: 2021-07-13

## 2021-07-28 ENCOUNTER — OFFICE VISIT (OUTPATIENT)
Dept: PODIATRY | Facility: CLINIC | Age: 76
End: 2021-07-28
Payer: MEDICARE

## 2021-07-28 VITALS — BODY MASS INDEX: 39.56 KG/M2 | WEIGHT: 215 LBS | HEIGHT: 62 IN

## 2021-07-28 DIAGNOSIS — N18.6 TYPE 2 DIABETES MELLITUS WITH ESRD (END-STAGE RENAL DISEASE): Primary | ICD-10-CM

## 2021-07-28 DIAGNOSIS — E11.22 TYPE 2 DIABETES MELLITUS WITH ESRD (END-STAGE RENAL DISEASE): Primary | ICD-10-CM

## 2021-07-28 DIAGNOSIS — L97.912 LEG ULCER, RIGHT, WITH FAT LAYER EXPOSED: ICD-10-CM

## 2021-07-28 PROCEDURE — 1101F PR PT FALLS ASSESS DOC 0-1 FALLS W/OUT INJ PAST YR: ICD-10-PCS | Mod: CPTII,S$GLB,, | Performed by: PODIATRIST

## 2021-07-28 PROCEDURE — 1159F PR MEDICATION LIST DOCUMENTED IN MEDICAL RECORD: ICD-10-PCS | Mod: CPTII,S$GLB,, | Performed by: PODIATRIST

## 2021-07-28 PROCEDURE — 1126F PR PAIN SEVERITY QUANTIFIED, NO PAIN PRESENT: ICD-10-PCS | Mod: CPTII,S$GLB,, | Performed by: PODIATRIST

## 2021-07-28 PROCEDURE — 3052F PR MOST RECENT HEMOGLOBIN A1C LEVEL 8.0 - < 9.0%: ICD-10-PCS | Mod: CPTII,S$GLB,, | Performed by: PODIATRIST

## 2021-07-28 PROCEDURE — 3288F PR FALLS RISK ASSESSMENT DOCUMENTED: ICD-10-PCS | Mod: CPTII,S$GLB,, | Performed by: PODIATRIST

## 2021-07-28 PROCEDURE — 99999 PR PBB SHADOW E&M-EST. PATIENT-LVL III: CPT | Mod: PBBFAC,,, | Performed by: PODIATRIST

## 2021-07-28 PROCEDURE — 1159F MED LIST DOCD IN RCRD: CPT | Mod: CPTII,S$GLB,, | Performed by: PODIATRIST

## 2021-07-28 PROCEDURE — 99213 OFFICE O/P EST LOW 20 MIN: CPT | Mod: S$GLB,,, | Performed by: PODIATRIST

## 2021-07-28 PROCEDURE — 1126F AMNT PAIN NOTED NONE PRSNT: CPT | Mod: CPTII,S$GLB,, | Performed by: PODIATRIST

## 2021-07-28 PROCEDURE — 3288F FALL RISK ASSESSMENT DOCD: CPT | Mod: CPTII,S$GLB,, | Performed by: PODIATRIST

## 2021-07-28 PROCEDURE — 1160F PR REVIEW ALL MEDS BY PRESCRIBER/CLIN PHARMACIST DOCUMENTED: ICD-10-PCS | Mod: CPTII,S$GLB,, | Performed by: PODIATRIST

## 2021-07-28 PROCEDURE — 1101F PT FALLS ASSESS-DOCD LE1/YR: CPT | Mod: CPTII,S$GLB,, | Performed by: PODIATRIST

## 2021-07-28 PROCEDURE — 1160F RVW MEDS BY RX/DR IN RCRD: CPT | Mod: CPTII,S$GLB,, | Performed by: PODIATRIST

## 2021-07-28 PROCEDURE — 99999 PR PBB SHADOW E&M-EST. PATIENT-LVL III: ICD-10-PCS | Mod: PBBFAC,,, | Performed by: PODIATRIST

## 2021-07-28 PROCEDURE — 99213 PR OFFICE/OUTPT VISIT, EST, LEVL III, 20-29 MIN: ICD-10-PCS | Mod: S$GLB,,, | Performed by: PODIATRIST

## 2021-07-28 PROCEDURE — 3052F HG A1C>EQUAL 8.0%<EQUAL 9.0%: CPT | Mod: CPTII,S$GLB,, | Performed by: PODIATRIST

## 2021-08-02 ENCOUNTER — OFFICE VISIT (OUTPATIENT)
Dept: FAMILY MEDICINE | Facility: CLINIC | Age: 76
End: 2021-08-02
Payer: MEDICARE

## 2021-08-02 VITALS
SYSTOLIC BLOOD PRESSURE: 112 MMHG | HEIGHT: 62 IN | HEART RATE: 77 BPM | OXYGEN SATURATION: 98 % | DIASTOLIC BLOOD PRESSURE: 60 MMHG | BODY MASS INDEX: 39.7 KG/M2 | TEMPERATURE: 98 F | WEIGHT: 215.75 LBS

## 2021-08-02 DIAGNOSIS — N18.6 TYPE 2 DIABETES MELLITUS WITH ESRD (END-STAGE RENAL DISEASE): ICD-10-CM

## 2021-08-02 DIAGNOSIS — Z12.11 SCREENING FOR MALIGNANT NEOPLASM OF COLON: ICD-10-CM

## 2021-08-02 DIAGNOSIS — F32.0 MILD MAJOR DEPRESSION: ICD-10-CM

## 2021-08-02 DIAGNOSIS — D57.3 SICKLE CELL TRAIT: ICD-10-CM

## 2021-08-02 DIAGNOSIS — N25.81 SECONDARY RENAL HYPERPARATHYROIDISM: ICD-10-CM

## 2021-08-02 DIAGNOSIS — N18.6 ESRD ON HEMODIALYSIS: ICD-10-CM

## 2021-08-02 DIAGNOSIS — R53.81 DEBILITY: ICD-10-CM

## 2021-08-02 DIAGNOSIS — E11.3553 STABLE PROLIFERATIVE DIABETIC RETINOPATHY OF BOTH EYES ASSOCIATED WITH TYPE 2 DIABETES MELLITUS: ICD-10-CM

## 2021-08-02 DIAGNOSIS — I77.1 TORTUOUS AORTA: ICD-10-CM

## 2021-08-02 DIAGNOSIS — E11.22 TYPE 2 DIABETES MELLITUS WITH ESRD (END-STAGE RENAL DISEASE): ICD-10-CM

## 2021-08-02 DIAGNOSIS — E66.01 CLASS 2 SEVERE OBESITY WITH SERIOUS COMORBIDITY AND BODY MASS INDEX (BMI) OF 39.0 TO 39.9 IN ADULT, UNSPECIFIED OBESITY TYPE: ICD-10-CM

## 2021-08-02 DIAGNOSIS — E11.621 TYPE 2 DIABETES MELLITUS WITH FOOT ULCER, WITH LONG-TERM CURRENT USE OF INSULIN: ICD-10-CM

## 2021-08-02 DIAGNOSIS — D63.8 ANEMIA OF CHRONIC DISEASE: Chronic | ICD-10-CM

## 2021-08-02 DIAGNOSIS — N18.9 CHRONIC KIDNEY DISEASE-MINERAL AND BONE DISORDER: ICD-10-CM

## 2021-08-02 DIAGNOSIS — E11.65 UNCONTROLLED TYPE 2 DIABETES MELLITUS WITH HYPERGLYCEMIA: ICD-10-CM

## 2021-08-02 DIAGNOSIS — I50.32 CHRONIC DIASTOLIC HEART FAILURE: Chronic | ICD-10-CM

## 2021-08-02 DIAGNOSIS — E83.9 CHRONIC KIDNEY DISEASE-MINERAL AND BONE DISORDER: ICD-10-CM

## 2021-08-02 DIAGNOSIS — I27.20 PULMONARY HYPERTENSION: ICD-10-CM

## 2021-08-02 DIAGNOSIS — Z79.4 TYPE 2 DIABETES MELLITUS WITH FOOT ULCER, WITH LONG-TERM CURRENT USE OF INSULIN: ICD-10-CM

## 2021-08-02 DIAGNOSIS — I50.30 HEART FAILURE WITH PRESERVED EJECTION FRACTION, UNSPECIFIED HF CHRONICITY: ICD-10-CM

## 2021-08-02 DIAGNOSIS — E78.5 HYPERLIPIDEMIA LDL GOAL <100: Chronic | ICD-10-CM

## 2021-08-02 DIAGNOSIS — J96.10 CHRONIC RESPIRATORY FAILURE, UNSPECIFIED WHETHER WITH HYPOXIA OR HYPERCAPNIA: ICD-10-CM

## 2021-08-02 DIAGNOSIS — R09.02 HYPOXIA: ICD-10-CM

## 2021-08-02 DIAGNOSIS — Z96.1 PSEUDOPHAKIA: ICD-10-CM

## 2021-08-02 DIAGNOSIS — E55.9 VITAMIN D DEFICIENCY DISEASE: ICD-10-CM

## 2021-08-02 DIAGNOSIS — M89.9 CHRONIC KIDNEY DISEASE-MINERAL AND BONE DISORDER: ICD-10-CM

## 2021-08-02 DIAGNOSIS — N95.2 POSTMENOPAUSAL ATROPHIC VAGINITIS: ICD-10-CM

## 2021-08-02 DIAGNOSIS — G47.33 OSA ON CPAP: Chronic | ICD-10-CM

## 2021-08-02 DIAGNOSIS — L97.509 TYPE 2 DIABETES MELLITUS WITH FOOT ULCER, WITH LONG-TERM CURRENT USE OF INSULIN: ICD-10-CM

## 2021-08-02 DIAGNOSIS — E03.9 HYPOTHYROIDISM (ACQUIRED): Chronic | ICD-10-CM

## 2021-08-02 DIAGNOSIS — N39.46 MIXED INCONTINENCE URGE AND STRESS: ICD-10-CM

## 2021-08-02 DIAGNOSIS — Z00.00 ENCOUNTER FOR PREVENTIVE HEALTH EXAMINATION: Primary | ICD-10-CM

## 2021-08-02 DIAGNOSIS — Z99.2 ESRD ON HEMODIALYSIS: ICD-10-CM

## 2021-08-02 DIAGNOSIS — M81.0 AGE-RELATED OSTEOPOROSIS WITHOUT CURRENT PATHOLOGICAL FRACTURE: ICD-10-CM

## 2021-08-02 PROCEDURE — 3052F PR MOST RECENT HEMOGLOBIN A1C LEVEL 8.0 - < 9.0%: ICD-10-PCS | Mod: CPTII,S$GLB,, | Performed by: NURSE PRACTITIONER

## 2021-08-02 PROCEDURE — 99999 PR PBB SHADOW E&M-EST. PATIENT-LVL V: ICD-10-PCS | Mod: PBBFAC,,, | Performed by: NURSE PRACTITIONER

## 2021-08-02 PROCEDURE — 1160F RVW MEDS BY RX/DR IN RCRD: CPT | Mod: CPTII,S$GLB,, | Performed by: NURSE PRACTITIONER

## 2021-08-02 PROCEDURE — 1160F PR REVIEW ALL MEDS BY PRESCRIBER/CLIN PHARMACIST DOCUMENTED: ICD-10-PCS | Mod: CPTII,S$GLB,, | Performed by: NURSE PRACTITIONER

## 2021-08-02 PROCEDURE — 1126F PR PAIN SEVERITY QUANTIFIED, NO PAIN PRESENT: ICD-10-PCS | Mod: CPTII,S$GLB,, | Performed by: NURSE PRACTITIONER

## 2021-08-02 PROCEDURE — G0439 PPPS, SUBSEQ VISIT: HCPCS | Mod: S$GLB,,, | Performed by: NURSE PRACTITIONER

## 2021-08-02 PROCEDURE — 1101F PT FALLS ASSESS-DOCD LE1/YR: CPT | Mod: CPTII,S$GLB,, | Performed by: NURSE PRACTITIONER

## 2021-08-02 PROCEDURE — 3288F PR FALLS RISK ASSESSMENT DOCUMENTED: ICD-10-PCS | Mod: CPTII,S$GLB,, | Performed by: NURSE PRACTITIONER

## 2021-08-02 PROCEDURE — 3078F PR MOST RECENT DIASTOLIC BLOOD PRESSURE < 80 MM HG: ICD-10-PCS | Mod: CPTII,S$GLB,, | Performed by: NURSE PRACTITIONER

## 2021-08-02 PROCEDURE — 1159F PR MEDICATION LIST DOCUMENTED IN MEDICAL RECORD: ICD-10-PCS | Mod: CPTII,S$GLB,, | Performed by: NURSE PRACTITIONER

## 2021-08-02 PROCEDURE — 1101F PR PT FALLS ASSESS DOC 0-1 FALLS W/OUT INJ PAST YR: ICD-10-PCS | Mod: CPTII,S$GLB,, | Performed by: NURSE PRACTITIONER

## 2021-08-02 PROCEDURE — 3052F HG A1C>EQUAL 8.0%<EQUAL 9.0%: CPT | Mod: CPTII,S$GLB,, | Performed by: NURSE PRACTITIONER

## 2021-08-02 PROCEDURE — 3074F SYST BP LT 130 MM HG: CPT | Mod: CPTII,S$GLB,, | Performed by: NURSE PRACTITIONER

## 2021-08-02 PROCEDURE — 99499 RISK ADDL DX/OHS AUDIT: ICD-10-PCS | Mod: S$GLB,,, | Performed by: NURSE PRACTITIONER

## 2021-08-02 PROCEDURE — 3288F FALL RISK ASSESSMENT DOCD: CPT | Mod: CPTII,S$GLB,, | Performed by: NURSE PRACTITIONER

## 2021-08-02 PROCEDURE — 99999 PR PBB SHADOW E&M-EST. PATIENT-LVL V: CPT | Mod: PBBFAC,,, | Performed by: NURSE PRACTITIONER

## 2021-08-02 PROCEDURE — 99499 UNLISTED E&M SERVICE: CPT | Mod: S$GLB,,, | Performed by: NURSE PRACTITIONER

## 2021-08-02 PROCEDURE — 3074F PR MOST RECENT SYSTOLIC BLOOD PRESSURE < 130 MM HG: ICD-10-PCS | Mod: CPTII,S$GLB,, | Performed by: NURSE PRACTITIONER

## 2021-08-02 PROCEDURE — G0439 PR MEDICARE ANNUAL WELLNESS SUBSEQUENT VISIT: ICD-10-PCS | Mod: S$GLB,,, | Performed by: NURSE PRACTITIONER

## 2021-08-02 PROCEDURE — 3078F DIAST BP <80 MM HG: CPT | Mod: CPTII,S$GLB,, | Performed by: NURSE PRACTITIONER

## 2021-08-02 PROCEDURE — 1126F AMNT PAIN NOTED NONE PRSNT: CPT | Mod: CPTII,S$GLB,, | Performed by: NURSE PRACTITIONER

## 2021-08-02 PROCEDURE — 1159F MED LIST DOCD IN RCRD: CPT | Mod: CPTII,S$GLB,, | Performed by: NURSE PRACTITIONER

## 2021-08-02 RX ORDER — CITALOPRAM 10 MG/1
10 TABLET ORAL DAILY
COMMUNITY
Start: 2021-05-10 | End: 2022-01-03 | Stop reason: CLARIF

## 2021-08-02 RX ORDER — DULAGLUTIDE 0.75 MG/.5ML
INJECTION, SOLUTION SUBCUTANEOUS
COMMUNITY
Start: 2021-07-26 | End: 2022-03-10

## 2021-08-04 ENCOUNTER — HOSPITAL ENCOUNTER (OUTPATIENT)
Dept: CARDIOLOGY | Facility: HOSPITAL | Age: 76
Discharge: HOME | End: 2021-08-04
Attending: SURGERY
Payer: MEDICARE

## 2021-08-04 DIAGNOSIS — T82.858D ARTERIOVENOUS FISTULA STENOSIS, SUBSEQUENT ENCOUNTER: ICD-10-CM

## 2021-08-04 PROCEDURE — 93990 DOPPLER FLOW TESTING: CPT | Mod: 26,,, | Performed by: SURGERY

## 2021-08-04 PROCEDURE — 93990 DOPPLER FLOW TESTING: CPT | Mod: TC

## 2021-08-04 PROCEDURE — 93990 CV US HEMODIALYSIS ACCESS (CUPID ONLY): ICD-10-PCS | Mod: 26,,, | Performed by: SURGERY

## 2021-08-09 ENCOUNTER — OFFICE VISIT (OUTPATIENT)
Dept: VASCULAR SURGERY | Facility: CLINIC | Age: 76
End: 2021-08-09
Attending: SURGERY
Payer: MEDICARE

## 2021-08-09 VITALS
DIASTOLIC BLOOD PRESSURE: 70 MMHG | SYSTOLIC BLOOD PRESSURE: 102 MMHG | HEIGHT: 62 IN | WEIGHT: 217.06 LBS | BODY MASS INDEX: 39.94 KG/M2

## 2021-08-09 DIAGNOSIS — T82.858D ARTERIOVENOUS FISTULA STENOSIS, SUBSEQUENT ENCOUNTER: Primary | ICD-10-CM

## 2021-08-09 PROCEDURE — 1159F PR MEDICATION LIST DOCUMENTED IN MEDICAL RECORD: ICD-10-PCS | Mod: CPTII,S$GLB,, | Performed by: SURGERY

## 2021-08-09 PROCEDURE — 3074F SYST BP LT 130 MM HG: CPT | Mod: CPTII,S$GLB,, | Performed by: SURGERY

## 2021-08-09 PROCEDURE — 1101F PR PT FALLS ASSESS DOC 0-1 FALLS W/OUT INJ PAST YR: ICD-10-PCS | Mod: CPTII,S$GLB,, | Performed by: SURGERY

## 2021-08-09 PROCEDURE — 3288F PR FALLS RISK ASSESSMENT DOCUMENTED: ICD-10-PCS | Mod: CPTII,S$GLB,, | Performed by: SURGERY

## 2021-08-09 PROCEDURE — 3052F HG A1C>EQUAL 8.0%<EQUAL 9.0%: CPT | Mod: CPTII,S$GLB,, | Performed by: SURGERY

## 2021-08-09 PROCEDURE — 99999 PR PBB SHADOW E&M-EST. PATIENT-LVL III: CPT | Mod: PBBFAC,,, | Performed by: SURGERY

## 2021-08-09 PROCEDURE — 1159F MED LIST DOCD IN RCRD: CPT | Mod: CPTII,S$GLB,, | Performed by: SURGERY

## 2021-08-09 PROCEDURE — 3074F PR MOST RECENT SYSTOLIC BLOOD PRESSURE < 130 MM HG: ICD-10-PCS | Mod: CPTII,S$GLB,, | Performed by: SURGERY

## 2021-08-09 PROCEDURE — 1160F RVW MEDS BY RX/DR IN RCRD: CPT | Mod: CPTII,S$GLB,, | Performed by: SURGERY

## 2021-08-09 PROCEDURE — 3052F PR MOST RECENT HEMOGLOBIN A1C LEVEL 8.0 - < 9.0%: ICD-10-PCS | Mod: CPTII,S$GLB,, | Performed by: SURGERY

## 2021-08-09 PROCEDURE — 99214 PR OFFICE/OUTPT VISIT, EST, LEVL IV, 30-39 MIN: ICD-10-PCS | Mod: S$GLB,,, | Performed by: SURGERY

## 2021-08-09 PROCEDURE — 3078F DIAST BP <80 MM HG: CPT | Mod: CPTII,S$GLB,, | Performed by: SURGERY

## 2021-08-09 PROCEDURE — 1101F PT FALLS ASSESS-DOCD LE1/YR: CPT | Mod: CPTII,S$GLB,, | Performed by: SURGERY

## 2021-08-09 PROCEDURE — 3078F PR MOST RECENT DIASTOLIC BLOOD PRESSURE < 80 MM HG: ICD-10-PCS | Mod: CPTII,S$GLB,, | Performed by: SURGERY

## 2021-08-09 PROCEDURE — 1126F AMNT PAIN NOTED NONE PRSNT: CPT | Mod: CPTII,S$GLB,, | Performed by: SURGERY

## 2021-08-09 PROCEDURE — 1126F PR PAIN SEVERITY QUANTIFIED, NO PAIN PRESENT: ICD-10-PCS | Mod: CPTII,S$GLB,, | Performed by: SURGERY

## 2021-08-09 PROCEDURE — 99214 OFFICE O/P EST MOD 30 MIN: CPT | Mod: S$GLB,,, | Performed by: SURGERY

## 2021-08-09 PROCEDURE — 3288F FALL RISK ASSESSMENT DOCD: CPT | Mod: CPTII,S$GLB,, | Performed by: SURGERY

## 2021-08-09 PROCEDURE — 99999 PR PBB SHADOW E&M-EST. PATIENT-LVL III: ICD-10-PCS | Mod: PBBFAC,,, | Performed by: SURGERY

## 2021-08-09 PROCEDURE — 1160F PR REVIEW ALL MEDS BY PRESCRIBER/CLIN PHARMACIST DOCUMENTED: ICD-10-PCS | Mod: CPTII,S$GLB,, | Performed by: SURGERY

## 2021-08-10 PROCEDURE — G0179 MD RECERTIFICATION HHA PT: HCPCS | Mod: ,,, | Performed by: PODIATRIST

## 2021-08-10 PROCEDURE — G0179 PR HOME HEALTH MD RECERTIFICATION: ICD-10-PCS | Mod: ,,, | Performed by: PODIATRIST

## 2021-08-13 ENCOUNTER — EXTERNAL HOME HEALTH (OUTPATIENT)
Dept: HOME HEALTH SERVICES | Facility: HOSPITAL | Age: 76
End: 2021-08-13
Payer: MEDICARE

## 2021-08-16 ENCOUNTER — TELEPHONE (OUTPATIENT)
Dept: ENDOCRINOLOGY | Facility: CLINIC | Age: 76
End: 2021-08-16

## 2021-08-16 LAB
HD ANASTAMOSIS LOCATION: NORMAL
HD FISTULA OUTFLOW VEIN VESSEL: NORMAL
HD INFLOW ARTERY VESSEL: NORMAL
RIGHT DIS GRAFT PSV: 176 CM/ SEC
RIGHT INFLOW PSV: 299 CM/S
RIGHT MID GRAFT PSV: 208 CM/S
RIGHT OUTFLOW VEIN PSV: 108 CM/ SEC
RIGHT PROX ANA PSV: 340 CM/S
RIGHT PROX GRAFT PSV: 588 CM/S
RIGHT VOLUME FLOW PSV: 5301 ML/MIN

## 2021-08-19 DIAGNOSIS — Z86.73 HX-TIA (TRANSIENT ISCHEMIC ATTACK): ICD-10-CM

## 2021-08-19 RX ORDER — CLOPIDOGREL BISULFATE 75 MG/1
75 TABLET ORAL DAILY
Qty: 21 TABLET | Refills: 0 | OUTPATIENT
Start: 2021-08-19 | End: 2021-09-09

## 2021-08-20 ENCOUNTER — TELEPHONE (OUTPATIENT)
Dept: FAMILY MEDICINE | Facility: CLINIC | Age: 76
End: 2021-08-20

## 2021-08-20 ENCOUNTER — TELEPHONE (OUTPATIENT)
Dept: VASCULAR SURGERY | Facility: CLINIC | Age: 76
End: 2021-08-20

## 2021-08-25 DIAGNOSIS — Z86.73 HX-TIA (TRANSIENT ISCHEMIC ATTACK): ICD-10-CM

## 2021-08-25 RX ORDER — CLOPIDOGREL BISULFATE 75 MG/1
75 TABLET ORAL DAILY
Qty: 21 TABLET | Refills: 0 | OUTPATIENT
Start: 2021-08-25 | End: 2021-09-15

## 2021-09-10 ENCOUNTER — TELEPHONE (OUTPATIENT)
Dept: FAMILY MEDICINE | Facility: CLINIC | Age: 76
End: 2021-09-10

## 2021-09-10 DIAGNOSIS — Z99.2 ESRD ON HEMODIALYSIS: Primary | ICD-10-CM

## 2021-09-10 DIAGNOSIS — N18.6 ESRD ON HEMODIALYSIS: Primary | ICD-10-CM

## 2021-09-10 DIAGNOSIS — R53.81 DEBILITY: ICD-10-CM

## 2021-09-14 ENCOUNTER — TELEPHONE (OUTPATIENT)
Dept: FAMILY MEDICINE | Facility: CLINIC | Age: 76
End: 2021-09-14

## 2021-09-16 ENCOUNTER — TELEPHONE (OUTPATIENT)
Dept: FAMILY MEDICINE | Facility: CLINIC | Age: 76
End: 2021-09-16

## 2021-09-17 PROBLEM — E87.29 HIGH ANION GAP METABOLIC ACIDOSIS: Status: RESOLVED | Noted: 2021-06-06 | Resolved: 2021-09-17

## 2021-09-17 PROBLEM — R09.02 HYPOXIA: Status: RESOLVED | Noted: 2021-06-06 | Resolved: 2021-09-17

## 2021-09-17 PROBLEM — R42 DIZZINESS: Status: RESOLVED | Noted: 2021-06-06 | Resolved: 2021-09-17

## 2021-09-17 PROBLEM — Z86.73 H/O: STROKE: Status: ACTIVE | Noted: 2021-06-06

## 2021-09-17 PROBLEM — I95.9 HYPOTENSION: Status: RESOLVED | Noted: 2019-08-27 | Resolved: 2021-09-17

## 2021-09-21 NOTE — ASSESSMENT & PLAN NOTE
- at [facility]; Dr. Barrientos  - Tuesday, Thursday and Saturday  - has AV graft in left UE  - see plan above given hyperphosphatemia  
Hyperparathyroidism due to renal disease  On Zemplar?  Checking PTH level  
Patient present to clinic for evaluation and treatment of Chronic kidney disease-mineral and bone disorder as the cause of her low bone density/osteoporosis  Risk factors include ESRD on dialysis, hyperphosphatemia, hypocalcemia, vitamin-D deficiency (reported in the past)  High risk of falls given knee pain, poor gait, the need to use walker  Patient isn't taking her phosphate lowering binder as directed, will often missed dose daily to 2 times a day  Possibly is on Zemplar (vitamin D analog)    Plan:  Difficult case of mineral bone disease, due to poor compliance of phosphate binder  At this time advised patient to be compliant, need to follow low phosphate diet  Will need more information from dialysis center, patient was not aware of her regimen there. Will send request  We will repeat lab work, monitor PTH level  Close follow up, given high risk, may benefit from medical therapy      
Patient reports chronic knee pain, use walker for long ambulation  
Patient with history of hypothyroidism etiology unclear  On level thyroxine 50 mcg daily (low-dose)  Repeat TFT  Will titrate level if needed  
Type 2 diabetes, A1c of 7.0 (given ESRD status, can be falsely low)  Will check fructosamine level  Continue lifestyle modification, and diet at this time  Will discuss further at next office visit  
Will monitor weight  
No

## 2021-09-24 ENCOUNTER — DOCUMENT SCAN (OUTPATIENT)
Dept: HOME HEALTH SERVICES | Facility: HOSPITAL | Age: 76
End: 2021-09-24
Payer: MEDICARE

## 2021-09-28 ENCOUNTER — TELEPHONE (OUTPATIENT)
Dept: FAMILY MEDICINE | Facility: CLINIC | Age: 76
End: 2021-09-28

## 2021-09-28 DIAGNOSIS — G47.33 OSA (OBSTRUCTIVE SLEEP APNEA): Primary | ICD-10-CM

## 2021-10-06 ENCOUNTER — TELEPHONE (OUTPATIENT)
Dept: FAMILY MEDICINE | Facility: CLINIC | Age: 76
End: 2021-10-06

## 2021-10-06 ENCOUNTER — OFFICE VISIT (OUTPATIENT)
Dept: PODIATRY | Facility: CLINIC | Age: 76
End: 2021-10-06
Payer: MEDICARE

## 2021-10-06 VITALS — BODY MASS INDEX: 39.93 KG/M2 | WEIGHT: 217 LBS | HEIGHT: 62 IN

## 2021-10-06 DIAGNOSIS — N18.6 TYPE 2 DIABETES MELLITUS WITH ESRD (END-STAGE RENAL DISEASE): Primary | ICD-10-CM

## 2021-10-06 DIAGNOSIS — E11.22 TYPE 2 DIABETES MELLITUS WITH ESRD (END-STAGE RENAL DISEASE): Primary | ICD-10-CM

## 2021-10-06 DIAGNOSIS — G47.33 OSA (OBSTRUCTIVE SLEEP APNEA): Primary | ICD-10-CM

## 2021-10-06 DIAGNOSIS — M20.12 HALLUX ABDUCTO VALGUS, LEFT: ICD-10-CM

## 2021-10-06 DIAGNOSIS — L90.9 PLANTAR FAT PAD ATROPHY: ICD-10-CM

## 2021-10-06 DIAGNOSIS — M20.42 HAMMER TOES OF BOTH FEET: ICD-10-CM

## 2021-10-06 DIAGNOSIS — M20.41 HAMMER TOES OF BOTH FEET: ICD-10-CM

## 2021-10-06 DIAGNOSIS — M20.11 HALLUX ABDUCTO VALGUS, RIGHT: ICD-10-CM

## 2021-10-06 PROCEDURE — 1159F PR MEDICATION LIST DOCUMENTED IN MEDICAL RECORD: ICD-10-PCS | Mod: CPTII,S$GLB,, | Performed by: PODIATRIST

## 2021-10-06 PROCEDURE — 99213 PR OFFICE/OUTPT VISIT, EST, LEVL III, 20-29 MIN: ICD-10-PCS | Mod: S$GLB,,, | Performed by: PODIATRIST

## 2021-10-06 PROCEDURE — 3051F PR MOST RECENT HEMOGLOBIN A1C LEVEL 7.0 - < 8.0%: ICD-10-PCS | Mod: CPTII,S$GLB,, | Performed by: PODIATRIST

## 2021-10-06 PROCEDURE — 1160F RVW MEDS BY RX/DR IN RCRD: CPT | Mod: CPTII,S$GLB,, | Performed by: PODIATRIST

## 2021-10-06 PROCEDURE — 1126F PR PAIN SEVERITY QUANTIFIED, NO PAIN PRESENT: ICD-10-PCS | Mod: CPTII,S$GLB,, | Performed by: PODIATRIST

## 2021-10-06 PROCEDURE — 3051F HG A1C>EQUAL 7.0%<8.0%: CPT | Mod: CPTII,S$GLB,, | Performed by: PODIATRIST

## 2021-10-06 PROCEDURE — 1101F PR PT FALLS ASSESS DOC 0-1 FALLS W/OUT INJ PAST YR: ICD-10-PCS | Mod: CPTII,S$GLB,, | Performed by: PODIATRIST

## 2021-10-06 PROCEDURE — 99999 PR PBB SHADOW E&M-EST. PATIENT-LVL III: CPT | Mod: PBBFAC,,, | Performed by: PODIATRIST

## 2021-10-06 PROCEDURE — 99999 PR PBB SHADOW E&M-EST. PATIENT-LVL III: ICD-10-PCS | Mod: PBBFAC,,, | Performed by: PODIATRIST

## 2021-10-06 PROCEDURE — 3288F FALL RISK ASSESSMENT DOCD: CPT | Mod: CPTII,S$GLB,, | Performed by: PODIATRIST

## 2021-10-06 PROCEDURE — 1126F AMNT PAIN NOTED NONE PRSNT: CPT | Mod: CPTII,S$GLB,, | Performed by: PODIATRIST

## 2021-10-06 PROCEDURE — 3288F PR FALLS RISK ASSESSMENT DOCUMENTED: ICD-10-PCS | Mod: CPTII,S$GLB,, | Performed by: PODIATRIST

## 2021-10-06 PROCEDURE — 99213 OFFICE O/P EST LOW 20 MIN: CPT | Mod: S$GLB,,, | Performed by: PODIATRIST

## 2021-10-06 PROCEDURE — 3066F PR DOCUMENTATION OF TREATMENT FOR NEPHROPATHY: ICD-10-PCS | Mod: CPTII,S$GLB,, | Performed by: PODIATRIST

## 2021-10-06 PROCEDURE — 3066F NEPHROPATHY DOC TX: CPT | Mod: CPTII,S$GLB,, | Performed by: PODIATRIST

## 2021-10-06 PROCEDURE — 1160F PR REVIEW ALL MEDS BY PRESCRIBER/CLIN PHARMACIST DOCUMENTED: ICD-10-PCS | Mod: CPTII,S$GLB,, | Performed by: PODIATRIST

## 2021-10-06 PROCEDURE — 1101F PT FALLS ASSESS-DOCD LE1/YR: CPT | Mod: CPTII,S$GLB,, | Performed by: PODIATRIST

## 2021-10-06 PROCEDURE — 1159F MED LIST DOCD IN RCRD: CPT | Mod: CPTII,S$GLB,, | Performed by: PODIATRIST

## 2021-10-06 PROCEDURE — 99499 UNLISTED E&M SERVICE: CPT | Mod: S$PBB,,, | Performed by: PODIATRIST

## 2021-10-06 PROCEDURE — 99499 RISK ADDL DX/OHS AUDIT: ICD-10-PCS | Mod: S$PBB,,, | Performed by: PODIATRIST

## 2021-10-15 ENCOUNTER — IMMUNIZATION (OUTPATIENT)
Dept: OBSTETRICS AND GYNECOLOGY | Facility: CLINIC | Age: 76
End: 2021-10-15
Payer: MEDICARE

## 2021-10-15 DIAGNOSIS — Z23 NEED FOR VACCINATION: Primary | ICD-10-CM

## 2021-10-15 PROCEDURE — 91300 COVID-19, MRNA, LNP-S, PF, 30 MCG/0.3 ML DOSE VACCINE: CPT | Mod: PBBFAC | Performed by: FAMILY MEDICINE

## 2021-10-15 PROCEDURE — 0003A COVID-19, MRNA, LNP-S, PF, 30 MCG/0.3 ML DOSE VACCINE: CPT | Mod: PBBFAC | Performed by: FAMILY MEDICINE

## 2021-10-18 ENCOUNTER — TELEPHONE (OUTPATIENT)
Dept: FAMILY MEDICINE | Facility: CLINIC | Age: 76
End: 2021-10-18

## 2021-10-27 RX ORDER — MIDODRINE HYDROCHLORIDE 5 MG/1
5 TABLET ORAL 3 TIMES DAILY
Qty: 90 TABLET | Refills: 11 | OUTPATIENT
Start: 2021-10-27 | End: 2022-10-27

## 2021-10-27 RX ORDER — ATORVASTATIN CALCIUM 80 MG/1
80 TABLET, FILM COATED ORAL NIGHTLY
Qty: 90 TABLET | Refills: 0 | Status: SHIPPED | OUTPATIENT
Start: 2021-10-27 | End: 2022-02-21

## 2021-10-29 ENCOUNTER — TELEPHONE (OUTPATIENT)
Dept: FAMILY MEDICINE | Facility: CLINIC | Age: 76
End: 2021-10-29
Payer: MEDICARE

## 2021-11-01 ENCOUNTER — OFFICE VISIT (OUTPATIENT)
Dept: FAMILY MEDICINE | Facility: CLINIC | Age: 76
End: 2021-11-01
Payer: MEDICARE

## 2021-11-01 VITALS
TEMPERATURE: 99 F | DIASTOLIC BLOOD PRESSURE: 62 MMHG | SYSTOLIC BLOOD PRESSURE: 112 MMHG | BODY MASS INDEX: 39.79 KG/M2 | HEIGHT: 62 IN | OXYGEN SATURATION: 96 % | WEIGHT: 216.25 LBS | HEART RATE: 84 BPM

## 2021-11-01 DIAGNOSIS — E11.3553 STABLE PROLIFERATIVE DIABETIC RETINOPATHY OF BOTH EYES ASSOCIATED WITH TYPE 2 DIABETES MELLITUS: ICD-10-CM

## 2021-11-01 DIAGNOSIS — I50.32 CHRONIC DIASTOLIC HEART FAILURE: Chronic | ICD-10-CM

## 2021-11-01 DIAGNOSIS — E78.5 HYPERLIPIDEMIA LDL GOAL <100: Chronic | ICD-10-CM

## 2021-11-01 DIAGNOSIS — G56.03 BILATERAL CARPAL TUNNEL SYNDROME: ICD-10-CM

## 2021-11-01 DIAGNOSIS — E03.9 HYPOTHYROIDISM (ACQUIRED): Chronic | ICD-10-CM

## 2021-11-01 DIAGNOSIS — G47.33 OSA ON CPAP: Chronic | ICD-10-CM

## 2021-11-01 DIAGNOSIS — E66.01 CLASS 2 SEVERE OBESITY WITH SERIOUS COMORBIDITY AND BODY MASS INDEX (BMI) OF 39.0 TO 39.9 IN ADULT, UNSPECIFIED OBESITY TYPE: ICD-10-CM

## 2021-11-01 DIAGNOSIS — Z12.11 COLON CANCER SCREENING: ICD-10-CM

## 2021-11-01 DIAGNOSIS — Z23 NEEDS FLU SHOT: ICD-10-CM

## 2021-11-01 DIAGNOSIS — N18.6 TYPE 2 DIABETES MELLITUS WITH ESRD (END-STAGE RENAL DISEASE): ICD-10-CM

## 2021-11-01 DIAGNOSIS — M19.041 ARTHRITIS OF RIGHT HAND: ICD-10-CM

## 2021-11-01 DIAGNOSIS — N18.6 ESRD ON HEMODIALYSIS: ICD-10-CM

## 2021-11-01 DIAGNOSIS — M19.042 ARTHRITIS OF LEFT HAND: ICD-10-CM

## 2021-11-01 DIAGNOSIS — E11.22 TYPE 2 DIABETES MELLITUS WITH ESRD (END-STAGE RENAL DISEASE): ICD-10-CM

## 2021-11-01 DIAGNOSIS — Z86.73 H/O: STROKE: ICD-10-CM

## 2021-11-01 DIAGNOSIS — Z00.00 ANNUAL PHYSICAL EXAM: Primary | ICD-10-CM

## 2021-11-01 DIAGNOSIS — Z99.2 ESRD ON HEMODIALYSIS: ICD-10-CM

## 2021-11-01 PROCEDURE — 90694 FLU VACCINE - QUADRIVALENT - ADJUVANTED: ICD-10-PCS | Mod: S$GLB,,, | Performed by: NURSE PRACTITIONER

## 2021-11-01 PROCEDURE — 99999 PR PBB SHADOW E&M-EST. PATIENT-LVL V: CPT | Mod: PBBFAC,,, | Performed by: NURSE PRACTITIONER

## 2021-11-01 PROCEDURE — 3288F FALL RISK ASSESSMENT DOCD: CPT | Mod: CPTII,S$GLB,, | Performed by: NURSE PRACTITIONER

## 2021-11-01 PROCEDURE — 3074F SYST BP LT 130 MM HG: CPT | Mod: CPTII,S$GLB,, | Performed by: NURSE PRACTITIONER

## 2021-11-01 PROCEDURE — 3051F PR MOST RECENT HEMOGLOBIN A1C LEVEL 7.0 - < 8.0%: ICD-10-PCS | Mod: CPTII,S$GLB,, | Performed by: NURSE PRACTITIONER

## 2021-11-01 PROCEDURE — 90694 VACC AIIV4 NO PRSRV 0.5ML IM: CPT | Mod: S$GLB,,, | Performed by: NURSE PRACTITIONER

## 2021-11-01 PROCEDURE — 99214 OFFICE O/P EST MOD 30 MIN: CPT | Mod: S$GLB,,, | Performed by: NURSE PRACTITIONER

## 2021-11-01 PROCEDURE — G0008 ADMIN INFLUENZA VIRUS VAC: HCPCS | Mod: S$GLB,,, | Performed by: NURSE PRACTITIONER

## 2021-11-01 PROCEDURE — 99214 PR OFFICE/OUTPT VISIT, EST, LEVL IV, 30-39 MIN: ICD-10-PCS | Mod: S$GLB,,, | Performed by: NURSE PRACTITIONER

## 2021-11-01 PROCEDURE — 1126F AMNT PAIN NOTED NONE PRSNT: CPT | Mod: CPTII,S$GLB,, | Performed by: NURSE PRACTITIONER

## 2021-11-01 PROCEDURE — 1126F PR PAIN SEVERITY QUANTIFIED, NO PAIN PRESENT: ICD-10-PCS | Mod: CPTII,S$GLB,, | Performed by: NURSE PRACTITIONER

## 2021-11-01 PROCEDURE — 1159F MED LIST DOCD IN RCRD: CPT | Mod: CPTII,S$GLB,, | Performed by: NURSE PRACTITIONER

## 2021-11-01 PROCEDURE — 99999 PR PBB SHADOW E&M-EST. PATIENT-LVL V: ICD-10-PCS | Mod: PBBFAC,,, | Performed by: NURSE PRACTITIONER

## 2021-11-01 PROCEDURE — 3051F HG A1C>EQUAL 7.0%<8.0%: CPT | Mod: CPTII,S$GLB,, | Performed by: NURSE PRACTITIONER

## 2021-11-01 PROCEDURE — 1159F PR MEDICATION LIST DOCUMENTED IN MEDICAL RECORD: ICD-10-PCS | Mod: CPTII,S$GLB,, | Performed by: NURSE PRACTITIONER

## 2021-11-01 PROCEDURE — G0008 FLU VACCINE - QUADRIVALENT - ADJUVANTED: ICD-10-PCS | Mod: S$GLB,,, | Performed by: NURSE PRACTITIONER

## 2021-11-01 PROCEDURE — 3078F PR MOST RECENT DIASTOLIC BLOOD PRESSURE < 80 MM HG: ICD-10-PCS | Mod: CPTII,S$GLB,, | Performed by: NURSE PRACTITIONER

## 2021-11-01 PROCEDURE — 3074F PR MOST RECENT SYSTOLIC BLOOD PRESSURE < 130 MM HG: ICD-10-PCS | Mod: CPTII,S$GLB,, | Performed by: NURSE PRACTITIONER

## 2021-11-01 PROCEDURE — 1101F PR PT FALLS ASSESS DOC 0-1 FALLS W/OUT INJ PAST YR: ICD-10-PCS | Mod: CPTII,S$GLB,, | Performed by: NURSE PRACTITIONER

## 2021-11-01 PROCEDURE — 1101F PT FALLS ASSESS-DOCD LE1/YR: CPT | Mod: CPTII,S$GLB,, | Performed by: NURSE PRACTITIONER

## 2021-11-01 PROCEDURE — 3288F PR FALLS RISK ASSESSMENT DOCUMENTED: ICD-10-PCS | Mod: CPTII,S$GLB,, | Performed by: NURSE PRACTITIONER

## 2021-11-01 PROCEDURE — 3066F NEPHROPATHY DOC TX: CPT | Mod: CPTII,S$GLB,, | Performed by: NURSE PRACTITIONER

## 2021-11-01 PROCEDURE — 3078F DIAST BP <80 MM HG: CPT | Mod: CPTII,S$GLB,, | Performed by: NURSE PRACTITIONER

## 2021-11-01 PROCEDURE — 3066F PR DOCUMENTATION OF TREATMENT FOR NEPHROPATHY: ICD-10-PCS | Mod: CPTII,S$GLB,, | Performed by: NURSE PRACTITIONER

## 2021-11-01 RX ORDER — CINACALCET 30 MG/1
TABLET, FILM COATED ORAL
COMMUNITY
Start: 2021-06-29

## 2021-11-01 RX ORDER — VIT B COMP NO.3/FOLIC/C/BIOTIN 1 MG-60 MG
1 TABLET ORAL DAILY
COMMUNITY
Start: 2021-08-12

## 2021-11-01 RX ORDER — DICLOFENAC SODIUM 10 MG/G
2 GEL TOPICAL DAILY
Qty: 20 G | Refills: 0 | Status: SHIPPED | OUTPATIENT
Start: 2021-11-01 | End: 2022-04-06

## 2021-11-03 ENCOUNTER — LAB VISIT (OUTPATIENT)
Dept: LAB | Facility: HOSPITAL | Age: 76
End: 2021-11-03
Attending: HOSPITALIST
Payer: MEDICARE

## 2021-11-03 DIAGNOSIS — M89.9 CHRONIC KIDNEY DISEASE-MINERAL AND BONE DISORDER: ICD-10-CM

## 2021-11-03 DIAGNOSIS — E03.9 HYPOTHYROIDISM (ACQUIRED): Chronic | ICD-10-CM

## 2021-11-03 DIAGNOSIS — I50.32 CHRONIC DIASTOLIC HEART FAILURE: Chronic | ICD-10-CM

## 2021-11-03 DIAGNOSIS — E83.9 CHRONIC KIDNEY DISEASE-MINERAL AND BONE DISORDER: ICD-10-CM

## 2021-11-03 DIAGNOSIS — E11.65 UNCONTROLLED TYPE 2 DIABETES MELLITUS WITH HYPERGLYCEMIA: ICD-10-CM

## 2021-11-03 DIAGNOSIS — N18.6 TYPE 2 DIABETES MELLITUS WITH ESRD (END-STAGE RENAL DISEASE): ICD-10-CM

## 2021-11-03 DIAGNOSIS — M81.0 AGE-RELATED OSTEOPOROSIS WITHOUT CURRENT PATHOLOGICAL FRACTURE: ICD-10-CM

## 2021-11-03 DIAGNOSIS — Z86.73 H/O: STROKE: ICD-10-CM

## 2021-11-03 DIAGNOSIS — E78.5 HYPERLIPIDEMIA LDL GOAL <100: Chronic | ICD-10-CM

## 2021-11-03 DIAGNOSIS — N18.6 ESRD ON HEMODIALYSIS: ICD-10-CM

## 2021-11-03 DIAGNOSIS — N18.9 CHRONIC KIDNEY DISEASE-MINERAL AND BONE DISORDER: ICD-10-CM

## 2021-11-03 DIAGNOSIS — Z99.2 ESRD ON HEMODIALYSIS: ICD-10-CM

## 2021-11-03 DIAGNOSIS — E66.01 CLASS 2 SEVERE OBESITY WITH SERIOUS COMORBIDITY AND BODY MASS INDEX (BMI) OF 39.0 TO 39.9 IN ADULT, UNSPECIFIED OBESITY TYPE: ICD-10-CM

## 2021-11-03 DIAGNOSIS — E11.22 TYPE 2 DIABETES MELLITUS WITH ESRD (END-STAGE RENAL DISEASE): ICD-10-CM

## 2021-11-03 DIAGNOSIS — Z00.00 ANNUAL PHYSICAL EXAM: ICD-10-CM

## 2021-11-03 LAB
25(OH)D3+25(OH)D2 SERPL-MCNC: 47 NG/ML (ref 30–96)
BASOPHILS # BLD AUTO: 0.04 K/UL (ref 0–0.2)
BASOPHILS NFR BLD: 0.6 % (ref 0–1.9)
DIFFERENTIAL METHOD: ABNORMAL
EOSINOPHIL # BLD AUTO: 0.3 K/UL (ref 0–0.5)
EOSINOPHIL NFR BLD: 4.8 % (ref 0–8)
ERYTHROCYTE [DISTWIDTH] IN BLOOD BY AUTOMATED COUNT: 14.6 % (ref 11.5–14.5)
ESTIMATED AVG GLUCOSE: 140 MG/DL (ref 68–131)
HBA1C MFR BLD: 6.5 % (ref 4–5.6)
HCT VFR BLD AUTO: 38.6 % (ref 37–48.5)
HGB BLD-MCNC: 12.4 G/DL (ref 12–16)
IMM GRANULOCYTES # BLD AUTO: 0.03 K/UL (ref 0–0.04)
IMM GRANULOCYTES NFR BLD AUTO: 0.5 % (ref 0–0.5)
LYMPHOCYTES # BLD AUTO: 1.3 K/UL (ref 1–4.8)
LYMPHOCYTES NFR BLD: 19.6 % (ref 18–48)
MCH RBC QN AUTO: 28.2 PG (ref 27–31)
MCHC RBC AUTO-ENTMCNC: 32.1 G/DL (ref 32–36)
MCV RBC AUTO: 88 FL (ref 82–98)
MONOCYTES # BLD AUTO: 0.9 K/UL (ref 0.3–1)
MONOCYTES NFR BLD: 13.4 % (ref 4–15)
NEUTROPHILS # BLD AUTO: 3.9 K/UL (ref 1.8–7.7)
NEUTROPHILS NFR BLD: 61.1 % (ref 38–73)
NRBC BLD-RTO: 0 /100 WBC
PLATELET # BLD AUTO: 235 K/UL (ref 150–450)
PMV BLD AUTO: 10.6 FL (ref 9.2–12.9)
RBC # BLD AUTO: 4.39 M/UL (ref 4–5.4)
TSH SERPL DL<=0.005 MIU/L-ACNC: 2.63 UIU/ML (ref 0.4–4)
WBC # BLD AUTO: 6.44 K/UL (ref 3.9–12.7)

## 2021-11-03 PROCEDURE — 36415 COLL VENOUS BLD VENIPUNCTURE: CPT | Mod: PO | Performed by: HOSPITALIST

## 2021-11-03 PROCEDURE — 82306 VITAMIN D 25 HYDROXY: CPT | Performed by: HOSPITALIST

## 2021-11-03 PROCEDURE — 80061 LIPID PANEL: CPT | Performed by: NURSE PRACTITIONER

## 2021-11-03 PROCEDURE — 84443 ASSAY THYROID STIM HORMONE: CPT | Performed by: NURSE PRACTITIONER

## 2021-11-03 PROCEDURE — 83036 HEMOGLOBIN GLYCOSYLATED A1C: CPT | Performed by: HOSPITALIST

## 2021-11-03 PROCEDURE — 85025 COMPLETE CBC W/AUTO DIFF WBC: CPT | Performed by: NURSE PRACTITIONER

## 2021-11-03 PROCEDURE — 80053 COMPREHEN METABOLIC PANEL: CPT | Performed by: NURSE PRACTITIONER

## 2021-11-04 DIAGNOSIS — I50.30 HEART FAILURE WITH PRESERVED EJECTION FRACTION, UNSPECIFIED HF CHRONICITY: ICD-10-CM

## 2021-11-04 DIAGNOSIS — Z99.2 ESRD ON HEMODIALYSIS: ICD-10-CM

## 2021-11-04 DIAGNOSIS — N18.6 ESRD ON HEMODIALYSIS: ICD-10-CM

## 2021-11-04 DIAGNOSIS — E11.22 TYPE 2 DIABETES MELLITUS WITH ESRD (END-STAGE RENAL DISEASE): ICD-10-CM

## 2021-11-04 DIAGNOSIS — D63.8 ANEMIA OF CHRONIC DISEASE: ICD-10-CM

## 2021-11-04 DIAGNOSIS — N18.6 TYPE 2 DIABETES MELLITUS WITH ESRD (END-STAGE RENAL DISEASE): ICD-10-CM

## 2021-11-04 DIAGNOSIS — E78.5 HYPERLIPIDEMIA LDL GOAL <100: Primary | ICD-10-CM

## 2021-11-04 LAB
ALBUMIN SERPL BCP-MCNC: 3.4 G/DL (ref 3.5–5.2)
ALP SERPL-CCNC: 143 U/L (ref 55–135)
ALT SERPL W/O P-5'-P-CCNC: 20 U/L (ref 10–44)
ANION GAP SERPL CALC-SCNC: 18 MMOL/L (ref 8–16)
AST SERPL-CCNC: 21 U/L (ref 10–40)
BILIRUB SERPL-MCNC: 0.4 MG/DL (ref 0.1–1)
BUN SERPL-MCNC: 52 MG/DL (ref 8–23)
CALCIUM SERPL-MCNC: 9 MG/DL (ref 8.7–10.5)
CHLORIDE SERPL-SCNC: 100 MMOL/L (ref 95–110)
CHOLEST SERPL-MCNC: 127 MG/DL (ref 120–199)
CHOLEST/HDLC SERPL: 2.4 {RATIO} (ref 2–5)
CO2 SERPL-SCNC: 25 MMOL/L (ref 23–29)
CREAT SERPL-MCNC: 7.8 MG/DL (ref 0.5–1.4)
EST. GFR  (AFRICAN AMERICAN): 5.3 ML/MIN/1.73 M^2
EST. GFR  (NON AFRICAN AMERICAN): 4.6 ML/MIN/1.73 M^2
GLUCOSE SERPL-MCNC: 120 MG/DL (ref 70–110)
HDLC SERPL-MCNC: 52 MG/DL (ref 40–75)
HDLC SERPL: 40.9 % (ref 20–50)
LDLC SERPL CALC-MCNC: 64.4 MG/DL (ref 63–159)
NONHDLC SERPL-MCNC: 75 MG/DL
POTASSIUM SERPL-SCNC: 5.5 MMOL/L (ref 3.5–5.1)
PROT SERPL-MCNC: 8.1 G/DL (ref 6–8.4)
SODIUM SERPL-SCNC: 143 MMOL/L (ref 136–145)
TRIGL SERPL-MCNC: 53 MG/DL (ref 30–150)

## 2021-11-10 ENCOUNTER — CLINICAL SUPPORT (OUTPATIENT)
Dept: REHABILITATION | Facility: HOSPITAL | Age: 76
End: 2021-11-10
Payer: MEDICARE

## 2021-11-10 DIAGNOSIS — R29.818 FINE MOTOR IMPAIRMENT: ICD-10-CM

## 2021-11-10 DIAGNOSIS — R29.898 FINE MOTOR IMPAIRMENT: ICD-10-CM

## 2021-11-10 DIAGNOSIS — R29.898 DECREASED GRIP STRENGTH: ICD-10-CM

## 2021-11-10 DIAGNOSIS — G56.03 BILATERAL CARPAL TUNNEL SYNDROME: ICD-10-CM

## 2021-11-10 PROCEDURE — 97165 OT EVAL LOW COMPLEX 30 MIN: CPT | Mod: PN

## 2021-11-10 PROCEDURE — 97110 THERAPEUTIC EXERCISES: CPT | Mod: PN

## 2021-11-15 ENCOUNTER — TELEPHONE (OUTPATIENT)
Dept: VASCULAR SURGERY | Facility: CLINIC | Age: 76
End: 2021-11-15
Payer: MEDICARE

## 2021-11-19 ENCOUNTER — CLINICAL SUPPORT (OUTPATIENT)
Dept: REHABILITATION | Facility: HOSPITAL | Age: 76
End: 2021-11-19
Payer: MEDICARE

## 2021-11-19 DIAGNOSIS — R29.898 FINE MOTOR IMPAIRMENT: ICD-10-CM

## 2021-11-19 DIAGNOSIS — R29.818 FINE MOTOR IMPAIRMENT: ICD-10-CM

## 2021-11-19 DIAGNOSIS — R29.898 DECREASED GRIP STRENGTH: Primary | ICD-10-CM

## 2021-11-19 PROCEDURE — 97110 THERAPEUTIC EXERCISES: CPT | Mod: PN

## 2021-11-22 LAB — NONINV COLON CA DNA+OCC BLD SCRN STL QL: POSITIVE

## 2021-11-23 DIAGNOSIS — R19.5 POSITIVE COLORECTAL CANCER SCREENING USING COLOGUARD TEST: Primary | ICD-10-CM

## 2021-11-26 ENCOUNTER — CLINICAL SUPPORT (OUTPATIENT)
Dept: REHABILITATION | Facility: HOSPITAL | Age: 76
End: 2021-11-26
Payer: MEDICARE

## 2021-11-26 DIAGNOSIS — R29.898 DECREASED GRIP STRENGTH: Primary | ICD-10-CM

## 2021-11-26 DIAGNOSIS — R29.818 FINE MOTOR IMPAIRMENT: ICD-10-CM

## 2021-11-26 DIAGNOSIS — R29.898 FINE MOTOR IMPAIRMENT: ICD-10-CM

## 2021-11-26 PROCEDURE — 97110 THERAPEUTIC EXERCISES: CPT | Mod: PN

## 2021-11-30 ENCOUNTER — TELEPHONE (OUTPATIENT)
Dept: FAMILY MEDICINE | Facility: CLINIC | Age: 76
End: 2021-11-30
Payer: MEDICARE

## 2021-11-30 ENCOUNTER — TELEPHONE (OUTPATIENT)
Dept: ENDOSCOPY | Facility: HOSPITAL | Age: 76
End: 2021-11-30
Payer: MEDICARE

## 2021-12-01 ENCOUNTER — CLINICAL SUPPORT (OUTPATIENT)
Dept: REHABILITATION | Facility: HOSPITAL | Age: 76
End: 2021-12-01
Payer: MEDICARE

## 2021-12-01 DIAGNOSIS — Z12.11 COLON CANCER SCREENING: ICD-10-CM

## 2021-12-01 DIAGNOSIS — Z99.2 DIALYSIS PATIENT: Primary | ICD-10-CM

## 2021-12-01 DIAGNOSIS — R29.898 DECREASED GRIP STRENGTH: Primary | ICD-10-CM

## 2021-12-01 DIAGNOSIS — R29.818 FINE MOTOR IMPAIRMENT: ICD-10-CM

## 2021-12-01 DIAGNOSIS — R29.898 FINE MOTOR IMPAIRMENT: ICD-10-CM

## 2021-12-01 PROCEDURE — 97110 THERAPEUTIC EXERCISES: CPT | Mod: PN

## 2021-12-01 RX ORDER — SODIUM, POTASSIUM,MAG SULFATES 17.5-3.13G
1 SOLUTION, RECONSTITUTED, ORAL ORAL DAILY
Qty: 1 KIT | Refills: 0 | Status: SHIPPED | OUTPATIENT
Start: 2021-12-01 | End: 2021-12-03

## 2021-12-08 ENCOUNTER — OFFICE VISIT (OUTPATIENT)
Dept: PODIATRY | Facility: CLINIC | Age: 76
End: 2021-12-08
Payer: MEDICARE

## 2021-12-08 ENCOUNTER — CLINICAL SUPPORT (OUTPATIENT)
Dept: REHABILITATION | Facility: HOSPITAL | Age: 76
End: 2021-12-08
Payer: MEDICARE

## 2021-12-08 VITALS — WEIGHT: 216.25 LBS | HEIGHT: 62 IN | BODY MASS INDEX: 39.79 KG/M2

## 2021-12-08 DIAGNOSIS — E11.22 TYPE 2 DIABETES MELLITUS WITH ESRD (END-STAGE RENAL DISEASE): Primary | ICD-10-CM

## 2021-12-08 DIAGNOSIS — N18.6 TYPE 2 DIABETES MELLITUS WITH ESRD (END-STAGE RENAL DISEASE): Primary | ICD-10-CM

## 2021-12-08 DIAGNOSIS — M20.11 HALLUX ABDUCTO VALGUS, RIGHT: ICD-10-CM

## 2021-12-08 DIAGNOSIS — R29.898 FINE MOTOR IMPAIRMENT: ICD-10-CM

## 2021-12-08 DIAGNOSIS — R29.898 DECREASED GRIP STRENGTH: Primary | ICD-10-CM

## 2021-12-08 DIAGNOSIS — L90.9 PLANTAR FAT PAD ATROPHY: ICD-10-CM

## 2021-12-08 DIAGNOSIS — R29.818 FINE MOTOR IMPAIRMENT: ICD-10-CM

## 2021-12-08 DIAGNOSIS — M20.41 HAMMER TOES OF BOTH FEET: ICD-10-CM

## 2021-12-08 DIAGNOSIS — M20.42 HAMMER TOES OF BOTH FEET: ICD-10-CM

## 2021-12-08 DIAGNOSIS — M20.12 HALLUX ABDUCTO VALGUS, LEFT: ICD-10-CM

## 2021-12-08 PROCEDURE — 97110 THERAPEUTIC EXERCISES: CPT | Mod: PN

## 2021-12-08 PROCEDURE — 99999 PR PBB SHADOW E&M-EST. PATIENT-LVL IV: ICD-10-PCS | Mod: PBBFAC,,, | Performed by: PODIATRIST

## 2021-12-08 PROCEDURE — 3066F NEPHROPATHY DOC TX: CPT | Mod: CPTII,S$GLB,, | Performed by: PODIATRIST

## 2021-12-08 PROCEDURE — 3066F PR DOCUMENTATION OF TREATMENT FOR NEPHROPATHY: ICD-10-PCS | Mod: CPTII,S$GLB,, | Performed by: PODIATRIST

## 2021-12-08 PROCEDURE — 99214 PR OFFICE/OUTPT VISIT, EST, LEVL IV, 30-39 MIN: ICD-10-PCS | Mod: S$GLB,,, | Performed by: PODIATRIST

## 2021-12-08 PROCEDURE — 99999 PR PBB SHADOW E&M-EST. PATIENT-LVL IV: CPT | Mod: PBBFAC,,, | Performed by: PODIATRIST

## 2021-12-08 PROCEDURE — 99214 OFFICE O/P EST MOD 30 MIN: CPT | Mod: S$GLB,,, | Performed by: PODIATRIST

## 2021-12-13 ENCOUNTER — HOSPITAL ENCOUNTER (OUTPATIENT)
Dept: CARDIOLOGY | Facility: HOSPITAL | Age: 76
Discharge: HOME | End: 2021-12-13
Attending: SURGERY
Payer: MEDICARE

## 2021-12-13 ENCOUNTER — HOSPITAL ENCOUNTER (EMERGENCY)
Facility: HOSPITAL | Age: 76
Discharge: HOME | End: 2021-12-13
Attending: EMERGENCY MEDICINE
Payer: MEDICARE

## 2021-12-13 ENCOUNTER — OFFICE VISIT (OUTPATIENT)
Dept: VASCULAR SURGERY | Facility: CLINIC | Age: 76
End: 2021-12-13
Payer: MEDICARE

## 2021-12-13 VITALS
OXYGEN SATURATION: 100 % | TEMPERATURE: 98 F | HEART RATE: 73 BPM | WEIGHT: 215 LBS | BODY MASS INDEX: 38.09 KG/M2 | RESPIRATION RATE: 17 BRPM | HEIGHT: 63 IN | DIASTOLIC BLOOD PRESSURE: 59 MMHG | SYSTOLIC BLOOD PRESSURE: 105 MMHG

## 2021-12-13 VITALS
HEIGHT: 62 IN | BODY MASS INDEX: 39.71 KG/M2 | DIASTOLIC BLOOD PRESSURE: 78 MMHG | HEART RATE: 91 BPM | OXYGEN SATURATION: 96 % | WEIGHT: 215.81 LBS | SYSTOLIC BLOOD PRESSURE: 133 MMHG

## 2021-12-13 DIAGNOSIS — T82.858D ARTERIOVENOUS FISTULA STENOSIS, SUBSEQUENT ENCOUNTER: Primary | ICD-10-CM

## 2021-12-13 DIAGNOSIS — T82.858D ARTERIOVENOUS FISTULA STENOSIS, SUBSEQUENT ENCOUNTER: ICD-10-CM

## 2021-12-13 DIAGNOSIS — T82.858D ARTERIOVENOUS GRAFT STENOSIS, SUBSEQUENT ENCOUNTER: ICD-10-CM

## 2021-12-13 DIAGNOSIS — D64.9 ANEMIA: ICD-10-CM

## 2021-12-13 DIAGNOSIS — Z71.1 NO PROBLEM, FEARED COMPLAINT UNFOUNDED: ICD-10-CM

## 2021-12-13 DIAGNOSIS — N18.6 ESRD (END STAGE RENAL DISEASE) ON DIALYSIS: Primary | ICD-10-CM

## 2021-12-13 DIAGNOSIS — Z99.2 ESRD (END STAGE RENAL DISEASE) ON DIALYSIS: Primary | ICD-10-CM

## 2021-12-13 LAB
ALBUMIN SERPL BCP-MCNC: 3.3 G/DL (ref 3.5–5.2)
ALP SERPL-CCNC: 131 U/L (ref 55–135)
ALT SERPL W/O P-5'-P-CCNC: 15 U/L (ref 10–44)
ANION GAP SERPL CALC-SCNC: 17 MMOL/L (ref 8–16)
AST SERPL-CCNC: 17 U/L (ref 10–40)
BASOPHILS # BLD AUTO: 0.04 K/UL (ref 0–0.2)
BASOPHILS NFR BLD: 0.5 % (ref 0–1.9)
BILIRUB SERPL-MCNC: 0.3 MG/DL (ref 0.1–1)
BUN SERPL-MCNC: 68 MG/DL (ref 8–23)
CALCIUM SERPL-MCNC: 8.1 MG/DL (ref 8.7–10.5)
CHLORIDE SERPL-SCNC: 95 MMOL/L (ref 95–110)
CO2 SERPL-SCNC: 28 MMOL/L (ref 23–29)
CREAT SERPL-MCNC: 10.8 MG/DL (ref 0.5–1.4)
DIFFERENTIAL METHOD: NORMAL
EOSINOPHIL # BLD AUTO: 0.1 K/UL (ref 0–0.5)
EOSINOPHIL NFR BLD: 1.9 % (ref 0–8)
ERYTHROCYTE [DISTWIDTH] IN BLOOD BY AUTOMATED COUNT: 14.3 % (ref 11.5–14.5)
EST. GFR  (AFRICAN AMERICAN): 4 ML/MIN/1.73 M^2
EST. GFR  (NON AFRICAN AMERICAN): 3 ML/MIN/1.73 M^2
GLUCOSE SERPL-MCNC: 194 MG/DL (ref 70–110)
HCT VFR BLD AUTO: 38.3 % (ref 37–48.5)
HGB BLD-MCNC: 12.4 G/DL (ref 12–16)
IMM GRANULOCYTES # BLD AUTO: 0.03 K/UL (ref 0–0.04)
IMM GRANULOCYTES NFR BLD AUTO: 0.4 % (ref 0–0.5)
LYMPHOCYTES # BLD AUTO: 1.4 K/UL (ref 1–4.8)
LYMPHOCYTES NFR BLD: 18.8 % (ref 18–48)
MCH RBC QN AUTO: 28.5 PG (ref 27–31)
MCHC RBC AUTO-ENTMCNC: 32.4 G/DL (ref 32–36)
MCV RBC AUTO: 88 FL (ref 82–98)
MONOCYTES # BLD AUTO: 0.8 K/UL (ref 0.3–1)
MONOCYTES NFR BLD: 10.2 % (ref 4–15)
NEUTROPHILS # BLD AUTO: 5.1 K/UL (ref 1.8–7.7)
NEUTROPHILS NFR BLD: 68.2 % (ref 38–73)
NRBC BLD-RTO: 0 /100 WBC
PLATELET # BLD AUTO: 216 K/UL (ref 150–450)
PMV BLD AUTO: 10.3 FL (ref 9.2–12.9)
POTASSIUM SERPL-SCNC: 5.9 MMOL/L (ref 3.5–5.1)
PROT SERPL-MCNC: 7.5 G/DL (ref 6–8.4)
RBC # BLD AUTO: 4.35 M/UL (ref 4–5.4)
SODIUM SERPL-SCNC: 140 MMOL/L (ref 136–145)
WBC # BLD AUTO: 7.44 K/UL (ref 3.9–12.7)

## 2021-12-13 PROCEDURE — 3066F PR DOCUMENTATION OF TREATMENT FOR NEPHROPATHY: ICD-10-PCS | Mod: CPTII,S$GLB,, | Performed by: SURGERY

## 2021-12-13 PROCEDURE — 99214 PR OFFICE/OUTPT VISIT, EST, LEVL IV, 30-39 MIN: ICD-10-PCS | Mod: S$GLB,,, | Performed by: SURGERY

## 2021-12-13 PROCEDURE — 99999 PR PBB SHADOW E&M-EST. PATIENT-LVL V: ICD-10-PCS | Mod: PBBFAC,,, | Performed by: SURGERY

## 2021-12-13 PROCEDURE — 80053 COMPREHEN METABOLIC PANEL: CPT | Performed by: NURSE PRACTITIONER

## 2021-12-13 PROCEDURE — 85025 COMPLETE CBC W/AUTO DIFF WBC: CPT | Performed by: NURSE PRACTITIONER

## 2021-12-13 PROCEDURE — 99999 PR PBB SHADOW E&M-EST. PATIENT-LVL V: CPT | Mod: PBBFAC,,, | Performed by: SURGERY

## 2021-12-13 PROCEDURE — 93010 EKG 12-LEAD: ICD-10-PCS | Mod: ,,, | Performed by: INTERNAL MEDICINE

## 2021-12-13 PROCEDURE — 93010 ELECTROCARDIOGRAM REPORT: CPT | Mod: ,,, | Performed by: INTERNAL MEDICINE

## 2021-12-13 PROCEDURE — 93990 CV US HEMODIALYSIS ACCESS (CUPID ONLY): ICD-10-PCS | Mod: 26,,, | Performed by: SURGERY

## 2021-12-13 PROCEDURE — 93990 DOPPLER FLOW TESTING: CPT | Mod: TC

## 2021-12-13 PROCEDURE — 99214 OFFICE O/P EST MOD 30 MIN: CPT | Mod: S$GLB,,, | Performed by: SURGERY

## 2021-12-13 PROCEDURE — 3066F NEPHROPATHY DOC TX: CPT | Mod: CPTII,S$GLB,, | Performed by: SURGERY

## 2021-12-13 PROCEDURE — 99284 EMERGENCY DEPT VISIT MOD MDM: CPT

## 2021-12-13 PROCEDURE — 93990 DOPPLER FLOW TESTING: CPT | Mod: 26,,, | Performed by: SURGERY

## 2021-12-13 PROCEDURE — 93005 ELECTROCARDIOGRAM TRACING: CPT

## 2021-12-20 ENCOUNTER — TELEPHONE (OUTPATIENT)
Dept: VASCULAR SURGERY | Facility: CLINIC | Age: 76
End: 2021-12-20
Payer: MEDICARE

## 2021-12-21 ENCOUNTER — TELEPHONE (OUTPATIENT)
Dept: VASCULAR SURGERY | Facility: CLINIC | Age: 76
End: 2021-12-21
Payer: MEDICARE

## 2021-12-21 LAB
HD ANASTAMOSIS LOCATION: NORMAL
HD FISTULA OUTFLOW VEIN VESSEL: NORMAL
HD INFLOW ARTERY VESSEL: NORMAL
RIGHT DIS GRAFT PSV: 60 CM/ SEC
RIGHT INFLOW PSV: 150 CM/S
RIGHT MID GRAFT PSV: 84 CM/S
RIGHT OUTFLOW VEIN PSV: 90 CM/ SEC
RIGHT PROX ANA PSV: 384 CM/S
RIGHT PROX GRAFT PSV: 673 CM/S
RIGHT VOLUME FLOW PSV: 3932 ML/MIN

## 2021-12-24 NOTE — ASSESSMENT & PLAN NOTE
Possibly not compliant with diet and medications  Cardiac diet  IV lasix BID  Strict I/Os  Daily weights  Case management assisting with home CHF program  ECHO reviewed:      1 - Normal left ventricular systolic function (EF 65-70%).     2 - No diagnostic regional wall motion abnormalities.     3 - Concentric remodeling.     4 - Impaired LV relaxation, elevated LAP (grade 2 diastolic dysfunction).     5 - Trivial tricuspid regurgitation.     6 - The estimated PA systolic pressure is greater than 18 mmHg.   improving with IV lasix,     4 = No assist / stand by assistance

## 2022-01-03 ENCOUNTER — HOSPITAL ENCOUNTER (OUTPATIENT)
Dept: PREADMISSION TESTING | Facility: HOSPITAL | Age: 77
Discharge: HOME | End: 2022-01-03
Attending: SURGERY
Payer: MEDICARE

## 2022-01-03 VITALS
HEART RATE: 83 BPM | SYSTOLIC BLOOD PRESSURE: 98 MMHG | BODY MASS INDEX: 38.13 KG/M2 | TEMPERATURE: 99 F | OXYGEN SATURATION: 97 % | HEIGHT: 63 IN | WEIGHT: 215.19 LBS | RESPIRATION RATE: 16 BRPM | DIASTOLIC BLOOD PRESSURE: 60 MMHG

## 2022-01-03 DIAGNOSIS — Z01.812 PRE-PROCEDURE LAB EXAM: ICD-10-CM

## 2022-01-03 NOTE — DISCHARGE INSTRUCTIONS
Your procedure is scheduled for _Tuesday, 1/11/22____. Go to Same Day Surgery on the 2nd floor (See Map).  You may park in the front of the hospital and use the sidewalk along the left side of the hospital to enter the building at the garage entrance.  You may also park on the 1st level of the garage.Do not park above the ground level in the garage, you will be locked out of the hospital building.      Call [phone] between 2pm and 5pm on ___Monday, 1/11/22__ for your surgery day arrival time. Time:_______.    Covid test the day of surgery.    Important instructions:   The day before your procedure, don't eat/drink ANYTHING after 12 midnight. OK to brush your teeth.  Don't have gum, candy or mints.     See attached medication sheet - only take a small sip of water with any medications the morning of your surgery.    DO NOT take any diabetic medication on the morning of surgery unless instructed to do so by your doctor or pre op nurse.     STOP:  Ibuprofen, Mobic, Advil, Aleve, Fish oil, Vitamin E, supplements, & vitamins for at least 7 days before your surgery. You may use Tylenol unless otherwise instructed by your doctor.        Prep instructions:  SHOWER        Please shower the night before and the morning of your surgery.      Use Hibiclens soap as instructed (do not use on face/genital area).  Place clean bed linens the night before surgery & wear fresh clothing after showers.     DO NOT shave procedural area at least 5 days before surgery to prevent skin irritation and/or possible infection.     You may wear deodorant only. Do not wear make- up, mascara, perfume, powder, lotion, or cologne.     Do not wear jewelry or have metal on your body (hairpins, clips, etc.).You will be asked to remove any dentures or partials for the procedure.     Please bring any documents given to you by your doctor.     If going home the day of surgery or had sedation/anesthesia, you must have a family member/friend  drive you home.  Public transit is prohibited.      Wear loose fitting clothes allowing for bandages.  Leave money and valuables home, but you will need a form of payment if you're filling any prescriptions here at the hospital.     Important: Call your surgeon if fever, chills, or illness should occur before your surgery. Call us at the Pre-op Center at [phone]  if needed.

## 2022-01-13 ENCOUNTER — TELEPHONE (OUTPATIENT)
Dept: ENDOSCOPY | Facility: HOSPITAL | Age: 77
End: 2022-01-13
Payer: MEDICARE

## 2022-01-13 DIAGNOSIS — Z12.11 SPECIAL SCREENING FOR MALIGNANT NEOPLASMS, COLON: Primary | ICD-10-CM

## 2022-01-13 RX ORDER — SODIUM, POTASSIUM,MAG SULFATES 17.5-3.13G
1 SOLUTION, RECONSTITUTED, ORAL ORAL DAILY
Qty: 1 KIT | Refills: 0 | Status: SHIPPED | OUTPATIENT
Start: 2022-01-13 | End: 2022-01-15

## 2022-01-13 NOTE — TELEPHONE ENCOUNTER
Pt took 1 bottle suprep on 1/12/22 according to box suprep instructions. Ordered new kit and verbal and e-mail instructions provided.pt verbalized understanding during teachback.  
Declined

## 2022-01-14 ENCOUNTER — ANESTHESIA EVENT (OUTPATIENT)
Dept: ENDOSCOPY | Facility: HOSPITAL | Age: 77
End: 2022-01-14
Payer: MEDICARE

## 2022-01-14 ENCOUNTER — HOSPITAL ENCOUNTER (OUTPATIENT)
Facility: HOSPITAL | Age: 77
Discharge: HOME | End: 2022-01-14
Attending: INTERNAL MEDICINE | Admitting: INTERNAL MEDICINE
Payer: MEDICARE

## 2022-01-14 ENCOUNTER — ANESTHESIA (OUTPATIENT)
Dept: ENDOSCOPY | Facility: HOSPITAL | Age: 77
End: 2022-01-14
Payer: MEDICARE

## 2022-01-14 VITALS
TEMPERATURE: 97 F | HEIGHT: 64 IN | RESPIRATION RATE: 20 BRPM | DIASTOLIC BLOOD PRESSURE: 52 MMHG | SYSTOLIC BLOOD PRESSURE: 96 MMHG | OXYGEN SATURATION: 92 % | HEART RATE: 83 BPM | WEIGHT: 211 LBS | BODY MASS INDEX: 36.02 KG/M2

## 2022-01-14 DIAGNOSIS — E66.01 CLASS 2 SEVERE OBESITY WITH SERIOUS COMORBIDITY AND BODY MASS INDEX (BMI) OF 39.0 TO 39.9 IN ADULT, UNSPECIFIED OBESITY TYPE: Primary | ICD-10-CM

## 2022-01-14 DIAGNOSIS — Z12.11 SCREEN FOR COLON CANCER: ICD-10-CM

## 2022-01-14 LAB
CTP QC/QA: YES
GLUCOSE SERPL-MCNC: 90 MG/DL (ref 70–110)
HCO3 UR-SCNC: 26.8 MMOL/L (ref 24–28)
HCT VFR BLD CALC: 39 %PCV (ref 36–54)
PCO2 BLDA: 47.5 MMHG (ref 35–45)
PH SMN: 7.36 [PH] (ref 7.35–7.45)
PO2 BLDA: 29 MMHG (ref 40–60)
POC BE: 1 MMOL/L
POC IONIZED CALCIUM: 0.92 MMOL/L (ref 1.06–1.42)
POC SATURATED O2: 51 % (ref 95–100)
POC TCO2: 28 MMOL/L (ref 24–29)
POCT GLUCOSE: 102 MG/DL (ref 70–110)
POCT GLUCOSE: 105 MG/DL (ref 70–110)
POTASSIUM BLD-SCNC: 3.9 MMOL/L (ref 3.5–5.1)
SAMPLE: ABNORMAL
SARS-COV-2 AG RESP QL IA.RAPID: NEGATIVE
SODIUM BLD-SCNC: 137 MMOL/L (ref 136–145)

## 2022-01-14 PROCEDURE — 00811 ANES LWR INTST NDSC NOS: CPT | Performed by: INTERNAL MEDICINE

## 2022-01-14 PROCEDURE — 25000003 PHARM REV CODE 250: Performed by: NURSE ANESTHETIST, CERTIFIED REGISTERED

## 2022-01-14 PROCEDURE — 45385 PR COLONOSCOPY,REMV LESN,SNARE: ICD-10-PCS | Mod: GC,,, | Performed by: INTERNAL MEDICINE

## 2022-01-14 PROCEDURE — 37000009 HC ANESTHESIA EA ADD 15 MINS: Performed by: INTERNAL MEDICINE

## 2022-01-14 PROCEDURE — 88305 TISSUE EXAM BY PATHOLOGIST: ICD-10-PCS | Mod: 26,,, | Performed by: PATHOLOGY

## 2022-01-14 PROCEDURE — 27201089 HC SNARE, DISP (ANY): Performed by: INTERNAL MEDICINE

## 2022-01-14 PROCEDURE — 88305 TISSUE EXAM BY PATHOLOGIST: CPT | Performed by: PATHOLOGY

## 2022-01-14 PROCEDURE — D9220A PRA ANESTHESIA: ICD-10-PCS | Mod: CRNA,,, | Performed by: NURSE ANESTHETIST, CERTIFIED REGISTERED

## 2022-01-14 PROCEDURE — 82962 GLUCOSE BLOOD TEST: CPT | Performed by: INTERNAL MEDICINE

## 2022-01-14 PROCEDURE — D9220A PRA ANESTHESIA: ICD-10-PCS | Mod: ANES,,, | Performed by: ANESTHESIOLOGY

## 2022-01-14 PROCEDURE — D9220A PRA ANESTHESIA: Mod: ANES,,, | Performed by: ANESTHESIOLOGY

## 2022-01-14 PROCEDURE — 25000003 PHARM REV CODE 250: Performed by: INTERNAL MEDICINE

## 2022-01-14 PROCEDURE — 63600175 PHARM REV CODE 636 W HCPCS: Performed by: STUDENT IN AN ORGANIZED HEALTH CARE EDUCATION/TRAINING PROGRAM

## 2022-01-14 PROCEDURE — 94761 N-INVAS EAR/PLS OXIMETRY MLT: CPT

## 2022-01-14 PROCEDURE — 88305 TISSUE EXAM BY PATHOLOGIST: CPT | Mod: 26,,, | Performed by: PATHOLOGY

## 2022-01-14 PROCEDURE — 37000008 HC ANESTHESIA 1ST 15 MINUTES: Performed by: INTERNAL MEDICINE

## 2022-01-14 PROCEDURE — 63600175 PHARM REV CODE 636 W HCPCS: Performed by: NURSE ANESTHETIST, CERTIFIED REGISTERED

## 2022-01-14 PROCEDURE — 45385 COLONOSCOPY W/LESION REMOVAL: CPT | Performed by: INTERNAL MEDICINE

## 2022-01-14 PROCEDURE — 27202298: Performed by: INTERNAL MEDICINE

## 2022-01-14 PROCEDURE — 45385 COLONOSCOPY W/LESION REMOVAL: CPT | Mod: GC,,, | Performed by: INTERNAL MEDICINE

## 2022-01-14 PROCEDURE — D9220A PRA ANESTHESIA: Mod: CRNA,,, | Performed by: NURSE ANESTHETIST, CERTIFIED REGISTERED

## 2022-01-14 RX ORDER — SODIUM CHLORIDE 9 MG/ML
INJECTION, SOLUTION INTRAVENOUS CONTINUOUS
Status: DISCONTINUED | OUTPATIENT
Start: 2022-01-14 | End: 2022-01-21 | Stop reason: HOSPADM

## 2022-01-14 RX ORDER — PHENYLEPHRINE HCL IN 0.9% NACL 1 MG/10 ML
SYRINGE (ML) INTRAVENOUS
Status: COMPLETED
Start: 2022-01-14 | End: 2022-01-14

## 2022-01-14 RX ORDER — FENTANYL CITRATE 50 UG/ML
25 INJECTION, SOLUTION INTRAMUSCULAR; INTRAVENOUS EVERY 5 MIN PRN
Status: DISCONTINUED | OUTPATIENT
Start: 2022-01-14 | End: 2022-01-21 | Stop reason: HOSPADM

## 2022-01-14 RX ORDER — PHENYLEPHRINE HCL IN 0.9% NACL 20MG/250ML
0-5 PLASTIC BAG, INJECTION (ML) INTRAVENOUS CONTINUOUS
Status: DISCONTINUED | OUTPATIENT
Start: 2022-01-14 | End: 2022-01-21 | Stop reason: HOSPADM

## 2022-01-14 RX ORDER — ONDANSETRON 2 MG/ML
4 INJECTION INTRAMUSCULAR; INTRAVENOUS DAILY PRN
Status: DISCONTINUED | OUTPATIENT
Start: 2022-01-14 | End: 2022-01-21 | Stop reason: HOSPADM

## 2022-01-14 RX ORDER — LIDOCAINE HYDROCHLORIDE 10 MG/ML
1 INJECTION, SOLUTION EPIDURAL; INFILTRATION; INTRACAUDAL; PERINEURAL ONCE
Status: CANCELLED | OUTPATIENT
Start: 2022-01-14 | End: 2022-01-14

## 2022-01-14 RX ORDER — PROPOFOL 10 MG/ML
VIAL (ML) INTRAVENOUS
Status: DISCONTINUED | OUTPATIENT
Start: 2022-01-14 | End: 2022-01-14

## 2022-01-14 RX ORDER — LIDOCAINE HYDROCHLORIDE 20 MG/ML
INJECTION, SOLUTION EPIDURAL; INFILTRATION; INTRACAUDAL; PERINEURAL
Status: DISCONTINUED | OUTPATIENT
Start: 2022-01-14 | End: 2022-01-14

## 2022-01-14 RX ORDER — PHENYLEPHRINE HCL IN 0.9% NACL 1 MG/10 ML
SYRINGE (ML) INTRAVENOUS
Status: DISCONTINUED | OUTPATIENT
Start: 2022-01-14 | End: 2022-01-14

## 2022-01-14 RX ORDER — PROPOFOL 10 MG/ML
VIAL (ML) INTRAVENOUS CONTINUOUS PRN
Status: DISCONTINUED | OUTPATIENT
Start: 2022-01-14 | End: 2022-01-14

## 2022-01-14 RX ADMIN — Medication 50 MCG/KG/MIN: at 10:01

## 2022-01-14 RX ADMIN — Medication 200 MCG: at 10:01

## 2022-01-14 RX ADMIN — PROPOFOL 50 MG: 10 INJECTION, EMULSION INTRAVENOUS at 10:01

## 2022-01-14 RX ADMIN — PROPOFOL 30 MG: 10 INJECTION, EMULSION INTRAVENOUS at 10:01

## 2022-01-14 RX ADMIN — Medication 0.5 MCG/KG/MIN: at 11:01

## 2022-01-14 RX ADMIN — Medication 300 MCG: at 11:01

## 2022-01-14 RX ADMIN — Medication 100 MCG: at 10:01

## 2022-01-14 RX ADMIN — LIDOCAINE HYDROCHLORIDE 50 MG: 20 INJECTION, SOLUTION EPIDURAL; INFILTRATION; INTRACAUDAL at 10:01

## 2022-01-14 RX ADMIN — SODIUM CHLORIDE: 0.9 INJECTION, SOLUTION INTRAVENOUS at 10:01

## 2022-01-14 NOTE — DISCHARGE INSTRUCTIONS
Patient Education       Colonoscopy Discharge Instructions   About this topic   Colonoscopy is done so your doctor can see the inside of your large intestines, also called your colon, and your rectum. It uses a lighted tube called a scope, which has a tiny camera that can be moved through the large intestine. This may be done to:  · Screen for colon cancer or polyps  · Look for the source of rectal bleeding  · Find the cause of changes in your bowel movement  · Find the cause of belly or rectal pain  · Check results from other tests  · Check your response to treatment for other diseases     What care is needed at home?   · Ask your doctor what you need to do when you go home. Make sure you ask questions if you do not understand what the doctor says. This way you will know what you need to do.  · Rest. Do not drive the rest of the day. Do not sign any important papers or make any important decisions for the next 24 hours.  · You may feel groggy. Take extra care when moving about.  · You may have gas or mild cramping. This is normal.  · A small amount of bleeding may happen during the first few days after your procedure.  · Start back on your normal diet unless you need some changes in your diet.  What follow-up care is needed?   · Your doctor will talk to you about your test results. Your doctor may ask you to make visits to the office to check on your progress. Be sure to keep these visits.  · Ask your doctor when you need to have another colonoscopy. The amount of time between tests is based on what was found during this test. People with no polyps may be able to wait 10 years to have another colonoscopy. Other people may need to have this test repeated in 1 year because of the kind of polyps that were found in their colon. Ask your doctor when you need to come back.  What drugs may be needed?   Ask your doctor what drugs you will need to take. Take your drugs as told by your doctor.  Will physical activity be  limited?   Physical activities may be limited for a short time. Rest after the procedure. You may go back to your normal activities within a day or so.  What changes to diet are needed?   · Drink 6 to 8 glasses of water each day.  · Eat food rich in fiber like fresh fruits and vegetables.  What problems could happen?   · Tear inside your colon  · Bleeding can happen up to a few days afterwards  When do I need to call the doctor?   · Fever of 100.4°F (38°C) or higher, chills  · Bleeding during bowel movements (1 teaspoon (5 mL) or more) or maroon stool  · Throwing up more than 3 times in the next 48 hours  · Feeling dizzy  · Feeling weak  · Belly pain that is getting worse  · Not able to have a bowel movement for more than 2 days  · Hard swollen belly  Teach Back: Helping You Understand   The Teach Back Method helps you understand the information we are giving you. After you talk with the staff, tell them in your own words what you learned. This helps to make sure the staff has described each thing clearly. It also helps to explain things that may have been confusing. Before going home, make sure you can do these:  · I can tell you about my procedure.  · I can tell you what signs are normal after my procedure.  · I can tell you what I will do if I throw up more than 3 times in the next 48 hours or I have more belly pain.  Where can I learn more?   American Cancer Society  https://www.cancer.org/cancer/colon-rectal-cancer/haazjrcak-rfpezpwqm-zjcvwgg/grjcvawmv-dphvs-bsqe.html   American Society of Clinical Oncology  https://www.cancer.net/navigating-cancer-care/diagnosing-cancer/tests-and-procedures/colonoscopy   Last Reviewed Date   2021-03-10  Consumer Information Use and Disclaimer   This information is not specific medical advice and does not replace information you receive from your health care provider. This is only a brief summary of general information. It does NOT include all information about conditions,  illnesses, injuries, tests, procedures, treatments, therapies, discharge instructions or life-style choices that may apply to you. You must talk with your health care provider for complete information about your health and treatment options. This information should not be used to decide whether or not to accept your health care providers advice, instructions or recommendations. Only your health care provider has the knowledge and training to provide advice that is right for you.  Copyright   Copyright © 2021 [facility] and its affiliates and/or licensors. All rights reserved.

## 2022-01-14 NOTE — ANESTHESIA RELEASE NOTE
"Anesthesia Release from PACU Note    Patient: Erica Leblanc    Procedure(s) Performed: Procedure(s) (LRB):  COLONOSCOPY (N/A)    Anesthesia type: general    Post pain: Adequate analgesia    Post assessment: no apparent anesthetic complications, tolerated procedure well and no evidence of recall    Last Vitals:   Visit Vitals  BP (!) 96/52 (BP Location: Right arm, Patient Position: Lying)   Pulse 82   Temp 36.3 °C (97.3 °F) (Temporal)   Resp 20   Ht 5' 4" (1.626 m)   Wt 95.7 kg (211 lb)   SpO2 100%   Breastfeeding No   BMI 36.22 kg/m²       Post vital signs: stable    Level of consciousness: awake, alert  and oriented    Nausea/Vomiting: no nausea/no vomiting    Complications: Initially hypotensive requiring phenylephrine drip, but has been stable for the last 1.5 hours in PACU off of the drip.    Airway Patency: patent    Respiratory: unassisted    Cardiovascular: stable and blood pressure at baseline    Hydration: euvolemic  "

## 2022-01-14 NOTE — PROGRESS NOTES
Pt AAOX4 Pt following commands. Pt did require a lisa drip on arrival to pacu for an hour. BP remained stable after infusion was stopped. Maps >65 sys in 90's. Pt denies pain denies n/v. Pt did have a sandwich and pudding tolerated well. Teds/SCD's in place throughout duration in PACU. Pt discharged home in wheelchair with belongings at side. Pt will be discharged home with son

## 2022-01-14 NOTE — ANESTHESIA PREPROCEDURE EVALUATION
2022  Erica Leblanc is a 76 y.o., female.  Pre-operative evaluation for Procedure(s) (LRB):  COLONOSCOPY (N/A)    Erica Leblanc is a 76 y.o. female   ESRD on IHD TThS, last dialyzed three days ago (22)    Patient Active Problem List   Diagnosis    Class 2 obesity in adult    Sickle cell trait    Hyperlipidemia LDL goal <100    HUMBERTO on CPAP    Hypothyroidism (acquired)    Hx-TIA (transient ischemic attack)    Vitamin D deficiency disease    Pulmonary hypertension    Type 2 diabetes mellitus with ESRD (end-stage renal disease)    Secondary renal hyperparathyroidism    Incomplete bladder emptying    Postmenopausal atrophic vaginitis    Rectocele    Mixed incontinence urge and stress    Chronic diastolic heart failure    Tortuous aorta    Uncontrolled type 2 diabetes mellitus with hyperglycemia    ESRD on hemodialysis    Anemia of chronic disease    Debility    Chronic respiratory failure    Type 2 diabetes mellitus with foot ulcer, with long-term current use of insulin    Stable proliferative diabetic retinopathy associated with type 2 diabetes mellitus    Mild major depression    Heart failure with preserved ejection fraction    Pseudophakia    Chronic kidney disease-mineral and bone disorder    Age-related osteoporosis without current pathological fracture    H/O: stroke    Decreased  strength    Fine motor impairment       Past Surgical History:   Procedure Laterality Date    AV FISTULA PLACEMENT Left 2019    Procedure: CREATION, AV FISTULA, LEFT UPPER EXTREMITY;  Surgeon: Keron Perez MD;  Location: [facility];  Service: Vascular;  Laterality: Left;    BREAST BIOPSY      breast reduction Bilateral age 31    BREAST SURGERY      CATARACT EXTRACTION Bilateral     cataracts Bilateral      SECTION, LOW TRANSVERSE      x1     CHOLECYSTECTOMY      EYE SURGERY  1/2014, 6/2014    vitrectomy    EYE SURGERY Right 08/17/2016    FISTULOGRAM Left 3/6/2020    Procedure: Fistulogram, left upper extremity, with branch ligation;  Surgeon: Keron Perez MD;  Location: [address];  Service: Vascular;  Laterality: Left;  Time 1.1 Minute  42.10 mGy    FISTULOGRAM Left 7/21/2020    Procedure: Fistulogram, left upper extremity, transradial access with possible intervention;  Surgeon: Keron Perez MD;  Location: [address];  Service: Vascular;  Laterality: Left;    FISTULOGRAM Left 8/19/2020    Procedure: Fistulogram, left upper extremity, possible intervention;  Surgeon: Keron Perez MD;  Location: [facility];  Service: Vascular;  Laterality: Left;  930 AM START  RN PRE OP  ---COVID NEGATIVE ON  8-. [place]    HYSTERECTOMY  1986    TAHBSO (patient is unsure if ovaries removed)    OOPHORECTOMY      PERCUTANEOUS TRANSLUMINAL ANGIOPLASTY OF ARTERIOVENOUS FISTULA Left 7/21/2020    Procedure: PTA, AV FISTULA;  Surgeon: Keron Perez MD;  Location: [address];  Service: Vascular;  Laterality: Left;  15.9 minutes of fluro  41.12  mGy  7.9060 Gy cm2  32ml  contrast    PHLEBOGRAPHY Left 7/21/2020    Procedure: CENTRAL VENOGRAM;  Surgeon: Keron Perez MD;  Location: [address];  Service: Vascular;  Laterality: Left;    REFRACTIVE SURGERY      TOTAL REDUCTION MAMMOPLASTY      approx 10 yrs ago       Social History     Socioeconomic History    Marital status: Single    Number of children: 5   Occupational History    Occupation:      Employer: [employer]     Comment: part-time   Tobacco Use    Smoking status: Never Smoker    Smokeless tobacco: Never Used   Substance and Sexual Activity    Alcohol use: No     Alcohol/week: 0.0 standard drinks    Drug use: No    Sexual activity: Not Currently     Partners: Male     Birth control/protection: Post-menopausal,  Surgical     Social Determinants of Health     Financial Resource Strain: High Risk    Difficulty of Paying Living Expenses: Hard   Food Insecurity: Food Insecurity Present    Worried About Running Out of Food in the Last Year: Sometimes true    Ran Out of Food in the Last Year: Sometimes true   Transportation Needs: Unmet Transportation Needs    Lack of Transportation (Medical): Yes    Lack of Transportation (Non-Medical): Yes   Physical Activity: Insufficiently Active    Days of Exercise per Week: 3 days    Minutes of Exercise per Session: 10 min   Stress: Stress Concern Present    Feeling of Stress : Rather much   Social Connections: Moderately Isolated    Frequency of Communication with Friends and Family: More than three times a week    Frequency of Social Gatherings with Friends and Family: Once a week    Attends Jainism Services: More than 4 times per year    Active Member of Clubs or Organizations: No    Attends Club or Organization Meetings: Never    Marital Status: Never    Housing Stability: High Risk    Unable to Pay for Housing in the Last Year: Yes    Number of Places Lived in the Last Year: 1    Unstable Housing in the Last Year: No       No current facility-administered medications on file prior to encounter.     Current Outpatient Medications on File Prior to Encounter   Medication Sig Dispense Refill    ACCU-CHEK SOFT DEV LANCETS Kit       aspirin (ECOTRIN) 81 MG EC tablet Take 1 tablet (81 mg total) by mouth once daily. 90 tablet 3    blood sugar diagnostic Strp One test strip use 2 times a day to check blood glucose,  ICD-10: E11.9, compatible with insurance/glucometer 100 each 11    blood-glucose meter kit One glucometer, use to check blood glucose.   ICD-10: E11.9. Dispense machine covered by insurance 1 each 0    docusate sodium (COLACE) 100 MG capsule Take 200 mg by mouth once daily.       doxercalciferoL (HECTOROL) 4 mcg/2 mL injection Inject 4.5 mcg into the  vein 3 (three) times a week. At dialysis unit      doxycycline (VIBRA-TABS) 100 MG tablet Take 1 tablet (100 mg total) by mouth 2 (two) times daily. 20 tablet 0    epoetin arnel (EPOGEN INJ) Inject 1,000 Units as directed every 7 days. At the dialysis unit      LANCETS MISC       lancets Misc One lancets use 2 times a day to check blood glucose, ICD-10: E11.9 100 each 11    lancing device Misc One device, used to check blood glucose, ICD-10: E11.9 1 each 0    levothyroxine (SYNTHROID) 50 MCG tablet Take 1 tablet (50 mcg total) by mouth before breakfast. 90 tablet 3    midodrine (PROAMATINE) 5 MG Tab Take 1 tablet (5 mg total) by mouth 3 (three) times daily. 90 tablet 11    sevelamer carbonate (RENVELA) 800 mg Tab Take 3 tablets (2,400 mg total) by mouth 3 (three) times daily with meals. 270 tablet 11    TRULICITY 0.75 mg/0.5 mL pen injector SMARTSI.5 Milliliter(s) SUB-Q Once a Week         Review of patient's allergies indicates:   Allergen Reactions    Ace inhibitors Other (See Comments)     Other reaction(s): cough         CBC: No results for input(s): WBC, RBC, HGB, HCT, PLT, MCV, MCH, MCHC in the last 72 hours.    CMP: No results for input(s): NA, K, CL, CO2, BUN, CREATININE, GLU, MG, PHOS, CALCIUM, ALBUMIN, PROT, ALKPHOS, ALT, AST, BILITOT in the last 72 hours.    INR  No results for input(s): PT, INR, PROTIME, APTT in the last 72 hours.      Diagnostic Studies:    EKG:   Results for orders placed or performed during the hospital encounter of 21   EKG 12-lead    Collection Time: 21  5:11 PM    Narrative    Test Reason : D64.9,    Vent. Rate : 074 BPM     Atrial Rate : 074 BPM     P-R Int : 126 ms          QRS Dur : 096 ms      QT Int : 450 ms       P-R-T Axes : 037 106 -12 degrees     QTc Int : 499 ms    Normal sinus rhythm  Rightward axis  Cannot rule out Anterior infarct ,age undetermined  Abnormal ECG  When compared with ECG of 2021 09:33,  Significant changes have  occurred  Confirmed by Murali Li MD ([id number]) on 12/14/2021 10:51:01 PM    Referred By: [email or web address]   SELF           Confirmed By:Murali Li MD       TTE:  Results for orders placed or performed during the hospital encounter of 06/06/21   Echo Saline Bubble? Yes   Result Value Ref Range    BSA 2.03 m2    TDI SEPTAL 0.05 m/s    LV LATERAL E/E' RATIO 10.88 m/s    LV SEPTAL E/E' RATIO 17.40 m/s    LA WIDTH 5.06 cm    TDI LATERAL 0.08 m/s    PV PEAK VELOCITY 1.14 cm/s    LVIDd 3.14 (A) 3.5 - 6.0 cm    IVS 1.55 (A) 0.6 - 1.1 cm    Posterior Wall 1.30 (A) 0.6 - 1.1 cm    LVIDs 2.05 (A) 2.1 - 4.0 cm    FS 35 28 - 44 %    LA volume 126.65 cm3    Sinus 3.18 cm    STJ 3.07 cm    Ascending aorta 3.33 cm    LV mass 153.66 g    LA size 5.32 cm    RVDD 3.76 cm    TAPSE 2.07 cm    RV S' 15.01 cm/s    Left Ventricle Relative Wall Thickness 0.83 cm    AV mean gradient 10 mmHg    AV valve area 2.31 cm2    AV Velocity Ratio 0.76     AV index (prosthetic) 0.77     MV mean gradient 1 mmHg    MV valve area p 1/2 method 2.69 cm2    E/A ratio 0.71     Mean e' 0.07 m/s    E wave deceleration time 281.59 msec    IVRT 152.25 msec    LVOT diameter 1.96 cm    LVOT area 3.0 cm2    LVOT peak alok 1.42 m/s    LVOT peak VTI 28.39 cm    Ao peak alok 1.87 m/s    Ao VTI 37.08 cm    LVOT stroke volume 85.61 cm3    AV peak gradient 14 mmHg    MV peak gradient 6 mmHg    E/E' ratio 13.38 m/s    MV Peak E Alok 0.87 m/s    TR Max Alok 3.12 m/s    MV stenosis pressure 1/2 time 81.66 ms    MV Peak A Alok 1.23 m/s    LV Systolic Volume 13.51 mL    LV Systolic Volume Index 6.9 mL/m2    LV Diastolic Volume 39.01 mL    LV Diastolic Volume Index 19.80 mL/m2    LA Volume Index 64.3 mL/m2    LV Mass Index 78 g/m2    RA Major Axis 5.16 cm    Left Atrium Minor Axis 5.51 cm    Left Atrium Major Axis 5.56 cm    Triscuspid Valve Regurgitation Peak Gradient 39 mmHg    RA Width 3.25 cm    EF 55 %    Narrative    · The left ventricle is normal in size with concentric  remodeling and   normal systolic function.  · The estimated ejection fraction is 55%.  · Moderate left atrial enlargement.  · Grade I left ventricular diastolic dysfunction.  · Normal right ventricular size with normal right ventricular systolic   function.  · Mild right atrial enlargement.  · There is moderate pulmonary hypertension.  · There is no evidence of intracardiac shunting.        EF   Date Value Ref Range Status   06/08/2021 55 % Final     Nuc Stress EF   Date Value Ref Range Status   10/28/2019 76 % Final      Results for orders placed or performed during the hospital encounter of 06/12/17   2D echo with color flow doppler   Result Value Ref Range    EF + QEF 70 55 - 65    Diastolic Dysfunction Yes (A)     Est. PA Systolic Pressure 17.64     Pericardial Effusion NONE     Mitral Valve Mobility NORMAL     Tricuspid Valve Regurgitation TRIVIAL        KAREN:  No results found. However, due to the size of the patient record, not all encounters were searched. Please check Results Review for a complete set of results.    Stress Test:  Results for orders placed during the hospital encounter of 10/28/19    Nuclear Stress - Cardiology Interpreted    Interpretation Summary    Normal Alfreda myocardial perfusion study.    The perfusion scan is free of evidence from myocardial ischemia or injury.    Gated perfusion images showed an ejection fraction of 76 % post stress.    There is normal wall motion post stress.       LHC:  No results found for this or any previous visit.       PFT:  No results found for: FEV1, FVC, IHH0BIM, TLC, DLCO     ALLERGIES:     Review of patient's allergies indicates:   Allergen Reactions    Ace inhibitors Other (See Comments)     Other reaction(s): cough     LDA:      Lines/Drains/Airways     Drain                 Hemodialysis AV Fistula Left forearm -- days          Peripheral Intravenous Line                 Peripheral IV - Single Lumen 12/13/21 1627 18 G Right Antecubital 31 days                Anesthesia Evaluation      Airway   Mallampati: II  TM distance: Normal, at least 6 cm  Neck ROM: Normal ROM  Dental      Pulmonary    (+) sleep apnea,   Cardiovascular   (+) hypertension,     Rate: Normal    Neuro/Psych      GI/Hepatic/Renal    (+) chronic renal disease ESRD and Dialysis,     Endo/Other    (+) diabetes mellitus, hypothyroidism,   Abdominal                     Anesthesia Evaluation    I have reviewed the Patient Summary Reports.    I have reviewed the Nursing Notes. I have reviewed the NPO Status.   I have reviewed the Medications.     Review of Systems  Anesthesia Hx:  No problems with previous Anesthesia  Denies Family Hx of Anesthesia complications.   Denies Personal Hx of Anesthesia complications.   Cardiovascular:   Hypertension PVD    Pulmonary:   Sleep Apnea    Renal/:   Chronic Renal Disease, ESRD, Dialysis    Hepatic/GI:  Hepatic/GI Normal    Neurological:  Neurology Normal    Endocrine:   Diabetes Hypothyroidism        Physical Exam  General:  Obesity    Airway/Jaw/Neck:  Airway Findings: Mouth Opening: Normal Tongue: Normal  General Airway Assessment: Adult, Good  Mallampati: II  TM Distance: Normal, at least 6 cm  Jaw/Neck Findings:  Neck ROM: Normal ROM      Dental:  Dental Findings:Chipped left upper central incisor; multiple missing teeth; nothing remaining is loose/chipped/broken per patient   Chest/Lungs:  Chest/Lungs Findings: Clear to auscultation     Heart/Vascular:  Heart Findings: Rate: Normal  Rhythm: Regular Rhythm        Mental Status:  Mental Status Findings:  Alert and Oriented, Cooperative         Anesthesia Plan  Type of Anesthesia, risks & benefits discussed:  Anesthesia Type:  general, MAC    Patient's Preference:   Plan Factors:          Intra-op Monitoring Plan: standard ASA monitors  Intra-op Monitoring Plan Comments:   Post Op Pain Control Plan: per primary service following discharge from PACU  Post Op Pain Control Plan Comments:     Induction:    IV  Beta Blocker:  Patient is not currently on a Beta-Blocker (No further documentation required).       Informed Consent: Patient understands risks and agrees with Anesthesia plan.  Questions answered. Anesthesia consent signed with patient.  ASA Score: 4     Day of Surgery Review of History & Physical:    H&P update referred to the provider.         Ready For Surgery From Anesthesia Perspective.

## 2022-01-14 NOTE — ANESTHESIA POSTPROCEDURE EVALUATION
Anesthesia Post Evaluation    Patient: Erica Leblanc    Procedure(s) Performed: Procedure(s) (LRB):  COLONOSCOPY (N/A)    Final Anesthesia Type: general      Patient location during evaluation: PACU  Patient participation: Yes- Able to Participate  Level of consciousness: awake and alert  Post-procedure vital signs: reviewed and stable  Pain management: adequate  Airway patency: patent    PONV status at discharge: No PONV  Anesthetic complications: no      Cardiovascular status: hemodynamically stable  Respiratory status: spontaneous ventilation  Follow-up not needed.          Vitals Value Taken Time   /63 01/14/22 1217   Temp 98.0 01/14/22 1231   Pulse 77 01/14/22 1230   Resp 20 01/14/22 1230   SpO2 98 % 01/14/22 1230   Vitals shown include unvalidated device data.      No case tracking events are documented in the log.      Pain/Lyndsey Score: Lyndsey Score: 9 (1/14/2022 12:00 PM)

## 2022-01-14 NOTE — PROVATION PATIENT INSTRUCTIONS
Discharge Summary/Instructions after an Endoscopic Procedure  Patient Name: Erica Contreras  Patient MRN: [id number]  Patient YOB: 1945 Friday, January 14, 2022  Mahesh Huang MD  Dear patient,  As a result of recent federal legislation (The Federal Cures Act), you may   receive lab or pathology results from your procedure in your [facility]   account before your physician is able to contact you. Your physician or   their representative will relay the results to you with their   recommendations at their soonest availability.  Thank you,  RESTRICTIONS:  During your procedure today, you received medications for sedation.  These   medications may affect your judgment, balance and coordination.  Therefore,   for 24 hours, you have the following restrictions:   - DO NOT drive a car, operate machinery, make legal/financial decisions,   sign important papers or drink alcohol.    ACTIVITY:  Today: no heavy lifting, straining or running due to procedural   sedation/anesthesia.  The following day: return to full activity including work.  DIET:  Eat and drink normally unless instructed otherwise.     TREATMENT FOR COMMON SIDE EFFECTS:  - Mild abdominal pain, nausea, belching, bloating or excessive gas:  rest,   eat lightly and use a heating pad.  - Sore Throat: treat with throat lozenges and/or gargle with warm salt   water.  - Because air was used during the procedure, expelling large amounts of air   from your rectum or belching is normal.  - If a bowel prep was taken, you may not have a bowel movement for 1-3 days.    This is normal.  SYMPTOMS TO WATCH FOR AND REPORT TO YOUR PHYSICIAN:  1. Abdominal pain or bloating, other than gas cramps.  2. Chest pain.  3. Back pain.  4. Signs of infection such as: chills or fever occurring within 24 hours   after the procedure.  5. Rectal bleeding, which would show as bright red, maroon, or black stools.   (A tablespoon of blood from the rectum is not serious,  especially if   hemorrhoids are present.)  6. Vomiting.  7. Weakness or dizziness.  GO DIRECTLY TO THE NEAREST EMERGENCY ROOM IF YOU HAVE ANY OF THE FOLLOWING:      Difficulty breathing              Chills and/or fever over 101 F   Persistent vomiting and/or vomiting blood   Severe abdominal pain   Severe chest pain   Black, tarry stools   Bleeding- more than one tablespoon   Any other symptom or condition that you feel may need urgent attention  Your doctor recommends these additional instructions:  If any biopsies were taken, your doctors clinic will contact you in 1 to 2   weeks with any results.  - Discharge patient to home.   - Patient has a contact number available for emergencies.  The signs and   symptoms of potential delayed complications were discussed with the   patient.  Return to normal activities tomorrow.  Written discharge   instructions were provided to the patient.   - Resume previous diet.   - Continue present medications.   - Await pathology results.   - Repeat colonoscopy in 3 years for surveillance.   - The findings and recommendations were discussed with the patient.  For questions, problems or results please call your physician - Mahesh Huang MD at Work:  [phone].  [facility], EMERGENCY ROOM PHONE NUMBER: [phone]  IF A COMPLICATION OR EMERGENCY SITUATION ARISES AND YOU ARE UNABLE TO REACH   YOUR PHYSICIAN - GO DIRECTLY TO THE EMERGENCY ROOM.  Mahesh Huang MD  1/14/2022 11:12:36 AM  This report has been verified and signed electronically.  Dear patient,  As a result of recent federal legislation (The Federal Cures Act), you may   receive lab or pathology results from your procedure in your [facility]   account before your physician is able to contact you. Your physician or   their representative will relay the results to you with their   recommendations at their soonest availability.  Thank you,  PROVATION

## 2022-01-14 NOTE — TRANSFER OF CARE
"Anesthesia Transfer of Care Note    Patient: Erica Leblanc    Procedure(s) Performed: Procedure(s) (LRB):  COLONOSCOPY (N/A)    Patient location: PACU    Anesthesia Type: general    Transport from OR: Transported from OR on room air with adequate spontaneous ventilation    Post pain: adequate analgesia    Post assessment: no apparent anesthetic complications and tolerated procedure well    Vital signs course: NIBP low and treated with phenylephrine.  Last NIBP 118/54.    Level of consciousness: awake, alert and oriented    Nausea/Vomiting: no nausea/vomiting    Complications: none    Transfer of care protocol was followed      Last vitals:   Visit Vitals  /64 (BP Location: Right arm, Patient Position: Lying)   Pulse 84   Temp 36.4 °C (97.5 °F) (Temporal)   Resp 18   Ht 5' 4" (1.626 m)   Wt 95.7 kg (211 lb)   SpO2 98%   Breastfeeding No   BMI 36.22 kg/m²     "
Improved

## 2022-01-14 NOTE — H&P
Short Stay Endoscopy History and Physical    PCP - Sendy Elaine MD  Referring Physician - Estela Johnson, NP  [id number] [facility]  [place] [id number]    Procedure - Endoscopy/Colonoscopy  ASA - per anesthesia  Mallampati - per anesthesia  History of Anesthesia problems - no  Family history Anesthesia problems -  no   Plan of anesthesia - General    HPI  76 y.o. female  Reason for procedure:   screen    ROS:  Constitutional: No fevers, chills, No weight loss  CV: No chest pain  Pulm: No cough, No shortness of breath  GI: see HPI    Medical History:  has a past medical history of Acute ischemic right PCA stroke (6/6/2021), Acute respiratory failure with hypoxia, Age-related osteoporosis without current pathological fracture (3/8/2021), Anemia of chronic kidney failure, stage 4 (severe) (4/5/2019), Cataracts, bilateral, CHF (congestive heart failure), CKD (chronic kidney disease) stage 3, GFR 30-59 ml/min, CKD (chronic kidney disease) stage 3, GFR 30-59 ml/min, Controlled type 2 diabetes mellitus with proteinuria or albuminuria, Depression, Diabetes with neurologic complications, Diabetic retinopathy of both eyes, Edema, Glaucoma, History of colonic polyps, TIA (transient ischemic attack) (11/2008), Hyperlipidemia LDL goal < 100, Hypertension, Hypothyroidism, Major depressive disorder, single episode, mild (2/17/2016), Mixed incontinence urge and stress, Obesity, Obstructive sleep apnea on CPAP, Osteopenia, Proteinuria, Sickle cell trait, Strabismus, TIA (transient ischemic attack), Trouble in sleeping, Type 2 diabetes mellitus with ophthalmic manifestations, Type 2 diabetes with stage 3 chronic kidney disease GFR 30-59, Type II or unspecified type diabetes mellitus with renal manifestations, uncontrolled(250.42), Uncontrolled type 2 diabetes mellitus with peripheral circulatory disorder (4/5/2019), Urge incontinence (1/11/2016), Urge incontinence, Venous stasis ulcer, and Vitamin D deficiency disease.    Surgical  History:  has a past surgical history that includes breast reduction (Bilateral, age 31); Cholecystectomy;  section, low transverse; Refractive surgery; cataracts (Bilateral); Eye surgery (2014, 2014); Hysterectomy (); Eye surgery (Right, 2016); Oophorectomy; Total Reduction Mammoplasty; Breast biopsy; Breast surgery; AV fistula placement (Left, 2019); Cataract extraction (Bilateral); Fistulogram (Left, 3/6/2020); Fistulogram (Left, 2020); Phlebography (Left, 2020); Percutaneous transluminal angioplasty of arteriovenous fistula (Left, 2020); and Fistulogram (Left, 2020).    Family History: family history includes Achondroplasia in her sister; Breast cancer (age of onset: 65) in her maternal aunt; Cataracts in her father, maternal grandfather, maternal grandmother, mother, paternal grandmother, and paternal uncle; Diabetes in her mother and paternal grandmother; Esophageal cancer in her maternal uncle; HIV in her brother; Hypertension in her mother; Leukemia in her father; No Known Problems in her paternal aunt and paternal grandfather; Ovarian cancer (age of onset: 35) in her sister; Parkinsonism in her maternal aunt; Stroke in her mother..    Social History:  reports that she has never smoked. She has never used smokeless tobacco. She reports that she does not drink alcohol and does not use drugs.    Review of patient's allergies indicates:   Allergen Reactions    Ace inhibitors Other (See Comments)     Other reaction(s): cough       Medications:   Medications Prior to Admission   Medication Sig Dispense Refill Last Dose    ACCU-CHEK SOFT DEV LANCETS Kit        aspirin (ECOTRIN) 81 MG EC tablet Take 1 tablet (81 mg total) by mouth once daily. 90 tablet 3     atorvastatin (LIPITOR) 80 MG tablet Take 1 tablet (80 mg total) by mouth every evening. 90 tablet 0     blood sugar diagnostic Strp One test strip use 2 times a day to check blood glucose,  ICD-10: E11.9,  compatible with insurance/glucometer 100 each 11     blood-glucose meter kit One glucometer, use to check blood glucose.   ICD-10: E11.9. Dispense machine covered by insurance 1 each 0     cinacalcet (SENSIPAR) 30 MG Tab        diclofenac sodium (ARTHRITIS PAIN, DICLOFENAC,) 1 % Gel Apply 2 g topically once daily. 20 g 0     docusate sodium (COLACE) 100 MG capsule Take 200 mg by mouth once daily.        doxercalciferoL (HECTOROL) 4 mcg/2 mL injection Inject 4.5 mcg into the vein 3 (three) times a week. At dialysis unit       doxycycline (VIBRA-TABS) 100 MG tablet Take 1 tablet (100 mg total) by mouth 2 (two) times daily. 20 tablet 0     epoetin arnel (EPOGEN INJ) Inject 1,000 Units as directed every 7 days. At the dialysis unit       LANCETS MISC        lancets Misc One lancets use 2 times a day to check blood glucose, ICD-10: E11.9 100 each 11     lancing device Misc One device, used to check blood glucose, ICD-10: E11.9 1 each 0     levothyroxine (SYNTHROID) 50 MCG tablet Take 1 tablet (50 mcg total) by mouth before breakfast. 90 tablet 3     midodrine (PROAMATINE) 5 MG Tab Take 1 tablet (5 mg total) by mouth 3 (three) times daily. 90 tablet 11     OLENA-NABIL RX 1- mg-mg-mcg Tab Take 1 tablet by mouth once daily.       sevelamer carbonate (RENVELA) 800 mg Tab Take 3 tablets (2,400 mg total) by mouth 3 (three) times daily with meals. 270 tablet 11     sodium,potassium,mag sulfates (SUPREP BOWEL PREP KIT) 17.5-3.13-1.6 gram SolR Take 177 mLs by mouth once daily. for 2 days 1 kit 0     TRULICITY 0.75 mg/0.5 mL pen injector SMARTSI.5 Milliliter(s) SUB-Q Once a Week          Physical Exam:    Vital Signs: There were no vitals filed for this visit.    General Appearance: Well appearing in no acute distress  Abdomen: Soft, non tender, non distended with normal bowel sounds, no masses    Labs:  Lab Results   Component Value Date    WBC 7.44 2021    HGB 12.4 2021    HCT 38.3 2021      12/13/2021    CHOL 127 11/03/2021    TRIG 53 11/03/2021    HDL 52 11/03/2021    ALT 15 12/13/2021    AST 17 12/13/2021     12/13/2021    K 5.9 (H) 12/13/2021    CL 95 12/13/2021    CREATININE 10.8 (H) 12/13/2021    BUN 68 (H) 12/13/2021    CO2 28 12/13/2021    TSH 2.631 11/03/2021    INR 1.0 06/07/2021    GLUF 217 (H) 08/30/2004    HGBA1C 6.5 (H) 11/03/2021       I have explained the risks and benefits of this endoscopic procedure to the patient including but not limited to bleeding, inflammation, infection, perforation, and death.      Mahesh Huang MD

## 2022-01-20 ENCOUNTER — ANESTHESIA EVENT (OUTPATIENT)
Dept: SURGERY | Facility: HOSPITAL | Age: 77
End: 2022-01-20
Payer: MEDICARE

## 2022-01-21 ENCOUNTER — HOSPITAL ENCOUNTER (OUTPATIENT)
Facility: HOSPITAL | Age: 77
Discharge: HOME | End: 2022-01-21
Attending: SURGERY | Admitting: SURGERY
Payer: MEDICARE

## 2022-01-21 ENCOUNTER — ANESTHESIA (OUTPATIENT)
Dept: SURGERY | Facility: HOSPITAL | Age: 77
End: 2022-01-21
Payer: MEDICARE

## 2022-01-21 VITALS
HEART RATE: 74 BPM | DIASTOLIC BLOOD PRESSURE: 58 MMHG | BODY MASS INDEX: 36.02 KG/M2 | OXYGEN SATURATION: 97 % | WEIGHT: 211 LBS | SYSTOLIC BLOOD PRESSURE: 112 MMHG | HEIGHT: 64 IN | RESPIRATION RATE: 18 BRPM | TEMPERATURE: 98 F

## 2022-01-21 DIAGNOSIS — T82.858A STENOSIS OF ARTERIOVENOUS DIALYSIS FISTULA: ICD-10-CM

## 2022-01-21 LAB
CTP QC/QA: YES
POCT GLUCOSE: 103 MG/DL (ref 70–110)
POCT GLUCOSE: 111 MG/DL (ref 70–110)
SARS-COV-2 AG RESP QL IA.RAPID: NEGATIVE

## 2022-01-21 PROCEDURE — 63600175 PHARM REV CODE 636 W HCPCS: Performed by: SURGERY

## 2022-01-21 PROCEDURE — 36000707: Performed by: SURGERY

## 2022-01-21 PROCEDURE — C1725 CATH, TRANSLUMIN NON-LASER: HCPCS | Performed by: SURGERY

## 2022-01-21 PROCEDURE — 36902 INTRO CATH DIALYSIS CIRCUIT: CPT | Mod: ,,, | Performed by: SURGERY

## 2022-01-21 PROCEDURE — 63600175 PHARM REV CODE 636 W HCPCS: Performed by: NURSE ANESTHETIST, CERTIFIED REGISTERED

## 2022-01-21 PROCEDURE — 25000003 PHARM REV CODE 250: Performed by: NURSE ANESTHETIST, CERTIFIED REGISTERED

## 2022-01-21 PROCEDURE — 37000009 HC ANESTHESIA EA ADD 15 MINS: Performed by: SURGERY

## 2022-01-21 PROCEDURE — 82962 GLUCOSE BLOOD TEST: CPT | Performed by: SURGERY

## 2022-01-21 PROCEDURE — 36000706: Performed by: SURGERY

## 2022-01-21 PROCEDURE — 27201423 OPTIME MED/SURG SUP & DEVICES STERILE SUPPLY: Performed by: SURGERY

## 2022-01-21 PROCEDURE — 37000008 HC ANESTHESIA 1ST 15 MINUTES: Performed by: SURGERY

## 2022-01-21 PROCEDURE — C1769 GUIDE WIRE: HCPCS | Performed by: SURGERY

## 2022-01-21 PROCEDURE — 71000016 HC POSTOP RECOV ADDL HR: Performed by: SURGERY

## 2022-01-21 PROCEDURE — D9220A PRA ANESTHESIA: ICD-10-PCS | Mod: CRNA,,, | Performed by: NURSE ANESTHETIST, CERTIFIED REGISTERED

## 2022-01-21 PROCEDURE — 36902 PR INTRO CATH, DIALYSIS CIRCUIT W/TRANSLML BALLOON ANGIO: ICD-10-PCS | Mod: ,,, | Performed by: SURGERY

## 2022-01-21 PROCEDURE — 71000044 HC DOSC ROUTINE RECOVERY FIRST HOUR: Performed by: SURGERY

## 2022-01-21 PROCEDURE — 71000015 HC POSTOP RECOV 1ST HR: Performed by: SURGERY

## 2022-01-21 PROCEDURE — C1894 INTRO/SHEATH, NON-LASER: HCPCS | Performed by: SURGERY

## 2022-01-21 PROCEDURE — D9220A PRA ANESTHESIA: ICD-10-PCS | Mod: ANES,,, | Performed by: ANESTHESIOLOGY

## 2022-01-21 PROCEDURE — D9220A PRA ANESTHESIA: Mod: CRNA,,, | Performed by: NURSE ANESTHETIST, CERTIFIED REGISTERED

## 2022-01-21 PROCEDURE — C1887 CATHETER, GUIDING: HCPCS | Performed by: SURGERY

## 2022-01-21 PROCEDURE — D9220A PRA ANESTHESIA: Mod: ANES,,, | Performed by: ANESTHESIOLOGY

## 2022-01-21 PROCEDURE — 25000003 PHARM REV CODE 250: Performed by: SURGERY

## 2022-01-21 RX ORDER — CEFAZOLIN SODIUM 1 G/3ML
INJECTION, POWDER, FOR SOLUTION INTRAMUSCULAR; INTRAVENOUS
Status: DISCONTINUED | OUTPATIENT
Start: 2022-01-21 | End: 2022-01-21

## 2022-01-21 RX ORDER — FENTANYL CITRATE 50 UG/ML
25 INJECTION, SOLUTION INTRAMUSCULAR; INTRAVENOUS EVERY 5 MIN PRN
Status: DISCONTINUED | OUTPATIENT
Start: 2022-01-21 | End: 2022-01-21 | Stop reason: HOSPADM

## 2022-01-21 RX ORDER — HEPARIN SODIUM 1000 [USP'U]/ML
INJECTION, SOLUTION INTRAVENOUS; SUBCUTANEOUS
Status: DISCONTINUED | OUTPATIENT
Start: 2022-01-21 | End: 2022-01-21 | Stop reason: HOSPADM

## 2022-01-21 RX ORDER — ACETAMINOPHEN 325 MG/1
650 TABLET ORAL EVERY 4 HOURS PRN
Status: DISCONTINUED | OUTPATIENT
Start: 2022-01-21 | End: 2022-01-21 | Stop reason: HOSPADM

## 2022-01-21 RX ORDER — CEFAZOLIN SODIUM/D5W 2 G/100 ML
2 PLASTIC BAG, INJECTION (ML) INTRAVENOUS
Status: DISCONTINUED | OUTPATIENT
Start: 2022-01-21 | End: 2022-01-21 | Stop reason: HOSPADM

## 2022-01-21 RX ORDER — MIDAZOLAM HYDROCHLORIDE 1 MG/ML
INJECTION, SOLUTION INTRAMUSCULAR; INTRAVENOUS
Status: DISCONTINUED | OUTPATIENT
Start: 2022-01-21 | End: 2022-01-21

## 2022-01-21 RX ORDER — NITROGLYCERIN 5 MG/ML
INJECTION, SOLUTION INTRAVENOUS
Status: DISCONTINUED | OUTPATIENT
Start: 2022-01-21 | End: 2022-01-21 | Stop reason: HOSPADM

## 2022-01-21 RX ORDER — FENTANYL CITRATE 50 UG/ML
INJECTION, SOLUTION INTRAMUSCULAR; INTRAVENOUS
Status: DISCONTINUED | OUTPATIENT
Start: 2022-01-21 | End: 2022-01-21

## 2022-01-21 RX ORDER — VERAPAMIL HYDROCHLORIDE 2.5 MG/ML
INJECTION, SOLUTION INTRAVENOUS
Status: DISCONTINUED | OUTPATIENT
Start: 2022-01-21 | End: 2022-01-21 | Stop reason: HOSPADM

## 2022-01-21 RX ORDER — SODIUM CHLORIDE 0.9 % (FLUSH) 0.9 %
10 SYRINGE (ML) INJECTION
Status: DISCONTINUED | OUTPATIENT
Start: 2022-01-21 | End: 2022-01-21 | Stop reason: HOSPADM

## 2022-01-21 RX ORDER — METOCLOPRAMIDE HYDROCHLORIDE 5 MG/ML
10 INJECTION INTRAMUSCULAR; INTRAVENOUS EVERY 10 MIN PRN
Status: DISCONTINUED | OUTPATIENT
Start: 2022-01-21 | End: 2022-01-21 | Stop reason: HOSPADM

## 2022-01-21 RX ORDER — MUPIROCIN 20 MG/G
OINTMENT TOPICAL
Status: DISCONTINUED | OUTPATIENT
Start: 2022-01-21 | End: 2022-01-21 | Stop reason: HOSPADM

## 2022-01-21 RX ORDER — LIDOCAINE HYDROCHLORIDE 10 MG/ML
INJECTION, SOLUTION EPIDURAL; INFILTRATION; INTRACAUDAL; PERINEURAL
Status: DISCONTINUED | OUTPATIENT
Start: 2022-01-21 | End: 2022-01-21 | Stop reason: HOSPADM

## 2022-01-21 RX ORDER — LIDOCAINE HYDROCHLORIDE 10 MG/ML
1 INJECTION, SOLUTION EPIDURAL; INFILTRATION; INTRACAUDAL; PERINEURAL ONCE
Status: DISCONTINUED | OUTPATIENT
Start: 2022-01-21 | End: 2022-01-21 | Stop reason: HOSPADM

## 2022-01-21 RX ORDER — HEPARIN SODIUM 1000 [USP'U]/ML
INJECTION, SOLUTION INTRAVENOUS; SUBCUTANEOUS
Status: DISCONTINUED | OUTPATIENT
Start: 2022-01-21 | End: 2022-01-21

## 2022-01-21 RX ORDER — ONDANSETRON 8 MG/1
8 TABLET, ORALLY DISINTEGRATING ORAL EVERY 8 HOURS PRN
Status: DISCONTINUED | OUTPATIENT
Start: 2022-01-21 | End: 2022-01-21 | Stop reason: HOSPADM

## 2022-01-21 RX ADMIN — HEPARIN SODIUM 3000 UNITS: 1000 INJECTION, SOLUTION INTRAVENOUS; SUBCUTANEOUS at 02:01

## 2022-01-21 RX ADMIN — FENTANYL CITRATE 25 MCG: 50 INJECTION, SOLUTION INTRAMUSCULAR; INTRAVENOUS at 02:01

## 2022-01-21 RX ADMIN — SODIUM CHLORIDE: 9 INJECTION, SOLUTION INTRAVENOUS at 01:01

## 2022-01-21 RX ADMIN — FENTANYL CITRATE 25 MCG: 50 INJECTION, SOLUTION INTRAMUSCULAR; INTRAVENOUS at 01:01

## 2022-01-21 RX ADMIN — CEFAZOLIN 2 G: 330 INJECTION, POWDER, FOR SOLUTION INTRAMUSCULAR; INTRAVENOUS at 01:01

## 2022-01-21 RX ADMIN — MIDAZOLAM HYDROCHLORIDE 1 MG: 1 INJECTION, SOLUTION INTRAMUSCULAR; INTRAVENOUS at 01:01

## 2022-01-21 NOTE — H&P
[facility] - Surgery ([facility])  Vascular Surgery  History and Physical     Patient Name: Erica Leblanc  MRN: [id number]  Admission Date: (Not on file)  Code Status: Prior   Attending Physician: Keron Perez MD  Primary Care Physician: Sendy Elaine MD    Subjective:     Chief Complaint/Reason for Admission: Fistula stenosis     HPI:  HPI:  Erica Leblanc is a 76 y.o. female with       Patient Active Problem List   Diagnosis    Class 2 obesity in adult    Sickle cell trait    Hyperlipidemia LDL goal <100    HUMBERTO on CPAP    Hypothyroidism (acquired)    Hx-TIA (transient ischemic attack)    Vitamin D deficiency disease    Pulmonary hypertension    Type 2 diabetes mellitus with ESRD (end-stage renal disease)    Secondary renal hyperparathyroidism    Incomplete bladder emptying    Postmenopausal atrophic vaginitis    Rectocele    Mixed incontinence urge and stress    Chronic diastolic heart failure    Tortuous aorta    Uncontrolled type 2 diabetes mellitus with hyperglycemia    ESRD on hemodialysis    Anemia of chronic disease    Debility    Chronic respiratory failure    Type 2 diabetes mellitus with foot ulcer, with long-term current use of insulin    Stable proliferative diabetic retinopathy associated with type 2 diabetes mellitus    Mild major depression    Heart failure with preserved ejection fraction    Pseudophakia    Chronic kidney disease-mineral and bone disorder    Age-related osteoporosis without current pathological fracture    H/O: stroke    Decreased  strength    Fine motor impairment    being managed by PCP and specialists who is here today for evaluation of long term HD access.  S/p R IJ tunneled HD catheter, HD without issues.  Pt is R handed.  No complaints today.  Does endorse orthopnea, no chest pain.  Unable to climb 1 flight of stairs on own.     no MI  no Stroke  Tobacco use: denies     1/20/20: No new issues.     3/2020: Dialysis without issues.      20:  S/p LUE fistulogram, central venogram, ligation of medial side branch cephalic vein, ligation of lateral side branch cephalic vein.  No issues with HD for several weeks since procedure.     20:  No issues with HD.     8/3/20: s/p L proximal fistula angioplasty 20.  No issues with HD.     20:  S/p 29 Balloon angioplasty of the proximal LUE AVF with a 6x60 Angiosculpt and Stellerex balloon.       10/2020:  No issues with HD     2021:  No issues with HD     2021:  No new problems.     2021:  No issues with HD. C/o fatigue after HD.  Has not seen cardiology recently.      Medications Prior to Admission   Medication Sig Dispense Refill Last Dose    levothyroxine (SYNTHROID) 50 MCG tablet Take 1 tablet (50 mcg total) by mouth before breakfast. 90 tablet 3     midodrine (PROAMATINE) 5 MG Tab Take 1 tablet (5 mg total) by mouth 3 (three) times daily. 90 tablet 11     TRULICITY 0.75 mg/0.5 mL pen injector SMARTSI.5 Milliliter(s) SUB-Q Once a Week       ACCU-CHEK SOFT DEV LANCETS Kit        aspirin (ECOTRIN) 81 MG EC tablet Take 1 tablet (81 mg total) by mouth once daily. 90 tablet 3     atorvastatin (LIPITOR) 80 MG tablet Take 1 tablet (80 mg total) by mouth every evening. 90 tablet 0     blood sugar diagnostic Strp One test strip use 2 times a day to check blood glucose,  ICD-10: E11.9, compatible with insurance/glucometer 100 each 11     blood-glucose meter kit One glucometer, use to check blood glucose.   ICD-10: E11.9. Dispense machine covered by insurance 1 each 0     cinacalcet (SENSIPAR) 30 MG Tab        diclofenac sodium (ARTHRITIS PAIN, DICLOFENAC,) 1 % Gel Apply 2 g topically once daily. 20 g 0     docusate sodium (COLACE) 100 MG capsule Take 200 mg by mouth once daily.        doxercalciferoL (HECTOROL) 4 mcg/2 mL injection Inject 4.5 mcg into the vein 3 (three) times a week. At dialysis unit       doxycycline (VIBRA-TABS) 100 MG tablet Take 1 tablet (100 mg  total) by mouth 2 (two) times daily. 20 tablet 0     epoetin arnel (EPOGEN INJ) Inject 1,000 Units as directed every 7 days. At the dialysis unit       LANCETS MISC        lancets Misc One lancets use 2 times a day to check blood glucose, ICD-10: E11.9 100 each 11     lancing device Misc One device, used to check blood glucose, ICD-10: E11.9 1 each 0     OLENA-NABIL RX 1- mg-mg-mcg Tab Take 1 tablet by mouth once daily.       sevelamer carbonate (RENVELA) 800 mg Tab Take 3 tablets (2,400 mg total) by mouth 3 (three) times daily with meals. 270 tablet 11     [] sodium,potassium,mag sulfates (SUPREP BOWEL PREP KIT) 17.5-3.13-1.6 gram SolR Take 177 mLs by mouth once daily. for 2 days 1 kit 0        Review of patient's allergies indicates:   Allergen Reactions    Ace inhibitors Other (See Comments)     Other reaction(s): cough       Past Medical History:   Diagnosis Date    Acute ischemic right PCA stroke 2021    Acute respiratory failure with hypoxia     Age-related osteoporosis without current pathological fracture 3/8/2021    Anemia of chronic kidney failure, stage 4 (severe) 2019    Cataracts, bilateral     CHF (congestive heart failure)     CKD (chronic kidney disease) stage 3, GFR 30-59 ml/min     CKD (chronic kidney disease) stage 3, GFR 30-59 ml/min     Controlled type 2 diabetes mellitus with proteinuria or albuminuria     Depression     Diabetes with neurologic complications     Diabetic retinopathy of both eyes     Edema     Glaucoma     History of colonic polyps     Hx-TIA (transient ischemic attack) 2008    Hyperlipidemia LDL goal < 100     Hypertension     Hypothyroidism     Major depressive disorder, single episode, mild 2016    Mixed incontinence urge and stress     Obesity     Obstructive sleep apnea on CPAP     19:  Home CPAP machine broken, per patient & son    Osteopenia     Proteinuria     Sickle cell trait     Strabismus     TIA  (transient ischemic attack)     Trouble in sleeping     Type 2 diabetes mellitus with ophthalmic manifestations     Type 2 diabetes with stage 3 chronic kidney disease GFR 30-59     Type II or unspecified type diabetes mellitus with renal manifestations, uncontrolled(250.42)     Uncontrolled type 2 diabetes mellitus with peripheral circulatory disorder 2019    Urge incontinence 2016    Urge incontinence     Venous stasis ulcer     bilateral lower legs    Vitamin D deficiency disease      Past Surgical History:   Procedure Laterality Date    AV FISTULA PLACEMENT Left 2019    Procedure: CREATION, AV FISTULA, LEFT UPPER EXTREMITY;  Surgeon: Keron Perez MD;  [facility];  Service: Vascular;  Laterality: Left;    BREAST BIOPSY      breast reduction Bilateral age 31    BREAST SURGERY      CATARACT EXTRACTION Bilateral     cataracts Bilateral      SECTION, LOW TRANSVERSE      x1    CHOLECYSTECTOMY      COLONOSCOPY N/A 2022    Procedure: COLONOSCOPY;  Surgeon: Mahesh Huang MD;  Location: [facility] ([address] [place] [address]);  Service: Endoscopy;  Laterality: N/A;  fully vaccinated-sm.  RAPID COVID  +cologuard needing ASAP  Dialysis pt T,TH Sat and left UA access  2nd floor - cardiac/dialysis/SOB / LABS / prep ins. emailed - [facility]    EYE SURGERY  2014, 2014    vitrectomy    EYE SURGERY Right 2016    FISTULOGRAM Left 3/6/2020    Procedure: Fistulogram, left upper extremity, with branch ligation;  Surgeon: Keron Perez MD;  Location: [address];  Service: Vascular;  Laterality: Left;  Time 1.1 Minute  42.10 mGy    FISTULOGRAM Left 2020    Procedure: Fistulogram, left upper extremity, transradial access with possible intervention;  Surgeon: Keron Perez MD;  Location: [address];  Service: Vascular;  Laterality: Left;    FISTULOGRAM Left 2020    Procedure: Fistulogram, left upper extremity, possible intervention;  Surgeon:  Keron Perez MD;  Location: [facility] OR;  Service: Vascular;  Laterality: Left;  930 AM START  RN PRE OP  ---COVID NEGATIVE ON  8-. CA    HYSTERECTOMY  1986    TAHBSO (patient is unsure if ovaries removed)    OOPHORECTOMY      PERCUTANEOUS TRANSLUMINAL ANGIOPLASTY OF ARTERIOVENOUS FISTULA Left 7/21/2020    Procedure: PTA, AV FISTULA;  Surgeon: Keron Preez MD;  Location: [facility] OR 2ND FLR;  Service: Vascular;  Laterality: Left;  15.9 minutes of fluro  41.12  mGy  7.9060 Gy cm2  32ml  contrast    PHLEBOGRAPHY Left 7/21/2020    Procedure: CENTRAL VENOGRAM;  Surgeon: Keron Perez MD;  Location: [facility] OR [facility] FLR;  Service: Vascular;  Laterality: Left;    REFRACTIVE SURGERY      TOTAL REDUCTION MAMMOPLASTY      approx 10 yrs ago     Family History     Problem Relation (Age of Onset)    Achondroplasia Sister    Breast cancer Maternal Aunt (65)    Cataracts Father, Mother, Paternal Grandmother, Maternal Grandmother, Maternal Grandfather, Paternal Uncle    Diabetes Mother, Paternal Grandmother    Esophageal cancer Maternal Uncle    HIV Brother    Hypertension Mother    Leukemia Father    No Known Problems Paternal Grandfather, Paternal Aunt    Ovarian cancer Sister (35)    Parkinsonism Maternal Aunt    Stroke Mother        Tobacco Use    Smoking status: Never Smoker    Smokeless tobacco: Never Used   Substance and Sexual Activity    Alcohol use: No     Alcohol/week: 0.0 standard drinks    Drug use: No    Sexual activity: Not Currently     Partners: Male     Birth control/protection: Post-menopausal, Surgical     Review of Systems   All other systems reviewed and are negative.    Objective:     Vital Signs (Most Recent):    Vital Signs (24h Range):           There is no height or weight on file to calculate BMI.    Physical Exam  Vitals and nursing note reviewed.   Constitutional:       Appearance: She is obese.   Cardiovascular:      Rate and Rhythm: Normal rate.      Pulses: Normal  pulses.   Pulmonary:      Effort: Pulmonary effort is normal.   Musculoskeletal:         General: Normal range of motion.   Skin:     General: Skin is warm and dry.      Capillary Refill: Capillary refill takes less than 2 seconds.   Neurological:      Mental Status: She is alert.         Significant Labs:  CBC: No results for input(s): WBC, RBC, HGB, HCT, PLT, MCV, MCH, MCHC in the last 48 hours.  CMP: No results for input(s): GLU, CALCIUM, ALBUMIN, PROT, NA, K, CO2, CL, BUN, CREATININE, ALKPHOS, ALT, AST, BILITOT in the last 48 hours.    Significant Diagnostics:  I have reviewed all pertinent imaging results/findings within the past 24 hours.    Assessment and Plan:     76 year old female with L RC fistula here with stenosis seen on Duplex US   Patient is s/p L RC AV fistula with proximal fistula measuring 5 mm 1/13/20, adequate flow without issues during HD s/p LUE fistulogram, central venogram, ligation of medial side branch cephalic vein, ligation of lateral side branch cephalic vein 3/2/20 with prox AVF stenosis s/p L proximal fistula angioplasty 7/21/20 with persistent flow issues s/p proximal angioplasty 8/19/20 with scoring balloon/DCB with improvement in stenosis/flow and no further issues with HD    -NPO  -To OR today for fistulogram and all necessary procedures        Alverto Harris MD  Vascular Surgery  [facility] - Surgery ([facility])

## 2022-01-21 NOTE — TRANSFER OF CARE
"Anesthesia Transfer of Care Note    Patient: Erica Leblanc    Procedure(s) Performed: Procedure(s) (LRB):  Fistulogram, transradial access (Left)  ANGIOPLASTY, VEIN    Patient location: [facility]    Anesthesia Type: MAC    Transport from OR: Transported from OR on 6-10 L/min O2 by face mask with adequate spontaneous ventilation    Post pain: adequate analgesia    Post assessment: no apparent anesthetic complications    Post vital signs: stable    Level of consciousness: awake, alert and oriented    Nausea/Vomiting: no nausea/vomiting    Complications: none    Transfer of care protocol was followed      Last vitals:   Visit Vitals  /70   Pulse 83   Temp 36.9 °C (98.4 °F) (Oral)   Resp 16   Ht 5' 4" (1.626 m)   Wt 95.7 kg (210 lb 15.7 oz)   SpO2 100%   Breastfeeding No   BMI 36.21 kg/m²     "

## 2022-01-21 NOTE — ANESTHESIA PREPROCEDURE EVALUATION
01/21/2022  Erica Leblanc is a 76 y.o., female.  Pre-operative evaluation for Procedure(s) (LRB):  Fistulogram, transradial access (Left)    Erica Leblanc is a 76 y.o. female     Patient Active Problem List   Diagnosis    Class 2 obesity in adult    Sickle cell trait    Hyperlipidemia LDL goal <100    HUMBERTO on CPAP    Hypothyroidism (acquired)    Hx-TIA (transient ischemic attack)    Vitamin D deficiency disease    Pulmonary hypertension    Type 2 diabetes mellitus with ESRD (end-stage renal disease)    Secondary renal hyperparathyroidism    Incomplete bladder emptying    Postmenopausal atrophic vaginitis    Rectocele    Mixed incontinence urge and stress    Chronic diastolic heart failure    Tortuous aorta    Uncontrolled type 2 diabetes mellitus with hyperglycemia    ESRD on hemodialysis    Anemia of chronic disease    Debility    Chronic respiratory failure    Type 2 diabetes mellitus with foot ulcer, with long-term current use of insulin    Stable proliferative diabetic retinopathy associated with type 2 diabetes mellitus    Mild major depression    Heart failure with preserved ejection fraction    Pseudophakia    Chronic kidney disease-mineral and bone disorder    Age-related osteoporosis without current pathological fracture    H/O: stroke    Decreased  strength    Fine motor impairment       Review of patient's allergies indicates:   Allergen Reactions    Ace inhibitors Other (See Comments)     Other reaction(s): cough       No current facility-administered medications on file prior to encounter.     Current Outpatient Medications on File Prior to Encounter   Medication Sig Dispense Refill    levothyroxine (SYNTHROID) 50 MCG tablet Take 1 tablet (50 mcg total) by mouth before breakfast. 90 tablet 3    midodrine (PROAMATINE) 5 MG Tab Take 1 tablet (5 mg  total) by mouth 3 (three) times daily. 90 tablet 11    TRULICITY 0.75 mg/0.5 mL pen injector SMARTSI.5 Milliliter(s) SUB-Q Once a Week      ACCU-CHEK SOFT DEV LANCETS Kit       aspirin (ECOTRIN) 81 MG EC tablet Take 1 tablet (81 mg total) by mouth once daily. 90 tablet 3    blood sugar diagnostic Strp One test strip use 2 times a day to check blood glucose,  ICD-10: E11.9, compatible with insurance/glucometer 100 each 11    blood-glucose meter kit One glucometer, use to check blood glucose.   ICD-10: E11.9. Dispense machine covered by insurance 1 each 0    docusate sodium (COLACE) 100 MG capsule Take 200 mg by mouth once daily.       doxercalciferoL (HECTOROL) 4 mcg/2 mL injection Inject 4.5 mcg into the vein 3 (three) times a week. At dialysis unit      doxycycline (VIBRA-TABS) 100 MG tablet Take 1 tablet (100 mg total) by mouth 2 (two) times daily. 20 tablet 0    epoetin arnel (EPOGEN INJ) Inject 1,000 Units as directed every 7 days. At the dialysis unit      LANCETS MISC       lancets Misc One lancets use 2 times a day to check blood glucose, ICD-10: E11.9 100 each 11    lancing device Misc One device, used to check blood glucose, ICD-10: E11.9 1 each 0    sevelamer carbonate (RENVELA) 800 mg Tab Take 3 tablets (2,400 mg total) by mouth 3 (three) times daily with meals. 270 tablet 11       Past Surgical History:   Procedure Laterality Date    AV FISTULA PLACEMENT Left 2019    Procedure: CREATION, AV FISTULA, LEFT UPPER EXTREMITY;  Surgeon: Keron Perez MD;  Location: [facility] OR;  Service: Vascular;  Laterality: Left;    BREAST BIOPSY      breast reduction Bilateral age 31    BREAST SURGERY      CATARACT EXTRACTION Bilateral     cataracts Bilateral      SECTION, LOW TRANSVERSE      x1    CHOLECYSTECTOMY      COLONOSCOPY N/A 2022    Procedure: COLONOSCOPY;  Surgeon: Mahesh Huang MD;  Location: [facility] ENDO ([address] [place] [address]);  Service: Endoscopy;  Laterality: N/A;  fully  vaccinated-sm.  RAPID COVID  +cologuard needing ASAP  Dialysis pt T,TH Sat and left UA access  2nd floor - cardiac/dialysis/SOB / LABS / prep ins. emailed - [facility]    EYE SURGERY  1/2014, 6/2014    vitrectomy    EYE SURGERY Right 08/17/2016    FISTULOGRAM Left 3/6/2020    Procedure: Fistulogram, left upper extremity, with branch ligation;  Surgeon: Keron Perez MD;  Location: [facility] OR [address] [place] [address];  Service: Vascular;  Laterality: Left;  Time 1.1 Minute  42.10 mGy    FISTULOGRAM Left 7/21/2020    Procedure: Fistulogram, left upper extremity, transradial access with possible intervention;  Surgeon: Keron Perez MD;  Location: [facility] OR [address] [place] [address];  Service: Vascular;  Laterality: Left;    FISTULOGRAM Left 8/19/2020    Procedure: Fistulogram, left upper extremity, possible intervention;  Surgeon: Keron Perez MD;  Location: [facility];  Service: Vascular;  Laterality: Left;  930 AM START  RN PRE OP  ---COVID NEGATIVE ON  8-. CA    HYSTERECTOMY  1986    TAHBSO (patient is unsure if ovaries removed)    OOPHORECTOMY      PERCUTANEOUS TRANSLUMINAL ANGIOPLASTY OF ARTERIOVENOUS FISTULA Left 7/21/2020    Procedure: PTA, AV FISTULA;  Surgeon: Keron Perez MD;  Location: [facility] OR [address] [place] [address];  Service: Vascular;  Laterality: Left;  15.9 minutes of fluro  41.12  mGy  7.9060 Gy cm2  32ml  contrast    PHLEBOGRAPHY Left 7/21/2020    Procedure: CENTRAL VENOGRAM;  Surgeon: Keron Perez MD;  Location: [facility] OR [address] [place] [address];  Service: Vascular;  Laterality: Left;    REFRACTIVE SURGERY      TOTAL REDUCTION MAMMOPLASTY      approx 10 yrs ago       Social History     Socioeconomic History    Marital status: Single    Number of children: 5   Occupational History    Occupation:      Employer: [employer]     Comment: part-time   Tobacco Use    Smoking status: Never Smoker    Smokeless tobacco: Never Used   Substance and Sexual Activity    Alcohol use: No     Alcohol/week: 0.0  standard drinks    Drug use: No    Sexual activity: Not Currently     Partners: Male     Birth control/protection: Post-menopausal, Surgical     Social Determinants of Health     Financial Resource Strain: High Risk    Difficulty of Paying Living Expenses: Hard   Food Insecurity: Food Insecurity Present    Worried About Running Out of Food in the Last Year: Sometimes true    Ran Out of Food in the Last Year: Sometimes true   Transportation Needs: Unmet Transportation Needs    Lack of Transportation (Medical): Yes    Lack of Transportation (Non-Medical): Yes   Physical Activity: Insufficiently Active    Days of Exercise per Week: 3 days    Minutes of Exercise per Session: 10 min   Stress: Stress Concern Present    Feeling of Stress : Rather much   Social Connections: Moderately Isolated    Frequency of Communication with Friends and Family: More than three times a week    Frequency of Social Gatherings with Friends and Family: Once a week    Attends Congregational Services: More than 4 times per year    Active Member of Clubs or Organizations: No    Attends Club or Organization Meetings: Never    Marital Status: Never    Housing Stability: High Risk    Unable to Pay for Housing in the Last Year: Yes    Number of Places Lived in the Last Year: 1    Unstable Housing in the Last Year: No         CBC: No results for input(s): WBC, RBC, HGB, HCT, PLT, MCV, MCH, MCHC in the last 72 hours.    CMP: No results for input(s): NA, K, CL, CO2, BUN, CREATININE, GLU, MG, PHOS, CALCIUM, ALBUMIN, PROT, ALKPHOS, ALT, AST, BILITOT in the last 72 hours.    INR  No results for input(s): PT, INR, PROTIME, APTT in the last 72 hours.        Diagnostic Studies:      EKD Echo:  2021    · The left ventricle is normal in size with concentric remodeling and   normal systolic function.  · The estimated ejection fraction is 55%.  · Moderate left atrial enlargement.  · Grade I left ventricular diastolic  dysfunction.  · Normal right ventricular size with normal right ventricular systolic   function.  · Mild right atrial enlargement.  · There is moderate pulmonary hypertension.  · There is no evidence of intracardiac shunting         Anesthesia Evaluation    I have reviewed the Patient Summary Reports.    I have reviewed the Nursing Notes. I have reviewed the NPO Status.   I have reviewed the Medications.     Review of Systems  Cardiovascular:   Hypertension CHF    Pulmonary:   Sleep Apnea    Renal/:   Chronic Renal Disease    Neurological:   TIA, CVA    Endocrine:   Diabetes Hypothyroidism        Physical Exam   Airway/Jaw/Neck:  Airway Findings: Mouth Opening: Normal Tongue: Normal  General Airway Assessment: Adult  Mallampati: III  Improves to II with phonation.  TM Distance: Normal, at least 6 cm        Eyes/Ears/Nose:  EYES/EARS/NOSE FINDINGS: Normal   Dental:  Dental Findings:    Chest/Lungs:  Chest/Lungs Clear    Heart/Vascular:  Heart Findings: Normal       Mental Status:  Mental Status Findings: Normal        Anesthesia Plan  Type of Anesthesia, risks & benefits discussed:  Anesthesia Type:  general    Patient's Preference:   Plan Factors:          Intra-op Monitoring Plan: standard ASA monitors  Intra-op Monitoring Plan Comments:   Post Op Pain Control Plan: multimodal analgesia  Post Op Pain Control Plan Comments:     Induction:   IV  Beta Blocker:  Patient is not currently on a Beta-Blocker (No further documentation required).       Informed Consent:  Anesthesia consent signed with patient.  ASA Score: 3     Day of Surgery Review of History & Physical:            Ready For Surgery From Anesthesia Perspective.

## 2022-01-21 NOTE — PLAN OF CARE
Patient received discharge instructions and prescriptions.  Patient verbalized understanding of all instructions given and all questions were addressed prior to patient's discharge.  Patient's vital signs are stable and within patient's baseline.  Patient tolerated clear liquids PO.   Patient states pain is 0/10 and tolerable.  Patient denies nausea and vomiting at this time.  Patient meets all criteria for discharge at this time.  All required consents present in patient's chart upon patient's discharge.

## 2022-01-21 NOTE — BRIEF OP NOTE
[facility] - Surgery ([facility])  Brief Operative Note    Surgery Date: 1/21/2022     Surgeon(s) and Role:     * Keron Perez MD - Primary     * Alverto Harris MD - Fellow    Assisting Surgeon: None    Pre-op Diagnosis:  Arteriovenous fistula stenosis, subsequent encounter [T82.858D]  Arteriovenous graft stenosis, subsequent encounter [T82.858D]    Post-op Diagnosis:  Post-Op Diagnosis Codes:     * Arteriovenous fistula stenosis, subsequent encounter [T82.858D]     * Arteriovenous graft stenosis, subsequent encounter [T82.858D]    Procedure(s) (LRB):  Fistulogram, transradial access (Left)  ANGIOPLASTY, VEIN    Anesthesia: Local MAC    Operative Findings: adequate artery for access    Estimated Blood Loss:<5 cc         Specimens:   Specimen (24h ago, onward)            None            Discharge Note    OUTCOME: Patient tolerated treatment/procedure well without complication and is now ready for discharge.    DISPOSITION: Home or Self Care    FINAL DIAGNOSIS:  <principal problem not specified>    FOLLOWUP: In clinic in 1-2 weeks with [facility]    DISCHARGE INSTRUCTIONS:  No discharge procedures on file.

## 2022-01-21 NOTE — DISCHARGE INSTRUCTIONS
"Discharge Note     OUTCOME: Patient tolerated treatment/procedure well without complication and is now ready for discharge.     DISPOSITION: Home or Self Care     FINAL DIAGNOSIS:  <principal problem not specified>     FOLLOWUP: In clinic in 1-2 weeks with HD US     DISCHARGE INSTRUCTIONS:  No discharge procedures on file.              Patient Education       Fistulogram   Why is this procedure done?   A fistulogram is a test to look at your dialysis fistula. The fistula is the connection your doctors made between an artery and a vein. It gives you a place to access your blood vessels for dialysis. This test uses an x-ray dye to see if your fistula is blocked or narrow.  What will the results be?   Your doctor will be able to know more about what may be causing problems you have with your fistula. Then together, you can decide how to best treat the problem.  What happens before the procedure?   · Your doctor will talk with you about your history and do an exam. Talk to your doctor about:  ? If you are pregnant or nursing  ? All the drugs you are taking. Be sure to include all prescription, over-the-counter, and herbal supplements. Tell the doctor if you have any drug allergy. Bring a list of drugs you take with you.  ? Any bleeding problems. Be sure to tell your doctor if you are taking any drugs that may cause bleeding. Some of these are warfarin, rivaroxaban, apixaban, ticagrelor, clopidogrel, ketorolac, ibuprofen, naproxen, or aspirin. Certain vitamins and herbs, such as garlic and fish oil, may also add to the risk for bleeding. You may need to stop these drugs as well. Talk to your doctor about them.  · You may be given a dye called "contrast" for this procedure. Tell your doctor if you are allergic to dye.  · Talk to your doctor about if you need to stop eating or drinking before your test.  What happens during the procedure?   · Once you are in the procedure room, the staff may give you drugs to help you " relax. The staff will put a special IV in your fistula. They will give you dye, take x-rays, and measure pressure inside your fistula.  · You may have a light flushing feeling in your body when the dye goes in. Your body may feel warm or may itch slightly. Sometimes, the dye will give you a metallic taste in your mouth. These signs will only last a few seconds. These are all mild reactions to the contrast and will most often go away very quickly.  · If there is a block, the doctor may try to open it up with drugs or with a thin wire and a balloon. The doctor may need to place a small tube called a stent to keep your fistula open.  · The doctor will take out the IV and may need to place a stitch to stop any bleeding.  What happens after the procedure?   · The staff may watch you for a while to make sure you are not bleeding. They will tell you when you can go home.  · Have someone drive you home if you are given a drug to relax for the procedure.  · Your doctor may give you special orders if you were given a contrast dye.  · Ask your doctor when you can get the results.  What care is needed at home?   · You may need to limit lifting with your arm for 24 hours.  · If you were given a contrast dye, talk to your doctor about how much water you should drink.  · Wash your hands after using the toilet.  What follow-up care is needed?   · The results will help your doctor understand what kind of problem you have with your fistula. Together you can make a plan for further care.  · If you have stitches, you will need to have them taken out. Your doctor will often want to do this in 1 to 2 weeks.  What problems could happen?   · Infection  · Bleeding and bruising  · Damage to your fistula  · Allergy to dye  · Dye can make your kidneys not work as well  Last Reviewed Date   2020-05-14  Consumer Information Use and Disclaimer   This information is not specific medical advice and does not replace information you receive from your  health care provider. This is only a brief summary of general information. It does NOT include all information about conditions, illnesses, injuries, tests, procedures, treatments, therapies, discharge instructions or life-style choices that may apply to you. You must talk with your health care provider for complete information about your health and treatment options. This information should not be used to decide whether or not to accept your health care providers advice, instructions or recommendations. Only your health care provider has the knowledge and training to provide advice that is right for you.  Copyright   Copyright © 2021 "[employer] and its affiliates and/or licensors. All rights reserved.

## 2022-01-21 NOTE — OP NOTE
Date: 1/21/2022     Surgeon(s) and Role:   Keron Perez MD    Assistant: Alvreto Harris MD    Pre-op Diagnosis:   1. T82.858A: Stenosis of vascular prosthetic devices, implants and grafts, initial encounter  2. Final diagnoses:  [T82.858A] Stenosis of arteriovenous dialysis fistula      Post-op Diagnosis: Same     Procedure(s):   1. US guided L radial artery access  2. LUE fistulogram  3. Central venogram  4. Moderate sedation  5. Balloon angioplasty of proximal LUE AVF with 5x40 mm balloon    Anesthesia: Local MAC     Findings/Key Components:   Successful treatment of > 70% stenosis  Strong palpable thrill     EBL: Minimal    PROCEDURE IN DETAIL:After an informed consent was obtained the patient was taken to the interventional suite and placed in the supine position.  The patient was brought to the IR suite, placed in supine. Arm was prepped and draped in the standard surgical fashion. Under ultrasound guidance, the L radial artery was accessed with a micropuncture needle; ultrasound confirmed vessel patency, followed by placement of 4/3-Trinidadian micropuncture dilator. Through this, an 0.035-inch wire was placed in the short 6-Trinidadian sheath.   A fistulogram and central venogram was performed.  After independent review and interpretation, based upon the fistulogram results, I decided to intervene. Through this, an 0.035-inch Glidewire was placed through the high-grade stenosis, which was demonstrated by the angiogram.  A high-grade stenosis in the proximal AVF was found, which was crossed with a hyrophilic 0.035-in glidewire. This was treated with the high-pressure, noncompliant balloon(s) listed above. Resolution of the stenosis was noted. Strong thrill could be felt. The sheath was removed, a TR band was placed with good hemostasis.

## 2022-01-21 NOTE — PLAN OF CARE
TR band removed successfully with no bleeding or hematoma present. Dry dressing applied to site. Patient stable to discharge.

## 2022-01-22 LAB
FINAL PATHOLOGIC DIAGNOSIS: NORMAL
Lab: NORMAL

## 2022-01-22 NOTE — ANESTHESIA POSTPROCEDURE EVALUATION
Anesthesia Post Evaluation    Patient: Erica Leblanc    Procedure(s) Performed: Procedure(s) (LRB):  Fistulogram, transradial access (Left)  ANGIOPLASTY, VEIN    Final Anesthesia Type: MAC      Patient location during evaluation: PACU  Patient participation: Yes- Able to Participate  Level of consciousness: awake and alert  Post-procedure vital signs: reviewed and stable  Pain management: adequate  Airway patency: patent    PONV status at discharge: No PONV  Anesthetic complications: no      Cardiovascular status: blood pressure returned to baseline  Respiratory status: unassisted  Hydration status: euvolemic  Follow-up not needed.          Vitals Value Taken Time   /58 01/21/22 1700   Temp 36.6 °C (97.8 °F) 01/21/22 1700   Pulse 74 01/21/22 1700   Resp 18 01/21/22 1700   SpO2 97 % 01/21/22 1700         No case tracking events are documented in the log.      Pain/Lyndsey Score: Lyndsey Score: 10 (1/21/2022  5:00 PM)

## 2022-01-24 ENCOUNTER — TELEPHONE (OUTPATIENT)
Dept: VASCULAR SURGERY | Facility: CLINIC | Age: 77
End: 2022-01-24
Payer: MEDICARE

## 2022-01-24 NOTE — TELEPHONE ENCOUNTER
LM informing pt that we will reach out to Dr. Perez to see which tests he wants ordered and will call her back to schedule her appts once orders are received.    ----- Message from Jacqueline Christianson sent at 1/24/2022  8:11 AM CST -----  Regarding: appointment  Name of Who is Calling: Erica           What is the request in detail: Patient is requesting a call back to schedule a follow up appointment and to find out about an ultrasound she suppose to have.           Can the clinic reply by [facility]: No           What Number to Call Back if not in [facility]: [phone]

## 2022-01-26 ENCOUNTER — DOCUMENTATION ONLY (OUTPATIENT)
Dept: REHABILITATION | Facility: HOSPITAL | Age: 77
End: 2022-01-26
Payer: MEDICARE

## 2022-01-26 DIAGNOSIS — R29.898 DECREASED GRIP STRENGTH: Primary | ICD-10-CM

## 2022-01-26 DIAGNOSIS — R29.818 FINE MOTOR IMPAIRMENT: ICD-10-CM

## 2022-01-26 DIAGNOSIS — R29.898 FINE MOTOR IMPAIRMENT: ICD-10-CM

## 2022-01-26 NOTE — PROGRESS NOTES
No Show Note/Documentation    Patient: Erica Leblanc  Date of Session: 2022  Diagnosis:   1. Decreased  strength     2. Fine motor impairment       MRN: [id number]    Erica Leblanc did not attend her scheduled therapy appointment today. She did not call to cancel nor reschedule. This is the 3rd appointment that Erica has not attended. No additional therapy appointments scheduled.  Plan of care is . No charges have been posted today.       MAURICE Asencio  2022

## 2022-01-28 ENCOUNTER — DOCUMENTATION ONLY (OUTPATIENT)
Dept: REHABILITATION | Facility: HOSPITAL | Age: 77
End: 2022-01-28
Payer: MEDICARE

## 2022-01-28 NOTE — PROGRESS NOTES
[facility]  Occupational Therapy Discharge Note    Name: Erica Leblanc  [facility] Number: [id number]    Therapy Diagnosis: No diagnosis found.  Physician: No ref. provider found    Physician Orders: Eval and treat   Medical Diagnosis from Referral: Bilateral carpal tunnel   Evaluation Date: 11/19/2021    Date of Last visit: 12/8/2021  Total Visits Received: 5    ASSESSMENT          Discharge reason: Patient has not attended therapy since December 8, 2021. She has had several hospitalizations and has not been able to attend therapy.          Goals: Patient has not met goals secondary to non-compliance with therapy      PLAN   This patient is discharged from Occupational Therapy      Stefanie Nash OT

## 2022-02-01 DIAGNOSIS — T82.858D ARTERIOVENOUS FISTULA STENOSIS, SUBSEQUENT ENCOUNTER: Primary | ICD-10-CM

## 2022-02-04 ENCOUNTER — EXTERNAL HOME HEALTH (OUTPATIENT)
Dept: HOME HEALTH SERVICES | Facility: HOSPITAL | Age: 77
End: 2022-02-04
Payer: MEDICARE

## 2022-02-07 ENCOUNTER — TELEPHONE (OUTPATIENT)
Dept: FAMILY MEDICINE | Facility: CLINIC | Age: 77
End: 2022-02-07
Payer: MEDICARE

## 2022-02-07 DIAGNOSIS — R53.81 DEBILITY: ICD-10-CM

## 2022-02-07 DIAGNOSIS — Z86.73 H/O: STROKE: Primary | ICD-10-CM

## 2022-02-07 NOTE — TELEPHONE ENCOUNTER
----- Message from Abida López sent at 2/4/2022 12:08 PM CST -----  Type: Patient Call Back    Who called:pt    What is the request in detail:pt is requesting a rolling  walker with a seat. Call pt to discuss     Can the clinic reply by [facility]?    Would the patient rather a call back or a response via [facility]? call    Best call back number:[phone] (home) [phone] (work)      Additional Information:

## 2022-02-07 NOTE — TELEPHONE ENCOUNTER
Spoke to pt about length of walker nad reason. Pt requests rollator with seat, and a lifetime walker.  Added walker for order to be placed.

## 2022-02-09 ENCOUNTER — OFFICE VISIT (OUTPATIENT)
Dept: PODIATRY | Facility: CLINIC | Age: 77
End: 2022-02-09
Payer: MEDICARE

## 2022-02-09 VITALS — WEIGHT: 210 LBS | BODY MASS INDEX: 35.85 KG/M2 | HEIGHT: 64 IN

## 2022-02-09 DIAGNOSIS — M20.41 HAMMER TOES OF BOTH FEET: ICD-10-CM

## 2022-02-09 DIAGNOSIS — L90.9 PLANTAR FAT PAD ATROPHY: ICD-10-CM

## 2022-02-09 DIAGNOSIS — E11.22 TYPE 2 DIABETES MELLITUS WITH ESRD (END-STAGE RENAL DISEASE): Primary | ICD-10-CM

## 2022-02-09 DIAGNOSIS — N18.6 TYPE 2 DIABETES MELLITUS WITH ESRD (END-STAGE RENAL DISEASE): Primary | ICD-10-CM

## 2022-02-09 DIAGNOSIS — M20.42 HAMMER TOES OF BOTH FEET: ICD-10-CM

## 2022-02-09 DIAGNOSIS — M20.11 HALLUX ABDUCTO VALGUS, RIGHT: ICD-10-CM

## 2022-02-09 DIAGNOSIS — M20.12 HALLUX ABDUCTO VALGUS, LEFT: ICD-10-CM

## 2022-02-09 PROCEDURE — 3288F FALL RISK ASSESSMENT DOCD: CPT | Mod: CPTII,S$GLB,, | Performed by: PODIATRIST

## 2022-02-09 PROCEDURE — 1101F PR PT FALLS ASSESS DOC 0-1 FALLS W/OUT INJ PAST YR: ICD-10-PCS | Mod: CPTII,S$GLB,, | Performed by: PODIATRIST

## 2022-02-09 PROCEDURE — 1160F RVW MEDS BY RX/DR IN RCRD: CPT | Mod: CPTII,S$GLB,, | Performed by: PODIATRIST

## 2022-02-09 PROCEDURE — 99213 OFFICE O/P EST LOW 20 MIN: CPT | Mod: S$GLB,,, | Performed by: PODIATRIST

## 2022-02-09 PROCEDURE — 99499 UNLISTED E&M SERVICE: CPT | Mod: S$GLB,,, | Performed by: PODIATRIST

## 2022-02-09 PROCEDURE — 99213 PR OFFICE/OUTPT VISIT, EST, LEVL III, 20-29 MIN: ICD-10-PCS | Mod: S$GLB,,, | Performed by: PODIATRIST

## 2022-02-09 PROCEDURE — 99499 RISK ADDL DX/OHS AUDIT: ICD-10-PCS | Mod: S$GLB,,, | Performed by: PODIATRIST

## 2022-02-09 PROCEDURE — 99999 PR PBB SHADOW E&M-EST. PATIENT-LVL IV: ICD-10-PCS | Mod: PBBFAC,,, | Performed by: PODIATRIST

## 2022-02-09 PROCEDURE — 1101F PT FALLS ASSESS-DOCD LE1/YR: CPT | Mod: CPTII,S$GLB,, | Performed by: PODIATRIST

## 2022-02-09 PROCEDURE — 1159F PR MEDICATION LIST DOCUMENTED IN MEDICAL RECORD: ICD-10-PCS | Mod: CPTII,S$GLB,, | Performed by: PODIATRIST

## 2022-02-09 PROCEDURE — 1126F PR PAIN SEVERITY QUANTIFIED, NO PAIN PRESENT: ICD-10-PCS | Mod: CPTII,S$GLB,, | Performed by: PODIATRIST

## 2022-02-09 PROCEDURE — 1126F AMNT PAIN NOTED NONE PRSNT: CPT | Mod: CPTII,S$GLB,, | Performed by: PODIATRIST

## 2022-02-09 PROCEDURE — 99999 PR PBB SHADOW E&M-EST. PATIENT-LVL IV: CPT | Mod: PBBFAC,,, | Performed by: PODIATRIST

## 2022-02-09 PROCEDURE — 1159F MED LIST DOCD IN RCRD: CPT | Mod: CPTII,S$GLB,, | Performed by: PODIATRIST

## 2022-02-09 PROCEDURE — 1160F PR REVIEW ALL MEDS BY PRESCRIBER/CLIN PHARMACIST DOCUMENTED: ICD-10-PCS | Mod: CPTII,S$GLB,, | Performed by: PODIATRIST

## 2022-02-09 PROCEDURE — 3288F PR FALLS RISK ASSESSMENT DOCUMENTED: ICD-10-PCS | Mod: CPTII,S$GLB,, | Performed by: PODIATRIST

## 2022-02-09 NOTE — PROGRESS NOTES
Is Subjective:      Patient ID: Erica Leblanc is a 76 y.o. female.    Chief Complaint: Diabetes Mellitus (PCP  (NP Aryan 11/01/2021)) and Nail Care    Erica Leblanc is a 76 y.o. femalewho presents to the clinic for evaluation and treatment of high risk feet. Erica Leblanc   has a past medical history of Acute ischemic right PCA stroke (6/6/2021), Acute respiratory failure with hypoxia, Age-related osteoporosis without current pathological fracture (3/8/2021), Anemia of chronic kidney failure, stage 4 (severe) (4/5/2019), Cataracts, bilateral, CHF (congestive heart failure), CKD (chronic kidney disease) stage 3, GFR 30-59 ml/min, CKD (chronic kidney disease) stage 3, GFR 30-59 ml/min, Controlled type 2 diabetes mellitus with proteinuria or albuminuria, Depression, Diabetes with neurologic complications, Diabetic retinopathy of both eyes, Edema, Glaucoma, History of colonic polyps, TIA (transient ischemic attack) (11/2008), Hyperlipidemia LDL goal < 100, Hypertension, Hypothyroidism, Major depressive disorder, single episode, mild (2/17/2016), Mixed incontinence urge and stress, Obesity, Obstructive sleep apnea on CPAP, Osteopenia, Proteinuria, Sickle cell trait, Strabismus, TIA (transient ischemic attack), Trouble in sleeping, Type 2 diabetes mellitus with ophthalmic manifestations, Type 2 diabetes with stage 3 chronic kidney disease GFR 30-59, Type II or unspecified type diabetes mellitus with renal manifestations, uncontrolled(250.42), Uncontrolled type 2 diabetes mellitus with peripheral circulatory disorder (4/5/2019), Urge incontinence (1/11/2016), Urge incontinence, Venous stasis ulcer, and Vitamin D deficiency disease.  She presents to the podiatry clinic for care of  Right anterior leg ulcer.  Onset of the symptoms was several weeks ago. Precipitating event:relates she hit the leg in May.  She relates some tenderness to palpation of the site.  She has not been doing any true wound  care.  Current symptoms include: redness, swelling and large spongy scab. Signs of infection denies nausea, vomiting, fever, chills   Symptoms have been well-controlled. Patient has had prior foot problems. Patients rates pain 3/10 on pain scale.    She also complains of long, thick toenails. This patient has documented high risk feet requiring routine maintenance secondary to diabetes mellitis and those secondary complications of diabetes, as mentioned.       07/28/2021 Patient has been having HH dressing changes.  There have been issues with scheduling and patient relates that are excluding her foot from compression wrap.  She completed abx without adverse effect.  No new pedal complaints.     10/06/2021 Patient relates she evacuated to [place] and was living there for almost one month receiving home health.  She relates wound has healed. No new pedal complaints.     12/10/2021 patient has continued to care for skin and lower limbs as instructed.  He relates wound has remained healed.  No new pedal complaints     2/19/2022 patient has continued to care for skin and lower limbs as instructed.  He relates wound has remained healed.  No new pedal complaints       PCP: Sendy Elanie MD    Date Last Seen by PCP:   Chief Complaint   Patient presents with    Diabetes Mellitus     PCP  (NP Baeza 11/01/2021)    Nail Care      Hemoglobin A1C   Date Value Ref Range Status   11/03/2021 6.5 (H) 4.0 - 5.6 % Final     Comment:     ADA Screening Guidelines:  5.7-6.4%  Consistent with prediabetes  >or=6.5%  Consistent with diabetes    High levels of fetal hemoglobin interfere with the HbA1C  assay. Heterozygous hemoglobin variants (HbS, HgC, etc)do  not significantly interfere with this assay.   However, presence of multiple variants may affect accuracy.     06/06/2021 8.1 (H) 4.0 - 5.6 % Final     Comment:     ADA Screening Guidelines:  5.7-6.4%  Consistent with prediabetes  >or=6.5%  Consistent with diabetes    High  levels of fetal hemoglobin interfere with the HbA1C  assay. Heterozygous hemoglobin variants (HbS, HgC, etc)do  not significantly interfere with this assay.   However, presence of multiple variants may affect accuracy.     05/07/2021 8.8 (H) 4.0 - 5.6 % Final     Comment:     ADA Screening Guidelines:  5.7-6.4%  Consistent with prediabetes  >or=6.5%  Consistent with diabetes    High levels of fetal hemoglobin interfere with the HbA1C  assay. Heterozygous hemoglobin variants (HbS, HgC, etc)do  not significantly interfere with this assay.   However, presence of multiple variants may affect accuracy.     03/09/2021 8.5 (H) 0.0 - 5.6 % Final     Comment:     [facility]   12/15/2020 7.9 (H) 0.0 - 5.6 % Final     Comment:     [facility]       Patient Active Problem List   Diagnosis    Class 2 obesity in adult    Sickle cell trait    Hyperlipidemia LDL goal <100    HUMBERTO on CPAP    Hypothyroidism (acquired)    Hx-TIA (transient ischemic attack)    Vitamin D deficiency disease    Pulmonary hypertension    Type 2 diabetes mellitus with ESRD (end-stage renal disease)    Secondary renal hyperparathyroidism    Incomplete bladder emptying    Postmenopausal atrophic vaginitis    Rectocele    Mixed incontinence urge and stress    Chronic diastolic heart failure    Tortuous aorta    Uncontrolled type 2 diabetes mellitus with hyperglycemia    ESRD on hemodialysis    Anemia of chronic disease    Debility    Chronic respiratory failure    Type 2 diabetes mellitus with foot ulcer, with long-term current use of insulin    Stable proliferative diabetic retinopathy associated with type 2 diabetes mellitus    Mild major depression    Heart failure with preserved ejection fraction    Pseudophakia    Chronic kidney disease-mineral and bone disorder    Age-related osteoporosis without current pathological fracture    H/O: stroke    Decreased  strength    Fine motor impairment     Current Outpatient  Medications on File Prior to Visit   Medication Sig Dispense Refill    ACCU-CHEK SOFT DEV LANCETS Kit       aspirin (ECOTRIN) 81 MG EC tablet Take 1 tablet (81 mg total) by mouth once daily. 90 tablet 3    atorvastatin (LIPITOR) 80 MG tablet Take 1 tablet (80 mg total) by mouth every evening. 90 tablet 0    blood sugar diagnostic Strp One test strip use 2 times a day to check blood glucose,  ICD-10: E11.9, compatible with insurance/glucometer 100 each 11    blood-glucose meter kit One glucometer, use to check blood glucose.   ICD-10: E11.9. Dispense machine covered by insurance 1 each 0    cinacalcet (SENSIPAR) 30 MG Tab       docusate sodium (COLACE) 100 MG capsule Take 200 mg by mouth once daily.       doxercalciferoL (HECTOROL) 4 mcg/2 mL injection Inject 4.5 mcg into the vein 3 (three) times a week. At dialysis unit      epoetin arnel (EPOGEN INJ) Inject 1,000 Units as directed every 7 days. At the dialysis unit      LANCETS MISC       lancets Misc One lancets use 2 times a day to check blood glucose, ICD-10: E11.9 100 each 11    lancing device Misc One device, used to check blood glucose, ICD-10: E11.9 1 each 0    levothyroxine (SYNTHROID) 50 MCG tablet Take 1 tablet (50 mcg total) by mouth before breakfast. 90 tablet 3    midodrine (PROAMATINE) 5 MG Tab Take 1 tablet (5 mg total) by mouth 3 (three) times daily. 90 tablet 11    OLENA-NABIL RX 1- mg-mg-mcg Tab Take 1 tablet by mouth once daily.      sevelamer carbonate (RENVELA) 800 mg Tab Take 3 tablets (2,400 mg total) by mouth 3 (three) times daily with meals. 270 tablet 11    TRULICITY 0.75 mg/0.5 mL pen injector SMARTSI.5 Milliliter(s) SUB-Q Once a Week      diclofenac sodium (ARTHRITIS PAIN, DICLOFENAC,) 1 % Gel Apply 2 g topically once daily. 20 g 0    doxycycline (VIBRA-TABS) 100 MG tablet Take 1 tablet (100 mg total) by mouth 2 (two) times daily. 20 tablet 0     No current facility-administered medications on file prior to  visit.     Review of patient's allergies indicates:   Allergen Reactions    Ace inhibitors Other (See Comments)     Other reaction(s): cough     Past Surgical History:   Procedure Laterality Date    AV FISTULA PLACEMENT Left 2019    Procedure: CREATION, AV FISTULA, LEFT UPPER EXTREMITY;  Surgeon: Keron Perez MD;  Location: [facility];  Service: Vascular;  Laterality: Left;    BREAST BIOPSY      breast reduction Bilateral age 31    BREAST SURGERY      CATARACT EXTRACTION Bilateral     cataracts Bilateral      SECTION, LOW TRANSVERSE      x1    CHOLECYSTECTOMY      COLONOSCOPY N/A 2022    Procedure: COLONOSCOPY;  Surgeon: Mahesh Huang MD;  Location: [facility] ENDO (2ND FLR);  Service: Endoscopy;  Laterality: N/A;  fully vaccinated-sm.  RAPID COVID  +cologuard needing ASAP  Dialysis pt T,TH Sat and left UA access  2nd floor - cardiac/dialysis/SOB / LABS / prep ins. emailed - [facility]    EYE SURGERY  2014, 2014    vitrectomy    EYE SURGERY Right 2016    FISTULOGRAM Left 3/6/2020    Procedure: Fistulogram, left upper extremity, with branch ligation;  Surgeon: Keron Perez MD;  Location: [address];  Service: Vascular;  Laterality: Left;  Time 1.1 Minute  42.10 mGy    FISTULOGRAM Left 2020    Procedure: Fistulogram, left upper extremity, transradial access with possible intervention;  Surgeon: Keron Perez MD;  Location: [address];  Service: Vascular;  Laterality: Left;    FISTULOGRAM Left 2020    Procedure: Fistulogram, left upper extremity, possible intervention;  Surgeon: Keron Perez MD;  Location: [facility];  Service: Vascular;  Laterality: Left;  930 AM START  RN PRE OP  ---COVID NEGATIVE ON  2020. [place]    FISTULOGRAM Left 2022    Procedure: Fistulogram, transradial access;  Surgeon: Keron Perez MD;  Location: [address];  Service: Vascular;  Laterality: Left;   mgy-40.16  gycm-8.3905  contrast-34cc  time-12.4min    HYSTERECTOMY  1986    TAHBSO (patient is unsure if ovaries removed)    OOPHORECTOMY      PERCUTANEOUS TRANSLUMINAL ANGIOPLASTY OF ARTERIOVENOUS FISTULA Left 7/21/2020    Procedure: PTA, AV FISTULA;  Surgeon: Keron Perez MD;  Location: [address];  Service: Vascular;  Laterality: Left;  15.9 minutes of fluro  41.12  mGy  7.9060 Gy cm2  32ml  contrast    PHLEBOGRAPHY Left 7/21/2020    Procedure: CENTRAL VENOGRAM;  Surgeon: Keron Perez MD;  Location: [facility] [address] [place] [address];  Service: Vascular;  Laterality: Left;    REFRACTIVE SURGERY      TOTAL REDUCTION MAMMOPLASTY      approx 10 yrs ago    VENOPLASTY  1/21/2022    Procedure: ANGIOPLASTY, VEIN;  Surgeon: Keron Perez MD;  Location: [facility] OR [address] [place] [address];  Service: Vascular;;     Family History   Problem Relation Age of Onset    Leukemia Father     Cataracts Father     Ovarian cancer Sister 35    Stroke Mother     Diabetes Mother     Hypertension Mother     Cataracts Mother     Diabetes Paternal Grandmother     Cataracts Paternal Grandmother     Breast cancer Maternal Aunt 65    No Known Problems Paternal Grandfather     Cataracts Maternal Grandmother     Cataracts Maternal Grandfather     HIV Brother     Achondroplasia Sister     Parkinsonism Maternal Aunt     Esophageal cancer Maternal Uncle         smoker    No Known Problems Paternal Aunt     Cataracts Paternal Uncle     Amblyopia Neg Hx     Blindness Neg Hx     Glaucoma Neg Hx     Macular degeneration Neg Hx     Retinal detachment Neg Hx     Strabismus Neg Hx     Thyroid disease Neg Hx     Colon cancer Neg Hx     Cancer Neg Hx        Review of Systems   Constitutional: Negative for chills and fever.   Cardiovascular: Positive for leg swelling. Negative for chest pain and claudication.   Respiratory: Negative for cough and shortness of breath.    Skin: Positive for dry skin, nail changes and suspicious  "lesions. Negative for itching and rash.   Musculoskeletal: Positive for arthritis and joint pain. Negative for falls, joint swelling, muscle cramps and muscle weakness.   Gastrointestinal: Negative for diarrhea, nausea and vomiting.   Neurological: Positive for numbness and paresthesias. Negative for tremors and weakness.   Psychiatric/Behavioral: Negative for altered mental status and hallucinations.         Objective:       Vitals:    02/09/22 1408   Weight: 95.3 kg (210 lb)   Height: 5' 4" (1.626 m)   PainSc: 0-No pain     Physical Exam  Vitals and nursing note reviewed.   Constitutional:       Appearance: She is not diaphoretic.      Comments: General: Pt. is well-developed, well-nourished, appears stated age, in no acute distress, alert and oriented x 3. No evidence of depression, anxiety, or agitation. Calm, cooperative, and communicative. Appropriate interactions and affect.       Cardiovascular:      Pulses:           Dorsalis pedis pulses are 1+ on the right side and 1+ on the left side.        Posterior tibial pulses are 1+ on the right side and 1+ on the left side.      Comments: There is decreased digital hair.   Musculoskeletal:      Right ankle: Swelling present. No tenderness.      Right Achilles Tendon: No defects.      Left ankle: Swelling present. No tenderness.      Left Achilles Tendon: No defects.      Right foot: Normal range of motion. No tenderness.      Left foot: Normal range of motion. No tenderness.      Comments: Muscle strength is 5/5 in all groups bilaterally.    Decreased stride, station of gait.  apropulsive toe off.  Increased angle and base of gait.    Patient has hammertoes of digits 2-5 bilateral partially reducible without symptom today.    Visible and palpable bunion without pain at dorsomedial 1st metatarsal head right and left.  Hallux abducted right and left partially reducible, tracks laterally without being track bound.  No ecchymosis, erythema, edema, or cardinal signs " infection or signs of trauma same foot.    Fat pad atrophy to heels and met heads bilateral     Skin:     General: Skin is warm and dry.      Coloration: Skin is not pale.      Findings: Wound (See description below) present. No abrasion, lesion (posterior right leg healed) or rash.      Nails: There is no clubbing.      Comments: Xerosis bilaterally     Toenails 1-5 bilaterally are elongated by 2-3 mm, thickened by 2-3 mm, discolored/yellowed, dystrophic, brittle with subungual debris.     Interdigital Spaces clean, dry and without evidence of break in skin integrity   Neurological:      Sensory: No sensory deficit.      Comments: Wauseon-Gloria 5.07 monofilament is intact bilateral feet. Sharp/dull sensation is also intact Bilateral feet.           Psychiatric:         Speech: Speech normal.               12/08/2021            10/06/2021          07/28/2021    Ulcer location:  Anterior right leg  Measurements :  2.2 x 1.4 x 0.2  cm   Signs of infection:  Local edema and erythema as well as tenderness to palpation   Drainage: Sanguinous  Purulence: no  Crepitus/fluctuance: no  Periwound: Reddened  Base: fibrogranular  Depth: subcutaneous tissue  Probe to bone: no              07/08/2021    Ulcer location:  Anterior right leg  Measurements :  2.2 x 1.7 x 0.4  cm   Signs of infection:  Local edema and erythema as well as tenderness to palpation and mild malodor  Drainage: Sero-Sanguinous  Purulence: no  Crepitus/fluctuance: no  Periwound: Reddened  Base: Fibrotic slough  Depth: subcutaneous tissue  Probe to bone: no                Assessment:       Encounter Diagnoses   Name Primary?    Type 2 diabetes mellitus with ESRD (end-stage renal disease) Yes    Hammer toes of both feet     Hallux abducto valgus, left     Hallux abducto valgus, right     Plantar fat pad atrophy          Plan:       Erica was seen today for diabetes mellitus and nail care.    Diagnoses and all orders for this visit:    Type 2 diabetes  mellitus with ESRD (end-stage renal disease)    Hammer toes of both feet    Hallux abducto valgus, left    Hallux abducto valgus, right    Plantar fat pad atrophy      I counseled the patient on her conditions, their implications and medical management. Education about the diabetic foot, neuropathy, and prevention of limb loss.    Discussed wound healing cycle, skin integrity, ways to care for skin. Counseled patient on the effects of high blood glucose on healing. She verbalizes understanding that it can increase the chances of delayed healing and this prolonged exposure leads to infection or progression of infection which subsequently can result in loss of limb.    Adequate vitamin supplementation, protein intake, and hydration - discussed with patient     Wound has remained healed well with no signs of continued skin breakdown noted.  Skin is still delicate therefore patient must be diligent in avoiding excessive pressure and making sure there is adequate support and padding in shoe gear. Compression stockings and elevation recommended.    Follow-up: 10-12 weeks but should call [facility] immediately if any signs of infection, such as fever, chills, sweats, increased redness or pain.    Short-term goals include maintaining good offloading and minimizing bioburden, promoting granulation and epithelialization to healing.  Long-term goals include keeping the wound healed by good offloading and medical management under the direction of internist.    Shoe inspection. Diabetic Foot Education. Patient reminded of the importance of good nutrition/healthy diet/weight management and blood sugar control to help prevent podiatric complications of diabetes. Patient instructed on proper foot hygeine. Wear comfortable, proper fitting shoes. Wash feet daily. Dry well. After drying, apply moisturizer to feet (no lotion to webspaces). Inspect feet daily for skin breaks, blisters, swelling, or redness. Wear cotton socks (preferably  white)  Change socks every day. Do NOT walk barefoot. Do NOT use heating pads or hot water soaks. We discussed wearing proper shoe gear, daily foot inspections, never walking without protective shoe gear.

## 2022-02-10 NOTE — PROGRESS NOTES
[facility]  Occupational Therapy Progress Note    Date: 2/11/2022  Name: Erica Leblanc  Clinic Number: [id number]    Therapy Diagnosis:   Encounter Diagnoses   Name Primary?    Decreased  strength Yes    Fine motor impairment      Physician: Alverto Baeza NP    Physician Orders: eval and treat   Medical Diagnosis: G56.03 (ICD-10-CM) - Bilateral carpal tunnel syndrome  Surgical Procedure and Date: , n/a / Date of Injury/Onset: a year ago  Evaluation Date: 11/10/2021  Insurance Authorization Period Expiration: 11/19/22  Plan of Care Expiration: 8 weeks; 1/7/2021  Progress Note Due: 4 weeks; 12/10/2021    Date of Return to MD: TBA  Visit # / Visits authorized: 4 / 20  FOTO: initial eval      Precautions:  Standard and dialysis      Time In:  8:45 am  Time Out:  9:35 am  Total Billable Time: 50 minutes      SUBJECTIVE     Pt reports: I am still having numbness and crampy, right is worse then left. I am feeling about the same as when I just started therapy.  She was compliant with home exercise program given last session.   Response to previous treatment: to long ago  Functional change: none noted    Pain: 0/10 reported  Location: bilateral hands      OBJECTIVE     Supervised moist heat applied to hands x 5 minutes prior to exercise and assessment.    REassessment:      Edema. Measured in centimeters.    11/10/2021 11/10/2021 2/11/22 2/11/22     Left Right Left  Left    Wrist Crease 16.8 cm 16.4 cm 16 cm 15.5 cm   MCPs     19.0 cm               18.2  cm        Hand ROM. Measured in degrees.    11/10/2021 11/10/2021 2/11/22 2/11/22     Left Right Left  Right      Able to make full composite flexion Able to make full composite flexion               Thumb: MP     46 43                IP     43 60       Elbow and Wrist ROM. Measured in degrees.   11/10/2021 11/10/2021 2/11/22 2/11/22    Left Right Left  Right    Wrist Ext/Flex 73/55 75/60 70/50 75/50   Wrist RD/UD 16/32 10/40 15/35  10/30       Strength (Dynamometer) and Pinch Strength (Pinch Gauge)  Measured in pounds.    11/10/2021 11/10/2021 2/11/22 2/11/22     Left Right Left  right    Rung II 33.1 38.5 38.9 51.83   [place] Pinch 10.1 4.9 11.6 11   3pt Pinch 5.0 4.0 8 8.6       9HPT:   11/10/2021      2/11/22  R) 46s              R) 47 seconds 1 drop  L) 34s               L) 37 seconds 0 drops    Therapeutic exercises to develop strength, endurance and ROM for 35 minutes, including:  - digit abduction/adduction (1 min) each hand   - isolated finger abd/adduction bilaterally x 10 reps  -blue sponge  x 10 reps  -wave x 10 reps each hand    Patient Education and Home Exercises      Education provided:   - education on brace to wear   - Progress towards goals     Written Home Exercises Provided: continue with previously instructed HEP.  Exercises were reviewed and Erica was able to demonstrate them prior to the end of the session.  Erica demonstrated good  understanding of the HEP provided. See EMR under Patient Instructions for exercises provided during therapy sessions.      ASSESSMENT     Pt would continue to benefit from skilled OT. Erica is demonstrating increases in  and pinch strength as noted above. Slight improvement in wrist extension    Erica is progressing well towards her goals and there are no updates to goals at this time. Pt prognosis is Good.     Pt will continue to benefit from skilled outpatient occupational therapy to address the deficits listed in the problem list on initial evaluation, provide pt/family education and to maximize pt's level of independence in the home and community environment.     Pt's spiritual, cultural and educational needs considered and pt agreeable to plan of care and goals.    Anticipated barriers to occupational therapy:     Goals:Long Term Goals (LTGs); to be met by discharge.  LTG #1: Pt will report a pain level of 0-1 out of 10 with functional hand use              LTG #2: Pt will demo  improved FOTO score by 5-8 points.   LTG #3: Pt will return to prior level of function for ADLs and household management.      Short Term Goals (STGs); to be met within 4 weeks (12/10/2021).  STG #1: Pt will report 3-4 out of 10 pain level with functional use of hand, especially during meal preparation- not assessed.  STG #2: Pt will report/demo increase in  strength by 5#- met 2/11/22  STG #3: Pt will report/demo improvement in fine motor coordination by performing 9HPT 5 seconds less than initial assessment- not met  STG #4: Pt will demonstrate independence with issued HEP- in progress   STG #5: Pt will demo improved wrist ROM by 5 degrees to demonstrate progression of carpal tunnel syndrome.- met 2/11/22    PLAN     Continue skilled occupational therapy with individualized plan of care focusing on improving functional independence   Nerve glides and active movement as MAURICE Sorensen

## 2022-02-11 ENCOUNTER — CLINICAL SUPPORT (OUTPATIENT)
Dept: REHABILITATION | Facility: HOSPITAL | Age: 77
End: 2022-02-11
Payer: MEDICARE

## 2022-02-11 DIAGNOSIS — R29.898 DECREASED GRIP STRENGTH: Primary | ICD-10-CM

## 2022-02-11 DIAGNOSIS — R29.898 FINE MOTOR IMPAIRMENT: ICD-10-CM

## 2022-02-11 DIAGNOSIS — R29.818 FINE MOTOR IMPAIRMENT: ICD-10-CM

## 2022-02-11 PROCEDURE — 97110 THERAPEUTIC EXERCISES: CPT | Mod: PN

## 2022-02-11 NOTE — PLAN OF CARE
Outpatient Therapy Updated Plan of Care     Visit Date: 2/11/2022  Name: Erica Leblanc  Clinic Number: [id number]    Therapy Diagnosis:   Encounter Diagnoses   Name Primary?    Decreased  strength Yes    Fine motor impairment      Physician: Alverto Baeza NP    Physician Orders: Eval and Treat  Medical Diagnosis: bilateral carpal tunnel syndrome  Evaluation Date: 11/10/21    Total Visits Received: 4  Cancelled Visits: 1  No Show Visits: 0    Current Certification Period:  11/10/21 to 1/7/22  Precautions:  Kidney failure(dialysis)   Visits from Evaluation Date:  3  Functional Level Prior to Evaluation:  Difficulty with holding utensils, difficulty with fasteners    Subjective     Update:   Pt reports: I am still having numbness and crampy, right is worse then left. I am feeling about the same as when I just started therapy.  She was compliant with home exercise program given last session.   Response to previous treatment: to long ago  Functional change: none noted    Pain: 0/10 reported  Location: bilateral hands      Objective     Update:    Edema. Measured in centimeters.    11/10/2021 11/10/2021 2/11/22 2/11/22       Left Right Left  Left    Wrist Crease 16.8 cm 16.4 cm 16 cm 15.5 cm   MCPs     19.0 cm               18.2  cm        Hand ROM. Measured in degrees.    11/10/2021 11/10/2021 2/11/22 2/11/22     Left Right Left  Right      Able to make full composite flexion Able to make full composite flexion               Thumb: MP     46 43                IP     43 60       Elbow and Wrist ROM. Measured in degrees.   11/10/2021 11/10/2021 2/11/22 2/11/22    Left Right Left  Right    Wrist Ext/Flex 73/55 75/60 70/50 75/50   Wrist RD/UD 16/32 10/40 15/35 10/30       Strength (Dynamometer) and Pinch Strength (Pinch Gauge)  Measured in pounds.    11/10/2021 11/10/2021 2/11/22 2/11/22     Left Right Left  right    Rung II 33.1 38.5 38.9 51.83   [place] Pinch 10.1 4.9 11.6 11   3pt Pinch 5.0 4.0 8 8.6        9HPT:   11/10/2021      2/11/22  R) 46s              R) 47 seconds 1 drop  L) 34s               L) 37 seconds 0 drops    Assessment     Update: . Erica is demonstrating increases in  and pinch strength as noted above. Slight improvement in wrist extension    Previous Short Term Goals Status:     Short Term Goals (STGs); to be met within 4 weeks (12/10/2021).  STG #1: Pt will report 3-4 out of 10 pain level with functional use of hand, especially during meal preparation- not assessed.  STG #2: Pt will report/demo increase in  strength by 5#- met 2/11/22  STG #3: Pt will report/demo improvement in fine motor coordination by performing 9HPT 5 seconds less than initial assessment- not met  STG #4: Pt will demonstrate independence with issued HEP- in progress   STG #5: Pt will demo improved wrist ROM by 5 degrees to demonstrate progression of carpal tunnel syndrome.- met 2/11/22    Long Term Goal Status:   continue per initial plan of care.  Goals:Long Term Goals (LTGs); to be met by discharge.  LTG #1: Pt will report a pain level of 0-1 out of 10 with functional hand use              LTG #2: Pt will demo improved FOTO score by 5-8 points.   LTG #3: Pt will return to prior level of function for ADLs and household management.     Reasons for Recertification of Therapy:   Scheduling and health issue has had minimal intervention. Some progress noted    Plan     Updated Certification Period: 2/11/2022 to 4/8/22  Recommended Treatment Plan: 1 times per week for 8 weeks: Fluidotherapy, Manual Therapy, Moist Heat/ Ice, Paraffin, Therapeutic Activities and Therapeutic Exercise  Other Recommendations:     MAURICE Asencio  2/11/2022      I CERTIFY THE NEED FOR THESE SERVICES FURNISHED UNDER THIS PLAN OF TREATMENT AND WHILE UNDER MY CARE    Physician's comments:        Physician's Signature: ___________________________________________________

## 2022-02-13 NOTE — PROGRESS NOTES
Patient Erica Leblanc, MRN [id number], was dependent on dialysis (ICD10 Z99.2) at the time of this visit on 2/9/22. This addendum is made to the medical record on 02/13/2022.

## 2022-02-21 RX ORDER — ATORVASTATIN CALCIUM 80 MG/1
80 TABLET, FILM COATED ORAL NIGHTLY
Qty: 90 TABLET | Refills: 0 | Status: SHIPPED | OUTPATIENT
Start: 2022-02-21 | End: 2022-04-29

## 2022-03-01 NOTE — PROGRESS NOTES
[facility]  Occupational Therapy Treatment Note    Date: 3/2/2022  Name: Erica Leblanc  Clinic Number: [id number]    Therapy Diagnosis:   Encounter Diagnoses   Name Primary?    Decreased  strength Yes    Fine motor impairment      Physician: Alverto Baeza NP    Physician Orders: eval and treat   Medical Diagnosis: G56.03 (ICD-10-CM) - Bilateral carpal tunnel syndrome  Surgical Procedure and Date: , n/a / Date of Injury/Onset: a year ago  Evaluation Date: 11/10/2021  Insurance Authorization Period Expiration: 11/19/22  Plan of Care Expiration: 2/11/2022 to 4/8/22    Date of Return to MD: TBA  Visit # / Visits authorized:  6/ 20  FOTO: initial eval      Precautions:  Standard and dialysis      Time In:  3:30 pm  Time Out:  4:10  pm  Total Billable Time:40 minutes      SUBJECTIVE     Pt reports: I am feeling a little stiff today.  She was compliant with home exercise program given last session.   Response to previous treatment: felt good  Functional change: able to  a pill off the floor    Pain: 0/10 reported  Location:  bilateral hands      OBJECTIVE     Supervised moist heat applied to hands x 5 minutes prior to exercise and assessment.    Therapeutic exercises to develop strength, endurance and ROM for 35 minutes, including:  - digit abduction/adduction x 10 reps  -median nerve glides x 6 positions  -MP flexion, hook fist x 10 reps each   -sequential straight fingers, to MP flexion to flat fist x 10 reps  -full composite fist x 10 reps    Patient Education and Home Exercises      Education provided:    - Progress towards goals     Written Home Exercises Provided: yes  Exercises were reviewed and Erica was able to demonstrate them prior to the end of the session.  Erica demonstrated fair understanding of the HEP provided. See EMR under Patient Instructions for exercises provided during therapy sessions.      ASSESSMENT     Pt would continue to benefit from skilled OT.  Erica is reporting improvement in her hand use at home.  She required aries assistance with performance of her previous and new HEP. Erica is also reporting less pain in her hands.     Erica is progressing well towards her goals and there are no updates to goals at this time. Pt prognosis is Good.     Pt will continue to benefit from skilled outpatient occupational therapy to address the deficits listed in the problem list on initial evaluation, provide pt/family education and to maximize pt's level of independence in the home and community environment.     Pt's spiritual, cultural and educational needs considered and pt agreeable to plan of care and goals.    Anticipated barriers to occupational therapy:     Short Term Goals (STGs); to be met within 4 weeks (12/10/2021).  STG #1: Pt will report 3-4 out of 10 pain level with functional use of hand, especially during meal preparation- not assessed.  STG #2: Pt will report/demo increase in  strength by 5#- met 2/11/22  STG #3: Pt will report/demo improvement in fine motor coordination by performing 9HPT 5 seconds less than initial assessment- not met  STG #4: Pt will demonstrate independence with issued HEP- in progress   STG #5: Pt will demo improved wrist ROM by 5 degrees to demonstrate progression of carpal tunnel syndrome.- met 2/11/22    Goals:Long Term Goals (LTGs); to be met by discharge.  LTG #1: Pt will report a pain level of 0-1 out of 10 with functional hand use              LTG #2: Pt will demo improved FOTO score by 5-8 points.   LTG #3: Pt will return to prior level of function for ADLs and household management.     PLAN     Continue skilled occupational therapy with individualized plan of care focusing on improving functional independence   Nerve glides and active movement as MAURICE Sorensen

## 2022-03-02 ENCOUNTER — CLINICAL SUPPORT (OUTPATIENT)
Dept: REHABILITATION | Facility: HOSPITAL | Age: 77
End: 2022-03-02
Payer: MEDICARE

## 2022-03-02 DIAGNOSIS — R29.898 FINE MOTOR IMPAIRMENT: ICD-10-CM

## 2022-03-02 DIAGNOSIS — R29.818 FINE MOTOR IMPAIRMENT: ICD-10-CM

## 2022-03-02 DIAGNOSIS — R29.898 DECREASED GRIP STRENGTH: Primary | ICD-10-CM

## 2022-03-02 PROCEDURE — 97110 THERAPEUTIC EXERCISES: CPT | Mod: PN

## 2022-03-02 NOTE — PATIENT INSTRUCTIONS
[facility] - OCCUPATIONAL THERAPY  HOME EXERCISE PROGRAM       Complete the following exercises with 10 repetitions each, 2-3x/day.       AROM: PIP Flexion / Extension  Pinch bottom knuckle  to prevent bending. Actively bend middle knuckle until stretch is felt.   Hold 3 seconds. Relax. Straighten finger as far as possible.    AROM: Isolated PIP Flexion  Bend only middle joint of your finger, keeping other fingers straight with other hand.      AROM: Abduction / Adduction  With hand flat on table, spread all fingers apart, then bring them together as close as possible.    Copyright © I. All rights reserved.     Therapist:

## 2022-03-09 ENCOUNTER — CLINICAL SUPPORT (OUTPATIENT)
Dept: REHABILITATION | Facility: HOSPITAL | Age: 77
End: 2022-03-09
Payer: MEDICARE

## 2022-03-09 DIAGNOSIS — R29.818 FINE MOTOR IMPAIRMENT: ICD-10-CM

## 2022-03-09 DIAGNOSIS — R29.898 FINE MOTOR IMPAIRMENT: ICD-10-CM

## 2022-03-09 DIAGNOSIS — R29.898 DECREASED GRIP STRENGTH: Primary | ICD-10-CM

## 2022-03-09 PROCEDURE — 97110 THERAPEUTIC EXERCISES: CPT | Mod: PN

## 2022-03-09 NOTE — PROGRESS NOTES
[facility]  Occupational Therapy Treatment Note    Date: 3/9/2022  Name: Erica Leblanc  Clinic Number: [id number]    Therapy Diagnosis:   Encounter Diagnoses   Name Primary?    Decreased  strength Yes    Fine motor impairment      Physician: Alverto Baeza NP    Physician Orders: eval and treat   Medical Diagnosis: G56.03 (ICD-10-CM) - Bilateral carpal tunnel syndrome  Surgical Procedure and Date: , n/a / Date of Injury/Onset: a year ago  Evaluation Date: 11/10/2021  Insurance Authorization Period Expiration: 11/19/22  Plan of Care Expiration: 2/11/2022 to 4/8/22    Date of Return to MD: TBA  Visit # / Visits authorized:  6/ 20  FOTO: initial eval      Precautions:  Standard and dialysis      Time In:  3:32 pm  Time Out:  4:13  pm  Total Billable Time: 36 minutes    SUBJECTIVE     Pt reports: I have very little stiffness today.  No cramping of hands in dialysis this week.   She was compliant with home exercise program given last session.   Response to previous treatment: felt good  Functional change: able to write better    Pain: 0/10 reported  Location:  bilateral hands      OBJECTIVE     Supervised moist heat applied to hands x 5 minutes prior to exercise and assessment.    Therapeutic exercises to develop strength, endurance and ROM for 36 minutes, including:  -full fist x 20 reps  -spheres rotated on table right hand x 2 min  -wrist extension from table top x 10 reps   wrist flexion and extension in neutral forearm x 20 reps  -digi extension yellow band x 10 reps  -isolated digit abduction/adduction x 10 reps each finger  -circumduction with thumb x 10  Hands on assist required  -thumb opposition to each tip x 10 reps each  -hook fist to MP flexion x 10 reps each       Patient Education and Home Exercises      Education provided:    - Progress towards goals     Written Home Exercises Provided: continue with previously instructed HEP  Exercises were reviewed and Erica was  able to demonstrate them prior to the end of the session.  Erica demonstrated fair understanding of the HEP provided. See EMR under Patient Instructions for exercises provided during therapy sessions.      ASSESSMENT     Pt would continue to benefit from skilled OT. Erica with motor planning issues with performance of some exercises in clinic although carry over noted with two exercises noted today.  Pt having less cramping in her hands at the end of dialysis.     Erica is progressing well towards her goals and there are no updates to goals at this time. Pt prognosis is Good.     Pt will continue to benefit from skilled outpatient occupational therapy to address the deficits listed in the problem list on initial evaluation, provide pt/family education and to maximize pt's level of independence in the home and community environment.     Pt's spiritual, cultural and educational needs considered and pt agreeable to plan of care and goals.    Anticipated barriers to occupational therapy:     Short Term Goals (STGs); to be met within 4 weeks (12/10/2021).  STG #1: Pt will report 3-4 out of 10 pain level with functional use of hand, especially during meal preparation- not assessed.  STG #2: Pt will report/demo increase in  strength by 5#- met 2/11/22  STG #3: Pt will report/demo improvement in fine motor coordination by performing 9HPT 5 seconds less than initial assessment- not met  STG #4: Pt will demonstrate independence with issued HEP- in progress   STG #5: Pt will demo improved wrist ROM by 5 degrees to demonstrate progression of carpal tunnel syndrome.- met 2/11/22    Goals:Long Term Goals (LTGs); to be met by discharge.  LTG #1: Pt will report a pain level of 0-1 out of 10 with functional hand use              LTG #2: Pt will demo improved FOTO score by 5-8 points.   LTG #3: Pt will return to prior level of function for ADLs and household management.     PLAN     Continue skilled occupational therapy with  individualized plan of care focusing on improving functional independence   Nerve glides and active movement as MAURICE Sorensen

## 2022-03-15 LAB — HBA1C MFR BLD: 7.1 % (ref 0–5.6)

## 2022-03-16 ENCOUNTER — OFFICE VISIT (OUTPATIENT)
Dept: VASCULAR SURGERY | Facility: CLINIC | Age: 77
End: 2022-03-16
Payer: MEDICARE

## 2022-03-16 ENCOUNTER — HOSPITAL ENCOUNTER (OUTPATIENT)
Dept: CARDIOLOGY | Facility: HOSPITAL | Age: 77
Discharge: HOME | End: 2022-03-16
Attending: SURGERY
Payer: MEDICARE

## 2022-03-16 VITALS
SYSTOLIC BLOOD PRESSURE: 115 MMHG | DIASTOLIC BLOOD PRESSURE: 60 MMHG | BODY MASS INDEX: 37.46 KG/M2 | HEIGHT: 64 IN | WEIGHT: 219.44 LBS

## 2022-03-16 DIAGNOSIS — T82.858D ARTERIOVENOUS FISTULA STENOSIS, SUBSEQUENT ENCOUNTER: ICD-10-CM

## 2022-03-16 DIAGNOSIS — T82.858D ARTERIOVENOUS FISTULA STENOSIS, SUBSEQUENT ENCOUNTER: Primary | ICD-10-CM

## 2022-03-16 PROCEDURE — 1159F MED LIST DOCD IN RCRD: CPT | Mod: CPTII,S$GLB,, | Performed by: SURGERY

## 2022-03-16 PROCEDURE — 1101F PR PT FALLS ASSESS DOC 0-1 FALLS W/OUT INJ PAST YR: ICD-10-PCS | Mod: CPTII,S$GLB,, | Performed by: SURGERY

## 2022-03-16 PROCEDURE — 3288F FALL RISK ASSESSMENT DOCD: CPT | Mod: CPTII,S$GLB,, | Performed by: SURGERY

## 2022-03-16 PROCEDURE — 1101F PT FALLS ASSESS-DOCD LE1/YR: CPT | Mod: CPTII,S$GLB,, | Performed by: SURGERY

## 2022-03-16 PROCEDURE — 99214 PR OFFICE/OUTPT VISIT, EST, LEVL IV, 30-39 MIN: ICD-10-PCS | Mod: S$GLB,,, | Performed by: SURGERY

## 2022-03-16 PROCEDURE — 3074F PR MOST RECENT SYSTOLIC BLOOD PRESSURE < 130 MM HG: ICD-10-PCS | Mod: CPTII,S$GLB,, | Performed by: SURGERY

## 2022-03-16 PROCEDURE — 99999 PR PBB SHADOW E&M-EST. PATIENT-LVL III: CPT | Mod: PBBFAC,,, | Performed by: SURGERY

## 2022-03-16 PROCEDURE — 3078F DIAST BP <80 MM HG: CPT | Mod: CPTII,S$GLB,, | Performed by: SURGERY

## 2022-03-16 PROCEDURE — 1126F AMNT PAIN NOTED NONE PRSNT: CPT | Mod: CPTII,S$GLB,, | Performed by: SURGERY

## 2022-03-16 PROCEDURE — 1126F PR PAIN SEVERITY QUANTIFIED, NO PAIN PRESENT: ICD-10-PCS | Mod: CPTII,S$GLB,, | Performed by: SURGERY

## 2022-03-16 PROCEDURE — 93990 CV US HEMODIALYSIS ACCESS (CUPID ONLY): ICD-10-PCS | Mod: 26,,, | Performed by: SURGERY

## 2022-03-16 PROCEDURE — 3288F PR FALLS RISK ASSESSMENT DOCUMENTED: ICD-10-PCS | Mod: CPTII,S$GLB,, | Performed by: SURGERY

## 2022-03-16 PROCEDURE — 1159F PR MEDICATION LIST DOCUMENTED IN MEDICAL RECORD: ICD-10-PCS | Mod: CPTII,S$GLB,, | Performed by: SURGERY

## 2022-03-16 PROCEDURE — 99999 PR PBB SHADOW E&M-EST. PATIENT-LVL III: ICD-10-PCS | Mod: PBBFAC,,, | Performed by: SURGERY

## 2022-03-16 PROCEDURE — 3078F PR MOST RECENT DIASTOLIC BLOOD PRESSURE < 80 MM HG: ICD-10-PCS | Mod: CPTII,S$GLB,, | Performed by: SURGERY

## 2022-03-16 PROCEDURE — 99214 OFFICE O/P EST MOD 30 MIN: CPT | Mod: S$GLB,,, | Performed by: SURGERY

## 2022-03-16 PROCEDURE — 3074F SYST BP LT 130 MM HG: CPT | Mod: CPTII,S$GLB,, | Performed by: SURGERY

## 2022-03-16 PROCEDURE — 93990 DOPPLER FLOW TESTING: CPT | Mod: 26,,, | Performed by: SURGERY

## 2022-03-16 PROCEDURE — 93990 DOPPLER FLOW TESTING: CPT | Mod: TC

## 2022-03-16 NOTE — PROGRESS NOTES
Keron Perez MD VI                       [facility]                         03/16/2022    HPI:  Erica Leblanc is a 77 y.o. female with   Patient Active Problem List   Diagnosis    Class 2 obesity in adult    Sickle cell trait    Hyperlipidemia LDL goal <100    HUMBERTO on CPAP    Hypothyroidism (acquired)    Hx-TIA (transient ischemic attack)    Vitamin D deficiency disease    Pulmonary hypertension    Type 2 diabetes mellitus with ESRD (end-stage renal disease)    Secondary renal hyperparathyroidism    Incomplete bladder emptying    Postmenopausal atrophic vaginitis    Rectocele    Mixed incontinence urge and stress    Chronic diastolic heart failure    Tortuous aorta    Uncontrolled type 2 diabetes mellitus with hyperglycemia    ESRD on hemodialysis    Anemia of chronic disease    Debility    Chronic respiratory failure    Type 2 diabetes mellitus with foot ulcer, with long-term current use of insulin    Stable proliferative diabetic retinopathy associated with type 2 diabetes mellitus    Mild major depression    Heart failure with preserved ejection fraction    Pseudophakia    Chronic kidney disease-mineral and bone disorder    Age-related osteoporosis without current pathological fracture    H/O: stroke    Decreased  strength    Fine motor impairment    being managed by PCP and specialists who is here today for evaluation of long term HD access.  S/p R IJ tunneled HD catheter, HD without issues.  Pt is R handed.  No complaints today.  Does endorse orthopnea, no chest pain.  Unable to climb 1 flight of stairs on own.    no MI  no Stroke  Tobacco use: denies    1/20/20: No new issues.    3/2020: Dialysis without issues.    4/6/20:  S/p LUE fistulogram, central venogram, ligation of medial side branch cephalic vein, ligation of lateral side branch cephalic vein.  No issues with HD for several weeks since procedure.    7/6/20:  No issues with  HD.    8/3/20: s/p L proximal fistula angioplasty 7/21/20.  No issues with HD.    8/31/20:  S/p 8/19/29 Balloon angioplasty of the proximal LUE AVF with a 6x60 Angiosculpt and Stellerex balloon.      10/2020:  No issues with HD    1/2021:  No issues with HD    4/2021:  No new problems.    8/2021:  No issues with HD. C/o fatigue after HD.  Has not seen cardiology recently.    3/2022:  No issues with HD    Past Medical History:   Diagnosis Date    Acute ischemic right PCA stroke 6/6/2021    Acute respiratory failure with hypoxia     Age-related osteoporosis without current pathological fracture 3/8/2021    Anemia of chronic kidney failure, stage 4 (severe) 4/5/2019    Cataracts, bilateral     CHF (congestive heart failure)     CKD (chronic kidney disease) stage 3, GFR 30-59 ml/min     CKD (chronic kidney disease) stage 3, GFR 30-59 ml/min     Controlled type 2 diabetes mellitus with proteinuria or albuminuria     Depression     Diabetes with neurologic complications     Diabetic retinopathy of both eyes     Edema     Glaucoma     History of colonic polyps     Hx-TIA (transient ischemic attack) 11/2008    Hyperlipidemia LDL goal < 100     Hypertension     Hypothyroidism     Major depressive disorder, single episode, mild 2/17/2016    Mixed incontinence urge and stress     Obesity     Obstructive sleep apnea on CPAP     7/19/19:  Home CPAP machine broken, per patient & son    Osteopenia     Proteinuria     Sickle cell trait     Strabismus     TIA (transient ischemic attack)     Trouble in sleeping     Type 2 diabetes mellitus with ophthalmic manifestations     Type 2 diabetes with stage 3 chronic kidney disease GFR 30-59     Type II or unspecified type diabetes mellitus with renal manifestations, uncontrolled(250.42)     Uncontrolled type 2 diabetes mellitus with peripheral circulatory disorder 4/5/2019    Urge incontinence 1/11/2016    Urge incontinence     Venous stasis ulcer      bilateral lower legs    Vitamin D deficiency disease      Past Surgical History:   Procedure Laterality Date    AV FISTULA PLACEMENT Left 2019    Procedure: CREATION, AV FISTULA, LEFT UPPER EXTREMITY;  Surgeon: Keron Perez MD;  Location: [facility] OR;  Service: Vascular;  Laterality: Left;    BREAST BIOPSY      breast reduction Bilateral age 31    BREAST SURGERY      CATARACT EXTRACTION Bilateral     cataracts Bilateral      SECTION, LOW TRANSVERSE      x1    CHOLECYSTECTOMY      COLONOSCOPY N/A 2022    Procedure: COLONOSCOPY;  Surgeon: Mahesh Huang MD;  Location: [facility] (2ND FLR);  Service: Endoscopy;  Laterality: N/A;  fully vaccinated-sm.  RAPID COVID  +cologuard needing ASAP  Dialysis pt T,TH Sat and left UA access  2nd floor - cardiac/dialysis/SOB / LABS / prep ins. emailed - [facility]    EYE SURGERY  2014, 2014    vitrectomy    EYE SURGERY Right 2016    FISTULOGRAM Left 3/6/2020    Procedure: Fistulogram, left upper extremity, with branch ligation;  Surgeon: Keron Perez MD;  Location: [facility] OR [address] [place] [address];  Service: Vascular;  Laterality: Left;  Time 1.1 Minute  42.10 mGy    FISTULOGRAM Left 2020    Procedure: Fistulogram, left upper extremity, transradial access with possible intervention;  Surgeon: Keron Perez MD;  Location: [facility] OR [address] [place] [address];  Service: Vascular;  Laterality: Left;    FISTULOGRAM Left 2020    Procedure: Fistulogram, left upper extremity, possible intervention;  Surgeon: Keron Perez MD;  Location: [facility];  Service: Vascular;  Laterality: Left;  930 AM START  RN PRE OP  ---COVID NEGATIVE ON  2020. [place]    FISTULOGRAM Left 2022    Procedure: Fistulogram, transradial access;  Surgeon: Keron Perez MD;  Location: [facility] OR [address] [place] [address];  Service: Vascular;  Laterality: Left;  mgy-40.16  gycm-8.3905  contrast-34cc  time-12.4min    HYSTERECTOMY  1986    TAHBSO (patient is unsure if ovaries removed)     OOPHORECTOMY      PERCUTANEOUS TRANSLUMINAL ANGIOPLASTY OF ARTERIOVENOUS FISTULA Left 7/21/2020    Procedure: PTA, AV FISTULA;  Surgeon: Keron Perez MD;  Location: [facility] OR [address] [place] [address];  Service: Vascular;  Laterality: Left;  15.9 minutes of fluro  41.12  mGy  7.9060 Gy cm2  32ml  contrast    PHLEBOGRAPHY Left 7/21/2020    Procedure: CENTRAL VENOGRAM;  Surgeon: Keron Perez MD;  Location: [facility] OR [address] [place] [address];  Service: Vascular;  Laterality: Left;    REFRACTIVE SURGERY      TOTAL REDUCTION MAMMOPLASTY      approx 10 yrs ago    VENOPLASTY  1/21/2022    Procedure: ANGIOPLASTY, VEIN;  Surgeon: Keron Perez MD;  Location: [facility] OR [address] [place] [address];  Service: Vascular;;     Family History   Problem Relation Age of Onset    Leukemia Father     Cataracts Father     Ovarian cancer Sister 35    Stroke Mother     Diabetes Mother     Hypertension Mother     Cataracts Mother     Diabetes Paternal Grandmother     Cataracts Paternal Grandmother     Breast cancer Maternal Aunt 65    No Known Problems Paternal Grandfather     Cataracts Maternal Grandmother     Cataracts Maternal Grandfather     HIV Brother     Achondroplasia Sister     Parkinsonism Maternal Aunt     Esophageal cancer Maternal Uncle         smoker    No Known Problems Paternal Aunt     Cataracts Paternal Uncle     Amblyopia Neg Hx     Blindness Neg Hx     Glaucoma Neg Hx     Macular degeneration Neg Hx     Retinal detachment Neg Hx     Strabismus Neg Hx     Thyroid disease Neg Hx     Colon cancer Neg Hx     Cancer Neg Hx      Social History     Socioeconomic History    Marital status: Single    Number of children: 5   Occupational History    Occupation:      Employer: [employer]     Comment: part-time   Tobacco Use    Smoking status: Never Smoker    Smokeless tobacco: Never Used   Substance and Sexual Activity    Alcohol use: No     Alcohol/week: 0.0 standard drinks    Drug use: No    Sexual  activity: Not Currently     Partners: Male     Birth control/protection: Post-menopausal, Surgical     Social Determinants of Health     Financial Resource Strain: High Risk    Difficulty of Paying Living Expenses: Hard   Food Insecurity: Food Insecurity Present    Worried About Running Out of Food in the Last Year: Sometimes true    Ran Out of Food in the Last Year: Sometimes true   Transportation Needs: Unmet Transportation Needs    Lack of Transportation (Medical): Yes    Lack of Transportation (Non-Medical): Yes   Physical Activity: Insufficiently Active    Days of Exercise per Week: 3 days    Minutes of Exercise per Session: 10 min   Stress: Stress Concern Present    Feeling of Stress : Rather much   Social Connections: Moderately Isolated    Frequency of Communication with Friends and Family: More than three times a week    Frequency of Social Gatherings with Friends and Family: Once a week    Attends Adventism Services: More than 4 times per year    Active Member of Clubs or Organizations: No    Attends Club or Organization Meetings: Never    Marital Status: Never    Housing Stability: High Risk    Unable to Pay for Housing in the Last Year: Yes    Number of Places Lived in the Last Year: 1    Unstable Housing in the Last Year: No       Current Outpatient Medications:     ACCU-CHEK SOFT DEV LANCETS Kit, , Disp: , Rfl:     aspirin (ECOTRIN) 81 MG EC tablet, Take 1 tablet (81 mg total) by mouth once daily., Disp: 90 tablet, Rfl: 3    atorvastatin (LIPITOR) 80 MG tablet, Take 1 tablet (80 mg total) by mouth every evening., Disp: 90 tablet, Rfl: 0    blood sugar diagnostic Strp, One test strip use 2 times a day to check blood glucose,  ICD-10: E11.9, compatible with insurance/glucometer, Disp: 100 each, Rfl: 11    blood-glucose meter kit, One glucometer, use to check blood glucose.   ICD-10: E11.9. Dispense machine covered by insurance, Disp: 1 each, Rfl: 0    cinacalcet (SENSIPAR)  30 MG Tab, , Disp: , Rfl:     docusate sodium (COLACE) 100 MG capsule, Take 200 mg by mouth once daily. , Disp: , Rfl:     doxercalciferoL (HECTOROL) 4 mcg/2 mL injection, Inject 4.5 mcg into the vein 3 (three) times a week. At dialysis unit, Disp: , Rfl:     dulaglutide (TRULICITY) 0.75 mg/0.5 mL pen injector, Inject 0.75 mg into the skin every 7 days. For future refills, will need to be seen in clinic with Dr Barb hopkins., Disp: 4 pen, Rfl: 0    epoetin arnel (EPOGEN INJ), Inject 1,000 Units as directed every 7 days. At the dialysis unit, Disp: , Rfl:     LANCETS MISC, , Disp: , Rfl:     lancets Misc, One lancets use 2 times a day to check blood glucose, ICD-10: E11.9, Disp: 100 each, Rfl: 11    lancing device Misc, One device, used to check blood glucose, ICD-10: E11.9, Disp: 1 each, Rfl: 0    levothyroxine (SYNTHROID) 50 MCG tablet, Take 1 tablet (50 mcg total) by mouth before breakfast., Disp: 90 tablet, Rfl: 3    midodrine (PROAMATINE) 5 MG Tab, Take 1 tablet (5 mg total) by mouth 3 (three) times daily., Disp: 90 tablet, Rfl: 11    OLENA-NABIL RX 1- mg-mg-mcg Tab, Take 1 tablet by mouth once daily., Disp: , Rfl:     sevelamer carbonate (RENVELA) 800 mg Tab, Take 3 tablets (2,400 mg total) by mouth 3 (three) times daily with meals., Disp: 270 tablet, Rfl: 11    diclofenac sodium (ARTHRITIS PAIN, DICLOFENAC,) 1 % Gel, Apply 2 g topically once daily., Disp: 20 g, Rfl: 0    doxycycline (VIBRA-TABS) 100 MG tablet, Take 1 tablet (100 mg total) by mouth 2 (two) times daily., Disp: 20 tablet, Rfl: 0    REVIEW OF SYSTEMS:  General: No fevers or chills; ENT: No sore throat; Allergy and Immunology: no persistent infections; Hematological and Lymphatic: No history of bleeding or easy bruising; Endocrine: negative; Respiratory: no cough, shortness of breath, or wheezing; Cardiovascular: no chest pain or dyspnea on exertion; Gastrointestinal: no abdominal pain/back, change in bowel habits, or bloody stools;  Genito-Urinary: no dysuria, trouble voiding, or hematuria; Musculoskeletal: negative; Neurological: no TIA or stroke symptoms; Psychiatric: no nervousness, anxiety or depression.    PHYSICAL EXAM:                General appearance:  Alert, well-appearing, and in no distress.  Oriented to person, place, and time                    Neurological: Normal speech, no focal findings noted; CN II - XII grossly intact. All extremities with sensation to light touch.            Musculoskeletal: Digits/nail without cyanosis/clubbing.  Strength 5/5 all extremities.                    Neck: Supple, no significant adenopathy, no carotid bruit can be auscultated                  Chest:  Clear to auscultation, no wheezes, rales or rhonchi, symmetric air entry. No use of accessory muscles               Cardiac: Normal rate and regular rhythm, S1 and S2 normal            Abdomen: Soft, nontender, nondistended, no masses or organomegaly, no hernia     No rebound tenderness noted; bowel sounds normal     Pulsatile aortic mass is non palpable.     No groin adenopathy      Extremities:      2+ radial pulse bilaterally, LUE AVF +thrill, inc well healed     No BUE edema, Negative Manuel's test BUE    Skin: No tissue loss    LAB RESULTS:  No results found for: CBC  Lab Results   Component Value Date    LABPROT 10.3 06/07/2021    INR 1.0 06/07/2021     Lab Results   Component Value Date     12/13/2021    K 4.2 01/14/2022    CL 95 12/13/2021    CO2 28 12/13/2021     (H) 12/13/2021    BUN 68 (H) 12/13/2021    CREATININE 10.8 (H) 12/13/2021    CALCIUM 8.1 (L) 12/13/2021    ANIONGAP 17 (H) 12/13/2021    EGFRNONAA 3 (A) 12/13/2021     Lab Results   Component Value Date    WBC 7.44 12/13/2021    RBC 4.35 12/13/2021    HGB 12.4 12/13/2021    HCT 39 01/14/2022    MCV 88 12/13/2021    MCH 28.5 12/13/2021    MCHC 32.4 12/13/2021    RDW 14.3 12/13/2021     12/13/2021    MPV 10.3 12/13/2021    GRAN 5.1 12/13/2021    GRAN 68.2  12/13/2021    LYMPH 1.4 12/13/2021    LYMPH 18.8 12/13/2021    MONO 0.8 12/13/2021    MONO 10.2 12/13/2021    EOS 0.1 12/13/2021    BASO 0.04 12/13/2021    EOSINOPHIL 1.9 12/13/2021    BASOPHIL 0.5 12/13/2021    DIFFMETHOD Automated 12/13/2021     .  Lab Results   Component Value Date    HGBA1C 6.5 (H) 11/03/2021       IMAGING:  All pertinent imaging has been reviewed and interpreted independently.    Vein mapping 9/2019:  Adequate R superior basilic vein and axillary vein ; Adequate L basilic vein superior and inferior, adequate L axillary     1/27/20 Left upper extremity dialysis ultrasound shows no hemodynamically significant stenosis.  Flow is 1083 ml/min.  Depth and diameter are appropriate.    3/23/20:  Left upper extremity dialysis ultrasound shows anastomotic and proximal fistula hemodynamically significant stenosis.  Flow is 955 ml/min.      7/2020: Left upper extremity dialysis ultrasound shows proximal fistula hemodynamically significant stenosis.  Flow is 1257 ml/min.      8/2020: Left upper extremity dialysis ultrasound shows proximal hemodynamically significant stenosis.  Flow is 1999 ml/min.      8/31/20: Left upper extremity dialysis ultrasound shows proximal fistula hemodynamically significant stenosis.  Flow is 2209 ml/min.      10/2020:  Prox stenosis, flow > 3000 ml/min    1/2021:  Proximal stenosis, flow 4414 ml/min    4/2021:  HD US reviewed without significant stenosis, adequate flow.    8/2021:HD US reviewed without significant stenosis, adequate flow.    3/2022:  Left upper extremity dialysis ultrasound shows approx 50% anastomotic and prox fistula stenosis withou hemodynamically significant stenosis.  Flow is 3774 ml/min.        IMP/PLAN:  77 y.o. female with   Patient Active Problem List   Diagnosis    Class 2 obesity in adult    Sickle cell trait    Hyperlipidemia LDL goal <100    HUMBERTO on CPAP    Hypothyroidism (acquired)    Hx-TIA (transient ischemic attack)    Vitamin D deficiency  disease    Pulmonary hypertension    Type 2 diabetes mellitus with ESRD (end-stage renal disease)    Secondary renal hyperparathyroidism    Incomplete bladder emptying    Postmenopausal atrophic vaginitis    Rectocele    Mixed incontinence urge and stress    Chronic diastolic heart failure    Tortuous aorta    Uncontrolled type 2 diabetes mellitus with hyperglycemia    ESRD on hemodialysis    Anemia of chronic disease    Debility    Chronic respiratory failure    Type 2 diabetes mellitus with foot ulcer, with long-term current use of insulin    Stable proliferative diabetic retinopathy associated with type 2 diabetes mellitus    Mild major depression    Heart failure with preserved ejection fraction    Pseudophakia    Chronic kidney disease-mineral and bone disorder    Age-related osteoporosis without current pathological fracture    H/O: stroke    Decreased  strength    Fine motor impairment    being managed by PCP and specialists who is here today for evaluation of long term HD access s/p L RC AV fistula.    -s/p L RC AV fistula with proximal fistula measuring 5 mm 1/13/20, adequate flow without issues during HD s/p LUE fistulogram, central venogram, ligation of medial side branch cephalic vein, ligation of lateral side branch cephalic vein 3/2/20 with prox AVF stenosis s/p L proximal fistula angioplasty 7/21/20 with persistent flow issues s/p proximal angioplasty 8/19/20 with scoring balloon/DCB with improvement in stenosis/flow and no further issues with HD  -Cont renal diet  -RTC 3 mo with HD US for continued routine surveillance    I spent 12 minutes evaluating this patient and greater than 50% of the time was spent counseling, coordinator care and discussing the plan of care.  All questions were answered and patient stated understanding with agreement with the above treatment plan.    Keron Perez MD [facility]  Vascular and Endovascular Surgery

## 2022-03-17 DIAGNOSIS — N18.6 ESRD ON HEMODIALYSIS: Primary | ICD-10-CM

## 2022-03-17 DIAGNOSIS — Z99.2 ESRD ON HEMODIALYSIS: Primary | ICD-10-CM

## 2022-03-17 LAB
HD ANASTAMOSIS LOCATION: NORMAL
HD FISTULA OUTFLOW VEIN VESSEL: NORMAL
HD INFLOW ARTERY VESSEL: NORMAL
RIGHT DIS GRAFT PSV: 78 CM/ SEC
RIGHT INFLOW PSV: 216 CM/S
RIGHT MID GRAFT PSV: 188 CM/S
RIGHT OUTFLOW VEIN PSV: 60 CM/ SEC
RIGHT PROX ANA PSV: 457 CM/S
RIGHT PROX GRAFT PSV: 498 CM/S
RIGHT VOLUME FLOW PSV: 3774 ML/MIN

## 2022-03-18 ENCOUNTER — HOSPITAL ENCOUNTER (OUTPATIENT)
Dept: RADIOLOGY | Facility: HOSPITAL | Age: 77
Discharge: HOME | End: 2022-03-18
Attending: INTERNAL MEDICINE
Payer: MEDICARE

## 2022-03-18 DIAGNOSIS — Z12.31 BREAST CANCER SCREENING BY MAMMOGRAM: ICD-10-CM

## 2022-03-18 PROCEDURE — 77067 SCR MAMMO BI INCL CAD: CPT | Mod: 26,,, | Performed by: RADIOLOGY

## 2022-03-18 PROCEDURE — 77063 MAMMO DIGITAL SCREENING BILAT WITH TOMO: ICD-10-PCS | Mod: 26,,, | Performed by: RADIOLOGY

## 2022-03-18 PROCEDURE — 77067 MAMMO DIGITAL SCREENING BILAT WITH TOMO: ICD-10-PCS | Mod: 26,,, | Performed by: RADIOLOGY

## 2022-03-18 PROCEDURE — 77063 BREAST TOMOSYNTHESIS BI: CPT | Mod: 26,,, | Performed by: RADIOLOGY

## 2022-03-18 PROCEDURE — 77063 BREAST TOMOSYNTHESIS BI: CPT | Mod: TC,PO

## 2022-03-22 NOTE — PLAN OF CARE
Problem: Acute Confusion (Hospitalized Older Adult)  Goal: Optimal Cognitive Function    Intervention: Minimize Factors Contributing to Confusion     07/25/19 0521   Manage Environment and Stimulation   Environment Familiarity/Consistency daily routine followed;familiar objects from home provided;personal clothing/items utilized   Optimize Cognitive and Communication Skills   Reorientation Measures clock in view;familiar social contact encouraged;glasses encouraged;reorientation provided   Prevent or Manage Pain   Sensory Stimulation Regulation care clustered;lighting decreased;quiet environment promoted;visual stimulation provided         Problem: Bowel Elimination Impaired (Hospitalized Older Adult)  Goal: Effective Bowel Elimination    Intervention: Promote Effective Bowel Elimination     07/25/19 0521   Promote Effective Bowel Elimination   Bowel Elimination Promotion adequate fluid intake promoted;commode/bedpan at bedside;diet adjusted;privacy promoted   Monitor/Manage Chemotherapy Gastrointestinal Effects   Bowel Dysfunction Management adaptive equipment provided;relaxation techniques promoted;toileting offered         Problem: Depression (Hospitalized Older Adult)  Goal: Depressive Symptoms Identified and Managed    Intervention: Monitor and Manage Depressive Symptoms     07/25/19 0521   Promote Anxiety Reduction   Family/Support System Care involvement promoted;presence promoted;self-care encouraged;support provided;caregiver stress acknowledged   Supportive Measures active listening utilized;counseling provided;decision-making supported;goal setting facilitated;positive reinforcement provided;problem solving facilitated;relaxation techniques promoted;self-care encouraged;self-reflection promoted;self-responsibility promoted;verbalization of feelings encouraged         Problem: Fluid Imbalance (Hospitalized Older Adult)  Goal: Fluid Balance    Intervention: Monitor and Manage Fluid Balance     07/25/19 0521    Monitor and Manage Cardiac Rhythm Effects   Fluid/Electrolyte Management fluids adjusted;oral rehydration therapy initiated         Problem: Functional Ability Impaired (Hospitalized Older Adult)  Goal: Optimal Functional Ability    Intervention: Promote Functional Ability     07/25/19 0521   Identify and Manage Contributors to Fall Injury Risk   Self-Care Promotion independence encouraged;BADL personal objects within reach;BADL personal routines maintained;meal setup provided;safe use of adaptive equipment encouraged   Promote Activity and Functional [place]   Activity Assistance Provided assistance, 2 people;assistance, 3 or more people         Problem: Oral Intake Inadequate (Hospitalized Older Adult)  Goal: Optimal Nutrition Intake    Intervention: Promote and Optimize Oral Intake     07/25/19 0521   Monitor and Manage Anemia   Oral Nutrition Promotion calorie dense foods provided;medicated;calorie dense liquids provided;nutritional therapy counseling provided;physical activity promoted;referred to outpatient services;rest periods promoted;safe use of adaptive equipment encouraged;social interaction promoted         Problem: Sleep Disturbance (Hospitalized Older Adult)  Goal: Adequate Sleep/Rest    Intervention: Promote Sleep/Rest     07/25/19 0521   Prevent or Manage Pain   Sleep/Rest Enhancement awakenings minimized;consistent schedule promoted;family presence promoted;noise level reduced;regular sleep/rest pattern promoted;relaxation techniques promoted         Problem: Urinary Incontinence (Hospitalized Older Adult)  Goal: Urinary Incontinence Managed    Intervention: Promote Urinary Continence     07/25/19 0521   Monitor/Manage Chemotherapy Gastrointestinal Effects   Perineal Care absorbent pad changed;perineum cleansed;protective cream applied;protective ointment applied;skin sealant/barrier applied   Promote Bladder Elimination   Urinary Elimination Promotion absorbent pad/diaper use  encouraged;toileting offered            PROVIDER:[TOKEN:[[id number]],FOLLOWUP:[2 weeks]]

## 2022-03-22 NOTE — PROGRESS NOTES
[facility]  Occupational Therapy Progress / Discharge Note    Date: 3/23/2022  Name: Erica Leblanc  Clinic Number: [id number]    Therapy Diagnosis:   Encounter Diagnoses   Name Primary?    Decreased  strength Yes    Fine motor impairment      Physician: Alverto Baeza NP    Physician Orders: eval and treat   Medical Diagnosis: G56.03 (ICD-10-CM) - Bilateral carpal tunnel syndrome  Surgical Procedure and Date: , n/a / Date of Injury/Onset: a year ago  Evaluation Date: 11/10/2021  Insurance Authorization Period Expiration: 11/19/22  Plan of Care Expiration: 2/11/2022 to 4/8/22    Date of Return to MD: EUGENIO  Visit # / Visits authorized:  7/ 20  FOTO: initial eval      Precautions:  Standard and dialysis      Time In:  3:13 pm  Time Out:  3:58  pm  Total Billable Time: 40 minutes    Date of Last visit: 2/23/22  Total Visits Received: 7    SUBJECTIVE     Pt reports: I was doing well until yesterday with the weather coming through and worrying about my kids.   She was compliant with home exercise program provided   Response to previous treatment: fingers were more pliable  Functional change: able to  small coins off the floor better, able to place pots onto stove with no problem    Pain: 0/10 reported   0 pain overall   Location:  bilateral hands      OBJECTIVE     Supervised moist heat applied to hands x 8 minutes prior to exercise and assessment.    Therapeutic exercises to develop strength, endurance and ROM for 40 minutes, including:    Reassessment:     Edema. Measured in centimeters.    11/10/2021 11/10/2021 2/11/22 2/11/22     Left Right Left  Right    Wrist Crease 16.8 cm 16.4 cm 16 cm 15.5 cm   MCPs     19.0 cm               18.2  cm        Hand ROM. Measured in degrees.    11/10/2021 11/10/2021 2/11/22 2/11/22     Left Right Left  Right      Able to make full composite flexion Able to make full composite flexion               Thumb: MP     46 43                IP      43 60       Elbow and Wrist ROM. Measured in degrees.   11/10/2021 11/10/2021 2/11/22 2/11/22    Left Right Left  Right    Wrist Ext/Flex 73/55 75/60 70/50 75/50   Wrist RD/UD 16/32 10/40 15/35 10/30       Strength (Dynamometer) and Pinch Strength (Pinch Gauge)  Measured in pounds.    11/10/2021 11/10/2021 2/11/22 2/11/22 3/22/22 3/22/22     Left Right Left  right  Left  Right    Rung II 33.1 38.5 38.9 51.83 27.6 42.9   Boston Pinch 10.1 4.9 11.6 11 8.3 12.6   3pt Pinch 5.0 4.0 8 8.6 4.4 5.2       9HPT:   11/10/2021      2/11/22                               3/23/22  R) 46s              R) 47 seconds 1 drop     R) 37 seconds 1 drop  L) 34s               L) 37 seconds 0 drops    L) 44 seconds 1 drop    - ergo gripper 1 red and yellow bands x 3 min  -wrist extension from table top x 10 reps  - wrist flexion and extension in neutral forearm x 10 reps  -green sponge 3pt pinch x 1 min each hand  -spheres rotated on table right and left  hand x 2 min  -gather 5 coins into hand to place int slotted cup one at a time x 3 each hand      Patient Education and Home Exercises      Education provided:    - Progress towards goals     Written Home Exercises Provided: continue with previously instructed HEP  Exercises were reviewed and Erica was able to demonstrate them prior to the end of the session.  Erica demonstrated fair understanding of the HEP provided. See EMR under Patient Instructions for exercises provided during therapy sessions.      ASSESSMENT     Pt would continue to benefit from skilled OT. Erica is reporting general overall improvement in her hands, greater flexibility and no pain.  She has improvement with her manipulation skills as well. Less symptoms in her hand following her dialysis.     Pt's spiritual, cultural and educational needs considered and pt agreeable to plan of care and goals.    Anticipated barriers to occupational therapy:     Short Term Goals (STGs); to be met within 4 weeks  (12/10/2021).  STG #1: Pt will report 3-4 out of 10 pain level with functional use of hand, especially during meal preparation- met 3/23/22.  STG #2: Pt will report/demo increase in  strength by 5#- met 2/11/22  STG #3: Pt will report/demo improvement in fine motor coordination by performing 9HPT 5 seconds less than initial assessment-met with right hand 3/23/22  STG #4: Pt will demonstrate independence with issued HEP- met 3/23/22   STG #5: Pt will demo improved wrist ROM by 5 degrees to demonstrate progression of carpal tunnel syndrome.- met 2/11/22    Goals:Long Term Goals (LTGs); to be met by discharge.  LTG #1: Pt will report a pain level of 0-1 out of 10 with functional hand use-met 3/23/22              LTG #2: Pt will demo improved FOTO score by 5-8 points.- met 3/23/22   LTG #3: Pt will return to prior level of function for ADLs and household management.- not met     Discharge reason: Patient has met all of his/her goals    Discharge FOTO Score: 33% impairment      PLAN     This patient is discharged from Occupational Therapy      MAURICE Asencio

## 2022-03-23 ENCOUNTER — CLINICAL SUPPORT (OUTPATIENT)
Dept: REHABILITATION | Facility: HOSPITAL | Age: 77
End: 2022-03-23
Payer: MEDICARE

## 2022-03-23 DIAGNOSIS — R29.818 FINE MOTOR IMPAIRMENT: ICD-10-CM

## 2022-03-23 DIAGNOSIS — R29.898 FINE MOTOR IMPAIRMENT: ICD-10-CM

## 2022-03-23 DIAGNOSIS — R29.898 DECREASED GRIP STRENGTH: Primary | ICD-10-CM

## 2022-03-23 PROCEDURE — 97110 THERAPEUTIC EXERCISES: CPT | Mod: PN

## 2022-04-06 ENCOUNTER — OFFICE VISIT (OUTPATIENT)
Dept: ENDOCRINOLOGY | Facility: CLINIC | Age: 77
End: 2022-04-06
Payer: MEDICARE

## 2022-04-06 VITALS
TEMPERATURE: 99 F | DIASTOLIC BLOOD PRESSURE: 66 MMHG | WEIGHT: 219.88 LBS | HEART RATE: 83 BPM | BODY MASS INDEX: 37.75 KG/M2 | SYSTOLIC BLOOD PRESSURE: 106 MMHG

## 2022-04-06 DIAGNOSIS — E03.9 HYPOTHYROIDISM (ACQUIRED): Chronic | ICD-10-CM

## 2022-04-06 DIAGNOSIS — E78.5 HYPERLIPIDEMIA LDL GOAL <100: Chronic | ICD-10-CM

## 2022-04-06 DIAGNOSIS — N18.6 TYPE 2 DIABETES MELLITUS WITH ESRD (END-STAGE RENAL DISEASE): Primary | ICD-10-CM

## 2022-04-06 DIAGNOSIS — Z99.2 ESRD ON HEMODIALYSIS: ICD-10-CM

## 2022-04-06 DIAGNOSIS — N18.9 CHRONIC KIDNEY DISEASE-MINERAL AND BONE DISORDER: ICD-10-CM

## 2022-04-06 DIAGNOSIS — N18.6 ESRD ON HEMODIALYSIS: ICD-10-CM

## 2022-04-06 DIAGNOSIS — M89.9 CHRONIC KIDNEY DISEASE-MINERAL AND BONE DISORDER: ICD-10-CM

## 2022-04-06 DIAGNOSIS — E83.9 CHRONIC KIDNEY DISEASE-MINERAL AND BONE DISORDER: ICD-10-CM

## 2022-04-06 DIAGNOSIS — E03.9 HYPOTHYROIDISM, UNSPECIFIED TYPE: ICD-10-CM

## 2022-04-06 DIAGNOSIS — E11.22 TYPE 2 DIABETES MELLITUS WITH ESRD (END-STAGE RENAL DISEASE): Primary | ICD-10-CM

## 2022-04-06 PROCEDURE — 3078F PR MOST RECENT DIASTOLIC BLOOD PRESSURE < 80 MM HG: ICD-10-PCS | Mod: CPTII,S$GLB,, | Performed by: HOSPITALIST

## 2022-04-06 PROCEDURE — 99999 PR PBB SHADOW E&M-EST. PATIENT-LVL IV: CPT | Mod: PBBFAC,,, | Performed by: HOSPITALIST

## 2022-04-06 PROCEDURE — 99214 OFFICE O/P EST MOD 30 MIN: CPT | Mod: S$GLB,,, | Performed by: HOSPITALIST

## 2022-04-06 PROCEDURE — 1126F AMNT PAIN NOTED NONE PRSNT: CPT | Mod: CPTII,S$GLB,, | Performed by: HOSPITALIST

## 2022-04-06 PROCEDURE — 99214 PR OFFICE/OUTPT VISIT, EST, LEVL IV, 30-39 MIN: ICD-10-PCS | Mod: S$GLB,,, | Performed by: HOSPITALIST

## 2022-04-06 PROCEDURE — 1101F PT FALLS ASSESS-DOCD LE1/YR: CPT | Mod: CPTII,S$GLB,, | Performed by: HOSPITALIST

## 2022-04-06 PROCEDURE — 1160F RVW MEDS BY RX/DR IN RCRD: CPT | Mod: CPTII,S$GLB,, | Performed by: HOSPITALIST

## 2022-04-06 PROCEDURE — 3074F SYST BP LT 130 MM HG: CPT | Mod: CPTII,S$GLB,, | Performed by: HOSPITALIST

## 2022-04-06 PROCEDURE — 3288F PR FALLS RISK ASSESSMENT DOCUMENTED: ICD-10-PCS | Mod: CPTII,S$GLB,, | Performed by: HOSPITALIST

## 2022-04-06 PROCEDURE — 1159F PR MEDICATION LIST DOCUMENTED IN MEDICAL RECORD: ICD-10-PCS | Mod: CPTII,S$GLB,, | Performed by: HOSPITALIST

## 2022-04-06 PROCEDURE — 3288F FALL RISK ASSESSMENT DOCD: CPT | Mod: CPTII,S$GLB,, | Performed by: HOSPITALIST

## 2022-04-06 PROCEDURE — 1126F PR PAIN SEVERITY QUANTIFIED, NO PAIN PRESENT: ICD-10-PCS | Mod: CPTII,S$GLB,, | Performed by: HOSPITALIST

## 2022-04-06 PROCEDURE — 99999 PR PBB SHADOW E&M-EST. PATIENT-LVL IV: ICD-10-PCS | Mod: PBBFAC,,, | Performed by: HOSPITALIST

## 2022-04-06 PROCEDURE — 3078F DIAST BP <80 MM HG: CPT | Mod: CPTII,S$GLB,, | Performed by: HOSPITALIST

## 2022-04-06 PROCEDURE — 3074F PR MOST RECENT SYSTOLIC BLOOD PRESSURE < 130 MM HG: ICD-10-PCS | Mod: CPTII,S$GLB,, | Performed by: HOSPITALIST

## 2022-04-06 PROCEDURE — 1160F PR REVIEW ALL MEDS BY PRESCRIBER/CLIN PHARMACIST DOCUMENTED: ICD-10-PCS | Mod: CPTII,S$GLB,, | Performed by: HOSPITALIST

## 2022-04-06 PROCEDURE — 1101F PR PT FALLS ASSESS DOC 0-1 FALLS W/OUT INJ PAST YR: ICD-10-PCS | Mod: CPTII,S$GLB,, | Performed by: HOSPITALIST

## 2022-04-06 PROCEDURE — 1159F MED LIST DOCD IN RCRD: CPT | Mod: CPTII,S$GLB,, | Performed by: HOSPITALIST

## 2022-04-06 RX ORDER — DULAGLUTIDE 0.75 MG/.5ML
0.75 INJECTION, SOLUTION SUBCUTANEOUS
Qty: 12 PEN | Refills: 3 | Status: SHIPPED | OUTPATIENT
Start: 2022-04-06 | End: 2023-01-24

## 2022-04-06 NOTE — PROGRESS NOTES
Subjective:      Patient ID: Erica Leblanc is a 77 y.o. female presented to Endocrinology clinic on 4/6/2022.    Chief Complaint:  Diabetes, osteoporosis/metabolic bone disease, hypothyroidism      History of Present Illness: Erica Leblanc is a 77 y.o. female with metabolic bone disease with osteoporosis, hypothyroidism and type 2 diabetes  ESRD on HD on TTS for 1 year, her nephrologist is  Dr Myla Puente  Here for follow-up    Interval history:  Patient is here for follow-up.  Last seen 05/21, lost to follow-up.  Previously seen by me for diabetes, CKD-BMD, hypothyroidism  A1c now 7.1 on 03/22 blood work done at HD. On Trulicity 0.75mg once a week, report glucose , good glucose at HD unit  On Sensipar, sevelamer given by dialysis, better compliance with diet  Compliance with levothyroxine, taken daily.  No problem    Has been cutting back on dietary indiscretion, soda, pickles, food      With regards to type 2 diabetes:  Diagnosis around 10 years ago  Patient was on insulin prior to HD, has been off of all therapy since that time.  Checking glucose 1x a day  Family hx of diabetes    Diabetes Management Status: Reviewed     A1C Trend  Lab Results   Component Value Date    HGBA1C 6.5 (H) 11/03/2021    HGBA1C 8.1 (H) 06/06/2021    HGBA1C 8.8 (H) 05/07/2021       Lab Results   Component Value Date    MICALBCREAT 3,693.6 (H) 03/17/2014     No results found for: CLMSSCTO61  Lab Results   Component Value Date    TTGIGA 7 03/01/2021       Lab Results   Component Value Date    FRUCTOSAMINE 580 03/01/2021        Screening or Prevention Patient's value Goal Complete/Controlled?   Lipid profile : 11/03/2021 Annually Yes   LDL control Lab Results   Component Value Date    LDLCALC 64.4 11/03/2021    Annually/Less than 100 mg/dl  Yes   Nephropathy screening Lab Results   Component Value Date    LABMICR 1,736.0 03/17/2014     Lab Results   Component Value Date    PROTEINUA 2+ (A) 06/06/2021    Annually No   Blood  pressure BP Readings from Last 1 Encounters:   04/06/22 106/66    Less than 140/90 Yes   Dilated retinal exam : 05/07/2021 Annually Yes   Foot exam   : 02/09/2022 Annually Yes        With regards to chronic kidney disease mineral and bone disorder  Seen on recent bone density scan.  Patient denies prior history of any bone disorder.  Chronic history of ESRD on dialysis  Lab work review showing chronic hyperphosphatemia, elevated alkaline phosphatase, hypocalcemia, normal vitamin-D.    Patient reports Sensipar three times a week at HD  Patient is on phosphate binder:  Sevelamer 1600mg TIDWM, reports better compliance recently    Patient has stopped soda consumption  Patient denies back pain, No falls  Never had fractures  Use walker to help with gait and ambulation    DXA done on 10/2020  Comparison study done on 03/13/2018.  Lumbar spine (L1-L4): BMD is 0.943 g/cm2, T-score is -0.7, and Z-score is 1.0.  Total hip: BMD is 0.680 g/cm2, T-score is -2.1, and Z-score is -1.0.  Femoral neck: BMD is 0.511 g/cm2, T-score is -3.0, and Z-score is -1.5.    Impression:  1. Osteoporosis with likely chronic kidney disease mineral and bone disorder  2. Compared with previous DXA, BMD at the lumbar spine has declined by 13.9%, and the BMD at the total hip has declined by 23.2%.    Height loss (>2 inches)? no  Family hx of Osteoporosis: no    Asymptomatic menopausal state.  She had a hysterectomy at 42 y/o and menopausal symptoms at 45 y/o.     Modifiable  Low: Calcium intake?  5939-6731 mg elemental Ca2+ per day   Low: Vit D intake?  2000 IU a day   Diet: renal diet    Tobacco use ?  no   EtOH use? no    Caffeine use ? yes , but limit caffeine intake to 1 servings a day)  Weight bearing exercise?   Yes, walking, but not enough  Recent falls? no  Fall Precautions? yes, get out the chair without using their arms  Current diarrhea or h/o malabsorption? no    Medications  Estrogen Therapy? no   Chronic steroids? no  Anticoagulants?  No    Malignancy involving bone (active or history)? no  Prior radiation treatment? No    Lab work reviewed  Lab Results   Component Value Date    .0 (H) 05/07/2021    .7 (H) 03/01/2021    .9 (H) 05/22/2019    [id number] 47 11/03/2021    [id number] 34 03/01/2021    QVMUWZAC91LC 46 12/15/2018    CALCIUM 8.1 (L) 12/13/2021    CALCIUM 9.0 11/03/2021    CALCIUM 8.2 (L) 06/08/2021    PHOS 6.9 (H) 06/07/2021    PHOS 6.9 (H) 03/01/2021    PHOS 4.4 11/20/2019    ALKPHOS 131 12/13/2021    ALKPHOS 143 (H) 11/03/2021    ALKPHOS 123 06/08/2021    TSH 2.631 11/03/2021    TTGIGA 7 03/01/2021       With regards to hypothyroidism  Chronic  Restarted in 2021: Levothyroxin 50mcg    History of thyroid goiter, with thyroid nodule seen on ultrasound      Reviewed past surgical, medical, family, social history and updated as appropriate.    Objective:   /66 (BP Location: Right arm)   Pulse 83   Temp 99.2 °F (37.3 °C) (Oral)   Wt 99.7 kg (219 lb 14.4 oz)   BMI 37.75 kg/m²     Body mass index is 37.75 kg/m².    Physical Exam  Vitals and nursing note reviewed.   Constitutional:       Appearance: She is well-developed.      Comments: Elderly female using walker to help with ambulation   HENT:      Head: Normocephalic.   Eyes:      General: No scleral icterus.     Conjunctiva/sclera: Conjunctivae normal.   Neck:      Thyroid: No thyromegaly.   Cardiovascular:      Rate and Rhythm: Normal rate.      Heart sounds: Normal heart sounds.   Pulmonary:      Effort: Pulmonary effort is normal. No respiratory distress.   Abdominal:      Palpations: Abdomen is soft.      Tenderness: There is no abdominal tenderness.   Musculoskeletal:         General: Normal range of motion.      Cervical back: Neck supple.   Skin:     General: Skin is warm.      Findings: No erythema.   Neurological:      Mental Status: She is alert.      Coordination: Coordination normal.   Psychiatric:         Behavior: Behavior normal.         Lab  Review:  Lab Results   Component Value Date    .0 (H) 05/07/2021    .7 (H) 03/01/2021    .9 (H) 05/22/2019    [id number] 47 11/03/2021    [id number] 34 03/01/2021    CPTPPXGK78OY 46 12/15/2018    CALCIUM 8.1 (L) 12/13/2021    CALCIUM 9.0 11/03/2021    CALCIUM 8.2 (L) 06/08/2021    PHOS 6.9 (H) 06/07/2021    PHOS 6.9 (H) 03/01/2021    PHOS 4.4 11/20/2019    ALKPHOS 131 12/13/2021    ALKPHOS 143 (H) 11/03/2021    ALKPHOS 123 06/08/2021    TSH 2.631 11/03/2021    TTGIGA 7 03/01/2021       Outside lab work reviewed :  Care everywhere, from dialysis unit      Vitamin-D:  39              Assessment     1. Type 2 diabetes mellitus with ESRD (end-stage renal disease)  dulaglutide (TRULICITY) 0.75 mg/0.5 mL pen injector   2. Hypothyroidism, unspecified type  TSH    T4, Free    TSH    T4, Free    TSH    T4, Free    CANCELED: TSH    CANCELED: T4, Free   3. Hypothyroidism (acquired)     4. Hyperlipidemia LDL goal <100     5. Chronic kidney disease-mineral and bone disorder     6. ESRD on hemodialysis          Plan     Type 2 diabetes mellitus with ESRD (end-stage renal disease)  - Diabetes is at a goal given current A1C (7.1%), goal A1C for patient is 7% in ESRD  - Complicated by hyperglycemia, ESRD, dietary indiscretion  - Diabetic supplies/medications: reviewed no need for refills at this time  - Long discussion with patient about dietary modification, portion size control, decreasing carbohydrates intake    Plan  - Continue and refill: Trulicity 0.75 Q weekly as she is doing well.  Can increase his dose if needed, patient will contact in the future  - patient unable to check glucose often, will continue checking glucose at dialysis center  - repeat lab work every 3 months at HD, follow-up with me every 6 months or sooner if needed    Hypothyroidism (acquired)  Patient with history of hypothyroidism etiology unclear  On levothyroxine 50 mcg daily (low-dose)  Repeat TFT soon, faxed to HD unit to  obtain  Will titrate level if needed    Hyperlipidemia LDL goal <100  - ASCVD Risk below: Statin: Taking  The ASCVD Risk score (Huntington DC Jr., et al., 2013) failed to calculate for the following reasons:    The patient has a prior MI or stroke diagnosis    Chronic kidney disease-mineral and bone disorder  - Very difficult case, longstanding issue with worsening osteoporosis and continue elevation in PTH leading to osteoporosis  - Risk factors include ESRD on dialysis, hyperphosphatemia, hypocalcemia  - High risk of falls given knee pain, poor gait, the need to use walker  - Patient is more compliant with phosphate binder, and dietary modification  - On vitamin-D analog at dialysis  - sestamibi scan inconclusive for parathyroid adenoma  - now on Sensipar at HD unit  - threshold for parathyroidectomy is above >800 and failing medical therapy option  - given improvement in PTH at this time will continue monitor    ESRD on hemodialysis  - at [facility]; Dr. Barrientos  - Tuesday, Thursday and Saturday  - has AV graft in left UE       Return to clinic in 6 mo for diabetes management    Carl Day MD  Endocrinology- [facility]   4/6/2022      Disclaimer: This note has been generated using voice-recognition software. There may be typographical errors that have been missed during proof-reading.

## 2022-04-06 NOTE — ASSESSMENT & PLAN NOTE
- Very difficult case, longstanding issue with worsening osteoporosis and continue elevation in PTH leading to osteoporosis  - Risk factors include ESRD on dialysis, hyperphosphatemia, hypocalcemia  - High risk of falls given knee pain, poor gait, the need to use walker  - Patient is more compliant with phosphate binder, and dietary modification  - On vitamin-D analog at dialysis  - sestamibi scan inconclusive for parathyroid adenoma  - now on Sensipar at HD unit  - threshold for parathyroidectomy is above >800 and failing medical therapy option  - given improvement in PTH at this time will continue monitor

## 2022-04-06 NOTE — ASSESSMENT & PLAN NOTE
Patient with history of hypothyroidism etiology unclear  On levothyroxine 50 mcg daily (low-dose)  Repeat TFT soon, faxed to HD unit to obtain  Will titrate level if needed

## 2022-04-06 NOTE — ASSESSMENT & PLAN NOTE
- Diabetes is at a goal given current A1C (7.1%), goal A1C for patient is 7% in ESRD  - Complicated by hyperglycemia, ESRD, dietary indiscretion  - Diabetic supplies/medications: reviewed no need for refills at this time  - Long discussion with patient about dietary modification, portion size control, decreasing carbohydrates intake    Plan  - Continue and refill: Trulicity 0.75 Q weekly as she is doing well.  Can increase his dose if needed, patient will contact in the future  - patient unable to check glucose often, will continue checking glucose at dialysis center  - repeat lab work every 3 months at HD, follow-up with me every 6 months or sooner if needed

## 2022-04-06 NOTE — ASSESSMENT & PLAN NOTE
- ASCVD Risk below: Statin: Taking  The ASCVD Risk score (Norma BABCOCK Jr., et al., 2013) failed to calculate for the following reasons:    The patient has a prior MI or stroke diagnosis

## 2022-04-06 NOTE — ASSESSMENT & PLAN NOTE
- at [facility]; Dr. Barrientos  - Tuesday, Thursday and Saturday  - has AV graft in left UE

## 2022-04-22 ENCOUNTER — TELEPHONE (OUTPATIENT)
Dept: ENDOCRINOLOGY | Facility: CLINIC | Age: 77
End: 2022-04-22
Payer: MEDICARE

## 2022-04-22 DIAGNOSIS — E03.9 HYPOTHYROIDISM (ACQUIRED): Chronic | ICD-10-CM

## 2022-04-22 RX ORDER — LEVOTHYROXINE SODIUM 50 UG/1
50 TABLET ORAL
Qty: 90 TABLET | Refills: 3 | Status: SHIPPED | OUTPATIENT
Start: 2022-04-22 | End: 2022-08-04

## 2022-04-22 NOTE — LETTER
April 22, 2022    Erica Leblanc  [facility]  [place] [address]             [facility] - Endocrinology  [address], [address]  [place] [address]  Phone: [phone]  Fax: [phone] Dear Erica Leblanc:    Hello,  I just review your thyroid function obtain from dialysis unit.  Your thyroid function is normal.  Let us continue the current dose of levothyroxine 50 mcg daily.  Refill sent to [facility]'s    Please make sure that you are doing your blood work soon schedule by  team, on 5/7/22      Please follow-up with me every 6 months for monitoring          Carl Day MD  Endocrinology  4/22/2022

## 2022-04-22 NOTE — TELEPHONE ENCOUNTER
Received from dialysis unit for thyroid function    4/19/2022  TSH 1.17  free T4 1.2    No changes made to levothyroxine, refills

## 2022-04-29 RX ORDER — ATORVASTATIN CALCIUM 80 MG/1
80 TABLET, FILM COATED ORAL NIGHTLY
Qty: 90 TABLET | Refills: 0 | Status: SHIPPED | OUTPATIENT
Start: 2022-04-29 | End: 2022-09-22

## 2022-04-29 NOTE — TELEPHONE ENCOUNTER
No new care gaps identified.  Powered by [employer]. Reference number: [id number].   4/29/2022 1:13:57 PM CDT

## 2022-04-29 NOTE — TELEPHONE ENCOUNTER
Refill Routing Note   Medication(s) are not appropriate for processing by [facility] for the following reason(s):      - Patient has been seen in the ED/Hospital since the last PCP visit    [facility] action(s):  Route          Medication reconciliation completed: No     Appointments  past 12m or future 3m with PCP    Date Provider   Last Visit   4/28/2021 Sendy Elaine MD   Next Visit   5/20/2022 Sendy Elaine MD   ED visits in past 90 days: 0        Note composed:1:25 PM 04/29/2022

## 2022-05-04 ENCOUNTER — TELEPHONE (OUTPATIENT)
Dept: FAMILY MEDICINE | Facility: CLINIC | Age: 77
End: 2022-05-04
Payer: MEDICARE

## 2022-05-04 NOTE — TELEPHONE ENCOUNTER
----- Message from Saeid Mclain sent at 5/2/2022  2:22 PM CDT -----  Regarding: Pt Advice  Contact: MICKEY BERTRAND [[id number]]  Name of Who is Calling: MICKEY BERTRAND [[id number]]      What is the request in detail: Would like to speak with staff in regards to diabetic shoes. Please advise, needs another copy of Marina prescription with signature of Dr. Elaine and diabetic records fax to [facility]. Please advise      Can the clinic reply by [facility]: no      What Number to Call Back [facility] not [facility]: [phone]

## 2022-05-07 ENCOUNTER — LAB VISIT (OUTPATIENT)
Dept: LAB | Facility: HOSPITAL | Age: 77
End: 2022-05-07
Payer: MEDICARE

## 2022-05-07 DIAGNOSIS — M89.9 CHRONIC KIDNEY DISEASE-MINERAL AND BONE DISORDER: ICD-10-CM

## 2022-05-07 DIAGNOSIS — N18.9 CHRONIC KIDNEY DISEASE-MINERAL AND BONE DISORDER: ICD-10-CM

## 2022-05-07 DIAGNOSIS — I50.30 HEART FAILURE WITH PRESERVED EJECTION FRACTION, UNSPECIFIED HF CHRONICITY: ICD-10-CM

## 2022-05-07 DIAGNOSIS — N18.6 ESRD ON HEMODIALYSIS: ICD-10-CM

## 2022-05-07 DIAGNOSIS — Z99.2 ESRD ON HEMODIALYSIS: ICD-10-CM

## 2022-05-07 DIAGNOSIS — E11.22 TYPE 2 DIABETES MELLITUS WITH ESRD (END-STAGE RENAL DISEASE): ICD-10-CM

## 2022-05-07 DIAGNOSIS — E83.9 CHRONIC KIDNEY DISEASE-MINERAL AND BONE DISORDER: ICD-10-CM

## 2022-05-07 DIAGNOSIS — M81.0 AGE-RELATED OSTEOPOROSIS WITHOUT CURRENT PATHOLOGICAL FRACTURE: ICD-10-CM

## 2022-05-07 DIAGNOSIS — E03.9 HYPOTHYROIDISM (ACQUIRED): Chronic | ICD-10-CM

## 2022-05-07 DIAGNOSIS — D63.8 ANEMIA OF CHRONIC DISEASE: ICD-10-CM

## 2022-05-07 DIAGNOSIS — N18.6 TYPE 2 DIABETES MELLITUS WITH ESRD (END-STAGE RENAL DISEASE): ICD-10-CM

## 2022-05-07 DIAGNOSIS — E78.5 HYPERLIPIDEMIA LDL GOAL <100: ICD-10-CM

## 2022-05-07 LAB
25(OH)D3+25(OH)D2 SERPL-MCNC: 48 NG/ML (ref 30–96)
ALBUMIN SERPL BCP-MCNC: 3.2 G/DL (ref 3.5–5.2)
ALP SERPL-CCNC: 93 U/L (ref 55–135)
ALT SERPL W/O P-5'-P-CCNC: 11 U/L (ref 10–44)
ANION GAP SERPL CALC-SCNC: 19 MMOL/L (ref 8–16)
AST SERPL-CCNC: 17 U/L (ref 10–40)
BASOPHILS # BLD AUTO: 0.05 K/UL (ref 0–0.2)
BASOPHILS NFR BLD: 0.7 % (ref 0–1.9)
BILIRUB SERPL-MCNC: 0.3 MG/DL (ref 0.1–1)
BUN SERPL-MCNC: 51 MG/DL (ref 8–23)
CALCIUM SERPL-MCNC: 9.3 MG/DL (ref 8.7–10.5)
CHLORIDE SERPL-SCNC: 96 MMOL/L (ref 95–110)
CHOLEST SERPL-MCNC: 165 MG/DL (ref 120–199)
CHOLEST/HDLC SERPL: 3.4 {RATIO} (ref 2–5)
CO2 SERPL-SCNC: 24 MMOL/L (ref 23–29)
CREAT SERPL-MCNC: 9.6 MG/DL (ref 0.5–1.4)
DIFFERENTIAL METHOD: ABNORMAL
EOSINOPHIL # BLD AUTO: 0.1 K/UL (ref 0–0.5)
EOSINOPHIL NFR BLD: 1 % (ref 0–8)
ERYTHROCYTE [DISTWIDTH] IN BLOOD BY AUTOMATED COUNT: 15.2 % (ref 11.5–14.5)
EST. GFR  (AFRICAN AMERICAN): 4.1 ML/MIN/1.73 M^2
EST. GFR  (NON AFRICAN AMERICAN): 3.6 ML/MIN/1.73 M^2
ESTIMATED AVG GLUCOSE: 146 MG/DL (ref 68–131)
GLUCOSE SERPL-MCNC: 98 MG/DL (ref 70–110)
HBA1C MFR BLD: 6.7 % (ref 4–5.6)
HCT VFR BLD AUTO: 39.3 % (ref 37–48.5)
HDLC SERPL-MCNC: 48 MG/DL (ref 40–75)
HDLC SERPL: 29.1 % (ref 20–50)
HGB BLD-MCNC: 12.4 G/DL (ref 12–16)
IMM GRANULOCYTES # BLD AUTO: 0.03 K/UL (ref 0–0.04)
IMM GRANULOCYTES NFR BLD AUTO: 0.4 % (ref 0–0.5)
LDLC SERPL CALC-MCNC: 101 MG/DL (ref 63–159)
LYMPHOCYTES # BLD AUTO: 1.4 K/UL (ref 1–4.8)
LYMPHOCYTES NFR BLD: 20.4 % (ref 18–48)
MCH RBC QN AUTO: 27.6 PG (ref 27–31)
MCHC RBC AUTO-ENTMCNC: 31.6 G/DL (ref 32–36)
MCV RBC AUTO: 88 FL (ref 82–98)
MONOCYTES # BLD AUTO: 0.9 K/UL (ref 0.3–1)
MONOCYTES NFR BLD: 13 % (ref 4–15)
NEUTROPHILS # BLD AUTO: 4.4 K/UL (ref 1.8–7.7)
NEUTROPHILS NFR BLD: 64.5 % (ref 38–73)
NONHDLC SERPL-MCNC: 117 MG/DL
NRBC BLD-RTO: 0 /100 WBC
PLATELET # BLD AUTO: 248 K/UL (ref 150–450)
PMV BLD AUTO: 10.6 FL (ref 9.2–12.9)
POTASSIUM SERPL-SCNC: 4.9 MMOL/L (ref 3.5–5.1)
PROT SERPL-MCNC: 7.8 G/DL (ref 6–8.4)
PTH-INTACT SERPL-MCNC: 413.6 PG/ML (ref 9–77)
RBC # BLD AUTO: 4.49 M/UL (ref 4–5.4)
SODIUM SERPL-SCNC: 139 MMOL/L (ref 136–145)
TRIGL SERPL-MCNC: 80 MG/DL (ref 30–150)
WBC # BLD AUTO: 6.83 K/UL (ref 3.9–12.7)

## 2022-05-07 PROCEDURE — 80061 LIPID PANEL: CPT | Performed by: NURSE PRACTITIONER

## 2022-05-07 PROCEDURE — 36415 COLL VENOUS BLD VENIPUNCTURE: CPT | Mod: PO | Performed by: NURSE PRACTITIONER

## 2022-05-07 PROCEDURE — 83970 ASSAY OF PARATHORMONE: CPT | Performed by: HOSPITALIST

## 2022-05-07 PROCEDURE — 85025 COMPLETE CBC W/AUTO DIFF WBC: CPT | Performed by: NURSE PRACTITIONER

## 2022-05-07 PROCEDURE — 83036 HEMOGLOBIN GLYCOSYLATED A1C: CPT | Performed by: NURSE PRACTITIONER

## 2022-05-07 PROCEDURE — 82306 VITAMIN D 25 HYDROXY: CPT | Performed by: NURSE PRACTITIONER

## 2022-05-07 PROCEDURE — 80053 COMPREHEN METABOLIC PANEL: CPT | Performed by: NURSE PRACTITIONER

## 2022-05-09 PROBLEM — E11.65 UNCONTROLLED TYPE 2 DIABETES MELLITUS WITH HYPERGLYCEMIA: Status: RESOLVED | Noted: 2018-03-08 | Resolved: 2022-05-09

## 2022-05-11 ENCOUNTER — OFFICE VISIT (OUTPATIENT)
Dept: PODIATRY | Facility: CLINIC | Age: 77
End: 2022-05-11
Payer: MEDICARE

## 2022-05-11 VITALS — WEIGHT: 219 LBS | BODY MASS INDEX: 37.39 KG/M2 | HEIGHT: 64 IN

## 2022-05-11 DIAGNOSIS — L90.9 PLANTAR FAT PAD ATROPHY: ICD-10-CM

## 2022-05-11 DIAGNOSIS — M20.41 HAMMER TOES OF BOTH FEET: ICD-10-CM

## 2022-05-11 DIAGNOSIS — B35.1 NAIL DERMATOPHYTOSIS: ICD-10-CM

## 2022-05-11 DIAGNOSIS — E11.22 TYPE 2 DIABETES MELLITUS WITH ESRD (END-STAGE RENAL DISEASE): Primary | ICD-10-CM

## 2022-05-11 DIAGNOSIS — N18.6 TYPE 2 DIABETES MELLITUS WITH ESRD (END-STAGE RENAL DISEASE): Primary | ICD-10-CM

## 2022-05-11 DIAGNOSIS — M20.42 HAMMER TOES OF BOTH FEET: ICD-10-CM

## 2022-05-11 DIAGNOSIS — M20.11 HALLUX ABDUCTO VALGUS, RIGHT: ICD-10-CM

## 2022-05-11 DIAGNOSIS — M20.12 HALLUX ABDUCTO VALGUS, LEFT: ICD-10-CM

## 2022-05-11 PROCEDURE — 3288F PR FALLS RISK ASSESSMENT DOCUMENTED: ICD-10-PCS | Mod: CPTII,S$GLB,, | Performed by: PODIATRIST

## 2022-05-11 PROCEDURE — 99999 PR PBB SHADOW E&M-EST. PATIENT-LVL IV: CPT | Mod: PBBFAC,,, | Performed by: PODIATRIST

## 2022-05-11 PROCEDURE — 99213 PR OFFICE/OUTPT VISIT, EST, LEVL III, 20-29 MIN: ICD-10-PCS | Mod: S$GLB,,, | Performed by: PODIATRIST

## 2022-05-11 PROCEDURE — 99499 RISK ADDL DX/OHS AUDIT: ICD-10-PCS | Mod: ,,, | Performed by: PODIATRIST

## 2022-05-11 PROCEDURE — 1101F PT FALLS ASSESS-DOCD LE1/YR: CPT | Mod: CPTII,S$GLB,, | Performed by: PODIATRIST

## 2022-05-11 PROCEDURE — 1159F PR MEDICATION LIST DOCUMENTED IN MEDICAL RECORD: ICD-10-PCS | Mod: CPTII,S$GLB,, | Performed by: PODIATRIST

## 2022-05-11 PROCEDURE — 1160F PR REVIEW ALL MEDS BY PRESCRIBER/CLIN PHARMACIST DOCUMENTED: ICD-10-PCS | Mod: CPTII,S$GLB,, | Performed by: PODIATRIST

## 2022-05-11 PROCEDURE — 99999 PR PBB SHADOW E&M-EST. PATIENT-LVL IV: ICD-10-PCS | Mod: PBBFAC,,, | Performed by: PODIATRIST

## 2022-05-11 PROCEDURE — 1159F MED LIST DOCD IN RCRD: CPT | Mod: CPTII,S$GLB,, | Performed by: PODIATRIST

## 2022-05-11 PROCEDURE — 3288F FALL RISK ASSESSMENT DOCD: CPT | Mod: CPTII,S$GLB,, | Performed by: PODIATRIST

## 2022-05-11 PROCEDURE — 99499 UNLISTED E&M SERVICE: CPT | Mod: ,,, | Performed by: PODIATRIST

## 2022-05-11 PROCEDURE — 1160F RVW MEDS BY RX/DR IN RCRD: CPT | Mod: CPTII,S$GLB,, | Performed by: PODIATRIST

## 2022-05-11 PROCEDURE — 1126F AMNT PAIN NOTED NONE PRSNT: CPT | Mod: CPTII,S$GLB,, | Performed by: PODIATRIST

## 2022-05-11 PROCEDURE — 1126F PR PAIN SEVERITY QUANTIFIED, NO PAIN PRESENT: ICD-10-PCS | Mod: CPTII,S$GLB,, | Performed by: PODIATRIST

## 2022-05-11 PROCEDURE — 99213 OFFICE O/P EST LOW 20 MIN: CPT | Mod: S$GLB,,, | Performed by: PODIATRIST

## 2022-05-11 PROCEDURE — 1101F PR PT FALLS ASSESS DOC 0-1 FALLS W/OUT INJ PAST YR: ICD-10-PCS | Mod: CPTII,S$GLB,, | Performed by: PODIATRIST

## 2022-05-11 NOTE — PATIENT INSTRUCTIONS
..Over the counter pain creams: Voltaren Gel, Biofreeze, Bengay, tiger balm, two old goat, lidocaine gel,  Absorbine Veterinary Liniment Gel Topical Analgesic Sore Muscle and Joint Pain Relief    Recommend lotions: eucerin, eucerin for diabetics, aquaphor, A&D ointment, gold bond for diabetics, sween, Matthews's Bees all purpose baby ointment,  urea 40 with aloe (found on amazon.com)    Shoe recommendations: (try 6pm.com, zappos.com , nordstromrack.[employer], or shoes.com for discounted prices) you can visit DSW shoes in [place]  or [employer] [facility] in the [facility] (there are also several shoe brand outlets in the [facility])    Asics (GT 2000 or gel foundations), new balance stability type shoes (such as the 940 series), saucony (stabil c3),  Hernandez (GTS or Beast or transcend), propet (tennis shoe)    Kelsey Corbett (women) Kierra&Corey (men), clarks, crocs, aerosoles, naturalizers, SAS, ecco, born, elvia storey, rockports (dress shoes)    Vionic, burkenstocks, fitflops, propet (sandals)  Nike comfort thong sandals, crocs, propet (house shoes)    Nail Home remedy:  Vicks Vapor rub or Emuaid to nails for easier manageability    Diabetes: Inspecting Your Feet  Diabetes increases your chances of developing foot problems. So inspect your feet every day. This helps you find small skin irritations before they become serious infections. If you have trouble seeing the bottoms of your feet, use a mirror or ask a family member or friend to help.     Pressure spots on the bottom of the foot are common areas where problems develop.   How to check your feet  Below are tips to help you look for foot problems. Try to check your feet at the same time each day, such as when you get out of bed in the morning:  Check the top of each foot. The tops of toes, back of the heel, and outer edge of the foot can get a lot of rubbing from poor-fitting shoes.  Check the bottom of each foot. Daily wear and tear often leads to problems at pressure  spots.  Check the toes and nails. Fungal infections often occur between toes. Toenail problems can also be a sign of fungal infections or lead to breaks in the skin.  Check your shoes, too. Loose objects inside a shoe can injure the foot. Use your hand to feel inside your shoes for things like goldy, loose stitching, or rough areas that could irritate your skin.  Warning signs  Look for any color changes in the foot. Redness with streaks can signal a severe infection, which needs immediate medical attention. Tell your doctor right away if you have any of these problems:  Swelling, sometimes with color changes, may be a sign of poor blood flow or infection. Symptoms include tenderness and an increase in the size of your foot.  Warm or hot areas on your feet may be signs of infection. A foot that is cold may not be getting enough blood.  Sensations such as burning, tingling, or pins and needles can be signs of a problem. Also check for areas that may be numb.  Hot spots are caused by friction or pressure. Look for hot spots in areas that get a lot of rubbing. Hot spots can turn into blisters, calluses, or sores.  Cracks and sores are caused by dry or irritated skin. They are a sign that the skin is breaking down, which can lead to infection.  Toenail problems to watch for include nails growing into the skin (ingrown toenail) and causing redness or pain. Thick, yellow, or discolored nails can signal a fungal infection.  Drainage and odor can develop from untreated sores and ulcers. Call your doctor right away if you notice white or yellow drainage, bleeding, or unpleasant odor.   © 3860-1879 [employer]. [address] [place] [address]. All rights reserved. This information is not intended as a substitute for professional medical care. Always follow your healthcare professional's instructions.        Step-by-Step:  Inspecting Your Feet (Diabetes)    Date Last Reviewed: 10/1/2016  © 3669-9283  [employer], [employer]. [address] [place] [address], [place] [address]. All rights reserved. This information is not intended as a substitute for professional medical care. Always follow your healthcare professional's instructions.

## 2022-05-15 NOTE — PROGRESS NOTES
Is Subjective:      Patient ID: Erica Leblanc is a 77 y.o. female.    Chief Complaint: Diabetes Mellitus (PCP [place] (NP Hunt 11/01/2021)) and Nail Care    Erica Leblanc is a 77 y.o. femalewho presents to the clinic for evaluation and treatment of high risk feet. Erica Leblanc   has a past medical history of Acute ischemic right PCA stroke (6/6/2021), Acute respiratory failure with hypoxia, Age-related osteoporosis without current pathological fracture (3/8/2021), Anemia of chronic kidney failure, stage 4 (severe) (4/5/2019), Cataracts, bilateral, CHF (congestive heart failure), CKD (chronic kidney disease) stage 3, GFR 30-59 ml/min, CKD (chronic kidney disease) stage 3, GFR 30-59 ml/min, Controlled type 2 diabetes mellitus with proteinuria or albuminuria, Depression, Diabetes with neurologic complications, Diabetic retinopathy of both eyes, Edema, Glaucoma, History of colonic polyps, TIA (transient ischemic attack) (11/2008), Hyperlipidemia LDL goal < 100, Hypertension, Hypothyroidism, Major depressive disorder, single episode, mild (2/17/2016), Mixed incontinence urge and stress, Obesity, Obstructive sleep apnea on CPAP, Osteopenia, Proteinuria, Sickle cell trait, Strabismus, TIA (transient ischemic attack), Trouble in sleeping, Type 2 diabetes mellitus with ophthalmic manifestations, Type 2 diabetes with stage 3 chronic kidney disease GFR 30-59, Type II or unspecified type diabetes mellitus with renal manifestations, uncontrolled(250.42), Uncontrolled type 2 diabetes mellitus with peripheral circulatory disorder (4/5/2019), Urge incontinence (1/11/2016), Urge incontinence, Venous stasis ulcer, and Vitamin D deficiency disease.  She presents to the podiatry clinic for care of  Right anterior leg ulcer.  Onset of the symptoms was several weeks ago. Precipitating event:relates she hit the leg in May.  She relates some tenderness to palpation of the site.  She has not been doing any true wound  care.  Current symptoms include: redness, swelling and large spongy scab. Signs of infection denies nausea, vomiting, fever, chills   Symptoms have been well-controlled. Patient has had prior foot problems. Patients rates pain 3/10 on pain scale.    She also complains of long, thick toenails. This patient has documented high risk feet requiring routine maintenance secondary to diabetes mellitis and those secondary complications of diabetes, as mentioned.       07/28/2021 Patient has been having HH dressing changes.  There have been issues with scheduling and patient relates that are excluding her foot from compression wrap.  She completed abx without adverse effect.  No new pedal complaints.     10/06/2021 Patient relates she evacuated to [place] and was living there for almost one month receiving home health.  She relates wound has healed. No new pedal complaints.     12/10/2021 patient has continued to care for skin and lower limbs as instructed.  He relates wound has remained healed.  No new pedal complaints     2/19/2022 patient has continued to care for skin and lower limbs as instructed.  He relates wound has remained healed.  No new pedal complaints     5/11/22  patient has continued to care for skin and lower limbs as instructed.  She relates wound has remained healed.  No new pedal complaints.  She presents today for routines foot exam.       PCP: Sendy Elaine MD    Date Last Seen by PCP:   Chief Complaint   Patient presents with    Diabetes Mellitus     PCP [place] (ESTRELLA Baeza 11/01/2021)    Nail Care      Hemoglobin A1C   Date Value Ref Range Status   05/07/2022 6.7 (H) 4.0 - 5.6 % Final     Comment:     ADA Screening Guidelines:  5.7-6.4%  Consistent with prediabetes  >or=6.5%  Consistent with diabetes    High levels of fetal hemoglobin interfere with the HbA1C  assay. Heterozygous hemoglobin variants (HbS, HgC, etc)do  not significantly interfere with this assay.   However, presence of multiple  variants may affect accuracy.     11/03/2021 6.5 (H) 4.0 - 5.6 % Final     Comment:     ADA Screening Guidelines:  5.7-6.4%  Consistent with prediabetes  >or=6.5%  Consistent with diabetes    High levels of fetal hemoglobin interfere with the HbA1C  assay. Heterozygous hemoglobin variants (HbS, HgC, etc)do  not significantly interfere with this assay.   However, presence of multiple variants may affect accuracy.     06/06/2021 8.1 (H) 4.0 - 5.6 % Final     Comment:     ADA Screening Guidelines:  5.7-6.4%  Consistent with prediabetes  >or=6.5%  Consistent with diabetes    High levels of fetal hemoglobin interfere with the HbA1C  assay. Heterozygous hemoglobin variants (HbS, HgC, etc)do  not significantly interfere with this assay.   However, presence of multiple variants may affect accuracy.     03/09/2021 8.5 (H) 0.0 - 5.6 % Final     Comment:     [facility]   12/15/2020 7.9 (H) 0.0 - 5.6 % Final     Comment:     [facility]       Patient Active Problem List   Diagnosis    Severe obesity (BMI 35.0-39.9) with comorbidity    Sickle cell trait    Hyperlipidemia LDL goal <100    HUMBERTO on CPAP    Hypothyroidism (acquired)    Hx-TIA (transient ischemic attack)    Vitamin D deficiency disease    Pulmonary hypertension    Type 2 diabetes mellitus with ESRD (end-stage renal disease)    Secondary renal hyperparathyroidism    Incomplete bladder emptying    Postmenopausal atrophic vaginitis    Rectocele    Mixed incontinence urge and stress    Chronic diastolic heart failure    Tortuous aorta    ESRD on hemodialysis    Anemia of chronic disease    Debility    Chronic respiratory failure    Type 2 diabetes mellitus with foot ulcer, with long-term current use of insulin    Stable proliferative diabetic retinopathy associated with type 2 diabetes mellitus    Mild major depression    Heart failure with preserved ejection fraction    Pseudophakia    Chronic kidney disease-mineral and bone disorder     Age-related osteoporosis without current pathological fracture    H/O: stroke     Current Outpatient Medications on File Prior to Visit   Medication Sig Dispense Refill    ACCU-CHEK SOFT DEV LANCETS Kit       aspirin (ECOTRIN) 81 MG EC tablet Take 1 tablet (81 mg total) by mouth once daily. 90 tablet 3    atorvastatin (LIPITOR) 80 MG tablet TAKE 1 TABLET (80 MG TOTAL) BY MOUTH EVERY EVENING. 90 tablet 0    blood sugar diagnostic Strp One test strip use 2 times a day to check blood glucose,  ICD-10: E11.9, compatible with insurance/glucometer 100 each 11    blood-glucose meter kit One glucometer, use to check blood glucose.   ICD-10: E11.9. Dispense machine covered by insurance 1 each 0    cinacalcet (SENSIPAR) 30 MG Tab       docusate sodium (COLACE) 100 MG capsule Take 200 mg by mouth once daily.       doxercalciferoL (HECTOROL) 4 mcg/2 mL injection Inject 4.5 mcg into the vein 3 (three) times a week. At dialysis unit      dulaglutide (TRULICITY) 0.75 mg/0.5 mL pen injector Inject 0.75 mg into the skin every 7 days. 12 pen 3    epoetin arnel (EPOGEN INJ) Inject 1,000 Units as directed every 7 days. At the dialysis unit      LANCETS MISC       lancets Misc One lancets use 2 times a day to check blood glucose, ICD-10: E11.9 100 each 11    lancing device Misc One device, used to check blood glucose, ICD-10: E11.9 1 each 0    levothyroxine (SYNTHROID) 50 MCG tablet Take 1 tablet (50 mcg total) by mouth before breakfast. 90 tablet 3    midodrine (PROAMATINE) 5 MG Tab Take 1 tablet (5 mg total) by mouth 3 (three) times daily. 90 tablet 11    OLENA-NABIL RX 1- mg-mg-mcg Tab Take 1 tablet by mouth once daily.      sevelamer carbonate (RENVELA) 800 mg Tab Take 3 tablets (2,400 mg total) by mouth 3 (three) times daily with meals. 270 tablet 11     No current facility-administered medications on file prior to visit.     Review of patient's allergies indicates:   Allergen Reactions    Ace inhibitors Other  (See Comments)     Other reaction(s): cough     Past Surgical History:   Procedure Laterality Date    AV FISTULA PLACEMENT Left 2019    Procedure: CREATION, AV FISTULA, LEFT UPPER EXTREMITY;  Surgeon: Keron Perez MD;  Location: [facility] OR;  Service: Vascular;  Laterality: Left;    BREAST BIOPSY      breast reduction Bilateral age 31    BREAST SURGERY      CATARACT EXTRACTION Bilateral     cataracts Bilateral      SECTION, LOW TRANSVERSE      x1    CHOLECYSTECTOMY      COLONOSCOPY N/A 2022    Procedure: COLONOSCOPY;  Surgeon: Mahesh Huang MD;  Location: [facility] ENDO (2ND FLR);  Service: Endoscopy;  Laterality: N/A;  fully vaccinated-sm.  RAPID COVID  +cologuard needing ASAP  Dialysis pt T,TH Sat and left UA access  2nd floor - cardiac/dialysis/SOB / LABS / prep ins. emailed - [facility]    EYE SURGERY  2014, 2014    vitrectomy    EYE SURGERY Right 2016    FISTULOGRAM Left 3/6/2020    Procedure: Fistulogram, left upper extremity, with branch ligation;  Surgeon: Keron Perez MD;  [facility];  Service: Vascular;  Laterality: Left;  Time 1.1 Minute  42.10 mGy    FISTULOGRAM Left 2020    Procedure: Fistulogram, left upper extremity, transradial access with possible intervention;  Surgeon: Keron Perez MD;  Location: [address];  Service: Vascular;  Laterality: Left;    FISTULOGRAM Left 2020    Procedure: Fistulogram, left upper extremity, possible intervention;  Surgeon: Keron Perez MD;  Location: [facility] OR;  Service: Vascular;  Laterality: Left;  930 AM START  RN PRE OP  ---COVID NEGATIVE ON  2020. [place]    FISTULOGRAM Left 2022    Procedure: Fistulogram, transradial access;  Surgeon: Keron Perez MD;  Location: [facility] OR [address] [place] [address];  Service: Vascular;  Laterality: Left;  mgy-40.16  gycm-8.3905  contrast-34cc  time-12.4min    HYSTERECTOMY      TAHBSO (patient is unsure if ovaries removed)    OOPHORECTOMY       PERCUTANEOUS TRANSLUMINAL ANGIOPLASTY OF ARTERIOVENOUS FISTULA Left 7/21/2020    Procedure: PTA, AV FISTULA;  Surgeon: Keron Perez MD;  Location: [facility] OR [address] [place] [address];  Service: Vascular;  Laterality: Left;  15.9 minutes of fluro  41.12  mGy  7.9060 Gy cm2  32ml  contrast    PHLEBOGRAPHY Left 7/21/2020    Procedure: CENTRAL VENOGRAM;  Surgeon: Keron Perez MD;  Location: [facility] OR [address] [place] [address];  Service: Vascular;  Laterality: Left;    REFRACTIVE SURGERY      TOTAL REDUCTION MAMMOPLASTY      approx 10 yrs ago    VENOPLASTY  1/21/2022    Procedure: ANGIOPLASTY, VEIN;  Surgeon: Keron Perez MD;  Location: [facility] OR [address] [place] [address];  Service: Vascular;;     Family History   Problem Relation Age of Onset    Leukemia Father     Cataracts Father     Ovarian cancer Sister 35    Stroke Mother     Diabetes Mother     Hypertension Mother     Cataracts Mother     Diabetes Paternal Grandmother     Cataracts Paternal Grandmother     Breast cancer Maternal Aunt 65    No Known Problems Paternal Grandfather     Cataracts Maternal Grandmother     Cataracts Maternal Grandfather     HIV Brother     Achondroplasia Sister     Parkinsonism Maternal Aunt     Esophageal cancer Maternal Uncle         smoker    No Known Problems Paternal Aunt     Cataracts Paternal Uncle     Amblyopia Neg Hx     Blindness Neg Hx     Glaucoma Neg Hx     Macular degeneration Neg Hx     Retinal detachment Neg Hx     Strabismus Neg Hx     Thyroid disease Neg Hx     Colon cancer Neg Hx     Cancer Neg Hx        Review of Systems   Constitutional: Negative for chills and fever.   Cardiovascular: Positive for leg swelling. Negative for chest pain and claudication.   Respiratory: Negative for cough and shortness of breath.    Skin: Positive for dry skin, nail changes and suspicious lesions. Negative for itching and rash.   Musculoskeletal: Positive for arthritis and joint pain. Negative for falls, joint swelling, muscle cramps  "and muscle weakness.   Gastrointestinal: Negative for diarrhea, nausea and vomiting.   Neurological: Positive for numbness and paresthesias. Negative for tremors and weakness.   Psychiatric/Behavioral: Negative for altered mental status and hallucinations.         Objective:       Vitals:    05/11/22 1451   Weight: 99.3 kg (219 lb)   Height: 5' 4" (1.626 m)   PainSc: 0-No pain     Physical Exam  Vitals and nursing note reviewed.   Constitutional:       Appearance: She is not diaphoretic.      Comments: General: Pt. is well-developed, well-nourished, appears stated age, in no acute distress, alert and oriented x 3. No evidence of depression, anxiety, or agitation. Calm, cooperative, and communicative. Appropriate interactions and affect.       Cardiovascular:      Pulses:           Dorsalis pedis pulses are 1+ on the right side and 1+ on the left side.        Posterior tibial pulses are 1+ on the right side and 1+ on the left side.      Comments: There is decreased digital hair.   Musculoskeletal:      Right ankle: Swelling present. No tenderness.      Right Achilles Tendon: No defects.      Left ankle: Swelling present. No tenderness.      Left Achilles Tendon: No defects.      Right foot: Normal range of motion. No tenderness.      Left foot: Normal range of motion. No tenderness.      Comments: Muscle strength is 5/5 in all groups bilaterally.    Decreased stride, station of gait.  apropulsive toe off.  Increased angle and base of gait.    Patient has hammertoes of digits 2-5 bilateral partially reducible without symptom today.    Visible and palpable bunion without pain at dorsomedial 1st metatarsal head right and left.  Hallux abducted right and left partially reducible, tracks laterally without being track bound.  No ecchymosis, erythema, edema, or cardinal signs infection or signs of trauma same foot.    Fat pad atrophy to heels and met heads bilateral     Skin:     General: Skin is warm and dry.      " Coloration: Skin is not pale.      Findings: Wound (See description below) present. No abrasion, lesion (posterior right leg healed) or rash.      Nails: There is no clubbing.      Comments: Xerosis bilaterally     Toenails 1-5 bilaterally are elongated by 2-3 mm, thickened by 2-3 mm, discolored/yellowed, dystrophic, brittle with subungual debris.     Interdigital Spaces clean, dry and without evidence of break in skin integrity   Neurological:      Sensory: No sensory deficit.      Comments: Lilburn-Gloria 5.07 monofilament is intact bilateral feet. Sharp/dull sensation is also intact Bilateral feet.           Psychiatric:         Speech: Speech normal.               12/08/2021            10/06/2021          07/28/2021    Ulcer location:  Anterior right leg  Measurements :  2.2 x 1.4 x 0.2  cm   Signs of infection:  Local edema and erythema as well as tenderness to palpation   Drainage: Sanguinous  Purulence: no  Crepitus/fluctuance: no  Periwound: Reddened  Base: fibrogranular  Depth: subcutaneous tissue  Probe to bone: no              07/08/2021    Ulcer location:  Anterior right leg  Measurements :  2.2 x 1.7 x 0.4  cm   Signs of infection:  Local edema and erythema as well as tenderness to palpation and mild malodor  Drainage: Sero-Sanguinous  Purulence: no  Crepitus/fluctuance: no  Periwound: Reddened  Base: Fibrotic slough  Depth: subcutaneous tissue  Probe to bone: no                Assessment:       Encounter Diagnoses   Name Primary?    Type 2 diabetes mellitus with ESRD (end-stage renal disease) Yes    Hammer toes of both feet     Hallux abducto valgus, left     Hallux abducto valgus, right     Plantar fat pad atrophy     Nail dermatophytosis          Plan:       Erica was seen today for diabetes mellitus and nail care.    Diagnoses and all orders for this visit:    Type 2 diabetes mellitus with ESRD (end-stage renal disease)    Hammer toes of both feet    Hallux abducto valgus, left    Hallux  abducto valgus, right    Plantar fat pad atrophy    Nail dermatophytosis      I counseled the patient on her conditions, their implications and medical management. Education about the diabetic foot, neuropathy, and prevention of limb loss.    Discussed wound healing cycle, skin integrity, ways to care for skin. Counseled patient on the effects of high blood glucose on healing. She verbalizes understanding that it can increase the chances of delayed healing and this prolonged exposure leads to infection or progression of infection which subsequently can result in loss of limb.    Adequate vitamin supplementation, protein intake, and hydration - discussed with patient    Wound has remained healed well with no signs of continued skin breakdown noted.  Skin is still delicate therefore patient must be diligent in avoiding excessive pressure and making sure there is adequate support and padding in shoe gear. Compression stockings and elevation recommended.    Follow-up: 12-15 weeks but should call [facility] immediately if any signs of infection, such as fever, chills, sweats, increased redness or pain.    Short-term goals include maintaining good offloading and minimizing bioburden, promoting granulation and epithelialization to healing.  Long-term goals include keeping the wound healed by good offloading and medical management under the direction of internist.    Shoe inspection. Diabetic Foot Education. Patient reminded of the importance of good nutrition/healthy diet/weight management and blood sugar control to help prevent podiatric complications of diabetes. Patient instructed on proper foot hygeine. Wear comfortable, proper fitting shoes. Wash feet daily. Dry well. After drying, apply moisturizer to feet (no lotion to webspaces). Inspect feet daily for skin breaks, blisters, swelling, or redness. Wear cotton socks (preferably white)  Change socks every day. Do NOT walk barefoot. Do NOT use heating pads or hot water  soaks. We discussed wearing proper shoe gear, daily foot inspections, never walking without protective shoe gear.

## 2022-05-20 ENCOUNTER — CLINICAL SUPPORT (OUTPATIENT)
Dept: CARDIOLOGY | Facility: HOSPITAL | Age: 77
End: 2022-05-20
Attending: INTERNAL MEDICINE
Payer: MEDICARE

## 2022-05-20 ENCOUNTER — OFFICE VISIT (OUTPATIENT)
Dept: CARDIOLOGY | Facility: CLINIC | Age: 77
End: 2022-05-20
Payer: MEDICARE

## 2022-05-20 ENCOUNTER — OFFICE VISIT (OUTPATIENT)
Dept: FAMILY MEDICINE | Facility: CLINIC | Age: 77
End: 2022-05-20
Payer: MEDICARE

## 2022-05-20 VITALS
OXYGEN SATURATION: 98 % | DIASTOLIC BLOOD PRESSURE: 64 MMHG | WEIGHT: 216.69 LBS | SYSTOLIC BLOOD PRESSURE: 120 MMHG | HEART RATE: 88 BPM | RESPIRATION RATE: 15 BRPM | BODY MASS INDEX: 36.99 KG/M2 | HEIGHT: 64 IN

## 2022-05-20 VITALS
DIASTOLIC BLOOD PRESSURE: 56 MMHG | TEMPERATURE: 98 F | BODY MASS INDEX: 37.43 KG/M2 | HEART RATE: 88 BPM | WEIGHT: 219.25 LBS | OXYGEN SATURATION: 94 % | SYSTOLIC BLOOD PRESSURE: 122 MMHG | HEIGHT: 64 IN

## 2022-05-20 DIAGNOSIS — I50.32 CHRONIC DIASTOLIC HEART FAILURE: Chronic | ICD-10-CM

## 2022-05-20 DIAGNOSIS — N18.6 TYPE 2 DIABETES MELLITUS WITH ESRD (END-STAGE RENAL DISEASE): ICD-10-CM

## 2022-05-20 DIAGNOSIS — Z86.73 HX-TIA (TRANSIENT ISCHEMIC ATTACK): ICD-10-CM

## 2022-05-20 DIAGNOSIS — E11.621 TYPE 2 DIABETES MELLITUS WITH FOOT ULCER, WITH LONG-TERM CURRENT USE OF INSULIN: ICD-10-CM

## 2022-05-20 DIAGNOSIS — Z00.00 ROUTINE MEDICAL EXAM: Primary | ICD-10-CM

## 2022-05-20 DIAGNOSIS — N18.6 ESRD ON HEMODIALYSIS: ICD-10-CM

## 2022-05-20 DIAGNOSIS — R33.9 INCOMPLETE BLADDER EMPTYING: ICD-10-CM

## 2022-05-20 DIAGNOSIS — E03.9 HYPOTHYROIDISM (ACQUIRED): ICD-10-CM

## 2022-05-20 DIAGNOSIS — Z99.2 ESRD ON HEMODIALYSIS: ICD-10-CM

## 2022-05-20 DIAGNOSIS — D57.3 SICKLE CELL TRAIT: ICD-10-CM

## 2022-05-20 DIAGNOSIS — I10 HYPERTENSION, UNSPECIFIED TYPE: Primary | ICD-10-CM

## 2022-05-20 DIAGNOSIS — I27.20 PULMONARY HYPERTENSION: ICD-10-CM

## 2022-05-20 DIAGNOSIS — J96.10 CHRONIC RESPIRATORY FAILURE, UNSPECIFIED WHETHER WITH HYPOXIA OR HYPERCAPNIA: ICD-10-CM

## 2022-05-20 DIAGNOSIS — N25.81 SECONDARY RENAL HYPERPARATHYROIDISM: ICD-10-CM

## 2022-05-20 DIAGNOSIS — G47.33 OSA ON CPAP: Chronic | ICD-10-CM

## 2022-05-20 DIAGNOSIS — I50.30 HEART FAILURE WITH PRESERVED EJECTION FRACTION, UNSPECIFIED HF CHRONICITY: ICD-10-CM

## 2022-05-20 DIAGNOSIS — I10 HYPERTENSION, UNSPECIFIED TYPE: ICD-10-CM

## 2022-05-20 DIAGNOSIS — Z79.4 TYPE 2 DIABETES MELLITUS WITH FOOT ULCER, WITH LONG-TERM CURRENT USE OF INSULIN: ICD-10-CM

## 2022-05-20 DIAGNOSIS — R53.81 DEBILITY: ICD-10-CM

## 2022-05-20 DIAGNOSIS — F32.0 MILD MAJOR DEPRESSION: ICD-10-CM

## 2022-05-20 DIAGNOSIS — L97.509 TYPE 2 DIABETES MELLITUS WITH FOOT ULCER, WITH LONG-TERM CURRENT USE OF INSULIN: ICD-10-CM

## 2022-05-20 DIAGNOSIS — M81.0 AGE-RELATED OSTEOPOROSIS WITHOUT CURRENT PATHOLOGICAL FRACTURE: ICD-10-CM

## 2022-05-20 DIAGNOSIS — I50.32 CHRONIC DIASTOLIC HEART FAILURE: ICD-10-CM

## 2022-05-20 DIAGNOSIS — E78.5 HYPERLIPIDEMIA LDL GOAL <100: ICD-10-CM

## 2022-05-20 DIAGNOSIS — I77.1 TORTUOUS AORTA: ICD-10-CM

## 2022-05-20 DIAGNOSIS — E03.9 HYPOTHYROIDISM (ACQUIRED): Chronic | ICD-10-CM

## 2022-05-20 DIAGNOSIS — E11.22 TYPE 2 DIABETES MELLITUS WITH ESRD (END-STAGE RENAL DISEASE): ICD-10-CM

## 2022-05-20 DIAGNOSIS — E11.3553 STABLE PROLIFERATIVE DIABETIC RETINOPATHY OF BOTH EYES ASSOCIATED WITH TYPE 2 DIABETES MELLITUS: ICD-10-CM

## 2022-05-20 DIAGNOSIS — G47.33 OSA ON CPAP: ICD-10-CM

## 2022-05-20 DIAGNOSIS — E66.01 SEVERE OBESITY (BMI 35.0-39.9) WITH COMORBIDITY: ICD-10-CM

## 2022-05-20 DIAGNOSIS — D63.8 ANEMIA OF CHRONIC DISEASE: Chronic | ICD-10-CM

## 2022-05-20 DIAGNOSIS — E55.9 VITAMIN D DEFICIENCY DISEASE: ICD-10-CM

## 2022-05-20 DIAGNOSIS — E78.5 HYPERLIPIDEMIA LDL GOAL <100: Chronic | ICD-10-CM

## 2022-05-20 PROCEDURE — 99397 PR PREVENTIVE VISIT,EST,65 & OVER: ICD-10-PCS | Mod: S$GLB,,, | Performed by: INTERNAL MEDICINE

## 2022-05-20 PROCEDURE — 1159F MED LIST DOCD IN RCRD: CPT | Mod: CPTII,S$GLB,, | Performed by: INTERNAL MEDICINE

## 2022-05-20 PROCEDURE — 1101F PT FALLS ASSESS-DOCD LE1/YR: CPT | Mod: CPTII,S$GLB,, | Performed by: INTERNAL MEDICINE

## 2022-05-20 PROCEDURE — 3288F PR FALLS RISK ASSESSMENT DOCUMENTED: ICD-10-PCS | Mod: CPTII,S$GLB,, | Performed by: INTERNAL MEDICINE

## 2022-05-20 PROCEDURE — 93272 ECG/REVIEW INTERPRET ONLY: CPT | Mod: ,,, | Performed by: STUDENT IN AN ORGANIZED HEALTH CARE EDUCATION/TRAINING PROGRAM

## 2022-05-20 PROCEDURE — 3074F PR MOST RECENT SYSTOLIC BLOOD PRESSURE < 130 MM HG: ICD-10-PCS | Mod: CPTII,S$GLB,, | Performed by: INTERNAL MEDICINE

## 2022-05-20 PROCEDURE — 99999 PR PBB SHADOW E&M-EST. PATIENT-LVL V: CPT | Mod: PBBFAC,,, | Performed by: INTERNAL MEDICINE

## 2022-05-20 PROCEDURE — 99214 OFFICE O/P EST MOD 30 MIN: CPT | Mod: S$GLB,,, | Performed by: INTERNAL MEDICINE

## 2022-05-20 PROCEDURE — 3074F SYST BP LT 130 MM HG: CPT | Mod: CPTII,S$GLB,, | Performed by: INTERNAL MEDICINE

## 2022-05-20 PROCEDURE — 1126F PR PAIN SEVERITY QUANTIFIED, NO PAIN PRESENT: ICD-10-PCS | Mod: CPTII,S$GLB,, | Performed by: INTERNAL MEDICINE

## 2022-05-20 PROCEDURE — 1126F AMNT PAIN NOTED NONE PRSNT: CPT | Mod: CPTII,S$GLB,, | Performed by: INTERNAL MEDICINE

## 2022-05-20 PROCEDURE — 1160F PR REVIEW ALL MEDS BY PRESCRIBER/CLIN PHARMACIST DOCUMENTED: ICD-10-PCS | Mod: CPTII,S$GLB,, | Performed by: INTERNAL MEDICINE

## 2022-05-20 PROCEDURE — 1160F RVW MEDS BY RX/DR IN RCRD: CPT | Mod: CPTII,S$GLB,, | Performed by: INTERNAL MEDICINE

## 2022-05-20 PROCEDURE — 93272 CARDIAC EVENT MONITOR (CUPID ONLY): ICD-10-PCS | Mod: ,,, | Performed by: STUDENT IN AN ORGANIZED HEALTH CARE EDUCATION/TRAINING PROGRAM

## 2022-05-20 PROCEDURE — 93270 REMOTE 30 DAY ECG REV/REPORT: CPT

## 2022-05-20 PROCEDURE — 3078F PR MOST RECENT DIASTOLIC BLOOD PRESSURE < 80 MM HG: ICD-10-PCS | Mod: CPTII,S$GLB,, | Performed by: INTERNAL MEDICINE

## 2022-05-20 PROCEDURE — 93000 EKG 12-LEAD: ICD-10-PCS | Mod: S$GLB,,, | Performed by: INTERNAL MEDICINE

## 2022-05-20 PROCEDURE — 1159F PR MEDICATION LIST DOCUMENTED IN MEDICAL RECORD: ICD-10-PCS | Mod: CPTII,S$GLB,, | Performed by: INTERNAL MEDICINE

## 2022-05-20 PROCEDURE — 99999 PR PBB SHADOW E&M-EST. PATIENT-LVL IV: CPT | Mod: PBBFAC,,, | Performed by: INTERNAL MEDICINE

## 2022-05-20 PROCEDURE — 3078F DIAST BP <80 MM HG: CPT | Mod: CPTII,S$GLB,, | Performed by: INTERNAL MEDICINE

## 2022-05-20 PROCEDURE — 1101F PR PT FALLS ASSESS DOC 0-1 FALLS W/OUT INJ PAST YR: ICD-10-PCS | Mod: CPTII,S$GLB,, | Performed by: INTERNAL MEDICINE

## 2022-05-20 PROCEDURE — 99214 PR OFFICE/OUTPT VISIT, EST, LEVL IV, 30-39 MIN: ICD-10-PCS | Mod: S$GLB,,, | Performed by: INTERNAL MEDICINE

## 2022-05-20 PROCEDURE — 3288F FALL RISK ASSESSMENT DOCD: CPT | Mod: CPTII,S$GLB,, | Performed by: INTERNAL MEDICINE

## 2022-05-20 PROCEDURE — 99999 PR PBB SHADOW E&M-EST. PATIENT-LVL V: ICD-10-PCS | Mod: PBBFAC,,, | Performed by: INTERNAL MEDICINE

## 2022-05-20 PROCEDURE — 93000 ELECTROCARDIOGRAM COMPLETE: CPT | Mod: S$GLB,,, | Performed by: INTERNAL MEDICINE

## 2022-05-20 PROCEDURE — 99499 UNLISTED E&M SERVICE: CPT | Mod: S$GLB,,, | Performed by: INTERNAL MEDICINE

## 2022-05-20 PROCEDURE — 99397 PER PM REEVAL EST PAT 65+ YR: CPT | Mod: S$GLB,,, | Performed by: INTERNAL MEDICINE

## 2022-05-20 PROCEDURE — 99999 PR PBB SHADOW E&M-EST. PATIENT-LVL IV: ICD-10-PCS | Mod: PBBFAC,,, | Performed by: INTERNAL MEDICINE

## 2022-05-20 PROCEDURE — 99499 RISK ADDL DX/OHS AUDIT: ICD-10-PCS | Mod: S$GLB,,, | Performed by: INTERNAL MEDICINE

## 2022-05-20 NOTE — PATIENT INSTRUCTIONS
Below is a list of your specific health maintenance topics (tests you should complete to keep you healthy).    Health Maintenance   Topic Date Due    Eye Exam  05/07/2022    Hemoglobin A1c  11/07/2022    Lipid Panel  05/07/2023    Foot Exam  05/11/2023    DEXA Scan  10/19/2023    TETANUS VACCINE  08/08/2026    Hepatitis C Screening  Completed       If you are due for any testing at this time they will be listed here. Please contact us to schedule them at your earliest convenience.               Diabetic Retinal Eye Exam    Diabetes is the #1 cause of blindness in the [place] - early detection before signs or symptoms develop can prevent debilitating blindness.    Once-a-year screening is recommended for all diabetic patients. This exam can prevent and treat diabetes complications in the eye before developing symptoms. This can be done with a special camera is used to take photographs of the back of your eye without having to dilate them, or you can see an eye doctor for a full dilated exam.    Although you are still overdue for this important screening, due to the COVID-19 pandemic, we recommend scheduling it in the near future.

## 2022-05-20 NOTE — PROGRESS NOTES
CARDIOVASCULAR CONSULTATION    REASON FOR CONSULT:   Erica Leblanc is a 77 y.o. female who presents for establishing care with me..      HISTORY OF PRESENT ILLNESS:     Notes from November 2019:  Patient here for follow-up.  Denies any chest pains at rest on exertion, orthopnea, PND.  Stress test did not show any significant ischemia.        Notes from October 2019:  Patient here for follow-up.  Denies any chest pains at rest on exertion, orthopnea, PND.  Has been started on dialysis since her last visit with me.  States that she feels great after starting dialysis and feels like she has more appetite and a breathing has gotten better.  Walks slowly with the help of a walker.  Denies PND.  States that since the dialysis her orthopnea has gotten much better.      Notes from August 2019:  Patient doing fine.  Denies any chest pains at rest on exertion, orthopnea, PND.  Does have chronic dyspnea on exertion.  States that she feels her pedal edema is going down as she is responding to Bumex better.      Notes from June 2019:  Patient is a very pleasant 74-year-old lady with a past medical history of heart failure with preserved ejection fraction, diabetes, chronic kidney disease, leg wounds, hypertension who is here for follow-up.  She denies any chest pains at rest on exertion, orthopnea, PND.  Denies any claudication like symptoms.  States that she [facility].  Has been doing salt restriction diet.      Notes from May 2022:  Patient here for follow-up after a long hiatus.  In the interim had a PCA stroke.  In 2021. Otherwise has been doing fine.  Denies any chest pains at rest on exertion, orthopnea, PND.  Was taken off her antihypertensives because of hypotension during dialysis.  Currently on midodrine for that.      PAST MEDICAL HISTORY:     Past Medical History:   Diagnosis Date    Acute ischemic right PCA stroke 6/6/2021    Acute respiratory failure with hypoxia      Age-related osteoporosis without current pathological fracture 3/8/2021    Anemia of chronic kidney failure, stage 4 (severe) 2019    Cataracts, bilateral     CHF (congestive heart failure)     CKD (chronic kidney disease) stage 3, GFR 30-59 ml/min     CKD (chronic kidney disease) stage 3, GFR 30-59 ml/min     Controlled type 2 diabetes mellitus with proteinuria or albuminuria     Depression     Diabetes with neurologic complications     Diabetic retinopathy of both eyes     Edema     Glaucoma     History of colonic polyps     Hx-TIA (transient ischemic attack) 2008    Hyperlipidemia LDL goal < 100     Hypertension     Hypothyroidism     Major depressive disorder, single episode, mild 2016    Mixed incontinence urge and stress     Obesity     Obstructive sleep apnea on CPAP     19:  Home CPAP machine broken, per patient & son    Osteopenia     Proteinuria     Sickle cell trait     Strabismus     TIA (transient ischemic attack)     Trouble in sleeping     Type 2 diabetes mellitus with ophthalmic manifestations     Type 2 diabetes with stage 3 chronic kidney disease GFR 30-59     Type II or unspecified type diabetes mellitus with renal manifestations, uncontrolled(250.42)     Uncontrolled type 2 diabetes mellitus with peripheral circulatory disorder 2019    Urge incontinence 2016    Urge incontinence     Venous stasis ulcer     bilateral lower legs    Vitamin D deficiency disease        PAST SURGICAL HISTORY:     Past Surgical History:   Procedure Laterality Date    AV FISTULA PLACEMENT Left 2019    Procedure: CREATION, AV FISTULA, LEFT UPPER EXTREMITY;  Surgeon: Keron Perez MD;  Location: [facility];  Service: Vascular;  Laterality: Left;    BREAST BIOPSY      breast reduction Bilateral age 31    BREAST SURGERY      CATARACT EXTRACTION Bilateral     cataracts Bilateral      SECTION, LOW TRANSVERSE      x1    CHOLECYSTECTOMY       COLONOSCOPY N/A 1/14/2022    Procedure: COLONOSCOPY;  Surgeon: Mahesh Huang MD;  Location: [facility] ENDO (2ND FLR);  Service: Endoscopy;  Laterality: N/A;  fully vaccinated-sm.  RAPID COVID  +cologuard needing ASAP  Dialysis pt T,TH Sat and left UA access  2nd floor - cardiac/dialysis/SOB / LABS / prep ins. emailed - [facility]    EYE SURGERY  1/2014, 6/2014    vitrectomy    EYE SURGERY Right 08/17/2016    FISTULOGRAM Left 3/6/2020    Procedure: Fistulogram, left upper extremity, with branch ligation;  Surgeon: Keron Perez MD;  Location: [facility] OR [address] [place] [address];  Service: Vascular;  Laterality: Left;  Time 1.1 Minute  42.10 mGy    FISTULOGRAM Left 7/21/2020    Procedure: Fistulogram, left upper extremity, transradial access with possible intervention;  Surgeon: Keron Perez MD;  Location: [facility] OR [address] [place] [address];  Service: Vascular;  Laterality: Left;    FISTULOGRAM Left 8/19/2020    Procedure: Fistulogram, left upper extremity, possible intervention;  Surgeon: Keron Perez MD;  Location: [facility];  Service: Vascular;  Laterality: Left;  930 AM START  RN PRE OP  ---COVID NEGATIVE ON  8-. CA    FISTULOGRAM Left 1/21/2022    Procedure: Fistulogram, transradial access;  Surgeon: Keron Perez MD;  Location: [facility] OR [address] [place] [address];  Service: Vascular;  Laterality: Left;  mgy-40.16  gycm-8.3905  contrast-34cc  time-12.4min    HYSTERECTOMY  1986    TAHBSO (patient is unsure if ovaries removed)    OOPHORECTOMY      PERCUTANEOUS TRANSLUMINAL ANGIOPLASTY OF ARTERIOVENOUS FISTULA Left 7/21/2020    Procedure: PTA, AV FISTULA;  Surgeon: Keron Perez MD;  Location: [facility] OR [address] [place] [address];  Service: Vascular;  Laterality: Left;  15.9 minutes of fluro  41.12  mGy  7.9060 Gy cm2  32ml  contrast    PHLEBOGRAPHY Left 7/21/2020    Procedure: CENTRAL VENOGRAM;  Surgeon: Keron Perez MD;  Location: [facility] OR [address] [place] [address];  Service: Vascular;  Laterality: Left;    REFRACTIVE SURGERY      TOTAL REDUCTION  MAMMOPLASTY      approx 10 yrs ago    VENOPLASTY  1/21/2022    Procedure: ANGIOPLASTY, VEIN;  Surgeon: Keron Perez MD;  Location: [facility] OR [address] [place] [address];  Service: Vascular;;       ALLERGIES AND MEDICATION:     Review of patient's allergies indicates:   Allergen Reactions    Ace inhibitors Other (See Comments)     Other reaction(s): cough        Medication List          Accurate as of May 20, 2022  9:47 AM. If you have any questions, ask your nurse or doctor.            CONTINUE taking these medications    ACCU-CHEK SOFT DEV LANCETS Kit  Generic drug: lancing device with lancets     aspirin 81 MG EC tablet  Commonly known as: ECOTRIN  Take 1 tablet (81 mg total) by mouth once daily.     atorvastatin 80 MG tablet  Commonly known as: LIPITOR  TAKE 1 TABLET (80 MG TOTAL) BY MOUTH EVERY EVENING.     blood sugar diagnostic Strp  One test strip use 2 times a day to check blood glucose,  ICD-10: E11.9, compatible with insurance/glucometer     blood-glucose meter kit  One glucometer, use to check blood glucose.   ICD-10: E11.9. Dispense machine covered by insurance     cinacalcet 30 MG Tab  Commonly known as: SENSIPAR     docusate sodium 100 MG capsule  Commonly known as: COLACE     doxercalciferoL 4 mcg/2 mL injection  Commonly known as: HECTOROL     EPOGEN INJ     * LANCETS MISC     * lancets Misc  One lancets use 2 times a day to check blood glucose, ICD-10: E11.9     lancing device Misc  One device, used to check blood glucose, ICD-10: E11.9     levothyroxine 50 MCG tablet  Commonly known as: SYNTHROID  Take 1 tablet (50 mcg total) by mouth before breakfast.     midodrine 5 MG Tab  Commonly known as: PROAMATINE  Take 1 tablet (5 mg total) by mouth 3 (three) times daily.     OLENA-NABIL RX 1- mg-mg-mcg Tab  Generic drug: vit b cmplx 3-fa-vit c-biotin 1- mg-mg-mcg     sevelamer carbonate 800 mg Tab  Commonly known as: RENVELA  Take 3 tablets (2,400 mg total) by mouth 3 (three) times daily with meals.      TRULICITY 0.75 mg/0.5 mL pen injector  Generic drug: dulaglutide  Inject 0.75 mg into the skin every 7 days.         * This list has 2 medication(s) that are the same as other medications prescribed for you. Read the directions carefully, and ask your doctor or other care provider to review them with you.                SOCIAL HISTORY:     Social History     Socioeconomic History    Marital status: Single    Number of children: 5   Occupational History    Occupation:      Employer: [employer]     Comment: part-time   Tobacco Use    Smoking status: Never Smoker    Smokeless tobacco: Never Used   Substance and Sexual Activity    Alcohol use: No     Alcohol/week: 0.0 standard drinks    Drug use: No    Sexual activity: Not Currently     Partners: Male     Birth control/protection: Post-menopausal, Surgical     Social Determinants of Health     Financial Resource Strain: High Risk    Difficulty of Paying Living Expenses: Hard   Food Insecurity: Food Insecurity Present    Worried About Running Out of Food in the Last Year: Sometimes true    Ran Out of Food in the Last Year: Sometimes true   Transportation Needs: Unmet Transportation Needs    Lack of Transportation (Medical): Yes    Lack of Transportation (Non-Medical): Yes   Physical Activity: Insufficiently Active    Days of Exercise per Week: 3 days    Minutes of Exercise per Session: 10 min   Stress: Stress Concern Present    Feeling of Stress : Rather much   Social Connections: Moderately Isolated    Frequency of Communication with Friends and Family: More than three times a week    Frequency of Social Gatherings with Friends and Family: Once a week    Attends Confucianism Services: More than 4 times per year    Active Member of Clubs or Organizations: No    Attends Club or Organization Meetings: Never    Marital Status: Never    Housing Stability: High Risk    Unable to Pay for Housing in the Last Year: Yes     "Number of Places Lived in the Last Year: 1    Unstable Housing in the Last Year: No       FAMILY HISTORY:     Family History   Problem Relation Age of Onset    Leukemia Father     Cataracts Father     Ovarian cancer Sister 35    Stroke Mother     Diabetes Mother     Hypertension Mother     Cataracts Mother     Diabetes Paternal Grandmother     Cataracts Paternal Grandmother     Breast cancer Maternal Aunt 65    No Known Problems Paternal Grandfather     Cataracts Maternal Grandmother     Cataracts Maternal Grandfather     HIV Brother     Achondroplasia Sister     Parkinsonism Maternal Aunt     Esophageal cancer Maternal Uncle         smoker    No Known Problems Paternal Aunt     Cataracts Paternal Uncle     Amblyopia Neg Hx     Blindness Neg Hx     Glaucoma Neg Hx     Macular degeneration Neg Hx     Retinal detachment Neg Hx     Strabismus Neg Hx     Thyroid disease Neg Hx     Colon cancer Neg Hx     Cancer Neg Hx        REVIEW OF SYSTEMS:   Review of Systems   Constitutional: Negative.    HENT: Negative.    Eyes: Negative.    Respiratory: Positive for shortness of breath.    Cardiovascular: Negative.    Gastrointestinal: Negative.    Genitourinary: Negative.    Musculoskeletal: Negative.    Skin: Negative.    Neurological: Negative.    Endo/Heme/Allergies: Negative.        A 10 point review of systems was performed and all the pertinent positives have been mentioned. Rest of review of systems was negative.        PHYSICAL EXAM:     Vitals:    05/20/22 0936   BP: 120/64   Pulse: 88   Resp: 15    Body mass index is 37.2 kg/m².  Weight: 98.3 kg (216 lb 11.4 oz)   Height: 5' 4" (162.6 cm)     Physical Exam  Vitals and nursing note reviewed.   Constitutional:       Appearance: Normal appearance. She is well-developed.   HENT:      Head: Normocephalic and atraumatic.      Right Ear: Hearing normal.      Left Ear: Hearing normal.      Nose: Nose normal.   Eyes:      General: Lids are normal. "      Conjunctiva/sclera: Conjunctivae normal.      Pupils: Pupils are equal, round, and reactive to light.   Cardiovascular:      Rate and Rhythm: Normal rate and regular rhythm.      Pulses: Normal pulses.      Heart sounds: Normal heart sounds.   Pulmonary:      Effort: Pulmonary effort is normal.      Breath sounds: Normal breath sounds.   Abdominal:      Palpations: Abdomen is soft.      Tenderness: There is no abdominal tenderness.   Musculoskeletal:         General: No deformity.      Cervical back: Normal range of motion and neck supple.   Skin:     General: Skin is warm and dry.   Neurological:      Mental Status: She is alert and oriented to person, place, and time.   Psychiatric:         Speech: Speech normal.           DATA:     Laboratory:  CBC:  Recent Labs   Lab 11/03/21  0857 12/13/21  1627 01/14/22  1010 05/07/22  0801   WBC 6.44 7.44  --  6.83   Hemoglobin 12.4 12.4  --  12.4   POC Hematocrit  --   --  39  --    Hematocrit 38.6 38.3  --  39.3   Platelets 235 216  --  248       CHEMISTRIES:  Recent Labs   Lab 11/20/19  1501 11/27/19  0541 03/01/21  0820 05/07/21  0710 06/07/21  0335 06/08/21  0341 11/03/21  0857 12/13/21  1627 01/14/22  0903 05/07/22  0801   Glucose 188 H   < > 160 H   < > 138 H   < > 120 H 194 H  --  98   Sodium 136   < > 140   < > 139   < > 143 140  --  139   Potassium 3.6   < > 4.3   < > 5.5 H   < > 5.5 H 5.9 H 4.2 4.9   BUN 35 H   < > 75 H   < > 55 H   < > 52 H 68 H  --  51 H   Creatinine 4.3 H   < > 7.9 H   < > 8.5 H   < > 7.8 H 10.8 H  --  9.6 H   eGFR if  11 A   < > 5 A   < > 5 A   < > 5.3 A 4 A  --  4.1 A   eGFR if non African American 10 A   < > 5 A   < > 4 A   < > 4.6 A 3 A  --  3.6 A   Calcium 8.5 L   < > 8.2 L   < > 8.8   < > 9.0 8.1 L  --  9.3   Magnesium 2.1  --  2.4  --  2.7 H  --   --   --   --   --     < > = values in this interval not displayed.       CARDIAC BIOMARKERS:  Recent Labs   Lab 06/06/21  1518 06/06/21  2116 06/07/21  0332   Troponin I  0.036 H 0.033 H 0.037 H       COAGS:  Recent Labs   Lab 11/20/19  1501 06/06/21  0939 06/07/21  0335   INR 1.0 1.0 1.0       LIPIDS/LFTS:  Recent Labs   Lab 06/06/21  0939 06/07/21  0335 11/03/21  0857 12/13/21  1627 05/07/22  0801   Cholesterol 143  --  127  --  165   Triglycerides 69  --  53  --  80   HDL 48  --  52  --  48   LDL Cholesterol 81.2  --  64.4  --  101.0   Non-HDL Cholesterol 95  --  75  --  117   AST 12   < > 21 17 17   ALT 11   < > 20 15 11    < > = values in this interval not displayed.       Hemoglobin A1C   Date Value Ref Range Status   05/07/2022 6.7 (H) 4.0 - 5.6 % Final     Comment:     ADA Screening Guidelines:  5.7-6.4%  Consistent with prediabetes  >or=6.5%  Consistent with diabetes    High levels of fetal hemoglobin interfere with the HbA1C  assay. Heterozygous hemoglobin variants (HbS, HgC, etc)do  not significantly interfere with this assay.   However, presence of multiple variants may affect accuracy.     11/03/2021 6.5 (H) 4.0 - 5.6 % Final     Comment:     ADA Screening Guidelines:  5.7-6.4%  Consistent with prediabetes  >or=6.5%  Consistent with diabetes    High levels of fetal hemoglobin interfere with the HbA1C  assay. Heterozygous hemoglobin variants (HbS, HgC, etc)do  not significantly interfere with this assay.   However, presence of multiple variants may affect accuracy.     06/06/2021 8.1 (H) 4.0 - 5.6 % Final     Comment:     ADA Screening Guidelines:  5.7-6.4%  Consistent with prediabetes  >or=6.5%  Consistent with diabetes    High levels of fetal hemoglobin interfere with the HbA1C  assay. Heterozygous hemoglobin variants (HbS, HgC, etc)do  not significantly interfere with this assay.   However, presence of multiple variants may affect accuracy.     03/09/2021 8.5 (H) 0.0 - 5.6 % Final     Comment:     [facility]   12/15/2020 7.9 (H) 0.0 - 5.6 % Final     Comment:     [facility]       TSH  Recent Labs   Lab 03/01/21  0820 06/06/21  0939 11/03/21  0857   TSH 1.751 2.200  2.631       The ASCVD Risk score ([place] Feyecenia BABCOCK Jr., et al., 2013) failed to calculate for the following reasons:    The patient has a prior MI or stroke diagnosis           Cardiovascular Testing:      Echo: 6-17  CONCLUSIONS     1 - Normal left ventricular systolic function (EF 65-70%).     2 - No diagnostic regional wall motion abnormalities.     3 - Concentric remodeling.     4 - Impaired LV relaxation, elevated LAP (grade 2 diastolic dysfunction).     5 - Trivial tricuspid regurgitation.     6 - The estimated PA systolic pressure is greater than 18 mmHg.      NST: 7-17  Impression: NORMAL MYOCARDIAL PERFUSION  1. The perfusion scan is free of evidence for myocardial ischemia or injury.   2. Resting wall motion is physiologic.   3. Visually estimated LV function is normal.   4. The ventricular volumes are normal at rest and stress.   5. The extracardiac distribution of radioactivity is normal.      Carotid ultrasound:  5-18  CONCLUSIONS   There is 0 - 19% right Internal Carotid stenosis.  There is 0 - 19% left Internal Carotid stenosis.     PAM:  10-18  · Elevated bilateral PAM suggesting calcified noncompressible vessels     LDL-53    3-19     Lower extremity arterial ultrasound:  · No hemodynamically significant plaque bilaterally  · PAM consistent with medial calcinosis    Renal artery ultrasound in June 2019:  · This was a technically difficult study. This study was limited due to excessive bowel gas.  · There is insignificant stenosis (0-59%) in the Right Renal Artery.  · Elevated right renal resistive index suggestive of intrinsic kidney disease.  · Right kidney 12.00 cm.  · There is insignificant stenosis (0-59%) in the Left Renal Artery.  · Elevated left renal resistive index suggestive of intrinsic kidney disease.  · Left kidney 11.80 cm.    Ultrasound legs in November 2018:    · No hemodynamically significant plaque bilaterally  · PAM consistent with medial calcinosis    ABIs in October 2018:    · Elevated  bilateral PAM suggesting calcified noncompressible vessels          ASSESSMENT AND PLAN     Patient Active Problem List   Diagnosis    Severe obesity (BMI 35.0-39.9) with comorbidity    Sickle cell trait    Hyperlipidemia LDL goal <100    HUMBERTO on CPAP    Hypothyroidism (acquired)    Hx-TIA (transient ischemic attack)    Vitamin D deficiency disease    Pulmonary hypertension    Type 2 diabetes mellitus with ESRD (end-stage renal disease)    Secondary renal hyperparathyroidism    Incomplete bladder emptying    Postmenopausal atrophic vaginitis    Rectocele    Mixed incontinence urge and stress    Chronic diastolic heart failure    Tortuous aorta    ESRD on hemodialysis    Anemia of chronic disease    Debility    Chronic respiratory failure    Type 2 diabetes mellitus with foot ulcer, with long-term current use of insulin    Stable proliferative diabetic retinopathy associated with type 2 diabetes mellitus    Mild major depression    Heart failure with preserved ejection fraction    Pseudophakia    Chronic kidney disease-mineral and bone disorder    Age-related osteoporosis without current pathological fracture    H/O: stroke       1.   Episodes of hypotension during dialysis.  On midodrine    2.  Heart failure with preserved ejection fraction.  Now on dialysis.  Continue current therapy.    3.  Diabetes:  Being managed by primary    4.  Continue follow-up with Wound care clinic. Wound has healed.     5.  Dyslipidemia:  On Lipitor    6.  Cva: check event monitor to r/o paroxysmal afib    7.  End-stage renal disease.      Thank you very much for involving me in the care of your patient.  Please do not hesitate to contact me if there are any questions.      Murali Li MD, FAC, [facility]  Interventional Cardiologist, [facility].     Follow-up in 3-4 months      This note was dictated with the help of speech recognition software.  There might be un-intended errors and/or  substitutions.

## 2022-05-20 NOTE — PROGRESS NOTES
HISTORY OF PRESENT ILLNESS:  Erica Leblanc is a 77 y.o. female who presents to the clinic today for a routine physical exam. Her last physical exam was approximately 1 years(s) ago.        PAST MEDICAL HISTORY:  Past Medical History:   Diagnosis Date    Acute ischemic right PCA stroke 6/6/2021    Acute respiratory failure with hypoxia     Age-related osteoporosis without current pathological fracture 3/8/2021    Anemia of chronic kidney failure, stage 4 (severe) 4/5/2019    Cataracts, bilateral     CHF (congestive heart failure)     CKD (chronic kidney disease) stage 3, GFR 30-59 ml/min     CKD (chronic kidney disease) stage 3, GFR 30-59 ml/min     Controlled type 2 diabetes mellitus with proteinuria or albuminuria     Depression     Diabetes with neurologic complications     Diabetic retinopathy of both eyes     Edema     Glaucoma     History of colonic polyps     Hx-TIA (transient ischemic attack) 11/2008    Hyperlipidemia LDL goal < 100     Hypertension     Hypothyroidism     Major depressive disorder, single episode, mild 2/17/2016    Mixed incontinence urge and stress     Obesity     Obstructive sleep apnea on CPAP     7/19/19:  Home CPAP machine broken, per patient & son    Osteopenia     Proteinuria     Sickle cell trait     Strabismus     TIA (transient ischemic attack)     Trouble in sleeping     Type 2 diabetes mellitus with ophthalmic manifestations     Type 2 diabetes with stage 3 chronic kidney disease GFR 30-59     Type II or unspecified type diabetes mellitus with renal manifestations, uncontrolled(250.42)     Uncontrolled type 2 diabetes mellitus with peripheral circulatory disorder 4/5/2019    Urge incontinence 1/11/2016    Urge incontinence     Venous stasis ulcer     bilateral lower legs    Vitamin D deficiency disease        PAST SURGICAL HISTORY:  Past Surgical History:   Procedure Laterality Date    AV FISTULA PLACEMENT Left 11/27/2019    Procedure:  CREATION, AV FISTULA, LEFT UPPER EXTREMITY;  Surgeon: Keron Perez MD;  Location: [facility] OR;  Service: Vascular;  Laterality: Left;    BREAST BIOPSY      breast reduction Bilateral age 31    BREAST SURGERY      CATARACT EXTRACTION Bilateral     cataracts Bilateral      SECTION, LOW TRANSVERSE      x1    CHOLECYSTECTOMY      COLONOSCOPY N/A 2022    Procedure: COLONOSCOPY;  Surgeon: Mahesh Huang MD;  Location: [facility] ENDO (2ND FLR);  Service: Endoscopy;  Laterality: N/A;  fully vaccinated-sm.  RAPID COVID  +cologuard needing ASAP  Dialysis pt T,TH Sat and left UA access  2nd floor - cardiac/dialysis/SOB / LABS / prep ins. emailed - [facility]    EYE SURGERY  2014, 2014    vitrectomy    EYE SURGERY Right 2016    FISTULOGRAM Left 3/6/2020    Procedure: Fistulogram, left upper extremity, with branch ligation;  Surgeon: Keron Perez MD;  Location: [facility] OR [address] [place] [address];  Service: Vascular;  Laterality: Left;  Time 1.1 Minute  42.10 mGy    FISTULOGRAM Left 2020    Procedure: Fistulogram, left upper extremity, transradial access with possible intervention;  Surgeon: Keron Perez MD;  Location: [facility] OR [address] [place] [address];  Service: Vascular;  Laterality: Left;    FISTULOGRAM Left 2020    Procedure: Fistulogram, left upper extremity, possible intervention;  Surgeon: Keron Perez MD;  Location: [facility];  Service: Vascular;  Laterality: Left;  930 AM START  RN PRE OP  ---COVID NEGATIVE ON  2020. [place]    FISTULOGRAM Left 2022    Procedure: Fistulogram, transradial access;  Surgeon: Keron Perez MD;  Location: [facility] OR [address] [place] [address];  Service: Vascular;  Laterality: Left;  mgy-40.16  gycm-8.3905  contrast-34cc  time-12.4min    HYSTERECTOMY      TAHBSO (patient is unsure if ovaries removed)    OOPHORECTOMY      PERCUTANEOUS TRANSLUMINAL ANGIOPLASTY OF ARTERIOVENOUS FISTULA Left 2020    Procedure: PTA, AV FISTULA;  Surgeon: Keron Perez MD;   Location: [facility] OR [address] [place] [address];  Service: Vascular;  Laterality: Left;  15.9 minutes of fluro  41.12  mGy  7.9060 Gy cm2  32ml  contrast    PHLEBOGRAPHY Left 7/21/2020    Procedure: CENTRAL VENOGRAM;  Surgeon: Keron Perez MD;  Location: [facility] OR [address] [place] [address];  Service: Vascular;  Laterality: Left;    REFRACTIVE SURGERY      TOTAL REDUCTION MAMMOPLASTY      approx 10 yrs ago    VENOPLASTY  1/21/2022    Procedure: ANGIOPLASTY, VEIN;  Surgeon: Keron Perez MD;  Location: [facility] OR [address] [place] [address];  Service: Vascular;;       SOCIAL HISTORY:  Social History     Socioeconomic History    Marital status: Single    Number of children: 5   Occupational History    Occupation:      Employer: [employer]     Comment: part-time   Tobacco Use    Smoking status: Never Smoker    Smokeless tobacco: Never Used   Substance and Sexual Activity    Alcohol use: No     Alcohol/week: 0.0 standard drinks    Drug use: No    Sexual activity: Not Currently     Partners: Male     Birth control/protection: Post-menopausal, Surgical     Social Determinants of Health     Financial Resource Strain: High Risk    Difficulty of Paying Living Expenses: Hard   Food Insecurity: Food Insecurity Present    Worried About Running Out of Food in the Last Year: Sometimes true    Ran Out of Food in the Last Year: Sometimes true   Transportation Needs: Unmet Transportation Needs    Lack of Transportation (Medical): Yes    Lack of Transportation (Non-Medical): Yes   Physical Activity: Insufficiently Active    Days of Exercise per Week: 3 days    Minutes of Exercise per Session: 10 min   Stress: Stress Concern Present    Feeling of Stress : Rather much   Social Connections: Moderately Isolated    Frequency of Communication with Friends and Family: More than three times a week    Frequency of Social Gatherings with Friends and Family: Once a week    Attends Rastafari Services: More than 4 times per year    Active Member  of Clubs or Organizations: No    Attends Club or Organization Meetings: Never    Marital Status: Never    Housing Stability: High Risk    Unable to Pay for Housing in the Last Year: Yes    Number of Places Lived in the Last Year: 1    Unstable Housing in the Last Year: No       FAMILY HISTORY:  Family History   Problem Relation Age of Onset    Leukemia Father     Cataracts Father     Ovarian cancer Sister 35    Stroke Mother     Diabetes Mother     Hypertension Mother     Cataracts Mother     Diabetes Paternal Grandmother     Cataracts Paternal Grandmother     Breast cancer Maternal Aunt 65    No Known Problems Paternal Grandfather     Cataracts Maternal Grandmother     Cataracts Maternal Grandfather     HIV Brother     Achondroplasia Sister     Parkinsonism Maternal Aunt     Esophageal cancer Maternal Uncle         smoker    No Known Problems Paternal Aunt     Cataracts Paternal Uncle     Amblyopia Neg Hx     Blindness Neg Hx     Glaucoma Neg Hx     Macular degeneration Neg Hx     Retinal detachment Neg Hx     Strabismus Neg Hx     Thyroid disease Neg Hx     Colon cancer Neg Hx     Cancer Neg Hx        ALLERGIES AND MEDICATIONS: updated and reviewed.  Review of patient's allergies indicates:   Allergen Reactions    Ace inhibitors Other (See Comments)     Other reaction(s): cough     Medication List with Changes/Refills   Current Medications    ACCU-CHEK SOFT DEV LANCETS KIT        ASPIRIN (ECOTRIN) 81 MG EC TABLET    Take 1 tablet (81 mg total) by mouth once daily.    ATORVASTATIN (LIPITOR) 80 MG TABLET    TAKE 1 TABLET (80 MG TOTAL) BY MOUTH EVERY EVENING.    BLOOD SUGAR DIAGNOSTIC STRP    One test strip use 2 times a day to check blood glucose,  ICD-10: E11.9, compatible with insurance/glucometer    BLOOD-GLUCOSE METER KIT    One glucometer, use to check blood glucose.   ICD-10: E11.9. Dispense machine covered by insurance    CINACALCET (SENSIPAR) 30 MG TAB        DOCUSATE  SODIUM (COLACE) 100 MG CAPSULE    Take 200 mg by mouth once daily.     DOXERCALCIFEROL (HECTOROL) 4 MCG/2 ML INJECTION    Inject 4.5 mcg into the vein 3 (three) times a week. At dialysis unit    DULAGLUTIDE (TRULICITY) 0.75 MG/0.5 ML PEN INJECTOR    Inject 0.75 mg into the skin every 7 days.    EPOETIN BE (EPOGEN INJ)    Inject 1,000 Units as directed every 7 days. At the dialysis unit    LANCETS MISC        LANCETS MISC    One lancets use 2 times a day to check blood glucose, ICD-10: E11.9    LANCING DEVICE MISC    One device, used to check blood glucose, ICD-10: E11.9    LEVOTHYROXINE (SYNTHROID) 50 MCG TABLET    Take 1 tablet (50 mcg total) by mouth before breakfast.    MIDODRINE (PROAMATINE) 5 MG TAB    Take 1 tablet (5 mg total) by mouth 3 (three) times daily.    OLENA-NABIL RX 1- MG-MG-MCG TAB    Take 1 tablet by mouth once daily.    SEVELAMER CARBONATE (RENVELA) 800 MG TAB    Take 3 tablets (2,400 mg total) by mouth 3 (three) times daily with meals.          CARE TEAM:  Patient Care Team:  Sendy Elaine MD as PCP - General (Internal Medicine)  Brittanie Orellana MD (Cardiology)  Nicole Gray DPM as Consulting Physician (Podiatry)  Surinder Joseph MD as Consulting Physician (Nephrology)  Katia Wong MD as Consulting Physician (Urology)  Coleen Gillis MD as Consulting Physician (Obstetrics)  LILLIANA Coello MD as Consulting Physician (Ophthalmology)  Yolis Rossi MD as Consulting Physician (Endocrinology)  Gianna Hinson LPN as Licensed Practical Nurse  Keron Perez MD as Consulting Physician (Vascular Surgery)           SCREENING HISTORY:  Health Maintenance       Date Due Completion Date    COVID-19 Vaccine (4 - Booster for Pfizer series) 02/15/2022 10/15/2021    Eye Exam 05/07/2022 5/7/2021    Override on 1/3/2011: Done    Hemoglobin A1c 11/07/2022 5/7/2022    Lipid Panel 05/07/2023 5/7/2022    Foot Exam 05/11/2023 5/11/2022    Override on 2/9/2022:  "Done (podiatry)    Override on 3/1/2021: Done (Dr. Nicole Gray ( Podiatry ))    Override on 2/3/2020: Done    Override on 10/15/2019: Done    Override on 8/23/2018: Done    Override on 10/10/2016: Done    DEXA Scan 10/19/2023 10/19/2020    Override on 8/7/2008: Done    TETANUS VACCINE 08/08/2026 8/8/2016            REVIEW OF SYSTEMS:   The patient reports: good dietary habits.  The patient reports : that they are unable to exercise regularly because  of orthopaedic limitations.  Review of Systems   Constitutional: Negative for chills and fever.   HENT: Negative for congestion.    Respiratory: Negative for shortness of breath.    Cardiovascular: Negative for chest pain.   Gastrointestinal: Negative for abdominal pain.   Genitourinary: Negative for dysuria.   Musculoskeletal: Positive for arthralgias and gait problem.   Neurological: Positive for weakness. Negative for tremors and headaches.   Psychiatric/Behavioral: Positive for dysphoric mood (- due to isolation because she is not able to work anymore).      ROS (Optional)-: no pelvic pain  Breast ROS (Optional)-: negative for breast lumps/discharge          NewVitals  Physical Examination:   Vitals:    05/20/22 1333   BP: (!) 122/56   Pulse: 88   Temp: 98.1 °F (36.7 °C)     Weight: 99.5 kg (219 lb 4 oz)   Height: 5' 4" (162.6 cm)   Body mass index is 37.63 kg/m².      Patient did not require to have a chaperone present during the exam today.    General appearance - alert, well appearing, and in no distress, obese, exam limited as patient did not get onto the examination table  Psychiatric - alert, oriented to person, place, and time, normal behavior, speech, dress, motor activity and thought process, mildly depressed mood  Eyes - pupils equal and reactive, extraocular eye movements intact, sclera anicteric  Mouth - not examined; patient wearing mask due to Covid 19 pandemic  Neck - supple, no significant adenopathy, carotids upstroke normal bilaterally, no " bruits  Lymphatics - no palpable cervical lymphadenopathy  Chest - clear to auscultation, no wheezes, rales or rhonchi, symmetric air entry  Heart - normal rate and regular rhythm, no gallops noted  Neurological - alert, normal speech, no focal findings, cranial nerves II through XII intact  Musculoskeletal - patient noted to have Moderate osteoarthritic changes to both knee joints. No joint effusions noted., no muscular tenderness noted, patient ambulated with a walker  Extremities - no pedal edema noted; A-V graft in left forearm  Skin - normal coloration, no suspicious skin lesions      Labs:  Lab Results   Component Value Date    HGBA1C 6.7 (H) 05/07/2022    HGBA1C 6.5 (H) 11/03/2021    HGBA1C 8.1 (H) 06/06/2021      Lab Results   Component Value Date    CHOL 165 05/07/2022    CHOL 127 11/03/2021    CHOL 143 06/06/2021     Lab Results   Component Value Date    LDLCALC 101.0 05/07/2022    LDLCALC 64.4 11/03/2021    LDLCALC 81.2 06/06/2021         ASSESSMENT AND PLAN:  1. Routine medical exam  Counseled on age appropriate medical preventative services including age appropriate cancer screenings, age appropriate eye and dental exams, over all nutritional health, need for a consistent exercise regimen, and an over all push towards maintaining a vigorous and active lifestyle.  Counseled on age appropriate vaccines and discussed upcoming health care needs based on age/gender. Discussed good sleep hygiene and stress management.    2. Type 2 diabetes mellitus with foot ulcer, with long-term current use of insulin/3. Type 2 diabetes mellitus with ESRD (end-stage renal disease)/4. Stable proliferative diabetic retinopathy of both eyes associated with type 2 diabetes mellitus  Lab Results   Component Value Date    LABA1C 7.6 06/15/2021    HGBA1C 6.7 (H) 05/07/2022     Diabetes is under good control at this time for age and comorbid conditions.   We discussed diabetic diet and regular exercise.   We discussed home blood  sugar monitoring, if appropriate - the patient should test twice daily and as needed.   Continue current medication regimen. Patient is followed by endocrinology.  Diabetic complications addressed: None addressed today.  Patient was counseled on the need for yearly eye exam to screen for/monitor diabetic retinopathy and yearly diabetic foot exam.    5. Chronic diastolic heart failure  The current medical regimen is effective;  continue present plan and medications.   Followed by: Cardiology.     6. Hyperlipidemia LDL goal <100  Lab Results   Component Value Date    LDLCALC 101.0 05/07/2022     We discussed low fat diet and regular exercise.The current medical regimen is effective;  continue present plan and medications.     7. Tortuous aorta  Patient with tortuous/ectatic Aorta.  Stable/asymptomatic. Currently stable on lipid lowering medication and blood pressure monitoring.     8. ESRD on hemodialysis  The current medical regimen is effective;  continue present plan and medications.   Followed by: Nephrology.     9. Chronic respiratory failure, unspecified whether with hypoxia or hypercapnia/10. Pulmonary hypertension  Stable. Observe.    11. Anemia of chronic disease  Stable. Observe.    12. Sickle cell trait  Stable. Asymptomatic. Observe.    13. Hypothyroidism (acquired)  Patient is clinically euthyroid. Continue current regimen.    14. Secondary renal hyperparathyroidism  Stable. Asymptomatic. Observe.    15. Age-related osteoporosis without current pathological fracture  We discussed adequate calcium and vitamin D supplementation. We discussed fall precautions. She is up to date on her BMD. Defer treatment to endocrinology.    16. HUMBERTO on CPAP  CPAP compliance: yes. The patient reports that they continue to benefit from regular use of their CPAP machine..  We discussed the potential ramifications of untreated sleep apnea, which could include daytime sleepiness, hypertension, heart disease including CHF, sudden  death while sleeping and/or stroke. The patient was advised to abstain from driving should they feel sleepy or drowsy.  We discussed potential treatment options, which could include weight loss, body positioning, continuous positive airway pressure (CPAP), or referral for surgical consideration.     17. Mild major depression  Stable. Overall doing ok. No need for Rx treatment at this time.    18. Severe obesity (BMI 35.0-39.9) with comorbidity  BMI Readings from Last 3 Encounters:   05/20/22 37.63 kg/m²   05/20/22 37.20 kg/m²   05/11/22 37.59 kg/m²     The patient is asked to make an attempt to improve diet and exercise patterns to aid in medical management of this problem.          No orders of the defined types were placed in this encounter.     Follow up in about 6 months (around 11/20/2022), or if symptoms worsen or fail to improve, for follow up chronic medical conditions.. or sooner as needed.

## 2022-05-27 ENCOUNTER — IMMUNIZATION (OUTPATIENT)
Dept: OBSTETRICS AND GYNECOLOGY | Facility: CLINIC | Age: 77
End: 2022-05-27
Payer: MEDICARE

## 2022-05-27 DIAGNOSIS — Z23 NEED FOR VACCINATION: Primary | ICD-10-CM

## 2022-05-27 PROCEDURE — 91305 COVID-19, MRNA, LNP-S, PF, 30 MCG/0.3 ML DOSE VACCINE (PFIZER): CPT | Mod: PBBFAC | Performed by: FAMILY MEDICINE

## 2022-06-03 ENCOUNTER — PES CALL (OUTPATIENT)
Dept: ADMINISTRATIVE | Facility: CLINIC | Age: 77
End: 2022-06-03
Payer: MEDICARE

## 2022-06-10 NOTE — ASSESSMENT & PLAN NOTE
The patient's H/H is stable and consistent with previous laboratory measurements, and the patient exhibits no signs or symptoms of acute bleeding; there is no indication for transfusion.  Will continue to monitor.   Initiate Treatment: TAC cream bid Plan: Patient c/o of itch\\nPatient can not afford Tretinoin it was over $200 and she doesn’t know that she wants to use a cream daily\\nDiscussed with patient Solaraze gel, Efudex cream and thomas light \\nPatient would like to proceed with thomas light Detail Level: Zone

## 2022-06-14 LAB
CHOLESTEROL, TOTAL: 155 (ref 0–199)
HBA1C MFR BLD: 6.6 % (ref 0–5.6)
HDLC SERPL-MCNC: 51 MG/DL (ref 40–60)
LDLC SERPL CALC-MCNC: 86 MG/DL (ref 0–99)
TRIGL SERPL-MCNC: 91 MG/DL (ref 0–149)
VLDL CHOLESTEROL: 18 MG/DL (ref 0–29)

## 2022-06-17 NOTE — TELEPHONE ENCOUNTER
----- Message from Darian Dean sent at 6/17/2022  3:11 PM CDT -----  Type: RX Refill Request    Who Called:Self    Have you contacted your pharmacy: yes    Refill or New Rx:new     RX Name and Strength:  midodrine (PROAMATINE) 5 MG Tab    Preferred Pharmacy with phone number:  [facility] (Now [facility]) - [facility] [address]   Phone:  [phone]  Fax:  [phone]          Local or Mail Order: mail    Ordering Provider: Dr. Li    Would the patient rather a call back or a response via My [facility]?call back    Best Call Back Number [phone] (home) [phone] (work)

## 2022-06-19 RX ORDER — MIDODRINE HYDROCHLORIDE 5 MG/1
5 TABLET ORAL 3 TIMES DAILY
Qty: 90 TABLET | Refills: 11 | Status: SHIPPED | OUTPATIENT
Start: 2022-06-19 | End: 2023-08-25 | Stop reason: SDUPTHER

## 2022-06-20 ENCOUNTER — HOSPITAL ENCOUNTER (OUTPATIENT)
Dept: CARDIOLOGY | Facility: HOSPITAL | Age: 77
Discharge: HOME | End: 2022-06-20
Attending: SURGERY
Payer: MEDICARE

## 2022-06-20 ENCOUNTER — OFFICE VISIT (OUTPATIENT)
Dept: VASCULAR SURGERY | Facility: CLINIC | Age: 77
End: 2022-06-20
Payer: MEDICARE

## 2022-06-20 VITALS — SYSTOLIC BLOOD PRESSURE: 116 MMHG | WEIGHT: 222 LBS | DIASTOLIC BLOOD PRESSURE: 60 MMHG | BODY MASS INDEX: 38.11 KG/M2

## 2022-06-20 DIAGNOSIS — Z99.2 ESRD ON HEMODIALYSIS: ICD-10-CM

## 2022-06-20 DIAGNOSIS — Z99.2 ESRD ON HEMODIALYSIS: Primary | ICD-10-CM

## 2022-06-20 DIAGNOSIS — N18.6 ESRD ON HEMODIALYSIS: Primary | ICD-10-CM

## 2022-06-20 DIAGNOSIS — N18.6 ESRD ON HEMODIALYSIS: ICD-10-CM

## 2022-06-20 PROCEDURE — 1159F MED LIST DOCD IN RCRD: CPT | Mod: CPTII,S$GLB,, | Performed by: SURGERY

## 2022-06-20 PROCEDURE — 1101F PT FALLS ASSESS-DOCD LE1/YR: CPT | Mod: CPTII,S$GLB,, | Performed by: SURGERY

## 2022-06-20 PROCEDURE — 1101F PR PT FALLS ASSESS DOC 0-1 FALLS W/OUT INJ PAST YR: ICD-10-PCS | Mod: CPTII,S$GLB,, | Performed by: SURGERY

## 2022-06-20 PROCEDURE — 3078F PR MOST RECENT DIASTOLIC BLOOD PRESSURE < 80 MM HG: ICD-10-PCS | Mod: CPTII,S$GLB,, | Performed by: SURGERY

## 2022-06-20 PROCEDURE — 3078F DIAST BP <80 MM HG: CPT | Mod: CPTII,S$GLB,, | Performed by: SURGERY

## 2022-06-20 PROCEDURE — 1126F AMNT PAIN NOTED NONE PRSNT: CPT | Mod: CPTII,S$GLB,, | Performed by: SURGERY

## 2022-06-20 PROCEDURE — 93990 DOPPLER FLOW TESTING: CPT | Mod: 26,,, | Performed by: SURGERY

## 2022-06-20 PROCEDURE — 3288F PR FALLS RISK ASSESSMENT DOCUMENTED: ICD-10-PCS | Mod: CPTII,S$GLB,, | Performed by: SURGERY

## 2022-06-20 PROCEDURE — 99999 PR PBB SHADOW E&M-EST. PATIENT-LVL III: CPT | Mod: PBBFAC,,, | Performed by: SURGERY

## 2022-06-20 PROCEDURE — 1126F PR PAIN SEVERITY QUANTIFIED, NO PAIN PRESENT: ICD-10-PCS | Mod: CPTII,S$GLB,, | Performed by: SURGERY

## 2022-06-20 PROCEDURE — 93990 DOPPLER FLOW TESTING: CPT | Mod: TC

## 2022-06-20 PROCEDURE — 99999 PR PBB SHADOW E&M-EST. PATIENT-LVL III: ICD-10-PCS | Mod: PBBFAC,,, | Performed by: SURGERY

## 2022-06-20 PROCEDURE — 3074F PR MOST RECENT SYSTOLIC BLOOD PRESSURE < 130 MM HG: ICD-10-PCS | Mod: CPTII,S$GLB,, | Performed by: SURGERY

## 2022-06-20 PROCEDURE — 99214 OFFICE O/P EST MOD 30 MIN: CPT | Mod: S$GLB,,, | Performed by: SURGERY

## 2022-06-20 PROCEDURE — 1159F PR MEDICATION LIST DOCUMENTED IN MEDICAL RECORD: ICD-10-PCS | Mod: CPTII,S$GLB,, | Performed by: SURGERY

## 2022-06-20 PROCEDURE — 3288F FALL RISK ASSESSMENT DOCD: CPT | Mod: CPTII,S$GLB,, | Performed by: SURGERY

## 2022-06-20 PROCEDURE — 93990 CV US HEMODIALYSIS ACCESS (CUPID ONLY): ICD-10-PCS | Mod: 26,,, | Performed by: SURGERY

## 2022-06-20 PROCEDURE — 1160F PR REVIEW ALL MEDS BY PRESCRIBER/CLIN PHARMACIST DOCUMENTED: ICD-10-PCS | Mod: CPTII,S$GLB,, | Performed by: SURGERY

## 2022-06-20 PROCEDURE — 99214 PR OFFICE/OUTPT VISIT, EST, LEVL IV, 30-39 MIN: ICD-10-PCS | Mod: S$GLB,,, | Performed by: SURGERY

## 2022-06-20 PROCEDURE — 1160F RVW MEDS BY RX/DR IN RCRD: CPT | Mod: CPTII,S$GLB,, | Performed by: SURGERY

## 2022-06-20 PROCEDURE — 3074F SYST BP LT 130 MM HG: CPT | Mod: CPTII,S$GLB,, | Performed by: SURGERY

## 2022-06-20 NOTE — PROGRESS NOTES
Keron Perez MD RPVI                       [facility]                         06/20/2022    HPI:  Erica Moulton is a 77 y.o. female with   Patient Active Problem List   Diagnosis    Severe obesity (BMI 35.0-39.9) with comorbidity    Sickle cell trait    Hyperlipidemia LDL goal <100    HUMBERTO on CPAP    Hypothyroidism (acquired)    Hx-TIA (transient ischemic attack)    Vitamin D deficiency disease    Pulmonary hypertension    Type 2 diabetes mellitus with ESRD (end-stage renal disease)    Secondary renal hyperparathyroidism    Incomplete bladder emptying    Postmenopausal atrophic vaginitis    Rectocele    Mixed incontinence urge and stress    Chronic diastolic heart failure    Tortuous aorta    ESRD on hemodialysis    Anemia of chronic disease    Debility    Chronic respiratory failure    Type 2 diabetes mellitus with foot ulcer, with long-term current use of insulin    Stable proliferative diabetic retinopathy associated with type 2 diabetes mellitus    Mild major depression    Heart failure with preserved ejection fraction    Pseudophakia    Chronic kidney disease-mineral and bone disorder    Age-related osteoporosis without current pathological fracture    H/O: stroke    being managed by PCP and specialists who is here today for evaluation of long term HD access.  S/p R IJ tunneled HD catheter, HD without issues.  Pt is R handed.  No complaints today.  Does endorse orthopnea, no chest pain.  Unable to climb 1 flight of stairs on own.    no MI  no Stroke  Tobacco use: denies    1/20/20: No new issues.    3/2020: Dialysis without issues.    4/6/20:  S/p LUE fistulogram, central venogram, ligation of medial side branch cephalic vein, ligation of lateral side branch cephalic vein.  No issues with HD for several weeks since procedure.    7/6/20:  No issues with HD.    8/3/20: s/p L proximal fistula angioplasty 7/21/20.  No issues with HD.    8/31/20:  S/p  8/19/29 Balloon angioplasty of the proximal LUE AVF with a 6x60 Angiosculpt and Stellerex balloon.      10/2020:  No issues with HD    1/2021:  No issues with HD    4/2021:  No new problems.    8/2021:  No issues with HD. C/o fatigue after HD.  Has not seen cardiology recently.    3/2022:  No issues with HD    6/2022:  Doing well, no issues with HD    Past Medical History:   Diagnosis Date    Acute ischemic right PCA stroke 6/6/2021    Acute respiratory failure with hypoxia     Age-related osteoporosis without current pathological fracture 3/8/2021    Anemia of chronic kidney failure, stage 4 (severe) 4/5/2019    Cataracts, bilateral     CHF (congestive heart failure)     CKD (chronic kidney disease) stage 3, GFR 30-59 ml/min     CKD (chronic kidney disease) stage 3, GFR 30-59 ml/min     Controlled type 2 diabetes mellitus with proteinuria or albuminuria     Depression     Diabetes with neurologic complications     Diabetic retinopathy of both eyes     Edema     Glaucoma     History of colonic polyps     Hx-TIA (transient ischemic attack) 11/2008    Hyperlipidemia LDL goal < 100     Hypertension     Hypothyroidism     Major depressive disorder, single episode, mild 2/17/2016    Mixed incontinence urge and stress     Obesity     Obstructive sleep apnea on CPAP     7/19/19:  Home CPAP machine broken, per patient & son    Osteopenia     Proteinuria     Sickle cell trait     Strabismus     TIA (transient ischemic attack)     Trouble in sleeping     Type 2 diabetes mellitus with ophthalmic manifestations     Type 2 diabetes with stage 3 chronic kidney disease GFR 30-59     Type II or unspecified type diabetes mellitus with renal manifestations, uncontrolled(250.42)     Uncontrolled type 2 diabetes mellitus with peripheral circulatory disorder 4/5/2019    Urge incontinence 1/11/2016    Urge incontinence     Venous stasis ulcer     bilateral lower legs    Vitamin D deficiency disease       Past Surgical History:   Procedure Laterality Date    AV FISTULA PLACEMENT Left 2019    Procedure: CREATION, AV FISTULA, LEFT UPPER EXTREMITY;  Surgeon: Keron Perez MD;  Location: [facility] OR;  Service: Vascular;  Laterality: Left;    BREAST BIOPSY      breast reduction Bilateral age 31    BREAST SURGERY      CATARACT EXTRACTION Bilateral     cataracts Bilateral      SECTION, LOW TRANSVERSE      x1    CHOLECYSTECTOMY      COLONOSCOPY N/A 2022    Procedure: COLONOSCOPY;  Surgeon: Mahesh Huang MD;  Location: [facility] ENDO (2ND FLR);  Service: Endoscopy;  Laterality: N/A;  fully vaccinated-sm.  RAPID COVID  +cologuard needing ASAP  Dialysis pt T,TH Sat and left UA access  2nd floor - cardiac/dialysis/SOB / LABS / prep ins. emailed - [facility]    EYE SURGERY  2014, 2014    vitrectomy    EYE SURGERY Right 2016    FISTULOGRAM Left 3/6/2020    Procedure: Fistulogram, left upper extremity, with branch ligation;  Surgeon: Keron Perez MD;  [facility];  Service: Vascular;  Laterality: Left;  Time 1.1 Minute  42.10 mGy    FISTULOGRAM Left 2020    Procedure: Fistulogram, left upper extremity, transradial access with possible intervention;  Surgeon: Keron Perez MD;  [facility];  Service: Vascular;  Laterality: Left;    FISTULOGRAM Left 2020    Procedure: Fistulogram, left upper extremity, possible intervention;  Surgeon: Keron Perez MD;  Location: [facility] OR;  Service: Vascular;  Laterality: Left;  930 AM START  RN PRE OP  ---COVID NEGATIVE ON  2020. [place]    FISTULOGRAM Left 2022    Procedure: Fistulogram, transradial access;  Surgeon: Keron Perez MD;  [facility];  Service: Vascular;  Laterality: Left;  mgy-40.16  gycm-8.3905  contrast-34cc  time-12.4min    HYSTERECTOMY      TAHBSO (patient is unsure if ovaries removed)    OOPHORECTOMY      PERCUTANEOUS TRANSLUMINAL ANGIOPLASTY  OF ARTERIOVENOUS FISTULA Left 7/21/2020    Procedure: PTA, AV FISTULA;  Surgeon: Keron Perez MD;  [facility];  Service: Vascular;  Laterality: Left;  15.9 minutes of fluro  41.12  mGy  7.9060 Gy cm2  32ml  contrast    PHLEBOGRAPHY Left 7/21/2020    Procedure: CENTRAL VENOGRAM;  Surgeon: Keron Perez MD;  [facility];  Service: Vascular;  Laterality: Left;    REFRACTIVE SURGERY      TOTAL REDUCTION MAMMOPLASTY      approx 10 yrs ago    VENOPLASTY  1/21/2022    Procedure: ANGIOPLASTY, VEIN;  Surgeon: Keron Perez MD;  [facility];  Service: Vascular;;     Family History   Problem Relation Age of Onset    Leukemia Father     Cataracts Father     Ovarian cancer Sister 35    Stroke Mother     Diabetes Mother     Hypertension Mother     Cataracts Mother     Diabetes Paternal Grandmother     Cataracts Paternal Grandmother     Breast cancer Maternal Aunt 65    No Known Problems Paternal Grandfather     Cataracts Maternal Grandmother     Cataracts Maternal Grandfather     HIV Brother     Achondroplasia Sister     Parkinsonism Maternal Aunt     Esophageal cancer Maternal Uncle         smoker    No Known Problems Paternal Aunt     Cataracts Paternal Uncle     Amblyopia Neg Hx     Blindness Neg Hx     Glaucoma Neg Hx     Macular degeneration Neg Hx     Retinal detachment Neg Hx     Strabismus Neg Hx     Thyroid disease Neg Hx     Colon cancer Neg Hx     Cancer Neg Hx      Social History     Socioeconomic History    Marital status: Single    Number of children: 5   Occupational History    Occupation:      Employer: [employer]     Comment: part-time   Tobacco Use    Smoking status: Never Smoker    Smokeless tobacco: Never Used   Substance and Sexual Activity    Alcohol use: No     Alcohol/week: 0.0 standard drinks    Drug use: No    Sexual activity: Not Currently     Partners: Male     Birth  control/protection: Post-menopausal, Surgical     Social Determinants of Health     Financial Resource Strain: High Risk    Difficulty of Paying Living Expenses: Hard   Food Insecurity: Food Insecurity Present    Worried About Running Out of Food in the Last Year: Sometimes true    Ran Out of Food in the Last Year: Sometimes true   Transportation Needs: Unmet Transportation Needs    Lack of Transportation (Medical): Yes    Lack of Transportation (Non-Medical): Yes   Physical Activity: Insufficiently Active    Days of Exercise per Week: 3 days    Minutes of Exercise per Session: 10 min   Stress: Stress Concern Present    Feeling of Stress : Rather much   Social Connections: Moderately Isolated    Frequency of Communication with Friends and Family: More than three times a week    Frequency of Social Gatherings with Friends and Family: Once a week    Attends Gnosticist Services: More than 4 times per year    Active Member of Clubs or Organizations: No    Attends Club or Organization Meetings: Never    Marital Status: Never    Housing Stability: High Risk    Unable to Pay for Housing in the Last Year: Yes    Number of Places Lived in the Last Year: 1    Unstable Housing in the Last Year: No       Current Outpatient Medications:     ACCU-CHEK SOFT DEV LANCETS Kit, , Disp: , Rfl:     atorvastatin (LIPITOR) 80 MG tablet, TAKE 1 TABLET (80 MG TOTAL) BY MOUTH EVERY EVENING., Disp: 90 tablet, Rfl: 0    blood sugar diagnostic Strp, One test strip use 2 times a day to check blood glucose,  ICD-10: E11.9, compatible with insurance/glucometer, Disp: 100 each, Rfl: 11    blood-glucose meter kit, One glucometer, use to check blood glucose.   ICD-10: E11.9. Dispense machine covered by insurance, Disp: 1 each, Rfl: 0    cinacalcet (SENSIPAR) 30 MG Tab, , Disp: , Rfl:     docusate sodium (COLACE) 100 MG capsule, Take 200 mg by mouth once daily. , Disp: , Rfl:     doxercalciferoL (HECTOROL) 4 mcg/2 mL  injection, Inject 4.5 mcg into the vein 3 (three) times a week. At dialysis unit, Disp: , Rfl:     dulaglutide (TRULICITY) 0.75 mg/0.5 mL pen injector, Inject 0.75 mg into the skin every 7 days., Disp: 12 pen, Rfl: 3    epoetin arnel (EPOGEN INJ), Inject 1,000 Units as directed every 7 days. At the dialysis unit, Disp: , Rfl:     LANCETS MISC, , Disp: , Rfl:     lancets Misc, One lancets use 2 times a day to check blood glucose, ICD-10: E11.9, Disp: 100 each, Rfl: 11    lancing device Misc, One device, used to check blood glucose, ICD-10: E11.9, Disp: 1 each, Rfl: 0    levothyroxine (SYNTHROID) 50 MCG tablet, Take 1 tablet (50 mcg total) by mouth before breakfast., Disp: 90 tablet, Rfl: 3    midodrine (PROAMATINE) 5 MG Tab, Take 1 tablet (5 mg total) by mouth 3 (three) times daily., Disp: 90 tablet, Rfl: 11    OLENA-NABIL RX 1- mg-mg-mcg Tab, Take 1 tablet by mouth once daily., Disp: , Rfl:     aspirin (ECOTRIN) 81 MG EC tablet, Take 1 tablet (81 mg total) by mouth once daily., Disp: 90 tablet, Rfl: 3    sevelamer carbonate (RENVELA) 800 mg Tab, Take 3 tablets (2,400 mg total) by mouth 3 (three) times daily with meals. (Patient not taking: Reported on 5/20/2022), Disp: 270 tablet, Rfl: 11    REVIEW OF SYSTEMS:  General: No fevers or chills; ENT: No sore throat; Allergy and Immunology: no persistent infections; Hematological and Lymphatic: No history of bleeding or easy bruising; Endocrine: negative; Respiratory: no cough, shortness of breath, or wheezing; Cardiovascular: no chest pain or dyspnea on exertion; Gastrointestinal: no abdominal pain/back, change in bowel habits, or bloody stools; Genito-Urinary: no dysuria, trouble voiding, or hematuria; Musculoskeletal: negative; Neurological: no TIA or stroke symptoms; Psychiatric: no nervousness, anxiety or depression.    PHYSICAL EXAM:                General appearance:  Alert, well-appearing, and in no distress.  Oriented to person, place, and time                     Neurological: Normal speech, no focal findings noted; CN II - XII grossly intact. All extremities with sensation to light touch.            Musculoskeletal: Digits/nail without cyanosis/clubbing.  Strength 5/5 all extremities.                    Neck: Supple, no significant adenopathy, no carotid bruit can be auscultated                  Chest:  Clear to auscultation, no wheezes, rales or rhonchi, symmetric air entry. No use of accessory muscles               Cardiac: Normal rate and regular rhythm, S1 and S2 normal            Abdomen: Soft, nontender, nondistended, no masses or organomegaly, no hernia     No rebound tenderness noted; bowel sounds normal     Pulsatile aortic mass is non palpable.     No groin adenopathy      Extremities:      2+ radial pulse bilaterally, LUE AVF +thrill, inc well healed     No BUE edema, Negative Manuel's test BUE    Skin: No tissue loss    LAB RESULTS:  No results found for: CBC  Lab Results   Component Value Date    LABPROT 10.3 06/07/2021    INR 1.0 06/07/2021     Lab Results   Component Value Date     05/07/2022    K 4.9 05/07/2022    CL 96 05/07/2022    CO2 24 05/07/2022    GLU 98 05/07/2022    BUN 51 (H) 05/07/2022    CREATININE 9.6 (H) 05/07/2022    CALCIUM 9.3 05/07/2022    ANIONGAP 19 (H) 05/07/2022    EGFRNONAA 3.6 (A) 05/07/2022     Lab Results   Component Value Date    WBC 6.83 05/07/2022    RBC 4.49 05/07/2022    HGB 12.4 05/07/2022    HCT 39.3 05/07/2022    MCV 88 05/07/2022    MCH 27.6 05/07/2022    MCHC 31.6 (L) 05/07/2022    RDW 15.2 (H) 05/07/2022     05/07/2022    MPV 10.6 05/07/2022    GRAN 4.4 05/07/2022    GRAN 64.5 05/07/2022    LYMPH 1.4 05/07/2022    LYMPH 20.4 05/07/2022    MONO 0.9 05/07/2022    MONO 13.0 05/07/2022    EOS 0.1 05/07/2022    BASO 0.05 05/07/2022    EOSINOPHIL 1.0 05/07/2022    BASOPHIL 0.7 05/07/2022    DIFFMETHOD Automated 05/07/2022     .  Lab Results   Component Value Date    HGBA1C 6.7 (H) 05/07/2022        IMAGING:  All pertinent imaging has been reviewed and interpreted independently.    Vein mapping 9/2019:  Adequate R superior basilic vein and axillary vein ; Adequate L basilic vein superior and inferior, adequate L axillary     1/27/20 Left upper extremity dialysis ultrasound shows no hemodynamically significant stenosis.  Flow is 1083 ml/min.  Depth and diameter are appropriate.    3/23/20:  Left upper extremity dialysis ultrasound shows anastomotic and proximal fistula hemodynamically significant stenosis.  Flow is 955 ml/min.      7/2020: Left upper extremity dialysis ultrasound shows proximal fistula hemodynamically significant stenosis.  Flow is 1257 ml/min.      8/2020: Left upper extremity dialysis ultrasound shows proximal hemodynamically significant stenosis.  Flow is 1999 ml/min.      8/31/20: Left upper extremity dialysis ultrasound shows proximal fistula hemodynamically significant stenosis.  Flow is 2209 ml/min.      10/2020:  Prox stenosis, flow > 3000 ml/min    1/2021:  Proximal stenosis, flow 4414 ml/min    4/2021:  HD US reviewed without significant stenosis, adequate flow.    8/2021:HD US reviewed without significant stenosis, adequate flow.    3/2022:  Left upper extremity dialysis ultrasound shows approx 50% anastomotic and prox fistula stenosis withou hemodynamically significant stenosis.  Flow is 3774 ml/min.      6/2022:  No significant stenosis     IMP/PLAN:  77 y.o. female with   Patient Active Problem List   Diagnosis    Severe obesity (BMI 35.0-39.9) with comorbidity    Sickle cell trait    Hyperlipidemia LDL goal <100    HUMBERTO on CPAP    Hypothyroidism (acquired)    Hx-TIA (transient ischemic attack)    Vitamin D deficiency disease    Pulmonary hypertension    Type 2 diabetes mellitus with ESRD (end-stage renal disease)    Secondary renal hyperparathyroidism    Incomplete bladder emptying    Postmenopausal atrophic vaginitis    Rectocele    Mixed incontinence urge and  stress    Chronic diastolic heart failure    Tortuous aorta    ESRD on hemodialysis    Anemia of chronic disease    Debility    Chronic respiratory failure    Type 2 diabetes mellitus with foot ulcer, with long-term current use of insulin    Stable proliferative diabetic retinopathy associated with type 2 diabetes mellitus    Mild major depression    Heart failure with preserved ejection fraction    Pseudophakia    Chronic kidney disease-mineral and bone disorder    Age-related osteoporosis without current pathological fracture    H/O: stroke    being managed by PCP and specialists who is here today for evaluation of long term HD access s/p L RC AV fistula.    -s/p L RC AV fistula with proximal fistula measuring 5 mm 1/13/20, adequate flow without issues during HD s/p LUE fistulogram, central venogram, ligation of medial side branch cephalic vein, ligation of lateral side branch cephalic vein 3/2/20 with prox AVF stenosis s/p L proximal fistula angioplasty 7/21/20 with persistent flow issues s/p proximal angioplasty 8/19/20 with scoring balloon/DCB with improvement in stenosis/flow and no further issues with HD  -Cont renal diet  -RTC 3 mo with HD US for continued routine surveillance    I spent 12 minutes evaluating this patient and greater than 50% of the time was spent counseling, coordinator care and discussing the plan of care.  All questions were answered and patient stated understanding with agreement with the above treatment plan.    Keron Perez MD [facility]  Vascular and Endovascular Surgery

## 2022-06-21 LAB
HD ANASTAMOSIS LOCATION: NORMAL
HD FISTULA OUTFLOW VEIN VESSEL: NORMAL
HD INFLOW ARTERY VESSEL: NORMAL
RIGHT DIS GRAFT PSV: 75 CM/ SEC
RIGHT INFLOW PSV: 192 CM/S
RIGHT MID GRAFT PSV: 53 CM/S
RIGHT OUTFLOW VEIN PSV: 122 CM/ SEC
RIGHT PROX ANA PSV: 610 CM/S
RIGHT PROX GRAFT PSV: 394 CM/S
RIGHT VOLUME FLOW PSV: 1943 ML/MIN

## 2022-07-15 ENCOUNTER — OFFICE VISIT (OUTPATIENT)
Dept: HOME HEALTH SERVICES | Facility: CLINIC | Age: 77
End: 2022-07-15
Payer: MEDICARE

## 2022-07-15 VITALS
DIASTOLIC BLOOD PRESSURE: 66 MMHG | SYSTOLIC BLOOD PRESSURE: 126 MMHG | BODY MASS INDEX: 36.1 KG/M2 | WEIGHT: 216.69 LBS | HEART RATE: 75 BPM | HEIGHT: 65 IN

## 2022-07-15 DIAGNOSIS — E78.5 HYPERLIPIDEMIA LDL GOAL <100: Chronic | ICD-10-CM

## 2022-07-15 DIAGNOSIS — Z99.2 ESRD ON HEMODIALYSIS: ICD-10-CM

## 2022-07-15 DIAGNOSIS — I50.32 CHRONIC HEART FAILURE WITH PRESERVED EJECTION FRACTION: ICD-10-CM

## 2022-07-15 DIAGNOSIS — N18.6 TYPE 2 DIABETES MELLITUS WITH ESRD (END-STAGE RENAL DISEASE): ICD-10-CM

## 2022-07-15 DIAGNOSIS — E11.22 TYPE 2 DIABETES MELLITUS WITH ESRD (END-STAGE RENAL DISEASE): ICD-10-CM

## 2022-07-15 DIAGNOSIS — D57.3 SICKLE CELL TRAIT: ICD-10-CM

## 2022-07-15 DIAGNOSIS — E11.3553 STABLE PROLIFERATIVE DIABETIC RETINOPATHY OF BOTH EYES ASSOCIATED WITH TYPE 2 DIABETES MELLITUS: ICD-10-CM

## 2022-07-15 DIAGNOSIS — M81.0 AGE-RELATED OSTEOPOROSIS WITHOUT CURRENT PATHOLOGICAL FRACTURE: ICD-10-CM

## 2022-07-15 DIAGNOSIS — Z00.00 ENCOUNTER FOR PREVENTIVE HEALTH EXAMINATION: Primary | ICD-10-CM

## 2022-07-15 DIAGNOSIS — I77.1 TORTUOUS AORTA: ICD-10-CM

## 2022-07-15 DIAGNOSIS — E03.9 HYPOTHYROIDISM (ACQUIRED): Chronic | ICD-10-CM

## 2022-07-15 DIAGNOSIS — I27.20 PULMONARY HYPERTENSION: ICD-10-CM

## 2022-07-15 DIAGNOSIS — J96.10 CHRONIC RESPIRATORY FAILURE, UNSPECIFIED WHETHER WITH HYPOXIA OR HYPERCAPNIA: ICD-10-CM

## 2022-07-15 DIAGNOSIS — N25.81 SECONDARY RENAL HYPERPARATHYROIDISM: ICD-10-CM

## 2022-07-15 DIAGNOSIS — E66.01 SEVERE OBESITY (BMI 35.0-39.9) WITH COMORBIDITY: ICD-10-CM

## 2022-07-15 DIAGNOSIS — D63.8 ANEMIA OF CHRONIC DISEASE: Chronic | ICD-10-CM

## 2022-07-15 DIAGNOSIS — N18.6 ESRD ON HEMODIALYSIS: ICD-10-CM

## 2022-07-15 DIAGNOSIS — G47.33 OSA ON CPAP: Chronic | ICD-10-CM

## 2022-07-15 DIAGNOSIS — Z99.89 DEPENDENCE ON OTHER ENABLING MACHINES AND DEVICES: ICD-10-CM

## 2022-07-15 PROCEDURE — 1101F PT FALLS ASSESS-DOCD LE1/YR: CPT | Mod: CPTII,S$GLB,, | Performed by: NURSE PRACTITIONER

## 2022-07-15 PROCEDURE — 3288F PR FALLS RISK ASSESSMENT DOCUMENTED: ICD-10-PCS | Mod: CPTII,S$GLB,, | Performed by: NURSE PRACTITIONER

## 2022-07-15 PROCEDURE — 1160F PR REVIEW ALL MEDS BY PRESCRIBER/CLIN PHARMACIST DOCUMENTED: ICD-10-PCS | Mod: CPTII,S$GLB,, | Performed by: NURSE PRACTITIONER

## 2022-07-15 PROCEDURE — 1159F MED LIST DOCD IN RCRD: CPT | Mod: CPTII,S$GLB,, | Performed by: NURSE PRACTITIONER

## 2022-07-15 PROCEDURE — 1126F AMNT PAIN NOTED NONE PRSNT: CPT | Mod: CPTII,S$GLB,, | Performed by: NURSE PRACTITIONER

## 2022-07-15 PROCEDURE — 1101F PR PT FALLS ASSESS DOC 0-1 FALLS W/OUT INJ PAST YR: ICD-10-PCS | Mod: CPTII,S$GLB,, | Performed by: NURSE PRACTITIONER

## 2022-07-15 PROCEDURE — 1126F PR PAIN SEVERITY QUANTIFIED, NO PAIN PRESENT: ICD-10-PCS | Mod: CPTII,S$GLB,, | Performed by: NURSE PRACTITIONER

## 2022-07-15 PROCEDURE — 1159F PR MEDICATION LIST DOCUMENTED IN MEDICAL RECORD: ICD-10-PCS | Mod: CPTII,S$GLB,, | Performed by: NURSE PRACTITIONER

## 2022-07-15 PROCEDURE — 3288F FALL RISK ASSESSMENT DOCD: CPT | Mod: CPTII,S$GLB,, | Performed by: NURSE PRACTITIONER

## 2022-07-15 PROCEDURE — 1160F RVW MEDS BY RX/DR IN RCRD: CPT | Mod: CPTII,S$GLB,, | Performed by: NURSE PRACTITIONER

## 2022-07-15 PROCEDURE — G0439 PR MEDICARE ANNUAL WELLNESS SUBSEQUENT VISIT: ICD-10-PCS | Mod: S$GLB,,, | Performed by: NURSE PRACTITIONER

## 2022-07-15 PROCEDURE — G0439 PPPS, SUBSEQ VISIT: HCPCS | Mod: S$GLB,,, | Performed by: NURSE PRACTITIONER

## 2022-07-15 NOTE — PATIENT INSTRUCTIONS
Counseling and Referral of Other Preventative  (Italic type indicates deductible and co-insurance are waived)    Patient Name: Erica Moulton  Today's Date: 7/15/2022    Health Maintenance       Date Due Completion Date    Eye Exam 05/07/2022 5/7/2021    Override on 1/3/2011: Done    Influenza Vaccine (1) 09/01/2022 11/1/2021    Override on 11/14/2018: Done    Override on 9/9/2015: Done    Override on 9/24/2012: Done    Hemoglobin A1c 12/14/2022 6/14/2022    Foot Exam 05/11/2023 5/11/2022    Override on 2/9/2022: Done (podiatry)    Override on 3/1/2021: Done (Dr. Nicole Gray ( Podiatry ))    Override on 2/3/2020: Done    Override on 10/15/2019: Done    Override on 8/23/2018: Done    Override on 10/10/2016: Done    Lipid Panel 06/14/2023 6/14/2022    DEXA Scan 10/19/2023 10/19/2020    Override on 8/7/2008: Done    TETANUS VACCINE 08/08/2026 8/8/2016        No orders of the defined types were placed in this encounter.    The following information is provided to all patients.  This information is to help you find resources for any of the problems found today that may be affecting your health:                Living healthy guide: www.[facility].[place].[facility]      Understanding Diabetes: www.diabetes.org      Eating healthy: www.cdc.gov/healthyweight      CDC home safety checklist: www.cdc.gov/steadi/patient.html      Agency on Aging: www.goea.[place].[facility]      Alcoholics anonymous (AA): www.aa.org      Physical Activity: www.teresita.nih.gov/gp2etnf      Tobacco use: www.quitwithusla.org

## 2022-07-15 NOTE — PROGRESS NOTES
"  Erica Brennon Moulton presented for a  Medicare AWV and comprehensive Health Risk Assessment today. The following components were reviewed and updated:    · Medical history  · Family History  · Social history  · Allergies and Current Medications  · Health Risk Assessment  · Health Maintenance  · Care Team         ** See Completed Assessments for Annual Wellness Visit within the encounter summary.**         The following assessments were completed:  · Living Situation  · CAGE  · Depression Screening  · Timed Get Up and Go  · Whisper Test  · Cognitive Function Screening  ·   ·   ·   · Nutrition Screening  · ADL Screening  · PAQ Screening        Vitals:    07/15/22 1000   BP: 126/66   Pulse: 75   Weight: 98.3 kg (216 lb 11.4 oz)   Height: 5' 5" (1.651 m)     Body mass index is 36.06 kg/m².  Physical Exam  Constitutional:       Appearance: She is obese.   HENT:      Head: Normocephalic and atraumatic.      Nose: Nose normal.      Mouth/Throat:      Mouth: Mucous membranes are moist.   Eyes:      Extraocular Movements: Extraocular movements intact.   Cardiovascular:      Rate and Rhythm: Normal rate and regular rhythm.      Heart sounds: Normal heart sounds.   Pulmonary:      Effort: Pulmonary effort is normal. No respiratory distress.      Breath sounds: Normal breath sounds.   Abdominal:      General: Bowel sounds are normal. There is no distension.      Palpations: Abdomen is soft.   Musculoskeletal:         General: No swelling. Normal range of motion.      Cervical back: Normal range of motion.   Skin:     General: Skin is warm and dry.      Comments: DAMIÁN RIVERA   Neurological:      Mental Status: She is alert and oriented to person, place, and time.      Gait: Gait abnormal (walker present).   Psychiatric:         Mood and Affect: Mood normal.         Behavior: Behavior normal.               Diagnoses and health risks identified today and associated recommendations/orders:    1. Encounter for preventive health " examination  Assessments completed. Preventive measures and health maintenance reviewed with patient.    2. Severe obesity (BMI 35.0-39.9) with comorbidity  Stable, followed by PCP. Healthy diet and tolerable exercise encouraged.    3. ESRD on hemodialysis  Stable, followed by Nephrology.     4. Pulmonary hypertension  Stable, followed by Cardiology.    5. Type 2 diabetes mellitus with ESRD (end-stage renal disease)  Stable, followed by PCP and Nephrology.     6. Tortuous aorta  Stable, pt' on Statin. Followed by Cardiology.     7. Chronic heart failure with preserved ejection fraction  Stable, followed by Cardiology.    8. Secondary renal hyperparathyroidism  Stable, followed by Nephrology.    9. Stable proliferative diabetic retinopathy of both eyes associated with type 2 diabetes mellitus  Stable, followed by Optometry.    10. Chronic respiratory failure, unspecified whether with hypoxia or hypercapnia  Stable, followed by PCP. Patient without any respiratory symptoms at this time.    11. Sickle cell trait  Stable, followed by PCP.    12. Hypothyroidism (acquired)  Stable, pt' on Synthroid. Followed by PCP.    13. Hyperlipidemia LDL goal <100  Stable, pt' on Statin. Followed by PCP.    14. Anemia of chronic disease  Stable, followed by Nephrology.    15. Age-related osteoporosis without current pathological fracture  Stable, followed by Endocrinology.     16. HUMBERTO on CPAP  Stable, followed by PCP.    17. Dependence on other enabling machines and devices  Patient has rolling walker for ambulation.    Provided Erica with a 5-10 year written screening schedule and personal prevention plan. Recommendations were developed using the USPSTF age appropriate recommendations. Education, counseling, and referrals were provided as needed. After Visit Summary printed and given to patient which includes a list of additional screenings\tests needed.    Follow up in about 1 year (around 7/15/2023) for your next annual wellness  visit.    Brigitte Jj, NP  I offered to discuss advanced care planning, including how to pick a person who would make decisions for you if you were unable to make them for yourself, called a health care power of , and what kind of decisions you might make such as use of life sustaining treatments such as ventilators and tube feeding when faced with a life limiting illness recorded on a living will that they will need to know. (How you want to be cared for as you near the end of your natural life)     X Patient is interested in learning more about how to make advanced directives.  I provided them paperwork and offered to discuss this with them.

## 2022-07-28 PROBLEM — F32.0 MILD MAJOR DEPRESSION: Status: RESOLVED | Noted: 2019-09-23 | Resolved: 2022-07-28

## 2022-08-17 ENCOUNTER — OFFICE VISIT (OUTPATIENT)
Dept: PODIATRY | Facility: CLINIC | Age: 77
End: 2022-08-17
Payer: MEDICARE

## 2022-08-17 ENCOUNTER — OFFICE VISIT (OUTPATIENT)
Dept: OPTOMETRY | Facility: CLINIC | Age: 77
End: 2022-08-17
Payer: MEDICARE

## 2022-08-17 VITALS — BODY MASS INDEX: 36.1 KG/M2 | HEIGHT: 65 IN | WEIGHT: 216.69 LBS

## 2022-08-17 DIAGNOSIS — E11.22 TYPE 2 DIABETES MELLITUS WITH ESRD (END-STAGE RENAL DISEASE): Primary | ICD-10-CM

## 2022-08-17 DIAGNOSIS — Z96.1 PSEUDOPHAKIA: ICD-10-CM

## 2022-08-17 DIAGNOSIS — M20.41 HAMMER TOES OF BOTH FEET: ICD-10-CM

## 2022-08-17 DIAGNOSIS — E11.9 DIABETIC EYE EXAM: Primary | ICD-10-CM

## 2022-08-17 DIAGNOSIS — Z01.00 DIABETIC EYE EXAM: Primary | ICD-10-CM

## 2022-08-17 DIAGNOSIS — H02.831 DERMATOCHALASIS OF BOTH UPPER EYELIDS: ICD-10-CM

## 2022-08-17 DIAGNOSIS — B35.1 NAIL DERMATOPHYTOSIS: ICD-10-CM

## 2022-08-17 DIAGNOSIS — E11.3553 STABLE PROLIFERATIVE DIABETIC RETINOPATHY OF BOTH EYES ASSOCIATED WITH TYPE 2 DIABETES MELLITUS: ICD-10-CM

## 2022-08-17 DIAGNOSIS — L90.9 PLANTAR FAT PAD ATROPHY: ICD-10-CM

## 2022-08-17 DIAGNOSIS — N18.6 TYPE 2 DIABETES MELLITUS WITH ESRD (END-STAGE RENAL DISEASE): Primary | ICD-10-CM

## 2022-08-17 DIAGNOSIS — M20.11 HALLUX ABDUCTO VALGUS, RIGHT: ICD-10-CM

## 2022-08-17 DIAGNOSIS — M20.42 HAMMER TOES OF BOTH FEET: ICD-10-CM

## 2022-08-17 DIAGNOSIS — H52.7 REFRACTIVE ERROR: ICD-10-CM

## 2022-08-17 DIAGNOSIS — M20.12 HALLUX ABDUCTO VALGUS, LEFT: ICD-10-CM

## 2022-08-17 DIAGNOSIS — H02.834 DERMATOCHALASIS OF BOTH UPPER EYELIDS: ICD-10-CM

## 2022-08-17 PROCEDURE — 99999 PR PBB SHADOW E&M-EST. PATIENT-LVL III: CPT | Mod: PBBFAC,,, | Performed by: OPTOMETRIST

## 2022-08-17 PROCEDURE — 99213 OFFICE O/P EST LOW 20 MIN: CPT | Mod: S$GLB,,, | Performed by: PODIATRIST

## 2022-08-17 PROCEDURE — 1101F PR PT FALLS ASSESS DOC 0-1 FALLS W/OUT INJ PAST YR: ICD-10-PCS | Mod: CPTII,S$GLB,, | Performed by: PODIATRIST

## 2022-08-17 PROCEDURE — 1159F PR MEDICATION LIST DOCUMENTED IN MEDICAL RECORD: ICD-10-PCS | Mod: CPTII,S$GLB,, | Performed by: PODIATRIST

## 2022-08-17 PROCEDURE — 92015 PR REFRACTION: ICD-10-PCS | Mod: S$GLB,,, | Performed by: OPTOMETRIST

## 2022-08-17 PROCEDURE — 1101F PR PT FALLS ASSESS DOC 0-1 FALLS W/OUT INJ PAST YR: ICD-10-PCS | Mod: CPTII,S$GLB,, | Performed by: OPTOMETRIST

## 2022-08-17 PROCEDURE — 3288F PR FALLS RISK ASSESSMENT DOCUMENTED: ICD-10-PCS | Mod: CPTII,S$GLB,, | Performed by: PODIATRIST

## 2022-08-17 PROCEDURE — 1101F PT FALLS ASSESS-DOCD LE1/YR: CPT | Mod: CPTII,S$GLB,, | Performed by: PODIATRIST

## 2022-08-17 PROCEDURE — 92014 COMPRE OPH EXAM EST PT 1/>: CPT | Mod: S$GLB,,, | Performed by: OPTOMETRIST

## 2022-08-17 PROCEDURE — 3288F FALL RISK ASSESSMENT DOCD: CPT | Mod: CPTII,S$GLB,, | Performed by: PODIATRIST

## 2022-08-17 PROCEDURE — 99213 PR OFFICE/OUTPT VISIT, EST, LEVL III, 20-29 MIN: ICD-10-PCS | Mod: S$GLB,,, | Performed by: PODIATRIST

## 2022-08-17 PROCEDURE — 3288F FALL RISK ASSESSMENT DOCD: CPT | Mod: CPTII,S$GLB,, | Performed by: OPTOMETRIST

## 2022-08-17 PROCEDURE — 99999 PR PBB SHADOW E&M-EST. PATIENT-LVL III: ICD-10-PCS | Mod: PBBFAC,,, | Performed by: OPTOMETRIST

## 2022-08-17 PROCEDURE — 1159F MED LIST DOCD IN RCRD: CPT | Mod: CPTII,S$GLB,, | Performed by: PODIATRIST

## 2022-08-17 PROCEDURE — 99999 PR PBB SHADOW E&M-EST. PATIENT-LVL III: CPT | Mod: PBBFAC,,, | Performed by: PODIATRIST

## 2022-08-17 PROCEDURE — 1101F PT FALLS ASSESS-DOCD LE1/YR: CPT | Mod: CPTII,S$GLB,, | Performed by: OPTOMETRIST

## 2022-08-17 PROCEDURE — 3288F PR FALLS RISK ASSESSMENT DOCUMENTED: ICD-10-PCS | Mod: CPTII,S$GLB,, | Performed by: OPTOMETRIST

## 2022-08-17 PROCEDURE — 92015 DETERMINE REFRACTIVE STATE: CPT | Mod: S$GLB,,, | Performed by: OPTOMETRIST

## 2022-08-17 PROCEDURE — 92014 PR EYE EXAM, EST PATIENT,COMPREHESV: ICD-10-PCS | Mod: S$GLB,,, | Performed by: OPTOMETRIST

## 2022-08-17 PROCEDURE — 99999 PR PBB SHADOW E&M-EST. PATIENT-LVL III: ICD-10-PCS | Mod: PBBFAC,,, | Performed by: PODIATRIST

## 2022-08-17 NOTE — PATIENT INSTRUCTIONS
Over the counter pain creams: Voltaren Gel, Biofreeze, Bengay, tiger balm, two old goat, lidocaine gel,  Absorbine Veterinary Liniment Gel Topical Analgesic Sore Muscle and Joint Pain Relief    Recommend lotions: eucerin, eucerin for diabetics, aquaphor, A&D ointment, gold bond for diabetics, sween, Mobile's Bees all purpose baby ointment,  urea 40 with aloe (found on amazon.com)    Shoe recommendations: (try 6pm.com, zappos.com , nordstromrack[employer], or shoes.com for discounted prices) you can visit [employer] in [place]  or [employer] [facility] in the [facility] (there are also several shoe brand outlets in the [facility])    Asics (GT 2000 or gel foundations), new balance stability type shoes (such as the 940 series), saucony (stabil c3),  Hernandez (GTS or Beast or transcend), propet (tennis shoe)    Kelsey Corbett (women) Kierra&Corey (men), clarks, crocs, aerosoles, naturalizers, SAS, ecco, born, elvia storey, rockports (dress shoes)    Vionic, burkenstocks, fitflops, propet (sandals)  Nike comfort thong sandals, crocs, propet (house shoes)    Nail Home remedy:  Vicks Vapor rub or Emuaid to nails for easier manageability    Diabetes: Inspecting Your Feet  Diabetes increases your chances of developing foot problems. So inspect your feet every day. This helps you find small skin irritations before they become serious infections. If you have trouble seeing the bottoms of your feet, use a mirror or ask a family member or friend to help.     Pressure spots on the bottom of the foot are common areas where problems develop.   How to check your feet  Below are tips to help you look for foot problems. Try to check your feet at the same time each day, such as when you get out of bed in the morning:  Check the top of each foot. The tops of toes, back of the heel, and outer edge of the foot can get a lot of rubbing from poor-fitting shoes.  Check the bottom of each foot. Daily wear and tear often leads to problems at pressure spots.  Check  the toes and nails. Fungal infections often occur between toes. Toenail problems can also be a sign of fungal infections or lead to breaks in the skin.  Check your shoes, too. Loose objects inside a shoe can injure the foot. Use your hand to feel inside your shoes for things like goldy, loose stitching, or rough areas that could irritate your skin.  Warning signs  Look for any color changes in the foot. Redness with streaks can signal a severe infection, which needs immediate medical attention. Tell your doctor right away if you have any of these problems:  Swelling, sometimes with color changes, may be a sign of poor blood flow or infection. Symptoms include tenderness and an increase in the size of your foot.  Warm or hot areas on your feet may be signs of infection. A foot that is cold may not be getting enough blood.  Sensations such as burning, tingling, or pins and needles can be signs of a problem. Also check for areas that may be numb.  Hot spots are caused by friction or pressure. Look for hot spots in areas that get a lot of rubbing. Hot spots can turn into blisters, calluses, or sores.  Cracks and sores are caused by dry or irritated skin. They are a sign that the skin is breaking down, which can lead to infection.  Toenail problems to watch for include nails growing into the skin (ingrown toenail) and causing redness or pain. Thick, yellow, or discolored nails can signal a fungal infection.  Drainage and odor can develop from untreated sores and ulcers. Call your doctor right away if you notice white or yellow drainage, bleeding, or unpleasant odor.   © 0705-0087 [employer]. [address] [place] [address]. All rights reserved. This information is not intended as a substitute for professional medical care. Always follow your healthcare professional's instructions.        Step-by-Step:  Inspecting Your Feet (Diabetes)    Date Last Reviewed: 10/1/2016  © 3854-6899 [employer]. [address] [place] [address], [place] [address]. All rights reserved. This information is not intended as a substitute for professional medical care. Always follow your healthcare professional's instructions.

## 2022-08-17 NOTE — PROGRESS NOTES
Is Subjective:      Patient ID: Erica Moulton is a 77 y.o. female.    Chief Complaint: Diabetes Mellitus (5/20/22 Dr Argentina HE) and Nail Care    Erica Moulton is a 77 y.o. femalewho presents to the clinic for evaluation and treatment of high risk feet. Erica Moulton   has a past medical history of Acute ischemic right PCA stroke (6/6/2021), Acute respiratory failure with hypoxia, Age-related osteoporosis without current pathological fracture (3/8/2021), Anemia of chronic kidney failure, stage 4 (severe) (4/5/2019), Cataracts, bilateral, CHF (congestive heart failure), CKD (chronic kidney disease) stage 3, GFR 30-59 ml/min, CKD (chronic kidney disease) stage 3, GFR 30-59 ml/min, Controlled type 2 diabetes mellitus with proteinuria or albuminuria, Depression, Diabetes with neurologic complications, Diabetic retinopathy of both eyes, Edema, Glaucoma, History of colonic polyps, TIA (transient ischemic attack) (11/2008), Hyperlipidemia LDL goal < 100, Hypertension, Hypothyroidism, Major depressive disorder, single episode, mild (2/17/2016), Mixed incontinence urge and stress, Obesity, Obstructive sleep apnea on CPAP, Osteopenia, Proteinuria, Sickle cell trait, Strabismus, TIA (transient ischemic attack), Trouble in sleeping, Type 2 diabetes mellitus with ophthalmic manifestations, Type 2 diabetes with stage 3 chronic kidney disease GFR 30-59, Type II or unspecified type diabetes mellitus with renal manifestations, uncontrolled(250.42), Uncontrolled type 2 diabetes mellitus with peripheral circulatory disorder (4/5/2019), Urge incontinence (1/11/2016), Urge incontinence, Venous stasis ulcer, and Vitamin D deficiency disease.  She presents to the podiatry clinic for care of  Right anterior leg ulcer.  Onset of the symptoms was several weeks ago. Precipitating event:relates she hit the leg in May.  She relates some tenderness to palpation of the site.  She has not been doing any true wound care.  Current  symptoms include: redness, swelling and large spongy scab. Signs of infection denies nausea, vomiting, fever, chills   Symptoms have been well-controlled. Patient has had prior foot problems. Patients rates pain 3/10 on pain scale.    She also complains of long, thick toenails. This patient has documented high risk feet requiring routine maintenance secondary to diabetes mellitis and those secondary complications of diabetes, as mentioned.       07/28/2021 Patient has been having HH dressing changes.  There have been issues with scheduling and patient relates that are excluding her foot from compression wrap.  She completed abx without adverse effect.  No new pedal complaints.     10/06/2021 Patient relates she evacuated to [place] and was living there for almost one month receiving home health.  She relates wound has healed. No new pedal complaints.     12/10/2021 patient has continued to care for skin and lower limbs as instructed.  He relates wound has remained healed.  No new pedal complaints     2/19/2022 patient has continued to care for skin and lower limbs as instructed.  He relates wound has remained healed.  No new pedal complaints     5/11/22  patient has continued to care for skin and lower limbs as instructed.  She relates wound has remained healed.  No new pedal complaints.  She presents today for routines foot exam.     5/11/22  patient has continued to care for skin and lower limbs as instructed.  She relates wound has remained healed.  No new pedal complaints.  She presents today for routines foot exam.     08/17/2022 present to clinic for routine foot exam.     PCP: Sendy Elaine MD    Date Last Seen by PCP:   Chief Complaint   Patient presents with    Diabetes Mellitus     5/20/22 Dr Elaine PCP    Nail Care      Hemoglobin A1C   Date Value Ref Range Status   05/07/2022 6.7 (H) 4.0 - 5.6 % Final     Comment:     ADA Screening Guidelines:  5.7-6.4%  Consistent with prediabetes  >or=6.5%   Consistent with diabetes    High levels of fetal hemoglobin interfere with the HbA1C  assay. Heterozygous hemoglobin variants (HbS, HgC, etc)do  not significantly interfere with this assay.   However, presence of multiple variants may affect accuracy.     11/03/2021 6.5 (H) 4.0 - 5.6 % Final     Comment:     ADA Screening Guidelines:  5.7-6.4%  Consistent with prediabetes  >or=6.5%  Consistent with diabetes    High levels of fetal hemoglobin interfere with the HbA1C  assay. Heterozygous hemoglobin variants (HbS, HgC, etc)do  not significantly interfere with this assay.   However, presence of multiple variants may affect accuracy.     06/06/2021 8.1 (H) 4.0 - 5.6 % Final     Comment:     ADA Screening Guidelines:  5.7-6.4%  Consistent with prediabetes  >or=6.5%  Consistent with diabetes    High levels of fetal hemoglobin interfere with the HbA1C  assay. Heterozygous hemoglobin variants (HbS, HgC, etc)do  not significantly interfere with this assay.   However, presence of multiple variants may affect accuracy.     03/09/2021 8.5 (H) 0.0 - 5.6 % Final     Comment:     [facility]   12/15/2020 7.9 (H) 0.0 - 5.6 % Final     Comment:     [facility]       Patient Active Problem List   Diagnosis    Severe obesity (BMI 35.0-39.9) with comorbidity    Sickle cell trait    Hyperlipidemia LDL goal <100    HUMBERTO on CPAP    Hypothyroidism (acquired)    Hx-TIA (transient ischemic attack)    Vitamin D deficiency disease    Pulmonary hypertension    Type 2 diabetes mellitus with ESRD (end-stage renal disease)    Secondary renal hyperparathyroidism    Incomplete bladder emptying    Postmenopausal atrophic vaginitis    Rectocele    Mixed incontinence urge and stress    Chronic diastolic heart failure    Tortuous aorta    ESRD on hemodialysis    Anemia of chronic disease    Debility    Chronic respiratory failure    Type 2 diabetes mellitus with foot ulcer, with long-term current use of insulin    Stable  proliferative diabetic retinopathy associated with type 2 diabetes mellitus    Heart failure with preserved ejection fraction    Pseudophakia    Chronic kidney disease-mineral and bone disorder    Age-related osteoporosis without current pathological fracture    H/O: stroke     Current Outpatient Medications on File Prior to Visit   Medication Sig Dispense Refill    ACCU-CHEK SOFT DEV LANCETS Kit       atorvastatin (LIPITOR) 80 MG tablet TAKE 1 TABLET (80 MG TOTAL) BY MOUTH EVERY EVENING. 90 tablet 0    blood sugar diagnostic Strp One test strip use 2 times a day to check blood glucose,  ICD-10: E11.9, compatible with insurance/glucometer 100 each 11    blood-glucose meter kit One glucometer, use to check blood glucose.   ICD-10: E11.9. Dispense machine covered by insurance 1 each 0    cinacalcet (SENSIPAR) 30 MG Tab       docusate sodium (COLACE) 100 MG capsule Take 200 mg by mouth once daily.       doxercalciferoL (HECTOROL) 4 mcg/2 mL injection Inject 4.5 mcg into the vein 3 (three) times a week. At dialysis unit      dulaglutide (TRULICITY) 0.75 mg/0.5 mL pen injector Inject 0.75 mg into the skin every 7 days. 12 pen 3    epoetin arnel (EPOGEN INJ) Inject 1,000 Units as directed every 7 days. At the dialysis unit      LANCETS MISC       lancets Misc One lancets use 2 times a day to check blood glucose, ICD-10: E11.9 100 each 11    lancing device Misc One device, used to check blood glucose, ICD-10: E11.9 1 each 0    levothyroxine (SYNTHROID) 50 MCG tablet TAKE 1 TABLET BEFORE BREAKFAST 90 tablet 3    midodrine (PROAMATINE) 5 MG Tab Take 1 tablet (5 mg total) by mouth 3 (three) times daily. 90 tablet 11    OLENA-NABIL RX 1- mg-mg-mcg Tab Take 1 tablet by mouth once daily.      aspirin (ECOTRIN) 81 MG EC tablet Take 1 tablet (81 mg total) by mouth once daily. 90 tablet 3    sevelamer carbonate (RENVELA) 800 mg Tab Take 3 tablets (2,400 mg total) by mouth 3 (three) times daily with meals.  270 tablet 11     No current facility-administered medications on file prior to visit.     Review of patient's allergies indicates:   Allergen Reactions    Ace inhibitors Other (See Comments)     Other reaction(s): cough     Past Surgical History:   Procedure Laterality Date    AV FISTULA PLACEMENT Left 2019    Procedure: CREATION, AV FISTULA, LEFT UPPER EXTREMITY;  Surgeon: Keron Perez MD;  Location: [facility];  Service: Vascular;  Laterality: Left;    BREAST BIOPSY      breast reduction Bilateral age 31    BREAST SURGERY      CATARACT EXTRACTION Bilateral     cataracts Bilateral      SECTION, LOW TRANSVERSE      x1    CHOLECYSTECTOMY      COLONOSCOPY N/A 2022    Procedure: COLONOSCOPY;  Surgeon: Mahesh Huang MD;  Location: [facility] ENDO ([address] [place] [address]);  Service: Endoscopy;  Laterality: N/A;  fully vaccinated-sm.  RAPID COVID  +cologuard needing ASAP  Dialysis pt T,TH Sat and left UA access  2nd floor - cardiac/dialysis/SOB / LABS / prep ins. emailed - [facility]    EYE SURGERY  2014, 2014    vitrectomy    EYE SURGERY Right 2016    FISTULOGRAM Left 3/6/2020    Procedure: Fistulogram, left upper extremity, with branch ligation;  Surgeon: Keron Perez MD;  Location: [address];  Service: Vascular;  Laterality: Left;  Time 1.1 Minute  42.10 mGy    FISTULOGRAM Left 2020    Procedure: Fistulogram, left upper extremity, transradial access with possible intervention;  Surgeon: Keron Perez MD;  Location: [address];  Service: Vascular;  Laterality: Left;    FISTULOGRAM Left 2020    Procedure: Fistulogram, left upper extremity, possible intervention;  Surgeon: Keron Perez MD;  Location: [facility];  Service: Vascular;  Laterality: Left;  930 AM START  RN PRE OP  ---COVID NEGATIVE ON  2020. [place]    FISTULOGRAM Left 2022    Procedure: Fistulogram, transradial access;  Surgeon: Keron Perez MD;  Location: [address];   Service: Vascular;  Laterality: Left;  mgy-40.16  gycm-8.3905  contrast-34cc  time-12.4min    HYSTERECTOMY  1986    TAHBSO (patient is unsure if ovaries removed)    OOPHORECTOMY      PERCUTANEOUS TRANSLUMINAL ANGIOPLASTY OF ARTERIOVENOUS FISTULA Left 7/21/2020    Procedure: PTA, AV FISTULA;  Surgeon: Keron Perez MD;  Location: [facility] OR [address] [place] [address];  Service: Vascular;  Laterality: Left;  15.9 minutes of fluro  41.12  mGy  7.9060 Gy cm2  32ml  contrast    PHLEBOGRAPHY Left 7/21/2020    Procedure: CENTRAL VENOGRAM;  Surgeon: Keron Perez MD;  Location: [facility] OR [address] [place] [address];  Service: Vascular;  Laterality: Left;    REFRACTIVE SURGERY      TOTAL REDUCTION MAMMOPLASTY      approx 10 yrs ago    VENOPLASTY  1/21/2022    Procedure: ANGIOPLASTY, VEIN;  Surgeon: Keron Perez MD;  Location: [facility] OR [address] [place] [address];  Service: Vascular;;     Family History   Problem Relation Age of Onset    Stroke Mother     Diabetes Mother     Hypertension Mother     Cataracts Mother     Leukemia Father     Cataracts Father     Ovarian cancer Sister 35    Achondroplasia Sister     HIV Brother     No Known Problems Daughter     No Known Problems Son     Breast cancer Maternal Aunt 65    Parkinsonism Maternal Aunt     Esophageal cancer Maternal Uncle         smoker    No Known Problems Paternal Aunt     Cataracts Paternal Uncle     Cataracts Maternal Grandmother     Cataracts Maternal Grandfather     Diabetes Paternal Grandmother     Cataracts Paternal Grandmother     No Known Problems Paternal Grandfather     No Known Problems Other     Amblyopia Neg Hx     Blindness Neg Hx     Glaucoma Neg Hx     Macular degeneration Neg Hx     Retinal detachment Neg Hx     Strabismus Neg Hx     Thyroid disease Neg Hx     Colon cancer Neg Hx     Cancer Neg Hx        Review of Systems   Constitutional: Negative for chills and fever.   Cardiovascular: Positive for leg swelling. Negative for chest pain and  "claudication.   Respiratory: Negative for cough and shortness of breath.    Skin: Positive for dry skin, nail changes and suspicious lesions. Negative for itching and rash.   Musculoskeletal: Positive for arthritis and joint pain. Negative for falls, joint swelling, muscle cramps and muscle weakness.   Gastrointestinal: Negative for diarrhea, nausea and vomiting.   Neurological: Positive for numbness and paresthesias. Negative for tremors and weakness.   Psychiatric/Behavioral: Negative for altered mental status and hallucinations.         Objective:       Vitals:    08/17/22 1404   Weight: 98.3 kg (216 lb 11.4 oz)   Height: 5' 5" (1.651 m)     Physical Exam  Vitals and nursing note reviewed.   Constitutional:       Appearance: She is not diaphoretic.      Comments: General: Pt. is well-developed, well-nourished, appears stated age, in no acute distress, alert and oriented x 3. No evidence of depression, anxiety, or agitation. Calm, cooperative, and communicative. Appropriate interactions and affect.       Cardiovascular:      Pulses:           Dorsalis pedis pulses are 1+ on the right side and 1+ on the left side.        Posterior tibial pulses are 1+ on the right side and 1+ on the left side.      Comments: There is decreased digital hair.   Musculoskeletal:      Right ankle: Swelling present. No tenderness.      Right Achilles Tendon: No defects.      Left ankle: Swelling present. No tenderness.      Left Achilles Tendon: No defects.      Right foot: Normal range of motion. No tenderness.      Left foot: Normal range of motion. No tenderness.      Comments: Muscle strength is 5/5 in all groups bilaterally.    Decreased stride, station of gait.  apropulsive toe off.  Increased angle and base of gait.    Patient has hammertoes of digits 2-5 bilateral partially reducible without symptom today.    Visible and palpable bunion without pain at dorsomedial 1st metatarsal head right and left.  Hallux abducted right and " left partially reducible, tracks laterally without being track bound.  No ecchymosis, erythema, edema, or cardinal signs infection or signs of trauma same foot.    Fat pad atrophy to heels and met heads bilateral     Skin:     General: Skin is warm and dry.      Coloration: Skin is not pale.      Findings: Wound (See description below) present. No abrasion, lesion (posterior right leg healed) or rash.      Nails: There is no clubbing.      Comments: Xerosis bilaterally     Toenails 1-5 bilaterally are elongated by 2-3 mm, thickened by 2-3 mm, discolored/yellowed, dystrophic, brittle with subungual debris.     Interdigital Spaces clean, dry and without evidence of break in skin integrity   Neurological:      Sensory: No sensory deficit.      Comments: [place]ia 5.07 monofilament is intact bilateral feet. Sharp/dull sensation is also intact Bilateral feet.           Psychiatric:         Speech: Speech normal.               12/08/2021            10/06/2021          07/28/2021    Ulcer location:  Anterior right leg  Measurements :  2.2 x 1.4 x 0.2  cm   Signs of infection:  Local edema and erythema as well as tenderness to palpation   Drainage: Sanguinous  Purulence: no  Crepitus/fluctuance: no  Periwound: Reddened  Base: fibrogranular  Depth: subcutaneous tissue  Probe to bone: no              07/08/2021    Ulcer location:  Anterior right leg  Measurements :  2.2 x 1.7 x 0.4  cm   Signs of infection:  Local edema and erythema as well as tenderness to palpation and mild malodor  Drainage: Sero-Sanguinous  Purulence: no  Crepitus/fluctuance: no  Periwound: Reddened  Base: Fibrotic slough  Depth: subcutaneous tissue  Probe to bone: no                Assessment:       Encounter Diagnoses   Name Primary?    Type 2 diabetes mellitus with ESRD (end-stage renal disease) Yes    Hammer toes of both feet     Hallux abducto valgus, left     Hallux abducto valgus, right     Plantar fat pad atrophy     Nail  dermatophytosis          Plan:       Erica was seen today for diabetes mellitus and nail care.    Diagnoses and all orders for this visit:    Type 2 diabetes mellitus with ESRD (end-stage renal disease)    Hammer toes of both feet    Hallux abducto valgus, left    Hallux abducto valgus, right    Plantar fat pad atrophy    Nail dermatophytosis      I counseled the patient on her conditions, their implications and medical management. Education about the diabetic foot, neuropathy, and prevention of limb loss.    Discussed wound healing cycle, skin integrity, ways to care for skin. Counseled patient on the effects of high blood glucose on healing. She verbalizes understanding that it can increase the chances of delayed healing and this prolonged exposure leads to infection or progression of infection which subsequently can result in loss of limb.    Adequate vitamin supplementation, protein intake, and hydration - discussed with patient    Wound has remained healed well with no signs of continued skin breakdown noted.  Skin is still delicate therefore patient must be diligent in avoiding excessive pressure and making sure there is adequate support and padding in shoe gear. Again, compression stockings and elevation recommended.    Follow-up: 12-15 weeks but should call [facility] immediately if any signs of infection, such as fever, chills, sweats, increased redness or pain.    Short-term goals include maintaining good offloading and minimizing bioburden, promoting granulation and epithelialization to healing.  Long-term goals include keeping the wound healed by good offloading and medical management under the direction of internist.    Shoe inspection. Diabetic Foot Education. Patient reminded of the importance of good nutrition/healthy diet/weight management and blood sugar control to help prevent podiatric complications of diabetes. Patient instructed on proper foot hygeine. Wear comfortable, proper fitting shoes. Wash  feet daily. Dry well. After drying, apply moisturizer to feet (no lotion to webspaces). Inspect feet daily for skin breaks, blisters, swelling, or redness. Wear cotton socks (preferably white)  Change socks every day. Do NOT walk barefoot. Do NOT use heating pads or hot water soaks. We discussed wearing proper shoe gear, daily foot inspections, never walking without protective shoe gear.

## 2022-08-17 NOTE — PROGRESS NOTES
Subjective:       Patient ID: Erica Moulton is a 77 y.o. female      Chief Complaint   Patient presents with    Concerns About Ocular Health    Diabetic Eye Exam     History of Present Illness  Dls: 5/7/21 Dr. Quiles     78 y/o female presents today for diabetic eye exam.   Pt c/o cloudy vision in am od. Pt states can see better if lifting RUL.   Pt wears otc readers.     LBS ?     No tearing  No itching  No pain  No ha's  No floaters  No flashes    Eye meds  None    Hemoglobin A1C       Date                     Value               Ref Range             Status                05/07/2022               6.7 (H)             4.0 - 5.6 %           Final                  11/03/2021               6.5 (H)             4.0 - 5.6 %           Final                  06/06/2021               8.1 (H)             4.0 - 5.6 %           Final                      Assessment/Plan:     1. Diabetic eye exam  [facility]    2. Stable proliferative diabetic retinopathy of both eyes associated with type 2 diabetes mellitus  S/p PRP and vitrectomy OU. Stable.    3. Dermatochalasis of both upper eyelids   Discussed with pt. Pt does not want surgical consult at this time.     4. Pseudophakia  Well-centered. Clear.     5. Refractive error  Pt wants Rx. Educated patient on refractive error and discussed lens options. Dispensed updated spectacle Rx. Educated about adaptation period to new specs.    Eyeglass Final Rx     Eyeglass Final Rx       Sphere Cylinder Axis Add    Right -1.00 +0.25 180 +2.75    Left -0.50 +0.50 170 +2.75    Expiration Date: 8/17/2023                  Follow up in about 1 year (around 8/17/2023) for Diabetic Eye Exam.

## 2022-08-26 ENCOUNTER — OFFICE VISIT (OUTPATIENT)
Dept: CARDIOLOGY | Facility: CLINIC | Age: 77
End: 2022-08-26
Payer: MEDICARE

## 2022-08-26 VITALS
OXYGEN SATURATION: 96 % | HEART RATE: 76 BPM | SYSTOLIC BLOOD PRESSURE: 123 MMHG | WEIGHT: 222.69 LBS | BODY MASS INDEX: 37.05 KG/M2 | DIASTOLIC BLOOD PRESSURE: 57 MMHG

## 2022-08-26 DIAGNOSIS — E03.9 HYPOTHYROIDISM (ACQUIRED): ICD-10-CM

## 2022-08-26 DIAGNOSIS — N25.81 SECONDARY RENAL HYPERPARATHYROIDISM: ICD-10-CM

## 2022-08-26 DIAGNOSIS — Z86.73 HX-TIA (TRANSIENT ISCHEMIC ATTACK): ICD-10-CM

## 2022-08-26 DIAGNOSIS — L97.509 TYPE 2 DIABETES MELLITUS WITH FOOT ULCER, WITH LONG-TERM CURRENT USE OF INSULIN: ICD-10-CM

## 2022-08-26 DIAGNOSIS — I77.1 TORTUOUS AORTA: ICD-10-CM

## 2022-08-26 DIAGNOSIS — E55.9 VITAMIN D DEFICIENCY DISEASE: ICD-10-CM

## 2022-08-26 DIAGNOSIS — Z79.4 TYPE 2 DIABETES MELLITUS WITH FOOT ULCER, WITH LONG-TERM CURRENT USE OF INSULIN: ICD-10-CM

## 2022-08-26 DIAGNOSIS — N95.2 POSTMENOPAUSAL ATROPHIC VAGINITIS: ICD-10-CM

## 2022-08-26 DIAGNOSIS — I50.32 CHRONIC HEART FAILURE WITH PRESERVED EJECTION FRACTION: ICD-10-CM

## 2022-08-26 DIAGNOSIS — N18.6 TYPE 2 DIABETES MELLITUS WITH ESRD (END-STAGE RENAL DISEASE): ICD-10-CM

## 2022-08-26 DIAGNOSIS — E78.5 HYPERLIPIDEMIA LDL GOAL <100: ICD-10-CM

## 2022-08-26 DIAGNOSIS — E11.621 TYPE 2 DIABETES MELLITUS WITH FOOT ULCER, WITH LONG-TERM CURRENT USE OF INSULIN: ICD-10-CM

## 2022-08-26 DIAGNOSIS — E66.01 SEVERE OBESITY (BMI 35.0-39.9) WITH COMORBIDITY: Primary | ICD-10-CM

## 2022-08-26 DIAGNOSIS — I27.20 PULMONARY HYPERTENSION: ICD-10-CM

## 2022-08-26 DIAGNOSIS — E11.3553 STABLE PROLIFERATIVE DIABETIC RETINOPATHY OF BOTH EYES ASSOCIATED WITH TYPE 2 DIABETES MELLITUS: ICD-10-CM

## 2022-08-26 DIAGNOSIS — Z99.2 ESRD ON HEMODIALYSIS: ICD-10-CM

## 2022-08-26 DIAGNOSIS — R33.9 INCOMPLETE BLADDER EMPTYING: ICD-10-CM

## 2022-08-26 DIAGNOSIS — Z96.1 PSEUDOPHAKIA: ICD-10-CM

## 2022-08-26 DIAGNOSIS — D57.3 SICKLE CELL TRAIT: ICD-10-CM

## 2022-08-26 DIAGNOSIS — R53.81 DEBILITY: ICD-10-CM

## 2022-08-26 DIAGNOSIS — N18.6 ESRD ON HEMODIALYSIS: ICD-10-CM

## 2022-08-26 DIAGNOSIS — E11.22 TYPE 2 DIABETES MELLITUS WITH ESRD (END-STAGE RENAL DISEASE): ICD-10-CM

## 2022-08-26 DIAGNOSIS — G47.33 OSA ON CPAP: ICD-10-CM

## 2022-08-26 PROCEDURE — 3078F PR MOST RECENT DIASTOLIC BLOOD PRESSURE < 80 MM HG: ICD-10-PCS | Mod: CPTII,S$GLB,, | Performed by: INTERNAL MEDICINE

## 2022-08-26 PROCEDURE — 1101F PR PT FALLS ASSESS DOC 0-1 FALLS W/OUT INJ PAST YR: ICD-10-PCS | Mod: CPTII,S$GLB,, | Performed by: INTERNAL MEDICINE

## 2022-08-26 PROCEDURE — 1159F PR MEDICATION LIST DOCUMENTED IN MEDICAL RECORD: ICD-10-PCS | Mod: CPTII,S$GLB,, | Performed by: INTERNAL MEDICINE

## 2022-08-26 PROCEDURE — 1160F RVW MEDS BY RX/DR IN RCRD: CPT | Mod: CPTII,S$GLB,, | Performed by: INTERNAL MEDICINE

## 2022-08-26 PROCEDURE — 99214 OFFICE O/P EST MOD 30 MIN: CPT | Mod: S$GLB,,, | Performed by: INTERNAL MEDICINE

## 2022-08-26 PROCEDURE — 1126F AMNT PAIN NOTED NONE PRSNT: CPT | Mod: CPTII,S$GLB,, | Performed by: INTERNAL MEDICINE

## 2022-08-26 PROCEDURE — 1126F PR PAIN SEVERITY QUANTIFIED, NO PAIN PRESENT: ICD-10-PCS | Mod: CPTII,S$GLB,, | Performed by: INTERNAL MEDICINE

## 2022-08-26 PROCEDURE — 1159F MED LIST DOCD IN RCRD: CPT | Mod: CPTII,S$GLB,, | Performed by: INTERNAL MEDICINE

## 2022-08-26 PROCEDURE — 1160F PR REVIEW ALL MEDS BY PRESCRIBER/CLIN PHARMACIST DOCUMENTED: ICD-10-PCS | Mod: CPTII,S$GLB,, | Performed by: INTERNAL MEDICINE

## 2022-08-26 PROCEDURE — 3288F PR FALLS RISK ASSESSMENT DOCUMENTED: ICD-10-PCS | Mod: CPTII,S$GLB,, | Performed by: INTERNAL MEDICINE

## 2022-08-26 PROCEDURE — 3078F DIAST BP <80 MM HG: CPT | Mod: CPTII,S$GLB,, | Performed by: INTERNAL MEDICINE

## 2022-08-26 PROCEDURE — 3288F FALL RISK ASSESSMENT DOCD: CPT | Mod: CPTII,S$GLB,, | Performed by: INTERNAL MEDICINE

## 2022-08-26 PROCEDURE — 3074F PR MOST RECENT SYSTOLIC BLOOD PRESSURE < 130 MM HG: ICD-10-PCS | Mod: CPTII,S$GLB,, | Performed by: INTERNAL MEDICINE

## 2022-08-26 PROCEDURE — 99999 PR PBB SHADOW E&M-EST. PATIENT-LVL IV: ICD-10-PCS | Mod: PBBFAC,,, | Performed by: INTERNAL MEDICINE

## 2022-08-26 PROCEDURE — 3074F SYST BP LT 130 MM HG: CPT | Mod: CPTII,S$GLB,, | Performed by: INTERNAL MEDICINE

## 2022-08-26 PROCEDURE — 1101F PT FALLS ASSESS-DOCD LE1/YR: CPT | Mod: CPTII,S$GLB,, | Performed by: INTERNAL MEDICINE

## 2022-08-26 PROCEDURE — 99999 PR PBB SHADOW E&M-EST. PATIENT-LVL IV: CPT | Mod: PBBFAC,,, | Performed by: INTERNAL MEDICINE

## 2022-08-26 PROCEDURE — 99214 PR OFFICE/OUTPT VISIT, EST, LEVL IV, 30-39 MIN: ICD-10-PCS | Mod: S$GLB,,, | Performed by: INTERNAL MEDICINE

## 2022-08-26 NOTE — PROGRESS NOTES
CARDIOVASCULAR CONSULTATION    REASON FOR CONSULT:   Erica Moulton is a 77 y.o. female who presents for establishing care with me..      HISTORY OF PRESENT ILLNESS:     Notes from November 2019:  Patient here for follow-up.  Denies any chest pains at rest on exertion, orthopnea, PND.  Stress test did not show any significant ischemia.        Notes from October 2019:  Patient here for follow-up.  Denies any chest pains at rest on exertion, orthopnea, PND.  Has been started on dialysis since her last visit with me.  States that she feels great after starting dialysis and feels like she has more appetite and a breathing has gotten better.  Walks slowly with the help of a walker.  Denies PND.  States that since the dialysis her orthopnea has gotten much better.      Notes from August 2019:  Patient doing fine.  Denies any chest pains at rest on exertion, orthopnea, PND.  Does have chronic dyspnea on exertion.  States that she feels her pedal edema is going down as she is responding to Bumex better.      Notes from June 2019:  Patient is a very pleasant 74-year-old lady with a past medical history of heart failure with preserved ejection fraction, diabetes, chronic kidney disease, leg wounds, hypertension who is here for follow-up.  She denies any chest pains at rest on exertion, orthopnea, PND.  Denies any claudication like symptoms.  States that she [facility].  Has been doing salt restriction diet.      Notes from May 2022:  Patient here for follow-up after a long hiatus.  In the interim had a PCA stroke.  In 2021. Otherwise has been doing fine.  Denies any chest pains at rest on exertion, orthopnea, PND.  Was taken off her antihypertensives because of hypotension during dialysis.  Currently on midodrine for that.    Notes from August 2022: Patient here for follow-up.  Denies any chest pains at rest on exertion orthopnea PND.  Has been doing fine.      PAST MEDICAL HISTORY:      Past Medical History:   Diagnosis Date    Acute ischemic right PCA stroke 6/6/2021    Acute respiratory failure with hypoxia     Age-related osteoporosis without current pathological fracture 3/8/2021    Anemia of chronic kidney failure, stage 4 (severe) 4/5/2019    Cataracts, bilateral     CHF (congestive heart failure)     CKD (chronic kidney disease) stage 3, GFR 30-59 ml/min     CKD (chronic kidney disease) stage 3, GFR 30-59 ml/min     Controlled type 2 diabetes mellitus with proteinuria or albuminuria     Depression     Diabetes with neurologic complications     Diabetic retinopathy of both eyes     Edema     Glaucoma     History of colonic polyps     Hx-TIA (transient ischemic attack) 11/2008    Hyperlipidemia LDL goal < 100     Hypertension     Hypothyroidism     Major depressive disorder, single episode, mild 2/17/2016    Mixed incontinence urge and stress     Obesity     Obstructive sleep apnea on CPAP     7/19/19:  Home CPAP machine broken, per patient & son    Osteopenia     Proteinuria     Sickle cell trait     Strabismus     TIA (transient ischemic attack)     Trouble in sleeping     Type 2 diabetes mellitus with ophthalmic manifestations     Type 2 diabetes with stage 3 chronic kidney disease GFR 30-59     Type II or unspecified type diabetes mellitus with renal manifestations, uncontrolled(250.42)     Uncontrolled type 2 diabetes mellitus with peripheral circulatory disorder 4/5/2019    Urge incontinence 1/11/2016    Urge incontinence     Venous stasis ulcer     bilateral lower legs    Vitamin D deficiency disease        PAST SURGICAL HISTORY:     Past Surgical History:   Procedure Laterality Date    AV FISTULA PLACEMENT Left 11/27/2019    Procedure: CREATION, AV FISTULA, LEFT UPPER EXTREMITY;  Surgeon: Keron Perez MD;  Location: [facility];  Service: Vascular;  Laterality: Left;    BREAST BIOPSY      breast reduction Bilateral age 31    BREAST  SURGERY      CATARACT EXTRACTION Bilateral     cataracts Bilateral      SECTION, LOW TRANSVERSE      x1    CHOLECYSTECTOMY      COLONOSCOPY N/A 2022    Procedure: COLONOSCOPY;  Surgeon: Mahesh Huang MD;  [facility]  Service: Endoscopy;  Laterality: N/A;  fully vaccinated-sm.  RAPID COVID  +cologuard needing ASAP  Dialysis pt T,TH Sat and left UA access  2nd floor - cardiac/dialysis/SOB / LABS / prep ins. emailed - [facility]    EYE SURGERY  2014, 2014    vitrectomy    EYE SURGERY Right 2016    FISTULOGRAM Left 3/6/2020    Procedure: Fistulogram, left upper extremity, with branch ligation;  Surgeon: Keron Perez MD;  Location: [facility] OR [address] [place] [address];  Service: Vascular;  Laterality: Left;  Time 1.1 Minute  42.10 mGy    FISTULOGRAM Left 2020    Procedure: Fistulogram, left upper extremity, transradial access with possible intervention;  Surgeon: Keron Perez MD;  Location: [address];  Service: Vascular;  Laterality: Left;    FISTULOGRAM Left 2020    Procedure: Fistulogram, left upper extremity, possible intervention;  Surgeon: Keron Perez MD;  Location: [facility];  Service: Vascular;  Laterality: Left;  930 AM START  RN PRE OP  ---COVID NEGATIVE ON  2020. [place]    FISTULOGRAM Left 2022    Procedure: Fistulogram, transradial access;  Surgeon: Keron Perez MD;  Location: [address];  Service: Vascular;  Laterality: Left;  mgy-40.16  gycm-8.3905  contrast-34cc  time-12.4min    HYSTERECTOMY      TAHBSO (patient is unsure if ovaries removed)    OOPHORECTOMY      PERCUTANEOUS TRANSLUMINAL ANGIOPLASTY OF ARTERIOVENOUS FISTULA Left 2020    Procedure: PTA, AV FISTULA;  Surgeon: Keron Perez MD;  Location: [address];  Service: Vascular;  Laterality: Left;  15.9 minutes of fluro  41.12  mGy  7.9060 Gy cm2  32ml  contrast    PHLEBOGRAPHY Left 2020    Procedure: CENTRAL VENOGRAM;  Surgeon:  Keron Perez MD;  Location: [address];  Service: Vascular;  Laterality: Left;    REFRACTIVE SURGERY      TOTAL REDUCTION MAMMOPLASTY      approx 10 yrs ago    VENOPLASTY  1/21/2022    Procedure: ANGIOPLASTY, VEIN;  Surgeon: Keron Perez MD;  Location: [address];  Service: Vascular;;       ALLERGIES AND MEDICATION:     Review of patient's allergies indicates:   Allergen Reactions    Ace inhibitors Other (See Comments)     Other reaction(s): cough        Medication List          Accurate as of August 26, 2022  9:52 AM. If you have any questions, ask your nurse or doctor.            CONTINUE taking these medications    ACCU-CHEK SOFT DEV LANCETS Kit  Generic drug: lancing device with lancets     aspirin 81 MG EC tablet  Commonly known as: ECOTRIN  Take 1 tablet (81 mg total) by mouth once daily.     atorvastatin 80 MG tablet  Commonly known as: LIPITOR  TAKE 1 TABLET (80 MG TOTAL) BY MOUTH EVERY EVENING.     blood sugar diagnostic Strp  One test strip use 2 times a day to check blood glucose,  ICD-10: E11.9, compatible with insurance/glucometer     blood-glucose meter kit  One glucometer, use to check blood glucose.   ICD-10: E11.9. Dispense machine covered by insurance     cinacalcet 30 MG Tab  Commonly known as: SENSIPAR     docusate sodium 100 MG capsule  Commonly known as: COLACE     doxercalciferoL 4 mcg/2 mL injection  Commonly known as: HECTOROL     EPOGEN INJ     * LANCETS MISC     * lancets Misc  One lancets use 2 times a day to check blood glucose, ICD-10: E11.9     lancing device Misc  One device, used to check blood glucose, ICD-10: E11.9     levothyroxine 50 MCG tablet  Commonly known as: SYNTHROID  TAKE 1 TABLET BEFORE BREAKFAST     midodrine 5 MG Tab  Commonly known as: PROAMATINE  Take 1 tablet (5 mg total) by mouth 3 (three) times daily.     OLENA-NABIL RX 1- mg-mg-mcg Tab  Generic drug: vit b cmplx 3-fa-vit c-biotin 1- mg-mg-mcg     sevelamer carbonate 800 mg  Tab  Commonly known as: RENVELA  Take 3 tablets (2,400 mg total) by mouth 3 (three) times daily with meals.     TRULICITY 0.75 mg/0.5 mL pen injector  Generic drug: dulaglutide  Inject 0.75 mg into the skin every 7 days.         * This list has 2 medication(s) that are the same as other medications prescribed for you. Read the directions carefully, and ask your doctor or other care provider to review them with you.                SOCIAL HISTORY:     Social History     Socioeconomic History    Marital status: Single    Number of children: 5   Occupational History    Occupation:      Employer: [employer]     Comment: part-time   Tobacco Use    Smoking status: Never Smoker    Smokeless tobacco: Never Used   Substance and Sexual Activity    Alcohol use: No     Alcohol/week: 0.0 standard drinks    Drug use: No    Sexual activity: Not Currently     Partners: Male     Birth control/protection: Post-menopausal, Surgical     Social Determinants of Health     Financial Resource Strain: Low Risk     Difficulty of Paying Living Expenses: Not hard at all   Food Insecurity: No Food Insecurity    Worried About Running Out of Food in the Last Year: Never true    Ran Out of Food in the Last Year: Never true   Transportation Needs: No Transportation Needs    Lack of Transportation (Medical): No    Lack of Transportation (Non-Medical): No   Physical Activity: Insufficiently Active    Days of Exercise per Week: 2 days    Minutes of Exercise per Session: 10 min   Stress: No Stress Concern Present    Feeling of Stress : Only a little   Social Connections: Socially Isolated    Frequency of Communication with Friends and Family: More than three times a week    Frequency of Social Gatherings with Friends and Family: More than three times a week    Attends Orthodoxy Services: Never    Active Member of Clubs or Organizations: No    Attends Club or Organization Meetings: Never    Marital  Status:    Housing Stability: Low Risk     Unable to Pay for Housing in the Last Year: No    Number of Places Lived in the Last Year: 1    Unstable Housing in the Last Year: No       FAMILY HISTORY:     Family History   Problem Relation Age of Onset    Stroke Mother     Diabetes Mother     Hypertension Mother     Cataracts Mother     Leukemia Father     Cataracts Father     Ovarian cancer Sister 35    Achondroplasia Sister     HIV Brother     No Known Problems Daughter     No Known Problems Son     Breast cancer Maternal Aunt 65    Parkinsonism Maternal Aunt     Esophageal cancer Maternal Uncle         smoker    No Known Problems Paternal Aunt     Cataracts Paternal Uncle     Cataracts Maternal Grandmother     Cataracts Maternal Grandfather     Diabetes Paternal Grandmother     Cataracts Paternal Grandmother     No Known Problems Paternal Grandfather     No Known Problems Other     Amblyopia Neg Hx     Blindness Neg Hx     Glaucoma Neg Hx     Macular degeneration Neg Hx     Retinal detachment Neg Hx     Strabismus Neg Hx     Thyroid disease Neg Hx     Colon cancer Neg Hx     Cancer Neg Hx        REVIEW OF SYSTEMS:   Review of Systems   Constitutional: Negative.    HENT: Negative.    Eyes: Negative.    Respiratory: Positive for shortness of breath.    Cardiovascular: Negative.    Gastrointestinal: Negative.    Genitourinary: Negative.    Musculoskeletal: Negative.    Skin: Negative.    Neurological: Negative.    Endo/Heme/Allergies: Negative.        A 10 point review of systems was performed and all the pertinent positives have been mentioned. Rest of review of systems was negative.        PHYSICAL EXAM:     Vitals:    08/26/22 0935   BP: (!) 123/57   Pulse: 76    Body mass index is 37.05 kg/m².  Weight: 101 kg (222 lb 10.6 oz)         Physical Exam  Vitals and nursing note reviewed.   Constitutional:       Appearance: Normal appearance. She is well-developed.   HENT:       Head: Normocephalic and atraumatic.      Right Ear: Hearing normal.      Left Ear: Hearing normal.      Nose: Nose normal.   Eyes:      General: Lids are normal.      Conjunctiva/sclera: Conjunctivae normal.      Pupils: Pupils are equal, round, and reactive to light.   Cardiovascular:      Rate and Rhythm: Normal rate and regular rhythm.      Pulses: Normal pulses.      Heart sounds: Normal heart sounds.   Pulmonary:      Effort: Pulmonary effort is normal.      Breath sounds: Normal breath sounds.   Abdominal:      Palpations: Abdomen is soft.      Tenderness: There is no abdominal tenderness.   Musculoskeletal:         General: No deformity.      Cervical back: Normal range of motion and neck supple.   Skin:     General: Skin is warm and dry.   Neurological:      Mental Status: She is alert and oriented to person, place, and time.   Psychiatric:         Speech: Speech normal.           DATA:     Laboratory:  CBC:  Recent Labs   Lab 11/03/21  0857 12/13/21  1627 01/14/22  1010 05/07/22  0801   WBC 6.44 7.44  --  6.83   Hemoglobin 12.4 12.4  --  12.4   POC Hematocrit  --   --  39  --    Hematocrit 38.6 38.3  --  39.3   Platelets 235 216  --  248       CHEMISTRIES:  Recent Labs   Lab 11/20/19  1501 11/27/19  0541 03/01/21  0820 05/07/21  0710 06/07/21  0335 06/08/21  0341 11/03/21  0857 12/13/21  1627 01/14/22  0903 05/07/22  0801   Glucose 188 H   < > 160 H   < > 138 H   < > 120 H 194 H  --  98   Sodium 136   < > 140   < > 139   < > 143 140  --  139   Potassium 3.6   < > 4.3   < > 5.5 H   < > 5.5 H 5.9 H 4.2 4.9   BUN 35 H   < > 75 H   < > 55 H   < > 52 H 68 H  --  51 H   Creatinine 4.3 H   < > 7.9 H   < > 8.5 H   < > 7.8 H 10.8 H  --  9.6 H   eGFR if  11 A   < > 5 A   < > 5 A   < > 5.3 A 4 A  --  4.1 A   eGFR if non African American 10 A   < > 5 A   < > 4 A   < > 4.6 A 3 A  --  3.6 A   Calcium 8.5 L   < > 8.2 L   < > 8.8   < > 9.0 8.1 L  --  9.3   Magnesium 2.1  --  2.4  --  2.7 H  --   --   --    --   --     < > = values in this interval not displayed.       CARDIAC BIOMARKERS:  Recent Labs   Lab 06/06/21  1518 06/06/21  2116 06/07/21  0335   Troponin I 0.036 H 0.033 H 0.037 H       COAGS:  Recent Labs   Lab 11/20/19  1501 06/06/21  0939 06/07/21  0335   INR 1.0 1.0 1.0       LIPIDS/LFTS:  Recent Labs   Lab 06/06/21  0939 06/07/21  0335 11/03/21  0857 12/13/21  1627 05/07/22  0801   Cholesterol 143  --  127  --  165   Triglycerides 69  --  53  --  80   HDL 48  --  52  --  48   LDL Cholesterol 81.2  --  64.4  --  101.0   Non-HDL Cholesterol 95  --  75  --  117   AST 12   < > 21 17 17   ALT 11   < > 20 15 11    < > = values in this interval not displayed.       Hemoglobin A1C   Date Value Ref Range Status   05/07/2022 6.7 (H) 4.0 - 5.6 % Final     Comment:     ADA Screening Guidelines:  5.7-6.4%  Consistent with prediabetes  >or=6.5%  Consistent with diabetes    High levels of fetal hemoglobin interfere with the HbA1C  assay. Heterozygous hemoglobin variants (HbS, HgC, etc)do  not significantly interfere with this assay.   However, presence of multiple variants may affect accuracy.     11/03/2021 6.5 (H) 4.0 - 5.6 % Final     Comment:     ADA Screening Guidelines:  5.7-6.4%  Consistent with prediabetes  >or=6.5%  Consistent with diabetes    High levels of fetal hemoglobin interfere with the HbA1C  assay. Heterozygous hemoglobin variants (HbS, HgC, etc)do  not significantly interfere with this assay.   However, presence of multiple variants may affect accuracy.     06/06/2021 8.1 (H) 4.0 - 5.6 % Final     Comment:     ADA Screening Guidelines:  5.7-6.4%  Consistent with prediabetes  >or=6.5%  Consistent with diabetes    High levels of fetal hemoglobin interfere with the HbA1C  assay. Heterozygous hemoglobin variants (HbS, HgC, etc)do  not significantly interfere with this assay.   However, presence of multiple variants may affect accuracy.     03/09/2021 8.5 (H) 0.0 - 5.6 % Final     Comment:     [facility]    12/15/2020 7.9 (H) 0.0 - 5.6 % Final     Comment:     [facility]       TSH  Recent Labs   Lab 03/01/21  0820 06/06/21  0939 11/03/21  0857   TSH 1.751 2.200 2.631       The 10-year ASCVD risk score (Norma BABCOCK Jr., et al., 2013) is: 29.3%    Values used to calculate the score:      Age: 77 years      Sex: Female      Is Non- : Yes      Diabetic: Yes      Tobacco smoker: No      Systolic Blood Pressure: 123 mmHg      Is BP treated: No      HDL Cholesterol: 48 mg/dL      Total Cholesterol: 165 mg/dL           Cardiovascular Testing:      Echo: 6-17  CONCLUSIONS     1 - Normal left ventricular systolic function (EF 65-70%).     2 - No diagnostic regional wall motion abnormalities.     3 - Concentric remodeling.     4 - Impaired LV relaxation, elevated LAP (grade 2 diastolic dysfunction).     5 - Trivial tricuspid regurgitation.     6 - The estimated PA systolic pressure is greater than 18 mmHg.      NST: 7-17  Impression: NORMAL MYOCARDIAL PERFUSION  1. The perfusion scan is free of evidence for myocardial ischemia or injury.   2. Resting wall motion is physiologic.   3. Visually estimated LV function is normal.   4. The ventricular volumes are normal at rest and stress.   5. The extracardiac distribution of radioactivity is normal.      Carotid ultrasound:  5-18  CONCLUSIONS   There is 0 - 19% right Internal Carotid stenosis.  There is 0 - 19% left Internal Carotid stenosis.     PAM:  10-18  · Elevated bilateral PAM suggesting calcified noncompressible vessels     LDL-53    3-19     Lower extremity arterial ultrasound:  · No hemodynamically significant plaque bilaterally  · PAM consistent with medial calcinosis    Renal artery ultrasound in June 2019:  · This was a technically difficult study. This study was limited due to excessive bowel gas.  · There is insignificant stenosis (0-59%) in the Right Renal Artery.  · Elevated right renal resistive index suggestive of intrinsic kidney  disease.  · Right kidney 12.00 cm.  · There is insignificant stenosis (0-59%) in the Left Renal Artery.  · Elevated left renal resistive index suggestive of intrinsic kidney disease.  · Left kidney 11.80 cm.    Ultrasound legs in November 2018:    · No hemodynamically significant plaque bilaterally  · PAM consistent with medial calcinosis    ABIs in October 2018:    · Elevated bilateral PAM suggesting calcified noncompressible vessels          ASSESSMENT AND PLAN     Patient Active Problem List   Diagnosis    Severe obesity (BMI 35.0-39.9) with comorbidity    Sickle cell trait    Hyperlipidemia LDL goal <100    HUMBERTO on CPAP    Hypothyroidism (acquired)    Hx-TIA (transient ischemic attack)    Vitamin D deficiency disease    Pulmonary hypertension    Type 2 diabetes mellitus with ESRD (end-stage renal disease)    Secondary renal hyperparathyroidism    Incomplete bladder emptying    Postmenopausal atrophic vaginitis    Rectocele    Mixed incontinence urge and stress    Chronic diastolic heart failure    Tortuous aorta    ESRD on hemodialysis    Anemia of chronic disease    Debility    Chronic respiratory failure    Type 2 diabetes mellitus with foot ulcer, with long-term current use of insulin    Stable proliferative diabetic retinopathy associated with type 2 diabetes mellitus    Heart failure with preserved ejection fraction    Pseudophakia    Chronic kidney disease-mineral and bone disorder    Age-related osteoporosis without current pathological fracture    H/O: stroke       1.   Episodes of hypotension during dialysis.  On midodrine    2.  Heart failure with preserved ejection fraction.  Now on dialysis.  Continue current therapy.    3.  Diabetes:  Being managed by primary    4.  Continue follow-up with Wound care clinic. Wound has healed.     5.  Dyslipidemia:  On Lipitor    6.  Cva: check event monitor to r/o paroxysmal afib    7.  End-stage renal disease.      Follow-up in 6  months      Thank you very much for involving me in the care of your patient.  Please do not hesitate to contact me if there are any questions.      Murali Li MD, FACC, [facility]  Interventional Cardiologist, [facility].     Follow-up in 3-4 months      This note was dictated with the help of speech recognition software.  There might be un-intended errors and/or substitutions.

## 2022-09-13 LAB — HBA1C MFR BLD: 6.3 % (ref 0–5.6)

## 2022-09-22 RX ORDER — ATORVASTATIN CALCIUM 80 MG/1
80 TABLET, FILM COATED ORAL NIGHTLY
Qty: 90 TABLET | Refills: 3 | Status: SHIPPED | OUTPATIENT
Start: 2022-09-22 | End: 2023-11-20 | Stop reason: SDUPTHER

## 2022-09-22 NOTE — TELEPHONE ENCOUNTER
No new care gaps identified.  [facility] Embedded Care Gaps. Reference number: [id number]. 9/22/2022   12:43:35 PM CDT

## 2022-09-22 NOTE — TELEPHONE ENCOUNTER
Refill Decision Note   Erica Moulton  is requesting a refill authorization.  Brief Assessment and Rationale for Refill:  Approve     Medication Therapy Plan:       Medication Reconciliation Completed: No   Comments:     No Care Gaps recommended.     Note composed:2:23 PM 09/22/2022

## 2022-09-28 ENCOUNTER — HOSPITAL ENCOUNTER (OUTPATIENT)
Dept: CARDIOLOGY | Facility: HOSPITAL | Age: 77
Discharge: HOME | End: 2022-09-28
Attending: SURGERY
Payer: MEDICARE

## 2022-09-28 ENCOUNTER — OFFICE VISIT (OUTPATIENT)
Dept: VASCULAR SURGERY | Facility: CLINIC | Age: 77
End: 2022-09-28
Payer: MEDICARE

## 2022-09-28 VITALS
SYSTOLIC BLOOD PRESSURE: 118 MMHG | DIASTOLIC BLOOD PRESSURE: 67 MMHG | WEIGHT: 222.75 LBS | BODY MASS INDEX: 37.07 KG/M2

## 2022-09-28 DIAGNOSIS — T82.858D ARTERIOVENOUS FISTULA STENOSIS, SUBSEQUENT ENCOUNTER: Primary | ICD-10-CM

## 2022-09-28 DIAGNOSIS — Z99.2 ESRD ON HEMODIALYSIS: ICD-10-CM

## 2022-09-28 DIAGNOSIS — N18.6 ESRD ON HEMODIALYSIS: ICD-10-CM

## 2022-09-28 LAB
HD FISTULA OUTFLOW VEIN LOCATION: NORMAL
HD FISTULA OUTFLOW VEIN VESSEL: NORMAL
HD INFLOW ARTERY LOCATION: NORMAL
HD INFLOW ARTERY VESSEL: NORMAL
RIGHT DIS GRAFT PSV: 86 CM/ SEC
RIGHT INFLOW PSV: 208 CM/S
RIGHT MID GRAFT PSV: 149 CM/S
RIGHT OUTFLOW VEIN PSV: 95 CM/ SEC
RIGHT PROX ANA PSV: 610 CM/S
RIGHT PROX GRAFT PSV: 377 CM/S
RIGHT VOLUME FLOW PSV: 2254 ML/MIN

## 2022-09-28 PROCEDURE — 1126F AMNT PAIN NOTED NONE PRSNT: CPT | Mod: CPTII,S$GLB,, | Performed by: NURSE PRACTITIONER

## 2022-09-28 PROCEDURE — 3078F DIAST BP <80 MM HG: CPT | Mod: CPTII,S$GLB,, | Performed by: NURSE PRACTITIONER

## 2022-09-28 PROCEDURE — 99214 OFFICE O/P EST MOD 30 MIN: CPT | Mod: S$GLB,,, | Performed by: NURSE PRACTITIONER

## 2022-09-28 PROCEDURE — 1159F MED LIST DOCD IN RCRD: CPT | Mod: CPTII,S$GLB,, | Performed by: NURSE PRACTITIONER

## 2022-09-28 PROCEDURE — 3288F PR FALLS RISK ASSESSMENT DOCUMENTED: ICD-10-PCS | Mod: CPTII,S$GLB,, | Performed by: NURSE PRACTITIONER

## 2022-09-28 PROCEDURE — 93990 CV US HEMODIALYSIS ACCESS (CUPID ONLY): ICD-10-PCS | Mod: 26,,, | Performed by: SURGERY

## 2022-09-28 PROCEDURE — 99214 PR OFFICE/OUTPT VISIT, EST, LEVL IV, 30-39 MIN: ICD-10-PCS | Mod: S$GLB,,, | Performed by: NURSE PRACTITIONER

## 2022-09-28 PROCEDURE — 3288F FALL RISK ASSESSMENT DOCD: CPT | Mod: CPTII,S$GLB,, | Performed by: NURSE PRACTITIONER

## 2022-09-28 PROCEDURE — 99999 PR PBB SHADOW E&M-EST. PATIENT-LVL III: CPT | Mod: PBBFAC,,, | Performed by: SURGERY

## 2022-09-28 PROCEDURE — 93990 DOPPLER FLOW TESTING: CPT | Mod: 26,,, | Performed by: SURGERY

## 2022-09-28 PROCEDURE — 1101F PR PT FALLS ASSESS DOC 0-1 FALLS W/OUT INJ PAST YR: ICD-10-PCS | Mod: CPTII,S$GLB,, | Performed by: NURSE PRACTITIONER

## 2022-09-28 PROCEDURE — 1101F PT FALLS ASSESS-DOCD LE1/YR: CPT | Mod: CPTII,S$GLB,, | Performed by: NURSE PRACTITIONER

## 2022-09-28 PROCEDURE — 3074F PR MOST RECENT SYSTOLIC BLOOD PRESSURE < 130 MM HG: ICD-10-PCS | Mod: CPTII,S$GLB,, | Performed by: NURSE PRACTITIONER

## 2022-09-28 PROCEDURE — 99499 UNLISTED E&M SERVICE: CPT | Mod: S$GLB,,, | Performed by: NURSE PRACTITIONER

## 2022-09-28 PROCEDURE — 1159F PR MEDICATION LIST DOCUMENTED IN MEDICAL RECORD: ICD-10-PCS | Mod: CPTII,S$GLB,, | Performed by: NURSE PRACTITIONER

## 2022-09-28 PROCEDURE — 3074F SYST BP LT 130 MM HG: CPT | Mod: CPTII,S$GLB,, | Performed by: NURSE PRACTITIONER

## 2022-09-28 PROCEDURE — 93990 DOPPLER FLOW TESTING: CPT | Mod: TC

## 2022-09-28 PROCEDURE — 3078F PR MOST RECENT DIASTOLIC BLOOD PRESSURE < 80 MM HG: ICD-10-PCS | Mod: CPTII,S$GLB,, | Performed by: NURSE PRACTITIONER

## 2022-09-28 PROCEDURE — 99999 PR PBB SHADOW E&M-EST. PATIENT-LVL III: ICD-10-PCS | Mod: PBBFAC,,, | Performed by: SURGERY

## 2022-09-28 PROCEDURE — 1126F PR PAIN SEVERITY QUANTIFIED, NO PAIN PRESENT: ICD-10-PCS | Mod: CPTII,S$GLB,, | Performed by: NURSE PRACTITIONER

## 2022-09-28 NOTE — PROGRESS NOTES
[facility]                         09/28/2022    HPI:  Erica Moulton is a 77 y.o. female with   Patient Active Problem List   Diagnosis    Severe obesity (BMI 35.0-39.9) with comorbidity    Sickle cell trait    Hyperlipidemia LDL goal <100    HUMBERTO on CPAP    Hypothyroidism (acquired)    Hx-TIA (transient ischemic attack)    Vitamin D deficiency disease    Pulmonary hypertension    Type 2 diabetes mellitus with ESRD (end-stage renal disease)    Secondary renal hyperparathyroidism    Incomplete bladder emptying    Postmenopausal atrophic vaginitis    Rectocele    Mixed incontinence urge and stress    Chronic diastolic heart failure    Tortuous aorta    ESRD on hemodialysis    Anemia of chronic disease    Debility    Chronic respiratory failure    Type 2 diabetes mellitus with foot ulcer, with long-term current use of insulin    Stable proliferative diabetic retinopathy associated with type 2 diabetes mellitus    Heart failure with preserved ejection fraction    Pseudophakia    Chronic kidney disease-mineral and bone disorder    Age-related osteoporosis without current pathological fracture    H/O: stroke    being managed by PCP and specialists who is here today for evaluation of long term HD access.  S/p R IJ tunneled HD catheter, HD without issues.  Pt is R handed.  No complaints today.  Denies orthopnea, no chest pain.  Unable to climb 1 flight of stairs on own.    no MI  no Stroke  Tobacco use: denies    1/20/20: No new issues.    3/2020: Dialysis without issues.    4/6/20:  S/p LUE fistulogram, central venogram, ligation of medial side branch cephalic vein, ligation of lateral side branch cephalic vein.  No issues with HD for several weeks since procedure.    7/6/20:  No issues with HD.    8/3/20: s/p L proximal fistula angioplasty 7/21/20.  No issues with HD.    8/31/20:  S/p 8/19/29 Balloon angioplasty of the proximal LUE AVF with a 6x60 Angiosculpt and Stellerex  balloon.      10/2020:  No issues with HD    1/2021:  No issues with HD    4/2021:  No new problems.    8/2021:  No issues with HD. C/o fatigue after HD.  Has not seen cardiology recently.    3/2022:  No issues with HD    6/2022:  Doing well, no issues with HD    9/2022: No issues with HD    Past Medical History:   Diagnosis Date    Acute ischemic right PCA stroke 6/6/2021    Acute respiratory failure with hypoxia     Age-related osteoporosis without current pathological fracture 3/8/2021    Anemia of chronic kidney failure, stage 4 (severe) 4/5/2019    Cataracts, bilateral     CHF (congestive heart failure)     CKD (chronic kidney disease) stage 3, GFR 30-59 ml/min     CKD (chronic kidney disease) stage 3, GFR 30-59 ml/min     Controlled type 2 diabetes mellitus with proteinuria or albuminuria     Depression     Diabetes with neurologic complications     Diabetic retinopathy of both eyes     Edema     Glaucoma     History of colonic polyps     Hx-TIA (transient ischemic attack) 11/2008    Hyperlipidemia LDL goal < 100     Hypertension     Hypothyroidism     Major depressive disorder, single episode, mild 2/17/2016    Mixed incontinence urge and stress     Obesity     Obstructive sleep apnea on CPAP     7/19/19:  Home CPAP machine broken, per patient & son    Osteopenia     Proteinuria     Sickle cell trait     Strabismus     TIA (transient ischemic attack)     Trouble in sleeping     Type 2 diabetes mellitus with ophthalmic manifestations     Type 2 diabetes with stage 3 chronic kidney disease GFR 30-59     Type II or unspecified type diabetes mellitus with renal manifestations, uncontrolled(250.42)     Uncontrolled type 2 diabetes mellitus with peripheral circulatory disorder 4/5/2019    Urge incontinence 1/11/2016    Urge incontinence     Venous stasis ulcer     bilateral lower legs    Vitamin D deficiency disease      Past Surgical History:   Procedure Laterality Date    AV FISTULA PLACEMENT Left 11/27/2019     Procedure: CREATION, AV FISTULA, LEFT UPPER EXTREMITY;  Surgeon: Keron Perez MD;  Location: [facility] OR;  Service: Vascular;  Laterality: Left;    BREAST BIOPSY      breast reduction Bilateral age 31    BREAST SURGERY      CATARACT EXTRACTION Bilateral     cataracts Bilateral      SECTION, LOW TRANSVERSE      x1    CHOLECYSTECTOMY      COLONOSCOPY N/A 2022    Procedure: COLONOSCOPY;  Surgeon: Mahesh Huang MD;  [facility]  Service: Endoscopy;  Laterality: N/A;  fully vaccinated-sm.  RAPID COVID  +cologuard needing ASAP  Dialysis pt T,TH Sat and left UA access  2nd floor - cardiac/dialysis/SOB / LABS / prep ins. emailed - ERW    EYE SURGERY  2014, 2014    vitrectomy    EYE SURGERY Right 2016    FISTULOGRAM Left 3/6/2020    Procedure: Fistulogram, left upper extremity, with branch ligation;  Surgeon: Keron Perez MD;  Location: [address];  Service: Vascular;  Laterality: Left;  Time 1.1 Minute  42.10 mGy    FISTULOGRAM Left 2020    Procedure: Fistulogram, left upper extremity, transradial access with possible intervention;  Surgeon: Keron Perez MD;  Location: [address];  Service: Vascular;  Laterality: Left;    FISTULOGRAM Left 2020    Procedure: Fistulogram, left upper extremity, possible intervention;  Surgeon: Keron Perez MD;  Location: [facility];  Service: Vascular;  Laterality: Left;  930 AM START  RN PRE OP  ---COVID NEGATIVE ON  2020. [place]    FISTULOGRAM Left 2022    Procedure: Fistulogram, transradial access;  Surgeon: Keron Perez MD;  Location: [address];  Service: Vascular;  Laterality: Left;  mgy-40.16  gycm-8.3905  contrast-34cc  time-12.4min    HYSTERECTOMY      TAHBSO (patient is unsure if ovaries removed)    OOPHORECTOMY      PERCUTANEOUS TRANSLUMINAL ANGIOPLASTY OF ARTERIOVENOUS FISTULA Left 2020    Procedure: PTA, AV FISTULA;  Surgeon: Keron Perez MD;  Location:  [facility] OR 2ND FLR;  Service: Vascular;  Laterality: Left;  15.9 minutes of fluro  41.12  mGy  7.9060 Gy cm2  32ml  contrast    PHLEBOGRAPHY Left 7/21/2020    Procedure: CENTRAL VENOGRAM;  Surgeon: Keron Perez MD;  [facility];  Service: Vascular;  Laterality: Left;    REFRACTIVE SURGERY      TOTAL REDUCTION MAMMOPLASTY      approx 10 yrs ago    VENOPLASTY  1/21/2022    Procedure: ANGIOPLASTY, VEIN;  Surgeon: Keron Perez MD;  Location: [facility] OR [address] [place] [address];  Service: Vascular;;     Family History   Problem Relation Age of Onset    Stroke Mother     Diabetes Mother     Hypertension Mother     Cataracts Mother     Leukemia Father     Cataracts Father     Ovarian cancer Sister 35    Achondroplasia Sister     HIV Brother     No Known Problems Daughter     No Known Problems Son     Breast cancer Maternal Aunt 65    Parkinsonism Maternal Aunt     Esophageal cancer Maternal Uncle         smoker    No Known Problems Paternal Aunt     Cataracts Paternal Uncle     Cataracts Maternal Grandmother     Cataracts Maternal Grandfather     Diabetes Paternal Grandmother     Cataracts Paternal Grandmother     No Known Problems Paternal Grandfather     No Known Problems Other     Amblyopia Neg Hx     Blindness Neg Hx     Glaucoma Neg Hx     Macular degeneration Neg Hx     Retinal detachment Neg Hx     Strabismus Neg Hx     Thyroid disease Neg Hx     Colon cancer Neg Hx     Cancer Neg Hx      Social History     Socioeconomic History    Marital status: Single    Number of children: 5   Occupational History    Occupation:      Employer: [employer]     Comment: part-time   Tobacco Use    Smoking status: Never    Smokeless tobacco: Never   Substance and Sexual Activity    Alcohol use: No     Alcohol/week: 0.0 standard drinks    Drug use: No    Sexual activity: Not Currently     Partners: Male     Birth control/protection: Post-menopausal, Surgical     Social Determinants of Health      Financial Resource Strain: Low Risk     Difficulty of Paying Living Expenses: Not hard at all   Food Insecurity: No Food Insecurity    Worried About Running Out of Food in the Last Year: Never true    Ran Out of Food in the Last Year: Never true   Transportation Needs: No Transportation Needs    Lack of Transportation (Medical): No    Lack of Transportation (Non-Medical): No   Physical Activity: Insufficiently Active    Days of Exercise per Week: 2 days    Minutes of Exercise per Session: 10 min   Stress: No Stress Concern Present    Feeling of Stress : Only a little   Social Connections: Socially Isolated    Frequency of Communication with Friends and Family: More than three times a week    Frequency of Social Gatherings with Friends and Family: More than three times a week    Attends Confucianist Services: Never    Active Member of Clubs or Organizations: No    Attends Club or Organization Meetings: Never    Marital Status:    Housing Stability: Low Risk     Unable to Pay for Housing in the Last Year: No    Number of Places Lived in the Last Year: 1    Unstable Housing in the Last Year: No       Current Outpatient Medications:     ACCU-CHEK SOFT DEV LANCETS Kit, , Disp: , Rfl:     atorvastatin (LIPITOR) 80 MG tablet, TAKE 1 TABLET (80 MG TOTAL) BY MOUTH EVERY EVENING., Disp: 90 tablet, Rfl: 3    blood sugar diagnostic Strp, One test strip use 2 times a day to check blood glucose,  ICD-10: E11.9, compatible with insurance/glucometer, Disp: 100 each, Rfl: 11    blood-glucose meter kit, One glucometer, use to check blood glucose.   ICD-10: E11.9. Dispense machine covered by insurance, Disp: 1 each, Rfl: 0    cinacalcet (SENSIPAR) 30 MG Tab, , Disp: , Rfl:     docusate sodium (COLACE) 100 MG capsule, Take 200 mg by mouth once daily. , Disp: , Rfl:     doxercalciferoL (HECTOROL) 4 mcg/2 mL injection, Inject 4.5 mcg into the vein 3 (three) times a week. At dialysis unit, Disp: , Rfl:     dulaglutide (TRULICITY)  0.75 mg/0.5 mL pen injector, Inject 0.75 mg into the skin every 7 days., Disp: 12 pen, Rfl: 3    epoetin arnel (EPOGEN INJ), Inject 1,000 Units as directed every 7 days. At the dialysis unit, Disp: , Rfl:     LANCETS MISC, , Disp: , Rfl:     lancets Misc, One lancets use 2 times a day to check blood glucose, ICD-10: E11.9, Disp: 100 each, Rfl: 11    lancing device Misc, One device, used to check blood glucose, ICD-10: E11.9, Disp: 1 each, Rfl: 0    levothyroxine (SYNTHROID) 50 MCG tablet, TAKE 1 TABLET BEFORE BREAKFAST, Disp: 90 tablet, Rfl: 3    midodrine (PROAMATINE) 5 MG Tab, Take 1 tablet (5 mg total) by mouth 3 (three) times daily., Disp: 90 tablet, Rfl: 11    OLENA-NABIL RX 1- mg-mg-mcg Tab, Take 1 tablet by mouth once daily., Disp: , Rfl:     aspirin (ECOTRIN) 81 MG EC tablet, Take 1 tablet (81 mg total) by mouth once daily., Disp: 90 tablet, Rfl: 3    sevelamer carbonate (RENVELA) 800 mg Tab, Take 3 tablets (2,400 mg total) by mouth 3 (three) times daily with meals., Disp: 270 tablet, Rfl: 11    REVIEW OF SYSTEMS:  General: No fevers or chills; ENT: No sore throat; Allergy and Immunology: no persistent infections; Hematological and Lymphatic: No history of bleeding or easy bruising; Endocrine: negative; Respiratory: no cough, shortness of breath, or wheezing; Cardiovascular: no chest pain or dyspnea on exertion; Gastrointestinal: no abdominal pain/back, change in bowel habits, or bloody stools; Genito-Urinary: no dysuria, trouble voiding, or hematuria; Musculoskeletal: negative; Neurological: no TIA or stroke symptoms; Psychiatric: no nervousness, anxiety or depression.    PHYSICAL EXAM:                General appearance:  Alert, well-appearing, and in no distress.  Oriented to person, place, and time                    Neurological: Normal speech, no focal findings noted; CN II - XII grossly intact. All extremities with sensation to light touch.            Musculoskeletal: Digits/nail without  cyanosis/clubbing.  Strength 5/5 all extremities.                    Neck: Supple, no significant adenopathy, no carotid bruit can be auscultated                  Chest:  Clear to auscultation, no wheezes, rales or rhonchi, symmetric air entry. No use of accessory muscles               Cardiac: Normal rate and regular rhythm, S1 and S2 normal            Abdomen: Soft, nontender, nondistended, no masses or organomegaly, no hernia     No rebound tenderness noted; bowel sounds normal     Pulsatile aortic mass is non palpable.     No groin adenopathy      Extremities:      2+ radial pulse bilaterally, LUE AVF +thrill, inc well healed     No BUE edema, Negative Manuel's test BUE    Skin: No tissue loss    LAB RESULTS:  No results found for: CBC  Lab Results   Component Value Date    LABPROT 10.3 06/07/2021    INR 1.0 06/07/2021     Lab Results   Component Value Date     05/07/2022    K 4.9 05/07/2022    CL 96 05/07/2022    CO2 24 05/07/2022    GLU 98 05/07/2022    BUN 51 (H) 05/07/2022    CREATININE 9.6 (H) 05/07/2022    CALCIUM 9.3 05/07/2022    ANIONGAP 19 (H) 05/07/2022    EGFRNONAA 3.6 (A) 05/07/2022     Lab Results   Component Value Date    WBC 6.83 05/07/2022    RBC 4.49 05/07/2022    HGB 12.4 05/07/2022    HCT 39.3 05/07/2022    MCV 88 05/07/2022    MCH 27.6 05/07/2022    MCHC 31.6 (L) 05/07/2022    RDW 15.2 (H) 05/07/2022     05/07/2022    MPV 10.6 05/07/2022    GRAN 4.4 05/07/2022    GRAN 64.5 05/07/2022    LYMPH 1.4 05/07/2022    LYMPH 20.4 05/07/2022    MONO 0.9 05/07/2022    MONO 13.0 05/07/2022    EOS 0.1 05/07/2022    BASO 0.05 05/07/2022    EOSINOPHIL 1.0 05/07/2022    BASOPHIL 0.7 05/07/2022    DIFFMETHOD Automated 05/07/2022     .  Lab Results   Component Value Date    HGBA1C 6.7 (H) 05/07/2022       IMAGING:  All pertinent imaging has been reviewed and interpreted independently.    Vein mapping 9/2019:  Adequate R superior basilic vein and axillary vein ; Adequate L basilic vein superior and  inferior, adequate L axillary     1/27/20 Left upper extremity dialysis ultrasound shows no hemodynamically significant stenosis.  Flow is 1083 ml/min.  Depth and diameter are appropriate.    3/23/20:  Left upper extremity dialysis ultrasound shows anastomotic and proximal fistula hemodynamically significant stenosis.  Flow is 955 ml/min.      7/2020: Left upper extremity dialysis ultrasound shows proximal fistula hemodynamically significant stenosis.  Flow is 1257 ml/min.      8/2020: Left upper extremity dialysis ultrasound shows proximal hemodynamically significant stenosis.  Flow is 1999 ml/min.      8/31/20: Left upper extremity dialysis ultrasound shows proximal fistula hemodynamically significant stenosis.  Flow is 2209 ml/min.      10/2020:  Prox stenosis, flow > 3000 ml/min    1/2021:  Proximal stenosis, flow 4414 ml/min    4/2021:  HD US reviewed without significant stenosis, adequate flow.    8/2021:HD US reviewed without significant stenosis, adequate flow.    3/2022:  Left upper extremity dialysis ultrasound shows approx 50% anastomotic and prox fistula stenosis withou hemodynamically significant stenosis.  Flow is 3774 ml/min.      6/2022:  No significant stenosis     9/2022: No significant stenosis. Flow 2254  IMP/PLAN:  77 y.o. female with   Patient Active Problem List   Diagnosis    Severe obesity (BMI 35.0-39.9) with comorbidity    Sickle cell trait    Hyperlipidemia LDL goal <100    HUMBERTO on CPAP    Hypothyroidism (acquired)    Hx-TIA (transient ischemic attack)    Vitamin D deficiency disease    Pulmonary hypertension    Type 2 diabetes mellitus with ESRD (end-stage renal disease)    Secondary renal hyperparathyroidism    Incomplete bladder emptying    Postmenopausal atrophic vaginitis    Rectocele    Mixed incontinence urge and stress    Chronic diastolic heart failure    Tortuous aorta    ESRD on hemodialysis    Anemia of chronic disease    Debility    Chronic respiratory failure    Type 2  diabetes mellitus with foot ulcer, with long-term current use of insulin    Stable proliferative diabetic retinopathy associated with type 2 diabetes mellitus    Heart failure with preserved ejection fraction    Pseudophakia    Chronic kidney disease-mineral and bone disorder    Age-related osteoporosis without current pathological fracture    H/O: stroke    being managed by PCP and specialists who is here today for evaluation of long term HD access s/p L RC AV fistula.    -s/p L RC AV fistula with proximal fistula measuring 5 mm 1/13/20, adequate flow without issues during HD s/p LUE fistulogram, central venogram, ligation of medial side branch cephalic vein, ligation of lateral side branch cephalic vein 3/2/20 with prox AVF stenosis s/p L proximal fistula angioplasty 7/21/20 with persistent flow issues s/p proximal angioplasty 8/19/20 with scoring balloon/DCB with improvement in stenosis/flow and no further issues with HD  -Cont renal diet  -RTC 3 mo with HD US for continued routine surveillance    I spent 15 minutes evaluating this patient and greater than 50% of the time was spent counseling, coordinator care and discussing the plan of care.  All questions were answered and patient stated understanding with agreement with the above treatment plan.    Tnoia Silva NP  Vascular and Endovascular Surgery      STAFF ADDENDUM    I have reviewed the relevant data and the nurse practitioner's assessment and agree with the findings and plan as outlined above.     Keron Perez MD  Vascular and Endovascular Surgery

## 2022-09-30 ENCOUNTER — PATIENT MESSAGE (OUTPATIENT)
Dept: FAMILY MEDICINE | Facility: CLINIC | Age: 77
End: 2022-09-30
Payer: MEDICARE

## 2022-10-10 ENCOUNTER — TELEPHONE (OUTPATIENT)
Dept: VASCULAR SURGERY | Facility: CLINIC | Age: 77
End: 2022-10-10
Payer: MEDICARE

## 2022-10-10 ENCOUNTER — PATIENT MESSAGE (OUTPATIENT)
Dept: FAMILY MEDICINE | Facility: CLINIC | Age: 77
End: 2022-10-10
Payer: MEDICARE

## 2022-10-10 NOTE — TELEPHONE ENCOUNTER
Pt was called and appt's were rescheduled along with US's      ----- Message from Nadya Bajwa sent at 10/10/2022 12:23 PM CDT -----  Type: Patient Call Back    Who called: self     What is the request in detail: pt states she is returning a call     Can the clinic reply by [facility]?    Would the patient rather a call back or a response via [facility]?     Best call back number [phone] (home) [phone] (work)      Additional Information:

## 2022-11-11 ENCOUNTER — TELEPHONE (OUTPATIENT)
Dept: FAMILY MEDICINE | Facility: CLINIC | Age: 77
End: 2022-11-11
Payer: MEDICARE

## 2022-11-11 NOTE — TELEPHONE ENCOUNTER
Message  pt's daughter  Sudarshan Trimble called concerned because pt's ov had to be cx today secondary to Dr Stacia Brunson having the flu  per Dr Stacia Brunson pt is going to need open repair of aaa, needs ov to plan, Danny Sifuentes informed of same  s/w Carmen Guardado in scheduling, per daughter they are willing to travel to any office  she was able to sched pt for apt 3/10 at [facility] w/ Dr Sher Stout, she s/w pt's daughter to confirm  Active Problems    1  Aneurysm of abdominal aorta (441 4) (I71 4)   2  Atheroscler native arteries the extremities w/intermit claudication (440 21) (I70 219)   3  Atrial fibrillation (427 31) (I48 91)   4  Carotid artery stenosis (433 10) (I65 29)   5  Chronic venous hypertension (459 30) (I87 309)   6  Coronary atherosclerosis of native coronary artery (414 01) (I25 10)   7  Endoleak post endovascular aneurysm repair, subsequent encounter (V58 89,996 1)   (T82 330D)   8  Hyperlipidemia (272 4) (E78 5)   9  Hypertension (401 9) (I10)    Current Meds   1  Carbidopa-Levodopa  MG Oral Tablet; Therapy: (Recorded:[id number]) to Recorded   2  Diltiazem HCl - 30 MG Oral Tablet; Therapy: (Ruddy Chandler) to Recorded   3  Jantoven 5 MG Oral Tablet; Therapy: (468 4165) to Recorded   4  Metoprolol Tartrate 50 MG Oral Tablet; TAKE 1 TABLET TWICE DAILY; Therapy: (Ruddy Chandler) to Recorded   5  Simvastatin 20 MG Oral Tablet; Therapy: (Ruddy Chandler) to Recorded   6  Taztia  MG Oral Capsule Extended Release 24 Hour; Therapy: [id number] to Recorded    Allergies    1   Penicillins    Signatures   Electronically signed by : Jose Eduardo Quick, ; Mar  8 2016  2:05PM EST                       (Author) Spoke with patient states she will see if she can do her dialysis earlier on that day so she can make the appointment, patient states if she needs to reschedule she will call back.

## 2022-11-11 NOTE — TELEPHONE ENCOUNTER
----- Message from Xiomy Sparks sent at 11/11/2022  8:28 AM CST -----  Regarding: appointment  Name of Who is Calling: MICKEY VENEGAS [[id number]]      What is the request in detail: patient was scheduled for her annual on 112122 but due to the holiday that week her dialysis has been changed so she is requesting it be rescheduled to the following week on a [id number]       Can the clinic reply by [facility]: no      What Number to Call Back if not in [facility]: [phone]

## 2022-11-21 ENCOUNTER — OFFICE VISIT (OUTPATIENT)
Dept: FAMILY MEDICINE | Facility: CLINIC | Age: 77
End: 2022-11-21
Payer: MEDICARE

## 2022-11-21 VITALS
HEIGHT: 65 IN | HEART RATE: 75 BPM | OXYGEN SATURATION: 98 % | BODY MASS INDEX: 36.51 KG/M2 | WEIGHT: 219.13 LBS | TEMPERATURE: 99 F | DIASTOLIC BLOOD PRESSURE: 68 MMHG | SYSTOLIC BLOOD PRESSURE: 116 MMHG

## 2022-11-21 DIAGNOSIS — E78.5 HYPERLIPIDEMIA LDL GOAL <100: Chronic | ICD-10-CM

## 2022-11-21 DIAGNOSIS — N18.6 ESRD ON HEMODIALYSIS: ICD-10-CM

## 2022-11-21 DIAGNOSIS — E11.22 TYPE 2 DIABETES MELLITUS WITH ESRD (END-STAGE RENAL DISEASE): Primary | ICD-10-CM

## 2022-11-21 DIAGNOSIS — M81.0 AGE-RELATED OSTEOPOROSIS WITHOUT CURRENT PATHOLOGICAL FRACTURE: ICD-10-CM

## 2022-11-21 DIAGNOSIS — R53.81 DEBILITY: ICD-10-CM

## 2022-11-21 DIAGNOSIS — L97.509 TYPE 2 DIABETES MELLITUS WITH FOOT ULCER, WITH LONG-TERM CURRENT USE OF INSULIN: ICD-10-CM

## 2022-11-21 DIAGNOSIS — N18.6 TYPE 2 DIABETES MELLITUS WITH ESRD (END-STAGE RENAL DISEASE): Primary | ICD-10-CM

## 2022-11-21 DIAGNOSIS — E11.621 TYPE 2 DIABETES MELLITUS WITH FOOT ULCER, WITH LONG-TERM CURRENT USE OF INSULIN: ICD-10-CM

## 2022-11-21 DIAGNOSIS — I50.32 CHRONIC DIASTOLIC HEART FAILURE: Chronic | ICD-10-CM

## 2022-11-21 DIAGNOSIS — Z79.4 TYPE 2 DIABETES MELLITUS WITH FOOT ULCER, WITH LONG-TERM CURRENT USE OF INSULIN: ICD-10-CM

## 2022-11-21 DIAGNOSIS — G47.33 OSA ON CPAP: Chronic | ICD-10-CM

## 2022-11-21 DIAGNOSIS — Z99.2 ESRD ON HEMODIALYSIS: ICD-10-CM

## 2022-11-21 DIAGNOSIS — I27.20 PULMONARY HYPERTENSION: ICD-10-CM

## 2022-11-21 DIAGNOSIS — Z99.89 WALKER AS AMBULATION AID: ICD-10-CM

## 2022-11-21 PROCEDURE — 1126F PR PAIN SEVERITY QUANTIFIED, NO PAIN PRESENT: ICD-10-PCS | Mod: CPTII,S$GLB,, | Performed by: INTERNAL MEDICINE

## 2022-11-21 PROCEDURE — 99999 PR PBB SHADOW E&M-EST. PATIENT-LVL IV: ICD-10-PCS | Mod: PBBFAC,,, | Performed by: INTERNAL MEDICINE

## 2022-11-21 PROCEDURE — 1101F PR PT FALLS ASSESS DOC 0-1 FALLS W/OUT INJ PAST YR: ICD-10-PCS | Mod: CPTII,S$GLB,, | Performed by: INTERNAL MEDICINE

## 2022-11-21 PROCEDURE — 1159F MED LIST DOCD IN RCRD: CPT | Mod: CPTII,S$GLB,, | Performed by: INTERNAL MEDICINE

## 2022-11-21 PROCEDURE — 3074F PR MOST RECENT SYSTOLIC BLOOD PRESSURE < 130 MM HG: ICD-10-PCS | Mod: CPTII,S$GLB,, | Performed by: INTERNAL MEDICINE

## 2022-11-21 PROCEDURE — 1160F PR REVIEW ALL MEDS BY PRESCRIBER/CLIN PHARMACIST DOCUMENTED: ICD-10-PCS | Mod: CPTII,S$GLB,, | Performed by: INTERNAL MEDICINE

## 2022-11-21 PROCEDURE — 1101F PT FALLS ASSESS-DOCD LE1/YR: CPT | Mod: CPTII,S$GLB,, | Performed by: INTERNAL MEDICINE

## 2022-11-21 PROCEDURE — 3288F PR FALLS RISK ASSESSMENT DOCUMENTED: ICD-10-PCS | Mod: CPTII,S$GLB,, | Performed by: INTERNAL MEDICINE

## 2022-11-21 PROCEDURE — 99214 OFFICE O/P EST MOD 30 MIN: CPT | Mod: S$GLB,,, | Performed by: INTERNAL MEDICINE

## 2022-11-21 PROCEDURE — 99999 PR PBB SHADOW E&M-EST. PATIENT-LVL IV: CPT | Mod: PBBFAC,,, | Performed by: INTERNAL MEDICINE

## 2022-11-21 PROCEDURE — 1160F RVW MEDS BY RX/DR IN RCRD: CPT | Mod: CPTII,S$GLB,, | Performed by: INTERNAL MEDICINE

## 2022-11-21 PROCEDURE — 1159F PR MEDICATION LIST DOCUMENTED IN MEDICAL RECORD: ICD-10-PCS | Mod: CPTII,S$GLB,, | Performed by: INTERNAL MEDICINE

## 2022-11-21 PROCEDURE — 3074F SYST BP LT 130 MM HG: CPT | Mod: CPTII,S$GLB,, | Performed by: INTERNAL MEDICINE

## 2022-11-21 PROCEDURE — 1126F AMNT PAIN NOTED NONE PRSNT: CPT | Mod: CPTII,S$GLB,, | Performed by: INTERNAL MEDICINE

## 2022-11-21 PROCEDURE — 3078F DIAST BP <80 MM HG: CPT | Mod: CPTII,S$GLB,, | Performed by: INTERNAL MEDICINE

## 2022-11-21 PROCEDURE — 3288F FALL RISK ASSESSMENT DOCD: CPT | Mod: CPTII,S$GLB,, | Performed by: INTERNAL MEDICINE

## 2022-11-21 PROCEDURE — 99214 PR OFFICE/OUTPT VISIT, EST, LEVL IV, 30-39 MIN: ICD-10-PCS | Mod: S$GLB,,, | Performed by: INTERNAL MEDICINE

## 2022-11-21 PROCEDURE — 3078F PR MOST RECENT DIASTOLIC BLOOD PRESSURE < 80 MM HG: ICD-10-PCS | Mod: CPTII,S$GLB,, | Performed by: INTERNAL MEDICINE

## 2022-11-21 PROCEDURE — 99499 UNLISTED E&M SERVICE: CPT | Mod: S$GLB,,, | Performed by: INTERNAL MEDICINE

## 2022-11-22 ENCOUNTER — PATIENT OUTREACH (OUTPATIENT)
Dept: ADMINISTRATIVE | Facility: HOSPITAL | Age: 77
End: 2022-11-22
Payer: MEDICARE

## 2022-11-22 NOTE — PROGRESS NOTES
274}  HISTORY OF PRESENT ILLNESS:  Erica Willett is a 77 y.o. female who presents to the clinic today for Diabetes, Follow-up, and Medication Refill  .     The patient presents to clinic today for follow-up of her type 2 diabetes mellitus with end-stage renal disease on hemodialysis, chronic diastolic heart failure, and hyperlipidemia.  She continues on regular dialysis.  She is sometimes little depressed because of her current dialysis status, but she realizes that this is necessary.  She reports her diabetes has been well controlled and is checked regularly through dialysis.  She denies cardiac chest pain or shortness of breath.  She reports compliance with her CPAP machine.      PAST MEDICAL HISTORY:  Past Medical History:   Diagnosis Date    Acute ischemic right PCA stroke 6/6/2021    Acute respiratory failure with hypoxia     Age-related osteoporosis without current pathological fracture 3/8/2021    Anemia of chronic kidney failure, stage 4 (severe) 4/5/2019    Cataracts, bilateral     CHF (congestive heart failure)     CKD (chronic kidney disease) stage 3, GFR 30-59 ml/min     CKD (chronic kidney disease) stage 3, GFR 30-59 ml/min     Controlled type 2 diabetes mellitus with proteinuria or albuminuria     Depression     Diabetes with neurologic complications     Diabetic retinopathy of both eyes     Edema     Glaucoma     History of colonic polyps     Hx-TIA (transient ischemic attack) 11/2008    Hyperlipidemia LDL goal < 100     Hypertension     Hypothyroidism     Major depressive disorder, single episode, mild 2/17/2016    Mixed incontinence urge and stress     Obesity     Obstructive sleep apnea on CPAP     7/19/19:  Home CPAP machine broken, per patient & son    Osteopenia     Proteinuria     Sickle cell trait     Strabismus     TIA (transient ischemic attack)     Trouble in sleeping     Type 2 diabetes mellitus with ophthalmic manifestations     Type 2 diabetes with stage 3 chronic kidney disease  GFR 30-59     Type II or unspecified type diabetes mellitus with renal manifestations, uncontrolled(250.42)     Uncontrolled type 2 diabetes mellitus with peripheral circulatory disorder 2019    Urge incontinence 2016    Urge incontinence     Venous stasis ulcer     bilateral lower legs    Vitamin D deficiency disease        PAST SURGICAL HISTORY:  Past Surgical History:   Procedure Laterality Date    AV FISTULA PLACEMENT Left 2019    Procedure: CREATION, AV FISTULA, LEFT UPPER EXTREMITY;  Surgeon: Keron Perez MD;  Location: [facility] OR;  Service: Vascular;  Laterality: Left;    BREAST BIOPSY      breast reduction Bilateral age 31    BREAST SURGERY      CATARACT EXTRACTION Bilateral     cataracts Bilateral      SECTION, LOW TRANSVERSE      x1    CHOLECYSTECTOMY      COLONOSCOPY N/A 2022    Procedure: COLONOSCOPY;  Surgeon: Mahesh Huang MD;  [facility]  Service: Endoscopy;  Laterality: N/A;  fully vaccinated-sm.  RAPID COVID  +cologuard needing ASAP  Dialysis pt T,TH Sat and left UA access  2nd floor - cardiac/dialysis/SOB / LABS / prep ins. emailed - [facility]W    EYE SURGERY  2014, 2014    vitrectomy    EYE SURGERY Right 2016    FISTULOGRAM Left 3/6/2020    Procedure: Fistulogram, left upper extremity, with branch ligation;  Surgeon: Keron Perez MD;  Location: [facility] OR [address] [place] [address];  Service: Vascular;  Laterality: Left;  Time 1.1 Minute  42.10 mGy    FISTULOGRAM Left 2020    Procedure: Fistulogram, left upper extremity, transradial access with possible intervention;  Surgeon: Keron Perez MD;  Location: [facility] OR [address] [place] [address];  Service: Vascular;  Laterality: Left;    FISTULOGRAM Left 2020    Procedure: Fistulogram, left upper extremity, possible intervention;  Surgeon: Keron Perez MD;  Location: [facility] OR;  Service: Vascular;  Laterality: Left;  930 AM START  RN PRE OP  ---COVID NEGATIVE ON  2020. CA    FISTULOGRAM Left  1/21/2022    Procedure: Fistulogram, transradial access;  Surgeon: Keron Perez MD;  Location: [facility] OR [address] [place] [address];  Service: Vascular;  Laterality: Left;  mgy-40.16  gycm-8.3905  contrast-34cc  time-12.4min    HYSTERECTOMY  1986    TAHBSO (patient is unsure if ovaries removed)    OOPHORECTOMY      PERCUTANEOUS TRANSLUMINAL ANGIOPLASTY OF ARTERIOVENOUS FISTULA Left 7/21/2020    Procedure: PTA, AV FISTULA;  Surgeon: Keron Perez MD;  Location: [facility] OR [address] [place] [address];  Service: Vascular;  Laterality: Left;  15.9 minutes of fluro  41.12  mGy  7.9060 Gy cm2  32ml  contrast    PHLEBOGRAPHY Left 7/21/2020    Procedure: CENTRAL VENOGRAM;  Surgeon: Keron Perez MD;  Location: [facility] OR [address] [place] [address];  Service: Vascular;  Laterality: Left;    REFRACTIVE SURGERY      TOTAL REDUCTION MAMMOPLASTY      approx 10 yrs ago    VENOPLASTY  1/21/2022    Procedure: ANGIOPLASTY, VEIN;  Surgeon: Keron Perez MD;  Location: [facility] OR [address] [place] [address];  Service: Vascular;;       SOCIAL HISTORY:  Social History     Socioeconomic History    Marital status: Single    Number of children: 5   Occupational History    Occupation:      Employer: [employer]     Comment: part-time   Tobacco Use    Smoking status: Never    Smokeless tobacco: Never   Substance and Sexual Activity    Alcohol use: No     Alcohol/week: 0.0 standard drinks    Drug use: No    Sexual activity: Not Currently     Partners: Male     Birth control/protection: Post-menopausal, Surgical     Social Determinants of Health     Financial Resource Strain: Low Risk     Difficulty of Paying Living Expenses: Not hard at all   Food Insecurity: No Food Insecurity    Worried About Running Out of Food in the Last Year: Never true    Ran Out of Food in the Last Year: Never true   Transportation Needs: No Transportation Needs    Lack of Transportation (Medical): No    Lack of Transportation (Non-Medical): No   Physical Activity: Insufficiently Active    Days of  Exercise per Week: 2 days    Minutes of Exercise per Session: 10 min   Stress: No Stress Concern Present    Feeling of Stress : Only a little   Social Connections: Socially Isolated    Frequency of Communication with Friends and Family: More than three times a week    Frequency of Social Gatherings with Friends and Family: More than three times a week    Attends Adventism Services: Never    Active Member of Clubs or Organizations: No    Attends Club or Organization Meetings: Never    Marital Status:    Housing Stability: Low Risk     Unable to Pay for Housing in the Last Year: No    Number of Places Lived in the Last Year: 1    Unstable Housing in the Last Year: No       FAMILY HISTORY:  Family History   Problem Relation Age of Onset    Stroke Mother     Diabetes Mother     Hypertension Mother     Cataracts Mother     Leukemia Father     Cataracts Father     Ovarian cancer Sister 35    Achondroplasia Sister     HIV Brother     No Known Problems Daughter     No Known Problems Son     Breast cancer Maternal Aunt 65    Parkinsonism Maternal Aunt     Esophageal cancer Maternal Uncle         smoker    No Known Problems Paternal Aunt     Cataracts Paternal Uncle     Cataracts Maternal Grandmother     Cataracts Maternal Grandfather     Diabetes Paternal Grandmother     Cataracts Paternal Grandmother     No Known Problems Paternal Grandfather     No Known Problems Other     Amblyopia Neg Hx     Blindness Neg Hx     Glaucoma Neg Hx     Macular degeneration Neg Hx     Retinal detachment Neg Hx     Strabismus Neg Hx     Thyroid disease Neg Hx     Colon cancer Neg Hx     Cancer Neg Hx        ALLERGIES AND MEDICATIONS: updated and reviewed.  Review of patient's allergies indicates:   Allergen Reactions    Ace inhibitors Other (See Comments)     Other reaction(s): cough     Medication List with Changes/Refills   Current Medications    ACCU-CHEK SOFT DEV LANCETS KIT        ASPIRIN (ECOTRIN) 81 MG EC TABLET    Take 1 tablet  (81 mg total) by mouth once daily.    ATORVASTATIN (LIPITOR) 80 MG TABLET    TAKE 1 TABLET (80 MG TOTAL) BY MOUTH EVERY EVENING.    BLOOD SUGAR DIAGNOSTIC STRP    One test strip use 2 times a day to check blood glucose,  ICD-10: E11.9, compatible with insurance/glucometer    BLOOD-GLUCOSE METER KIT    One glucometer, use to check blood glucose.   ICD-10: E11.9. Dispense machine covered by insurance    CINACALCET (SENSIPAR) 30 MG TAB        DOCUSATE SODIUM (COLACE) 100 MG CAPSULE    Take 200 mg by mouth once daily.     DOXERCALCIFEROL (HECTOROL) 4 MCG/2 ML INJECTION    Inject 4.5 mcg into the vein 3 (three) times a week. At dialysis unit    DULAGLUTIDE (TRULICITY) 0.75 MG/0.5 ML PEN INJECTOR    Inject 0.75 mg into the skin every 7 days.    EPOETIN BE (EPOGEN INJ)    Inject 1,000 Units as directed every 7 days. At the dialysis unit    LANCETS MISC        LANCETS MISC    One lancets use 2 times a day to check blood glucose, ICD-10: E11.9    LANCING DEVICE MISC    One device, used to check blood glucose, ICD-10: E11.9    LEVOTHYROXINE (SYNTHROID) 50 MCG TABLET    TAKE 1 TABLET BEFORE BREAKFAST    MIDODRINE (PROAMATINE) 5 MG TAB    Take 1 tablet (5 mg total) by mouth 3 (three) times daily.    OLENA-NABIL RX 1- MG-MG-MCG TAB    Take 1 tablet by mouth once daily.    SEVELAMER CARBONATE (RENVELA) 800 MG TAB    Take 3 tablets (2,400 mg total) by mouth 3 (three) times daily with meals.         CARE TEAM:  Patient Care Team:  Sendy Elaine MD as PCP - General (Internal Medicine)  Brittanie Orellana MD (Cardiology)  Nicole Gray DPM as Consulting Physician (Podiatry)  Surinder Joseph MD (Inactive) as Consulting Physician (Nephrology)  Katia Wong MD as Consulting Physician (Urology)  Coleen Gillis MD as Consulting Physician (Obstetrics)  LILLIANA Coello MD as Consulting Physician (Ophthalmology)  Yolis Rossi MD as Consulting Physician (Endocrinology)  Gianna Hinson LPN as  "Licensed Practical Nurse  Keron Perez MD as Consulting Physician (Vascular Surgery)  Myla Puente MD as Consulting Physician (Nephrology)  Murali Li MD as Consulting Physician (Cardiology)  Carl Day MD as Consulting Physician (Endocrinology)  Nellie Quiles OD as Consulting Physician (Optometry)         REVIEW OF SYSTEMS:  Review of Systems   Constitutional:  Negative for chills and fever.   HENT:  Negative for congestion.    Respiratory:  Negative for shortness of breath.    Cardiovascular:  Negative for chest pain.   Gastrointestinal:  Negative for abdominal pain.   Genitourinary:  Negative for dysuria.       PHYSICAL EXAM: 274}  Vitals:    11/21/22 1529   BP: 116/68   Pulse: 75   Temp: 98.6 °F (37 °C)     Weight: 99.4 kg (219 lb 2.2 oz)   Height: 5' 5" (165.1 cm)   Body mass index is 36.47 kg/m².      General appearance - alert, well appearing, and in no distress, obese  Psychiatric - alert, oriented to person, place, and time, normal behavior, speech, dress, motor activity and thought process, mildly depressed mood  Eyes - pupils equal and reactive, extraocular eye movements intact, sclera anicteric  Mouth - not examined  Chest - clear to auscultation, no wheezes, rales or rhonchi, symmetric air entry  Heart - normal rate and regular rhythm  Neurological - alert, normal speech, no focal findings; cranial nerves II through XII intact  Musculoskeletal - patient noted to have Moderate osteoarthritic changes to both knee joints. No joint effusions noted., patient ambulated with a walker  Extremities - no pedal edema noted      Labs:  Lab Results   Component Value Date    HGBA1C 6.7 (H) 05/07/2022    HGBA1C 6.5 (H) 11/03/2021    HGBA1C 8.1 (H) 06/06/2021      Lab Results   Component Value Date    CHOL 165 05/07/2022    CHOL 127 11/03/2021    CHOL 143 06/06/2021     Lab Results   Component Value Date    LDLCALC 101.0 05/07/2022    LDLCALC 64.4 11/03/2021    LDLCALC 81.2 06/06/2021         ASSESSMENT " AND PLAN:  274}  1. Type 2 diabetes mellitus with ESRD (end-stage renal disease)/2. Type 2 diabetes mellitus with foot ulcer, with long-term current use of insulin  Lab Results   Component Value Date    LABA1C 7.6 06/15/2021    HGBA1C 6.7 (H) 05/07/2022     Diabetes is under good control at this time for age and comorbid conditions.   We discussed diabetic diet and regular exercise.   We discussed home blood sugar monitoring, if appropriate - the patient does not need to test daily but can test only as needed.   Continue current medication regimen.  Diabetic complications addressed: None addressed today.  Patient was counseled on the need for yearly eye exam to screen for/monitor diabetic retinopathy and yearly diabetic foot exam.      3. ESRD on hemodialysis  The current medical regimen is effective;  continue present plan and medications.   Followed by: Nephrology.     Overview:  - at [facility]; Dr. Barrientos  - Tuesday, Thursday and Saturday  - has AV graft in left UE      4. Chronic diastolic heart failure  The current medical regimen is effective;  continue present plan and medications.   Followed by: Cardiology.       5. Hyperlipidemia LDL goal <100  Lab Results   Component Value Date    CHOL 165 05/07/2022     Lab Results   Component Value Date    HDL 48 05/07/2022     Lab Results   Component Value Date    LDLCALC 101.0 05/07/2022     Lab Results   Component Value Date    TRIG 80 05/07/2022     Lab Results   Component Value Date    LDLCALC 101.0 05/07/2022     We discussed low fat diet and regular exercise.The current medical regimen is effective;  continue present plan and medications.       6. Pulmonary hypertension  Stable. Asymptomatic. Observe.      7. Age-related osteoporosis without current pathological fracture  We discussed adequate calcium and vitamin D supplementation. We discussed fall precautions. She is up to date on her BMD. Defer treatment to endocrinology.      8. HUMBERTO on CPAP  CPAP compliance: yes.  The patient reports that they continue to benefit from regular use of their CPAP machine..  We discussed the potential ramifications of untreated sleep apnea, which could include daytime sleepiness, hypertension, heart disease including CHF, sudden death while sleeping and/or stroke. The patient was advised to abstain from driving should they feel sleepy or drowsy.  We discussed potential treatment options, which could include weight loss, body positioning, continuous positive airway pressure (CPAP), or referral for surgical consideration.       9. Debility/10. Walker as ambulation aid  We discussed fall precautions.              No orders of the defined types were placed in this encounter.     Follow up in about 6 months (around 5/21/2023), or if symptoms worsen or fail to improve, for annual exam. or sooner as needed.

## 2022-11-30 ENCOUNTER — TELEPHONE (OUTPATIENT)
Dept: FAMILY MEDICINE | Facility: CLINIC | Age: 77
End: 2022-11-30
Payer: MEDICARE

## 2022-11-30 DIAGNOSIS — G47.33 OSA ON CPAP: Primary | Chronic | ICD-10-CM

## 2022-11-30 NOTE — TELEPHONE ENCOUNTER
Spoke to patient and she sts that she need an prescription sent to [facility]. She sts that her cpap machine is set on 4.0 and it need to be 14.0. Outside.in advised her to reach out to her pcp for prescription.

## 2022-11-30 NOTE — TELEPHONE ENCOUNTER
----- Message from Meera Wallace sent at 11/30/2022  1:17 PM CST -----  Type: Patient Call Back    Who called:Self     What is the request in detail: PT asking for a call back     Can the clinic reply by [facility]? No     Would the patient rather a call back or a response via [facility]? Call     Best call back number: [phone]

## 2022-12-01 ENCOUNTER — TELEPHONE (OUTPATIENT)
Dept: FAMILY MEDICINE | Facility: CLINIC | Age: 77
End: 2022-12-01
Payer: MEDICARE

## 2022-12-01 DIAGNOSIS — G47.33 OSA (OBSTRUCTIVE SLEEP APNEA): Primary | ICD-10-CM

## 2022-12-01 NOTE — TELEPHONE ENCOUNTER
Spoke with Halle at [facility] she stated pt needs a rx for her pressure setting on her CPAP not her sleep study. She states pt needs rx so RT can change settings.Also she informed pt of this information on yesterday.

## 2022-12-01 NOTE — TELEPHONE ENCOUNTER
Noted. Order written and will fax. Also placed order for patient to establish with sleep medicine going forward.

## 2022-12-07 ENCOUNTER — OFFICE VISIT (OUTPATIENT)
Dept: PODIATRY | Facility: CLINIC | Age: 77
End: 2022-12-07
Payer: MEDICARE

## 2022-12-07 VITALS — HEIGHT: 65 IN | BODY MASS INDEX: 36.49 KG/M2 | WEIGHT: 219 LBS

## 2022-12-07 DIAGNOSIS — B35.1 NAIL DERMATOPHYTOSIS: ICD-10-CM

## 2022-12-07 DIAGNOSIS — M20.42 HAMMER TOES OF BOTH FEET: ICD-10-CM

## 2022-12-07 DIAGNOSIS — M20.11 HALLUX ABDUCTO VALGUS, RIGHT: ICD-10-CM

## 2022-12-07 DIAGNOSIS — N18.6 TYPE 2 DIABETES MELLITUS WITH ESRD (END-STAGE RENAL DISEASE): Primary | ICD-10-CM

## 2022-12-07 DIAGNOSIS — M20.12 HALLUX ABDUCTO VALGUS, LEFT: ICD-10-CM

## 2022-12-07 DIAGNOSIS — L90.9 PLANTAR FAT PAD ATROPHY: ICD-10-CM

## 2022-12-07 DIAGNOSIS — M20.41 HAMMER TOES OF BOTH FEET: ICD-10-CM

## 2022-12-07 DIAGNOSIS — E11.22 TYPE 2 DIABETES MELLITUS WITH ESRD (END-STAGE RENAL DISEASE): Primary | ICD-10-CM

## 2022-12-07 PROCEDURE — 1126F AMNT PAIN NOTED NONE PRSNT: CPT | Mod: CPTII,S$GLB,, | Performed by: PODIATRIST

## 2022-12-07 PROCEDURE — 99213 OFFICE O/P EST LOW 20 MIN: CPT | Mod: S$GLB,,, | Performed by: PODIATRIST

## 2022-12-07 PROCEDURE — 3288F PR FALLS RISK ASSESSMENT DOCUMENTED: ICD-10-PCS | Mod: CPTII,S$GLB,, | Performed by: PODIATRIST

## 2022-12-07 PROCEDURE — 1101F PR PT FALLS ASSESS DOC 0-1 FALLS W/OUT INJ PAST YR: ICD-10-PCS | Mod: CPTII,S$GLB,, | Performed by: PODIATRIST

## 2022-12-07 PROCEDURE — 99999 PR PBB SHADOW E&M-EST. PATIENT-LVL III: CPT | Mod: PBBFAC,,, | Performed by: PODIATRIST

## 2022-12-07 PROCEDURE — 99213 PR OFFICE/OUTPT VISIT, EST, LEVL III, 20-29 MIN: ICD-10-PCS | Mod: S$GLB,,, | Performed by: PODIATRIST

## 2022-12-07 PROCEDURE — 1160F PR REVIEW ALL MEDS BY PRESCRIBER/CLIN PHARMACIST DOCUMENTED: ICD-10-PCS | Mod: CPTII,S$GLB,, | Performed by: PODIATRIST

## 2022-12-07 PROCEDURE — 1159F PR MEDICATION LIST DOCUMENTED IN MEDICAL RECORD: ICD-10-PCS | Mod: CPTII,S$GLB,, | Performed by: PODIATRIST

## 2022-12-07 PROCEDURE — 1101F PT FALLS ASSESS-DOCD LE1/YR: CPT | Mod: CPTII,S$GLB,, | Performed by: PODIATRIST

## 2022-12-07 PROCEDURE — 1160F RVW MEDS BY RX/DR IN RCRD: CPT | Mod: CPTII,S$GLB,, | Performed by: PODIATRIST

## 2022-12-07 PROCEDURE — 99999 PR PBB SHADOW E&M-EST. PATIENT-LVL III: ICD-10-PCS | Mod: PBBFAC,,, | Performed by: PODIATRIST

## 2022-12-07 PROCEDURE — 1126F PR PAIN SEVERITY QUANTIFIED, NO PAIN PRESENT: ICD-10-PCS | Mod: CPTII,S$GLB,, | Performed by: PODIATRIST

## 2022-12-07 PROCEDURE — 3288F FALL RISK ASSESSMENT DOCD: CPT | Mod: CPTII,S$GLB,, | Performed by: PODIATRIST

## 2022-12-07 PROCEDURE — 1159F MED LIST DOCD IN RCRD: CPT | Mod: CPTII,S$GLB,, | Performed by: PODIATRIST

## 2022-12-09 NOTE — PROGRESS NOTES
Is Subjective:      Patient ID: Erica Willett is a 77 y.o. female.    Chief Complaint: Diabetes Mellitus (PCP  11/21/2022) and Nail Care    Erica Willett is a 77 y.o. femalewho presents to the clinic for evaluation and treatment of high risk feet. Erica Willett   has a past medical history of Acute ischemic right PCA stroke (6/6/2021), Acute respiratory failure with hypoxia, Age-related osteoporosis without current pathological fracture (3/8/2021), Anemia of chronic kidney failure, stage 4 (severe) (4/5/2019), Cataracts, bilateral, CHF (congestive heart failure), CKD (chronic kidney disease) stage 3, GFR 30-59 ml/min, CKD (chronic kidney disease) stage 3, GFR 30-59 ml/min, Controlled type 2 diabetes mellitus with proteinuria or albuminuria, Depression, Diabetes with neurologic complications, Diabetic retinopathy of both eyes, Edema, Glaucoma, History of colonic polyps, TIA (transient ischemic attack) (11/2008), Hyperlipidemia LDL goal < 100, Hypertension, Hypothyroidism, Major depressive disorder, single episode, mild (2/17/2016), Mixed incontinence urge and stress, Obesity, Obstructive sleep apnea on CPAP, Osteopenia, Proteinuria, Sickle cell trait, Strabismus, TIA (transient ischemic attack), Trouble in sleeping, Type 2 diabetes mellitus with ophthalmic manifestations, Type 2 diabetes with stage 3 chronic kidney disease GFR 30-59, Type II or unspecified type diabetes mellitus with renal manifestations, uncontrolled(250.42), Uncontrolled type 2 diabetes mellitus with peripheral circulatory disorder (4/5/2019), Urge incontinence (1/11/2016), Urge incontinence, Venous stasis ulcer, and Vitamin D deficiency disease.  She presents to the podiatry clinic for  routine foot care and evaluation. This patient has documented high risk feet requiring routine maintenance secondary to diabetes mellitis and those secondary complications of diabetes, as mentioned.     PCP: Sendy Elaine MD     Date Last Seen by PCP:   Chief Complaint   Patient presents with    Diabetes Mellitus     PCP  11/21/2022    [facility]      Hemoglobin A1C   Date Value Ref Range Status   09/13/2022 6.3 (H) 0.0 - 5.6 % Final     Comment:     [facility]   06/14/2022 6.6 (H) 0.0 - 5.6 % Final     Comment:     [facility]   05/07/2022 6.7 (H) 4.0 - 5.6 % Final     Comment:     ADA Screening Guidelines:  5.7-6.4%  Consistent with prediabetes  >or=6.5%  Consistent with diabetes    High levels of fetal hemoglobin interfere with the HbA1C  assay. Heterozygous hemoglobin variants (HbS, HgC, etc)do  not significantly interfere with this assay.   However, presence of multiple variants may affect accuracy.     03/15/2022 7.1 (H) 0.0 - 5.6 % Final     Comment:     [facility]       Patient Active Problem List   Diagnosis    Severe obesity (BMI 35.0-39.9) with comorbidity    Sickle cell trait    Hyperlipidemia LDL goal <100    HUMBERTO on CPAP    Hypothyroidism (acquired)    Hx-TIA (transient ischemic attack)    Vitamin D deficiency disease    Pulmonary hypertension    Type 2 diabetes mellitus with ESRD (end-stage renal disease)    Secondary renal hyperparathyroidism    Incomplete bladder emptying    Postmenopausal atrophic vaginitis    Rectocele    Mixed incontinence urge and stress    Chronic diastolic heart failure    Tortuous aorta    ESRD on hemodialysis    Anemia of chronic disease    Debility    Chronic respiratory failure    Type 2 diabetes mellitus with foot ulcer, with long-term current use of insulin    Stable proliferative diabetic retinopathy associated with type 2 diabetes mellitus    Heart failure with preserved ejection fraction    Pseudophakia    Chronic kidney disease-mineral and bone disorder    Age-related osteoporosis without current pathological fracture    H/O: stroke    Walker as ambulation aid     Current Outpatient Medications on File Prior to Visit   Medication Sig Dispense Refill    ACCU-CHEK  SOFT DEV LANCETS Kit       atorvastatin (LIPITOR) 80 MG tablet TAKE 1 TABLET (80 MG TOTAL) BY MOUTH EVERY EVENING. 90 tablet 3    blood sugar diagnostic Strp One test strip use 2 times a day to check blood glucose,  ICD-10: E11.9, compatible with insurance/glucometer 100 each 11    blood-glucose meter kit One glucometer, use to check blood glucose.   ICD-10: E11.9. Dispense machine covered by insurance 1 each 0    cinacalcet (SENSIPAR) 30 MG Tab       docusate sodium (COLACE) 100 MG capsule Take 200 mg by mouth once daily.       doxercalciferoL (HECTOROL) 4 mcg/2 mL injection Inject 4.5 mcg into the vein 3 (three) times a week. At dialysis unit      dulaglutide (TRULICITY) 0.75 mg/0.5 mL pen injector Inject 0.75 mg into the skin every 7 days. 12 pen 3    epoetin arnel (EPOGEN INJ) Inject 1,000 Units as directed every 7 days. At the dialysis unit      LANCETS MISC       lancets Misc One lancets use 2 times a day to check blood glucose, ICD-10: E11.9 100 each 11    lancing device Misc One device, used to check blood glucose, ICD-10: E11.9 1 each 0    levothyroxine (SYNTHROID) 50 MCG tablet TAKE 1 TABLET BEFORE BREAKFAST 90 tablet 3    midodrine (PROAMATINE) 5 MG Tab Take 1 tablet (5 mg total) by mouth 3 (three) times daily. 90 tablet 11    OLENA-NABIL RX 1- mg-mg-mcg Tab Take 1 tablet by mouth once daily.      aspirin (ECOTRIN) 81 MG EC tablet Take 1 tablet (81 mg total) by mouth once daily. 90 tablet 3    sevelamer carbonate (RENVELA) 800 mg Tab Take 3 tablets (2,400 mg total) by mouth 3 (three) times daily with meals. 270 tablet 11     No current facility-administered medications on file prior to visit.     Review of patient's allergies indicates:   Allergen Reactions    Ace inhibitors Other (See Comments)     Other reaction(s): cough     Past Surgical History:   Procedure Laterality Date    AV FISTULA PLACEMENT Left 11/27/2019    Procedure: CREATION, AV FISTULA, LEFT UPPER EXTREMITY;  Surgeon: Keron HODGSON  MD Ana;  Location: [facility] OR;  Service: Vascular;  Laterality: Left;    BREAST BIOPSY      breast reduction Bilateral age 31    BREAST SURGERY      CATARACT EXTRACTION Bilateral     cataracts Bilateral      SECTION, LOW TRANSVERSE      x1    CHOLECYSTECTOMY      COLONOSCOPY N/A 2022    Procedure: COLONOSCOPY;  Surgeon: Mahesh Huang MD;  Location: [facility] ([address] [place] [address]);  Service: Endoscopy;  Laterality: N/A;  fully vaccinated-sm.  RAPID COVID  +cologuard needing ASAP  Dialysis pt T,TH Sat and left UA access  2nd floor - cardiac/dialysis/SOB / LABS / prep ins. emailed - [facility]    EYE SURGERY  2014, 2014    vitrectomy    EYE SURGERY Right 2016    FISTULOGRAM Left 3/6/2020    Procedure: Fistulogram, left upper extremity, with branch ligation;  Surgeon: Keron Perez MD;  Location: [address];  Service: Vascular;  Laterality: Left;  Time 1.1 Minute  42.10 mGy    FISTULOGRAM Left 2020    Procedure: Fistulogram, left upper extremity, transradial access with possible intervention;  Surgeon: Keron Perez MD;  Location: [address];  Service: Vascular;  Laterality: Left;    FISTULOGRAM Left 2020    Procedure: Fistulogram, left upper extremity, possible intervention;  Surgeon: Keron Perez MD;  Location: [facility];  Service: Vascular;  Laterality: Left;  930 AM START  RN PRE OP  ---COVID NEGATIVE ON  2020. [place]    FISTULOGRAM Left 2022    Procedure: Fistulogram, transradial access;  Surgeon: Keron Perez MD;  Location: [address];  Service: Vascular;  Laterality: Left;  mgy-40.16  gycm-8.3905  contrast-34cc  time-12.4min    HYSTERECTOMY      TAHBSO (patient is unsure if ovaries removed)    OOPHORECTOMY      PERCUTANEOUS TRANSLUMINAL ANGIOPLASTY OF ARTERIOVENOUS FISTULA Left 2020    Procedure: PTA, AV FISTULA;  Surgeon: Keron Perez MD;  Location: [address];  Service: Vascular;  Laterality: Left;  15.9 minutes of  fluro  41.12  mGy  7.9060 Gy cm2  32ml  contrast    PHLEBOGRAPHY Left 7/21/2020    Procedure: CENTRAL VENOGRAM;  Surgeon: Keron Perez MD;  Location: [facility] OR [address] [place] [address];  Service: Vascular;  Laterality: Left;    REFRACTIVE SURGERY      TOTAL REDUCTION MAMMOPLASTY      approx 10 yrs ago    VENOPLASTY  1/21/2022    Procedure: ANGIOPLASTY, VEIN;  Surgeon: Keron Perez MD;  Location: [facility] OR [address] [place] [address];  Service: Vascular;;     Family History   Problem Relation Age of Onset    Stroke Mother     Diabetes Mother     Hypertension Mother     Cataracts Mother     Leukemia Father     Cataracts Father     Ovarian cancer Sister 35    Achondroplasia Sister     HIV Brother     No Known Problems Daughter     No Known Problems Son     Breast cancer Maternal Aunt 65    Parkinsonism Maternal Aunt     Esophageal cancer Maternal Uncle         smoker    No Known Problems Paternal Aunt     Cataracts Paternal Uncle     Cataracts Maternal Grandmother     Cataracts Maternal Grandfather     Diabetes Paternal Grandmother     Cataracts Paternal Grandmother     No Known Problems Paternal Grandfather     No Known Problems Other     Amblyopia Neg Hx     Blindness Neg Hx     Glaucoma Neg Hx     Macular degeneration Neg Hx     Retinal detachment Neg Hx     Strabismus Neg Hx     Thyroid disease Neg Hx     Colon cancer Neg Hx     Cancer Neg Hx        Review of Systems   Constitutional: Negative for chills and fever.   Cardiovascular:  Positive for leg swelling. Negative for chest pain and claudication.   Respiratory:  Negative for cough and shortness of breath.    Skin:  Positive for dry skin, nail changes and suspicious lesions. Negative for itching and rash.   Musculoskeletal:  Positive for arthritis and joint pain. Negative for falls, joint swelling, muscle cramps and muscle weakness.   Gastrointestinal:  Negative for diarrhea, nausea and vomiting.   Neurological:  Positive for numbness and paresthesias. Negative for tremors and  "weakness.   Psychiatric/Behavioral:  Negative for altered mental status and hallucinations.        Objective:       Vitals:    12/07/22 1106   Weight: 99.3 kg (219 lb)   Height: 5' 5" (1.651 m)   PainSc: 0-No pain     Physical Exam  Vitals and nursing note reviewed.   Constitutional:       Appearance: She is not diaphoretic.      Comments: General: Pt. is well-developed, well-nourished, appears stated age, in no acute distress, alert and oriented x 3. No evidence of depression, anxiety, or agitation. Calm, cooperative, and communicative. Appropriate interactions and affect.       Cardiovascular:      Pulses:           Dorsalis pedis pulses are 1+ on the right side and 1+ on the left side.        Posterior tibial pulses are 1+ on the right side and 1+ on the left side.      Comments: There is decreased digital hair.   Musculoskeletal:      Right ankle: Swelling present. No tenderness.      Right Achilles Tendon: No defects.      Left ankle: Swelling present. No tenderness.      Left Achilles Tendon: No defects.      Right foot: Normal range of motion. No tenderness.      Left foot: Normal range of motion. No tenderness.      Comments: Muscle strength is 5/5 in all groups bilaterally.    Decreased stride, station of gait.  apropulsive toe off.  Increased angle and base of gait.    Patient has hammertoes of digits 2-5 bilateral partially reducible without symptom today.    Visible and palpable bunion without pain at dorsomedial 1st metatarsal head right and left.  Hallux abducted right and left partially reducible, tracks laterally without being track bound.  No ecchymosis, erythema, edema, or cardinal signs infection or signs of trauma same foot.    Fat pad atrophy to heels and met heads bilateral     Skin:     General: Skin is warm and dry.      Coloration: Skin is not pale.      Findings: Wound (See description below) present. No abrasion, lesion (posterior right leg healed) or rash.      Nails: There is no " clubbing.      Comments: Xerosis bilaterally     Toenails 1-5 bilaterally are elongated by 2-3 mm, thickened by 2-3 mm, discolored/yellowed, dystrophic, brittle with subungual debris.     Interdigital Spaces clean, dry and without evidence of break in skin integrity   Neurological:      Sensory: No sensory deficit.      Comments: [place]-Gloria 5.07 monofilament is intact bilateral feet. Sharp/dull sensation is also intact Bilateral feet.           Psychiatric:         Speech: Speech normal.             12/08/2021            10/06/2021          07/28/2021    Ulcer location:  Anterior right leg  Measurements :  2.2 x 1.4 x 0.2  cm   Signs of infection:  Local edema and erythema as well as tenderness to palpation   Drainage: Sanguinous  Purulence: no  Crepitus/fluctuance: no  Periwound: Reddened  Base: fibrogranular  Depth: subcutaneous tissue  Probe to bone: no              07/08/2021    Ulcer location:  Anterior right leg  Measurements :  2.2 x 1.7 x 0.4  cm   Signs of infection:  Local edema and erythema as well as tenderness to palpation and mild malodor  Drainage: Sero-Sanguinous  Purulence: no  Crepitus/fluctuance: no  Periwound: Reddened  Base: Fibrotic slough  Depth: subcutaneous tissue  Probe to bone: no                Assessment:       Encounter Diagnoses   Name Primary?    Type 2 diabetes mellitus with ESRD (end-stage renal disease) Yes    Hammer toes of both feet     Hallux abducto valgus, left     Hallux abducto valgus, right     Plantar fat pad atrophy     Nail dermatophytosis          Plan:       Erica was seen today for diabetes mellitus and nail care.    Diagnoses and all orders for this visit:    Type 2 diabetes mellitus with ESRD (end-stage renal disease)    Hammer toes of both feet    Hallux abducto valgus, left    Hallux abducto valgus, right    Plantar fat pad atrophy    Nail dermatophytosis    I counseled the patient on her conditions, their implications and medical management. Education about  the diabetic foot, neuropathy, and prevention of limb loss.    Discussed wound healing cycle, skin integrity, ways to care for skin. Counseled patient on the effects of high blood glucose on healing. She verbalizes understanding that it can increase the chances of delayed healing and this prolonged exposure leads to infection or progression of infection which subsequently can result in loss of limb.    Adequate vitamin supplementation, protein intake, and hydration - discussed with patient    Wound has remained healed well with no signs of continued skin breakdown noted.  Skin is still delicate therefore patient must be diligent in avoiding excessive pressure and making sure there is adequate support and padding in shoe gear. Again, compression stockings and elevation recommended.    Follow-up: 12-15 weeks but should call [facility] immediately if any signs of infection, such as fever, chills, sweats, increased redness or pain.    Short-term goals include maintaining good offloading and minimizing bioburden, promoting granulation and epithelialization to healing.  Long-term goals include keeping the wound healed by good offloading and medical management under the direction of internist.    Shoe inspection. Diabetic Foot Education. Patient reminded of the importance of good nutrition/healthy diet/weight management and blood sugar control to help prevent podiatric complications of diabetes. Patient instructed on proper foot hygeine. Wear comfortable, proper fitting shoes. Wash feet daily. Dry well. After drying, apply moisturizer to feet (no lotion to webspaces). Inspect feet daily for skin breaks, blisters, swelling, or redness. Wear cotton socks (preferably white)  Change socks every day. Do NOT walk barefoot. Do NOT use heating pads or hot water soaks. We discussed wearing proper shoe gear, daily foot inspections, never walking without protective shoe gear.

## 2023-01-09 ENCOUNTER — HOSPITAL ENCOUNTER (OUTPATIENT)
Dept: CARDIOLOGY | Facility: HOSPITAL | Age: 78
Discharge: HOME | End: 2023-01-09
Attending: SURGERY
Payer: MEDICARE

## 2023-01-09 ENCOUNTER — OFFICE VISIT (OUTPATIENT)
Dept: VASCULAR SURGERY | Facility: CLINIC | Age: 78
End: 2023-01-09
Payer: MEDICARE

## 2023-01-09 VITALS
BODY MASS INDEX: 36.87 KG/M2 | WEIGHT: 221.56 LBS | SYSTOLIC BLOOD PRESSURE: 134 MMHG | DIASTOLIC BLOOD PRESSURE: 69 MMHG

## 2023-01-09 DIAGNOSIS — T82.858D ARTERIOVENOUS FISTULA STENOSIS, SUBSEQUENT ENCOUNTER: ICD-10-CM

## 2023-01-09 DIAGNOSIS — T82.858D ARTERIOVENOUS FISTULA STENOSIS, SUBSEQUENT ENCOUNTER: Primary | ICD-10-CM

## 2023-01-09 LAB
HD FISTULA OUTFLOW VEIN LOCATION: NORMAL
HD FISTULA OUTFLOW VEIN VESSEL: NORMAL
HD INFLOW ARTERY LOCATION: NORMAL
HD INFLOW ARTERY VESSEL: NORMAL
RIGHT DIS GRAFT PSV: 87 CM/ SEC
RIGHT INFLOW PSV: 235 CM/S
RIGHT MID GRAFT PSV: 146 CM/S
RIGHT OUTFLOW VEIN PSV: 120 CM/ SEC
RIGHT PROX ANA PSV: 819 CM/S
RIGHT PROX GRAFT PSV: 681 CM/S
RIGHT VOLUME FLOW PSV: 1767 ML/MIN

## 2023-01-09 PROCEDURE — 1126F AMNT PAIN NOTED NONE PRSNT: CPT | Mod: CPTII,S$GLB,, | Performed by: SURGERY

## 2023-01-09 PROCEDURE — 99214 OFFICE O/P EST MOD 30 MIN: CPT | Mod: S$GLB,,, | Performed by: SURGERY

## 2023-01-09 PROCEDURE — 3288F FALL RISK ASSESSMENT DOCD: CPT | Mod: CPTII,S$GLB,, | Performed by: SURGERY

## 2023-01-09 PROCEDURE — 1101F PR PT FALLS ASSESS DOC 0-1 FALLS W/OUT INJ PAST YR: ICD-10-PCS | Mod: CPTII,S$GLB,, | Performed by: SURGERY

## 2023-01-09 PROCEDURE — 3078F PR MOST RECENT DIASTOLIC BLOOD PRESSURE < 80 MM HG: ICD-10-PCS | Mod: CPTII,S$GLB,, | Performed by: SURGERY

## 2023-01-09 PROCEDURE — 1101F PT FALLS ASSESS-DOCD LE1/YR: CPT | Mod: CPTII,S$GLB,, | Performed by: SURGERY

## 2023-01-09 PROCEDURE — 3075F SYST BP GE 130 - 139MM HG: CPT | Mod: CPTII,S$GLB,, | Performed by: SURGERY

## 2023-01-09 PROCEDURE — 3075F PR MOST RECENT SYSTOLIC BLOOD PRESS GE 130-139MM HG: ICD-10-PCS | Mod: CPTII,S$GLB,, | Performed by: SURGERY

## 2023-01-09 PROCEDURE — 1159F PR MEDICATION LIST DOCUMENTED IN MEDICAL RECORD: ICD-10-PCS | Mod: CPTII,S$GLB,, | Performed by: SURGERY

## 2023-01-09 PROCEDURE — 93990 CV US HEMODIALYSIS ACCESS (CUPID ONLY): ICD-10-PCS | Mod: 26,,, | Performed by: SURGERY

## 2023-01-09 PROCEDURE — 99214 PR OFFICE/OUTPT VISIT, EST, LEVL IV, 30-39 MIN: ICD-10-PCS | Mod: S$GLB,,, | Performed by: SURGERY

## 2023-01-09 PROCEDURE — 93990 DOPPLER FLOW TESTING: CPT | Mod: TC

## 2023-01-09 PROCEDURE — 93990 DOPPLER FLOW TESTING: CPT | Mod: 26,,, | Performed by: SURGERY

## 2023-01-09 PROCEDURE — 3078F DIAST BP <80 MM HG: CPT | Mod: CPTII,S$GLB,, | Performed by: SURGERY

## 2023-01-09 PROCEDURE — 99999 PR PBB SHADOW E&M-EST. PATIENT-LVL III: ICD-10-PCS | Mod: PBBFAC,,, | Performed by: SURGERY

## 2023-01-09 PROCEDURE — 1126F PR PAIN SEVERITY QUANTIFIED, NO PAIN PRESENT: ICD-10-PCS | Mod: CPTII,S$GLB,, | Performed by: SURGERY

## 2023-01-09 PROCEDURE — 1159F MED LIST DOCD IN RCRD: CPT | Mod: CPTII,S$GLB,, | Performed by: SURGERY

## 2023-01-09 PROCEDURE — 3288F PR FALLS RISK ASSESSMENT DOCUMENTED: ICD-10-PCS | Mod: CPTII,S$GLB,, | Performed by: SURGERY

## 2023-01-09 PROCEDURE — 99999 PR PBB SHADOW E&M-EST. PATIENT-LVL III: CPT | Mod: PBBFAC,,, | Performed by: SURGERY

## 2023-01-09 NOTE — PROGRESS NOTES
Keron Perez MD VI                       [facility]                         01/09/2023    HPI:  Erica Willett is a 77 y.o. female with   Patient Active Problem List   Diagnosis    Severe obesity (BMI 35.0-39.9) with comorbidity    Sickle cell trait    Hyperlipidemia LDL goal <100    HUMBERTO on CPAP    Hypothyroidism (acquired)    Hx-TIA (transient ischemic attack)    Vitamin D deficiency disease    Pulmonary hypertension    Type 2 diabetes mellitus with ESRD (end-stage renal disease)    Secondary renal hyperparathyroidism    Incomplete bladder emptying    Postmenopausal atrophic vaginitis    Rectocele    Mixed incontinence urge and stress    Chronic diastolic heart failure    Tortuous aorta    ESRD on hemodialysis    Anemia of chronic disease    Debility    Chronic respiratory failure    Type 2 diabetes mellitus with foot ulcer, with long-term current use of insulin    Stable proliferative diabetic retinopathy associated with type 2 diabetes mellitus    Heart failure with preserved ejection fraction    Pseudophakia    Chronic kidney disease-mineral and bone disorder    Age-related osteoporosis without current pathological fracture    H/O: stroke    Walker as ambulation aid    being managed by PCP and specialists who is here today for evaluation of long term HD access.  S/p R IJ tunneled HD catheter, HD without issues.  Pt is R handed.  No complaints today.  Does endorse orthopnea, no chest pain.  Unable to climb 1 flight of stairs on own.    no MI  no Stroke  Tobacco use: denies    1/20/20: No new issues.    3/2020: Dialysis without issues.    4/6/20:  S/p LUE fistulogram, central venogram, ligation of medial side branch cephalic vein, ligation of lateral side branch cephalic vein.  No issues with HD for several weeks since procedure.    7/6/20:  No issues with HD.    8/3/20: s/p L proximal fistula angioplasty 7/21/20.  No issues with HD.    8/31/20:  S/p 8/19/29 Balloon angioplasty  of the proximal LUE AVF with a 6x60 Angiosculpt and Stellerex balloon.      10/2020:  No issues with HD    1/2021:  No issues with HD    4/2021:  No new problems.    8/2021:  No issues with HD. C/o fatigue after HD.  Has not seen cardiology recently.    3/2022:  No issues with HD    6/2022:  Doing well, no issues with HD    1/2023:  No issues with HD    Past Medical History:   Diagnosis Date    Acute ischemic right PCA stroke 6/6/2021    Acute respiratory failure with hypoxia     Age-related osteoporosis without current pathological fracture 3/8/2021    Anemia of chronic kidney failure, stage 4 (severe) 4/5/2019    Cataracts, bilateral     CHF (congestive heart failure)     CKD (chronic kidney disease) stage 3, GFR 30-59 ml/min     CKD (chronic kidney disease) stage 3, GFR 30-59 ml/min     Controlled type 2 diabetes mellitus with proteinuria or albuminuria     Depression     Diabetes with neurologic complications     Diabetic retinopathy of both eyes     Edema     Glaucoma     History of colonic polyps     Hx-TIA (transient ischemic attack) 11/2008    Hyperlipidemia LDL goal < 100     Hypertension     Hypothyroidism     Major depressive disorder, single episode, mild 2/17/2016    Mixed incontinence urge and stress     Obesity     Obstructive sleep apnea on CPAP     7/19/19:  Home CPAP machine broken, per patient & son    Osteopenia     Proteinuria     Sickle cell trait     Strabismus     TIA (transient ischemic attack)     Trouble in sleeping     Type 2 diabetes mellitus with ophthalmic manifestations     Type 2 diabetes with stage 3 chronic kidney disease GFR 30-59     Type II or unspecified type diabetes mellitus with renal manifestations, uncontrolled(250.42)     Uncontrolled type 2 diabetes mellitus with peripheral circulatory disorder 4/5/2019    Urge incontinence 1/11/2016    Urge incontinence     Venous stasis ulcer     bilateral lower legs    Vitamin D deficiency disease      Past Surgical History:    Procedure Laterality Date    AV FISTULA PLACEMENT Left 2019    Procedure: CREATION, AV FISTULA, LEFT UPPER EXTREMITY;  Surgeon: Keron Perez MD;  Location: [facility] OR;  Service: Vascular;  Laterality: Left;    BREAST BIOPSY      breast reduction Bilateral age 31    BREAST SURGERY      CATARACT EXTRACTION Bilateral     cataracts Bilateral      SECTION, LOW TRANSVERSE      x1    CHOLECYSTECTOMY      COLONOSCOPY N/A 2022    Procedure: COLONOSCOPY;  Surgeon: Mahesh Huang MD;  Location: [facility] ENDO (2ND FLR);  Service: Endoscopy;  Laterality: N/A;  fully vaccinated-sm.  RAPID COVID  +cologuard needing ASAP  Dialysis pt T,TH Sat and left UA access  2nd floor - cardiac/dialysis/SOB / LABS / prep ins. emailed - ERW    EYE SURGERY  2014, 2014    vitrectomy    EYE SURGERY Right 2016    FISTULOGRAM Left 3/6/2020    Procedure: Fistulogram, left upper extremity, with branch ligation;  Surgeon: Keron Perez MD;  Location: [facility] OR [address] [place] [address];  Service: Vascular;  Laterality: Left;  Time 1.1 Minute  42.10 mGy    FISTULOGRAM Left 2020    Procedure: Fistulogram, left upper extremity, transradial access with possible intervention;  Surgeon: Keron Perez MD;  Location: [facility] OR [address] [place] [address];  Service: Vascular;  Laterality: Left;    FISTULOGRAM Left 2020    Procedure: Fistulogram, left upper extremity, possible intervention;  Surgeon: Keron Perez MD;  Location: [facility] OR;  Service: Vascular;  Laterality: Left;  930 AM START  RN PRE OP  ---COVID NEGATIVE ON  2020. [place]    FISTULOGRAM Left 2022    Procedure: Fistulogram, transradial access;  Surgeon: Keron Perez MD;  [facility];  Service: Vascular;  Laterality: Left;  mgy-40.16  gycm-8.3905  contrast-34cc  time-12.4min    HYSTERECTOMY      TAHBSO (patient is unsure if ovaries removed)    OOPHORECTOMY      PERCUTANEOUS TRANSLUMINAL ANGIOPLASTY OF ARTERIOVENOUS FISTULA Left 2020     Procedure: PTA, AV FISTULA;  Surgeon: Keron Perez MD;  Location: [facility] OR [address] [place] [address];  Service: Vascular;  Laterality: Left;  15.9 minutes of fluro  41.12  mGy  7.9060 Gy cm2  32ml  contrast    PHLEBOGRAPHY Left 7/21/2020    Procedure: CENTRAL VENOGRAM;  Surgeon: Keron Perez MD;  Location: [facility] OR [address] [place] [address];  Service: Vascular;  Laterality: Left;    REFRACTIVE SURGERY      TOTAL REDUCTION MAMMOPLASTY      approx 10 yrs ago    VENOPLASTY  1/21/2022    Procedure: ANGIOPLASTY, VEIN;  Surgeon: Keron Perez MD;  Location: [facility] OR [address] [place] [address];  Service: Vascular;;     Family History   Problem Relation Age of Onset    Stroke Mother     Diabetes Mother     Hypertension Mother     Cataracts Mother     Leukemia Father     Cataracts Father     Ovarian cancer Sister 35    Achondroplasia Sister     HIV Brother     No Known Problems Daughter     No Known Problems Son     Breast cancer Maternal Aunt 65    Parkinsonism Maternal Aunt     Esophageal cancer Maternal Uncle         smoker    No Known Problems Paternal Aunt     Cataracts Paternal Uncle     Cataracts Maternal Grandmother     Cataracts Maternal Grandfather     Diabetes Paternal Grandmother     Cataracts Paternal Grandmother     No Known Problems Paternal Grandfather     No Known Problems Other     Amblyopia Neg Hx     Blindness Neg Hx     Glaucoma Neg Hx     Macular degeneration Neg Hx     Retinal detachment Neg Hx     Strabismus Neg Hx     Thyroid disease Neg Hx     Colon cancer Neg Hx     Cancer Neg Hx      Social History     Socioeconomic History    Marital status: Single    Number of children: 5   Occupational History    Occupation:      Employer: [employer]     Comment: part-time   Tobacco Use    Smoking status: Never    Smokeless tobacco: Never   Substance and Sexual Activity    Alcohol use: No     Alcohol/week: 0.0 standard drinks    Drug use: No    Sexual activity: Not Currently     Partners: Male     Birth  control/protection: Post-menopausal, Surgical     Social Determinants of Health     Financial Resource Strain: Low Risk     Difficulty of Paying Living Expenses: Not hard at all   Food Insecurity: No Food Insecurity    Worried About Running Out of Food in the Last Year: Never true    Ran Out of Food in the Last Year: Never true   Transportation Needs: No Transportation Needs    Lack of Transportation (Medical): No    Lack of Transportation (Non-Medical): No   Physical Activity: Insufficiently Active    Days of Exercise per Week: 2 days    Minutes of Exercise per Session: 10 min   Stress: No Stress Concern Present    Feeling of Stress : Only a little   Social Connections: Socially Isolated    Frequency of Communication with Friends and Family: More than three times a week    Frequency of Social Gatherings with Friends and Family: More than three times a week    Attends Mu-ism Services: Never    Active Member of Clubs or Organizations: No    Attends Club or Organization Meetings: Never    Marital Status:    Housing Stability: Low Risk     Unable to Pay for Housing in the Last Year: No    Number of Places Lived in the Last Year: 1    Unstable Housing in the Last Year: No       Current Outpatient Medications:     ACCU-CHEK SOFT DEV LANCETS Kit, , Disp: , Rfl:     atorvastatin (LIPITOR) 80 MG tablet, TAKE 1 TABLET (80 MG TOTAL) BY MOUTH EVERY EVENING., Disp: 90 tablet, Rfl: 3    blood sugar diagnostic Strp, One test strip use 2 times a day to check blood glucose,  ICD-10: E11.9, compatible with insurance/glucometer, Disp: 100 each, Rfl: 11    blood-glucose meter kit, One glucometer, use to check blood glucose.   ICD-10: E11.9. Dispense machine covered by insurance, Disp: 1 each, Rfl: 0    cinacalcet (SENSIPAR) 30 MG Tab, , Disp: , Rfl:     docusate sodium (COLACE) 100 MG capsule, Take 200 mg by mouth once daily. , Disp: , Rfl:     doxercalciferoL (HECTOROL) 4 mcg/2 mL injection, Inject 4.5 mcg into the vein 3  (three) times a week. At dialysis unit, Disp: , Rfl:     dulaglutide (TRULICITY) 0.75 mg/0.5 mL pen injector, Inject 0.75 mg into the skin every 7 days., Disp: 12 pen, Rfl: 3    epoetin arnel (EPOGEN INJ), Inject 1,000 Units as directed every 7 days. At the dialysis unit, Disp: , Rfl:     LANCETS MISC, , Disp: , Rfl:     lancets Misc, One lancets use 2 times a day to check blood glucose, ICD-10: E11.9, Disp: 100 each, Rfl: 11    lancing device Misc, One device, used to check blood glucose, ICD-10: E11.9, Disp: 1 each, Rfl: 0    levothyroxine (SYNTHROID) 50 MCG tablet, TAKE 1 TABLET BEFORE BREAKFAST, Disp: 90 tablet, Rfl: 3    midodrine (PROAMATINE) 5 MG Tab, Take 1 tablet (5 mg total) by mouth 3 (three) times daily., Disp: 90 tablet, Rfl: 11    OLENA-NABIL RX 1- mg-mg-mcg Tab, Take 1 tablet by mouth once daily., Disp: , Rfl:     aspirin (ECOTRIN) 81 MG EC tablet, Take 1 tablet (81 mg total) by mouth once daily., Disp: 90 tablet, Rfl: 3    sevelamer carbonate (RENVELA) 800 mg Tab, Take 3 tablets (2,400 mg total) by mouth 3 (three) times daily with meals., Disp: 270 tablet, Rfl: 11    REVIEW OF SYSTEMS:  General: No fevers or chills; ENT: No sore throat; Allergy and Immunology: no persistent infections; Hematological and Lymphatic: No history of bleeding or easy bruising; Endocrine: negative; Respiratory: no cough, shortness of breath, or wheezing; Cardiovascular: no chest pain or dyspnea on exertion; Gastrointestinal: no abdominal pain/back, change in bowel habits, or bloody stools; Genito-Urinary: no dysuria, trouble voiding, or hematuria; Musculoskeletal: negative; Neurological: no TIA or stroke symptoms; Psychiatric: no nervousness, anxiety or depression.    PHYSICAL EXAM:                General appearance:  Alert, well-appearing, and in no distress.  Oriented to person, place, and time                    Neurological: Normal speech, no focal findings noted; CN II - XII grossly intact. All extremities with  sensation to light touch.            Musculoskeletal: Digits/nail without cyanosis/clubbing.  Strength 5/5 all extremities.                    Neck: Supple, no significant adenopathy, no carotid bruit can be auscultated                  Chest:  Clear to auscultation, no wheezes, rales or rhonchi, symmetric air entry. No use of accessory muscles               Cardiac: Normal rate and regular rhythm, S1 and S2 normal            Abdomen: Soft, nontender, nondistended, no masses or organomegaly, no hernia     No rebound tenderness noted; bowel sounds normal     Pulsatile aortic mass is non palpable.     No groin adenopathy      Extremities:      2+ radial pulse bilaterally, LUE AVF +thrill, inc well healed     No BUE edema, Negative Manuel's test BUE    Skin: No tissue loss    LAB RESULTS:  No results found for: CBC  Lab Results   Component Value Date    LABPROT 10.3 06/07/2021    INR 1.0 06/07/2021     Lab Results   Component Value Date     05/07/2022    K 4.9 05/07/2022    CL 96 05/07/2022    CO2 24 05/07/2022    GLU 98 05/07/2022    BUN 51 (H) 05/07/2022    CREATININE 9.6 (H) 05/07/2022    CALCIUM 9.3 05/07/2022    ANIONGAP 19 (H) 05/07/2022    EGFRNONAA 3.6 (A) 05/07/2022     Lab Results   Component Value Date    WBC 6.83 05/07/2022    RBC 4.49 05/07/2022    HGB 12.4 05/07/2022    HCT 39.3 05/07/2022    MCV 88 05/07/2022    MCH 27.6 05/07/2022    MCHC 31.6 (L) 05/07/2022    RDW 15.2 (H) 05/07/2022     05/07/2022    MPV 10.6 05/07/2022    GRAN 4.4 05/07/2022    GRAN 64.5 05/07/2022    LYMPH 1.4 05/07/2022    LYMPH 20.4 05/07/2022    MONO 0.9 05/07/2022    MONO 13.0 05/07/2022    EOS 0.1 05/07/2022    BASO 0.05 05/07/2022    EOSINOPHIL 1.0 05/07/2022    BASOPHIL 0.7 05/07/2022    DIFFMETHOD Automated 05/07/2022     .  Lab Results   Component Value Date    HGBA1C 6.3 (H) 09/13/2022       IMAGING:  All pertinent imaging has been reviewed and interpreted independently.    Vein mapping 9/2019:  Adequate R  superior basilic vein and axillary vein ; Adequate L basilic vein superior and inferior, adequate L axillary     1/27/20 Left upper extremity dialysis ultrasound shows no hemodynamically significant stenosis.  Flow is 1083 ml/min.  Depth and diameter are appropriate.    3/23/20:  Left upper extremity dialysis ultrasound shows anastomotic and proximal fistula hemodynamically significant stenosis.  Flow is 955 ml/min.      7/2020: Left upper extremity dialysis ultrasound shows proximal fistula hemodynamically significant stenosis.  Flow is 1257 ml/min.      8/2020: Left upper extremity dialysis ultrasound shows proximal hemodynamically significant stenosis.  Flow is 1999 ml/min.      8/31/20: Left upper extremity dialysis ultrasound shows proximal fistula hemodynamically significant stenosis.  Flow is 2209 ml/min.      10/2020:  Prox stenosis, flow > 3000 ml/min    1/2021:  Proximal stenosis, flow 4414 ml/min    4/2021:  HD US reviewed without significant stenosis, adequate flow.    8/2021:HD US reviewed without significant stenosis, adequate flow.    3/2022:  Left upper extremity dialysis ultrasound shows approx 50% anastomotic and prox fistula stenosis withou hemodynamically significant stenosis.  Flow is 3774 ml/min.      6/2022:  No significant stenosis     IMP/PLAN:  77 y.o. female with   Patient Active Problem List   Diagnosis    Severe obesity (BMI 35.0-39.9) with comorbidity    Sickle cell trait    Hyperlipidemia LDL goal <100    HUMBERTO on CPAP    Hypothyroidism (acquired)    Hx-TIA (transient ischemic attack)    Vitamin D deficiency disease    Pulmonary hypertension    Type 2 diabetes mellitus with ESRD (end-stage renal disease)    Secondary renal hyperparathyroidism    Incomplete bladder emptying    Postmenopausal atrophic vaginitis    Rectocele    Mixed incontinence urge and stress    Chronic diastolic heart failure    Tortuous aorta    ESRD on hemodialysis    Anemia of chronic disease    Debility    Chronic  respiratory failure    Type 2 diabetes mellitus with foot ulcer, with long-term current use of insulin    Stable proliferative diabetic retinopathy associated with type 2 diabetes mellitus    Heart failure with preserved ejection fraction    Pseudophakia    Chronic kidney disease-mineral and bone disorder    Age-related osteoporosis without current pathological fracture    H/O: stroke    Walker as ambulation aid    being managed by PCP and specialists who is here today for evaluation of long term HD access s/p L RC AV fistula.    -s/p L RC AV fistula with proximal fistula measuring 5 mm 1/13/20, adequate flow without issues during HD s/p LUE fistulogram, central venogram, ligation of medial side branch cephalic vein, ligation of lateral side branch cephalic vein 3/2/20 with prox AVF stenosis s/p L proximal fistula angioplasty 7/21/20 with persistent flow issues s/p proximal angioplasty 8/19/20 with scoring balloon/DCB with improvement in stenosis/flow and asymptomatic proximal stenosis with no further issues with HD  -Cont renal diet  -RTC 3 mo with HD US for continued routine surveillance    I spent 12 minutes evaluating this patient and greater than 50% of the time was spent counseling, coordinator care and discussing the plan of care.  All questions were answered and patient stated understanding with agreement with the above treatment plan.    Keron Perez MD [facility]  Vascular and Endovascular Surgery

## 2023-01-09 NOTE — PROGRESS NOTES
Patient Erica Willett, MRN [id number], was dependent on dialysis (ICD10 Z99.2) at the time of this visit on 1/9/23. This addendum is made to the medical record on 01/09/2023.

## 2023-01-13 ENCOUNTER — OFFICE VISIT (OUTPATIENT)
Dept: CARDIOLOGY | Facility: CLINIC | Age: 78
End: 2023-01-13
Payer: MEDICARE

## 2023-01-13 VITALS
RESPIRATION RATE: 16 BRPM | HEART RATE: 95 BPM | WEIGHT: 220.44 LBS | DIASTOLIC BLOOD PRESSURE: 70 MMHG | BODY MASS INDEX: 36.69 KG/M2 | OXYGEN SATURATION: 97 % | SYSTOLIC BLOOD PRESSURE: 134 MMHG

## 2023-01-13 DIAGNOSIS — E11.3553 STABLE PROLIFERATIVE DIABETIC RETINOPATHY OF BOTH EYES ASSOCIATED WITH TYPE 2 DIABETES MELLITUS: ICD-10-CM

## 2023-01-13 DIAGNOSIS — M89.9 CHRONIC KIDNEY DISEASE-MINERAL AND BONE DISORDER: ICD-10-CM

## 2023-01-13 DIAGNOSIS — N18.9 CHRONIC KIDNEY DISEASE-MINERAL AND BONE DISORDER: ICD-10-CM

## 2023-01-13 DIAGNOSIS — N18.6 TYPE 2 DIABETES MELLITUS WITH ESRD (END-STAGE RENAL DISEASE): ICD-10-CM

## 2023-01-13 DIAGNOSIS — E66.01 SEVERE OBESITY (BMI 35.0-39.9) WITH COMORBIDITY: ICD-10-CM

## 2023-01-13 DIAGNOSIS — Z86.73 HX-TIA (TRANSIENT ISCHEMIC ATTACK): ICD-10-CM

## 2023-01-13 DIAGNOSIS — E83.9 CHRONIC KIDNEY DISEASE-MINERAL AND BONE DISORDER: ICD-10-CM

## 2023-01-13 DIAGNOSIS — Z79.4 TYPE 2 DIABETES MELLITUS WITH FOOT ULCER, WITH LONG-TERM CURRENT USE OF INSULIN: ICD-10-CM

## 2023-01-13 DIAGNOSIS — I77.1 TORTUOUS AORTA: ICD-10-CM

## 2023-01-13 DIAGNOSIS — E11.621 TYPE 2 DIABETES MELLITUS WITH FOOT ULCER, WITH LONG-TERM CURRENT USE OF INSULIN: ICD-10-CM

## 2023-01-13 DIAGNOSIS — G47.33 OSA ON CPAP: Chronic | ICD-10-CM

## 2023-01-13 DIAGNOSIS — E11.22 TYPE 2 DIABETES MELLITUS WITH ESRD (END-STAGE RENAL DISEASE): ICD-10-CM

## 2023-01-13 DIAGNOSIS — L97.509 TYPE 2 DIABETES MELLITUS WITH FOOT ULCER, WITH LONG-TERM CURRENT USE OF INSULIN: ICD-10-CM

## 2023-01-13 DIAGNOSIS — E78.5 HYPERLIPIDEMIA LDL GOAL <100: Chronic | ICD-10-CM

## 2023-01-13 DIAGNOSIS — I50.32 CHRONIC DIASTOLIC HEART FAILURE: Chronic | ICD-10-CM

## 2023-01-13 DIAGNOSIS — I27.20 PULMONARY HYPERTENSION: ICD-10-CM

## 2023-01-13 DIAGNOSIS — I10 HYPERTENSION, UNSPECIFIED TYPE: Primary | ICD-10-CM

## 2023-01-13 PROCEDURE — 3078F DIAST BP <80 MM HG: CPT | Mod: CPTII,S$GLB,, | Performed by: INTERNAL MEDICINE

## 2023-01-13 PROCEDURE — 99214 OFFICE O/P EST MOD 30 MIN: CPT | Mod: S$GLB,,, | Performed by: INTERNAL MEDICINE

## 2023-01-13 PROCEDURE — 99499 RISK ADDL DX/OHS AUDIT: ICD-10-PCS | Mod: S$GLB,,, | Performed by: INTERNAL MEDICINE

## 2023-01-13 PROCEDURE — 3078F PR MOST RECENT DIASTOLIC BLOOD PRESSURE < 80 MM HG: ICD-10-PCS | Mod: CPTII,S$GLB,, | Performed by: INTERNAL MEDICINE

## 2023-01-13 PROCEDURE — 99999 PR PBB SHADOW E&M-EST. PATIENT-LVL III: ICD-10-PCS | Mod: PBBFAC,,, | Performed by: INTERNAL MEDICINE

## 2023-01-13 PROCEDURE — 1160F PR REVIEW ALL MEDS BY PRESCRIBER/CLIN PHARMACIST DOCUMENTED: ICD-10-PCS | Mod: CPTII,S$GLB,, | Performed by: INTERNAL MEDICINE

## 2023-01-13 PROCEDURE — 1126F PR PAIN SEVERITY QUANTIFIED, NO PAIN PRESENT: ICD-10-PCS | Mod: CPTII,S$GLB,, | Performed by: INTERNAL MEDICINE

## 2023-01-13 PROCEDURE — 1159F MED LIST DOCD IN RCRD: CPT | Mod: CPTII,S$GLB,, | Performed by: INTERNAL MEDICINE

## 2023-01-13 PROCEDURE — 3075F PR MOST RECENT SYSTOLIC BLOOD PRESS GE 130-139MM HG: ICD-10-PCS | Mod: CPTII,S$GLB,, | Performed by: INTERNAL MEDICINE

## 2023-01-13 PROCEDURE — 99499 UNLISTED E&M SERVICE: CPT | Mod: S$GLB,,, | Performed by: INTERNAL MEDICINE

## 2023-01-13 PROCEDURE — 1126F AMNT PAIN NOTED NONE PRSNT: CPT | Mod: CPTII,S$GLB,, | Performed by: INTERNAL MEDICINE

## 2023-01-13 PROCEDURE — 99214 PR OFFICE/OUTPT VISIT, EST, LEVL IV, 30-39 MIN: ICD-10-PCS | Mod: S$GLB,,, | Performed by: INTERNAL MEDICINE

## 2023-01-13 PROCEDURE — 93000 ELECTROCARDIOGRAM COMPLETE: CPT | Mod: S$GLB,,, | Performed by: INTERNAL MEDICINE

## 2023-01-13 PROCEDURE — 93000 EKG 12-LEAD: ICD-10-PCS | Mod: S$GLB,,, | Performed by: INTERNAL MEDICINE

## 2023-01-13 PROCEDURE — 1159F PR MEDICATION LIST DOCUMENTED IN MEDICAL RECORD: ICD-10-PCS | Mod: CPTII,S$GLB,, | Performed by: INTERNAL MEDICINE

## 2023-01-13 PROCEDURE — 99999 PR PBB SHADOW E&M-EST. PATIENT-LVL III: CPT | Mod: PBBFAC,,, | Performed by: INTERNAL MEDICINE

## 2023-01-13 PROCEDURE — 3075F SYST BP GE 130 - 139MM HG: CPT | Mod: CPTII,S$GLB,, | Performed by: INTERNAL MEDICINE

## 2023-01-13 PROCEDURE — 1160F RVW MEDS BY RX/DR IN RCRD: CPT | Mod: CPTII,S$GLB,, | Performed by: INTERNAL MEDICINE

## 2023-01-13 NOTE — PROGRESS NOTES
CARDIOVASCULAR CONSULTATION    REASON FOR CONSULT:   Erica Willett is a 77 y.o. female who presents for establishing care with me..      HISTORY OF PRESENT ILLNESS:     Notes from November 2019:  Patient here for follow-up.  Denies any chest pains at rest on exertion, orthopnea, PND.  Stress test did not show any significant ischemia.        Notes from October 2019:  Patient here for follow-up.  Denies any chest pains at rest on exertion, orthopnea, PND.  Has been started on dialysis since her last visit with me.  States that she feels great after starting dialysis and feels like she has more appetite and a breathing has gotten better.  Walks slowly with the help of a walker.  Denies PND.  States that since the dialysis her orthopnea has gotten much better.      Notes from August 2019:  Patient doing fine.  Denies any chest pains at rest on exertion, orthopnea, PND.  Does have chronic dyspnea on exertion.  States that she feels her pedal edema is going down as she is responding to Bumex better.      Notes from June 2019:  Patient is a very pleasant 74-year-old lady with a past medical history of heart failure with preserved ejection fraction, diabetes, chronic kidney disease, leg wounds, hypertension who is here for follow-up.  She denies any chest pains at rest on exertion, orthopnea, PND.  Denies any claudication like symptoms.  States that she [facility].  Has been doing salt restriction diet.      Notes from May 2022:  Patient here for follow-up after a long hiatus.  In the interim had a PCA stroke.  In 2021. Otherwise has been doing fine.  Denies any chest pains at rest on exertion, orthopnea, PND.  Was taken off her antihypertensives because of hypotension during dialysis.  Currently on midodrine for that.    Notes from August 2022: Patient here for follow-up.  Denies any chest pains at rest on exertion orthopnea PND.  Has been doing fine.      Notes from January 23:   Patient here for follow-up.  Doing fine.  Denies any chest pains at rest on exertion, orthopnea, PND, swelling of feet    PAST MEDICAL HISTORY:     Past Medical History:   Diagnosis Date    Acute ischemic right PCA stroke 6/6/2021    Acute respiratory failure with hypoxia     Age-related osteoporosis without current pathological fracture 3/8/2021    Anemia of chronic kidney failure, stage 4 (severe) 4/5/2019    Cataracts, bilateral     CHF (congestive heart failure)     CKD (chronic kidney disease) stage 3, GFR 30-59 ml/min     CKD (chronic kidney disease) stage 3, GFR 30-59 ml/min     Controlled type 2 diabetes mellitus with proteinuria or albuminuria     Depression     Diabetes with neurologic complications     Diabetic retinopathy of both eyes     Edema     Glaucoma     History of colonic polyps     Hx-TIA (transient ischemic attack) 11/2008    Hyperlipidemia LDL goal < 100     Hypertension     Hypothyroidism     Major depressive disorder, single episode, mild 2/17/2016    Mixed incontinence urge and stress     Obesity     Obstructive sleep apnea on CPAP     7/19/19:  Home CPAP machine broken, per patient & son    Osteopenia     Proteinuria     Sickle cell trait     Strabismus     TIA (transient ischemic attack)     Trouble in sleeping     Type 2 diabetes mellitus with ophthalmic manifestations     Type 2 diabetes with stage 3 chronic kidney disease GFR 30-59     Type II or unspecified type diabetes mellitus with renal manifestations, uncontrolled(250.42)     Uncontrolled type 2 diabetes mellitus with peripheral circulatory disorder 4/5/2019    Urge incontinence 1/11/2016    Urge incontinence     Venous stasis ulcer     bilateral lower legs    Vitamin D deficiency disease        PAST SURGICAL HISTORY:     Past Surgical History:   Procedure Laterality Date    AV FISTULA PLACEMENT Left 11/27/2019    Procedure: CREATION, AV FISTULA, LEFT UPPER EXTREMITY;  Surgeon: Keron Perez MD;  Location: [facility] OR;   Service: Vascular;  Laterality: Left;    BREAST BIOPSY      breast reduction Bilateral age 31    BREAST SURGERY      CATARACT EXTRACTION Bilateral     cataracts Bilateral      SECTION, LOW TRANSVERSE      x1    CHOLECYSTECTOMY      COLONOSCOPY N/A 2022    Procedure: COLONOSCOPY;  Surgeon: Mahesh Huang MD;  Location: [facility] ([address] [place] [address]);  Service: Endoscopy;  Laterality: N/A;  fully vaccinated-sm.  RAPID COVID  +cologuard needing ASAP  Dialysis pt T,TH Sat and left UA access  2nd floor - cardiac/dialysis/SOB / LABS / prep ins. emailed - [facility]    EYE SURGERY  2014, 2014    vitrectomy    EYE SURGERY Right 2016    FISTULOGRAM Left 3/6/2020    Procedure: Fistulogram, left upper extremity, with branch ligation;  Surgeon: Keron Perez MD;  Location: [address];  Service: Vascular;  Laterality: Left;  Time 1.1 Minute  42.10 mGy    FISTULOGRAM Left 2020    Procedure: Fistulogram, left upper extremity, transradial access with possible intervention;  Surgeon: Keron Perez MD;  Location: [address];  Service: Vascular;  Laterality: Left;    FISTULOGRAM Left 2020    Procedure: Fistulogram, left upper extremity, possible intervention;  Surgeon: Keron Perez MD;  Location: [facility];  Service: Vascular;  Laterality: Left;  930 AM START  RN PRE OP  ---COVID NEGATIVE ON  2020. [place]    FISTULOGRAM Left 2022    Procedure: Fistulogram, transradial access;  Surgeon: Keron Perez MD;  Location: [address];  Service: Vascular;  Laterality: Left;  mgy-40.16  gycm-8.3905  contrast-34cc  time-12.4min    HYSTERECTOMY      TAHBSO (patient is unsure if ovaries removed)    OOPHORECTOMY      PERCUTANEOUS TRANSLUMINAL ANGIOPLASTY OF ARTERIOVENOUS FISTULA Left 2020    Procedure: PTA, AV FISTULA;  Surgeon: Keron Perez MD;  Location: [address];  Service: Vascular;  Laterality: Left;  15.9 minutes of fluro  41.12  mGy  7.9060 Gy cm2  32ml   contrast    PHLEBOGRAPHY Left 7/21/2020    Procedure: CENTRAL VENOGRAM;  Surgeon: Keron Perez MD;  Location: [facility] OR [address] [place] [address];  Service: Vascular;  Laterality: Left;    REFRACTIVE SURGERY      TOTAL REDUCTION MAMMOPLASTY      approx 10 yrs ago    VENOPLASTY  1/21/2022    Procedure: ANGIOPLASTY, VEIN;  Surgeon: Keron Perez MD;  Location: [facility] OR [address] [place] [address];  Service: Vascular;;       ALLERGIES AND MEDICATION:     Review of patient's allergies indicates:   Allergen Reactions    Ace inhibitors Other (See Comments)     Other reaction(s): cough        Medication List            Accurate as of January 13, 2023  1:54 PM. If you have any questions, ask your nurse or doctor.                CONTINUE taking these medications      ACCU-CHEK SOFT DEV LANCETS Kit  Generic drug: lancing device with lancets     aspirin 81 MG EC tablet  Commonly known as: ECOTRIN  Take 1 tablet (81 mg total) by mouth once daily.     atorvastatin 80 MG tablet  Commonly known as: LIPITOR  TAKE 1 TABLET (80 MG TOTAL) BY MOUTH EVERY EVENING.     blood sugar diagnostic Strp  One test strip use 2 times a day to check blood glucose,  ICD-10: E11.9, compatible with insurance/glucometer     blood-glucose meter kit  One glucometer, use to check blood glucose.   ICD-10: E11.9. Dispense machine covered by insurance     cinacalcet 30 MG Tab  Commonly known as: SENSIPAR     docusate sodium 100 MG capsule  Commonly known as: COLACE     doxercalciferoL 4 mcg/2 mL injection  Commonly known as: HECTOROL     EPOGEN INJ     * LANCETS MISC     * lancets Misc  One lancets use 2 times a day to check blood glucose, ICD-10: E11.9     lancing device Misc  One device, used to check blood glucose, ICD-10: E11.9     levothyroxine 50 MCG tablet  Commonly known as: SYNTHROID  TAKE 1 TABLET BEFORE BREAKFAST     midodrine 5 MG Tab  Commonly known as: PROAMATINE  Take 1 tablet (5 mg total) by mouth 3 (three) times daily.     OLENA-NABIL RX 1- mg-mg-mcg  Tab  Generic drug: vit b cmplx 3-fa-vit c-biotin 1- mg-mg-mcg     sevelamer carbonate 800 mg Tab  Commonly known as: RENVELA  Take 3 tablets (2,400 mg total) by mouth 3 (three) times daily with meals.     TRULICITY 0.75 mg/0.5 mL pen injector  Generic drug: dulaglutide  Inject 0.75 mg into the skin every 7 days.           * This list has 2 medication(s) that are the same as other medications prescribed for you. Read the directions carefully, and ask your doctor or other care provider to review them with you.                  SOCIAL HISTORY:     Social History     Socioeconomic History    Marital status: Single    Number of children: 5   Occupational History    Occupation:      Employer: [employer]     Comment: part-time   Tobacco Use    Smoking status: Never    Smokeless tobacco: Never   Substance and Sexual Activity    Alcohol use: No     Alcohol/week: 0.0 standard drinks    Drug use: No    Sexual activity: Not Currently     Partners: Male     Birth control/protection: Post-menopausal, Surgical     Social Determinants of Health     Financial Resource Strain: Low Risk     Difficulty of Paying Living Expenses: Not hard at all   Food Insecurity: No Food Insecurity    Worried About Running Out of Food in the Last Year: Never true    Ran Out of Food in the Last Year: Never true   Transportation Needs: No Transportation Needs    Lack of Transportation (Medical): No    Lack of Transportation (Non-Medical): No   Physical Activity: Insufficiently Active    Days of Exercise per Week: 2 days    Minutes of Exercise per Session: 10 min   Stress: No Stress Concern Present    Feeling of Stress : Only a little   Social Connections: Socially Isolated    Frequency of Communication with Friends and Family: More than three times a week    Frequency of Social Gatherings with Friends and Family: More than three times a week    Attends Druze Services: Never    Active Member of Clubs or Organizations:  No    Attends Club or Organization Meetings: Never    Marital Status:    Housing Stability: Low Risk     Unable to Pay for Housing in the Last Year: No    Number of Places Lived in the Last Year: 1    Unstable Housing in the Last Year: No       FAMILY HISTORY:     Family History   Problem Relation Age of Onset    Stroke Mother     Diabetes Mother     Hypertension Mother     Cataracts Mother     Leukemia Father     Cataracts Father     Ovarian cancer Sister 35    Achondroplasia Sister     HIV Brother     No Known Problems Daughter     No Known Problems Son     Breast cancer Maternal Aunt 65    Parkinsonism Maternal Aunt     Esophageal cancer Maternal Uncle         smoker    No Known Problems Paternal Aunt     Cataracts Paternal Uncle     Cataracts Maternal Grandmother     Cataracts Maternal Grandfather     Diabetes Paternal Grandmother     Cataracts Paternal Grandmother     No Known Problems Paternal Grandfather     No Known Problems Other     Amblyopia Neg Hx     Blindness Neg Hx     Glaucoma Neg Hx     Macular degeneration Neg Hx     Retinal detachment Neg Hx     Strabismus Neg Hx     Thyroid disease Neg Hx     Colon cancer Neg Hx     Cancer Neg Hx        REVIEW OF SYSTEMS:   Review of Systems   Constitutional: Negative.    HENT: Negative.     Eyes: Negative.    Respiratory:  Positive for shortness of breath.    Cardiovascular: Negative.    Gastrointestinal: Negative.    Genitourinary: Negative.    Musculoskeletal: Negative.    Skin: Negative.    Neurological: Negative.    Endo/Heme/Allergies: Negative.      A 10 point review of systems was performed and all the pertinent positives have been mentioned. Rest of review of systems was negative.        PHYSICAL EXAM:     Vitals:    01/13/23 1331   BP: 134/70   Pulse: 95   Resp: 16    Body mass index is 36.69 kg/m².  Weight: 100 kg (220 lb 7.4 oz)         Physical Exam  Vitals and nursing note reviewed.   Constitutional:       Appearance: Normal appearance. She  is well-developed.   HENT:      Head: Normocephalic and atraumatic.      Right Ear: Hearing normal.      Left Ear: Hearing normal.      Nose: Nose normal.   Eyes:      General: Lids are normal.      Conjunctiva/sclera: Conjunctivae normal.      Pupils: Pupils are equal, round, and reactive to light.   Cardiovascular:      Rate and Rhythm: Normal rate and regular rhythm.      Pulses: Normal pulses.      Heart sounds: Normal heart sounds.   Pulmonary:      Effort: Pulmonary effort is normal.      Breath sounds: Normal breath sounds.   Abdominal:      Palpations: Abdomen is soft.      Tenderness: There is no abdominal tenderness.   Musculoskeletal:         General: No deformity.      Cervical back: Normal range of motion and neck supple.   Skin:     General: Skin is warm and dry.   Neurological:      Mental Status: She is alert and oriented to person, place, and time.   Psychiatric:         Speech: Speech normal.         DATA:     Laboratory:  CBC:  Recent Labs   Lab 11/03/21  0857 12/13/21  1627 01/14/22  1010 05/07/22  0801   WBC 6.44 7.44  --  6.83   Hemoglobin 12.4 12.4  --  12.4   POC Hematocrit  --   --  39  --    Hematocrit 38.6 38.3  --  39.3   Platelets 235 216  --  248         CHEMISTRIES:  Recent Labs   Lab 03/01/21  0820 05/07/21  0710 06/07/21  0335 06/08/21  0341 11/03/21  0857 12/13/21  1627 01/14/22  0903 05/07/22  0801   Glucose 160 H   < > 138 H   < > 120 H 194 H  --  98   Sodium 140   < > 139   < > 143 140  --  139   Potassium 4.3   < > 5.5 H   < > 5.5 H 5.9 H 4.2 4.9   BUN 75 H   < > 55 H   < > 52 H 68 H  --  51 H   Creatinine 7.9 H   < > 8.5 H   < > 7.8 H 10.8 H  --  9.6 H   eGFR if  5 A   < > 5 A   < > 5.3 A 4 A  --  4.1 A   eGFR if non African American 5 A   < > 4 A   < > 4.6 A 3 A  --  3.6 A   Calcium 8.2 L   < > 8.8   < > 9.0 8.1 L  --  9.3   Magnesium 2.4  --  2.7 H  --   --   --   --   --     < > = values in this interval not displayed.         CARDIAC BIOMARKERS:  Recent Labs    Lab 06/06/21  1518 06/06/21  2116 06/07/21  0335   Troponin I 0.036 H 0.033 H 0.037 H         COAGS:  Recent Labs   Lab 06/06/21  0939 06/06/21  1017 06/07/21  0335   INR 1.0 1.1 1.0         LIPIDS/LFTS:  Recent Labs   Lab 06/06/21  0939 06/07/21  0335 11/03/21  0857 12/13/21  1627 05/07/22  0801 06/14/22  0000   Cholesterol 143  --  127  --  165  --    Triglycerides 69  --  53  --  80 91   HDL 48  --  52  --  48 51   LDL Cholesterol 81.2  --  64.4  --  101.0 86   Non-HDL Cholesterol 95  --  75  --  117  --    AST 12   < > 21 17 17  --    ALT 11   < > 20 15 11  --     < > = values in this interval not displayed.         Hemoglobin A1C   Date Value Ref Range Status   09/13/2022 6.3 (H) 0.0 - 5.6 % Final     Comment:     [facility]   06/14/2022 6.6 (H) 0.0 - 5.6 % Final     Comment:     [facility]   05/07/2022 6.7 (H) 4.0 - 5.6 % Final     Comment:     ADA Screening Guidelines:  5.7-6.4%  Consistent with prediabetes  >or=6.5%  Consistent with diabetes    High levels of fetal hemoglobin interfere with the HbA1C  assay. Heterozygous hemoglobin variants (HbS, HgC, etc)do  not significantly interfere with this assay.   However, presence of multiple variants may affect accuracy.     03/15/2022 7.1 (H) 0.0 - 5.6 % Final     Comment:     [facility]       TSH  Recent Labs   Lab 03/01/21  0820 06/06/21  0939 11/03/21  0857   TSH 1.751 2.200 2.631         The 10-year ASCVD risk score (Avelino KING, et al., 2019) is: 33.2%    Values used to calculate the score:      Age: 77 years      Sex: Female      Is Non- : Yes      Diabetic: Yes      Tobacco smoker: No      Systolic Blood Pressure: 134 mmHg      Is BP treated: No      HDL Cholesterol: 51 mg/dL      Total Cholesterol: 165 mg/dL           Cardiovascular Testing:      Echo: 6-17  CONCLUSIONS     1 - Normal left ventricular systolic function (EF 65-70%).     2 - No diagnostic regional wall motion abnormalities.     3 - Concentric  remodeling.     4 - Impaired LV relaxation, elevated LAP (grade 2 diastolic dysfunction).     5 - Trivial tricuspid regurgitation.     6 - The estimated PA systolic pressure is greater than 18 mmHg.      NST: 7-17  Impression: NORMAL MYOCARDIAL PERFUSION  1. The perfusion scan is free of evidence for myocardial ischemia or injury.   2. Resting wall motion is physiologic.   3. Visually estimated LV function is normal.   4. The ventricular volumes are normal at rest and stress.   5. The extracardiac distribution of radioactivity is normal.      Carotid ultrasound:  5-18  CONCLUSIONS   There is 0 - 19% right Internal Carotid stenosis.  There is 0 - 19% left Internal Carotid stenosis.     PAM:  10-18  Elevated bilateral PAM suggesting calcified noncompressible vessels     LDL-53    3-19     Lower extremity arterial ultrasound:  No hemodynamically significant plaque bilaterally  PAM consistent with medial calcinosis    Renal artery ultrasound in June 2019:  This was a technically difficult study. This study was limited due to excessive bowel gas.  There is insignificant stenosis (0-59%) in the Right Renal Artery.  Elevated right renal resistive index suggestive of intrinsic kidney disease.  Right kidney 12.00 cm.  There is insignificant stenosis (0-59%) in the Left Renal Artery.  Elevated left renal resistive index suggestive of intrinsic kidney disease.  Left kidney 11.80 cm.    Ultrasound legs in November 2018:    No hemodynamically significant plaque bilaterally  PAM consistent with medial calcinosis    ABIs in October 2018:    Elevated bilateral PAM suggesting calcified noncompressible vessels          ASSESSMENT AND PLAN     Patient Active Problem List   Diagnosis    Severe obesity (BMI 35.0-39.9) with comorbidity    Sickle cell trait    Hyperlipidemia LDL goal <100    HUMBERTO on CPAP    Hypothyroidism (acquired)    Hx-TIA (transient ischemic attack)    Vitamin D deficiency disease    Pulmonary hypertension    Type 2  diabetes mellitus with ESRD (end-stage renal disease)    Secondary renal hyperparathyroidism    Incomplete bladder emptying    Postmenopausal atrophic vaginitis    Rectocele    Mixed incontinence urge and stress    Chronic diastolic heart failure    Tortuous aorta    ESRD on hemodialysis    Anemia of chronic disease    Debility    Chronic respiratory failure    Type 2 diabetes mellitus with foot ulcer, with long-term current use of insulin    Stable proliferative diabetic retinopathy associated with type 2 diabetes mellitus    Heart failure with preserved ejection fraction    Pseudophakia    Chronic kidney disease-mineral and bone disorder    Age-related osteoporosis without current pathological fracture    H/O: stroke    Walker as ambulation aid       1.   Episodes of hypotension during dialysis.  On midodrine    2.  Heart failure with preserved ejection fraction.  Now on dialysis.  Continue current therapy.    3.  Diabetes:  Being managed by primary    4.  Continue follow-up with Wound care clinic. Wound has healed.     5.  Dyslipidemia:  On Lipitor    6.  Cva: check event monitor to r/o paroxysmal afib    7.  End-stage renal disease.      Follow-up in 6 months      Thank you very much for involving me in the care of your patient.  Please do not hesitate to contact me if there are any questions.      Murali Li MD, FACC, [facility]  Interventional Cardiologist, [facility].     Follow-up in 3-4 months      This note was dictated with the help of speech recognition software.  There might be un-intended errors and/or substitutions.

## 2023-01-23 DIAGNOSIS — N18.6 TYPE 2 DIABETES MELLITUS WITH ESRD (END-STAGE RENAL DISEASE): ICD-10-CM

## 2023-01-23 DIAGNOSIS — E11.22 TYPE 2 DIABETES MELLITUS WITH ESRD (END-STAGE RENAL DISEASE): ICD-10-CM

## 2023-01-24 RX ORDER — DULAGLUTIDE 0.75 MG/.5ML
0.75 INJECTION, SOLUTION SUBCUTANEOUS
Qty: 4 PEN | Refills: 2 | Status: SHIPPED | OUTPATIENT
Start: 2023-01-24 | End: 2023-04-05

## 2023-02-07 DIAGNOSIS — Z00.00 ENCOUNTER FOR MEDICARE ANNUAL WELLNESS EXAM: ICD-10-CM

## 2023-02-09 DIAGNOSIS — Z00.00 ENCOUNTER FOR MEDICARE ANNUAL WELLNESS EXAM: ICD-10-CM

## 2023-02-10 ENCOUNTER — TELEPHONE (OUTPATIENT)
Dept: ENDOCRINOLOGY | Facility: CLINIC | Age: 78
End: 2023-02-10
Payer: MEDICARE

## 2023-02-10 NOTE — TELEPHONE ENCOUNTER
----- Message from Meera Wallace sent at 2/10/2023 10:48 AM CST -----  Type:  Sooner Appointment Request    Patient is requesting a sooner appointment.  Patient declined first available appointment listed as well as another facility and provider .  Patient will not accept being placed on the waitlist and is requesting a message be sent to doctor.    Name of Caller: Self     When is the first available appointment? 04/13    Symptoms: F/U stated Dr said he wanted to see her as soon as possible     Would the patient rather a call back or a response via [facility]? CALL     Best Call Back Number:  [phone] (home)

## 2023-03-29 ENCOUNTER — OFFICE VISIT (OUTPATIENT)
Dept: PULMONOLOGY | Facility: CLINIC | Age: 78
End: 2023-03-29
Payer: MEDICARE

## 2023-03-29 VITALS
OXYGEN SATURATION: 95 % | HEIGHT: 65 IN | SYSTOLIC BLOOD PRESSURE: 117 MMHG | WEIGHT: 213.19 LBS | HEART RATE: 89 BPM | BODY MASS INDEX: 35.52 KG/M2 | DIASTOLIC BLOOD PRESSURE: 76 MMHG

## 2023-03-29 DIAGNOSIS — E66.01 SEVERE OBESITY (BMI 35.0-39.9) WITH COMORBIDITY: ICD-10-CM

## 2023-03-29 DIAGNOSIS — G47.33 OSA ON CPAP: Chronic | ICD-10-CM

## 2023-03-29 PROCEDURE — 1101F PR PT FALLS ASSESS DOC 0-1 FALLS W/OUT INJ PAST YR: ICD-10-PCS | Mod: CPTII,S$GLB,, | Performed by: INTERNAL MEDICINE

## 2023-03-29 PROCEDURE — 3288F PR FALLS RISK ASSESSMENT DOCUMENTED: ICD-10-PCS | Mod: CPTII,S$GLB,, | Performed by: INTERNAL MEDICINE

## 2023-03-29 PROCEDURE — 3078F DIAST BP <80 MM HG: CPT | Mod: CPTII,S$GLB,, | Performed by: INTERNAL MEDICINE

## 2023-03-29 PROCEDURE — 3078F PR MOST RECENT DIASTOLIC BLOOD PRESSURE < 80 MM HG: ICD-10-PCS | Mod: CPTII,S$GLB,, | Performed by: INTERNAL MEDICINE

## 2023-03-29 PROCEDURE — 99999 PR PBB SHADOW E&M-EST. PATIENT-LVL IV: CPT | Mod: PBBFAC,,, | Performed by: INTERNAL MEDICINE

## 2023-03-29 PROCEDURE — 99203 PR OFFICE/OUTPT VISIT, NEW, LEVL III, 30-44 MIN: ICD-10-PCS | Mod: S$GLB,,, | Performed by: INTERNAL MEDICINE

## 2023-03-29 PROCEDURE — 99203 OFFICE O/P NEW LOW 30 MIN: CPT | Mod: S$GLB,,, | Performed by: INTERNAL MEDICINE

## 2023-03-29 PROCEDURE — 1126F AMNT PAIN NOTED NONE PRSNT: CPT | Mod: CPTII,S$GLB,, | Performed by: INTERNAL MEDICINE

## 2023-03-29 PROCEDURE — 1126F PR PAIN SEVERITY QUANTIFIED, NO PAIN PRESENT: ICD-10-PCS | Mod: CPTII,S$GLB,, | Performed by: INTERNAL MEDICINE

## 2023-03-29 PROCEDURE — 1159F PR MEDICATION LIST DOCUMENTED IN MEDICAL RECORD: ICD-10-PCS | Mod: CPTII,S$GLB,, | Performed by: INTERNAL MEDICINE

## 2023-03-29 PROCEDURE — 99999 PR PBB SHADOW E&M-EST. PATIENT-LVL IV: ICD-10-PCS | Mod: PBBFAC,,, | Performed by: INTERNAL MEDICINE

## 2023-03-29 PROCEDURE — 3074F SYST BP LT 130 MM HG: CPT | Mod: CPTII,S$GLB,, | Performed by: INTERNAL MEDICINE

## 2023-03-29 PROCEDURE — 1101F PT FALLS ASSESS-DOCD LE1/YR: CPT | Mod: CPTII,S$GLB,, | Performed by: INTERNAL MEDICINE

## 2023-03-29 PROCEDURE — 1159F MED LIST DOCD IN RCRD: CPT | Mod: CPTII,S$GLB,, | Performed by: INTERNAL MEDICINE

## 2023-03-29 PROCEDURE — 3288F FALL RISK ASSESSMENT DOCD: CPT | Mod: CPTII,S$GLB,, | Performed by: INTERNAL MEDICINE

## 2023-03-29 PROCEDURE — 3074F PR MOST RECENT SYSTOLIC BLOOD PRESSURE < 130 MM HG: ICD-10-PCS | Mod: CPTII,S$GLB,, | Performed by: INTERNAL MEDICINE
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
## 2023-04-05 ENCOUNTER — TELEPHONE (OUTPATIENT)
Dept: PULMONOLOGY | Facility: CLINIC | Age: 78
End: 2023-04-05
Payer: MEDICARE

## 2023-04-05 NOTE — TELEPHONE ENCOUNTER
Message sent to Dr. Linares.    CARISSA Cedeno  [facility]  [phone]                 ----- Message from Wilian May MA sent at 4/5/2023 12:54 PM CDT -----  Estela Lamb V Staff  Caller: Unspecified (Today, 12:43 PM)  .Type: Patient Call Back     Who called:self     What is the request in detail:would like to know why prescription hasn't be sent for nasal canula. Trying to order face mask     Can the clinic reply by [facility]?no     Would the patient rather a call back or a response via [facility]? Call     Best call back number: .[phone]       Additional Information:

## 2023-04-07 ENCOUNTER — TELEPHONE (OUTPATIENT)
Dept: PULMONOLOGY | Facility: CLINIC | Age: 78
End: 2023-04-07
Payer: MEDICARE

## 2023-04-07 DIAGNOSIS — G47.33 OSA (OBSTRUCTIVE SLEEP APNEA): Primary | ICD-10-CM

## 2023-04-08 NOTE — TELEPHONE ENCOUNTER
----- Message from Wilian May MA sent at 4/5/2023  1:12 PM CDT -----  Patient put order in for nasal pillows and cpap supplies ASAP.

## 2023-04-14 ENCOUNTER — OFFICE VISIT (OUTPATIENT)
Dept: ENDOCRINOLOGY | Facility: CLINIC | Age: 78
End: 2023-04-14
Payer: MEDICARE

## 2023-04-14 VITALS
BODY MASS INDEX: 35.48 KG/M2 | WEIGHT: 213.19 LBS | DIASTOLIC BLOOD PRESSURE: 66 MMHG | SYSTOLIC BLOOD PRESSURE: 117 MMHG | TEMPERATURE: 99 F | HEART RATE: 86 BPM

## 2023-04-14 DIAGNOSIS — E03.9 HYPOTHYROIDISM (ACQUIRED): Primary | ICD-10-CM

## 2023-04-14 DIAGNOSIS — N18.6 TYPE 2 DIABETES MELLITUS WITH ESRD (END-STAGE RENAL DISEASE): ICD-10-CM

## 2023-04-14 DIAGNOSIS — E11.22 TYPE 2 DIABETES MELLITUS WITH ESRD (END-STAGE RENAL DISEASE): ICD-10-CM

## 2023-04-14 DIAGNOSIS — M89.9 CHRONIC KIDNEY DISEASE-MINERAL AND BONE DISORDER: ICD-10-CM

## 2023-04-14 DIAGNOSIS — E04.2 MULTIPLE THYROID NODULES: ICD-10-CM

## 2023-04-14 DIAGNOSIS — E83.9 CHRONIC KIDNEY DISEASE-MINERAL AND BONE DISORDER: ICD-10-CM

## 2023-04-14 DIAGNOSIS — N18.9 CHRONIC KIDNEY DISEASE-MINERAL AND BONE DISORDER: ICD-10-CM

## 2023-04-14 DIAGNOSIS — M81.0 AGE-RELATED OSTEOPOROSIS WITHOUT CURRENT PATHOLOGICAL FRACTURE: ICD-10-CM

## 2023-04-14 DIAGNOSIS — Z99.2 ESRD ON HEMODIALYSIS: ICD-10-CM

## 2023-04-14 DIAGNOSIS — N18.6 ESRD ON HEMODIALYSIS: ICD-10-CM

## 2023-04-14 PROCEDURE — 3074F PR MOST RECENT SYSTOLIC BLOOD PRESSURE < 130 MM HG: ICD-10-PCS | Mod: CPTII,S$GLB,, | Performed by: HOSPITALIST

## 2023-04-14 PROCEDURE — 3074F SYST BP LT 130 MM HG: CPT | Mod: CPTII,S$GLB,, | Performed by: HOSPITALIST

## 2023-04-14 PROCEDURE — 99214 OFFICE O/P EST MOD 30 MIN: CPT | Mod: S$GLB,,, | Performed by: HOSPITALIST

## 2023-04-14 PROCEDURE — 1160F PR REVIEW ALL MEDS BY PRESCRIBER/CLIN PHARMACIST DOCUMENTED: ICD-10-PCS | Mod: CPTII,S$GLB,, | Performed by: HOSPITALIST

## 2023-04-14 PROCEDURE — 1159F MED LIST DOCD IN RCRD: CPT | Mod: CPTII,S$GLB,, | Performed by: HOSPITALIST

## 2023-04-14 PROCEDURE — 3078F DIAST BP <80 MM HG: CPT | Mod: CPTII,S$GLB,, | Performed by: HOSPITALIST

## 2023-04-14 PROCEDURE — 3288F FALL RISK ASSESSMENT DOCD: CPT | Mod: CPTII,S$GLB,, | Performed by: HOSPITALIST

## 2023-04-14 PROCEDURE — 99999 PR PBB SHADOW E&M-EST. PATIENT-LVL IV: ICD-10-PCS | Mod: PBBFAC,,, | Performed by: HOSPITALIST

## 2023-04-14 PROCEDURE — 3078F PR MOST RECENT DIASTOLIC BLOOD PRESSURE < 80 MM HG: ICD-10-PCS | Mod: CPTII,S$GLB,, | Performed by: HOSPITALIST

## 2023-04-14 PROCEDURE — 1159F PR MEDICATION LIST DOCUMENTED IN MEDICAL RECORD: ICD-10-PCS | Mod: CPTII,S$GLB,, | Performed by: HOSPITALIST

## 2023-04-14 PROCEDURE — 1126F AMNT PAIN NOTED NONE PRSNT: CPT | Mod: CPTII,S$GLB,, | Performed by: HOSPITALIST

## 2023-04-14 PROCEDURE — 99214 PR OFFICE/OUTPT VISIT, EST, LEVL IV, 30-39 MIN: ICD-10-PCS | Mod: S$GLB,,, | Performed by: HOSPITALIST

## 2023-04-14 PROCEDURE — 99999 PR PBB SHADOW E&M-EST. PATIENT-LVL IV: CPT | Mod: PBBFAC,,, | Performed by: HOSPITALIST

## 2023-04-14 PROCEDURE — 1126F PR PAIN SEVERITY QUANTIFIED, NO PAIN PRESENT: ICD-10-PCS | Mod: CPTII,S$GLB,, | Performed by: HOSPITALIST

## 2023-04-14 PROCEDURE — 1101F PT FALLS ASSESS-DOCD LE1/YR: CPT | Mod: CPTII,S$GLB,, | Performed by: HOSPITALIST

## 2023-04-14 PROCEDURE — 1101F PR PT FALLS ASSESS DOC 0-1 FALLS W/OUT INJ PAST YR: ICD-10-PCS | Mod: CPTII,S$GLB,, | Performed by: HOSPITALIST

## 2023-04-14 PROCEDURE — 3288F PR FALLS RISK ASSESSMENT DOCUMENTED: ICD-10-PCS | Mod: CPTII,S$GLB,, | Performed by: HOSPITALIST

## 2023-04-14 PROCEDURE — 1160F RVW MEDS BY RX/DR IN RCRD: CPT | Mod: CPTII,S$GLB,, | Performed by: HOSPITALIST

## 2023-04-14 NOTE — PROGRESS NOTES
"  Subjective:      Patient ID: Erica Willett is a 78 y.o. female presented to Endocrinology clinic on 4/14/2023.    Chief Complaint:  Diabetes, osteoporosis/metabolic bone disease, hypothyroidism    History of Present Illness: Erica Willett is a 78 y.o. female with metabolic bone disease with osteoporosis, hypothyroidism and type 2 diabetes  ESRD on HD on TTS for 1 year, her nephrologist is  Dr Myla Puente  Here for follow-up    Interval history:  Patient is here for follow-up.  Last seen 05/21, lost to follow-up.  Previously seen by me for diabetes, CKD-BMD, hypothyroidism  Diabetes: On Trulicity 0.75mg once a week, glucose have been 133 before HD. Report A1C of "6.3"  No low glucose  Taking binder, phos is up and down  Compliance with levothyroxine, taken daily.  No problem  Has been cutting back on dietary indiscretion, soda, pickles, food      1) Type 2 diabetes:    - Diagnosis around 10 years ago  - Patient was on insulin prior to HD, has been off of all therapy since that time.  - Checking glucose 1x a day  - Family hx of diabetes  - currently not on medication  - Statin: Taking, ACE/ARB: Not taking    Current reported meds:               Trulicity 0.75 mg once a week injection  Home glucose checks: checks 1-2 a day, Logs reviewed  - Hypoglycemia symptoms:  DENIES   Diet/Exercise:   - Eating 3 meals per day   - Weight trend: stable  - Diabetes Related Hospitalization:  No  - Hx of pancreatitis: No, denies  - Family history of diabetes: Yes  - Occupation:  Disabled    Eye exam current (within one year):  Yes, DR: unknown  Reports cuts or ulcers on feet:   Denies, has podiatrist  Statin: Taking, ACE/ARB: Not taking    Diabetes lab work  Lab Results   Component Value Date    HGBA1C 6.3 (H) 09/13/2022    HGBA1C 6.6 (H) 06/14/2022    HGBA1C 6.7 (H) 05/07/2022    HGBA1C 7.1 (H) 03/15/2022     No results found for: CPEPTIDE, GLUTAMICACID, ISLETCELLANT   Lab Results   Component Value Date    FRUCTOSAMINE 580 " 03/01/2021     Lab Results   Component Value Date    MICALBCREAT 3,693.6 (H) 03/17/2014     No results found for: [id number]    Diabetes Management Status: Reviewed this office visit  Screening or Prevention Patient's value Goal Complete/Controlled?   Lipid profile : 06/14/2022 Annually Yes     Dilated retinal exam : 08/17/2022 Annually Yes     Foot exam   Most Recent Foot Exam Date: Not Found Annually Yes          2) With regards to chronic kidney disease mineral and bone disorder  - Seen on recent bone density scan.  Patient denies prior history of any bone disorder.  - Chronic history of ESRD on dialysis  - Longstanding chronic issue.  Poorly-controlled bone disease due to ESRD status.  Continues to have hypocalcemia, hyperphosphatemia.  Elevated PTH.  - Lab work review showing chronic hyperphosphatemia, elevated alkaline phosphatase, hypocalcemia, normal vitamin-D.  - Patient reports Sensipar three times a week at HD  - Patient is on phosphate binder:  Sevelamer 1600mg TIDWM, reports better compliance recently  - Patient has stopped soda consumption   - Patient denies back pain, No falls  - Never had fractures  - Use walker to help with gait and ambulation  - NM Parathyroid scan 2021: No abnormal uptake to suggest parathyroid adenoma.  - Vit D 1000 iu daily    DXA done on 10/2020  Comparison study done on 03/13/2018.  Lumbar spine (L1-L4): BMD is 0.943 g/cm2, T-score is -0.7, and Z-score is 1.0.  Total hip: BMD is 0.680 g/cm2, T-score is -2.1, and Z-score is -1.0.  Femoral neck: BMD is 0.511 g/cm2, T-score is -3.0, and Z-score is -1.5.    Impression:  1. Osteoporosis with likely chronic kidney disease mineral and bone disorder  2. Compared with previous DXA, BMD at the lumbar spine has declined by 13.9%, and the BMD at the total hip has declined by 23.2%.    Height loss (>2 inches)? no  Family hx of Osteoporosis: no    Asymptomatic menopausal state.  She had a hysterectomy at 40 y/o and menopausal symptoms at 46  y/o.     Lab work reviewed  Lab Results   Component Value Date    .6 (H) 05/07/2022    .0 (H) 05/07/2021    .7 (H) 03/01/2021    [id number] 48 05/07/2022    [id number] 47 11/03/2021    [id number] 34 03/01/2021    CALCIUM 9.3 05/07/2022    CALCIUM 8.1 (L) 12/13/2021    CALCIUM 9.0 11/03/2021    PHOS 6.9 (H) 06/07/2021    PHOS 6.9 (H) 03/01/2021    PHOS 4.4 11/20/2019    ALKPHOS 93 05/07/2022    ALKPHOS 131 12/13/2021    ALKPHOS 143 (H) 11/03/2021    TSH 2.631 11/03/2021    TTGIGA 7 03/01/2021       3) Hypothyroidism  - Chronic  - Restarted in 2021: Levothyroxin 50mcg  - History of thyroid goiter, with thyroid nodule seen on ultrasound     Thyroid lab work  Lab Results   Component Value Date    TSH 2.631 11/03/2021    TSH 2.200 06/06/2021    TSH 1.751 03/01/2021    FREET4 1.13 03/23/2019    FREET4 1.12 12/15/2018    FREET4 1.21 01/06/2018      Antibodies  No results found for: THYROIDAB, TSIMMGLBN, THYROIDSTIMI      4) Multiple Thyroid nodule    US thyroid 2021  - The thyroid gland is normal in size.  - The right thyroid lobe measures 4.1 x 1.9 x 1.8 cm.  - The left thyroid lobe measures 4.5 x 1.8 x 1.9 cm.  - Thyroid parenchyma is homogeneous, without increased perfusion.  - There are 5 measured the small thyroid nodules.  - 1.5 cm right thyroid lobe/isthmus nodule with TI-RADS category 3, does not qualify for FNA..    Reviewed past surgical, medical, family, social history and updated as appropriate.    Objective:   /66 (BP Location: Right arm)   Pulse 86   Temp 98.7 °F (37.1 °C) (Oral)   Wt 96.7 kg (213 lb 3.2 oz)   BMI 35.48 kg/m²     Body mass index is 35.48 kg/m².    Physical Exam  Vitals and nursing note reviewed.   Constitutional:       Appearance: She is well-developed.      Comments: Elderly female using walker to help with ambulation   HENT:      Head: Normocephalic.   Eyes:      General: No scleral icterus.     Conjunctiva/sclera: Conjunctivae normal.   Neck:       Thyroid: No thyromegaly.   Cardiovascular:      Rate and Rhythm: Normal rate.      Heart sounds: Normal heart sounds.   Pulmonary:      Effort: Pulmonary effort is normal. No respiratory distress.   Abdominal:      Palpations: Abdomen is soft.      Tenderness: There is no abdominal tenderness.   Musculoskeletal:         General: Normal range of motion.      Cervical back: Neck supple.   Skin:     General: Skin is warm.      Findings: No erythema.   Neurological:      Mental Status: She is alert.      Coordination: Coordination normal.   Psychiatric:         Behavior: Behavior normal.     Lab Review:  Lab Results   Component Value Date    .6 (H) 05/07/2022    .0 (H) 05/07/2021    .7 (H) 03/01/2021    [id number] 48 05/07/2022    SHEGIHLX63SH 47 11/03/2021    [id number] 34 03/01/2021    CALCIUM 9.3 05/07/2022    CALCIUM 8.1 (L) 12/13/2021    CALCIUM 9.0 11/03/2021    PHOS 6.9 (H) 06/07/2021    PHOS 6.9 (H) 03/01/2021    PHOS 4.4 11/20/2019    ALKPHOS 93 05/07/2022    ALKPHOS 131 12/13/2021    ALKPHOS 143 (H) 11/03/2021    TSH 2.631 11/03/2021    TTGIGA 7 03/01/2021       Outside lab work reviewed :  Care everywhere, from dialysis unit  Elevated phosphorus, elevated PTH: 512  Hypocalcemia        Assessment     1. Hypothyroidism (acquired)  TSH    T4, Free      2. Type 2 diabetes mellitus with ESRD (end-stage renal disease)  Hemoglobin A1C    Comprehensive Metabolic Panel    Fructosamine      3. Chronic kidney disease-mineral and bone disorder  Vitamin D    PTH, Intact    Phosphorus    Magnesium      4. ESRD on hemodialysis  Vitamin D    PTH, Intact    Phosphorus    Magnesium      5. Multiple thyroid nodules  US Soft Tissue Head Neck Thyroid      6. Age-related osteoporosis without current pathological fracture          Plan     Type 2 diabetes mellitus with ESRD (end-stage renal disease)  - Diabetes is at a goal given current A1C (6.3%), goal A1C for patient is 7% in ESRD  - Complicated by  hyperglycemia, ESRD, dietary indiscretion  - Diabetic supplies/medications: reviewed no need for refills at this time  - Long discussion with patient about dietary modification, portion size control, decreasing carbohydrates intake  - A1c can be falsely low due to anemia/ESRD    Plan  - Continue and refill: Trulicity 0.75 Q weekly as she is doing well.   - Can increase his dose if needed, patient will contact in the future  - patient unable to check glucose often, will continue checking glucose at dialysis center  - repeat lab work every 3 months    Hypothyroidism (acquired)  - Patient with history of hypothyroidism etiology unclear  - On levothyroxine 50 mcg daily (low-dose)  - Repeat TFT soon, faxed to HD unit to obtain  - Will titrate level if needed    Chronic kidney disease-mineral and bone disorder  - Very difficult case, longstanding issue with worsening osteoporosis and continue elevation in PTH leading to osteoporosis  - Risk factors include ESRD on dialysis, hyperphosphatemia, hypocalcemia  - High risk of falls given knee pain, poor gait, the need to use walker  - Patient is more compliant with phosphate binder, and dietary modification  - On vitamin-D analog at dialysis  - sestamibi scan inconclusive for parathyroid adenoma  - now on Sensipar at HD unit  - threshold for parathyroidectomy is above >800 and failing medical therapy option  - given improvement in PTH at this time will continue monitor    ESRD on hemodialysis  - at [facility]; Dr. Barrientos  - Tuesday, Thursday and Saturday  - has AV graft in left UE    Age-related osteoporosis without current pathological fracture  - no treatment indicated given CKD-MD    Return to clinic in 6 mo for diabetes management    Carl Day MD  Endocrinology- [facility]   4/14/2023      Disclaimer: This note has been generated using voice-recognition software. There may be typographical errors that have been missed during proof-reading.

## 2023-04-14 NOTE — ASSESSMENT & PLAN NOTE
- at [facility]; Dr. Barrientos  - Tuesday, Thursday and Saturday  - has AV graft in left UE

## 2023-04-14 NOTE — ASSESSMENT & PLAN NOTE
- Patient with history of hypothyroidism etiology unclear  - On levothyroxine 50 mcg daily (low-dose)  - Repeat TFT soon, faxed to HD unit to obtain  - Will titrate level if needed

## 2023-04-14 NOTE — ASSESSMENT & PLAN NOTE
- Diabetes is at a goal given current A1C (6.3%), goal A1C for patient is 7% in ESRD  - Complicated by hyperglycemia, ESRD, dietary indiscretion  - Diabetic supplies/medications: reviewed no need for refills at this time  - Long discussion with patient about dietary modification, portion size control, decreasing carbohydrates intake  - A1c can be falsely low due to anemia/ESRD    Plan  - Continue and refill: Trulicity 0.75 Q weekly as she is doing well.   - Can increase his dose if needed, patient will contact in the future  - patient unable to check glucose often, will continue checking glucose at dialysis center  - repeat lab work every 3 months

## 2023-04-17 ENCOUNTER — HOSPITAL ENCOUNTER (OUTPATIENT)
Dept: CARDIOLOGY | Facility: HOSPITAL | Age: 78
Discharge: HOME | End: 2023-04-17
Attending: SURGERY
Payer: MEDICARE

## 2023-04-17 ENCOUNTER — OFFICE VISIT (OUTPATIENT)
Dept: VASCULAR SURGERY | Facility: CLINIC | Age: 78
End: 2023-04-17
Payer: MEDICARE

## 2023-04-17 VITALS
BODY MASS INDEX: 36.14 KG/M2 | WEIGHT: 217.13 LBS | SYSTOLIC BLOOD PRESSURE: 119 MMHG | DIASTOLIC BLOOD PRESSURE: 66 MMHG | HEART RATE: 77 BPM

## 2023-04-17 DIAGNOSIS — Z86.73 H/O: STROKE: ICD-10-CM

## 2023-04-17 DIAGNOSIS — Z86.73 HX-TIA (TRANSIENT ISCHEMIC ATTACK): Primary | ICD-10-CM

## 2023-04-17 DIAGNOSIS — N18.6 ESRD ON HEMODIALYSIS: ICD-10-CM

## 2023-04-17 DIAGNOSIS — Z99.2 ESRD ON HEMODIALYSIS: ICD-10-CM

## 2023-04-17 DIAGNOSIS — T82.858D ARTERIOVENOUS FISTULA STENOSIS, SUBSEQUENT ENCOUNTER: ICD-10-CM

## 2023-04-17 PROCEDURE — 1101F PT FALLS ASSESS-DOCD LE1/YR: CPT | Mod: CPTII,S$GLB,, | Performed by: NURSE PRACTITIONER

## 2023-04-17 PROCEDURE — 93990 DOPPLER FLOW TESTING: CPT | Mod: 26,,, | Performed by: SURGERY

## 2023-04-17 PROCEDURE — 1159F PR MEDICATION LIST DOCUMENTED IN MEDICAL RECORD: ICD-10-PCS | Mod: CPTII,S$GLB,, | Performed by: NURSE PRACTITIONER

## 2023-04-17 PROCEDURE — 3288F FALL RISK ASSESSMENT DOCD: CPT | Mod: CPTII,S$GLB,, | Performed by: NURSE PRACTITIONER

## 2023-04-17 PROCEDURE — 99214 PR OFFICE/OUTPT VISIT, EST, LEVL IV, 30-39 MIN: ICD-10-PCS | Mod: S$GLB,,, | Performed by: NURSE PRACTITIONER

## 2023-04-17 PROCEDURE — 1126F AMNT PAIN NOTED NONE PRSNT: CPT | Mod: CPTII,S$GLB,, | Performed by: NURSE PRACTITIONER

## 2023-04-17 PROCEDURE — 3288F PR FALLS RISK ASSESSMENT DOCUMENTED: ICD-10-PCS | Mod: CPTII,S$GLB,, | Performed by: NURSE PRACTITIONER

## 2023-04-17 PROCEDURE — 3074F PR MOST RECENT SYSTOLIC BLOOD PRESSURE < 130 MM HG: ICD-10-PCS | Mod: CPTII,S$GLB,, | Performed by: NURSE PRACTITIONER

## 2023-04-17 PROCEDURE — 99999 PR PBB SHADOW E&M-EST. PATIENT-LVL III: ICD-10-PCS | Mod: PBBFAC,,, | Performed by: NURSE PRACTITIONER

## 2023-04-17 PROCEDURE — 3074F SYST BP LT 130 MM HG: CPT | Mod: CPTII,S$GLB,, | Performed by: NURSE PRACTITIONER

## 2023-04-17 PROCEDURE — 3078F DIAST BP <80 MM HG: CPT | Mod: CPTII,S$GLB,, | Performed by: NURSE PRACTITIONER

## 2023-04-17 PROCEDURE — 93990 DOPPLER FLOW TESTING: CPT | Mod: TC

## 2023-04-17 PROCEDURE — 99214 OFFICE O/P EST MOD 30 MIN: CPT | Mod: S$GLB,,, | Performed by: NURSE PRACTITIONER

## 2023-04-17 PROCEDURE — 99999 PR PBB SHADOW E&M-EST. PATIENT-LVL III: CPT | Mod: PBBFAC,,, | Performed by: NURSE PRACTITIONER

## 2023-04-17 PROCEDURE — 93990 CV US HEMODIALYSIS ACCESS (CUPID ONLY): ICD-10-PCS | Mod: 26,,, | Performed by: SURGERY

## 2023-04-17 PROCEDURE — 1101F PR PT FALLS ASSESS DOC 0-1 FALLS W/OUT INJ PAST YR: ICD-10-PCS | Mod: CPTII,S$GLB,, | Performed by: NURSE PRACTITIONER

## 2023-04-17 PROCEDURE — 1159F MED LIST DOCD IN RCRD: CPT | Mod: CPTII,S$GLB,, | Performed by: NURSE PRACTITIONER

## 2023-04-17 PROCEDURE — 1126F PR PAIN SEVERITY QUANTIFIED, NO PAIN PRESENT: ICD-10-PCS | Mod: CPTII,S$GLB,, | Performed by: NURSE PRACTITIONER

## 2023-04-17 PROCEDURE — 3078F PR MOST RECENT DIASTOLIC BLOOD PRESSURE < 80 MM HG: ICD-10-PCS | Mod: CPTII,S$GLB,, | Performed by: NURSE PRACTITIONER

## 2023-04-17 NOTE — PROGRESS NOTES
[facility]                         04/17/2023    HPI:  Erica Willett is a 78 y.o. female with   Patient Active Problem List   Diagnosis    Severe obesity (BMI 35.0-39.9) with comorbidity    Sickle cell trait    Hyperlipidemia LDL goal <100    HUMBERTO on CPAP    Hypothyroidism (acquired)    Hx-TIA (transient ischemic attack)    Vitamin D deficiency disease    Pulmonary hypertension    Type 2 diabetes mellitus with ESRD (end-stage renal disease)    Secondary renal hyperparathyroidism    Incomplete bladder emptying    Postmenopausal atrophic vaginitis    Rectocele    Mixed incontinence urge and stress    Chronic diastolic heart failure    Tortuous aorta    ESRD on hemodialysis    Anemia of chronic disease    Debility    Chronic respiratory failure    Type 2 diabetes mellitus with foot ulcer, with long-term current use of insulin    Stable proliferative diabetic retinopathy associated with type 2 diabetes mellitus    Heart failure with preserved ejection fraction    Pseudophakia    Chronic kidney disease-mineral and bone disorder    Age-related osteoporosis without current pathological fracture    H/O: stroke    Walker as ambulation aid    being managed by PCP and specialists who is here today for evaluation of long term HD access.  S/p R IJ tunneled HD catheter, HD without issues.  Pt is R handed.  No complaints today.  Does endorse orthopnea, no chest pain.  Unable to climb 1 flight of stairs on own.    no MI  no Stroke  Tobacco use: denies    1/20/20: No new issues.    3/2020: Dialysis without issues.    4/6/20:  S/p LUE fistulogram, central venogram, ligation of medial side branch cephalic vein, ligation of lateral side branch cephalic vein.  No issues with HD for several weeks since procedure.    7/6/20:  No issues with HD.    8/3/20: s/p L proximal fistula angioplasty 7/21/20.  No issues with HD.    8/31/20:  S/p 8/19/29 Balloon angioplasty of the proximal LUE AVF with a  6x60 Angiosculpt and Stellerex balloon.      10/2020:  No issues with HD    1/2021:  No issues with HD    4/2021:  No new problems.    8/2021:  No issues with HD. C/o fatigue after HD.  Has not seen cardiology recently.    3/2022:  No issues with HD    6/2022:  Doing well, no issues with HD    1/2023:  No issues with HD    04/2023: No issues with HD.     Past Medical History:   Diagnosis Date    Acute ischemic right PCA stroke 6/6/2021    Acute respiratory failure with hypoxia     Age-related osteoporosis without current pathological fracture 3/8/2021    Anemia of chronic kidney failure, stage 4 (severe) 4/5/2019    Cataracts, bilateral     CHF (congestive heart failure)     CKD (chronic kidney disease) stage 3, GFR 30-59 ml/min     CKD (chronic kidney disease) stage 3, GFR 30-59 ml/min     Controlled type 2 diabetes mellitus with proteinuria or albuminuria     Depression     Diabetes with neurologic complications     Diabetic retinopathy of both eyes     Edema     Glaucoma     History of colonic polyps     Hx-TIA (transient ischemic attack) 11/2008    Hyperlipidemia LDL goal < 100     Hypertension     Hypothyroidism     Major depressive disorder, single episode, mild 2/17/2016    Mixed incontinence urge and stress     Obesity     Obstructive sleep apnea on CPAP     7/19/19:  Home CPAP machine broken, per patient & son    Osteopenia     Proteinuria     Sickle cell trait     Strabismus     TIA (transient ischemic attack)     Trouble in sleeping     Type 2 diabetes mellitus with ophthalmic manifestations     Type 2 diabetes with stage 3 chronic kidney disease GFR 30-59     Type II or unspecified type diabetes mellitus with renal manifestations, uncontrolled(250.42)     Uncontrolled type 2 diabetes mellitus with peripheral circulatory disorder 4/5/2019    Urge incontinence 1/11/2016    Urge incontinence     Venous stasis ulcer     bilateral lower legs    Vitamin D deficiency disease      Past Surgical History:    Procedure Laterality Date    AV FISTULA PLACEMENT Left 2019    Procedure: CREATION, AV FISTULA, LEFT UPPER EXTREMITY;  Surgeon: Keron Perez MD;  Location: [facility] OR;  Service: Vascular;  Laterality: Left;    BREAST BIOPSY      breast reduction Bilateral age 31    BREAST SURGERY      CATARACT EXTRACTION Bilateral     cataracts Bilateral      SECTION, LOW TRANSVERSE      x1    CHOLECYSTECTOMY      COLONOSCOPY N/A 2022    Procedure: COLONOSCOPY;  Surgeon: Mahesh Huang MD;  Location: [facility] ENDO (2ND FLR);  Service: Endoscopy;  Laterality: N/A;  fully vaccinated-sm.  RAPID COVID  +cologuard needing ASAP  Dialysis pt T,TH Sat and left UA access  2nd floor - cardiac/dialysis/SOB / LABS / prep ins. emailed - [facility]W    EYE SURGERY  2014, 2014    vitrectomy    EYE SURGERY Right 2016    FISTULOGRAM Left 3/6/2020    Procedure: Fistulogram, left upper extremity, with branch ligation;  Surgeon: Keron Perez MD;  Location: [facility] OR [address] [place] [address];  Service: Vascular;  Laterality: Left;  Time 1.1 Minute  42.10 mGy    FISTULOGRAM Left 2020    Procedure: Fistulogram, left upper extremity, transradial access with possible intervention;  Surgeon: Keron Perez MD;  Location: [facility] OR [address] [place] [address];  Service: Vascular;  Laterality: Left;    FISTULOGRAM Left 2020    Procedure: Fistulogram, left upper extremity, possible intervention;  Surgeon: Keron Perez MD;  Location: [facility] OR;  Service: Vascular;  Laterality: Left;  930 AM START  RN PRE OP  ---COVID NEGATIVE ON  2020. [place]    FISTULOGRAM Left 2022    Procedure: Fistulogram, transradial access;  Surgeon: Keron Perez MD;  [facility];  Service: Vascular;  Laterality: Left;  mgy-40.16  gycm-8.3905  contrast-34cc  time-12.4min    HYSTERECTOMY      TAHBSO (patient is unsure if ovaries removed)    OOPHORECTOMY      PERCUTANEOUS TRANSLUMINAL ANGIOPLASTY OF ARTERIOVENOUS FISTULA Left 2020     Procedure: PTA, AV FISTULA;  Surgeon: Keron Perez MD;  Location: [facility] OR [address] [place] [address];  Service: Vascular;  Laterality: Left;  15.9 minutes of fluro  41.12  mGy  7.9060 Gy cm2  32ml  contrast    PHLEBOGRAPHY Left 7/21/2020    Procedure: CENTRAL VENOGRAM;  Surgeon: Keron Perez MD;  Location: [facility] OR [address] [place] [address];  Service: Vascular;  Laterality: Left;    REFRACTIVE SURGERY      TOTAL REDUCTION MAMMOPLASTY      approx 10 yrs ago    VENOPLASTY  1/21/2022    Procedure: ANGIOPLASTY, VEIN;  Surgeon: Keron Perez MD;  Location: [facility] OR [address] [place] [address];  Service: Vascular;;     Family History   Problem Relation Age of Onset    Stroke Mother     Diabetes Mother     Hypertension Mother     Cataracts Mother     Leukemia Father     Cataracts Father     Ovarian cancer Sister 35    Achondroplasia Sister     HIV Brother     No Known Problems Daughter     No Known Problems Son     Breast cancer Maternal Aunt 65    Parkinsonism Maternal Aunt     Esophageal cancer Maternal Uncle         smoker    No Known Problems Paternal Aunt     Cataracts Paternal Uncle     Cataracts Maternal Grandmother     Cataracts Maternal Grandfather     Diabetes Paternal Grandmother     Cataracts Paternal Grandmother     No Known Problems Paternal Grandfather     No Known Problems Other     Amblyopia Neg Hx     Blindness Neg Hx     Glaucoma Neg Hx     Macular degeneration Neg Hx     Retinal detachment Neg Hx     Strabismus Neg Hx     Thyroid disease Neg Hx     Colon cancer Neg Hx     Cancer Neg Hx      Social History     Socioeconomic History    Marital status: Single    Number of children: 5   Occupational History    Occupation:      Employer: [employer]     Comment: part-time   Tobacco Use    Smoking status: Never    Smokeless tobacco: Never   Substance and Sexual Activity    Alcohol use: No     Alcohol/week: 0.0 standard drinks    Drug use: No    Sexual activity: Not Currently     Partners: Male     Birth  control/protection: Post-menopausal, Surgical     Social Determinants of Health     Financial Resource Strain: Low Risk     Difficulty of Paying Living Expenses: Not hard at all   Food Insecurity: No Food Insecurity    Worried About Running Out of Food in the Last Year: Never true    Ran Out of Food in the Last Year: Never true   Transportation Needs: No Transportation Needs    Lack of Transportation (Medical): No    Lack of Transportation (Non-Medical): No   Physical Activity: Insufficiently Active    Days of Exercise per Week: 2 days    Minutes of Exercise per Session: 10 min   Stress: No Stress Concern Present    Feeling of Stress : Only a little   Social Connections: Socially Isolated    Frequency of Communication with Friends and Family: More than three times a week    Frequency of Social Gatherings with Friends and Family: More than three times a week    Attends [employer] Services: Never    Active Member of Clubs or Organizations: No    Attends Club or Organization Meetings: Never    Marital Status:    Housing Stability: Low Risk     Unable to Pay for Housing in the Last Year: No    Number of Places Lived in the Last Year: 1    Unstable Housing in the Last Year: No       Current Outpatient Medications:     ACCU-CHEK SOFT DEV LANCETS Kit, , Disp: , Rfl:     atorvastatin (LIPITOR) 80 MG tablet, TAKE 1 TABLET (80 MG TOTAL) BY MOUTH EVERY EVENING., Disp: 90 tablet, Rfl: 3    blood sugar diagnostic Strp, One test strip use 2 times a day to check blood glucose,  ICD-10: E11.9, compatible with insurance/glucometer, Disp: 100 each, Rfl: 11    blood-glucose meter kit, One glucometer, use to check blood glucose.   ICD-10: E11.9. Dispense machine covered by insurance, Disp: 1 each, Rfl: 0    cinacalcet (SENSIPAR) 30 MG Tab, , Disp: , Rfl:     docusate sodium (COLACE) 100 MG capsule, Take 200 mg by mouth once daily. , Disp: , Rfl:     doxercalciferoL (HECTOROL) 4 mcg/2 mL injection, Inject 4.5 mcg into the vein 3  (three) times a week. At dialysis unit, Disp: , Rfl:     dulaglutide (TRULICITY) 0.75 mg/0.5 mL pen injector, INJECT 0.75MG INTO THE SKIN EVERY 7 DAYS, Disp: 12 pen, Rfl: 0    epoetin arnel (EPOGEN INJ), Inject 1,000 Units as directed every 7 days. At the dialysis unit, Disp: , Rfl:     LANCETS MISC, , Disp: , Rfl:     lancets Misc, One lancets use 2 times a day to check blood glucose, ICD-10: E11.9, Disp: 100 each, Rfl: 11    lancing device Misc, One device, used to check blood glucose, ICD-10: E11.9, Disp: 1 each, Rfl: 0    levothyroxine (SYNTHROID) 50 MCG tablet, TAKE 1 TABLET BEFORE BREAKFAST, Disp: 90 tablet, Rfl: 3    midodrine (PROAMATINE) 5 MG Tab, Take 1 tablet (5 mg total) by mouth 3 (three) times daily., Disp: 90 tablet, Rfl: 11    OLENA-NABIL RX 1- mg-mg-mcg Tab, Take 1 tablet by mouth once daily., Disp: , Rfl:     aspirin (ECOTRIN) 81 MG EC tablet, Take 1 tablet (81 mg total) by mouth once daily., Disp: 90 tablet, Rfl: 3    sevelamer carbonate (RENVELA) 800 mg Tab, Take 3 tablets (2,400 mg total) by mouth 3 (three) times daily with meals., Disp: 270 tablet, Rfl: 11    REVIEW OF SYSTEMS:  General: No fevers or chills; ENT: No sore throat; Allergy and Immunology: no persistent infections; Hematological and Lymphatic: No history of bleeding or easy bruising; Endocrine: negative; Respiratory: no cough, shortness of breath, or wheezing; Cardiovascular: no chest pain or dyspnea on exertion; Gastrointestinal: no abdominal pain/back, change in bowel habits, or bloody stools; Genito-Urinary: no dysuria, trouble voiding, or hematuria; Musculoskeletal: negative; Neurological: no TIA or stroke symptoms; Psychiatric: no nervousness, anxiety or depression.    PHYSICAL EXAM:      Pulse: 77         General appearance:  Alert, well-appearing, and in no distress.  Oriented to person, place, and time                    Neurological: Normal speech, no focal findings noted; CN II - XII grossly intact. All extremities  with sensation to light touch.            Musculoskeletal: Digits/nail without cyanosis/clubbing.  Strength 5/5 all extremities.                    Neck: Supple, no significant adenopathy, no carotid bruit can be auscultated                  Chest:  Clear to auscultation, no wheezes, rales or rhonchi, symmetric air entry. No use of accessory muscles               Cardiac: Normal rate and regular rhythm, S1 and S2 normal            Abdomen: Soft, nontender, nondistended, no masses or organomegaly, no hernia     No rebound tenderness noted; bowel sounds normal     Pulsatile aortic mass is non palpable.     No groin adenopathy      Extremities:      2+ radial pulse bilaterally, LUE AVF +thrill, inc well healed     No BUE edema, Negative Manuel's test BUE    Skin: No tissue loss    LAB RESULTS:  No results found for: CBC  Lab Results   Component Value Date    LABPROT 10.3 06/07/2021    INR 1.0 06/07/2021     Lab Results   Component Value Date     05/07/2022    K 4.9 05/07/2022    CL 96 05/07/2022    CO2 24 05/07/2022    GLU 98 05/07/2022    BUN 51 (H) 05/07/2022    CREATININE 9.6 (H) 05/07/2022    CALCIUM 9.3 05/07/2022    ANIONGAP 19 (H) 05/07/2022    EGFRNONAA 3.6 (A) 05/07/2022     Lab Results   Component Value Date    WBC 6.83 05/07/2022    RBC 4.49 05/07/2022    HGB 12.4 05/07/2022    HCT 39.3 05/07/2022    MCV 88 05/07/2022    MCH 27.6 05/07/2022    MCHC 31.6 (L) 05/07/2022    RDW 15.2 (H) 05/07/2022     05/07/2022    MPV 10.6 05/07/2022    GRAN 4.4 05/07/2022    GRAN 64.5 05/07/2022    LYMPH 1.4 05/07/2022    LYMPH 20.4 05/07/2022    MONO 0.9 05/07/2022    MONO 13.0 05/07/2022    EOS 0.1 05/07/2022    BASO 0.05 05/07/2022    EOSINOPHIL 1.0 05/07/2022    BASOPHIL 0.7 05/07/2022    DIFFMETHOD Automated 05/07/2022     .  Lab Results   Component Value Date    HGBA1C 6.3 (H) 09/13/2022       IMAGING:  All pertinent imaging has been reviewed and interpreted independently.    Vein mapping 9/2019:  Adequate R  superior basilic vein and axillary vein ; Adequate L basilic vein superior and inferior, adequate L axillary     1/27/20 Left upper extremity dialysis ultrasound shows no hemodynamically significant stenosis.  Flow is 1083 ml/min.  Depth and diameter are appropriate.    3/23/20:  Left upper extremity dialysis ultrasound shows anastomotic and proximal fistula hemodynamically significant stenosis.  Flow is 955 ml/min.      7/2020: Left upper extremity dialysis ultrasound shows proximal fistula hemodynamically significant stenosis.  Flow is 1257 ml/min.      8/2020: Left upper extremity dialysis ultrasound shows proximal hemodynamically significant stenosis.  Flow is 1999 ml/min.      8/31/20: Left upper extremity dialysis ultrasound shows proximal fistula hemodynamically significant stenosis.  Flow is 2209 ml/min.      10/2020:  Prox stenosis, flow > 3000 ml/min    1/2021:  Proximal stenosis, flow 4414 ml/min    4/2021:  HD US reviewed without significant stenosis, adequate flow.    8/2021:HD US reviewed without significant stenosis, adequate flow.    3/2022:  Left upper extremity dialysis ultrasound shows approx 50% anastomotic and prox fistula stenosis withou hemodynamically significant stenosis.  Flow is 3774 ml/min.      6/2022:  No significant stenosis     04/2023: No significant stenosis. Flow 4,086 ml/min    IMP/PLAN:  78 y.o. female with   Patient Active Problem List   Diagnosis    Severe obesity (BMI 35.0-39.9) with comorbidity    Sickle cell trait    Hyperlipidemia LDL goal <100    HUMBERTO on CPAP    Hypothyroidism (acquired)    Hx-TIA (transient ischemic attack)    Vitamin D deficiency disease    Pulmonary hypertension    Type 2 diabetes mellitus with ESRD (end-stage renal disease)    Secondary renal hyperparathyroidism    Incomplete bladder emptying    Postmenopausal atrophic vaginitis    Rectocele    Mixed incontinence urge and stress    Chronic diastolic heart failure    Tortuous aorta    ESRD on  hemodialysis    Anemia of chronic disease    Debility    Chronic respiratory failure    Type 2 diabetes mellitus with foot ulcer, with long-term current use of insulin    Stable proliferative diabetic retinopathy associated with type 2 diabetes mellitus    Heart failure with preserved ejection fraction    Pseudophakia    Chronic kidney disease-mineral and bone disorder    Age-related osteoporosis without current pathological fracture    H/O: stroke    Walker as ambulation aid    being managed by PCP and specialists who is here today for evaluation of long term HD access s/p L RC AV fistula.    -s/p L RC AV fistula with proximal fistula measuring 5 mm 1/13/20, adequate flow without issues during HD s/p LUE fistulogram, central venogram, ligation of medial side branch cephalic vein, ligation of lateral side branch cephalic vein 3/2/20 with prox AVF stenosis s/p L proximal fistula angioplasty 7/21/20 with persistent flow issues s/p proximal angioplasty 8/19/20 with scoring balloon/DCB with improvement in stenosis/flow and asymptomatic proximal stenosis with no further issues with HD  -Cont renal diet  -RTC 3 mo with HD US for continued routine surveillance    I spent 30 minutes evaluating this patient and greater than 50% of the time was spent counseling, coordinator care and discussing the plan of care.  All questions were answered and patient stated understanding with agreement with the above treatment plan.    Tonia Silva NP  Vascular and Endovascular Surgery                       DISPLAY PLAN FREE TEXT DISPLAY PLAN FREE TEXT

## 2023-04-18 ENCOUNTER — PES CALL (OUTPATIENT)
Dept: ADMINISTRATIVE | Facility: CLINIC | Age: 78
End: 2023-04-18
Payer: MEDICARE

## 2023-04-18 NOTE — TELEPHONE ENCOUNTER
No weight transmitted today for Ms Leblanc.  I have asked her to weigh on the scale if she has not done so and/or reboot the scale if she did weigh as it did not come through to us.  I also asked her to return our call with any concerns regardless of her weight.  Weight: 115.8 kg (255 lb 6.4 oz) (7/7/2017  8:36 AM)     Peng Advancement Flap Text: The defect edges were debeveled with a #15 scalpel blade.  Given the location of the defect, shape of the defect and the proximity to free margins a Peng advancement flap was deemed most appropriate.  Using a sterile surgical marker, an appropriate advancement flap was drawn incorporating the defect and placing the expected incisions within the relaxed skin tension lines where possible. The area thus outlined was incised deep to adipose tissue with a #15 scalpel blade.  The skin margins were undermined to an appropriate distance in all directions utilizing iris scissors.

## 2023-04-19 ENCOUNTER — OFFICE VISIT (OUTPATIENT)
Dept: PODIATRY | Facility: CLINIC | Age: 78
End: 2023-04-19
Payer: MEDICARE

## 2023-04-19 ENCOUNTER — PES CALL (OUTPATIENT)
Dept: ADMINISTRATIVE | Facility: CLINIC | Age: 78
End: 2023-04-19
Payer: MEDICARE

## 2023-04-19 VITALS — WEIGHT: 217 LBS | BODY MASS INDEX: 36.15 KG/M2 | HEIGHT: 65 IN

## 2023-04-19 DIAGNOSIS — N18.6 TYPE 2 DIABETES MELLITUS WITH ESRD (END-STAGE RENAL DISEASE): Primary | ICD-10-CM

## 2023-04-19 DIAGNOSIS — B35.1 NAIL DERMATOPHYTOSIS: ICD-10-CM

## 2023-04-19 DIAGNOSIS — L98.8 SKIN MACERATION: ICD-10-CM

## 2023-04-19 DIAGNOSIS — M20.11 HALLUX ABDUCTO VALGUS, RIGHT: ICD-10-CM

## 2023-04-19 DIAGNOSIS — M20.41 HAMMER TOES OF BOTH FEET: ICD-10-CM

## 2023-04-19 DIAGNOSIS — M20.12 HALLUX ABDUCTO VALGUS, LEFT: ICD-10-CM

## 2023-04-19 DIAGNOSIS — E11.22 TYPE 2 DIABETES MELLITUS WITH ESRD (END-STAGE RENAL DISEASE): Primary | ICD-10-CM

## 2023-04-19 DIAGNOSIS — M20.42 HAMMER TOES OF BOTH FEET: ICD-10-CM

## 2023-04-19 DIAGNOSIS — L90.9 PLANTAR FAT PAD ATROPHY: ICD-10-CM

## 2023-04-19 PROCEDURE — 1101F PR PT FALLS ASSESS DOC 0-1 FALLS W/OUT INJ PAST YR: ICD-10-PCS | Mod: CPTII,S$GLB,, | Performed by: PODIATRIST

## 2023-04-19 PROCEDURE — 99213 PR OFFICE/OUTPT VISIT, EST, LEVL III, 20-29 MIN: ICD-10-PCS | Mod: S$GLB,,, | Performed by: PODIATRIST

## 2023-04-19 PROCEDURE — 1126F AMNT PAIN NOTED NONE PRSNT: CPT | Mod: CPTII,S$GLB,, | Performed by: PODIATRIST

## 2023-04-19 PROCEDURE — 99999 PR PBB SHADOW E&M-EST. PATIENT-LVL IV: CPT | Mod: PBBFAC,,, | Performed by: PODIATRIST

## 2023-04-19 PROCEDURE — 99999 PR PBB SHADOW E&M-EST. PATIENT-LVL IV: ICD-10-PCS | Mod: PBBFAC,,, | Performed by: PODIATRIST

## 2023-04-19 PROCEDURE — 3288F PR FALLS RISK ASSESSMENT DOCUMENTED: ICD-10-PCS | Mod: CPTII,S$GLB,, | Performed by: PODIATRIST

## 2023-04-19 PROCEDURE — 3288F FALL RISK ASSESSMENT DOCD: CPT | Mod: CPTII,S$GLB,, | Performed by: PODIATRIST

## 2023-04-19 PROCEDURE — 1160F RVW MEDS BY RX/DR IN RCRD: CPT | Mod: CPTII,S$GLB,, | Performed by: PODIATRIST

## 2023-04-19 PROCEDURE — 1126F PR PAIN SEVERITY QUANTIFIED, NO PAIN PRESENT: ICD-10-PCS | Mod: CPTII,S$GLB,, | Performed by: PODIATRIST

## 2023-04-19 PROCEDURE — 99213 OFFICE O/P EST LOW 20 MIN: CPT | Mod: S$GLB,,, | Performed by: PODIATRIST

## 2023-04-19 PROCEDURE — 1159F PR MEDICATION LIST DOCUMENTED IN MEDICAL RECORD: ICD-10-PCS | Mod: CPTII,S$GLB,, | Performed by: PODIATRIST

## 2023-04-19 PROCEDURE — 1101F PT FALLS ASSESS-DOCD LE1/YR: CPT | Mod: CPTII,S$GLB,, | Performed by: PODIATRIST

## 2023-04-19 PROCEDURE — 1160F PR REVIEW ALL MEDS BY PRESCRIBER/CLIN PHARMACIST DOCUMENTED: ICD-10-PCS | Mod: CPTII,S$GLB,, | Performed by: PODIATRIST

## 2023-04-19 PROCEDURE — 1159F MED LIST DOCD IN RCRD: CPT | Mod: CPTII,S$GLB,, | Performed by: PODIATRIST

## 2023-04-19 NOTE — PROGRESS NOTES
Is Subjective:      Patient ID: Erica Willett is a 78 y.o. female.    Chief Complaint: Diabetes Mellitus (Sendy Elaine MD at 11/21/2022) and Nail Care      Erica Willett is a 78 y.o. femalewho presents to the clinic for evaluation and treatment of high risk feet. Erica Willett   has a past medical history of Acute ischemic right PCA stroke (6/6/2021), Acute respiratory failure with hypoxia, Age-related osteoporosis without current pathological fracture (3/8/2021), Anemia of chronic kidney failure, stage 4 (severe) (4/5/2019), Cataracts, bilateral, CHF (congestive heart failure), CKD (chronic kidney disease) stage 3, GFR 30-59 ml/min, CKD (chronic kidney disease) stage 3, GFR 30-59 ml/min, Controlled type 2 diabetes mellitus with proteinuria or albuminuria, Depression, Diabetes with neurologic complications, Diabetic retinopathy of both eyes, Edema, Glaucoma, History of colonic polyps, TIA (transient ischemic attack) (11/2008), Hyperlipidemia LDL goal < 100, Hypertension, Hypothyroidism, Major depressive disorder, single episode, mild (2/17/2016), Mixed incontinence urge and stress, Obesity, Obstructive sleep apnea on CPAP, Osteopenia, Proteinuria, Sickle cell trait, Strabismus, TIA (transient ischemic attack), Trouble in sleeping, Type 2 diabetes mellitus with ophthalmic manifestations, Type 2 diabetes with stage 3 chronic kidney disease GFR 30-59, Type II or unspecified type diabetes mellitus with renal manifestations, uncontrolled(250.42), Uncontrolled type 2 diabetes mellitus with peripheral circulatory disorder (4/5/2019), Urge incontinence (1/11/2016), Urge incontinence, Venous stasis ulcer, and Vitamin D deficiency disease.  She presents to the podiatry clinic for  routine foot care and evaluation. She relates difficulty with caring for the right foot due to limitations from joint pain.  This patient has documented high risk feet requiring routine maintenance secondary to diabetes  mellitis and those secondary complications of diabetes, as mentioned.       PCP: Sendy Elaine MD    Date Last Seen by PCP:   Chief Complaint   Patient presents with    Diabetes Mellitus     Sendy Elaine MD at 11/21/2022    [facility]      Hemoglobin A1C   Date Value Ref Range Status   09/13/2022 6.3 (H) 0.0 - 5.6 % Final     Comment:     [facility]   06/14/2022 6.6 (H) 0.0 - 5.6 % Final     Comment:     [facility]   05/07/2022 6.7 (H) 4.0 - 5.6 % Final     Comment:     ADA Screening Guidelines:  5.7-6.4%  Consistent with prediabetes  >or=6.5%  Consistent with diabetes    High levels of fetal hemoglobin interfere with the HbA1C  assay. Heterozygous hemoglobin variants (HbS, HgC, etc)do  not significantly interfere with this assay.   However, presence of multiple variants may affect accuracy.     03/15/2022 7.1 (H) 0.0 - 5.6 % Final     Comment:     [facility]       Patient Active Problem List   Diagnosis    Severe obesity (BMI 35.0-39.9) with comorbidity    Sickle cell trait    Hyperlipidemia LDL goal <100    HUMBERTO on CPAP    Hypothyroidism (acquired)    Hx-TIA (transient ischemic attack)    Vitamin D deficiency disease    Pulmonary hypertension    Type 2 diabetes mellitus with ESRD (end-stage renal disease)    Secondary renal hyperparathyroidism    Incomplete bladder emptying    Postmenopausal atrophic vaginitis    Rectocele    Mixed incontinence urge and stress    Chronic diastolic heart failure    Tortuous aorta    ESRD on hemodialysis    Anemia of chronic disease    Debility    Chronic respiratory failure    Type 2 diabetes mellitus with foot ulcer, with long-term current use of insulin    Stable proliferative diabetic retinopathy associated with type 2 diabetes mellitus    Heart failure with preserved ejection fraction    Pseudophakia    Chronic kidney disease-mineral and bone disorder    Age-related osteoporosis without current pathological fracture    H/O: stroke    Walker as  ambulation aid     Current Outpatient Medications on File Prior to Visit   Medication Sig Dispense Refill    ACCU-CHEK SOFT DEV LANCETS Kit       atorvastatin (LIPITOR) 80 MG tablet TAKE 1 TABLET (80 MG TOTAL) BY MOUTH EVERY EVENING. 90 tablet 3    blood sugar diagnostic Strp One test strip use 2 times a day to check blood glucose,  ICD-10: E11.9, compatible with insurance/glucometer 100 each 11    blood-glucose meter kit One glucometer, use to check blood glucose.   ICD-10: E11.9. Dispense machine covered by insurance 1 each 0    cinacalcet (SENSIPAR) 30 MG Tab       docusate sodium (COLACE) 100 MG capsule Take 200 mg by mouth once daily.       doxercalciferoL (HECTOROL) 4 mcg/2 mL injection Inject 4.5 mcg into the vein 3 (three) times a week. At dialysis unit      dulaglutide (TRULICITY) 0.75 mg/0.5 mL pen injector INJECT 0.75MG INTO THE SKIN EVERY 7 DAYS 12 pen 0    epoetin arnel (EPOGEN INJ) Inject 1,000 Units as directed every 7 days. At the dialysis unit      LANCETS MISC       lancets Misc One lancets use 2 times a day to check blood glucose, ICD-10: E11.9 100 each 11    lancing device Misc One device, used to check blood glucose, ICD-10: E11.9 1 each 0    levothyroxine (SYNTHROID) 50 MCG tablet TAKE 1 TABLET BEFORE BREAKFAST 90 tablet 3    midodrine (PROAMATINE) 5 MG Tab Take 1 tablet (5 mg total) by mouth 3 (three) times daily. 90 tablet 11    OLENA-NABIL RX 1- mg-mg-mcg Tab Take 1 tablet by mouth once daily.      aspirin (ECOTRIN) 81 MG EC tablet Take 1 tablet (81 mg total) by mouth once daily. 90 tablet 3    sevelamer carbonate (RENVELA) 800 mg Tab Take 3 tablets (2,400 mg total) by mouth 3 (three) times daily with meals. 270 tablet 11     No current facility-administered medications on file prior to visit.     Review of patient's allergies indicates:   Allergen Reactions    Ace inhibitors Other (See Comments)     Other reaction(s): cough     Past Surgical History:   Procedure Laterality Date    AV  FISTULA PLACEMENT Left 2019    Procedure: CREATION, AV FISTULA, LEFT UPPER EXTREMITY;  Surgeon: Keron Perez MD;  Location: [facility] OR;  Service: Vascular;  Laterality: Left;    BREAST BIOPSY      breast reduction Bilateral age 31    BREAST SURGERY      CATARACT EXTRACTION Bilateral     cataracts Bilateral      SECTION, LOW TRANSVERSE      x1    CHOLECYSTECTOMY      COLONOSCOPY N/A 2022    Procedure: COLONOSCOPY;  Surgeon: Mahesh Huang MD;  Location: [facility] ENDO (2ND FLR);  Service: Endoscopy;  Laterality: N/A;  fully vaccinated-sm.  RAPID COVID  +cologuard needing ASAP  Dialysis pt T,TH Sat and left UA access  2nd floor - cardiac/dialysis/SOB / LABS / prep ins. emailed - [facility]    EYE SURGERY  2014, 2014    vitrectomy    EYE SURGERY Right 2016    FISTULOGRAM Left 3/6/2020    Procedure: Fistulogram, left upper extremity, with branch ligation;  Surgeon: Keron Perez MD;  [facility];  Service: Vascular;  Laterality: Left;  Time 1.1 Minute  42.10 mGy    FISTULOGRAM Left 2020    Procedure: Fistulogram, left upper extremity, transradial access with possible intervention;  Surgeon: Keron Perez MD;  [facility];  Service: Vascular;  Laterality: Left;    FISTULOGRAM Left 2020    Procedure: Fistulogram, left upper extremity, possible intervention;  Surgeon: Keron Perez MD;  Location: [facility] OR;  Service: Vascular;  Laterality: Left;  930 AM START  RN PRE OP  ---COVID NEGATIVE ON  2020. [place]    FISTULOGRAM Left 2022    Procedure: Fistulogram, transradial access;  Surgeon: Keron Perez MD;  [facility];  Service: Vascular;  Laterality: Left;  mgy-40.16  gycm-8.3905  contrast-34cc  time-12.4min    HYSTERECTOMY      TAHBSO (patient is unsure if ovaries removed)    OOPHORECTOMY      PERCUTANEOUS TRANSLUMINAL ANGIOPLASTY OF ARTERIOVENOUS FISTULA Left 2020    Procedure: PTA, AV FISTULA;  Surgeon:  Keron Perez MD;  Location: [address];  Service: Vascular;  Laterality: Left;  15.9 minutes of fluro  41.12  mGy  7.9060 Gy cm2  32ml  contrast    PHLEBOGRAPHY Left 7/21/2020    Procedure: CENTRAL VENOGRAM;  Surgeon: Keron Perez MD;  Location: [address];  Service: Vascular;  Laterality: Left;    REFRACTIVE SURGERY      TOTAL REDUCTION MAMMOPLASTY      approx 10 yrs ago    VENOPLASTY  1/21/2022    Procedure: ANGIOPLASTY, VEIN;  Surgeon: Keron Perez MD;  Location: [address];  Service: Vascular;;     Family History   Problem Relation Age of Onset    Stroke Mother     Diabetes Mother     Hypertension Mother     Cataracts Mother     Leukemia Father     Cataracts Father     Ovarian cancer Sister 35    Achondroplasia Sister     HIV Brother     No Known Problems Daughter     No Known Problems Son     Breast cancer Maternal Aunt 65    Parkinsonism Maternal Aunt     Esophageal cancer Maternal Uncle         smoker    No Known Problems Paternal Aunt     Cataracts Paternal Uncle     Cataracts Maternal Grandmother     Cataracts Maternal Grandfather     Diabetes Paternal Grandmother     Cataracts Paternal Grandmother     No Known Problems Paternal Grandfather     No Known Problems Other     Amblyopia Neg Hx     Blindness Neg Hx     Glaucoma Neg Hx     Macular degeneration Neg Hx     Retinal detachment Neg Hx     Strabismus Neg Hx     Thyroid disease Neg Hx     Colon cancer Neg Hx     Cancer Neg Hx        Review of Systems   Constitutional: Negative for chills and fever.   Cardiovascular:  Positive for leg swelling. Negative for chest pain and claudication.   Respiratory:  Negative for cough and shortness of breath.    Skin:  Positive for dry skin, nail changes and suspicious lesions. Negative for itching and rash.   Musculoskeletal:  Positive for arthritis and joint pain. Negative for falls, joint swelling, muscle cramps and muscle weakness.   Gastrointestinal:  Negative for diarrhea,  "nausea and vomiting.   Neurological:  Positive for numbness and paresthesias. Negative for tremors and weakness.   Psychiatric/Behavioral:  Negative for altered mental status and hallucinations.        Objective:       Vitals:    04/19/23 0913   Weight: 98.4 kg (217 lb)   Height: 5' 5" (1.651 m)   PainSc: 0-No pain     Physical Exam  Vitals and nursing note reviewed.   Constitutional:       Appearance: She is not diaphoretic.      Comments: General: Pt. is well-developed, well-nourished, appears stated age, in no acute distress, alert and oriented x 3. No evidence of depression, anxiety, or agitation. Calm, cooperative, and communicative. Appropriate interactions and affect.       Cardiovascular:      Pulses:           Dorsalis pedis pulses are 1+ on the right side and 1+ on the left side.        Posterior tibial pulses are 1+ on the right side and 1+ on the left side.      Comments: There is decreased digital hair.   Musculoskeletal:      Right ankle: Swelling present. No tenderness.      Right Achilles Tendon: No defects.      Left ankle: Swelling present. No tenderness.      Left Achilles Tendon: No defects.      Right foot: Normal range of motion. No tenderness.      Left foot: Normal range of motion. No tenderness.      Comments: Muscle strength is 5/5 in all groups bilaterally.    Decreased stride, station of gait.  apropulsive toe off.  Increased angle and base of gait.    Patient has hammertoes of digits 2-5 bilateral partially reducible without symptom today.    Visible and palpable bunion without pain at dorsomedial 1st metatarsal head right and left.  Hallux abducted right and left partially reducible, tracks laterally without being track bound.  No ecchymosis, erythema, edema, or cardinal signs infection or signs of trauma same foot.    Fat pad atrophy to heels and met heads bilateral     Skin:     General: Skin is warm and dry.      Coloration: Skin is not pale.      Findings: Wound (See description " below) present. No abrasion, lesion (posterior right leg healed) or rash.      Nails: There is no clubbing.      Comments: Xerosis bilaterally     Toenails 1-5 bilaterally are elongated by 2-3 mm, thickened by 2-3 mm, discolored/yellowed, dystrophic, brittle with subungual debris.     Interdigital maceration   Neurological:      Sensory: No sensory deficit.      Comments: [place]ia 5.07 monofilament is intact bilateral feet. Sharp/dull sensation is also intact Bilateral feet.           Psychiatric:         Speech: Speech normal.             12/08/2021            10/06/2021          07/28/2021    Ulcer location:  Anterior right leg  Measurements :  2.2 x 1.4 x 0.2  cm   Signs of infection:  Local edema and erythema as well as tenderness to palpation   Drainage: Sanguinous  Purulence: no  Crepitus/fluctuance: no  Periwound: Reddened  Base: fibrogranular  Depth: subcutaneous tissue  Probe to bone: no              07/08/2021    Ulcer location:  Anterior right leg  Measurements :  2.2 x 1.7 x 0.4  cm   Signs of infection:  Local edema and erythema as well as tenderness to palpation and mild malodor  Drainage: Sero-Sanguinous  Purulence: no  Crepitus/fluctuance: no  Periwound: Reddened  Base: Fibrotic slough  Depth: subcutaneous tissue  Probe to bone: no                Assessment:       Encounter Diagnoses   Name Primary?    Type 2 diabetes mellitus with ESRD (end-stage renal disease) Yes    Skin maceration     Nail dermatophytosis     Hammer toes of both feet     Hallux abducto valgus, left     Hallux abducto valgus, right     Plantar fat pad atrophy          Plan:       Erica was seen today for diabetes mellitus and nail care.    Diagnoses and all orders for this visit:    Type 2 diabetes mellitus with ESRD (end-stage renal disease)  -     DIABETIC SHOES FOR HOME USE  -     US Lower Extremity Arteries Bilateral; Future    Skin maceration  -     US Lower Extremity Arteries Bilateral; Future    Nail  dermatophytosis    Hammer toes of both feet  -     DIABETIC SHOES FOR HOME USE    Hallux abducto valgus, left  -     DIABETIC SHOES FOR HOME USE    Hallux abducto valgus, right  -     DIABETIC SHOES FOR HOME USE    Plantar fat pad atrophy  -     DIABETIC SHOES FOR HOME USE      I counseled the patient on her conditions, their implications and medical management. Education about the diabetic foot, neuropathy, and prevention of limb loss.    Discussed wound healing cycle, skin integrity, ways to care for skin. Counseled patient on the effects of high blood glucose on healing. She verbalizes understanding that it can increase the chances of delayed healing and this prolonged exposure leads to infection or progression of infection which subsequently can result in loss of limb.    Adequate vitamin supplementation, protein intake, and hydration - discussed with patient    Wound has remained healed well with no signs of continued skin breakdown noted.  Skin is still delicate therefore patient must be diligent in avoiding excessive pressure and making sure there is adequate support and padding in shoe gear. Again, compression stockings and elevation recommended.    Follow-up: 12-15 weeks but should call [facility] immediately if any signs of infection, such as fever, chills, sweats, increased redness or pain.    Short-term goals include maintaining good offloading and minimizing bioburden, promoting granulation and epithelialization to healing.  Long-term goals include keeping the wound healed by good offloading and medical management under the direction of internist.    Shoe inspection. Diabetic Foot Education. Patient reminded of the importance of good nutrition/healthy diet/weight management and blood sugar control to help prevent podiatric complications of diabetes. Patient instructed on proper foot hygeine. Wear comfortable, proper fitting shoes. Wash feet daily. Dry well. After drying, apply moisturizer to feet (no  lotion to webspaces). Inspect feet daily for skin breaks, blisters, swelling, or redness. Wear cotton socks (preferably white)  Change socks every day. Do NOT walk barefoot. Do NOT use heating pads or hot water soaks. We discussed wearing proper shoe gear, daily foot inspections, never walking without protective shoe gear.     Rx diabetic shoes for protection and support

## 2023-04-19 NOTE — PATIENT INSTRUCTIONS
Recommend antifungal spray to powder or betadine between toes    Over the counter pain creams: Voltaren Gel, Biofreeze, Bengay, tiger balm, two old goat, lidocaine gel,  Absorbine Veterinary Liniment Gel Topical Analgesic Sore Muscle and Joint Pain Relief    Recommend lotions: eucerin, eucerin for diabetics, aquaphor, A&D ointment, gold bond for diabetics, sween, Baldomero's Bees all purpose baby ointment,  urea 40 with aloe (found on amazon.com)    Shoe recommendations: (try 6pm.com, zappos.com , nordstromrack.Drill Cycle, or shoes.Drill Cycle for discounted prices) you can visit [employer] shoes in [place]  or [employer] in the [facility] (there are also several shoe brand outlets in the [facility])    Asics (GT 2000 or gel foundations), new balance stability type shoes (such as the 940 series), saucony (stabil c3),  Hernandez (GTS or Beast or transcend), propet, HokaOne (tennis shoe)  Kelsey Corbett (women) Kierra&Corey (men), clarks, crocs, aerosoles, naturalizers, SAS, ecco, born, elvia storey, minnie (dress shoes)  Vionic, burkenstocks, fitflops, propet (sandals)  HokaOne sandals, crocs, propet (house shoes)    Nail Home remedy:  Vicks Vapor rub or Emuaid to nails for easier manageability    Diabetes: Inspecting Your Feet  Diabetes increases your chances of developing foot problems. So inspect your feet every day. This helps you find small skin irritations before they become serious infections. If you have trouble seeing the bottoms of your feet, use a mirror or ask a family member or friend to help.     Pressure spots on the bottom of the foot are common areas where problems develop.   How to check your feet  Below are tips to help you look for foot problems. Try to check your feet at the same time each day, such as when you get out of bed in the morning:  Check the top of each foot. The tops of toes, back of the heel, and outer edge of the foot can get a lot of rubbing from poor-fitting shoes.  Check the bottom of each foot. Daily wear  and tear often leads to problems at pressure spots.  Check the toes and nails. Fungal infections often occur between toes. Toenail problems can also be a sign of fungal infections or lead to breaks in the skin.  Check your shoes, too. Loose objects inside a shoe can injure the foot. Use your hand to feel inside your shoes for things like goldy, loose stitching, or rough areas that could irritate your skin.  Warning signs  Look for any color changes in the foot. Redness with streaks can signal a severe infection, which needs immediate medical attention. Tell your doctor right away if you have any of these problems:  Swelling, sometimes with color changes, may be a sign of poor blood flow or infection. Symptoms include tenderness and an increase in the size of your foot.  Warm or hot areas on your feet may be signs of infection. A foot that is cold may not be getting enough blood.  Sensations such as burning, tingling, or pins and needles can be signs of a problem. Also check for areas that may be numb.  Hot spots are caused by friction or pressure. Look for hot spots in areas that get a lot of rubbing. Hot spots can turn into blisters, calluses, or sores.  Cracks and sores are caused by dry or irritated skin. They are a sign that the skin is breaking down, which can lead to infection.  Toenail problems to watch for include nails growing into the skin (ingrown toenail) and causing redness or pain. Thick, yellow, or discolored nails can signal a fungal infection.  Drainage and odor can develop from untreated sores and ulcers. Call your doctor right away if you notice white or yellow drainage, bleeding, or unpleasant odor.   © 4454-8734 [employer]. [address] [place] [address], [place] [address]. All rights reserved. This information is not intended as a substitute for professional medical care. Always follow your healthcare professional's instructions.        Step-by-Step:  Inspecting Your Feet  (Diabetes)    Date Last Reviewed: 10/1/2016  © 7844-7632 [employer], [employer]. [address] [place] [address], [place] [address]. All rights reserved. This information is not intended as a substitute for professional medical care. Always follow your healthcare professional's instructions.

## 2023-04-20 LAB
HD ANASTAMOSIS LOCATION: NORMAL
HD FISTULA OUTFLOW VEIN VESSEL: NORMAL
HD INFLOW ARTERY VESSEL: NORMAL
RIGHT DIS GRAFT PSV: 83 CM/ SEC
RIGHT INFLOW PSV: 200 CM/S
RIGHT MID GRAFT PSV: 70 CM/S
RIGHT OUTFLOW VEIN PSV: 205 CM/ SEC
RIGHT PROX ANA PSV: 636 CM/S
RIGHT PROX GRAFT PSV: 539 CM/S
RIGHT VOLUME FLOW PSV: 4086 ML/MIN

## 2023-04-24 ENCOUNTER — TELEPHONE (OUTPATIENT)
Dept: FAMILY MEDICINE | Facility: CLINIC | Age: 78
End: 2023-04-24
Payer: MEDICARE

## 2023-04-24 DIAGNOSIS — Z12.31 BREAST CANCER SCREENING BY MAMMOGRAM: Primary | ICD-10-CM

## 2023-04-24 NOTE — TELEPHONE ENCOUNTER
----- Message from Karlee Lassiter sent at 4/24/2023  8:43 AM CDT -----  Regarding: self [phone]  Type: Patient Call Back    Who called: self     What is the request in detail: pt asked for nurse to call her back regarding a letter for a Mammo gram she received, she needs the orders.    Can the clinic reply by [facility]? No    Would the patient rather a call back or a response via [facility]? Call back     Best call back number: [phone]

## 2023-04-26 ENCOUNTER — HOSPITAL ENCOUNTER (OUTPATIENT)
Dept: RADIOLOGY | Facility: HOSPITAL | Age: 78
Discharge: HOME | End: 2023-04-26
Attending: INTERNAL MEDICINE
Payer: MEDICARE

## 2023-04-26 DIAGNOSIS — Z12.31 BREAST CANCER SCREENING BY MAMMOGRAM: ICD-10-CM

## 2023-04-26 PROCEDURE — 77067 MAMMO DIGITAL SCREENING BILAT WITH TOMO: ICD-10-PCS | Mod: 26,,, | Performed by: RADIOLOGY

## 2023-04-26 PROCEDURE — 77063 MAMMO DIGITAL SCREENING BILAT WITH TOMO: ICD-10-PCS | Mod: 26,,, | Performed by: RADIOLOGY

## 2023-04-26 PROCEDURE — 77063 BREAST TOMOSYNTHESIS BI: CPT | Mod: 26,,, | Performed by: RADIOLOGY

## 2023-04-26 PROCEDURE — 77067 SCR MAMMO BI INCL CAD: CPT | Mod: 26,,, | Performed by: RADIOLOGY

## 2023-04-26 PROCEDURE — 77067 SCR MAMMO BI INCL CAD: CPT | Mod: TC,PO

## 2023-05-01 ENCOUNTER — HOSPITAL ENCOUNTER (OUTPATIENT)
Dept: RADIOLOGY | Facility: HOSPITAL | Age: 78
Discharge: HOME | End: 2023-05-01
Attending: PODIATRIST
Payer: MEDICARE

## 2023-05-01 DIAGNOSIS — L98.8 SKIN MACERATION: ICD-10-CM

## 2023-05-01 DIAGNOSIS — N18.6 TYPE 2 DIABETES MELLITUS WITH ESRD (END-STAGE RENAL DISEASE): ICD-10-CM

## 2023-05-01 DIAGNOSIS — E11.22 TYPE 2 DIABETES MELLITUS WITH ESRD (END-STAGE RENAL DISEASE): ICD-10-CM

## 2023-05-01 PROCEDURE — 93925 LOWER EXTREMITY STUDY: CPT | Mod: 26,,, | Performed by: RADIOLOGY

## 2023-05-01 PROCEDURE — 93925 US LOWER EXTREMITY ARTERIES BILATERAL: ICD-10-PCS | Mod: 26,,, | Performed by: RADIOLOGY

## 2023-05-01 PROCEDURE — 93925 LOWER EXTREMITY STUDY: CPT | Mod: TC

## 2023-05-24 ENCOUNTER — OFFICE VISIT (OUTPATIENT)
Dept: FAMILY MEDICINE | Facility: CLINIC | Age: 78
End: 2023-05-24
Payer: MEDICARE

## 2023-05-24 VITALS
SYSTOLIC BLOOD PRESSURE: 120 MMHG | DIASTOLIC BLOOD PRESSURE: 66 MMHG | BODY MASS INDEX: 35.47 KG/M2 | WEIGHT: 212.88 LBS | OXYGEN SATURATION: 97 % | HEIGHT: 65 IN | HEART RATE: 81 BPM | TEMPERATURE: 99 F

## 2023-05-24 DIAGNOSIS — N25.81 SECONDARY RENAL HYPERPARATHYROIDISM: ICD-10-CM

## 2023-05-24 DIAGNOSIS — H10.10 ALLERGIC CONJUNCTIVITIS AND RHINITIS, UNSPECIFIED LATERALITY: ICD-10-CM

## 2023-05-24 DIAGNOSIS — E66.01 SEVERE OBESITY (BMI 35.0-39.9) WITH COMORBIDITY: ICD-10-CM

## 2023-05-24 DIAGNOSIS — N18.6 TYPE 2 DIABETES MELLITUS WITH ESRD (END-STAGE RENAL DISEASE): ICD-10-CM

## 2023-05-24 DIAGNOSIS — E78.5 HYPERLIPIDEMIA LDL GOAL <100: Chronic | ICD-10-CM

## 2023-05-24 DIAGNOSIS — E03.9 HYPOTHYROIDISM (ACQUIRED): Chronic | ICD-10-CM

## 2023-05-24 DIAGNOSIS — I50.32 CHRONIC DIASTOLIC HEART FAILURE: Chronic | ICD-10-CM

## 2023-05-24 DIAGNOSIS — Z99.2 ESRD ON HEMODIALYSIS: ICD-10-CM

## 2023-05-24 DIAGNOSIS — I27.20 PULMONARY HYPERTENSION: ICD-10-CM

## 2023-05-24 DIAGNOSIS — Z00.00 ROUTINE MEDICAL EXAM: Primary | ICD-10-CM

## 2023-05-24 DIAGNOSIS — G47.33 OSA ON CPAP: Chronic | ICD-10-CM

## 2023-05-24 DIAGNOSIS — J30.9 ALLERGIC CONJUNCTIVITIS AND RHINITIS, UNSPECIFIED LATERALITY: ICD-10-CM

## 2023-05-24 DIAGNOSIS — D63.8 ANEMIA OF CHRONIC DISEASE: Chronic | ICD-10-CM

## 2023-05-24 DIAGNOSIS — D57.3 SICKLE CELL TRAIT: ICD-10-CM

## 2023-05-24 DIAGNOSIS — M81.0 AGE-RELATED OSTEOPOROSIS WITHOUT CURRENT PATHOLOGICAL FRACTURE: ICD-10-CM

## 2023-05-24 DIAGNOSIS — E11.22 TYPE 2 DIABETES MELLITUS WITH ESRD (END-STAGE RENAL DISEASE): ICD-10-CM

## 2023-05-24 DIAGNOSIS — J96.10 CHRONIC RESPIRATORY FAILURE, UNSPECIFIED WHETHER WITH HYPOXIA OR HYPERCAPNIA: ICD-10-CM

## 2023-05-24 DIAGNOSIS — Z99.89 WALKER AS AMBULATION AID: ICD-10-CM

## 2023-05-24 DIAGNOSIS — E11.3553 STABLE PROLIFERATIVE DIABETIC RETINOPATHY OF BOTH EYES ASSOCIATED WITH TYPE 2 DIABETES MELLITUS: ICD-10-CM

## 2023-05-24 DIAGNOSIS — N18.6 ESRD ON HEMODIALYSIS: ICD-10-CM

## 2023-05-24 PROCEDURE — 99999 PR PBB SHADOW E&M-EST. PATIENT-LVL IV: CPT | Mod: PBBFAC,,, | Performed by: INTERNAL MEDICINE

## 2023-05-24 PROCEDURE — 3288F FALL RISK ASSESSMENT DOCD: CPT | Mod: CPTII,S$GLB,, | Performed by: INTERNAL MEDICINE

## 2023-05-24 PROCEDURE — 1126F PR PAIN SEVERITY QUANTIFIED, NO PAIN PRESENT: ICD-10-PCS | Mod: CPTII,S$GLB,, | Performed by: INTERNAL MEDICINE

## 2023-05-24 PROCEDURE — 1160F RVW MEDS BY RX/DR IN RCRD: CPT | Mod: CPTII,S$GLB,, | Performed by: INTERNAL MEDICINE

## 2023-05-24 PROCEDURE — 1101F PR PT FALLS ASSESS DOC 0-1 FALLS W/OUT INJ PAST YR: ICD-10-PCS | Mod: CPTII,S$GLB,, | Performed by: INTERNAL MEDICINE

## 2023-05-24 PROCEDURE — 3078F DIAST BP <80 MM HG: CPT | Mod: CPTII,S$GLB,, | Performed by: INTERNAL MEDICINE

## 2023-05-24 PROCEDURE — 3288F PR FALLS RISK ASSESSMENT DOCUMENTED: ICD-10-PCS | Mod: CPTII,S$GLB,, | Performed by: INTERNAL MEDICINE

## 2023-05-24 PROCEDURE — 99397 PR PREVENTIVE VISIT,EST,65 & OVER: ICD-10-PCS | Mod: S$GLB,,, | Performed by: INTERNAL MEDICINE

## 2023-05-24 PROCEDURE — 99397 PER PM REEVAL EST PAT 65+ YR: CPT | Mod: S$GLB,,, | Performed by: INTERNAL MEDICINE

## 2023-05-24 PROCEDURE — 1159F MED LIST DOCD IN RCRD: CPT | Mod: CPTII,S$GLB,, | Performed by: INTERNAL MEDICINE

## 2023-05-24 PROCEDURE — 1126F AMNT PAIN NOTED NONE PRSNT: CPT | Mod: CPTII,S$GLB,, | Performed by: INTERNAL MEDICINE

## 2023-05-24 PROCEDURE — 1160F PR REVIEW ALL MEDS BY PRESCRIBER/CLIN PHARMACIST DOCUMENTED: ICD-10-PCS | Mod: CPTII,S$GLB,, | Performed by: INTERNAL MEDICINE

## 2023-05-24 PROCEDURE — 3074F PR MOST RECENT SYSTOLIC BLOOD PRESSURE < 130 MM HG: ICD-10-PCS | Mod: CPTII,S$GLB,, | Performed by: INTERNAL MEDICINE

## 2023-05-24 PROCEDURE — 99999 PR PBB SHADOW E&M-EST. PATIENT-LVL IV: ICD-10-PCS | Mod: PBBFAC,,, | Performed by: INTERNAL MEDICINE

## 2023-05-24 PROCEDURE — 1159F PR MEDICATION LIST DOCUMENTED IN MEDICAL RECORD: ICD-10-PCS | Mod: CPTII,S$GLB,, | Performed by: INTERNAL MEDICINE

## 2023-05-24 PROCEDURE — 1101F PT FALLS ASSESS-DOCD LE1/YR: CPT | Mod: CPTII,S$GLB,, | Performed by: INTERNAL MEDICINE

## 2023-05-24 PROCEDURE — 3074F SYST BP LT 130 MM HG: CPT | Mod: CPTII,S$GLB,, | Performed by: INTERNAL MEDICINE

## 2023-05-24 PROCEDURE — 3078F PR MOST RECENT DIASTOLIC BLOOD PRESSURE < 80 MM HG: ICD-10-PCS | Mod: CPTII,S$GLB,, | Performed by: INTERNAL MEDICINE

## 2023-05-24 RX ORDER — FLUTICASONE PROPIONATE 50 MCG
1 SPRAY, SUSPENSION (ML) NASAL DAILY
Qty: 48 G | Refills: 5 | Status: SHIPPED | OUTPATIENT
Start: 2023-05-24

## 2023-05-24 NOTE — PROGRESS NOTES
CHIEF COMPLAINT:   Chief Complaint   Patient presents with    Follow-up    nasal drip    Nasal Congestion        HISTORY OF PRESENT ILLNESS:  Erica Leblanc is a 78 y.o. female who presents to the clinic today for a routine physical exam. Her last physical exam was approximately 1 years(s) ago.      Objective     PAST MEDICAL HISTORY:  Past Medical History:   Diagnosis Date    Acute ischemic right PCA stroke 6/6/2021    Acute respiratory failure with hypoxia     Age-related osteoporosis without current pathological fracture 3/8/2021    Anemia of chronic kidney failure, stage 4 (severe) 4/5/2019    Cataracts, bilateral     CHF (congestive heart failure)     CKD (chronic kidney disease) stage 3, GFR 30-59 ml/min     CKD (chronic kidney disease) stage 3, GFR 30-59 ml/min     Controlled type 2 diabetes mellitus with proteinuria or albuminuria     Depression     Diabetes with neurologic complications     Diabetic retinopathy of both eyes     Edema     Glaucoma     History of colonic polyps     Hx-TIA (transient ischemic attack) 11/2008    Hyperlipidemia LDL goal < 100     Hypertension     Hypothyroidism     Major depressive disorder, single episode, mild 2/17/2016    Mixed incontinence urge and stress     Obesity     Obstructive sleep apnea on CPAP     7/19/19:  Home CPAP machine broken, per patient & son    Osteopenia     Proteinuria     Sickle cell trait     Strabismus     TIA (transient ischemic attack)     Trouble in sleeping     Type 2 diabetes mellitus with ophthalmic manifestations     Type 2 diabetes with stage 3 chronic kidney disease GFR 30-59     Type II or unspecified type diabetes mellitus with renal manifestations, uncontrolled(250.42)     Uncontrolled type 2 diabetes mellitus with peripheral circulatory disorder 4/5/2019    Urge incontinence 1/11/2016    Urge incontinence     Venous stasis ulcer     bilateral lower legs    Vitamin D deficiency disease        PAST SURGICAL HISTORY:  Past Surgical  History:   Procedure Laterality Date    AV FISTULA PLACEMENT Left 2019    Procedure: CREATION, AV FISTULA, LEFT UPPER EXTREMITY;  Surgeon: Keron Perez MD;  Location: [facility] OR;  Service: Vascular;  Laterality: Left;    BREAST BIOPSY      breast reduction Bilateral age 31    BREAST SURGERY      CATARACT EXTRACTION Bilateral     cataracts Bilateral      SECTION, LOW TRANSVERSE      x1    CHOLECYSTECTOMY      COLONOSCOPY N/A 2022    Procedure: COLONOSCOPY;  Surgeon: Mahesh Huang MD;  Location: [facility] ENDO (2ND FLR);  Service: Endoscopy;  Laterality: N/A;  fully vaccinated-sm.  RAPID COVID  +cologuard needing ASAP  Dialysis pt T,TH Sat and left UA access  2nd floor - cardiac/dialysis/SOB / LABS / prep ins. emailed - ERW    EYE SURGERY  2014, 2014    vitrectomy    EYE SURGERY Right 2016    FISTULOGRAM Left 3/6/2020    Procedure: Fistulogram, left upper extremity, with branch ligation;  Surgeon: Keron Perez MD;  Location: [facility] OR [address] [place] [address];  Service: Vascular;  Laterality: Left;  Time 1.1 Minute  42.10 mGy    FISTULOGRAM Left 2020    Procedure: Fistulogram, left upper extremity, transradial access with possible intervention;  Surgeon: Keron Perez MD;  Location: [facility] OR [address] [place] [address];  Service: Vascular;  Laterality: Left;    FISTULOGRAM Left 2020    Procedure: Fistulogram, left upper extremity, possible intervention;  Surgeon: Keron Perez MD;  Location: [facility];  Service: Vascular;  Laterality: Left;  930 AM START  RN PRE OP  ---COVID NEGATIVE ON  2020. [place]    FISTULOGRAM Left 2022    Procedure: Fistulogram, transradial access;  Surgeon: Keron Perez MD;  [facility];  Service: Vascular;  Laterality: Left;  mgy-40.16  gycm-8.3905  contrast-34cc  time-12.4min    HYSTERECTOMY      TAHBSO (patient is unsure if ovaries removed)    OOPHORECTOMY      PERCUTANEOUS TRANSLUMINAL ANGIOPLASTY OF ARTERIOVENOUS FISTULA Left  7/21/2020    Procedure: PTA, AV FISTULA;  Surgeon: Keron Perez MD;  Location: [facility] OR [address] [place] [address];  Service: Vascular;  Laterality: Left;  15.9 minutes of fluro  41.12  mGy  7.9060 Gy cm2  32ml  contrast    PHLEBOGRAPHY Left 7/21/2020    Procedure: CENTRAL VENOGRAM;  Surgeon: Keron Perez MD;  Location: [facility] OR [address] [place] [address];  Service: Vascular;  Laterality: Left;    REFRACTIVE SURGERY      TOTAL REDUCTION MAMMOPLASTY      approx 10 yrs ago    VENOPLASTY  1/21/2022    Procedure: ANGIOPLASTY, VEIN;  Surgeon: Keron Perez MD;  Location: [facility] OR [address] [place] [address];  Service: Vascular;;       SOCIAL HISTORY:  Social History     Socioeconomic History    Marital status: Single    Number of children: 5   Occupational History    Occupation:      Employer: [employer]     Comment: part-time   Tobacco Use    Smoking status: Never    Smokeless tobacco: Never   Substance and Sexual Activity    Alcohol use: No     Alcohol/week: 0.0 standard drinks    Drug use: No    Sexual activity: Not Currently     Partners: Male     Birth control/protection: Post-menopausal, Surgical     Social Determinants of Health     Financial Resource Strain: Low Risk     Difficulty of Paying Living Expenses: Not hard at all   Food Insecurity: No Food Insecurity    Worried About Running Out of Food in the Last Year: Never true    Ran Out of Food in the Last Year: Never true   Transportation Needs: No Transportation Needs    Lack of Transportation (Medical): No    Lack of Transportation (Non-Medical): No   Physical Activity: Insufficiently Active    Days of Exercise per Week: 2 days    Minutes of Exercise per Session: 10 min   Stress: No Stress Concern Present    Feeling of Stress : Only a little   Social Connections: Socially Isolated    Frequency of Communication with Friends and Family: More than three times a week    Frequency of Social Gatherings with Friends and Family: More than three times a week    Attends Nondenominational  Services: Never    Active Member of Clubs or Organizations: No    Attends Club or Organization Meetings: Never    Marital Status:    Housing Stability: Low Risk     Unable to Pay for Housing in the Last Year: No    Number of Places Lived in the Last Year: 1    Unstable Housing in the Last Year: No       FAMILY HISTORY:  Family History   Problem Relation Age of Onset    Stroke Mother     Diabetes Mother     Hypertension Mother     Cataracts Mother     Leukemia Father     Cataracts Father     Ovarian cancer Sister 35    Achondroplasia Sister     HIV Brother     No Known Problems Daughter     No Known Problems Son     Breast cancer Maternal Aunt 65    Parkinsonism Maternal Aunt     Esophageal cancer Maternal Uncle         smoker    No Known Problems Paternal Aunt     Cataracts Paternal Uncle     Cataracts Maternal Grandmother     Cataracts Maternal Grandfather     Diabetes Paternal Grandmother     Cataracts Paternal Grandmother     No Known Problems Paternal Grandfather     No Known Problems Other     Amblyopia Neg Hx     Blindness Neg Hx     Glaucoma Neg Hx     Macular degeneration Neg Hx     Retinal detachment Neg Hx     Strabismus Neg Hx     Thyroid disease Neg Hx     Colon cancer Neg Hx     Cancer Neg Hx        ALLERGIES AND MEDICATIONS: updated and reviewed.  Review of patient's allergies indicates:   Allergen Reactions    Ace inhibitors Other (See Comments)     Other reaction(s): cough     Medication List with Changes/Refills   Current Medications    ACCU-CHEK SOFT DEV LANCETS KIT        ASPIRIN (ECOTRIN) 81 MG EC TABLET    Take 1 tablet (81 mg total) by mouth once daily.    ATORVASTATIN (LIPITOR) 80 MG TABLET    TAKE 1 TABLET (80 MG TOTAL) BY MOUTH EVERY EVENING.    BLOOD SUGAR DIAGNOSTIC STRP    One test strip use 2 times a day to check blood glucose,  ICD-10: E11.9, compatible with insurance/glucometer    BLOOD-GLUCOSE METER KIT    One glucometer, use to check blood glucose.   ICD-10: E11.9. Dispense  machine covered by insurance    CINACALCET (SENSIPAR) 30 MG TAB        DOCUSATE SODIUM (COLACE) 100 MG CAPSULE    Take 200 mg by mouth once daily.     DOXERCALCIFEROL (HECTOROL) 4 MCG/2 ML INJECTION    Inject 4.5 mcg into the vein 3 (three) times a week. At dialysis unit    DULAGLUTIDE (TRULICITY) 0.75 MG/0.5 ML PEN INJECTOR    INJECT 0.75MG INTO THE SKIN EVERY 7 DAYS    EPOETIN BE (EPOGEN INJ)    Inject 1,000 Units as directed every 7 days. At the dialysis unit    LANCETS MISC        LANCETS MISC    One lancets use 2 times a day to check blood glucose, ICD-10: E11.9    LANCING DEVICE MISC    One device, used to check blood glucose, ICD-10: E11.9    LEVOTHYROXINE (SYNTHROID) 50 MCG TABLET    TAKE 1 TABLET BEFORE BREAKFAST    MIDODRINE (PROAMATINE) 5 MG TAB    Take 1 tablet (5 mg total) by mouth 3 (three) times daily.    OLENA-NABIL RX 1- MG-MG-MCG TAB    Take 1 tablet by mouth once daily.    SEVELAMER CARBONATE (RENVELA) 800 MG TAB    Take 3 tablets (2,400 mg total) by mouth 3 (three) times daily with meals.          CARE TEAM:  Patient Care Team:  Sendy Elaine MD as PCP - General (Internal Medicine)  Brittanie Orellana MD (Cardiology)  Nicole Gray DPM as Consulting Physician (Podiatry)  Surinder Joseph MD as Consulting Physician (Nephrology)  Katia Wong MD as Consulting Physician (Urology)  Coleen Gillis MD as Consulting Physician (Obstetrics)  LILLIANA Coello MD as Consulting Physician (Ophthalmology)  Yolis Rossi MD as Consulting Physician (Endocrinology)  Gianna Hinson LPN as Care Coordinator  Keron Perez MD as Consulting Physician (Vascular Surgery)  Myla Puente MD as Consulting Physician (Nephrology)  Murali Li MD as Consulting Physician (Cardiology)  Carl Day MD as Consulting Physician (Endocrinology)  Nellie Quiles OD as Consulting Physician (Optometry)              SCREENING HISTORY:  Health Maintenance         Date Due Completion Date  "   Lipid Panel 06/14/2023 6/14/2022    Eye Exam 08/17/2023 8/17/2022    Override on 1/3/2011: Done    Hemoglobin A1c 09/14/2023 3/14/2023    DEXA Scan 10/19/2023 10/19/2020    Override on 8/7/2008: Done    TETANUS VACCINE 08/08/2026 8/8/2016              REVIEW OF SYSTEMS:   The patient reports : good dietary habits.  The patient reports : that they are unable to exercise regularly because  of orthopaedic limitations.  Review of Systems   Constitutional:  Negative for chills and fever.   HENT:  Positive for postnasal drip.    Eyes:  Positive for discharge (clear) and itching.   Respiratory:  Negative for shortness of breath.    Cardiovascular:  Negative for chest pain.   Gastrointestinal:  Negative for abdominal pain.   Genitourinary:  Negative for dysuria.    ROS (Optional)-: no pelvic pain  Breast ROS (Optional)-: negative for breast lumps/discharge          Physical Examination: 274}  Vitals:    05/24/23 1404   BP: 120/66   BP Location: Right arm   Patient Position: Sitting   BP Method: Large (Manual)   Pulse: 81   Temp: 98.8 °F (37.1 °C)   TempSrc: Oral   SpO2: 97%   Weight: 96.6 kg (212 lb 13.7 oz)   Height: 5' 5" (1.651 m)        Body mass index is 35.42 kg/m².        General appearance - alert, well appearing, and in no distress, obese  Psychiatric - alert, oriented to person, place, and time, normal behavior, speech, dress, motor activity and thought process  Eyes - pupils equal and reactive, extraocular eye movements intact, sclera anicteric  Chest - clear to auscultation, no wheezes, rales or rhonchi, symmetric air entry  Heart - normal rate and regular rhythm  Neurological - alert, normal speech, no focal findings; cranial nerves II through XII intact  Musculoskeletal - patient noted to have Moderate osteoarthritic changes to both knee joints. No joint effusions noted. Patient ambulated with a walker.  Skin - normal coloration, no suspicious skin lesions      Labs:  Lab Results   Component Value Date    " HGBA1C 6.3 (H) 09/13/2022    HGBA1C 6.6 (H) 06/14/2022    HGBA1C 6.7 (H) 05/07/2022      Lab Results   Component Value Date    CHOL 165 05/07/2022    CHOL 127 11/03/2021    CHOL 143 06/06/2021     Lab Results   Component Value Date    LDLCALC 86 06/14/2022    LDLCALC 101.0 05/07/2022    LDLCALC 64.4 11/03/2021         ASSESSMENT AND PLAN:  274}  1. Routine medical exam  Counseled on age appropriate medical preventative services including age appropriate cancer screenings, age appropriate eye and dental exams, over all nutritional health, need for a consistent exercise regimen, and an over all push towards maintaining a vigorous and active lifestyle.  Counseled on age appropriate vaccines and discussed upcoming health care needs based on age/gender. Discussed good sleep hygiene and stress management.    2. Type 2 diabetes mellitus with ESRD (end-stage renal disease) and foot ulcer/8. Stable proliferative diabetic retinopathy of both eyes associated with type 2 diabetes mellitus  Lab Results   Component Value Date    HGBA1C 6.3 (H) 09/13/2022     Diabetes is under good control at this time for age and comorbid conditions.   We discussed diabetic diet and regular exercise.   We discussed home blood sugar monitoring, if appropriate - the patient should test once daily and as needed.   Continue current medication regimen.  Diabetic complications addressed: Neuropathy pain controlled.  Patient is followed by podiatry and needs diabetic shoes.  Patient was counseled on the need for yearly eye exam to screen for/monitor diabetic retinopathy and yearly diabetic foot exam.    3. ESRD on hemodialysis  The current medical regimen is effective;  continue present plan and medications.   Followed by: Nephrology.   Overview:  - at [facility]; Dr. Barrientos  - Tuesday, Thursday and Saturday  - has AV graft in left UE      4. Chronic diastolic heart failure  The current medical regimen is effective;  continue present plan and medications.    Followed by: Cardiology.     5. Chronic respiratory failure, unspecified whether with hypoxia or hypercapnia/6. Pulmonary hypertension  The current medical regimen is effective;  continue present plan and medications.     7. Hyperlipidemia LDL goal <100  Lab Results   Component Value Date    CHOL 165 05/07/2022     Lab Results   Component Value Date    HDL 51 06/14/2022     Lab Results   Component Value Date    LDLCALC 86 06/14/2022     Lab Results   Component Value Date    TRIG 91 06/14/2022     Lab Results   Component Value Date    LDLCALC 86 06/14/2022     We discussed low fat diet and regular exercise.The current medical regimen is effective;  continue present plan and medications.   -     Lipid Panel; Future; Expected date: 05/24/2023      9. Anemia of chronic disease  Stable. Asymptomatic. Observe.    10. Sickle cell trait  Stable. Asymptomatic. Observe.    11. Hypothyroidism (acquired)  Lab Results   Component Value Date    TSH 2.631 11/03/2021     Patient is clinically euthyroid. Continue current regimen.    12. Secondary renal hyperparathyroidism  Stable. Asymptomatic. Observe.    13. Age-related osteoporosis without current pathological fracture  We discussed adequate calcium and vitamin D supplementation. We discussed fall precautions. She is up to date on her BMD. Defer treatment to endocrinology.    14. HUMBERTO on CPAP  CPAP compliance: yes. The patient reports that they continue to benefit from regular use of their CPAP machine..  We discussed the potential ramifications of untreated sleep apnea, which could include daytime sleepiness, hypertension, heart disease including CHF, sudden death while sleeping and/or stroke. The patient was advised to abstain from driving should they feel sleepy or drowsy.  We discussed potential treatment options, which could include weight loss, body positioning, continuous positive airway pressure (CPAP), or referral for surgical consideration.   Overview:  -ahi of  18  -currently on cpap of 14 cm H20 with ffm.    -would like to try nasal pillow for comfort.  Will switch apap in hope of lowering driving pressure.        15. Severe obesity (BMI 35.0-39.9) with comorbidity  BMI Readings from Last 3 Encounters:   05/24/23 35.42 kg/m²   04/19/23 36.11 kg/m²   04/17/23 36.14 kg/m²     The patient is asked to continue to make an attempt to improve diet and exercise patterns to aid in medical management of this problem.     16. Walker as ambulation aid  We discussed fall precautions.    17. Allergic conjunctivitis and rhinitis, unspecified laterality  The patient reports that she has been experiencing itchy watery eyes and postnasal  drip resulting in cough for the past few months.  She thought this was perhaps because she changed from face mask to nasal pillow for her CPAP machine.  She also reports that she got a Siberian Husky in October of last year.  He sheds a lot of here.  I suspect it is more likely an allergy to the new dog.  We discussed some changes in grooming.  We also discussed several treatment strategies: antihistamine at bedtime, flonase in the morning. We also discussed saline nasal rinse twice a day. Consider allergy covers for pillow and mattress. Patient will let me know if symptoms worsen or persist.             Orders Placed This Encounter   Procedures    Lipid Panel      Follow up in about 6 months (around 11/24/2023), or if symptoms worsen or fail to improve, for follow up chronic medical conditions.. or sooner as needed.

## 2023-05-24 NOTE — PATIENT INSTRUCTIONS
For allergy symptoms I recommend: antihistamine at bedtime (allegra, claritin or zyrtec), saline nasal rinse twice a day (hold head forward and spray towards the corresponding ear then blow your nose). Flonase in the morning after a saline rinse.  I also recommended allergy covers for your pillow and mattress.      Zaditor - otc allergy eye drop

## 2023-06-09 ENCOUNTER — OFFICE VISIT (OUTPATIENT)
Dept: HOME HEALTH SERVICES | Facility: CLINIC | Age: 78
End: 2023-06-09
Payer: MEDICARE

## 2023-06-09 DIAGNOSIS — N25.81 SECONDARY RENAL HYPERPARATHYROIDISM: ICD-10-CM

## 2023-06-09 DIAGNOSIS — I77.1 TORTUOUS AORTA: ICD-10-CM

## 2023-06-09 DIAGNOSIS — Z99.2 ESRD ON HEMODIALYSIS: ICD-10-CM

## 2023-06-09 DIAGNOSIS — M81.0 AGE-RELATED OSTEOPOROSIS WITHOUT CURRENT PATHOLOGICAL FRACTURE: ICD-10-CM

## 2023-06-09 DIAGNOSIS — N18.6 ESRD ON HEMODIALYSIS: ICD-10-CM

## 2023-06-09 DIAGNOSIS — G47.33 OSA ON CPAP: Chronic | ICD-10-CM

## 2023-06-09 DIAGNOSIS — J96.10 CHRONIC RESPIRATORY FAILURE, UNSPECIFIED WHETHER WITH HYPOXIA OR HYPERCAPNIA: ICD-10-CM

## 2023-06-09 DIAGNOSIS — E03.9 HYPOTHYROIDISM (ACQUIRED): Chronic | ICD-10-CM

## 2023-06-09 DIAGNOSIS — E11.3553 STABLE PROLIFERATIVE DIABETIC RETINOPATHY OF BOTH EYES ASSOCIATED WITH TYPE 2 DIABETES MELLITUS: ICD-10-CM

## 2023-06-09 DIAGNOSIS — E66.01 SEVERE OBESITY (BMI 35.0-39.9) WITH COMORBIDITY: ICD-10-CM

## 2023-06-09 DIAGNOSIS — Z00.00 ENCOUNTER FOR PREVENTIVE HEALTH EXAMINATION: ICD-10-CM

## 2023-06-09 DIAGNOSIS — I27.20 PULMONARY HYPERTENSION: ICD-10-CM

## 2023-06-09 DIAGNOSIS — E78.5 HYPERLIPIDEMIA LDL GOAL <100: Chronic | ICD-10-CM

## 2023-06-09 DIAGNOSIS — I50.32 CHRONIC DIASTOLIC HEART FAILURE: Chronic | ICD-10-CM

## 2023-06-09 DIAGNOSIS — E11.22 TYPE 2 DIABETES MELLITUS WITH ESRD (END-STAGE RENAL DISEASE): ICD-10-CM

## 2023-06-09 DIAGNOSIS — Z00.00 ENCOUNTER FOR MEDICARE ANNUAL WELLNESS EXAM: Primary | ICD-10-CM

## 2023-06-09 DIAGNOSIS — N18.6 TYPE 2 DIABETES MELLITUS WITH ESRD (END-STAGE RENAL DISEASE): ICD-10-CM

## 2023-06-09 PROCEDURE — G0439 PR MEDICARE ANNUAL WELLNESS SUBSEQUENT VISIT: ICD-10-PCS | Mod: S$GLB,,, | Performed by: NURSE PRACTITIONER

## 2023-06-09 PROCEDURE — 3078F PR MOST RECENT DIASTOLIC BLOOD PRESSURE < 80 MM HG: ICD-10-PCS | Mod: CPTII,S$GLB,, | Performed by: NURSE PRACTITIONER

## 2023-06-09 PROCEDURE — 1159F PR MEDICATION LIST DOCUMENTED IN MEDICAL RECORD: ICD-10-PCS | Mod: CPTII,S$GLB,, | Performed by: NURSE PRACTITIONER

## 2023-06-09 PROCEDURE — G0439 PPPS, SUBSEQ VISIT: HCPCS | Mod: S$GLB,,, | Performed by: NURSE PRACTITIONER

## 2023-06-09 PROCEDURE — 3288F FALL RISK ASSESSMENT DOCD: CPT | Mod: CPTII,S$GLB,, | Performed by: NURSE PRACTITIONER

## 2023-06-09 PROCEDURE — 1160F PR REVIEW ALL MEDS BY PRESCRIBER/CLIN PHARMACIST DOCUMENTED: ICD-10-PCS | Mod: CPTII,S$GLB,, | Performed by: NURSE PRACTITIONER

## 2023-06-09 PROCEDURE — 3075F PR MOST RECENT SYSTOLIC BLOOD PRESS GE 130-139MM HG: ICD-10-PCS | Mod: CPTII,S$GLB,, | Performed by: NURSE PRACTITIONER

## 2023-06-09 PROCEDURE — 3288F PR FALLS RISK ASSESSMENT DOCUMENTED: ICD-10-PCS | Mod: CPTII,S$GLB,, | Performed by: NURSE PRACTITIONER

## 2023-06-09 PROCEDURE — 1101F PR PT FALLS ASSESS DOC 0-1 FALLS W/OUT INJ PAST YR: ICD-10-PCS | Mod: CPTII,S$GLB,, | Performed by: NURSE PRACTITIONER

## 2023-06-09 PROCEDURE — 1160F RVW MEDS BY RX/DR IN RCRD: CPT | Mod: CPTII,S$GLB,, | Performed by: NURSE PRACTITIONER

## 2023-06-09 PROCEDURE — 1101F PT FALLS ASSESS-DOCD LE1/YR: CPT | Mod: CPTII,S$GLB,, | Performed by: NURSE PRACTITIONER

## 2023-06-09 PROCEDURE — 1126F PR PAIN SEVERITY QUANTIFIED, NO PAIN PRESENT: ICD-10-PCS | Mod: CPTII,S$GLB,, | Performed by: NURSE PRACTITIONER

## 2023-06-09 PROCEDURE — 3078F DIAST BP <80 MM HG: CPT | Mod: CPTII,S$GLB,, | Performed by: NURSE PRACTITIONER

## 2023-06-09 PROCEDURE — 1126F AMNT PAIN NOTED NONE PRSNT: CPT | Mod: CPTII,S$GLB,, | Performed by: NURSE PRACTITIONER

## 2023-06-09 PROCEDURE — 3075F SYST BP GE 130 - 139MM HG: CPT | Mod: CPTII,S$GLB,, | Performed by: NURSE PRACTITIONER

## 2023-06-09 PROCEDURE — 1159F MED LIST DOCD IN RCRD: CPT | Mod: CPTII,S$GLB,, | Performed by: NURSE PRACTITIONER

## 2023-06-12 VITALS
HEIGHT: 65 IN | BODY MASS INDEX: 35.08 KG/M2 | SYSTOLIC BLOOD PRESSURE: 130 MMHG | HEART RATE: 80 BPM | DIASTOLIC BLOOD PRESSURE: 67 MMHG | WEIGHT: 210.56 LBS

## 2023-06-13 NOTE — PATIENT INSTRUCTIONS
Counseling and Referral of Other Preventative  (Italic type indicates deductible and co-insurance are waived)    Patient Name: Erica Leblanc  Today's Date: 6/12/2023    Health Maintenance       Date Due Completion Date    COVID-19 Vaccine (6 - Pfizer series) 02/02/2023 10/2/2022    Lipid Panel 06/14/2023 6/14/2022    Eye Exam 08/17/2023 8/17/2022    Override on 1/3/2011: Done    Hemoglobin A1c 09/14/2023 3/14/2023    DEXA Scan 10/19/2023 10/19/2020    Override on 8/7/2008: Done    TETANUS VACCINE 08/08/2026 8/8/2016        No orders of the defined types were placed in this encounter.    The following information is provided to all patients.  This information is to help you find resources for any of the problems found today that may be affecting your health:                Living healthy guide: www.[facility].[place].[facility]      Understanding Diabetes: www.diabetes.org      Eating healthy: www.cdc.gov/healthyweight      CDC home safety checklist: www.cdc.gov/steadi/patient.html      Agency on Aging: www.goea.[place].[facility]      Alcoholics anonymous (AA): www.aa.org      Physical Activity: www.teresita.nih.gov/ck6vxmn      Tobacco use: www.quitwithusla.org

## 2023-06-13 NOTE — PROGRESS NOTES
"  Erica Leblanc presented for a  Medicare AWV and comprehensive Health Risk Assessment today. The following components were reviewed and updated:    Medical history  Family History  Social history  Allergies and Current Medications  Health Risk Assessment  Health Maintenance  Care Team         ** See Completed Assessments for Annual Wellness Visit within the encounter summary.**         The following assessments were completed:  Living Situation  CAGE  Depression Screening  Timed Get Up and Go  Whisper Test  Cognitive Function Screening      Nutrition Screening  ADL Screening  PAQ Screening        Vitals:    06/09/23 0858   BP: 130/67   Pulse: 80   Weight: 95.5 kg (210 lb 8.6 oz)   Height: 5' 5" (1.651 m)     Body mass index is 35.04 kg/m².  Physical Exam  Constitutional:       Appearance: She is obese.   HENT:      Head: Normocephalic and atraumatic.      Nose: Nose normal.      Mouth/Throat:      Mouth: Mucous membranes are moist.   Eyes:      Extraocular Movements: Extraocular movements intact.   Cardiovascular:      Rate and Rhythm: Normal rate and regular rhythm.      Heart sounds: Normal heart sounds.   Pulmonary:      Effort: Pulmonary effort is normal. No respiratory distress.      Breath sounds: Normal breath sounds.   Abdominal:      General: Bowel sounds are normal. There is no distension.      Palpations: Abdomen is soft.   Musculoskeletal:         General: No swelling. Normal range of motion.      Cervical back: Normal range of motion.   Skin:     General: Skin is warm and dry.      Comments: AV fistula to left forearm   Neurological:      General: No focal deficit present.      Mental Status: She is alert and oriented to person, place, and time.      Gait: Gait abnormal (walker present).   Psychiatric:         Mood and Affect: Mood normal.         Behavior: Behavior normal.             Diagnoses and health risks identified today and associated recommendations/orders:    1. Encounter for Medicare " annual wellness exam  - Ambulatory Referral/Consult to Enhanced Annual Wellness Visit (eAWV)    2. Encounter for preventive health examination  Assessments completed. Preventive measures and health maintenance reviewed with patient.  Review for opioid screening: Patient does not have any prescriptions or opioids.  Review for substance use disorder: Patient does not use any substances.    3. Severe obesity (BMI 35.0-39.9) with comorbidity  Stable, followed by PCP. Healthy diet and tolerable exercise encouraged.    4. Stable proliferative diabetic retinopathy of both eyes associated with type 2 diabetes mellitus  Stable, followed by Optometry.    5. Chronic diastolic heart failure  Stable, followed by Cardiology.    6. Pulmonary hypertension  Stable, followed by Cardiology.    7. ESRD on hemodialysis  Stable, patient on Renvela. Followed by Nephrology.    8. Tortuous aorta  Stable, patient on Aspirin.    9. Type 2 diabetes mellitus with ESRD (end-stage renal disease)  Stable, patient on Trulicity for T2DM. Followed by PCP and Nephrology.    10. Secondary renal hyperparathyroidism  Stable, followed by Nephrology.    11. Chronic respiratory failure, unspecified whether with hypoxia or hypercapnia  Stable, followed by Pulmonary.    12. Hyperlipidemia LDL goal <100  Stable, patient on Lipitor. Followed by PCP.    13. Hypothyroidism (acquired)  Stable, patient on Synthroid. Followed by PCP.    14. Age-related osteoporosis without current pathological fracture  Stable, followed by PCP and Endocrinology.    15. HUMBERTO on CPAP  Stable, followed by Pulmonary.       Provided Erica with a 5-10 year written screening schedule and personal prevention plan. Recommendations were developed using the USPSTF age appropriate recommendations. Education, counseling, and referrals were provided as needed. After Visit Summary printed and given to patient which includes a list of additional screenings\tests needed.    Follow up in about 1 year  (around 6/9/2024) for your next annual wellness visit.    Brigitte Jj, NP  I offered to discuss advanced care planning, including how to pick a person who would make decisions for you if you were unable to make them for yourself, called a health care power of , and what kind of decisions you might make such as use of life sustaining treatments such as ventilators and tube feeding when faced with a life limiting illness recorded on a living will that they will need to know. (How you want to be cared for as you near the end of your natural life)     X Patient is interested in learning more about how to make advanced directives.  I provided them paperwork and offered to discuss this with them.

## 2023-06-19 ENCOUNTER — PATIENT MESSAGE (OUTPATIENT)
Dept: INTERNAL MEDICINE | Facility: CLINIC | Age: 78
End: 2023-06-19
Payer: MEDICARE

## 2023-06-28 ENCOUNTER — TELEPHONE (OUTPATIENT)
Dept: ENDOCRINOLOGY | Facility: CLINIC | Age: 78
End: 2023-06-28
Payer: MEDICARE

## 2023-06-28 NOTE — TELEPHONE ENCOUNTER
Informed pt that she has labs scheduled for July 7, but I can move it sooner if she wanted. Pt stated she realized she had labs scheduled so she will keep the July 7 appt. Understanding verbalized.

## 2023-06-28 NOTE — TELEPHONE ENCOUNTER
----- Message from Ashley Archuleta sent at 6/28/2023 10:29 AM CDT -----  Regarding: self [phone]  Who called: self        What is the request in detail:pt stated she was trying to get her diabetic shoe, when she went to get her shoe she was told she didn't have A1C done since last year 2022. Pt requesting order for A1C       Can the clinic reply by [facility]? No        Would the patient rather a call back or a response via [facility]? Call back        Best call back number:[phone]        Additional Information:

## 2023-07-03 ENCOUNTER — HOSPITAL ENCOUNTER (OUTPATIENT)
Dept: RADIOLOGY | Facility: HOSPITAL | Age: 78
Discharge: HOME | End: 2023-07-03
Attending: HOSPITALIST
Payer: MEDICARE

## 2023-07-03 DIAGNOSIS — E04.2 MULTIPLE THYROID NODULES: ICD-10-CM

## 2023-07-03 PROCEDURE — 76536 US EXAM OF HEAD AND NECK: CPT | Mod: TC

## 2023-07-03 PROCEDURE — 76536 US SOFT TISSUE HEAD NECK THYROID: ICD-10-PCS | Mod: 26,,, | Performed by: RADIOLOGY

## 2023-07-03 PROCEDURE — 76536 US EXAM OF HEAD AND NECK: CPT | Mod: 26,,, | Performed by: RADIOLOGY

## 2023-07-07 ENCOUNTER — LAB VISIT (OUTPATIENT)
Dept: LAB | Facility: HOSPITAL | Age: 78
End: 2023-07-07
Attending: HOSPITALIST
Payer: MEDICARE

## 2023-07-07 DIAGNOSIS — E83.9 CHRONIC KIDNEY DISEASE-MINERAL AND BONE DISORDER: ICD-10-CM

## 2023-07-07 DIAGNOSIS — E11.22 TYPE 2 DIABETES MELLITUS WITH ESRD (END-STAGE RENAL DISEASE): ICD-10-CM

## 2023-07-07 DIAGNOSIS — N18.6 ESRD ON HEMODIALYSIS: ICD-10-CM

## 2023-07-07 DIAGNOSIS — N18.6 TYPE 2 DIABETES MELLITUS WITH ESRD (END-STAGE RENAL DISEASE): ICD-10-CM

## 2023-07-07 DIAGNOSIS — N18.9 CHRONIC KIDNEY DISEASE-MINERAL AND BONE DISORDER: ICD-10-CM

## 2023-07-07 DIAGNOSIS — M89.9 CHRONIC KIDNEY DISEASE-MINERAL AND BONE DISORDER: ICD-10-CM

## 2023-07-07 DIAGNOSIS — Z99.2 ESRD ON HEMODIALYSIS: ICD-10-CM

## 2023-07-07 DIAGNOSIS — E03.9 HYPOTHYROIDISM (ACQUIRED): ICD-10-CM

## 2023-07-07 DIAGNOSIS — E78.5 HYPERLIPIDEMIA LDL GOAL <100: Chronic | ICD-10-CM

## 2023-07-07 LAB
25(OH)D3+25(OH)D2 SERPL-MCNC: 49 NG/ML (ref 30–96)
ALBUMIN SERPL BCP-MCNC: 3.3 G/DL (ref 3.5–5.2)
ALP SERPL-CCNC: 90 U/L (ref 55–135)
ALT SERPL W/O P-5'-P-CCNC: 17 U/L (ref 10–44)
ANION GAP SERPL CALC-SCNC: 10 MMOL/L (ref 8–16)
AST SERPL-CCNC: 16 U/L (ref 10–40)
BILIRUB SERPL-MCNC: 0.5 MG/DL (ref 0.1–1)
BUN SERPL-MCNC: 35 MG/DL (ref 8–23)
CALCIUM SERPL-MCNC: 9.8 MG/DL (ref 8.7–10.5)
CHLORIDE SERPL-SCNC: 97 MMOL/L (ref 95–110)
CHOLEST SERPL-MCNC: 88 MG/DL (ref 120–199)
CHOLEST/HDLC SERPL: 2.1 {RATIO} (ref 2–5)
CO2 SERPL-SCNC: 30 MMOL/L (ref 23–29)
CREAT SERPL-MCNC: 6.8 MG/DL (ref 0.5–1.4)
EST. GFR  (NO RACE VARIABLE): 5.8 ML/MIN/1.73 M^2
ESTIMATED AVG GLUCOSE: 131 MG/DL (ref 68–131)
GLUCOSE SERPL-MCNC: 145 MG/DL (ref 70–110)
HBA1C MFR BLD: 6.2 % (ref 4–5.6)
HDLC SERPL-MCNC: 42 MG/DL (ref 40–75)
HDLC SERPL: 47.7 % (ref 20–50)
LDLC SERPL CALC-MCNC: 37.6 MG/DL (ref 63–159)
MAGNESIUM SERPL-MCNC: 2.1 MG/DL (ref 1.6–2.6)
NONHDLC SERPL-MCNC: 46 MG/DL
PHOSPHATE SERPL-MCNC: 3.7 MG/DL (ref 2.7–4.5)
POTASSIUM SERPL-SCNC: 4.3 MMOL/L (ref 3.5–5.1)
PROT SERPL-MCNC: 7.3 G/DL (ref 6–8.4)
PTH-INTACT SERPL-MCNC: 184.7 PG/ML (ref 9–77)
SODIUM SERPL-SCNC: 137 MMOL/L (ref 136–145)
T4 FREE SERPL-MCNC: 1.17 NG/DL (ref 0.71–1.51)
TRIGL SERPL-MCNC: 42 MG/DL (ref 30–150)
TSH SERPL DL<=0.005 MIU/L-ACNC: 1.96 UIU/ML (ref 0.4–4)

## 2023-07-07 PROCEDURE — 82306 VITAMIN D 25 HYDROXY: CPT | Performed by: HOSPITALIST

## 2023-07-07 PROCEDURE — 84439 ASSAY OF FREE THYROXINE: CPT | Performed by: HOSPITALIST

## 2023-07-07 PROCEDURE — 80061 LIPID PANEL: CPT | Performed by: INTERNAL MEDICINE

## 2023-07-07 PROCEDURE — 80053 COMPREHEN METABOLIC PANEL: CPT | Performed by: HOSPITALIST

## 2023-07-07 PROCEDURE — 84443 ASSAY THYROID STIM HORMONE: CPT | Performed by: HOSPITALIST

## 2023-07-07 PROCEDURE — 83735 ASSAY OF MAGNESIUM: CPT | Performed by: HOSPITALIST

## 2023-07-07 PROCEDURE — 83970 ASSAY OF PARATHORMONE: CPT | Performed by: HOSPITALIST

## 2023-07-07 PROCEDURE — 84100 ASSAY OF PHOSPHORUS: CPT | Performed by: HOSPITALIST

## 2023-07-07 PROCEDURE — 82985 ASSAY OF GLYCATED PROTEIN: CPT | Performed by: HOSPITALIST

## 2023-07-07 PROCEDURE — 83036 HEMOGLOBIN GLYCOSYLATED A1C: CPT | Performed by: HOSPITALIST

## 2023-07-10 LAB — FRUCTOSAMINE SERPL-SCNC: 322 UMOL /L

## 2023-07-14 ENCOUNTER — OFFICE VISIT (OUTPATIENT)
Dept: ENDOCRINOLOGY | Facility: CLINIC | Age: 78
End: 2023-07-14
Payer: MEDICARE

## 2023-07-14 VITALS
WEIGHT: 212.81 LBS | SYSTOLIC BLOOD PRESSURE: 124 MMHG | HEART RATE: 88 BPM | DIASTOLIC BLOOD PRESSURE: 69 MMHG | BODY MASS INDEX: 35.41 KG/M2 | TEMPERATURE: 99 F

## 2023-07-14 DIAGNOSIS — E03.9 HYPOTHYROIDISM (ACQUIRED): Chronic | ICD-10-CM

## 2023-07-14 DIAGNOSIS — N18.6 TYPE 2 DIABETES MELLITUS WITH ESRD (END-STAGE RENAL DISEASE): ICD-10-CM

## 2023-07-14 DIAGNOSIS — E11.22 TYPE 2 DIABETES MELLITUS WITH ESRD (END-STAGE RENAL DISEASE): ICD-10-CM

## 2023-07-14 DIAGNOSIS — Z99.2 ESRD ON HEMODIALYSIS: ICD-10-CM

## 2023-07-14 DIAGNOSIS — E55.9 VITAMIN D DEFICIENCY DISEASE: ICD-10-CM

## 2023-07-14 DIAGNOSIS — N18.6 ESRD ON HEMODIALYSIS: ICD-10-CM

## 2023-07-14 DIAGNOSIS — E83.9 CHRONIC KIDNEY DISEASE-MINERAL AND BONE DISORDER: ICD-10-CM

## 2023-07-14 DIAGNOSIS — M89.9 CHRONIC KIDNEY DISEASE-MINERAL AND BONE DISORDER: ICD-10-CM

## 2023-07-14 DIAGNOSIS — N18.9 CHRONIC KIDNEY DISEASE-MINERAL AND BONE DISORDER: ICD-10-CM

## 2023-07-14 LAB — GLUCOSE SERPL-MCNC: 187 MG/DL (ref 70–110)

## 2023-07-14 PROCEDURE — 1159F MED LIST DOCD IN RCRD: CPT | Mod: CPTII,S$GLB,, | Performed by: HOSPITALIST

## 2023-07-14 PROCEDURE — 3288F PR FALLS RISK ASSESSMENT DOCUMENTED: ICD-10-PCS | Mod: CPTII,S$GLB,, | Performed by: HOSPITALIST

## 2023-07-14 PROCEDURE — 82962 POCT GLUCOSE, HAND-HELD DEVICE: ICD-10-PCS | Mod: S$GLB,,, | Performed by: HOSPITALIST

## 2023-07-14 PROCEDURE — 3078F PR MOST RECENT DIASTOLIC BLOOD PRESSURE < 80 MM HG: ICD-10-PCS | Mod: CPTII,S$GLB,, | Performed by: HOSPITALIST

## 2023-07-14 PROCEDURE — 1159F PR MEDICATION LIST DOCUMENTED IN MEDICAL RECORD: ICD-10-PCS | Mod: CPTII,S$GLB,, | Performed by: HOSPITALIST

## 2023-07-14 PROCEDURE — 3074F PR MOST RECENT SYSTOLIC BLOOD PRESSURE < 130 MM HG: ICD-10-PCS | Mod: CPTII,S$GLB,, | Performed by: HOSPITALIST

## 2023-07-14 PROCEDURE — 1160F RVW MEDS BY RX/DR IN RCRD: CPT | Mod: CPTII,S$GLB,, | Performed by: HOSPITALIST

## 2023-07-14 PROCEDURE — 1101F PR PT FALLS ASSESS DOC 0-1 FALLS W/OUT INJ PAST YR: ICD-10-PCS | Mod: CPTII,S$GLB,, | Performed by: HOSPITALIST

## 2023-07-14 PROCEDURE — 99999 PR PBB SHADOW E&M-EST. PATIENT-LVL IV: CPT | Mod: PBBFAC,,, | Performed by: HOSPITALIST

## 2023-07-14 PROCEDURE — 99214 OFFICE O/P EST MOD 30 MIN: CPT | Mod: S$GLB,,, | Performed by: HOSPITALIST

## 2023-07-14 PROCEDURE — 3288F FALL RISK ASSESSMENT DOCD: CPT | Mod: CPTII,S$GLB,, | Performed by: HOSPITALIST

## 2023-07-14 PROCEDURE — 1160F PR REVIEW ALL MEDS BY PRESCRIBER/CLIN PHARMACIST DOCUMENTED: ICD-10-PCS | Mod: CPTII,S$GLB,, | Performed by: HOSPITALIST

## 2023-07-14 PROCEDURE — 99214 PR OFFICE/OUTPT VISIT, EST, LEVL IV, 30-39 MIN: ICD-10-PCS | Mod: S$GLB,,, | Performed by: HOSPITALIST

## 2023-07-14 PROCEDURE — 1101F PT FALLS ASSESS-DOCD LE1/YR: CPT | Mod: CPTII,S$GLB,, | Performed by: HOSPITALIST

## 2023-07-14 PROCEDURE — 3074F SYST BP LT 130 MM HG: CPT | Mod: CPTII,S$GLB,, | Performed by: HOSPITALIST

## 2023-07-14 PROCEDURE — 82962 GLUCOSE BLOOD TEST: CPT | Mod: S$GLB,,, | Performed by: HOSPITALIST

## 2023-07-14 PROCEDURE — 99999 PR PBB SHADOW E&M-EST. PATIENT-LVL IV: ICD-10-PCS | Mod: PBBFAC,,, | Performed by: HOSPITALIST

## 2023-07-14 PROCEDURE — 3078F DIAST BP <80 MM HG: CPT | Mod: CPTII,S$GLB,, | Performed by: HOSPITALIST

## 2023-07-14 RX ORDER — LEVOTHYROXINE SODIUM 50 UG/1
TABLET ORAL
Qty: 90 TABLET | Refills: 3 | Status: SHIPPED | OUTPATIENT
Start: 2023-07-14 | End: 2024-03-15 | Stop reason: SDUPTHER

## 2023-07-14 NOTE — ASSESSMENT & PLAN NOTE
- Diabetes is at a goal given current A1C (6.3%), goal A1C for patient is 7% in ESRD  - Diabetic supplies/medications: reviewed no need for refills at this time  - Long discussion with patient about dietary modification, portion size control, decreasing carbohydrates intake  - A1c can be falsely low due to anemia/ESRD    Plan  - Given hyperglycemia, increase Trulicity to 1.5 mg once a week injection.  Patient was okay with this plan  - Patient unable to check glucose often, will continue checking glucose at dialysis center  - Repeat lab work every 4-5 months

## 2023-07-14 NOTE — ASSESSMENT & PLAN NOTE
- Very difficult case, longstanding issue with worsening osteoporosis and continue elevation in PTH leading to osteoporosis  - Risk factors include ESRD on dialysis, hyperphosphatemia, hypocalcemia  - High risk of falls given knee pain, poor gait, the need to use walker  - Patient is more compliant with phosphate binder, and dietary modification  - On vitamin-D analog at dialysis  - sestamibi scan inconclusive for parathyroid adenoma  - now on Sensipar at HD unit  - threshold for parathyroidectomy is above >800 and failing medical therapy option  - given improvement in PTH, normal calcium, low phosphorus, continue monitoring with lab work

## 2023-07-14 NOTE — ASSESSMENT & PLAN NOTE
- at [facility]; Dr. Barrientos  - Tuesday, Thursday and Saturday  - has AV graft in left UE

## 2023-07-14 NOTE — ASSESSMENT & PLAN NOTE
- Patient with history of hypothyroidism etiology unclear  - On levothyroxine 50 mcg daily (low-dose)  - TFTs at goal, continue

## 2023-07-14 NOTE — PROGRESS NOTES
Subjective:      Patient ID: Erica Leblanc is a 78 y.o. female presented to Endocrinology clinic on 7/14/2023.    Chief Complaint:  Diabetes, osteoporosis/metabolic bone disease, hypothyroidism    History of Present Illness: Erica Leblanc is a 78 y.o. female with metabolic bone disease with osteoporosis, hypothyroidism and type 2 diabetes  ESRD on HD on TTS for 1 year, her nephrologist is  Dr Myla Puente  Here for follow-up-     Interval history:  Patient is here for follow-up.  Please have been control.  Still with hyperglycemia as reported.  But glucose have been much better   Patient reports compliance with binders, calcium and phosphorus much better.  Compliance with levothyroxine.  No issue with TSH.  No falls  179 at HD yesteday  In Clinic 187 after breakfast    1) Type 2 diabetes:    - Diagnosis around 10 years ago  - Patient was on insulin prior to HD, has been off of all therapy since that time.  - Checking glucose 1x a day  - Family hx of diabetes  - currently not on medication  - Statin: Taking, ACE/ARB: Not taking    Current reported meds:               Trulicity 0.75 mg once a week injection  Home glucose checks: checks 1-2 a day, Logs reviewed  - Hypoglycemia symptoms:  DENIES   Diet/Exercise:   - Eating 3 meals per day   - Weight trend: stable  - Diabetes Related Hospitalization:  No  - Hx of pancreatitis: No, denies  - Family history of diabetes: Yes  - Occupation:  Disabled    Eye exam current (within one year):  Yes, DR: unknown  Reports cuts or ulcers on feet:   Denies, has podiatrist  Statin: Taking, ACE/ARB: Not taking    Diabetes lab work  Lab Results   Component Value Date    HGBA1C 6.2 (H) 07/07/2023    HGBA1C 6.3 (H) 09/13/2022    HGBA1C 6.6 (H) 06/14/2022    HGBA1C 6.7 (H) 05/07/2022     No results found for: CPEPTIDE, GLUTAMICACID, ISLETCELLANT   Lab Results   Component Value Date    FRUCTOSAMINE 322 07/07/2023    FRUCTOSAMINE 580 03/01/2021     Lab Results   Component Value Date     MICALBCREAT 3,693.6 (H) 03/17/2014     No results found for: [id number]    Diabetes Management Status: Reviewed this office visit  Screening or Prevention Patient's value Goal Complete/Controlled?   Lipid profile : 07/07/2023 Annually Yes     Dilated retinal exam : 08/17/2022 Annually Yes     Foot exam   Most Recent Foot Exam Date: Not Found Annually Yes          2) With regards to chronic kidney disease mineral and bone disorder  - Seen on recent bone density scan.  Patient denies prior history of any bone disorder.  - Chronic history of ESRD on dialysis  - Longstanding chronic issue.  Poorly-controlled bone disease due to ESRD status.  Continues to have hypocalcemia, hyperphosphatemia.  Elevated PTH.  - Lab work review showing chronic hyperphosphatemia, elevated alkaline phosphatase, hypocalcemia, normal vitamin-D.  - Patient reports Sensipar three times a week at HD  - Patient is on phosphate binder:  Sevelamer 1600mg TIDWM, reports better compliance recently  - Patient has stopped soda consumption   - Patient denies back pain, No falls  - Never had fractures  - Use walker to help with gait and ambulation  - NM Parathyroid scan 2021: No abnormal uptake to suggest parathyroid adenoma.  - Vit D 1000 iu daily    DXA done on 10/2020  Comparison study done on 03/13/2018.  Lumbar spine (L1-L4): BMD is 0.943 g/cm2, T-score is -0.7, and Z-score is 1.0.  Total hip: BMD is 0.680 g/cm2, T-score is -2.1, and Z-score is -1.0.  Femoral neck: BMD is 0.511 g/cm2, T-score is -3.0, and Z-score is -1.5.    Impression:  1. Osteoporosis with likely chronic kidney disease mineral and bone disorder  2. Compared with previous DXA, BMD at the lumbar spine has declined by 13.9%, and the BMD at the total hip has declined by 23.2%.    Height loss (>2 inches)? no  Family hx of Osteoporosis: no    Asymptomatic menopausal state.  She had a hysterectomy at 42 y/o and menopausal symptoms at 47 y/o.     Lab work reviewed  Lab Results    Component Value Date    .7 (H) 07/07/2023    .6 (H) 05/07/2022    .0 (H) 05/07/2021    JIRHGCPJ50UQ 49 07/07/2023    [id number] 48 05/07/2022    [id number] 47 11/03/2021    CALCIUM 9.8 07/07/2023    CALCIUM 9.3 05/07/2022    CALCIUM 8.1 (L) 12/13/2021    PHOS 3.7 07/07/2023    PHOS 6.9 (H) 06/07/2021    PHOS 6.9 (H) 03/01/2021    ALKPHOS 90 07/07/2023    ALKPHOS 93 05/07/2022    ALKPHOS 131 12/13/2021    TSH 1.957 07/07/2023    TTGIGA 7 03/01/2021       3) Hypothyroidism  - Chronic  - Restarted in 2021: Levothyroxin 50mcg  - History of thyroid goiter, with thyroid nodule seen on ultrasound     Thyroid lab work  Lab Results   Component Value Date    TSH 1.957 07/07/2023    TSH 2.631 11/03/2021    TSH 2.200 06/06/2021    FREET4 1.17 07/07/2023    FREET4 1.13 03/23/2019    FREET4 1.12 12/15/2018      Antibodies  No results found for: THYROIDAB, TSIMMGLBN, THYROIDSTIMI      4) Multiple Thyroid nodule  - denies compressive symptoms.  Overall not interested in getting biopsy given stable size    US thyroid 07/03/2023  The thyroid is normal in size.  Right lobe of the thyroid measures 4.3 x 1.6 x 1.6 cm.  Left lobe of the thyroid measures 3.2 x 1.7 x 1.5 cm.  Normal thyroid parenchyma.  Nodule RIGHT upper pole 0.3 x 0.3 x 0.2 cm.  Nodule LEFT upper pole 1.3 x 2.2 x 1.1 cm and lower pole 0.9 x 0.7 x 0.7 cm.  Mid to lower pole nodule 0.4 x 0.4 x 0.3 cm. At the level of the isthmus, mixed cystic and solid lesions identified measuring 1.4 x 0.6 x 1.2 cm and 1.0 x 0.9 x 0.4 cm.  1.4 cm RIGHT isthmic nodule demonstrates presence of small microcalcifications.  This nodule meets criteria for TiRads 5 and FNA is recommended.  Cervical lymph nodes demonstrate normal morphology and size.     Impression:  Suspicious nodule RIGHT isthmus, 1.4 cm with small microcalcifications, mildly hypoechoic meets criteria for FNA.  Malignancy not excluded  Additional thyroidal nodules as above.    US thyroid 2021  The  thyroid gland is normal in size.  The right thyroid lobe measures 4.1 x 1.9 x 1.8 cm.  The left thyroid lobe measures 4.5 x 1.8 x 1.9 cm.  Thyroid parenchyma is homogeneous, without increased perfusion.  There are 5 measured the small thyroid nodules.  1.5 cm right thyroid lobe/isthmus nodule with TI-RADS category 3, does not qualify for FNA..    Reviewed past surgical, medical, family, social history and updated as appropriate.    Objective:   /69   Pulse 88   Temp 98.5 °F (36.9 °C) (Oral)   Wt 96.5 kg (212 lb 12.8 oz)   BMI 35.41 kg/m²     Body mass index is 35.41 kg/m².    Physical Exam  Vitals and nursing note reviewed.   Constitutional:       Appearance: She is well-developed.      Comments: Elderly female using walker to help with ambulation   HENT:      Head: Normocephalic.   Eyes:      General: No scleral icterus.     Conjunctiva/sclera: Conjunctivae normal.   Neck:      Thyroid: No thyromegaly.   Cardiovascular:      Rate and Rhythm: Normal rate.      Heart sounds: Normal heart sounds.   Pulmonary:      Effort: Pulmonary effort is normal. No respiratory distress.   Abdominal:      Palpations: Abdomen is soft.      Tenderness: There is no abdominal tenderness.   Musculoskeletal:         General: Normal range of motion.      Cervical back: Neck supple.   Skin:     General: Skin is warm.      Findings: No erythema.   Neurological:      Mental Status: She is alert.      Coordination: Coordination normal.   Psychiatric:         Behavior: Behavior normal.     Lab Review:  Lab Results   Component Value Date    .7 (H) 07/07/2023    .6 (H) 05/07/2022    .0 (H) 05/07/2021    [id number] 49 07/07/2023    SSADMOIR16DO 48 05/07/2022    VMVGPDKW27FI 47 11/03/2021    CALCIUM 9.8 07/07/2023    CALCIUM 9.3 05/07/2022    CALCIUM 8.1 (L) 12/13/2021    PHOS 3.7 07/07/2023    PHOS 6.9 (H) 06/07/2021    PHOS 6.9 (H) 03/01/2021    ALKPHOS 90 07/07/2023    ALKPHOS 93 05/07/2022    ALKPHOS 131  12/13/2021    TSH 1.957 07/07/2023    TTGIGA 7 03/01/2021         Assessment     1. Type 2 diabetes mellitus with ESRD (end-stage renal disease)  POCT Glucose, Hand-Held Device    dulaglutide (TRULICITY) 1.5 mg/0.5 mL pen injector    HEMOGLOBIN A1C    RENAL FUNCTION PANEL      2. Hypothyroidism (acquired)  levothyroxine (SYNTHROID) 50 MCG tablet    TSH    T4, Free      3. Chronic kidney disease-mineral and bone disorder        4. ESRD on hemodialysis        5. Vitamin D deficiency disease          Plan     Type 2 diabetes mellitus with ESRD (end-stage renal disease)  - Diabetes is at a goal given current A1C (6.3%), goal A1C for patient is 7% in ESRD  - Diabetic supplies/medications: reviewed no need for refills at this time  - Long discussion with patient about dietary modification, portion size control, decreasing carbohydrates intake  - A1c can be falsely low due to anemia/ESRD    Plan  - Given hyperglycemia, increase Trulicity to 1.5 mg once a week injection.  Patient was okay with this plan  - Patient unable to check glucose often, will continue checking glucose at dialysis center  - Repeat lab work every 4-5 months    Hypothyroidism (acquired)  - Patient with history of hypothyroidism etiology unclear  - On levothyroxine 50 mcg daily (low-dose)  - TFTs at goal, continue    Chronic kidney disease-mineral and bone disorder  - Very difficult case, longstanding issue with worsening osteoporosis and continue elevation in PTH leading to osteoporosis  - Risk factors include ESRD on dialysis, hyperphosphatemia, hypocalcemia  - High risk of falls given knee pain, poor gait, the need to use walker  - Patient is more compliant with phosphate binder, and dietary modification  - On vitamin-D analog at dialysis  - sestamibi scan inconclusive for parathyroid adenoma  - now on Sensipar at HD unit  - threshold for parathyroidectomy is above >800 and failing medical therapy option  - given improvement in PTH, normal calcium,  low phosphorus, continue monitoring with lab work    ESRD on hemodialysis  - at [facility]; Dr. Barrientos  - Tuesday, Thursday and Saturday  - has AV graft in left UE    Vitamin D deficiency disease  - vitamin-D at goal    Return to clinic in 5 mo for diabetes management    Carl Day MD  Endocrinology- [facility]   7/14/2023      Disclaimer: This note has been generated using voice-recognition software. There may be typographical errors that have been missed during proof-reading.

## 2023-07-19 ENCOUNTER — HOSPITAL ENCOUNTER (OUTPATIENT)
Dept: CARDIOLOGY | Facility: HOSPITAL | Age: 78
Discharge: HOME | End: 2023-07-19
Attending: NURSE PRACTITIONER
Payer: MEDICARE

## 2023-07-19 ENCOUNTER — OFFICE VISIT (OUTPATIENT)
Dept: VASCULAR SURGERY | Facility: CLINIC | Age: 78
End: 2023-07-19
Payer: MEDICARE

## 2023-07-19 VITALS
SYSTOLIC BLOOD PRESSURE: 142 MMHG | DIASTOLIC BLOOD PRESSURE: 80 MMHG | BODY MASS INDEX: 37.29 KG/M2 | WEIGHT: 224.13 LBS

## 2023-07-19 DIAGNOSIS — N18.6 ESRD ON HEMODIALYSIS: ICD-10-CM

## 2023-07-19 DIAGNOSIS — Z99.2 ESRD ON HEMODIALYSIS: ICD-10-CM

## 2023-07-19 DIAGNOSIS — T82.858D ARTERIOVENOUS FISTULA STENOSIS, SUBSEQUENT ENCOUNTER: Primary | ICD-10-CM

## 2023-07-19 PROCEDURE — 93990 DOPPLER FLOW TESTING: CPT | Mod: TC

## 2023-07-19 PROCEDURE — 93990 DOPPLER FLOW TESTING: CPT | Mod: 26,,, | Performed by: SURGERY

## 2023-07-19 PROCEDURE — 3077F SYST BP >= 140 MM HG: CPT | Mod: CPTII,S$GLB,, | Performed by: SURGERY

## 2023-07-19 PROCEDURE — 3079F DIAST BP 80-89 MM HG: CPT | Mod: CPTII,S$GLB,, | Performed by: SURGERY

## 2023-07-19 PROCEDURE — 1101F PR PT FALLS ASSESS DOC 0-1 FALLS W/OUT INJ PAST YR: ICD-10-PCS | Mod: CPTII,S$GLB,, | Performed by: SURGERY

## 2023-07-19 PROCEDURE — 1101F PT FALLS ASSESS-DOCD LE1/YR: CPT | Mod: CPTII,S$GLB,, | Performed by: SURGERY

## 2023-07-19 PROCEDURE — 3079F PR MOST RECENT DIASTOLIC BLOOD PRESSURE 80-89 MM HG: ICD-10-PCS | Mod: CPTII,S$GLB,, | Performed by: SURGERY

## 2023-07-19 PROCEDURE — 1159F PR MEDICATION LIST DOCUMENTED IN MEDICAL RECORD: ICD-10-PCS | Mod: CPTII,S$GLB,, | Performed by: SURGERY

## 2023-07-19 PROCEDURE — 99214 PR OFFICE/OUTPT VISIT, EST, LEVL IV, 30-39 MIN: ICD-10-PCS | Mod: S$GLB,,, | Performed by: SURGERY

## 2023-07-19 PROCEDURE — 99999 PR PBB SHADOW E&M-EST. PATIENT-LVL III: CPT | Mod: PBBFAC,,, | Performed by: SURGERY

## 2023-07-19 PROCEDURE — 1126F PR PAIN SEVERITY QUANTIFIED, NO PAIN PRESENT: ICD-10-PCS | Mod: CPTII,S$GLB,, | Performed by: SURGERY

## 2023-07-19 PROCEDURE — 3288F FALL RISK ASSESSMENT DOCD: CPT | Mod: CPTII,S$GLB,, | Performed by: SURGERY

## 2023-07-19 PROCEDURE — 3077F PR MOST RECENT SYSTOLIC BLOOD PRESSURE >= 140 MM HG: ICD-10-PCS | Mod: CPTII,S$GLB,, | Performed by: SURGERY

## 2023-07-19 PROCEDURE — 99214 OFFICE O/P EST MOD 30 MIN: CPT | Mod: S$GLB,,, | Performed by: SURGERY

## 2023-07-19 PROCEDURE — 1159F MED LIST DOCD IN RCRD: CPT | Mod: CPTII,S$GLB,, | Performed by: SURGERY

## 2023-07-19 PROCEDURE — 99999 PR PBB SHADOW E&M-EST. PATIENT-LVL III: ICD-10-PCS | Mod: PBBFAC,,, | Performed by: SURGERY

## 2023-07-19 PROCEDURE — 93990 CV US HEMODIALYSIS ACCESS (CUPID ONLY): ICD-10-PCS | Mod: 26,,, | Performed by: SURGERY

## 2023-07-19 PROCEDURE — 3288F PR FALLS RISK ASSESSMENT DOCUMENTED: ICD-10-PCS | Mod: CPTII,S$GLB,, | Performed by: SURGERY

## 2023-07-19 PROCEDURE — 1126F AMNT PAIN NOTED NONE PRSNT: CPT | Mod: CPTII,S$GLB,, | Performed by: SURGERY

## 2023-07-19 NOTE — PROGRESS NOTES
[facility]                         07/19/2023    HPI:  Erica Leblanc is a 78 y.o. female with   Patient Active Problem List   Diagnosis    Severe obesity (BMI 35.0-39.9) with comorbidity    Sickle cell trait    Hyperlipidemia LDL goal <100    HUMBERTO on CPAP    Hypothyroidism (acquired)    Hx-TIA (transient ischemic attack)    Vitamin D deficiency disease    Pulmonary hypertension    Type 2 diabetes mellitus with ESRD (end-stage renal disease)    Secondary renal hyperparathyroidism    Incomplete bladder emptying    Postmenopausal atrophic vaginitis    Rectocele    Mixed incontinence urge and stress    Chronic diastolic heart failure    Tortuous aorta    ESRD on hemodialysis    Anemia of chronic disease    Debility    Chronic respiratory failure    Type 2 diabetes mellitus with foot ulcer, with long-term current use of insulin    Stable proliferative diabetic retinopathy associated with type 2 diabetes mellitus    Heart failure with preserved ejection fraction    Pseudophakia    Chronic kidney disease-mineral and bone disorder    Age-related osteoporosis without current pathological fracture    H/O: stroke    Walker as ambulation aid    being managed by PCP and specialists who is here today for evaluation of long term HD access.  S/p R IJ tunneled HD catheter, HD without issues.  Pt is R handed.  No complaints today.  Does endorse orthopnea, no chest pain.  Unable to climb 1 flight of stairs on own.    no MI  no Stroke  Tobacco use: denies    1/20/20: No new issues.    3/2020: Dialysis without issues.    4/6/20:  S/p LUE fistulogram, central venogram, ligation of medial side branch cephalic vein, ligation of lateral side branch cephalic vein.  No issues with HD for several weeks since procedure.    7/6/20:  No issues with HD.    8/3/20: s/p L proximal fistula angioplasty 7/21/20.  No issues with HD.    8/31/20:  S/p 8/19/29 Balloon angioplasty of the proximal LUE AVF with a  6x60 Angiosculpt and Stellerex balloon.      10/2020:  No issues with HD    1/2021:  No issues with HD    4/2021:  No new problems.    8/2021:  No issues with HD. C/o fatigue after HD.  Has not seen cardiology recently.    3/2022:  No issues with HD    6/2022:  Doing well, no issues with HD    1/2023:  No issues with HD    04/2023: No issues with HD.     7/2023:  doing well    Past Medical History:   Diagnosis Date    Acute ischemic right PCA stroke 6/6/2021    Acute respiratory failure with hypoxia     Age-related osteoporosis without current pathological fracture 3/8/2021    Anemia of chronic kidney failure, stage 4 (severe) 4/5/2019    Cataracts, bilateral     CHF (congestive heart failure)     CKD (chronic kidney disease) stage 3, GFR 30-59 ml/min     CKD (chronic kidney disease) stage 3, GFR 30-59 ml/min     Controlled type 2 diabetes mellitus with proteinuria or albuminuria     Depression     Diabetes with neurologic complications     Diabetic retinopathy of both eyes     Edema     Glaucoma     History of colonic polyps     Hx-TIA (transient ischemic attack) 11/2008    Hyperlipidemia LDL goal < 100     Hypertension     Hypothyroidism     Major depressive disorder, single episode, mild 2/17/2016    Mixed incontinence urge and stress     Obesity     Obstructive sleep apnea on CPAP     7/19/19:  Home CPAP machine broken, per patient & son    Osteopenia     Proteinuria     Sickle cell trait     Strabismus     TIA (transient ischemic attack)     Trouble in sleeping     Type 2 diabetes mellitus with ophthalmic manifestations     Type 2 diabetes with stage 3 chronic kidney disease GFR 30-59     Type II or unspecified type diabetes mellitus with renal manifestations, uncontrolled(250.42)     Uncontrolled type 2 diabetes mellitus with peripheral circulatory disorder 4/5/2019    Urge incontinence 1/11/2016    Urge incontinence     Venous stasis ulcer     bilateral lower legs    Vitamin D deficiency disease      Past  Surgical History:   Procedure Laterality Date    AV FISTULA PLACEMENT Left 2019    Procedure: CREATION, AV FISTULA, LEFT UPPER EXTREMITY;  Surgeon: Keron Perez MD;  Location: [facility] OR;  Service: Vascular;  Laterality: Left;    BREAST BIOPSY      breast reduction Bilateral age 31    BREAST SURGERY      CATARACT EXTRACTION Bilateral     cataracts Bilateral      SECTION, LOW TRANSVERSE      x1    CHOLECYSTECTOMY      COLONOSCOPY N/A 2022    Procedure: COLONOSCOPY;  Surgeon: Mahesh Huang MD;  Location: [facility] ENDO (2ND FLR);  Service: Endoscopy;  Laterality: N/A;  fully vaccinated-sm.  RAPID COVID  +cologuard needing ASAP  Dialysis pt T,TH Sat and left UA access  2nd floor - cardiac/dialysis/SOB / LABS / prep ins. emailed - ERW    EYE SURGERY  2014, 2014    vitrectomy    EYE SURGERY Right 2016    FISTULOGRAM Left 3/6/2020    Procedure: Fistulogram, left upper extremity, with branch ligation;  Surgeon: Keron Perez MD;  [facility];  Service: Vascular;  Laterality: Left;  Time 1.1 Minute  42.10 mGy    FISTULOGRAM Left 2020    Procedure: Fistulogram, left upper extremity, transradial access with possible intervention;  Surgeon: Keron Perez MD;  Location: [facility] OR [address] [place] [address];  Service: Vascular;  Laterality: Left;    FISTULOGRAM Left 2020    Procedure: Fistulogram, left upper extremity, possible intervention;  Surgeon: Keron Perez MD;  Location: [facility];  Service: Vascular;  Laterality: Left;  930 AM START  RN PRE OP  ---COVID NEGATIVE ON  2020. [place]    FISTULOGRAM Left 2022    Procedure: Fistulogram, transradial access;  Surgeon: Keron Perez MD;  [facility];  Service: Vascular;  Laterality: Left;  mgy-40.16  gycm-8.3905  contrast-34cc  time-12.4min    HYSTERECTOMY      TAHBSO (patient is unsure if ovaries removed)    OOPHORECTOMY      PERCUTANEOUS TRANSLUMINAL ANGIOPLASTY OF ARTERIOVENOUS FISTULA  Left 7/21/2020    Procedure: PTA, AV FISTULA;  Surgeon: Keron Perez MD;  [facility];  Service: Vascular;  Laterality: Left;  15.9 minutes of fluro  41.12  mGy  7.9060 Gy cm2  32ml  contrast    PHLEBOGRAPHY Left 7/21/2020    Procedure: CENTRAL VENOGRAM;  Surgeon: Keron Perez MD;  [facility];  Service: Vascular;  Laterality: Left;    REFRACTIVE SURGERY      TOTAL REDUCTION MAMMOPLASTY      approx 10 yrs ago    VENOPLASTY  1/21/2022    Procedure: ANGIOPLASTY, VEIN;  Surgeon: Keron Perez MD;  Location: [facility] OR [address] [place] [address];  Service: Vascular;;     Family History   Problem Relation Age of Onset    Stroke Mother     Diabetes Mother     Hypertension Mother     Cataracts Mother     Leukemia Father     Cataracts Father     Ovarian cancer Sister 35    Achondroplasia Sister     HIV Brother     No Known Problems Daughter     No Known Problems Son     Breast cancer Maternal Aunt 65    Parkinsonism Maternal Aunt     Esophageal cancer Maternal Uncle         smoker    No Known Problems Paternal Aunt     Cataracts Paternal Uncle     Cataracts Maternal Grandmother     Cataracts Maternal Grandfather     Diabetes Paternal Grandmother     Cataracts Paternal Grandmother     No Known Problems Paternal Grandfather     No Known Problems Other     Amblyopia Neg Hx     Blindness Neg Hx     Glaucoma Neg Hx     Macular degeneration Neg Hx     Retinal detachment Neg Hx     Strabismus Neg Hx     Thyroid disease Neg Hx     Colon cancer Neg Hx     Cancer Neg Hx      Social History     Socioeconomic History    Marital status: Single    Number of children: 5   Occupational History    Occupation:      Employer: [employer]     Comment: part-time   Tobacco Use    Smoking status: Never    Smokeless tobacco: Never   Substance and Sexual Activity    Alcohol use: No     Alcohol/week: 0.0 standard drinks    Drug use: No    Sexual activity: Not Currently     Partners: Male      Birth control/protection: Post-menopausal, Surgical     Social Determinants of Health     Financial Resource Strain: Low Risk     Difficulty of Paying Living Expenses: Not hard at all   Food Insecurity: No Food Insecurity    Worried About Running Out of Food in the Last Year: Never true    Ran Out of Food in the Last Year: Never true   Transportation Needs: No Transportation Needs    Lack of Transportation (Medical): No    Lack of Transportation (Non-Medical): No   Physical Activity: Insufficiently Active    Days of Exercise per Week: 2 days    Minutes of Exercise per Session: 10 min   Stress: No Stress Concern Present    Feeling of Stress : Only a little   Social Connections: Socially Isolated    Frequency of Communication with Friends and Family: More than three times a week    Frequency of Social Gatherings with Friends and Family: Twice a week    Attends Jewish Services: Never    Active Member of Clubs or Organizations: No    Attends Club or Organization Meetings: Never    Marital Status:    Housing Stability: Low Risk     Unable to Pay for Housing in the Last Year: No    Number of Places Lived in the Last Year: 1    Unstable Housing in the Last Year: No       Current Outpatient Medications:     ACCU-CHEK SOFT DEV LANCETS Kit, , Disp: , Rfl:     atorvastatin (LIPITOR) 80 MG tablet, TAKE 1 TABLET (80 MG TOTAL) BY MOUTH EVERY EVENING., Disp: 90 tablet, Rfl: 3    blood sugar diagnostic Strp, One test strip use 2 times a day to check blood glucose,  ICD-10: E11.9, compatible with insurance/glucometer, Disp: 100 each, Rfl: 11    blood-glucose meter kit, One glucometer, use to check blood glucose.   ICD-10: E11.9. Dispense machine covered by insurance, Disp: 1 each, Rfl: 0    cinacalcet (SENSIPAR) 30 MG Tab, , Disp: , Rfl:     docusate sodium (COLACE) 100 MG capsule, Take 200 mg by mouth once daily. , Disp: , Rfl:     doxercalciferoL (HECTOROL) 4 mcg/2 mL injection, Inject 4.5 mcg into the vein 3 (three)  times a week. At dialysis unit, Disp: , Rfl:     dulaglutide (TRULICITY) 1.5 mg/0.5 mL pen injector, Inject 1.5 mg into the skin every 7 days., Disp: 12 pen , Rfl: 3    epoetin arnel (EPOGEN INJ), Inject 1,000 Units as directed every 7 days. At the dialysis unit, Disp: , Rfl:     fluticasone propionate (FLONASE) 50 mcg/actuation nasal spray, 1 spray (50 mcg total) by Each Nostril route once daily., Disp: 48 g, Rfl: 5    LANCETS MISC, , Disp: , Rfl:     lancets Misc, One lancets use 2 times a day to check blood glucose, ICD-10: E11.9, Disp: 100 each, Rfl: 11    lancing device Misc, One device, used to check blood glucose, ICD-10: E11.9, Disp: 1 each, Rfl: 0    levothyroxine (SYNTHROID) 50 MCG tablet, TAKE 1 TABLET BEFORE BREAKFAST, Disp: 90 tablet, Rfl: 3    OLENA-NABIL RX 1- mg-mg-mcg Tab, Take 1 tablet by mouth once daily., Disp: , Rfl:     aspirin (ECOTRIN) 81 MG EC tablet, Take 1 tablet (81 mg total) by mouth once daily., Disp: 90 tablet, Rfl: 3    midodrine (PROAMATINE) 5 MG Tab, Take 1 tablet (5 mg total) by mouth 3 (three) times daily., Disp: 90 tablet, Rfl: 11    sevelamer carbonate (RENVELA) 800 mg Tab, Take 3 tablets (2,400 mg total) by mouth 3 (three) times daily with meals., Disp: 270 tablet, Rfl: 11    REVIEW OF SYSTEMS:  General: No fevers or chills; ENT: No sore throat; Allergy and Immunology: no persistent infections; Hematological and Lymphatic: No history of bleeding or easy bruising; Endocrine: negative; Respiratory: no cough, shortness of breath, or wheezing; Cardiovascular: no chest pain or dyspnea on exertion; Gastrointestinal: no abdominal pain/back, change in bowel habits, or bloody stools; Genito-Urinary: no dysuria, trouble voiding, or hematuria; Musculoskeletal: negative; Neurological: no TIA or stroke symptoms; Psychiatric: no nervousness, anxiety or depression.    PHYSICAL EXAM:                General appearance:  Alert, well-appearing, and in no distress.  Oriented to person, place,  and time                    Neurological: Normal speech, no focal findings noted; CN II - XII grossly intact. All extremities with sensation to light touch.            Musculoskeletal: Digits/nail without cyanosis/clubbing.  Strength 5/5 all extremities.                    Neck: Supple, no significant adenopathy, no carotid bruit can be auscultated                  Chest:  Clear to auscultation, no wheezes, rales or rhonchi, symmetric air entry. No use of accessory muscles               Cardiac: Normal rate and regular rhythm, S1 and S2 normal            Abdomen: Soft, nontender, nondistended, no masses or organomegaly, no hernia     No rebound tenderness noted; bowel sounds normal     Pulsatile aortic mass is non palpable.     No groin adenopathy      Extremities:      2+ radial pulse bilaterally, LUE AVF +thrill, inc well healed     No BUE edema, Negative Manuel's test BUE    Skin: No tissue loss    LAB RESULTS:  No results found for: CBC  Lab Results   Component Value Date    LABPROT 10.3 06/07/2021    INR 1.0 06/07/2021     Lab Results   Component Value Date     07/07/2023    K 4.3 07/07/2023    CL 97 07/07/2023    CO2 30 (H) 07/07/2023     (H) 07/07/2023    BUN 35 (H) 07/07/2023    CREATININE 6.8 (H) 07/07/2023    CALCIUM 9.8 07/07/2023    ANIONGAP 10 07/07/2023    EGFRNONAA 3.6 (A) 05/07/2022     Lab Results   Component Value Date    WBC 6.83 05/07/2022    RBC 4.49 05/07/2022    HGB 12.4 05/07/2022    HCT 39.3 05/07/2022    MCV 88 05/07/2022    MCH 27.6 05/07/2022    MCHC 31.6 (L) 05/07/2022    RDW 15.2 (H) 05/07/2022     05/07/2022    MPV 10.6 05/07/2022    GRAN 4.4 05/07/2022    GRAN 64.5 05/07/2022    LYMPH 1.4 05/07/2022    LYMPH 20.4 05/07/2022    MONO 0.9 05/07/2022    MONO 13.0 05/07/2022    EOS 0.1 05/07/2022    BASO 0.05 05/07/2022    EOSINOPHIL 1.0 05/07/2022    BASOPHIL 0.7 05/07/2022    DIFFMETHOD Automated 05/07/2022     .  Lab Results   Component Value Date    HGBA1C 6.2 (H)  07/07/2023       IMAGING:  All pertinent imaging has been reviewed and interpreted independently.    Vein mapping 9/2019:  Adequate R superior basilic vein and axillary vein ; Adequate L basilic vein superior and inferior, adequate L axillary     1/27/20 Left upper extremity dialysis ultrasound shows no hemodynamically significant stenosis.  Flow is 1083 ml/min.  Depth and diameter are appropriate.    3/23/20:  Left upper extremity dialysis ultrasound shows anastomotic and proximal fistula hemodynamically significant stenosis.  Flow is 955 ml/min.      7/2020: Left upper extremity dialysis ultrasound shows proximal fistula hemodynamically significant stenosis.  Flow is 1257 ml/min.      8/2020: Left upper extremity dialysis ultrasound shows proximal hemodynamically significant stenosis.  Flow is 1999 ml/min.      8/31/20: Left upper extremity dialysis ultrasound shows proximal fistula hemodynamically significant stenosis.  Flow is 2209 ml/min.      10/2020:  Prox stenosis, flow > 3000 ml/min    1/2021:  Proximal stenosis, flow 4414 ml/min    4/2021:  HD US reviewed without significant stenosis, adequate flow.    8/2021:HD US reviewed without significant stenosis, adequate flow.    3/2022:  Left upper extremity dialysis ultrasound shows approx 50% anastomotic and prox fistula stenosis withou hemodynamically significant stenosis.  Flow is 3774 ml/min.      6/2022:  No significant stenosis     04/2023: No significant stenosis. Flow 4,086 ml/min    IMP/PLAN:  78 y.o. female with   Patient Active Problem List   Diagnosis    Severe obesity (BMI 35.0-39.9) with comorbidity    Sickle cell trait    Hyperlipidemia LDL goal <100    HUMBERTO on CPAP    Hypothyroidism (acquired)    Hx-TIA (transient ischemic attack)    Vitamin D deficiency disease    Pulmonary hypertension    Type 2 diabetes mellitus with ESRD (end-stage renal disease)    Secondary renal hyperparathyroidism    Incomplete bladder emptying    Postmenopausal atrophic  vaginitis    Rectocele    Mixed incontinence urge and stress    Chronic diastolic heart failure    Tortuous aorta    ESRD on hemodialysis    Anemia of chronic disease    Debility    Chronic respiratory failure    Type 2 diabetes mellitus with foot ulcer, with long-term current use of insulin    Stable proliferative diabetic retinopathy associated with type 2 diabetes mellitus    Heart failure with preserved ejection fraction    Pseudophakia    Chronic kidney disease-mineral and bone disorder    Age-related osteoporosis without current pathological fracture    H/O: stroke    Walker as ambulation aid    being managed by PCP and specialists who is here today for evaluation of long term HD access s/p L RC AV fistula.    -s/p L RC AV fistula with proximal fistula measuring 5 mm 1/13/20, adequate flow without issues during HD s/p LUE fistulogram, central venogram, ligation of medial side branch cephalic vein, ligation of lateral side branch cephalic vein 3/2/20 with prox AVF stenosis s/p L proximal fistula angioplasty 7/21/20 with persistent flow issues s/p proximal angioplasty 8/19/20 with scoring balloon/DCB with improvement in stenosis/flow and asymptomatic proximal stenosis with no further issues with HD  -Cont renal diet  -RTC 3 mo with HD US for continued routine surveillance    I spent 11 minutes evaluating this patient and greater than 50% of the time was spent counseling, coordinator care and discussing the plan of care.  All questions were answered and patient stated understanding with agreement with the above treatment plan.    Keron Perez MD  Vascular and Endovascular Surgery

## 2023-07-20 LAB
HD ANASTAMOSIS LOCATION: NORMAL
HD ANASTAMOSIS VESSEL: NORMAL
HD FISTULA OUTFLOW VEIN VESSEL: NORMAL
HD INFLOW ARTERY VESSEL: NORMAL
RIGHT DIS GRAFT DEPTH: 0.3 CM
RIGHT DIS GRAFT DIA: 0.8 CM
RIGHT DIS GRAFT PSV: 134 CM/ SEC
RIGHT INFLOW PSV: 282 CM/S
RIGHT MID GRAFT DEPTH: 0.4 CM
RIGHT MID GRAFT DIA: 0.9 CM
RIGHT MID GRAFT PSV: 178 CM/S
RIGHT OUTFLOW VEIN PSV: 218 CM/ SEC
RIGHT PROX ANA PSV: 361 CM/S
RIGHT PROX GRAFT DEPTH: 0.4 CM
RIGHT PROX GRAFT DIA: 0.5 CM
RIGHT PROX GRAFT PSV: 483 CM/S
RIGHT VOLUME FLOW DIA: 0.9 CM
RIGHT VOLUME FLOW PSV: 2215 ML/MIN

## 2023-08-02 ENCOUNTER — TELEPHONE (OUTPATIENT)
Dept: FAMILY MEDICINE | Facility: CLINIC | Age: 78
End: 2023-08-02
Payer: MEDICARE

## 2023-08-02 NOTE — TELEPHONE ENCOUNTER
----- Message from En Sanders sent at 8/2/2023 11:17 AM CDT -----  Regarding: Self .[phone]  Type: Patient Call Back     What is the request in detail: Pt is requesting a call back in regards to her diabetic shoes ( pt stated she is supposed to have a form signed by Dr Elaine that was sent from Dr courtney and that [facility] needs the results to release her shoes ) Pt is also stated that her A1C done on 7/7,       Can the clinic reply by [facility]? No     Would the patient rather a call back or a response via [facility]? Call back    Best call back number: .[phone]      Additional Information: Pt would like her A1C results sent to [facility] on [email or web address]     Thank you.

## 2023-08-02 NOTE — TELEPHONE ENCOUNTER
Patient states that she has spoken to Neeru Harrison (Nettie) at [facility], but has been unable to obtain diabetic shoes.  [facility] contacted and requested orders signed by an MD for diabetic shoes with code , 6 heat-molded inserts with code , and A1c results within 6 months.  All items faxed to [facility] at [phone].

## 2023-08-23 ENCOUNTER — OFFICE VISIT (OUTPATIENT)
Dept: PODIATRY | Facility: CLINIC | Age: 78
End: 2023-08-23
Payer: MEDICARE

## 2023-08-23 VITALS — BODY MASS INDEX: 37.32 KG/M2 | HEIGHT: 65 IN | WEIGHT: 224 LBS

## 2023-08-23 DIAGNOSIS — M20.11 HALLUX ABDUCTO VALGUS, RIGHT: ICD-10-CM

## 2023-08-23 DIAGNOSIS — E11.22 TYPE 2 DIABETES MELLITUS WITH ESRD (END-STAGE RENAL DISEASE): Primary | ICD-10-CM

## 2023-08-23 DIAGNOSIS — M20.41 HAMMER TOES OF BOTH FEET: ICD-10-CM

## 2023-08-23 DIAGNOSIS — N18.6 TYPE 2 DIABETES MELLITUS WITH ESRD (END-STAGE RENAL DISEASE): Primary | ICD-10-CM

## 2023-08-23 DIAGNOSIS — L90.9 PLANTAR FAT PAD ATROPHY: ICD-10-CM

## 2023-08-23 DIAGNOSIS — M20.42 HAMMER TOES OF BOTH FEET: ICD-10-CM

## 2023-08-23 DIAGNOSIS — M20.12 HALLUX ABDUCTO VALGUS, LEFT: ICD-10-CM

## 2023-08-23 DIAGNOSIS — B35.1 NAIL DERMATOPHYTOSIS: ICD-10-CM

## 2023-08-23 PROCEDURE — 1159F MED LIST DOCD IN RCRD: CPT | Mod: CPTII,S$GLB,, | Performed by: PODIATRIST

## 2023-08-23 PROCEDURE — 99999 PR PBB SHADOW E&M-EST. PATIENT-LVL IV: ICD-10-PCS | Mod: PBBFAC,,, | Performed by: PODIATRIST

## 2023-08-23 PROCEDURE — 1101F PR PT FALLS ASSESS DOC 0-1 FALLS W/OUT INJ PAST YR: ICD-10-PCS | Mod: CPTII,S$GLB,, | Performed by: PODIATRIST

## 2023-08-23 PROCEDURE — 99999 PR PBB SHADOW E&M-EST. PATIENT-LVL IV: CPT | Mod: PBBFAC,,, | Performed by: PODIATRIST

## 2023-08-23 PROCEDURE — 99213 PR OFFICE/OUTPT VISIT, EST, LEVL III, 20-29 MIN: ICD-10-PCS | Mod: S$GLB,,, | Performed by: PODIATRIST

## 2023-08-23 PROCEDURE — 1101F PT FALLS ASSESS-DOCD LE1/YR: CPT | Mod: CPTII,S$GLB,, | Performed by: PODIATRIST

## 2023-08-23 PROCEDURE — 3288F PR FALLS RISK ASSESSMENT DOCUMENTED: ICD-10-PCS | Mod: CPTII,S$GLB,, | Performed by: PODIATRIST

## 2023-08-23 PROCEDURE — 1159F PR MEDICATION LIST DOCUMENTED IN MEDICAL RECORD: ICD-10-PCS | Mod: CPTII,S$GLB,, | Performed by: PODIATRIST

## 2023-08-23 PROCEDURE — 99213 OFFICE O/P EST LOW 20 MIN: CPT | Mod: S$GLB,,, | Performed by: PODIATRIST

## 2023-08-23 PROCEDURE — 1126F AMNT PAIN NOTED NONE PRSNT: CPT | Mod: CPTII,S$GLB,, | Performed by: PODIATRIST

## 2023-08-23 PROCEDURE — 3288F FALL RISK ASSESSMENT DOCD: CPT | Mod: CPTII,S$GLB,, | Performed by: PODIATRIST

## 2023-08-23 PROCEDURE — 1160F RVW MEDS BY RX/DR IN RCRD: CPT | Mod: CPTII,S$GLB,, | Performed by: PODIATRIST

## 2023-08-23 PROCEDURE — 1160F PR REVIEW ALL MEDS BY PRESCRIBER/CLIN PHARMACIST DOCUMENTED: ICD-10-PCS | Mod: CPTII,S$GLB,, | Performed by: PODIATRIST

## 2023-08-23 PROCEDURE — 1126F PR PAIN SEVERITY QUANTIFIED, NO PAIN PRESENT: ICD-10-PCS | Mod: CPTII,S$GLB,, | Performed by: PODIATRIST

## 2023-08-23 NOTE — PROGRESS NOTES
Is Subjective:      Patient ID: Erica Leblanc is a 78 y.o. female.    Chief Complaint: Diabetes Mellitus (Carl Day MD at 7/14/2023 ) and Nail Care      Erica Leblanc is a 78 y.o. femalewho presents to the clinic for evaluation and treatment of high risk feet. Erica Leblanc   has a past medical history of Acute ischemic right PCA stroke (6/6/2021), Acute respiratory failure with hypoxia, Age-related osteoporosis without current pathological fracture (3/8/2021), Anemia of chronic kidney failure, stage 4 (severe) (4/5/2019), Cataracts, bilateral, CHF (congestive heart failure), CKD (chronic kidney disease) stage 3, GFR 30-59 ml/min, CKD (chronic kidney disease) stage 3, GFR 30-59 ml/min, Controlled type 2 diabetes mellitus with proteinuria or albuminuria, Depression, Diabetes with neurologic complications, Diabetic retinopathy of both eyes, Edema, Glaucoma, History of colonic polyps, TIA (transient ischemic attack) (11/2008), Hyperlipidemia LDL goal < 100, Hypertension, Hypothyroidism, Major depressive disorder, single episode, mild (2/17/2016), Mixed incontinence urge and stress, Obesity, Obstructive sleep apnea on CPAP, Osteopenia, Proteinuria, Sickle cell trait, Strabismus, TIA (transient ischemic attack), Trouble in sleeping, Type 2 diabetes mellitus with ophthalmic manifestations, Type 2 diabetes with stage 3 chronic kidney disease GFR 30-59, Type II or unspecified type diabetes mellitus with renal manifestations, uncontrolled(250.42), Uncontrolled type 2 diabetes mellitus with peripheral circulatory disorder (4/5/2019), Urge incontinence (1/11/2016), Urge incontinence, Venous stasis ulcer, and Vitamin D deficiency disease.  She presents to the podiatry clinic for  routine foot care and evaluation.   This patient has documented high risk feet requiring routine maintenance secondary to diabetes mellitis and those secondary complications of diabetes, as mentioned.       Rx shoes still  pending    PCP: Sendy Elaine MD    Date Last Seen by PCP:   Chief Complaint   Patient presents with    Diabetes Mellitus     Carl Day MD at 7/14/2023     [facility]      Hemoglobin A1C   Date Value Ref Range Status   07/07/2023 6.2 (H) 4.0 - 5.6 % Final     Comment:     ADA Screening Guidelines:  5.7-6.4%  Consistent with prediabetes  >or=6.5%  Consistent with diabetes    High levels of fetal hemoglobin interfere with the HbA1C  assay. Heterozygous hemoglobin variants (HbS, HgC, etc)do  not significantly interfere with this assay.   However, presence of multiple variants may affect accuracy.     09/13/2022 6.3 (H) 0.0 - 5.6 % Final     Comment[facility]     [facility]   06/14/2022 6.6 (H) 0.0 - 5.6 % Final     Comment[facility]     [facility]   05/07/2022 6.7 (H) 4.0 - 5.6 % Final     Comment:     ADA Screening Guidelines:  5.7-6.4%  Consistent with prediabetes  >or=6.5%  Consistent with diabetes    High levels of fetal hemoglobin interfere with the HbA1C  assay. Heterozygous hemoglobin variants (HbS, HgC, etc)do  not significantly interfere with this assay.   However, presence of multiple variants may affect accuracy.     03/15/2022 7.1 (H) 0.0 - 5.6 % Final     Comment:     [facility]       Patient Active Problem List   Diagnosis    Severe obesity (BMI 35.0-39.9) with comorbidity    Sickle cell trait    Hyperlipidemia LDL goal <100    HUMBERTO on CPAP    Hypothyroidism (acquired)    Hx-TIA (transient ischemic attack)    Vitamin D deficiency disease    Pulmonary hypertension    Type 2 diabetes mellitus with ESRD (end-stage renal disease)    Secondary renal hyperparathyroidism    Incomplete bladder emptying    Postmenopausal atrophic vaginitis    Rectocele    Mixed incontinence urge and stress    Chronic diastolic heart failure    Tortuous aorta    ESRD on hemodialysis    Anemia of chronic disease    Debility    Chronic respiratory failure    Type 2 diabetes mellitus with foot ulcer, with long-term current  use of insulin    Stable proliferative diabetic retinopathy associated with type 2 diabetes mellitus    Heart failure with preserved ejection fraction    Pseudophakia    Chronic kidney disease-mineral and bone disorder    Age-related osteoporosis without current pathological fracture    H/O: stroke    Walker as ambulation aid     Current Outpatient Medications on File Prior to Visit   Medication Sig Dispense Refill    ACCU-CHEK SOFT DEV LANCETS Kit       atorvastatin (LIPITOR) 80 MG tablet TAKE 1 TABLET (80 MG TOTAL) BY MOUTH EVERY EVENING. 90 tablet 3    blood sugar diagnostic Strp One test strip use 2 times a day to check blood glucose,  ICD-10: E11.9, compatible with insurance/glucometer 100 each 11    blood-glucose meter kit One glucometer, use to check blood glucose.   ICD-10: E11.9. Dispense machine covered by insurance 1 each 0    cinacalcet (SENSIPAR) 30 MG Tab       docusate sodium (COLACE) 100 MG capsule Take 200 mg by mouth once daily.       doxercalciferoL (HECTOROL) 4 mcg/2 mL injection Inject 4.5 mcg into the vein 3 (three) times a week. At dialysis unit      dulaglutide (TRULICITY) 1.5 mg/0.5 mL pen injector Inject 1.5 mg into the skin every 7 days. 12 pen 3    epoetin arnel (EPOGEN INJ) Inject 1,000 Units as directed every 7 days. At the dialysis unit      fluticasone propionate (FLONASE) 50 mcg/actuation nasal spray 1 spray (50 mcg total) by Each Nostril route once daily. 48 g 5    LANCETS MISC       lancets Misc One lancets use 2 times a day to check blood glucose, ICD-10: E11.9 100 each 11    lancing device Misc One device, used to check blood glucose, ICD-10: E11.9 1 each 0    levothyroxine (SYNTHROID) 50 MCG tablet TAKE 1 TABLET BEFORE BREAKFAST 90 tablet 3    OLENA-NABIL RX 1- mg-mg-mcg Tab Take 1 tablet by mouth once daily.      aspirin (ECOTRIN) 81 MG EC tablet Take 1 tablet (81 mg total) by mouth once daily. 90 tablet 3    midodrine (PROAMATINE) 5 MG Tab Take 1 tablet (5 mg total) by  mouth 3 (three) times daily. 90 tablet 11    sevelamer carbonate (RENVELA) 800 mg Tab Take 3 tablets (2,400 mg total) by mouth 3 (three) times daily with meals. 270 tablet 11     No current facility-administered medications on file prior to visit.     Review of patient's allergies indicates:   Allergen Reactions    Ace inhibitors Other (See Comments)     Other reaction(s): cough     Past Surgical History:   Procedure Laterality Date    AV FISTULA PLACEMENT Left 2019    Procedure: CREATION, AV FISTULA, LEFT UPPER EXTREMITY;  Surgeon: Keron Perez MD;  Location: [facility];  Service: Vascular;  Laterality: Left;    BREAST BIOPSY      breast reduction Bilateral age 31    BREAST SURGERY      CATARACT EXTRACTION Bilateral     cataracts Bilateral      SECTION, LOW TRANSVERSE      x1    CHOLECYSTECTOMY      COLONOSCOPY N/A 2022    Procedure: COLONOSCOPY;  Surgeon: Mahesh Huang MD;  Location: [facility] ([address] [place] [address]);  Service: Endoscopy;  Laterality: N/A;  fully vaccinated-sm.  RAPID COVID  +cologuard needing ASAP  Dialysis pt T,TH Sat and left UA access  2nd floor - cardiac/dialysis/SOB / LABS / prep ins. emailed - [facility]    EYE SURGERY  2014, 2014    vitrectomy    EYE SURGERY Right 2016    FISTULOGRAM Left 3/6/2020    Procedure: Fistulogram, left upper extremity, with branch ligation;  Surgeon: Keron Perez MD;  Location: [address];  Service: Vascular;  Laterality: Left;  Time 1.1 Minute  42.10 mGy    FISTULOGRAM Left 2020    Procedure: Fistulogram, left upper extremity, transradial access with possible intervention;  Surgeon: Keron Perez MD;  Location: [address];  Service: Vascular;  Laterality: Left;    FISTULOGRAM Left 2020    Procedure: Fistulogram, left upper extremity, possible intervention;  Surgeon: Keron Perez MD;  Location: [facility];  Service: Vascular;  Laterality: Left;  930 AM START  RN PRE OP  ---COVID NEGATIVE ON  2020. [place]     FISTULOGRAM Left 1/21/2022    Procedure: Fistulogram, transradial access;  Surgeon: Keron Perez MD;  Location: [facility] [place] [address] [place] [address];  Service: Vascular;  Laterality: Left;  mgy-40.16  gycm-8.3905  contrast-34cc  time-12.4min    HYSTERECTOMY  1986    TAHBSO (patient is unsure if ovaries removed)    OOPHORECTOMY      PERCUTANEOUS TRANSLUMINAL ANGIOPLASTY OF ARTERIOVENOUS FISTULA Left 7/21/2020    Procedure: PTA, AV FISTULA;  Surgeon: Keron Perez MD;  Location: [facility] [place] [address] [place] [address];  Service: Vascular;  Laterality: Left;  15.9 minutes of fluro  41.12  mGy  7.9060 Gy cm2  32ml  contrast    PHLEBOGRAPHY Left 7/21/2020    Procedure: CENTRAL VENOGRAM;  Surgeon: Keron Perez MD;  Location: [facility] [place] [address] [place] [address];  Service: Vascular;  Laterality: Left;    REFRACTIVE SURGERY      TOTAL REDUCTION MAMMOPLASTY      approx 10 yrs ago    VENOPLASTY  1/21/2022    Procedure: ANGIOPLASTY, VEIN;  Surgeon: Keron Perez MD;  Location: [facility] [place] [address] [place] [address];  Service: Vascular;;     Family History   Problem Relation Age of Onset    Stroke Mother     Diabetes Mother     Hypertension Mother     Cataracts Mother     Leukemia Father     Cataracts Father     Ovarian cancer Sister 35    Achondroplasia Sister     HIV Brother     No Known Problems Daughter     No Known Problems Son     Breast cancer Maternal Aunt 65    Parkinsonism Maternal Aunt     Esophageal cancer Maternal Uncle         smoker    No Known Problems Paternal Aunt     Cataracts Paternal Uncle     Cataracts Maternal Grandmother     Cataracts Maternal Grandfather     Diabetes Paternal Grandmother     Cataracts Paternal Grandmother     No Known Problems Paternal Grandfather     No Known Problems Other     Amblyopia Neg Hx     Blindness Neg Hx     Glaucoma Neg Hx     Macular degeneration Neg Hx     Retinal detachment Neg Hx     Strabismus Neg Hx     Thyroid disease Neg Hx     Colon cancer Neg Hx     Cancer Neg Hx        Review of Systems   Constitutional: Negative for  "chills and fever.   Cardiovascular:  Positive for leg swelling. Negative for chest pain and claudication.   Respiratory:  Negative for cough and shortness of breath.    Skin:  Positive for dry skin, nail changes and suspicious lesions. Negative for itching and rash.   Musculoskeletal:  Positive for arthritis and joint pain. Negative for falls, joint swelling, muscle cramps and muscle weakness.   Gastrointestinal:  Negative for diarrhea, nausea and vomiting.   Neurological:  Positive for numbness and paresthesias. Negative for tremors and weakness.   Psychiatric/Behavioral:  Negative for altered mental status and hallucinations.          Objective:       Vitals:    08/23/23 0929   Weight: 101.6 kg (224 lb)   Height: 5' 5" (1.651 m)   PainSc: 0-No pain     Physical Exam  Vitals and nursing note reviewed.   Constitutional:       Appearance: She is not diaphoretic.      Comments: General: Pt. is well-developed, well-nourished, appears stated age, in no acute distress, alert and oriented x 3. No evidence of depression, anxiety, or agitation. Calm, cooperative, and communicative. Appropriate interactions and affect.       Cardiovascular:      Pulses:           Dorsalis pedis pulses are 1+ on the right side and 1+ on the left side.        Posterior tibial pulses are 1+ on the right side and 1+ on the left side.      Comments: There is decreased digital hair.   Musculoskeletal:      Right ankle: Swelling present. No tenderness.      Right Achilles Tendon: No defects.      Left ankle: Swelling present. No tenderness.      Left Achilles Tendon: No defects.      Right foot: Normal range of motion. No tenderness.      Left foot: Normal range of motion. No tenderness.      Comments: Muscle strength is 5/5 in all groups bilaterally.    Decreased stride, station of gait.  apropulsive toe off.  Increased angle and base of gait.    Patient has hammertoes of digits 2-5 bilateral partially reducible without symptom today.    Visible " and palpable bunion without pain at dorsomedial 1st metatarsal head right and left.  Hallux abducted right and left partially reducible, tracks laterally without being track bound.  No ecchymosis, erythema, edema, or cardinal signs infection or signs of trauma same foot.    Fat pad atrophy to heels and met heads bilateral     Skin:     General: Skin is warm and dry.      Coloration: Skin is not pale.      Findings: Wound (See description below) present. No abrasion, lesion (posterior right leg healed) or rash.      Nails: There is no clubbing.      Comments: Xerosis bilaterally     Toenails 1-5 bilaterally are elongated by 2-3 mm, thickened by 2-3 mm, discolored/yellowed, dystrophic, brittle with subungual debris.     Interdigital maceration   Neurological:      Sensory: No sensory deficit.      Comments: [place] 5.07 monofilament is intact bilateral feet. Sharp/dull sensation is also intact Bilateral feet.           Psychiatric:         Speech: Speech normal.               12/08/2021            10/06/2021          07/28/2021    Ulcer location:  Anterior right leg  Measurements :  2.2 x 1.4 x 0.2  cm   Signs of infection:  Local edema and erythema as well as tenderness to palpation   Drainage: Sanguinous  Purulence: no  Crepitus/fluctuance: no  Periwound: Reddened  Base: fibrogranular  Depth: subcutaneous tissue  Probe to bone: no              07/08/2021    Ulcer location:  Anterior right leg  Measurements :  2.2 x 1.7 x 0.4  cm   Signs of infection:  Local edema and erythema as well as tenderness to palpation and mild malodor  Drainage: Sero-Sanguinous  Purulence: no  Crepitus/fluctuance: no  Periwound: Reddened  Base: Fibrotic slough  Depth: subcutaneous tissue  Probe to bone: no                Assessment:       Encounter Diagnoses   Name Primary?    Type 2 diabetes mellitus with ESRD (end-stage renal disease) Yes    Nail dermatophytosis     Plantar fat pad atrophy     Hallux abducto valgus, right      Hallux abducto valgus, left     Hammer toes of both feet          Plan:       Erica was seen today for diabetes mellitus and nail care.    Diagnoses and all orders for this visit:    Type 2 diabetes mellitus with ESRD (end-stage renal disease)    Nail dermatophytosis    Plantar fat pad atrophy    Hallux abducto valgus, right    Hallux abducto valgus, left    Hammer toes of both feet      I counseled the patient on her conditions, their implications and medical management. Education about the diabetic foot, neuropathy, and prevention of limb loss    Shoe inspection. Diabetic Foot Education. Patient reminded of the importance of good nutrition/healthy diet/weight management and blood sugar control to help prevent podiatric complications of diabetes. Patient instructed on proper foot hygeine. Wear comfortable, proper fitting shoes. Wash feet daily. Dry well. After drying, apply moisturizer to feet (no lotion to webspaces). Inspect feet daily for skin breaks, blisters, swelling, or redness. Wear cotton socks (preferably white)  Change socks every day. Do NOT walk barefoot. Do NOT use heating pads or hot water soaks. We discussed wearing proper shoe gear, daily foot inspections, never walking without protective shoe gear.     Discussed edema control and the importance of daily moisturizer to the feet such as Gold bonds diabetic foot cream    Recommend applying vicks vaporub to thick abnormal toenails daily x 6 months to treat fungal nail infection.    All previous wound sites have remained healed    She will continue to monitor the areas daily, inspect her feet, wear protective shoe gear when ambulatory, moisturizer to maintain skin integrity and follow in this office in approximately 4-6 months, sooner p.r.n.

## 2023-08-28 RX ORDER — MIDODRINE HYDROCHLORIDE 5 MG/1
5 TABLET ORAL 3 TIMES DAILY
Qty: 90 TABLET | Refills: 3 | Status: SHIPPED | OUTPATIENT
Start: 2023-08-28 | End: 2024-08-27

## 2023-09-14 LAB — HBA1C MFR BLD: 5.5 %

## 2023-10-01 ENCOUNTER — HOSPITAL ENCOUNTER (EMERGENCY)
Facility: HOSPITAL | Age: 78
Discharge: HOME | End: 2023-10-01
Attending: EMERGENCY MEDICINE
Payer: MEDICARE

## 2023-10-01 VITALS
WEIGHT: 209.44 LBS | DIASTOLIC BLOOD PRESSURE: 61 MMHG | HEART RATE: 93 BPM | TEMPERATURE: 98 F | BODY MASS INDEX: 34.89 KG/M2 | HEIGHT: 65 IN | RESPIRATION RATE: 22 BRPM | SYSTOLIC BLOOD PRESSURE: 123 MMHG | OXYGEN SATURATION: 92 %

## 2023-10-01 DIAGNOSIS — R19.7 DIARRHEA: ICD-10-CM

## 2023-10-01 LAB
ALBUMIN SERPL BCP-MCNC: 2.8 G/DL (ref 3.5–5.2)
ALP SERPL-CCNC: 72 U/L (ref 55–135)
ALT SERPL W/O P-5'-P-CCNC: 24 U/L (ref 10–44)
ANION GAP SERPL CALC-SCNC: 14 MMOL/L (ref 8–16)
AST SERPL-CCNC: 32 U/L (ref 10–40)
BASOPHILS # BLD AUTO: 0.02 K/UL (ref 0–0.2)
BASOPHILS NFR BLD: 0.3 % (ref 0–1.9)
BILIRUB SERPL-MCNC: 0.6 MG/DL (ref 0.1–1)
BUN SERPL-MCNC: 29 MG/DL (ref 8–23)
CALCIUM SERPL-MCNC: 8.6 MG/DL (ref 8.7–10.5)
CHLORIDE SERPL-SCNC: 101 MMOL/L (ref 95–110)
CO2 SERPL-SCNC: 26 MMOL/L (ref 23–29)
CREAT SERPL-MCNC: 7.6 MG/DL (ref 0.5–1.4)
CTP QC/QA: YES
DIFFERENTIAL METHOD: ABNORMAL
EOSINOPHIL # BLD AUTO: 0.1 K/UL (ref 0–0.5)
EOSINOPHIL NFR BLD: 1.4 % (ref 0–8)
ERYTHROCYTE [DISTWIDTH] IN BLOOD BY AUTOMATED COUNT: 13.8 % (ref 11.5–14.5)
EST. GFR  (NO RACE VARIABLE): 5 ML/MIN/1.73 M^2
GLUCOSE SERPL-MCNC: 91 MG/DL (ref 70–110)
HCT VFR BLD AUTO: 32.7 % (ref 37–48.5)
HGB BLD-MCNC: 10.4 G/DL (ref 12–16)
IMM GRANULOCYTES # BLD AUTO: 0.04 K/UL (ref 0–0.04)
IMM GRANULOCYTES NFR BLD AUTO: 0.6 % (ref 0–0.5)
LIPASE SERPL-CCNC: 14 U/L (ref 4–60)
LYMPHOCYTES # BLD AUTO: 0.7 K/UL (ref 1–4.8)
LYMPHOCYTES NFR BLD: 11.2 % (ref 18–48)
MCH RBC QN AUTO: 27.1 PG (ref 27–31)
MCHC RBC AUTO-ENTMCNC: 31.8 G/DL (ref 32–36)
MCV RBC AUTO: 85 FL (ref 82–98)
MONOCYTES # BLD AUTO: 1.1 K/UL (ref 0.3–1)
MONOCYTES NFR BLD: 16.4 % (ref 4–15)
NEUTROPHILS # BLD AUTO: 4.6 K/UL (ref 1.8–7.7)
NEUTROPHILS NFR BLD: 70.1 % (ref 38–73)
NRBC BLD-RTO: 0 /100 WBC
PLATELET # BLD AUTO: 194 K/UL (ref 150–450)
PMV BLD AUTO: 11.4 FL (ref 9.2–12.9)
POTASSIUM SERPL-SCNC: 3.8 MMOL/L (ref 3.5–5.1)
PROT SERPL-MCNC: 7.3 G/DL (ref 6–8.4)
RBC # BLD AUTO: 3.84 M/UL (ref 4–5.4)
SARS-COV-2 RDRP RESP QL NAA+PROBE: NEGATIVE
SODIUM SERPL-SCNC: 141 MMOL/L (ref 136–145)
WBC # BLD AUTO: 6.54 K/UL (ref 3.9–12.7)

## 2023-10-01 PROCEDURE — 87449 NOS EACH ORGANISM AG IA: CPT | Performed by: EMERGENCY MEDICINE

## 2023-10-01 PROCEDURE — 93010 EKG 12-LEAD: ICD-10-PCS | Mod: ,,, | Performed by: INTERNAL MEDICINE

## 2023-10-01 PROCEDURE — 99284 EMERGENCY DEPT VISIT MOD MDM: CPT

## 2023-10-01 PROCEDURE — 87635 SARS-COV-2 COVID-19 AMP PRB: CPT | Performed by: EMERGENCY MEDICINE

## 2023-10-01 PROCEDURE — 93005 ELECTROCARDIOGRAM TRACING: CPT

## 2023-10-01 PROCEDURE — 89055 LEUKOCYTE ASSESSMENT FECAL: CPT | Performed by: EMERGENCY MEDICINE

## 2023-10-01 PROCEDURE — 87186 SC STD MICRODIL/AGAR DIL: CPT | Performed by: EMERGENCY MEDICINE

## 2023-10-01 PROCEDURE — 87045 FECES CULTURE AEROBIC BACT: CPT | Performed by: EMERGENCY MEDICINE

## 2023-10-01 PROCEDURE — 87046 STOOL CULTR AEROBIC BACT EA: CPT | Mod: 59 | Performed by: EMERGENCY MEDICINE

## 2023-10-01 PROCEDURE — 93010 ELECTROCARDIOGRAM REPORT: CPT | Mod: ,,, | Performed by: INTERNAL MEDICINE

## 2023-10-01 PROCEDURE — 87209 SMEAR COMPLEX STAIN: CPT | Performed by: EMERGENCY MEDICINE

## 2023-10-01 PROCEDURE — 85025 COMPLETE CBC W/AUTO DIFF WBC: CPT | Performed by: EMERGENCY MEDICINE

## 2023-10-01 PROCEDURE — 80053 COMPREHEN METABOLIC PANEL: CPT | Performed by: EMERGENCY MEDICINE

## 2023-10-01 PROCEDURE — 87427 SHIGA-LIKE TOXIN AG IA: CPT | Mod: 59 | Performed by: EMERGENCY MEDICINE

## 2023-10-01 PROCEDURE — 87077 CULTURE AEROBIC IDENTIFY: CPT | Performed by: EMERGENCY MEDICINE

## 2023-10-01 PROCEDURE — 83690 ASSAY OF LIPASE: CPT | Performed by: EMERGENCY MEDICINE

## 2023-10-01 RX ORDER — LOPERAMIDE HYDROCHLORIDE 2 MG/1
2 CAPSULE ORAL 3 TIMES DAILY PRN
Qty: 12 CAPSULE | Refills: 0 | Status: SHIPPED | OUTPATIENT
Start: 2023-10-01 | End: 2023-10-05

## 2023-10-01 NOTE — ED PROVIDER NOTES
Encounter Date: 10/1/2023    SCRIBE #1 NOTE: I, Yandel Mckoy, am scribing for, and in the presence of,  Irwin Oscar Jr., MD. I have scribed the following portions of the note - Other sections scribed: HPI, ROS.       History     Chief Complaint   Patient presents with    Diarrhea     Patient reports diarrhea for past week, states missed dialysis 1 day last week secondary to stools, was instructed by clinic to come to ED if continued to have loose stools.      Erica Leblanc is a 78 y.o. female, with a PMHx of ESRD (dialysis T, TH, SAT), CHF, HTN, DM type II, TIA, HLD, acute ischemic right PCA stroke who presents to the ED with complaint of persistent watery diarrhea onset 7 days ago. Patient reports associated fever, diaphoresis, cough, congestion, watery eyes. She notes she felt much better 2 days ago, but it worsened suddenly last night. No other exacerbating or alleviating factors. Denies abdominal pain or other associated symptoms. Patient states her dialysis nurse told her it smells like C. Diff. Patient reports she has never experienced these symptoms. Patient did not take any imodium. Patient reports she had a full dialysis treatment yesterday.    The history is provided by the patient. No  was used.     Review of patient's allergies indicates:   Allergen Reactions    Ace inhibitors Other (See Comments)     Other reaction(s): cough     Past Medical History:   Diagnosis Date    Acute ischemic right PCA stroke 6/6/2021    Acute respiratory failure with hypoxia     Age-related osteoporosis without current pathological fracture 3/8/2021    Anemia of chronic kidney failure, stage 4 (severe) 4/5/2019    Cataracts, bilateral     CHF (congestive heart failure)     CKD (chronic kidney disease) stage 3, GFR 30-59 ml/min     CKD (chronic kidney disease) stage 3, GFR 30-59 ml/min     Controlled type 2 diabetes mellitus with proteinuria or albuminuria     Depression     Diabetes  with neurologic complications     Diabetic retinopathy of both eyes     Edema     Glaucoma     History of colonic polyps     Hx-TIA (transient ischemic attack) 2008    Hyperlipidemia LDL goal < 100     Hypertension     Hypothyroidism     Major depressive disorder, single episode, mild 2016    Mixed incontinence urge and stress     Obesity     Obstructive sleep apnea on CPAP     19:  Home CPAP machine broken, per patient & son    Osteopenia     Proteinuria     Sickle cell trait     Strabismus     TIA (transient ischemic attack)     Trouble in sleeping     Type 2 diabetes mellitus with ophthalmic manifestations     Type 2 diabetes with stage 3 chronic kidney disease GFR 30-59     Type II or unspecified type diabetes mellitus with renal manifestations, uncontrolled(250.42)     Uncontrolled type 2 diabetes mellitus with peripheral circulatory disorder 2019    Urge incontinence 2016    Urge incontinence     Venous stasis ulcer     bilateral lower legs    Vitamin D deficiency disease      Past Surgical History:   Procedure Laterality Date    AV FISTULA PLACEMENT Left 2019    Procedure: CREATION, AV FISTULA, LEFT UPPER EXTREMITY;  Surgeon: Keron Perez MD;  Location: [facility];  Service: Vascular;  Laterality: Left;    BREAST BIOPSY      breast reduction Bilateral age 31    BREAST SURGERY      CATARACT EXTRACTION Bilateral     cataracts Bilateral      SECTION, LOW TRANSVERSE      x1    CHOLECYSTECTOMY      COLONOSCOPY N/A 2022    Procedure: COLONOSCOPY;  Surgeon: Mahesh Huang MD;  Location: [facility] ([address] [place] [address]);  Service: Endoscopy;  Laterality: N/A;  fully vaccinated-sm.  RAPID COVID  +cologuard needing ASAP  Dialysis pt T,TH Sat and left UA access  2nd floor - cardiac/dialysis/SOB / LABS / prep ins. emailed - [facility]    EYE SURGERY  2014, 2014    vitrectomy    EYE SURGERY Right 2016    FISTULOGRAM Left 3/6/2020    Procedure: Fistulogram, left upper extremity,  with branch ligation;  Surgeon: Keron Perez MD;  Location: [address];  Service: Vascular;  Laterality: Left;  Time 1.1 Minute  42.10 mGy    FISTULOGRAM Left 7/21/2020    Procedure: Fistulogram, left upper extremity, transradial access with possible intervention;  Surgeon: Keron Perez MD;  Location: [address];  Service: Vascular;  Laterality: Left;    FISTULOGRAM Left 8/19/2020    Procedure: Fistulogram, left upper extremity, possible intervention;  Surgeon: Keorn Perez MD;  Location: [facility];  Service: Vascular;  Laterality: Left;  930 AM START  RN PRE OP  ---COVID NEGATIVE ON  8-. CA    FISTULOGRAM Left 1/21/2022    Procedure: Fistulogram, transradial access;  Surgeon: Keron Perez MD;  Location: [address];  Service: Vascular;  Laterality: Left;  mgy-40.16  gycm-8.3905  contrast-34cc  time-12.4min    HYSTERECTOMY  1986    TAHBSO (patient is unsure if ovaries removed)    OOPHORECTOMY      PERCUTANEOUS TRANSLUMINAL ANGIOPLASTY OF ARTERIOVENOUS FISTULA Left 7/21/2020    Procedure: PTA, AV FISTULA;  Surgeon: Keron Perez MD;  Location: [address];  Service: Vascular;  Laterality: Left;  15.9 minutes of fluro  41.12  mGy  7.9060 Gy cm2  32ml  contrast    PHLEBOGRAPHY Left 7/21/2020    Procedure: CENTRAL VENOGRAM;  Surgeon: Keron Perez MD;  Location: [address];  Service: Vascular;  Laterality: Left;    REFRACTIVE SURGERY      TOTAL REDUCTION MAMMOPLASTY      approx 10 yrs ago    VENOPLASTY  1/21/2022    Procedure: ANGIOPLASTY, VEIN;  Surgeon: Keron Perez MD;  Location: [facility] OR [address] [place] [address];  Service: Vascular;;     Family History   Problem Relation Age of Onset    Stroke Mother     Diabetes Mother     Hypertension Mother     Cataracts Mother     Leukemia Father     Cataracts Father     Ovarian cancer Sister 35    Achondroplasia Sister     HIV Brother     No Known Problems Daughter     No Known Problems Son     Breast cancer  Maternal Aunt 65    Parkinsonism Maternal Aunt     Esophageal cancer Maternal Uncle         smoker    No Known Problems Paternal Aunt     Cataracts Paternal Uncle     Cataracts Maternal Grandmother     Cataracts Maternal Grandfather     Diabetes Paternal Grandmother     Cataracts Paternal Grandmother     No Known Problems Paternal Grandfather     No Known Problems Other     Amblyopia Neg Hx     Blindness Neg Hx     Glaucoma Neg Hx     Macular degeneration Neg Hx     Retinal detachment Neg Hx     Strabismus Neg Hx     Thyroid disease Neg Hx     Colon cancer Neg Hx     Cancer Neg Hx      Social History     Tobacco Use    Smoking status: Never    Smokeless tobacco: Never   Substance Use Topics    Alcohol use: No     Alcohol/week: 0.0 standard drinks of alcohol    Drug use: No     Review of Systems   Constitutional:  Positive for diaphoresis and fever. Negative for chills.   HENT:  Positive for congestion. Negative for rhinorrhea and sore throat.    Eyes:  Positive for discharge. Negative for visual disturbance.   Respiratory:  Positive for cough. Negative for shortness of breath.    Cardiovascular:  Negative for chest pain.   Gastrointestinal:  Positive for diarrhea. Negative for abdominal pain, nausea and vomiting.   Genitourinary:  Negative for dysuria, frequency and hematuria.   Musculoskeletal:  Negative for back pain.   Skin:  Negative for rash.   Neurological:  Negative for dizziness, weakness and headaches.   All other systems reviewed and are negative.      Physical Exam     Initial Vitals [10/01/23 1206]   BP Pulse Resp Temp SpO2   (!) 151/77 99 18 98.2 °F (36.8 °C) 97 %      MAP       --         Physical Exam    Nursing note and vitals reviewed.  Constitutional: She appears well-developed and well-nourished. She is not diaphoretic. No distress.   HENT:   Head: Normocephalic and atraumatic.   Right Ear: External ear normal.   Left Ear: External ear normal.   Nose: Nose normal.   Mouth/Throat: Oropharynx is  clear and moist.   Eyes: Conjunctivae and EOM are normal. Pupils are equal, round, and reactive to light. Right eye exhibits no discharge. Left eye exhibits no discharge. No scleral icterus.   Neck: Neck supple.   Normal range of motion.  Cardiovascular:  Normal rate, regular rhythm, normal heart sounds and intact distal pulses.           No murmur heard.  Pulmonary/Chest: Breath sounds normal. No respiratory distress. She has no wheezes. She has no rhonchi. She has no rales.   Abdominal: Abdomen is soft. Bowel sounds are normal. She exhibits no distension and no mass. There is no abdominal tenderness.   Abdomen is soft and nontender with normal bowel sounds.  No distention.  No hernias or masses. There is no rebound and no guarding.   Musculoskeletal:         General: No tenderness or edema. Normal range of motion.      Cervical back: Normal range of motion and neck supple.     Neurological: She is alert and oriented to person, place, and time. She has normal strength. No cranial nerve deficit or sensory deficit.   Skin: Skin is warm and dry. No rash noted. No erythema. No pallor.   Psychiatric: She has a normal mood and affect. Her behavior is normal. Judgment and thought content normal.         ED Course   Procedures  Labs Reviewed   CBC W/ AUTO DIFFERENTIAL - Abnormal; Notable for the following components:       Result Value    RBC 3.84 (*)     Hemoglobin 10.4 (*)     Hematocrit 32.7 (*)     MCHC 31.8 (*)     Immature Granulocytes 0.6 (*)     Lymph # 0.7 (*)     Mono # 1.1 (*)     Lymph % 11.2 (*)     Mono % 16.4 (*)     All other components within normal limits   COMPREHENSIVE METABOLIC PANEL - Abnormal; Notable for the following components:    BUN 29 (*)     Creatinine 7.6 (*)     Calcium 8.6 (*)     Albumin 2.8 (*)     eGFR 5 (*)     All other components within normal limits   CULTURE, STOOL   LIPASE   STOOL EXAM-OVA,CYSTS,PARASITES   WBC, STOOL   SARS-COV-2 RDRP GENE     EKG Readings: (Independently  Interpreted)   Initial Reading: No STEMI. Ectopy: PACs.   EKG reviewed and interpreted by me shows sinus rhythm at a rate of 82 beats per minute.  The HI and QRS intervals are normal.  The QTC is prolonged at 514 milliseconds.  There is a right axis deviation.  There are no ST segment or T-wave ischemic findings.       Imaging Results    None          Medications - No data to display  Medical Decision Making  This is the emergent evaluation of a 78-year-old female who presents emergency department for evaluation of 7 days of diarrhea.  Differential diagnosis at the time of initial evaluation included, but was not limited to:   Food-borne illness, viral illness, metabolic derangement.  Patient has baseline end-stage renal disease findings without acute abnormalities of concern such as electrolyte abnormalities.  There is no leukocytosis.  There is anemia likely related to end-stage renal disease.  Patient's vital signs reassuring.  She is attempting to provide stool sample for leaving for follow-up.  At this time, she is stable for discharge with outpatient follow-up.  If she decides that she would like to leave and provide stool sample as an outpatient, that would be reasonable as well.  I discussed this with the patient.  Advised return for new or worsening symptoms such as severe abdominal pain,  intractable vomiting, or fever.  Will prescribe low-dose Imodium to use as needed.  No findings on exam to suggest patient needs advanced imaging.    Amount and/or Complexity of Data Reviewed  Labs: ordered.    Risk  Prescription drug management.            Scribe Attestation:   Scribe #1: I performed the above scribed service and the documentation accurately describes the services I performed. I attest to the accuracy of the note.                        Clinical Impression:   Final diagnoses:  [R19.7] Diarrhea              I, Irwin Oscar MD, personally performed the services described in this documentation. All  medical record entries made by the scribe were at my direction and in my presence. I have reviewed the chart and agree that the record reflects my personal performance and is accurate and complete.      Irwin Oscar Jr., MD  10/01/23 [id number]

## 2023-10-01 NOTE — ED NOTES
Patient presents to ED reports diarrhea x 7 days. Patient reports is a dialysis patient, missed Tuesday due to diarrhea. Patient states went to dialysis on Thursday and was told stool smelled like c-diff. Patient was advised to follow up in ED due to weakness/ dehydration/ diarrhea in symptoms had not resolved by Friday. Patient states resolution of all symptoms on Friday. Patient states Saturday felt fine until 1700 when symptoms started again, symptoms are not as bad as last week. Patient reports x 2 episodes of diarrhea today.

## 2023-10-01 NOTE — DISCHARGE INSTRUCTIONS
Please take medications as prescribed for diarrhea.  Please return if you get worse or if new symptoms develop such as weakness or intractable vomiting.  Please follow-up with your regular doctor this week for further evaluation and management.

## 2023-10-02 ENCOUNTER — PATIENT OUTREACH (OUTPATIENT)
Dept: EMERGENCY MEDICINE | Facility: HOSPITAL | Age: 78
End: 2023-10-02

## 2023-10-02 LAB
C DIFF GDH STL QL: NEGATIVE
C DIFF TOX A+B STL QL IA: NEGATIVE
WBC #/AREA STL HPF: ABNORMAL /[HPF]

## 2023-10-03 ENCOUNTER — PATIENT MESSAGE (OUTPATIENT)
Dept: FAMILY MEDICINE | Facility: CLINIC | Age: 78
End: 2023-10-03
Payer: MEDICARE

## 2023-10-03 ENCOUNTER — PATIENT OUTREACH (OUTPATIENT)
Dept: ADMINISTRATIVE | Facility: HOSPITAL | Age: 78
End: 2023-10-03
Payer: MEDICARE

## 2023-10-03 LAB
E COLI SXT1 STL QL IA: NEGATIVE
E COLI SXT2 STL QL IA: NEGATIVE

## 2023-10-05 ENCOUNTER — TELEPHONE (OUTPATIENT)
Dept: FAMILY MEDICINE | Facility: CLINIC | Age: 78
End: 2023-10-05
Payer: MEDICARE

## 2023-10-05 ENCOUNTER — TELEPHONE (OUTPATIENT)
Dept: EMERGENCY MEDICINE | Facility: HOSPITAL | Age: 78
End: 2023-10-05
Payer: MEDICARE

## 2023-10-05 LAB
BACTERIA STL CULT: ABNORMAL
BACTERIA STL CULT: ABNORMAL

## 2023-10-05 RX ORDER — SULFAMETHOXAZOLE AND TRIMETHOPRIM 400; 80 MG/1; MG/1
1 TABLET ORAL DAILY
Qty: 7 TABLET | Refills: 0 | Status: SHIPPED | OUTPATIENT
Start: 2023-10-05 | End: 2023-10-12

## 2023-10-05 NOTE — TELEPHONE ENCOUNTER
----- Message from Britney Fox sent at 10/5/2023  2:18 PM CDT -----  Regarding: self [phone]  .Type: Patient Call Back    Who called:self    What is the request in detail: patient requesting a call back in regards to test results from her previous visit in the ER.    Can the clinic reply by [facility]? no    Would the patient rather a call back or a response via [facility]? Call back    Best call back number [phone]

## 2023-10-06 LAB — O+P STL MICRO: NORMAL

## 2023-10-23 ENCOUNTER — HOSPITAL ENCOUNTER (OUTPATIENT)
Dept: CARDIOLOGY | Facility: HOSPITAL | Age: 78
Discharge: HOME | End: 2023-10-23
Attending: SURGERY
Payer: MEDICARE

## 2023-10-23 DIAGNOSIS — T82.858D ARTERIOVENOUS FISTULA STENOSIS, SUBSEQUENT ENCOUNTER: ICD-10-CM

## 2023-10-23 PROCEDURE — 93990 CV US HEMODIALYSIS ACCESS (CUPID ONLY): ICD-10-PCS | Mod: 26,,, | Performed by: SURGERY

## 2023-10-23 PROCEDURE — 93990 DOPPLER FLOW TESTING: CPT | Mod: TC

## 2023-10-23 PROCEDURE — 93990 DOPPLER FLOW TESTING: CPT | Mod: 26,,, | Performed by: SURGERY

## 2023-10-25 ENCOUNTER — OFFICE VISIT (OUTPATIENT)
Dept: VASCULAR SURGERY | Facility: CLINIC | Age: 78
End: 2023-10-25
Payer: MEDICARE

## 2023-10-25 DIAGNOSIS — T82.858D ARTERIOVENOUS FISTULA STENOSIS, SUBSEQUENT ENCOUNTER: Primary | ICD-10-CM

## 2023-10-25 LAB
HD ANASTAMOSIS LOCATION: NORMAL
HD FISTULA OUTFLOW VEIN VESSEL: NORMAL
HD INFLOW ARTERY VESSEL: NORMAL
RIGHT DIS GRAFT PSV: 93 CM/ SEC
RIGHT INFLOW PSV: 296 CM/S
RIGHT MID GRAFT PSV: 93 CM/S
RIGHT OUTFLOW VEIN PSV: 221 CM/ SEC
RIGHT PROX ANA PSV: 641 CM/S
RIGHT PROX GRAFT PSV: 328 CM/S
RIGHT VOLUME FLOW PSV: 5058 ML/MIN

## 2023-10-25 PROCEDURE — 99214 OFFICE O/P EST MOD 30 MIN: CPT | Mod: S$GLB,,, | Performed by: SURGERY

## 2023-10-25 PROCEDURE — 99214 PR OFFICE/OUTPT VISIT, EST, LEVL IV, 30-39 MIN: ICD-10-PCS | Mod: S$GLB,,, | Performed by: SURGERY

## 2023-10-25 NOTE — PROGRESS NOTES
[facility]                         10/25/2023    HPI:  Erica Leblanc is a 78 y.o. female with   Patient Active Problem List   Diagnosis    Severe obesity (BMI 35.0-39.9) with comorbidity    Sickle cell trait    Hyperlipidemia LDL goal <100    HUMBERTO on CPAP    Hypothyroidism (acquired)    Hx-TIA (transient ischemic attack)    Vitamin D deficiency disease    Pulmonary hypertension    Type 2 diabetes mellitus with ESRD (end-stage renal disease)    Secondary renal hyperparathyroidism    Incomplete bladder emptying    Postmenopausal atrophic vaginitis    Rectocele    Mixed incontinence urge and stress    Chronic diastolic heart failure    Tortuous aorta    ESRD on hemodialysis    Anemia of chronic disease    Debility    Chronic respiratory failure    Type 2 diabetes mellitus with foot ulcer, with long-term current use of insulin    Stable proliferative diabetic retinopathy associated with type 2 diabetes mellitus    Heart failure with preserved ejection fraction    Pseudophakia    Chronic kidney disease-mineral and bone disorder    Age-related osteoporosis without current pathological fracture    H/O: stroke    Walker as ambulation aid    being managed by PCP and specialists who is here today for evaluation of long term HD access.  S/p R IJ tunneled HD catheter, HD without issues.  Pt is R handed.  No complaints today.  Does endorse orthopnea, no chest pain.  Unable to climb 1 flight of stairs on own.    no MI  no Stroke  Tobacco use: denies    1/20/20: No new issues.    3/2020: Dialysis without issues.    4/6/20:  S/p LUE fistulogram, central venogram, ligation of medial side branch cephalic vein, ligation of lateral side branch cephalic vein.  No issues with HD for several weeks since procedure.    7/6/20:  No issues with HD.    8/3/20: s/p L proximal fistula angioplasty 7/21/20.  No issues with HD.    8/31/20:  S/p 8/19/29 Balloon angioplasty of the proximal LUE AVF with a  6x60 Angiosculpt and Stellerex balloon.      10/2020:  No issues with HD    1/2021:  No issues with HD    4/2021:  No new problems.    8/2021:  No issues with HD. C/o fatigue after HD.  Has not seen cardiology recently.    3/2022:  No issues with HD    6/2022:  Doing well, no issues with HD    1/2023:  No issues with HD    04/2023: No issues with HD.     7/2023:  doing well.    10/2023:  No issues with HD.    Past Medical History:   Diagnosis Date    Acute ischemic right PCA stroke 6/6/2021    Acute respiratory failure with hypoxia     Age-related osteoporosis without current pathological fracture 3/8/2021    Anemia of chronic kidney failure, stage 4 (severe) 4/5/2019    Cataracts, bilateral     CHF (congestive heart failure)     CKD (chronic kidney disease) stage 3, GFR 30-59 ml/min     CKD (chronic kidney disease) stage 3, GFR 30-59 ml/min     Controlled type 2 diabetes mellitus with proteinuria or albuminuria     Depression     Diabetes with neurologic complications     Diabetic retinopathy of both eyes     Edema     Glaucoma     History of colonic polyps     Hx-TIA (transient ischemic attack) 11/2008    Hyperlipidemia LDL goal < 100     Hypertension     Hypothyroidism     Major depressive disorder, single episode, mild 2/17/2016    Mixed incontinence urge and stress     Obesity     Obstructive sleep apnea on CPAP     7/19/19:  Home CPAP machine broken, per patient & son    Osteopenia     Proteinuria     Sickle cell trait     Strabismus     TIA (transient ischemic attack)     Trouble in sleeping     Type 2 diabetes mellitus with ophthalmic manifestations     Type 2 diabetes with stage 3 chronic kidney disease GFR 30-59     Type II or unspecified type diabetes mellitus with renal manifestations, uncontrolled(250.42)     Uncontrolled type 2 diabetes mellitus with peripheral circulatory disorder 4/5/2019    Urge incontinence 1/11/2016    Urge incontinence     Venous stasis ulcer     bilateral lower legs    Vitamin D  deficiency disease      Past Surgical History:   Procedure Laterality Date    AV FISTULA PLACEMENT Left 2019    Procedure: CREATION, AV FISTULA, LEFT UPPER EXTREMITY;  Surgeon: Keron Perez MD;  Location: [facility] OR;  Service: Vascular;  Laterality: Left;    BREAST BIOPSY      breast reduction Bilateral age 31    BREAST SURGERY      CATARACT EXTRACTION Bilateral     cataracts Bilateral      SECTION, LOW TRANSVERSE      x1    CHOLECYSTECTOMY      COLONOSCOPY N/A 2022    Procedure: COLONOSCOPY;  Surgeon: Mahesh Huang MD;  Location: [facility] ENDO (2ND FLR);  Service: Endoscopy;  Laterality: N/A;  fully vaccinated-sm.  RAPID COVID  +cologuard needing ASAP  Dialysis pt T,TH Sat and left UA access  2nd floor - cardiac/dialysis/SOB / LABS / prep ins. emailed - ERW    EYE SURGERY  2014, 2014    vitrectomy    EYE SURGERY Right 2016    FISTULOGRAM Left 3/6/2020    Procedure: Fistulogram, left upper extremity, with branch ligation;  Surgeon: Keron Perez MD;  Location: [facility] OR [address] [place] [address];  Service: Vascular;  Laterality: Left;  Time 1.1 Minute  42.10 mGy    FISTULOGRAM Left 2020    Procedure: Fistulogram, left upper extremity, transradial access with possible intervention;  Surgeon: Keron Perez MD;  Location: [facility] OR [address] [place] [address];  Service: Vascular;  Laterality: Left;    FISTULOGRAM Left 2020    Procedure: Fistulogram, left upper extremity, possible intervention;  Surgeon: Keron Perez MD;  Location: [facility] OR;  Service: Vascular;  Laterality: Left;  930 AM START  RN PRE OP  ---COVID NEGATIVE ON  2020. [place]    FISTULOGRAM Left 2022    Procedure: Fistulogram, transradial access;  Surgeon: Keron Perez MD;  [facility];  Service: Vascular;  Laterality: Left;  mgy-40.16  gycm-8.3905  contrast-34cc  time-12.4min    HYSTERECTOMY  1986    TAHBSO (patient is unsure if ovaries removed)    OOPHORECTOMY      PERCUTANEOUS TRANSLUMINAL ANGIOPLASTY  OF ARTERIOVENOUS FISTULA Left 7/21/2020    Procedure: PTA, AV FISTULA;  Surgeon: Keron Perez MD;  [facility];  Service: Vascular;  Laterality: Left;  15.9 minutes of fluro  41.12  mGy  7.9060 Gy cm2  32ml  contrast    PHLEBOGRAPHY Left 7/21/2020    Procedure: CENTRAL VENOGRAM;  Surgeon: Keron Perez MD;  [facility];  Service: Vascular;  Laterality: Left;    REFRACTIVE SURGERY      TOTAL REDUCTION MAMMOPLASTY      approx 10 yrs ago    VENOPLASTY  1/21/2022    Procedure: ANGIOPLASTY, VEIN;  Surgeon: Keron Perez MD;  Location: [facility] OR [address] [place] [address];  Service: Vascular;;     Family History   Problem Relation Age of Onset    Stroke Mother     Diabetes Mother     Hypertension Mother     Cataracts Mother     Leukemia Father     Cataracts Father     Ovarian cancer Sister 35    Achondroplasia Sister     HIV Brother     No Known Problems Daughter     No Known Problems Son     Breast cancer Maternal Aunt 65    Parkinsonism Maternal Aunt     Esophageal cancer Maternal Uncle         smoker    No Known Problems Paternal Aunt     Cataracts Paternal Uncle     Cataracts Maternal Grandmother     Cataracts Maternal Grandfather     Diabetes Paternal Grandmother     Cataracts Paternal Grandmother     No Known Problems Paternal Grandfather     No Known Problems Other     Amblyopia Neg Hx     Blindness Neg Hx     Glaucoma Neg Hx     Macular degeneration Neg Hx     Retinal detachment Neg Hx     Strabismus Neg Hx     Thyroid disease Neg Hx     Colon cancer Neg Hx     Cancer Neg Hx      Social History     Socioeconomic History    Marital status: Single    Number of children: 5   Occupational History    Occupation:      Employer: [employer]     Comment: part-time   Tobacco Use    Smoking status: Never    Smokeless tobacco: Never   Substance and Sexual Activity    Alcohol use: No     Alcohol/week: 0.0 standard drinks of alcohol    Drug use: No    Sexual activity: Not  Currently     Partners: Male     Birth control/protection: Post-menopausal, Surgical     Social Determinants of Health     Financial Resource Strain: Low Risk  (6/9/2023)    Overall Financial Resource Strain (CARDIA)     Difficulty of Paying Living Expenses: Not hard at all   Food Insecurity: No Food Insecurity (6/9/2023)    Hunger Vital Sign     Worried About Running Out of Food in the Last Year: Never true     Ran Out of Food in the Last Year: Never true   Transportation Needs: No Transportation Needs (6/9/2023)    PRAPARE - Transportation     Lack of Transportation (Medical): No     Lack of Transportation (Non-Medical): No   Physical Activity: Insufficiently Active (6/9/2023)    Exercise Vital Sign     Days of Exercise per Week: 2 days     Minutes of Exercise per Session: 10 min   Stress: No Stress Concern Present (6/9/2023)    [place] [employer] - Occupational Stress Questionnaire     Feeling of Stress : Only a little   Social Connections: Socially Isolated (6/9/2023)    Social Connection and Isolation Panel [NHANES]     Frequency of Communication with Friends and Family: More than three times a week     Frequency of Social Gatherings with Friends and Family: Twice a week     Attends Yarsanism Services: Never     Active Member of Clubs or Organizations: No     Attends Club or Organization Meetings: Never     Marital Status:    Housing Stability: Low Risk  (6/9/2023)    Housing Stability Vital Sign     Unable to Pay for Housing in the Last Year: No     Number of Places Lived in the Last Year: 1     Unstable Housing in the Last Year: No       Current Outpatient Medications:     ACCU-CHEK SOFT DEV LANCETS Kit, , Disp: , Rfl:     aspirin (ECOTRIN) 81 MG EC tablet, Take 1 tablet (81 mg total) by mouth once daily., Disp: 90 tablet, Rfl: 3    atorvastatin (LIPITOR) 80 MG tablet, TAKE 1 TABLET (80 MG TOTAL) BY MOUTH EVERY EVENING., Disp: 90 tablet, Rfl: 3    blood sugar diagnostic Strp, One  test strip use 2 times a day to check blood glucose,  ICD-10: E11.9, compatible with insurance/glucometer, Disp: 100 each, Rfl: 11    blood-glucose meter kit, One glucometer, use to check blood glucose.   ICD-10: E11.9. Dispense machine covered by insurance, Disp: 1 each, Rfl: 0    cinacalcet (SENSIPAR) 30 MG Tab, , Disp: , Rfl:     docusate sodium (COLACE) 100 MG capsule, Take 200 mg by mouth once daily. , Disp: , Rfl:     doxercalciferoL (HECTOROL) 4 mcg/2 mL injection, Inject 4.5 mcg into the vein 3 (three) times a week. At dialysis unit, Disp: , Rfl:     dulaglutide (TRULICITY) 1.5 mg/0.5 mL pen injector, Inject 1.5 mg into the skin every 7 days., Disp: 12 pen , Rfl: 3    epoetin arnel (EPOGEN INJ), Inject 1,000 Units as directed every 7 days. At the dialysis unit, Disp: , Rfl:     fluticasone propionate (FLONASE) 50 mcg/actuation nasal spray, 1 spray (50 mcg total) by Each Nostril route once daily., Disp: 48 g, Rfl: 5    LANCETS MISC, , Disp: , Rfl:     lancets Misc, One lancets use 2 times a day to check blood glucose, ICD-10: E11.9, Disp: 100 each, Rfl: 11    lancing device Misc, One device, used to check blood glucose, ICD-10: E11.9, Disp: 1 each, Rfl: 0    levothyroxine (SYNTHROID) 50 MCG tablet, TAKE 1 TABLET BEFORE BREAKFAST, Disp: 90 tablet, Rfl: 3    midodrine (PROAMATINE) 5 MG Tab, Take 1 tablet (5 mg total) by mouth 3 (three) times daily., Disp: 90 tablet, Rfl: 3    OLENA-NABIL RX 1- mg-mg-mcg Tab, Take 1 tablet by mouth once daily., Disp: , Rfl:     sevelamer carbonate (RENVELA) 800 mg Tab, Take 3 tablets (2,400 mg total) by mouth 3 (three) times daily with meals., Disp: 270 tablet, Rfl: 11    REVIEW OF SYSTEMS:  General: No fevers or chills; ENT: No sore throat; Allergy and Immunology: no persistent infections; Hematological and Lymphatic: No history of bleeding or easy bruising; Endocrine: negative; Respiratory: no cough, shortness of breath, or wheezing; Cardiovascular: no chest pain or dyspnea  "on exertion; Gastrointestinal: no abdominal pain/back, change in bowel habits, or bloody stools; Genito-Urinary: no dysuria, trouble voiding, or hematuria; Musculoskeletal: negative; Neurological: no TIA or stroke symptoms; Psychiatric: no nervousness, anxiety or depression.    PHYSICAL EXAM:                General appearance:  Alert, well-appearing, and in no distress.  Oriented to person, place, and time                    Neurological: Normal speech, no focal findings noted; CN II - XII grossly intact. All extremities with sensation to light touch.            Musculoskeletal: Digits/nail without cyanosis/clubbing.  Strength 5/5 all extremities.                    Neck: Supple, no significant adenopathy, no carotid bruit can be auscultated                  Chest:  Clear to auscultation, no wheezes, rales or rhonchi, symmetric air entry. No use of accessory muscles               Cardiac: Normal rate and regular rhythm, S1 and S2 normal            Abdomen: Soft, nontender, nondistended, no masses or organomegaly, no hernia     No rebound tenderness noted; bowel sounds normal     Pulsatile aortic mass is non palpable.     No groin adenopathy      Extremities:      2+ radial pulse bilaterally, LUE AVF +thrill, inc well healed, 2 PSA with dry scabs, no ulcerations     No BUE edema, Negative Manuel's test BUE    Skin: No tissue loss    LAB RESULTS:  No results found for: "CBC"  Lab Results   Component Value Date    LABPROT 10.3 06/07/2021    INR 1.0 06/07/2021     Lab Results   Component Value Date     10/01/2023    K 3.8 10/01/2023     10/01/2023    CO2 26 10/01/2023    GLU 91 10/01/2023    BUN 29 (H) 10/01/2023    CREATININE 7.6 (H) 10/01/2023    CALCIUM 8.6 (L) 10/01/2023    ANIONGAP 14 10/01/2023    EGFRNONAA 3.6 (A) 05/07/2022     Lab Results   Component Value Date    WBC 6.54 10/01/2023    RBC 3.84 (L) 10/01/2023    HGB 10.4 (L) 10/01/2023    HCT 32.7 (L) 10/01/2023    MCV 85 10/01/2023    MCH 27.1 " 10/01/2023    MCHC 31.8 (L) 10/01/2023    RDW 13.8 10/01/2023     10/01/2023    MPV 11.4 10/01/2023    GRAN 4.6 10/01/2023    GRAN 70.1 10/01/2023    LYMPH 0.7 (L) 10/01/2023    LYMPH 11.2 (L) 10/01/2023    MONO 1.1 (H) 10/01/2023    MONO 16.4 (H) 10/01/2023    EOS 0.1 10/01/2023    BASO 0.02 10/01/2023    EOSINOPHIL 1.4 10/01/2023    BASOPHIL 0.3 10/01/2023    DIFFMETHOD Automated 10/01/2023     .  Lab Results   Component Value Date    HGBA1C 5.5 09/14/2023       IMAGING:  All pertinent imaging has been reviewed and interpreted independently.    Vein mapping 9/2019:  Adequate R superior basilic vein and axillary vein ; Adequate L basilic vein superior and inferior, adequate L axillary     1/27/20 Left upper extremity dialysis ultrasound shows no hemodynamically significant stenosis.  Flow is 1083 ml/min.  Depth and diameter are appropriate.    3/23/20:  Left upper extremity dialysis ultrasound shows anastomotic and proximal fistula hemodynamically significant stenosis.  Flow is 955 ml/min.      7/2020: Left upper extremity dialysis ultrasound shows proximal fistula hemodynamically significant stenosis.  Flow is 1257 ml/min.      8/2020: Left upper extremity dialysis ultrasound shows proximal hemodynamically significant stenosis.  Flow is 1999 ml/min.      8/31/20: Left upper extremity dialysis ultrasound shows proximal fistula hemodynamically significant stenosis.  Flow is 2209 ml/min.      10/2020:  Prox stenosis, flow > 3000 ml/min    1/2021:  Proximal stenosis, flow 4414 ml/min    4/2021:  HD US reviewed without significant stenosis, adequate flow.    8/2021:HD US reviewed without significant stenosis, adequate flow.    3/2022:  Left upper extremity dialysis ultrasound shows approx 50% anastomotic and prox fistula stenosis withou hemodynamically significant stenosis.  Flow is 3774 ml/min.      6/2022:  No significant stenosis     04/2023: No significant stenosis. Flow 4,086 ml/min    10/2023:  Left upper  extremity dialysis ultrasound shows approx 50% anastomotic stenosis.  Flow is 5000 ml/min.        IMP/PLAN:  78 y.o. female with   Patient Active Problem List   Diagnosis    Severe obesity (BMI 35.0-39.9) with comorbidity    Sickle cell trait    Hyperlipidemia LDL goal <100    HUMBERTO on CPAP    Hypothyroidism (acquired)    Hx-TIA (transient ischemic attack)    Vitamin D deficiency disease    Pulmonary hypertension    Type 2 diabetes mellitus with ESRD (end-stage renal disease)    Secondary renal hyperparathyroidism    Incomplete bladder emptying    Postmenopausal atrophic vaginitis    Rectocele    Mixed incontinence urge and stress    Chronic diastolic heart failure    Tortuous aorta    ESRD on hemodialysis    Anemia of chronic disease    Debility    Chronic respiratory failure    Type 2 diabetes mellitus with foot ulcer, with long-term current use of insulin    Stable proliferative diabetic retinopathy associated with type 2 diabetes mellitus    Heart failure with preserved ejection fraction    Pseudophakia    Chronic kidney disease-mineral and bone disorder    Age-related osteoporosis without current pathological fracture    H/O: stroke    Walker as ambulation aid    being managed by PCP and specialists who is here today for evaluation of long term HD access s/p L RC AV fistula.    -s/p L RC AV fistula with proximal fistula measuring 5 mm 1/13/20, adequate flow without issues during HD s/p LUE fistulogram, central venogram, ligation of medial side branch cephalic vein, ligation of lateral side branch cephalic vein 3/2/20 with prox AVF stenosis s/p L proximal fistula angioplasty 7/21/20 with persistent flow issues s/p proximal angioplasty 8/19/20 with scoring balloon/DCB with improvement in stenosis/flow and asymptomatic anastomotic stenosis with no further issues with HD  -Cont renal diet  -RTC 4 weeks to eval PSA skin; RTC 3 mo with HD US for continued routine surveillance    I spent 11 minutes evaluating this  patient and greater than 50% of the time was spent counseling, coordinator care and discussing the plan of care.  All questions were answered and patient stated understanding with agreement with the above treatment plan.    Keron Perez MD  Vascular and Endovascular Surgery

## 2023-11-03 ENCOUNTER — APPOINTMENT (OUTPATIENT)
Dept: RADIOLOGY | Facility: HOSPITAL | Age: 78
End: 2023-11-03
Payer: MEDICARE

## 2023-11-03 ENCOUNTER — HOSPITAL ENCOUNTER (EMERGENCY)
Facility: HOSPITAL | Age: 78
Discharge: HOME | End: 2023-11-03
Attending: EMERGENCY MEDICINE
Payer: MEDICARE

## 2023-11-03 VITALS
SYSTOLIC BLOOD PRESSURE: 155 MMHG | HEART RATE: 79 BPM | DIASTOLIC BLOOD PRESSURE: 87 MMHG | RESPIRATION RATE: 17 BRPM | OXYGEN SATURATION: 94 % | TEMPERATURE: 99 F

## 2023-11-03 DIAGNOSIS — L03.116 CELLULITIS OF LEFT LOWER EXTREMITY: Primary | ICD-10-CM

## 2023-11-03 DIAGNOSIS — M79.605 LEFT LEG PAIN: ICD-10-CM

## 2023-11-03 PROCEDURE — 73590 XR TIBIA FIBULA 2 VIEW LEFT: ICD-10-PCS | Mod: 26,LT,, | Performed by: RADIOLOGY

## 2023-11-03 PROCEDURE — 99284 EMERGENCY DEPT VISIT MOD MDM: CPT | Mod: 25,ER

## 2023-11-03 PROCEDURE — 73590 X-RAY EXAM OF LOWER LEG: CPT | Mod: TC,FY,LT

## 2023-11-03 PROCEDURE — 73590 X-RAY EXAM OF LOWER LEG: CPT | Mod: 26,LT,, | Performed by: RADIOLOGY

## 2023-11-03 RX ORDER — MUPIROCIN 20 MG/G
OINTMENT TOPICAL 3 TIMES DAILY
Qty: 22 G | Refills: 0 | Status: SHIPPED | OUTPATIENT
Start: 2023-11-03

## 2023-11-03 RX ORDER — CLINDAMYCIN HYDROCHLORIDE 300 MG/1
300 CAPSULE ORAL 4 TIMES DAILY
Qty: 28 CAPSULE | Refills: 0 | Status: SHIPPED | OUTPATIENT
Start: 2023-11-03 | End: 2023-11-10

## 2023-11-03 NOTE — DISCHARGE INSTRUCTIONS
You may use clindamycin as prescribed 4 times daily for cellulitis.  You may use topical mupirocin as prescribed.  Please follow-up with your primary care provider within 1 week.  Please return to the emergency department for any worsening symptoms.  Thank you for coming to our Emergency Department today. It is important to remember that some problems are difficult to diagnose and may not be found during your Emergency Department visit. Be sure to follow up with your primary care doctor and review all labs/imaging/tests that were performed during this visit with them. Some labs/tests may be outside of the normal range and require non-emergent follow-up and further investigation to help diagnose/exclude/prevent complications or other medical conditions.    If you do not have a primary care doctor, you may contact the one listed on your discharge paperwork or you may also call the [facility] Appointment Desk at [phone] to schedule an appointment and establish care with one. It is important to your health that you have a primary care doctor.    Please take all medications as directed. All medications may potentially have side-effects and it is impossible to predict which medications may give you side-effects or what side-effects (if any) they will give you.. If you feel that you are having a negative effect or side-effect of any medication you should immediately stop taking them and seek medical attention. If you feel that you are having a life-threatening reaction call 911.    Return to the ER with any questions/concerns, new/concerning symptoms, worsening or failure to improve.     Do not drive, swim, climb to height, take a bath or make any important decisions for 24 hours if you have received any pain medications, sedatives or mood altering drugs during your ER visit.

## 2023-11-03 NOTE — ED PROVIDER NOTES
"Encounter Date: 11/3/2023    SCRIBE #1 NOTE: I, Bryant Guerra, am scribing for, and in the presence of,  LUIS DANIEL Mercado. I have scribed the following portions of the note - Other sections scribed: HPI, ROS.       History     Chief Complaint   Patient presents with    Leg Injury     Pt states she has a spot on her LLE that she is concerned it turning into a diabetic wound. Pt has bilateral lower extremity edema noted.      CC: Left leg pain    HPI: Erica Leblanc is a 78 y.o. female with a past medical history of hypertension, diabetes, chronic kidney disease currently on dialysis 3 times weekly who presents to the emergency department for evaluation of worsening left leg pain with redness x 1 week. Pt complains of associated left leg swelling, numbness, paresthesia, and redness. Pt believes symptoms are due to scratch on her left leg that she noticed over a week ago. Pt denies recent surgeries or long travels but states " that's all I do is sit 24/7." Pt denies hx of blood clots. Pt notes she has dyspnea at baseline that always improves after dialysis. Pt denies nausea, vomiting, diarrhea, or abdominal pain. Pt denies fever, chills, headache, chest pain, or cough.     The history is provided by the patient. No  was used.     Review of patient's allergies indicates:   Allergen Reactions    Ace inhibitors Other (See Comments)     Other reaction(s): cough     Past Medical History:   Diagnosis Date    Acute ischemic right PCA stroke 6/6/2021    Acute respiratory failure with hypoxia     Age-related osteoporosis without current pathological fracture 3/8/2021    Anemia of chronic kidney failure, stage 4 (severe) 4/5/2019    Cataracts, bilateral     CHF (congestive heart failure)     CKD (chronic kidney disease) stage 3, GFR 30-59 ml/min     CKD (chronic kidney disease) stage 3, GFR 30-59 ml/min     Controlled type 2 diabetes mellitus with proteinuria or albuminuria     Depression     Diabetes " with neurologic complications     Diabetic retinopathy of both eyes     Edema     Glaucoma     History of colonic polyps     Hx-TIA (transient ischemic attack) 2008    Hyperlipidemia LDL goal < 100     Hypertension     Hypothyroidism     Major depressive disorder, single episode, mild 2016    Mixed incontinence urge and stress     Obesity     Obstructive sleep apnea on CPAP     19:  Home CPAP machine broken, per patient & son    Osteopenia     Proteinuria     Sickle cell trait     Strabismus     TIA (transient ischemic attack)     Trouble in sleeping     Type 2 diabetes mellitus with ophthalmic manifestations     Type 2 diabetes with stage 3 chronic kidney disease GFR 30-59     Type II or unspecified type diabetes mellitus with renal manifestations, uncontrolled(250.42)     Uncontrolled type 2 diabetes mellitus with peripheral circulatory disorder 2019    Urge incontinence 2016    Urge incontinence     Venous stasis ulcer     bilateral lower legs    Vitamin D deficiency disease      Past Surgical History:   Procedure Laterality Date    AV FISTULA PLACEMENT Left 2019    Procedure: CREATION, AV FISTULA, LEFT UPPER EXTREMITY;  Surgeon: Keron Perez MD;  Location: [facility];  Service: Vascular;  Laterality: Left;    BREAST BIOPSY      breast reduction Bilateral age 31    BREAST SURGERY      CATARACT EXTRACTION Bilateral     cataracts Bilateral      SECTION, LOW TRANSVERSE      x1    CHOLECYSTECTOMY      COLONOSCOPY N/A 2022    Procedure: COLONOSCOPY;  Surgeon: Mahesh uHang MD;  Location: [facility] ([address] [place] [address]);  Service: Endoscopy;  Laterality: N/A;  fully vaccinated-sm.  RAPID COVID  +cologuard needing ASAP  Dialysis pt T,TH Sat and left UA access  2nd floor - cardiac/dialysis/SOB / LABS / prep ins. emailed - ERW    EYE SURGERY  2014, 2014    vitrectomy    EYE SURGERY Right 2016    FISTULOGRAM Left 3/6/2020    Procedure: Fistulogram, left upper extremity,  with branch ligation;  Surgeon: Keron Perez MD;  Location: [address];  Service: Vascular;  Laterality: Left;  Time 1.1 Minute  42.10 mGy    FISTULOGRAM Left 7/21/2020    Procedure: Fistulogram, left upper extremity, transradial access with possible intervention;  Surgeon: Keron Perez MD;  Location: [address];  Service: Vascular;  Laterality: Left;    FISTULOGRAM Left 8/19/2020    Procedure: Fistulogram, left upper extremity, possible intervention;  Surgeon: Keron Perez MD;  Location: [facility];  Service: Vascular;  Laterality: Left;  930 AM START  RN PRE OP  ---COVID NEGATIVE ON  8-. CA    FISTULOGRAM Left 1/21/2022    Procedure: Fistulogram, transradial access;  Surgeon: Keron Perez MD;  Location: [address];  Service: Vascular;  Laterality: Left;  mgy-40.16  gycm-8.3905  contrast-34cc  time-12.4min    HYSTERECTOMY  1986    TAHBSO (patient is unsure if ovaries removed)    OOPHORECTOMY      PERCUTANEOUS TRANSLUMINAL ANGIOPLASTY OF ARTERIOVENOUS FISTULA Left 7/21/2020    Procedure: PTA, AV FISTULA;  Surgeon: Keron Perez MD;  Location: [address];  Service: Vascular;  Laterality: Left;  15.9 minutes of fluro  41.12  mGy  7.9060 Gy cm2  32ml  contrast    PHLEBOGRAPHY Left 7/21/2020    Procedure: CENTRAL VENOGRAM;  Surgeon: Keron Perez MD;  Location: [address];  Service: Vascular;  Laterality: Left;    REFRACTIVE SURGERY      TOTAL REDUCTION MAMMOPLASTY      approx 10 yrs ago    VENOPLASTY  1/21/2022    Procedure: ANGIOPLASTY, VEIN;  Surgeon: Keron Perez MD;  Location: [facility] OR [address] [place] [address];  Service: Vascular;;     Family History   Problem Relation Age of Onset    Stroke Mother     Diabetes Mother     Hypertension Mother     Cataracts Mother     Leukemia Father     Cataracts Father     Ovarian cancer Sister 35    Achondroplasia Sister     HIV Brother     No Known Problems Daughter     No Known Problems Son     Breast cancer  Maternal Aunt 65    Parkinsonism Maternal Aunt     Esophageal cancer Maternal Uncle         smoker    No Known Problems Paternal Aunt     Cataracts Paternal Uncle     Cataracts Maternal Grandmother     Cataracts Maternal Grandfather     Diabetes Paternal Grandmother     Cataracts Paternal Grandmother     No Known Problems Paternal Grandfather     No Known Problems Other     Amblyopia Neg Hx     Blindness Neg Hx     Glaucoma Neg Hx     Macular degeneration Neg Hx     Retinal detachment Neg Hx     Strabismus Neg Hx     Thyroid disease Neg Hx     Colon cancer Neg Hx     Cancer Neg Hx      Social History     Tobacco Use    Smoking status: Never    Smokeless tobacco: Never   Substance Use Topics    Alcohol use: No     Alcohol/week: 0.0 standard drinks of alcohol    Drug use: No     Review of Systems   Constitutional:  Negative for chills and fever.   HENT:  Negative for congestion and rhinorrhea.    Respiratory:  Negative for cough and shortness of breath.    Cardiovascular:  Negative for chest pain.   Gastrointestinal:  Negative for abdominal pain, diarrhea, nausea and vomiting.   Musculoskeletal:  Positive for arthralgias and joint swelling.   Skin:  Positive for color change.   Neurological:  Positive for numbness. Negative for headaches.       Physical Exam     Initial Vitals [11/03/23 1528]   BP Pulse Resp Temp SpO2   (!) 169/97 89 18 98.9 °F (37.2 °C) (!) 92 %      MAP       --         Physical Exam    Nursing note and vitals reviewed.  Constitutional: She appears well-developed and well-nourished.   HENT:   Head: Normocephalic and atraumatic.   Right Ear: External ear normal.   Left Ear: External ear normal.   Neck:   Normal range of motion.  Cardiovascular:  Normal rate, regular rhythm, normal heart sounds and intact distal pulses.     Exam reveals no gallop and no friction rub.       No murmur heard.  Pulses:       Dorsalis pedis pulses are 2+ on the right side and 2+ on the left side.   Pulmonary/Chest: Breath  sounds normal. No respiratory distress. She has no wheezes. She has no rhonchi. She has no rales.   Abdominal: Abdomen is soft. Bowel sounds are normal. She exhibits no distension. There is no abdominal tenderness. There is no rebound and no guarding.   Musculoskeletal:         General: Normal range of motion.      Cervical back: Normal range of motion.      Comments: Mild swelling noted to the left lower extremity with overlying erythema noted to the lateral aspect of the shin compared to the contralateral lower extremity. Neurovascularly intact.  Please see image attached below for direct visualization.     Neurological: She is alert and oriented to person, place, and time. GCS score is 15. GCS eye subscore is 4. GCS verbal subscore is 5. GCS motor subscore is 6.   Skin: Capillary refill takes less than 2 seconds.   Psychiatric: She has a normal mood and affect.           ED Course   Procedures  Labs Reviewed - No data to display       Imaging Results              US Lower Extremity Veins Left (Final result)  Result time 11/03/23 17:29:22      Final result by Hernán Gomez MD (11/03/23 17:29:22)                   Impression:      The left calf veins were not confidently identified which may be related to technical factors versus thrombosis/DVT.  Further evaluation/follow-up as warranted.    Otherwise, no evidence of DVT above the knee.      Electronically signed by: Hernán Gomez MD  Date:    11/03/2023  Time:    17:29               Narrative:    EXAMINATION:  US LOWER EXTREMITY VEINS LEFT    CLINICAL HISTORY:  Pain in left leg    TECHNIQUE:  Duplex and color flow Doppler evaluation and graded compression of the left lower extremity veins was performed.    COMPARISON:  None    FINDINGS:  Left thigh veins: The common femoral, femoral, popliteal, upper greater saphenous, and deep femoral veins are patent and free of thrombus. The veins are normally compressible and have normal phasic flow and augmentation  response.    Left calf veins: The peroneal, anterior tibial and posterior tibial veins were not identified.    Contralateral CFV: The contralateral (right) common femoral vein is patent and free of thrombus.    Miscellaneous: Scattered nonspecific subcutaneous edema below the left knee.                                       X-Ray Tibia Fibula 2 View Left (Final result)  Result time 11/03/23 16:32:27      Final result by Hernán Gomez MD (11/03/23 16:32:27)                   Impression:      Left lower leg and ankle diffuse nonspecific soft tissue swelling from edema or cellulitis, without acute displaced fracture-dislocation identified.      Electronically signed by: Hernán Gomez MD  Date:    11/03/2023  Time:    16:32               Narrative:    EXAMINATION:  XR TIBIA FIBULA 2 VIEW LEFT    CLINICAL HISTORY:  Pain in left leg    TECHNIQUE:  AP and lateral views of the left tibia and fibula were performed.    COMPARISON:  None.    FINDINGS:  Diffuse soft tissue swelling with subcutaneous stranding about the left lower leg and ankle from edema or cellulitis.  Few scattered nonspecific soft tissue calcifications.  Scattered advanced atherosclerotic vascular calcifications noted.  No subcutaneous emphysema or radiodense retained foreign body.    Generalized osteopenia.  Overall alignment is within normal limits.  No displaced fracture, dislocation or destructive osseous process.  Age-related degenerative change at the imaged knee and ankle.                                       Medications - No data to display  Medical Decision Making  This is an emergent evaluation of a 78-year-old female with a past medical history of hypertension, diabetes, chronic kidney disease currently on dialysis 3 times weekly who presents to the emergency department for evaluation of worsening left leg pain with redness x 1 week.  Physical exam reveals Mild swelling noted to the left lower extremity with overlying erythema noted to the  lateral aspect of the shin compared to the contralateral lower extremity. Neurovascularly intact.  Please see image attached below for direct visualization. Regular rate rhythm without murmurs. Lungs are clear to auscultation bilaterally.  Abdomen is soft, nontender, non distended, with normal bowel sounds.  Differential diagnosis includes but is not limited to DVT, cellulitis, osteomyelitis.  Workup initiated with x-ray of left tibia fibula, ultrasound of left lower extremity veins.  X-ray of left tibia fibula shows no acute fracture or dislocation, does show mild soft tissue swelling consistent with cellulitis.  Ultrasound shows no evidence for acute DVT.  Will treat for cellulitis.  Patient does have history of chronic kidney disease and is currently on dialysis, so I am a little limited with treatment.  Will send home with clindamycin and topical mupirocin.  I provided strict return precautions with the patient if any worsening symptoms. Patient is very well appearing, and in no acute distress. Vital signs are reassuring here in the emergency department, patient is afebrile, breathing comfortable, satting 92 % on room air. Patient is stable for discharge at this time. Patient verbalizes understanding of care plan. All questions and concerns were addressed. Discussed strict return precautions with the patient. Instructed follow up with primary care provider within 1 week.     Gordy Atwood PA-C    DISCLAIMER: This note was prepared with [employer] ([employer] voice recognition transcription software. Garbled syntax, mangled pronouns, and other bizarre constructions may be attributed to that software system.      Amount and/or Complexity of Data Reviewed  Radiology: ordered.    Risk  Prescription drug management.            Scribe Attestation:   Scribe #1: I performed the above scribed service and the documentation accurately describes the services I performed. I attest to the accuracy of the note.                      Scribe  attestation: I, Gordy Atwood PA-C, personally performed the services described in this documentation.  All medical record entries made by the scribe were at my direction and in my presence.  I have reviewed the chart and agree that the record reflects my personal performance and is accurate and complete.    Clinical Impression:   Final diagnoses:  [M79.605] Left leg pain  [L03.116] Cellulitis of left lower extremity (Primary)        ED Disposition Condition    Discharge Stable          ED Prescriptions       Medication Sig Dispense Start Date End Date Auth. Provider    clindamycin (CLEOCIN) 300 MG capsule Take 1 capsule (300 mg total) by mouth 4 (four) times daily. for 7 days 28 capsule 11/3/2023 11/10/2023 Gordy Atwood PA-C    mupirocin (BACTROBAN) 2 % ointment Apply topically 3 (three) times daily. 22 g 11/3/2023 -- Gordy Atwood PA-C          Follow-up Information       Follow up With Specialties Details Why Contact Info    Sendy Elaine MD Internal Medicine, Pediatrics   [address] [place] [address]  [place] [address]  [phone]      [facility] - Emergency Dept Emergency Medicine Go to  Go to [facility] emergency department on [facility] if symptoms worsen or do not resolve, If symptoms worsen [address] [facility] [address] [phone]             Gordy Atwood PA-C  11/03/23 8561

## 2023-11-07 ENCOUNTER — PATIENT OUTREACH (OUTPATIENT)
Dept: EMERGENCY MEDICINE | Facility: HOSPITAL | Age: 78
End: 2023-11-07
Payer: MEDICARE

## 2023-11-08 ENCOUNTER — OFFICE VISIT (OUTPATIENT)
Dept: PODIATRY | Facility: CLINIC | Age: 78
End: 2023-11-08
Payer: MEDICARE

## 2023-11-08 VITALS
HEIGHT: 65 IN | DIASTOLIC BLOOD PRESSURE: 80 MMHG | HEART RATE: 89 BPM | SYSTOLIC BLOOD PRESSURE: 159 MMHG | BODY MASS INDEX: 34.89 KG/M2 | WEIGHT: 209.44 LBS

## 2023-11-08 DIAGNOSIS — E11.22 TYPE 2 DIABETES MELLITUS WITH ESRD (END-STAGE RENAL DISEASE): Primary | ICD-10-CM

## 2023-11-08 DIAGNOSIS — N18.6 TYPE 2 DIABETES MELLITUS WITH ESRD (END-STAGE RENAL DISEASE): Primary | ICD-10-CM

## 2023-11-08 DIAGNOSIS — R60.0 LOWER EXTREMITY EDEMA: ICD-10-CM

## 2023-11-08 PROCEDURE — 1159F PR MEDICATION LIST DOCUMENTED IN MEDICAL RECORD: ICD-10-PCS | Mod: CPTII,S$GLB,, | Performed by: PODIATRIST

## 2023-11-08 PROCEDURE — 99999 PR PBB SHADOW E&M-EST. PATIENT-LVL IV: ICD-10-PCS | Mod: PBBFAC,,, | Performed by: PODIATRIST

## 2023-11-08 PROCEDURE — 1101F PR PT FALLS ASSESS DOC 0-1 FALLS W/OUT INJ PAST YR: ICD-10-PCS | Mod: CPTII,S$GLB,, | Performed by: PODIATRIST

## 2023-11-08 PROCEDURE — 3288F FALL RISK ASSESSMENT DOCD: CPT | Mod: CPTII,S$GLB,, | Performed by: PODIATRIST

## 2023-11-08 PROCEDURE — 1159F MED LIST DOCD IN RCRD: CPT | Mod: CPTII,S$GLB,, | Performed by: PODIATRIST

## 2023-11-08 PROCEDURE — 1125F PR PAIN SEVERITY QUANTIFIED, PAIN PRESENT: ICD-10-PCS | Mod: CPTII,S$GLB,, | Performed by: PODIATRIST

## 2023-11-08 PROCEDURE — 1125F AMNT PAIN NOTED PAIN PRSNT: CPT | Mod: CPTII,S$GLB,, | Performed by: PODIATRIST

## 2023-11-08 PROCEDURE — 99213 PR OFFICE/OUTPT VISIT, EST, LEVL III, 20-29 MIN: ICD-10-PCS | Mod: S$GLB,,, | Performed by: PODIATRIST

## 2023-11-08 PROCEDURE — 1101F PT FALLS ASSESS-DOCD LE1/YR: CPT | Mod: CPTII,S$GLB,, | Performed by: PODIATRIST

## 2023-11-08 PROCEDURE — 3077F PR MOST RECENT SYSTOLIC BLOOD PRESSURE >= 140 MM HG: ICD-10-PCS | Mod: CPTII,S$GLB,, | Performed by: PODIATRIST

## 2023-11-08 PROCEDURE — 3079F DIAST BP 80-89 MM HG: CPT | Mod: CPTII,S$GLB,, | Performed by: PODIATRIST

## 2023-11-08 PROCEDURE — 3077F SYST BP >= 140 MM HG: CPT | Mod: CPTII,S$GLB,, | Performed by: PODIATRIST

## 2023-11-08 PROCEDURE — 99213 OFFICE O/P EST LOW 20 MIN: CPT | Mod: S$GLB,,, | Performed by: PODIATRIST

## 2023-11-08 PROCEDURE — 3079F PR MOST RECENT DIASTOLIC BLOOD PRESSURE 80-89 MM HG: ICD-10-PCS | Mod: CPTII,S$GLB,, | Performed by: PODIATRIST

## 2023-11-08 PROCEDURE — 3288F PR FALLS RISK ASSESSMENT DOCUMENTED: ICD-10-PCS | Mod: CPTII,S$GLB,, | Performed by: PODIATRIST

## 2023-11-08 PROCEDURE — 99999 PR PBB SHADOW E&M-EST. PATIENT-LVL IV: CPT | Mod: PBBFAC,,, | Performed by: PODIATRIST

## 2023-11-08 NOTE — PROGRESS NOTES
Subjective:     Patient ID: Erica Leblanc is a 78 y.o. female.    Chief Complaint: Diabetes Mellitus and Foot Swelling (Right foot and leg )    Erica is a 78 y.o. female who presents to the clinic upon referral from Dr. Diane osei. provider found  for evaluation and treatment of diabetic feet. Erica has a past medical history of Acute ischemic right PCA stroke (6/6/2021), Acute respiratory failure with hypoxia, Age-related osteoporosis without current pathological fracture (3/8/2021), Anemia of chronic kidney failure, stage 4 (severe) (4/5/2019), Cataracts, bilateral, CHF (congestive heart failure), CKD (chronic kidney disease) stage 3, GFR 30-59 ml/min, CKD (chronic kidney disease) stage 3, GFR 30-59 ml/min, Controlled type 2 diabetes mellitus with proteinuria or albuminuria, Depression, Diabetes with neurologic complications, Diabetic retinopathy of both eyes, Edema, Glaucoma, History of colonic polyps, TIA (transient ischemic attack) (11/2008), Hyperlipidemia LDL goal < 100, Hypertension, Hypothyroidism, Major depressive disorder, single episode, mild (2/17/2016), Mixed incontinence urge and stress, Obesity, Obstructive sleep apnea on CPAP, Osteopenia, Proteinuria, Sickle cell trait, Strabismus, TIA (transient ischemic attack), Trouble in sleeping, Type 2 diabetes mellitus with ophthalmic manifestations, Type 2 diabetes with stage 3 chronic kidney disease GFR 30-59, Type II or unspecified type diabetes mellitus with renal manifestations, uncontrolled(250.42), Uncontrolled type 2 diabetes mellitus with peripheral circulatory disorder (4/5/2019), Urge incontinence (1/11/2016), Urge incontinence, Venous stasis ulcer, and Vitamin D deficiency disease. Reports left leg swelling x several days. Recently went to ED on 11/3/23 Rx. Clindamycin sent currently taking this.     PCP: Sendy Elaine MD    Date Last Seen by PCP: none found  Current shoe gear: Tennis shoes    Hemoglobin A1C   Date Value Ref Range Status    09/14/2023 5.5 % Final   07/07/2023 6.2 (H) 4.0 - 5.6 % Final     Comment:     ADA Screening Guidelines:  5.7-6.4%  Consistent with prediabetes  >or=6.5%  Consistent with diabetes    High levels of fetal hemoglobin interfere with the HbA1C  assay. Heterozygous hemoglobin variants (HbS, HgC, etc)do  not significantly interfere with this assay.   However, presence of multiple variants may affect accuracy.     09/13/2022 6.3 (H) 0.0 - 5.6 % Final     Comment:     [facility]   06/14/2022 6.6 (H) 0.0 - 5.6 % Final     Comment:     [facility]         Review of Systems   Constitutional: Negative for chills.   Cardiovascular:  Negative for chest pain and claudication.   Respiratory:  Negative for cough.    Skin:  Positive for color change, dry skin and nail changes.   Musculoskeletal:  Positive for joint pain.   Gastrointestinal:  Negative for nausea.   Neurological:  Positive for paresthesias. Negative for numbness.   Psychiatric/Behavioral:  The patient is not nervous/anxious.         Objective:     Physical Exam  Constitutional:       Appearance: She is well-developed.   Cardiovascular:      Comments: Dorsalis pedis and posterior tibial pulses are diminished Bilaterally. Toes are cool to touch. Feet are warm proximally.There is decreased digital hair. Skin is atrophic, slightly hyperpigmented, and mildly edematous   Pulmonary:      Effort: No respiratory distress.   Musculoskeletal:         General: Tenderness present.      Comments: Adequate joint range of motion without pain, limitation, nor crepitation Bilateral feet and ankle joints. Muscle strength is 5/5 in all groups bilaterally.    Feet:      Right foot:      Skin integrity: Callus and dry skin present. No ulcer or skin breakdown.      Left foot:      Skin integrity: Dry skin present. No ulcer.   Skin:     General: Skin is dry.      Findings: No erythema.      Comments: Nails x10 are elongated by 2-6mm's, thickened by 3-5 mm's, dystrophic, and are  darkened in coloration . Xerosis Bilaterally. No open lesions noted      Neurological:      Mental Status: She is alert.      Comments: Esbon-Gloria 5.07 monofilamant testing is diminished Nirmal feet. Sharp/dull sensation diminished Bilaterally. Light touch absent Bilaterally.            Assessment:      Encounter Diagnoses   Name Primary?    Type 2 diabetes mellitus with ESRD (end-stage renal disease) Yes    Lower extremity edema      Plan:     Erica was seen today for diabetes mellitus and foot swelling.    Diagnoses and all orders for this visit:    Type 2 diabetes mellitus with ESRD (end-stage renal disease)    Lower extremity edema      I counseled the patient on her conditions, their implications and medical management.        - Shoe inspection. Diabetic Foot Education. Patient reminded of the importance of good nutrition and blood sugar control to help prevent podiatric complications of diabetes. Patient instructed on proper foot hygeine. We discussed wearing proper shoe gear, daily foot inspections, never walking without protective shoe gear, caution putting sharp instruments to feet     - Discussed DM foot care:  Wear comfortable, proper fitting shoes. Wash feet daily. Dry well. After drying, apply moisturizer to feet (no lotion to webspaces). Inspect feet daily for skin breaks, blisters, swelling, or redness. Wear cotton socks (preferably white)  Change socks every day. Do NOT walk barefoot. Do NOT use heating pads or warm/hot water soaks     Appt.scheduled with vascular sx for later this month.     Discussed the use of compression stockings b/l to help control edema    RTC PRN

## 2023-11-14 NOTE — SUBJECTIVE & OBJECTIVE
Diagnosed: age 20  Last hemoglobin A1c: 12.8 09/13/2023    Outpatient regimen:   He reports Toujeo 20u daily and Lyumjev 10u ac (Tresiba is what is on MAR so I suspect he is using Tresiba for basal, not Toujeo)  Also has DEXCOM G6. Previously prescribed Omnipod 5    Endocrinology consulted for BG management.   BG goal 140-180     He is getting D5W for hypernatremia and BETH per nephro. Please let me know when rate is changed or if D5W is stopped as this will change his insulin requirement and I will adjust rate of transition gtt at that time.    - Transition drip at 2.2 units/hr with step-down parameters. Pt is a type 1 diabetic and cannot go without basal insulin. If gtt is paused for more than 30 min for any reason, please page on-call endocrine fellow   - Novolog (aspart) insulin 5 Units SQ TIDWM and prn for BG excursions low dose SSI (150/50).  - BG checks ac/hs/0200  - Hypoglycemia protocol in place    ** Please notify Endocrine for any change and/or advance in diet**  ** Please call Endocrine for any BG related issues **    Discharge Planning:   TBD. Please notify endocrinology prior to discharge.     Interval History: feels well.      Review of Systems   Respiratory: Negative for shortness of breath.    Cardiovascular: Positive for leg swelling.   Gastrointestinal: Negative.      Objective:     Vital Signs (Most Recent):  Temp: 96.9 °F (36.1 °C) (09/05/19 0751)  Pulse: 60 (09/05/19 1145)  Resp: 17 (09/05/19 1145)  BP: (!) 170/94 (09/05/19 1145)  SpO2: 95% (09/05/19 1145) Vital Signs (24h Range):  Temp:  [97.3 °F (36.3 °C)-99.3 °F (37.4 °C)] 97.6 °F (36.4 °C)  Pulse:  [49-61] 59  Resp:  [18-22] 20  SpO2:  [92 %-98 %] 94 %  BP: (124-165)/() 124/65     Weight: 122.3 kg (269 lb 10 oz)  Body mass index is 44.87 kg/m².    Intake/Output Summary (Last 24 hours) at 9/9/2019 1807  Last data filed at 9/9/2019 1520  Gross per 24 hour   Intake 740 ml   Output 4501 ml   Net -3761 ml      Physical Exam   Constitutional: She is oriented to person, place, and time. She appears well-developed. No distress.   Cardiovascular:   NSR with HR 60s   Pulmonary/Chest: She has no wheezes. She has no rales.   Speaking full sentences      Abdominal: Soft. Bowel sounds are normal.   Musculoskeletal: She exhibits edema.   Neurological: She is alert and oriented to person, place, and time.   Skin: She is not diaphoretic.   Nursing note and vitals reviewed.      Significant Labs: All pertinent labs within the past 24 hours have been reviewed.    Significant Imaging: I have reviewed all pertinent imaging results/findings within the past 24 hours.  I have reviewed and interpreted all pertinent imaging results/findings within the past 24 hours.

## 2023-11-20 ENCOUNTER — OFFICE VISIT (OUTPATIENT)
Dept: FAMILY MEDICINE | Facility: CLINIC | Age: 78
End: 2023-11-20
Payer: MEDICARE

## 2023-11-20 VITALS
TEMPERATURE: 98 F | HEART RATE: 90 BPM | WEIGHT: 212.44 LBS | HEIGHT: 65 IN | OXYGEN SATURATION: 93 % | BODY MASS INDEX: 35.39 KG/M2 | SYSTOLIC BLOOD PRESSURE: 132 MMHG | DIASTOLIC BLOOD PRESSURE: 70 MMHG

## 2023-11-20 DIAGNOSIS — E03.9 HYPOTHYROIDISM (ACQUIRED): Chronic | ICD-10-CM

## 2023-11-20 DIAGNOSIS — N18.6 TYPE 2 DIABETES MELLITUS WITH ESRD (END-STAGE RENAL DISEASE): Primary | ICD-10-CM

## 2023-11-20 DIAGNOSIS — E66.01 SEVERE OBESITY (BMI 35.0-39.9) WITH COMORBIDITY: ICD-10-CM

## 2023-11-20 DIAGNOSIS — Z99.2 ESRD ON HEMODIALYSIS: ICD-10-CM

## 2023-11-20 DIAGNOSIS — M79.89 SWELLING OF LEFT LOWER EXTREMITY: ICD-10-CM

## 2023-11-20 DIAGNOSIS — M81.0 AGE-RELATED OSTEOPOROSIS WITHOUT CURRENT PATHOLOGICAL FRACTURE: ICD-10-CM

## 2023-11-20 DIAGNOSIS — N18.6 ESRD ON HEMODIALYSIS: ICD-10-CM

## 2023-11-20 DIAGNOSIS — E78.5 HYPERLIPIDEMIA LDL GOAL <100: Chronic | ICD-10-CM

## 2023-11-20 DIAGNOSIS — I50.32 CHRONIC DIASTOLIC HEART FAILURE: Chronic | ICD-10-CM

## 2023-11-20 DIAGNOSIS — E11.22 TYPE 2 DIABETES MELLITUS WITH ESRD (END-STAGE RENAL DISEASE): Primary | ICD-10-CM

## 2023-11-20 DIAGNOSIS — G47.33 OSA ON CPAP: Chronic | ICD-10-CM

## 2023-11-20 PROCEDURE — 99214 OFFICE O/P EST MOD 30 MIN: CPT | Mod: S$GLB,,, | Performed by: INTERNAL MEDICINE

## 2023-11-20 PROCEDURE — 3078F PR MOST RECENT DIASTOLIC BLOOD PRESSURE < 80 MM HG: ICD-10-PCS | Mod: CPTII,S$GLB,, | Performed by: INTERNAL MEDICINE

## 2023-11-20 PROCEDURE — 3075F SYST BP GE 130 - 139MM HG: CPT | Mod: CPTII,S$GLB,, | Performed by: INTERNAL MEDICINE

## 2023-11-20 PROCEDURE — 1159F PR MEDICATION LIST DOCUMENTED IN MEDICAL RECORD: ICD-10-PCS | Mod: CPTII,S$GLB,, | Performed by: INTERNAL MEDICINE

## 2023-11-20 PROCEDURE — 1160F RVW MEDS BY RX/DR IN RCRD: CPT | Mod: CPTII,S$GLB,, | Performed by: INTERNAL MEDICINE

## 2023-11-20 PROCEDURE — 99999 PR PBB SHADOW E&M-EST. PATIENT-LVL V: ICD-10-PCS | Mod: PBBFAC,,, | Performed by: INTERNAL MEDICINE

## 2023-11-20 PROCEDURE — 3078F DIAST BP <80 MM HG: CPT | Mod: CPTII,S$GLB,, | Performed by: INTERNAL MEDICINE

## 2023-11-20 PROCEDURE — 3288F FALL RISK ASSESSMENT DOCD: CPT | Mod: CPTII,S$GLB,, | Performed by: INTERNAL MEDICINE

## 2023-11-20 PROCEDURE — 1101F PT FALLS ASSESS-DOCD LE1/YR: CPT | Mod: CPTII,S$GLB,, | Performed by: INTERNAL MEDICINE

## 2023-11-20 PROCEDURE — 99999 PR PBB SHADOW E&M-EST. PATIENT-LVL V: CPT | Mod: PBBFAC,,, | Performed by: INTERNAL MEDICINE

## 2023-11-20 PROCEDURE — 3075F PR MOST RECENT SYSTOLIC BLOOD PRESS GE 130-139MM HG: ICD-10-PCS | Mod: CPTII,S$GLB,, | Performed by: INTERNAL MEDICINE

## 2023-11-20 PROCEDURE — 1159F MED LIST DOCD IN RCRD: CPT | Mod: CPTII,S$GLB,, | Performed by: INTERNAL MEDICINE

## 2023-11-20 PROCEDURE — 1101F PR PT FALLS ASSESS DOC 0-1 FALLS W/OUT INJ PAST YR: ICD-10-PCS | Mod: CPTII,S$GLB,, | Performed by: INTERNAL MEDICINE

## 2023-11-20 PROCEDURE — 3288F PR FALLS RISK ASSESSMENT DOCUMENTED: ICD-10-PCS | Mod: CPTII,S$GLB,, | Performed by: INTERNAL MEDICINE

## 2023-11-20 PROCEDURE — 1126F PR PAIN SEVERITY QUANTIFIED, NO PAIN PRESENT: ICD-10-PCS | Mod: CPTII,S$GLB,, | Performed by: INTERNAL MEDICINE

## 2023-11-20 PROCEDURE — 1160F PR REVIEW ALL MEDS BY PRESCRIBER/CLIN PHARMACIST DOCUMENTED: ICD-10-PCS | Mod: CPTII,S$GLB,, | Performed by: INTERNAL MEDICINE

## 2023-11-20 PROCEDURE — 99214 PR OFFICE/OUTPT VISIT, EST, LEVL IV, 30-39 MIN: ICD-10-PCS | Mod: S$GLB,,, | Performed by: INTERNAL MEDICINE

## 2023-11-20 PROCEDURE — 1126F AMNT PAIN NOTED NONE PRSNT: CPT | Mod: CPTII,S$GLB,, | Performed by: INTERNAL MEDICINE

## 2023-11-20 RX ORDER — ATORVASTATIN CALCIUM 80 MG/1
80 TABLET, FILM COATED ORAL NIGHTLY
Qty: 90 TABLET | Refills: 3 | Status: SHIPPED | OUTPATIENT
Start: 2023-11-20

## 2023-11-20 NOTE — PROGRESS NOTES
CHIEF COMPLAINT:   Chief Complaint   Patient presents with    Diabetes     6 month f/u          HISTORY OF PRESENT ILLNESS:  Erica Leblanc is a 78 y.o. female who presents to the clinic today for Diabetes (6 month f/u)  .     The patient presents to clinic today for follow-up of her type 2 diabetes mellitus hit by end-stage renal disease, CHF, hyperlipidemia, and hypothyroidism.  The patient is on hemodialysis and is compliant with her treatments.  She denies cardiac chest pain or increased shortness of breath.  The patient reports that on 11/03 she went to the emergency room for a bump with smelling on her left lower extremity.  She does not remember hitting it on anything.  Being diabetic and on dialysis she was very concerned about there being something wrong that might cause her to lose her leg.  The emergency room did a thorough evaluation with x-rays and ultrasound which did not reveal anything worrisome.  She was prescribed antibiotics as well as an ointment.  She saw Podiatry on 11/08, but the visit did not focus on the reason for her visit which was the left lower extremity swelling and bump.  She reports that the swelling has improved some.  There is still some swelling/hardness and discomfort, but the redness seems to be much better.  She has also been wearing compression stockings.  She has a follow-up with vascular surgery in the near future.    Subjective    PAST MEDICAL HISTORY:  Past Medical History:   Diagnosis Date    Acute ischemic right PCA stroke 6/6/2021    Acute respiratory failure with hypoxia     Age-related osteoporosis without current pathological fracture 3/8/2021    Anemia of chronic kidney failure, stage 4 (severe) 4/5/2019    Cataracts, bilateral     CHF (congestive heart failure)     CKD (chronic kidney disease) stage 3, GFR 30-59 ml/min     CKD (chronic kidney disease) stage 3, GFR 30-59 ml/min     Controlled type 2 diabetes mellitus with proteinuria or albuminuria      Depression     Diabetes with neurologic complications     Diabetic retinopathy of both eyes     Edema     Glaucoma     History of colonic polyps     Hx-TIA (transient ischemic attack) 2008    Hyperlipidemia LDL goal < 100     Hypertension     Hypothyroidism     Major depressive disorder, single episode, mild 2016    Mixed incontinence urge and stress     Obesity     Obstructive sleep apnea on CPAP     19:  Home CPAP machine broken, per patient & son    Osteopenia     Proteinuria     Sickle cell trait     Strabismus     TIA (transient ischemic attack)     Trouble in sleeping     Type 2 diabetes mellitus with ophthalmic manifestations     Type 2 diabetes with stage 3 chronic kidney disease GFR 30-59     Type II or unspecified type diabetes mellitus with renal manifestations, uncontrolled(250.42)     Uncontrolled type 2 diabetes mellitus with peripheral circulatory disorder 2019    Urge incontinence 2016    Urge incontinence     Venous stasis ulcer     bilateral lower legs    Vitamin D deficiency disease        PAST SURGICAL HISTORY:  Past Surgical History:   Procedure Laterality Date    AV FISTULA PLACEMENT Left 2019    Procedure: CREATION, AV FISTULA, LEFT UPPER EXTREMITY;  Surgeon: Keron Perez MD;  Location: [facility];  Service: Vascular;  Laterality: Left;    BREAST BIOPSY      breast reduction Bilateral age 31    BREAST SURGERY      CATARACT EXTRACTION Bilateral     cataracts Bilateral      SECTION, LOW TRANSVERSE      x1    CHOLECYSTECTOMY      COLONOSCOPY N/A 2022    Procedure: COLONOSCOPY;  Surgeon: Mahesh Huang MD;  Location: [facility] ([address] [place] [address]);  Service: Endoscopy;  Laterality: N/A;  fully vaccinated-sm.  RAPID COVID  +cologuard needing ASAP  Dialysis pt T,TH Sat and left UA access  2nd floor - cardiac/dialysis/SOB / LABS / prep ins. emailed - [facility]    EYE SURGERY  2014, 2014    vitrectomy    EYE SURGERY Right 2016    FISTULOGRAM Left 3/6/2020     Procedure: Fistulogram, left upper extremity, with branch ligation;  Surgeon: Keron Perez MD;  Location[facility];  Service: Vascular;  Laterality: Left;  Time 1.1 Minute  42.10 mGy    FISTULOGRAM Left 7/21/2020    Procedure: Fistulogram, left upper extremity, transradial access with possible intervention;  Surgeon: Keron Perez MD;  Location: [facility];  Service: Vascular;  Laterality: Left;    FISTULOGRAM Left 8/19/2020    Procedure: Fistulogram, left upper extremity, possible intervention;  Surgeon: Keron Perez MD;  Location: [facility] OR;  Service: Vascular;  Laterality: Left;  930 AM START  RN PRE OP  ---COVID NEGATIVE ON  8-. CA    FISTULOGRAM Left 1/21/2022    Procedure: Fistulogram, transradial access;  Surgeon: Keron Perez MD;  Location: [facility];  Service: Vascular;  Laterality: Left;  mgy-40.16  gycm-8.3905  contrast-34cc  time-12.4min    HYSTERECTOMY  1986    TAHBSO (patient is unsure if ovaries removed)    OOPHORECTOMY      PERCUTANEOUS TRANSLUMINAL ANGIOPLASTY OF ARTERIOVENOUS FISTULA Left 7/21/2020    Procedure: PTA, AV FISTULA;  Surgeon: Keron Perez MD;  Location: [facility] OR [address] [place] [address];  Service: Vascular;  Laterality: Left;  15.9 minutes of fluro  41.12  mGy  7.9060 Gy cm2  32ml  contrast    PHLEBOGRAPHY Left 7/21/2020    Procedure: CENTRAL VENOGRAM;  Surgeon: Keron Perez MD;  Location: [facility];  Service: Vascular;  Laterality: Left;    REFRACTIVE SURGERY      TOTAL REDUCTION MAMMOPLASTY      approx 10 yrs ago    VENOPLASTY  1/21/2022    Procedure: ANGIOPLASTY, VEIN;  Surgeon: Keron Perez MD;  Location: [facility] OR [address] [place] [address];  Service: Vascular;;       SOCIAL HISTORY:  Social History     Socioeconomic History    Marital status: Single    Number of children: 5   Occupational History    Occupation:      Employer: [employer]     Comment: part-time   Tobacco Use    Smoking status: Never     Smokeless tobacco: Never   Substance and Sexual Activity    Alcohol use: No     Alcohol/week: 0.0 standard drinks of alcohol    Drug use: No    Sexual activity: Not Currently     Partners: Male     Birth control/protection: Post-menopausal, Surgical     Social Determinants of Health     Financial Resource Strain: Low Risk  (6/9/2023)    Overall Financial Resource Strain (CARDIA)     Difficulty of Paying Living Expenses: Not hard at all   Food Insecurity: No Food Insecurity (6/9/2023)    Hunger Vital Sign     Worried About Running Out of Food in the Last Year: Never true     Ran Out of Food in the Last Year: Never true   Transportation Needs: No Transportation Needs (6/9/2023)    PRAPARE - Transportation     Lack of Transportation (Medical): No     Lack of Transportation (Non-Medical): No   Physical Activity: Insufficiently Active (6/9/2023)    Exercise Vital Sign     Days of Exercise per Week: 2 days     Minutes of Exercise per Session: 10 min   Stress: No Stress Concern Present (6/9/2023)    [employer] - Occupational Stress Questionnaire     Feeling of Stress : Only a little   Social Connections: Socially Isolated (6/9/2023)    Social Connection and Isolation Panel [NHANES]     Frequency of Communication with Friends and Family: More than three times a week     Frequency of Social Gatherings with Friends and Family: Twice a week     Attends Yarsani Services: Never     Active Member of Clubs or Organizations: No     Attends Club or Organization Meetings: Never     Marital Status:    Housing Stability: Low Risk  (6/9/2023)    Housing Stability Vital Sign     Unable to Pay for Housing in the Last Year: No     Number of Places Lived in the Last Year: 1     Unstable Housing in the Last Year: No       FAMILY HISTORY:  Family History   Problem Relation Age of Onset    Stroke Mother     Diabetes Mother     Hypertension Mother     Cataracts Mother     Leukemia Father     Cataracts  Father     Ovarian cancer Sister 35    Achondroplasia Sister     HIV Brother     No Known Problems Daughter     No Known Problems Son     Breast cancer Maternal Aunt 65    Parkinsonism Maternal Aunt     Esophageal cancer Maternal Uncle         smoker    No Known Problems Paternal Aunt     Cataracts Paternal Uncle     Cataracts Maternal Grandmother     Cataracts Maternal Grandfather     Diabetes Paternal Grandmother     Cataracts Paternal Grandmother     No Known Problems Paternal Grandfather     No Known Problems Other     Amblyopia Neg Hx     Blindness Neg Hx     Glaucoma Neg Hx     Macular degeneration Neg Hx     Retinal detachment Neg Hx     Strabismus Neg Hx     Thyroid disease Neg Hx     Colon cancer Neg Hx     Cancer Neg Hx        ALLERGIES AND MEDICATIONS: updated and reviewed.  Review of patient's allergies indicates:   Allergen Reactions    Ace inhibitors Other (See Comments)     Other reaction(s): cough     Medication List with Changes/Refills   Current Medications    ACCU-CHEK SOFT DEV LANCETS KIT        ASPIRIN (ECOTRIN) 81 MG EC TABLET    Take 1 tablet (81 mg total) by mouth once daily.    BLOOD SUGAR DIAGNOSTIC STRP    One test strip use 2 times a day to check blood glucose,  ICD-10: E11.9, compatible with insurance/glucometer    BLOOD-GLUCOSE METER KIT    One glucometer, use to check blood glucose.   ICD-10: E11.9. Dispense machine covered by insurance    CINACALCET (SENSIPAR) 30 MG TAB        DOCUSATE SODIUM (COLACE) 100 MG CAPSULE    Take 200 mg by mouth once daily.     DOXERCALCIFEROL (HECTOROL) 4 MCG/2 ML INJECTION    Inject 4.5 mcg into the vein 3 (three) times a week. At dialysis unit    DULAGLUTIDE (TRULICITY) 1.5 MG/0.5 ML PEN INJECTOR    Inject 1.5 mg into the skin every 7 days.    EPOETIN BE (EPOGEN INJ)    Inject 1,000 Units as directed every 7 days. At the dialysis unit    FLUTICASONE PROPIONATE (FLONASE) 50 MCG/ACTUATION NASAL SPRAY    1 spray (50 mcg total) by Each Nostril route once  daily.    LANCETS MISC    One lancets use 2 times a day to check blood glucose, ICD-10: E11.9    LANCING DEVICE MISC    One device, used to check blood glucose, ICD-10: E11.9    LEVOTHYROXINE (SYNTHROID) 50 MCG TABLET    TAKE 1 TABLET BEFORE BREAKFAST    MIDODRINE (PROAMATINE) 5 MG TAB    Take 1 tablet (5 mg total) by mouth 3 (three) times daily.    MUPIROCIN (BACTROBAN) 2 % OINTMENT    Apply topically 3 (three) times daily.    OLENA-NABIL RX 1- MG-MG-MCG TAB    Take 1 tablet by mouth once daily.    SEVELAMER CARBONATE (RENVELA) 800 MG TAB    Take 3 tablets (2,400 mg total) by mouth 3 (three) times daily with meals.   Changed and/or Refilled Medications    Modified Medication Previous Medication    ATORVASTATIN (LIPITOR) 80 MG TABLET atorvastatin (LIPITOR) 80 MG tablet       Take 1 tablet (80 mg total) by mouth every evening.    TAKE 1 TABLET (80 MG TOTAL) BY MOUTH EVERY EVENING.   Discontinued Medications    LANCETS MISC           CARE TEAM:  Patient Care Team:  Sendy Elaine MD as PCP - General (Internal Medicine)  Brittanie Orellana MD (Cardiology)  Nicole Gray DPM as Consulting Physician (Podiatry)  Surinder Joseph MD as Consulting Physician (Nephrology)  Katia Wong MD as Consulting Physician (Urology)  Coleen Gillis MD as Consulting Physician (Obstetrics)  LILLIANA Coello MD as Consulting Physician (Ophthalmology)  Yolis Rossi MD as Consulting Physician (Endocrinology)  Gianna Hinson LPN as Care Coordinator  Keron Perez MD as Consulting Physician (Vascular Surgery)  Myla Puente MD as Consulting Physician (Nephrology)  Murali Li MD as Consulting Physician (Cardiology)  Carl Day MD as Consulting Physician (Endocrinology)  Nellie Quiles OD as Consulting Physician (Optometry)  Adonis Linares MD as Consulting Physician (Pulmonary Disease)  Tonia Silva NP as Nurse Practitioner (Vascular Surgery)  Kendra Mccartney as ED Navigator  "      REVIEW OF SYSTEMS:  Review of Systems   Constitutional:  Negative for chills and fever.   HENT:  Negative for congestion.    Respiratory:  Negative for cough.    Cardiovascular:  Positive for leg swelling. Negative for chest pain.   Gastrointestinal:  Negative for abdominal pain.   Genitourinary:  Negative for dysuria.   Skin:         See HPI             Objective    PHYSICAL EXAMINATION/VITALS:  Vitals:    11/20/23 1448   BP: 132/70   Pulse: 90   Temp: 97.8 °F (36.6 °C)   TempSrc: Oral   SpO2: (!) 93%   Weight: 96.3 kg (212 lb 6.6 oz)   Height: 5' 5" (1.651 m)       Body mass index is 35.35 kg/m².      General appearance - alert, well appearing, and in no distress, obese  Psychiatric - alert, oriented to person, place, and time, normal behavior, speech, dress, motor activity and thought process  Chest - clear to auscultation, no wheezes, rales or rhonchi, symmetric air entry  Heart - normal rate and regular rhythm  Extremities - pedal edema trace, LLE as noted in photograph        LABS:  Lab Results   Component Value Date    HGBA1C 5.5 09/14/2023    HGBA1C 6.2 (H) 07/07/2023    HGBA1C 6.3 (H) 09/13/2022      Lab Results   Component Value Date    CHOL 88 (L) 07/07/2023    CHOL 165 05/07/2022    CHOL 127 11/03/2021     Lab Results   Component Value Date    LDLCALC 37.6 (L) 07/07/2023    LDLCALC 86 06/14/2022    LDLCALC 101.0 05/07/2022               ASSESSMENT AND PLAN:  1. Type 2 diabetes mellitus with ESRD (end-stage renal disease)  Lab Results   Component Value Date    HGBA1C 5.5 09/14/2023     Diabetes is under good control at this time for age and comorbid conditions.   We discussed diabetic diet and regular exercise.   We discussed home blood sugar monitoring, if appropriate - the patient does not need to test daily but can test only as needed.   Continue current medication regimen.  Diabetic complications addressed: None addressed today.  Patient was counseled on the need for yearly eye exam to screen " for/monitor diabetic retinopathy and yearly diabetic foot exam.    2. Chronic diastolic heart failure  The current medical regimen is effective;  continue present plan and medications.     3. Hyperlipidemia LDL goal <100  Lab Results   Component Value Date    CHOL 88 (L) 07/07/2023     Lab Results   Component Value Date    HDL 42 07/07/2023     Lab Results   Component Value Date    LDLCALC 37.6 (L) 07/07/2023     Lab Results   Component Value Date    TRIG 42 07/07/2023     Lab Results   Component Value Date    LDLCALC 37.6 (L) 07/07/2023     We discussed low fat diet and regular exercise.The current medical regimen is effective;  continue present plan and medications.   -     atorvastatin (LIPITOR) 80 MG tablet; Take 1 tablet (80 mg total) by mouth every evening.  Dispense: 90 tablet; Refill: 3    4. ESRD on hemodialysis  The current medical regimen is effective;  continue present plan and medications.   Followed by: Nephrology.   Overview:  - at [facility]; Dr. Barrientos  - Tuesday, Thursday and Saturday  - has AV graft in left UE      5. Hypothyroidism (acquired)  Lab Results   Component Value Date    TSH 1.957 07/07/2023     Patient is clinically euthyroid. Continue current regimen.    6. Age-related osteoporosis without current pathological fracture  We discussed adequate calcium intake (preferably from diet) and vitamin D supplementation. We discussed fall precautions. She is scheduled for her BMD. Further treatment plan will be based on results of bone density testing.  -     DXA Bone Density Axial Skeleton 1 or more sites; Future; Expected date: 11/20/2023    7. HUMBERTO on CPAP  CPAP compliance: yes. The patient reports that they continue to benefit from regular use of their CPAP machine..  We discussed the potential ramifications of untreated sleep apnea, which could include daytime sleepiness, hypertension, heart disease including CHF, sudden death while sleeping and/or stroke. The patient was advised to abstain from  driving should they feel sleepy or drowsy.  We discussed potential treatment options, which could include weight loss, body positioning, continuous positive airway pressure (CPAP), or referral for surgical consideration.   Overview:  -ahi of 18  -currently on cpap of 14 cm H20 with ffm.    -would like to try nasal pillow for comfort.  Will switch apap in hope of lowering driving pressure.        8. Severe obesity (BMI 35.0-39.9) with comorbidity  BMI Readings from Last 3 Encounters:   11/20/23 35.35 kg/m²   11/08/23 34.85 kg/m²   10/01/23 34.85 kg/m²     The patient is asked to continue to make an attempt to improve diet and exercise patterns to aid in medical management of this problem.     9. Swelling of left lower extremity  There is improvement from earlier this month.  No signs of cellulitis at this time.  She may continue with topical ointment to prevent infection.  She will continue with compression stockings.  She has follow-up with vascular in the near future.  There is still a hard bump and I do not know if this is a bruise that may or may not resolve.  Observe for now.            Orders Placed This Encounter   Procedures    DXA Bone Density Axial Skeleton 1 or more sites      FOLLOW UP: Follow up in about 6 months (around 5/20/2024), or if symptoms worsen or fail to improve, for annual exam. or sooner as needed.

## 2023-11-27 ENCOUNTER — OFFICE VISIT (OUTPATIENT)
Dept: VASCULAR SURGERY | Facility: CLINIC | Age: 78
End: 2023-11-27
Payer: MEDICARE

## 2023-11-27 VITALS
WEIGHT: 217.69 LBS | HEIGHT: 65 IN | SYSTOLIC BLOOD PRESSURE: 150 MMHG | BODY MASS INDEX: 36.27 KG/M2 | DIASTOLIC BLOOD PRESSURE: 85 MMHG | HEART RATE: 80 BPM

## 2023-11-27 DIAGNOSIS — I87.303 VENOUS HYPERTENSION OF BOTH LOWER EXTREMITIES: ICD-10-CM

## 2023-11-27 DIAGNOSIS — T82.858D ARTERIOVENOUS FISTULA STENOSIS, SUBSEQUENT ENCOUNTER: Primary | ICD-10-CM

## 2023-11-27 PROCEDURE — 99214 OFFICE O/P EST MOD 30 MIN: CPT | Mod: S$GLB,,, | Performed by: SURGERY

## 2023-11-27 PROCEDURE — 1125F PR PAIN SEVERITY QUANTIFIED, PAIN PRESENT: ICD-10-PCS | Mod: CPTII,S$GLB,, | Performed by: SURGERY

## 2023-11-27 PROCEDURE — 3079F DIAST BP 80-89 MM HG: CPT | Mod: CPTII,S$GLB,, | Performed by: SURGERY

## 2023-11-27 PROCEDURE — 1125F AMNT PAIN NOTED PAIN PRSNT: CPT | Mod: CPTII,S$GLB,, | Performed by: SURGERY

## 2023-11-27 PROCEDURE — 99999 PR PBB SHADOW E&M-EST. PATIENT-LVL II: CPT | Mod: PBBFAC,,, | Performed by: SURGERY

## 2023-11-27 PROCEDURE — 3077F PR MOST RECENT SYSTOLIC BLOOD PRESSURE >= 140 MM HG: ICD-10-PCS | Mod: CPTII,S$GLB,, | Performed by: SURGERY

## 2023-11-27 PROCEDURE — 3079F PR MOST RECENT DIASTOLIC BLOOD PRESSURE 80-89 MM HG: ICD-10-PCS | Mod: CPTII,S$GLB,, | Performed by: SURGERY

## 2023-11-27 PROCEDURE — 3077F SYST BP >= 140 MM HG: CPT | Mod: CPTII,S$GLB,, | Performed by: SURGERY

## 2023-11-27 PROCEDURE — 99214 PR OFFICE/OUTPT VISIT, EST, LEVL IV, 30-39 MIN: ICD-10-PCS | Mod: S$GLB,,, | Performed by: SURGERY

## 2023-11-27 PROCEDURE — 99999 PR PBB SHADOW E&M-EST. PATIENT-LVL II: ICD-10-PCS | Mod: PBBFAC,,, | Performed by: SURGERY

## 2023-11-27 NOTE — PROGRESS NOTES
[facility]                         11/27/2023    HPI:  Erica Leblanc is a 78 y.o. female with   Patient Active Problem List   Diagnosis    Severe obesity (BMI 35.0-39.9) with comorbidity    Sickle cell trait    Hyperlipidemia LDL goal <100    HUMBERTO on CPAP    Hypothyroidism (acquired)    Hx-TIA (transient ischemic attack)    Vitamin D deficiency disease    Pulmonary hypertension    Type 2 diabetes mellitus with ESRD (end-stage renal disease)    Secondary renal hyperparathyroidism    Incomplete bladder emptying    Postmenopausal atrophic vaginitis    Rectocele    Mixed incontinence urge and stress    Chronic diastolic heart failure    Tortuous aorta    ESRD on hemodialysis    Anemia of chronic disease    Debility    Chronic respiratory failure    Type 2 diabetes mellitus with foot ulcer, with long-term current use of insulin    Stable proliferative diabetic retinopathy associated with type 2 diabetes mellitus    Heart failure with preserved ejection fraction    Pseudophakia    Chronic kidney disease-mineral and bone disorder    Age-related osteoporosis without current pathological fracture    H/O: stroke    Walker as ambulation aid    being managed by PCP and specialists who is here today for evaluation of long term HD access.  S/p R IJ tunneled HD catheter, HD without issues.  Pt is R handed.  No complaints today.  Does endorse orthopnea, no chest pain.  Unable to climb 1 flight of stairs on own.    no MI  no Stroke  Tobacco use: denies    1/20/20: No new issues.    3/2020: Dialysis without issues.    4/6/20:  S/p LUE fistulogram, central venogram, ligation of medial side branch cephalic vein, ligation of lateral side branch cephalic vein.  No issues with HD for several weeks since procedure.    7/6/20:  No issues with HD.    8/3/20: s/p L proximal fistula angioplasty 7/21/20.  No issues with HD.    8/31/20:  S/p 8/19/29 Balloon angioplasty of the proximal LUE AVF with a  6x60 Angiosculpt and Stellerex balloon.      10/2020:  No issues with HD    1/2021:  No issues with HD    4/2021:  No new problems.    8/2021:  No issues with HD. C/o fatigue after HD.  Has not seen cardiology recently.    3/2022:  No issues with HD    6/2022:  Doing well, no issues with HD    1/2023:  No issues with HD    04/2023: No issues with HD.     7/2023:  doing well.    10/2023:  No issues with HD.    11/2023:  c/o LLE cellulitis and edema.  +compression with sock.    Past Medical History:   Diagnosis Date    Acute ischemic right PCA stroke 6/6/2021    Acute respiratory failure with hypoxia     Age-related osteoporosis without current pathological fracture 3/8/2021    Anemia of chronic kidney failure, stage 4 (severe) 4/5/2019    Cataracts, bilateral     CHF (congestive heart failure)     CKD (chronic kidney disease) stage 3, GFR 30-59 ml/min     CKD (chronic kidney disease) stage 3, GFR 30-59 ml/min     Controlled type 2 diabetes mellitus with proteinuria or albuminuria     Depression     Diabetes with neurologic complications     Diabetic retinopathy of both eyes     Edema     Glaucoma     History of colonic polyps     Hx-TIA (transient ischemic attack) 11/2008    Hyperlipidemia LDL goal < 100     Hypertension     Hypothyroidism     Major depressive disorder, single episode, mild 2/17/2016    Mixed incontinence urge and stress     Obesity     Obstructive sleep apnea on CPAP     7/19/19:  Home CPAP machine broken, per patient & son    Osteopenia     Proteinuria     Sickle cell trait     Strabismus     TIA (transient ischemic attack)     Trouble in sleeping     Type 2 diabetes mellitus with ophthalmic manifestations     Type 2 diabetes with stage 3 chronic kidney disease GFR 30-59     Type II or unspecified type diabetes mellitus with renal manifestations, uncontrolled(250.42)     Uncontrolled type 2 diabetes mellitus with peripheral circulatory disorder 4/5/2019    Urge incontinence 1/11/2016    Urge  incontinence     Venous stasis ulcer     bilateral lower legs    Vitamin D deficiency disease      Past Surgical History:   Procedure Laterality Date    AV FISTULA PLACEMENT Left 2019    Procedure: CREATION, AV FISTULA, LEFT UPPER EXTREMITY;  Surgeon: Keron Perez MD;  Location: [facility] OR;  Service: Vascular;  Laterality: Left;    BREAST BIOPSY      breast reduction Bilateral age 31    BREAST SURGERY      CATARACT EXTRACTION Bilateral     cataracts Bilateral      SECTION, LOW TRANSVERSE      x1    CHOLECYSTECTOMY      COLONOSCOPY N/A 2022    Procedure: COLONOSCOPY;  Surgeon: Mahesh Huang MD;  Location: [facility] ENDO (2ND FLR);  Service: Endoscopy;  Laterality: N/A;  fully vaccinated-sm.  RAPID COVID  +cologuard needing ASAP  Dialysis pt T,TH Sat and left UA access  2nd floor - cardiac/dialysis/SOB / LABS / prep ins. emailed - ERW    EYE SURGERY  2014, 2014    vitrectomy    EYE SURGERY Right 2016    FISTULOGRAM Left 3/6/2020    Procedure: Fistulogram, left upper extremity, with branch ligation;  Surgeon: Keron Perez MD;  [facility];  Service: Vascular;  Laterality: Left;  Time 1.1 Minute  42.10 mGy    FISTULOGRAM Left 2020    Procedure: Fistulogram, left upper extremity, transradial access with possible intervention;  Surgeon: Keron Perez MD;  Location: [address];  Service: Vascular;  Laterality: Left;    FISTULOGRAM Left 2020    Procedure: Fistulogram, left upper extremity, possible intervention;  Surgeon: Keron Perez MD;  Location: [facility] OR;  Service: Vascular;  Laterality: Left;  930 AM START  RN PRE OP  ---COVID NEGATIVE ON  2020. [place]    FISTULOGRAM Left 2022    Procedure: Fistulogram, transradial access;  Surgeon: Keron Perez MD;  Location: [facility] OR [address] [place] [address];  Service: Vascular;  Laterality: Left;  mgy-40.16  gycm-8.3905  contrast-34cc  time-12.4min    HYSTERECTOMY  1986    TAHBSO (patient is unsure if  ovaries removed)    OOPHORECTOMY      PERCUTANEOUS TRANSLUMINAL ANGIOPLASTY OF ARTERIOVENOUS FISTULA Left 7/21/2020    Procedure: PTA, AV FISTULA;  Surgeon: Keron Perez MD;  Location: [facility] OR [address] [place] [address];  Service: Vascular;  Laterality: Left;  15.9 minutes of fluro  41.12  mGy  7.9060 Gy cm2  32ml  contrast    PHLEBOGRAPHY Left 7/21/2020    Procedure: CENTRAL VENOGRAM;  Surgeon: Keron Perez MD;  Location: [facility] OR [address] [place] [address];  Service: Vascular;  Laterality: Left;    REFRACTIVE SURGERY      TOTAL REDUCTION MAMMOPLASTY      approx 10 yrs ago    VENOPLASTY  1/21/2022    Procedure: ANGIOPLASTY, VEIN;  Surgeon: Keron Perez MD;  Location: [facility] OR [address] [place] [address];  Service: Vascular;;     Family History   Problem Relation Age of Onset    Stroke Mother     Diabetes Mother     Hypertension Mother     Cataracts Mother     Leukemia Father     Cataracts Father     Ovarian cancer Sister 35    Achondroplasia Sister     HIV Brother     No Known Problems Daughter     No Known Problems Son     Breast cancer Maternal Aunt 65    Parkinsonism Maternal Aunt     Esophageal cancer Maternal Uncle         smoker    No Known Problems Paternal Aunt     Cataracts Paternal Uncle     Cataracts Maternal Grandmother     Cataracts Maternal Grandfather     Diabetes Paternal Grandmother     Cataracts Paternal Grandmother     No Known Problems Paternal Grandfather     No Known Problems Other     Amblyopia Neg Hx     Blindness Neg Hx     Glaucoma Neg Hx     Macular degeneration Neg Hx     Retinal detachment Neg Hx     Strabismus Neg Hx     Thyroid disease Neg Hx     Colon cancer Neg Hx     Cancer Neg Hx      Social History     Socioeconomic History    Marital status: Single    Number of children: 5   Occupational History    Occupation:      Employer: [employer]     Comment: part-time   Tobacco Use    Smoking status: Never    Smokeless tobacco: Never   Substance and Sexual Activity    Alcohol use: No      Alcohol/week: 0.0 standard drinks of alcohol    Drug use: No    Sexual activity: Not Currently     Partners: Male     Birth control/protection: Post-menopausal, Surgical     Social Determinants of Health     Financial Resource Strain: Low Risk  (6/9/2023)    Overall Financial Resource Strain (CARDIA)     Difficulty of Paying Living Expenses: Not hard at all   Food Insecurity: No Food Insecurity (6/9/2023)    Hunger Vital Sign     Worried About Running Out of Food in the Last Year: Never true     Ran Out of Food in the Last Year: Never true   Transportation Needs: No Transportation Needs (6/9/2023)    PRAPARE - Transportation     Lack of Transportation (Medical): No     Lack of Transportation (Non-Medical): No   Physical Activity: Insufficiently Active (6/9/2023)    Exercise Vital Sign     Days of Exercise per Week: 2 days     Minutes of Exercise per Session: 10 min   Stress: No Stress Concern Present (6/9/2023)    [employer] - Occupational Stress Questionnaire     Feeling of Stress : Only a little   Social Connections: Socially Isolated (6/9/2023)    Social Connection and Isolation Panel [NHANES]     Frequency of Communication with Friends and Family: More than three times a week     Frequency of Social Gatherings with Friends and Family: Twice a week     Attends Rastafari Services: Never     Active Member of Clubs or Organizations: No     Attends Club or Organization Meetings: Never     Marital Status:    Housing Stability: Low Risk  (6/9/2023)    Housing Stability Vital Sign     Unable to Pay for Housing in the Last Year: No     Number of Places Lived in the Last Year: 1     Unstable Housing in the Last Year: No       Current Outpatient Medications:     ACCU-CHEK SOFT DEV LANCETS Kit, , Disp: , Rfl:     atorvastatin (LIPITOR) 80 MG tablet, Take 1 tablet (80 mg total) by mouth every evening., Disp: 90 tablet, Rfl: 3    blood sugar diagnostic Strp, One test strip use 2 times a  day to check blood glucose,  ICD-10: E11.9, compatible with insurance/glucometer, Disp: 100 each, Rfl: 11    blood-glucose meter kit, One glucometer, use to check blood glucose.   ICD-10: E11.9. Dispense machine covered by insurance, Disp: 1 each, Rfl: 0    cinacalcet (SENSIPAR) 30 MG Tab, , Disp: , Rfl:     docusate sodium (COLACE) 100 MG capsule, Take 200 mg by mouth once daily. , Disp: , Rfl:     doxercalciferoL (HECTOROL) 4 mcg/2 mL injection, Inject 4.5 mcg into the vein 3 (three) times a week. At dialysis unit, Disp: , Rfl:     dulaglutide (TRULICITY) 1.5 mg/0.5 mL pen injector, Inject 1.5 mg into the skin every 7 days., Disp: 12 pen , Rfl: 3    epoetin arnel (EPOGEN INJ), Inject 1,000 Units as directed every 7 days. At the dialysis unit, Disp: , Rfl:     fluticasone propionate (FLONASE) 50 mcg/actuation nasal spray, 1 spray (50 mcg total) by Each Nostril route once daily., Disp: 48 g, Rfl: 5    lancets Misc, One lancets use 2 times a day to check blood glucose, ICD-10: E11.9, Disp: 100 each, Rfl: 11    lancing device Misc, One device, used to check blood glucose, ICD-10: E11.9, Disp: 1 each, Rfl: 0    levothyroxine (SYNTHROID) 50 MCG tablet, TAKE 1 TABLET BEFORE BREAKFAST, Disp: 90 tablet, Rfl: 3    midodrine (PROAMATINE) 5 MG Tab, Take 1 tablet (5 mg total) by mouth 3 (three) times daily., Disp: 90 tablet, Rfl: 3    mupirocin (BACTROBAN) 2 % ointment, Apply topically 3 (three) times daily., Disp: 22 g, Rfl: 0    OLENA-NABIL RX 1- mg-mg-mcg Tab, Take 1 tablet by mouth once daily., Disp: , Rfl:     aspirin (ECOTRIN) 81 MG EC tablet, Take 1 tablet (81 mg total) by mouth once daily., Disp: 90 tablet, Rfl: 3    sevelamer carbonate (RENVELA) 800 mg Tab, Take 3 tablets (2,400 mg total) by mouth 3 (three) times daily with meals., Disp: 270 tablet, Rfl: 11    REVIEW OF SYSTEMS:  General: No fevers or chills; ENT: No sore throat; Allergy and Immunology: no persistent infections; Hematological and Lymphatic: No  "history of bleeding or easy bruising; Endocrine: negative; Respiratory: no cough, shortness of breath, or wheezing; Cardiovascular: no chest pain or dyspnea on exertion; Gastrointestinal: no abdominal pain/back, change in bowel habits, or bloody stools; Genito-Urinary: no dysuria, trouble voiding, or hematuria; Musculoskeletal: negative; Neurological: no TIA or stroke symptoms; Psychiatric: no nervousness, anxiety or depression.    PHYSICAL EXAM:      Pulse: 80         General appearance:  Alert, well-appearing, and in no distress.  Oriented to person, place, and time                    Neurological: Normal speech, no focal findings noted; CN II - XII grossly intact. All extremities with sensation to light touch.            Musculoskeletal: Digits/nail without cyanosis/clubbing.  Strength 5/5 all extremities.                    Neck: Supple, no significant adenopathy, no carotid bruit can be auscultated                  Chest:  Clear to auscultation, no wheezes, rales or rhonchi, symmetric air entry. No use of accessory muscles               Cardiac: Normal rate and regular rhythm, S1 and S2 normal            Abdomen: Soft, nontender, nondistended, no masses or organomegaly, no hernia     No rebound tenderness noted; bowel sounds normal     Pulsatile aortic mass is non palpable.     No groin adenopathy      Extremities:      2+ radial pulse bilaterally, LUE AVF +thrill, inc well healed, 2 PSA with dry scabs, no ulcerations     No BUE edema, Negative Manuel's test BUE    Skin: No tissue loss, 2+ LLE edema with discoloration, 1+ RLE edema     VCSS 11     CEAP 3/4a    LAB RESULTS:  No results found for: "CBC"  Lab Results   Component Value Date    LABPROT 10.3 06/07/2021    INR 1.0 06/07/2021     Lab Results   Component Value Date     10/01/2023    K 3.8 10/01/2023     10/01/2023    CO2 26 10/01/2023    GLU 91 10/01/2023    BUN 29 (H) 10/01/2023    CREATININE 7.6 (H) 10/01/2023    CALCIUM 8.6 (L) 10/01/2023 "    ANIONGAP 14 10/01/2023    EGFRNONAA 3.6 (A) 05/07/2022     Lab Results   Component Value Date    WBC 6.54 10/01/2023    RBC 3.84 (L) 10/01/2023    HGB 10.4 (L) 10/01/2023    HCT 32.7 (L) 10/01/2023    MCV 85 10/01/2023    MCH 27.1 10/01/2023    MCHC 31.8 (L) 10/01/2023    RDW 13.8 10/01/2023     10/01/2023    MPV 11.4 10/01/2023    GRAN 4.6 10/01/2023    GRAN 70.1 10/01/2023    LYMPH 0.7 (L) 10/01/2023    LYMPH 11.2 (L) 10/01/2023    MONO 1.1 (H) 10/01/2023    MONO 16.4 (H) 10/01/2023    EOS 0.1 10/01/2023    BASO 0.02 10/01/2023    EOSINOPHIL 1.4 10/01/2023    BASOPHIL 0.3 10/01/2023    DIFFMETHOD Automated 10/01/2023     .  Lab Results   Component Value Date    HGBA1C 5.5 09/14/2023       IMAGING:  All pertinent imaging has been reviewed and interpreted independently.    Vein mapping 9/2019:  Adequate R superior basilic vein and axillary vein ; Adequate L basilic vein superior and inferior, adequate L axillary     1/27/20 Left upper extremity dialysis ultrasound shows no hemodynamically significant stenosis.  Flow is 1083 ml/min.  Depth and diameter are appropriate.    3/23/20:  Left upper extremity dialysis ultrasound shows anastomotic and proximal fistula hemodynamically significant stenosis.  Flow is 955 ml/min.      7/2020: Left upper extremity dialysis ultrasound shows proximal fistula hemodynamically significant stenosis.  Flow is 1257 ml/min.      8/2020: Left upper extremity dialysis ultrasound shows proximal hemodynamically significant stenosis.  Flow is 1999 ml/min.      8/31/20: Left upper extremity dialysis ultrasound shows proximal fistula hemodynamically significant stenosis.  Flow is 2209 ml/min.      10/2020:  Prox stenosis, flow > 3000 ml/min    1/2021:  Proximal stenosis, flow 4414 ml/min    4/2021:  HD US reviewed without significant stenosis, adequate flow.    8/2021:HD US reviewed without significant stenosis, adequate flow.    3/2022:  Left upper extremity dialysis ultrasound shows  approx 50% anastomotic and prox fistula stenosis withou hemodynamically significant stenosis.  Flow is 3774 ml/min.      6/2022:  No significant stenosis     04/2023: No significant stenosis. Flow 4,086 ml/min    10/2023:  Left upper extremity dialysis ultrasound shows approx 50% anastomotic stenosis.  Flow is 5000 ml/min.      IMP/PLAN:  78 y.o. female with   Patient Active Problem List   Diagnosis    Severe obesity (BMI 35.0-39.9) with comorbidity    Sickle cell trait    Hyperlipidemia LDL goal <100    HUMBERTO on CPAP    Hypothyroidism (acquired)    Hx-TIA (transient ischemic attack)    Vitamin D deficiency disease    Pulmonary hypertension    Type 2 diabetes mellitus with ESRD (end-stage renal disease)    Secondary renal hyperparathyroidism    Incomplete bladder emptying    Postmenopausal atrophic vaginitis    Rectocele    Mixed incontinence urge and stress    Chronic diastolic heart failure    Tortuous aorta    ESRD on hemodialysis    Anemia of chronic disease    Debility    Chronic respiratory failure    Type 2 diabetes mellitus with foot ulcer, with long-term current use of insulin    Stable proliferative diabetic retinopathy associated with type 2 diabetes mellitus    Heart failure with preserved ejection fraction    Pseudophakia    Chronic kidney disease-mineral and bone disorder    Age-related osteoporosis without current pathological fracture    H/O: stroke    Walker as ambulation aid    being managed by PCP and specialists who is here today for evaluation of long term HD access s/p L RC AV fistula.    -s/p L RC AV fistula with proximal fistula measuring 5 mm 1/13/20, adequate flow without issues during HD s/p LUE fistulogram, central venogram, ligation of medial side branch cephalic vein, ligation of lateral side branch cephalic vein 3/2/20 with prox AVF stenosis s/p L proximal fistula angioplasty 7/21/20 with persistent flow issues s/p proximal angioplasty 8/19/20 with scoring balloon/DCB with improvement in  stenosis/flow and asymptomatic anastomotic stenosis with no further issues with HD  -Cont renal diet  -RTC 2-3 weeks with venous insuff US  -F/u in 2 mo with HD US for continued routine surveillance    I spent 11 minutes evaluating this patient and greater than 50% of the time was spent counseling, coordinator care and discussing the plan of care.  All questions were answered and patient stated understanding with agreement with the above treatment plan.    Keron Perez MD  Vascular and Endovascular Surgery

## 2023-12-06 ENCOUNTER — LAB VISIT (OUTPATIENT)
Dept: LAB | Facility: HOSPITAL | Age: 78
End: 2023-12-06
Attending: HOSPITALIST
Payer: MEDICARE

## 2023-12-06 DIAGNOSIS — E03.9 HYPOTHYROIDISM (ACQUIRED): Chronic | ICD-10-CM

## 2023-12-06 DIAGNOSIS — N18.6 TYPE 2 DIABETES MELLITUS WITH ESRD (END-STAGE RENAL DISEASE): ICD-10-CM

## 2023-12-06 DIAGNOSIS — E11.22 TYPE 2 DIABETES MELLITUS WITH ESRD (END-STAGE RENAL DISEASE): ICD-10-CM

## 2023-12-06 LAB
ALBUMIN SERPL BCP-MCNC: 3.2 G/DL (ref 3.5–5.2)
ANION GAP SERPL CALC-SCNC: 15 MMOL/L (ref 8–16)
BUN SERPL-MCNC: 42 MG/DL (ref 8–23)
CALCIUM SERPL-MCNC: 9.1 MG/DL (ref 8.7–10.5)
CHLORIDE SERPL-SCNC: 100 MMOL/L (ref 95–110)
CO2 SERPL-SCNC: 29 MMOL/L (ref 23–29)
CREAT SERPL-MCNC: 7.5 MG/DL (ref 0.5–1.4)
EST. GFR  (NO RACE VARIABLE): 5.2 ML/MIN/1.73 M^2
ESTIMATED AVG GLUCOSE: 117 MG/DL (ref 68–131)
GLUCOSE SERPL-MCNC: 151 MG/DL (ref 70–110)
HBA1C MFR BLD: 5.7 % (ref 4–5.6)
PHOSPHATE SERPL-MCNC: 4.3 MG/DL (ref 2.7–4.5)
POTASSIUM SERPL-SCNC: 4 MMOL/L (ref 3.5–5.1)
SODIUM SERPL-SCNC: 144 MMOL/L (ref 136–145)
T4 FREE SERPL-MCNC: 1.14 NG/DL (ref 0.71–1.51)
TSH SERPL DL<=0.005 MIU/L-ACNC: 2.27 UIU/ML (ref 0.4–4)

## 2023-12-06 PROCEDURE — 36415 COLL VENOUS BLD VENIPUNCTURE: CPT | Mod: PO | Performed by: HOSPITALIST

## 2023-12-06 PROCEDURE — 84439 ASSAY OF FREE THYROXINE: CPT | Performed by: HOSPITALIST

## 2023-12-06 PROCEDURE — 80069 RENAL FUNCTION PANEL: CPT | Performed by: HOSPITALIST

## 2023-12-06 PROCEDURE — 83036 HEMOGLOBIN GLYCOSYLATED A1C: CPT | Performed by: HOSPITALIST

## 2023-12-06 PROCEDURE — 84443 ASSAY THYROID STIM HORMONE: CPT | Performed by: HOSPITALIST

## 2023-12-13 ENCOUNTER — OFFICE VISIT (OUTPATIENT)
Dept: ENDOCRINOLOGY | Facility: CLINIC | Age: 78
End: 2023-12-13
Payer: MEDICARE

## 2023-12-13 VITALS
TEMPERATURE: 98 F | HEART RATE: 74 BPM | BODY MASS INDEX: 35.84 KG/M2 | SYSTOLIC BLOOD PRESSURE: 158 MMHG | DIASTOLIC BLOOD PRESSURE: 88 MMHG | WEIGHT: 215.38 LBS

## 2023-12-13 DIAGNOSIS — Z99.2 ESRD ON HEMODIALYSIS: ICD-10-CM

## 2023-12-13 DIAGNOSIS — E83.9 CHRONIC KIDNEY DISEASE-MINERAL AND BONE DISORDER: ICD-10-CM

## 2023-12-13 DIAGNOSIS — M89.9 CHRONIC KIDNEY DISEASE-MINERAL AND BONE DISORDER: ICD-10-CM

## 2023-12-13 DIAGNOSIS — N18.6 ESRD ON HEMODIALYSIS: ICD-10-CM

## 2023-12-13 DIAGNOSIS — E03.9 HYPOTHYROIDISM (ACQUIRED): Chronic | ICD-10-CM

## 2023-12-13 DIAGNOSIS — N18.6 TYPE 2 DIABETES MELLITUS WITH ESRD (END-STAGE RENAL DISEASE): Primary | ICD-10-CM

## 2023-12-13 DIAGNOSIS — N18.9 CHRONIC KIDNEY DISEASE-MINERAL AND BONE DISORDER: ICD-10-CM

## 2023-12-13 DIAGNOSIS — E11.22 TYPE 2 DIABETES MELLITUS WITH ESRD (END-STAGE RENAL DISEASE): Primary | ICD-10-CM

## 2023-12-13 PROCEDURE — 1159F MED LIST DOCD IN RCRD: CPT | Mod: CPTII,S$GLB,, | Performed by: HOSPITALIST

## 2023-12-13 PROCEDURE — 3288F PR FALLS RISK ASSESSMENT DOCUMENTED: ICD-10-PCS | Mod: CPTII,S$GLB,, | Performed by: HOSPITALIST

## 2023-12-13 PROCEDURE — 99999 PR PBB SHADOW E&M-EST. PATIENT-LVL IV: CPT | Mod: PBBFAC,,, | Performed by: HOSPITALIST

## 2023-12-13 PROCEDURE — 1159F PR MEDICATION LIST DOCUMENTED IN MEDICAL RECORD: ICD-10-PCS | Mod: CPTII,S$GLB,, | Performed by: HOSPITALIST

## 2023-12-13 PROCEDURE — 3079F DIAST BP 80-89 MM HG: CPT | Mod: CPTII,S$GLB,, | Performed by: HOSPITALIST

## 2023-12-13 PROCEDURE — 3077F SYST BP >= 140 MM HG: CPT | Mod: CPTII,S$GLB,, | Performed by: HOSPITALIST

## 2023-12-13 PROCEDURE — 3079F PR MOST RECENT DIASTOLIC BLOOD PRESSURE 80-89 MM HG: ICD-10-PCS | Mod: CPTII,S$GLB,, | Performed by: HOSPITALIST

## 2023-12-13 PROCEDURE — 99999 PR PBB SHADOW E&M-EST. PATIENT-LVL IV: ICD-10-PCS | Mod: PBBFAC,,, | Performed by: HOSPITALIST

## 2023-12-13 PROCEDURE — 3288F FALL RISK ASSESSMENT DOCD: CPT | Mod: CPTII,S$GLB,, | Performed by: HOSPITALIST

## 2023-12-13 PROCEDURE — 99214 PR OFFICE/OUTPT VISIT, EST, LEVL IV, 30-39 MIN: ICD-10-PCS | Mod: S$GLB,,, | Performed by: HOSPITALIST

## 2023-12-13 PROCEDURE — 1101F PT FALLS ASSESS-DOCD LE1/YR: CPT | Mod: CPTII,S$GLB,, | Performed by: HOSPITALIST

## 2023-12-13 PROCEDURE — 3077F PR MOST RECENT SYSTOLIC BLOOD PRESSURE >= 140 MM HG: ICD-10-PCS | Mod: CPTII,S$GLB,, | Performed by: HOSPITALIST

## 2023-12-13 PROCEDURE — 1101F PR PT FALLS ASSESS DOC 0-1 FALLS W/OUT INJ PAST YR: ICD-10-PCS | Mod: CPTII,S$GLB,, | Performed by: HOSPITALIST

## 2023-12-13 PROCEDURE — 99214 OFFICE O/P EST MOD 30 MIN: CPT | Mod: S$GLB,,, | Performed by: HOSPITALIST

## 2023-12-13 NOTE — ASSESSMENT & PLAN NOTE
- at [facility]; Dr. Barrientos  - Tuesday, Thursday and Saturday  - has AV graft in left UE

## 2023-12-13 NOTE — PROGRESS NOTES
"Subjective:      Patient ID: Erica Leblanc is a 78 y.o. female presented to Endocrinology clinic on 12/13/2023.    Chief Complaint:  Diabetes, osteoporosis/metabolic bone disease, hypothyroidism    History of Present Illness: Erica Leblanc is a 78 y.o. female with metabolic bone disease with osteoporosis, hypothyroidism and type 2 diabetes  ESRD on HD on TTS for 1 year, her nephrologist is  Dr Myla Puente  Here for follow-up-     Interval history:  Patient is here for follow-up.  Please have been control.  Still with hyperglycemia as reported.  But glucose have been much better   Patient reports compliance with binders, calcium and phosphorus much better.  Compliance with levothyroxine.  No issue with TSH.  No falls  In Clinic 187 after breakfast  Glucose at , 108, 120s fasting   Denies hypoglycemia  She reports appetite suppression on Trulicity  Dry weight 95kg      1) Type 2 diabetes:    - Diagnosis around 10 years ago  - Patient was on insulin prior to HD, has been off of all therapy since that time.  - Checking glucose 1x a day  - Family hx of diabetes  - currently not on medication  - Statin: Taking, ACE/ARB: Not taking    Current reported meds:               Trulicity 1.5 mg once a week injection  Home glucose checks: checks 1-2 a day, Logs reviewed  - Hypoglycemia symptoms:  DENIES   Diet/Exercise:   - Eating 3 meals per day   - Weight trend: stable  - Diabetes Related Hospitalization:  No  - Hx of pancreatitis: No, denies  - Family history of diabetes: Yes  - Occupation:  Disabled    Eye exam current (within one year):  Yes, DR: unknown  Reports cuts or ulcers on feet:   Denies, has podiatrist  Statin: Taking, ACE/ARB: Not taking    Diabetes lab work  Lab Results   Component Value Date    HGBA1C 5.7 (H) 12/06/2023    HGBA1C 5.5 09/14/2023    HGBA1C 6.2 (H) 07/07/2023    HGBA1C 6.3 (H) 09/13/2022     No results found for: "CPEPTIDE", "GLUTAMICACID", "ISLETCELLANT"   Lab Results   Component Value " "Date    FRUCTOSAMINE 322 07/07/2023    FRUCTOSAMINE 580 03/01/2021     Lab Results   Component Value Date    MICALBCREAT 3,693.6 (H) 03/17/2014     No results found for: "WFXECXBB60"    Diabetes Management Status: Reviewed this office visit  Screening or Prevention Patient's value Goal Complete/Controlled?   Lipid profile : 07/07/2023 Annually Yes     Dilated retinal exam : 08/17/2022 Annually Yes     Foot exam   Most Recent Foot Exam Date: Not Found Annually Yes          2) With regards to chronic kidney disease mineral and bone disorder  - Seen on recent bone density scan.  Patient denies prior history of any bone disorder.  - Chronic history of ESRD on dialysis  - Longstanding chronic issue.  Poorly-controlled bone disease due to ESRD status.  Continues to have hypocalcemia, hyperphosphatemia.  Elevated PTH.  - Lab work review showing chronic hyperphosphatemia, elevated alkaline phosphatase, hypocalcemia, normal vitamin-D.  - Patient reports Sensipar three times a week at HD  - Patient is on phosphate binder:  Sevelamer 1600mg TIDWM, reports better compliance recently  - Patient has stopped soda consumption   - Patient denies back pain, No falls  - Never had fractures  - Use walker to help with gait and ambulation  - NM Parathyroid scan 2021: No abnormal uptake to suggest parathyroid adenoma.  - Vit D 1000 iu daily    DXA done on 10/2020  Comparison study done on 03/13/2018.  Lumbar spine (L1-L4): BMD is 0.943 g/cm2, T-score is -0.7, and Z-score is 1.0.  Total hip: BMD is 0.680 g/cm2, T-score is -2.1, and Z-score is -1.0.  Femoral neck: BMD is 0.511 g/cm2, T-score is -3.0, and Z-score is -1.5.    Impression:  1. Osteoporosis with likely chronic kidney disease mineral and bone disorder  2. Compared with previous DXA, BMD at the lumbar spine has declined by 13.9%, and the BMD at the total hip has declined by 23.2%.    Height loss (>2 inches)? no  Family hx of Osteoporosis: no    Asymptomatic menopausal state.  She " "had a hysterectomy at 40 y/o and menopausal symptoms at 47 y/o.     Lab work reviewed  Lab Results   Component Value Date    .7 (H) 07/07/2023    .6 (H) 05/07/2022    .0 (H) 05/07/2021    IEKSTSXN88JG 49 07/07/2023    GPVMJGKV91YU 48 05/07/2022    ULQIQKIM87FU 47 11/03/2021    CALCIUM 9.1 12/06/2023    CALCIUM 8.6 (L) 10/01/2023    CALCIUM 9.8 07/07/2023    PHOS 4.3 12/06/2023    PHOS 3.7 07/07/2023    PHOS 6.9 (H) 06/07/2021    ALKPHOS 72 10/01/2023    ALKPHOS 90 07/07/2023    ALKPHOS 93 05/07/2022    TSH 2.268 12/06/2023    TTGIGA 7 03/01/2021       3) Hypothyroidism  - Chronic  - Restarted in 2021: Levothyroxin 50mcg  - History of thyroid goiter, with thyroid nodule seen on ultrasound     Thyroid lab work  Lab Results   Component Value Date    TSH 2.268 12/06/2023    TSH 1.957 07/07/2023    TSH 2.631 11/03/2021    FREET4 1.14 12/06/2023    FREET4 1.17 07/07/2023    FREET4 1.13 03/23/2019      Antibodies  No results found for: "THYROIDAB", "TSIMMGLBN", "THYROIDSTIMI", "THYROTROPINR"      4) Multiple Thyroid nodule  - denies compressive symptoms.  Overall not interested in getting biopsy given stable size    US thyroid 07/03/2023  The thyroid is normal in size.  Right lobe of the thyroid measures 4.3 x 1.6 x 1.6 cm.  Left lobe of the thyroid measures 3.2 x 1.7 x 1.5 cm.  Normal thyroid parenchyma.  Nodule RIGHT upper pole 0.3 x 0.3 x 0.2 cm.  Nodule LEFT upper pole 1.3 x 2.2 x 1.1 cm and lower pole 0.9 x 0.7 x 0.7 cm.  Mid to lower pole nodule 0.4 x 0.4 x 0.3 cm. At the level of the isthmus, mixed cystic and solid lesions identified measuring 1.4 x 0.6 x 1.2 cm and 1.0 x 0.9 x 0.4 cm.  1.4 cm RIGHT isthmic nodule demonstrates presence of small microcalcifications.  This nodule meets criteria for TiRads 5 and FNA is recommended.  Cervical lymph nodes demonstrate normal morphology and size.     Impression:  Suspicious nodule RIGHT isthmus, 1.4 cm with small microcalcifications, mildly hypoechoic " meets criteria for FNA.  Malignancy not excluded  Additional thyroidal nodules as above.    US thyroid 2021  The thyroid gland is normal in size.  The right thyroid lobe measures 4.1 x 1.9 x 1.8 cm.  The left thyroid lobe measures 4.5 x 1.8 x 1.9 cm.  Thyroid parenchyma is homogeneous, without increased perfusion.  There are 5 measured the small thyroid nodules.  1.5 cm right thyroid lobe/isthmus nodule with TI-RADS category 3, does not qualify for FNA..    Reviewed past surgical, medical, family, social history and updated as appropriate.    Objective:   BP (!) 158/88   Pulse 74   Temp 98.1 °F (36.7 °C) (Oral)   Wt 97.7 kg (215 lb 6.4 oz)   BMI 35.84 kg/m²     Body mass index is 35.84 kg/m².    Physical Exam  Vitals and nursing note reviewed.   Constitutional:       Appearance: She is well-developed.      Comments: Elderly female using walker to help with ambulation   HENT:      Head: Normocephalic.   Eyes:      General: No scleral icterus.     Conjunctiva/sclera: Conjunctivae normal.   Neck:      Thyroid: No thyromegaly.   Cardiovascular:      Rate and Rhythm: Normal rate.      Heart sounds: Normal heart sounds.   Pulmonary:      Effort: Pulmonary effort is normal. No respiratory distress.   Abdominal:      Palpations: Abdomen is soft.      Tenderness: There is no abdominal tenderness.   Musculoskeletal:         General: Normal range of motion.      Cervical back: Neck supple.   Skin:     General: Skin is warm.      Findings: No erythema.   Neurological:      Mental Status: She is alert.      Coordination: Coordination normal.   Psychiatric:         Behavior: Behavior normal.       Lab Review:  Lab Results   Component Value Date    .7 (H) 07/07/2023    .6 (H) 05/07/2022    .0 (H) 05/07/2021    GVHEQSBH56LK 49 07/07/2023    [id number] 48 05/07/2022    BGIPWMFU75GF 47 11/03/2021    CALCIUM 9.1 12/06/2023    CALCIUM 8.6 (L) 10/01/2023    CALCIUM 9.8 07/07/2023    PHOS 4.3 12/06/2023    PHOS  3.7 07/07/2023    PHOS 6.9 (H) 06/07/2021    ALKPHOS 72 10/01/2023    ALKPHOS 90 07/07/2023    ALKPHOS 93 05/07/2022    TSH 2.268 12/06/2023    TTGIGA 7 03/01/2021         Assessment     1. Type 2 diabetes mellitus with ESRD (end-stage renal disease)  HEMOGLOBIN A1C    RENAL FUNCTION PANEL    Fructosamine      2. Hypothyroidism (acquired)  TSH    T4, Free      3. Chronic kidney disease-mineral and bone disorder  PTH, Intact      4. ESRD on hemodialysis            Plan     Type 2 diabetes mellitus with ESRD (end-stage renal disease)  - Diabetes is at a goal given current A1C goal A1C for patient is 7% in ESRD  - Diabetic supplies/medications: reviewed no need for refills at this time  - Long discussion with patient about dietary modification, portion size control, decreasing carbohydrates intake  - A1c can be falsely low due to anemia/ESRD  - improvement in hyperglycemia now.  Since on Trulicity    Plan  - continue Trulicity to 1.5 mg once a week injection.  Patient was okay with this plan  - Patient unable to check glucose often, will continue checking glucose at dialysis center  - Repeat lab work every 3 months, consider switching to Mounjaro/Ozempic given patient concern for weight gain    Hypothyroidism (acquired)  - Patient with history of hypothyroidism etiology unclear  - On levothyroxine 50 mcg daily (low-dose)  - TFTs at goal, continue    Chronic kidney disease-mineral and bone disorder  - Very difficult case, longstanding issue with worsening osteoporosis and continue elevation in PTH leading to osteoporosis  - Risk factors include ESRD on dialysis, hyperphosphatemia, hypocalcemia  - High risk of falls given knee pain, poor gait, the need to use walker  - Patient is more compliant with phosphate binder, and dietary modification  - On vitamin-D analog at dialysis  - sestamibi scan inconclusive for parathyroid adenoma  - now on Sensipar at HD unit  - threshold for parathyroidectomy is above >800 and failing  medical therapy option  - given improvement in PTH, normal calcium, low phosphorus, continue monitoring with lab work    ESRD on hemodialysis  - at [facility]; Dr. Barrientos  - Tuesday, Thursday and Saturday  - has AV graft in left UE      Return to clinic in 3-4mo for diabetes management    Carl Day MD  Endocrinology- [facility]   12/13/2023      Disclaimer: This note has been generated using voice-recognition software. There may be typographical errors that have been missed during proof-reading.

## 2023-12-13 NOTE — ASSESSMENT & PLAN NOTE
- Diabetes is at a goal given current A1C goal A1C for patient is 7% in ESRD  - Diabetic supplies/medications: reviewed no need for refills at this time  - Long discussion with patient about dietary modification, portion size control, decreasing carbohydrates intake  - A1c can be falsely low due to anemia/ESRD  - improvement in hyperglycemia now.  Since on Trulicity    Plan  - continue Trulicity to 1.5 mg once a week injection.  Patient was okay with this plan  - Patient unable to check glucose often, will continue checking glucose at dialysis center  - Repeat lab work every 3 months, consider switching to Mounjaro/Ozempic given patient concern for weight gain

## 2023-12-15 ENCOUNTER — HOSPITAL ENCOUNTER (OUTPATIENT)
Dept: CARDIOLOGY | Facility: HOSPITAL | Age: 78
Discharge: HOME | End: 2023-12-15
Attending: SURGERY
Payer: MEDICARE

## 2023-12-15 DIAGNOSIS — I87.303 VENOUS HYPERTENSION OF BOTH LOWER EXTREMITIES: ICD-10-CM

## 2023-12-15 PROCEDURE — 93970 CV US LOWER VENOUS INSUFFICIENCY BILATERAL (CUPID ONLY): ICD-10-PCS | Mod: 26,,, | Performed by: SURGERY

## 2023-12-15 PROCEDURE — 93970 EXTREMITY STUDY: CPT | Mod: TC

## 2023-12-15 PROCEDURE — 93970 EXTREMITY STUDY: CPT | Mod: 26,,, | Performed by: SURGERY

## 2023-12-22 ENCOUNTER — OFFICE VISIT (OUTPATIENT)
Dept: VASCULAR SURGERY | Facility: CLINIC | Age: 78
End: 2023-12-22
Payer: MEDICARE

## 2023-12-22 ENCOUNTER — TELEPHONE (OUTPATIENT)
Dept: VASCULAR SURGERY | Facility: CLINIC | Age: 78
End: 2023-12-22

## 2023-12-22 VITALS
HEIGHT: 65 IN | WEIGHT: 211.56 LBS | SYSTOLIC BLOOD PRESSURE: 158 MMHG | BODY MASS INDEX: 35.25 KG/M2 | DIASTOLIC BLOOD PRESSURE: 88 MMHG | HEART RATE: 77 BPM

## 2023-12-22 DIAGNOSIS — T82.858D ARTERIOVENOUS FISTULA STENOSIS, SUBSEQUENT ENCOUNTER: Primary | ICD-10-CM

## 2023-12-22 DIAGNOSIS — I89.0 LYMPHEDEMA: ICD-10-CM

## 2023-12-22 LAB
LEFT GREAT SAPHENOUS DISTAL THIGH DIA: 0.4 CM
LEFT GREAT SAPHENOUS JUNCTION DIA: 0.8 CM
LEFT GREAT SAPHENOUS KNEE DIA: 0.4 CM
LEFT GREAT SAPHENOUS MIDDLE THIGH DIA: 0.4 CM
LEFT GREAT SAPHENOUS PROXIMAL CALF DIA: 0.2 CM
LEFT SMALL SAPHENOUS KNEE DIA: 0.3 CM
LEFT SMALL SAPHENOUS SPJ DIA: 0.3 CM
RIGHT GREAT SAPHENOUS DISTAL THIGH DIA: 0.5 CM
RIGHT GREAT SAPHENOUS JUNCTION DIA: 0.8 CM
RIGHT GREAT SAPHENOUS KNEE DIA: 0.4 CM
RIGHT GREAT SAPHENOUS MIDDLE THIGH DIA: 0.5 CM
RIGHT GREAT SAPHENOUS PROXIMAL CALF DIA: 0.3 CM
RIGHT SMALL SAPHENOUS KNEE DIA: 0.1 CM
RIGHT SMALL SAPHENOUS SPJ DIA: 0.2 CM

## 2023-12-22 PROCEDURE — 1101F PT FALLS ASSESS-DOCD LE1/YR: CPT | Mod: CPTII,S$GLB,, | Performed by: SURGERY

## 2023-12-22 PROCEDURE — 3288F PR FALLS RISK ASSESSMENT DOCUMENTED: ICD-10-PCS | Mod: CPTII,S$GLB,, | Performed by: SURGERY

## 2023-12-22 PROCEDURE — 99214 PR OFFICE/OUTPT VISIT, EST, LEVL IV, 30-39 MIN: ICD-10-PCS | Mod: S$GLB,,, | Performed by: SURGERY

## 2023-12-22 PROCEDURE — 1101F PR PT FALLS ASSESS DOC 0-1 FALLS W/OUT INJ PAST YR: ICD-10-PCS | Mod: CPTII,S$GLB,, | Performed by: SURGERY

## 2023-12-22 PROCEDURE — 1125F AMNT PAIN NOTED PAIN PRSNT: CPT | Mod: CPTII,S$GLB,, | Performed by: SURGERY

## 2023-12-22 PROCEDURE — 3288F FALL RISK ASSESSMENT DOCD: CPT | Mod: CPTII,S$GLB,, | Performed by: SURGERY

## 2023-12-22 PROCEDURE — 3079F PR MOST RECENT DIASTOLIC BLOOD PRESSURE 80-89 MM HG: ICD-10-PCS | Mod: CPTII,S$GLB,, | Performed by: SURGERY

## 2023-12-22 PROCEDURE — 3077F SYST BP >= 140 MM HG: CPT | Mod: CPTII,S$GLB,, | Performed by: SURGERY

## 2023-12-22 PROCEDURE — 1125F PR PAIN SEVERITY QUANTIFIED, PAIN PRESENT: ICD-10-PCS | Mod: CPTII,S$GLB,, | Performed by: SURGERY

## 2023-12-22 PROCEDURE — 3077F PR MOST RECENT SYSTOLIC BLOOD PRESSURE >= 140 MM HG: ICD-10-PCS | Mod: CPTII,S$GLB,, | Performed by: SURGERY

## 2023-12-22 PROCEDURE — 3079F DIAST BP 80-89 MM HG: CPT | Mod: CPTII,S$GLB,, | Performed by: SURGERY

## 2023-12-22 PROCEDURE — 99214 OFFICE O/P EST MOD 30 MIN: CPT | Mod: S$GLB,,, | Performed by: SURGERY

## 2023-12-22 PROCEDURE — 99999 PR PBB SHADOW E&M-EST. PATIENT-LVL II: ICD-10-PCS | Mod: PBBFAC,,, | Performed by: SURGERY

## 2023-12-22 PROCEDURE — 99999 PR PBB SHADOW E&M-EST. PATIENT-LVL II: CPT | Mod: PBBFAC,,, | Performed by: SURGERY

## 2023-12-22 NOTE — PROGRESS NOTES
[facility]                         12/22/2023    HPI:  Erica Leblanc is a 78 y.o. female with   Patient Active Problem List   Diagnosis    Severe obesity (BMI 35.0-39.9) with comorbidity    Sickle cell trait    Hyperlipidemia LDL goal <100    HUMBERTO on CPAP    Hypothyroidism (acquired)    Hx-TIA (transient ischemic attack)    Vitamin D deficiency disease    Pulmonary hypertension    Type 2 diabetes mellitus with ESRD (end-stage renal disease)    Secondary renal hyperparathyroidism    Incomplete bladder emptying    Postmenopausal atrophic vaginitis    Rectocele    Mixed incontinence urge and stress    Chronic diastolic heart failure    Tortuous aorta    ESRD on hemodialysis    Anemia of chronic disease    Debility    Chronic respiratory failure    Type 2 diabetes mellitus with foot ulcer, with long-term current use of insulin    Stable proliferative diabetic retinopathy associated with type 2 diabetes mellitus    Heart failure with preserved ejection fraction    Pseudophakia    Chronic kidney disease-mineral and bone disorder    Age-related osteoporosis without current pathological fracture    H/O: stroke    Walker as ambulation aid    being managed by PCP and specialists who is here today for evaluation of long term HD access.  S/p R IJ tunneled HD catheter, HD without issues.  Pt is R handed.  No complaints today.  Does endorse orthopnea, no chest pain.  Unable to climb 1 flight of stairs on own.    no MI  no Stroke  Tobacco use: denies    1/20/20: No new issues.    3/2020: Dialysis without issues.    4/6/20:  S/p LUE fistulogram, central venogram, ligation of medial side branch cephalic vein, ligation of lateral side branch cephalic vein.  No issues with HD for several weeks since procedure.    7/6/20:  No issues with HD.    8/3/20: s/p L proximal fistula angioplasty 7/21/20.  No issues with HD.    8/31/20:  S/p 8/19/29 Balloon angioplasty of the proximal LUE AVF with a  6x60 Angiosculpt and Stellerex balloon.      10/2020:  No issues with HD    1/2021:  No issues with HD    4/2021:  No new problems.    8/2021:  No issues with HD. C/o fatigue after HD.  Has not seen cardiology recently.    3/2022:  No issues with HD    6/2022:  Doing well, no issues with HD    1/2023:  No issues with HD    04/2023: No issues with HD.     7/2023:  doing well.    10/2023:  No issues with HD.    11/2023:  c/o LLE cellulitis and edema.  +compression with sock.    12/2023:  +compression.      Past Medical History:   Diagnosis Date    Acute ischemic right PCA stroke 6/6/2021    Acute respiratory failure with hypoxia     Age-related osteoporosis without current pathological fracture 3/8/2021    Anemia of chronic kidney failure, stage 4 (severe) 4/5/2019    Cataracts, bilateral     CHF (congestive heart failure)     CKD (chronic kidney disease) stage 3, GFR 30-59 ml/min     CKD (chronic kidney disease) stage 3, GFR 30-59 ml/min     Controlled type 2 diabetes mellitus with proteinuria or albuminuria     Depression     Diabetes with neurologic complications     Diabetic retinopathy of both eyes     Edema     Glaucoma     History of colonic polyps     Hx-TIA (transient ischemic attack) 11/2008    Hyperlipidemia LDL goal < 100     Hypertension     Hypothyroidism     Major depressive disorder, single episode, mild 2/17/2016    Mixed incontinence urge and stress     Obesity     Obstructive sleep apnea on CPAP     7/19/19:  Home CPAP machine broken, per patient & son    Osteopenia     Proteinuria     Sickle cell trait     Strabismus     TIA (transient ischemic attack)     Trouble in sleeping     Type 2 diabetes mellitus with ophthalmic manifestations     Type 2 diabetes with stage 3 chronic kidney disease GFR 30-59     Type II or unspecified type diabetes mellitus with renal manifestations, uncontrolled(250.42)     Uncontrolled type 2 diabetes mellitus with peripheral circulatory disorder 4/5/2019    Urge  incontinence 2016    Urge incontinence     Venous stasis ulcer     bilateral lower legs    Vitamin D deficiency disease      Past Surgical History:   Procedure Laterality Date    AV FISTULA PLACEMENT Left 2019    Procedure: CREATION, AV FISTULA, LEFT UPPER EXTREMITY;  Surgeon: Keron Perez MD;  Location: [facility];  Service: Vascular;  Laterality: Left;    BREAST BIOPSY      breast reduction Bilateral age 31    BREAST SURGERY      CATARACT EXTRACTION Bilateral     cataracts Bilateral      SECTION, LOW TRANSVERSE      x1    CHOLECYSTECTOMY      COLONOSCOPY N/A 2022    Procedure: COLONOSCOPY;  Surgeon: Mahesh Huang MD;  Location: [facility] ENDO (2ND FLR);  Service: Endoscopy;  Laterality: N/A;  fully vaccinated-sm.  RAPID COVID  +cologuard needing ASAP  Dialysis pt T,TH Sat and left UA access  2nd floor - cardiac/dialysis/SOB / LABS / prep ins. emailed - [facility]    EYE SURGERY  2014, 2014    vitrectomy    EYE SURGERY Right 2016    FISTULOGRAM Left 3/6/2020    Procedure: Fistulogram, left upper extremity, with branch ligation;  Surgeon: Keron Perez MD;  Location: [address];  Service: Vascular;  Laterality: Left;  Time 1.1 Minute  42.10 mGy    FISTULOGRAM Left 2020    Procedure: Fistulogram, left upper extremity, transradial access with possible intervention;  Surgeon: Keron Perez MD;  Location: [address];  Service: Vascular;  Laterality: Left;    FISTULOGRAM Left 2020    Procedure: Fistulogram, left upper extremity, possible intervention;  Surgeon: Keron Perez MD;  Location: [facility];  Service: Vascular;  Laterality: Left;  930 AM START  RN PRE OP  ---COVID NEGATIVE ON  2020. [place]    FISTULOGRAM Left 2022    Procedure: Fistulogram, transradial access;  Surgeon: Keron Perez MD;  Location: [address];  Service: Vascular;  Laterality: Left;  mgy-40.16  gycm-8.3905  contrast-34cc  time-12.4min    HYSTERECTOMY  1986     TAHBSO (patient is unsure if ovaries removed)    OOPHORECTOMY      PERCUTANEOUS TRANSLUMINAL ANGIOPLASTY OF ARTERIOVENOUS FISTULA Left 7/21/2020    Procedure: PTA, AV FISTULA;  Surgeon: Keron Perez MD;  Location: [facility] OR [address] [place] [address];  Service: Vascular;  Laterality: Left;  15.9 minutes of fluro  41.12  mGy  7.9060 Gy cm2  32ml  contrast    PHLEBOGRAPHY Left 7/21/2020    Procedure: CENTRAL VENOGRAM;  Surgeon: Keron Perez MD;  Location: [facility] OR [address] [place] [address];  Service: Vascular;  Laterality: Left;    REFRACTIVE SURGERY      TOTAL REDUCTION MAMMOPLASTY      approx 10 yrs ago    VENOPLASTY  1/21/2022    Procedure: ANGIOPLASTY, VEIN;  Surgeon: Keron Perez MD;  Location: [facility] OR [address] [place] [address];  Service: Vascular;;     Family History   Problem Relation Age of Onset    Stroke Mother     Diabetes Mother     Hypertension Mother     Cataracts Mother     Leukemia Father     Cataracts Father     Ovarian cancer Sister 35    Achondroplasia Sister     HIV Brother     No Known Problems Daughter     No Known Problems Son     Breast cancer Maternal Aunt 65    Parkinsonism Maternal Aunt     Esophageal cancer Maternal Uncle         smoker    No Known Problems Paternal Aunt     Cataracts Paternal Uncle     Cataracts Maternal Grandmother     Cataracts Maternal Grandfather     Diabetes Paternal Grandmother     Cataracts Paternal Grandmother     No Known Problems Paternal Grandfather     No Known Problems Other     Amblyopia Neg Hx     Blindness Neg Hx     Glaucoma Neg Hx     Macular degeneration Neg Hx     Retinal detachment Neg Hx     Strabismus Neg Hx     Thyroid disease Neg Hx     Colon cancer Neg Hx     Cancer Neg Hx      Social History     Socioeconomic History    Marital status: Single    Number of children: 5   Occupational History    Occupation:      Employer: [employer]     Comment: part-time   Tobacco Use    Smoking status: Never    Smokeless tobacco: Never   Substance and Sexual Activity     Alcohol use: No     Alcohol/week: 0.0 standard drinks of alcohol    Drug use: No    Sexual activity: Not Currently     Partners: Male     Birth control/protection: Post-menopausal, Surgical     Social Determinants of Health     Financial Resource Strain: Low Risk  (6/9/2023)    Overall Financial Resource Strain (CARDIA)     Difficulty of Paying Living Expenses: Not hard at all   Food Insecurity: No Food Insecurity (6/9/2023)    Hunger Vital Sign     Worried About Running Out of Food in the Last Year: Never true     Ran Out of Food in the Last Year: Never true   Transportation Needs: No Transportation Needs (6/9/2023)    PRAPARE - Transportation     Lack of Transportation (Medical): No     Lack of Transportation (Non-Medical): No   Physical Activity: Insufficiently Active (6/9/2023)    Exercise Vital Sign     Days of Exercise per Week: 2 days     Minutes of Exercise per Session: 10 min   Stress: No Stress Concern Present (6/9/2023)    [employer] [place] [employer] - Occupational Stress Questionnaire     Feeling of Stress : Only a little   Social Connections: Socially Isolated (6/9/2023)    Social Connection and Isolation Panel [NHANES]     Frequency of Communication with Friends and Family: More than three times a week     Frequency of Social Gatherings with Friends and Family: Twice a week     Attends Jew Services: Never     Active Member of Clubs or Organizations: No     Attends Club or Organization Meetings: Never     Marital Status:    Housing Stability: Low Risk  (6/9/2023)    Housing Stability Vital Sign     Unable to Pay for Housing in the Last Year: No     Number of Places Lived in the Last Year: 1     Unstable Housing in the Last Year: No       Current Outpatient Medications:     ACCU-CHEK SOFT DEV LANCETS Kit, , Disp: , Rfl:     atorvastatin (LIPITOR) 80 MG tablet, Take 1 tablet (80 mg total) by mouth every evening., Disp: 90 tablet, Rfl: 3    blood sugar diagnostic Strp, One  test strip use 2 times a day to check blood glucose,  ICD-10: E11.9, compatible with insurance/glucometer, Disp: 100 each, Rfl: 11    blood-glucose meter kit, One glucometer, use to check blood glucose.   ICD-10: E11.9. Dispense machine covered by insurance, Disp: 1 each, Rfl: 0    cinacalcet (SENSIPAR) 30 MG Tab, , Disp: , Rfl:     docusate sodium (COLACE) 100 MG capsule, Take 200 mg by mouth once daily. , Disp: , Rfl:     doxercalciferoL (HECTOROL) 4 mcg/2 mL injection, Inject 4.5 mcg into the vein 3 (three) times a week. At dialysis unit, Disp: , Rfl:     dulaglutide (TRULICITY) 1.5 mg/0.5 mL pen injector, Inject 1.5 mg into the skin every 7 days., Disp: 12 pen , Rfl: 3    epoetin arnel (EPOGEN INJ), Inject 1,000 Units as directed every 7 days. At the dialysis unit, Disp: , Rfl:     fluticasone propionate (FLONASE) 50 mcg/actuation nasal spray, 1 spray (50 mcg total) by Each Nostril route once daily., Disp: 48 g, Rfl: 5    lancets Misc, One lancets use 2 times a day to check blood glucose, ICD-10: E11.9, Disp: 100 each, Rfl: 11    lancing device Misc, One device, used to check blood glucose, ICD-10: E11.9, Disp: 1 each, Rfl: 0    levothyroxine (SYNTHROID) 50 MCG tablet, TAKE 1 TABLET BEFORE BREAKFAST, Disp: 90 tablet, Rfl: 3    midodrine (PROAMATINE) 5 MG Tab, Take 1 tablet (5 mg total) by mouth 3 (three) times daily., Disp: 90 tablet, Rfl: 3    mupirocin (BACTROBAN) 2 % ointment, Apply topically 3 (three) times daily., Disp: 22 g, Rfl: 0    OLENA-NABIL RX 1- mg-mg-mcg Tab, Take 1 tablet by mouth once daily., Disp: , Rfl:     aspirin (ECOTRIN) 81 MG EC tablet, Take 1 tablet (81 mg total) by mouth once daily., Disp: 90 tablet, Rfl: 3    sevelamer carbonate (RENVELA) 800 mg Tab, Take 3 tablets (2,400 mg total) by mouth 3 (three) times daily with meals., Disp: 270 tablet, Rfl: 11    REVIEW OF SYSTEMS:  General: No fevers or chills; ENT: No sore throat; Allergy and Immunology: no persistent infections;  "Hematological and Lymphatic: No history of bleeding or easy bruising; Endocrine: negative; Respiratory: no cough, shortness of breath, or wheezing; Cardiovascular: no chest pain or dyspnea on exertion; Gastrointestinal: no abdominal pain/back, change in bowel habits, or bloody stools; Genito-Urinary: no dysuria, trouble voiding, or hematuria; Musculoskeletal: negative; Neurological: no TIA or stroke symptoms; Psychiatric: no nervousness, anxiety or depression.    PHYSICAL EXAM:      Pulse: 77         General appearance:  Alert, well-appearing, and in no distress.  Oriented to person, place, and time                    Neurological: Normal speech, no focal findings noted; CN II - XII grossly intact. All extremities with sensation to light touch.            Musculoskeletal: Digits/nail without cyanosis/clubbing.  Strength 5/5 all extremities.                    Neck: Supple, no significant adenopathy, no carotid bruit can be auscultated                  Chest:  Clear to auscultation, no wheezes, rales or rhonchi, symmetric air entry. No use of accessory muscles               Cardiac: Normal rate and regular rhythm, S1 and S2 normal            Abdomen: Soft, nontender, nondistended, no masses or organomegaly, no hernia     No rebound tenderness noted; bowel sounds normal     Pulsatile aortic mass is non palpable.     No groin adenopathy      Extremities:      2+ radial pulse bilaterally, LUE AVF +thrill, inc well healed, 2 PSA with dry scabs, no ulcerations     No BUE edema, Negative Manuel's test BUE    Skin: No tissue loss, 2+ LLE edema with discoloration, 1+ RLE edema     VCSS 11     CEAP 3/4a    LAB RESULTS:  No results found for: "CBC"  Lab Results   Component Value Date    LABPROT 10.3 06/07/2021    INR 1.0 06/07/2021     Lab Results   Component Value Date     12/06/2023    K 4.0 12/06/2023     12/06/2023    CO2 29 12/06/2023     (H) 12/06/2023    BUN 42 (H) 12/06/2023    CREATININE 7.5 (H) " 12/06/2023    CALCIUM 9.1 12/06/2023    ANIONGAP 15 12/06/2023    EGFRNONAA 3.6 (A) 05/07/2022     Lab Results   Component Value Date    WBC 6.54 10/01/2023    RBC 3.84 (L) 10/01/2023    HGB 10.4 (L) 10/01/2023    HCT 32.7 (L) 10/01/2023    MCV 85 10/01/2023    MCH 27.1 10/01/2023    MCHC 31.8 (L) 10/01/2023    RDW 13.8 10/01/2023     10/01/2023    MPV 11.4 10/01/2023    GRAN 4.6 10/01/2023    GRAN 70.1 10/01/2023    LYMPH 0.7 (L) 10/01/2023    LYMPH 11.2 (L) 10/01/2023    MONO 1.1 (H) 10/01/2023    MONO 16.4 (H) 10/01/2023    EOS 0.1 10/01/2023    BASO 0.02 10/01/2023    EOSINOPHIL 1.4 10/01/2023    BASOPHIL 0.3 10/01/2023    DIFFMETHOD Automated 10/01/2023     .  Lab Results   Component Value Date    HGBA1C 5.7 (H) 12/06/2023       IMAGING:  All pertinent imaging has been reviewed and interpreted independently.    Vein mapping 9/2019:  Adequate R superior basilic vein and axillary vein ; Adequate L basilic vein superior and inferior, adequate L axillary     1/27/20 Left upper extremity dialysis ultrasound shows no hemodynamically significant stenosis.  Flow is 1083 ml/min.  Depth and diameter are appropriate.    3/23/20:  Left upper extremity dialysis ultrasound shows anastomotic and proximal fistula hemodynamically significant stenosis.  Flow is 955 ml/min.      7/2020: Left upper extremity dialysis ultrasound shows proximal fistula hemodynamically significant stenosis.  Flow is 1257 ml/min.      8/2020: Left upper extremity dialysis ultrasound shows proximal hemodynamically significant stenosis.  Flow is 1999 ml/min.      8/31/20: Left upper extremity dialysis ultrasound shows proximal fistula hemodynamically significant stenosis.  Flow is 2209 ml/min.      10/2020:  Prox stenosis, flow > 3000 ml/min    1/2021:  Proximal stenosis, flow 4414 ml/min    4/2021:  HD US reviewed without significant stenosis, adequate flow.    8/2021:HD US reviewed without significant stenosis, adequate flow.    3/2022:  Left  upper extremity dialysis ultrasound shows approx 50% anastomotic and prox fistula stenosis withou hemodynamically significant stenosis.  Flow is 3774 ml/min.      6/2022:  No significant stenosis     04/2023: No significant stenosis. Flow 4,086 ml/min    10/2023:  Left upper extremity dialysis ultrasound shows approx 50% anastomotic stenosis.  Flow is 5000 ml/min.      12/2023:  Color flow evaluation of the right lower extremity demonstrates no evidence of venous thrombosis in the deep or superficial veins, and no reflux.  Color flow evaluation of the left lower extremity demonstrates no evidence of venous thrombosis in the deep or superficial veins, and no reflux.      IMP/PLAN:  78 y.o. female with   Patient Active Problem List   Diagnosis    Severe obesity (BMI 35.0-39.9) with comorbidity    Sickle cell trait    Hyperlipidemia LDL goal <100    HUMBERTO on CPAP    Hypothyroidism (acquired)    Hx-TIA (transient ischemic attack)    Vitamin D deficiency disease    Pulmonary hypertension    Type 2 diabetes mellitus with ESRD (end-stage renal disease)    Secondary renal hyperparathyroidism    Incomplete bladder emptying    Postmenopausal atrophic vaginitis    Rectocele    Mixed incontinence urge and stress    Chronic diastolic heart failure    Tortuous aorta    ESRD on hemodialysis    Anemia of chronic disease    Debility    Chronic respiratory failure    Type 2 diabetes mellitus with foot ulcer, with long-term current use of insulin    Stable proliferative diabetic retinopathy associated with type 2 diabetes mellitus    Heart failure with preserved ejection fraction    Pseudophakia    Chronic kidney disease-mineral and bone disorder    Age-related osteoporosis without current pathological fracture    H/O: stroke    Walker as ambulation aid    being managed by PCP and specialists who is here today for evaluation of long term HD access s/p L RC AV fistula.    -s/p L RC AV fistula with proximal fistula measuring 5 mm 1/13/20,  adequate flow without issues during HD s/p LUE fistulogram, central venogram, ligation of medial side branch cephalic vein, ligation of lateral side branch cephalic vein 3/2/20 with prox AVF stenosis s/p L proximal fistula angioplasty 7/21/20 with persistent flow issues s/p proximal angioplasty 8/19/20 with scoring balloon/DCB with improvement in stenosis/flow and asymptomatic anastomotic stenosis with no further issues with HD  -Cont renal diet  -Rec lymphedema clinic and pumps  -F/u in 1 mo with HD US for continued routine surveillance    I spent 11 minutes evaluating this patient and greater than 50% of the time was spent counseling, coordinator care and discussing the plan of care.  All questions were answered and patient stated understanding with agreement with the above treatment plan.    Keron Perez MD  Vascular and Endovascular Surgery

## 2023-12-27 ENCOUNTER — OFFICE VISIT (OUTPATIENT)
Dept: PODIATRY | Facility: CLINIC | Age: 78
End: 2023-12-27
Payer: MEDICARE

## 2023-12-27 VITALS
WEIGHT: 211.44 LBS | SYSTOLIC BLOOD PRESSURE: 168 MMHG | BODY MASS INDEX: 35.23 KG/M2 | DIASTOLIC BLOOD PRESSURE: 88 MMHG | HEART RATE: 81 BPM | HEIGHT: 65 IN

## 2023-12-27 DIAGNOSIS — M20.12 HALLUX ABDUCTO VALGUS, LEFT: ICD-10-CM

## 2023-12-27 DIAGNOSIS — E11.22 TYPE 2 DIABETES MELLITUS WITH ESRD (END-STAGE RENAL DISEASE): Primary | ICD-10-CM

## 2023-12-27 DIAGNOSIS — L90.9 PLANTAR FAT PAD ATROPHY: ICD-10-CM

## 2023-12-27 DIAGNOSIS — N18.6 TYPE 2 DIABETES MELLITUS WITH ESRD (END-STAGE RENAL DISEASE): Primary | ICD-10-CM

## 2023-12-27 DIAGNOSIS — M20.11 HALLUX ABDUCTO VALGUS, RIGHT: ICD-10-CM

## 2023-12-27 DIAGNOSIS — Z99.2 ESRD ON HEMODIALYSIS: ICD-10-CM

## 2023-12-27 DIAGNOSIS — R60.0 LOWER EXTREMITY EDEMA: ICD-10-CM

## 2023-12-27 DIAGNOSIS — N18.6 ESRD ON HEMODIALYSIS: ICD-10-CM

## 2023-12-27 PROCEDURE — 3079F PR MOST RECENT DIASTOLIC BLOOD PRESSURE 80-89 MM HG: ICD-10-PCS | Mod: CPTII,S$GLB,, | Performed by: PODIATRIST

## 2023-12-27 PROCEDURE — 1159F PR MEDICATION LIST DOCUMENTED IN MEDICAL RECORD: ICD-10-PCS | Mod: CPTII,S$GLB,, | Performed by: PODIATRIST

## 2023-12-27 PROCEDURE — 1159F MED LIST DOCD IN RCRD: CPT | Mod: CPTII,S$GLB,, | Performed by: PODIATRIST

## 2023-12-27 PROCEDURE — 99999 PR PBB SHADOW E&M-EST. PATIENT-LVL IV: ICD-10-PCS | Mod: PBBFAC,,, | Performed by: PODIATRIST

## 2023-12-27 PROCEDURE — 3288F FALL RISK ASSESSMENT DOCD: CPT | Mod: CPTII,S$GLB,, | Performed by: PODIATRIST

## 2023-12-27 PROCEDURE — 99999 PR PBB SHADOW E&M-EST. PATIENT-LVL IV: CPT | Mod: PBBFAC,,, | Performed by: PODIATRIST

## 2023-12-27 PROCEDURE — 1125F PR PAIN SEVERITY QUANTIFIED, PAIN PRESENT: ICD-10-PCS | Mod: CPTII,S$GLB,, | Performed by: PODIATRIST

## 2023-12-27 PROCEDURE — 3079F DIAST BP 80-89 MM HG: CPT | Mod: CPTII,S$GLB,, | Performed by: PODIATRIST

## 2023-12-27 PROCEDURE — 3077F PR MOST RECENT SYSTOLIC BLOOD PRESSURE >= 140 MM HG: ICD-10-PCS | Mod: CPTII,S$GLB,, | Performed by: PODIATRIST

## 2023-12-27 PROCEDURE — 1101F PR PT FALLS ASSESS DOC 0-1 FALLS W/OUT INJ PAST YR: ICD-10-PCS | Mod: CPTII,S$GLB,, | Performed by: PODIATRIST

## 2023-12-27 PROCEDURE — 99214 PR OFFICE/OUTPT VISIT, EST, LEVL IV, 30-39 MIN: ICD-10-PCS | Mod: S$GLB,,, | Performed by: PODIATRIST

## 2023-12-27 PROCEDURE — 1160F RVW MEDS BY RX/DR IN RCRD: CPT | Mod: CPTII,S$GLB,, | Performed by: PODIATRIST

## 2023-12-27 PROCEDURE — 3288F PR FALLS RISK ASSESSMENT DOCUMENTED: ICD-10-PCS | Mod: CPTII,S$GLB,, | Performed by: PODIATRIST

## 2023-12-27 PROCEDURE — 3077F SYST BP >= 140 MM HG: CPT | Mod: CPTII,S$GLB,, | Performed by: PODIATRIST

## 2023-12-27 PROCEDURE — 1160F PR REVIEW ALL MEDS BY PRESCRIBER/CLIN PHARMACIST DOCUMENTED: ICD-10-PCS | Mod: CPTII,S$GLB,, | Performed by: PODIATRIST

## 2023-12-27 PROCEDURE — 1125F AMNT PAIN NOTED PAIN PRSNT: CPT | Mod: CPTII,S$GLB,, | Performed by: PODIATRIST

## 2023-12-27 PROCEDURE — 99214 OFFICE O/P EST MOD 30 MIN: CPT | Mod: S$GLB,,, | Performed by: PODIATRIST

## 2023-12-27 PROCEDURE — 1101F PT FALLS ASSESS-DOCD LE1/YR: CPT | Mod: CPTII,S$GLB,, | Performed by: PODIATRIST

## 2023-12-27 NOTE — PROGRESS NOTES
Subjective:     Patient ID: Erica Leblanc is a 78 y.o. female.    Chief Complaint: Diabetes Mellitus (12/13/23 Endo Day) and Nail Care    Erica is a 78 y.o. female who presents to the clinic upon referral from Dr. Diane osei. provider found  for evaluation and treatment of diabetic feet. Erica has a past medical history of Acute ischemic right PCA stroke (6/6/2021), Acute respiratory failure with hypoxia, Age-related osteoporosis without current pathological fracture (3/8/2021), Anemia of chronic kidney failure, stage 4 (severe) (4/5/2019), Cataracts, bilateral, CHF (congestive heart failure), CKD (chronic kidney disease) stage 3, GFR 30-59 ml/min, CKD (chronic kidney disease) stage 3, GFR 30-59 ml/min, Controlled type 2 diabetes mellitus with proteinuria or albuminuria, Depression, Diabetes with neurologic complications, Diabetic retinopathy of both eyes, Edema, Glaucoma, History of colonic polyps, TIA (transient ischemic attack) (11/2008), Hyperlipidemia LDL goal < 100, Hypertension, Hypothyroidism, Major depressive disorder, single episode, mild (2/17/2016), Mixed incontinence urge and stress, Obesity, Obstructive sleep apnea on CPAP, Osteopenia, Proteinuria, Sickle cell trait, Strabismus, TIA (transient ischemic attack), Trouble in sleeping, Type 2 diabetes mellitus with ophthalmic manifestations, Type 2 diabetes with stage 3 chronic kidney disease GFR 30-59, Type II or unspecified type diabetes mellitus with renal manifestations, uncontrolled(250.42), Uncontrolled type 2 diabetes mellitus with peripheral circulatory disorder (4/5/2019), Urge incontinence (1/11/2016), Urge incontinence, Venous stasis ulcer, and Vitamin D deficiency disease. Reports left leg swelling x several days. Recently went to ED on 11/3/23 Rx. Clindamycin sent currently taking this.    12/27/2023 Patient returns to clinic for follow up of LLE cellulitis with her daughter.  Since her last encounter with podiatry she was seen by her  "vascular surgeon and referred to lymphedema clinic.  She relates cellulitis has resolved but some tenderness t the lateral leg persists.  Denies seeing weeping     PCP: Sendy Elaine MD    Date Last Seen by PCP: none found  Current shoe gear: Tennis shoes    Hemoglobin A1C   Date Value Ref Range Status   12/06/2023 5.7 (H) 4.0 - 5.6 % Final     Comment:     ADA Screening Guidelines:  5.7-6.4%  Consistent with prediabetes  >or=6.5%  Consistent with diabetes    High levels of fetal hemoglobin interfere with the HbA1C  assay. Heterozygous hemoglobin variants (HbS, HgC, etc)do  not significantly interfere with this assay.   However, presence of multiple variants may affect accuracy.     09/14/2023 5.5 % Final   07/07/2023 6.2 (H) 4.0 - 5.6 % Final     Comment:     ADA Screening Guidelines:  5.7-6.4%  Consistent with prediabetes  >or=6.5%  Consistent with diabetes    High levels of fetal hemoglobin interfere with the HbA1C  assay. Heterozygous hemoglobin variants (HbS, HgC, etc)do  not significantly interfere with this assay.   However, presence of multiple variants may affect accuracy.     09/13/2022 6.3 (H) 0.0 - 5.6 % Final     Comment:     [facility]   06/14/2022 6.6 (H) 0.0 - 5.6 % Final     Comment:     [facility]         Review of Systems   Constitutional: Negative for chills.   Cardiovascular:  Positive for leg swelling. Negative for chest pain and claudication.   Respiratory:  Negative for cough.    Skin:  Positive for color change, dry skin and nail changes.   Musculoskeletal:  Positive for joint pain, myalgias and stiffness.   Gastrointestinal:  Negative for nausea.   Neurological:  Positive for paresthesias. Negative for numbness.   Psychiatric/Behavioral:  The patient is not nervous/anxious.         Objective:     Vitals:    12/27/23 0847   BP: (!) 168/88   Pulse: 81   Weight: 95.9 kg (211 lb 6.7 oz)   Height: 5' 5" (1.651 m)   PainSc:   4       Physical Exam  Constitutional:       " Appearance: She is well-developed.   Cardiovascular:      Comments: Dorsalis pedis and posterior tibial pulses are diminished Bilaterally. Toes are cool to touch. Feet are warm proximally.There is decreased digital hair. Skin is atrophic, slightly hyperpigmented, and mildly edematous   Pulmonary:      Effort: No respiratory distress.   Musculoskeletal:         General: Tenderness present.      Right lower leg: Edema present.      Left lower leg: Edema present.      Comments: Adequate joint range of motion without pain, limitation, nor crepitation Bilateral feet and ankle joints. Muscle strength is 5/5 in all groups bilaterally.    Feet:      Right foot:      Skin integrity: Callus and dry skin present. No ulcer or skin breakdown.      Left foot:      Skin integrity: Dry skin present. No ulcer.   Skin:     General: Skin is dry.      Comments: Nails x10 are thickened by 3-5 mm's, dystrophic, and are darkened in coloration . Xerosis Bilaterally. No open lesions noted     Skin is atrophic and hyperpigmented   Neurological:      Mental Status: She is alert.      Comments: [place] 5.07 monofilamant testing is diminished [place] feet. Sharp/dull sensation diminished Bilaterally. Light touch absent Bilaterally.            Assessment:      Encounter Diagnoses   Name Primary?    Type 2 diabetes mellitus with ESRD (end-stage renal disease) Yes    Lower extremity edema     ESRD on hemodialysis     Plantar fat pad atrophy     Hallux abducto valgus, right     Hallux abducto valgus, left      Plan:     Erica was seen today for diabetes mellitus and nail care.    Diagnoses and all orders for this visit:    Type 2 diabetes mellitus with ESRD (end-stage renal disease)    Lower extremity edema    ESRD on hemodialysis    Plantar fat pad atrophy    Hallux abducto valgus, right    Hallux abducto valgus, left      I counseled the patient on her conditions, their implications and medical management.        - Shoe inspection. Diabetic  Foot Education. Patient reminded of the importance of good nutrition and blood sugar control to help prevent podiatric complications of diabetes. Patient instructed on proper foot hygeine. We discussed wearing proper shoe gear, daily foot inspections, never walking without protective shoe gear, caution putting sharp instruments to feet     - Discussed DM foot care:  Wear comfortable, proper fitting shoes. Wash feet daily. Dry well. After drying, apply moisturizer to feet (no lotion to webspaces). Inspect feet daily for skin breaks, blisters, swelling, or redness. Wear cotton socks (preferably white)  Change socks every day. Do NOT walk barefoot. Do NOT use heating pads or warm/hot water soaks       Long discussion about the various types of peripheral vascular disease  and ways to care for skin and skin changes.    Discussed the use of compression stockings b/l to help control edema    RTC in 4 months    I spent a total of 37 minutes on the day of the visit.This includes face to face time and non-face to face time preparing to see the patient (eg, review of tests), obtaining and/or reviewing separately obtained history, documenting clinical information in the electronic or other health record, independently interpreting results and communicating results to the patient/family/caregiver, or care coordinator.

## 2024-01-17 ENCOUNTER — CLINICAL SUPPORT (OUTPATIENT)
Dept: REHABILITATION | Facility: HOSPITAL | Age: 79
End: 2024-01-17
Payer: MEDICARE

## 2024-01-17 DIAGNOSIS — I89.0 LYMPHEDEMA: ICD-10-CM

## 2024-01-17 DIAGNOSIS — T82.858D ARTERIOVENOUS FISTULA STENOSIS, SUBSEQUENT ENCOUNTER: ICD-10-CM

## 2024-01-17 DIAGNOSIS — I89.0 LYMPHEDEMA OF BOTH LOWER EXTREMITIES: ICD-10-CM

## 2024-01-17 DIAGNOSIS — M79.89 SWELLING OF LOWER EXTREMITY: Primary | ICD-10-CM

## 2024-01-17 DIAGNOSIS — Z74.09 IMPAIRED FUNCTIONAL MOBILITY AND ACTIVITY TOLERANCE: ICD-10-CM

## 2024-01-17 PROCEDURE — 97162 PT EVAL MOD COMPLEX 30 MIN: CPT

## 2024-01-17 PROCEDURE — 97110 THERAPEUTIC EXERCISES: CPT

## 2024-01-17 PROCEDURE — 97140 MANUAL THERAPY 1/> REGIONS: CPT

## 2024-01-17 NOTE — PLAN OF CARE
[facility]  Physical Therapy Initial Evaluation    Name: Erica Leblanc  Clinic Number: [id number]    Therapy Diagnosis:   Encounter Diagnoses   Name Primary?    Arteriovenous fistula stenosis, subsequent encounter     Lymphedema     Swelling of lower extremity Yes    Lymphedema of both lower extremities     Impaired functional mobility and activity tolerance      Physician: Keron Perez MD    Physician Orders: PT Eval and Treat - lymphedema  Medical Diagnosis from Referral:   Diagnosis   T82.858D (ICD-10-CM) - Stenosis of other vascular prosthetic devices, implants and grafts, subsequent encounter   I89.0 (ICD-10-CM) - Lymphedema, not elsewhere classified   Evaluation Date: 1/17/2024  Authorization Period Expiration: 12/22/25  Plan of Care Expiration: 4/10/24  Visit # / Visits authorized: 1/ 1    Time In: 320p  Time Out: 445p  Total Billable Time: 85 minutes    Precautions: Standard, Diabetes, Fall, CHF, and ESRD, TIA/stroke    Subjective   Date of onset: ESRD with HD since 2020 due to DM - presently T-TH-Sat schedule.  Swelling in Left leg and cellulitis 11/2023 - ED and then returned to Dr. Perez- had antibiotics.  US on venous system and checked Xray.  Admits fluid fluctuations since dialysis- noted especially L LE- goes down with HD.  Admits swelling varies.    History of current condition - Erica reports: wears KH compression sock - today wearing 2 on each leg.  Tubigrip on leg first from Podiatry, sock is a basic compression sock. Typically was wearing x 1 week day/night- was sponge bath remaining days.  - advised on day and night compression use.  Admits weakness and fatigue especially on dialysis days.    Assistance with FOTO survey    Pt denies CHF and CA. Admits ESRD with HD, DM.  TIA in past.  Fluid pill- no  Blood thinner- Heparin for HD    Imaging: US      Impression:   The left calf veins were not confidently identified which may be related to technical  factors versus thrombosis/DVT.  Further evaluation/follow-up as warranted.   Otherwise, no evidence of DVT above the knee.   Electronically signed by: Hernán Gomez MD  Date:                                            11/03/2023:   12/15/2023  Reason for Exam  Priority: Routine  Dx: Venous hypertension of both lower extremities [I87.303 (ICD-10-CM)]     Conclusion     There is no evidence of a right lower extremity DVT.    There is no evidence of a left lower extremity DVT.    The right superficial femoral middle vein is normal.    The contralateral (left) common femoral vein is patent.    The left superficial femoral middle vein is normal.    The contralateral (right) common femoral vein is patent.     Color flow evaluation of the right lower extremity demonstrates no evidence of venous thrombosis in the deep or superficial veins, and no reflux.  Color flow evaluation of the left lower extremity demonstrates no evidence of venous thrombosis in the deep or superficial veins, and no reflux.\     Medical History:   Past Medical History:   Diagnosis Date    Acute ischemic right PCA stroke 6/6/2021    Acute respiratory failure with hypoxia     Age-related osteoporosis without current pathological fracture 3/8/2021    Anemia of chronic kidney failure, stage 4 (severe) 4/5/2019    Cataracts, bilateral     CHF (congestive heart failure)     CKD (chronic kidney disease) stage 3, GFR 30-59 ml/min     CKD (chronic kidney disease) stage 3, GFR 30-59 ml/min     Controlled type 2 diabetes mellitus with proteinuria or albuminuria     Depression     Diabetes with neurologic complications     Diabetic retinopathy of both eyes     Edema     Glaucoma     History of colonic polyps     Hx-TIA (transient ischemic attack) 11/2008    Hyperlipidemia LDL goal < 100     Hypertension     Hypothyroidism     Major depressive disorder, single episode, mild 2/17/2016    Mixed incontinence urge and stress     Obesity     Obstructive sleep apnea on  CPAP     19:  Home CPAP machine broken, per patient & son    Osteopenia     Proteinuria     Sickle cell trait     Strabismus     TIA (transient ischemic attack)     Trouble in sleeping     Type 2 diabetes mellitus with ophthalmic manifestations     Type 2 diabetes with stage 3 chronic kidney disease GFR 30-59     Type II or unspecified type diabetes mellitus with renal manifestations, uncontrolled(250.42)     Uncontrolled type 2 diabetes mellitus with peripheral circulatory disorder 2019    Urge incontinence 2016    Urge incontinence     Venous stasis ulcer     bilateral lower legs    Vitamin D deficiency disease        Surgical History:   Erica Leblanc  has a past surgical history that includes breast reduction (Bilateral, age 31); Cholecystectomy;  section, low transverse; Refractive surgery; cataracts (Bilateral); Eye surgery (2014, 2014); Hysterectomy (); Eye surgery (Right, 2016); Oophorectomy; Total Reduction Mammoplasty; Breast biopsy; Breast surgery; AV fistula placement (Left, 2019); Cataract extraction (Bilateral); Fistulogram (Left, 3/6/2020); Fistulogram (Left, 2020); Phlebography (Left, 2020); Percutaneous transluminal angioplasty of arteriovenous fistula (Left, 2020); Fistulogram (Left, 2020); Colonoscopy (N/A, 2022); Fistulogram (Left, 2022); and Venoplasty (2022).    Medications:   Erica has a current medication list which includes the following prescription(s): accu-chek soft dev lancets, aspirin, atorvastatin, blood sugar diagnostic, blood-glucose meter, cinacalcet, docusate sodium, doxercalciferol, dulaglutide, epoetin arnel, fluticasone propionate, lancets, lancing device, levothyroxine, midodrine, mupirocin, william-cadence rx, and sevelamer carbonate.    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  Environmental barriers: 2 steps to front entrance, rollator, difficult to get RW up after dialysis,  may help, may perform steps on bus   Abuse/Neglect: none noted    Nutritional status: BMI 35   Educational needs: met   Spiritual/Cultural: met   Fall risk: no, RW     Prior Level of Function: amb rollator  Current Level of Function: bath bench, able to dress  Gait: rollator   Transfers:mod I   Bed Mobility: mod I, has elevating bed - HOB for breathing     Pain and Swelling:  Current 0/10, worst 4/10, best 0/10   Location: bilateral legs, today had R leg pain,  often related to HD, able to walk   Description: Tight and post cellulitis feels hard  Aggravating Factors: fluctuation of HD  Easing Factors: rest and elevation    Pts goals: reduce swelling, stop cellulitis, compression choice    Objective               Female amb to dept Rollator- tends to lean down on forearms  Wearing tubigrip F and basic sock over, slip on mules with heel shelf  Amount of Swelling/Location of Swelling: mod B LE size R > L, R prior wounds medial, scape healed ant shin, L prior cellulitis with darkening, skin discoloration   Skin Integrity: dry, darkened, dense, healed with prior scarring from wounds and cellulitis   Palpation/Texture: dense, pitting distally   + B Stemmer Sign  - B Yehuda's Sign  Circulation: intact      Posture: tends to use forearms on rollator, cueing for erect postures     Range of Motion - LE  Arom knee, ankle sitting or supine  (R)  DF lacks 5  (L)  DF lacks 5    Strength: functional screen- able to perform AROM against gravity and sit to stand transfers  Lacks ROM ankles for DF     Five Times Sit to Stand Test: requires use  of hands and to rollator for safety     Sensation: intact to lt touch B LE    Girth Measurements (in centimeters)  LANDMARK LEFT LE  1/17/24 RIGHT LE  1/17/24 DIFF   at eval   SBP + 10  62.0 cm 65.0 cm 3.0 cm   SBP 56.0 cm 57.0 cm 1.0 cm   10 below SBP 38.0 cm 43.0 cm 5.0 cm   20 below SBP 35.0 cm 37.0 cm 2.0 cm   30 below SBP 27.0 cm 32.5 cm 4.5 cm   35 below SBP 25.0 cm 29.0 cm 4.0 cm   Ankle 26.0 cm 28.0 cm 2.0 cm   Forefoot 22.0 cm 23.0 cm 1.0 cm     CMS Impairment/Limitation/Restriction for FOTO Survey  Therapist reviewed FOTO scores for Erica Leblanc on 1/17/2024.   FOTO documents entered into [employer] - see Media section.     TREATMENT   Treatment Time In: 400p  Treatment Time Out: 445p  Total Treatment time separate from Evaluation: 45 minutes  Some delay with phone call per her brother    Erica received therapeutic exercises to develop strength, endurance, ROM, flexibility, and posture for 15 minutes including:  Cueing on posture and performance - will need to review and practice for improved GSS.  Pt was instructed in and performed Gastroc Soleus Stretching for ankle ROM, muscle pump support and ankle pump mobility.  Pt was given written/illustrated home exercise program to perform daily.  GSS with strap or towel in long sitting - 3 x 20 seconds 1x daily each leg  2.   GSS with strap or towel in chair with cueing for long spine and lean forward - 3 x 20 seconds 1x daily each leg  3.   GSS in standing with hands on wall- back heel remains on floor with knee straight and lean towards wall- hold for a steady stretch - 3 x 20 seconds 1x daily each leg.- modified with standing to chair or RW.  Aps, knee ROM, avoid dependency, avoid immobility, elevation, walking with compression, deep breathing, use of muscle pump to assist venous return    Erica received the following manual therapy techniques: Manual Lymphatic Drainage as plan of care and compression were applied to the: B LE for 15 minutes,  including:  Education and training in compression needs.  Complete Decongestive Therapy components and management with goals and plan of care review.    Demo of Manual Lymphatic Drainage and short stretch compression bandaging.     Pt was provided with an option for Knee High Copper Compression socks 20-30mmHg size L/XL - modified with fold at knee for length.  Pros/cons of basic compression choices were fully reviewed with noted limited size selection, one length choice, lessor tier of product, benefits of low cost, relative ease of application and comfort, and additional items available for use.  Pt was trained on don/doff and methods to apply with assistance of gloves or family member.  Position and fit with appropriate girth, length and support are required.   Daytime use with removal for sleep.   Wear schedules and wash schedules confirmed.   Provided information to secure additional pairs or use as temporary option until medical grade choices are available.     Pt may use Copper Sock or tubigrip daily - advised to remove for sleep    Self Care/Home Management / Functional Therapeutic Activity training for 10 minutes including:  Compression choices and don.doff practice  GSS  Present compression: tubigrip F from Podiatry, Dr. Gray  Pt was educated in potential compression needs.  Demo of products including socks, garments, and Inelastic Velcro wraps.   Discussed cost/coverage and authorization per insurance with Durable Medical Equipment(DME) provider.  Compression require orders from referring provider and coverage or purchase of products from DME or self order.      Discussed wear schedule, don/doff, wash and management of products.  Size and compression class and AM/PM needs.    Consideration for tubigrip or Copper Socks as form of temporary compression use until securing compression needs.   Product information provided.   Vendor list provided.    Informed insurance coverage of compression is per DME  provider and typically Medicare and Medicare group plans may not cover cost beyond pair of standard sized knee high garments.   Commitment to attendance as well as commitment to securing compression needs is critical to edema management.      Home Exercises and Patient Education Provided  Education provided:   - GSS  Compression support daily  Diabetic foot care management  Infection monitoring  Advised on ESRD with HD - fluctuations in size/shape edema  - Pt was educated in lymphedema etiology and management plans.  Pt was provided with written risk reductions and precautions for managing lymphedema.      This patient is in agreement to participate in Lymphedema treatment.    Written Home Exercises Provided: yes.  Exercises were reviewed and Erica was able to demonstrate them prior to the end of the session.  Erica demonstrated fair  understanding of the education provided.     See EMR under Patient Instructions for exercises provided 1/17/2024.    Assessment   Erica is a 78 y.o. 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   Patient has Stage II secondary lymphedema due to DM, ESRD with HD, CVI, obesity, trauma. Therapy recommends elevation, exercise, regular use of compression, and therapy for at least a month to reduce swelling.    Pt prognosis is Fair.   Pt will benefit from skilled  outpatient Physical Therapy to address the deficits stated above and in the chart below, provide pt/family education, and to maximize pt's level of independence.     Plan of care discussed with patient: Yes  Pt's spiritual, cultural and educational needs considered and patient is agreeable to the plan of care and goals as stated below:     Medical Necessity is demonstrated by the following  History  Co-morbidities and personal factors that may impact the plan of care [] LOW: no personal factors / co-morbidities  [x] MODERATE: 1-2 personal factors / co-morbidities  [] HIGH: 3+ personal factors / co-morbidities    Moderate / High Support Documentation: ESRD, HD, cdHF, HUMBERTO on CPAP, DM2, TIA     Examination  Body Structures and Functions, activity limitations and participation restrictions that may impact the plan of care [] LOW: addressing 1-2 elements  [x] MODERATE: 3+ elements  [] HIGH: 4+ elements (please support below)    Moderate / High Support Documentation: ROM, walking rollator, skin tissue changes, discoloration, pitting edema     Clinical Presentation [] LOW: stable  [x] MODERATE: Evolving  [] HIGH: Unstable     Decision Making/ Complexity Score: moderate       Anticipated Barriers for therapy: ESRD with HD, transportation    The following goals were discussed with the patient and patient is in agreement with them as to be addressed in the treatment plan.     Short Term Goals: (6 weeks)  1. Patient will show decreased girth in B LE by up to 1 cm to allow for LE symmetry, shoe and clothing choice, and ability to apply needed compression.  (progressing, not met)   2. Patient will demonstrate 100% knowledge of lymphedema precautions and signs of infection to allow for reduced lymphedema risk, infection risk, and/or exacerbation of condition.  (progressing, not met)  3. Patient or caregiver will perform self-bandaging techniques and/or wearing of compression garments to allow for lymphatic drainage support, skin  elasticity, and reduction in shape and size of limb. (progressing, not met)  4. Patient will perform self lymph drainage techniques to areas within reach to enhance lymphatic drainage and skin condition.  (progressing, not met)  5. Patient will tolerate daily activities with multilayered bandaging to allow for lymphatic and venous support.  (progressing, not met)    Long Term Goals: (12  weeks)  1. Patient will show decreased girth in B LE by up to 2 cm  to allow for LE symmetry, shoe and clothing choice, and ability to apply needed compression daily.  (progressing, not met)  2. Patient will show reduction in density to mild or less with improved contour of limb to allow for cosmesis, LE symmetry, infection risk reduction, and clothing and compression choice.   (progressing, not met)  3. Patient to merry/doff compression garment with daily compliance to assist in lymphedema management, skin elasticity, and tissue density.  (progressing, not met)  4. Pt to show improved postural awareness and alignment.  (progressing, not met)  5. Pt to be I and compliant with HEP to allow for increased function in affected limb.   (progressing, not met)  Plan   Plan of care Certification: 1/17/2024 to 4/10/24.    Outpatient Physical Therapy 2 times weekly for 10 weeks to include the following interventions: Patient Education, Self Care, Therapeutic Activities, and Therapeutic Exercise. Complete Decongestive Therapy- compression and home equipment needs to be addressed and assisted.    Pt may be seen by a PTA as part of the Rehab treatment team.  Plan of Care was discussed with GERONIMO Solano, PT

## 2024-01-22 ENCOUNTER — CLINICAL SUPPORT (OUTPATIENT)
Dept: REHABILITATION | Facility: HOSPITAL | Age: 79
End: 2024-01-22
Payer: MEDICARE

## 2024-01-22 DIAGNOSIS — I89.0 LYMPHEDEMA OF BOTH LOWER EXTREMITIES: Primary | ICD-10-CM

## 2024-01-22 DIAGNOSIS — Z74.09 IMPAIRED FUNCTIONAL MOBILITY AND ACTIVITY TOLERANCE: ICD-10-CM

## 2024-01-22 DIAGNOSIS — M79.89 SWELLING OF LOWER EXTREMITY: ICD-10-CM

## 2024-01-22 PROCEDURE — 97140 MANUAL THERAPY 1/> REGIONS: CPT | Mod: CQ

## 2024-01-22 NOTE — PROGRESS NOTES
Physical Therapy Daily Treatment Note     Name: Erica Leblanc  Clinic Number: [id number]    Therapy Diagnosis:   Encounter Diagnoses   Name Primary?    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 She toppled forward and lost her balance due to missing a wheel although didn't fall . She feels her legs are doing better. She got the compression socks on herself and can get them off herself as well .   She was compliant with home compression/exercise program.  Response to previous treatment: eval - good response and has been wearing compression daily   Functional change: ambulates with rollator , able to self don/doff compression socks     Pain: 0/10  Location: BLEs , L>R    Objective     Female amb to dept Rollator, only one front wheel missing front right wheel- tends to lean down on forearms  Wearing copper compression socks BLEs folded down with calf bulge RLE, slip on mules with heel shelf  Amount of Swelling/Location of Swelling: mod B LE size R > L, R prior wounds medial, scape healed ant shin, L prior cellulitis with darkening, skin discoloration   Skin Integrity: dry, darkened, dense, healed with prior  scarring from wounds and cellulitis   Palpation/Texture: dense, pitting distally   + B Stemmer Sign  - B Yehuda's Sign  Circulation: intact         Treatment:   Erica received the following manual therapy techniques:- Manual Lymphatic Drainage were applied to the: RLE for 60 minutes, including: MLD and short stretch compression bandaging   Time spent assisting with finding screw to replace wheel on rollator - able to replace with help from [facility] and maintenance     MANUAL LYMPHATIC DRAINAGE (MLD):    While supine with LEs elevated stimulation at terminus, along GI region, B inguinal regions, drainage of entire RLE LE mendy lower leg, ankle, and foot with return proximally,  Use of Aquaphor/Eucerin due to dryness.   Consider self massage to abdominal areas, B inguinal areas, thigh, and remaining LE within reach.      MULTILAYERED BANDAGING:  Not performed today due to broken rollator and safety concerns - able to assist with fixing rollator wheel at end of session and plans to bandage at next session.   Re-applied compression sock to RLE with education on donning/doffing and proper fit and wear schedule .     Not Performed :  issued supplies and bandaged R LE with cotton stockinette, Rosidal soft section dorsum of foot, 2 komprex wedges post malleoli, 1 Rosidal soft rolls ankle to knee, 1-8cm and 2- 10cm Durelast rolls foot to knee, to leave intact 12-24 hrs as tolerated, discontinue with any problems, return rolled bandages next session. Wash and wear schedules confirmed.     Erica received therapeutic exercises to develop strength, endurance, ROM, flexibility, and posture including:  Cueing on posture and performance - review only  Pt was instructed in and performed Gastroc Soleus Stretching for ankle ROM, muscle pump support and ankle pump mobility.  Pt was given written/illustrated home exercise program to perform daily.  GSS with strap or towel in long sitting - 3 x 20 seconds 1x daily each leg  2.    GSS with strap or towel in chair with cueing for long spine and lean forward - 3 x 20 seconds 1x daily each leg  3.   GSS in standing with hands on wall- back heel remains on floor with knee straight and lean towards wall- hold for a steady stretch - 3 x 20 seconds 1x daily each leg.- modified with standing to chair or RW.  Aps, knee ROM, avoid dependency, avoid immobility, elevation, walking with compression, deep breathing, use of muscle pump to assist venous return       Home Exercises Provided and Patient Education Provided:  Self Care Home Management Training/Functional Therapeutic Activity x 5 minutes    Pt was provided with an option for Knee High Copper Compression socks 20-30mmHg size L/XL - modified with fold at knee for length.  Pt may use Copper Sock or tubigrip daily - advised to remove for sleep   Pros/cons of basic compression choices were fully reviewed with noted limited size selection, one length choice, lessor tier of product, benefits of low cost, relative ease of application and comfort, and additional items available for use.    Present compression:   tubigrip F from Podiatry, Dr. Gray , 20-30mmHg knee high copper compression socks  Orders and recommendations: 20-30mmHg knee highs   PATIENT/FAMILY Education: bandaging/compression wear schedule,  HEP,  Beginning of self massage,  Self or assisted bandaging, compression options, and Risk reduction    Written Home Exercises Provided: yes.  Home exercise and compression plan of management was reviewed and Erica was able to demonstrate understanding prior to the end of the session.  Erica demonstrated good  understanding of the education provided.     Assessment     Erica is a 78 y.o. 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     Patient has Stage II secondary lymphedema due to DM, ESRD with HD, CVI, obesity, trauma. Therapy recommends elevation, exercise, regular use of compression, and therapy for at least a month to reduce swelling.  Initiated treatment to pts RLE today although did not bandage due to missing wheel on rollator and safety concerns- unsure if able to get rollator fixed by end of session today although able to with assistance from [facility]. Encouraged continue use of compression socks for management as well as exercise and elevation. Discussed medical co-morbidities contributing and needs to continue management per medical team to help aid in management of swelling.     Erica Is progressing well towards her goals.   Pt prognosis is Fair.     Pt will continue to benefit from skilled outpatient physical therapy to address the deficits listed in the problem list box on initial evaluation, provide pt/family education and to maximize pt's level of independence in the home and community environment.   Pt's spiritual, cultural and educational needs considered and pt agreeable to plan of care and goals.     Anticipated Barriers for therapy: ESRD with HD, transportation     The following goals were discussed with the patient and patient is in agreement with them as to be addressed in the treatment plan.      Short Term Goals: (6 weeks)  1. Patient will show decreased girth in B LE by up to 1 cm to allow for LE symmetry, shoe and clothing choice, and ability to apply needed compression.  (progressing, not met)   2.  Patient will demonstrate 100% knowledge of lymphedema precautions and signs of infection to allow for reduced lymphedema risk, infection risk, and/or exacerbation of condition.  (progressing, not met)  3. Patient or caregiver will perform self-bandaging techniques and/or wearing of compression garments to allow for lymphatic drainage support, skin elasticity, and reduction in shape and size of limb. (progressing, not met)  4. Patient will perform self lymph drainage techniques to areas within reach to enhance lymphatic drainage and skin condition.  (progressing, not met)  5. Patient will tolerate daily activities with multilayered bandaging to allow for lymphatic and venous support.  (progressing, not met)     Long Term Goals: (12  weeks)  1. Patient will show decreased girth in B LE by up to 2 cm  to allow for LE symmetry, shoe and clothing choice, and ability to apply needed compression daily.  (progressing, not met)  2. Patient will show reduction in density to mild or less with improved contour of limb to allow for cosmesis, LE symmetry, infection risk reduction, and clothing and compression choice.   (progressing, not met)  3. Patient to merry/doff compression garment with daily compliance to assist in lymphedema management, skin elasticity, and tissue density.  (progressing, not met)  4. Pt to show improved postural awareness and alignment.  (progressing, not met)  5. Pt to be I and compliant with HEP to allow for increased function in affected limb.   (progressing, not met)  Plan   Plan of care Certification: 1/17/2024 to 4/10/24.     Outpatient Physical Therapy 2 times weekly for 10 weeks to include the following interventions: Patient Education, Self Care, Therapeutic Activities, and Therapeutic Exercise. Complete Decongestive Therapy- compression and home equipment needs to be addressed and assisted.    Heather French, PTA

## 2024-01-26 ENCOUNTER — HOSPITAL ENCOUNTER (OUTPATIENT)
Dept: RADIOLOGY | Facility: CLINIC | Age: 79
Discharge: HOME | End: 2024-01-26
Attending: INTERNAL MEDICINE
Payer: MEDICARE

## 2024-01-26 ENCOUNTER — CLINICAL SUPPORT (OUTPATIENT)
Dept: REHABILITATION | Facility: HOSPITAL | Age: 79
End: 2024-01-26
Payer: MEDICARE

## 2024-01-26 DIAGNOSIS — Z74.09 IMPAIRED FUNCTIONAL MOBILITY AND ACTIVITY TOLERANCE: ICD-10-CM

## 2024-01-26 DIAGNOSIS — I89.0 LYMPHEDEMA OF BOTH LOWER EXTREMITIES: Primary | ICD-10-CM

## 2024-01-26 DIAGNOSIS — M79.89 SWELLING OF LOWER EXTREMITY: ICD-10-CM

## 2024-01-26 DIAGNOSIS — M81.0 AGE-RELATED OSTEOPOROSIS WITHOUT CURRENT PATHOLOGICAL FRACTURE: ICD-10-CM

## 2024-01-26 PROCEDURE — 77080 DXA BONE DENSITY AXIAL: CPT | Mod: 26,,, | Performed by: INTERNAL MEDICINE

## 2024-01-26 PROCEDURE — 29581 APPL MULTLAYER CMPRN SYS LEG: CPT | Mod: 50

## 2024-01-26 PROCEDURE — 97140 MANUAL THERAPY 1/> REGIONS: CPT | Mod: 59

## 2024-01-26 PROCEDURE — 77080 DXA BONE DENSITY AXIAL: CPT | Mod: TC,PO

## 2024-01-26 NOTE — PROGRESS NOTES
Physical Therapy Daily Treatment Note     Name: Erica Leblanc  Clinic Number: [id number]    Therapy Diagnosis:   Encounter Diagnoses   Name Primary?    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, implants and grafts, subsequent encounter   I89.0 (ICD-10-CM) - Lymphedema, not elsewhere classified   Evaluation Date: 1/17/2024  Authorization Period Expiration: 12/22/25  Plan of Care Expiration: 4/10/24  Visit # / Visits authorized: 3/ 20     Time In: 1100 pm   Time Out: 1210 pm  Total Billable Time: 70 minutes      Precautions: Standard, Diabetes, Fall, CHF, and ESRD, TIA/stroke    Subjective     Pt reports: pt found the missing screw for her Rollator at her house- last visit she arrived with only 3 legs attached to rollator - very unsafe walking - [facility] was able to add a screw to secure rollator leg/wheel.  Advised again on caution and to secure all components of Rollator.  Attempted to raise arm handles - no more length available - pt often leans down on her forearms to walk when tired.  She may also sit on seat of Rollator.   She was compliant with home compression/exercise program.  Response to previous treatment: pt has KH compression socks or uses Tubigrip F box at home she self ordered.   R leg was bandaged - after completed pt notes Dexa bone scan today with no metal - re bandaged to remove clips which secured wraps.  Functional change: ambulates with rollator,  Dialysis T TH Sat - [employer] transportation , [employer], or her brother will bring to therapy.  Pt questions leg cramping during dialysis - discussed fluid filter, edema fluctuates- venous and lymphatic drainage and swelling  varies due to ESRD and need for Hemo dialysis.  Discuss all medical concerns with team - pt often asks Dialysis team to alter pull of fluid in last hour due to cramping.  Pain: 0/10  Location: BLEs , L>R    Objective     Female amb to dept Rollator - one leg now secured with screw   Pt varies from hand support to resting on forearms to walk  Attempted to raise hand /arms but unable to due to hole placement length.  B LE with Tubigrip F slightly covering toes, slip on mules with heel shelf  Amount of Swelling/Location of Swelling: mod B LE size R > L, R prior wounds medial, scrape healed ant shin, L prior cellulitis with darkening, skin discoloration     New area R lower leg - see photo   Pt reports her phone fell this morning, struck her R leg - caused swelling and bruising  R medial leg with circular bump, density, swelling - pain rating 1/10 to this site, no surrounding redness or surrounding warmth or tenderness, very mild warmth to site only- allowed ice gel pack x 5 min off/on during Manual Lymphatic Drainage, MLD, session      Skin Integrity: dry, darkened, dense, healed with prior scarring from wounds and cellulitis   Palpation/Texture: dense, pitting distally   + B Stemmer Sign  - B Yehuda's Sign  Circulation: intact       Treatment:   Erica received the following manual therapy techniques:- Manual Lymphatic Drainage were applied to the: R and L LE for 60 minutes, including: MLD and short stretch compression bandaging     MANUAL LYMPHATIC DRAINAGE (MLD):  applied ice gel pack to medial lower R leg 5-10 min on and off during session.  While supine with LEs elevated stimulation at terminus, along GI region, B inguinal regions, drainage of entire R LE and brief performance to L LE mendy lower leg, ankle, and foot with return proximally,  Use of Aquaphor/Eucerin due to dryness.   Consider self massage to abdominal areas, B inguinal areas, thigh, and remaining LE within reach.    MULTILAYERED BANDAGING:    Re-applied Tubigrip F to L LE to just below knee and double wrap to portion of foot  Advised on daily use of Tubigrip or Copper compression sock 20-30mmHg daily when not in bandaging.    issued supplies and bandaged R LE with cotton stockinette, small Rosidal soft section dorsum of foot, 2 komprex wedges post malleoli, 2 Cellona cotton padding rolls foot to knee 2-8cm and 1- 10cm Durelast rolls foot to knee, to leave intact 12-24 hrs as tolerated, discontinue with any problems, return rolled bandages next session. Wash and wear schedules confirmed.     Bandaging was completed on R LE when pt mentioned DEXA bone scan today 3pm- no metal is allowed   Unrolled bandaging and reapplied with tape closure - removed clips  Advised on safety and position/fit of compression choice.    Erica received therapeutic exercises to develop strength, endurance, ROM, flexibility, and posture including:  Cueing on posture and performance - review only  Pt has HEP for Gastroc Soleus Stretching for ankle ROM, muscle pump support and ankle pump mobility.  Pt has written/illustrated home exercise program to perform daily.  GSS with strap or towel in long sitting - 3 x 20 seconds 1x daily each leg  2.   GSS with strap or towel in chair with cueing for long spine and lean forward - 3 x 20 seconds 1x daily each leg  3.   GSS in standing with hands on wall- back heel remains on floor with knee straight and lean towards wall- hold for a steady stretch - 3 x 20 seconds 1x daily each leg.- modified with standing to chair or RW.  Recommend Aps, knee ROM, avoid dependency, avoid immobility, elevation, walking with compression, deep breathing, use of muscle pump to assist venous return    Home Exercises Provided and Patient Education Provided:  Self Care Home Management Training/Functional Therapeutic Activity x 5 minutes    HEP  Use of Tubigrip of Knee High Copper Compression socks 20-30mmHg size L/XL - modified with fold at knee for length.  Pt  may use Copper Sock or tubigrip daily - advised to remove for sleep   Pros/cons of basic compression choices were fully reviewed with noted limited size selection, one length choice, lessor tier of product, benefits of low cost, relative ease of application and comfort, and additional items available for use.    Present compression:   tubigrip F from Podiatry, Dr. Gray , 20-30mmHg knee high copper compression socks  Orders and recommendations: 20-30mmHg knee highs   PATIENT/FAMILY Education: bandaging/compression wear schedule,  HEP,  Beginning of self massage,  Self or assisted bandaging, compression options, and Risk reduction    Written Home Exercises Provided: yes.  Home exercise and compression plan of management was reviewed and Erica was able to demonstrate understanding prior to the end of the session.  Erica demonstrated good  understanding of the education provided.     Assessment     Erica is a 78 y.o. 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     Pt with bruising R lower leg after trauma from dropping phone - area is elevated, soft fullness and bruising - advised to monitor closely and seek medical attention if not improving or worsening.   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   Pt will  need daily compression support as assistance in management.    Caution with amb with Rollator and mechanical set up of device.   Erica Is progressing well towards her goals.   Pt prognosis is Fair.     Pt will continue to benefit from skilled outpatient physical therapy to address the deficits listed in the problem list box on initial evaluation, provide pt/family education and to maximize pt's level of independence in the home and community environment.   Pt's spiritual, cultural and educational needs considered and pt agreeable to plan of care and goals.     Anticipated Barriers for therapy: ESRD with HD, transportation     The following goals were discussed with the patient and patient is in agreement with them as to be addressed in the treatment plan.      Short Term Goals: (6 weeks)  1. Patient will show decreased girth in B LE by up to 1 cm to allow for LE symmetry, shoe and clothing choice, and ability to apply needed compression.  (progressing, not met)   2. Patient will demonstrate 100% knowledge of lymphedema precautions and signs of infection to allow for reduced lymphedema risk, infection risk, and/or exacerbation of condition.  (progressing, not met)  3. Patient or caregiver will perform self-bandaging techniques and/or wearing of compression garments to allow for lymphatic drainage support, skin elasticity, and reduction in shape and size of limb. (progressing, not met)  4. Patient will perform self lymph drainage techniques to areas within reach to enhance lymphatic drainage and skin condition.  (progressing, not met)  5. Patient will tolerate daily activities with multilayered bandaging to allow for lymphatic and venous support.  (progressing, not met)     Long Term Goals: (12  weeks)  1. Patient will show decreased girth in B LE by up to 2 cm  to allow for LE symmetry, shoe and clothing choice, and ability to apply needed compression daily.  (progressing, not met)  2. Patient will show reduction in  density to mild or less with improved contour of limb to allow for cosmesis, LE symmetry, infection risk reduction, and clothing and compression choice.   (progressing, not met)  3. Patient to merry/doff compression garment with daily compliance to assist in lymphedema management, skin elasticity, and tissue density.  (progressing, not met)  4. Pt to show improved postural awareness and alignment.  (progressing, not met)  5. Pt to be I and compliant with HEP to allow for increased function in affected limb.   (progressing, not met)  Plan   Plan of care Certification: 1/17/2024 to 4/10/24.     Outpatient Physical Therapy 2 times weekly for 10 weeks to include the following interventions: Patient Education, Self Care, Therapeutic Activities, and Therapeutic Exercise. Complete Decongestive Therapy- compression and home equipment needs to be addressed and assisted.    POC confirmed with Heather French, GERONIMO Coles, PT

## 2024-01-29 ENCOUNTER — CLINICAL SUPPORT (OUTPATIENT)
Dept: REHABILITATION | Facility: HOSPITAL | Age: 79
End: 2024-01-29
Payer: MEDICARE

## 2024-01-29 DIAGNOSIS — I89.0 LYMPHEDEMA OF BOTH LOWER EXTREMITIES: Primary | ICD-10-CM

## 2024-01-29 DIAGNOSIS — Z74.09 IMPAIRED FUNCTIONAL MOBILITY AND ACTIVITY TOLERANCE: ICD-10-CM

## 2024-01-29 DIAGNOSIS — M79.89 SWELLING OF LOWER EXTREMITY: ICD-10-CM

## 2024-01-29 PROCEDURE — 97140 MANUAL THERAPY 1/> REGIONS: CPT | Mod: CQ

## 2024-01-29 NOTE — PROGRESS NOTES
Physical Therapy Daily Treatment Note     Name: Erica Leblanc  Clinic Number: [id number]    Therapy Diagnosis:   Encounter Diagnoses   Name Primary?    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 Felt leg was smaller after removing and she felt safe walking in them . She has been able to apply and wear either her compression sock or tiubigrip daily.   Leg still feels alittle tender from dropping phone on it but it is doing fine otherwise .   She was compliant with home compression/exercise program.  Response to previous treatment: pt has KH compression socks or uses Tubigrip F box at home she self ordered.   R leg was bandaged - good tolerance noted.   Functional change: ambulates with rollator,  Dialysis T TH Sat - [employer] transportation , [employer], or her brother will bring to therapy.  Pt questions leg cramping during dialysis - discussed fluid filter, edema fluctuates- venous and lymphatic drainage and swelling varies due to ESRD and need for Hemo dialysis.  Discuss all medical concerns with team - pt often asks Dialysis team to alter pull of fluid in last hour due to cramping.  Pain: 0/10  Location: BLEs , L>R    Objective     Female  amb to dept Rollator - one leg now secured with screw   Pt varies from hand support to resting on forearms to walk  Attempted to raise hand /arms but unable to due to hole placement length.  B LE with Tubigrip F slightly covering toes, slip on mules with heel shelf  Amount of Swelling/Location of Swelling: mod B LE size R > L, R prior wounds medial, scrape healed ant shin, L prior cellulitis with darkening, skin discoloration   Skin Integrity: dry, darkened, dense, healed with prior scarring from wounds and cellulitis   Palpation/Texture: dense, pitting distally   + B Stemmer Sign  - B Yehuda's Sign  Circulation: intact       Treatment:   Erica received the following manual therapy techniques:- Manual Lymphatic Drainage were applied to the: R and L LE for 55 minutes, including: MLD and short stretch compression bandaging     MANUAL LYMPHATIC DRAINAGE (MLD):  applied ice gel pack to medial lower R leg 5-10 min on and off during session.  While supine with LEs elevated stimulation at terminus, along GI region, B inguinal regions, drainage of entire R LE and brief performance to L LE mendy lower leg, ankle, and foot with return proximally,  Use of Aquaphor/Eucerin due to dryness.   Consider self massage to abdominal areas, B inguinal areas, thigh, and remaining LE within reach.    MULTILAYERED BANDAGING:   issued supplies and bandaged R LE with cotton stockinette, small Rosidal soft section dorsum of foot, 2 komprex wedges post malleoli, 2 Cellona cotton padding rolls foot to knee 2-8cm and 1- 10cm Durelast rolls foot to knee, to leave intact 12-24 hrs as tolerated, discontinue with any problems, return rolled bandages next session. Wash and wear schedules confirmed.     Advised on daily use of Tubigrip or Copper compression sock 20-30mmHg daily when not in bandaging.    Erica received therapeutic exercises to develop strength, endurance, ROM, flexibility, and posture including:  Cueing on posture and performance -  review only  Pt has HEP for Gastroc Soleus Stretching for ankle ROM, muscle pump support and ankle pump mobility.  Pt has written/illustrated home exercise program to perform daily.  GSS with strap or towel in long sitting - 3 x 20 seconds 1x daily each leg  2.   GSS with strap or towel in chair with cueing for long spine and lean forward - 3 x 20 seconds 1x daily each leg  3.   GSS in standing with hands on wall- back heel remains on floor with knee straight and lean towards wall- hold for a steady stretch - 3 x 20 seconds 1x daily each leg.- modified with standing to chair or RW.  Recommend Aps, knee ROM, avoid dependency, avoid immobility, elevation, walking with compression, deep breathing, use of muscle pump to assist venous return    Home Exercises Provided and Patient Education Provided:  Self Care Home Management Training/Functional Therapeutic Activity     HEP  Use of Tubigrip of Knee High Copper Compression socks 20-30mmHg size L/XL - modified with fold at knee for length.  Pt may use Copper Sock or tubigrip daily - advised to remove for sleep   Pros/cons of basic compression choices were fully reviewed with noted limited size selection, one length choice, lessor tier of product, benefits of low cost, relative ease of application and comfort, and additional items available for use.    Present compression:   tubigrip F from Podiatry, Dr. Gray , 20-30mmHg knee high copper compression socks  Orders and recommendations: 20-30mmHg knee highs   PATIENT/FAMILY Education: bandaging/compression wear schedule,  HEP,  Beginning of self massage,  Self or assisted bandaging, compression options, and Risk reduction    Written Home Exercises Provided: yes.  Home exercise and compression plan of management was reviewed and Erica was able to demonstrate understanding prior to the end of the session.  Erica demonstrated good  understanding of the education provided.     Assessment     Erica is a 78 y.o. 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     Noted improvements in overall swelling as well as decreased bruising on area of leg where she dropped her phone last week. Good tolerance to bandaging and continued to focus on RLE treatment today . Education on use of compression socks or tubigrip daily which she also reports good compliance with .   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   Pt will need daily compression support as assistance in management.      Erica Is progressing well towards her goals.   Pt prognosis is Fair.     Pt will continue to benefit from skilled outpatient physical therapy to address the deficits listed in the problem list box on initial evaluation, provide pt/family education and to maximize pt's level of independence in the home and community environment.   Pt's spiritual, cultural and educational needs considered and pt agreeable to plan of care and goals.     Anticipated Barriers for therapy: ESRD with HD, transportation     The following goals were discussed with the patient and patient is in agreement with them as to be addressed in the treatment plan.      Short Term Goals: (6 weeks)  1. Patient will show decreased girth in B LE by up to 1 cm to allow for LE symmetry, shoe and clothing  choice, and ability to apply needed compression.  (progressing, not met)   2. Patient will demonstrate 100% knowledge of lymphedema precautions and signs of infection to allow for reduced lymphedema risk, infection risk, and/or exacerbation of condition.  (progressing, not met)  3. Patient or caregiver will perform self-bandaging techniques and/or wearing of compression garments to allow for lymphatic drainage support, skin elasticity, and reduction in shape and size of limb. (progressing, not met)  4. Patient will perform self lymph drainage techniques to areas within reach to enhance lymphatic drainage and skin condition.  (progressing, not met)  5. Patient will tolerate daily activities with multilayered bandaging to allow for lymphatic and venous support.  (progressing, not met)     Long Term Goals: (12  weeks)  1. Patient will show decreased girth in B LE by up to 2 cm  to allow for LE symmetry, shoe and clothing choice, and ability to apply needed compression daily.  (progressing, not met)  2. Patient will show reduction in density to mild or less with improved contour of limb to allow for cosmesis, LE symmetry, infection risk reduction, and clothing and compression choice.   (progressing, not met)  3. Patient to merry/doff compression garment with daily compliance to assist in lymphedema management, skin elasticity, and tissue density.  (progressing, not met)  4. Pt to show improved postural awareness and alignment.  (progressing, not met)  5. Pt to be I and compliant with HEP to allow for increased function in affected limb.   (progressing, not met)  Plan   Plan of care Certification: 1/17/2024 to 4/10/24.     Outpatient Physical Therapy 2 times weekly for 10 weeks to include the following interventions: Patient Education, Self Care, Therapeutic Activities, and Therapeutic Exercise. Complete Decongestive Therapy- compression and home equipment needs to be addressed and assisted.    Heather French, PTA

## 2024-02-02 ENCOUNTER — CLINICAL SUPPORT (OUTPATIENT)
Dept: REHABILITATION | Facility: HOSPITAL | Age: 79
End: 2024-02-02
Payer: MEDICARE

## 2024-02-02 DIAGNOSIS — Z74.09 IMPAIRED FUNCTIONAL MOBILITY AND ACTIVITY TOLERANCE: ICD-10-CM

## 2024-02-02 DIAGNOSIS — M79.89 SWELLING OF LOWER EXTREMITY: ICD-10-CM

## 2024-02-02 DIAGNOSIS — I89.0 LYMPHEDEMA OF BOTH LOWER EXTREMITIES: Primary | ICD-10-CM

## 2024-02-02 PROCEDURE — 97140 MANUAL THERAPY 1/> REGIONS: CPT | Mod: 59

## 2024-02-02 PROCEDURE — 29581 APPL MULTLAYER CMPRN SYS LEG: CPT

## 2024-02-02 NOTE — PROGRESS NOTES
Physical Therapy Daily Treatment Note     Name: Erica Leblanc  Clinic Number: [id number]    Therapy Diagnosis:   Encounter Diagnoses   Name Primary?    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  Pt was not successful applying medical grade garments in the past even with Don assist device.  Pt often uses lite socks over her tubigrip, donated KH compression socks or her lite pair.    Pt admits most issues are related to dialysis and side effects.  She was compliant with home compression/exercise program.  Response to previous treatment: pt dropped her phone on her R lower leg a few days ago -  to touch and has bump - not truly painful and not added issues while using bandaging.    Functional change: pt questions home pump - advised Dr. Perez's office has message of consult - will assess if appropriate with HD/ESRD and proceed per his orders.   Pain: 0/10  Location: BLEs , L>R    Objective     Female amb to dept Rollator - one leg now secured with screw   Pt varies from hand support to resting on forearms to walk  B LE with Tubigrip E slightly covering toes, slip on mules with  heel shelf  Amount of Swelling/Location of Swelling: mod B LE size R > L, R prior wounds medial, scrape healed ant shin, L prior cellulitis with darkening, skin discoloration, dark scales ant L shin- removed easily during massage  R medial lower leg with darkened discoloration, prior wound sites and new area she states was struck by her phone- lump and nodular feel, mild tender to touch- no added warmth or redness  Skin Integrity: dry, darkened, dense, healed with prior scarring from wounds and cellulitis   Palpation/Texture: dense, pitting distally   + B Stemmer Sign  - B Yehuda's Sign  Circulation: intact       Girth Measurements (in centimeters)  LANDMARK LEFT LE  1/17/24 RIGHT LE  1/17/24 R LE  2/2/24 Change  R LE DIFF   at eval   SBP + 10  62.0 cm 65.0 cm 64.0 1.0 3.0 cm   SBP 56.0 cm 57.0 cm 54.0 3.0 1.0 cm   10 below SBP 38.0 cm 43.0 cm 42.0 1.0 5.0 cm   20 below SBP 35.0 cm 37.0 cm 37.0 0 2.0 cm   30 below SBP 27.0 cm 32.5 cm 31.0 1.5 4.5 cm   35 below SBP 25.0 cm 29.0 cm 27.0 2.0 4.0 cm   Ankle 26.0 cm 28.0 cm 27.5 0.5 2.0 cm   Forefoot 22.0 cm 23.0 cm 23.0 0 1.0 cm      Treatment:   Erica received the following manual therapy techniques:- Manual Lymphatic Drainage were applied to the: R and L LE for 55 minutes, including: MLD and short stretch compression bandaging     MANUAL LYMPHATIC DRAINAGE (MLD):  While supine with LEs elevated stimulation at terminus, along GI region, B inguinal regions, drainage of entire R LE and brief performance to L LE + lotion and gentle scale debridement, mendy lower leg, ankle, and foot with return proximally,  Use of Aquaphor/Eucerin due to dryness.   Consider self massage to abdominal areas, B inguinal areas, thigh, and remaining LE within reach.    MULTILAYERED BANDAGING:   issued supplies and bandaged R LE with cotton stockinette, small Rosidal soft section dorsum of foot, 2 komprex wedges post malleoli, 2 Cellona cotton padding rolls foot to knee 2-8cm and 1- 10cm Durelast  rolls foot to knee, to leave intact 12-24 hrs as tolerated, discontinue with any problems, return rolled bandages next session. Wash and wear schedules confirmed.   Reapplied tubigrip to L LE.  Advised on daily use of Tubigrip or Copper compression sock 20-30mmHg daily when not in bandaging.    Erica received therapeutic exercises to develop strength, endurance, ROM, flexibility, and posture including:  Cueing on posture and performance - review only  Pt has HEP for Gastroc Soleus Stretching for ankle ROM, muscle pump support and ankle pump mobility.  Pt has written/illustrated home exercise program to perform daily.  Recommend Aps, knee ROM, avoid dependency, avoid immobility, elevation, walking with compression, deep breathing, use of muscle pump to assist venous return    Home Exercises Provided and Patient Education Provided:  Self Care Home Management Training/Functional Therapeutic Activity x 5 min    Reviewed her present choices and ability to don/doff to allow compliance  Orders requested for KH compression, may need to consider Velcro Inelastic wraps.  Therapy will often assess lessor tier of product, style, lessor class, or size modification to gain compliance  Discussed her daily use recommendations and her reported difficulty applying.     Pt brings in COBAN - not recommended for therapy bandaging    Use of Tubigrip or Knee High Copper Compression socks 20-30mmHg size L/XL - modified with fold at knee for length.  Pt may use Copper Sock or tubigrip daily - advised to remove for sleep   Pros/cons of basic compression choices were fully reviewed with noted limited size selection, one length choice, lessor tier of product, benefits of low cost, relative ease of application and comfort, and additional items available for use.    MD clinic notes suggest home pump consultation - therapy considers pump precautionary due to HD due to ESRD -  MD would need to proceed with recommendations/consult if  appropriate.    Present compression:   tubigrip E from Podiatry, Dr. Gray , 20-30mmHg knee high copper compression socks  Orders and recommendations: 20-30mmHg knee highs   PATIENT/FAMILY Education: bandaging/compression wear schedule,  HEP,  Beginning of self massage,  Self or assisted bandaging, compression options, and Risk reduction    Written Home Exercises Provided: yes.  Home exercise and compression plan of management was reviewed and Erica was able to demonstrate understanding prior to the end of the session.  Erica demonstrated good  understanding of the education provided.     Assessment     Erica is a 78 y.o. 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     R LE with some reductions in girth although she experiences fluctuations in size/shape of edema due to ESRD with HD.  Pt has had difficulty in past applying medical grade products and may need to consider alternative options to gain compliance with as much compression support that she is capable of applying. .     Erica Is progressing well towards her goals.   Pt prognosis is Fair.     Pt will continue to benefit from skilled outpatient physical therapy to address the deficits listed in the problem list box on initial evaluation, provide pt/family education and to maximize pt's level of independence in the home and community  environment.   Pt's spiritual, cultural and educational needs considered and pt agreeable to plan of care and goals.     Anticipated Barriers for therapy: ESRD with HD, transportation     The following goals were discussed with the patient and patient is in agreement with them as to be addressed in the treatment plan.      Short Term Goals: (6 weeks)  1. Patient will show decreased girth in B LE by up to 1 cm to allow for LE symmetry, shoe and clothing choice, and ability to apply needed compression.  (progressing, not met)   2. Patient will demonstrate 100% knowledge of lymphedema precautions and signs of infection to allow for reduced lymphedema risk, infection risk, and/or exacerbation of condition.  (progressing, not met)  3. Patient or caregiver will perform self-bandaging techniques and/or wearing of compression garments to allow for lymphatic drainage support, skin elasticity, and reduction in shape and size of limb. (progressing, not met)  4. Patient will perform self lymph drainage techniques to areas within reach to enhance lymphatic drainage and skin condition.  (progressing, not met)  5. Patient will tolerate daily activities with multilayered bandaging to allow for lymphatic and venous support.  (progressing, not met)     Long Term Goals: (12  weeks)  1. Patient will show decreased girth in B LE by up to 2 cm  to allow for LE symmetry, shoe and clothing choice, and ability to apply needed compression daily.  (progressing, not met)  2. Patient will show reduction in density to mild or less with improved contour of limb to allow for cosmesis, LE symmetry, infection risk reduction, and clothing and compression choice.   (progressing, not met)  3. Patient to merry/doff compression garment with daily compliance to assist in lymphedema management, skin elasticity, and tissue density.  (progressing, not met)  4. Pt to show improved postural awareness and alignment.  (progressing, not met)  5. Pt to be I and  compliant with HEP to allow for increased function in affected limb.   (progressing, not met)  Plan   Plan of care Certification: 1/17/2024 to 4/10/24.     Outpatient Physical Therapy 2 times weekly for 10 weeks to include the following interventions: Patient Education, Self Care, Therapeutic Activities, and Therapeutic Exercise. Complete Decongestive Therapy- compression and home equipment needs to be addressed and assisted.    Debora Coles, PT

## 2024-02-02 NOTE — PROGRESS NOTES
Physical Therapy Daily Treatment Note     Name: Erica Leblanc  Clinic Number: [id number]    Therapy Diagnosis:   Encounter Diagnoses   Name Primary?    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 She has Ernesto copper compression socks that she's had since before she got the cellulitis and she wears over the tubigrip.      She was compliant with home compression/exercise program.  Response to previous treatment: feels bump where she dropped her phone has improved and has gone down and is not as tender.   Functional change: pt questions home pump - advised Dr. Perez's office has message of consult - will assess if appropriate with HD/ESRD and proceed per his orders.   Pain: 0/10  Location: BLEs , L>R    Objective     Female amb to dept Rollator - one leg now secured with screw   Pt varies from hand support to resting on forearms to walk  B LE with Tubigrip E slightly covering toes, slip on mules with heel shelf  Amount of Swelling/Location of Swelling: mod B LE size R > L, R prior wounds medial, scrape healed ant shin, L prior cellulitis with darkening, skin discoloration, dark scales ant L shin-  removed easily during massage  R medial lower leg with darkened discoloration, prior wound sites and new area she states was struck by her phone- lump and nodular feel, mild tender to touch- no added warmth or redness  Skin Integrity: dry, darkened, dense, healed with prior scarring from wounds and cellulitis   Palpation/Texture: dense, pitting distally   + B Stemmer Sign  - B Yehuda's Sign  Circulation: intact       Girth Measurements (in centimeters)  LANDMARK LEFT LE  1/17/24 RIGHT LE  1/17/24 R LE  2/2/24 Change  R LE DIFF   at eval   SBP + 10  62.0 cm 65.0 cm 64.0 1.0 3.0 cm   SBP 56.0 cm 57.0 cm 54.0 3.0 1.0 cm   10 below SBP 38.0 cm 43.0 cm 42.0 1.0 5.0 cm   20 below SBP 35.0 cm 37.0 cm 37.0 0 2.0 cm   30 below SBP 27.0 cm 32.5 cm 31.0 1.5 4.5 cm   35 below SBP 25.0 cm 29.0 cm 27.0 2.0 4.0 cm   Ankle 26.0 cm 28.0 cm 27.5 0.5 2.0 cm   Forefoot 22.0 cm 23.0 cm 23.0 0 1.0 cm      Treatment:   Erica received the following manual therapy techniques:- Manual Lymphatic Drainage were applied to the: R and L LE for 55 minutes, including: MLD and short stretch compression bandaging     MANUAL LYMPHATIC DRAINAGE (MLD):  While supine with LEs elevated stimulation at terminus, along GI region, B inguinal regions, drainage of entire R LE and brief performance to L LE + lotion and gentle scale debridement, mendy lower leg, ankle, and foot with return proximally,  Use of Aquaphor/Eucerin due to dryness.   Consider self massage to abdominal areas, B inguinal areas, thigh, and remaining LE within reach.    MULTILAYERED BANDAGING:   issued supplies and bandaged R LE with cotton stockinette, small Rosidal soft section dorsum of foot, 2 komprex wedges post malleoli, 2 Cellona cotton padding rolls foot to knee 2-8cm and 1- 10cm Durelast rolls foot to knee, to leave intact 12-24 hrs as tolerated, discontinue with any problems, return rolled bandages next session. Wash and wear schedules confirmed.   Reapplied tubigrip to L  MIRTA.  Advised on daily use of Tubigrip or Copper compression sock 20-30mmHg daily when not in bandaging.    Erica received therapeutic exercises to develop strength, endurance, ROM, flexibility, and posture including:  Cueing on posture and performance - review only  Pt has HEP for Gastroc Soleus Stretching for ankle ROM, muscle pump support and ankle pump mobility.  Pt has written/illustrated home exercise program to perform daily.  Recommend Aps, knee ROM, avoid dependency, avoid immobility, elevation, walking with compression, deep breathing, use of muscle pump to assist venous return    Home Exercises Provided and Patient Education Provided:  Self Care Home Management Training/Functional Therapeutic Activity x 5 min    Reviewed her present choices and ability to don/doff to allow compliance  Orders requested for KH compression, may need to consider Velcro Inelastic wraps.  Therapy will often assess lessor tier of product, style, lessor class, or size modification to gain compliance  Discussed her daily use recommendations and her reported difficulty applying.     Pt brings in COBAN - not recommended for therapy bandaging    Use of Tubigrip or Knee High Copper Compression socks 20-30mmHg size L/XL - modified with fold at knee for length.  Pt may use Copper Sock or tubigrip daily - advised to remove for sleep   Pros/cons of basic compression choices were fully reviewed with noted limited size selection, one length choice, lessor tier of product, benefits of low cost, relative ease of application and comfort, and additional items available for use.    MD clinic notes suggest home pump consultation - therapy considers pump precautionary due to HD due to ESRD -  MD would need to proceed with recommendations/consult if appropriate.    Present compression:   tubigrip E from Podiatry, Dr. Gray , 20-30mmHg knee high copper compression socks  Orders and recommendations: 20-30mmHg knee highs   PATIENT/FAMILY Education:  bandaging/compression wear schedule,  HEP,  Beginning of self massage,  Self or assisted bandaging, compression options, and Risk reduction    Written Home Exercises Provided: yes.  Home exercise and compression plan of management was reviewed and Erica was able to demonstrate understanding prior to the end of the session.  Erica demonstrated good  understanding of the education provided.     Assessment     Erica is a 78 y.o. 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     Noted some mild reductions in girth although swelling and density still remain. Pt reports compliance with use of either tubigrip or copper compression socks daily. She does admit difficulty applying and may need to consider alternative compressions for future use.     Erica Is progressing well towards her goals.   Pt prognosis is Fair.     Pt will continue to benefit from skilled outpatient physical therapy to address the deficits listed in the problem list box on initial evaluation, provide pt/family education and to maximize pt's level of independence in the home and community environment.   Pt's spiritual, cultural and educational needs considered and pt agreeable to plan of care and goals.     Anticipated Barriers for therapy: ESRD with HD, transportation     The following goals were discussed with the patient and patient  is in agreement with them as to be addressed in the treatment plan.      Short Term Goals: (6 weeks)  1. Patient will show decreased girth in B LE by up to 1 cm to allow for LE symmetry, shoe and clothing choice, and ability to apply needed compression.  (progressing, not met)   2. Patient will demonstrate 100% knowledge of lymphedema precautions and signs of infection to allow for reduced lymphedema risk, infection risk, and/or exacerbation of condition.  (progressing, not met)  3. Patient or caregiver will perform self-bandaging techniques and/or wearing of compression garments to allow for lymphatic drainage support, skin elasticity, and reduction in shape and size of limb. (progressing, not met)  4. Patient will perform self lymph drainage techniques to areas within reach to enhance lymphatic drainage and skin condition.  (progressing, not met)  5. Patient will tolerate daily activities with multilayered bandaging to allow for lymphatic and venous support.  (progressing, not met)     Long Term Goals: (12  weeks)  1. Patient will show decreased girth in B LE by up to 2 cm  to allow for LE symmetry, shoe and clothing choice, and ability to apply needed compression daily.  (progressing, not met)  2. Patient will show reduction in density to mild or less with improved contour of limb to allow for cosmesis, LE symmetry, infection risk reduction, and clothing and compression choice.   (progressing, not met)  3. Patient to merry/doff compression garment with daily compliance to assist in lymphedema management, skin elasticity, and tissue density.  (progressing, not met)  4. Pt to show improved postural awareness and alignment.  (progressing, not met)  5. Pt to be I and compliant with HEP to allow for increased function in affected limb.   (progressing, not met)  Plan   Plan of care Certification: 1/17/2024 to 4/10/24.     Outpatient Physical Therapy 2 times weekly for 10 weeks to include the following interventions:  Patient Education, Self Care, Therapeutic Activities, and Therapeutic Exercise. Complete Decongestive Therapy- compression and home equipment needs to be addressed and assisted.    Heather French, PTA

## 2024-02-05 ENCOUNTER — OFFICE VISIT (OUTPATIENT)
Dept: OPTOMETRY | Facility: CLINIC | Age: 79
End: 2024-02-05
Payer: COMMERCIAL

## 2024-02-05 ENCOUNTER — CLINICAL SUPPORT (OUTPATIENT)
Dept: REHABILITATION | Facility: HOSPITAL | Age: 79
End: 2024-02-05
Payer: MEDICARE

## 2024-02-05 DIAGNOSIS — Z74.09 IMPAIRED FUNCTIONAL MOBILITY AND ACTIVITY TOLERANCE: ICD-10-CM

## 2024-02-05 DIAGNOSIS — H02.883 MEIBOMIAN GLAND DYSFUNCTION (MGD) OF BOTH EYES: ICD-10-CM

## 2024-02-05 DIAGNOSIS — I89.0 LYMPHEDEMA: Primary | ICD-10-CM

## 2024-02-05 DIAGNOSIS — M79.89 SWELLING OF LOWER EXTREMITY: ICD-10-CM

## 2024-02-05 DIAGNOSIS — E11.9 DIABETIC EYE EXAM: Primary | ICD-10-CM

## 2024-02-05 DIAGNOSIS — I89.0 LYMPHEDEMA OF BOTH LOWER EXTREMITIES: Primary | ICD-10-CM

## 2024-02-05 DIAGNOSIS — H02.886 MEIBOMIAN GLAND DYSFUNCTION (MGD) OF BOTH EYES: ICD-10-CM

## 2024-02-05 DIAGNOSIS — Z96.1 PSEUDOPHAKIA: ICD-10-CM

## 2024-02-05 DIAGNOSIS — H52.7 REFRACTIVE ERROR: ICD-10-CM

## 2024-02-05 DIAGNOSIS — Z01.00 DIABETIC EYE EXAM: Primary | ICD-10-CM

## 2024-02-05 PROCEDURE — 99999 PR PBB SHADOW E&M-EST. PATIENT-LVL III: CPT | Mod: PBBFAC,,, | Performed by: OPTOMETRIST

## 2024-02-05 PROCEDURE — 92014 COMPRE OPH EXAM EST PT 1/>: CPT | Mod: S$GLB,,, | Performed by: OPTOMETRIST

## 2024-02-05 PROCEDURE — 97140 MANUAL THERAPY 1/> REGIONS: CPT | Mod: CQ

## 2024-02-05 PROCEDURE — 92015 DETERMINE REFRACTIVE STATE: CPT | Mod: S$GLB,,, | Performed by: OPTOMETRIST

## 2024-02-05 NOTE — PROGRESS NOTES
Subjective:       Patient ID: Erica Leblanc is a 78 y.o. female      Chief Complaint   Patient presents with    Concerns About Ocular Health    Diabetic Eye Exam     History of Present Illness  Dls: 8/17/22 Dr. Quiles     77 y/o female presents today for diabetic eye exam.   Pt c/o tearing ou mucous ou x 3-4 months.   Pt states no changes in vision. Pt wears otc readers.     LBS ?     No tearing  + ou off/on itching  No burning  No pain  No ha's  No floaters  No flashes    Eye meds  None    Pohx:   S/p Bilateral CE   S/p lasik Bilateral     Fohx:  Cat - father, grandmother, aunts, uncles     Hemoglobin A1C       Date                     Value               Ref Range             Status                12/06/2023               5.7 (H)             4.0 - 5.6 %           Final                   09/14/2023               5.5                 %                     Final                 07/07/2023               6.2 (H)             4.0 - 5.6 %           Final              Assessment/Plan:     1. Diabetic eye exam  [facility]    PDR s/p PRP and vitrectomy OU. Stable. Monitor.    2. Refractive error  Pt advised minimal change in distance Rx. Wants Rx.    Eyeglass Final Rx       Eyeglass Final Rx         Sphere Cylinder Axis Add    Right -1.00 Sphere  +2.75    Left -0.75 +0.50 180 +2.75      Expiration Date: 2/5/2025                      3. Pseudophakia  Well-centered. Clear.       4. Meibomian gland dysfunction (MGD) of both eyes  Educated patient on MGD and options of treatment including artificial tears, lid scrubs and warm compresses. Discussed chronicity.      Follow up in about 1 year (around 2/5/2025).

## 2024-02-06 ENCOUNTER — TELEPHONE (OUTPATIENT)
Dept: VASCULAR SURGERY | Facility: CLINIC | Age: 79
End: 2024-02-06
Payer: MEDICARE

## 2024-02-16 ENCOUNTER — OFFICE VISIT (OUTPATIENT)
Dept: VASCULAR SURGERY | Facility: CLINIC | Age: 79
End: 2024-02-16
Payer: MEDICARE

## 2024-02-16 ENCOUNTER — HOSPITAL ENCOUNTER (OUTPATIENT)
Dept: CARDIOLOGY | Facility: HOSPITAL | Age: 79
Discharge: HOME | End: 2024-02-16
Attending: SURGERY
Payer: MEDICARE

## 2024-02-16 VITALS
DIASTOLIC BLOOD PRESSURE: 79 MMHG | HEART RATE: 76 BPM | BODY MASS INDEX: 34.47 KG/M2 | WEIGHT: 206.88 LBS | SYSTOLIC BLOOD PRESSURE: 134 MMHG | HEIGHT: 65 IN

## 2024-02-16 DIAGNOSIS — T82.858D ARTERIOVENOUS FISTULA STENOSIS, SUBSEQUENT ENCOUNTER: ICD-10-CM

## 2024-02-16 DIAGNOSIS — T82.858D ARTERIOVENOUS FISTULA STENOSIS, SUBSEQUENT ENCOUNTER: Primary | ICD-10-CM

## 2024-02-16 DIAGNOSIS — I89.0 LYMPHEDEMA: ICD-10-CM

## 2024-02-16 LAB
HD ANASTAMOSIS LOCATION: NORMAL
HD ANASTAMOSIS VESSEL: NORMAL
HD FISTULA OUTFLOW VEIN VESSEL: NORMAL
HD INFLOW ARTERY VESSEL: NORMAL
RIGHT DIS GRAFT DEPTH: 0.2 CM
RIGHT DIS GRAFT DIA: 1.8 CM
RIGHT DIS GRAFT PSV: 90 CM/ SEC
RIGHT INFLOW PSV: 320 CM/S
RIGHT MID GRAFT DEPTH: 0.2 CM
RIGHT MID GRAFT DIA: 2.3 CM
RIGHT MID GRAFT PSV: 156 CM/S
RIGHT OUTFLOW VEIN PSV: 117 CM/ SEC
RIGHT PROX ANA PSV: 664 CM/S
RIGHT PROX GRAFT DEPTH: 0.4 CM
RIGHT PROX GRAFT DIA: 0.5 CM
RIGHT PROX GRAFT PSV: 571 CM/S
RIGHT VOLUME FLOW DIA: 2.2 CM
RIGHT VOLUME FLOW PSV: NORMAL ML/MIN

## 2024-02-16 PROCEDURE — 1101F PT FALLS ASSESS-DOCD LE1/YR: CPT | Mod: CPTII,S$GLB,, | Performed by: SURGERY

## 2024-02-16 PROCEDURE — 3075F SYST BP GE 130 - 139MM HG: CPT | Mod: CPTII,S$GLB,, | Performed by: SURGERY

## 2024-02-16 PROCEDURE — 99999 PR PBB SHADOW E&M-EST. PATIENT-LVL III: CPT | Mod: PBBFAC,,, | Performed by: SURGERY

## 2024-02-16 PROCEDURE — 93990 DOPPLER FLOW TESTING: CPT | Mod: TC

## 2024-02-16 PROCEDURE — 1126F AMNT PAIN NOTED NONE PRSNT: CPT | Mod: CPTII,S$GLB,, | Performed by: SURGERY

## 2024-02-16 PROCEDURE — 3078F DIAST BP <80 MM HG: CPT | Mod: CPTII,S$GLB,, | Performed by: SURGERY

## 2024-02-16 PROCEDURE — 99214 OFFICE O/P EST MOD 30 MIN: CPT | Mod: S$GLB,,, | Performed by: SURGERY

## 2024-02-16 PROCEDURE — 1159F MED LIST DOCD IN RCRD: CPT | Mod: CPTII,S$GLB,, | Performed by: SURGERY

## 2024-02-16 PROCEDURE — 3288F FALL RISK ASSESSMENT DOCD: CPT | Mod: CPTII,S$GLB,, | Performed by: SURGERY

## 2024-02-16 PROCEDURE — 93990 DOPPLER FLOW TESTING: CPT | Mod: 26,,, | Performed by: SURGERY

## 2024-02-16 RX ORDER — LIDOCAINE AND PRILOCAINE 25; 25 MG/G; MG/G
CREAM TOPICAL
Qty: 30 G | Refills: 11 | Status: SHIPPED | OUTPATIENT
Start: 2024-02-16

## 2024-02-16 NOTE — PROGRESS NOTES
[facility]                         02/16/2024    HPI:  Erica Leblanc is a 78 y.o. female with   Patient Active Problem List   Diagnosis    Severe obesity (BMI 35.0-39.9) with comorbidity    Sickle cell trait    Hyperlipidemia LDL goal <100    HUMBERTO on CPAP    Hypothyroidism (acquired)    Hx-TIA (transient ischemic attack)    Vitamin D deficiency disease    Pulmonary hypertension    Type 2 diabetes mellitus with ESRD (end-stage renal disease)    Secondary renal hyperparathyroidism    Incomplete bladder emptying    Postmenopausal atrophic vaginitis    Rectocele    Mixed incontinence urge and stress    Chronic diastolic heart failure    Tortuous aorta    ESRD on hemodialysis    Anemia of chronic disease    Debility    Chronic respiratory failure    Type 2 diabetes mellitus with foot ulcer, with long-term current use of insulin    Stable proliferative diabetic retinopathy associated with type 2 diabetes mellitus    Heart failure with preserved ejection fraction    Pseudophakia    Chronic kidney disease-mineral and bone disorder    Age-related osteoporosis without current pathological fracture    H/O: stroke    Walker as ambulation aid    Swelling of lower extremity    Lymphedema of both lower extremities    Impaired functional mobility and activity tolerance    being managed by PCP and specialists who is here today for evaluation of long term HD access.  S/p R IJ tunneled HD catheter, HD without issues.  Pt is R handed.  No complaints today.  Does endorse orthopnea, no chest pain.  Unable to climb 1 flight of stairs on own.    no MI  no Stroke  Tobacco use: denies    1/20/20: No new issues.    3/2020: Dialysis without issues.    4/6/20:  S/p LUE fistulogram, central venogram, ligation of medial side branch cephalic vein, ligation of lateral side branch cephalic vein.  No issues with HD for several weeks since procedure.    7/6/20:  No issues with HD.    8/3/20: s/p L proximal  fistula angioplasty 7/21/20.  No issues with HD.    8/31/20:  S/p 8/19/29 Balloon angioplasty of the proximal LUE AVF with a 6x60 Angiosculpt and Stellerex balloon.      10/2020:  No issues with HD    1/2021:  No issues with HD    4/2021:  No new problems.    8/2021:  No issues with HD. C/o fatigue after HD.  Has not seen cardiology recently.    3/2022:  No issues with HD    6/2022:  Doing well, no issues with HD    1/2023:  No issues with HD    04/2023: No issues with HD.     7/2023:  doing well.    10/2023:  No issues with HD.    11/2023:  c/o LLE cellulitis and edema.  +compression with sock.    12/2023:  +compression.      2/2024:  no new issues.    Past Medical History:   Diagnosis Date    Acute ischemic right PCA stroke 6/6/2021    Acute respiratory failure with hypoxia     Age-related osteoporosis without current pathological fracture 3/8/2021    Anemia of chronic kidney failure, stage 4 (severe) 4/5/2019    Cataracts, bilateral     CHF (congestive heart failure)     CKD (chronic kidney disease) stage 3, GFR 30-59 ml/min     CKD (chronic kidney disease) stage 3, GFR 30-59 ml/min     Controlled type 2 diabetes mellitus with proteinuria or albuminuria     Depression     Diabetes with neurologic complications     Diabetic retinopathy of both eyes     Edema     Glaucoma     History of colonic polyps     Hx-TIA (transient ischemic attack) 11/2008    Hyperlipidemia LDL goal < 100     Hypertension     Hypothyroidism     Major depressive disorder, single episode, mild 2/17/2016    Mixed incontinence urge and stress     Obesity     Obstructive sleep apnea on CPAP     7/19/19:  Home CPAP machine broken, per patient & son    Osteopenia     Proteinuria     Sickle cell trait     Strabismus     TIA (transient ischemic attack)     Trouble in sleeping     Type 2 diabetes mellitus with ophthalmic manifestations     Type 2 diabetes with stage 3 chronic kidney disease GFR 30-59     Type II or unspecified type diabetes mellitus  with renal manifestations, uncontrolled(250.42)     Uncontrolled type 2 diabetes mellitus with peripheral circulatory disorder 2019    Urge incontinence 2016    Urge incontinence     Venous stasis ulcer     bilateral lower legs    Vitamin D deficiency disease      Past Surgical History:   Procedure Laterality Date    AV FISTULA PLACEMENT Left 2019    Procedure: CREATION, AV FISTULA, LEFT UPPER EXTREMITY;  Surgeon: Keron Perez MD;  Location: [facility];  Service: Vascular;  Laterality: Left;    BREAST BIOPSY      breast reduction Bilateral age 31    BREAST SURGERY      CATARACT EXTRACTION Bilateral     cataracts Bilateral      SECTION, LOW TRANSVERSE      x1    CHOLECYSTECTOMY      COLONOSCOPY N/A 2022    Procedure: COLONOSCOPY;  Surgeon: Mahesh Huang MD;  [facility]  Service: Endoscopy;  Laterality: N/A;  fully vaccinated-sm.  RAPID COVID  +cologuard needing ASAP  Dialysis pt T,TH Sat and left UA access  2nd floor - cardiac/dialysis/SOB / LABS / prep ins. emailed - ERW    EYE SURGERY  2014, 2014    vitrectomy    EYE SURGERY Right 2016    FISTULOGRAM Left 3/6/2020    Procedure: Fistulogram, left upper extremity, with branch ligation;  Surgeon: Keron Perez MD;  Location: [address];  Service: Vascular;  Laterality: Left;  Time 1.1 Minute  42.10 mGy    FISTULOGRAM Left 2020    Procedure: Fistulogram, left upper extremity, transradial access with possible intervention;  Surgeon: Keron Perez MD;  Location: [address];  Service: Vascular;  Laterality: Left;    FISTULOGRAM Left 2020    Procedure: Fistulogram, left upper extremity, possible intervention;  Surgeon: Keron Perez MD;  Location: [facility] OR;  Service: Vascular;  Laterality: Left;  930 AM START  RN PRE OP  ---COVID NEGATIVE ON  2020. [place]    FISTULOGRAM Left 2022    Procedure: Fistulogram, transradial access;  Surgeon: Keron Perez MD;   Location: [facility] OR 2ND FLR;  Service: Vascular;  Laterality: Left;  mgy-40.16  gycm-8.3905  contrast-34cc  time-12.4min    HYSTERECTOMY  1986    TAHBSO (patient is unsure if ovaries removed)    OOPHORECTOMY      PERCUTANEOUS TRANSLUMINAL ANGIOPLASTY OF ARTERIOVENOUS FISTULA Left 7/21/2020    Procedure: PTA, AV FISTULA;  Surgeon: Keron Perez MD;  [facility];  Service: Vascular;  Laterality: Left;  15.9 minutes of fluro  41.12  mGy  7.9060 Gy cm2  32ml  contrast    PHLEBOGRAPHY Left 7/21/2020    Procedure: CENTRAL VENOGRAM;  Surgeon: Keron Perez MD;  [facility];  Service: Vascular;  Laterality: Left;    REFRACTIVE SURGERY      TOTAL REDUCTION MAMMOPLASTY      approx 10 yrs ago    VENOPLASTY  1/21/2022    Procedure: ANGIOPLASTY, VEIN;  Surgeon: Keron Perez MD;  [facility];  Service: Vascular;;     Family History   Problem Relation Age of Onset    Stroke Mother     Diabetes Mother     Hypertension Mother     Cataracts Mother     Leukemia Father     Cataracts Father     Ovarian cancer Sister 35    Achondroplasia Sister     HIV Brother     No Known Problems Daughter     No Known Problems Son     Breast cancer Maternal Aunt 65    Parkinsonism Maternal Aunt     Esophageal cancer Maternal Uncle         smoker    No Known Problems Paternal Aunt     Cataracts Paternal Uncle     Cataracts Maternal Grandmother     Cataracts Maternal Grandfather     Diabetes Paternal Grandmother     Cataracts Paternal Grandmother     No Known Problems Paternal Grandfather     No Known Problems Other     Amblyopia Neg Hx     Blindness Neg Hx     Glaucoma Neg Hx     Macular degeneration Neg Hx     Retinal detachment Neg Hx     Strabismus Neg Hx     Thyroid disease Neg Hx     Colon cancer Neg Hx     Cancer Neg Hx      Social History     Socioeconomic History    Marital status: Single    Number of children: 5   Occupational History    Occupation:      Employer:  [employer]     Comment: part-time   Tobacco Use    Smoking status: Never    Smokeless tobacco: Never   Substance and Sexual Activity    Alcohol use: No     Alcohol/week: 0.0 standard drinks of alcohol    Drug use: No    Sexual activity: Not Currently     Partners: Male     Birth control/protection: Post-menopausal, Surgical     Social Determinants of Health     Financial Resource Strain: Low Risk  (6/9/2023)    Overall Financial Resource Strain (CARDIA)     Difficulty of Paying Living Expenses: Not hard at all   Food Insecurity: No Food Insecurity (6/9/2023)    Hunger Vital Sign     Worried About Running Out of Food in the Last Year: Never true     Ran Out of Food in the Last Year: Never true   Transportation Needs: No Transportation Needs (6/9/2023)    PRAPARE - Transportation     Lack of Transportation (Medical): No     Lack of Transportation (Non-Medical): No   Physical Activity: Insufficiently Active (6/9/2023)    Exercise Vital Sign     Days of Exercise per Week: 2 days     Minutes of Exercise per Session: 10 min   Stress: No Stress Concern Present (6/9/2023)    [employer] - Occupational Stress Questionnaire     Feeling of Stress : Only a little   Social Connections: Socially Isolated (6/9/2023)    Social Connection and Isolation Panel [NHANES]     Frequency of Communication with Friends and Family: More than three times a week     Frequency of Social Gatherings with Friends and Family: Twice a week     Attends Hindu Services: Never     Active Member of Clubs or Organizations: No     Attends Club or Organization Meetings: Never     Marital Status:    Housing Stability: Low Risk  (6/9/2023)    Housing Stability Vital Sign     Unable to Pay for Housing in the Last Year: No     Number of Places Lived in the Last Year: 1     Unstable Housing in the Last Year: No       Current Outpatient Medications:     ACCU-CHEK SOFT DEV LANCETS Kit, , Disp: , Rfl:      atorvastatin (LIPITOR) 80 MG tablet, Take 1 tablet (80 mg total) by mouth every evening., Disp: 90 tablet, Rfl: 3    blood sugar diagnostic Strp, One test strip use 2 times a day to check blood glucose,  ICD-10: E11.9, compatible with insurance/glucometer, Disp: 100 each, Rfl: 11    blood-glucose meter kit, One glucometer, use to check blood glucose.   ICD-10: E11.9. Dispense machine covered by insurance, Disp: 1 each, Rfl: 0    cinacalcet (SENSIPAR) 30 MG Tab, , Disp: , Rfl:     docusate sodium (COLACE) 100 MG capsule, Take 200 mg by mouth once daily. , Disp: , Rfl:     doxercalciferoL (HECTOROL) 4 mcg/2 mL injection, Inject 4.5 mcg into the vein 3 (three) times a week. At dialysis unit, Disp: , Rfl:     dulaglutide (TRULICITY) 1.5 mg/0.5 mL pen injector, Inject 1.5 mg into the skin every 7 days., Disp: 12 pen , Rfl: 3    epoetin arnel (EPOGEN INJ), Inject 1,000 Units as directed every 7 days. At the dialysis unit, Disp: , Rfl:     fluticasone propionate (FLONASE) 50 mcg/actuation nasal spray, 1 spray (50 mcg total) by Each Nostril route once daily., Disp: 48 g, Rfl: 5    lancets Misc, One lancets use 2 times a day to check blood glucose, ICD-10: E11.9, Disp: 100 each, Rfl: 11    lancing device Misc, One device, used to check blood glucose, ICD-10: E11.9, Disp: 1 each, Rfl: 0    levothyroxine (SYNTHROID) 50 MCG tablet, TAKE 1 TABLET BEFORE BREAKFAST, Disp: 90 tablet, Rfl: 3    midodrine (PROAMATINE) 5 MG Tab, Take 1 tablet (5 mg total) by mouth 3 (three) times daily., Disp: 90 tablet, Rfl: 3    mupirocin (BACTROBAN) 2 % ointment, Apply topically 3 (three) times daily., Disp: 22 g, Rfl: 0    OLENA-NABIL RX 1- mg-mg-mcg Tab, Take 1 tablet by mouth once daily., Disp: , Rfl:     aspirin (ECOTRIN) 81 MG EC tablet, Take 1 tablet (81 mg total) by mouth once daily., Disp: 90 tablet, Rfl: 3    sevelamer carbonate (RENVELA) 800 mg Tab, Take 3 tablets (2,400 mg total) by mouth 3 (three) times daily with meals., Disp: 270  "tablet, Rfl: 11    REVIEW OF SYSTEMS:  General: No fevers or chills; ENT: No sore throat; Allergy and Immunology: no persistent infections; Hematological and Lymphatic: No history of bleeding or easy bruising; Endocrine: negative; Respiratory: no cough, shortness of breath, or wheezing; Cardiovascular: no chest pain or dyspnea on exertion; Gastrointestinal: no abdominal pain/back, change in bowel habits, or bloody stools; Genito-Urinary: no dysuria, trouble voiding, or hematuria; Musculoskeletal: negative; Neurological: no TIA or stroke symptoms; Psychiatric: no nervousness, anxiety or depression.    PHYSICAL EXAM:      Pulse: 76         General appearance:  Alert, well-appearing, and in no distress.  Oriented to person, place, and time                    Neurological: Normal speech, no focal findings noted; CN II - XII grossly intact. All extremities with sensation to light touch.            Musculoskeletal: Digits/nail without cyanosis/clubbing.  Strength 5/5 all extremities.                    Neck: Supple, no significant adenopathy, no carotid bruit can be auscultated                  Chest:  Clear to auscultation, no wheezes, rales or rhonchi, symmetric air entry. No use of accessory muscles               Cardiac: Normal rate and regular rhythm, S1 and S2 normal            Abdomen: Soft, nontender, nondistended, no masses or organomegaly, no hernia     No rebound tenderness noted; bowel sounds normal     Pulsatile aortic mass is non palpable.     No groin adenopathy      Extremities:      2+ radial pulse bilaterally, LUE AVF +thrill, inc well healed, 2 PSA with dry scabs, no ulcerations     No BUE edema, Negative Manuel's test BUE    Skin: No tissue loss, 2+ LLE edema with discoloration, 1+ RLE edema     VCSS 11     CEAP 3/4a    LAB RESULTS:  No results found for: "CBC"  Lab Results   Component Value Date    LABPROT 10.3 06/07/2021    INR 1.0 06/07/2021     Lab Results   Component Value Date     12/06/2023 "    K 4.0 12/06/2023     12/06/2023    CO2 29 12/06/2023     (H) 12/06/2023    BUN 42 (H) 12/06/2023    CREATININE 7.5 (H) 12/06/2023    CALCIUM 9.1 12/06/2023    ANIONGAP 15 12/06/2023    EGFRNONAA 3.6 (A) 05/07/2022     Lab Results   Component Value Date    WBC 6.54 10/01/2023    RBC 3.84 (L) 10/01/2023    HGB 10.4 (L) 10/01/2023    HCT 32.7 (L) 10/01/2023    MCV 85 10/01/2023    MCH 27.1 10/01/2023    MCHC 31.8 (L) 10/01/2023    RDW 13.8 10/01/2023     10/01/2023    MPV 11.4 10/01/2023    GRAN 4.6 10/01/2023    GRAN 70.1 10/01/2023    LYMPH 0.7 (L) 10/01/2023    LYMPH 11.2 (L) 10/01/2023    MONO 1.1 (H) 10/01/2023    MONO 16.4 (H) 10/01/2023    EOS 0.1 10/01/2023    BASO 0.02 10/01/2023    EOSINOPHIL 1.4 10/01/2023    BASOPHIL 0.3 10/01/2023    DIFFMETHOD Automated 10/01/2023     .  Lab Results   Component Value Date    HGBA1C 5.7 (H) 12/06/2023       IMAGING:  All pertinent imaging has been reviewed and interpreted independently.    Vein mapping 9/2019:  Adequate R superior basilic vein and axillary vein ; Adequate L basilic vein superior and inferior, adequate L axillary     1/27/20 Left upper extremity dialysis ultrasound shows no hemodynamically significant stenosis.  Flow is 1083 ml/min.  Depth and diameter are appropriate.    3/23/20:  Left upper extremity dialysis ultrasound shows anastomotic and proximal fistula hemodynamically significant stenosis.  Flow is 955 ml/min.      7/2020: Left upper extremity dialysis ultrasound shows proximal fistula hemodynamically significant stenosis.  Flow is 1257 ml/min.      8/2020: Left upper extremity dialysis ultrasound shows proximal hemodynamically significant stenosis.  Flow is 1999 ml/min.      8/31/20: Left upper extremity dialysis ultrasound shows proximal fistula hemodynamically significant stenosis.  Flow is 2209 ml/min.      10/2020:  Prox stenosis, flow > 3000 ml/min    1/2021:  Proximal stenosis, flow 4414 ml/min    4/2021:  HD US  reviewed without significant stenosis, adequate flow.    8/2021:HD US reviewed without significant stenosis, adequate flow.    3/2022:  Left upper extremity dialysis ultrasound shows approx 50% anastomotic and prox fistula stenosis withou hemodynamically significant stenosis.  Flow is 3774 ml/min.      6/2022:  No significant stenosis     04/2023: No significant stenosis. Flow 4,086 ml/min    10/2023:  Left upper extremity dialysis ultrasound shows approx 50% anastomotic stenosis.  Flow is 5000 ml/min.      12/2023:  Color flow evaluation of the right lower extremity demonstrates no evidence of venous thrombosis in the deep or superficial veins, and no reflux.  Color flow evaluation of the left lower extremity demonstrates no evidence of venous thrombosis in the deep or superficial veins, and no reflux.    IMP/PLAN:  78 y.o. female with   Patient Active Problem List   Diagnosis    Severe obesity (BMI 35.0-39.9) with comorbidity    Sickle cell trait    Hyperlipidemia LDL goal <100    HUMBERTO on CPAP    Hypothyroidism (acquired)    Hx-TIA (transient ischemic attack)    Vitamin D deficiency disease    Pulmonary hypertension    Type 2 diabetes mellitus with ESRD (end-stage renal disease)    Secondary renal hyperparathyroidism    Incomplete bladder emptying    Postmenopausal atrophic vaginitis    Rectocele    Mixed incontinence urge and stress    Chronic diastolic heart failure    Tortuous aorta    ESRD on hemodialysis    Anemia of chronic disease    Debility    Chronic respiratory failure    Type 2 diabetes mellitus with foot ulcer, with long-term current use of insulin    Stable proliferative diabetic retinopathy associated with type 2 diabetes mellitus    Heart failure with preserved ejection fraction    Pseudophakia    Chronic kidney disease-mineral and bone disorder    Age-related osteoporosis without current pathological fracture    H/O: stroke    Walker as ambulation aid    Swelling of lower extremity    Lymphedema of  both lower extremities    Impaired functional mobility and activity tolerance    being managed by PCP and specialists who is here today for evaluation of long term HD access s/p L RC AV fistula.    -s/p L RC AV fistula with proximal fistula measuring 5 mm 1/13/20, adequate flow without issues during HD s/p LUE fistulogram, central venogram, ligation of medial side branch cephalic vein, ligation of lateral side branch cephalic vein 3/2/20 with prox AVF stenosis s/p L proximal fistula angioplasty 7/21/20 with persistent flow issues s/p proximal angioplasty 8/19/20 with scoring balloon/DCB with improvement in stenosis/flow and asymptomatic anastomotic stenosis with no further issues with HD  -Cont renal diet  -Rec cont lymphedema clinic and pumps  -F/u in 3 mo with HD US for continued routine surveillance    I spent 11 minutes evaluating this patient and greater than 50% of the time was spent counseling, coordinator care and discussing the plan of care.  All questions were answered and patient stated understanding with agreement with the above treatment plan.    Keron Perez MD  Vascular and Endovascular Surgery

## 2024-02-19 ENCOUNTER — CLINICAL SUPPORT (OUTPATIENT)
Dept: REHABILITATION | Facility: HOSPITAL | Age: 79
End: 2024-02-19
Payer: MEDICARE

## 2024-02-19 DIAGNOSIS — M79.89 SWELLING OF LOWER EXTREMITY: ICD-10-CM

## 2024-02-19 DIAGNOSIS — I89.0 LYMPHEDEMA OF BOTH LOWER EXTREMITIES: Primary | ICD-10-CM

## 2024-02-19 DIAGNOSIS — Z74.09 IMPAIRED FUNCTIONAL MOBILITY AND ACTIVITY TOLERANCE: ICD-10-CM

## 2024-02-19 PROCEDURE — 97140 MANUAL THERAPY 1/> REGIONS: CPT

## 2024-02-19 NOTE — PROGRESS NOTES
Physical Therapy Daily Treatment Note     Name: Erica Leblanc  Clinic Number: [id number]    Therapy Diagnosis:   Encounter Diagnoses   Name Primary?    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  Pt saw Dr. Perez last week, he was pleased with progress and his staff would check on home training with vendor.  Pt admits they called today and will have home visit scheduled.  Pt is on HD T TH Sat, had to miss therapy last Monday due to dialysis change because of Aldairhubert Johnson. Pt missed other Friday visits due to our provider PTA being out.   Pt was not aware of small blister L Leg.  Pt relates past wounds, ulcers to L leg after hot water with pedicure.  Pt admits DM but states has feeling in her feet/legs - discussed altered sensation or neuropathy may occur with DM and to monitor and view legs frequently, diabetic foot care and advised on healing and skin tissue changes to L ant  leg, R medial leg.    She was compliant with home compression/exercise program.  Response to previous treatment: prior wounds and delayed healing after minor trauma.  Compression with lite socks or tubigrip  is better than not wearing support. She was unable to apply medical grade compression in the past.   Pump settings will be determined by Dr. Perez.    Functional change: amb with Rollator.  Pt is able to apply  tubgrip and lite socks.   Dr. Perez's office has consult for home pump system, if appropriate with HD/ESRD, proceed per his orders.   Pain: 0/10  Location: BLEs , L>R    Objective     Female amb to dept Rollator   Pt varies from hand support to resting on forearms to walk  B LE with Tubigrip E slightly covering toes, slip on mules with heel shelf  Amount of Swelling/Location of Swelling: mod B LE size R > L, R prior wounds medial, scrape healed ant shin, L prior cellulitis with darkening, skin discoloration, dark scales ant L shin- less present  New small blister intact ant L shin - photo for chart and pt requested with her phone - monitor closely  R medial lower leg with darkened discoloration, prior wound sites, nodular bump she relates with trauma- struck by her phone- lump and nodular feel,   No true pain or tenderness to either site,  no added warmth or redness  Skin Integrity: dry, darkened, dense, healed with prior scarring from wounds and cellulitis   Palpation/Texture: dense, pitting distally   + B Stemmer Sign  - B Yehuda's Sign  Circulation: intact    Sensation intact to basic light touch - advised on DM foot care and precautions    2/19/24 Left ant shin - small blister - pt made aware to monitor closely and seek attention if warranted         Right medial leg    Girth Measurements (in centimeters)  LANDMARK LEFT LE  1/17/24 RIGHT LE  1/17/24 R LE  2/2/24 Change  R LE DIFF   at eval   SBP + 10  62.0 cm 65.0 cm 64.0 1.0 3.0 cm   SBP 56.0 cm 57.0 cm 54.0 3.0 1.0 cm   10 below SBP 38.0 cm 43.0 cm 42.0 1.0 5.0 cm   20 below SBP 35.0 cm 37.0 cm 37.0 0 2.0 cm   30 below SBP 27.0 cm 32.5 cm 31.0 1.5 4.5 cm   35 below SBP 25.0 cm 29.0 cm 27.0 2.0 4.0 cm   Ankle 26.0 cm 28.0 cm 27.5 0.5 2.0 cm    Forefoot 22.0 cm 23.0 cm 23.0 0 1.0 cm      Treatment:   Erica received the following manual therapy techniques:- Manual Lymphatic Drainage were applied to the: R and L LE for 55 minutes, including: MLD and short stretch compression bandaging     MANUAL LYMPHATIC DRAINAGE (MLD):  While supine with LEs elevated stimulation at terminus, along GI region, B inguinal regions, drainage of entire  L and R LE + lotion especially lower leg, ankle, and foot with return proximally,  Use of Aquaphor/Eucerin due to dryness.   Consider self massage to abdominal areas, B inguinal areas, thigh, and remaining LE within reach.  Avoid friction to ant leg or blister site    MULTILAYERED BANDAGING:   issued supplies and bandaged R LE with cotton stockinette, small Rosidal soft section dorsum of foot, 2 komprex wedges post malleoli, 2 Cellona cotton padding rolls foot to knee 2-8cm and 1- 10cm Durelast rolls foot to knee, to leave intact 12-24 hrs as tolerated, discontinue with any problems, return rolled bandages next session. Wash and wear schedules confirmed.   Reapplied tubigrip to L LE- no friction to ant leg.  Advised on daily use of Tubigrip or Copper compression sock 20-30mmHg daily when not in bandaging.    Erica received therapeutic exercises to develop strength, endurance, ROM, flexibility, and posture including:  Cueing on posture and performance - review only  Pt has HEP for Gastroc Soleus Stretching for ankle ROM, muscle pump support and ankle pump mobility.  Pt has written/illustrated home exercise program to perform daily.  Recommend Aps, knee ROM, avoid dependency, avoid immobility, elevation, walking with compression, deep breathing, use of muscle pump to assist venous return    Home Exercises Provided and Patient Education Provided:  Self Care Home Management Training/Functional Therapeutic Activity x 10 min    Concerns with skin and tissue healing with DM, ESRD with HD - advised again on DM foot care considerations,  view legs and protection.  Pt will monitor closely and seek medical care as warranted.    Pt repeats she is unable to self apply Medical grade garments therefore KH socks and Tubigrip are her lessor alternative to allow compliance with a form of compression.  Reviewed her present choices and ability to don/doff to allow compliance  Therapy will often assess lessor tier of product, style, lessor class, or size modification to gain compliance  Discussed her daily use recommendations and her reported difficulty applying.     Use of Tubigrip or Knee High Copper Compression socks 20-30mmHg size L/XL - modified with fold at knee for length.  Pt may use Copper Sock or tubigrip daily - advised to remove for sleep   Pros/cons of basic compression choices were fully reviewed with noted limited size selection, one length choice, lessor tier of product, benefits of low cost, relative ease of application and comfort, and additional items available for use.    MD provided home pump consultation - unit was delivered and pt will have vendor training in set up, use with settings per Dr. Perez.   MD clearance per pump precautionary due to HD due to ESRD -  MD would need to proceed with recommendations/consult if appropriate.  Message sent regarding therapy status.    Present compression:   tubigrip E from Podiatry, Dr. Gray , 20-30mmHg knee high copper compression socks  Orders and recommendations: 20-30mmHg knee highs   PATIENT/FAMILY Education: bandaging/compression wear schedule,  HEP,  Beginning of self massage,  Self or assisted bandaging, compression options, and Risk reduction    Written Home Exercises Provided: yes.  Home exercise and compression plan of management was reviewed and Erica was able to demonstrate understanding prior to the end of the session.  Erica demonstrated good  understanding of the education provided.     Assessment     Erica is a 78 y.o. 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     Delayed healing, skin discoloration and new small blister L LE with concern due to DM and ESRD.  Pt advised on caution due to Diabetic LE /feet risk. Pt is choosing lite compression since this allows her don/doff and compliance. Medical grade KH and Velcro Wraps are options but at present pt is choosing Tubigrip or lite socks. Pt's LE are showing min reductions in girth although swelling, discoloration, skin changes and some density remains.  still remain.  Erica Is progressing well towards her goals.   Pt prognosis is Fair.     Pt will continue to benefit from skilled outpatient physical therapy to address the deficits listed in the problem list box on initial evaluation, provide pt/family education and to maximize pt's level of independence in the home and community environment.   Pt's spiritual, cultural and educational needs considered and pt agreeable to plan of care and goals.     Anticipated Barriers for therapy: ESRD with HD, transportation     The following goals were discussed with the patient and patient is in agreement with them as to be addressed in the treatment plan.      Short Term Goals: (6 weeks)  1. Patient will show decreased girth in B LE by up to 1 cm to allow for LE symmetry, shoe and clothing choice, and ability to apply needed compression.  (progressing, not met)   2. Patient will  demonstrate 100% knowledge of lymphedema precautions and signs of infection to allow for reduced lymphedema risk, infection risk, and/or exacerbation of condition.  (progressing, not met)  3. Patient or caregiver will perform self-bandaging techniques and/or wearing of compression garments to allow for lymphatic drainage support, skin elasticity, and reduction in shape and size of limb. (progressing, not met)  4. Patient will perform self lymph drainage techniques to areas within reach to enhance lymphatic drainage and skin condition.  (progressing, not met)  5. Patient will tolerate daily activities with multilayered bandaging to allow for lymphatic and venous support.  (progressing, not met)     Long Term Goals: (12  weeks)  1. Patient will show decreased girth in B LE by up to 2 cm  to allow for LE symmetry, shoe and clothing choice, and ability to apply needed compression daily.  (progressing, not met)  2. Patient will show reduction in density to mild or less with improved contour of limb to allow for cosmesis, LE symmetry, infection risk reduction, and clothing and compression choice.   (progressing, not met)  3. Patient to merry/doff compression garment with daily compliance to assist in lymphedema management, skin elasticity, and tissue density.  (progressing, not met)  4. Pt to show improved postural awareness and alignment.  (progressing, not met)  5. Pt to be I and compliant with HEP to allow for increased function in affected limb.   (progressing, not met)  Plan   Plan of care Certification: 1/17/2024 to 4/10/24.     Outpatient Physical Therapy 2 times weekly for 10 weeks to include the following interventions: Patient Education, Self Care, Therapeutic Activities, and Therapeutic Exercise. Complete Decongestive Therapy- compression and home equipment needs to be addressed and assisted.    MD notified by message of pump considerations and home vendor training planned.    Debora Coles, PT

## 2024-02-22 ENCOUNTER — TELEPHONE (OUTPATIENT)
Dept: VASCULAR SURGERY | Facility: CLINIC | Age: 79
End: 2024-02-22
Payer: MEDICARE

## 2024-02-23 ENCOUNTER — CLINICAL SUPPORT (OUTPATIENT)
Dept: REHABILITATION | Facility: HOSPITAL | Age: 79
End: 2024-02-23
Payer: MEDICARE

## 2024-02-23 ENCOUNTER — TELEPHONE (OUTPATIENT)
Dept: VASCULAR SURGERY | Facility: CLINIC | Age: 79
End: 2024-02-23
Payer: MEDICARE

## 2024-02-23 DIAGNOSIS — M79.89 SWELLING OF LOWER EXTREMITY: ICD-10-CM

## 2024-02-23 DIAGNOSIS — Z74.09 IMPAIRED FUNCTIONAL MOBILITY AND ACTIVITY TOLERANCE: ICD-10-CM

## 2024-02-23 DIAGNOSIS — I89.0 LYMPHEDEMA OF BOTH LOWER EXTREMITIES: Primary | ICD-10-CM

## 2024-02-23 PROCEDURE — 97140 MANUAL THERAPY 1/> REGIONS: CPT | Mod: CQ

## 2024-02-23 NOTE — PROGRESS NOTES
Physical Therapy Daily Treatment Note     Name: Erica Leblanc  Clinic Number: [id number]    Therapy Diagnosis:   Encounter Diagnoses   Name Primary?    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 Unsure if blister on the front of the leg is still there or not. She has been careful with putting on her compression to not irritate her leg.     She was compliant with home compression/exercise program.  Response to previous treatment: prior wounds and delayed healing after minor trauma.  Compression with lite socks or tubigrip is better than not wearing support. She was unable to apply medical grade compression in the past.   Pump settings will be determined by Dr. Perez.    Functional change: amb with Rollator.  Pt is able to apply  tubgrip and lite socks.   Dr. Perez's office has consult for home pump system, if appropriate with HD/ESRD, proceed per his orders.   Pain: 0/10  Location: BLEs , L>R    Objective     Female amb to dept Rollator   Pt varies from hand support to resting on forearms to walk  B LE with Tubigrip E slightly covering toes, slip on mules with heel shelf    Amount of Swelling/Location of Swelling: mod B LE size R  > L, R prior wounds medial, scrape healed ant shin, L prior cellulitis with darkening, skin discoloration, dark scales ant L shin- less present  New small blister intact ant L shin - photo for chart and pt requested with her phone - monitor closely  R medial lower leg with darkened discoloration, prior wound sites, nodular bump she relates with trauma- struck by her phone- lump and nodular feel,   No true pain or tenderness to either site,  no added warmth or redness  Skin Integrity: dry, darkened, dense, healed with prior scarring from wounds and cellulitis   Palpation/Texture: dense, pitting distally   + B Stemmer Sign  - B Yehuda's Sign  Circulation: intact    Sensation intact to basic light touch - advised on DM foot care and precautions    Girth Measurements (in centimeters)  LANDMARK LEFT LE  1/17/24 RIGHT LE  1/17/24 R LE  2/2/24 Change  R LE DIFF   at eval   SBP + 10  62.0 cm 65.0 cm 64.0 1.0 3.0 cm   SBP 56.0 cm 57.0 cm 54.0 3.0 1.0 cm   10 below SBP 38.0 cm 43.0 cm 42.0 1.0 5.0 cm   20 below SBP 35.0 cm 37.0 cm 37.0 0 2.0 cm   30 below SBP 27.0 cm 32.5 cm 31.0 1.5 4.5 cm   35 below SBP 25.0 cm 29.0 cm 27.0 2.0 4.0 cm   Ankle 26.0 cm 28.0 cm 27.5 0.5 2.0 cm   Forefoot 22.0 cm 23.0 cm 23.0 0 1.0 cm      Treatment:   Erica received the following manual therapy techniques:- Manual Lymphatic Drainage were applied to the: R and L LE for 55 minutes, including: MLD and short stretch compression bandaging     MANUAL LYMPHATIC DRAINAGE (MLD):  While supine with LEs elevated stimulation at terminus, along GI region, B inguinal regions, drainage of entire  L and R LE + lotion especially lower leg, ankle, and foot with return proximally,  Use of Aquaphor/Eucerin due to dryness.   Consider self massage to abdominal areas, B inguinal areas, thigh, and remaining LE within reach.  Avoid friction to ant leg or blister site    MULTILAYERED BANDAGING:   issued supplies and bandaged R LE with cotton stockinette, small Rosidal  soft section dorsum of foot, 2 komprex wedges post malleoli, 2 Cellona cotton padding rolls foot to knee 2-8cm and 1- 10cm Durelast rolls foot to knee, to leave intact 12-24 hrs as tolerated, discontinue with any problems, return rolled bandages next session. Wash and wear schedules confirmed.   Reapplied tubigrip to L LE- no friction to ant leg.  Advised on daily use of Tubigrip or Copper compression sock 20-30mmHg daily when not in bandaging.    Erica received therapeutic exercises to develop strength, endurance, ROM, flexibility, and posture including:  Cueing on posture and performance - review only  Pt has HEP for Gastroc Soleus Stretching for ankle ROM, muscle pump support and ankle pump mobility.  Pt has written/illustrated home exercise program to perform daily.  Recommend Aps, knee ROM, avoid dependency, avoid immobility, elevation, walking with compression, deep breathing, use of muscle pump to assist venous return    Home Exercises Provided and Patient Education Provided:  Self Care Home Management Training/Functional Therapeutic Activity    Concerns with skin and tissue healing with DM, ESRD with HD - advised again on DM foot care considerations, view legs and protection.  Pt will monitor closely and seek medical care as warranted.    Pt repeats she is unable to self apply Medical grade garments therefore KH socks and Tubigrip are her lessor alternative to allow compliance with a form of compression.  Reviewed her present choices and ability to don/doff to allow compliance  Therapy will often assess lessor tier of product, style, lessor class, or size modification to gain compliance  Discussed her daily use recommendations and her reported difficulty applying.     Use of Tubigrip or Knee High Copper Compression socks 20-30mmHg size L/XL - modified with fold at knee for length.  Pt may use Copper Sock or tubigrip daily - advised to remove for sleep   Pros/cons of basic compression choices were fully  reviewed with noted limited size selection, one length choice, lessor tier of product, benefits of low cost, relative ease of application and comfort, and additional items available for use.    MD provided home pump consultation - unit was delivered and pt will have vendor training in set up, use with settings per Dr. Perez.   MD clearance per pump precautionary due to HD due to ESRD -  MD would need to proceed with recommendations/consult if appropriate.  Message sent regarding therapy status.    Present compression:   tubigrip E from Podiatry, Dr. Gray , 20-30mmHg knee high copper compression socks  Orders and recommendations: 20-30mmHg knee highs   PATIENT/FAMILY Education: bandaging/compression wear schedule,  HEP,  Beginning of self massage,  Self or assisted bandaging, compression options, and Risk reduction    Written Home Exercises Provided: yes.  Home exercise and compression plan of management was reviewed and Erica was able to demonstrate understanding prior to the end of the session.  Erica demonstrated good  understanding of the education provided.     Assessment     Erica is a 78 y.o. 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     Noted popped blister to anterior LLE . No signs of infection with no redness or warmth present. Advised continue close monitoring and avoiding  friction to area until fully healed .  Pt advised on caution due to Diabetic LE /feet risk. Pt is choosing lite compression since this allows her don/doff and compliance. Medical grade KH and Velcro Wraps are options but at present pt is choosing Tubigrip or lite socks. Pt's LE are showing min reductions in girth although swelling, discoloration, skin changes and some density remains.  still remain.  Erica Is progressing well towards her goals.   Pt prognosis is Fair.     Pt will continue to benefit from skilled outpatient physical therapy to address the deficits listed in the problem list box on initial evaluation, provide pt/family education and to maximize pt's level of independence in the home and community environment.   Pt's spiritual, cultural and educational needs considered and pt agreeable to plan of care and goals.     Anticipated Barriers for therapy: ESRD with HD, transportation     The following goals were discussed with the patient and patient is in agreement with them as to be addressed in the treatment plan.      Short Term Goals: (6 weeks)  1. Patient will show decreased girth in B LE by up to 1 cm to allow for LE symmetry, shoe and clothing choice, and ability to apply needed compression.  (progressing, not met)   2. Patient will demonstrate 100% knowledge of lymphedema precautions and signs of infection to allow for reduced lymphedema risk, infection risk, and/or exacerbation of condition.  (progressing, not met)  3. Patient or caregiver will perform self-bandaging techniques and/or wearing of compression garments to allow for lymphatic drainage support, skin elasticity, and reduction in shape and size of limb. (progressing, not met)  4. Patient will perform self lymph drainage techniques to areas within reach to enhance lymphatic drainage and skin condition.  (progressing, not met)  5. Patient will tolerate daily activities with multilayered bandaging to allow for lymphatic and venous support.   (progressing, not met)     Long Term Goals: (12  weeks)  1. Patient will show decreased girth in B LE by up to 2 cm  to allow for LE symmetry, shoe and clothing choice, and ability to apply needed compression daily.  (progressing, not met)  2. Patient will show reduction in density to mild or less with improved contour of limb to allow for cosmesis, LE symmetry, infection risk reduction, and clothing and compression choice.   (progressing, not met)  3. Patient to merry/doff compression garment with daily compliance to assist in lymphedema management, skin elasticity, and tissue density.  (progressing, not met)  4. Pt to show improved postural awareness and alignment.  (progressing, not met)  5. Pt to be I and compliant with HEP to allow for increased function in affected limb.   (progressing, not met)  Plan   Plan of care Certification: 1/17/2024 to 4/10/24.     Outpatient Physical Therapy 2 times weekly for 10 weeks to include the following interventions: Patient Education, Self Care, Therapeutic Activities, and Therapeutic Exercise. Complete Decongestive Therapy- compression and home equipment needs to be addressed and assisted.    MD notified by message of pump considerations and home vendor training planned.    Heather French, PTA

## 2024-02-28 ENCOUNTER — OFFICE VISIT (OUTPATIENT)
Dept: VASCULAR SURGERY | Facility: CLINIC | Age: 79
End: 2024-02-28
Payer: MEDICARE

## 2024-02-28 VITALS
BODY MASS INDEX: 35.45 KG/M2 | WEIGHT: 212.75 LBS | HEART RATE: 80 BPM | SYSTOLIC BLOOD PRESSURE: 154 MMHG | HEIGHT: 65 IN | DIASTOLIC BLOOD PRESSURE: 68 MMHG

## 2024-02-28 DIAGNOSIS — I89.0 LYMPHEDEMA: ICD-10-CM

## 2024-02-28 DIAGNOSIS — I87.303 VENOUS HYPERTENSION OF BOTH LOWER EXTREMITIES: ICD-10-CM

## 2024-02-28 DIAGNOSIS — S81.802A WOUND OF LEFT LOWER EXTREMITY, INITIAL ENCOUNTER: Primary | ICD-10-CM

## 2024-02-28 DIAGNOSIS — I73.9 PVD (PERIPHERAL VASCULAR DISEASE): ICD-10-CM

## 2024-02-28 PROCEDURE — 1125F AMNT PAIN NOTED PAIN PRSNT: CPT | Mod: CPTII,S$GLB,, | Performed by: NURSE PRACTITIONER

## 2024-02-28 PROCEDURE — 1101F PT FALLS ASSESS-DOCD LE1/YR: CPT | Mod: CPTII,S$GLB,, | Performed by: NURSE PRACTITIONER

## 2024-02-28 PROCEDURE — 99215 OFFICE O/P EST HI 40 MIN: CPT | Mod: S$GLB,,, | Performed by: NURSE PRACTITIONER

## 2024-02-28 PROCEDURE — 1159F MED LIST DOCD IN RCRD: CPT | Mod: CPTII,S$GLB,, | Performed by: NURSE PRACTITIONER

## 2024-02-28 PROCEDURE — 3078F DIAST BP <80 MM HG: CPT | Mod: CPTII,S$GLB,, | Performed by: NURSE PRACTITIONER

## 2024-02-28 PROCEDURE — 99999 PR PBB SHADOW E&M-EST. PATIENT-LVL III: CPT | Mod: PBBFAC,,, | Performed by: NURSE PRACTITIONER

## 2024-02-28 PROCEDURE — 3077F SYST BP >= 140 MM HG: CPT | Mod: CPTII,S$GLB,, | Performed by: NURSE PRACTITIONER

## 2024-02-28 PROCEDURE — 3288F FALL RISK ASSESSMENT DOCD: CPT | Mod: CPTII,S$GLB,, | Performed by: NURSE PRACTITIONER

## 2024-02-29 ENCOUNTER — TELEPHONE (OUTPATIENT)
Dept: VASCULAR SURGERY | Facility: CLINIC | Age: 79
End: 2024-02-29
Payer: MEDICARE

## 2024-03-01 ENCOUNTER — CLINICAL SUPPORT (OUTPATIENT)
Dept: REHABILITATION | Facility: HOSPITAL | Age: 79
End: 2024-03-01
Payer: MEDICARE

## 2024-03-01 ENCOUNTER — HOSPITAL ENCOUNTER (OUTPATIENT)
Dept: CARDIOLOGY | Facility: HOSPITAL | Age: 79
Discharge: HOME | End: 2024-03-01
Attending: NURSE PRACTITIONER
Payer: MEDICARE

## 2024-03-01 DIAGNOSIS — I73.9 PVD (PERIPHERAL VASCULAR DISEASE): ICD-10-CM

## 2024-03-01 DIAGNOSIS — M79.89 SWELLING OF LOWER EXTREMITY: ICD-10-CM

## 2024-03-01 DIAGNOSIS — I89.0 LYMPHEDEMA OF BOTH LOWER EXTREMITIES: Primary | ICD-10-CM

## 2024-03-01 DIAGNOSIS — Z74.09 IMPAIRED FUNCTIONAL MOBILITY AND ACTIVITY TOLERANCE: ICD-10-CM

## 2024-03-01 PROCEDURE — 93925 LOWER EXTREMITY STUDY: CPT

## 2024-03-01 PROCEDURE — 93922 UPR/L XTREMITY ART 2 LEVELS: CPT

## 2024-03-01 PROCEDURE — 93922 UPR/L XTREMITY ART 2 LEVELS: CPT | Mod: 26,,, | Performed by: SURGERY

## 2024-03-01 PROCEDURE — 97140 MANUAL THERAPY 1/> REGIONS: CPT

## 2024-03-01 PROCEDURE — 93925 LOWER EXTREMITY STUDY: CPT | Mod: 26,,, | Performed by: SURGERY

## 2024-03-01 PROCEDURE — 97535 SELF CARE MNGMENT TRAINING: CPT

## 2024-03-01 NOTE — PROGRESS NOTES
Physical Therapy Daily Treatment Note     Name: Erica Leblanc  Clinic Number: [id number]    Therapy Diagnosis:   Encounter Diagnoses   Name Primary?    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  Pt states US for veins and arteries was ordered and performed today.  Pt was also referred to wound care 3/14/24.  Pt states pump representative will have home visit today 3/1/24 - advised to follow Dr. Perez's guidance regarding use and settings.   She was compliant with home compression/exercise program.  Response to previous treatment: L LE was dressed per NP with care instructions and plan.  Pt admits may have slipped some today during US.  Pt states they did want her to use compression.  Pt denies fever or infection.  Pt has HD T TH Sat.  All pump recommendations for use and settings will need to be cleared per Dr. Perez.    Functional change: amb with Rollator.  Using lite socks or tubigrip F.     Vendor/representative should contact Dr. Perez's office, consult for home pump system, if appropriate with HD/ESRD, proceed per his orders.   Pain:  0/10  Location: BLEs , L>R    Objective     Female amb to dept Rollator   Pt varies from hand support to resting on forearms to walk  B LE with Tubigrip E slightly covering toes, slip on mules with heel shelf  *L LE ant shin with xeraform shifted above open wound area, gauze securing. Pink tissue with surrounding open region of past blister- no added warmth or surrounding redness  B LE feet cool to touch R > L    Amount of Swelling/Location of Swelling: mod B LE size R > L, R prior wounds medial, scrape healed ant shin, L prior cellulitis with darkening, skin discoloration,   Superficial opening > quarter sized area of past blister ant L shin - photo for chart and pt requested with her phone - monitor closely follow as per vascular   R medial lower leg with darkened discoloration, prior wound sites, nodular bump - past trauma- struck by her phone- lump and nodular feel,   No true pain or tenderness to either site,  no added warmth or redness  Skin Integrity: dry, darkened, dense, healed with prior scarring from wounds and cellulitis   Palpation/Texture: dense, pitting distally   + B Stemmer Sign  - B Yehuda's Sign  Circulation: intact    Sensation intact to basic light touch - advised on DM foot care and precautions    3/1/24            Girth Measurements (in centimeters)  LANDMARK LEFT LE  1/17/24 L LE  3/1/24 RIGHT LE  1/17/24 R LE  2/2/24 R LE  3/1/24 Change  R LE DIFF   at eval   SBP + 10  62.0 cm 62.0 65.0 cm 64.0 63.0 2.0 3.0 cm   SBP 56.0 cm 54.5 57.0 cm 54.0 54.0 3.0 1.0 cm   10 below SBP 38.0 cm 38.0 43.0 cm 42.0 39.0 4.0 5.0 cm   20 below SBP 35.0 cm 35.5 37.0 cm 37.0 36.0 1.0 2.0 cm   30 below SBP 27.0 cm 28.5 32.5 cm 31.0 29.5 3.0 4.5 cm   35 below SBP 25.0 cm 26.5 29.0 cm 27.0 26.0 3.0 4.0 cm   Ankle 26.0 cm 25.5 28.0 cm 27.5 27.0 1.0 2.0 cm   Forefoot 22.0 cm 22.0 23.0 cm 23.0 22.5 0.5 1.0 cm      Treatment:   Erica received the following manual therapy techniques:- Manual Lymphatic Drainage were  applied to the: R and L LE for 50 minutes, including: MLD and short stretch compression bandaging     MANUAL LYMPHATIC DRAINAGE (MLD):  While supine with LEs elevated stimulation at terminus, along GI region, B inguinal regions, drainage of entire  R LE + lotion especially lower leg, ankle, and foot with return proximally,  Use of Aquaphor/Eucerin due to dryness.   Consider self massage to abdominal areas, B inguinal areas, thigh, and remaining LE within reach.    L LE was cleansed with SeaClens wound  and gentle wipe with gauze, re dressed with Xeroform to open area, non stick covering and secured with Elastomul.  Pt states team wanted compression, issued new clean segments of tubigrip- applied over wrap to L LE. Advised to follow MD/NP guidelines on care.     MULTILAYERED BANDAGING: pt reports home pump trial today 400pm- advised may need to hold until studies/results are confirmed, also L LE wound   Pt choose tubigrip to R LE to allow pump consult training today.   Not performed (issued supplies and bandaged R LE with cotton stockinette, small Rosidal soft section dorsum of foot, 2 komprex wedges post malleoli, 2 Cellona cotton padding rolls foot to knee 2-8cm and 1- 10cm Durelast rolls foot to knee, to leave intact 12-24 hrs as tolerated, discontinue with any problems, return rolled bandages next session. Wash and wear schedules confirmed. )    Advised on daily use of Tubigrip or Copper compression sock 20-30mmHg daily when not in bandaging.    Erica received therapeutic exercises to develop strength, endurance, ROM, flexibility, and posture including:  Cueing on posture and performance - review only  Pt has HEP for Gastroc Soleus Stretching for ankle ROM, muscle pump support and ankle pump mobility.  Pt has written/illustrated home exercise program to perform daily.  Recommend Aps, knee ROM, avoid dependency, avoid immobility, elevation, walking with compression, deep breathing, use of muscle pump to  assist venous return    Home Exercises Provided and Patient Education Provided:  Self Care Home Management Training/Functional Therapeutic Activity x 10 min    MD was messaged previously due to concerns with skin and tissue healing with DM, ESRD with HD- pt was seen by his team 3/28/24, US studies 3/1/24 and return to NP 3/6/24 - advised to follow their guidelines and advice.  advised again on DM foot care considerations, view legs and protection.  Pt will monitor closely and seek medical care as warranted.    Presently using tubigrip or lessor tier compression R LE   She is unable to self apply Medical grade garments therefore KH socks and Tubigrip are her lessor alternative to allow compliance with a form of compression.  Reviewed her present choices and ability to don/doff to allow compliance  Therapy will often assess lessor tier of product, style, lessor class, or size modification to gain compliance  Discussed her daily use recommendations and her reported difficulty applying.     Use of Tubigrip or Knee High Copper Compression socks 20-30mmHg size L/XL - modified with fold at knee for length.  Pt may use Copper Sock or tubigrip daily - advised to remove for sleep   Pros/cons of basic compression choices were fully reviewed with noted limited size selection, one length choice, lessor tier of product, benefits of low cost, relative ease of application and comfort, and additional items available for use.    Wound care considerations and US results pending.    MD directed home pump consultation - unit was delivered, vendor representative training 3/1/24 - settings and use per Dr. Perez.  Therapy considers pump use precautionary due to HD due to ESRD  Prior message sent regarding therapy status. MD notified by message of pump considerations and home vendor training planned.    Present compression:   tubigrip E, Podiatry, Dr. Gray , 20-30mmHg knee high copper compression socks  Orders and recommendations:  20-30mmHg knee highs   PATIENT/FAMILY Education: bandaging/compression wear schedule,  HEP,  Beginning of self massage,  Self or assisted bandaging, compression options, and Risk reduction    Written Home Exercises Provided: yes.  Home exercise and compression plan of management was reviewed and Erica was able to demonstrate understanding prior to the end of the session.  Erica demonstrated good  understanding of the education provided.     Assessment     Erica is a 78 y.o. 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     Pt with diabetic and ESRD concerns with L leg wound and R leg delayed healing. Pt was seen by referring team and did have venous and arterial studies performed.  Pt will need to proceed with any additional recommendations for compression, pump use and wound care pending these results.   Her  RLE has decreased in size and density although she notes fluctuations due to dialysis.  L LE with wound care pending and present dressing/care was repeated as NP.   HOLD therapy for MD visit, wound care visits and ongoing HD.    Continue present compression as MD recommendations.   Erica Is progressing fair towards her goals.   Pt prognosis is Fair.     Pt will continue to benefit from skilled outpatient physical therapy to address the deficits listed in the problem list box on initial  evaluation, provide pt/family education and to maximize pt's level of independence in the home and community environment.   Pt's spiritual, cultural and educational needs considered and pt agreeable to plan of care and goals.     Anticipated Barriers for therapy: ESRD with HD, transportation     The following goals were discussed with the patient and patient is in agreement with them as to be addressed in the treatment plan.      Short Term Goals: (6 weeks)  1. Patient will show decreased girth in B LE by up to 1 cm to allow for LE symmetry, shoe and clothing choice, and ability to apply needed compression.  R LE met)   2. Patient will demonstrate 100% knowledge of lymphedema precautions and signs of infection to allow for reduced lymphedema risk, infection risk, and/or exacerbation of condition.  (progressing,   3. Patient or caregiver will perform self-bandaging techniques and/or wearing of compression garments to allow for lymphatic drainage support, skin elasticity, and reduction in shape and size of limb. Lite compression and tubigrip  4. Patient will perform self lymph drainage techniques to areas within reach to enhance lymphatic drainage and skin condition.  (progressing  5. Patient will tolerate daily activities with multilayered bandaging to allow for lymphatic and venous support.  R LE met)     Long Term Goals: (12  weeks)  1. Patient will show decreased girth in B LE by up to 2 cm  to allow for LE symmetry, shoe and clothing choice, and ability to apply needed compression daily.  (some areas met)  2. Patient will show reduction in density to mild or less with improved contour of limb to allow for cosmesis, LE symmetry, infection risk reduction, and clothing and compression choice.   (progressing, nodular dense region remains R and wound L  3. Patient to merry/doff compression garment with daily compliance to assist in lymphedema management, skin elasticity, and tissue density.  (progressing)  4. Pt to  show improved postural awareness and alignment.  (progressing,  5. Pt to be I and compliant with HEP to allow for increased function in affected limb.   (progressing,  Plan   Plan of care Certification: 1/17/2024 to 4/10/24.     Outpatient Physical Therapy 2 times weekly for 10 weeks to include the following interventions: Patient Education, Self Care, Therapeutic Activities, and Therapeutic Exercise. Complete Decongestive Therapy- compression and home equipment needs to be addressed and assisted.    HOLD therapy for medical and wound care management     Debora Coles, PT

## 2024-03-04 NOTE — PROGRESS NOTES
[facility]                  HPI:  Erica Leblanc is a 79 y.o. female with   Patient Active Problem List   Diagnosis    Severe obesity (BMI 35.0-39.9) with comorbidity    Sickle cell trait    Hyperlipidemia LDL goal <100    HUMBERTO on CPAP    Hypothyroidism (acquired)    Hx-TIA (transient ischemic attack)    Vitamin D deficiency disease    Pulmonary hypertension    Type 2 diabetes mellitus with ESRD (end-stage renal disease)    Secondary renal hyperparathyroidism    Incomplete bladder emptying    Postmenopausal atrophic vaginitis    Rectocele    Mixed incontinence urge and stress    Chronic diastolic heart failure    Tortuous aorta    ESRD on hemodialysis    Anemia of chronic disease    Debility    Chronic respiratory failure    Type 2 diabetes mellitus with foot ulcer, with long-term current use of insulin    Stable proliferative diabetic retinopathy associated with type 2 diabetes mellitus    Heart failure with preserved ejection fraction    Pseudophakia    Chronic kidney disease-mineral and bone disorder    Age-related osteoporosis without current pathological fracture    H/O: stroke    Walker as ambulation aid    Swelling of lower extremity    Lymphedema of both lower extremities    Impaired functional mobility and activity tolerance    being managed by PCP and specialists who is here today for evaluation of long term HD access.  S/p R IJ tunneled HD catheter, HD without issues.  Pt is R handed.  No complaints today.  Does endorse orthopnea, no chest pain.  Unable to climb 1 flight of stairs on own.    no MI  no Stroke  Tobacco use: denies    1/20/20: No new issues.    3/2020: Dialysis without issues.    4/6/20:  S/p LUE fistulogram, central venogram, ligation of medial side branch cephalic vein, ligation of lateral side branch cephalic vein.  No issues with HD for several weeks since procedure.    7/6/20:  No issues with HD.    8/3/20: s/p L proximal fistula angioplasty  7/21/20.  No issues with HD.    8/31/20:  S/p 8/19/29 Balloon angioplasty of the proximal LUE AVF with a 6x60 Angiosculpt and Stellerex balloon.      10/2020:  No issues with HD    1/2021:  No issues with HD    4/2021:  No new problems.    8/2021:  No issues with HD. C/o fatigue after HD.  Has not seen cardiology recently.    3/2022:  No issues with HD    6/2022:  Doing well, no issues with HD    1/2023:  No issues with HD    04/2023: No issues with HD.     7/2023:  doing well.    10/2023:  No issues with HD.    11/2023:  c/o LLE cellulitis and edema.  +compression with sock.    12/2023:  +compression.      2/2024:  no new issues.    02/28/24: Pt presents with complaints LLE shin blister with swelling after undergoing lymphedema therapy.     Past Medical History:   Diagnosis Date    Acute ischemic right PCA stroke 6/6/2021    Acute respiratory failure with hypoxia     Age-related osteoporosis without current pathological fracture 3/8/2021    Anemia of chronic kidney failure, stage 4 (severe) 4/5/2019    Cataracts, bilateral     CHF (congestive heart failure)     CKD (chronic kidney disease) stage 3, GFR 30-59 ml/min     CKD (chronic kidney disease) stage 3, GFR 30-59 ml/min     Controlled type 2 diabetes mellitus with proteinuria or albuminuria     Depression     Diabetes with neurologic complications     Diabetic retinopathy of both eyes     Edema     Glaucoma     History of colonic polyps     Hx-TIA (transient ischemic attack) 11/2008    Hyperlipidemia LDL goal < 100     Hypertension     Hypothyroidism     Major depressive disorder, single episode, mild 2/17/2016    Mixed incontinence urge and stress     Obesity     Obstructive sleep apnea on CPAP     7/19/19:  Home CPAP machine broken, per patient & son    Osteopenia     Proteinuria     Sickle cell trait     Strabismus     TIA (transient ischemic attack)     Trouble in sleeping     Type 2 diabetes mellitus with ophthalmic manifestations     Type 2 diabetes with  stage 3 chronic kidney disease GFR 30-59     Type II or unspecified type diabetes mellitus with renal manifestations, uncontrolled(250.42)     Uncontrolled type 2 diabetes mellitus with peripheral circulatory disorder 2019    Urge incontinence 2016    Urge incontinence     Venous stasis ulcer     bilateral lower legs    Vitamin D deficiency disease      Past Surgical History:   Procedure Laterality Date    AV FISTULA PLACEMENT Left 2019    Procedure: CREATION, AV FISTULA, LEFT UPPER EXTREMITY;  Surgeon: Keron Perez MD;  Location: [facility] OR;  Service: Vascular;  Laterality: Left;    BREAST BIOPSY      breast reduction Bilateral age 31    BREAST SURGERY      CATARACT EXTRACTION Bilateral     cataracts Bilateral      SECTION, LOW TRANSVERSE      x1    CHOLECYSTECTOMY      COLONOSCOPY N/A 2022    Procedure: COLONOSCOPY;  Surgeon: Mahesh Huang MD;  Location: [facility] ENDO (2ND FLR);  Service: Endoscopy;  Laterality: N/A;  fully vaccinated-sm.  RAPID COVID  +cologuard needing ASAP  Dialysis pt T,TH Sat and left UA access  2nd floor - cardiac/dialysis/SOB / LABS / prep ins. emailed - ERW    EYE SURGERY  2014, 2014    vitrectomy    EYE SURGERY Right 2016    FISTULOGRAM Left 3/6/2020    Procedure: Fistulogram, left upper extremity, with branch ligation;  Surgeon: Keron Perez MD;  Location: [facility] OR [address] [place] [address];  Service: Vascular;  Laterality: Left;  Time 1.1 Minute  42.10 mGy    FISTULOGRAM Left 2020    Procedure: Fistulogram, left upper extremity, transradial access with possible intervention;  Surgeon: Keron Perez MD;  Location: [facility] OR [address] [place] [address];  Service: Vascular;  Laterality: Left;    FISTULOGRAM Left 2020    Procedure: Fistulogram, left upper extremity, possible intervention;  Surgeon: Keron Perez MD;  Location: [facility] OR;  Service: Vascular;  Laterality: Left;  930 AM START  RN PRE OP  ---COVID NEGATIVE ON  2020. CA    FISTULOGRAM Left  1/21/2022    Procedure: Fistulogram, transradial access;  Surgeon: Keron Perez MD;  [facility];  Service: Vascular;  Laterality: Left;  mgy-40.16  gycm-8.3905  contrast-34cc  time-12.4min    HYSTERECTOMY  1986    TAHBSO (patient is unsure if ovaries removed)    OOPHORECTOMY      PERCUTANEOUS TRANSLUMINAL ANGIOPLASTY OF ARTERIOVENOUS FISTULA Left 7/21/2020    Procedure: PTA, AV FISTULA;  Surgeon: Keron Perez MD;  Location: [facility] OR [address] [place] [address];  Service: Vascular;  Laterality: Left;  15.9 minutes of fluro  41.12  mGy  7.9060 Gy cm2  32ml  contrast    PHLEBOGRAPHY Left 7/21/2020    Procedure: CENTRAL VENOGRAM;  Surgeon: Keron Perez MD;  Location: [facility] OR [address] [place] [address];  Service: Vascular;  Laterality: Left;    REFRACTIVE SURGERY      TOTAL REDUCTION MAMMOPLASTY      approx 10 yrs ago    VENOPLASTY  1/21/2022    Procedure: ANGIOPLASTY, VEIN;  Surgeon: Keron Perez MD;  Location: [facility] OR [address] [place] [address];  Service: Vascular;;     Family History   Problem Relation Age of Onset    Stroke Mother     Diabetes Mother     Hypertension Mother     Cataracts Mother     Leukemia Father     Cataracts Father     Ovarian cancer Sister 35    Achondroplasia Sister     HIV Brother     No Known Problems Daughter     No Known Problems Son     Breast cancer Maternal Aunt 65    Parkinsonism Maternal Aunt     Esophageal cancer Maternal Uncle         smoker    No Known Problems Paternal Aunt     Cataracts Paternal Uncle     Cataracts Maternal Grandmother     Cataracts Maternal Grandfather     Diabetes Paternal Grandmother     Cataracts Paternal Grandmother     No Known Problems Paternal Grandfather     No Known Problems Other     Amblyopia Neg Hx     Blindness Neg Hx     Glaucoma Neg Hx     Macular degeneration Neg Hx     Retinal detachment Neg Hx     Strabismus Neg Hx     Thyroid disease Neg Hx     Colon cancer Neg Hx     Cancer Neg Hx      Social History     Socioeconomic History    Marital status: Single     Number of children: 5   Occupational History    Occupation:      Employer: [employer]     Comment: part-time   Tobacco Use    Smoking status: Never    Smokeless tobacco: Never   Substance and Sexual Activity    Alcohol use: No     Alcohol/week: 0.0 standard drinks of alcohol    Drug use: No    Sexual activity: Not Currently     Partners: Male     Birth control/protection: Post-menopausal, Surgical     Social Determinants of Health     Financial Resource Strain: Low Risk  (6/9/2023)    Overall Financial Resource Strain (CARDIA)     Difficulty of Paying Living Expenses: Not hard at all   Food Insecurity: No Food Insecurity (6/9/2023)    Hunger Vital Sign     Worried About Running Out of Food in the Last Year: Never true     Ran Out of Food in the Last Year: Never true   Transportation Needs: No Transportation Needs (6/9/2023)    PRAPARE - Transportation     Lack of Transportation (Medical): No     Lack of Transportation (Non-Medical): No   Physical Activity: Insufficiently Active (6/9/2023)    Exercise Vital Sign     Days of Exercise per Week: 2 days     Minutes of Exercise per Session: 10 min   Stress: No Stress Concern Present (6/9/2023)    [employer] - Occupational Stress Questionnaire     Feeling of Stress : Only a little   Social Connections: Socially Isolated (6/9/2023)    Social Connection and Isolation Panel [NHANES]     Frequency of Communication with Friends and Family: More than three times a week     Frequency of Social Gatherings with Friends and Family: Twice a week     Attends [employer] Services: Never     Active Member of Clubs or Organizations: No     Attends Club or Organization Meetings: Never     Marital Status:    Housing Stability: Low Risk  (6/9/2023)    Housing Stability Vital Sign     Unable to Pay for Housing in the Last Year: No     Number of Places Lived in the Last Year: 1     Unstable Housing in the Last Year: No        Current Outpatient Medications:     ACCU-CHEK SOFT DEV LANCETS Kit, , Disp: , Rfl:     atorvastatin (LIPITOR) 80 MG tablet, Take 1 tablet (80 mg total) by mouth every evening., Disp: 90 tablet, Rfl: 3    blood sugar diagnostic Strp, One test strip use 2 times a day to check blood glucose,  ICD-10: E11.9, compatible with insurance/glucometer, Disp: 100 each, Rfl: 11    blood-glucose meter kit, One glucometer, use to check blood glucose.   ICD-10: E11.9. Dispense machine covered by insurance, Disp: 1 each, Rfl: 0    cinacalcet (SENSIPAR) 30 MG Tab, , Disp: , Rfl:     docusate sodium (COLACE) 100 MG capsule, Take 200 mg by mouth once daily. , Disp: , Rfl:     doxercalciferoL (HECTOROL) 4 mcg/2 mL injection, Inject 4.5 mcg into the vein 3 (three) times a week. At dialysis unit, Disp: , Rfl:     dulaglutide (TRULICITY) 1.5 mg/0.5 mL pen injector, Inject 1.5 mg into the skin every 7 days., Disp: 12 pen , Rfl: 3    epoetin arnel (EPOGEN INJ), Inject 1,000 Units as directed every 7 days. At the dialysis unit, Disp: , Rfl:     fluticasone propionate (FLONASE) 50 mcg/actuation nasal spray, 1 spray (50 mcg total) by Each Nostril route once daily., Disp: 48 g, Rfl: 5    lancets Misc, One lancets use 2 times a day to check blood glucose, ICD-10: E11.9, Disp: 100 each, Rfl: 11    lancing device Misc, One device, used to check blood glucose, ICD-10: E11.9, Disp: 1 each, Rfl: 0    levothyroxine (SYNTHROID) 50 MCG tablet, TAKE 1 TABLET BEFORE BREAKFAST, Disp: 90 tablet, Rfl: 3    midodrine (PROAMATINE) 5 MG Tab, Take 1 tablet (5 mg total) by mouth 3 (three) times daily., Disp: 90 tablet, Rfl: 3    mupirocin (BACTROBAN) 2 % ointment, Apply topically 3 (three) times daily., Disp: 22 g, Rfl: 0    OLENA-NABIL RX 1- mg-mg-mcg Tab, Take 1 tablet by mouth once daily., Disp: , Rfl:     aspirin (ECOTRIN) 81 MG EC tablet, Take 1 tablet (81 mg total) by mouth once daily., Disp: 90 tablet, Rfl: 3    LIDOcaine-prilocaine (EMLA) cream,  Apply topically as needed. (Patient not taking: Reported on 2/28/2024), Disp: 30 g, Rfl: 11    sevelamer carbonate (RENVELA) 800 mg Tab, Take 3 tablets (2,400 mg total) by mouth 3 (three) times daily with meals., Disp: 270 tablet, Rfl: 11    REVIEW OF SYSTEMS:  General: No fevers or chills; ENT: No sore throat; Allergy and Immunology: no persistent infections; Hematological and Lymphatic: No history of bleeding or easy bruising; Endocrine: negative; Respiratory: no cough, shortness of breath, or wheezing; Cardiovascular: no chest pain or dyspnea on exertion; Gastrointestinal: no abdominal pain/back, change in bowel habits, or bloody stools; Genito-Urinary: no dysuria, trouble voiding, or hematuria; Musculoskeletal: negative; Neurological: no TIA or stroke symptoms; Psychiatric: no nervousness, anxiety or depression.    PHYSICAL EXAM:      Pulse: 80         General appearance:  Alert, well-appearing, and in no distress.  Oriented to person, place, and time                    Neurological: Normal speech, no focal findings noted; CN II - XII grossly intact. All extremities with sensation to light touch.            Musculoskeletal: Digits/nail without cyanosis/clubbing.  Strength 5/5 all extremities.                    Neck: Supple, no significant adenopathy, no carotid bruit can be auscultated                  Chest:  Clear to auscultation, no wheezes, rales or rhonchi, symmetric air entry. No use of accessory muscles               Cardiac: Normal rate and regular rhythm, S1 and S2 normal            Abdomen: Soft, nontender, nondistended, no masses or organomegaly, no hernia     No rebound tenderness noted; bowel sounds normal     Pulsatile aortic mass is non palpable.     No groin adenopathy      Extremities:      2+ radial pulse bilaterally, LUE AVF +thrill, inc well healed, 2 PSA with dry scabs, no ulcerations     No BUE edema, Negative Manuel's test BUE    Skin: No tissue loss, 2+ LLE edema with discoloration and  "blister, 1+ RLE edema     VCSS 11     CEAP 3/4a    LAB RESULTS:  No results found for: "CBC"  Lab Results   Component Value Date    LABPROT 10.3 06/07/2021    INR 1.0 06/07/2021     Lab Results   Component Value Date     12/06/2023    K 4.0 12/06/2023     12/06/2023    CO2 29 12/06/2023     (H) 12/06/2023    BUN 42 (H) 12/06/2023    CREATININE 7.5 (H) 12/06/2023    CALCIUM 9.1 12/06/2023    ANIONGAP 15 12/06/2023    EGFRNONAA 3.6 (A) 05/07/2022     Lab Results   Component Value Date    WBC 6.54 10/01/2023    RBC 3.84 (L) 10/01/2023    HGB 10.4 (L) 10/01/2023    HCT 32.7 (L) 10/01/2023    MCV 85 10/01/2023    MCH 27.1 10/01/2023    MCHC 31.8 (L) 10/01/2023    RDW 13.8 10/01/2023     10/01/2023    MPV 11.4 10/01/2023    GRAN 4.6 10/01/2023    GRAN 70.1 10/01/2023    LYMPH 0.7 (L) 10/01/2023    LYMPH 11.2 (L) 10/01/2023    MONO 1.1 (H) 10/01/2023    MONO 16.4 (H) 10/01/2023    EOS 0.1 10/01/2023    BASO 0.02 10/01/2023    EOSINOPHIL 1.4 10/01/2023    BASOPHIL 0.3 10/01/2023    DIFFMETHOD Automated 10/01/2023     .  Lab Results   Component Value Date    HGBA1C 5.7 (H) 12/06/2023       IMAGING:  All pertinent imaging has been reviewed and interpreted independently.    Vein mapping 9/2019:  Adequate R superior basilic vein and axillary vein ; Adequate L basilic vein superior and inferior, adequate L axillary     1/27/20 Left upper extremity dialysis ultrasound shows no hemodynamically significant stenosis.  Flow is 1083 ml/min.  Depth and diameter are appropriate.    3/23/20:  Left upper extremity dialysis ultrasound shows anastomotic and proximal fistula hemodynamically significant stenosis.  Flow is 955 ml/min.      7/2020: Left upper extremity dialysis ultrasound shows proximal fistula hemodynamically significant stenosis.  Flow is 1257 ml/min.      8/2020: Left upper extremity dialysis ultrasound shows proximal hemodynamically significant stenosis.  Flow is 1999 ml/min.      8/31/20: Left " upper extremity dialysis ultrasound shows proximal fistula hemodynamically significant stenosis.  Flow is 2209 ml/min.      10/2020:  Prox stenosis, flow > 3000 ml/min    1/2021:  Proximal stenosis, flow 4414 ml/min    4/2021:  HD US reviewed without significant stenosis, adequate flow.    8/2021:HD US reviewed without significant stenosis, adequate flow.    3/2022:  Left upper extremity dialysis ultrasound shows approx 50% anastomotic and prox fistula stenosis withou hemodynamically significant stenosis.  Flow is 3774 ml/min.      6/2022:  No significant stenosis     04/2023: No significant stenosis. Flow 4,086 ml/min    10/2023:  Left upper extremity dialysis ultrasound shows approx 50% anastomotic stenosis.  Flow is 5000 ml/min.      12/2023:  Color flow evaluation of the right lower extremity demonstrates no evidence of venous thrombosis in the deep or superficial veins, and no reflux.  Color flow evaluation of the left lower extremity demonstrates no evidence of venous thrombosis in the deep or superficial veins, and no reflux.    IMP/PLAN:  79 y.o. female with   Patient Active Problem List   Diagnosis    Severe obesity (BMI 35.0-39.9) with comorbidity    Sickle cell trait    Hyperlipidemia LDL goal <100    HUMBERTO on CPAP    Hypothyroidism (acquired)    Hx-TIA (transient ischemic attack)    Vitamin D deficiency disease    Pulmonary hypertension    Type 2 diabetes mellitus with ESRD (end-stage renal disease)    Secondary renal hyperparathyroidism    Incomplete bladder emptying    Postmenopausal atrophic vaginitis    Rectocele    Mixed incontinence urge and stress    Chronic diastolic heart failure    Tortuous aorta    ESRD on hemodialysis    Anemia of chronic disease    Debility    Chronic respiratory failure    Type 2 diabetes mellitus with foot ulcer, with long-term current use of insulin    Stable proliferative diabetic retinopathy associated with type 2 diabetes mellitus    Heart failure with preserved ejection  fraction    Pseudophakia    Chronic kidney disease-mineral and bone disorder    Age-related osteoporosis without current pathological fracture    H/O: stroke    Walker as ambulation aid    Swelling of lower extremity    Lymphedema of both lower extremities    Impaired functional mobility and activity tolerance    being managed by PCP and specialists who is here today for evaluation of long term HD access s/p L RC AV fistula. No presenting for LLE blister.    -s/p L RC AV fistula with proximal fistula measuring 5 mm 1/13/20, adequate flow without issues during HD s/p LUE fistulogram, central venogram, ligation of medial side branch cephalic vein, ligation of lateral side branch cephalic vein 3/2/20 with prox AVF stenosis s/p L proximal fistula angioplasty 7/21/20 with persistent flow issues s/p proximal angioplasty 8/19/20 with scoring balloon/DCB with improvement in stenosis/flow and asymptomatic anastomotic stenosis with no further issues with HD. Patient undergoing lymphedema therapy developed LLE blister to her shin with associated edema . No redness or drainage present.- Recommend xeroform, gauze and ACE wrap.   - Wound care referral  -Cont renal diet  -Rec cont lymphedema clinic and pumps  -F/u in 1 wk with arterial US and PAM    I spent 30 minutes evaluating this patient and greater than 50% of the time was spent counseling, coordinator care and discussing the plan of care.  All questions were answered and patient stated understanding with agreement with the above treatment plan.    Tonia Silva NP  Vascular and Endovascular Surgery

## 2024-03-05 LAB
LEFT ABI: 1.02
LEFT ANT TIBIAL SYS PSV: 109 CM/S
LEFT CFA PSV: 85 CM/S
LEFT EXTERNAL ILIAC PSV: 87 CM/S
LEFT PERONEAL SYS PSV: 73 CM/S
LEFT POPLITEAL PSV: 93 CM/S
LEFT POST TIBIAL SYS PSV: 57 CM/S
LEFT POSTERIOR TIBIAL: 133 MMHG
LEFT PROFUNDA SYS PSV: 72 CM/S
LEFT SUPER FEMORAL DIST SYS PSV: 88 CM/S
LEFT SUPER FEMORAL MID SYS PSV: 80 CM/S
LEFT SUPER FEMORAL OSTIAL SYS PSV: 91 CM/S
LEFT SUPER FEMORAL PROX SYS PSV: 118 CM/S
LEFT TBI: 0.98
LEFT TIB/PER TRUNK SYS PSV: 69 CM/S
LEFT TOE PRESSURE: 129 MMHG
OHS CV LEFT LOWER EXTREMITY ABI (NO CALC): 1.02
OHS CV RIGHT ABI LOWER EXTREMITY (NO CALC): 1.09
RIGHT ABI: 1.09
RIGHT ANT TIBIAL SYS PSV: 47 CM/S
RIGHT ARM BP: 131 MMHG
RIGHT CFA PSV: 102 CM/S
RIGHT DORSALIS PEDIS: 143 MMHG
RIGHT EXTERNAL ILLIAC PSV: 116 CM/S
RIGHT PERONEAL SYS PSV: 73 CM/S
RIGHT POPLITEAL PSV: 57 CM/S
RIGHT POST TIBIAL SYS PSV: 51 CM/S
RIGHT PROFUNDA SYS PSV: 89 CM/S
RIGHT SUPER FEMORAL DIST SYS PSV: 80 CM/S
RIGHT SUPER FEMORAL MID SYS PSV: 93 CM/S
RIGHT SUPER FEMORAL OSTIAL SYS PSV: 97 CM/S
RIGHT SUPER FEMORAL PROX SYS PSV: 114 CM/S
RIGHT TBI: 0.79
RIGHT TIB/PER TRUNK SYS PSV: 63 CM/S
RIGHT TOE PRESSURE: 103 MMHG

## 2024-03-06 ENCOUNTER — OFFICE VISIT (OUTPATIENT)
Dept: VASCULAR SURGERY | Facility: CLINIC | Age: 79
End: 2024-03-06
Payer: MEDICARE

## 2024-03-06 VITALS
HEART RATE: 78 BPM | DIASTOLIC BLOOD PRESSURE: 82 MMHG | BODY MASS INDEX: 35.45 KG/M2 | SYSTOLIC BLOOD PRESSURE: 156 MMHG | WEIGHT: 212.75 LBS | HEIGHT: 65 IN

## 2024-03-06 DIAGNOSIS — I87.303 VENOUS HYPERTENSION OF BOTH LOWER EXTREMITIES: ICD-10-CM

## 2024-03-06 DIAGNOSIS — I89.0 LYMPHEDEMA: ICD-10-CM

## 2024-03-06 DIAGNOSIS — I73.9 PVD (PERIPHERAL VASCULAR DISEASE): Primary | ICD-10-CM

## 2024-03-06 DIAGNOSIS — S81.802A WOUND OF LEFT LOWER EXTREMITY, INITIAL ENCOUNTER: ICD-10-CM

## 2024-03-06 PROCEDURE — 1126F AMNT PAIN NOTED NONE PRSNT: CPT | Mod: CPTII,S$GLB,, | Performed by: NURSE PRACTITIONER

## 2024-03-06 PROCEDURE — 99999 PR PBB SHADOW E&M-EST. PATIENT-LVL IV: CPT | Mod: PBBFAC,,, | Performed by: NURSE PRACTITIONER

## 2024-03-06 PROCEDURE — 3288F FALL RISK ASSESSMENT DOCD: CPT | Mod: CPTII,S$GLB,, | Performed by: NURSE PRACTITIONER

## 2024-03-06 PROCEDURE — 3079F DIAST BP 80-89 MM HG: CPT | Mod: CPTII,S$GLB,, | Performed by: NURSE PRACTITIONER

## 2024-03-06 PROCEDURE — 3077F SYST BP >= 140 MM HG: CPT | Mod: CPTII,S$GLB,, | Performed by: NURSE PRACTITIONER

## 2024-03-06 PROCEDURE — 99215 OFFICE O/P EST HI 40 MIN: CPT | Mod: S$GLB,,, | Performed by: NURSE PRACTITIONER

## 2024-03-06 PROCEDURE — 1101F PT FALLS ASSESS-DOCD LE1/YR: CPT | Mod: CPTII,S$GLB,, | Performed by: NURSE PRACTITIONER

## 2024-03-06 PROCEDURE — 1159F MED LIST DOCD IN RCRD: CPT | Mod: CPTII,S$GLB,, | Performed by: NURSE PRACTITIONER

## 2024-03-08 ENCOUNTER — LAB VISIT (OUTPATIENT)
Dept: LAB | Facility: HOSPITAL | Age: 79
End: 2024-03-08
Attending: HOSPITALIST
Payer: MEDICARE

## 2024-03-08 DIAGNOSIS — E83.9 CHRONIC KIDNEY DISEASE-MINERAL AND BONE DISORDER: ICD-10-CM

## 2024-03-08 DIAGNOSIS — E11.22 TYPE 2 DIABETES MELLITUS WITH ESRD (END-STAGE RENAL DISEASE): ICD-10-CM

## 2024-03-08 DIAGNOSIS — M89.9 CHRONIC KIDNEY DISEASE-MINERAL AND BONE DISORDER: ICD-10-CM

## 2024-03-08 DIAGNOSIS — N18.9 CHRONIC KIDNEY DISEASE-MINERAL AND BONE DISORDER: ICD-10-CM

## 2024-03-08 DIAGNOSIS — N18.6 TYPE 2 DIABETES MELLITUS WITH ESRD (END-STAGE RENAL DISEASE): ICD-10-CM

## 2024-03-08 DIAGNOSIS — E03.9 HYPOTHYROIDISM (ACQUIRED): Chronic | ICD-10-CM

## 2024-03-08 LAB
ALBUMIN SERPL BCP-MCNC: 3.2 G/DL (ref 3.5–5.2)
ANION GAP SERPL CALC-SCNC: 13 MMOL/L (ref 8–16)
BUN SERPL-MCNC: 35 MG/DL (ref 8–23)
CALCIUM SERPL-MCNC: 9.6 MG/DL (ref 8.7–10.5)
CHLORIDE SERPL-SCNC: 101 MMOL/L (ref 95–110)
CO2 SERPL-SCNC: 26 MMOL/L (ref 23–29)
CREAT SERPL-MCNC: 7.1 MG/DL (ref 0.5–1.4)
EST. GFR  (NO RACE VARIABLE): 5.5 ML/MIN/1.73 M^2
ESTIMATED AVG GLUCOSE: 128 MG/DL (ref 68–131)
GLUCOSE SERPL-MCNC: 97 MG/DL (ref 70–110)
HBA1C MFR BLD: 6.1 % (ref 4–5.6)
PHOSPHATE SERPL-MCNC: 5 MG/DL (ref 2.7–4.5)
POTASSIUM SERPL-SCNC: 4.5 MMOL/L (ref 3.5–5.1)
PTH-INTACT SERPL-MCNC: 555.1 PG/ML (ref 9–77)
SODIUM SERPL-SCNC: 140 MMOL/L (ref 136–145)
T4 FREE SERPL-MCNC: 1.14 NG/DL (ref 0.71–1.51)
TSH SERPL DL<=0.005 MIU/L-ACNC: 2.2 UIU/ML (ref 0.4–4)

## 2024-03-08 PROCEDURE — 84439 ASSAY OF FREE THYROXINE: CPT | Performed by: HOSPITALIST

## 2024-03-08 PROCEDURE — 80069 RENAL FUNCTION PANEL: CPT | Performed by: HOSPITALIST

## 2024-03-08 PROCEDURE — 83970 ASSAY OF PARATHORMONE: CPT | Performed by: HOSPITALIST

## 2024-03-08 PROCEDURE — 82985 ASSAY OF GLYCATED PROTEIN: CPT | Performed by: HOSPITALIST

## 2024-03-08 PROCEDURE — 84443 ASSAY THYROID STIM HORMONE: CPT | Performed by: HOSPITALIST

## 2024-03-08 PROCEDURE — 36415 COLL VENOUS BLD VENIPUNCTURE: CPT | Mod: PO | Performed by: HOSPITALIST

## 2024-03-08 PROCEDURE — 83036 HEMOGLOBIN GLYCOSYLATED A1C: CPT | Performed by: HOSPITALIST

## 2024-03-11 LAB — FRUCTOSAMINE SERPL-SCNC: 324 UMOL /L

## 2024-03-14 ENCOUNTER — HOSPITAL ENCOUNTER (OUTPATIENT)
Dept: WOUND CARE | Facility: HOSPITAL | Age: 79
Discharge: HOME | End: 2024-03-14
Attending: FAMILY MEDICINE
Payer: MEDICARE

## 2024-03-14 VITALS
HEART RATE: 82 BPM | DIASTOLIC BLOOD PRESSURE: 86 MMHG | BODY MASS INDEX: 32.87 KG/M2 | SYSTOLIC BLOOD PRESSURE: 184 MMHG | WEIGHT: 197.5 LBS | TEMPERATURE: 97 F

## 2024-03-14 DIAGNOSIS — S81.802A WOUND OF LEFT LOWER EXTREMITY, INITIAL ENCOUNTER: ICD-10-CM

## 2024-03-14 PROCEDURE — 99213 OFFICE O/P EST LOW 20 MIN: CPT | Mod: ,,, | Performed by: FAMILY MEDICINE

## 2024-03-14 PROCEDURE — 99213 OFFICE O/P EST LOW 20 MIN: CPT | Performed by: FAMILY MEDICINE

## 2024-03-14 NOTE — PROGRESS NOTES
"[facility]                Progress Note    Subjective:       Patient ID: Erica Leblanc is a 79 y.o. female.    Chief Complaint: Wound Check    New wound care visit:    Erica Leblanc arrived at the [facility] ambulating independently with the use of her personal walker.  Patient has dressing to her mid-anterior LLE of Xeroform and conforming stretch gauze circumferentially wrapped.  Dressing was removed to reveal that the wound was healed with no opening (see picture below).  MD assessment reveals same conclusion.  She states that she was really worried because she has had a wound that required extensive wound care visits and she "did not want that to happen again." Patient was advised that she could return to [facility] in the future, if needed.      Review of Systems   Constitutional: Negative.    HENT: Negative.     Eyes: Negative.    Respiratory: Negative.     Cardiovascular: Negative.    Gastrointestinal: Negative.    Endocrine: Negative.    Musculoskeletal: Negative.    Skin:  Negative for wound.   All other systems reviewed and are negative.        Objective:        Physical Exam  Vitals reviewed.   Constitutional:       Appearance: She is well-developed.   HENT:      Head: Normocephalic and atraumatic.   Eyes:      Extraocular Movements: Extraocular movements intact.      Conjunctiva/sclera: Conjunctivae normal.      Pupils: Pupils are equal, round, and reactive to light.   Skin:     General: Skin is warm and dry.      Comments: Wound healed    Neurological:      Mental Status: She is alert and oriented to person, place, and time.   Psychiatric:         Mood and Affect: Mood normal.         Behavior: Behavior normal.         Vitals:    03/14/24 1526   BP: (!) 184/86   Pulse: 82   Temp: 97.1 °F (36.2 °C)       Assessment:           ICD-10-CM ICD-9-CM   1. Wound of left lower extremity, initial encounter  S81.802A 894.0                Plan:          Debridement not " needed and Not Done today.   Continue off-loading / leg elevation   Continue eating protein   Wound healed   Discharge from wound clinic  Continue to follow current medication regimen as per pcp   Call for any questions / concerns.       Tissue pathology and/or culture taken     [] Yes      [x] No  Sharp debridement performed                   [] Yes       [x] No  Labs ordered     [] Yes       [x] No  Imaging ordered    [] Yes      [x] No    Orders Placed This Encounter   Procedures    Ambulatory referral/consult to Wound Clinic     Standing Status:   Standing     Number of Occurrences:   1     Referral Priority:   Urgent     Referral Type:   Consultation     Referral Reason:   Specialty Services Required     Requested Specialty:   Wound Care     Number of Visits Requested:   1        Follow up if symptoms worsen or fail to improve.

## 2024-03-15 ENCOUNTER — OFFICE VISIT (OUTPATIENT)
Dept: ENDOCRINOLOGY | Facility: CLINIC | Age: 79
End: 2024-03-15
Payer: MEDICARE

## 2024-03-15 VITALS
BODY MASS INDEX: 35.01 KG/M2 | SYSTOLIC BLOOD PRESSURE: 138 MMHG | DIASTOLIC BLOOD PRESSURE: 72 MMHG | WEIGHT: 210.38 LBS | HEART RATE: 79 BPM

## 2024-03-15 DIAGNOSIS — E55.9 VITAMIN D DEFICIENCY DISEASE: ICD-10-CM

## 2024-03-15 DIAGNOSIS — M89.9 CHRONIC KIDNEY DISEASE-MINERAL AND BONE DISORDER: ICD-10-CM

## 2024-03-15 DIAGNOSIS — Z99.2 ESRD ON HEMODIALYSIS: ICD-10-CM

## 2024-03-15 DIAGNOSIS — E11.621 TYPE 2 DIABETES MELLITUS WITH FOOT ULCER, WITH LONG-TERM CURRENT USE OF INSULIN: ICD-10-CM

## 2024-03-15 DIAGNOSIS — E66.01 SEVERE OBESITY (BMI 35.0-39.9) WITH COMORBIDITY: ICD-10-CM

## 2024-03-15 DIAGNOSIS — E83.9 CHRONIC KIDNEY DISEASE-MINERAL AND BONE DISORDER: ICD-10-CM

## 2024-03-15 DIAGNOSIS — N18.9 CHRONIC KIDNEY DISEASE-MINERAL AND BONE DISORDER: ICD-10-CM

## 2024-03-15 DIAGNOSIS — Z79.4 TYPE 2 DIABETES MELLITUS WITH FOOT ULCER, WITH LONG-TERM CURRENT USE OF INSULIN: ICD-10-CM

## 2024-03-15 DIAGNOSIS — E11.22 TYPE 2 DIABETES MELLITUS WITH ESRD (END-STAGE RENAL DISEASE): Primary | ICD-10-CM

## 2024-03-15 DIAGNOSIS — N18.6 TYPE 2 DIABETES MELLITUS WITH ESRD (END-STAGE RENAL DISEASE): Primary | ICD-10-CM

## 2024-03-15 DIAGNOSIS — L97.509 TYPE 2 DIABETES MELLITUS WITH FOOT ULCER, WITH LONG-TERM CURRENT USE OF INSULIN: ICD-10-CM

## 2024-03-15 DIAGNOSIS — E04.2 MULTIPLE THYROID NODULES: ICD-10-CM

## 2024-03-15 DIAGNOSIS — N18.6 ESRD ON HEMODIALYSIS: ICD-10-CM

## 2024-03-15 DIAGNOSIS — E03.9 HYPOTHYROIDISM (ACQUIRED): Chronic | ICD-10-CM

## 2024-03-15 PROCEDURE — G2211 COMPLEX E/M VISIT ADD ON: HCPCS | Mod: S$GLB,,, | Performed by: HOSPITALIST

## 2024-03-15 PROCEDURE — 99214 OFFICE O/P EST MOD 30 MIN: CPT | Mod: S$GLB,,, | Performed by: HOSPITALIST

## 2024-03-15 PROCEDURE — 1159F MED LIST DOCD IN RCRD: CPT | Mod: CPTII,S$GLB,, | Performed by: HOSPITALIST

## 2024-03-15 PROCEDURE — 1101F PT FALLS ASSESS-DOCD LE1/YR: CPT | Mod: CPTII,S$GLB,, | Performed by: HOSPITALIST

## 2024-03-15 PROCEDURE — 3075F SYST BP GE 130 - 139MM HG: CPT | Mod: CPTII,S$GLB,, | Performed by: HOSPITALIST

## 2024-03-15 PROCEDURE — 3288F FALL RISK ASSESSMENT DOCD: CPT | Mod: CPTII,S$GLB,, | Performed by: HOSPITALIST

## 2024-03-15 PROCEDURE — 99999 PR PBB SHADOW E&M-EST. PATIENT-LVL IV: CPT | Mod: PBBFAC,,, | Performed by: HOSPITALIST

## 2024-03-15 PROCEDURE — 3078F DIAST BP <80 MM HG: CPT | Mod: CPTII,S$GLB,, | Performed by: HOSPITALIST

## 2024-03-15 RX ORDER — LEVOTHYROXINE SODIUM 50 UG/1
TABLET ORAL
Qty: 90 TABLET | Refills: 3 | Status: SHIPPED | OUTPATIENT
Start: 2024-03-15

## 2024-03-15 NOTE — ASSESSMENT & PLAN NOTE
- at [facility]; Dr. Barrientos  - Tuesday, Thursday and Saturday  - has AV graft in left UE

## 2024-03-15 NOTE — PROGRESS NOTES
"Subjective:      Patient ID: Erica Leblanc is a 79 y.o. female presented to Endocrinology clinic on 3/15/2024.    Chief Complaint:  Diabetes, osteoporosis/metabolic bone disease, hypothyroidism    History of Present Illness: Erica Leblanc is a 79 y.o. female with metabolic bone disease with osteoporosis, hypothyroidism and type 2 diabetes  ESRD on HD on TTS for 1 year, her nephrologist is  Dr Myla Puente  Here for follow-up    Interval history:  Patient is here for follow-up.  A1c 6.1 Glucose 136 and 110 at dialysis center  But glucose have been much better. Denies hypoglycemia  She reports appetite suppression on Trulicity  Dry weight 95kg  L shin wound saw wound care 3/14/2024 due to lymphedema, but healing well now. See Vascular surgery   Patient reports compliance with binders, calcium and phosphorus much better.    Compliance with levothyroxine.  No issue with TSH.  No falls      1) Type 2 diabetes:    - Diagnosis around 10 years ago  - Patient was on insulin prior to HD, has been off of all therapy since that time.  - Checking glucose 1x a day  - Family hx of diabetes  - currently not on medication  - Statin: Taking, ACE/ARB: Not taking    Current reported meds:               Trulicity 1.5 mg once a week injection>> From [facility]  Home glucose checks: checks 1-2 a day, Logs reviewed  - Hypoglycemia symptoms:  DENIES   Diet/Exercise:   - Eating 3 meals per day   - Weight trend: stable  - Diabetes Related Hospitalization:  No  - Hx of pancreatitis: No, denies  - Family history of diabetes: Yes  - Occupation:  Disabled    Eye exam current (within one year):  Yes, DR: unknown  Reports cuts or ulcers on feet:   Denies, has podiatrist  Statin: Taking, ACE/ARB: Not taking    Diabetes lab work  Lab Results   Component Value Date    HGBA1C 6.1 (H) 03/08/2024    HGBA1C 5.7 (H) 12/06/2023    HGBA1C 5.5 09/14/2023    HGBA1C 6.2 (H) 07/07/2023     No results found for: "CPEPTIDE", "GLUTAMICACID", "ISLETCELLANT" " "  Lab Results   Component Value Date    FRUCTOSAMINE 324 03/08/2024    FRUCTOSAMINE 322 07/07/2023   At goal >> good glucose control      Lab Results   Component Value Date    MICALBCREAT 3,693.6 (H) 03/17/2014     No results found for: "WTVCYXSW46"    Diabetes Management Status: Reviewed this office visit  Screening or Prevention Patient's value Goal Complete/Controlled?   Lipid profile : 07/07/2023 Annually Yes     Dilated retinal exam : 02/05/2024 Annually Yes     Foot exam   Most Recent Foot Exam Date: Not Found Annually Yes          2) With regards to chronic kidney disease mineral and bone disorder  - Seen on recent bone density scan.  Patient denies prior history of any bone disorder.  - Chronic history of ESRD on dialysis  - Longstanding chronic issue.  Poorly-controlled bone disease due to ESRD status.  Continues to have hypocalcemia, hyperphosphatemia.  Elevated PTH.  - Lab work review showing chronic hyperphosphatemia, elevated alkaline phosphatase, hypocalcemia, normal vitamin-D.  - Patient reports Sensipar three times a week at HD  - Patient is on phosphate binder:  Sevelamer 1600mg TIDWM, reports better compliance recently  - Patient has stopped soda consumption   - Patient denies back pain, No falls  - Never had fractures  - Use walker to help with gait and ambulation  - NM Parathyroid scan 2021: No abnormal uptake to suggest parathyroid adenoma.  - Vit D 1000 iu daily    DXA done on 10/2020  Comparison study done on 03/13/2018.  Lumbar spine (L1-L4): BMD is 0.943 g/cm2, T-score is -0.7, and Z-score is 1.0.  Total hip: BMD is 0.680 g/cm2, T-score is -2.1, and Z-score is -1.0.  Femoral neck: BMD is 0.511 g/cm2, T-score is -3.0, and Z-score is -1.5.    Impression:  1. Osteoporosis with likely chronic kidney disease mineral and bone disorder  2. Compared with previous DXA, BMD at the lumbar spine has declined by 13.9%, and the BMD at the total hip has declined by 23.2%.    Height loss (>2 inches)? " "no  Family hx of Osteoporosis: no    Asymptomatic menopausal state.  She had a hysterectomy at 42 y/o and menopausal symptoms at 45 y/o.     Lab work reviewed  Lab Results   Component Value Date    .1 (H) 03/08/2024    .7 (H) 07/07/2023    .6 (H) 05/07/2022    RWHFESQG36VY 49 07/07/2023    RFUUELBT72JI 48 05/07/2022    UMDIIRGC76MR 47 11/03/2021    CALCIUM 9.6 03/08/2024    CALCIUM 9.1 12/06/2023    CALCIUM 8.6 (L) 10/01/2023    PHOS 5.0 (H) 03/08/2024    PHOS 4.3 12/06/2023    PHOS 3.7 07/07/2023    ALKPHOS 72 10/01/2023    ALKPHOS 90 07/07/2023    ALKPHOS 93 05/07/2022    TSH 2.202 03/08/2024    TTGIGA 7 03/01/2021       3) Hypothyroidism  - Chronic  - Restarted in 2021: Levothyroxin 50mcg  - History of thyroid goiter, with thyroid nodule seen on ultrasound     Thyroid lab work  Lab Results   Component Value Date    TSH 2.202 03/08/2024    TSH 2.268 12/06/2023    TSH 1.957 07/07/2023    FREET4 1.14 03/08/2024    FREET4 1.14 12/06/2023    FREET4 1.17 07/07/2023      Antibodies  No results found for: "THYROIDAB", "TSIMMGLBN", "THYROIDSTIMI", "THYROTROPINR"      4) Multiple Thyroid nodule  - denies compressive symptoms.   - Overall not interested in getting biopsy given stable size      US thyroid 07/03/2023  The thyroid is normal in size.  Right lobe of the thyroid measures 4.3 x 1.6 x 1.6 cm.  Left lobe of the thyroid measures 3.2 x 1.7 x 1.5 cm.  Normal thyroid parenchyma.  Nodule RIGHT upper pole 0.3 x 0.3 x 0.2 cm.  Nodule LEFT upper pole 1.3 x 2.2 x 1.1 cm and lower pole 0.9 x 0.7 x 0.7 cm.  Mid to lower pole nodule 0.4 x 0.4 x 0.3 cm. At the level of the isthmus, mixed cystic and solid lesions identified measuring 1.4 x 0.6 x 1.2 cm and 1.0 x 0.9 x 0.4 cm.  1.4 cm RIGHT isthmic nodule demonstrates presence of small microcalcifications.  This nodule meets criteria for TiRads 5 and FNA is recommended.  Cervical lymph nodes demonstrate normal morphology and size.     Impression:  Suspicious " nodule RIGHT isthmus, 1.4 cm with small microcalcifications, mildly hypoechoic meets criteria for FNA.  Malignancy not excluded  Additional thyroidal nodules as above.    US thyroid 2021  The thyroid gland is normal in size.  The right thyroid lobe measures 4.1 x 1.9 x 1.8 cm.  The left thyroid lobe measures 4.5 x 1.8 x 1.9 cm.  Thyroid parenchyma is homogeneous, without increased perfusion.  There are 5 measured the small thyroid nodules.  1.5 cm right thyroid lobe/isthmus nodule with TI-RADS category 3, does not qualify for FNA..    Reviewed past surgical, medical, family, social history and updated as appropriate.    Objective:   /72   Pulse 79   Wt 95.4 kg (210 lb 6.4 oz)   BMI 35.01 kg/m²     Body mass index is 35.01 kg/m².    Physical Exam  Vitals and nursing note reviewed.   Constitutional:       Appearance: She is well-developed.      Comments: Elderly female using walker to help with ambulation   HENT:      Head: Normocephalic.   Eyes:      General: No scleral icterus.     Conjunctiva/sclera: Conjunctivae normal.   Neck:      Thyroid: No thyromegaly.   Cardiovascular:      Rate and Rhythm: Normal rate.      Heart sounds: Normal heart sounds.   Pulmonary:      Effort: Pulmonary effort is normal. No respiratory distress.   Abdominal:      Palpations: Abdomen is soft.      Tenderness: There is no abdominal tenderness.   Musculoskeletal:         General: Normal range of motion.      Cervical back: Neck supple.   Skin:     General: Skin is warm.      Findings: No erythema.   Neurological:      Mental Status: She is alert.      Coordination: Coordination normal.   Psychiatric:         Behavior: Behavior normal.       Lab Review:  Lab Results   Component Value Date    .1 (H) 03/08/2024    .7 (H) 07/07/2023    .6 (H) 05/07/2022    FOTEVBGU11GJ 49 07/07/2023    FUNQAQVH98DZ 48 05/07/2022    YCYDFWJN21AJ 47 11/03/2021    CALCIUM 9.6 03/08/2024    CALCIUM 9.1 12/06/2023    CALCIUM 8.6 (L)  10/01/2023    PHOS 5.0 (H) 03/08/2024    PHOS 4.3 12/06/2023    PHOS 3.7 07/07/2023    ALKPHOS 72 10/01/2023    ALKPHOS 90 07/07/2023    ALKPHOS 93 05/07/2022    TSH 2.202 03/08/2024    TTGIGA 7 03/01/2021         Assessment     1. Type 2 diabetes mellitus with ESRD (end-stage renal disease)  dulaglutide (TRULICITY) 1.5 mg/0.5 mL pen injector    Fructosamine    HEMOGLOBIN A1C    RENAL FUNCTION PANEL      2. Hypothyroidism (acquired)  levothyroxine (SYNTHROID) 50 MCG tablet      3. ESRD on hemodialysis  RENAL FUNCTION PANEL      4. Multiple thyroid nodules  US Soft Tissue Head Neck      5. Vitamin D deficiency disease  Vitamin D      6. Chronic kidney disease-mineral and bone disorder        7. Severe obesity (BMI 35.0-39.9) with comorbidity        8. Type 2 diabetes mellitus with foot ulcer, with long-term current use of insulin          Plan     Type 2 diabetes mellitus with ESRD (end-stage renal disease)  - Diabetes is at a goal given current A1C goal A1C for patient is 7% in ESRD  - Diabetic supplies/medications: reviewed no need for refills at this time  - Long discussion with patient about dietary modification, portion size control, decreasing carbohydrates intake  - A1c can be falsely low due to anemia/ESRD  - improvement in hyperglycemia now.  Since on Trulicity    Plan  - continue Trulicity to 1.5 mg once a week injection.  Patient was okay with this plan  - Patient unable to check glucose often, will continue checking glucose at dialysis center  - Repeat lab work every 5-6 months, consider switching to Mounjaro/Ozempic given patient concern for weight gain    Hypothyroidism (acquired)  - Patient with history of hypothyroidism etiology unclear  - On levothyroxine 50 mcg daily (low-dose)  - TFTs at goal, continue    Chronic kidney disease-mineral and bone disorder  - Very difficult case, longstanding issue with worsening osteoporosis and continue elevation in PTH leading to osteoporosis  - Risk factors  include ESRD on dialysis, hyperphosphatemia, hypocalcemia  - High risk of falls given knee pain, poor gait, the need to use walker  - Patient is more compliant with phosphate binder, and dietary modification  - On vitamin-D analog at dialysis  - sestamibi scan inconclusive for parathyroid adenoma  - now on Sensipar at HD unit  - threshold for parathyroidectomy is above >800 and failing medical therapy option  - given improvement in PTH, normal calcium, low phosphorus, continue monitoring with lab work    ESRD on hemodialysis  - at [facility]; Dr. Barrientos  - Tuesday, Thursday and Saturday  - has AV graft in left UE    Severe obesity (BMI 35.0-39.9) with comorbidity  - Body mass index is 35.01 kg/m².  - Continue to monitor weight      Vitamin D deficiency disease  - monitor vitamin-D    Type 2 diabetes mellitus with foot ulcer, with long-term current use of insulin  - patient denies any ulcer at this time.    Multiple thyroid nodules  - Denies compressive symptoms.   - Overall patient not interested in getting biopsy given stable size  - US thyroid RIGHT isthmus, 1.4 cm with small microcalcifications  - Check thyroid ultrasound 2024    Return to clinic in 5-6mo for diabetes management     COMPLEXITY   Visit today included increased complexity associated with the care of the episodic problem: type 2 diabetes addressed and managing the longitudinal care of the patient due to the serious and/or complex managed problem(s): longstanding type 2 diabetes, hypothyroidism, ESRD, metabolic bone disease, obesity and multiple thyroid nodules.    Carl Day MD  Endocrinology- [facility]   3/15/2024      Disclaimer: This note has been generated using voice-recognition software. There may be typographical errors that have been missed during proof-reading.

## 2024-03-15 NOTE — ASSESSMENT & PLAN NOTE
- Diabetes is at a goal given current A1C goal A1C for patient is 7% in ESRD  - Diabetic supplies/medications: reviewed no need for refills at this time  - Long discussion with patient about dietary modification, portion size control, decreasing carbohydrates intake  - A1c can be falsely low due to anemia/ESRD  - improvement in hyperglycemia now.  Since on Trulicity    Plan  - continue Trulicity to 1.5 mg once a week injection.  Patient was okay with this plan  - Patient unable to check glucose often, will continue checking glucose at dialysis center  - Repeat lab work every 5-6 months, consider switching to Mounjaro/Ozempic given patient concern for weight gain

## 2024-03-15 NOTE — ASSESSMENT & PLAN NOTE
- Denies compressive symptoms.   - Overall patient not interested in getting biopsy given stable size  - US thyroid RIGHT isthmus, 1.4 cm with small microcalcifications  - Check thyroid ultrasound 2024

## 2024-03-25 NOTE — TELEPHONE ENCOUNTER
----- Message from Angela Nayak sent at 8/17/2020  2:07 PM CDT -----  Regarding: Self/   [phone]  Type: Patient Call Back    Who called:  Patient    What is the request in detail:  Patient is needing staff to give her a call.  She stated she at the office and need to know what should she do.  Thank you    Would the patient rather a call back or a response via [facility]?   Call back    Best call back number:  [phone]           negative

## 2024-04-03 ENCOUNTER — OFFICE VISIT (OUTPATIENT)
Dept: PODIATRY | Facility: CLINIC | Age: 79
End: 2024-04-03
Payer: MEDICARE

## 2024-04-03 ENCOUNTER — OFFICE VISIT (OUTPATIENT)
Dept: VASCULAR SURGERY | Facility: CLINIC | Age: 79
End: 2024-04-03
Payer: MEDICARE

## 2024-04-03 VITALS
HEIGHT: 65 IN | HEART RATE: 78 BPM | WEIGHT: 212.31 LBS | BODY MASS INDEX: 35.37 KG/M2 | DIASTOLIC BLOOD PRESSURE: 82 MMHG | SYSTOLIC BLOOD PRESSURE: 147 MMHG

## 2024-04-03 VITALS
DIASTOLIC BLOOD PRESSURE: 81 MMHG | HEART RATE: 80 BPM | WEIGHT: 210.31 LBS | HEIGHT: 65 IN | SYSTOLIC BLOOD PRESSURE: 178 MMHG | BODY MASS INDEX: 35.04 KG/M2

## 2024-04-03 DIAGNOSIS — E11.22 TYPE 2 DIABETES MELLITUS WITH ESRD (END-STAGE RENAL DISEASE): Primary | ICD-10-CM

## 2024-04-03 DIAGNOSIS — M20.42 HAMMER TOES OF BOTH FEET: ICD-10-CM

## 2024-04-03 DIAGNOSIS — N18.6 ESRD ON HEMODIALYSIS: ICD-10-CM

## 2024-04-03 DIAGNOSIS — M20.11 HALLUX ABDUCTO VALGUS, RIGHT: ICD-10-CM

## 2024-04-03 DIAGNOSIS — M20.12 HALLUX ABDUCTO VALGUS, LEFT: ICD-10-CM

## 2024-04-03 DIAGNOSIS — B35.1 NAIL DERMATOPHYTOSIS: ICD-10-CM

## 2024-04-03 DIAGNOSIS — I87.303 VENOUS HYPERTENSION OF BOTH LOWER EXTREMITIES: ICD-10-CM

## 2024-04-03 DIAGNOSIS — M20.41 HAMMER TOES OF BOTH FEET: ICD-10-CM

## 2024-04-03 DIAGNOSIS — Z99.2 ESRD ON HEMODIALYSIS: ICD-10-CM

## 2024-04-03 DIAGNOSIS — L90.9 PLANTAR FAT PAD ATROPHY: ICD-10-CM

## 2024-04-03 DIAGNOSIS — N18.6 TYPE 2 DIABETES MELLITUS WITH ESRD (END-STAGE RENAL DISEASE): Primary | ICD-10-CM

## 2024-04-03 DIAGNOSIS — S81.802A WOUND OF LEFT LOWER EXTREMITY, INITIAL ENCOUNTER: ICD-10-CM

## 2024-04-03 DIAGNOSIS — R60.0 LOWER EXTREMITY EDEMA: ICD-10-CM

## 2024-04-03 DIAGNOSIS — I89.0 LYMPHEDEMA: Primary | ICD-10-CM

## 2024-04-03 DIAGNOSIS — I73.9 PVD (PERIPHERAL VASCULAR DISEASE): ICD-10-CM

## 2024-04-03 PROCEDURE — 3288F FALL RISK ASSESSMENT DOCD: CPT | Mod: CPTII,S$GLB,, | Performed by: NURSE PRACTITIONER

## 2024-04-03 PROCEDURE — 3077F SYST BP >= 140 MM HG: CPT | Mod: CPTII,S$GLB,, | Performed by: NURSE PRACTITIONER

## 2024-04-03 PROCEDURE — 99999 PR PBB SHADOW E&M-EST. PATIENT-LVL III: CPT | Mod: PBBFAC,,, | Performed by: NURSE PRACTITIONER

## 2024-04-03 PROCEDURE — 3079F DIAST BP 80-89 MM HG: CPT | Mod: CPTII,S$GLB,, | Performed by: NURSE PRACTITIONER

## 2024-04-03 PROCEDURE — 99499 UNLISTED E&M SERVICE: CPT | Mod: S$GLB,,, | Performed by: PODIATRIST

## 2024-04-03 PROCEDURE — 99215 OFFICE O/P EST HI 40 MIN: CPT | Mod: S$GLB,,, | Performed by: NURSE PRACTITIONER

## 2024-04-03 PROCEDURE — 1159F MED LIST DOCD IN RCRD: CPT | Mod: CPTII,S$GLB,, | Performed by: NURSE PRACTITIONER

## 2024-04-03 PROCEDURE — 99999 PR PBB SHADOW E&M-EST. PATIENT-LVL V: CPT | Mod: PBBFAC,,, | Performed by: PODIATRIST

## 2024-04-03 PROCEDURE — 11721 DEBRIDE NAIL 6 OR MORE: CPT | Mod: Q9,S$GLB,, | Performed by: PODIATRIST

## 2024-04-03 PROCEDURE — 1101F PT FALLS ASSESS-DOCD LE1/YR: CPT | Mod: CPTII,S$GLB,, | Performed by: NURSE PRACTITIONER

## 2024-04-03 PROCEDURE — 1126F AMNT PAIN NOTED NONE PRSNT: CPT | Mod: CPTII,S$GLB,, | Performed by: NURSE PRACTITIONER

## 2024-04-03 NOTE — PATIENT INSTRUCTIONS
Over the counter pain creams: Voltaren Gel, Biofreeze, Bengay, tiger balm, two old goat, lidocaine gel,  Absorbine Veterinary Liniment Gel Topical Analgesic Sore Muscle and Joint Pain Relief    Recommend lotions: eucerin, eucerin for diabetics, aquaphor, A&D ointment, gold bond for diabetics, sween, Stevenson's Bees all purpose baby ointment,  urea 40 with aloe or SkinIntegra rapid crack repair (found on amazon.com)    Shoe recommendations: (try 6pm.com, zappos.com , nordstromrack.Polyheal, or shoes.com for discounted prices) you can visit varsity shoes in [place], DSW shoes in [place]  or armando rack in the [facility] (there are also several shoe brand outlets in the [facility])    ONLY purchase stability style tennis shoes NO flex, foam, free, yoga mat style shoes    Shoe examples:    Asics (GT 4000 or gel foundations), new balance stability type shoes (such as the 940 series), saucony (stabil c3),  Hernandez (GTS or Beast or   transcend), propet, HokaOne (tennis shoe) Gregor (tennis shoes and boots)    Kimberlyft Aric (women) Kierra&Corey (men), clarks, crocs, aerosoles, naturalizers, SAS, ecco, born, elvia storey, rockports (dress shoes)    Vionic, burkenstocks, fitflops, propet, taos, baretraps (sandals)    HokaOne sandals, crocs, propet (house shoes)      Nail Home remedy:  Vicks Vapor rub or Emuaid to nails for easier manageability    Diabetes: Inspecting Your Feet  Diabetes increases your chances of developing foot problems. So inspect your feet every day. This helps you find small skin irritations before they become serious infections. If you have trouble seeing the bottoms of your feet, use a mirror or ask a family member or friend to help.     Pressure spots on the bottom of the foot are common areas where problems develop.   How to check your feet  Below are tips to help you look for foot problems. Try to check your feet at the same time each day, such as when you get out of bed in the morning:  Check the top of  each foot. The tops of toes, back of the heel, and outer edge of the foot can get a lot of rubbing from poor-fitting shoes.  Check the bottom of each foot. Daily wear and tear often leads to problems at pressure spots.  Check the toes and nails. Fungal infections often occur between toes. Toenail problems can also be a sign of fungal infections or lead to breaks in the skin.  Check your shoes, too. Loose objects inside a shoe can injure the foot. Use your hand to feel inside your shoes for things like goldy, loose stitching, or rough areas that could irritate your skin.  Warning signs  Look for any color changes in the foot. Redness with streaks can signal a severe infection, which needs immediate medical attention. Tell your doctor right away if you have any of these problems:  Swelling, sometimes with color changes, may be a sign of poor blood flow or infection. Symptoms include tenderness and an increase in the size of your foot.  Warm or hot areas on your feet may be signs of infection. A foot that is cold may not be getting enough blood.  Sensations such as burning, tingling, or pins and needles can be signs of a problem. Also check for areas that may be numb.  Hot spots are caused by friction or pressure. Look for hot spots in areas that get a lot of rubbing. Hot spots can turn into blisters, calluses, or sores.  Cracks and sores are caused by dry or irritated skin. They are a sign that the skin is breaking down, which can lead to infection.  Toenail problems to watch for include nails growing into the skin (ingrown toenail) and causing redness or pain. Thick, yellow, or discolored nails can signal a fungal infection.  Drainage and odor can develop from untreated sores and ulcers. Call your doctor right away if you notice white or yellow drainage, bleeding, or unpleasant odor.   © 8320-8978 [employer]. [address] [place] [address], [place] [address]. All rights reserved. This information is not  intended as a substitute for professional medical care. Always follow your healthcare professional's instructions.        Step-by-Step:  Inspecting Your Feet (Diabetes)    Date Last Reviewed: 10/1/2016  © 3782-5932 [employer]. [address] [place] [address], [place] [address]. All rights reserved. This information is not intended as a substitute for professional medical care. Always follow your healthcare professional's instructions.

## 2024-04-03 NOTE — PROGRESS NOTES
[facility]                  HPI:  Erica Leblanc is a 79 y.o. female with   Patient Active Problem List   Diagnosis    Severe obesity (BMI 35.0-39.9) with comorbidity    Sickle cell trait    Hyperlipidemia LDL goal <100    HUMBERTO on CPAP    Hypothyroidism (acquired)    Hx-TIA (transient ischemic attack)    Vitamin D deficiency disease    Pulmonary hypertension    Type 2 diabetes mellitus with ESRD (end-stage renal disease)    Secondary renal hyperparathyroidism    Incomplete bladder emptying    Postmenopausal atrophic vaginitis    Rectocele    Mixed incontinence urge and stress    Chronic diastolic heart failure    Tortuous aorta    ESRD on hemodialysis    Anemia of chronic disease    Debility    Chronic respiratory failure    Type 2 diabetes mellitus with foot ulcer, with long-term current use of insulin    Stable proliferative diabetic retinopathy associated with type 2 diabetes mellitus    Heart failure with preserved ejection fraction    Pseudophakia    Chronic kidney disease-mineral and bone disorder    Age-related osteoporosis without current pathological fracture    H/O: stroke    Walker as ambulation aid    Swelling of lower extremity    Lymphedema of both lower extremities    Impaired functional mobility and activity tolerance    Wound of left leg    Multiple thyroid nodules    being managed by PCP and specialists who is here today for evaluation of long term HD access.  S/p R IJ tunneled HD catheter, HD without issues.  Pt is R handed.  No complaints today.  Does endorse orthopnea, no chest pain.  Unable to climb 1 flight of stairs on own.    no MI  no Stroke  Tobacco use: denies    1/20/20: No new issues.    3/2020: Dialysis without issues.    4/6/20:  S/p LUE fistulogram, central venogram, ligation of medial side branch cephalic vein, ligation of lateral side branch cephalic vein.  No issues with HD for several weeks since procedure.    7/6/20:  No issues with  HD.    8/3/20: s/p L proximal fistula angioplasty 7/21/20.  No issues with HD.    8/31/20:  S/p 8/19/29 Balloon angioplasty of the proximal LUE AVF with a 6x60 Angiosculpt and Stellerex balloon.      10/2020:  No issues with HD    1/2021:  No issues with HD    4/2021:  No new problems.    8/2021:  No issues with HD. C/o fatigue after HD.  Has not seen cardiology recently.    3/2022:  No issues with HD    6/2022:  Doing well, no issues with HD    1/2023:  No issues with HD    04/2023: No issues with HD.     7/2023:  doing well.    10/2023:  No issues with HD.    11/2023:  c/o LLE cellulitis and edema.  +compression with sock.    12/2023:  +compression.      2/2024:  no new issues.    02/28/24: Pt presents with complaints LLE shin blister with swelling after undergoing lymphedema therapy.     03/2024: No new issues    04/2024: No new issues wound healed.    Past Medical History:   Diagnosis Date    Acute ischemic right PCA stroke 6/6/2021    Acute respiratory failure with hypoxia     Age-related osteoporosis without current pathological fracture 3/8/2021    Anemia of chronic kidney failure, stage 4 (severe) 4/5/2019    Cataracts, bilateral     CHF (congestive heart failure)     CKD (chronic kidney disease) stage 3, GFR 30-59 ml/min     CKD (chronic kidney disease) stage 3, GFR 30-59 ml/min     Controlled type 2 diabetes mellitus with proteinuria or albuminuria     Depression     Diabetes with neurologic complications     Diabetic retinopathy of both eyes     Edema     Glaucoma     History of colonic polyps     Hx-TIA (transient ischemic attack) 11/2008    Hyperlipidemia LDL goal < 100     Hypertension     Hypothyroidism     Major depressive disorder, single episode, mild 2/17/2016    Mixed incontinence urge and stress     Obesity     Obstructive sleep apnea on CPAP     7/19/19:  Home CPAP machine broken, per patient & son    Osteopenia     Proteinuria     Sickle cell trait     Strabismus     TIA (transient ischemic  attack)     Trouble in sleeping     Type 2 diabetes mellitus with ophthalmic manifestations     Type 2 diabetes with stage 3 chronic kidney disease GFR 30-59     Type II or unspecified type diabetes mellitus with renal manifestations, uncontrolled(250.42)     Uncontrolled type 2 diabetes mellitus with peripheral circulatory disorder 2019    Urge incontinence 2016    Urge incontinence     Venous stasis ulcer     bilateral lower legs    Vitamin D deficiency disease      Past Surgical History:   Procedure Laterality Date    AV FISTULA PLACEMENT Left 2019    Procedure: CREATION, AV FISTULA, LEFT UPPER EXTREMITY;  Surgeon: Keron Perez MD;  Location: [facility];  Service: Vascular;  Laterality: Left;    BREAST BIOPSY      breast reduction Bilateral age 31    BREAST SURGERY      CATARACT EXTRACTION Bilateral     cataracts Bilateral      SECTION, LOW TRANSVERSE      x1    CHOLECYSTECTOMY      COLONOSCOPY N/A 2022    Procedure: COLONOSCOPY;  Surgeon: Mahesh Huang MD;  Location: [facility] ([address] [place] [address]);  Service: Endoscopy;  Laterality: N/A;  fully vaccinated-sm.  RAPID COVID  +cologuard needing ASAP  Dialysis pt T,TH Sat and left UA access  2nd floor - cardiac/dialysis/SOB / LABS / prep ins. emailed - ERW    EYE SURGERY  2014, 2014    vitrectomy    EYE SURGERY Right 2016    FISTULOGRAM Left 3/6/2020    Procedure: Fistulogram, left upper extremity, with branch ligation;  Surgeon: Keron Perez MD;  Location: [address];  Service: Vascular;  Laterality: Left;  Time 1.1 Minute  42.10 mGy    FISTULOGRAM Left 2020    Procedure: Fistulogram, left upper extremity, transradial access with possible intervention;  Surgeon: Keron Perez MD;  Location: [address];  Service: Vascular;  Laterality: Left;    FISTULOGRAM Left 2020    Procedure: Fistulogram, left upper extremity, possible intervention;  Surgeon: Keron Perez MD;  Location: [facility];   Service: Vascular;  Laterality: Left;  930 AM START  RN PRE OP  ---COVID NEGATIVE ON  8-. CA    FISTULOGRAM Left 1/21/2022    Procedure: Fistulogram, transradial access;  Surgeon: Keron Perez MD;  [facility];  Service: Vascular;  Laterality: Left;  mgy-40.16  gycm-8.3905  contrast-34cc  time-12.4min    HYSTERECTOMY  1986    TAHBSO (patient is unsure if ovaries removed)    OOPHORECTOMY      PERCUTANEOUS TRANSLUMINAL ANGIOPLASTY OF ARTERIOVENOUS FISTULA Left 7/21/2020    Procedure: PTA, AV FISTULA;  Surgeon: Keron Perez MD;  [facility];  Service: Vascular;  Laterality: Left;  15.9 minutes of fluro  41.12  mGy  7.9060 Gy cm2  32ml  contrast    PHLEBOGRAPHY Left 7/21/2020    Procedure: CENTRAL VENOGRAM;  Surgeon: Keron Perez MD;  Location: [facility] OR [address] [place] [address];  Service: Vascular;  Laterality: Left;    REFRACTIVE SURGERY      TOTAL REDUCTION MAMMOPLASTY      approx 10 yrs ago    VENOPLASTY  1/21/2022    Procedure: ANGIOPLASTY, VEIN;  Surgeon: Keron Perez MD;  Location: [facility] OR [address] [place] [address];  Service: Vascular;;     Family History   Problem Relation Age of Onset    Stroke Mother     Diabetes Mother     Hypertension Mother     Cataracts Mother     Leukemia Father     Cataracts Father     Ovarian cancer Sister 35    Achondroplasia Sister     HIV Brother     No Known Problems Daughter     No Known Problems Son     Breast cancer Maternal Aunt 65    Parkinsonism Maternal Aunt     Esophageal cancer Maternal Uncle         smoker    No Known Problems Paternal Aunt     Cataracts Paternal Uncle     Cataracts Maternal Grandmother     Cataracts Maternal Grandfather     Diabetes Paternal Grandmother     Cataracts Paternal Grandmother     No Known Problems Paternal Grandfather     No Known Problems Other     Amblyopia Neg Hx     Blindness Neg Hx     Glaucoma Neg Hx     Macular degeneration Neg Hx     Retinal detachment Neg Hx     Strabismus Neg Hx     Thyroid disease Neg  Hx     Colon cancer Neg Hx     Cancer Neg Hx      Social History     Socioeconomic History    Marital status: Single    Number of children: 5   Occupational History    Occupation:      Employer: [employer]     Comment: part-time   Tobacco Use    Smoking status: Never    Smokeless tobacco: Never   Substance and Sexual Activity    Alcohol use: No     Alcohol/week: 0.0 standard drinks of alcohol    Drug use: No    Sexual activity: Not Currently     Partners: Male     Birth control/protection: Post-menopausal, Surgical     Social Determinants of Health     Financial Resource Strain: Low Risk  (6/9/2023)    Overall Financial Resource Strain (CARDIA)     Difficulty of Paying Living Expenses: Not hard at all   Food Insecurity: No Food Insecurity (6/9/2023)    Hunger Vital Sign     Worried About Running Out of Food in the Last Year: Never true     Ran Out of Food in the Last Year: Never true   Transportation Needs: No Transportation Needs (6/9/2023)    PRAPARE - Transportation     Lack of Transportation (Medical): No     Lack of Transportation (Non-Medical): No   Physical Activity: Insufficiently Active (6/9/2023)    Exercise Vital Sign     Days of Exercise per Week: 2 days     Minutes of Exercise per Session: 10 min   Stress: No Stress Concern Present (6/9/2023)    [place] [employer] - Occupational Stress Questionnaire     Feeling of Stress : Only a little   Social Connections: Socially Isolated (6/9/2023)    Social Connection and Isolation Panel [NHANES]     Frequency of Communication with Friends and Family: More than three times a week     Frequency of Social Gatherings with Friends and Family: Twice a week     Attends Jainism Services: Never     Active Member of Clubs or Organizations: No     Attends Club or Organization Meetings: Never     Marital Status:    Housing Stability: Low Risk  (6/9/2023)    Housing Stability Vital Sign     Unable to Pay for Housing  in the Last Year: No     Number of Places Lived in the Last Year: 1     Unstable Housing in the Last Year: No       Current Outpatient Medications:     ACCU-CHEK SOFT DEV LANCETS Kit, , Disp: , Rfl:     atorvastatin (LIPITOR) 80 MG tablet, Take 1 tablet (80 mg total) by mouth every evening., Disp: 90 tablet, Rfl: 3    blood sugar diagnostic Strp, One test strip use 2 times a day to check blood glucose,  ICD-10: E11.9, compatible with insurance/glucometer, Disp: 100 each, Rfl: 11    blood-glucose meter kit, One glucometer, use to check blood glucose.   ICD-10: E11.9. Dispense machine covered by insurance, Disp: 1 each, Rfl: 0    cinacalcet (SENSIPAR) 30 MG Tab, , Disp: , Rfl:     docusate sodium (COLACE) 100 MG capsule, Take 200 mg by mouth once daily. , Disp: , Rfl:     doxercalciferoL (HECTOROL) 4 mcg/2 mL injection, Inject 4.5 mcg into the vein 3 (three) times a week. At dialysis unit, Disp: , Rfl:     dulaglutide (TRULICITY) 1.5 mg/0.5 mL pen injector, Inject 1.5 mg into the skin every 7 days., Disp: 12 pen , Rfl: 3    epoetin arnel (EPOGEN INJ), Inject 1,000 Units as directed every 7 days. At the dialysis unit, Disp: , Rfl:     fluticasone propionate (FLONASE) 50 mcg/actuation nasal spray, 1 spray (50 mcg total) by Each Nostril route once daily., Disp: 48 g, Rfl: 5    lancets Misc, One lancets use 2 times a day to check blood glucose, ICD-10: E11.9, Disp: 100 each, Rfl: 11    lancing device Misc, One device, used to check blood glucose, ICD-10: E11.9, Disp: 1 each, Rfl: 0    levothyroxine (SYNTHROID) 50 MCG tablet, TAKE 1 TABLET BEFORE BREAKFAST, Disp: 90 tablet, Rfl: 3    LIDOcaine-prilocaine (EMLA) cream, Apply topically as needed., Disp: 30 g, Rfl: 11    midodrine (PROAMATINE) 5 MG Tab, Take 1 tablet (5 mg total) by mouth 3 (three) times daily., Disp: 90 tablet, Rfl: 3    mupirocin (BACTROBAN) 2 % ointment, Apply topically 3 (three) times daily., Disp: 22 g, Rfl: 0    OLENA-NABIL RX 1- mg-mg-mcg Tab, Take  1 tablet by mouth once daily., Disp: , Rfl:     aspirin (ECOTRIN) 81 MG EC tablet, Take 1 tablet (81 mg total) by mouth once daily., Disp: 90 tablet, Rfl: 3    sevelamer carbonate (RENVELA) 800 mg Tab, Take 3 tablets (2,400 mg total) by mouth 3 (three) times daily with meals., Disp: 270 tablet, Rfl: 11    REVIEW OF SYSTEMS:  General: No fevers or chills; ENT: No sore throat; Allergy and Immunology: no persistent infections; Hematological and Lymphatic: No history of bleeding or easy bruising; Endocrine: negative; Respiratory: no cough, shortness of breath, or wheezing; Cardiovascular: no chest pain or dyspnea on exertion; Gastrointestinal: no abdominal pain/back, change in bowel habits, or bloody stools; Genito-Urinary: no dysuria, trouble voiding, or hematuria; Musculoskeletal: negative; Neurological: no TIA or stroke symptoms; Psychiatric: no nervousness, anxiety or depression.    PHYSICAL EXAM:      Pulse: 78         General appearance:  Alert, well-appearing, and in no distress.  Oriented to person, place, and time                    Neurological: Normal speech, no focal findings noted; CN II - XII grossly intact. All extremities with sensation to light touch.            Musculoskeletal: Digits/nail without cyanosis/clubbing.  Strength 5/5 all extremities.                    Neck: Supple, no significant adenopathy, no carotid bruit can be auscultated                  Chest:  Clear to auscultation, no wheezes, rales or rhonchi, symmetric air entry. No use of accessory muscles               Cardiac: Normal rate and regular rhythm, S1 and S2 normal            Abdomen: Soft, nontender, nondistended, no masses or organomegaly, no hernia     No rebound tenderness noted; bowel sounds normal     Pulsatile aortic mass is non palpable.     No groin adenopathy      Extremities:      2+ radial pulse bilaterally, LUE AVF +thrill, inc well healed, 2 PSA with dry scabs, no ulcerations     No BUE edema, Negative Manuel's test  "BUE    Skin: No tissue loss, 2+ LLE edema , 1+ RLE edema     VCSS 11     CEAP 3/4a    LAB RESULTS:  No results found for: "CBC"  Lab Results   Component Value Date    LABPROT 10.3 06/07/2021    INR 1.0 06/07/2021     Lab Results   Component Value Date     03/08/2024    K 4.5 03/08/2024     03/08/2024    CO2 26 03/08/2024    GLU 97 03/08/2024    BUN 35 (H) 03/08/2024    CREATININE 7.1 (H) 03/08/2024    CALCIUM 9.6 03/08/2024    ANIONGAP 13 03/08/2024    EGFRNONAA 3.6 (A) 05/07/2022     Lab Results   Component Value Date    WBC 6.54 10/01/2023    RBC 3.84 (L) 10/01/2023    HGB 10.8 (L) 03/26/2024    HCT 32.7 (L) 10/01/2023    MCV 85 10/01/2023    MCH 27.1 10/01/2023    MCHC 31.8 (L) 10/01/2023    RDW 13.8 10/01/2023     10/01/2023    MPV 11.4 10/01/2023    GRAN 4.6 10/01/2023    GRAN 70.1 10/01/2023    LYMPH 0.7 (L) 10/01/2023    LYMPH 11.2 (L) 10/01/2023    MONO 1.1 (H) 10/01/2023    MONO 16.4 (H) 10/01/2023    EOS 0.1 10/01/2023    BASO 0.02 10/01/2023    EOSINOPHIL 1.4 10/01/2023    BASOPHIL 0.3 10/01/2023    DIFFMETHOD Automated 10/01/2023     .  Lab Results   Component Value Date    HGBA1C 6.1 (H) 03/08/2024       IMAGING:  All pertinent imaging has been reviewed and interpreted independently.    Vein mapping 9/2019:  Adequate R superior basilic vein and axillary vein ; Adequate L basilic vein superior and inferior, adequate L axillary     1/27/20 Left upper extremity dialysis ultrasound shows no hemodynamically significant stenosis.  Flow is 1083 ml/min.  Depth and diameter are appropriate.    3/23/20:  Left upper extremity dialysis ultrasound shows anastomotic and proximal fistula hemodynamically significant stenosis.  Flow is 955 ml/min.      7/2020: Left upper extremity dialysis ultrasound shows proximal fistula hemodynamically significant stenosis.  Flow is 1257 ml/min.      8/2020: Left upper extremity dialysis ultrasound shows proximal hemodynamically significant stenosis.  Flow is 1999 " ml/min.      8/31/20: Left upper extremity dialysis ultrasound shows proximal fistula hemodynamically significant stenosis.  Flow is 2209 ml/min.      10/2020:  Prox stenosis, flow > 3000 ml/min    1/2021:  Proximal stenosis, flow 4414 ml/min    4/2021:  HD US reviewed without significant stenosis, adequate flow.    8/2021:HD US reviewed without significant stenosis, adequate flow.    3/2022:  Left upper extremity dialysis ultrasound shows approx 50% anastomotic and prox fistula stenosis withou hemodynamically significant stenosis.  Flow is 3774 ml/min.      6/2022:  No significant stenosis     04/2023: No significant stenosis. Flow 4,086 ml/min    10/2023:  Left upper extremity dialysis ultrasound shows approx 50% anastomotic stenosis.  Flow is 5000 ml/min.      12/2023:  Color flow evaluation of the right lower extremity demonstrates no evidence of venous thrombosis in the deep or superficial veins, and no reflux.  Color flow evaluation of the left lower extremity demonstrates no evidence of venous thrombosis in the deep or superficial veins, and no reflux.    Arterial US 03/2024:  Conclusion    Right lower extremity arterial ultrasound shows no hemodynamically significant stenosis.    Left lower extremity arterial ultrasound shows no hemodynamically significant stenosis.      PAM 03/2024:      IMP/PLAN:  79 y.o. female with   Patient Active Problem List   Diagnosis    Severe obesity (BMI 35.0-39.9) with comorbidity    Sickle cell trait    Hyperlipidemia LDL goal <100    HUMBERTO on CPAP    Hypothyroidism (acquired)    Hx-TIA (transient ischemic attack)    Vitamin D deficiency disease    Pulmonary hypertension    Type 2 diabetes mellitus with ESRD (end-stage renal disease)    Secondary renal hyperparathyroidism    Incomplete bladder emptying    Postmenopausal atrophic vaginitis    Rectocele    Mixed incontinence urge and stress    Chronic diastolic heart failure    Tortuous aorta    ESRD on hemodialysis    Anemia of  chronic disease    Debility    Chronic respiratory failure    Type 2 diabetes mellitus with foot ulcer, with long-term current use of insulin    Stable proliferative diabetic retinopathy associated with type 2 diabetes mellitus    Heart failure with preserved ejection fraction    Pseudophakia    Chronic kidney disease-mineral and bone disorder    Age-related osteoporosis without current pathological fracture    H/O: stroke    Walker as ambulation aid    Swelling of lower extremity    Lymphedema of both lower extremities    Impaired functional mobility and activity tolerance    Wound of left leg    Multiple thyroid nodules    being managed by PCP and specialists who is here today for evaluation of long term HD access s/p L RC AV fistula. No presenting for LLE blister.    -s/p L RC AV fistula with proximal fistula measuring 5 mm 1/13/20, adequate flow without issues during HD s/p LUE fistulogram, central venogram, ligation of medial side branch cephalic vein, ligation of lateral side branch cephalic vein 3/2/20 with prox AVF stenosis s/p L proximal fistula angioplasty 7/21/20 with persistent flow issues s/p proximal angioplasty 8/19/20 with scoring balloon/DCB with improvement in stenosis/flow and asymptomatic anastomotic stenosis with no further issues with HD. Patient undergoing lymphedema therapy developed LLE blister to her shin with associated edema- wound well healed. Vascular studies with adequate blood flow for wound healing.  -Cont renal diet  -Rec cont lymphedema clinic and pumps  - Compression and elevation  -RTC in May for further evaluation of AVF with HD US.    I spent 30 minutes evaluating this patient and greater than 50% of the time was spent counseling, coordinator care and discussing the plan of care.  All questions were answered and patient stated understanding with agreement with the above treatment plan.    Tonia Silva NP  Vascular and Endovascular Surgery

## 2024-04-03 NOTE — PROGRESS NOTES
Subjective:      Patient ID: Erica Leblanc is a 79 y.o. female.    Chief Complaint: Diabetes Mellitus (3/15/2024 Dr. Day ) and Nail Care    Erica is a 79 y.o. female who presents to the clinic for evaluation and treatment of high risk feet. Erica has a past medical history of Acute ischemic right PCA stroke (6/6/2021), Acute respiratory failure with hypoxia, Age-related osteoporosis without current pathological fracture (3/8/2021), Anemia of chronic kidney failure, stage 4 (severe) (4/5/2019), Cataracts, bilateral, CHF (congestive heart failure), CKD (chronic kidney disease) stage 3, GFR 30-59 ml/min, CKD (chronic kidney disease) stage 3, GFR 30-59 ml/min, Controlled type 2 diabetes mellitus with proteinuria or albuminuria, Depression, Diabetes with neurologic complications, Diabetic retinopathy of both eyes, Edema, Glaucoma, History of colonic polyps, TIA (transient ischemic attack) (11/2008), Hyperlipidemia LDL goal < 100, Hypertension, Hypothyroidism, Major depressive disorder, single episode, mild (2/17/2016), Mixed incontinence urge and stress, Obesity, Obstructive sleep apnea on CPAP, Osteopenia, Proteinuria, Sickle cell trait, Strabismus, TIA (transient ischemic attack), Trouble in sleeping, Type 2 diabetes mellitus with ophthalmic manifestations, Type 2 diabetes with stage 3 chronic kidney disease GFR 30-59, Type II or unspecified type diabetes mellitus with renal manifestations, uncontrolled(250.42), Uncontrolled type 2 diabetes mellitus with peripheral circulatory disorder (4/5/2019), Urge incontinence (1/11/2016), Urge incontinence, Venous stasis ulcer, and Vitamin D deficiency disease. The patient's chief complaint is long, thick toenails. This patient has documented high risk feet requiring routine maintenance secondary to diabetes mellitis and those secondary complications of diabetes, as mentioned..    PCP: Sendy Elaine MD    Date Last Seen by PCP:   Chief Complaint   Patient presents  with    Diabetes Mellitus     3/15/2024 Dr. Day     Nail Care     Hemoglobin A1C   Date Value Ref Range Status   03/08/2024 6.1 (H) 4.0 - 5.6 % Final     Comment:     ADA Screening Guidelines:  5.7-6.4%  Consistent with prediabetes  >or=6.5%  Consistent with diabetes    High levels of fetal hemoglobin interfere with the HbA1C  assay. Heterozygous hemoglobin variants (HbS, HgC, etc)do  not significantly interfere with this assay.   However, presence of multiple variants may affect accuracy.     12/06/2023 5.7 (H) 4.0 - 5.6 % Final     Comment:     ADA Screening Guidelines:  5.7-6.4%  Consistent with prediabetes  >or=6.5%  Consistent with diabetes    High levels of fetal hemoglobin interfere with the HbA1C  assay. Heterozygous hemoglobin variants (HbS, HgC, etc)do  not significantly interfere with this assay.   However, presence of multiple variants may affect accuracy.     09/14/2023 5.5 % Final   07/07/2023 6.2 (H) 4.0 - 5.6 % Final     Comment:     ADA Screening Guidelines:  5.7-6.4%  Consistent with prediabetes  >or=6.5%  Consistent with diabetes    High levels of fetal hemoglobin interfere with the HbA1C  assay. Heterozygous hemoglobin variants (HbS, HgC, etc)do  not significantly interfere with this assay.   However, presence of multiple variants may affect accuracy.     09/13/2022 6.3 (H) 0.0 - 5.6 % Final     Comment:     [facility]   06/14/2022 6.6 (H) 0.0 - 5.6 % Final     Comment:     [facility]           Patient Active Problem List   Diagnosis    Severe obesity (BMI 35.0-39.9) with comorbidity    Sickle cell trait    Hyperlipidemia LDL goal <100    HUMBERTO on CPAP    Hypothyroidism (acquired)    Hx-TIA (transient ischemic attack)    Vitamin D deficiency disease    Pulmonary hypertension    Type 2 diabetes mellitus with ESRD (end-stage renal disease)    Secondary renal hyperparathyroidism    Incomplete bladder emptying    Postmenopausal atrophic vaginitis    Rectocele    Mixed incontinence urge  and stress    Chronic diastolic heart failure    Tortuous aorta    ESRD on hemodialysis    Anemia of chronic disease    Debility    Chronic respiratory failure    Type 2 diabetes mellitus with foot ulcer, with long-term current use of insulin    Stable proliferative diabetic retinopathy associated with type 2 diabetes mellitus    Heart failure with preserved ejection fraction    Pseudophakia    Chronic kidney disease-mineral and bone disorder    Age-related osteoporosis without current pathological fracture    H/O: stroke    Walker as ambulation aid    Swelling of lower extremity    Lymphedema of both lower extremities    Impaired functional mobility and activity tolerance    Wound of left leg    Multiple thyroid nodules       Current Outpatient Medications on File Prior to Visit   Medication Sig Dispense Refill    ACCU-CHEK SOFT DEV LANCETS Kit       atorvastatin (LIPITOR) 80 MG tablet Take 1 tablet (80 mg total) by mouth every evening. 90 tablet 3    blood sugar diagnostic Strp One test strip use 2 times a day to check blood glucose,  ICD-10: E11.9, compatible with insurance/glucometer 100 each 11    blood-glucose meter kit One glucometer, use to check blood glucose.   ICD-10: E11.9. Dispense machine covered by insurance 1 each 0    cinacalcet (SENSIPAR) 30 MG Tab       docusate sodium (COLACE) 100 MG capsule Take 200 mg by mouth once daily.       doxercalciferoL (HECTOROL) 4 mcg/2 mL injection Inject 4.5 mcg into the vein 3 (three) times a week. At dialysis unit      dulaglutide (TRULICITY) 1.5 mg/0.5 mL pen injector Inject 1.5 mg into the skin every 7 days. 12 pen 3    epoetin arnel (EPOGEN INJ) Inject 1,000 Units as directed every 7 days. At the dialysis unit      fluticasone propionate (FLONASE) 50 mcg/actuation nasal spray 1 spray (50 mcg total) by Each Nostril route once daily. 48 g 5    lancets Misc One lancets use 2 times a day to check blood glucose, ICD-10: E11.9 100 each 11    lancing device Misc One  device, used to check blood glucose, ICD-10: E11.9 1 each 0    levothyroxine (SYNTHROID) 50 MCG tablet TAKE 1 TABLET BEFORE BREAKFAST 90 tablet 3    LIDOcaine-prilocaine (EMLA) cream Apply topically as needed. 30 g 11    midodrine (PROAMATINE) 5 MG Tab Take 1 tablet (5 mg total) by mouth 3 (three) times daily. 90 tablet 3    mupirocin (BACTROBAN) 2 % ointment Apply topically 3 (three) times daily. 22 g 0    OLENA-NABIL RX 1- mg-mg-mcg Tab Take 1 tablet by mouth once daily.      aspirin (ECOTRIN) 81 MG EC tablet Take 1 tablet (81 mg total) by mouth once daily. 90 tablet 3    sevelamer carbonate (RENVELA) 800 mg Tab Take 3 tablets (2,400 mg total) by mouth 3 (three) times daily with meals. 270 tablet 11     No current facility-administered medications on file prior to visit.       Review of patient's allergies indicates:   Allergen Reactions    Ace inhibitors Other (See Comments)     Other reaction(s): cough       Past Surgical History:   Procedure Laterality Date    AV FISTULA PLACEMENT Left 2019    Procedure: CREATION, AV FISTULA, LEFT UPPER EXTREMITY;  Surgeon: Keron Perez MD;  Location: [facility];  Service: Vascular;  Laterality: Left;    BREAST BIOPSY      breast reduction Bilateral age 31    BREAST SURGERY      CATARACT EXTRACTION Bilateral     cataracts Bilateral      SECTION, LOW TRANSVERSE      x1    CHOLECYSTECTOMY      COLONOSCOPY N/A 2022    Procedure: COLONOSCOPY;  Surgeon: Mahesh Huang MD;  Location: [facility] ([address] [place] [address]);  Service: Endoscopy;  Laterality: N/A;  fully vaccinated-sm.  RAPID COVID  +cologuard needing ASAP  Dialysis pt T,TH Sat and left UA access  2nd floor - cardiac/dialysis/SOB / LABS / prep ins. emailed - [facility]    EYE SURGERY  2014, 2014    vitrectomy    EYE SURGERY Right 2016    FISTULOGRAM Left 3/6/2020    Procedure: Fistulogram, left upper extremity, with branch ligation;  Surgeon: Keron Perez MD;  [address];   Service: Vascular;  Laterality: Left;  Time 1.1 Minute  42.10 mGy    FISTULOGRAM Left 7/21/2020    Procedure: Fistulogram, left upper extremity, transradial access with possible intervention;  Surgeon: Keron Perez MD;  Location: [address];  Service: Vascular;  Laterality: Left;    FISTULOGRAM Left 8/19/2020    Procedure: Fistulogram, left upper extremity, possible intervention;  Surgeon: Keron Perez MD;  Location: [facility];  Service: Vascular;  Laterality: Left;  930 AM START  RN PRE OP  ---COVID NEGATIVE ON  8-. CA    FISTULOGRAM Left 1/21/2022    Procedure: Fistulogram, transradial access;  Surgeon: Keron Perez MD;  Location: [address];  Service: Vascular;  Laterality: Left;  mgy-40.16  gycm-8.3905  contrast-34cc  time-12.4min    HYSTERECTOMY  1986    TAHBSO (patient is unsure if ovaries removed)    OOPHORECTOMY      PERCUTANEOUS TRANSLUMINAL ANGIOPLASTY OF ARTERIOVENOUS FISTULA Left 7/21/2020    Procedure: PTA, AV FISTULA;  Surgeon: Keron Perez MD;  Location: [address];  Service: Vascular;  Laterality: Left;  15.9 minutes of fluro  41.12  mGy  7.9060 Gy cm2  32ml  contrast    PHLEBOGRAPHY Left 7/21/2020    Procedure: CENTRAL VENOGRAM;  Surgeon: Keron Perez MD;  Location: [facility] OR [address] [place] [address];  Service: Vascular;  Laterality: Left;    REFRACTIVE SURGERY      TOTAL REDUCTION MAMMOPLASTY      approx 10 yrs ago    VENOPLASTY  1/21/2022    Procedure: ANGIOPLASTY, VEIN;  Surgeon: Keron Perez MD;  Location: [address];  Service: Vascular;;       Family History   Problem Relation Age of Onset    Stroke Mother     Diabetes Mother     Hypertension Mother     Cataracts Mother     Leukemia Father     Cataracts Father     Ovarian cancer Sister 35    Achondroplasia Sister     HIV Brother     No Known Problems Daughter     No Known Problems Son     Breast cancer Maternal Aunt 65    Parkinsonism Maternal Aunt     Esophageal cancer Maternal Uncle          smoker    No Known Problems Paternal Aunt     Cataracts Paternal Uncle     Cataracts Maternal Grandmother     Cataracts Maternal Grandfather     Diabetes Paternal Grandmother     Cataracts Paternal Grandmother     No Known Problems Paternal Grandfather     No Known Problems Other     Amblyopia Neg Hx     Blindness Neg Hx     Glaucoma Neg Hx     Macular degeneration Neg Hx     Retinal detachment Neg Hx     Strabismus Neg Hx     Thyroid disease Neg Hx     Colon cancer Neg Hx     Cancer Neg Hx        Social History     Socioeconomic History    Marital status: Single    Number of children: 5   Occupational History    Occupation:      Employer: [employer]     Comment: part-time   Tobacco Use    Smoking status: Never    Smokeless tobacco: Never   Substance and Sexual Activity    Alcohol use: No     Alcohol/week: 0.0 standard drinks of alcohol    Drug use: No    Sexual activity: Not Currently     Partners: Male     Birth control/protection: Post-menopausal, Surgical     Social Determinants of Health     Financial Resource Strain: Low Risk  (6/9/2023)    Overall Financial Resource Strain (CARDIA)     Difficulty of Paying Living Expenses: Not hard at all   Food Insecurity: No Food Insecurity (6/9/2023)    Hunger Vital Sign     Worried About Running Out of Food in the Last Year: Never true     Ran Out of Food in the Last Year: Never true   Transportation Needs: No Transportation Needs (6/9/2023)    PRAPARE - Transportation     Lack of Transportation (Medical): No     Lack of Transportation (Non-Medical): No   Physical Activity: Insufficiently Active (6/9/2023)    Exercise Vital Sign     Days of Exercise per Week: 2 days     Minutes of Exercise per Session: 10 min   Stress: No Stress Concern Present (6/9/2023)    [employer] - Occupational Stress Questionnaire     Feeling of Stress : Only a little   Social Connections: Socially Isolated (6/9/2023)    Social Connection  "and Isolation Panel [NHANES]     Frequency of Communication with Friends and Family: More than three times a week     Frequency of Social Gatherings with Friends and Family: Twice a week     Attends Temple Services: Never     Active Member of Clubs or Organizations: No     Attends Club or Organization Meetings: Never     Marital Status:    Housing Stability: Low Risk  (6/9/2023)    Housing Stability Vital Sign     Unable to Pay for Housing in the Last Year: No     Number of Places Lived in the Last Year: 1     Unstable Housing in the Last Year: No       Review of Systems   Constitutional: Negative for chills.   Cardiovascular:  Positive for leg swelling. Negative for chest pain and claudication.   Respiratory:  Negative for cough.    Skin:  Positive for color change, dry skin and nail changes.   Musculoskeletal:  Positive for joint pain, myalgias and stiffness.   Gastrointestinal:  Negative for nausea.   Neurological:  Positive for paresthesias. Negative for numbness.   Psychiatric/Behavioral:  The patient is not nervous/anxious.            Objective:      Vitals:    04/03/24 0944   BP: (!) 178/81   Pulse: 80   Weight: 95.4 kg (210 lb 5.1 oz)   Height: 5' 5" (1.651 m)   PainSc: 0-No pain       Physical Exam  Vitals and nursing note reviewed.   Constitutional:       Appearance: She is not diaphoretic.      Comments: General: Pt. is well-developed, well-nourished, appears stated age, in no acute distress, alert and oriented x 3. No evidence of depression, anxiety, or agitation. Calm, cooperative, and communicative. Appropriate interactions and affect.       Cardiovascular:      Pulses:           Dorsalis pedis pulses are 1+ on the right side and 1+ on the left side.        Posterior tibial pulses are 1+ on the right side and 1+ on the left side.      Comments: There is decreased digital hair.   Musculoskeletal:      Right ankle: Swelling present. No tenderness.      Right Achilles Tendon: No defects.      " Left ankle: Swelling present. No tenderness.      Left Achilles Tendon: No defects.      Right foot: Normal range of motion. No tenderness.      Left foot: Normal range of motion. No tenderness.      Comments: Muscle strength is 5/5 in all groups bilaterally.    Decreased stride, station of gait.  apropulsive toe off.  Increased angle and base of gait.    Patient has hammertoes of digits 2-5 bilateral partially reducible without symptom today.    Visible and palpable bunion without pain at dorsomedial 1st metatarsal head right and left.  Hallux abducted right and left partially reducible, tracks laterally without being track bound.  No ecchymosis, erythema, edema, or cardinal signs infection or signs of trauma same foot.    Fat pad atrophy to heels and met heads bilateral     Skin:     General: Skin is warm and dry.      Coloration: Skin is not pale.      Findings: No abrasion, lesion (posterior right leg healed) or rash.      Nails: There is no clubbing.      Comments: Xerosis bilaterally     Toenails 1-5 bilaterally are elongated by 2-3 mm, thickened by 2-3 mm, discolored/yellowed, dystrophic, brittle with subungual debris.     Interdigital Spaces clean, dry and without evidence of break in skin integrity   Neurological:      Sensory: No sensory deficit.      Comments: [place]-Glorai 5.07 monofilament is intact bilateral feet. Sharp/dull sensation is also intact Bilateral feet.           Psychiatric:         Speech: Speech normal.               Assessment:       Encounter Diagnoses   Name Primary?    Type 2 diabetes mellitus with ESRD (end-stage renal disease) Yes    ESRD on hemodialysis     Lower extremity edema     Plantar fat pad atrophy     Nail dermatophytosis     Hallux abducto valgus, right     Hallux abducto valgus, left     Hammer toes of both feet          Plan:     Problem List Items Addressed This Visit       Type 2 diabetes mellitus with ESRD (end-stage renal disease) - Primary    ESRD on  hemodialysis    Overview     - at [facility]; Dr. Barrientos  - Tuesday, Thursday and Saturday  - has AV graft in left UE          Other Visit Diagnoses       Lower extremity edema        Plantar fat pad atrophy        Nail dermatophytosis        Hallux abducto valgus, right        Hallux abducto valgus, left        Hammer toes of both feet               I counseled the patient on her conditions, their implications and medical management.         Education about the diabetic foot, neuropathy, and prevention of limb loss.    Shoe inspection. Diabetic Foot Education. Patient reminded of the importance of good nutrition/healthy diet/weight management and blood sugar control to help prevent podiatric complications of diabetes. Patient instructed on proper foot hygeine. Wear comfortable, proper fitting shoes. Wash feet daily. Dry well. After drying, apply moisturizer to feet (no lotion to webspaces). Inspect feet daily for skin breaks, blisters, swelling, or redness. Wear cotton socks (preferably white)  Change socks every day. Do NOT walk barefoot. Do NOT use heating pads or hot water soaks. We discussed wearing proper shoe gear, daily foot inspections, never walking without protective shoe gear.     Discussed edema control and the importance of daily moisturizer to the skin of the lower extremity    Recommend applying vicks vaporub to thick abnormal toenails daily x 6 months to treat fungal nail infection.    With patient's permission, nails were aggressively reduced and debrided x 10 to their soft tissue attachment mechanically, removing all offending nail and debris. Patient relates relief following the procedure.     She will continue to monitor the areas daily, inspect her feet, wear protective shoe gear when ambulatory, moisturizer to maintain skin integrity and follow in this office in approximately 3-4 months, sooner p.r.n.

## 2024-05-22 ENCOUNTER — HOSPITAL ENCOUNTER (OUTPATIENT)
Dept: CARDIOLOGY | Facility: HOSPITAL | Age: 79
Discharge: HOME | End: 2024-05-22
Attending: SURGERY
Payer: MEDICARE

## 2024-05-22 ENCOUNTER — OFFICE VISIT (OUTPATIENT)
Dept: VASCULAR SURGERY | Facility: CLINIC | Age: 79
End: 2024-05-22
Payer: MEDICARE

## 2024-05-22 VITALS
BODY MASS INDEX: 34.93 KG/M2 | DIASTOLIC BLOOD PRESSURE: 82 MMHG | HEIGHT: 65 IN | HEART RATE: 80 BPM | SYSTOLIC BLOOD PRESSURE: 152 MMHG | WEIGHT: 209.69 LBS

## 2024-05-22 DIAGNOSIS — T82.858D ARTERIOVENOUS FISTULA STENOSIS, SUBSEQUENT ENCOUNTER: ICD-10-CM

## 2024-05-22 DIAGNOSIS — I89.0 LYMPHEDEMA: ICD-10-CM

## 2024-05-22 DIAGNOSIS — N18.6 ESRD ON HEMODIALYSIS: Primary | ICD-10-CM

## 2024-05-22 DIAGNOSIS — Z99.2 ESRD ON HEMODIALYSIS: Primary | ICD-10-CM

## 2024-05-22 PROCEDURE — 3079F DIAST BP 80-89 MM HG: CPT | Mod: HCNC,CPTII,S$GLB, | Performed by: NURSE PRACTITIONER

## 2024-05-22 PROCEDURE — 99999 PR PBB SHADOW E&M-EST. PATIENT-LVL III: CPT | Mod: PBBFAC,HCNC,, | Performed by: NURSE PRACTITIONER

## 2024-05-22 PROCEDURE — 93990 DOPPLER FLOW TESTING: CPT | Mod: 26,HCNC,, | Performed by: SURGERY

## 2024-05-22 PROCEDURE — 3288F FALL RISK ASSESSMENT DOCD: CPT | Mod: HCNC,CPTII,S$GLB, | Performed by: NURSE PRACTITIONER

## 2024-05-22 PROCEDURE — 1101F PT FALLS ASSESS-DOCD LE1/YR: CPT | Mod: HCNC,CPTII,S$GLB, | Performed by: NURSE PRACTITIONER

## 2024-05-22 PROCEDURE — 93990 DOPPLER FLOW TESTING: CPT | Mod: TC,HCNC

## 2024-05-22 PROCEDURE — 1126F AMNT PAIN NOTED NONE PRSNT: CPT | Mod: HCNC,CPTII,S$GLB, | Performed by: NURSE PRACTITIONER

## 2024-05-22 PROCEDURE — 3077F SYST BP >= 140 MM HG: CPT | Mod: HCNC,CPTII,S$GLB, | Performed by: NURSE PRACTITIONER

## 2024-05-22 PROCEDURE — 1159F MED LIST DOCD IN RCRD: CPT | Mod: HCNC,CPTII,S$GLB, | Performed by: NURSE PRACTITIONER

## 2024-05-22 PROCEDURE — 99214 OFFICE O/P EST MOD 30 MIN: CPT | Mod: HCNC,S$GLB,, | Performed by: NURSE PRACTITIONER

## 2024-05-22 NOTE — PROGRESS NOTES
[facility]                  HPI:  Erica Leblanc is a 79 y.o. female with   Patient Active Problem List   Diagnosis    Severe obesity (BMI 35.0-39.9) with comorbidity    Sickle cell trait    Hyperlipidemia LDL goal <100    HUMBERTO on CPAP    Hypothyroidism (acquired)    Hx-TIA (transient ischemic attack)    Vitamin D deficiency disease    Pulmonary hypertension    Type 2 diabetes mellitus with ESRD (end-stage renal disease)    Secondary renal hyperparathyroidism    Incomplete bladder emptying    Postmenopausal atrophic vaginitis    Rectocele    Mixed incontinence urge and stress    Chronic diastolic heart failure    Tortuous aorta    ESRD on hemodialysis    Anemia of chronic disease    Debility    Chronic respiratory failure    Type 2 diabetes mellitus with foot ulcer, with long-term current use of insulin    Stable proliferative diabetic retinopathy associated with type 2 diabetes mellitus    Heart failure with preserved ejection fraction    Pseudophakia    Chronic kidney disease-mineral and bone disorder    Age-related osteoporosis without current pathological fracture    H/O: stroke    Walker as ambulation aid    Swelling of lower extremity    Lymphedema of both lower extremities    Impaired functional mobility and activity tolerance    Wound of left leg    Multiple thyroid nodules    being managed by PCP and specialists who is here today for evaluation of long term HD access.  S/p R IJ tunneled HD catheter, HD without issues.  Pt is R handed.  No complaints today.  Does endorse orthopnea, no chest pain.  Unable to climb 1 flight of stairs on own.    no MI  no Stroke  Tobacco use: denies    1/20/20: No new issues.    3/2020: Dialysis without issues.    4/6/20:  S/p LUE fistulogram, central venogram, ligation of medial side branch cephalic vein, ligation of lateral side branch cephalic vein.  No issues with HD for several weeks since procedure.    7/6/20:  No issues with  HD.    8/3/20: s/p L proximal fistula angioplasty 7/21/20.  No issues with HD.    8/31/20:  S/p 8/19/29 Balloon angioplasty of the proximal LUE AVF with a 6x60 Angiosculpt and Stellerex balloon.      10/2020:  No issues with HD    1/2021:  No issues with HD    4/2021:  No new problems.    8/2021:  No issues with HD. C/o fatigue after HD.  Has not seen cardiology recently.    3/2022:  No issues with HD    6/2022:  Doing well, no issues with HD    1/2023:  No issues with HD    04/2023: No issues with HD.     7/2023:  doing well.    10/2023:  No issues with HD.    11/2023:  c/o LLE cellulitis and edema.  +compression with sock.    12/2023:  +compression.      2/2024:  no new issues.    02/28/24: Pt presents with complaints LLE shin blister with swelling after undergoing lymphedema therapy.     03/2024: No new issues    04/2024: No new issues wound healed.    05/22/24: No new issues.  Past Medical History:   Diagnosis Date    Acute ischemic right PCA stroke 6/6/2021    Acute respiratory failure with hypoxia     Age-related osteoporosis without current pathological fracture 3/8/2021    Anemia of chronic kidney failure, stage 4 (severe) 4/5/2019    Cataracts, bilateral     CHF (congestive heart failure)     CKD (chronic kidney disease) stage 3, GFR 30-59 ml/min     CKD (chronic kidney disease) stage 3, GFR 30-59 ml/min     Controlled type 2 diabetes mellitus with proteinuria or albuminuria     Depression     Diabetes with neurologic complications     Diabetic retinopathy of both eyes     Edema     Glaucoma     History of colonic polyps     Hx-TIA (transient ischemic attack) 11/2008    Hyperlipidemia LDL goal < 100     Hypertension     Hypothyroidism     Major depressive disorder, single episode, mild 2/17/2016    Mixed incontinence urge and stress     Obesity     Obstructive sleep apnea on CPAP     7/19/19:  Home CPAP machine broken, per patient & son    Osteopenia     Proteinuria     Sickle cell trait     Strabismus      TIA (transient ischemic attack)     Trouble in sleeping     Type 2 diabetes mellitus with ophthalmic manifestations     Type 2 diabetes with stage 3 chronic kidney disease GFR 30-59     Type II or unspecified type diabetes mellitus with renal manifestations, uncontrolled(250.42)     Uncontrolled type 2 diabetes mellitus with peripheral circulatory disorder 2019    Urge incontinence 2016    Urge incontinence     Venous stasis ulcer     bilateral lower legs    Vitamin D deficiency disease      Past Surgical History:   Procedure Laterality Date    AV FISTULA PLACEMENT Left 2019    Procedure: CREATION, AV FISTULA, LEFT UPPER EXTREMITY;  Surgeon: Keron Perez MD;  Location: [facility];  Service: Vascular;  Laterality: Left;    BREAST BIOPSY      breast reduction Bilateral age 31    BREAST SURGERY      CATARACT EXTRACTION Bilateral     cataracts Bilateral      SECTION, LOW TRANSVERSE      x1    CHOLECYSTECTOMY      COLONOSCOPY N/A 2022    Procedure: COLONOSCOPY;  Surgeon: Mahesh Huang MD;  Location: [facility] ([address] [place] [address]);  Service: Endoscopy;  Laterality: N/A;  fully vaccinated-sm.  RAPID COVID  +cologuard needing ASAP  Dialysis pt T,TH Sat and left UA access  2nd floor - cardiac/dialysis/SOB / LABS / prep ins. emailed - ERW    EYE SURGERY  2014, 2014    vitrectomy    EYE SURGERY Right 2016    FISTULOGRAM Left 3/6/2020    Procedure: Fistulogram, left upper extremity, with branch ligation;  Surgeon: Keron Perez MD;  Location: [address];  Service: Vascular;  Laterality: Left;  Time 1.1 Minute  42.10 mGy    FISTULOGRAM Left 2020    Procedure: Fistulogram, left upper extremity, transradial access with possible intervention;  Surgeon: Keron Perez MD;  Location: [address];  Service: Vascular;  Laterality: Left;    FISTULOGRAM Left 2020    Procedure: Fistulogram, left upper extremity, possible intervention;  Surgeon: Keron Perez MD;   Location: [facility] OR;  Service: Vascular;  Laterality: Left;  930 AM START  RN PRE OP  ---COVID NEGATIVE ON  8-. CA    FISTULOGRAM Left 1/21/2022    Procedure: Fistulogram, transradial access;  Surgeon: Keron Perez MD;  [facility];  Service: Vascular;  Laterality: Left;  mgy-40.16  gycm-8.3905  contrast-34cc  time-12.4min    HYSTERECTOMY  1986    TAHBSO (patient is unsure if ovaries removed)    OOPHORECTOMY      PERCUTANEOUS TRANSLUMINAL ANGIOPLASTY OF ARTERIOVENOUS FISTULA Left 7/21/2020    Procedure: PTA, AV FISTULA;  Surgeon: Keron Perez MD;  [facility];  Service: Vascular;  Laterality: Left;  15.9 minutes of fluro  41.12  mGy  7.9060 Gy cm2  32ml  contrast    PHLEBOGRAPHY Left 7/21/2020    Procedure: CENTRAL VENOGRAM;  Surgeon: Keron Perez MD;  Location: [facility] OR [address] [place] [address];  Service: Vascular;  Laterality: Left;    REFRACTIVE SURGERY      TOTAL REDUCTION MAMMOPLASTY      approx 10 yrs ago    VENOPLASTY  1/21/2022    Procedure: ANGIOPLASTY, VEIN;  Surgeon: Keron Perez MD;  [facility];  Service: Vascular;;     Family History   Problem Relation Name Age of Onset    Stroke Mother      Diabetes Mother      Hypertension Mother      Cataracts Mother      Leukemia Father      Cataracts Father      Ovarian cancer Sister Marilee 35    Achondroplasia Sister Hannah     HIV Brother Paul     No Known Problems Daughter x2     No Known Problems Son x3     Breast cancer Maternal Aunt  65    Parkinsonism Maternal Aunt      Esophageal cancer Maternal Uncle          smoker    No Known Problems Paternal Aunt      Cataracts Paternal Uncle      Cataracts Maternal Grandmother      Cataracts Maternal Grandfather      Diabetes Paternal Grandmother      Cataracts Paternal Grandmother      No Known Problems Paternal Grandfather      No Known Problems Other      Amblyopia Neg Hx      Blindness Neg Hx      Glaucoma Neg Hx      Macular degeneration Neg Hx       Retinal detachment Neg Hx      Strabismus Neg Hx      Thyroid disease Neg Hx      Colon cancer Neg Hx      Cancer Neg Hx       Social History     Socioeconomic History    Marital status: Single    Number of children: 5   Occupational History    Occupation:      Employer: [employer]     Comment: part-time   Tobacco Use    Smoking status: Never    Smokeless tobacco: Never   Substance and Sexual Activity    Alcohol use: No     Alcohol/week: 0.0 standard drinks of alcohol    Drug use: No    Sexual activity: Not Currently     Partners: Male     Birth control/protection: Post-menopausal, Surgical     Social Determinants of Health     Financial Resource Strain: Low Risk  (6/9/2023)    Overall Financial Resource Strain (CARDIA)     Difficulty of Paying Living Expenses: Not hard at all   Food Insecurity: No Food Insecurity (6/9/2023)    Hunger Vital Sign     Worried About Running Out of Food in the Last Year: Never true     Ran Out of Food in the Last Year: Never true   Transportation Needs: No Transportation Needs (6/9/2023)    PRAPARE - Transportation     Lack of Transportation (Medical): No     Lack of Transportation (Non-Medical): No   Physical Activity: Insufficiently Active (6/9/2023)    Exercise Vital Sign     Days of Exercise per Week: 2 days     Minutes of Exercise per Session: 10 min   Stress: No Stress Concern Present (6/9/2023)    [employer] [employer] [employer] - Occupational Stress Questionnaire     Feeling of Stress : Only a little   Housing Stability: Low Risk  (6/9/2023)    Housing Stability Vital Sign     Unable to Pay for Housing in the Last Year: No     Number of Places Lived in the Last Year: 1     Unstable Housing in the Last Year: No       Current Outpatient Medications:     ACCU-CHEK SOFT DEV LANCETS Kit, , Disp: , Rfl:     atorvastatin (LIPITOR) 80 MG tablet, Take 1 tablet (80 mg total) by mouth every evening., Disp: 90 tablet, Rfl: 3    blood sugar diagnostic  Strp, One test strip use 2 times a day to check blood glucose,  ICD-10: E11.9, compatible with insurance/glucometer, Disp: 100 each, Rfl: 11    blood-glucose meter kit, One glucometer, use to check blood glucose.   ICD-10: E11.9. Dispense machine covered by insurance, Disp: 1 each, Rfl: 0    cinacalcet (SENSIPAR) 30 MG Tab, , Disp: , Rfl:     docusate sodium (COLACE) 100 MG capsule, Take 200 mg by mouth once daily. , Disp: , Rfl:     doxercalciferoL (HECTOROL) 4 mcg/2 mL injection, Inject 4.5 mcg into the vein 3 (three) times a week. At dialysis unit, Disp: , Rfl:     dulaglutide (TRULICITY) 1.5 mg/0.5 mL pen injector, Inject 1.5 mg into the skin every 7 days., Disp: 12 pen , Rfl: 3    epoetin arnel (EPOGEN INJ), Inject 1,000 Units as directed every 7 days. At the dialysis unit, Disp: , Rfl:     fluticasone propionate (FLONASE) 50 mcg/actuation nasal spray, 1 spray (50 mcg total) by Each Nostril route once daily., Disp: 48 g, Rfl: 5    lancets Misc, One lancets use 2 times a day to check blood glucose, ICD-10: E11.9, Disp: 100 each, Rfl: 11    lancing device Misc, One device, used to check blood glucose, ICD-10: E11.9, Disp: 1 each, Rfl: 0    levothyroxine (SYNTHROID) 50 MCG tablet, TAKE 1 TABLET BEFORE BREAKFAST, Disp: 90 tablet, Rfl: 3    LIDOcaine-prilocaine (EMLA) cream, Apply topically as needed., Disp: 30 g, Rfl: 11    midodrine (PROAMATINE) 5 MG Tab, Take 1 tablet (5 mg total) by mouth 3 (three) times daily., Disp: 90 tablet, Rfl: 3    mupirocin (BACTROBAN) 2 % ointment, Apply topically 3 (three) times daily., Disp: 22 g, Rfl: 0    OLENA-NABIL RX 1- mg-mg-mcg Tab, Take 1 tablet by mouth once daily., Disp: , Rfl:     aspirin (ECOTRIN) 81 MG EC tablet, Take 1 tablet (81 mg total) by mouth once daily., Disp: 90 tablet, Rfl: 3    sevelamer carbonate (RENVELA) 800 mg Tab, Take 3 tablets (2,400 mg total) by mouth 3 (three) times daily with meals., Disp: 270 tablet, Rfl: 11    REVIEW OF SYSTEMS:  General: No  "fevers or chills; ENT: No sore throat; Allergy and Immunology: no persistent infections; Hematological and Lymphatic: No history of bleeding or easy bruising; Endocrine: negative; Respiratory: no cough, shortness of breath, or wheezing; Cardiovascular: no chest pain or dyspnea on exertion; Gastrointestinal: no abdominal pain/back, change in bowel habits, or bloody stools; Genito-Urinary: no dysuria, trouble voiding, or hematuria; Musculoskeletal: negative; Neurological: no TIA or stroke symptoms; Psychiatric: no nervousness, anxiety or depression.    PHYSICAL EXAM:      Pulse: 80         General appearance:  Alert, well-appearing, and in no distress.  Oriented to person, place, and time                    Neurological: Normal speech, no focal findings noted; CN II - XII grossly intact. All extremities with sensation to light touch.            Musculoskeletal: Digits/nail without cyanosis/clubbing.  Strength 5/5 all extremities.                    Neck: Supple, no significant adenopathy, no carotid bruit can be auscultated                  Chest:  Clear to auscultation, no wheezes, rales or rhonchi, symmetric air entry. No use of accessory muscles               Cardiac: Normal rate and regular rhythm, S1 and S2 normal            Abdomen: Soft, nontender, nondistended, no masses or organomegaly, no hernia     No rebound tenderness noted; bowel sounds normal     Pulsatile aortic mass is non palpable.     No groin adenopathy      Extremities:      2+ radial pulse bilaterally, LUE AVF +thrill, inc well healed, 2 PSA with dry scabs, no ulcerations     No BUE edema, Negative Manuel's test BUE    Skin: No tissue loss, 2+ LLE edema , 1+ RLE edema     VCSS 11     CEAP 3/4a    LAB RESULTS:  No results found for: "CBC"  Lab Results   Component Value Date    LABPROT 10.3 06/07/2021    INR 1.0 06/07/2021     Lab Results   Component Value Date     03/08/2024    K 4.5 03/08/2024     03/08/2024    CO2 26 03/08/2024    GLU " 97 03/08/2024    BUN 35 (H) 03/08/2024    CREATININE 9.76 (H) 05/14/2024    CALCIUM 9.6 03/08/2024    ANIONGAP 13 03/08/2024    EGFRNONAA 3.6 (A) 05/07/2022     Lab Results   Component Value Date    WBC 6.54 10/01/2023    RBC 3.84 (L) 10/01/2023    HGB 11.0 (L) 04/23/2024    HCT 32.7 (L) 10/01/2023    MCV 85 10/01/2023    MCH 27.1 10/01/2023    MCHC 31.8 (L) 10/01/2023    RDW 13.8 10/01/2023     10/01/2023    MPV 11.4 10/01/2023    GRAN 4.6 10/01/2023    GRAN 70.1 10/01/2023    LYMPH 0.7 (L) 10/01/2023    LYMPH 11.2 (L) 10/01/2023    MONO 1.1 (H) 10/01/2023    MONO 16.4 (H) 10/01/2023    EOS 0.1 10/01/2023    BASO 0.02 10/01/2023    EOSINOPHIL 1.4 10/01/2023    BASOPHIL 0.3 10/01/2023    DIFFMETHOD Automated 10/01/2023     .  Lab Results   Component Value Date    HGBA1C 6.1 (H) 03/08/2024       IMAGING:  All pertinent imaging has been reviewed and interpreted independently.    Vein mapping 9/2019:  Adequate R superior basilic vein and axillary vein ; Adequate L basilic vein superior and inferior, adequate L axillary     1/27/20 Left upper extremity dialysis ultrasound shows no hemodynamically significant stenosis.  Flow is 1083 ml/min.  Depth and diameter are appropriate.    3/23/20:  Left upper extremity dialysis ultrasound shows anastomotic and proximal fistula hemodynamically significant stenosis.  Flow is 955 ml/min.      7/2020: Left upper extremity dialysis ultrasound shows proximal fistula hemodynamically significant stenosis.  Flow is 1257 ml/min.      8/2020: Left upper extremity dialysis ultrasound shows proximal hemodynamically significant stenosis.  Flow is 1999 ml/min.      8/31/20: Left upper extremity dialysis ultrasound shows proximal fistula hemodynamically significant stenosis.  Flow is 2209 ml/min.      10/2020:  Prox stenosis, flow > 3000 ml/min    1/2021:  Proximal stenosis, flow 4414 ml/min    4/2021:  HD US reviewed without significant stenosis, adequate flow.    8/2021:HD US reviewed  without significant stenosis, adequate flow.    3/2022:  Left upper extremity dialysis ultrasound shows approx 50% anastomotic and prox fistula stenosis withou hemodynamically significant stenosis.  Flow is 3774 ml/min.      6/2022:  No significant stenosis     04/2023: No significant stenosis. Flow 4,086 ml/min    10/2023:  Left upper extremity dialysis ultrasound shows approx 50% anastomotic stenosis.  Flow is 5000 ml/min.      12/2023:  Color flow evaluation of the right lower extremity demonstrates no evidence of venous thrombosis in the deep or superficial veins, and no reflux.  Color flow evaluation of the left lower extremity demonstrates no evidence of venous thrombosis in the deep or superficial veins, and no reflux.    Arterial US 03/2024:  Conclusion    Right lower extremity arterial ultrasound shows no hemodynamically significant stenosis.    Left lower extremity arterial ultrasound shows no hemodynamically significant stenosis.      PAM 03/2024:      HD US 05/22/24: Flow 2529 ml/min    IMP/PLAN:  79 y.o. female with   Patient Active Problem List   Diagnosis    Severe obesity (BMI 35.0-39.9) with comorbidity    Sickle cell trait    Hyperlipidemia LDL goal <100    HUMBERTO on CPAP    Hypothyroidism (acquired)    Hx-TIA (transient ischemic attack)    Vitamin D deficiency disease    Pulmonary hypertension    Type 2 diabetes mellitus with ESRD (end-stage renal disease)    Secondary renal hyperparathyroidism    Incomplete bladder emptying    Postmenopausal atrophic vaginitis    Rectocele    Mixed incontinence urge and stress    Chronic diastolic heart failure    Tortuous aorta    ESRD on hemodialysis    Anemia of chronic disease    Debility    Chronic respiratory failure    Type 2 diabetes mellitus with foot ulcer, with long-term current use of insulin    Stable proliferative diabetic retinopathy associated with type 2 diabetes mellitus    Heart failure with preserved ejection fraction    Pseudophakia    Chronic  kidney disease-mineral and bone disorder    Age-related osteoporosis without current pathological fracture    H/O: stroke    Walker as ambulation aid    Swelling of lower extremity    Lymphedema of both lower extremities    Impaired functional mobility and activity tolerance    Wound of left leg    Multiple thyroid nodules    being managed by PCP and specialists who is here today for evaluation of long term HD access s/p L RC AV fistula. No presenting for LLE blister.    -s/p L RC AV fistula with proximal fistula measuring 5 mm 1/13/20, adequate flow without issues during HD s/p LUE fistulogram, central venogram, ligation of medial side branch cephalic vein, ligation of lateral side branch cephalic vein 3/2/20 with prox AVF stenosis s/p L proximal fistula angioplasty 7/21/20 with persistent flow issues s/p proximal angioplasty 8/19/20 with scoring balloon/DCB with improvement in stenosis/flow and asymptomatic anastomotic stenosis with no further issues with HD. Patient undergoing lymphedema therapy developed LLE blister to her shin with associated edema- wound well healed. Vascular studies with adequate blood flow for wound healing. HD US 05/22: Flow 2529 ml/min.  -Cont renal diet  -Rec cont lymphedema clinic and pumps  - Compression and elevation  -RTC in 3 mo for further evaluation of AVF with HD US.    I spent 30 minutes evaluating this patient and greater than 50% of the time was spent counseling, coordinator care and discussing the plan of care.  All questions were answered and patient stated understanding with agreement with the above treatment plan.    Tonia Silva NP  Vascular and Endovascular Surgery

## 2024-05-23 LAB
HD ANASTAMOSIS VESSEL: NORMAL
HD FISTULA OUTFLOW VEIN LOCATION: NORMAL
HD FISTULA OUTFLOW VEIN VESSEL: NORMAL
HD INFLOW ARTERY VESSEL: NORMAL
RIGHT DIS GRAFT DEPTH: 0.2 CM
RIGHT DIS GRAFT DIA: 0.8 CM
RIGHT DIS GRAFT PSV: 88 CM/ SEC
RIGHT INFLOW PSV: 312 CM/S
RIGHT MID GRAFT DEPTH: 0.5 CM
RIGHT MID GRAFT DIA: 1 CM
RIGHT MID GRAFT PSV: 151 CM/S
RIGHT OUTFLOW VEIN PSV: 149 CM/ SEC
RIGHT PROX ANA PSV: 617 CM/S
RIGHT PROX GRAFT DEPTH: 0.4 CM
RIGHT PROX GRAFT DIA: 0.5 CM
RIGHT PROX GRAFT PSV: 382 CM/S
RIGHT VOLUME FLOW DIA: 1.1 CM
RIGHT VOLUME FLOW PSV: 2529 ML/MIN

## 2024-06-19 ENCOUNTER — DOCUMENTATION ONLY (OUTPATIENT)
Dept: REHABILITATION | Facility: HOSPITAL | Age: 79
End: 2024-06-19
Payer: MEDICARE

## 2024-06-19 NOTE — PROGRESS NOTES
[facility]  Discharge Note    Name: Erica Leblanc  Clinic Number: [id number]    Physician: Keron Perez MD     Physician Orders: PT Eval and Treat - lymphedema  Medical Diagnosis from Referral:   Diagnosis   T82.858D (ICD-10-CM) - Stenosis of other vascular prosthetic devices, implants and grafts, subsequent encounter   I89.0 (ICD-10-CM) - Lymphedema, not elsewhere classified   Evaluation Date: 1/17/2024  Authorization Period Expiration: 12/22/25  Plan of Care Expiration: 4/10/24  Visit # / Visits authorized: 9/ 20     FOTO 2/2/24     Precautions: Standard, Diabetes, Fall, CHF, and ESRD, TIA/stroke, neuropathy    Date of Last visit: Visit Date: 3/1/2024  Total Visits Received: 9    ASSESSMENT      Per last visit 3/1/24:  Pt with diabetic and ESRD concerns with L leg wound and R leg delayed healing. Pt was seen by referring team and did have venous and arterial studies performed.  Pt will need to proceed with any additional recommendations for compression, pump use and wound care pending these results.   Her  RLE has decreased in size and density although she notes fluctuations due to dialysis.  L LE with wound care pending and present dressing/care was repeated as NP.   HOLD therapy for MD visit, wound care visits and ongoing HD.    Continue present compression as MD recommendations.     Discharge reason: Other:  HOLD PT for medical testing, wound care and management   Pt last followed 3/1/24.  Will need new or resume orders to return.     Discharge FOTO Score: na    Goals: Short Term Goals: (6 weeks)  1. Patient will show decreased girth in B LE by up to 1 cm to allow for LE symmetry, shoe and clothing choice, and ability to apply needed compression.  R LE met)   2. Patient will demonstrate 100% knowledge of lymphedema precautions and signs of infection to allow for reduced lymphedema risk, infection risk, and/or exacerbation of condition.  (progressing,   3. Patient or  caregiver will perform self-bandaging techniques and/or wearing of compression garments to allow for lymphatic drainage support, skin elasticity, and reduction in shape and size of limb. Lite compression and tubigrip  4. Patient will perform self lymph drainage techniques to areas within reach to enhance lymphatic drainage and skin condition.  (progressing  5. Patient will tolerate daily activities with multilayered bandaging to allow for lymphatic and venous support.  R LE met)     Long Term Goals: (12  weeks)  1. Patient will show decreased girth in B LE by up to 2 cm  to allow for LE symmetry, shoe and clothing choice, and ability to apply needed compression daily.  (some areas met)  2. Patient will show reduction in density to mild or less with improved contour of limb to allow for cosmesis, LE symmetry, infection risk reduction, and clothing and compression choice.   (progressing, nodular dense region remains R and wound L  3. Patient to merry/doff compression garment with daily compliance to assist in lymphedema management, skin elasticity, and tissue density.  (progressing)  4. Pt to show improved postural awareness and alignment.  (progressing,  5. Pt to be I and compliant with HEP to allow for increased function in affected limb.   (progressing,    PLAN   This patient is discharged from Physical Therapy      Debora Coles, PT

## 2024-07-10 ENCOUNTER — OFFICE VISIT (OUTPATIENT)
Dept: PODIATRY | Facility: CLINIC | Age: 79
End: 2024-07-10
Payer: MEDICARE

## 2024-07-10 VITALS — WEIGHT: 209.69 LBS | HEIGHT: 65 IN | BODY MASS INDEX: 34.93 KG/M2

## 2024-07-10 DIAGNOSIS — M20.12 HALLUX ABDUCTO VALGUS, LEFT: ICD-10-CM

## 2024-07-10 DIAGNOSIS — M20.42 HAMMER TOES OF BOTH FEET: ICD-10-CM

## 2024-07-10 DIAGNOSIS — S80.812A ABRASION OF LEFT LEG, INITIAL ENCOUNTER: ICD-10-CM

## 2024-07-10 DIAGNOSIS — M20.11 HALLUX ABDUCTO VALGUS, RIGHT: ICD-10-CM

## 2024-07-10 DIAGNOSIS — N18.6 ESRD ON HEMODIALYSIS: ICD-10-CM

## 2024-07-10 DIAGNOSIS — Z99.2 ESRD ON HEMODIALYSIS: ICD-10-CM

## 2024-07-10 DIAGNOSIS — E11.22 TYPE 2 DIABETES MELLITUS WITH ESRD (END-STAGE RENAL DISEASE): Primary | ICD-10-CM

## 2024-07-10 DIAGNOSIS — N18.6 TYPE 2 DIABETES MELLITUS WITH ESRD (END-STAGE RENAL DISEASE): Primary | ICD-10-CM

## 2024-07-10 DIAGNOSIS — R60.0 LOWER EXTREMITY EDEMA: ICD-10-CM

## 2024-07-10 DIAGNOSIS — L90.9 PLANTAR FAT PAD ATROPHY: ICD-10-CM

## 2024-07-10 DIAGNOSIS — M20.41 HAMMER TOES OF BOTH FEET: ICD-10-CM

## 2024-07-10 PROCEDURE — 99999 PR PBB SHADOW E&M-EST. PATIENT-LVL IV: CPT | Mod: PBBFAC,HCNC,, | Performed by: PODIATRIST

## 2024-07-10 NOTE — PROGRESS NOTES
Subjective:      Patient ID: Erica Leblanc is a 79 y.o. female.    Chief Complaint: Diabetes Mellitus (3/15/2024 Dr. Day ) and Nail Care    Erica is a 79 y.o. female who presents to the clinic for evaluation and treatment of high risk feet. Erica has a past medical history of Acute ischemic right PCA stroke (6/6/2021), Acute respiratory failure with hypoxia, Age-related osteoporosis without current pathological fracture (3/8/2021), Anemia of chronic kidney failure, stage 4 (severe) (4/5/2019), Cataracts, bilateral, CHF (congestive heart failure), CKD (chronic kidney disease) stage 3, GFR 30-59 ml/min, CKD (chronic kidney disease) stage 3, GFR 30-59 ml/min, Controlled type 2 diabetes mellitus with proteinuria or albuminuria, Depression, Diabetes with neurologic complications, Diabetic retinopathy of both eyes, Edema, Glaucoma, History of colonic polyps, TIA (transient ischemic attack) (11/2008), Hyperlipidemia LDL goal < 100, Hypertension, Hypothyroidism, Major depressive disorder, single episode, mild (2/17/2016), Mixed incontinence urge and stress, Obesity, Obstructive sleep apnea on CPAP, Osteopenia, Proteinuria, Sickle cell trait, Strabismus, TIA (transient ischemic attack), Trouble in sleeping, Type 2 diabetes mellitus with ophthalmic manifestations, Type 2 diabetes with stage 3 chronic kidney disease GFR 30-59, Type II or unspecified type diabetes mellitus with renal manifestations, uncontrolled(250.42), Uncontrolled type 2 diabetes mellitus with peripheral circulatory disorder (4/5/2019), Urge incontinence (1/11/2016), Urge incontinence, Venous stasis ulcer, and Vitamin D deficiency disease. The patient's chief complaint is an abrasion to the anterior aspect of the left leg.  Nonpainful, no active drainage.  Treating with a bandage. This patient has documented high risk feet requiring routine maintenance secondary to diabetes mellitis and those secondary complications of diabetes, as  mentioned..    Presents in wedge sandals    PCP: Sendy Elaine MD    Date Last Seen by PCP:   Chief Complaint   Patient presents with    Diabetes Mellitus     3/15/2024 Dr. Day     Nail Care     Hemoglobin A1C   Date Value Ref Range Status   03/08/2024 6.1 (H) 4.0 - 5.6 % Final     Comment:     ADA Screening Guidelines:  5.7-6.4%  Consistent with prediabetes  >or=6.5%  Consistent with diabetes    High levels of fetal hemoglobin interfere with the HbA1C  assay. Heterozygous hemoglobin variants (HbS, HgC, etc)do  not significantly interfere with this assay.   However, presence of multiple variants may affect accuracy.     12/06/2023 5.7 (H) 4.0 - 5.6 % Final     Comment:     ADA Screening Guidelines:  5.7-6.4%  Consistent with prediabetes  >or=6.5%  Consistent with diabetes    High levels of fetal hemoglobin interfere with the HbA1C  assay. Heterozygous hemoglobin variants (HbS, HgC, etc)do  not significantly interfere with this assay.   However, presence of multiple variants may affect accuracy.     09/14/2023 5.5 % Final   07/07/2023 6.2 (H) 4.0 - 5.6 % Final     Comment:     ADA Screening Guidelines:  5.7-6.4%  Consistent with prediabetes  >or=6.5%  Consistent with diabetes    High levels of fetal hemoglobin interfere with the HbA1C  assay. Heterozygous hemoglobin variants (HbS, HgC, etc)do  not significantly interfere with this assay.   However, presence of multiple variants may affect accuracy.     09/13/2022 6.3 (H) 0.0 - 5.6 % Final     Comment:     [facility]   06/14/2022 6.6 (H) 0.0 - 5.6 % Final     Comment:     [facility]           Patient Active Problem List   Diagnosis    Severe obesity (BMI 35.0-39.9) with comorbidity    Sickle cell trait    Hyperlipidemia LDL goal <100    HUMBERTO on CPAP    Hypothyroidism (acquired)    Hx-TIA (transient ischemic attack)    Vitamin D deficiency disease    Pulmonary hypertension    Type 2 diabetes mellitus with ESRD (end-stage renal disease)    Secondary  renal hyperparathyroidism    Incomplete bladder emptying    Postmenopausal atrophic vaginitis    Rectocele    Mixed incontinence urge and stress    Chronic diastolic heart failure    Tortuous aorta    ESRD on hemodialysis    Anemia of chronic disease    Debility    Chronic respiratory failure    Type 2 diabetes mellitus with foot ulcer, with long-term current use of insulin    Stable proliferative diabetic retinopathy associated with type 2 diabetes mellitus    Heart failure with preserved ejection fraction    Pseudophakia    Chronic kidney disease-mineral and bone disorder    Age-related osteoporosis without current pathological fracture    H/O: stroke    Walker as ambulation aid    Swelling of lower extremity    Lymphedema of both lower extremities    Impaired functional mobility and activity tolerance    Wound of left leg    Multiple thyroid nodules       Current Outpatient Medications on File Prior to Visit   Medication Sig Dispense Refill    ACCU-CHEK SOFT DEV LANCETS Kit       atorvastatin (LIPITOR) 80 MG tablet Take 1 tablet (80 mg total) by mouth every evening. 90 tablet 3    blood sugar diagnostic Strp One test strip use 2 times a day to check blood glucose,  ICD-10: E11.9, compatible with insurance/glucometer 100 each 11    blood-glucose meter kit One glucometer, use to check blood glucose.   ICD-10: E11.9. Dispense machine covered by insurance 1 each 0    cinacalcet (SENSIPAR) 30 MG Tab       docusate sodium (COLACE) 100 MG capsule Take 200 mg by mouth once daily.       doxercalciferoL (HECTOROL) 4 mcg/2 mL injection Inject 4.5 mcg into the vein 3 (three) times a week. At dialysis unit      dulaglutide (TRULICITY) 1.5 mg/0.5 mL pen injector Inject 1.5 mg into the skin every 7 days. 12 pen 3    epoetin arnel (EPOGEN INJ) Inject 1,000 Units as directed every 7 days. At the dialysis unit      fluticasone propionate (FLONASE) 50 mcg/actuation nasal spray 1 spray (50 mcg total) by Each Nostril route once daily.  48 g 5    lancets Misc One lancets use 2 times a day to check blood glucose, ICD-10: E11.9 100 each 11    lancing device Misc One device, used to check blood glucose, ICD-10: E11.9 1 each 0    levothyroxine (SYNTHROID) 50 MCG tablet TAKE 1 TABLET BEFORE BREAKFAST 90 tablet 3    LIDOcaine-prilocaine (EMLA) cream Apply topically as needed. 30 g 11    midodrine (PROAMATINE) 5 MG Tab Take 1 tablet (5 mg total) by mouth 3 (three) times daily. 90 tablet 3    mupirocin (BACTROBAN) 2 % ointment Apply topically 3 (three) times daily. 22 g 0    OLENA-NABIL RX 1- mg-mg-mcg Tab Take 1 tablet by mouth once daily.      aspirin (ECOTRIN) 81 MG EC tablet Take 1 tablet (81 mg total) by mouth once daily. 90 tablet 3    sevelamer carbonate (RENVELA) 800 mg Tab Take 3 tablets (2,400 mg total) by mouth 3 (three) times daily with meals. 270 tablet 11     No current facility-administered medications on file prior to visit.       Review of patient's allergies indicates:   Allergen Reactions    Ace inhibitors Other (See Comments)     Other reaction(s): cough       Past Surgical History:   Procedure Laterality Date    AV FISTULA PLACEMENT Left 2019    Procedure: CREATION, AV FISTULA, LEFT UPPER EXTREMITY;  Surgeon: Keron Perez MD;  Location: [facility];  Service: Vascular;  Laterality: Left;    BREAST BIOPSY      breast reduction Bilateral age 31    BREAST SURGERY      CATARACT EXTRACTION Bilateral     cataracts Bilateral      SECTION, LOW TRANSVERSE      x1    CHOLECYSTECTOMY      COLONOSCOPY N/A 2022    Procedure: COLONOSCOPY;  Surgeon: Mahesh Huang MD;  Location: [facility] ([address] [place] [address]);  Service: Endoscopy;  Laterality: N/A;  fully vaccinated-sm.  RAPID COVID  +cologuard needing ASAP  Dialysis pt T,TH Sat and left UA access  2nd floor - cardiac/dialysis/SOB / LABS / prep ins. emailed - [facility]    EYE SURGERY  2014, 2014    vitrectomy    EYE SURGERY Right 2016    FISTULOGRAM Left 3/6/2020     Procedure: Fistulogram, left upper extremity, with branch ligation;  Surgeon: Keron Perez MD;  [facility];  Service: Vascular;  Laterality: Left;  Time 1.1 Minute  42.10 mGy    FISTULOGRAM Left 7/21/2020    Procedure: Fistulogram, left upper extremity, transradial access with possible intervention;  Surgeon: Keron Perez MD;  Location[place] [facility] [place] [address] [place] [address];  Service: Vascular;  Laterality: Left;    FISTULOGRAM Left 8/19/2020    Procedure: Fistulogram, left upper extremity, possible intervention;  Surgeon: Keron Perez MD;  Location: [facility];  Service: Vascular;  Laterality: Left;  930 AM START  RN PRE OP  ---COVID NEGATIVE ON  8-. CA    FISTULOGRAM Left 1/21/2022    Procedure: Fistulogram, transradial access;  Surgeon: Keron Perez MD;  Location: [facility] [place] [address] [place] [address];  Service: Vascular;  Laterality: Left;  mgy-40.16  gycm-8.3905  contrast-34cc  time-12.4min    HYSTERECTOMY  1986    TAHBSO (patient is unsure if ovaries removed)    OOPHORECTOMY      PERCUTANEOUS TRANSLUMINAL ANGIOPLASTY OF ARTERIOVENOUS FISTULA Left 7/21/2020    Procedure: PTA, AV FISTULA;  Surgeon: Keron Perez MD;  Location: [facility] [place] [address] [place] [address];  Service: Vascular;  Laterality: Left;  15.9 minutes of fluro  41.12  mGy  7.9060 Gy cm2  32ml  contrast    PHLEBOGRAPHY Left 7/21/2020    Procedure: CENTRAL VENOGRAM;  Surgeon: Keron Perez MD;  Location: [facility] [address] [place] [address];  Service: Vascular;  Laterality: Left;    REFRACTIVE SURGERY      TOTAL REDUCTION MAMMOPLASTY      approx 10 yrs ago    VENOPLASTY  1/21/2022    Procedure: ANGIOPLASTY, VEIN;  Surgeon: Keron Perez MD;  Location: [facility] [address] [place] [address];  Service: Vascular;;       Family History   Problem Relation Name Age of Onset    Stroke Mother      Diabetes Mother      Hypertension Mother      Cataracts Mother      Leukemia Father      Cataracts Father      Ovarian cancer Sister [place] 35    Achondroplasia Sister Hannah     HIV Brother Paul      No Known Problems Daughter x2     No Known Problems Son x3     Breast cancer Maternal Aunt  65    Parkinsonism Maternal Aunt      Esophageal cancer Maternal Uncle          smoker    No Known Problems Paternal Aunt      Cataracts Paternal Uncle      Cataracts Maternal Grandmother      Cataracts Maternal Grandfather      Diabetes Paternal Grandmother      Cataracts Paternal Grandmother      No Known Problems Paternal Grandfather      No Known Problems Other      Amblyopia Neg Hx      Blindness Neg Hx      Glaucoma Neg Hx      Macular degeneration Neg Hx      Retinal detachment Neg Hx      Strabismus Neg Hx      Thyroid disease Neg Hx      Colon cancer Neg Hx      Cancer Neg Hx         Social History     Socioeconomic History    Marital status: Single    Number of children: 5   Occupational History    Occupation:      Employer: [employer]     Comment: part-time   Tobacco Use    Smoking status: Never    Smokeless tobacco: Never   Substance and Sexual Activity    Alcohol use: No     Alcohol/week: 0.0 standard drinks of alcohol    Drug use: No    Sexual activity: Not Currently     Partners: Male     Birth control/protection: Post-menopausal, Surgical     Social Determinants of Health     Financial Resource Strain: Low Risk  (6/9/2023)    Overall Financial Resource Strain (CARDIA)     Difficulty of Paying Living Expenses: Not hard at all   Food Insecurity: No Food Insecurity (6/9/2023)    Hunger Vital Sign     Worried About Running Out of Food in the Last Year: Never true     Ran Out of Food in the Last Year: Never true   Transportation Needs: No Transportation Needs (6/9/2023)    PRAPARE - Transportation     Lack of Transportation (Medical): No     Lack of Transportation (Non-Medical): No   Physical Activity: Insufficiently Active (6/9/2023)    Exercise Vital Sign     Days of Exercise per Week: 2 days     Minutes of Exercise per Session: 10 min   Stress: No Stress Concern Present (6/9/2023)  "   [employer] [place] [employer] - Occupational Stress Questionnaire     Feeling of Stress : Only a little   Housing Stability: Low Risk  (6/9/2023)    Housing Stability Vital Sign     Unable to Pay for Housing in the Last Year: No     Number of Places Lived in the Last Year: 1     Unstable Housing in the Last Year: No       Review of Systems   Constitutional: Negative for chills.   Cardiovascular:  Positive for leg swelling. Negative for chest pain and claudication.   Respiratory:  Negative for cough.    Skin:  Positive for color change, dry skin and nail changes.   Musculoskeletal:  Positive for joint pain, myalgias and stiffness.   Gastrointestinal:  Negative for nausea.   Neurological:  Positive for paresthesias. Negative for numbness.   Psychiatric/Behavioral:  The patient is not nervous/anxious.            Objective:      Vitals:    07/10/24 0912   Weight: 95.1 kg (209 lb 10.5 oz)   Height: 5' 5" (1.651 m)         Physical Exam  Vitals and nursing note reviewed.   Constitutional:       Appearance: She is not diaphoretic.      Comments: General: Pt. is well-developed, well-nourished, appears stated age, in no acute distress, alert and oriented x 3. No evidence of depression, anxiety, or agitation. Calm, cooperative, and communicative. Appropriate interactions and affect.       Cardiovascular:      Pulses:           Dorsalis pedis pulses are 1+ on the right side and 1+ on the left side.        Posterior tibial pulses are 1+ on the right side and 1+ on the left side.      Comments: There is decreased digital hair.   Musculoskeletal:      Right ankle: Swelling present. No tenderness.      Right Achilles Tendon: No defects.      Left ankle: Swelling present. No tenderness.      Left Achilles Tendon: No defects.      Right foot: Normal range of motion. No tenderness.      Left foot: Normal range of motion. No tenderness.      Comments: Muscle strength is 5/5 in all groups bilaterally.    Decreased stride, " station of gait.  apropulsive toe off.  Increased angle and base of gait.    Patient has hammertoes of digits 2-5 bilateral partially reducible without symptom today.    Visible and palpable bunion without pain at dorsomedial 1st metatarsal head right and left.  Hallux abducted right and left partially reducible, tracks laterally without being track bound.  No ecchymosis, erythema, edema, or cardinal signs infection or signs of trauma same foot.    Fat pad atrophy to heels and met heads bilateral     Skin:     General: Skin is warm and dry.      Coloration: Skin is not pale.      Findings: Abrasion (anterior left leg, thin epithelium and granulation) present. No lesion (posterior right leg healed) or rash.      Nails: There is no clubbing.      Comments: Xerosis bilaterally     Toenails 1-5 bilaterally are discolored/yellowed, dystrophic, brittle with subungual debris.     Interdigital Spaces clean, dry and without evidence of break in skin integrity   Neurological:      Sensory: No sensory deficit.      Comments: [place]-Gloria 5.07 monofilament is intact bilateral feet. Sharp/dull sensation is also intact Bilateral feet.           Psychiatric:         Speech: Speech normal.               Assessment:       Encounter Diagnoses   Name Primary?    Type 2 diabetes mellitus with ESRD (end-stage renal disease) Yes    ESRD on hemodialysis     Abrasion of left leg, initial encounter     Plantar fat pad atrophy     Hallux abducto valgus, right     Hallux abducto valgus, left     Hammer toes of both feet     Lower extremity edema            Plan:     Problem List Items Addressed This Visit       Type 2 diabetes mellitus with ESRD (end-stage renal disease) - Primary    Relevant Orders    DIABETIC SHOES FOR HOME USE    ESRD on hemodialysis    Overview     - at [facility]; Dr. Barrientos  - Tuesday, Thursday and Saturday  - has AV graft in left UE          Other Visit Diagnoses       Abrasion of left leg, initial encounter         Plantar fat pad atrophy        Relevant Orders    DIABETIC SHOES FOR HOME USE    Hallux abducto valgus, right        Relevant Orders    DIABETIC SHOES FOR HOME USE    Hallux abducto valgus, left        Relevant Orders    DIABETIC SHOES FOR HOME USE    Hammer toes of both feet        Relevant Orders    DIABETIC SHOES FOR HOME USE    Lower extremity edema        Relevant Orders    DIABETIC SHOES FOR HOME USE             I counseled the patient on her conditions, their implications and medical management.    Orders Placed This Encounter    DIABETIC SHOES FOR HOME USE       Education about the diabetic foot, neuropathy, and prevention of limb loss.    Discussed wound healing cycle, skin integrity, ways to care for skin.Counseled patient on the effects of  PVD and blood glucose on healing. She verbalizes understanding that it can increase the chances of delayed healing and this prolonged exposure leads to infection or progression of infection which subsequently can result in loss of limb.    The wound is cleansed of foreign material as much as possible and the base inspected for bone or abscess.  Base is granular and epithelial without SOI and without bone nor joint exposure.  Rochelle and mepilex Ag applied.  Discussed home care.     Shoe inspection. Diabetic Foot Education. Patient reminded of the importance of good nutrition/healthy diet/weight management and blood sugar control to help prevent podiatric complications of diabetes. Patient instructed on proper foot hygeine. Wear comfortable, proper fitting shoes. Wash feet daily. Dry well. After drying, apply moisturizer to feet (no lotion to webspaces). Inspect feet daily for skin breaks, blisters, swelling, or redness. Wear cotton socks (preferably white)  Change socks every day. Do NOT walk barefoot. Do NOT use heating pads or hot water soaks. We discussed wearing proper shoe gear, daily foot inspections, never walking without protective shoe gear.     Discussed edema  control and the importance of daily moisturizer to the skin of the lower extremity    Recommend applying vicks vaporub to thick abnormal toenails daily x 6 months to treat fungal nail infection.    Rx diabetic shoes for protection and support    She will continue to monitor the areas daily, inspect her feet, wear protective shoe gear when ambulatory, moisturizer to maintain skin integrity and follow in this office in approximately 3-4 months, sooner p.r.n.

## 2024-07-10 NOTE — PATIENT INSTRUCTIONS
Over the counter pain creams: Voltaren Gel, Biofreeze, Bengay, tiger balm, two old goat, lidocaine gel,  Absorbine Veterinary Liniment Gel Topical Analgesic Sore Muscle and Joint Pain Relief    Recommend lotions: eucerin, eucerin for diabetics, aquaphor, A&D ointment, gold bond for diabetics, sween, Tipton's Bees all purpose baby ointment,  urea 40 with aloe or SkinIntegra rapid crack repair (found on amazon.com)    Shoe recommendations: (try 6pm.com, zappos.com , nordstromrack.[facility] [employer], or shoes.com for discounted prices) you can visit varsity shoes in [place], DSW shoes in [place]  or armando rack in the [facility] (there are also several shoe brand outlets in the [facility])    ONLY purchase stability style tennis shoes NO flex, foam, free, yoga mat style shoes    Shoe examples:    Asics (GT 4000 or gel foundations), new balance stability type shoes (such as the 940 series), saucony (stabil c3),  Hernandez (GTS or Beast or   transcend), propet, HokaOne (tennis shoe) Gregor (tennis shoes and boots)    Kimberlyft Aric (women) Kierra&Corey (men), clarks, crocs, aerosoles, naturalizers, SAS, ecco, born, elvia storey, rockports (dress shoes)    Vionic, burkenstocks, fitflops, propet, taos, baretraps (sandals)    HokaOne sandals, crocs, propet (house shoes)      Nail Home remedy:  Vicks Vapor rub or Emuaid to nails for easier manageability    Diabetes: Inspecting Your Feet  Diabetes increases your chances of developing foot problems. So inspect your feet every day. This helps you find small skin irritations before they become serious infections. If you have trouble seeing the bottoms of your feet, use a mirror or ask a family member or friend to help.     Pressure spots on the bottom of the foot are common areas where problems develop.   How to check your feet  Below are tips to help you look for foot problems. Try to check your feet at the same time each day, such as when you get out of bed in the morning:  Check the top of  each foot. The tops of toes, back of the heel, and outer edge of the foot can get a lot of rubbing from poor-fitting shoes.  Check the bottom of each foot. Daily wear and tear often leads to problems at pressure spots.  Check the toes and nails. Fungal infections often occur between toes. Toenail problems can also be a sign of fungal infections or lead to breaks in the skin.  Check your shoes, too. Loose objects inside a shoe can injure the foot. Use your hand to feel inside your shoes for things like goldy, loose stitching, or rough areas that could irritate your skin.  Warning signs  Look for any color changes in the foot. Redness with streaks can signal a severe infection, which needs immediate medical attention. Tell your doctor right away if you have any of these problems:  Swelling, sometimes with color changes, may be a sign of poor blood flow or infection. Symptoms include tenderness and an increase in the size of your foot.  Warm or hot areas on your feet may be signs of infection. A foot that is cold may not be getting enough blood.  Sensations such as burning, tingling, or pins and needles can be signs of a problem. Also check for areas that may be numb.  Hot spots are caused by friction or pressure. Look for hot spots in areas that get a lot of rubbing. Hot spots can turn into blisters, calluses, or sores.  Cracks and sores are caused by dry or irritated skin. They are a sign that the skin is breaking down, which can lead to infection.  Toenail problems to watch for include nails growing into the skin (ingrown toenail) and causing redness or pain. Thick, yellow, or discolored nails can signal a fungal infection.  Drainage and odor can develop from untreated sores and ulcers. Call your doctor right away if you notice white or yellow drainage, bleeding, or unpleasant odor.   © 9215-8732 [employer]. [address] [place] [address], [place] [address]. All rights reserved. This information is not  intended as a substitute for professional medical care. Always follow your healthcare professional's instructions.        Step-by-Step:  Inspecting Your Feet (Diabetes)    Date Last Reviewed: 10/1/2016  © 5599-1085 [employer]. [address] [place] [address], [place] [address]. All rights reserved. This information is not intended as a substitute for professional medical care. Always follow your healthcare professional's instructions.

## 2024-07-15 ENCOUNTER — TELEPHONE (OUTPATIENT)
Dept: VASCULAR SURGERY | Facility: CLINIC | Age: 79
End: 2024-07-15
Payer: MEDICARE

## 2024-07-15 NOTE — TELEPHONE ENCOUNTER
----- Message from Estela Kelly sent at 7/15/2024  9:31 AM CDT -----  Regarding: Patient call back  .Type: Patient Call Back    Who called:self     What is the request in detail:asking what day she can come in so Dr. Perez can take a look at her legs and let her know if she can began to use the Lymphedema pump again. Caller states she does not want to schedule an appointment    Can the clinic reply by [facility]?no     Would the patient rather a call back or a response via [facility]? Call     Best call back number:.[phone]      Additional Information:

## 2024-07-15 NOTE — TELEPHONE ENCOUNTER
Called and spoke with pt. States her dgtr.dog scratched her leg and wanted to have provider look at prior to using her lymphedema pumps. States she saw podiatry  recently and they put a dressing on it. Scheduled an appt. and spoke with provider. Received message from provider stating: just looked at the picture in media ok to use pump while she has dressing on leg. Paint scratch with betadine twice daily. Pt.aware and verbalized understanding. Appt.scheduled for today cancelled. Pt. to call our office if has any further issues.

## 2024-07-17 ENCOUNTER — OFFICE VISIT (OUTPATIENT)
Dept: FAMILY MEDICINE | Facility: CLINIC | Age: 79
End: 2024-07-17
Payer: MEDICARE

## 2024-07-17 VITALS
TEMPERATURE: 99 F | BODY MASS INDEX: 35.4 KG/M2 | OXYGEN SATURATION: 92 % | HEART RATE: 77 BPM | HEIGHT: 65 IN | WEIGHT: 212.5 LBS | SYSTOLIC BLOOD PRESSURE: 146 MMHG | DIASTOLIC BLOOD PRESSURE: 68 MMHG

## 2024-07-17 DIAGNOSIS — R53.81 DEBILITY: ICD-10-CM

## 2024-07-17 DIAGNOSIS — Z86.73 H/O: STROKE: ICD-10-CM

## 2024-07-17 DIAGNOSIS — G47.33 OSA ON CPAP: Chronic | ICD-10-CM

## 2024-07-17 DIAGNOSIS — N18.6 ESRD ON HEMODIALYSIS: ICD-10-CM

## 2024-07-17 DIAGNOSIS — E66.01 SEVERE OBESITY (BMI 35.0-39.9) WITH COMORBIDITY: ICD-10-CM

## 2024-07-17 DIAGNOSIS — D57.3 SICKLE CELL TRAIT: ICD-10-CM

## 2024-07-17 DIAGNOSIS — E03.9 HYPOTHYROIDISM (ACQUIRED): Chronic | ICD-10-CM

## 2024-07-17 DIAGNOSIS — M81.0 AGE-RELATED OSTEOPOROSIS WITHOUT CURRENT PATHOLOGICAL FRACTURE: ICD-10-CM

## 2024-07-17 DIAGNOSIS — Z99.89 WALKER AS AMBULATION AID: ICD-10-CM

## 2024-07-17 DIAGNOSIS — I50.32 CHRONIC DIASTOLIC HEART FAILURE: Chronic | ICD-10-CM

## 2024-07-17 DIAGNOSIS — D63.8 ANEMIA OF CHRONIC DISEASE: Chronic | ICD-10-CM

## 2024-07-17 DIAGNOSIS — I77.1 TORTUOUS AORTA: ICD-10-CM

## 2024-07-17 DIAGNOSIS — Z99.2 ESRD ON HEMODIALYSIS: ICD-10-CM

## 2024-07-17 DIAGNOSIS — E78.5 HYPERLIPIDEMIA LDL GOAL <100: Chronic | ICD-10-CM

## 2024-07-17 DIAGNOSIS — E11.3553 STABLE PROLIFERATIVE DIABETIC RETINOPATHY OF BOTH EYES ASSOCIATED WITH TYPE 2 DIABETES MELLITUS: ICD-10-CM

## 2024-07-17 DIAGNOSIS — Z00.00 ROUTINE MEDICAL EXAM: Primary | ICD-10-CM

## 2024-07-17 DIAGNOSIS — N25.81 SECONDARY RENAL HYPERPARATHYROIDISM: ICD-10-CM

## 2024-07-17 DIAGNOSIS — E11.22 TYPE 2 DIABETES MELLITUS WITH ESRD (END-STAGE RENAL DISEASE): ICD-10-CM

## 2024-07-17 DIAGNOSIS — J96.10 CHRONIC RESPIRATORY FAILURE, UNSPECIFIED WHETHER WITH HYPOXIA OR HYPERCAPNIA: ICD-10-CM

## 2024-07-17 DIAGNOSIS — I27.20 PULMONARY HYPERTENSION: ICD-10-CM

## 2024-07-17 DIAGNOSIS — N18.6 TYPE 2 DIABETES MELLITUS WITH ESRD (END-STAGE RENAL DISEASE): ICD-10-CM

## 2024-07-17 PROCEDURE — 99397 PER PM REEVAL EST PAT 65+ YR: CPT | Mod: HCNC,S$GLB,, | Performed by: INTERNAL MEDICINE

## 2024-07-17 PROCEDURE — 99999 PR PBB SHADOW E&M-EST. PATIENT-LVL IV: CPT | Mod: PBBFAC,HCNC,, | Performed by: INTERNAL MEDICINE

## 2024-07-17 PROCEDURE — 1101F PT FALLS ASSESS-DOCD LE1/YR: CPT | Mod: HCNC,CPTII,S$GLB, | Performed by: INTERNAL MEDICINE

## 2024-07-17 PROCEDURE — 3077F SYST BP >= 140 MM HG: CPT | Mod: HCNC,CPTII,S$GLB, | Performed by: INTERNAL MEDICINE

## 2024-07-17 PROCEDURE — 1126F AMNT PAIN NOTED NONE PRSNT: CPT | Mod: HCNC,CPTII,S$GLB, | Performed by: INTERNAL MEDICINE

## 2024-07-17 PROCEDURE — 3078F DIAST BP <80 MM HG: CPT | Mod: HCNC,CPTII,S$GLB, | Performed by: INTERNAL MEDICINE

## 2024-07-17 PROCEDURE — 1159F MED LIST DOCD IN RCRD: CPT | Mod: HCNC,CPTII,S$GLB, | Performed by: INTERNAL MEDICINE

## 2024-07-17 PROCEDURE — 3288F FALL RISK ASSESSMENT DOCD: CPT | Mod: HCNC,CPTII,S$GLB, | Performed by: INTERNAL MEDICINE

## 2024-07-17 PROCEDURE — 1160F RVW MEDS BY RX/DR IN RCRD: CPT | Mod: HCNC,CPTII,S$GLB, | Performed by: INTERNAL MEDICINE

## 2024-07-17 NOTE — PROGRESS NOTES
CHIEF COMPLAINT:   Chief Complaint   Patient presents with    Annual Exam          HISTORY OF PRESENT ILLNESS:  Erica Leblanc is a 79 y.o. female who presents to the clinic today for a routine physical exam. Her last physical exam was approximately 1 years(s) ago.    Subjective    PAST MEDICAL HISTORY:  Past Medical History:   Diagnosis Date    Acute ischemic right PCA stroke 6/6/2021    Acute respiratory failure with hypoxia     Age-related osteoporosis without current pathological fracture 3/8/2021    Anemia of chronic kidney failure, stage 4 (severe) 4/5/2019    Cataracts, bilateral     CHF (congestive heart failure)     CKD (chronic kidney disease) stage 3, GFR 30-59 ml/min     CKD (chronic kidney disease) stage 3, GFR 30-59 ml/min     Controlled type 2 diabetes mellitus with proteinuria or albuminuria     Depression     Diabetes with neurologic complications     Diabetic retinopathy of both eyes     Edema     Glaucoma     History of colonic polyps     Hx-TIA (transient ischemic attack) 11/2008    Hyperlipidemia LDL goal < 100     Hypertension     Hypothyroidism     Major depressive disorder, single episode, mild 2/17/2016    Mixed incontinence urge and stress     Obesity     Obstructive sleep apnea on CPAP     7/19/19:  Home CPAP machine broken, per patient & son    Osteopenia     Proteinuria     Sickle cell trait     Strabismus     TIA (transient ischemic attack)     Trouble in sleeping     Type 2 diabetes mellitus with ophthalmic manifestations     Type 2 diabetes with stage 3 chronic kidney disease GFR 30-59     Type II or unspecified type diabetes mellitus with renal manifestations, uncontrolled(250.42)     Uncontrolled type 2 diabetes mellitus with peripheral circulatory disorder 4/5/2019    Urge incontinence 1/11/2016    Urge incontinence     Venous stasis ulcer     bilateral lower legs    Vitamin D deficiency disease        PAST SURGICAL HISTORY:  Past Surgical History:   Procedure Laterality  Date    AV FISTULA PLACEMENT Left 2019    Procedure: CREATION, AV FISTULA, LEFT UPPER EXTREMITY;  Surgeon: Keron Perez MD;  Location: [facility] OR;  Service: Vascular;  Laterality: Left;    BREAST BIOPSY      breast reduction Bilateral age 31    BREAST SURGERY      CATARACT EXTRACTION Bilateral     cataracts Bilateral      SECTION, LOW TRANSVERSE      x1    CHOLECYSTECTOMY      COLONOSCOPY N/A 2022    Procedure: COLONOSCOPY;  Surgeon: Mahesh Huang MD;  Location: [facility] ENDO (2ND FLR);  Service: Endoscopy;  Laterality: N/A;  fully vaccinated-sm.  RAPID COVID  +cologuard needing ASAP  Dialysis pt T,TH Sat and left UA access  2nd floor - cardiac/dialysis/SOB / LABS / prep ins. emailed - [facility]W    EYE SURGERY  2014, 2014    vitrectomy    EYE SURGERY Right 2016    FISTULOGRAM Left 3/6/2020    Procedure: Fistulogram, left upper extremity, with branch ligation;  Surgeon: Keron Perez MD;  [facility];  Service: Vascular;  Laterality: Left;  Time 1.1 Minute  42.10 mGy    FISTULOGRAM Left 2020    Procedure: Fistulogram, left upper extremity, transradial access with possible intervention;  Surgeon: Keron Perez MD;  [facility];  Service: Vascular;  Laterality: Left;    FISTULOGRAM Left 2020    Procedure: Fistulogram, left upper extremity, possible intervention;  Surgeon: Keron Perez MD;  Location: [facility] OR;  Service: Vascular;  Laterality: Left;  930 AM START  RN PRE OP  ---COVID NEGATIVE ON  2020. [place]    FISTULOGRAM Left 2022    Procedure: Fistulogram, transradial access;  Surgeon: Keron Perez MD;  [facility];  Service: Vascular;  Laterality: Left;  mgy-40.16  gycm-8.3905  contrast-34cc  time-12.4min    HYSTERECTOMY      TAHBSO (patient is unsure if ovaries removed)    OOPHORECTOMY      PERCUTANEOUS TRANSLUMINAL ANGIOPLASTY OF ARTERIOVENOUS FISTULA Left 2020    Procedure: PTA, AV  FISTULA;  Surgeon: Keron Perez MD;  Location: [facility] OR [address] [place] [address];  Service: Vascular;  Laterality: Left;  15.9 minutes of fluro  41.12  mGy  7.9060 Gy cm2  32ml  contrast    PHLEBOGRAPHY Left 7/21/2020    Procedure: CENTRAL VENOGRAM;  Surgeon: Keron Perez MD;  Location: [facility] OR [address] [place] [address];  Service: Vascular;  Laterality: Left;    REFRACTIVE SURGERY      TOTAL REDUCTION MAMMOPLASTY      approx 10 yrs ago    VENOPLASTY  1/21/2022    Procedure: ANGIOPLASTY, VEIN;  Surgeon: Keron Perez MD;  Location: [facility] OR [address] [place] [address];  Service: Vascular;;       SOCIAL HISTORY:  Social History     Socioeconomic History    Marital status: Single    Number of children: 5   Occupational History    Occupation:      Employer: [employer]     Comment: part-time   Tobacco Use    Smoking status: Never    Smokeless tobacco: Never   Substance and Sexual Activity    Alcohol use: No     Alcohol/week: 0.0 standard drinks of alcohol    Drug use: No    Sexual activity: Not Currently     Partners: Male     Birth control/protection: Post-menopausal, Surgical     Social Determinants of Health     Financial Resource Strain: Low Risk  (6/9/2023)    Overall Financial Resource Strain (CARDIA)     Difficulty of Paying Living Expenses: Not hard at all   Food Insecurity: No Food Insecurity (6/9/2023)    Hunger Vital Sign     Worried About Running Out of Food in the Last Year: Never true     Ran Out of Food in the Last Year: Never true   Transportation Needs: No Transportation Needs (6/9/2023)    PRAPARE - Transportation     Lack of Transportation (Medical): No     Lack of Transportation (Non-Medical): No   Physical Activity: Insufficiently Active (6/9/2023)    Exercise Vital Sign     Days of Exercise per Week: 2 days     Minutes of Exercise per Session: 10 min   Stress: No Stress Concern Present (6/9/2023)    [employer] - Occupational Stress Questionnaire     Feeling of Stress : Only a  little   Housing Stability: Low Risk  (6/9/2023)    Housing Stability Vital Sign     Unable to Pay for Housing in the Last Year: No     Number of Places Lived in the Last Year: 1     Unstable Housing in the Last Year: No       FAMILY HISTORY:  Family History   Problem Relation Name Age of Onset    Stroke Mother      Diabetes Mother      Hypertension Mother      Cataracts Mother      Leukemia Father      Cataracts Father      Ovarian cancer Sister Marilee 35    Achondroplasia Sister Hannah     HIV Brother Paul     No Known Problems Daughter x2     No Known Problems Son x3     Breast cancer Maternal Aunt  65    Parkinsonism Maternal Aunt      Esophageal cancer Maternal Uncle          smoker    No Known Problems Paternal Aunt      Cataracts Paternal Uncle      Cataracts Maternal Grandmother      Cataracts Maternal Grandfather      Diabetes Paternal Grandmother      Cataracts Paternal Grandmother      No Known Problems Paternal Grandfather      No Known Problems Other      Amblyopia Neg Hx      Blindness Neg Hx      Glaucoma Neg Hx      Macular degeneration Neg Hx      Retinal detachment Neg Hx      Strabismus Neg Hx      Thyroid disease Neg Hx      Colon cancer Neg Hx      Cancer Neg Hx         ALLERGIES AND MEDICATIONS: updated and reviewed.  Review of patient's allergies indicates:   Allergen Reactions    Ace inhibitors Other (See Comments)     Other reaction(s): cough     Medication List with Changes/Refills   Current Medications    ACCU-CHEK SOFT DEV LANCETS KIT        ASPIRIN (ECOTRIN) 81 MG EC TABLET    Take 1 tablet (81 mg total) by mouth once daily.    ATORVASTATIN (LIPITOR) 80 MG TABLET    Take 1 tablet (80 mg total) by mouth every evening.    BLOOD SUGAR DIAGNOSTIC STRP    One test strip use 2 times a day to check blood glucose,  ICD-10: E11.9, compatible with insurance/glucometer    BLOOD-GLUCOSE METER KIT    One glucometer, use to check blood glucose.   ICD-10: E11.9. Dispense machine covered by  insurance    CINACALCET (SENSIPAR) 30 MG TAB        DOCUSATE SODIUM (COLACE) 100 MG CAPSULE    Take 200 mg by mouth once daily.     DOXERCALCIFEROL (HECTOROL) 4 MCG/2 ML INJECTION    Inject 4.5 mcg into the vein 3 (three) times a week. At dialysis unit    DULAGLUTIDE (TRULICITY) 1.5 MG/0.5 ML PEN INJECTOR    Inject 1.5 mg into the skin every 7 days.    EPOETIN BE (EPOGEN INJ)    Inject 1,000 Units as directed every 7 days. At the dialysis unit    FLUTICASONE PROPIONATE (FLONASE) 50 MCG/ACTUATION NASAL SPRAY    1 spray (50 mcg total) by Each Nostril route once daily.    LANCETS MISC    One lancets use 2 times a day to check blood glucose, ICD-10: E11.9    LANCING DEVICE MISC    One device, used to check blood glucose, ICD-10: E11.9    LEVOTHYROXINE (SYNTHROID) 50 MCG TABLET    TAKE 1 TABLET BEFORE BREAKFAST    LIDOCAINE-PRILOCAINE (EMLA) CREAM    Apply topically as needed.    MIDODRINE (PROAMATINE) 5 MG TAB    Take 1 tablet (5 mg total) by mouth 3 (three) times daily.    MUPIROCIN (BACTROBAN) 2 % OINTMENT    Apply topically 3 (three) times daily.    OLENA-NABIL RX 1- MG-MG-MCG TAB    Take 1 tablet by mouth once daily.    SEVELAMER CARBONATE (RENVELA) 800 MG TAB    Take 3 tablets (2,400 mg total) by mouth 3 (three) times daily with meals.          CARE TEAM:  Patient Care Team:  Sendy Elaine MD as PCP - General (Internal Medicine)  Brittanie Orellana MD (Cardiology)  Nicole Gray DPM as Consulting Physician (Podiatry)  Surinder Joseph MD as Consulting Physician (Nephrology)  Katai Wong MD as Consulting Physician (Urology)  Coleen Gillis MD as Consulting Physician (Obstetrics)  LILLIANA Coello MD as Consulting Physician (Ophthalmology)  Yolis Rossi MD as Consulting Physician (Endocrinology)  Gianna Hinson LPN as Care Coordinator  Keron Perez MD as Consulting Physician (Vascular Surgery)  Myla Puente MD as Consulting Physician (Nephrology)  [facility],  "Murali QUINTEROS MD as Consulting Physician (Cardiology)  Carl Day MD as Consulting Physician (Endocrinology)  Nellie Quiles, ARIELLE as Consulting Physician (Optometry)  Adonis Linares MD as Consulting Physician (Pulmonary Disease)  Tonia Silva NP as Nurse Practitioner (Vascular Surgery)  Kendra Mccartney as ED Navigator       SCREENING HISTORY:  Health Maintenance         Date Due Completion Date    COVID-19 Vaccine (7 - 2023-24 season) 03/27/2024 11/27/2023    Lipid Panel 07/07/2024 7/7/2023    Influenza Vaccine (1) 09/01/2024 10/7/2023    Override on 11/14/2018: Done    Override on 9/9/2015: Done    Override on 9/24/2012: Done    Hemoglobin A1c 12/11/2024 6/11/2024    Eye Exam 02/05/2025 2/5/2024    Override on 1/3/2011: Done    DEXA Scan 01/26/2026 1/26/2024    TETANUS VACCINE 08/08/2026 8/8/2016              REVIEW OF SYSTEMS:  The patient reports : fair dietary habits.  She feels like she does not eat a lot and can not understand why she is not losing any weight.  Review of her dietary intake reveals she is drinking a lot of sugary drinks.  The patient reports : that they are unable to exercise regularly because  of orthopaedic limitations.  Review of Systems   Constitutional:  Negative for chills and fever.   Respiratory:  Negative for cough and shortness of breath.    Cardiovascular:  Negative for chest pain.   Gastrointestinal:  Negative for abdominal pain.   Genitourinary:  Negative for dysuria.   Musculoskeletal:  Positive for arthralgias and gait problem (ambulates with walker).       ROS (Optional)-: no pelvic pain  Breast ROS (Optional)-: negative for breast lumps/discharge          Objective    PHYSICAL EXAMINATION/VITALS:  Vitals:    07/17/24 1011   BP: (!) 146/68   Pulse: 77   Temp: 98.8 °F (37.1 °C)   TempSrc: Oral   SpO2: (!) 92%   Weight: 96.4 kg (212 lb 8.4 oz)   Height: 5' 5" (1.651 m)        Body mass index is 35.37 kg/m².      General appearance - alert, well appearing, and in no distress, " obese; exam limited as patient did not get onto the exam table  Psychiatric - alert, oriented to person, place, and time, normal behavior, speech, dress, motor activity and thought process  Eyes - pupils equal and reactive, extraocular eye movements intact, sclera anicteric  Neck - supple, no significant adenopathy, carotids upstroke normal bilaterally, no bruits  Lymphatics - no palpable cervical lymphadenopathy  Chest - clear to auscultation, no wheezes, rales or rhonchi, symmetric air entry  Heart - normal rate and regular rhythm  Neurological - alert, normal speech, no focal findings; cranial nerves II through XII intact  Musculoskeletal - not examined, patient ambulated with a walker  Extremities - pedal edema trace  Skin - normal coloration, no suspicious skin lesions      LABS:  Lab Results   Component Value Date    HGBA1C 6.1 (H) 03/08/2024    HGBA1C 5.7 (H) 12/06/2023    HGBA1C 5.5 09/14/2023      Lab Results   Component Value Date    CHOL 88 (L) 07/07/2023    CHOL 165 05/07/2022    CHOL 127 11/03/2021     Lab Results   Component Value Date    LDLCALC 37.6 (L) 07/07/2023    LDLCALC 86 06/14/2022    LDLCALC 101.0 05/07/2022               ASSESSMENT AND PLAN:   1. Routine medical exam  Counseled on age appropriate medical preventative services including age appropriate cancer screenings, age appropriate eye and dental exams, over all nutritional health, need for a consistent exercise regimen, and an over all push towards maintaining a vigorous and active lifestyle.  Counseled on age appropriate vaccines and discussed upcoming health care needs based on age/gender. Discussed good sleep hygiene and stress management.    2. Type 2 diabetes mellitus with ESRD (end-stage renal disease)/3. Stable proliferative diabetic retinopathy of both eyes associated with type 2 diabetes mellitus  Lab Results   Component Value Date    HGBA1C 6.1 (H) 03/08/2024     Diabetes is under good control at this time for age and comorbid  "conditions. Overall diabetes control has stayed the same since last check.  We discussed diabetic diet and regular exercise.   We discussed home blood sugar monitoring, if appropriate - the patient should test once daily and as needed.   Continue current medication regimen.  Patient is followed by endocrinology.  Diabetic complications addressed: None addressed today.  Patient was counseled on the need for yearly eye exam to screen for/monitor diabetic retinopathy and yearly diabetic foot exam.  -     Lipid Panel; Future; Expected date: 07/17/2024      4. Chronic diastolic heart failure  The current medical regimen is effective;  continue present plan and medications.   Followed by: Cardiology.     5. Pulmonary hypertension  Stable. Asymptomatic. Observe.    6. Chronic respiratory failure, unspecified whether with hypoxia or hypercapnia  The current medical regimen is effective;  continue present plan and medications.     7. Hyperlipidemia LDL goal <100  Lab Results   Component Value Date    CHOL 88 (L) 07/07/2023     Lab Results   Component Value Date    HDL 42 07/07/2023     Lab Results   Component Value Date    LDLCALC 37.6 (L) 07/07/2023     Lab Results   Component Value Date    TRIG 42 07/07/2023     Lab Results   Component Value Date    LDLCALC 37.6 (L) 07/07/2023     We discussed low fat diet and regular exercise.The current medical regimen is effective;  continue present plan and medications.     8. Tortuous aorta  Patient with tortuous/ectatic Aorta.  Stable/asymptomatic. Currently stable on lipid lowering medication and blood pressure monitoring.   Overview:  "There is mild to moderate tortuosity of the thoracic aorta" - Xray Chest 7-8-2011      9. ESRD on hemodialysis  The current medical regimen is effective;  continue present plan and medications.   Followed by: Nephrology.   Overview:  - at [facility]; Dr. Barrientos  - Tuesday, Thursday and Saturday  - has AV graft in left UE      10. Anemia of chronic " disease  Stable. Asymptomatic. Observe.    11. Sickle cell trait  Stable. Asymptomatic. Observe.    12. Hypothyroidism (acquired)  Lab Results   Component Value Date    TSH 2.202 03/08/2024     Patient is clinically euthyroid. Continue current regimen.    13. H/O: stroke  Stable/asymptomatic.  We discussed risk factor reduction.  Overview:  - ischemic; right PCA; 2021      14. Secondary renal hyperparathyroidism  Stable. Asymptomatic. Observe.    15. Age-related osteoporosis without current pathological fracture  We discussed adequate calcium intake (preferably from diet) and vitamin D supplementation. We discussed fall precautions. She is up to date on her BMD. Continue current treatment regimen as per endocrinology recommendation/treatment plan.    16. HUMBERTO on CPAP  The current medical regimen is effective;  continue present plan and medications.   Followed by: sleep medicine..   Overview:  -ahi of 18  -currently on cpap of 14 cm H20 with ffm.    -would like to try nasal pillow for comfort.  Will switch apap in hope of lowering driving pressure.        17. Severe obesity (BMI 35.0-39.9) with comorbidity  BMI Readings from Last 3 Encounters:   07/17/24 35.37 kg/m²   07/10/24 34.89 kg/m²   05/22/24 34.89 kg/m²     The patient is asked to make an attempt to improve diet and exercise patterns to aid in medical management of this problem.  We discussed documenting daily calorie intake and limiting daily added sugar intake (5g/serving of any product; 20 g/day total).    18. Debility/19. Walker as ambulation aid  We discussed fall precautions.             Orders Placed This Encounter   Procedures    Lipid Panel      FOLLOW UP: Follow up in about 6 months (around 1/17/2025), or if symptoms worsen or fail to improve, for follow up chronic medical conditions.. or sooner as needed.

## 2024-07-18 ENCOUNTER — HOSPITAL ENCOUNTER (EMERGENCY)
Facility: HOSPITAL | Age: 79
Discharge: HOME | End: 2024-07-18
Attending: EMERGENCY MEDICINE
Payer: MEDICARE

## 2024-07-18 VITALS
TEMPERATURE: 99 F | HEART RATE: 70 BPM | RESPIRATION RATE: 18 BRPM | SYSTOLIC BLOOD PRESSURE: 155 MMHG | DIASTOLIC BLOOD PRESSURE: 71 MMHG | HEIGHT: 65 IN | BODY MASS INDEX: 33.32 KG/M2 | WEIGHT: 200 LBS | OXYGEN SATURATION: 94 %

## 2024-07-18 DIAGNOSIS — M25.562 LEFT KNEE PAIN: ICD-10-CM

## 2024-07-18 DIAGNOSIS — S80.12XA CONTUSION OF LEFT LOWER EXTREMITY, INITIAL ENCOUNTER: ICD-10-CM

## 2024-07-18 DIAGNOSIS — M79.605 LEFT LEG PAIN: ICD-10-CM

## 2024-07-18 DIAGNOSIS — N18.6 END STAGE RENAL DISEASE: Primary | ICD-10-CM
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
## 2024-07-18 LAB
ALLENS TEST: ABNORMAL
ANION GAP SERPL CALC-SCNC: 13 MMOL/L (ref 8–16)
BUN SERPL-MCNC: 48 MG/DL (ref 6–30)
CHLORIDE SERPL-SCNC: 99 MMOL/L (ref 95–110)
CREAT SERPL-MCNC: 10.3 MG/DL (ref 0.5–1.4)
DELSYS: ABNORMAL
GLUCOSE SERPL-MCNC: 117 MG/DL (ref 70–110)
HCT VFR BLD CALC: 33 %PCV (ref 36–54)
POC IONIZED CALCIUM: 1.14 MMOL/L (ref 1.06–1.42)
POC TCO2 (MEASURED): 30 MMOL/L (ref 23–29)
POCT GLUCOSE: 134 MG/DL (ref 70–110)
POTASSIUM BLD-SCNC: 4.4 MMOL/L (ref 3.5–5.1)
SAMPLE: ABNORMAL
SITE: ABNORMAL
SODIUM BLD-SCNC: 136 MMOL/L (ref 136–145)

## 2024-07-18 PROCEDURE — 82962 GLUCOSE BLOOD TEST: CPT | Mod: HCNC

## 2024-07-18 PROCEDURE — 82330 ASSAY OF CALCIUM: CPT | Mod: HCNC

## 2024-07-18 PROCEDURE — 84295 ASSAY OF SERUM SODIUM: CPT | Mod: HCNC

## 2024-07-18 PROCEDURE — 93005 ELECTROCARDIOGRAM TRACING: CPT | Mod: HCNC

## 2024-07-18 PROCEDURE — 99900035 HC TECH TIME PER 15 MIN (STAT): Mod: HCNC

## 2024-07-18 PROCEDURE — 82565 ASSAY OF CREATININE: CPT | Mod: HCNC

## 2024-07-18 PROCEDURE — 85014 HEMATOCRIT: CPT | Mod: HCNC

## 2024-07-18 PROCEDURE — 93010 ELECTROCARDIOGRAM REPORT: CPT | Mod: HCNC,,, | Performed by: INTERNAL MEDICINE

## 2024-07-18 PROCEDURE — 99284 EMERGENCY DEPT VISIT MOD MDM: CPT | Mod: 25,HCNC

## 2024-07-18 PROCEDURE — 84132 ASSAY OF SERUM POTASSIUM: CPT | Mod: HCNC

## 2024-07-18 NOTE — ED NOTES
Pt with ESRD, T, Th, Sat,  Last dialyzed on Tues. A/V shunt to left forearm w/ +thrill.  Pt on her way to dialysis on [employer] bus. Pt's walker started to roll away, pt went to grab it and fell onto her left knee. Pt presents w/ c/o left knee pain and swelling. Denies any other complaints.  Pt is AAOx3, resp even and unlabored, skin warm and dry. HOB elevated, SR up x 2, next to nurses station.

## 2024-07-18 NOTE — ED PROVIDER NOTES
Encounter Date: 7/18/2024    SCRIBE #1 NOTE: I, Cody Hernadez Do, am scribing for, and in the presence of,  Artem Rosa MD. I have scribed the following portions of the note - Other sections scribed: HPI.       History     Chief Complaint   Patient presents with    Knee Pain     Patient with hematoma to L knee from fall. [place] they [place] a sharp turn and she grabbed her rolling walker to catch it. She states she slipped out of the seat and onto her L knee. +ambulatory. Pt was on her way to dialysis, but was not able to receive dialysis due to needing evaluation for injury.     Erica Leblanc is a 79 y.o. female, with a pertinent PMHx of CKD stage 4 on Hemodialysis, T2DM, HLD, and HTN, who presents to the ED with left shin pain with associated bruising s/p a mechanical fall onset 30 minutes ago. Patient reports being on a bus on the way to dialysis when it made a sharp turn and she fell from her seat. She describes the bus being hot and feeling light-headed prior to her fall. She denies a syncopal episode. Patient denies receiving dialysis today. No other exacerbating or alleviating factors. Patient denies SOB, chest pain, fever, or other associated symptoms.     [facility] [phone]    The history is provided by the patient. No  was used.     Review of patient's allergies indicates:   Allergen Reactions    Ace inhibitors Other (See Comments)     Other reaction(s): cough     Past Medical History:   Diagnosis Date    Acute ischemic right PCA stroke 6/6/2021    Acute respiratory failure with hypoxia     Age-related osteoporosis without current pathological fracture 3/8/2021    Anemia of chronic kidney failure, stage 4 (severe) 4/5/2019    Cataracts, bilateral     CHF (congestive heart failure)     CKD (chronic kidney disease) stage 3, GFR 30-59 ml/min     CKD (chronic kidney disease) stage 3, GFR 30-59 ml/min     Controlled type 2 diabetes mellitus with proteinuria  or albuminuria     Depression     Diabetes with neurologic complications     Diabetic retinopathy of both eyes     Edema     Glaucoma     History of colonic polyps     Hx-TIA (transient ischemic attack) 2008    Hyperlipidemia LDL goal < 100     Hypertension     Hypothyroidism     Major depressive disorder, single episode, mild 2016    Mixed incontinence urge and stress     Obesity     Obstructive sleep apnea on CPAP     19:  Home CPAP machine broken, per patient & son    Osteopenia     Proteinuria     Sickle cell trait     Strabismus     TIA (transient ischemic attack)     Trouble in sleeping     Type 2 diabetes mellitus with ophthalmic manifestations     Type 2 diabetes with stage 3 chronic kidney disease GFR 30-59     Type II or unspecified type diabetes mellitus with renal manifestations, uncontrolled(250.42)     Uncontrolled type 2 diabetes mellitus with peripheral circulatory disorder 2019    Urge incontinence 2016    Urge incontinence     Venous stasis ulcer     bilateral lower legs    Vitamin D deficiency disease      Past Surgical History:   Procedure Laterality Date    AV FISTULA PLACEMENT Left 2019    Procedure: CREATION, AV FISTULA, LEFT UPPER EXTREMITY;  Surgeon: Keron Perez MD;  Location: [facility];  Service: Vascular;  Laterality: Left;    BREAST BIOPSY      breast reduction Bilateral age 31    BREAST SURGERY      CATARACT EXTRACTION Bilateral     cataracts Bilateral      SECTION, LOW TRANSVERSE      x1    CHOLECYSTECTOMY      COLONOSCOPY N/A 2022    Procedure: COLONOSCOPY;  Surgeon: Mahesh Huang MD;  Location: [facility] ([address] [place] [address]);  Service: Endoscopy;  Laterality: N/A;  fully vaccinated-sm.  RAPID COVID  +cologuard needing ASAP  Dialysis pt T,TH Sat and left UA access  2nd floor - cardiac/dialysis/SOB / LABS / prep ins. emailed - ERW    EYE SURGERY  2014, 2014    vitrectomy    EYE SURGERY Right 2016    FISTULOGRAM Left 3/6/2020     Procedure: Fistulogram, left upper extremity, with branch ligation;  Surgeon: Keron Perez MD;  [facility];  Service: Vascular;  Laterality: Left;  Time 1.1 Minute  42.10 mGy    FISTULOGRAM Left 7/21/2020    Procedure: Fistulogram, left upper extremity, transradial access with possible intervention;  Surgeon: Keron Perez MD;  Location: [facility] OR [address] [place] [address];  Service: Vascular;  Laterality: Left;    FISTULOGRAM Left 8/19/2020    Procedure: Fistulogram, left upper extremity, possible intervention;  Surgeon: Keron Perez MD;  Location: [facility] OR;  Service: Vascular;  Laterality: Left;  930 AM START  RN PRE OP  ---COVID NEGATIVE ON  8-. CA    FISTULOGRAM Left 1/21/2022    Procedure: Fistulogram, transradial access;  Surgeon: Keron Perez MD;  Location: [facility] OR [address] [place] [address];  Service: Vascular;  Laterality: Left;  mgy-40.16  gycm-8.3905  contrast-34cc  time-12.4min    HYSTERECTOMY  1986    TAHBSO (patient is unsure if ovaries removed)    OOPHORECTOMY      PERCUTANEOUS TRANSLUMINAL ANGIOPLASTY OF ARTERIOVENOUS FISTULA Left 7/21/2020    Procedure: PTA, AV FISTULA;  Surgeon: Keron Perez MD;  Location: [facility] OR [address] [place] [address];  Service: Vascular;  Laterality: Left;  15.9 minutes of fluro  41.12  mGy  7.9060 Gy cm2  32ml  contrast    PHLEBOGRAPHY Left 7/21/2020    Procedure: CENTRAL VENOGRAM;  Surgeon: Keron Perez MD;  Location: [facility] OR [address] [place] [address];  Service: Vascular;  Laterality: Left;    REFRACTIVE SURGERY      TOTAL REDUCTION MAMMOPLASTY      approx 10 yrs ago    VENOPLASTY  1/21/2022    Procedure: ANGIOPLASTY, VEIN;  Surgeon: Keron Perez MD;  Location: [facility] OR [address] [place] [address];  Service: Vascular;;     Family History   Problem Relation Name Age of Onset    Stroke Mother      Diabetes Mother      Hypertension Mother      Cataracts Mother      Leukemia Father      Cataracts Father      Ovarian cancer Sister [place] 35    Achondroplasia Sister Hannah     HIV Brother Paul      No Known Problems Daughter x2     No Known Problems Son x3     Breast cancer Maternal Aunt  65    Parkinsonism Maternal Aunt      Esophageal cancer Maternal Uncle          smoker    No Known Problems Paternal Aunt      Cataracts Paternal Uncle      Cataracts Maternal Grandmother      Cataracts Maternal Grandfather      Diabetes Paternal Grandmother      Cataracts Paternal Grandmother      No Known Problems Paternal Grandfather      No Known Problems Other      Amblyopia Neg Hx      Blindness Neg Hx      Glaucoma Neg Hx      Macular degeneration Neg Hx      Retinal detachment Neg Hx      Strabismus Neg Hx      Thyroid disease Neg Hx      Colon cancer Neg Hx      Cancer Neg Hx       Social History     Tobacco Use    Smoking status: Never    Smokeless tobacco: Never   Substance Use Topics    Alcohol use: No     Alcohol/week: 0.0 standard drinks of alcohol    Drug use: No     Review of Systems   Constitutional:  Negative for fever.   HENT:  Negative for sore throat.    Respiratory:  Negative for shortness of breath.    Cardiovascular:  Negative for chest pain.   Gastrointestinal:  Negative for nausea and vomiting.   Genitourinary:  Negative for dysuria.   Musculoskeletal:  Positive for arthralgias. Negative for back pain.   Skin:  Negative for rash.   Neurological:  Negative for syncope and weakness.       Physical Exam     Initial Vitals [07/18/24 1016]   BP Pulse Resp Temp SpO2   128/62 77 16 98.6 °F (37 °C) 95 %      MAP       --         Physical Exam    Nursing note and vitals reviewed.  Constitutional: She appears well-developed and well-nourished. She is not diaphoretic. No distress.   HENT:   Head: Normocephalic and atraumatic.   Nose: Nose normal.   Eyes: EOM are normal. Pupils are equal, round, and reactive to light. No scleral icterus.   Neck: Neck supple.   Normal range of motion.  Cardiovascular:  Normal rate, regular rhythm, normal heart sounds and intact distal pulses.     Exam reveals no gallop and no  friction rub.       No murmur heard.  Pulmonary/Chest: Breath sounds normal. No stridor. No respiratory distress. She has no wheezes. She has no rhonchi. She has no rales.   Abdominal: Abdomen is soft. Bowel sounds are normal. She exhibits no distension. There is no abdominal tenderness. There is no rebound and no guarding.   Musculoskeletal:         General: No tenderness or edema. Normal range of motion.      Cervical back: Normal range of motion and neck supple.     Neurological: She is oriented to person, place, and time. No cranial nerve deficit. GCS score is 15. GCS eye subscore is 4. GCS verbal subscore is 5. GCS motor subscore is 6.   Skin: Skin is warm and dry. No rash noted.   Psychiatric: She has a normal mood and affect. Her behavior is normal.         ED Course   Procedures  Labs Reviewed   POCT GLUCOSE - Abnormal       Result Value    POCT Glucose 134 (*)    ISTAT PROCEDURE - Abnormal    POC Glucose 117 (*)     POC BUN 48 (*)     POC Creatinine 10.3 (*)     POC Sodium 136      POC Potassium 4.4      POC Chloride 99      POC TCO2 (MEASURED) 30 (*)     POC Anion Gap 13      POC Ionized Calcium 1.14      POC Hematocrit 33 (*)     Sample VENOUS      Site Other      Allens Test N/A      DelSys Room Air       EKG Readings: (Independently Interpreted)   Initial Reading: No STEMI. Rhythm: Normal Sinus Rhythm. Heart Rate: 70.   No ST segment elevation or depression concerning for acute ischemia.        ECG Results              EKG 12-lead (Final result)        Collection Time Result Time QRS Duration OHS QTC Calculation    07/18/24 12:47:04 07/19/24 19:05:09 98 449                     Final result by Interface, Lab In [facility] (07/19/24 19:05:17)                   Narrative:    Test Reason : W19.XXXA,    Vent. Rate : 070 BPM     Atrial Rate : 070 BPM     P-R Int : 132 ms          QRS Dur : 098 ms      QT Int : 416 ms       P-R-T Axes : 060 122 013 degrees     QTc Int : 449 ms    Normal sinus rhythm  Right axis  deviation  Abnormal ECG  When compared with ECG of 01-OCT-2023 13:05,  Significant changes have occurred  Confirmed by Guillermo LERMA, Murali QUINTEROS ([id number]) on 7/19/2024 7:05:03 PM    Referred By: [email or web address]   SELF           Confirmed By:Murali Li MD                                  Imaging Results              X-Ray Knee 3 View Left (Final result)  Result time 07/18/24 11:18:51      Final result by Russ Jimenez MD (07/18/24 11:18:51)                   Impression:      No evidence for acute fracture, bone destruction, or dislocation.    Osteopenia.    Tricompartmental degenerative changes with moderate narrowing of the medial compartment of the left femorotibial joint.    Mild lateral subluxation of the patella.    Atherosclerosis.      Electronically signed by: Russ Jimenez MD  Date:    07/18/2024  Time:    11:18               Narrative:    EXAMINATION:  XR KNEE 3 VIEW LEFT    CLINICAL HISTORY:  Pain in left knee    TECHNIQUE:  AP, lateral, and Merchant views of the left knee were performed.    COMPARISON:  09/19/2009.    FINDINGS:  The bones are intact.  There is no evidence for acute fracture or bone destruction.  The bones do appear osteopenic.  There are degenerative changes with spurring of the tibial spines with osteophytes at the femorotibial and patellofemoral joints.  There is moderate narrowing of the medial compartment of the left femorotibial joint.  Narrowing of the patellofemoral joint is also present with mild subluxation of the patella laterally.  There is no evidence for joint effusion.  There is a patellar spur at the insertion of the quadriceps tendon.  There is atherosclerotic calcification identified within the distal thigh and proximal calf arteries.                                       X-Ray Tibia Fibula 2 View Left (Final result)  Result time 07/18/24 11:21:41      Final result by Russ Jimenez MD (07/18/24 11:21:41)                   Impression:      Osteopenia.    No evidence for acute  fracture or bone destruction.    Atherosclerosis.    Degenerative changes of the knee and ankle.      Electronically signed by: Russ Jimenez MD  Date:    07/18/2024  Time:    11:21               Narrative:    EXAMINATION:  XR TIBIA FIBULA 2 VIEW LEFT    CLINICAL HISTORY:  Pain in left leg    TECHNIQUE:  AP and lateral views of the left tibia and fibula were performed.    COMPARISON:  11/03/2023.    FINDINGS:  The bones appear osteopenic.  There is no evidence for acute fracture or bone destruction.  There are degenerative changes at the knee and ankle.  There is atherosclerotic calcification identified within the calf arteries.  No radiopaque soft tissue foreign bodies are appreciated.                                       Medications - No data to display  Medical Decision Making  Amount and/or Complexity of Data Reviewed  Labs: ordered. Decision-making details documented in ED Course.  Radiology: ordered. Decision-making details documented in ED Course.  ECG/medicine tests: ordered. Decision-making details documented in ED Course.            Scribe Attestation:   Scribe #1: I performed the above scribed service and the documentation accurately describes the services I performed. I attest to the accuracy of the note.        ED Course as of 07/19/24 1928   Thu Jul 18, 2024   1229 79-year-old female with history of end-stage renal disease presenting with left shin pain and bruising after a fall.  Patient reports she is on a bus and fell out of her seat causing her to hit her shin.  Patient ambulatory.  Denies numbness or weakness.  Patient states he felt momentarily lightheaded.  Denies chest pain, shortness of breath, syncope, numbness, weakness.  Patient notes there was no air conditioning on the bus and she felt overheated.  On exam she is well-appearing and in no acute distress.  Vitals normal.  X-rays negative for fracture.  Will get labs, EKG.  Discussed with the dialysis center.  Patient has chair time  tomorrow.  Will discharge tomorrow with plan for dialysis tomorrow. [GS]      ED Course User Index  [GS] Artem Rosa MD          I, Artem Rosa, personally performed the services described in this documentation.  All medical record entries made by the scribe were at my direction and in my presence.  I have reviewed the chart and agree that the record reflects my personal performance and is accurate and complete.       Medical Decision Making:   ED Management:  Patient given chair time tomorrow morning at 11:30 a.m..  No evidence of hyperkalemia volume overload.  Overall believe she is safe for discharge home with urgent dialysis in 24 hours.  Discussed with patient who is amenable to plan.  No evidence of fracture or injury.  Suspect mostly mechanical fall, no prodrome, dizziness, numbness, weakness concerning for arrhythmia, stroke or other cause.  No evidence of head bleed.  Discussed return precautions.             Clinical Impression:  Final diagnoses:  [M25.562] Left knee pain  [M25.562] Left knee pain - mild trauma, swelling  [M79.605] Left leg pain  [W19.XXXA] Fall  [N18.6] End stage renal disease (Primary)  [S80.12XA] Contusion of left lower extremity, initial encounter          ED Disposition Condition    Discharge Stable          ED Prescriptions    None       Follow-up Information       Follow up With Specialties Details Why Contact Info    Sendy Elaine MD Internal Medicine, Pediatrics Schedule an appointment as soon as possible for a visit   [address] [facility] [address]  [phone]      [facility] Emergency Medicine  As needed, If symptoms worsen [address] [facility] [place] [address] [phone]             Artem Rosa MD  07/19/24 1921

## 2024-07-18 NOTE — DISCHARGE INSTRUCTIONS
You were seen in the emergency department after a fall.  You have a mild contusion of your leg.  We do not believe there are any broken bones or other serious injury.  Please follow-up with your primary care provider as needed.  Please go to your dialysis center tomorrow at 11:30 a.m. to receive dialysis tomorrow.  Please return for any new or worsening pain, weakness, numbness, dizziness, or you become concerned in any other way.

## 2024-07-19 ENCOUNTER — TELEPHONE (OUTPATIENT)
Dept: VASCULAR SURGERY | Facility: CLINIC | Age: 79
End: 2024-07-19
Payer: MEDICARE

## 2024-07-19 ENCOUNTER — PATIENT OUTREACH (OUTPATIENT)
Dept: EMERGENCY MEDICINE | Facility: HOSPITAL | Age: 79
End: 2024-07-19
Payer: MEDICARE

## 2024-07-19 LAB
OHS QRS DURATION: 98 MS
OHS QTC CALCULATION: 449 MS

## 2024-07-19 NOTE — TELEPHONE ENCOUNTER
----- Message from Naya Soler sent at 7/19/2024  9:16 AM CDT -----  Regarding: Pt Advice  Contact: Erica Keene morning,    Patient visited the ED on 7/18/2024 for knee pain.  Pain would like a call back to discuss if she needs to schedule a sooner appointment in the clinic.  Patient has an existing appointment scheduled for 8/26/2024 at 10:30 AM.  Patient's dialysis days are Tuesdays, Thursdays, and Saturdays, so any other day will be okay to schedule appointment.  Patient's call back number is [phone].    Thanks in advance,    Naya Soler  ED Navigator

## 2024-07-19 NOTE — PROGRESS NOTES
Naya Soler  ED Navigator  Emergency Department    [place]: [facility] [place]  Role: Community Health Worker    Date: 07/19/2024  Patient Name: Ms. Erica Leblanc  MRN: [id number]  PCP: Sendy Elaine MD    Assessment:     Erica Leblanc is a 79 y.o. female who has presented to ED for left knee pain. Patient has visited the ED 1 times in the past 3 months. Patient did not contact PCP.     ED Navigator Initial Assessment    ED Navigator Enrollment Documentation  Consent to Services  Does patient consent to completing the assessment?: Yes  Contact  Method of Initial Contact: Phone  Transportation  Does the patient have issues with Transportation?: No  Does the patient have transportation to and from healthcare appointments?: Yes  Insurance Coverage  Do you have coverage/adequate coverage?: Yes  Type/kind of coverage: [employer] Man[employer] Medicare HMO  Specialist Appointment  PCP Follow Up Appointment  Medications  Is patient able to afford medication?: Yes  Is patient unable to get medication due to lack of transportation?: No  Psychological  Food  Communication/Education  Other Financial Concerns  Other Social Barriers/Concerns  Primary Barrier  Barriers identified: Structural barrier (service availability, waiting times, etc.)  Root Cause of ED Utilization: Chronic Conditions  Plan to address Chronic Conditions: Encourage patient to contact PCP/specialist for follow up per ED discharge instructions  Next steps: Provided Education         Social History     Socioeconomic History    Marital status: Single    Number of children: 5   Occupational History    Occupation:      Employer: [employer]     Comment: part-time   Tobacco Use    Smoking status: Never    Smokeless tobacco: Never   Substance and Sexual Activity    Alcohol use: No     Alcohol/week: 0.0 standard drinks of alcohol    Drug use: No    Sexual activity: Not Currently     Partners: Male     Birth  control/protection: Post-menopausal, Surgical     Social Determinants of Health     Financial Resource Strain: Low Risk  (7/19/2024)    Overall Financial Resource Strain (CARDIA)     Difficulty of Paying Living Expenses: Not hard at all   Food Insecurity: Patient Declined (7/19/2024)    Hunger Vital Sign     Worried About Running Out of Food in the Last Year: Patient declined     Ran Out of Food in the Last Year: Patient declined   Transportation Needs: No Transportation Needs (7/19/2024)    PRAPARE - Transportation     Lack of Transportation (Medical): No     Lack of Transportation (Non-Medical): No   Physical Activity: Patient Declined (7/19/2024)    Exercise Vital Sign     Days of Exercise per Week: Patient declined     Minutes of Exercise per Session: Patient declined   Stress: Patient Declined (7/19/2024)    [employer] - Occupational Stress Questionnaire     Feeling of Stress : Patient declined   Housing Stability: Low Risk  (7/19/2024)    Housing Stability Vital Sign     Unable to Pay for Housing in the Last Year: No     Homeless in the Last Year: No       Plan:   Patient visited the ED on 7/18/2024.  Patient declined Post ED 7 day PCP follow up appointment.  Patient visited PCP on 7/17/2024 at 10:20 AM.  Patient requested message to be sent to vascular surgery providers for advice on of new appointment need to be scheduled.  In-basket message sent to vascular surgery providers, Keron Perez DO and Tonia Silva NP clinical support staff for further assistance.  Patient very appreciative of care provided by emergency department staff on 7/18/2024.  Patient denied no needs to be addressed at this time.

## 2024-07-19 NOTE — TELEPHONE ENCOUNTER
Called and spoke with pt. States she fell and hurt her knee yesterday and went to the ER. Has knot on knee and wants to by checked by provider prior to using her lymphedema pumps. Appt.scheduled with provider per pt.request.

## 2024-07-22 ENCOUNTER — TELEPHONE (OUTPATIENT)
Dept: FAMILY MEDICINE | Facility: CLINIC | Age: 79
End: 2024-07-22
Payer: MEDICARE

## 2024-07-22 NOTE — TELEPHONE ENCOUNTER
Pt states she was seen in the ED for a wound assessment from a recent fall which she had a knot on her leg.Pt states everything came back fine and she also has an follow up appt with her vascular surgeo on today. Informed pt if she needs anything from us give us call. Pt verbalized understanding.

## 2024-07-22 NOTE — TELEPHONE ENCOUNTER
----- Message from Velvet Edwards sent at 7/19/2024 10:16 AM CDT -----  Contact: Theodore  Type:  Patient Call          Who Called: Patient         Does the patient know what this is regarding?: Requesting a callback to have a ER follow up visit within 1 week ; please  advise           Would the patient rather a call back or a response via [facility]?call           Best Call Back Number: [phone]             Additional Information: Pt will be at dialysis from 11-4

## 2024-07-24 ENCOUNTER — OFFICE VISIT (OUTPATIENT)
Dept: VASCULAR SURGERY | Facility: CLINIC | Age: 79
End: 2024-07-24
Payer: MEDICARE

## 2024-07-24 VITALS
HEART RATE: 81 BPM | SYSTOLIC BLOOD PRESSURE: 152 MMHG | HEIGHT: 65 IN | BODY MASS INDEX: 35.4 KG/M2 | WEIGHT: 212.5 LBS | DIASTOLIC BLOOD PRESSURE: 78 MMHG

## 2024-07-24 DIAGNOSIS — S81.802A WOUND OF LEFT LOWER EXTREMITY, INITIAL ENCOUNTER: ICD-10-CM

## 2024-07-24 DIAGNOSIS — I89.0 LYMPHEDEMA: Primary | ICD-10-CM

## 2024-07-24 PROCEDURE — 99999 PR PBB SHADOW E&M-EST. PATIENT-LVL III: CPT | Mod: PBBFAC,,, | Performed by: NURSE PRACTITIONER

## 2024-08-09 ENCOUNTER — HOSPITAL ENCOUNTER (OUTPATIENT)
Dept: RADIOLOGY | Facility: HOSPITAL | Age: 79
Discharge: HOME | End: 2024-08-09
Attending: HOSPITALIST
Payer: MEDICARE

## 2024-08-09 DIAGNOSIS — E04.2 MULTIPLE THYROID NODULES: ICD-10-CM

## 2024-08-09 PROCEDURE — 76536 US EXAM OF HEAD AND NECK: CPT | Mod: 26,,, | Performed by: STUDENT IN AN ORGANIZED HEALTH CARE EDUCATION/TRAINING PROGRAM

## 2024-08-09 PROCEDURE — 76536 US EXAM OF HEAD AND NECK: CPT | Mod: TC

## 2024-08-16 ENCOUNTER — OFFICE VISIT (OUTPATIENT)
Dept: ENDOCRINOLOGY | Facility: CLINIC | Age: 79
End: 2024-08-16
Payer: MEDICARE

## 2024-08-16 VITALS
BODY MASS INDEX: 35.08 KG/M2 | DIASTOLIC BLOOD PRESSURE: 76 MMHG | HEART RATE: 97 BPM | SYSTOLIC BLOOD PRESSURE: 142 MMHG | WEIGHT: 210.81 LBS

## 2024-08-16 DIAGNOSIS — N18.9 CHRONIC KIDNEY DISEASE-MINERAL AND BONE DISORDER: ICD-10-CM

## 2024-08-16 DIAGNOSIS — E55.9 VITAMIN D DEFICIENCY DISEASE: ICD-10-CM

## 2024-08-16 DIAGNOSIS — Z99.2 ESRD ON HEMODIALYSIS: ICD-10-CM

## 2024-08-16 DIAGNOSIS — N18.6 ESRD ON HEMODIALYSIS: ICD-10-CM

## 2024-08-16 DIAGNOSIS — E03.9 HYPOTHYROIDISM (ACQUIRED): ICD-10-CM

## 2024-08-16 DIAGNOSIS — M89.9 CHRONIC KIDNEY DISEASE-MINERAL AND BONE DISORDER: ICD-10-CM

## 2024-08-16 DIAGNOSIS — E04.2 MULTIPLE THYROID NODULES: ICD-10-CM

## 2024-08-16 DIAGNOSIS — N18.6 TYPE 2 DIABETES MELLITUS WITH ESRD (END-STAGE RENAL DISEASE): Primary | ICD-10-CM

## 2024-08-16 DIAGNOSIS — E11.22 TYPE 2 DIABETES MELLITUS WITH ESRD (END-STAGE RENAL DISEASE): Primary | ICD-10-CM

## 2024-08-16 DIAGNOSIS — E83.9 CHRONIC KIDNEY DISEASE-MINERAL AND BONE DISORDER: ICD-10-CM

## 2024-08-16 PROCEDURE — 99999 PR PBB SHADOW E&M-EST. PATIENT-LVL IV: CPT | Mod: PBBFAC,,, | Performed by: HOSPITALIST

## 2024-08-16 RX ORDER — LEVOTHYROXINE SODIUM 50 UG/1
TABLET ORAL
Qty: 90 TABLET | Refills: 3 | Status: SHIPPED | OUTPATIENT
Start: 2024-08-16

## 2024-08-16 NOTE — PROGRESS NOTES
"Subjective:      Patient ID: Erica Leblanc is a 79 y.o. female presented to Endocrinology clinic on 8/16/2024.    Chief Complaint:  Diabetes, osteoporosis/metabolic bone disease, hypothyroidism    History of Present Illness: Erica Leblanc is a 79 y.o. female with metabolic bone disease with osteoporosis, hypothyroidism and type 2 diabetes  ESRD on HD on TTS for 1 year, her nephrologist is  Dr Myla Puente  Here for follow-up      Interval history:  Patient is here for follow-up.  A1c 5.7, with Fructosamine level of 330 (in good control range)  Glucose 90, but did have 180 due to diet. Denies hypoglycemia  She reports appetite suppression on Trulicity, getting supply regularly   Dry weight 95kg  Compliance with levothyroxine.  No issue with TSH. No falls  Phos elevated. Per patient missing her binder dose (especially at night)       1) Type 2 diabetes:  - Diagnosis around 10 years ago  - Patient was on insulin prior to HD, has been off of all therapy since that time.  - Checking glucose 1x a day  - Family hx of diabetes  - currently not on medication  - Statin: Taking, ACE/ARB: Not taking    Current reported meds:               Trulicity 1.5 mg once a week injection>> From [facility]    Home glucose checks: checks 1-2 a day, Logs reviewed  - Hypoglycemia symptoms:  DENIES   Diet/Exercise:   - Eating 3 meals per day   - Weight trend: stable  - Diabetes Related Hospitalization:  No  - Hx of pancreatitis: No, denies  - Family history of diabetes: Yes  - Occupation:  Disabled    Eye exam current (within one year):  Yes, DR: unknown  Reports cuts or ulcers on feet:   Denies, has podiatrist  Statin: Taking, ACE/ARB: Not taking    Diabetes lab work  Lab Results   Component Value Date    HGBA1C 5.7 (H) 08/09/2024    HGBA1C 6.1 (H) 03/08/2024    HGBA1C 5.7 (H) 12/06/2023    HGBA1C 5.5 09/14/2023     No results found for: "CPEPTIDE", "GLUTAMICACID", "ISLETCELLANT"   Lab Results   Component Value Date    FRUCTOSAMINE 330 " "08/09/2024    FRUCTOSAMINE 324 03/08/2024   At goal >> good glucose control    Lab Results   Component Value Date    MICALBCREAT 3,693.6 (H) 03/17/2014     No results found for: "QMKKHIUI16"    Diabetes Management Status: Reviewed this office visit  Screening or Prevention Patient's value Goal Complete/Controlled?   Lipid profile : 08/09/2024 Annually Yes     Dilated retinal exam : 02/05/2024 Annually Yes     Foot exam   Most Recent Foot Exam Date: Not Found Annually Yes          2) Chronic kidney disease mineral and bone disorder  - Seen on recent bone density scan.  Patient denies prior history of any bone disorder.  - Chronic history of ESRD on dialysis  - Longstanding chronic issue.  Poorly-controlled bone disease due to ESRD status.  Continues to have hypocalcemia, hyperphosphatemia.  Elevated PTH.  - Lab work review showing chronic hyperphosphatemia, elevated alkaline phosphatase, hypocalcemia, normal vitamin-D.  - Patient reports Sensipar three times a week at HD  - Patient is on phosphate binder:  Sevelamer 1600mg TIDWM, reports better compliance recently  - Patient has stopped soda consumption   - Patient denies back pain, No falls  - Never had fractures  - Use walker to help with gait and ambulation  - NM Parathyroid scan 2021: No abnormal uptake to suggest parathyroid adenoma.  - Vit D 1000 iu daily      DXA done on 10/2020  Comparison study done on 03/13/2018.  Lumbar spine (L1-L4): BMD is 0.943 g/cm2, T-score is -0.7, and Z-score is 1.0.  Total hip: BMD is 0.680 g/cm2, T-score is -2.1, and Z-score is -1.0.  Femoral neck: BMD is 0.511 g/cm2, T-score is -3.0, and Z-score is -1.5.    Impression:  1. Osteoporosis with likely chronic kidney disease mineral and bone disorder  2. Compared with previous DXA, BMD at the lumbar spine has declined by 13.9%, and the BMD at the total hip has declined by 23.2%.    Height loss (>2 inches)? no  Family hx of Osteoporosis: no    Asymptomatic menopausal state.  She had a " "hysterectomy at 42 y/o and menopausal symptoms at 47 y/o.     Lab work reviewed  Lab Results   Component Value Date     (H) 08/13/2024     (H) 07/11/2024     (H) 06/11/2024    [id number] 73 08/09/2024    [id number] 49 07/07/2023    [id number] 48 05/07/2022    CALCIUM 9.2 08/09/2024    CALCIUM 8.7 07/23/2024    CALCIUM 9.6 03/08/2024    PHOS 5.3 (H) 08/09/2024    PHOS TNP 07/23/2024    PHOS 5.0 (H) 03/08/2024    ALKPHOS 72 10/01/2023    ALKPHOS 90 07/07/2023    ALKPHOS 93 05/07/2022    TSH 2.202 03/08/2024    TTGIGA 7 03/01/2021       3) Hypothyroidism  - Chronic  - Restarted in 2021: Levothyroxine 50mcg  - History of thyroid goiter, with thyroid nodule seen on ultrasound     Thyroid lab work  Lab Results   Component Value Date    TSH 2.202 03/08/2024    TSH 2.268 12/06/2023    TSH 1.957 07/07/2023    FREET4 1.14 03/08/2024    FREET4 1.14 12/06/2023    FREET4 1.17 07/07/2023      Antibodies  No results found for: "THYROIDAB", "TSIMMGLBN", "THYROIDSTIMI", "THYROTROPINR"      4) Multiple Thyroid nodule  - denies compressive symptoms.   - discuss with patient previously,  not interested in getting biopsy given stable size  - discussed 2023 and 2024    US thyroid   8/09/2024  The right lobe of the thyroid measures 4.7 x 1.8 x 1.6 cm and demonstrates a 1.3 x 0.8 x 1.5 cm nodule.  Nodule is mixed solid and cystic, hypoechoic, and demonstrates internal echogenic foci.  Nodule is a TR 4 and meets criteria for FNA if not previously performed.    The thyroid isthmus is unremarkable.     The left lobe of the thyroid measures 3.4 x 1.6 x 1.8 cm and demonstrates 3 subcentimeter nodules, the largest measuring 0.9 x 0.8 x 0.7 cm.  This nodule is solid, hypoechoic, not taller than wide, demonstrates smooth margins, and demonstrates a questionable punctate peripheral calcification.  These nodules do not meet criteria for FNA or follow-up.     Thyroid vascularity is within normal limits.     There are no " abnormal lymph nodes within the visualized portions of the neck.     Impression:  Multinodular thyroid, the largest a right 1.5 cm TR 4 nodule which meets criteria for FNA if not previously performed.  Additional nodules do not meet criteria for FNA or follow-up.      07/03/2023  The thyroid is normal in size.  Right lobe of the thyroid measures 4.3 x 1.6 x 1.6 cm.  Left lobe of the thyroid measures 3.2 x 1.7 x 1.5 cm.  Normal thyroid parenchyma.  Nodule RIGHT upper pole 0.3 x 0.3 x 0.2 cm.  Nodule LEFT upper pole 1.3 x 2.2 x 1.1 cm and lower pole 0.9 x 0.7 x 0.7 cm.  Mid to lower pole nodule 0.4 x 0.4 x 0.3 cm. At the level of the isthmus, mixed cystic and solid lesions identified measuring 1.4 x 0.6 x 1.2 cm and 1.0 x 0.9 x 0.4 cm.  1.4 cm RIGHT isthmic nodule demonstrates presence of small microcalcifications.  This nodule meets criteria for TiRads 5 and FNA is recommended.  Cervical lymph nodes demonstrate normal morphology and size.     Impression:  Suspicious nodule RIGHT isthmus, 1.4 cm with small microcalcifications, mildly hypoechoic meets criteria for FNA.  Malignancy not excluded  Additional thyroidal nodules as above.    US thyroid 2021  The thyroid gland is normal in size.  The right thyroid lobe measures 4.1 x 1.9 x 1.8 cm.  The left thyroid lobe measures 4.5 x 1.8 x 1.9 cm.  Thyroid parenchyma is homogeneous, without increased perfusion.  There are 5 measured the small thyroid nodules.  1.5 cm right thyroid lobe/isthmus nodule with TI-RADS category 3, does not qualify for FNA..    Reviewed past surgical, medical, family, social history and updated as appropriate.    Objective:   BP (!) 142/76   Pulse 97   Wt 95.6 kg (210 lb 12.8 oz)   BMI 35.08 kg/m²     Body mass index is 35.08 kg/m².    Physical Exam  Vitals and nursing note reviewed.   Constitutional:       Appearance: She is well-developed.      Comments: Elderly female using walker to help with ambulation   HENT:      Head: Normocephalic.    Eyes:      General: No scleral icterus.     Conjunctiva/sclera: Conjunctivae normal.   Neck:      Thyroid: No thyromegaly.   Cardiovascular:      Rate and Rhythm: Normal rate.      Heart sounds: Normal heart sounds.   Pulmonary:      Effort: Pulmonary effort is normal. No respiratory distress.   Abdominal:      Palpations: Abdomen is soft.      Tenderness: There is no abdominal tenderness.   Musculoskeletal:         General: Normal range of motion.      Cervical back: Neck supple.   Skin:     General: Skin is warm.      Findings: No erythema.   Neurological:      Mental Status: She is alert.      Coordination: Coordination normal.   Psychiatric:         Behavior: Behavior normal.       Lab Review:  Lab Results   Component Value Date     (H) 08/13/2024     (H) 07/11/2024     (H) 06/11/2024    [id number] 73 08/09/2024    [id number] 49 07/07/2023    [id number] 48 05/07/2022    CALCIUM 9.2 08/09/2024    CALCIUM 8.7 07/23/2024    CALCIUM 9.6 03/08/2024    PHOS 5.3 (H) 08/09/2024    PHOS TNP 07/23/2024    PHOS 5.0 (H) 03/08/2024    ALKPHOS 72 10/01/2023    ALKPHOS 90 07/07/2023    ALKPHOS 93 05/07/2022    TSH 2.202 03/08/2024    TTGIGA 7 03/01/2021         Assessment     1. Type 2 diabetes mellitus with ESRD (end-stage renal disease)  Basic Metabolic Panel    Hemoglobin A1C    Fructosamine      2. Hypothyroidism (acquired)  levothyroxine (SYNTHROID) 50 MCG tablet      3. Multiple thyroid nodules        4. Chronic kidney disease-mineral and bone disorder        5. ESRD on hemodialysis        6. Vitamin D deficiency disease          Plan     Type 2 diabetes mellitus with ESRD (end-stage renal disease)  - Diabetes is at a goal given current A1C goal A1C for patient is 7% in ESRD  - Diabetic supplies/medications: reviewed no need for refills at this time  - Long discussion with patient about dietary modification, portion size control, decreasing carbohydrates intake  - A1c can be falsely low due  to anemia/ESRD  - improvement in hyperglycemia now.  Since on Trulicity    Plan  - continue Trulicity to 1.5 mg once a week injection.  Patient recently picked up a 90 day supply, and prefers avoid increasing the dose at this time  - Patient unable to check glucose often, will continue checking glucose at dialysis center  - Repeat lab work every 5-6 months, consider switching to Mounjaro/Ozempic given patient concern for weight gain    Hypothyroidism (acquired)  - Patient with history of hypothyroidism etiology unclear  - On levothyroxine 50 mcg daily (low-dose)  - TFTs at goal, continue    Multiple thyroid nodules  - Denies compressive symptoms.   - Overall patient not interested in getting biopsy given stable size  - US thyroid RIGHT isthmus, 1.4 cm with small microcalcifications  - Check thyroid ultrasound 2023 in 2024, stable in size.    - patient does not want FNA.  will continue monitoring    Chronic kidney disease-mineral and bone disorder  - Very difficult case, longstanding issue with worsening osteoporosis and continue elevation in PTH leading to osteoporosis  - Risk factors include ESRD on dialysis, hyperphosphatemia, hypocalcemia  - High risk of falls given knee pain, poor gait, the need to use walker  - Patient is more compliant with phosphate binder, and dietary modification  - On vitamin-D analog at dialysis  - sestamibi scan inconclusive for parathyroid adenoma  - now on Sensipar at HD unit  - threshold for parathyroidectomy is above >800 and failing medical therapy option  - given improvement in PTH, normal calcium, low phosphorus, continue monitoring with lab work    ESRD on hemodialysis  - at [facility]; Dr. Barrientos  - Tuesday, Thursday and Saturday  - has AV graft in left UE    Vitamin D deficiency disease  - monitor vitamin-D    Return to clinic in 5-6mo for diabetes management     Carl Day MD  Endocrinology- [facility]   8/16/2024      Disclaimer: This note has been generated using  voice-recognition software. There may be typographical errors that have been missed during proof-reading.    Fructosamine range

## 2024-08-16 NOTE — ASSESSMENT & PLAN NOTE
- Denies compressive symptoms.   - Overall patient not interested in getting biopsy given stable size  - US thyroid RIGHT isthmus, 1.4 cm with small microcalcifications  - Check thyroid ultrasound 2023 in 2024, stable in size.    - patient does not want FNA.  will continue monitoring

## 2024-08-16 NOTE — PATIENT INSTRUCTIONS
Continue Trulicity 1.5 mg once a week injection     Goal blood sugar in the morning, before breakfast:   Glucose goal AFTER MEALS:    2 Hour after: less than 140  Before going to bed: 100-140  Do not go to bed with glucose less than 100  Have a small snack if glucose is lower than 100      Contact information:  Carl Day M.D  [facility] [address], [address]  [place] [address]    Office:  [phone]  Fax:  [phone]     ----------------------------------------------------------

## 2024-08-16 NOTE — ASSESSMENT & PLAN NOTE
- Diabetes is at a goal given current A1C goal A1C for patient is 7% in ESRD  - Diabetic supplies/medications: reviewed no need for refills at this time  - Long discussion with patient about dietary modification, portion size control, decreasing carbohydrates intake  - A1c can be falsely low due to anemia/ESRD  - improvement in hyperglycemia now.  Since on Trulicity    Plan  - continue Trulicity to 1.5 mg once a week injection.  Patient recently picked up a 90 day supply, and prefers avoid increasing the dose at this time  - Patient unable to check glucose often, will continue checking glucose at dialysis center  - Repeat lab work every 5-6 months, consider switching to Mounjaro/Ozempic given patient concern for weight gain

## 2024-08-27 NOTE — PROGRESS NOTES
[facility]                  HPI:  Erica Leblanc is a 79 y.o. female with   Patient Active Problem List   Diagnosis    Severe obesity (BMI 35.0-39.9) with comorbidity    Sickle cell trait    Hyperlipidemia LDL goal <100    HUMBERTO on CPAP    Hypothyroidism (acquired)    Hx-TIA (transient ischemic attack)    Vitamin D deficiency disease    Pulmonary hypertension    Type 2 diabetes mellitus with ESRD (end-stage renal disease)    Secondary renal hyperparathyroidism    Incomplete bladder emptying    Postmenopausal atrophic vaginitis    Rectocele    Mixed incontinence urge and stress    Chronic diastolic heart failure    Tortuous aorta    ESRD on hemodialysis    Anemia of chronic disease    Debility    Chronic respiratory failure    Type 2 diabetes mellitus with foot ulcer, with long-term current use of insulin    Stable proliferative diabetic retinopathy associated with type 2 diabetes mellitus    Heart failure with preserved ejection fraction    Pseudophakia    Chronic kidney disease-mineral and bone disorder    Age-related osteoporosis without current pathological fracture    H/O: stroke    Walker as ambulation aid    Swelling of lower extremity    Lymphedema of both lower extremities    Impaired functional mobility and activity tolerance    Wound of left leg    Multiple thyroid nodules    being managed by PCP and specialists who is here today for evaluation of long term HD access.  S/p R IJ tunneled HD catheter, HD without issues.  Pt is R handed.  No complaints today.  Does endorse orthopnea, no chest pain.  Unable to climb 1 flight of stairs on own.    no MI  no Stroke  Tobacco use: denies    1/20/20: No new issues.    3/2020: Dialysis without issues.    4/6/20:  S/p LUE fistulogram, central venogram, ligation of medial side branch cephalic vein, ligation of lateral side branch cephalic vein.  No issues with HD for several weeks since procedure.    7/6/20:  No issues with  HD.    8/3/20: s/p L proximal fistula angioplasty 7/21/20.  No issues with HD.    8/31/20:  S/p 8/19/29 Balloon angioplasty of the proximal LUE AVF with a 6x60 Angiosculpt and Stellerex balloon.      10/2020:  No issues with HD    1/2021:  No issues with HD    4/2021:  No new problems.    8/2021:  No issues with HD. C/o fatigue after HD.  Has not seen cardiology recently.    3/2022:  No issues with HD    6/2022:  Doing well, no issues with HD    1/2023:  No issues with HD    04/2023: No issues with HD.     7/2023:  doing well.    10/2023:  No issues with HD.    11/2023:  c/o LLE cellulitis and edema.  +compression with sock.    12/2023:  +compression.      2/2024:  no new issues.    02/28/24: Pt presents with complaints LLE shin blister with swelling after undergoing lymphedema therapy.     03/2024: No new issues    04/2024: No new issues wound healed.    05/22/24: No new issues.    07/2024: Pt called with complaints of LLE wound  Past Medical History:   Diagnosis Date    Acute ischemic right PCA stroke 6/6/2021    Acute respiratory failure with hypoxia     Age-related osteoporosis without current pathological fracture 3/8/2021    Anemia of chronic kidney failure, stage 4 (severe) 4/5/2019    Cataracts, bilateral     CHF (congestive heart failure)     CKD (chronic kidney disease) stage 3, GFR 30-59 ml/min     CKD (chronic kidney disease) stage 3, GFR 30-59 ml/min     Controlled type 2 diabetes mellitus with proteinuria or albuminuria     Depression     Diabetes with neurologic complications     Diabetic retinopathy of both eyes     Edema     Glaucoma     History of colonic polyps     Hx-TIA (transient ischemic attack) 11/2008    Hyperlipidemia LDL goal < 100     Hypertension     Hypothyroidism     Major depressive disorder, single episode, mild 2/17/2016    Mixed incontinence urge and stress     Obesity     Obstructive sleep apnea on CPAP     7/19/19:  Home CPAP machine broken, per patient & son    Osteopenia      Proteinuria     Sickle cell trait     Strabismus     TIA (transient ischemic attack)     Trouble in sleeping     Type 2 diabetes mellitus with ophthalmic manifestations     Type 2 diabetes with stage 3 chronic kidney disease GFR 30-59     Type II or unspecified type diabetes mellitus with renal manifestations, uncontrolled(250.42)     Uncontrolled type 2 diabetes mellitus with peripheral circulatory disorder 2019    Urge incontinence 2016    Urge incontinence     Venous stasis ulcer     bilateral lower legs    Vitamin D deficiency disease      Past Surgical History:   Procedure Laterality Date    AV FISTULA PLACEMENT Left 2019    Procedure: CREATION, AV FISTULA, LEFT UPPER EXTREMITY;  Surgeon: Keron Perez MD;  Location: [facility];  Service: Vascular;  Laterality: Left;    BREAST BIOPSY      breast reduction Bilateral age 31    BREAST SURGERY      CATARACT EXTRACTION Bilateral     cataracts Bilateral      SECTION, LOW TRANSVERSE      x1    CHOLECYSTECTOMY      COLONOSCOPY N/A 2022    Procedure: COLONOSCOPY;  Surgeon: Mahesh Huang MD;  Location: [facility] ([address] [place] [address]);  Service: Endoscopy;  Laterality: N/A;  fully vaccinated-sm.  RAPID COVID  +cologuard needing ASAP  Dialysis pt T,TH Sat and left UA access  2nd floor - cardiac/dialysis/SOB / LABS / prep ins. emailed - ERW    EYE SURGERY  2014, 2014    vitrectomy    EYE SURGERY Right 2016    FISTULOGRAM Left 3/6/2020    Procedure: Fistulogram, left upper extremity, with branch ligation;  Surgeon: Keron Perez MD;  Location: [address];  Service: Vascular;  Laterality: Left;  Time 1.1 Minute  42.10 mGy    FISTULOGRAM Left 2020    Procedure: Fistulogram, left upper extremity, transradial access with possible intervention;  Surgeon: Keron Perez MD;  Location: [address];  Service: Vascular;  Laterality: Left;    FISTULOGRAM Left 2020    Procedure: Fistulogram, left upper extremity,  possible intervention;  Surgeon: Keron Perez MD;  Location: [facility];  Service: Vascular;  Laterality: Left;  930 AM START  RN PRE OP  ---COVID NEGATIVE ON  8-. CA    FISTULOGRAM Left 1/21/2022    Procedure: Fistulogram, transradial access;  Surgeon: Keron Perez MD;  Location: [facility] OR [address] [place] [address];  Service: Vascular;  Laterality: Left;  mgy-40.16  gycm-8.3905  contrast-34cc  time-12.4min    HYSTERECTOMY  1986    TAHBSO (patient is unsure if ovaries removed)    OOPHORECTOMY      PERCUTANEOUS TRANSLUMINAL ANGIOPLASTY OF ARTERIOVENOUS FISTULA Left 7/21/2020    Procedure: PTA, AV FISTULA;  Surgeon: Keron Perez MD;  Location: [facility] OR [address] [place] [address];  Service: Vascular;  Laterality: Left;  15.9 minutes of fluro  41.12  mGy  7.9060 Gy cm2  32ml  contrast    PHLEBOGRAPHY Left 7/21/2020    Procedure: CENTRAL VENOGRAM;  Surgeon: Keron Perez MD;  Location: [facility] OR [address] [place] [address];  Service: Vascular;  Laterality: Left;    REFRACTIVE SURGERY      TOTAL REDUCTION MAMMOPLASTY      approx 10 yrs ago    VENOPLASTY  1/21/2022    Procedure: ANGIOPLASTY, VEIN;  Surgeon: Keron Perez MD;  Location: [facility] OR [address] [place] [address];  Service: Vascular;;     Family History   Problem Relation Name Age of Onset    Stroke Mother      Diabetes Mother      Hypertension Mother      Cataracts Mother      Leukemia Father      Cataracts Father      Ovarian cancer Sister Marilee 35    Achondroplasia Sister Hannah     HIV Brother Paul     No Known Problems Daughter x2     No Known Problems Son x3     Breast cancer Maternal Aunt  65    Parkinsonism Maternal Aunt      Esophageal cancer Maternal Uncle          smoker    No Known Problems Paternal Aunt      Cataracts Paternal Uncle      Cataracts Maternal Grandmother      Cataracts Maternal Grandfather      Diabetes Paternal Grandmother      Cataracts Paternal Grandmother      No Known Problems Paternal Grandfather      No Known Problems Other      Amblyopia Neg Hx      Blindness Neg  Hx      Glaucoma Neg Hx      Macular degeneration Neg Hx      Retinal detachment Neg Hx      Strabismus Neg Hx      Thyroid disease Neg Hx      Colon cancer Neg Hx      Cancer Neg Hx       Social History     Socioeconomic History    Marital status: Single    Number of children: 5   Occupational History    Occupation:      Employer: [employer]     Comment: part-time   Tobacco Use    Smoking status: Never    Smokeless tobacco: Never   Substance and Sexual Activity    Alcohol use: No     Alcohol/week: 0.0 standard drinks of alcohol    Drug use: No    Sexual activity: Not Currently     Partners: Male     Birth control/protection: Post-menopausal, Surgical     Social Determinants of Health     Financial Resource Strain: Low Risk  (7/19/2024)    Overall Financial Resource Strain (CARDIA)     Difficulty of Paying Living Expenses: Not hard at all   Food Insecurity: Patient Declined (7/19/2024)    Hunger Vital Sign     Worried About Running Out of Food in the Last Year: Patient declined     Ran Out of Food in the Last Year: Patient declined   Transportation Needs: No Transportation Needs (7/19/2024)    PRAPARE - Transportation     Lack of Transportation (Medical): No     Lack of Transportation (Non-Medical): No   Physical Activity: Patient Declined (7/19/2024)    Exercise Vital Sign     Days of Exercise per Week: Patient declined     Minutes of Exercise per Session: Patient declined   Stress: Patient Declined (7/19/2024)    [employer] - Occupational Stress Questionnaire     Feeling of Stress : Patient declined   Housing Stability: Low Risk  (7/19/2024)    Housing Stability Vital Sign     Unable to Pay for Housing in the Last Year: No     Homeless in the Last Year: No       Current Outpatient Medications:     ACCU-CHEK SOFT DEV LANCETS Kit, , Disp: , Rfl:     atorvastatin (LIPITOR) 80 MG tablet, Take 1 tablet (80 mg total) by mouth every evening., Disp: 90 tablet, Rfl:  3    blood sugar diagnostic Strp, One test strip use 2 times a day to check blood glucose,  ICD-10: E11.9, compatible with insurance/glucometer, Disp: 100 each, Rfl: 11    blood-glucose meter kit, One glucometer, use to check blood glucose.   ICD-10: E11.9. Dispense machine covered by insurance, Disp: 1 each, Rfl: 0    cinacalcet (SENSIPAR) 30 MG Tab, , Disp: , Rfl:     docusate sodium (COLACE) 100 MG capsule, Take 200 mg by mouth once daily. , Disp: , Rfl:     doxercalciferoL (HECTOROL) 4 mcg/2 mL injection, Inject 4.5 mcg into the vein 3 (three) times a week. At dialysis unit, Disp: , Rfl:     dulaglutide (TRULICITY) 1.5 mg/0.5 mL pen injector, Inject 1.5 mg into the skin every 7 days., Disp: 12 pen , Rfl: 3    epoetin arnel (EPOGEN INJ), Inject 1,000 Units as directed every 7 days. At the dialysis unit, Disp: , Rfl:     fluticasone propionate (FLONASE) 50 mcg/actuation nasal spray, 1 spray (50 mcg total) by Each Nostril route once daily., Disp: 48 g, Rfl: 5    lancets Misc, One lancets use 2 times a day to check blood glucose, ICD-10: E11.9, Disp: 100 each, Rfl: 11    lancing device Misc, One device, used to check blood glucose, ICD-10: E11.9, Disp: 1 each, Rfl: 0    LIDOcaine-prilocaine (EMLA) cream, Apply topically as needed., Disp: 30 g, Rfl: 11    midodrine (PROAMATINE) 5 MG Tab, Take 1 tablet (5 mg total) by mouth 3 (three) times daily., Disp: 90 tablet, Rfl: 3    mupirocin (BACTROBAN) 2 % ointment, Apply topically 3 (three) times daily., Disp: 22 g, Rfl: 0    OLENA-NABIL RX 1- mg-mg-mcg Tab, Take 1 tablet by mouth once daily., Disp: , Rfl:     aspirin (ECOTRIN) 81 MG EC tablet, Take 1 tablet (81 mg total) by mouth once daily., Disp: 90 tablet, Rfl: 3    levothyroxine (SYNTHROID) 50 MCG tablet, TAKE 1 TABLET BEFORE BREAKFAST, Disp: 90 tablet, Rfl: 3    sevelamer carbonate (RENVELA) 800 mg Tab, Take 3 tablets (2,400 mg total) by mouth 3 (three) times daily with meals., Disp: 270 tablet, Rfl: 11    REVIEW  "OF SYSTEMS:  General: No fevers or chills; ENT: No sore throat; Allergy and Immunology: no persistent infections; Hematological and Lymphatic: No history of bleeding or easy bruising; Endocrine: negative; Respiratory: no cough, shortness of breath, or wheezing; Cardiovascular: no chest pain or dyspnea on exertion; Gastrointestinal: no abdominal pain/back, change in bowel habits, or bloody stools; Genito-Urinary: no dysuria, trouble voiding, or hematuria; Musculoskeletal: negative; Neurological: no TIA or stroke symptoms; Psychiatric: no nervousness, anxiety or depression.    PHYSICAL EXAM:      Pulse: 81         General appearance:  Alert, well-appearing, and in no distress.  Oriented to person, place, and time                    Neurological: Normal speech, no focal findings noted; CN II - XII grossly intact. All extremities with sensation to light touch.            Musculoskeletal: Digits/nail without cyanosis/clubbing.  Strength 5/5 all extremities.                    Neck: Supple, no significant adenopathy, no carotid bruit can be auscultated                  Chest:  Clear to auscultation, no wheezes, rales or rhonchi, symmetric air entry. No use of accessory muscles               Cardiac: Normal rate and regular rhythm, S1 and S2 normal            Abdomen: Soft, nontender, nondistended, no masses or organomegaly, no hernia     No rebound tenderness noted; bowel sounds normal     Pulsatile aortic mass is non palpable.     No groin adenopathy      Extremities:      2+ radial pulse bilaterally, LUE AVF +thrill, inc well healed, 2 PSA with dry scabs, no ulcerations     No BUE edema, Negative Manuel's test BUE    Skin: No tissue loss, 2+ LLE edema , 1+ RLE edema     VCSS 11     CEAP 3/4a    LAB RESULTS:  No results found for: "CBC"  Lab Results   Component Value Date    LABPROT 10.3 06/07/2021    INR 1.0 06/07/2021     Lab Results   Component Value Date     08/09/2024    K 4.9 08/09/2024    CL 99 08/09/2024    " CO2 29 08/09/2024     (H) 08/09/2024    BUN 42 (H) 08/09/2024    CREATININE 9.28 (H) 08/13/2024    CALCIUM 9.2 08/09/2024    ANIONGAP 12 08/09/2024    EGFRNONAA 3.6 (A) 05/07/2022     Lab Results   Component Value Date    WBC 6.54 10/01/2023    RBC 3.84 (L) 10/01/2023    HGB 10.9 (L) 07/23/2024    HCT 33 (L) 07/18/2024    MCV 85 10/01/2023    MCH 27.1 10/01/2023    MCHC 31.8 (L) 10/01/2023    RDW 13.8 10/01/2023     10/01/2023    MPV 11.4 10/01/2023    GRAN 4.6 10/01/2023    GRAN 70.1 10/01/2023    LYMPH 0.7 (L) 10/01/2023    LYMPH 11.2 (L) 10/01/2023    MONO 1.1 (H) 10/01/2023    MONO 16.4 (H) 10/01/2023    EOS 0.1 10/01/2023    BASO 0.02 10/01/2023    EOSINOPHIL 1.4 10/01/2023    BASOPHIL 0.3 10/01/2023    DIFFMETHOD Automated 10/01/2023     .  Lab Results   Component Value Date    HGBA1C 5.7 (H) 08/09/2024       IMAGING:  All pertinent imaging has been reviewed and interpreted independently.    Vein mapping 9/2019:  Adequate R superior basilic vein and axillary vein ; Adequate L basilic vein superior and inferior, adequate L axillary     1/27/20 Left upper extremity dialysis ultrasound shows no hemodynamically significant stenosis.  Flow is 1083 ml/min.  Depth and diameter are appropriate.    3/23/20:  Left upper extremity dialysis ultrasound shows anastomotic and proximal fistula hemodynamically significant stenosis.  Flow is 955 ml/min.      7/2020: Left upper extremity dialysis ultrasound shows proximal fistula hemodynamically significant stenosis.  Flow is 1257 ml/min.      8/2020: Left upper extremity dialysis ultrasound shows proximal hemodynamically significant stenosis.  Flow is 1999 ml/min.      8/31/20: Left upper extremity dialysis ultrasound shows proximal fistula hemodynamically significant stenosis.  Flow is 2209 ml/min.      10/2020:  Prox stenosis, flow > 3000 ml/min    1/2021:  Proximal stenosis, flow 4414 ml/min    4/2021:  HD US reviewed without significant stenosis, adequate  flow.    8/2021:HD US reviewed without significant stenosis, adequate flow.    3/2022:  Left upper extremity dialysis ultrasound shows approx 50% anastomotic and prox fistula stenosis withou hemodynamically significant stenosis.  Flow is 3774 ml/min.      6/2022:  No significant stenosis     04/2023: No significant stenosis. Flow 4,086 ml/min    10/2023:  Left upper extremity dialysis ultrasound shows approx 50% anastomotic stenosis.  Flow is 5000 ml/min.      12/2023:  Color flow evaluation of the right lower extremity demonstrates no evidence of venous thrombosis in the deep or superficial veins, and no reflux.  Color flow evaluation of the left lower extremity demonstrates no evidence of venous thrombosis in the deep or superficial veins, and no reflux.    Arterial US 03/2024:  Conclusion    Right lower extremity arterial ultrasound shows no hemodynamically significant stenosis.    Left lower extremity arterial ultrasound shows no hemodynamically significant stenosis.      PAM 03/2024:      HD US 05/22/24: Flow 2529 ml/min    IMP/PLAN:  79 y.o. female with   Patient Active Problem List   Diagnosis    Severe obesity (BMI 35.0-39.9) with comorbidity    Sickle cell trait    Hyperlipidemia LDL goal <100    HUMBERTO on CPAP    Hypothyroidism (acquired)    Hx-TIA (transient ischemic attack)    Vitamin D deficiency disease    Pulmonary hypertension    Type 2 diabetes mellitus with ESRD (end-stage renal disease)    Secondary renal hyperparathyroidism    Incomplete bladder emptying    Postmenopausal atrophic vaginitis    Rectocele    Mixed incontinence urge and stress    Chronic diastolic heart failure    Tortuous aorta    ESRD on hemodialysis    Anemia of chronic disease    Debility    Chronic respiratory failure    Type 2 diabetes mellitus with foot ulcer, with long-term current use of insulin    Stable proliferative diabetic retinopathy associated with type 2 diabetes mellitus    Heart failure with preserved ejection  fraction    Pseudophakia    Chronic kidney disease-mineral and bone disorder    Age-related osteoporosis without current pathological fracture    H/O: stroke    Walker as ambulation aid    Swelling of lower extremity    Lymphedema of both lower extremities    Impaired functional mobility and activity tolerance    Wound of left leg    Multiple thyroid nodules    being managed by PCP and specialists who is here today for evaluation of long term HD access s/p L RC AV fistula. No presenting for LLE blister.    - Wound well healed. Ok to use compressions  - s/p L RC AV fistula with proximal fistula measuring 5 mm 1/13/20, adequate flow without issues during HD s/p LUE fistulogram, central venogram, ligation of medial side branch cephalic vein, ligation of lateral side branch cephalic vein 3/2/20 with prox AVF stenosis s/p L proximal fistula angioplasty 7/21/20 with persistent flow issues s/p proximal angioplasty 8/19/20 with scoring balloon/DCB with improvement in stenosis/flow and asymptomatic anastomotic stenosis with no further issues with HD. Patient undergoing lymphedema therapy developed LLE blister to her shin with associated edema- wound well healed. Vascular studies with adequate blood flow for wound healing. HD US 05/22: Flow 2529 ml/min.  -Cont renal diet  -Rec cont lymphedema clinic and pumps  - Compression and elevation  -RTC in 3 mo for further evaluation of AVF with HD US.    I spent 30 minutes evaluating this patient and greater than 50% of the time was spent counseling, coordinator care and discussing the plan of care.  All questions were answered and patient stated understanding with agreement with the above treatment plan.    Tonia Silva NP  Vascular and Endovascular Surgery

## 2024-09-16 ENCOUNTER — OFFICE VISIT (OUTPATIENT)
Dept: VASCULAR SURGERY | Facility: CLINIC | Age: 79
End: 2024-09-16
Payer: MEDICARE

## 2024-09-16 ENCOUNTER — HOSPITAL ENCOUNTER (OUTPATIENT)
Dept: CARDIOLOGY | Facility: HOSPITAL | Age: 79
Discharge: HOME | End: 2024-09-16
Attending: SURGERY
Payer: MEDICARE

## 2024-09-16 VITALS
WEIGHT: 211.88 LBS | SYSTOLIC BLOOD PRESSURE: 163 MMHG | HEART RATE: 82 BPM | HEIGHT: 65 IN | DIASTOLIC BLOOD PRESSURE: 96 MMHG | BODY MASS INDEX: 35.3 KG/M2

## 2024-09-16 DIAGNOSIS — T82.858D ARTERIOVENOUS FISTULA STENOSIS, SUBSEQUENT ENCOUNTER: ICD-10-CM

## 2024-09-16 DIAGNOSIS — T82.858D ARTERIOVENOUS FISTULA STENOSIS, SUBSEQUENT ENCOUNTER: Primary | ICD-10-CM

## 2024-09-16 DIAGNOSIS — S81.802A WOUND OF LEFT LOWER EXTREMITY, INITIAL ENCOUNTER: ICD-10-CM

## 2024-09-16 DIAGNOSIS — I89.0 LYMPHEDEMA: Primary | ICD-10-CM

## 2024-09-16 LAB
HD FISTULA OUTFLOW VEIN VESSEL: NORMAL
HD INFLOW ARTERY VESSEL: NORMAL
RIGHT DIS GRAFT PSV: 99 CM/ SEC
RIGHT INFLOW PSV: 278 CM/S
RIGHT MID GRAFT DIA: 2.2 CM
RIGHT MID GRAFT PSV: 84 CM/S
RIGHT OUTFLOW VEIN PSV: 196 CM/ SEC
RIGHT PROX ANA PSV: 638 CM/S
RIGHT PROX GRAFT PSV: 287 CM/S
RIGHT VOLUME FLOW PSV: 7536 ML/MIN

## 2024-09-16 PROCEDURE — 93990 DOPPLER FLOW TESTING: CPT | Mod: TC,HCNC

## 2024-09-16 PROCEDURE — 93990 DOPPLER FLOW TESTING: CPT | Mod: 26,HCNC,, | Performed by: SURGERY

## 2024-09-16 PROCEDURE — 99999 PR PBB SHADOW E&M-EST. PATIENT-LVL III: CPT | Mod: PBBFAC,HCNC,, | Performed by: SURGERY

## 2024-09-16 NOTE — PROGRESS NOTES
[facility]                  HPI:  Erica Leblanc is a 79 y.o. female with   Patient Active Problem List   Diagnosis    Severe obesity (BMI 35.0-39.9) with comorbidity    Sickle cell trait    Hyperlipidemia LDL goal <100    HUMBERTO on CPAP    Hypothyroidism (acquired)    Hx-TIA (transient ischemic attack)    Vitamin D deficiency disease    Pulmonary hypertension    Type 2 diabetes mellitus with ESRD (end-stage renal disease)    Secondary renal hyperparathyroidism    Incomplete bladder emptying    Postmenopausal atrophic vaginitis    Rectocele    Mixed incontinence urge and stress    Chronic diastolic heart failure    Tortuous aorta    ESRD on hemodialysis    Anemia of chronic disease    Debility    Chronic respiratory failure    Type 2 diabetes mellitus with foot ulcer, with long-term current use of insulin    Stable proliferative diabetic retinopathy associated with type 2 diabetes mellitus    Heart failure with preserved ejection fraction    Pseudophakia    Chronic kidney disease-mineral and bone disorder    Age-related osteoporosis without current pathological fracture    H/O: stroke    Walker as ambulation aid    Swelling of lower extremity    Lymphedema of both lower extremities    Impaired functional mobility and activity tolerance    Wound of left leg    Multiple thyroid nodules    being managed by PCP and specialists who is here today for evaluation of long term HD access.  S/p R IJ tunneled HD catheter, HD without issues.  Pt is R handed.  No complaints today.  Does endorse orthopnea, no chest pain.  Unable to climb 1 flight of stairs on own.    no MI  no Stroke  Tobacco use: denies    1/20/20: No new issues.    3/2020: Dialysis without issues.    4/6/20:  S/p LUE fistulogram, central venogram, ligation of medial side branch cephalic vein, ligation of lateral side branch cephalic vein.  No issues with HD for several weeks since procedure.    7/6/20:  No issues with  HD.    8/3/20: s/p L proximal fistula angioplasty 7/21/20.  No issues with HD.    8/31/20:  S/p 8/19/29 Balloon angioplasty of the proximal LUE AVF with a 6x60 Angiosculpt and Stellerex balloon.      10/2020:  No issues with HD    1/2021:  No issues with HD    4/2021:  No new problems.    8/2021:  No issues with HD. C/o fatigue after HD.  Has not seen cardiology recently.    3/2022:  No issues with HD    6/2022:  Doing well, no issues with HD    1/2023:  No issues with HD    04/2023: No issues with HD.     7/2023:  doing well.    10/2023:  No issues with HD.    11/2023:  c/o LLE cellulitis and edema.  +compression with sock.    12/2023:  +compression.      2/2024:  no new issues.    02/28/24: Pt presents with complaints LLE shin blister with swelling after undergoing lymphedema therapy.     03/2024: No new issues    04/2024: No new issues wound healed.    05/22/24: No new issues.    07/2024: Pt called with complaints of LLE wound    9/2024: no issues with HD.  Traumatic wound to LLE.    Past Medical History:   Diagnosis Date    Acute ischemic right PCA stroke 6/6/2021    Acute respiratory failure with hypoxia     Age-related osteoporosis without current pathological fracture 3/8/2021    Anemia of chronic kidney failure, stage 4 (severe) 4/5/2019    Cataracts, bilateral     CHF (congestive heart failure)     CKD (chronic kidney disease) stage 3, GFR 30-59 ml/min     CKD (chronic kidney disease) stage 3, GFR 30-59 ml/min     Controlled type 2 diabetes mellitus with proteinuria or albuminuria     Depression     Diabetes with neurologic complications     Diabetic retinopathy of both eyes     Edema     Glaucoma     History of colonic polyps     Hx-TIA (transient ischemic attack) 11/2008    Hyperlipidemia LDL goal < 100     Hypertension     Hypothyroidism     Major depressive disorder, single episode, mild 2/17/2016    Mixed incontinence urge and stress     Obesity     Obstructive sleep apnea on CPAP     7/19/19:  Home  CPAP machine broken, per patient & son    Osteopenia     Proteinuria     Sickle cell trait     Strabismus     TIA (transient ischemic attack)     Trouble in sleeping     Type 2 diabetes mellitus with ophthalmic manifestations     Type 2 diabetes with stage 3 chronic kidney disease GFR 30-59     Type II or unspecified type diabetes mellitus with renal manifestations, uncontrolled(250.42)     Uncontrolled type 2 diabetes mellitus with peripheral circulatory disorder 2019    Urge incontinence 2016    Urge incontinence     Venous stasis ulcer     bilateral lower legs    Vitamin D deficiency disease      Past Surgical History:   Procedure Laterality Date    AV FISTULA PLACEMENT Left 2019    Procedure: CREATION, AV FISTULA, LEFT UPPER EXTREMITY;  Surgeon: Keron Perez MD;  Location: [facility];  Service: Vascular;  Laterality: Left;    BREAST BIOPSY      breast reduction Bilateral age 31    BREAST SURGERY      CATARACT EXTRACTION Bilateral     cataracts Bilateral      SECTION, LOW TRANSVERSE      x1    CHOLECYSTECTOMY      COLONOSCOPY N/A 2022    Procedure: COLONOSCOPY;  Surgeon: Mahesh Huang MD;  Location: [facility] ([address] [place] [address]);  Service: Endoscopy;  Laterality: N/A;  fully vaccinated-sm.  RAPID COVID  +cologuard needing ASAP  Dialysis pt T,TH Sat and left UA access  2nd floor - cardiac/dialysis/SOB / LABS / prep ins. emailed - ERW    EYE SURGERY  2014, 2014    vitrectomy    EYE SURGERY Right 2016    FISTULOGRAM Left 3/6/2020    Procedure: Fistulogram, left upper extremity, with branch ligation;  Surgeon: Keron Perez MD;  Location: [address];  Service: Vascular;  Laterality: Left;  Time 1.1 Minute  42.10 mGy    FISTULOGRAM Left 2020    Procedure: Fistulogram, left upper extremity, transradial access with possible intervention;  Surgeon: Keron Perez MD;  Location: [address];  Service: Vascular;  Laterality: Left;    FISTULOGRAM Left  8/19/2020    Procedure: Fistulogram, left upper extremity, possible intervention;  Surgeon: Keron Perez MD;  Location: [facility];  Service: Vascular;  Laterality: Left;  930 AM START  RN PRE OP  ---COVID NEGATIVE ON  8-. CA    FISTULOGRAM Left 1/21/2022    Procedure: Fistulogram, transradial access;  Surgeon: Keron Perez MD;  Location: [facility] OR [address] [place] [address];  Service: Vascular;  Laterality: Left;  mgy-40.16  gycm-8.3905  contrast-34cc  time-12.4min    HYSTERECTOMY  1986    TAHBSO (patient is unsure if ovaries removed)    OOPHORECTOMY      PERCUTANEOUS TRANSLUMINAL ANGIOPLASTY OF ARTERIOVENOUS FISTULA Left 7/21/2020    Procedure: PTA, AV FISTULA;  Surgeon: Keron Perez MD;  Location: [facility] OR [address] [place] [address];  Service: Vascular;  Laterality: Left;  15.9 minutes of fluro  41.12  mGy  7.9060 Gy cm2  32ml  contrast    PHLEBOGRAPHY Left 7/21/2020    Procedure: CENTRAL VENOGRAM;  Surgeon: Keron Perez MD;  Location: [facility] OR [address] [place] [address];  Service: Vascular;  Laterality: Left;    REFRACTIVE SURGERY      TOTAL REDUCTION MAMMOPLASTY      approx 10 yrs ago    VENOPLASTY  1/21/2022    Procedure: ANGIOPLASTY, VEIN;  Surgeon: Keron Perez MD;  Location: [facility] OR [address] [place] [address];  Service: Vascular;;     Family History   Problem Relation Name Age of Onset    Stroke Mother      Diabetes Mother      Hypertension Mother      Cataracts Mother      Leukemia Father      Cataracts Father      Ovarian cancer Sister Thayer 35    Achondroplasia Sister Hannah     HIV Brother Paul     No Known Problems Daughter x2     No Known Problems Son x3     Breast cancer Maternal Aunt  65    Parkinsonism Maternal Aunt      Esophageal cancer Maternal Uncle          smoker    No Known Problems Paternal Aunt      Cataracts Paternal Uncle      Cataracts Maternal Grandmother      Cataracts Maternal Grandfather      Diabetes Paternal Grandmother      Cataracts Paternal Grandmother      No Known Problems Paternal Grandfather      No  Known Problems Other      Amblyopia Neg Hx      Blindness Neg Hx      Glaucoma Neg Hx      Macular degeneration Neg Hx      Retinal detachment Neg Hx      Strabismus Neg Hx      Thyroid disease Neg Hx      Colon cancer Neg Hx      Cancer Neg Hx       Social History     Socioeconomic History    Marital status: Single    Number of children: 5   Occupational History    Occupation:      Employer: [employer]     Comment: part-time   Tobacco Use    Smoking status: Never    Smokeless tobacco: Never   Substance and Sexual Activity    Alcohol use: No     Alcohol/week: 0.0 standard drinks of alcohol    Drug use: No    Sexual activity: Not Currently     Partners: Male     Birth control/protection: Post-menopausal, Surgical     Social Determinants of Health     Financial Resource Strain: Low Risk  (7/19/2024)    Overall Financial Resource Strain (CARDIA)     Difficulty of Paying Living Expenses: Not hard at all   Food Insecurity: Patient Declined (7/19/2024)    Hunger Vital Sign     Worried About Running Out of Food in the Last Year: Patient declined     Ran Out of Food in the Last Year: Patient declined   Transportation Needs: No Transportation Needs (7/19/2024)    PRAPARE - Transportation     Lack of Transportation (Medical): No     Lack of Transportation (Non-Medical): No   Physical Activity: Patient Declined (7/19/2024)    Exercise Vital Sign     Days of Exercise per Week: Patient declined     Minutes of Exercise per Session: Patient declined   Stress: Patient Declined (7/19/2024)    [employer] - Occupational Stress Questionnaire     Feeling of Stress : Patient declined   Housing Stability: Low Risk  (7/19/2024)    Housing Stability Vital Sign     Unable to Pay for Housing in the Last Year: No     Homeless in the Last Year: No       Current Outpatient Medications:     ACCU-CHEK SOFT DEV LANCETS Kit, , Disp: , Rfl:     atorvastatin (LIPITOR) 80 MG tablet, Take 1  tablet (80 mg total) by mouth every evening., Disp: 90 tablet, Rfl: 3    blood sugar diagnostic Strp, One test strip use 2 times a day to check blood glucose,  ICD-10: E11.9, compatible with insurance/glucometer, Disp: 100 each, Rfl: 11    blood-glucose meter kit, One glucometer, use to check blood glucose.   ICD-10: E11.9. Dispense machine covered by insurance, Disp: 1 each, Rfl: 0    cinacalcet (SENSIPAR) 30 MG Tab, , Disp: , Rfl:     docusate sodium (COLACE) 100 MG capsule, Take 200 mg by mouth once daily. , Disp: , Rfl:     doxercalciferoL (HECTOROL) 4 mcg/2 mL injection, Inject 4.5 mcg into the vein 3 (three) times a week. At dialysis unit, Disp: , Rfl:     dulaglutide (TRULICITY) 1.5 mg/0.5 mL pen injector, Inject 1.5 mg into the skin every 7 days., Disp: 12 pen , Rfl: 3    epoetin arnel (EPOGEN INJ), Inject 1,000 Units as directed every 7 days. At the dialysis unit, Disp: , Rfl:     fluticasone propionate (FLONASE) 50 mcg/actuation nasal spray, 1 spray (50 mcg total) by Each Nostril route once daily., Disp: 48 g, Rfl: 5    lancets Misc, One lancets use 2 times a day to check blood glucose, ICD-10: E11.9, Disp: 100 each, Rfl: 11    lancing device Misc, One device, used to check blood glucose, ICD-10: E11.9, Disp: 1 each, Rfl: 0    levothyroxine (SYNTHROID) 50 MCG tablet, TAKE 1 TABLET BEFORE BREAKFAST, Disp: 90 tablet, Rfl: 3    LIDOcaine-prilocaine (EMLA) cream, Apply topically as needed., Disp: 30 g, Rfl: 11    mupirocin (BACTROBAN) 2 % ointment, Apply topically 3 (three) times daily., Disp: 22 g, Rfl: 0    OLENA-NABIL RX 1- mg-mg-mcg Tab, Take 1 tablet by mouth once daily., Disp: , Rfl:     aspirin (ECOTRIN) 81 MG EC tablet, Take 1 tablet (81 mg total) by mouth once daily., Disp: 90 tablet, Rfl: 3    midodrine (PROAMATINE) 5 MG Tab, Take 1 tablet (5 mg total) by mouth 3 (three) times daily., Disp: 90 tablet, Rfl: 3    sevelamer carbonate (RENVELA) 800 mg Tab, Take 3 tablets (2,400 mg total) by mouth 3  "(three) times daily with meals., Disp: 270 tablet, Rfl: 11    REVIEW OF SYSTEMS:  General: No fevers or chills; ENT: No sore throat; Allergy and Immunology: no persistent infections; Hematological and Lymphatic: No history of bleeding or easy bruising; Endocrine: negative; Respiratory: no cough, shortness of breath, or wheezing; Cardiovascular: no chest pain or dyspnea on exertion; Gastrointestinal: no abdominal pain/back, change in bowel habits, or bloody stools; Genito-Urinary: no dysuria, trouble voiding, or hematuria; Musculoskeletal: negative; Neurological: no TIA or stroke symptoms; Psychiatric: no nervousness, anxiety or depression.    PHYSICAL EXAM:      Pulse: 82         General appearance:  Alert, well-appearing, and in no distress.  Oriented to person, place, and time                    Neurological: Normal speech, no focal findings noted; CN II - XII grossly intact. All extremities with sensation to light touch.            Musculoskeletal: Digits/nail without cyanosis/clubbing.  Strength 5/5 all extremities.                    Neck: Supple, no significant adenopathy, no carotid bruit can be auscultated                  Chest:  Clear to auscultation, no wheezes, rales or rhonchi, symmetric air entry. No use of accessory muscles               Cardiac: Normal rate and regular rhythm, S1 and S2 normal            Abdomen: Soft, nontender, nondistended, no masses or organomegaly, no hernia     No rebound tenderness noted; bowel sounds normal     Pulsatile aortic mass is non palpable.     No groin adenopathy      Extremities:      2+ radial pulse bilaterally, LUE AVF +thrill, inc well healed, 2 PSA with dry scabs, no ulcerations     No BUE edema, Negative Manuel's test BUE    Skin: No tissue loss, 2+ LLE edema , 1+ RLE edema     VCSS 5     CEAP 3/4a    LAB RESULTS:  No results found for: "CBC"  Lab Results   Component Value Date    LABPROT 10.3 06/07/2021    INR 1.0 06/07/2021     Lab Results   Component Value " Date     08/09/2024    K 4.9 08/09/2024    CL 99 08/09/2024    CO2 29 08/09/2024     (H) 08/09/2024    BUN 42 (H) 08/09/2024    CREATININE 9.22 (H) 09/10/2024    CALCIUM 9.2 08/09/2024    ANIONGAP 12 08/09/2024    EGFRNONAA 3.6 (A) 05/07/2022     Lab Results   Component Value Date    WBC 6.54 10/01/2023    RBC 3.84 (L) 10/01/2023    HGB 10.6 (L) 08/27/2024    HCT 33 (L) 07/18/2024    MCV 85 10/01/2023    MCH 27.1 10/01/2023    MCHC 31.8 (L) 10/01/2023    RDW 13.8 10/01/2023     10/01/2023    MPV 11.4 10/01/2023    GRAN 4.6 10/01/2023    GRAN 70.1 10/01/2023    LYMPH 0.7 (L) 10/01/2023    LYMPH 11.2 (L) 10/01/2023    MONO 1.1 (H) 10/01/2023    MONO 16.4 (H) 10/01/2023    EOS 0.1 10/01/2023    BASO 0.02 10/01/2023    EOSINOPHIL 1.4 10/01/2023    BASOPHIL 0.3 10/01/2023    DIFFMETHOD Automated 10/01/2023     .  Lab Results   Component Value Date    HGBA1C 5.7 (H) 08/09/2024       IMAGING:  All pertinent imaging has been reviewed and interpreted independently.    Vein mapping 9/2019:  Adequate R superior basilic vein and axillary vein ; Adequate L basilic vein superior and inferior, adequate L axillary     1/27/20 Left upper extremity dialysis ultrasound shows no hemodynamically significant stenosis.  Flow is 1083 ml/min.  Depth and diameter are appropriate.    3/23/20:  Left upper extremity dialysis ultrasound shows anastomotic and proximal fistula hemodynamically significant stenosis.  Flow is 955 ml/min.      7/2020: Left upper extremity dialysis ultrasound shows proximal fistula hemodynamically significant stenosis.  Flow is 1257 ml/min.      8/2020: Left upper extremity dialysis ultrasound shows proximal hemodynamically significant stenosis.  Flow is 1999 ml/min.      8/31/20: Left upper extremity dialysis ultrasound shows proximal fistula hemodynamically significant stenosis.  Flow is 2209 ml/min.      10/2020:  Prox stenosis, flow > 3000 ml/min    1/2021:  Proximal stenosis, flow 4414  ml/min    4/2021:  HD US reviewed without significant stenosis, adequate flow.    8/2021:HD US reviewed without significant stenosis, adequate flow.    3/2022:  Left upper extremity dialysis ultrasound shows approx 50% anastomotic and prox fistula stenosis withou hemodynamically significant stenosis.  Flow is 3774 ml/min.      6/2022:  No significant stenosis     04/2023: No significant stenosis. Flow 4,086 ml/min    10/2023:  Left upper extremity dialysis ultrasound shows approx 50% anastomotic stenosis.  Flow is 5000 ml/min.      12/2023:  Color flow evaluation of the right lower extremity demonstrates no evidence of venous thrombosis in the deep or superficial veins, and no reflux.  Color flow evaluation of the left lower extremity demonstrates no evidence of venous thrombosis in the deep or superficial veins, and no reflux.    Arterial US 03/2024:  Conclusion    Right lower extremity arterial ultrasound shows no hemodynamically significant stenosis.    Left lower extremity arterial ultrasound shows no hemodynamically significant stenosis.      PAM 03/2024:      HD US 05/22/24: Flow 2529 ml/min    9/2024: Left upper extremity dialysis ultrasound shows 50-69% stenosis of the anastomosis and proximal fistula.  Flow is 7536 ml/min.      IMP/PLAN:  79 y.o. female with   Patient Active Problem List   Diagnosis    Severe obesity (BMI 35.0-39.9) with comorbidity    Sickle cell trait    Hyperlipidemia LDL goal <100    HUMBERTO on CPAP    Hypothyroidism (acquired)    Hx-TIA (transient ischemic attack)    Vitamin D deficiency disease    Pulmonary hypertension    Type 2 diabetes mellitus with ESRD (end-stage renal disease)    Secondary renal hyperparathyroidism    Incomplete bladder emptying    Postmenopausal atrophic vaginitis    Rectocele    Mixed incontinence urge and stress    Chronic diastolic heart failure    Tortuous aorta    ESRD on hemodialysis    Anemia of chronic disease    Debility    Chronic respiratory failure     Type 2 diabetes mellitus with foot ulcer, with long-term current use of insulin    Stable proliferative diabetic retinopathy associated with type 2 diabetes mellitus    Heart failure with preserved ejection fraction    Pseudophakia    Chronic kidney disease-mineral and bone disorder    Age-related osteoporosis without current pathological fracture    H/O: stroke    Walker as ambulation aid    Swelling of lower extremity    Lymphedema of both lower extremities    Impaired functional mobility and activity tolerance    Wound of left leg    Multiple thyroid nodules    being managed by PCP and specialists who is here today for evaluation of long term HD access s/p L RC AV fistula.     - Wound well healed. Ok to use compressions  - s/p L RC AV fistula with proximal fistula measuring 5 mm 1/13/20, adequate flow without issues during HD s/p LUE fistulogram, central venogram, ligation of medial side branch cephalic vein, ligation of lateral side branch cephalic vein 3/2/20 with prox AVF stenosis s/p L proximal fistula angioplasty 7/21/20 with persistent flow issues s/p proximal angioplasty 8/19/20 with scoring balloon/DCB with improvement in stenosis/flow and asymptomatic anastomotic stenosis with no further issues with HD. Patient undergoing lymphedema therapy developed LLE blister to her shin with associated edema- wound well healed. Vascular studies with adequate blood flow for wound healing.   -Cont renal diet  -Rec cont lymphedema clinic and pumps  -Compression and elevation  -RTC in 3 mo for further evaluation of AVF with HD US.    I spent 11 minutes evaluating this patient and greater than 50% of the time was spent counseling, coordinator care and discussing the plan of care.  All questions were answered and patient stated understanding with agreement with the above treatment plan.    Keron Perez M.D., R.P.V.I. [facility]

## 2024-09-25 ENCOUNTER — TELEPHONE (OUTPATIENT)
Dept: VASCULAR SURGERY | Facility: CLINIC | Age: 79
End: 2024-09-25
Payer: MEDICARE

## 2024-09-25 ENCOUNTER — PATIENT MESSAGE (OUTPATIENT)
Dept: VASCULAR SURGERY | Facility: CLINIC | Age: 79
End: 2024-09-25
Payer: MEDICARE

## 2024-09-25 DIAGNOSIS — I87.303 VENOUS HYPERTENSION OF BOTH LOWER EXTREMITIES: ICD-10-CM

## 2024-09-25 DIAGNOSIS — S81.802A WOUND OF LEFT LOWER EXTREMITY, INITIAL ENCOUNTER: ICD-10-CM

## 2024-09-25 DIAGNOSIS — I73.9 PVD (PERIPHERAL VASCULAR DISEASE): Primary | ICD-10-CM

## 2024-09-25 NOTE — TELEPHONE ENCOUNTER
Spoke with pt.and has wound to lt.leg not improving from clinic visit on 9/16/2024 and wants appt. Spoke with  and wound care consult ordered and PAM. Pt. unable to come for visit on tomorrow due to dialysis treatment scheduled. Pt. states her son is going to help her try to send picture of wound via my chart. Awaiting for PAM to be scheduled and pt. Aware message sent to dept. to do on day pt.does not have dialysis. Pt.instructed if wound worsen to go to the ER. Pt.verbalized understanding.-Pt.sent picture of wound through my chart and sent to  to review.-9/26/2024 Spoke with . Reviewed picture of wound and pt. needs to see wound care and continue other recommendations that  has spoke with her at visit.  Called pt. and aware. Verbalized understanding.

## 2024-09-25 NOTE — TELEPHONE ENCOUNTER
----- Message from Kay Verdugo sent at 9/25/2024 10:24 AM CDT -----  Regarding: self  Patient is requesting a sooner appointment.  Patient declined first available appointment listed as well as another facility and provider .  Patient will not accept being placed on the waitlist and is requesting a message be sent to doctor.    Name of Caller: self    When is the first available appointment? December    Symptoms: Pt was instructed to call if wound to ankle got worse. Nothing populated for me until December    Would the patient rather a call back or a response via [facility]?    Best Call Back Number: [phone]      Additional Information: attempt to call office was made, but no answer

## 2024-09-30 ENCOUNTER — HOSPITAL ENCOUNTER (OUTPATIENT)
Dept: WOUND CARE | Facility: HOSPITAL | Age: 79
Discharge: HOME | End: 2024-09-30
Attending: FAMILY MEDICINE
Payer: MEDICARE

## 2024-09-30 VITALS
HEART RATE: 75 BPM | SYSTOLIC BLOOD PRESSURE: 160 MMHG | DIASTOLIC BLOOD PRESSURE: 72 MMHG | RESPIRATION RATE: 18 BRPM | TEMPERATURE: 98 F

## 2024-09-30 DIAGNOSIS — I87.303 VENOUS HYPERTENSION OF BOTH LOWER EXTREMITIES: ICD-10-CM

## 2024-09-30 DIAGNOSIS — I73.9 PVD (PERIPHERAL VASCULAR DISEASE): ICD-10-CM

## 2024-09-30 DIAGNOSIS — S81.802A WOUND OF LEFT LOWER EXTREMITY, INITIAL ENCOUNTER: Primary | ICD-10-CM

## 2024-09-30 PROCEDURE — 11042 DBRDMT SUBQ TIS 1ST 20SQCM/<: CPT | Mod: HCNC | Performed by: FAMILY MEDICINE

## 2024-09-30 PROCEDURE — 99214 OFFICE O/P EST MOD 30 MIN: CPT | Mod: 25,HCNC,, | Performed by: FAMILY MEDICINE

## 2024-09-30 PROCEDURE — 11042 DBRDMT SUBQ TIS 1ST 20SQCM/<: CPT | Mod: HCNC,,, | Performed by: FAMILY MEDICINE

## 2024-09-30 NOTE — PROGRESS NOTES
[facility]                Progress Note    Subjective:       Patient ID: Erica Leblanc is a 79 y.o. female.    Chief Complaint: Wound Care and Dressing Change    Patient was seen today as a readmit. Patient ambulated to the exam room with Rollator walker, with son by side. New wound noted to the Left Anterior LL. She c/o pain to the Left Anterior LL wound bed rating 5/10. Denies fever, nausea, chills, vomiting, or diarrhea at present. Wound was dressed with 4 x 4 and tape. Dressing intact without strike through drainage. Topical 5% Lidocaine was applied and allowed to absorb for 15 minutes for pain management. Wound care done per MD orders. Patient's son was present throughout the exam. AVS printed and given to patient.         Return to clinic in 1 week.      This is an established patient in wound care.   Patient presents in the office today for evaluation of the chronic wound.  Updated HPI is noted below.    The periwound appears non-erythematous, no maceration noted, edematous    The wound appears to have good granulation tissue. No odor. Serous drainage.     Patient denies fever, chills    Patients reports wound pain    Lymphedema status is contributory to wound status    Pain at the site of the wound is aching    Contributing factors to current wound state include numerous comorbid conditions; Patient has compression pumps at home        Review of Systems   Constitutional:  Negative for activity change, appetite change and fever.   HENT:  Negative for congestion.    Respiratory:  Negative for shortness of breath and wheezing.    Cardiovascular:  Negative for chest pain and leg swelling.   Gastrointestinal:  Negative for abdominal pain, nausea and vomiting.   Skin:  Positive for wound.   Neurological:  Negative for dizziness and headaches.   Psychiatric/Behavioral:  The patient is not nervous/anxious.          Objective:        Physical Exam  Vitals and  nursing note reviewed.   Constitutional:       General: She is not in acute distress.     Appearance: She is well-developed.   HENT:      Head: Normocephalic and atraumatic.   Eyes:      Conjunctiva/sclera: Conjunctivae normal.   Pulmonary:      Effort: Pulmonary effort is normal.   Abdominal:      General: There is no distension.   Musculoskeletal:         General: Normal range of motion.      Cervical back: Normal range of motion.   Skin:     General: Skin is warm.      Comments: See wound description   Neurological:      Mental Status: She is alert and oriented to person, place, and time.   Psychiatric:         Behavior: Behavior normal.         Thought Content: Thought content normal.         Judgment: Judgment normal.         Vitals:    09/30/24 1324   BP: (!) 160/72   Pulse: 75   Resp: 18   Temp: 98.3 °F (36.8 °C)       Assessment:           ICD-10-CM ICD-9-CM   1. Wound of left lower extremity, initial encounter  S81.802A 894.0   2. PVD (peripheral vascular disease)  I73.9 443.9   3. Venous hypertension of both lower extremities  I87.303 459.30            Wound 09/30/24 Venous Ulcer Left anterior;lower Leg (Active)   09/30/24    Present on Original Admission: Y   Primary Wound Type: Venous ulcer   Side: Left   Orientation: anterior;lower   Location: Leg   Wound Approximate Age at First Assessment (Weeks):    Wound Number:    Is this injury device related?:    Incision Type:    Closure Method:    Wound Description (Comments):    Type:    Additional Comments:    Ankle-Brachial Index:    Pulses:    Removal Indication and Assessment:    Wound Outcome:    Wound Image   09/30/24 1327   Dressing Appearance Intact;Moist drainage 09/30/24 1327   Drainage Amount Moderate 09/30/24 1327   Drainage Characteristics/Odor Serosanguineous 09/30/24 1327   Appearance Moist;Red 09/30/24 1327   Tissue loss description Full thickness 09/30/24 1327   Red (%), Wound Tissue Color 100 % 09/30/24 1327   Periwound Area Dry 09/30/24 1327    Wound Edges Open;Dehisced 09/30/24 1327   Wound Length (cm) 5.6 cm 09/30/24 1327   Wound Width (cm) 1.5 cm 09/30/24 1327   Wound Depth (cm) 0.2 cm 09/30/24 1327   Wound Volume (cm^3) 1.68 cm^3 09/30/24 1327   Wound Surface Area (cm^2) 8.4 cm^2 09/30/24 1327   Care Cleansed with:;Sterile normal saline;Debrided 09/30/24 1327   Dressing Applied;Collagen;Absorptive Pad;Other (comment);Tubular bandage 09/30/24 1327   Compression Tubular elasticized bandage 09/30/24 1327   Dressing Change Due 10/07/24 09/30/24 1327           Debridement    Date/Time: 9/30/2024 1:00 PM    Performed by: Lizz Marie MD  Authorized by: Lizz Marie MD    Consent Done?:  Yes (Verbal)  Local anesthetic:  Topical anesthetic    Wound Details:    Location:  Left leg    Type of Debridement:  Excisional       Length (cm):  5.6       Area (sq cm):  8.4       Width (cm):  1.5       Percent Debrided (%):  100       Depth (cm):  0.2       Total Area Debrided (sq cm):  8.4    Depth of debridement:  Subcutaneous tissue    Tissue debrided:  Subcutaneous    Devitalized tissue debrided:  Slough, Fibrin and Callus    Instruments:  Curette  Bleeding:  Minimal  Hemostasis Achieved: Yes  Method Used:  Pressure  Patient tolerance:  Patient tolerated the procedure well with no immediate complications      Plan:            Debridement needed and Done today.   Keep dressing clean and intact  Recommend off-loading   Discussed increase protein intake   Discussed wound expectations and plan   Continue with wound care orders and plan as noted in orders.   Continue to follow current medication regimen as per pcp   Call for any questions / concerns.        Tissue pathology and/or culture taken     [] Yes      [x] No  Sharp debridement performed                   [x] Yes       [] No  Labs ordered     [] Yes       [x] No  Imaging ordered    [] Yes      [x] No    Orders Placed This Encounter   Procedures    Debridement     This order was created via procedure  "documentation     Standing Status:   Standing     Number of Occurrences:   1    Ambulatory referral/consult to Wound Clinic     Standing Status:   Standing     Number of Occurrences:   1     Referral Priority:   Routine     Referral Type:   Consultation     Referral Reason:   Specialty Services Required     Requested Specialty:   Wound Care     Number of Visits Requested:   1    Change dressing     Wound Dressing Orders    Dressing change frequency once a week and prn Nurse Visits  Remove old dressing  Cleanse or irrigate with: Normal Saline    Location: Left Anterior LL  Protect periwound with: Cavilon   Primary dressing: WOUND BASE: Promogran  Secondary dressing: MextraSizes: size used: 5"x5" and secure with medipore tape  Compression: Tubi-, single layer, size E, medium tissue support ( Lt calf measurement 36.5 cm)    Return to clinic in 1 week.        Follow up in about 1 week (around 10/7/2024) for wound care.         "

## 2024-10-04 ENCOUNTER — HOSPITAL ENCOUNTER (OUTPATIENT)
Dept: CARDIOLOGY | Facility: HOSPITAL | Age: 79
Discharge: HOME | End: 2024-10-04
Attending: SURGERY
Payer: MEDICARE

## 2024-10-04 DIAGNOSIS — I73.9 PVD (PERIPHERAL VASCULAR DISEASE): ICD-10-CM

## 2024-10-04 DIAGNOSIS — I87.303 VENOUS HYPERTENSION OF BOTH LOWER EXTREMITIES: ICD-10-CM

## 2024-10-04 DIAGNOSIS — S81.802A WOUND OF LEFT LOWER EXTREMITY, INITIAL ENCOUNTER: ICD-10-CM

## 2024-10-04 LAB
LEFT TBI: 0.97
LEFT TOE PRESSURE: 143 MMHG
RIGHT ABI: 1.25
RIGHT ARM BP: 147 MMHG
RIGHT DORSALIS PEDIS: 153 MMHG
RIGHT POSTERIOR TIBIAL: 184 MMHG
RIGHT TBI: 0.63
RIGHT TOE PRESSURE: 93 MMHG

## 2024-10-04 PROCEDURE — 93922 UPR/L XTREMITY ART 2 LEVELS: CPT | Mod: HCNC

## 2024-10-04 PROCEDURE — 93922 UPR/L XTREMITY ART 2 LEVELS: CPT | Mod: 26,HCNC,, | Performed by: SURGERY

## 2024-10-07 ENCOUNTER — HOSPITAL ENCOUNTER (OUTPATIENT)
Dept: WOUND CARE | Facility: HOSPITAL | Age: 79
Discharge: HOME | End: 2024-10-07
Attending: SURGERY
Payer: MEDICARE

## 2024-10-07 VITALS — TEMPERATURE: 98 F | HEART RATE: 88 BPM | DIASTOLIC BLOOD PRESSURE: 81 MMHG | SYSTOLIC BLOOD PRESSURE: 171 MMHG

## 2024-10-07 DIAGNOSIS — L97.929 VENOUS ULCER OF LEFT LOWER EXTREMITY WITHOUT VARICOSE VEINS: Primary | ICD-10-CM

## 2024-10-07 DIAGNOSIS — S81.802A WOUND OF LEFT LEG: ICD-10-CM

## 2024-10-07 DIAGNOSIS — I87.2 VENOUS ULCER OF LEFT LOWER EXTREMITY WITHOUT VARICOSE VEINS: Primary | ICD-10-CM

## 2024-10-07 DIAGNOSIS — N18.6 ESRD (END STAGE RENAL DISEASE) ON DIALYSIS: ICD-10-CM

## 2024-10-07 DIAGNOSIS — Z99.2 ESRD (END STAGE RENAL DISEASE) ON DIALYSIS: ICD-10-CM

## 2024-10-07 PROCEDURE — 11042 DBRDMT SUBQ TIS 1ST 20SQCM/<: CPT | Mod: HCNC | Performed by: INTERNAL MEDICINE

## 2024-10-07 PROCEDURE — 99499 UNLISTED E&M SERVICE: CPT | Mod: HCNC,,, | Performed by: INTERNAL MEDICINE

## 2024-10-07 PROCEDURE — 11042 DBRDMT SUBQ TIS 1ST 20SQCM/<: CPT | Mod: HCNC,,, | Performed by: INTERNAL MEDICINE

## 2024-10-07 NOTE — PROGRESS NOTES
[facility]                Progress Note    Subjective:       Patient ID: Erica Leblanc is a 79 y.o. female.    Chief Complaint: Wound Care, Wound Check, and Dressing Change    Follow Up wound care visit. Patient ambulated with assistance of  walker to [facility] with family and nurse at side}. Patient denies fever, chills, nausea, vomiting or diarrhea at present. Patient denies pain to wound bed at present. Patient states pain comes and goes when at home and is usually about a 3/10.Patient reports not having any issues with dressing since last visit.Dressing appears  without strike through drainage.Dressing removed & wound cleaned by nurse.Dressing  was discarded.Topical Lidocaine 5% ointment applied to wound bed and allowed to absorb for 15 minutes for patient comfort. No change to dressing order; applied per MD orders.Patient will return to [facility] in 1 wk.AVS printed and given to patient.     No issue with dressing has remained in place since visit last week. She is using sequential lymphedema compression pumps for two hours each day no periwound pain       Review of Systems      Objective:        Physical Exam  Constitutional:       General: She is not in acute distress.     Appearance: She is obese.   Pulmonary:      Effort: Pulmonary effort is normal. No respiratory distress.   Musculoskeletal:      Right lower leg: No edema.      Left lower leg: No edema.   Skin:     Comments: Anterior left leg ulcer measures smaller with evidence of new epithelium to perimeter center of wound pink iwthout slough or exposed muscle or bone. There is dried fibrin/pomegran to perimeter see procedure note for debridement details   Neurological:      Mental Status: She is alert.         Vitals:    10/07/24 1326   BP: (!) 171/81   Pulse: 88   Temp: 98.2 °F (36.8 °C)       Assessment:           ICD-10-CM ICD-9-CM   1. Venous ulcer of left lower extremity without varicose veins  I87.2 459.81     L97.929    2. Wound of left leg  S81.802A 891.0   3. ESRD (end stage renal disease) on dialysis  N18.6 585.6    Z99.2 V45.11            Wound 09/30/24 Venous Ulcer Left anterior;lower Leg (Active)   09/30/24    Present on Original Admission: Y   Primary Wound Type: Venous ulcer   Side: Left   Orientation: anterior;lower   Location: Leg   Wound Approximate Age at First Assessment (Weeks):    Wound Number:    Is this injury device related?:    Incision Type:    Closure Method:    Wound Description (Comments):    Type:    Additional Comments:    Ankle-Brachial Index:    Pulses:    Removal Indication and Assessment:    Wound Outcome:    Wound Image   10/07/24 1328   Dressing Appearance Intact;Dried drainage 10/07/24 1328   Drainage Amount Small 10/07/24 1328   Drainage Characteristics/Odor Serosanguineous 10/07/24 1328   Appearance Pink;Red;Moist 10/07/24 1328   Tissue loss description Partial thickness 10/07/24 1328   Red (%), Wound Tissue Color 100 % 10/07/24 1328   Periwound Area Intact;Dry 10/07/24 1328   Wound Edges Open;Defined 10/07/24 1328   Wound Length (cm) 4.5 cm 10/07/24 1328   Wound Width (cm) 1 cm 10/07/24 1328   Wound Depth (cm) 0.2 cm 10/07/24 1328   Wound Volume (cm^3) 0.9 cm^3 10/07/24 1328   Wound Surface Area (cm^2) 4.5 cm^2 10/07/24 1328   Care Cleansed with:;Antimicrobial agent;Sterile normal saline;Debrided 10/07/24 1328   Dressing Applied;Collagen;Absorptive Pad;Elastic bandage 10/07/24 1328   Periwound Care Skin barrier film applied 10/07/24 1328   Compression Tubular elasticized bandage 10/07/24 1328   Dressing Change Due 10/14/24 10/07/24 1328           Plan:          Wound improving continue jacqueline for collagen formation continue compression dressing to prevent reflux return for wound check in one week    Tissue pathology and/or culture taken     [] Yes      [x] No  Sharp debridement performed                   [x] Yes       [] No  Labs ordered     [] Yes       [x] No  Imaging  "ordered    [] Yes      [x] No    Orders Placed This Encounter   Procedures    Change dressing     Wound Dressing Orders    Dressing change frequency once a week and prn Nurse Visits  Remove old dressing  Cleanse or irrigate with: Normal Saline    Location: Left Anterior LL  Protect periwound with: Cavilon  Primary dressing: WOUND BASE: Promogran  Secondary dressing: MextraSizes: size used: 5"x5" and secure with medipore tape  Compression: Tubi-, single layer, size E, medium tissue support ( Lt calf measurement 36.5 cm)    Return to clinic in 1 week.        Follow up in about 1 week (around 10/14/2024) for Wound Care.       "

## 2024-10-07 NOTE — PROCEDURES
Wound Debridement    Date/Time: 10/7/2024 1:00 PM    Performed by: Franko Jacobson MD  Authorized by: Franko Jacobson MD    Consent Done?:  Yes (Verbal)  Local anesthesia used?: Yes    Local anesthetic:  Topical anesthetic    Wound Details:    Location:  Left leg    Type of Debridement:  Excisional       Length (cm):  4.5       Area (sq cm):  4.5       Width (cm):  1       Percent Debrided (%):  50       Depth (cm):  0.1       Total Area Debrided (sq cm):  2.25    Depth of debridement:  Subcutaneous tissue    Tissue debrided:  Subcutaneous    Devitalized tissue debrided:  Biofilm and Fibrin    Instruments:  Curette  Bleeding:  None  Patient tolerance:  Patient tolerated the procedure well with no immediate complications

## 2024-10-14 ENCOUNTER — HOSPITAL ENCOUNTER (OUTPATIENT)
Dept: WOUND CARE | Facility: HOSPITAL | Age: 79
Discharge: HOME | End: 2024-10-14
Attending: SURGERY
Payer: MEDICARE

## 2024-10-14 VITALS
SYSTOLIC BLOOD PRESSURE: 165 MMHG | TEMPERATURE: 97 F | HEART RATE: 77 BPM | DIASTOLIC BLOOD PRESSURE: 77 MMHG | RESPIRATION RATE: 18 BRPM

## 2024-10-14 DIAGNOSIS — L97.929 VENOUS ULCER OF LEFT LOWER EXTREMITY WITHOUT VARICOSE VEINS: ICD-10-CM

## 2024-10-14 DIAGNOSIS — I87.2 VENOUS ULCER OF LEFT LOWER EXTREMITY WITHOUT VARICOSE VEINS: ICD-10-CM

## 2024-10-14 DIAGNOSIS — S81.802A WOUND OF LEFT LEG: Primary | ICD-10-CM

## 2024-10-14 PROCEDURE — 11042 DBRDMT SUBQ TIS 1ST 20SQCM/<: CPT | Mod: HCNC

## 2024-10-14 PROCEDURE — 99499 UNLISTED E&M SERVICE: CPT | Mod: HCNC,,, | Performed by: INTERNAL MEDICINE

## 2024-10-14 PROCEDURE — 11042 DBRDMT SUBQ TIS 1ST 20SQCM/<: CPT | Mod: HCNC,,, | Performed by: INTERNAL MEDICINE

## 2024-10-14 PROCEDURE — 11042 DBRDMT SUBQ TIS 1ST 20SQCM/<: CPT | Mod: HCNC | Performed by: INTERNAL MEDICINE

## 2024-10-14 NOTE — PROGRESS NOTES
[facility]                Progress Note    Subjective:       Patient ID: Erica Leblanc is a 79 y.o. female.    Chief Complaint: Wound Care and Dressing Change    Patient was seen today for follow up wound care visit. Patient ambulated to the exam room with the assistance of Rollator walker, with LPN by side. She denies pain or discomfort to the wound bed. Denies fever, nausea, chills, vomiting, or diarrhea at present. Dressing intact without strike through drainage. Left Anterior LL wound bed is measuring smaller in length, width, and depth since last wound care visit on 10/07/2024. Topical 5% Lidocaine was applied and allowed to absorb for 15 minutes for patient comfort. Wound dressing updated and applied by LPN per MD orders.    Return to clinic in 1 week.      No strike through drainage dressing has remained in place and dry since last visit she does uses sequential compression devices daily for lymphedema tolerating HD without issue      Review of Systems      Objective:        Physical Exam  Constitutional:       General: She is not in acute distress.  Pulmonary:      Effort: Pulmonary effort is normal. No respiratory distress.   Musculoskeletal:      Right lower leg: No edema.      Left lower leg: No edema.   Skin:     Comments: Dried jacqueline to wound bed, post debridement shows two small islands of pink granulation no exposed muscle or bone layer no surrounding edema or erythema   Neurological:      Mental Status: She is alert.         Vitals:    10/14/24 1333   BP: (!) 165/77   Pulse: 77   Resp: 18   Temp: 97.1 °F (36.2 °C)       Assessment:           ICD-10-CM ICD-9-CM   1. Wound of left leg  S81.802A 891.0   2. Venous ulcer of left lower extremity without varicose veins  I87.2 459.81    L97.929             Wound 09/30/24 Venous Ulcer Left anterior;lower Leg (Active)   09/30/24    Present on Original Admission: Y   Primary Wound Type: Venous ulcer   Side:  "Left   Orientation: anterior;lower   Location: Leg   Wound Approximate Age at First Assessment (Weeks):    Wound Number:    Is this injury device related?:    Incision Type:    Closure Method:    Wound Description (Comments):    Type:    Additional Comments:    Ankle-Brachial Index:    Pulses:    Removal Indication and Assessment:    Wound Outcome:    Wound Image    10/14/24 1335   Dressing Appearance Intact;Dry 10/14/24 1335   Drainage Amount None 10/14/24 1335   Appearance Fibrin;Dry 10/14/24 1335   Red (%), Wound Tissue Color 100 % 10/14/24 1335   Periwound Area Dry 10/14/24 1335   Wound Edges Defined 10/14/24 1335   Wound Length (cm) 1.9 cm 10/14/24 1335   Wound Width (cm) 0.5 cm 10/14/24 1335   Wound Depth (cm) 0 cm 10/14/24 1335   Wound Volume (cm^3) 0 cm^3 10/14/24 1335   Wound Surface Area (cm^2) 0.95 cm^2 10/14/24 1335   Care Cleansed with:;Antimicrobial agent;Sterile normal saline;Debrided 10/14/24 1335   Dressing Applied;Collagen;Absorptive Pad;Tubular bandage;Other (comment) 10/14/24 1335   Periwound Care Skin barrier film applied 10/14/24 1335   Compression Tubular elasticized bandage 10/14/24 1335   Dressing Change Due 10/21/24 10/14/24 1335           Plan:          Debridement done today wound improving continue topical compression jacqueline for collagen formation return for wound check in one week    Tissue pathology and/or culture taken     [] Yes      [x] No  Sharp debridement performed                   [x] Yes       [] No  Labs ordered     [] Yes       [x] No  Imaging ordered    [] Yes      [x] No    Orders Placed This Encounter   Procedures    Change dressing     Wound Dressing Orders    Dressing change frequency once a week and prn Nurse Visits  Remove old dressing  Cleanse or irrigate with: Normal Saline    Location: Left Anterior LL  Protect periwound with: Cavilon  Primary dressing: WOUND BASE: Jacqueline  Secondary dressing: MextraSizes: size used: 5"x5" and secure with medipore tape  Compression: " Tubi-, single layer, size E, medium tissue support ( Lt calf measurement 36.5 cm)    Return to clinic in 1 week.        Follow up in about 1 week (around 10/21/2024) for wound care.

## 2024-10-14 NOTE — PROCEDURES
Wound Debridement    Date/Time: 10/14/2024 1:00 PM    Performed by: Franko Jacobson MD  Authorized by: Franko Jacobson MD      Wound Details:    Location:  Left leg    Type of Debridement:  Excisional       Length (cm):  1.5       Area (sq cm):  0.75       Width (cm):  0.5       Percent Debrided (%):  100       Depth (cm):  0.1       Total Area Debrided (sq cm):  0.75    Depth of debridement:  Subcutaneous tissue    Tissue debrided:  Subcutaneous    Devitalized tissue debrided:  Biofilm and Fibrin    Instruments:  Curette  Bleeding:  None  Patient tolerance:  Patient tolerated the procedure well with no immediate complications

## 2024-10-21 ENCOUNTER — HOSPITAL ENCOUNTER (OUTPATIENT)
Dept: WOUND CARE | Facility: HOSPITAL | Age: 79
Discharge: HOME | End: 2024-10-21
Attending: SURGERY
Payer: MEDICARE

## 2024-10-21 ENCOUNTER — OFFICE VISIT (OUTPATIENT)
Facility: CLINIC | Age: 79
End: 2024-10-21
Payer: MEDICARE

## 2024-10-21 VITALS
TEMPERATURE: 97 F | DIASTOLIC BLOOD PRESSURE: 82 MMHG | RESPIRATION RATE: 18 BRPM | SYSTOLIC BLOOD PRESSURE: 169 MMHG | HEART RATE: 83 BPM

## 2024-10-21 VITALS
DIASTOLIC BLOOD PRESSURE: 80 MMHG | HEIGHT: 63 IN | TEMPERATURE: 98 F | OXYGEN SATURATION: 98 % | HEART RATE: 89 BPM | RESPIRATION RATE: 20 BRPM | WEIGHT: 210.88 LBS | BODY MASS INDEX: 37.36 KG/M2 | SYSTOLIC BLOOD PRESSURE: 150 MMHG

## 2024-10-21 DIAGNOSIS — Z71.89 ADVANCED DIRECTIVES, COUNSELING/DISCUSSION: ICD-10-CM

## 2024-10-21 DIAGNOSIS — I10 HYPERTENSION, UNSPECIFIED TYPE: ICD-10-CM

## 2024-10-21 DIAGNOSIS — Z00.00 ENCOUNTER FOR PREVENTIVE HEALTH EXAMINATION: ICD-10-CM

## 2024-10-21 DIAGNOSIS — L97.929 VENOUS ULCER OF LEFT LOWER EXTREMITY WITHOUT VARICOSE VEINS: Primary | ICD-10-CM

## 2024-10-21 DIAGNOSIS — I50.32 CHRONIC DIASTOLIC HEART FAILURE: Chronic | ICD-10-CM

## 2024-10-21 DIAGNOSIS — J96.10 CHRONIC RESPIRATORY FAILURE, UNSPECIFIED WHETHER WITH HYPOXIA OR HYPERCAPNIA: ICD-10-CM

## 2024-10-21 DIAGNOSIS — I27.20 PULMONARY HYPERTENSION: ICD-10-CM

## 2024-10-21 DIAGNOSIS — Z00.00 ENCOUNTER FOR MEDICARE ANNUAL WELLNESS EXAM: ICD-10-CM

## 2024-10-21 DIAGNOSIS — M81.0 AGE-RELATED OSTEOPOROSIS WITHOUT CURRENT PATHOLOGICAL FRACTURE: ICD-10-CM

## 2024-10-21 DIAGNOSIS — Z99.89 WALKER AS AMBULATION AID: ICD-10-CM

## 2024-10-21 DIAGNOSIS — N25.81 SECONDARY RENAL HYPERPARATHYROIDISM: ICD-10-CM

## 2024-10-21 DIAGNOSIS — E11.22 TYPE 2 DIABETES MELLITUS WITH ESRD (END-STAGE RENAL DISEASE): ICD-10-CM

## 2024-10-21 DIAGNOSIS — I87.2 VENOUS ULCER OF LEFT LOWER EXTREMITY WITHOUT VARICOSE VEINS: Primary | ICD-10-CM

## 2024-10-21 DIAGNOSIS — E11.3553 STABLE PROLIFERATIVE DIABETIC RETINOPATHY OF BOTH EYES ASSOCIATED WITH TYPE 2 DIABETES MELLITUS: ICD-10-CM

## 2024-10-21 DIAGNOSIS — L97.929 VENOUS ULCER OF LEFT LOWER EXTREMITY WITHOUT VARICOSE VEINS: ICD-10-CM

## 2024-10-21 DIAGNOSIS — E78.5 HYPERLIPIDEMIA LDL GOAL <100: Chronic | ICD-10-CM

## 2024-10-21 DIAGNOSIS — I77.1 TORTUOUS AORTA: ICD-10-CM

## 2024-10-21 DIAGNOSIS — G47.33 OSA ON CPAP: Chronic | ICD-10-CM

## 2024-10-21 DIAGNOSIS — E03.9 HYPOTHYROIDISM (ACQUIRED): Chronic | ICD-10-CM

## 2024-10-21 DIAGNOSIS — Z99.2 ESRD ON HEMODIALYSIS: ICD-10-CM

## 2024-10-21 DIAGNOSIS — I87.2 VENOUS ULCER OF LEFT LOWER EXTREMITY WITHOUT VARICOSE VEINS: ICD-10-CM

## 2024-10-21 DIAGNOSIS — N18.6 TYPE 2 DIABETES MELLITUS WITH ESRD (END-STAGE RENAL DISEASE): ICD-10-CM

## 2024-10-21 DIAGNOSIS — S81.802A WOUND OF LEFT LEG: ICD-10-CM

## 2024-10-21 DIAGNOSIS — E66.01 SEVERE OBESITY (BMI 35.0-39.9) WITH COMORBIDITY: ICD-10-CM

## 2024-10-21 DIAGNOSIS — N18.6 ESRD ON HEMODIALYSIS: ICD-10-CM

## 2024-10-21 PROCEDURE — 99213 OFFICE O/P EST LOW 20 MIN: CPT | Mod: HCNC

## 2024-10-21 PROCEDURE — 1160F RVW MEDS BY RX/DR IN RCRD: CPT | Mod: HCNC,CPTII,S$GLB, | Performed by: NURSE PRACTITIONER

## 2024-10-21 PROCEDURE — 1101F PT FALLS ASSESS-DOCD LE1/YR: CPT | Mod: HCNC,CPTII,S$GLB, | Performed by: NURSE PRACTITIONER

## 2024-10-21 PROCEDURE — 99999 PR PBB SHADOW E&M-EST. PATIENT-LVL V: CPT | Mod: PBBFAC,HCNC,, | Performed by: NURSE PRACTITIONER

## 2024-10-21 PROCEDURE — 3288F FALL RISK ASSESSMENT DOCD: CPT | Mod: HCNC,CPTII,S$GLB, | Performed by: NURSE PRACTITIONER

## 2024-10-21 PROCEDURE — 3077F SYST BP >= 140 MM HG: CPT | Mod: HCNC,CPTII,S$GLB, | Performed by: NURSE PRACTITIONER

## 2024-10-21 PROCEDURE — 3079F DIAST BP 80-89 MM HG: CPT | Mod: HCNC,CPTII,S$GLB, | Performed by: NURSE PRACTITIONER

## 2024-10-21 PROCEDURE — 99213 OFFICE O/P EST LOW 20 MIN: CPT | Mod: HCNC,,, | Performed by: INTERNAL MEDICINE

## 2024-10-21 PROCEDURE — 1159F MED LIST DOCD IN RCRD: CPT | Mod: HCNC,CPTII,S$GLB, | Performed by: NURSE PRACTITIONER

## 2024-10-21 PROCEDURE — 1126F AMNT PAIN NOTED NONE PRSNT: CPT | Mod: HCNC,CPTII,S$GLB, | Performed by: NURSE PRACTITIONER

## 2024-10-21 PROCEDURE — 1170F FXNL STATUS ASSESSED: CPT | Mod: HCNC,CPTII,S$GLB, | Performed by: NURSE PRACTITIONER

## 2024-10-21 PROCEDURE — G0439 PPPS, SUBSEQ VISIT: HCPCS | Mod: HCNC,S$GLB,, | Performed by: NURSE PRACTITIONER

## 2024-10-21 NOTE — PATIENT INSTRUCTIONS
It was a pleasure to meet you.      Follow up in 1 year for AWV.     Please Follow up with PCP for Routine Care.     Monitor BP at home, keep a log, and send to PCP     Please get needed vaccine done at local pharmacy.        The following information is provided to all patients.  This information is to help you find resources for any of the problems found today that may be affecting your health:                Living healthy guide: www.[facility].[place].[facility]       Understanding Diabetes: www.diabetes.org       Eating healthy: www.cdc.gov/healthyweight      [facility] home safety checklist: www.cdc.gov/steadi/patient.html      Agency on Aging: www.goea.[place].[facility]       Alcoholics anonymous (AA): www.aa.org      Physical Activity: www.teresita.nih.gov/eo7gkjc       Tobacco use: www.quitwithusla.org

## 2024-10-21 NOTE — PROGRESS NOTES
[facility]                Progress Note    Subjective:       Patient ID: Erica Leblanc is a 79 y.o. female.    Chief Complaint: Wound Care, Wound Check, and Dressing Change    Follow Up wound care visit. Patient ambulated with assistance of  rollator to [facility] with nurse at side. Patient denies fever, chills, nausea, vomiting or diarrhea at present. Patient denies pain to wound bed at present.Patient reports not having any issues with dressing since last visit. Dressing appears without strike through drainage.Dressing removed & wound cleaned by nurse. Dressing was discarded.Topical Lidocaine 5% ointment applied to wound bed and allowed to absorb for 15 minutes for patient comfort. Changes made to dressing order:applied per MD orders.  LLL anterior wound is pink and dry. LLL anterior wound is healed. Patient is discharged from wound care.Patient instructed to continue to wear compression socks to lower extremity. Verbalized understanding, has compression socks.       No issue with dressing no drainage she is using sequential compression pumps daily no issue with dialysis running full times            Review of Systems      Objective:        Physical Exam  Constitutional:       General: She is not in acute distress.     Appearance: She is obese.   Pulmonary:      Effort: Pulmonary effort is normal. No respiratory distress.   Skin:     Comments: Left anterior leg with intact epithelium no pitting   Neurological:      Mental Status: She is alert.         Vitals:    10/21/24 1321   BP: (!) 169/82   Pulse: 83   Resp: 18   Temp: 96.7 °F (35.9 °C)       Assessment:           ICD-10-CM ICD-9-CM   1. Venous ulcer of left lower extremity without varicose veins  I87.2 459.81    L97.929    2. Wound of left leg  S81.802A 891.0            Wound 09/30/24 Venous Ulcer Left anterior;lower Leg (Active)   09/30/24    Present on Original Admission: Y   Primary Wound Type: Venous ulcer    Side: Left   Orientation: anterior;lower   Location: Leg   Wound Approximate Age at First Assessment (Weeks):    Wound Number:    Is this injury device related?:    Incision Type:    Closure Method:    Wound Description (Comments):    Type:    Additional Comments:    Ankle-Brachial Index:    Pulses:    Removal Indication and Assessment:    Wound Outcome:    Wound Image   10/21/24 1324   Dressing Appearance Dry;Intact 10/21/24 1324   Drainage Amount None 10/21/24 1324   Appearance Pink;Dry;Other (see comments) 10/21/24 1324   Red (%), Wound Tissue Color 100 % 10/21/24 1324   Wound Length (cm) 0 cm 10/21/24 1324   Wound Width (cm) 0 cm 10/21/24 1324   Wound Depth (cm) 0 cm 10/21/24 1324   Wound Volume (cm^3) 0 cm^3 10/21/24 1324   Wound Surface Area (cm^2) 0 cm^2 10/21/24 1324   Care Cleansed with:;Antimicrobial agent;Sterile normal saline 10/21/24 1324   Compression Tubular elasticized bandage 10/21/24 1324           Plan:          Wound has healed tubigrip compression for venous reflux continue lymphedema compression pumps discharge from wound care clinic    Tissue pathology and/or culture taken     [] Yes      [x] No  Sharp debridement performed                   [] Yes       [x] No  Labs ordered     [] Yes       [x] No  Imaging ordered    [] Yes      [x] No    Orders Placed This Encounter   Procedures    Change dressing     Wound Dressing Orders    Dressing change frequency once a week and prn Nurse Visits  Remove old dressing  Cleanse or irrigate with: Normal Saline    Location: Left Anterior LL  Compression: Tubi-, single layer, size E, medium tissue support ( Lt calf measurement 36.5 cm)        Follow up if symptoms worsen or fail to improve.

## 2024-10-21 NOTE — PROGRESS NOTES
"  Erica Leblanc presented for a follow-up Medicare AWV today. The following components were reviewed and updated:    Medical history  Family History  Social history  Allergies and Current Medications  Health Risk Assessment  Health Maintenance  Care Team    **See Completed Assessments for Annual Wellness visit with in the encounter summary    The following assessments were completed:  Living Situation  Depression Screening  Cognitive function Screening  Timed Get Up Test  Whisper Test  ADL  PAQ      Opioid documentation:      Patient does not have a current opioid prescription.          Vitals:    10/21/24 1538 10/21/24 1622   BP: (!) 160/76 (!) 150/80   BP Location: Right arm Right arm   Patient Position: Sitting Sitting   Pulse: 89    Resp: 20    Temp: 98 °F (36.7 °C)    TempSrc: Oral    SpO2: 98%    Weight: 95.7 kg (210 lb 13.9 oz)    Height: 5' 3" (1.6 m)      Body mass index is 37.35 kg/m².     Review of Systems   Constitutional:  Negative for chills, fever and weight loss.   HENT:  Positive for hearing loss. Negative for ear discharge, ear pain and tinnitus.    Eyes:  Negative for blurred vision and double vision.   Respiratory:  Negative for cough and shortness of breath.    Cardiovascular:  Negative for chest pain, palpitations, orthopnea and leg swelling.   Gastrointestinal:  Negative for constipation and diarrhea.   Genitourinary:  Negative for dysuria and hematuria.        Urge incontinence   Musculoskeletal:  Negative for back pain and myalgias.   Skin:  Negative for itching and rash.   Neurological:  Negative for dizziness and headaches.   Endo/Heme/Allergies:  Does not bruise/bleed easily.   Psychiatric/Behavioral:  Negative for depression. The patient does not have insomnia.       Physical Exam  Constitutional:       General: She is not in acute distress.     Appearance: She is not ill-appearing, toxic-appearing or diaphoretic.   HENT:      Head: Normocephalic and atraumatic.      Right Ear: " External ear normal. Decreased hearing noted.      Left Ear: External ear normal. Decreased hearing noted.      Ears:      Comments: Bilateral Hearing Aids in place     Nose: No congestion or rhinorrhea.   Eyes:      Pupils: Pupils are equal, round, and reactive to light.   Cardiovascular:      Rate and Rhythm: Normal rate and regular rhythm.   Pulmonary:      Effort: No respiratory distress.      Breath sounds: No wheezing.   Musculoskeletal:         General: Normal range of motion.      Cervical back: Normal range of motion.   Skin:     General: Skin is warm and dry.      Comments:  AV fistula to left forearm   Neurological:      General: No focal deficit present.      Mental Status: She is oriented to person, place, and time.      Comments: Ambulates with walker   Psychiatric:         Mood and Affect: Mood normal.         Thought Content: Thought content normal.        Media Information    File Link    Diagnoses and health risks identified today and associated recommendations/orders:  1. Encounter for Medicare annual wellness exam  -Counseled on age appropriate medical preventative services including age appropriate cancer screenings, age appropriate eye and dental exams, over all nutritional health, need for a consistent exercise regimen, and an over all push towards maintaining a vigorous and active lifestyle.  Counseled on age appropriate vaccines and discussed upcoming health care needs based on age/gender. Discussed good sleep hygiene and stress management.        2. Encounter for preventive health examination  -Counseled on age appropriate medical preventative services including age appropriate cancer screenings, age appropriate eye and dental exams, over all nutritional health, need for a consistent exercise regimen, and an over all push towards maintaining a vigorous and active lifestyle.  Counseled on age appropriate vaccines and discussed upcoming health care needs based on age/gender. Discussed good  sleep hygiene and stress management.        3. Hypertension, unspecified type  -BP elevated in clinic; Denies cardiac symptoms  -Patient instructed to monitor BP at home, keep a log, and send to PCP    4. Chronic diastolic heart failure  -The current medical regimen is effective. Continue present plan and medications.      5. Stable proliferative diabetic retinopathy of both eyes associated with type 2 diabetes mellitus  -Continue routine eye exams    6. Pulmonary hypertension  -The current medical regimen is effective. Continue present plan and medications.      7. ESRD on hemodialysis  -The current medical regimen is effective.      -Continue HD Tues, Thursday, and Saturday    8. Tortuous aorta  -The current medical regimen is effective. Continue present plan and medications.      9. 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 Continue present plan and medications.      10. Secondary renal hyperparathyroidism  -The current medical regimen is effective. Continue present plan and medications.      11. Chronic respiratory failure, unspecified whether with hypoxia or hypercapnia  -The current medical regimen is effective. Continue present plan and medications.      12. Hyperlipidemia LDL goal <100  -The current medical regimen is effective. Continue present plan and medications.      13. Hypothyroidism (acquired)  -The current medical regimen is effective. Continue present plan and medications.      14. Age-related osteoporosis without current pathological fracture  -The current medical regimen is effective. Continue present plan and medications.      15. HUMBERTO on CPAP  -Compliant with CPAP    16. Venous ulcer of left lower extremity without varicose veins  -Follows Vascular     17. Walker as ambulation aid  -Doing well    18. Severe obesity (BMI 35.0-39.9) with comorbidity  - Noted. Pt counseled on diet and exercise for healthy lifestyle.     19. Advanced  directives, counseling/discussion  -I offered to discuss advanced care planning, including how to pick a person who would make decisions for you if you were unable to make them for yourself, called a health care power of , and what kind of decisions you might make such as use of life sustaining treatments such as ventilators and tube feeding when faced with a life limiting illness recorded on a living will that they will need to know. (How you want to be cared for as you near the end of your natural life)       X Patient has Advanced directives, copy not in Epic       Provided Ercia with a 5-10 year written screening schedule and personal prevention plan. Recommendations were developed using the USPSTF age appropriate recommendations. Education, counseling, and referrals were provided as needed.  After Visit Summary printed and given to patient which includes a list of additional screenings\tests needed.    Follow up in about 1 year (around 10/21/2025) for AWV.      FREDY Gold

## 2024-11-05 ENCOUNTER — TELEPHONE (OUTPATIENT)
Dept: WOUND CARE | Facility: HOSPITAL | Age: 79
End: 2024-11-05
Payer: MEDICARE

## 2024-11-05 NOTE — TELEPHONE ENCOUNTER
"Patient called in stating " I was washing my feet" and she saw 2 new wounds "1 inch from my previous wound (on Left leg)". She denies foul odor or green drainage at present. She denies fever, chills, nausea, vomiting or diarrhea at present. Advised to wash area with Dove unscented soap or Dial yellow bar soap and water, pat dry. Then apply betadine to area with gauze covering if needed. Gave infection precautions (as listed above and if there is any redness/swelling, increased drainage, malodor, and/or increased pain not tolerable after taking OTC pain medication - go to the ER) until visit scheduled on 11/11/24.  " ACTIVITY AS TOLERATED

## 2024-11-11 ENCOUNTER — HOSPITAL ENCOUNTER (OUTPATIENT)
Dept: WOUND CARE | Facility: HOSPITAL | Age: 79
Discharge: HOME | End: 2024-11-11
Attending: SURGERY
Payer: MEDICARE

## 2024-11-11 VITALS
DIASTOLIC BLOOD PRESSURE: 68 MMHG | SYSTOLIC BLOOD PRESSURE: 141 MMHG | TEMPERATURE: 98 F | RESPIRATION RATE: 18 BRPM | HEART RATE: 84 BPM

## 2024-11-11 DIAGNOSIS — L97.929 VENOUS ULCER OF LEFT LOWER EXTREMITY WITHOUT VARICOSE VEINS: Primary | ICD-10-CM

## 2024-11-11 DIAGNOSIS — I87.2 VENOUS ULCER OF LEFT LOWER EXTREMITY WITHOUT VARICOSE VEINS: Primary | ICD-10-CM

## 2024-11-11 DIAGNOSIS — S81.802A WOUND OF LEFT LEG: ICD-10-CM

## 2024-11-11 PROCEDURE — 11042 DBRDMT SUBQ TIS 1ST 20SQCM/<: CPT | Mod: HCNC | Performed by: INTERNAL MEDICINE

## 2024-11-11 PROCEDURE — 11042 DBRDMT SUBQ TIS 1ST 20SQCM/<: CPT | Mod: HCNC,,, | Performed by: INTERNAL MEDICINE

## 2024-11-11 PROCEDURE — 99499 UNLISTED E&M SERVICE: CPT | Mod: HCNC,,, | Performed by: INTERNAL MEDICINE

## 2024-11-11 NOTE — PROCEDURES
Wound Debridement    Date/Time: 11/11/2024 2:00 PM    Performed by: Franko Jacobson MD  Authorized by: Franko Jacobson MD    Consent Done?:  Yes (Verbal)  Local anesthesia used?: Yes    Local anesthetic:  Topical anesthetic    Wound Details:    Location:  Left leg    Type of Debridement:  Excisional       Length (cm):  1.9       Area (sq cm):  1.33       Width (cm):  0.7       Percent Debrided (%):  75       Depth (cm):  0.1       Total Area Debrided (sq cm):  1    Depth of debridement:  Subcutaneous tissue    Tissue debrided:  Subcutaneous    Devitalized tissue debrided:  Biofilm and Necrotic/Eschar    Instruments:  Curette  Bleeding:  Minimal  Hemostasis Achieved: Yes  Method Used:  Pressure  Patient tolerance:  Patient tolerated the procedure well with no immediate complications

## 2024-11-11 NOTE — PROGRESS NOTES
"[facility]                Progress Note    Subjective:       Patient ID: Erica Leblanc is a 79 y.o. female.    Chief Complaint: Wound Care and Dressing Change    Patient was seen today as a readmit to wound care. Patient was discharged from wound care on 10/21/2024. Patient ambulated to the exam room using Rollator Walker, with LPN by side. She c/o pain "off and on" to the wound bed rating 5/10. Denies fever, nausea, chills, vomiting, or diarrhea at present. Dressing intact without strike through drainage. Patient came in with wounds dressed with 4 x 4 and paper tape. Patient stated "she saw dirt on her left leg and begin to clean the dirt off and notices she had rub the skin off." Patient has a wound to the Left Anterior, Left Medial, and Left Lateral Lower Leg. Topical 5% Lidocaine was applied and allowed to absorb for 15 minutes for pain management. Wound dressing applied by LPN per MD verbal orders.  AVS printed and given to patient.      Return to clinic in 1 week.      Was washing leg last week when skin broke no fevers/chills no periwound pain running full time on HD not using compression since skin broke open      Review of Systems      Objective:        Physical Exam  Constitutional:       General: She is not in acute distress.     Appearance: She is obese.   Cardiovascular:      Pulses: Normal pulses.   Pulmonary:      Effort: Pulmonary effort is normal. No respiratory distress.   Skin:     Comments: Left anterior leg shows three areas of superifical skin tear with pink granulation tissue to entire ulcer bases. No indruation or fluctuance the most proximal ulcer has perimeter of devitalized skin loosely adherent see procedure note for debridement details   Neurological:      Mental Status: She is alert.         Vitals:    11/11/24 1448   BP: (!) 141/68   Pulse: 84   Resp: 18   Temp: 97.9 °F (36.6 °C)       Assessment:           ICD-10-CM ICD-9-CM   1. " Venous ulcer of left lower extremity without varicose veins  I87.2 459.81    L97.929    2. Wound of left leg  S81.802A 891.0            Wound 11/11/24 Venous Ulcer Left anterior;lower Leg (Active)   11/11/24  Leg   Present on Original Admission:    Primary Wound Type: Venous ulcer   Side: Left   Orientation: anterior;lower   Wound Approximate Age at First Assessment (Weeks):    Wound Number:    Is this injury device related?:    Incision Type:    Closure Method:    Wound Description (Comments):    Type:    Additional Comments:    Ankle-Brachial Index:    Pulses:    Removal Indication and Assessment:    Wound Outcome:    Wound Image    11/11/24 1451   Dressing Appearance Intact;Moist drainage 11/11/24 1451   Drainage Amount Small 11/11/24 1451   Drainage Characteristics/Odor Serosanguineous 11/11/24 1451   Appearance Red;Moist 11/11/24 1451   Red (%), Wound Tissue Color 100 % 11/11/24 1451   Periwound Area [place] 11/11/24 1451   Wound Edges Open;Defined 11/11/24 1451   Wound Length (cm) 2.9 cm 11/11/24 1451   Wound Width (cm) 0.9 cm 11/11/24 1451   Wound Depth (cm) 0.1 cm 11/11/24 1451   Wound Volume (cm^3) 0.261 cm^3 11/11/24 1451   Wound Surface Area (cm^2) 2.61 cm^2 11/11/24 1451   Care Cleansed with:;Antimicrobial agent;Sterile normal saline;Debrided 11/11/24 1451   Dressing Applied;Collagen;Island/border;Absorptive Pad;Tubular bandage 11/11/24 1451   Periwound Care Skin barrier film applied 11/11/24 1451   Compression Tubular elasticized bandage 11/11/24 1451   Dressing Change Due 11/18/24 11/11/24 1451            Wound 11/11/24 Venous Ulcer Left medial;lower Leg (Active)   11/11/24  Leg   Present on Original Admission:    Primary Wound Type: Venous ulcer   Side: Left   Orientation: medial;lower   Wound Approximate Age at First Assessment (Weeks):    Wound Number:    Is this injury device related?:    Incision Type:    Closure Method:    Wound Description (Comments):    Type:    Additional Comments:     Ankle-Brachial Index:    Pulses:    Removal Indication and Assessment:    Wound Outcome:    Wound Image    11/11/24 1451   Dressing Appearance Intact;Moist drainage 11/11/24 1451   Drainage Amount Small 11/11/24 1451   Drainage Characteristics/Odor Serosanguineous 11/11/24 1451   Appearance Red;Moist 11/11/24 1451   Red (%), Wound Tissue Color 100 % 11/11/24 1451   Periwound Area Dry 11/11/24 1451   Wound Edges Open;Defined 11/11/24 1451   Wound Length (cm) 1.9 cm 11/11/24 1451   Wound Width (cm) 0.7 cm 11/11/24 1451   Wound Depth (cm) 0.1 cm 11/11/24 1451   Wound Volume (cm^3) 0.133 cm^3 11/11/24 1451   Wound Surface Area (cm^2) 1.33 cm^2 11/11/24 1451   Care Cleansed with:;Antimicrobial agent;Sterile normal saline;Debrided 11/11/24 1451   Dressing Applied;Collagen;Island/border;Absorptive Pad;Tubular bandage 11/11/24 1451   Periwound Care Skin barrier film applied 11/11/24 1451   Compression Tubular elasticized bandage 11/11/24 1451   Dressing Change Due 11/18/24 11/11/24 1451            Wound 11/11/24 Venous Ulcer Left lateral;lower Leg (Active)   11/11/24  Leg   Present on Original Admission:    Primary Wound Type: Venous ulcer   Side: Left   Orientation: lateral;lower   Wound Approximate Age at First Assessment (Weeks):    Wound Number:    Is this injury device related?:    Incision Type:    Closure Method:    Wound Description (Comments):    Type:    Additional Comments:    Ankle-Brachial Index:    Pulses:    Removal Indication and Assessment:    Wound Outcome:    Wound Image    11/11/24 1451   Dressing Appearance Intact;Moist drainage 11/11/24 1451   Drainage Amount Small 11/11/24 1451   Drainage Characteristics/Odor Serosanguineous 11/11/24 1451   Appearance Red;Moist 11/11/24 1451   Red (%), Wound Tissue Color 100 % 11/11/24 1451   Periwound Area Dry 11/11/24 1451   Wound Edges Open;Defined 11/11/24 1451   Wound Length (cm) 1 cm 11/11/24 1451   Wound Width (cm) 2.4 cm 11/11/24 1451   Wound Depth (cm) 0.1  "cm 11/11/24 1451   Wound Volume (cm^3) 0.24 cm^3 11/11/24 1451   Wound Surface Area (cm^2) 2.4 cm^2 11/11/24 1451   Care Cleansed with:;Sterile normal saline;Antimicrobial agent;Debrided 11/11/24 1451   Dressing Applied;Collagen;Island/border;Absorptive Pad;Tubular bandage 11/11/24 1451   Periwound Care Skin barrier film applied 11/11/24 1451   Compression Tubular elasticized bandage 11/11/24 1451   Dressing Change Due 11/18/24 11/11/24 1451           Plan:          Non viable tissue removed from perimeter of ulcer today, jacqueline to ulcer beds back with mextra to manage any draiange tubigrip for circumferential compression leave dressing in place until return in one week    Tissue pathology and/or culture taken     [] Yes      [x] No  Sharp debridement performed                   [x] Yes       [] No  Labs ordered     [] Yes       [x] No  Imaging ordered    [] Yes      [x] No    Orders Placed This Encounter   Procedures    Change dressing     Wound Dressing Orders    Dressing change frequency once a week and prn Nurse Visits  Remove old dressing  Cleanse or irrigate with: Normal Saline    TO ALL WOUNDS  Protect periwound with: Cavilon   Primary dressing: WOUND BASE: Jacqueline  Secondary dressing: MextraSizes: size used: 5"x5" ( Qty 1); secure with medipore tape  Compression: Tubi-, single layer, size E ( Left calf measurement 36 cm)    Return to clinic in 1 week        Follow up in about 1 week (around 11/18/2024) for wound care.       "

## 2024-11-13 ENCOUNTER — OFFICE VISIT (OUTPATIENT)
Dept: PODIATRY | Facility: CLINIC | Age: 79
End: 2024-11-13
Payer: MEDICARE

## 2024-11-13 VITALS — HEART RATE: 85 BPM | SYSTOLIC BLOOD PRESSURE: 161 MMHG | DIASTOLIC BLOOD PRESSURE: 77 MMHG | OXYGEN SATURATION: 93 %

## 2024-11-13 DIAGNOSIS — Z99.2 ESRD ON HEMODIALYSIS: ICD-10-CM

## 2024-11-13 DIAGNOSIS — L90.9 PLANTAR FAT PAD ATROPHY: ICD-10-CM

## 2024-11-13 DIAGNOSIS — E11.22 TYPE 2 DIABETES MELLITUS WITH ESRD (END-STAGE RENAL DISEASE): Primary | ICD-10-CM

## 2024-11-13 DIAGNOSIS — M77.42 METATARSALGIA, LEFT FOOT: ICD-10-CM

## 2024-11-13 DIAGNOSIS — M20.11 HALLUX ABDUCTO VALGUS, RIGHT: ICD-10-CM

## 2024-11-13 DIAGNOSIS — M20.41 HAMMER TOES OF BOTH FEET: ICD-10-CM

## 2024-11-13 DIAGNOSIS — M20.42 HAMMER TOES OF BOTH FEET: ICD-10-CM

## 2024-11-13 DIAGNOSIS — M20.12 HALLUX ABDUCTO VALGUS, LEFT: ICD-10-CM

## 2024-11-13 DIAGNOSIS — N18.6 ESRD ON HEMODIALYSIS: ICD-10-CM

## 2024-11-13 DIAGNOSIS — N18.6 TYPE 2 DIABETES MELLITUS WITH ESRD (END-STAGE RENAL DISEASE): Primary | ICD-10-CM

## 2024-11-13 DIAGNOSIS — M79.672 FOOT PAIN, LEFT: ICD-10-CM

## 2024-11-13 PROCEDURE — 3288F FALL RISK ASSESSMENT DOCD: CPT | Mod: HCNC,CPTII,S$GLB, | Performed by: PODIATRIST

## 2024-11-13 PROCEDURE — 3078F DIAST BP <80 MM HG: CPT | Mod: HCNC,CPTII,S$GLB, | Performed by: PODIATRIST

## 2024-11-13 PROCEDURE — 99214 OFFICE O/P EST MOD 30 MIN: CPT | Mod: HCNC,S$GLB,, | Performed by: PODIATRIST

## 2024-11-13 PROCEDURE — 1101F PT FALLS ASSESS-DOCD LE1/YR: CPT | Mod: HCNC,CPTII,S$GLB, | Performed by: PODIATRIST

## 2024-11-13 PROCEDURE — 3077F SYST BP >= 140 MM HG: CPT | Mod: HCNC,CPTII,S$GLB, | Performed by: PODIATRIST

## 2024-11-13 PROCEDURE — 1160F RVW MEDS BY RX/DR IN RCRD: CPT | Mod: HCNC,CPTII,S$GLB, | Performed by: PODIATRIST

## 2024-11-13 PROCEDURE — 99999 PR PBB SHADOW E&M-EST. PATIENT-LVL IV: CPT | Mod: PBBFAC,HCNC,, | Performed by: PODIATRIST

## 2024-11-13 PROCEDURE — 1159F MED LIST DOCD IN RCRD: CPT | Mod: HCNC,CPTII,S$GLB, | Performed by: PODIATRIST

## 2024-11-13 NOTE — PATIENT INSTRUCTIONS
Over the counter pain creams: Voltaren Gel, Biofreeze, Bengay, tiger balm, two old goat, lidocaine gel,  Absorbine Veterinary Liniment Gel Topical Analgesic Sore Muscle and Joint Pain Relief    Recommend lotions: eucerin, eucerin for diabetics, aquaphor, A&D ointment, gold bond for diabetics, sween, Willernie's Bees all purpose baby ointment,  urea 40 with aloe or SkinIntegra rapid crack repair (found on amazon.com)    Shoe recommendations: (try 6pm.com, zappos.com , nordstromrack[employer], or shoes.com for discounted prices) you can visit varsity shoes in [place], DSW shoes in [place]  or armando rack in the [facility] (there are also several shoe brand outlets in the [facility])    ONLY purchase stability style tennis shoes AVOID flex, foam, free, yoga mat style shoes or shoes that claim to feel like clouds  If you have a flatter foot purchase shoes for PRONATION  If you have a high arch purchase shoes for SUPINATION    Shoe examples:    Asics (GT or gel foundations, gel-kayano-30), new balance stability type shoes (such as the 940 series or the [id number]), saantoinetteony (Guide 16, stabil c3),  Hernandez (GTS or Beast or   transcend), propet, HokaOne (donna 7, arahi, ken) Gregor (tennis shoes and boots)    Sofft Brand (women) Kierra&Corey (men), clarks, croserafin, aerosoles, naturalizers, SAS, ecco, born, elvia storey, rockports (dress shoes)    Vionic, burkenstocks, fitflops, propet, taos, baretraps, Hoka or vionic recovery slides/sandals (sandals)    Hoka or vionic recovery slides/sandals, crocs, propet (house shoes)      Nail Home remedy:  Vicks Vapor rub or Emuaid to nails for easier manageability    Occasional soaks for 15-20 mins in luke warm water with 1/2 cup of listerine and 1 cup of apple cider vinegar are ok You may add several drops of oil of oregano or tea tree oil as well    What is Metatarsalgia?  Metatarsalgia is pain in the ball of your foot. It is often caused by wearing shoes with thin soles and high heels.  "This puts extra pressure on the bones in the ball of the foot. Standing or walking on a hard surface for long periods also puts added pressure on the bones, causing pain. The pain can occur under any of the five metatarsal bones, although it's most common in the second. Bent or twisted toes and bunions can make the problem worse. So can being overweight. Sometimes high arches or arthritis can also cause metatarsalgia.    Inside the ball of your foot  The long bones in the middle of your foot are called the metatarsal bones. Each metatarsal bone ends in the ball of the foot. When you walk, these bones bear the weight of your body as your foot pushes off the ground. If there is more pressure on the end of one bone, it presses on the skin beneath it. This causes pain and inflammation in the ball of the foot (metatarsalgia). A callus (a hard growth of skin) may also form on the ball of the foot.    Symptoms  The most common symptom of metatarsalgia is pain in the ball of the foot. It may feel as if you have a stone in your shoe. The ball of the foot may also become red and inflamed, and a callus may form under the end of the metatarsal bone.   Date Last Reviewed: 9/29/2015  © 7835-3001 [employer]. [address] [place] [address]. All rights reserved. This information is not intended as a substitute for professional medical care. Always follow your healthcare professional's instructions.        Treating Metatarsalgia    Metatarsalgia is pain in the "ball of your foot," the area between your arch and your toes. The pain usually starts in one or more of the five bones in this area under the toes. These bones are called the metatarsals. Often, a callus builds up in this area. In most cases, wearing low-heeled, well-cushioned shoes and filing down the callus will ease the pain. If these steps dont work, you may need surgery to remove part of the bone. To take pressure off the ball of your foot and " ease the pain, your healthcare provider may suggest that you do one or more of the following.  Wear shoes with padded soles  Wear shoes with thick padding in the soles. Keep heels low. Make sure the shoe is wide across the ball of your foot and your toes.  Using a metatarsal pad  Put a metatarsal pad in your shoe. This lifts and takes pressure off the painful area. To insert the pad:  Put a small lipstick scooter on the callus.  Step into the shoe to leave a scooter on the insole.  Peel the backing off the pad. Then put the pad in the shoe just behind the lipstick scooter.  You may also be able to find inserts with built-in metatarsal pads.  Filing the callus  Soak your foot in warm water for a few minutes to soften the skin.  Dry the foot.  Gently rub the callus with a pumice stone or nail file to remove the hard skin. Stop if bleeding or pain occurs.  If you have diabetes, see your diabetes healthcare provider for foot care.  Date Last Reviewed: 9/29/2015  © 4801-5311 [employer]. [address] [place] [address]. All rights reserved. This information is not intended as a substitute for professional medical care. Always follow your healthcare professional's instructions.        Wearing Proper Shoes                    You walk on your feet every day, forcing them to support the weight of your body. Repeated stress on your feet can cause damage over time. The right shoes can help protect your feet. The wrong shoes can cause more foot problems. Read the information below to help you find a shoe that fits your foot needs.      A good shoe fit will cover your foot outline. A shoe that doesnt cover the outline is a bad fit.   Whats your foot shape?  To get a good fit, you need to know the shape of your foot. Do this simple test: While standing, place your foot on a piece of paper and trace around it. Is your foot straight or curved? Do you have a foot problem, such as a bunion, that causes your foot  outline to show a bulge on the side of your big toe?  Finding your fit  Bring your foot outline to the shoe store to help you find the right shoe. Place a shoe you like on top of the outline to see if it matches the shape. The shoe should cover the outline. (If you have a bunion, the shoe may not cover the bulge on the outline. Look for soft leather shoes to stretch over the bunion.) Once youve found a pair of proper shoes, put them on. Walk around. Be sure the shoes dont rub or pinch. If the shoes feel good, youve found your fit!  The right shoe for you  A good shoe has features that provide comfort and support. It must also be the right size and shape for your feet. Look for a shoe made of breathable fabric and lining, such as leather or canvas. Be sure that shoes have enough tread to prevent slipping. Go to a good shoe store for help finding the right shoe.  Good shoe features  An ideal shoe has the following:  Laces for support. If tying laces is a problem for you, try shoes with Velcro fasteners or sergo.  A front of the shoe (toe box) with ½ inch space in front of your longest toes.  An arch shape that supports your foot.  No more than 1½ inches of heel.  A stiff, snug back of the shoe to keep your foot from sliding around.  A smooth lining with no rough seams.  Shoe shopping tips  Below are some dos and donts for when you go to the shoe store.  Do:  Select the shoes that feel right. Wear them around the house. Then bring them to your foot healthcare provider to check for fit. If they dont fit well, return them.  Shop late in the day, when your feet will be slightly bigger.  Each time you buy shoes, have both your feet measured while you are standing. Foot size changes with time.  Pick shoes to suit their purpose. High heels are OK for an occasional night on the town. But for everyday wear, choose a more sensible shoe.  Try on shoes while wearing any inserts specially made for your feet  (orthoses).  Try on both the right and left shoes. If your feet are different sizes, pick a pair that fits the larger foot.  Dont:  Dont buy shoes based on shoe size alone. Always try on shoes, as sizes differ from brand to brand and within brands.  Dont expect shoes to break in. If they dont fit at the store, dont buy them.  Dont buy a shoe that doesnt match your foot shape.  What about socks?  Always wear socks with shoes. Socks help absorb sweat and reduce friction and blistering. When shopping for shoes, choose soft, padded socks with seams that dont irritate your feet.  If you have foot problems  Some foot problems cause deformities. This can make it hard to find a good fit. Look for shoes made of soft leather to stretch over the deformity. If you have bunions, buy shoes with a wider toe box. To fit hammertoes, look for shoes with a tall toe box. If you have arch problems, you may need inserts. In some cases, youll need to have custom footwear or orthoses made for your feet.  Suggested footwear  Ask your healthcare provider what kind of footwear you need. He or she may recommend a certain brand or shoe store.  Date Last Reviewed: 8/1/2016  © 2618-2871 [employer]. [address] [place] [address], [place] [address]. All rights reserved. This information is not intended as a substitute for professional medical care. Always follow your healthcare professional's instructions.

## 2024-11-16 NOTE — PROGRESS NOTES
Subjective:      Patient ID: Erica Leblanc is a 79 y.o. female.    Chief Complaint: Follow-up    Erica is a 79 y.o. female who presents to the clinic for evaluation and treatment of high risk feet. Erica has a past medical history of Acute ischemic right PCA stroke (6/6/2021), Acute respiratory failure with hypoxia, Age-related osteoporosis without current pathological fracture (3/8/2021), Anemia of chronic kidney failure, stage 4 (severe) (4/5/2019), Cataracts, bilateral, CHF (congestive heart failure), CKD (chronic kidney disease) stage 3, GFR 30-59 ml/min, CKD (chronic kidney disease) stage 3, GFR 30-59 ml/min, Controlled type 2 diabetes mellitus with proteinuria or albuminuria, Depression, Diabetes with neurologic complications, Diabetic retinopathy of both eyes, Edema, Glaucoma, History of colonic polyps, TIA (transient ischemic attack) (11/2008), Hyperlipidemia LDL goal < 100, Hypertension, Hypothyroidism, Major depressive disorder, single episode, mild (2/17/2016), Mixed incontinence urge and stress, Obesity, Obstructive sleep apnea on CPAP, Osteopenia, Proteinuria, Sickle cell trait, Strabismus, TIA (transient ischemic attack), Trouble in sleeping, Type 2 diabetes mellitus with ophthalmic manifestations, Type 2 diabetes with stage 3 chronic kidney disease GFR 30-59, Type II or unspecified type diabetes mellitus with renal manifestations, uncontrolled(250.42), Uncontrolled type 2 diabetes mellitus with peripheral circulatory disorder (4/5/2019), Urge incontinence (1/11/2016), Urge incontinence, Venous stasis ulcer, and Vitamin D deficiency disease. The patient's chief complaint is occasional left forefoot pain with palpation or ambulation.. This patient has documented high risk feet requiring routine maintenance secondary to diabetes mellitis and those secondary complications of diabetes, as mentioned..    Presents in sandals    PCP: Sendy Elaine MD    Date Last Seen by PCP:   Chief Complaint    Patient presents with    Follow-up     Hemoglobin A1C   Date Value Ref Range Status   08/09/2024 5.7 (H) 4.0 - 5.6 % Final     Comment:     ADA Screening Guidelines:  5.7-6.4%  Consistent with prediabetes  >or=6.5%  Consistent with diabetes    High levels of fetal hemoglobin interfere with the HbA1C  assay. Heterozygous hemoglobin variants (HbS, HgC, etc)do  not significantly interfere with this assay.   However, presence of multiple variants may affect accuracy.     03/08/2024 6.1 (H) 4.0 - 5.6 % Final     Comment:     ADA Screening Guidelines:  5.7-6.4%  Consistent with prediabetes  >or=6.5%  Consistent with diabetes    High levels of fetal hemoglobin interfere with the HbA1C  assay. Heterozygous hemoglobin variants (HbS, HgC, etc)do  not significantly interfere with this assay.   However, presence of multiple variants may affect accuracy.     12/06/2023 5.7 (H) 4.0 - 5.6 % Final     Comment:     ADA Screening Guidelines:  5.7-6.4%  Consistent with prediabetes  >or=6.5%  Consistent with diabetes    High levels of fetal hemoglobin interfere with the HbA1C  assay. Heterozygous hemoglobin variants (HbS, HgC, etc)do  not significantly interfere with this assay.   However, presence of multiple variants may affect accuracy.     09/14/2023 5.5 % Final   09/13/2022 6.3 (H) 0.0 - 5.6 % Final     Comment:     [facility]   06/14/2022 6.6 (H) 0.0 - 5.6 % Final     Comment:     [facility]           Patient Active Problem List   Diagnosis    Severe obesity (BMI 35.0-39.9) with comorbidity    Sickle cell trait    Hyperlipidemia LDL goal <100    HUMBERTO on CPAP    Hypothyroidism (acquired)    Hx-TIA (transient ischemic attack)    Vitamin D deficiency disease    Pulmonary hypertension    Type 2 diabetes mellitus with ESRD (end-stage renal disease)    Secondary renal hyperparathyroidism    Incomplete bladder emptying    Postmenopausal atrophic vaginitis    Rectocele    Mixed incontinence urge and stress    Chronic  diastolic heart failure    Tortuous aorta    ESRD on hemodialysis    Anemia of chronic disease    Debility    Chronic respiratory failure    Type 2 diabetes mellitus with foot ulcer, with long-term current use of insulin    Stable proliferative diabetic retinopathy associated with type 2 diabetes mellitus    Heart failure with preserved ejection fraction    Pseudophakia    Chronic kidney disease-mineral and bone disorder    Age-related osteoporosis without current pathological fracture    H/O: stroke    Walker as ambulation aid    Swelling of lower extremity    Lymphedema of both lower extremities    Impaired functional mobility and activity tolerance    Wound of left leg    Multiple thyroid nodules    Venous ulcer of left lower extremity without varicose veins       Current Outpatient Medications on File Prior to Visit   Medication Sig Dispense Refill    ACCU-CHEK SOFT DEV LANCETS Kit       atorvastatin (LIPITOR) 80 MG tablet Take 1 tablet (80 mg total) by mouth every evening. 90 tablet 3    blood sugar diagnostic Strp One test strip use 2 times a day to check blood glucose,  ICD-10: E11.9, compatible with insurance/glucometer 100 each 11    blood-glucose meter kit One glucometer, use to check blood glucose.   ICD-10: E11.9. Dispense machine covered by insurance 1 each 0    cinacalcet (SENSIPAR) 30 MG Tab       docusate sodium (COLACE) 100 MG capsule Take 200 mg by mouth once daily.       doxercalciferoL (HECTOROL) 4 mcg/2 mL injection Inject 4.5 mcg into the vein 3 (three) times a week. At dialysis unit      dulaglutide (TRULICITY) 1.5 mg/0.5 mL pen injector Inject 1.5 mg into the skin every 7 days. 12 pen 3    epoetin arnel (EPOGEN INJ) Inject 1,000 Units as directed every 7 days. At the dialysis unit      fluticasone propionate (FLONASE) 50 mcg/actuation nasal spray 1 spray (50 mcg total) by Each Nostril route once daily. 48 g 5    lancets Misc One lancets use 2 times a day to check blood glucose, ICD-10: E11.9  100 each 11    lancing device Misc One device, used to check blood glucose, ICD-10: E11.9 1 each 0    levothyroxine (SYNTHROID) 50 MCG tablet TAKE 1 TABLET BEFORE BREAKFAST 90 tablet 3    LIDOcaine-prilocaine (EMLA) cream Apply topically as needed. 30 g 11    mupirocin (BACTROBAN) 2 % ointment Apply topically 3 (three) times daily. 22 g 0    OLENA-NABIL RX 1- mg-mg-mcg Tab Take 1 tablet by mouth once daily.      aspirin (ECOTRIN) 81 MG EC tablet Take 1 tablet (81 mg total) by mouth once daily. 90 tablet 3    midodrine (PROAMATINE) 5 MG Tab Take 1 tablet (5 mg total) by mouth 3 (three) times daily. 90 tablet 3    sevelamer carbonate (RENVELA) 800 mg Tab Take 3 tablets (2,400 mg total) by mouth 3 (three) times daily with meals. 270 tablet 11     No current facility-administered medications on file prior to visit.       Review of patient's allergies indicates:   Allergen Reactions    Ace inhibitors Other (See Comments)     Other reaction(s): cough       Past Surgical History:   Procedure Laterality Date    AV FISTULA PLACEMENT Left 2019    Procedure: CREATION, AV FISTULA, LEFT UPPER EXTREMITY;  Surgeon: Keron Perez MD;  Location: [facility];  Service: Vascular;  Laterality: Left;    BREAST BIOPSY      breast reduction Bilateral age 31    BREAST SURGERY      CATARACT EXTRACTION Bilateral     cataracts Bilateral      SECTION, LOW TRANSVERSE      x1    CHOLECYSTECTOMY      COLONOSCOPY N/A 2022    Procedure: COLONOSCOPY;  Surgeon: Mahesh Huang MD;  Location: [address]);  Service: Endoscopy;  Laterality: N/A;  fully vaccinated-sm.  RAPID COVID  +cologuard needing ASAP  Dialysis pt T,TH Sat and left UA access  2nd floor - cardiac/dialysis/SOB / LABS / prep ins. emailed - [facility]    EYE SURGERY  2014, 2014    vitrectomy    EYE SURGERY Right 2016    FISTULOGRAM Left 2020    Procedure: Fistulogram, left upper extremity, with branch ligation;  Surgeon: Keron HODGSON  MD Ana;  Location: [address];  Service: Vascular;  Laterality: Left;  Time 1.1 Minute  42.10 mGy    FISTULOGRAM Left 07/21/2020    Procedure: Fistulogram, left upper extremity, transradial access with possible intervention;  Surgeon: Keron Perez MD;  Location: [address];  Service: Vascular;  Laterality: Left;    FISTULOGRAM Left 08/19/2020    Procedure: Fistulogram, left upper extremity, possible intervention;  Surgeon: Keron Perez MD;  Location: [facility];  Service: Vascular;  Laterality: Left;  930 AM START  RN PRE OP  ---COVID NEGATIVE ON  8-. CA    FISTULOGRAM Left 01/21/2022    Procedure: Fistulogram, transradial access;  Surgeon: Keron Perez MD;  Location: [facility] OR [address] [place] [address];  Service: Vascular;  Laterality: Left;  mgy-40.16  gycm-8.3905  contrast-34cc  time-12.4min    HYSTERECTOMY  1986    TAHBSO (patient is unsure if ovaries removed)    OOPHORECTOMY      PERCUTANEOUS TRANSLUMINAL ANGIOPLASTY OF ARTERIOVENOUS FISTULA Left 07/21/2020    Procedure: PTA, AV FISTULA;  Surgeon: Keron Perez MD;  Location: [facility] OR [address] [place] [address];  Service: Vascular;  Laterality: Left;  15.9 minutes of fluro  41.12  mGy  7.9060 Gy cm2  32ml  contrast    PHLEBOGRAPHY Left 07/21/2020    Procedure: CENTRAL VENOGRAM;  Surgeon: Keron Perez MD;  Location: [facility] OR [address] [place] [address];  Service: Vascular;  Laterality: Left;    REFRACTIVE SURGERY      TOTAL REDUCTION MAMMOPLASTY      approx 10 yrs ago    VENOPLASTY  01/21/2022    Procedure: ANGIOPLASTY, VEIN;  Surgeon: Keron Perez MD;  Location: [facility] OR [address] [place] [address];  Service: Vascular;;       Family History   Problem Relation Name Age of Onset    Stroke Mother      Diabetes Mother      Hypertension Mother      Cataracts Mother      Leukemia Father      Cataracts Father      Ovarian cancer Sister Marilee 35    Achondroplasia Sister Hannah     HIV Brother Paul     No Known Problems Daughter x2     No Known Problems Son x3     Breast cancer  Maternal Aunt  65    Parkinsonism Maternal Aunt      Esophageal cancer Maternal Uncle          smoker    No Known Problems Paternal Aunt      Cataracts Paternal Uncle      Cataracts Maternal Grandmother      Cataracts Maternal Grandfather      Diabetes Paternal Grandmother      Cataracts Paternal Grandmother      No Known Problems Paternal Grandfather      No Known Problems Other      Amblyopia Neg Hx      Blindness Neg Hx      Glaucoma Neg Hx      Macular degeneration Neg Hx      Retinal detachment Neg Hx      Strabismus Neg Hx      Thyroid disease Neg Hx      Colon cancer Neg Hx      Cancer Neg Hx         Social History     Socioeconomic History    Marital status: Single    Number of children: 5   Occupational History    Occupation:      Employer: [employer]     Comment: part-time   Tobacco Use    Smoking status: Never     Passive exposure: Never    Smokeless tobacco: Never   Substance and Sexual Activity    Alcohol use: No    Drug use: No     Social Drivers of Health     Financial Resource Strain: Medium Risk (10/21/2024)    Overall Financial Resource Strain (CARDIA)     Difficulty of Paying Living Expenses: Somewhat hard   Food Insecurity: Food Insecurity Present (10/21/2024)    Hunger Vital Sign     Worried About Running Out of Food in the Last Year: Sometimes true     Ran Out of Food in the Last Year: Sometimes true   Transportation Needs: No Transportation Needs (10/21/2024)    PRAPARE - Transportation     Lack of Transportation (Medical): No     Lack of Transportation (Non-Medical): No   Physical Activity: Inactive (10/21/2024)    Exercise Vital Sign     Days of Exercise per Week: 0 days     Minutes of Exercise per Session: 0 min   Stress: No Stress Concern Present (10/21/2024)    [employer] - Occupational Stress Questionnaire     Feeling of Stress : Only a little   Housing Stability: High Risk (10/21/2024)    Housing Stability Vital Sign     Unable  to Pay for Housing in the Last Year: Yes     Homeless in the Last Year: No       Review of Systems   Constitutional: Negative for chills.   Cardiovascular:  Positive for leg swelling. Negative for chest pain and claudication.   Respiratory:  Negative for cough.    Skin:  Positive for color change, dry skin and nail changes.   Musculoskeletal:  Positive for joint pain, myalgias and stiffness.   Gastrointestinal:  Negative for nausea.   Neurological:  Positive for paresthesias. Negative for numbness.   Psychiatric/Behavioral:  The patient is not nervous/anxious.            Objective:       Vitals:    11/13/24 0844   BP: (!) 161/77   Pulse: 85   SpO2: (!) 93%         Physical Exam  Vitals and nursing note reviewed.   Constitutional:       Appearance: She is not diaphoretic.      Comments: General: Pt. is well-developed, well-nourished, appears stated age, in no acute distress, alert and oriented x 3. No evidence of depression, anxiety, or agitation. Calm, cooperative, and communicative. Appropriate interactions and affect.       Cardiovascular:      Pulses:           Dorsalis pedis pulses are 1+ on the right side and 1+ on the left side.        Posterior tibial pulses are 1+ on the right side and 1+ on the left side.      Comments: There is decreased digital hair.   Musculoskeletal:      Right ankle: Swelling present. No tenderness.      Right Achilles Tendon: No defects.      Left ankle: Swelling present. No tenderness.      Left Achilles Tendon: No defects.      Right foot: No tenderness.      Left foot: Tenderness (with palpation and end ROM central ray MTPJs) present.      Comments: Muscle strength is 5/5 in all groups bilaterally.    Decreased stride, station of gait.  apropulsive toe off.  Increased angle and base of gait.    Patient has hammertoes of digits 2-5 bilateral partially reducible without symptom today.    Visible and palpable bunion without pain at dorsomedial 1st metatarsal head right and left.   Hallux abducted right and left partially reducible, tracks laterally without being track bound.  No ecchymosis, erythema, edema, or cardinal signs infection or signs of trauma same foot.    Fat pad atrophy to heels and met heads bilateral     Skin:     General: Skin is warm and dry.      Coloration: Skin is not pale.      Findings: Abrasion (anterior left leg, thin epithelium and granulation) present. No lesion (posterior right leg healed) or rash.      Nails: There is no clubbing.      Comments: Xerosis bilaterally     Toenails 1-5 bilaterally are discolored/yellowed, dystrophic, brittle with subungual debris.     Interdigital Spaces clean, dry and without evidence of break in skin integrity   Neurological:      Sensory: No sensory deficit.      Comments: [place] 5.07 monofilament is intact bilateral feet. Sharp/dull sensation is also intact Bilateral feet.           Psychiatric:         Speech: Speech normal.               Assessment:       Encounter Diagnoses   Name Primary?    Type 2 diabetes mellitus with ESRD (end-stage renal disease) Yes    ESRD on hemodialysis     Plantar fat pad atrophy     Hallux abducto valgus, right     Hallux abducto valgus, left     Hammer toes of both feet     Foot pain, left     Metatarsalgia, left foot            Plan:     Problem List Items Addressed This Visit       Type 2 diabetes mellitus with ESRD (end-stage renal disease) - Primary    Relevant Orders    DIABETIC SHOES FOR HOME USE    ESRD on hemodialysis    Overview     - at [facility]  - Tuesday, Thursday and Saturday  - has AV graft in left UE          Other Visit Diagnoses       Plantar fat pad atrophy        Relevant Orders    DIABETIC SHOES FOR HOME USE    Hallux abducto valgus, right        Relevant Orders    DIABETIC SHOES FOR HOME USE    Hallux abducto valgus, left        Relevant Orders    DIABETIC SHOES FOR HOME USE    Hammer toes of both feet        Relevant Orders    DIABETIC SHOES FOR HOME USE     Foot pain, left        Metatarsalgia, left foot        Relevant Orders    DIABETIC SHOES FOR HOME USE             I counseled the patient on her conditions, their implications and medical management.    Orders Placed This Encounter    DIABETIC SHOES FOR HOME USE       Education about the diabetic foot, neuropathy, and prevention of limb loss.Education about the diabetic foot, neuropathy, and prevention of limb loss.    Shoe inspection. Diabetic Foot Education. Patient reminded of the importance of good nutrition/healthy diet/weight management and blood sugar control to help prevent podiatric complications of diabetes. Patient instructed on proper foot hygeine. Wear comfortable, proper fitting shoes. Wash feet daily. Dry well. After drying, apply moisturizer to feet (no lotion to webspaces). Inspect feet daily for skin breaks, blisters, swelling, or redness. Wear cotton socks (preferably white)  Change socks every day. Do NOT walk barefoot. Do NOT use heating pads or hot water soaks. We discussed wearing proper shoe gear, daily foot inspections, never walking without protective shoe gear.     Discussed edema control and the importance of daily moisturizer to the skin of the lower extremity    Recommend applying vicks vaporub to thick abnormal toenails daily x 6 months to treat fungal nail infection.    rx diabetic shoes for protection and support    Conservative treatments for hammer digit discussed include padding, hammertoe crest  Pads, extra-depth shoes; Surgical treatments discussed, including hammertoe correction and generic post-op course. All questions answered. Patient opting to begin with conservative options first.      Patient instructed on adequate icing techniques. Patient should ice the affected area at least once per day x 10 minutes for 10 days . I advised the  patient that extra icing would also be beneficial to ensure adequate anti inflammatory effect. Dispensed information on proper shoe fit and  modification including inserts, met pads.      She will continue to monitor the areas daily, inspect her feet, wear protective shoe gear when ambulatory, moisturizer to maintain skin integrity and follow in this office in approximately 3-4 months, sooner p.r.n.

## 2024-11-18 ENCOUNTER — HOSPITAL ENCOUNTER (OUTPATIENT)
Dept: WOUND CARE | Facility: HOSPITAL | Age: 79
Discharge: HOME | End: 2024-11-18
Attending: SURGERY
Payer: MEDICARE

## 2024-11-18 VITALS
RESPIRATION RATE: 18 BRPM | SYSTOLIC BLOOD PRESSURE: 168 MMHG | HEART RATE: 74 BPM | DIASTOLIC BLOOD PRESSURE: 78 MMHG | TEMPERATURE: 98 F

## 2024-11-18 DIAGNOSIS — S81.802A WOUND OF LEFT LEG: ICD-10-CM

## 2024-11-18 DIAGNOSIS — L97.929 VENOUS ULCER OF LEFT LOWER EXTREMITY WITHOUT VARICOSE VEINS: Primary | ICD-10-CM

## 2024-11-18 DIAGNOSIS — I87.2 VENOUS ULCER OF LEFT LOWER EXTREMITY WITHOUT VARICOSE VEINS: Primary | ICD-10-CM

## 2024-11-18 PROCEDURE — 99213 OFFICE O/P EST LOW 20 MIN: CPT | Mod: HCNC | Performed by: INTERNAL MEDICINE

## 2024-11-18 PROCEDURE — 99214 OFFICE O/P EST MOD 30 MIN: CPT | Mod: HCNC,,, | Performed by: INTERNAL MEDICINE

## 2024-11-18 NOTE — PROGRESS NOTES
[facility]                Progress Note    Subjective:       Patient ID: Erica Leblanc is a 79 y.o. female.    Chief Complaint: Wound Check    Follow Up wound care visit. Patient ambulated unaided  with assistance of walker to [facility] with nurse at side. Patient denies fever, chills, nausea, vomiting or diarrhea at present. Patient c/o pain to wound bed 3/10 at present. Patient reports not having any issues with dressing since last visit. Dressing appears  without strike through drainage.Dressing removed & wound cleaned by nurse. Wounds smaller than last wound care visit. Dressing was discarded.Topical Lidocaine 5% ointment applied to wound bed and allowed to absorb for 15 minutes for patient comfort. No change to dressing order; applied per MD orders. Patient will return to [facility] on  Tuesday d/t change in dialysis schedule for holiday week. AVS printed and given to patient.    No issue with dressing no strike through drainage due to thanksgiving holiday will be getting HD next Monday, will arrange wound care follow up for Tuesday Nov 26th     Wound Check      Review of Systems      Objective:        Physical Exam  Constitutional:       General: She is not in acute distress.  Pulmonary:      Effort: Pulmonary effort is normal. No respiratory distress.   Musculoskeletal:      Right lower leg: No edema.      Left lower leg: No edema.   Skin:     Comments: All wounds smaller pink granulation base without slough no induration no tunneling no periwound pitting   Neurological:      Mental Status: She is alert.         Vitals:    11/18/24 1423   BP: (!) 168/78   Pulse: 74   Resp: 18   Temp: 97.8 °F (36.6 °C)       Assessment:           ICD-10-CM ICD-9-CM   1. Venous ulcer of left lower extremity without varicose veins  I87.2 459.81    L97.929    2. Wound of left leg  S81.802A 891.0            Wound 11/11/24 Venous Ulcer Left anterior;lower Leg (Active)   11/11/24  Leg    Present on Original Admission:    Primary Wound Type: Venous ulcer   Side: Left   Orientation: anterior;lower   Wound Approximate Age at First Assessment (Weeks):    Wound Number:    Is this injury device related?:    Incision Type:    Closure Method:    Wound Description (Comments):    Type:    Additional Comments:    Ankle-Brachial Index:    Pulses:    Removal Indication and Assessment:    Wound Outcome:    Wound Image   11/18/24 1557   Dressing Appearance Intact;Dried drainage 11/18/24 1557   Drainage Amount Small 11/18/24 1557   Drainage Characteristics/Odor Serosanguineous 11/18/24 1557   Appearance Pink;Red;Fibrin;Moist 11/18/24 1557   Red (%), Wound Tissue Color 100 % 11/18/24 1557   Periwound Area Intact;Dry 11/18/24 1557   Wound Edges Open;Defined 11/18/24 1557   Wound Length (cm) 2.4 cm 11/18/24 1557   Wound Width (cm) 0.9 cm 11/18/24 1557   Wound Depth (cm) 0.1 cm 11/18/24 1557   Wound Volume (cm^3) 0.216 cm^3 11/18/24 1557   Wound Surface Area (cm^2) 2.16 cm^2 11/18/24 1557   Care Cleansed with:;Antimicrobial agent;Sterile normal saline 11/18/24 1557   Dressing Applied;Other (comment) 11/18/24 1557   Periwound Care Skin barrier film applied 11/18/24 1557   Compression Tubular elasticized bandage 11/18/24 1557   Dressing Change Due 11/26/24 11/18/24 1557            Wound 11/11/24 Venous Ulcer Left medial;lower Leg (Active)   11/11/24  Leg   Present on Original Admission:    Primary Wound Type: Venous ulcer   Side: Left   Orientation: medial;lower   Wound Approximate Age at First Assessment (Weeks):    Wound Number:    Is this injury device related?:    Incision Type:    Closure Method:    Wound Description (Comments):    Type:    Additional Comments:    Ankle-Brachial Index:    Pulses:    Removal Indication and Assessment:    Wound Outcome:    Wound Image   11/18/24 1557   Dressing Appearance Dry;Dried drainage 11/18/24 1557   Drainage Amount Small 11/18/24 1557   Drainage Characteristics/Odor  Serosanguineous 11/18/24 1557   Appearance Pink;Red;Fibrin;Moist 11/18/24 1557   Red (%), Wound Tissue Color 100 % 11/18/24 1557   Periwound Area Intact;Dry 11/18/24 1557   Wound Edges Open;Defined 11/18/24 1557   Wound Length (cm) 1.5 cm 11/18/24 1557   Wound Width (cm) 0.5 cm 11/18/24 1557   Wound Depth (cm) 0.1 cm 11/18/24 1557   Wound Volume (cm^3) 0.075 cm^3 11/18/24 1557   Wound Surface Area (cm^2) 0.75 cm^2 11/18/24 1557   Care Cleansed with:;Antimicrobial agent;Sterile normal saline 11/18/24 1557   Dressing Applied;Other (comment) 11/18/24 1557   Periwound Care Skin barrier film applied 11/18/24 1557   Compression Tubular elasticized bandage 11/18/24 1557   Dressing Change Due 11/26/24 11/18/24 1557            Wound 11/11/24 Venous Ulcer Left lateral;lower Leg (Active)   11/11/24  Leg   Present on Original Admission:    Primary Wound Type: Venous ulcer   Side: Left   Orientation: lateral;lower   Wound Approximate Age at First Assessment (Weeks):    Wound Number:    Is this injury device related?:    Incision Type:    Closure Method:    Wound Description (Comments):    Type:    Additional Comments:    Ankle-Brachial Index:    Pulses:    Removal Indication and Assessment:    Wound Outcome:    Wound Image   11/18/24 1557   Dressing Appearance Intact;Moist drainage 11/18/24 1557   Drainage Amount Small 11/18/24 1557   Drainage Characteristics/Odor Serosanguineous 11/18/24 1557   Appearance Pink;Moist 11/18/24 1557   Red (%), Wound Tissue Color 100 % 11/18/24 1557   Periwound Area Intact;Dry 11/18/24 1557   Wound Edges Open;Defined 11/18/24 1557   Wound Length (cm) 0.9 cm 11/18/24 1557   Wound Width (cm) 1.8 cm 11/18/24 1557   Wound Depth (cm) 0.1 cm 11/18/24 1557   Wound Volume (cm^3) 0.162 cm^3 11/18/24 1557   Wound Surface Area (cm^2) 1.62 cm^2 11/18/24 1557   Care Cleansed with:;Antimicrobial agent;Sterile normal saline 11/18/24 1557   Dressing Applied;Other (comment) 11/18/24 1557   Periwound Care Skin  "barrier film applied 11/18/24 1557   Compression Tubular elasticized bandage 11/18/24 1557   Dressing Change Due 11/26/24 11/18/24 1557           Plan:          Wounds all smaller continuie jacqueline and light compression return one week for wound check    Tissue pathology and/or culture taken     [] Yes      [x] No  Sharp debridement performed                   [] Yes       [x] No  Labs ordered     [] Yes       [x] No  Imaging ordered    [] Yes      [x] No    Orders Placed This Encounter   Procedures    Change dressing     Wound Dressing Orders    Dressing change frequency once a week and prn Nurse Visits  Remove old dressing  Cleanse or irrigate with: Normal Saline    TO ALL WOUNDS  Protect periwound with: Cavilon  Primary dressing: WOUND BASE: Jacqueline  Secondary dressing: MextraSizes: size used: 5"x5" ( Qty 1); secure with medipore tape  Compression: Tubi-, single layer, size E ( Left calf measurement 36 cm)    Return to clinic in 1 week        Follow up in about 8 days (around 11/26/2024) for Wound Care.       "

## 2024-11-21 ENCOUNTER — PATIENT MESSAGE (OUTPATIENT)
Dept: FAMILY MEDICINE | Facility: CLINIC | Age: 79
End: 2024-11-21
Payer: MEDICARE

## 2024-11-26 ENCOUNTER — HOSPITAL ENCOUNTER (OUTPATIENT)
Dept: WOUND CARE | Facility: HOSPITAL | Age: 79
Discharge: HOME | End: 2024-11-26
Attending: SURGERY
Payer: MEDICARE

## 2024-11-26 VITALS
TEMPERATURE: 97 F | SYSTOLIC BLOOD PRESSURE: 162 MMHG | HEART RATE: 75 BPM | DIASTOLIC BLOOD PRESSURE: 74 MMHG | RESPIRATION RATE: 18 BRPM

## 2024-11-26 DIAGNOSIS — I87.2 VENOUS ULCER OF LEFT LOWER EXTREMITY WITHOUT VARICOSE VEINS: Primary | ICD-10-CM

## 2024-11-26 DIAGNOSIS — L97.929 VENOUS ULCER OF LEFT LOWER EXTREMITY WITHOUT VARICOSE VEINS: Primary | ICD-10-CM

## 2024-11-26 PROCEDURE — 99214 OFFICE O/P EST MOD 30 MIN: CPT | Mod: HCNC,,, | Performed by: FAMILY MEDICINE

## 2024-11-26 PROCEDURE — 99213 OFFICE O/P EST LOW 20 MIN: CPT | Mod: HCNC | Performed by: FAMILY MEDICINE

## 2024-11-26 NOTE — PROGRESS NOTES
[facility]                Progress Note    Subjective:       Patient ID: Erica Leblanc is a 79 y.o. female.    Chief Complaint: Wound Care and Dressing Change    Patient was seen today for follow up wound care visit. Patient ambulated to the exam room with regular walker, with LPN by side. She denies pain or discomfort to the wound bed. Denies fever, nausea, chills, vomiting, or diarrhea at present. Dressing intact without strike through drainage. Wound care done per MD orders.        Return to clinic in 1 week.    MD note:  patient following up today for wounds to LLE.  There has been no pain in the leg.  She states that she has been keeping dressings clean, dry, and in place.  She has not noticed any odor from the wound sites.  She denies any fevers, chills, or sweats.          Review of Systems   Constitutional: Negative.    HENT: Negative.     Eyes: Negative.    Respiratory: Negative.     Cardiovascular:  Positive for leg swelling.   Gastrointestinal: Negative.    Skin:  Positive for wound.         Objective:        Physical Exam  Constitutional:       Appearance: Normal appearance.   HENT:      Head: Normocephalic and atraumatic.      Mouth/Throat:      Mouth: Mucous membranes are moist.      Pharynx: Oropharynx is clear.   Eyes:      Extraocular Movements: Extraocular movements intact.      Conjunctiva/sclera: Conjunctivae normal.   Pulmonary:      Effort: Pulmonary effort is normal. No respiratory distress.   Abdominal:      General: There is no distension.   Skin:     General: Skin is warm.      Comments: Wounds have closed, but thin layer of epithelium   Neurological:      Mental Status: She is alert and oriented to person, place, and time. Mental status is at baseline.         Vitals:    11/26/24 1541   BP: (!) 162/74   Pulse: 75   Resp: 18   Temp: 96.6 °F (35.9 °C)       Assessment:           ICD-10-CM ICD-9-CM   1. Venous ulcer of left lower extremity  without varicose veins  I87.2 459.81    L97.929             Wound 11/11/24 Venous Ulcer Left anterior;lower Leg (Active)   11/11/24  Leg   Present on Original Admission:    Primary Wound Type: Venous ulcer   Side: Left   Orientation: anterior;lower   Wound Approximate Age at First Assessment (Weeks):    Wound Number:    Is this injury device related?:    Incision Type:    Closure Method:    Wound Description (Comments):    Type:    Additional Comments:    Ankle-Brachial Index:    Pulses:    Removal Indication and Assessment:    Wound Outcome:    Wound Image   11/26/24 1543   Dressing Appearance Intact;Dry 11/26/24 1543   Drainage Amount None 11/26/24 1543   Appearance Intact 11/26/24 1543   Periwound Area Intact;Dry 11/26/24 1543   Wound Edges Rolled/closed 11/26/24 1543   Wound Length (cm) 0 cm 11/26/24 1543   Wound Width (cm) 0 cm 11/26/24 1543   Wound Depth (cm) 0 cm 11/26/24 1543   Wound Volume (cm^3) 0 cm^3 11/26/24 1543   Wound Surface Area (cm^2) 0 cm^2 11/26/24 1543   Care Cleansed with:;Sterile normal saline;Wound cleanser 11/26/24 1543   Dressing Applied;Collagen;Absorptive Pad;Tubular bandage;Other (comment) 11/26/24 1543   Periwound Care Skin barrier film applied 11/26/24 1543   Compression Tubular elasticized bandage 11/26/24 1543   Dressing Change Due 12/02/24 11/26/24 1543            Wound 11/11/24 Venous Ulcer Left medial;lower Leg (Active)   11/11/24  Leg   Present on Original Admission:    Primary Wound Type: Venous ulcer   Side: Left   Orientation: medial;lower   Wound Approximate Age at First Assessment (Weeks):    Wound Number:    Is this injury device related?:    Incision Type:    Closure Method:    Wound Description (Comments):    Type:    Additional Comments:    Ankle-Brachial Index:    Pulses:    Removal Indication and Assessment:    Wound Outcome:    Wound Image   11/26/24 1543   Dressing Appearance Intact;Dry 11/26/24 1543   Drainage Amount None 11/26/24 1543   Appearance Fibrin;Dry  11/26/24 1543   Periwound Area Intact;Dry 11/26/24 1543   Wound Edges Defined 11/26/24 1543   Wound Length (cm) 0 cm 11/26/24 1543   Wound Width (cm) 0 cm 11/26/24 1543   Wound Depth (cm) 0 cm 11/26/24 1543   Wound Volume (cm^3) 0 cm^3 11/26/24 1543   Wound Surface Area (cm^2) 0 cm^2 11/26/24 1543   Care Cleansed with:;Sterile normal saline;Wound cleanser 11/26/24 1543   Dressing Applied;Collagen;Non-adherent;Tubular bandage;Other (comment) 11/26/24 1543   Periwound Care Skin barrier film applied 11/26/24 1543   Compression Tubular elasticized bandage 11/26/24 1543   Dressing Change Due 12/02/24 11/26/24 1543            Wound 11/11/24 Venous Ulcer Left lateral;lower Leg (Active)   11/11/24  Leg   Present on Original Admission:    Primary Wound Type: Venous ulcer   Side: Left   Orientation: lateral;lower   Wound Approximate Age at First Assessment (Weeks):    Wound Number:    Is this injury device related?:    Incision Type:    Closure Method:    Wound Description (Comments):    Type:    Additional Comments:    Ankle-Brachial Index:    Pulses:    Removal Indication and Assessment:    Wound Outcome:    Wound Image   11/26/24 1543   Dressing Appearance Intact;Dry 11/26/24 1543   Drainage Amount None 11/26/24 1543   Appearance Fibrin;Dry 11/26/24 1543   Periwound Area Intact;Dry 11/26/24 1543   Wound Edges Defined 11/26/24 1543   Wound Length (cm) 0 cm 11/26/24 1543   Wound Width (cm) 0 cm 11/26/24 1543   Wound Depth (cm) 0 cm 11/26/24 1543   Wound Volume (cm^3) 0 cm^3 11/26/24 1543   Wound Surface Area (cm^2) 0 cm^2 11/26/24 1543   Care Cleansed with:;Sterile normal saline;Wound cleanser 11/26/24 1543   Dressing Applied;Collagen;Absorptive Pad;Tubular bandage;Other (comment) 11/26/24 1543   Periwound Care Skin barrier film applied 11/26/24 1543   Compression Tubular elasticized bandage 11/26/24 1543   Dressing Change Due 12/02/24 11/26/24 1543           Plan:              Tissue pathology and/or culture taken     []  "Yes      [x] No  Sharp debridement performed                   [] Yes       [x] No  Labs ordered     [] Yes       [x] No  Imaging ordered    [] Yes      [x] No    Orders Placed This Encounter   Procedures    Change dressing     Wound Dressing Orders    Dressing change frequency once a week and prn Nurse Visits  Remove old dressing  Cleanse or irrigate with: Normal Saline    TO ALL WOUNDS  Protect periwound with: Cavilon  Primary dressing: WOUND BASE: Rochelle  Secondary dressing: MextraSizes: size used: 5"x5" ( Qty 1); secure with medipore tape  Compression: Tubi-, single layer, size E ( Left calf measurement 36 cm)    Return to clinic in 1 week     Will compress for one more week per patient request  She will follow up with Dr. Jacobson next week as she will be back on her regular T, Th, Saturday dialysis scheduled  She is to call with any concerns regarding her wounds   Follow up in about 6 days (around 12/2/2024) for wound care.     Sang Mendes MD    "

## 2024-12-02 ENCOUNTER — HOSPITAL ENCOUNTER (OUTPATIENT)
Dept: WOUND CARE | Facility: HOSPITAL | Age: 79
Discharge: HOME | End: 2024-12-02
Attending: SURGERY
Payer: MEDICARE

## 2024-12-02 VITALS
RESPIRATION RATE: 18 BRPM | SYSTOLIC BLOOD PRESSURE: 177 MMHG | DIASTOLIC BLOOD PRESSURE: 84 MMHG | HEART RATE: 75 BPM | TEMPERATURE: 96 F

## 2024-12-02 DIAGNOSIS — I87.2 VENOUS ULCER OF LEFT LOWER EXTREMITY WITHOUT VARICOSE VEINS: Primary | ICD-10-CM

## 2024-12-02 DIAGNOSIS — L97.929 VENOUS ULCER OF LEFT LOWER EXTREMITY WITHOUT VARICOSE VEINS: Primary | ICD-10-CM

## 2024-12-02 PROCEDURE — 99213 OFFICE O/P EST LOW 20 MIN: CPT | Mod: HCNC,,, | Performed by: INTERNAL MEDICINE

## 2024-12-02 PROCEDURE — 99213 OFFICE O/P EST LOW 20 MIN: CPT | Mod: HCNC | Performed by: INTERNAL MEDICINE

## 2024-12-02 NOTE — PROGRESS NOTES
[facility]                Progress Note    Subjective:       Patient ID: Erica Leblanc is a 79 y.o. female.    Chief Complaint: Wound Care and Dressing Change    Patient was seen today for follow up wound care visit. Patient ambulated to the exam room with Rollator walker, with LPN by side. She denies pain or discomfort to the wound bed. Denies fever, nausea, chills, vomiting, or diarrhea at present. Dressing intact without strike through drainage. Left Medial LL, Left Lateral LL, and Left Anterior LL wounds have resolved. MD gave patient discharged orders, patient is discharged from wound care.      Wounds have remained closed since last week. She is back to her [facility] HD scheduled she has compression stockings at home      Review of Systems      Objective:        Physical Exam  Constitutional:       General: She is not in acute distress.  Pulmonary:      Effort: Pulmonary effort is normal. No respiratory distress.   Musculoskeletal:      Right lower leg: No edema.      Left lower leg: No edema.   Skin:     Comments: Left leg shows intact skin without pitting or induration   Neurological:      Mental Status: She is alert.         Vitals:    12/02/24 1520   BP: (!) 177/84   Pulse: 75   Resp: 18   Temp: 96.3 °F (35.7 °C)       Assessment:           ICD-10-CM ICD-9-CM   1. Venous ulcer of left lower extremity without varicose veins  I87.2 459.81    L97.929             Wound 11/11/24 Venous Ulcer Left anterior;lower Leg (Active)   11/11/24  Leg   Present on Original Admission:    Primary Wound Type: Venous ulcer   Side: Left   Orientation: anterior;lower   Wound Approximate Age at First Assessment (Weeks):    Wound Number:    Is this injury device related?:    Incision Type:    Closure Method:    Wound Description (Comments):    Type:    Additional Comments:    Ankle-Brachial Index:    Pulses:    Removal Indication and Assessment:    Wound Outcome:    Wound Image    12/02/24 1522   Dressing Appearance Intact;Dry 12/02/24 1522   Drainage Amount None 12/02/24 1522   Appearance Intact;Dry 12/02/24 1522   Periwound Area Intact;Dry 12/02/24 1522   Wound Edges Rolled/closed 12/02/24 1522   Wound Length (cm) 0 cm 12/02/24 1522   Wound Width (cm) 0 cm 12/02/24 1522   Wound Depth (cm) 0 cm 12/02/24 1522   Wound Volume (cm^3) 0 cm^3 12/02/24 1522   Wound Surface Area (cm^2) 0 cm^2 12/02/24 1522   Care Cleansed with:;Wound cleanser;Sterile normal saline 12/02/24 1522   Dressing Applied;Tubular bandage 12/02/24 1522            Wound 11/11/24 Venous Ulcer Left medial;lower Leg (Active)   11/11/24  Leg   Present on Original Admission:    Primary Wound Type: Venous ulcer   Side: Left   Orientation: medial;lower   Wound Approximate Age at First Assessment (Weeks):    Wound Number:    Is this injury device related?:    Incision Type:    Closure Method:    Wound Description (Comments):    Type:    Additional Comments:    Ankle-Brachial Index:    Pulses:    Removal Indication and Assessment:    Wound Outcome:    Wound Image   12/02/24 1522   Dressing Appearance Intact;Dry 12/02/24 1522   Drainage Amount None 12/02/24 1522   Appearance Intact;Dry 12/02/24 1522   Periwound Area Intact;Dry 12/02/24 1522   Wound Edges Rolled/closed 12/02/24 1522   Wound Length (cm) 0 cm 12/02/24 1522   Wound Width (cm) 0 cm 12/02/24 1522   Wound Depth (cm) 0 cm 12/02/24 1522   Wound Volume (cm^3) 0 cm^3 12/02/24 1522   Wound Surface Area (cm^2) 0 cm^2 12/02/24 1522   Care Cleansed with:;Sterile normal saline;Wound cleanser 12/02/24 1522   Dressing Applied;Tubular bandage 12/02/24 1522            Wound 11/11/24 Venous Ulcer Left lateral;lower Leg (Active)   11/11/24  Leg   Present on Original Admission:    Primary Wound Type: Venous ulcer   Side: Left   Orientation: lateral;lower   Wound Approximate Age at First Assessment (Weeks):    Wound Number:    Is this injury device related?:    Incision Type:    Closure  Method:    Wound Description (Comments):    Type:    Additional Comments:    Ankle-Brachial Index:    Pulses:    Removal Indication and Assessment:    Wound Outcome:    Wound Image   12/02/24 1522   Dressing Appearance Intact;Dry 12/02/24 1522   Drainage Amount None 12/02/24 1522   Appearance Intact;Dry 12/02/24 1522   Periwound Area Intact;Dry 12/02/24 1522   Wound Edges Rolled/closed 12/02/24 1522   Wound Length (cm) 0 cm 12/02/24 1522   Wound Width (cm) 0 cm 12/02/24 1522   Wound Depth (cm) 0 cm 12/02/24 1522   Wound Volume (cm^3) 0 cm^3 12/02/24 1522   Wound Surface Area (cm^2) 0 cm^2 12/02/24 1522   Care Cleansed with:;Sterile normal saline;Wound cleanser 12/02/24 1522   Dressing Applied;Tubular bandage 12/02/24 1522           Plan:          Wounds have healed discharge from wound clinic continue daily compression stocking use    Tissue pathology and/or culture taken     [] Yes      [x] No  Sharp debridement performed                   [] Yes       [x] No  Labs ordered     [] Yes       [x] No  Imaging ordered    [] Yes      [x] No    Orders Placed This Encounter   Procedures    Change dressing     Wound Dressing Orders    Left Lower LL  Compression: Tubi-, single layer, size E ( Left calf measurement 36 cm)  Other: Patient is discharged from wound care.        Follow up if symptoms worsen or fail to improve.

## 2024-12-04 ENCOUNTER — HOSPITAL ENCOUNTER (OUTPATIENT)
Dept: CARDIOLOGY | Facility: HOSPITAL | Age: 79
Discharge: HOME | End: 2024-12-04
Attending: SURGERY
Payer: MEDICARE

## 2024-12-04 ENCOUNTER — OFFICE VISIT (OUTPATIENT)
Dept: VASCULAR SURGERY | Facility: CLINIC | Age: 79
End: 2024-12-04
Payer: MEDICARE

## 2024-12-04 VITALS
WEIGHT: 206.38 LBS | BODY MASS INDEX: 36.57 KG/M2 | SYSTOLIC BLOOD PRESSURE: 169 MMHG | HEART RATE: 82 BPM | HEIGHT: 63 IN | DIASTOLIC BLOOD PRESSURE: 92 MMHG

## 2024-12-04 DIAGNOSIS — I89.0 LYMPHEDEMA: Primary | ICD-10-CM

## 2024-12-04 DIAGNOSIS — Z99.2 ESRD ON HEMODIALYSIS: ICD-10-CM

## 2024-12-04 DIAGNOSIS — N18.6 ESRD ON HEMODIALYSIS: ICD-10-CM

## 2024-12-04 DIAGNOSIS — T82.858D ARTERIOVENOUS FISTULA STENOSIS, SUBSEQUENT ENCOUNTER: ICD-10-CM

## 2024-12-04 LAB
HD ANASTAMOSIS VESSEL: NORMAL
HD FISTULA OUTFLOW VEIN VESSEL: NORMAL
HD INFLOW ARTERY VESSEL: NORMAL
RIGHT DIS GRAFT DEPTH: 0.2 CM
RIGHT DIS GRAFT DIA: 0.9 CM
RIGHT DIS GRAFT PSV: 97 CM/ SEC
RIGHT INFLOW PSV: 319 CM/S
RIGHT MID GRAFT DEPTH: 0.2 CM
RIGHT MID GRAFT DIA: 2 CM
RIGHT MID GRAFT PSV: 105 CM/S
RIGHT OUTFLOW VEIN PSV: 173 CM/ SEC
RIGHT PROX ANA PSV: 761 CM/S
RIGHT PROX GRAFT DEPTH: 0.3 CM
RIGHT PROX GRAFT DIA: 2.2 CM
RIGHT PROX GRAFT PSV: 56 CM/S
RIGHT VOLUME FLOW DIA: 1.8 CM
RIGHT VOLUME FLOW PSV: 5326 ML/MIN

## 2024-12-04 PROCEDURE — 93990 DOPPLER FLOW TESTING: CPT | Mod: 26,HCNC,, | Performed by: SURGERY

## 2024-12-04 PROCEDURE — 93990 DOPPLER FLOW TESTING: CPT | Mod: TC,HCNC

## 2024-12-04 PROCEDURE — 99999 PR PBB SHADOW E&M-EST. PATIENT-LVL III: CPT | Mod: PBBFAC,HCNC,, | Performed by: SURGERY

## 2024-12-04 NOTE — PROGRESS NOTES
[facility]                  HPI:  Erica Leblanc is a 79 y.o. female with   Patient Active Problem List   Diagnosis    Severe obesity (BMI 35.0-39.9) with comorbidity    Sickle cell trait    Hyperlipidemia LDL goal <100    HUMBERTO on CPAP    Hypothyroidism (acquired)    Hx-TIA (transient ischemic attack)    Vitamin D deficiency disease    Pulmonary hypertension    Type 2 diabetes mellitus with ESRD (end-stage renal disease)    Secondary renal hyperparathyroidism    Incomplete bladder emptying    Postmenopausal atrophic vaginitis    Rectocele    Mixed incontinence urge and stress    Chronic diastolic heart failure    Tortuous aorta    ESRD on hemodialysis    Anemia of chronic disease    Debility    Chronic respiratory failure    Type 2 diabetes mellitus with foot ulcer, with long-term current use of insulin    Stable proliferative diabetic retinopathy associated with type 2 diabetes mellitus    Heart failure with preserved ejection fraction    Pseudophakia    Chronic kidney disease-mineral and bone disorder    Age-related osteoporosis without current pathological fracture    H/O: stroke    Walker as ambulation aid    Swelling of lower extremity    Lymphedema of both lower extremities    Impaired functional mobility and activity tolerance    Wound of left leg    Multiple thyroid nodules    Venous ulcer of left lower extremity without varicose veins    being managed by PCP and specialists who is here today for evaluation of long term HD access.  S/p R IJ tunneled HD catheter, HD without issues.  Pt is R handed.  No complaints today.  Does endorse orthopnea, no chest pain.  Unable to climb 1 flight of stairs on own.    no MI  no Stroke  Tobacco use: denies    1/20/20: No new issues.    3/2020: Dialysis without issues.    4/6/20:  S/p LUE fistulogram, central venogram, ligation of medial side branch cephalic vein, ligation of lateral side branch cephalic vein.  No issues with HD  for several weeks since procedure.    7/6/20:  No issues with HD.    8/3/20: s/p L proximal fistula angioplasty 7/21/20.  No issues with HD.    8/31/20:  S/p 8/19/29 Balloon angioplasty of the proximal LUE AVF with a 6x60 Angiosculpt and Stellerex balloon.      10/2020:  No issues with HD    1/2021:  No issues with HD    4/2021:  No new problems.    8/2021:  No issues with HD. C/o fatigue after HD.  Has not seen cardiology recently.    3/2022:  No issues with HD    6/2022:  Doing well, no issues with HD    1/2023:  No issues with HD    04/2023: No issues with HD.     7/2023:  doing well.    10/2023:  No issues with HD.    11/2023:  c/o LLE cellulitis and edema.  +compression with sock.    12/2023:  +compression.      2/2024:  no new issues.    02/28/24: Pt presents with complaints LLE shin blister with swelling after undergoing lymphedema therapy.     03/2024: No new issues    04/2024: No new issues wound healed.    05/22/24: No new issues.    07/2024: Pt called with complaints of LLE wound    9/2024: no issues with HD.  Traumatic wound to LLE.    12/2024:  no issues with HD.  LLE wound healed.    Past Medical History:   Diagnosis Date    Acute ischemic right PCA stroke 6/6/2021    Acute respiratory failure with hypoxia     Age-related osteoporosis without current pathological fracture 3/8/2021    Anemia of chronic kidney failure, stage 4 (severe) 4/5/2019    Cataracts, bilateral     CHF (congestive heart failure)     CKD (chronic kidney disease) stage 3, GFR 30-59 ml/min     CKD (chronic kidney disease) stage 3, GFR 30-59 ml/min     Controlled type 2 diabetes mellitus with proteinuria or albuminuria     Depression     Diabetes with neurologic complications     Diabetic retinopathy of both eyes     Edema     Glaucoma     History of colonic polyps     Hx-TIA (transient ischemic attack) 11/2008    Hyperlipidemia LDL goal < 100     Hypertension     Hypothyroidism     Major depressive disorder, single episode, mild  2016    Mixed incontinence urge and stress     Obesity     Obstructive sleep apnea on CPAP     19:  Home CPAP machine broken, per patient & son    Osteopenia     Proteinuria     Sickle cell trait     Strabismus     TIA (transient ischemic attack)     Trouble in sleeping     Type 2 diabetes mellitus with ophthalmic manifestations     Type 2 diabetes with stage 3 chronic kidney disease GFR 30-59     Type II or unspecified type diabetes mellitus with renal manifestations, uncontrolled(250.42)     Uncontrolled type 2 diabetes mellitus with peripheral circulatory disorder 2019    Urge incontinence 2016    Urge incontinence     Venous stasis ulcer     bilateral lower legs    Vitamin D deficiency disease      Past Surgical History:   Procedure Laterality Date    AV FISTULA PLACEMENT Left 2019    Procedure: CREATION, AV FISTULA, LEFT UPPER EXTREMITY;  Surgeon: Keron Perez MD;  Location: [facility];  Service: Vascular;  Laterality: Left;    BREAST BIOPSY      breast reduction Bilateral age 31    BREAST SURGERY      CATARACT EXTRACTION Bilateral     cataracts Bilateral      SECTION, LOW TRANSVERSE      x1    CHOLECYSTECTOMY      COLONOSCOPY N/A 2022    Procedure: COLONOSCOPY;  Surgeon: Mahesh Huang MD;  Location: [facility] (2ND FLR);  Service: Endoscopy;  Laterality: N/A;  fully vaccinated-sm.  RAPID COVID  +cologuard needing ASAP  Dialysis pt T,TH Sat and left UA access  2nd floor - cardiac/dialysis/SOB / LABS / prep ins. emailed - ERW    EYE SURGERY  2014, 2014    vitrectomy    EYE SURGERY Right 2016    FISTULOGRAM Left 2020    Procedure: Fistulogram, left upper extremity, with branch ligation;  Surgeon: Keron Perez MD;  [address]  Service: Vascular;  Laterality: Left;  Time 1.1 Minute  42.10 mGy    FISTULOGRAM Left 2020    Procedure: Fistulogram, left upper extremity, transradial access with possible intervention;  Surgeon:  Keron Perez MD;  [facility];  Service: Vascular;  Laterality: Left;    FISTULOGRAM Left 08/19/2020    Procedure: Fistulogram, left upper extremity, possible intervention;  Surgeon: Keron Perez MD;  Location: [facility];  Service: Vascular;  Laterality: Left;  930 AM START  RN PRE OP  ---COVID NEGATIVE ON  8-. CA    FISTULOGRAM Left 01/21/2022    Procedure: Fistulogram, transradial access;  Surgeon: Keron Perez MD;  [facility];  Service: Vascular;  Laterality: Left;  mgy-40.16  gycm-8.3905  contrast-34cc  time-12.4min    HYSTERECTOMY  1986    TAHBSO (patient is unsure if ovaries removed)    OOPHORECTOMY      PERCUTANEOUS TRANSLUMINAL ANGIOPLASTY OF ARTERIOVENOUS FISTULA Left 07/21/2020    Procedure: PTA, AV FISTULA;  Surgeon: Keron Perez MD;  [facility];  Service: Vascular;  Laterality: Left;  15.9 minutes of fluro  41.12  mGy  7.9060 Gy cm2  32ml  contrast    PHLEBOGRAPHY Left 07/21/2020    Procedure: CENTRAL VENOGRAM;  Surgeon: Keron Perez MD;  [facility];  Service: Vascular;  Laterality: Left;    REFRACTIVE SURGERY      TOTAL REDUCTION MAMMOPLASTY      approx 10 yrs ago    VENOPLASTY  01/21/2022    Procedure: ANGIOPLASTY, VEIN;  Surgeon: Keron Perez MD;  [facility];  Service: Vascular;;     Family History   Problem Relation Name Age of Onset    Stroke Mother      Diabetes Mother      Hypertension Mother      Cataracts Mother      Leukemia Father      Cataracts Father      Ovarian cancer Sister Hiram 35    Achondroplasia Sister Hannah     HIV Brother Paul     No Known Problems Daughter x2     No Known Problems Son x3     Breast cancer Maternal Aunt  65    Parkinsonism Maternal Aunt      Esophageal cancer Maternal Uncle          smoker    No Known Problems Paternal Aunt      Cataracts Paternal Uncle      Cataracts Maternal Grandmother      Cataracts Maternal Grandfather       Diabetes Paternal Grandmother      Cataracts Paternal Grandmother      No Known Problems Paternal Grandfather      No Known Problems Other      Amblyopia Neg Hx      Blindness Neg Hx      Glaucoma Neg Hx      Macular degeneration Neg Hx      Retinal detachment Neg Hx      Strabismus Neg Hx      Thyroid disease Neg Hx      Colon cancer Neg Hx      Cancer Neg Hx       Social History     Socioeconomic History    Marital status: Single    Number of children: 5   Occupational History    Occupation:      Employer: [employer]     Comment: part-time   Tobacco Use    Smoking status: Never     Passive exposure: Never    Smokeless tobacco: Never   Substance and Sexual Activity    Alcohol use: No    Drug use: No     Social Drivers of Health     Financial Resource Strain: Medium Risk (10/21/2024)    Overall Financial Resource Strain (CARDIA)     Difficulty of Paying Living Expenses: Somewhat hard   Food Insecurity: Food Insecurity Present (10/21/2024)    Hunger Vital Sign     Worried About Running Out of Food in the Last Year: Sometimes true     Ran Out of Food in the Last Year: Sometimes true   Transportation Needs: No Transportation Needs (10/21/2024)    PRAPARE - Transportation     Lack of Transportation (Medical): No     Lack of Transportation (Non-Medical): No   Physical Activity: Inactive (10/21/2024)    Exercise Vital Sign     Days of Exercise per Week: 0 days     Minutes of Exercise per Session: 0 min   Stress: No Stress Concern Present (10/21/2024)    [employer] [place] [employer] - Occupational Stress Questionnaire     Feeling of Stress : Only a little   Housing Stability: High Risk (10/21/2024)    Housing Stability Vital Sign     Unable to Pay for Housing in the Last Year: Yes     Homeless in the Last Year: No       Current Outpatient Medications:     ACCU-CHEK SOFT DEV LANCETS Kit, , Disp: , Rfl:     atorvastatin (LIPITOR) 80 MG tablet, Take 1 tablet (80 mg total) by mouth every  evening., Disp: 90 tablet, Rfl: 3    blood sugar diagnostic Strp, One test strip use 2 times a day to check blood glucose,  ICD-10: E11.9, compatible with insurance/glucometer, Disp: 100 each, Rfl: 11    blood-glucose meter kit, One glucometer, use to check blood glucose.   ICD-10: E11.9. Dispense machine covered by insurance, Disp: 1 each, Rfl: 0    cinacalcet (SENSIPAR) 30 MG Tab, , Disp: , Rfl:     docusate sodium (COLACE) 100 MG capsule, Take 200 mg by mouth once daily. , Disp: , Rfl:     doxercalciferoL (HECTOROL) 4 mcg/2 mL injection, Inject 4.5 mcg into the vein 3 (three) times a week. At dialysis unit, Disp: , Rfl:     dulaglutide (TRULICITY) 1.5 mg/0.5 mL pen injector, Inject 1.5 mg into the skin every 7 days., Disp: 12 pen , Rfl: 3    epoetin arnel (EPOGEN INJ), Inject 1,000 Units as directed every 7 days. At the dialysis unit, Disp: , Rfl:     fluticasone propionate (FLONASE) 50 mcg/actuation nasal spray, 1 spray (50 mcg total) by Each Nostril route once daily., Disp: 48 g, Rfl: 5    lancets Misc, One lancets use 2 times a day to check blood glucose, ICD-10: E11.9, Disp: 100 each, Rfl: 11    lancing device Misc, One device, used to check blood glucose, ICD-10: E11.9, Disp: 1 each, Rfl: 0    levothyroxine (SYNTHROID) 50 MCG tablet, TAKE 1 TABLET BEFORE BREAKFAST, Disp: 90 tablet, Rfl: 3    LIDOcaine-prilocaine (EMLA) cream, Apply topically as needed., Disp: 30 g, Rfl: 11    mupirocin (BACTROBAN) 2 % ointment, Apply topically 3 (three) times daily., Disp: 22 g, Rfl: 0    OLENA-NABIL RX 1- mg-mg-mcg Tab, Take 1 tablet by mouth once daily., Disp: , Rfl:     aspirin (ECOTRIN) 81 MG EC tablet, Take 1 tablet (81 mg total) by mouth once daily., Disp: 90 tablet, Rfl: 3    midodrine (PROAMATINE) 5 MG Tab, Take 1 tablet (5 mg total) by mouth 3 (three) times daily., Disp: 90 tablet, Rfl: 3    sevelamer carbonate (RENVELA) 800 mg Tab, Take 3 tablets (2,400 mg total) by mouth 3 (three) times daily with meals., Disp:  "270 tablet, Rfl: 11    REVIEW OF SYSTEMS:  General: No fevers or chills; ENT: No sore throat; Allergy and Immunology: no persistent infections; Hematological and Lymphatic: No history of bleeding or easy bruising; Endocrine: negative; Respiratory: no cough, shortness of breath, or wheezing; Cardiovascular: no chest pain or dyspnea on exertion; Gastrointestinal: no abdominal pain/back, change in bowel habits, or bloody stools; Genito-Urinary: no dysuria, trouble voiding, or hematuria; Musculoskeletal: negative; Neurological: no TIA or stroke symptoms; Psychiatric: no nervousness, anxiety or depression.    PHYSICAL EXAM:      Pulse: 82         General appearance:  Alert, well-appearing, and in no distress.  Oriented to person, place, and time                    Neurological: Normal speech, no focal findings noted; CN II - XII grossly intact. All extremities with sensation to light touch.            Musculoskeletal: Digits/nail without cyanosis/clubbing.  Strength 5/5 all extremities.                    Neck: Supple, no significant adenopathy, no carotid bruit can be auscultated                  Chest:  Clear to auscultation, no wheezes, rales or rhonchi, symmetric air entry. No use of accessory muscles               Cardiac: Normal rate and regular rhythm, S1 and S2 normal            Abdomen: Soft, nontender, nondistended, no masses or organomegaly, no hernia     No rebound tenderness noted; bowel sounds normal     Pulsatile aortic mass is non palpable.     No groin adenopathy      Extremities:      2+ radial pulse bilaterally, LUE AVF +thrill, inc well healed, 2 PSA with dry scabs, no ulcerations     No BUE edema, Negative Manuel's test BUE    Skin: No tissue loss, 2+ LLE edema , 1+ RLE edema     VCSS 5     CEAP 3/4a    LAB RESULTS:  No results found for: "CBC"  Lab Results   Component Value Date    LABPROT 10.3 06/07/2021    INR 1.0 06/07/2021     Lab Results   Component Value Date     08/09/2024    K 4.9 " 08/09/2024    CL 99 08/09/2024    CO2 29 08/09/2024     (H) 08/09/2024    BUN 42 (H) 08/09/2024    CREATININE 8.85 (H) 11/12/2024    CALCIUM 9.2 08/09/2024    ANIONGAP 12 08/09/2024    EGFRNONAA 3.6 (A) 05/07/2022     Lab Results   Component Value Date    WBC 6.54 10/01/2023    RBC 3.84 (L) 10/01/2023    HGB 10.5 (L) 10/29/2024    HCT 33 (L) 07/18/2024    MCV 85 10/01/2023    MCH 27.1 10/01/2023    MCHC 31.8 (L) 10/01/2023    RDW 13.8 10/01/2023     10/01/2023    MPV 11.4 10/01/2023    GRAN 4.6 10/01/2023    GRAN 70.1 10/01/2023    LYMPH 0.7 (L) 10/01/2023    LYMPH 11.2 (L) 10/01/2023    MONO 1.1 (H) 10/01/2023    MONO 16.4 (H) 10/01/2023    EOS 0.1 10/01/2023    BASO 0.02 10/01/2023    EOSINOPHIL 1.4 10/01/2023    BASOPHIL 0.3 10/01/2023    DIFFMETHOD Automated 10/01/2023     .  Lab Results   Component Value Date    HGBA1C 5.7 (H) 08/09/2024       IMAGING:  All pertinent imaging has been reviewed and interpreted independently.    Vein mapping 9/2019:  Adequate R superior basilic vein and axillary vein ; Adequate L basilic vein superior and inferior, adequate L axillary     1/27/20 Left upper extremity dialysis ultrasound shows no hemodynamically significant stenosis.  Flow is 1083 ml/min.  Depth and diameter are appropriate.    3/23/20:  Left upper extremity dialysis ultrasound shows anastomotic and proximal fistula hemodynamically significant stenosis.  Flow is 955 ml/min.      7/2020: Left upper extremity dialysis ultrasound shows proximal fistula hemodynamically significant stenosis.  Flow is 1257 ml/min.      8/2020: Left upper extremity dialysis ultrasound shows proximal hemodynamically significant stenosis.  Flow is 1999 ml/min.      8/31/20: Left upper extremity dialysis ultrasound shows proximal fistula hemodynamically significant stenosis.  Flow is 2209 ml/min.      10/2020:  Prox stenosis, flow > 3000 ml/min    1/2021:  Proximal stenosis, flow 4414 ml/min    4/2021:  HD US reviewed without  significant stenosis, adequate flow.    8/2021:HD US reviewed without significant stenosis, adequate flow.    3/2022:  Left upper extremity dialysis ultrasound shows approx 50% anastomotic and prox fistula stenosis withou hemodynamically significant stenosis.  Flow is 3774 ml/min.      6/2022:  No significant stenosis     04/2023: No significant stenosis. Flow 4,086 ml/min    10/2023:  Left upper extremity dialysis ultrasound shows approx 50% anastomotic stenosis.  Flow is 5000 ml/min.      12/2023:  Color flow evaluation of the right lower extremity demonstrates no evidence of venous thrombosis in the deep or superficial veins, and no reflux.  Color flow evaluation of the left lower extremity demonstrates no evidence of venous thrombosis in the deep or superficial veins, and no reflux.    Arterial US 03/2024:  Conclusion    Right lower extremity arterial ultrasound shows no hemodynamically significant stenosis.    Left lower extremity arterial ultrasound shows no hemodynamically significant stenosis.      PAM 03/2024:      HD US 05/22/24: Flow 2529 ml/min    9/2024: Left upper extremity dialysis ultrasound shows 50-69% stenosis of the anastomosis and proximal fistula.  Flow is 7536 ml/min.      12/2024:      IMP/PLAN:  79 y.o. female with   Patient Active Problem List   Diagnosis    Severe obesity (BMI 35.0-39.9) with comorbidity    Sickle cell trait    Hyperlipidemia LDL goal <100    HUMBERTO on CPAP    Hypothyroidism (acquired)    Hx-TIA (transient ischemic attack)    Vitamin D deficiency disease    Pulmonary hypertension    Type 2 diabetes mellitus with ESRD (end-stage renal disease)    Secondary renal hyperparathyroidism    Incomplete bladder emptying    Postmenopausal atrophic vaginitis    Rectocele    Mixed incontinence urge and stress    Chronic diastolic heart failure    Tortuous aorta    ESRD on hemodialysis    Anemia of chronic disease    Debility    Chronic respiratory failure    Type 2 diabetes mellitus with  foot ulcer, with long-term current use of insulin    Stable proliferative diabetic retinopathy associated with type 2 diabetes mellitus    Heart failure with preserved ejection fraction    Pseudophakia    Chronic kidney disease-mineral and bone disorder    Age-related osteoporosis without current pathological fracture    H/O: stroke    Walker as ambulation aid    Swelling of lower extremity    Lymphedema of both lower extremities    Impaired functional mobility and activity tolerance    Wound of left leg    Multiple thyroid nodules    Venous ulcer of left lower extremity without varicose veins    being managed by PCP and specialists who is here today for evaluation of long term HD access s/p L RC AV fistula.     -Wound well healed. Ok to use compressions  -s/p L RC AV fistula with proximal fistula measuring 5 mm 1/13/20, adequate flow without issues during HD s/p LUE fistulogram, central venogram, ligation of medial side branch cephalic vein, ligation of lateral side branch cephalic vein 3/2/20 with prox AVF stenosis s/p L proximal fistula angioplasty 7/21/20 with persistent flow issues s/p proximal angioplasty 8/19/20 with scoring balloon/DCB with improvement in stenosis/flow and asymptomatic anastomotic stenosis with no further issues with HD. Patient undergoing lymphedema therapy developed LLE blister to her shin with associated edema- wound well healed. Vascular studies with adequate blood flow for wound healing and wound healed.  -Cont renal diet  -Rec cont lymphedema clinic and pumps  -Compression and elevation  -RTC in 3 mo for further evaluation of AVF with HD US.    I spent 11 minutes evaluating this patient and greater than 50% of the time was spent counseling, coordinator care and discussing the plan of care.  All questions were answered and patient stated understanding with agreement with the above treatment plan.    Keron Perez M.D., R.P.V.I. [facility]

## 2024-12-19 ENCOUNTER — TELEPHONE (OUTPATIENT)
Dept: FAMILY MEDICINE | Facility: CLINIC | Age: 79
End: 2024-12-19
Payer: MEDICARE

## 2024-12-19 DIAGNOSIS — Z86.73 H/O: STROKE: Primary | ICD-10-CM

## 2024-12-19 DIAGNOSIS — R53.81 DEBILITY: ICD-10-CM

## 2024-12-19 NOTE — TELEPHONE ENCOUNTER
----- Message from Med Assistant Garcia sent at 12/18/2024  9:16 AM CST -----  Type: Patient Call Back    Who called: Self    What is the request in detail: pt. Is requesting a order for a replacement rollator walker.. states the one she has is broken ..     Can the clinic reply by [facility]?NO    Would the patient rather a call back or a response via [facility]? Yes, call     Best call back number: [phone] (home)

## 2025-01-10 ENCOUNTER — TELEPHONE (OUTPATIENT)
Dept: FAMILY MEDICINE | Facility: CLINIC | Age: 80
End: 2025-01-10
Payer: MEDICARE

## 2025-01-10 ENCOUNTER — LAB VISIT (OUTPATIENT)
Dept: LAB | Facility: HOSPITAL | Age: 80
End: 2025-01-10
Attending: HOSPITALIST
Payer: MEDICARE

## 2025-01-10 DIAGNOSIS — I50.32 CHRONIC DIASTOLIC HEART FAILURE: Chronic | ICD-10-CM

## 2025-01-10 DIAGNOSIS — Z99.2 ESRD ON HEMODIALYSIS: ICD-10-CM

## 2025-01-10 DIAGNOSIS — N18.6 TYPE 2 DIABETES MELLITUS WITH ESRD (END-STAGE RENAL DISEASE): ICD-10-CM

## 2025-01-10 DIAGNOSIS — I27.20 PULMONARY HYPERTENSION: ICD-10-CM

## 2025-01-10 DIAGNOSIS — E11.22 TYPE 2 DIABETES MELLITUS WITH ESRD (END-STAGE RENAL DISEASE): ICD-10-CM

## 2025-01-10 DIAGNOSIS — E11.22 TYPE 2 DIABETES MELLITUS WITH ESRD (END-STAGE RENAL DISEASE): Primary | ICD-10-CM

## 2025-01-10 DIAGNOSIS — N18.6 ESRD ON HEMODIALYSIS: ICD-10-CM

## 2025-01-10 DIAGNOSIS — N18.6 TYPE 2 DIABETES MELLITUS WITH ESRD (END-STAGE RENAL DISEASE): Primary | ICD-10-CM

## 2025-01-10 LAB
ANION GAP SERPL CALC-SCNC: 13 MMOL/L (ref 8–16)
BUN SERPL-MCNC: 35 MG/DL (ref 8–23)
CALCIUM SERPL-MCNC: 9.5 MG/DL (ref 8.7–10.5)
CHLORIDE SERPL-SCNC: 99 MMOL/L (ref 95–110)
CO2 SERPL-SCNC: 26 MMOL/L (ref 23–29)
CREAT SERPL-MCNC: 6.5 MG/DL (ref 0.5–1.4)
EST. GFR  (NO RACE VARIABLE): 6.1 ML/MIN/1.73 M^2
ESTIMATED AVG GLUCOSE: 114 MG/DL (ref 68–131)
GLUCOSE SERPL-MCNC: 80 MG/DL (ref 70–110)
HBA1C MFR BLD: 5.6 % (ref 4–5.6)
POTASSIUM SERPL-SCNC: 4.8 MMOL/L (ref 3.5–5.1)
SODIUM SERPL-SCNC: 138 MMOL/L (ref 136–145)

## 2025-01-10 PROCEDURE — 36415 COLL VENOUS BLD VENIPUNCTURE: CPT | Mod: HCNC,PO | Performed by: HOSPITALIST

## 2025-01-10 PROCEDURE — 82985 ASSAY OF GLYCATED PROTEIN: CPT | Mod: HCNC | Performed by: HOSPITALIST

## 2025-01-10 PROCEDURE — 83036 HEMOGLOBIN GLYCOSYLATED A1C: CPT | Mod: HCNC | Performed by: HOSPITALIST

## 2025-01-10 PROCEDURE — 80048 BASIC METABOLIC PNL TOTAL CA: CPT | Mod: HCNC | Performed by: HOSPITALIST

## 2025-01-10 RX ORDER — ACETAMINOPHEN 500 MG
1 TABLET ORAL SEE ADMIN INSTRUCTIONS
Qty: 1 EACH | Refills: 0 | Status: SHIPPED | OUTPATIENT
Start: 2025-01-10

## 2025-01-10 NOTE — TELEPHONE ENCOUNTER
----- Message from En sent at 1/9/2025  4:01 PM CST -----  Regarding: Ninoska Zamora Nurse  Type: Patient Call Back     What is the request in detail: Pt doesn't have a BP monitor for home and nurse is requesting an order be sent to [facility] and [facility]ine will cover the cost due to it being medically necessary.     Can the clinic reply by [facility]? No     Would the patient rather a call back or a response via [facility]? Call back    Best call back number:  [phone]      Additional Information:    Thank you.

## 2025-01-13 LAB — FRUCTOSAMINE SERPL-SCNC: 338 UMOL /L

## 2025-01-17 ENCOUNTER — OFFICE VISIT (OUTPATIENT)
Dept: FAMILY MEDICINE | Facility: CLINIC | Age: 80
End: 2025-01-17
Payer: MEDICARE

## 2025-01-17 ENCOUNTER — OFFICE VISIT (OUTPATIENT)
Dept: ENDOCRINOLOGY | Facility: CLINIC | Age: 80
End: 2025-01-17
Payer: MEDICARE

## 2025-01-17 VITALS
BODY MASS INDEX: 36.49 KG/M2 | WEIGHT: 206 LBS | SYSTOLIC BLOOD PRESSURE: 130 MMHG | DIASTOLIC BLOOD PRESSURE: 68 MMHG | HEART RATE: 92 BPM

## 2025-01-17 VITALS
HEIGHT: 63 IN | HEART RATE: 86 BPM | BODY MASS INDEX: 36.54 KG/M2 | OXYGEN SATURATION: 95 % | SYSTOLIC BLOOD PRESSURE: 138 MMHG | TEMPERATURE: 99 F | WEIGHT: 206.25 LBS | DIASTOLIC BLOOD PRESSURE: 84 MMHG

## 2025-01-17 DIAGNOSIS — I87.2 VENOUS ULCER OF LEFT LOWER EXTREMITY WITHOUT VARICOSE VEINS: ICD-10-CM

## 2025-01-17 DIAGNOSIS — E83.9 CHRONIC KIDNEY DISEASE-MINERAL AND BONE DISORDER: ICD-10-CM

## 2025-01-17 DIAGNOSIS — E66.01 SEVERE OBESITY (BMI 35.0-39.9) WITH COMORBIDITY: ICD-10-CM

## 2025-01-17 DIAGNOSIS — L97.929 VENOUS ULCER OF LEFT LOWER EXTREMITY WITHOUT VARICOSE VEINS: ICD-10-CM

## 2025-01-17 DIAGNOSIS — Z99.2 ESRD ON HEMODIALYSIS: ICD-10-CM

## 2025-01-17 DIAGNOSIS — N18.6 ESRD ON HEMODIALYSIS: ICD-10-CM

## 2025-01-17 DIAGNOSIS — E55.9 VITAMIN D DEFICIENCY DISEASE: ICD-10-CM

## 2025-01-17 DIAGNOSIS — I50.32 CHRONIC DIASTOLIC HEART FAILURE: ICD-10-CM

## 2025-01-17 DIAGNOSIS — N18.9 CHRONIC KIDNEY DISEASE-MINERAL AND BONE DISORDER: ICD-10-CM

## 2025-01-17 DIAGNOSIS — J96.10 CHRONIC RESPIRATORY FAILURE, UNSPECIFIED WHETHER WITH HYPOXIA OR HYPERCAPNIA: ICD-10-CM

## 2025-01-17 DIAGNOSIS — E11.3553 STABLE PROLIFERATIVE DIABETIC RETINOPATHY OF BOTH EYES ASSOCIATED WITH TYPE 2 DIABETES MELLITUS: ICD-10-CM

## 2025-01-17 DIAGNOSIS — E03.9 HYPOTHYROIDISM (ACQUIRED): ICD-10-CM

## 2025-01-17 DIAGNOSIS — M89.9 CHRONIC KIDNEY DISEASE-MINERAL AND BONE DISORDER: ICD-10-CM

## 2025-01-17 DIAGNOSIS — N18.6 TYPE 2 DIABETES MELLITUS WITH ESRD (END-STAGE RENAL DISEASE): Primary | ICD-10-CM

## 2025-01-17 DIAGNOSIS — E11.22 TYPE 2 DIABETES MELLITUS WITH ESRD (END-STAGE RENAL DISEASE): Primary | ICD-10-CM

## 2025-01-17 DIAGNOSIS — E04.2 MULTIPLE THYROID NODULES: ICD-10-CM

## 2025-01-17 PROCEDURE — 3075F SYST BP GE 130 - 139MM HG: CPT | Mod: HCNC,CPTII,S$GLB,

## 2025-01-17 PROCEDURE — 99999 PR PBB SHADOW E&M-EST. PATIENT-LVL IV: CPT | Mod: PBBFAC,HCNC,,

## 2025-01-17 PROCEDURE — 3079F DIAST BP 80-89 MM HG: CPT | Mod: HCNC,CPTII,S$GLB,

## 2025-01-17 PROCEDURE — 99214 OFFICE O/P EST MOD 30 MIN: CPT | Mod: HCNC,S$GLB,, | Performed by: HOSPITALIST

## 2025-01-17 PROCEDURE — 1126F AMNT PAIN NOTED NONE PRSNT: CPT | Mod: HCNC,CPTII,S$GLB,

## 2025-01-17 PROCEDURE — 1159F MED LIST DOCD IN RCRD: CPT | Mod: HCNC,CPTII,S$GLB, | Performed by: HOSPITALIST

## 2025-01-17 PROCEDURE — 1160F RVW MEDS BY RX/DR IN RCRD: CPT | Mod: HCNC,CPTII,S$GLB, | Performed by: HOSPITALIST

## 2025-01-17 PROCEDURE — 1101F PT FALLS ASSESS-DOCD LE1/YR: CPT | Mod: HCNC,CPTII,S$GLB,

## 2025-01-17 PROCEDURE — 1101F PT FALLS ASSESS-DOCD LE1/YR: CPT | Mod: HCNC,CPTII,S$GLB, | Performed by: HOSPITALIST

## 2025-01-17 PROCEDURE — 3288F FALL RISK ASSESSMENT DOCD: CPT | Mod: HCNC,CPTII,S$GLB,

## 2025-01-17 PROCEDURE — G2211 COMPLEX E/M VISIT ADD ON: HCPCS | Mod: HCNC,S$GLB,,

## 2025-01-17 PROCEDURE — 3288F FALL RISK ASSESSMENT DOCD: CPT | Mod: HCNC,CPTII,S$GLB, | Performed by: HOSPITALIST

## 2025-01-17 PROCEDURE — 3078F DIAST BP <80 MM HG: CPT | Mod: HCNC,CPTII,S$GLB, | Performed by: HOSPITALIST

## 2025-01-17 PROCEDURE — 99214 OFFICE O/P EST MOD 30 MIN: CPT | Mod: HCNC,S$GLB,,

## 2025-01-17 PROCEDURE — G2211 COMPLEX E/M VISIT ADD ON: HCPCS | Mod: HCNC,S$GLB,, | Performed by: HOSPITALIST

## 2025-01-17 PROCEDURE — 99999 PR PBB SHADOW E&M-EST. PATIENT-LVL IV: CPT | Mod: PBBFAC,HCNC,, | Performed by: HOSPITALIST

## 2025-01-17 PROCEDURE — 3075F SYST BP GE 130 - 139MM HG: CPT | Mod: HCNC,CPTII,S$GLB, | Performed by: HOSPITALIST

## 2025-01-17 RX ORDER — LEVOTHYROXINE SODIUM 50 UG/1
TABLET ORAL
Qty: 90 TABLET | Refills: 3 | Status: SHIPPED | OUTPATIENT
Start: 2025-01-17

## 2025-01-17 NOTE — ASSESSMENT & PLAN NOTE
- at [facility]; Dr. Barrientos  - Tuesday, Thursday and Saturday  - has AV graft in left UE

## 2025-01-17 NOTE — ASSESSMENT & PLAN NOTE
- Denies compressive symptoms.   - Overall patient not interested in getting biopsy given stable size  - US thyroid RIGHT isthmus, 1.4 cm with small microcalcifications  - Check thyroid ultrasound 2023 in 2024, stable in size.    - patient does not want FNA.  As discuss 2024

## 2025-01-17 NOTE — PROGRESS NOTES
HPI     Chief Complaint:  Chief Complaint   Patient presents with    Diabetes       Erica Leblanc is a 79 y.o. female with multiple medical diagnoses as listed in the medical history and problem list that presents for   Chief Complaint   Patient presents with    Diabetes    .     Patient is not known to me with her last appointment in this department on Visit date not found.     HPI  Pt presents for DM follow up.  Reports weakness and fatigue after dialysis.  Frustrated with DM shoes process, will defer form completion to PCP.  Most recent A1C stable, followed by endocrinology.    Assessment & Plan     Problem List Items Addressed This Visit       Type 2 diabetes mellitus with ESRD (end-stage renal disease) - Primary  Stable. Followed by endocrinology. The current medical regimen is effective;  continue present plan and medications.    Hemoglobin A1C   Date Value Ref Range Status   01/10/2025 5.6 4.0 - 5.6 % Final     Comment:     ADA Screening Guidelines:  5.7-6.4%  Consistent with prediabetes  >or=6.5%  Consistent with diabetes    High levels of fetal hemoglobin interfere with the HbA1C  assay. Heterozygous hemoglobin variants (HbS, HgC, etc)do  not significantly interfere with this assay.   However, presence of multiple variants may affect accuracy.     08/09/2024 5.7 (H) 4.0 - 5.6 % Final     Comment:     ADA Screening Guidelines:  5.7-6.4%  Consistent with prediabetes  >or=6.5%  Consistent with diabetes    High levels of fetal hemoglobin interfere with the HbA1C  assay. Heterozygous hemoglobin variants (HbS, HgC, etc)do  not significantly interfere with this assay.   However, presence of multiple variants may affect accuracy.     03/08/2024 6.1 (H) 4.0 - 5.6 % Final     Comment:     ADA Screening Guidelines:  5.7-6.4%  Consistent with prediabetes  >or=6.5%  Consistent with diabetes    High levels of fetal hemoglobin interfere with the HbA1C  assay. Heterozygous hemoglobin variants (HbS, HgC, etc)do  not  significantly interfere with this assay.   However, presence of multiple variants may affect accuracy.     09/14/2023 5.5 % Final   09/13/2022 6.3 (H) 0.0 - 5.6 % Final     Comment:     [facility]   06/14/2022 6.6 (H) 0.0 - 5.6 % Final     Comment:     [facility]         Chronic diastolic heart failure (Chronic)  Stable with no acute symptoms. Followed by cardiology. The current medical regimen is effective;  continue present plan and medications.      ESRD on hemodialysis  Some weakness and fatigue after completing dialysis. Followed by nephrology.    Overview     - at [facility]; Dr. Barrientos  - Tuesday, Thursday and Saturday  - has AV graft in left UE         Chronic respiratory failure  Stable with no acute symptoms. The current medical regimen is effective;  continue present plan and medications.    Stable proliferative diabetic retinopathy associated with type 2 diabetes mellitus  Stable. Followed by optometry. The current medical regimen is effective;  continue present plan and medications.      Venous ulcer of left lower extremity without varicose veins  Stable. Followed by podiatry. The current medical regimen is effective;  continue present plan and medications.           --------------------------------------------      Health Maintenance:  Health Maintenance         Date Due Completion Date    Diabetic Eye Exam 04/22/2025 (Originally 2/5/2025) 2/5/2024    Override on 1/3/2011: Done    Hemoglobin A1c 07/10/2025 1/10/2025    Lipid Panel 08/09/2025 8/9/2024    DEXA Scan 01/26/2026 1/26/2024    TETANUS VACCINE 08/08/2026 8/8/2016            Health maintenance reviewed    Follow Up:  Follow up in about 6 months (around 7/17/2025).    Exam     Review of Systems:  (as noted above)  Review of Systems    Physical Exam:   Physical Exam  Constitutional:       General: She is not in acute distress.     Appearance: Normal appearance. She is obese. She is not ill-appearing or toxic-appearing.   Cardiovascular:     "  Rate and Rhythm: Normal rate.   Pulmonary:      Effort: Pulmonary effort is normal.   Neurological:      Mental Status: She is alert.       Vitals:    01/17/25 1143   BP: 138/84   Pulse: 86   Temp: 98.6 °F (37 °C)   TempSrc: Oral   SpO2: 95%   Weight: 93.6 kg (206 lb 3.9 oz)   Height: 5' 3" (1.6 m)      Body mass index is 36.53 kg/m².        History     Past Medical History:  Past Medical History:   Diagnosis Date    Acute ischemic right PCA stroke 6/6/2021    Acute respiratory failure with hypoxia     Age-related osteoporosis without current pathological fracture 3/8/2021    Anemia of chronic kidney failure, stage 4 (severe) 4/5/2019    Cataracts, bilateral     CHF (congestive heart failure)     CKD (chronic kidney disease) stage 3, GFR 30-59 ml/min     CKD (chronic kidney disease) stage 3, GFR 30-59 ml/min     Controlled type 2 diabetes mellitus with proteinuria or albuminuria     Depression     Diabetes with neurologic complications     Diabetic retinopathy of both eyes     Edema     Glaucoma     History of colonic polyps     Hx-TIA (transient ischemic attack) 11/2008    Hyperlipidemia LDL goal < 100     Hypertension     Hypothyroidism     Major depressive disorder, single episode, mild 2/17/2016    Mixed incontinence urge and stress     Obesity     Obstructive sleep apnea on CPAP     7/19/19:  Home CPAP machine broken, per patient & son    Osteopenia     Proteinuria     Sickle cell trait     Strabismus     TIA (transient ischemic attack)     Trouble in sleeping     Type 2 diabetes mellitus with ophthalmic manifestations     Type 2 diabetes with stage 3 chronic kidney disease GFR 30-59     Type II or unspecified type diabetes mellitus with renal manifestations, uncontrolled(250.42)     Uncontrolled type 2 diabetes mellitus with peripheral circulatory disorder 4/5/2019    Urge incontinence 1/11/2016    Urge incontinence     Venous stasis ulcer     bilateral lower legs    Vitamin D deficiency disease        Past " Surgical History:  Past Surgical History:   Procedure Laterality Date    AV FISTULA PLACEMENT Left 2019    Procedure: CREATION, AV FISTULA, LEFT UPPER EXTREMITY;  Surgeon: Keron Perez MD;  Location: [facility] OR;  Service: Vascular;  Laterality: Left;    BREAST BIOPSY      breast reduction Bilateral age 31    BREAST SURGERY      CATARACT EXTRACTION Bilateral     cataracts Bilateral      SECTION, LOW TRANSVERSE      x1    CHOLECYSTECTOMY      COLONOSCOPY N/A 2022    Procedure: COLONOSCOPY;  Surgeon: Mahesh Huang MD;  Location: [facility] ENDO (2ND FLR);  Service: Endoscopy;  Laterality: N/A;  fully vaccinated-sm.  RAPID COVID  +cologuard needing ASAP  Dialysis pt T,TH Sat and left UA access  2nd floor - cardiac/dialysis/SOB / LABS / prep ins. emailed - [facility]    EYE SURGERY  2014, 2014    vitrectomy    EYE SURGERY Right 2016    FISTULOGRAM Left 2020    Procedure: Fistulogram, left upper extremity, with branch ligation;  Surgeon: Keron Perez MD;  Location: [facility] OR [address] [place] [address];  Service: Vascular;  Laterality: Left;  Time 1.1 Minute  42.10 mGy    FISTULOGRAM Left 2020    Procedure: Fistulogram, left upper extremity, transradial access with possible intervention;  Surgeon: Keron Perez MD;  Location: [facility] OR [address] [place] [address];  Service: Vascular;  Laterality: Left;    FISTULOGRAM Left 2020    Procedure: Fistulogram, left upper extremity, possible intervention;  Surgeon: Keron Perez MD;  Location: [facility];  Service: Vascular;  Laterality: Left;  930 AM START  RN PRE OP  ---COVID NEGATIVE ON  2020. CA    FISTULOGRAM Left 2022    Procedure: Fistulogram, transradial access;  Surgeon: Keron Perez MD;  [facility];  Service: Vascular;  Laterality: Left;  mgy-40.16  gycm-8.3905  contrast-34cc  time-12.4min    HYSTERECTOMY  1986    TAHBSO (patient is unsure if ovaries removed)    OOPHORECTOMY      PERCUTANEOUS TRANSLUMINAL  ANGIOPLASTY OF ARTERIOVENOUS FISTULA Left 07/21/2020    Procedure: PTA, AV FISTULA;  Surgeon: Keron Perez MD;  Location: [facility] OR [address] [place] [address];  Service: Vascular;  Laterality: Left;  15.9 minutes of fluro  41.12  mGy  7.9060 Gy cm2  32ml  contrast    PHLEBOGRAPHY Left 07/21/2020    Procedure: CENTRAL VENOGRAM;  Surgeon: Keron Perez MD;  Location: [facility] OR [address] [place] [address];  Service: Vascular;  Laterality: Left;    REFRACTIVE SURGERY      TOTAL REDUCTION MAMMOPLASTY      approx 10 yrs ago    VENOPLASTY  01/21/2022    Procedure: ANGIOPLASTY, VEIN;  Surgeon: Keron Perez MD;  Location: [facility] OR [address] [place] [address];  Service: Vascular;;       Social History:  Social History     Socioeconomic History    Marital status: Single    Number of children: 5   Occupational History    Occupation:      Employer: [employer]     Comment: part-time   Tobacco Use    Smoking status: Never     Passive exposure: Never    Smokeless tobacco: Never   Substance and Sexual Activity    Alcohol use: No    Drug use: No     Social Drivers of Health     Financial Resource Strain: Medium Risk (10/21/2024)    Overall Financial Resource Strain (CARDIA)     Difficulty of Paying Living Expenses: Somewhat hard   Food Insecurity: Food Insecurity Present (10/21/2024)    Hunger Vital Sign     Worried About Running Out of Food in the Last Year: Sometimes true     Ran Out of Food in the Last Year: Sometimes true   Transportation Needs: No Transportation Needs (10/21/2024)    PRAPARE - Transportation     Lack of Transportation (Medical): No     Lack of Transportation (Non-Medical): No   Physical Activity: Inactive (10/21/2024)    Exercise Vital Sign     Days of Exercise per Week: 0 days     Minutes of Exercise per Session: 0 min   Stress: No Stress Concern Present (10/21/2024)    [employer] - Occupational Stress Questionnaire     Feeling of Stress : Only a little   Housing Stability: High Risk  (10/21/2024)    Housing Stability Vital Sign     Unable to Pay for Housing in the Last Year: Yes     Homeless in the Last Year: No       Family History:  Family History   Problem Relation Name Age of Onset    Stroke Mother      Diabetes Mother      Hypertension Mother      Cataracts Mother      Leukemia Father      Cataracts Father      Ovarian cancer Sister Marilee 35    Achondroplasia Sister Hannah     HIV Brother Paul     No Known Problems Daughter x2     No Known Problems Son x3     Breast cancer Maternal Aunt  65    Parkinsonism Maternal Aunt      Esophageal cancer Maternal Uncle          smoker    No Known Problems Paternal Aunt      Cataracts Paternal Uncle      Cataracts Maternal Grandmother      Cataracts Maternal Grandfather      Diabetes Paternal Grandmother      Cataracts Paternal Grandmother      No Known Problems Paternal Grandfather      No Known Problems Other      Amblyopia Neg Hx      Blindness Neg Hx      Glaucoma Neg Hx      Macular degeneration Neg Hx      Retinal detachment Neg Hx      Strabismus Neg Hx      Thyroid disease Neg Hx      Colon cancer Neg Hx      Cancer Neg Hx         Allergies and Medications: (updated and reviewed)  Review of patient's allergies indicates:   Allergen Reactions    Ace inhibitors Other (See Comments)     Other reaction(s): cough     Current Outpatient Medications   Medication Sig Dispense Refill    ACCU-CHEK SOFT DEV LANCETS Kit       atorvastatin (LIPITOR) 80 MG tablet Take 1 tablet (80 mg total) by mouth every evening. 90 tablet 3    blood pressure monitor (BLOOD PRESSURE KIT) Kit 1 kit by Misc.(Non-Drug; Combo Route) route As instructed (as instructed). 1 each 0    blood sugar diagnostic Strp One test strip use 2 times a day to check blood glucose,  ICD-10: E11.9, compatible with insurance/glucometer 100 each 11    blood-glucose meter kit One glucometer, use to check blood glucose.   ICD-10: E11.9. Dispense machine covered by insurance 1 each 0    cinacalcet  (SENSIPAR) 30 MG Tab       docusate sodium (COLACE) 100 MG capsule Take 200 mg by mouth once daily.       doxercalciferoL (HECTOROL) 4 mcg/2 mL injection Inject 4.5 mcg into the vein 3 (three) times a week. At dialysis unit      epoetin arnel (EPOGEN INJ) Inject 1,000 Units as directed every 7 days. At the dialysis unit      fluticasone propionate (FLONASE) 50 mcg/actuation nasal spray 1 spray (50 mcg total) by Each Nostril route once daily. 48 g 5    lancets Misc One lancets use 2 times a day to check blood glucose, ICD-10: E11.9 100 each 11    lancing device Misc One device, used to check blood glucose, ICD-10: E11.9 1 each 0    LIDOcaine-prilocaine (EMLA) cream Apply topically as needed. 30 g 11    mupirocin (BACTROBAN) 2 % ointment Apply topically 3 (three) times daily. 22 g 0    OLENA-NABIL RX 1- mg-mg-mcg Tab Take 1 tablet by mouth once daily.      aspirin (ECOTRIN) 81 MG EC tablet Take 1 tablet (81 mg total) by mouth once daily. 90 tablet 3    dulaglutide (TRULICITY) 1.5 mg/0.5 mL pen injector Inject 1.5 mg into the skin every 7 days. 12 pen 3    levothyroxine (SYNTHROID) 50 MCG tablet TAKE 1 TABLET BEFORE BREAKFAST 90 tablet 3    midodrine (PROAMATINE) 5 MG Tab Take 1 tablet (5 mg total) by mouth 3 (three) times daily. 90 tablet 3    sevelamer carbonate (RENVELA) 800 mg Tab Take 3 tablets (2,400 mg total) by mouth 3 (three) times daily with meals. 270 tablet 11     No current facility-administered medications for this visit.       Patient Care Team:  Sendy Elaine MD as PCP - General (Internal Medicine)  Brittanie Orellana MD (Cardiology)  Nicole Gray DPM as Consulting Physician (Podiatry)  Surinder Joseph MD as Consulting Physician (Nephrology)  Katia Wong MD as Consulting Physician (Urology)  Coleen Gillis MD as Consulting Physician (Obstetrics)  LILLIANA Coello MD as Consulting Physician (Ophthalmology)  Yolis Rossi MD as Consulting Physician  (Endocrinology)  Gianna Hinson LPN as Care Coordinator  Keron Perez MD as Consulting Physician (Vascular Surgery)  Myla Puente MD as Consulting Physician (Nephrology)  Murali Li MD as Consulting Physician (Cardiology)  Carl Day MD as Consulting Physician (Endocrinology)  Nellie Quiles OD as Consulting Physician (Optometry)  Adonis Linares MD as Consulting Physician (Pulmonary Disease)  Tonia Silva NP (Inactive) as Nurse Practitioner (Vascular Surgery)  Kendra Mccartney as ED Navigator  Naya Soler as ED Navigator         - The patient is given an After Visit Summary that lists all medications with directions, allergies, education, orders placed during this encounter and follow-up instructions.      - I have reviewed the patient's medical information including past medical, family, and social history sections including the medications and allergies.      - We discussed the patient's current medications.     This note was created by combination of typed  and MModal dictation.  Transcription errors may be present.  If there are any questions, please contact me.       Ramonita Weeks NP

## 2025-01-17 NOTE — ASSESSMENT & PLAN NOTE
- Diabetes is at a goal given current A1C goal A1C for patient is 7% in ESRD  - Diabetic supplies/medications: reviewed no need for refills at this time  - Long discussion with patient about dietary modification, portion size control, decreasing carbohydrates intake  - A1c can be falsely low due to anemia/ESRD  - improvement in hyperglycemia now.  Since on Trulicity  - Fructosamine level of 338 (in good control range)    Plan  - continue Trulicity to 1.5 mg once a week injection.   - Patient unable to check glucose often, will continue checking glucose at dialysis center  - Repeat lab work every 5-6 months  - follow up routinely

## 2025-01-17 NOTE — Clinical Note
Hi, Patient received her flu vaccine with [facility] but I'm unable to find it in the chart. Can you please look for record of this so it can be updated in her chart? Thanks

## 2025-01-17 NOTE — PROGRESS NOTES
"Subjective:      Patient ID: Erica Leblanc is a 79 y.o. female presented to Endocrinology clinic on 1/17/2025.    Chief Complaint:  Diabetes, osteoporosis/metabolic bone disease, hypothyroidism    History of Present Illness: Erica Leblanc is a 79 y.o. female with metabolic bone disease with osteoporosis, hypothyroidism and type 2 diabetes  ESRD on HD on TTS for 1 year, her nephrologist is  Dr Myla Puente  Here for follow-up      Interval history:  Patient is here for follow-up.  A1c 0.6% with Fructosamine level of 338 (in good control range)  She reports appetite suppression on Trulicity, getting supply regularly   Dry weight 95kg  Compliance with levothyroxine.  No issue with TSH. No falls  On phosphate binder.  Stable calcium, monitor by her nephrologist during dialysis      1) Type 2 diabetes:  - Diagnosis around 10 years ago  - Patient was on insulin prior to HD, has been off of all therapy since that time.  - Checking glucose 1x a day  - Family hx of diabetes  - currently not on medication  - Statin: Taking, ACE/ARB: Not taking    Current reported meds:               Trulicity 1.5 mg once a week injection>> From [facility] glucose checks: checks 1-2 a day, Logs reviewed  - Hypoglycemia symptoms:  DENIES   Diet/Exercise:   - Eating 3 meals per day   - Weight trend: stable  - Diabetes Related Hospitalization:  No  - Hx of pancreatitis: No, denies  - Family history of diabetes: Yes  - Occupation:  Disabled    Eye exam current (within one year):  Yes, DR: unknown  Reports cuts or ulcers on feet:   Denies, has podiatrist  Statin: Taking, ACE/ARB: Not taking    Diabetes lab work  Lab Results   Component Value Date    HGBA1C 5.6 01/10/2025    HGBA1C 5.7 (H) 08/09/2024    HGBA1C 6.1 (H) 03/08/2024    HGBA1C 5.7 (H) 12/06/2023     No results found for: "CPEPTIDE", "GLUTAMICACID", "ISLETCELLANT"   Lab Results   Component Value Date    FRUCTOSAMINE 338 01/10/2025    FRUCTOSAMINE 330 08/09/2024   At goal >> " "good glucose control    Lab Results   Component Value Date    MICALBCREAT 3,693.6 (H) 03/17/2014     No results found for: "AMAHZQYY36"    Diabetes Management Status: Reviewed this office visit  Screening or Prevention Patient's value Goal Complete/Controlled?   Lipid profile : 08/09/2024 Annually Yes     Dilated retinal exam : 02/05/2024 Annually Yes     Foot exam   Most Recent Foot Exam Date: Not Found Annually Yes          2) Chronic kidney disease mineral and bone disorder  - Seen on recent bone density scan.  Patient denies prior history of any bone disorder.  - Chronic history of ESRD on dialysis  - Longstanding chronic issue.  Poorly-controlled bone disease due to ESRD status.  Continues to have hypocalcemia, hyperphosphatemia.  Elevated PTH.  - Lab work review showing chronic hyperphosphatemia, elevated alkaline phosphatase, hypocalcemia, normal vitamin-D.  - Patient reports Sensipar three times a week at HD  - Patient is on phosphate binder:  Sevelamer 1600mg TIDWM, reports better compliance recently  - Patient has stopped soda consumption   - Patient denies back pain, No falls  - Never had fractures  - Use walker to help with gait and ambulation  - NM Parathyroid scan 2021: No abnormal uptake to suggest parathyroid adenoma.  - Vit D 1000 iu daily      DXA done on 10/2020  Comparison study done on 03/13/2018.  Lumbar spine (L1-L4): BMD is 0.943 g/cm2, T-score is -0.7, and Z-score is 1.0.  Total hip: BMD is 0.680 g/cm2, T-score is -2.1, and Z-score is -1.0.  Femoral neck: BMD is 0.511 g/cm2, T-score is -3.0, and Z-score is -1.5.    Impression:  1. Osteoporosis with likely chronic kidney disease mineral and bone disorder  2. Compared with previous DXA, BMD at the lumbar spine has declined by 13.9%, and the BMD at the total hip has declined by 23.2%.    Height loss (>2 inches)? no  Family hx of Osteoporosis: no    Asymptomatic menopausal state.  She had a hysterectomy at 42 y/o and menopausal symptoms at 46 " "y/o.     Lab work reviewed  Lab Results   Component Value Date     (H) 01/14/2025     (H) 12/03/2024     (H) 11/12/2024    [id number] 73 08/09/2024    CZLOWOPO30YZ 86.4 06/11/2024    SWCGLQEU80HA 49 07/07/2023    CALCIUM 9.5 01/10/2025    CALCIUM 9.2 08/09/2024    CALCIUM 8.7 07/23/2024    PHOS 5.3 (H) 08/09/2024    PHOS TNP 07/23/2024    PHOS 5.0 (H) 03/08/2024    ALKPHOS 72 10/01/2023    ALKPHOS 90 07/07/2023    ALKPHOS 93 05/07/2022    TSH 2.202 03/08/2024    TTGIGA 7 03/01/2021       3) Hypothyroidism  - Chronic  - Restarted in 2021: Levothyroxine 50mcg  - History of thyroid goiter, with thyroid nodule seen on ultrasound     Thyroid lab work  Lab Results   Component Value Date    TSH 2.202 03/08/2024    TSH 2.268 12/06/2023    TSH 1.957 07/07/2023    FREET4 1.14 03/08/2024    FREET4 1.14 12/06/2023    FREET4 1.17 07/07/2023      Antibodies  No results found for: "THYROIDAB", "TSIMMGLBN", "THYROIDSTIMI", "THYROTROPINR"      4) Multiple Thyroid nodule  - denies compressive symptoms.   - discuss with patient previously,  not interested in getting biopsy given stable size  - discussed 2023 and 2024    US thyroid   8/09/2024  The right lobe of the thyroid measures 4.7 x 1.8 x 1.6 cm and demonstrates a 1.3 x 0.8 x 1.5 cm nodule.  Nodule is mixed solid and cystic, hypoechoic, and demonstrates internal echogenic foci.  Nodule is a TR 4 and meets criteria for FNA if not previously performed.    The thyroid isthmus is unremarkable.     The left lobe of the thyroid measures 3.4 x 1.6 x 1.8 cm and demonstrates 3 subcentimeter nodules, the largest measuring 0.9 x 0.8 x 0.7 cm.  This nodule is solid, hypoechoic, not taller than wide, demonstrates smooth margins, and demonstrates a questionable punctate peripheral calcification.  These nodules do not meet criteria for FNA or follow-up.     Thyroid vascularity is within normal limits.     There are no abnormal lymph nodes within the visualized portions of " the neck.     Impression:  Multinodular thyroid, the largest a right 1.5 cm TR 4 nodule which meets criteria for FNA if not previously performed.  Additional nodules do not meet criteria for FNA or follow-up.      07/03/2023  The thyroid is normal in size.  Right lobe of the thyroid measures 4.3 x 1.6 x 1.6 cm.  Left lobe of the thyroid measures 3.2 x 1.7 x 1.5 cm.  Normal thyroid parenchyma.  Nodule RIGHT upper pole 0.3 x 0.3 x 0.2 cm.  Nodule LEFT upper pole 1.3 x 2.2 x 1.1 cm and lower pole 0.9 x 0.7 x 0.7 cm.  Mid to lower pole nodule 0.4 x 0.4 x 0.3 cm. At the level of the isthmus, mixed cystic and solid lesions identified measuring 1.4 x 0.6 x 1.2 cm and 1.0 x 0.9 x 0.4 cm.  1.4 cm RIGHT isthmic nodule demonstrates presence of small microcalcifications.  This nodule meets criteria for TiRads 5 and FNA is recommended.  Cervical lymph nodes demonstrate normal morphology and size.     Impression:  Suspicious nodule RIGHT isthmus, 1.4 cm with small microcalcifications, mildly hypoechoic meets criteria for FNA.  Malignancy not excluded  Additional thyroidal nodules as above.    US thyroid 2021  The thyroid gland is normal in size.  The right thyroid lobe measures 4.1 x 1.9 x 1.8 cm.  The left thyroid lobe measures 4.5 x 1.8 x 1.9 cm.  Thyroid parenchyma is homogeneous, without increased perfusion.  There are 5 measured the small thyroid nodules.  1.5 cm right thyroid lobe/isthmus nodule with TI-RADS category 3, does not qualify for FNA..    Reviewed past surgical, medical, family, social history and updated as appropriate.    Objective:   /68   Pulse 92   Wt 93.4 kg (206 lb)   BMI 36.49 kg/m²     Body mass index is 36.49 kg/m².    Physical Exam  Vitals and nursing note reviewed.   Constitutional:       Appearance: She is well-developed.      Comments: Elderly female using walker to help with ambulation   HENT:      Head: Normocephalic.   Eyes:      General: No scleral icterus.     Conjunctiva/sclera:  Conjunctivae normal.   Neck:      Thyroid: No thyromegaly.   Cardiovascular:      Rate and Rhythm: Normal rate.      Heart sounds: Normal heart sounds.   Pulmonary:      Effort: Pulmonary effort is normal. No respiratory distress.   Abdominal:      Palpations: Abdomen is soft.      Tenderness: There is no abdominal tenderness.   Musculoskeletal:         General: Normal range of motion.      Cervical back: Neck supple.   Skin:     General: Skin is warm.      Findings: No erythema.   Neurological:      Mental Status: She is alert.      Coordination: Coordination normal.   Psychiatric:         Behavior: Behavior normal.       Lab Review:  Lab Results   Component Value Date     (H) 01/14/2025     (H) 12/03/2024     (H) 11/12/2024    [id number] 73 08/09/2024    XHFBQNGB09JV 86.4 06/11/2024    IGSKEKRZ46KP 49 07/07/2023    CALCIUM 9.5 01/10/2025    CALCIUM 9.2 08/09/2024    CALCIUM 8.7 07/23/2024    PHOS 5.3 (H) 08/09/2024    PHOS TNP 07/23/2024    PHOS 5.0 (H) 03/08/2024    ALKPHOS 72 10/01/2023    ALKPHOS 90 07/07/2023    ALKPHOS 93 05/07/2022    TSH 2.202 03/08/2024    TTGIGA 7 03/01/2021         Assessment     1. Type 2 diabetes mellitus with ESRD (end-stage renal disease)  dulaglutide (TRULICITY) 1.5 mg/0.5 mL pen injector    Hemoglobin A1C    Renal Function Panel      2. Hypothyroidism (acquired)  TSH    T4, Free    levothyroxine (SYNTHROID) 50 MCG tablet    Renal Function Panel      3. Severe obesity (BMI 35.0-39.9) with comorbidity        4. Chronic kidney disease-mineral and bone disorder        5. Multiple thyroid nodules        6. ESRD on hemodialysis        7. Vitamin D deficiency disease          Plan     Type 2 diabetes mellitus with ESRD (end-stage renal disease)  - Diabetes is at a goal given current A1C goal A1C for patient is 7% in ESRD  - Diabetic supplies/medications: reviewed no need for refills at this time  - Long discussion with patient about dietary modification, portion  size control, decreasing carbohydrates intake  - A1c can be falsely low due to anemia/ESRD  - improvement in hyperglycemia now.  Since on Trulicity  - Fructosamine level of 338 (in good control range)    Plan  - continue Trulicity to 1.5 mg once a week injection.   - Patient unable to check glucose often, will continue checking glucose at dialysis center  - Repeat lab work every 5-6 months  - follow up routinely    Hypothyroidism (acquired)  - Patient with history of hypothyroidism etiology unclear  - On levothyroxine 50 mcg daily (low-dose)  - TFTs at goal, continue  - check lab work every 6 months    Chronic kidney disease-mineral and bone disorder  - Very difficult case, longstanding issue with worsening osteoporosis and continue elevation in PTH leading to osteoporosis  - Risk factors include ESRD on dialysis, hyperphosphatemia, hypocalcemia  - High risk of falls given knee pain, poor gait, the need to use walker  - Patient is more compliant with phosphate binder, and dietary modification  - On vitamin-D analog at dialysis  - sestamibi scan inconclusive for parathyroid adenoma  - now on Sensipar at HD unit  - threshold for parathyroidectomy is above >800 and failing medical therapy option  - given improvement in PTH, normal calcium, low phosphorus, continue monitoring with lab work    Multiple thyroid nodules  - Denies compressive symptoms.   - Overall patient not interested in getting biopsy given stable size  - US thyroid RIGHT isthmus, 1.4 cm with small microcalcifications  - Check thyroid ultrasound 2023 in 2024, stable in size.    - patient does not want FNA.  As discuss 2024    ESRD on hemodialysis  - at [facility]; Dr. Barrientos  - Tuesday, Thursday and Saturday  - has AV graft in left UE    Vitamin D deficiency disease  - Vitamin-D goal >30  - monitor with Nephrology      Return to clinic in 5-6mo for diabetes management     Visit today included increased complexity associated with the care of the episodic problem  addressed and managing the longitudinal care of the patient due to the serious and/or complex managed problem(s).   Including: Type 2 diabetes, ESRD on dialysis, CKD-MB, obesity, hypothyroidism, vitamin-D deficiency      Carl Day MD  Endocrinology- [facility]   1/17/2025      Disclaimer: This note has been generated using voice-recognition software. There may be typographical errors that have been missed during proof-reading.    Fructosamine range

## 2025-01-17 NOTE — ASSESSMENT & PLAN NOTE
- Patient with history of hypothyroidism etiology unclear  - On levothyroxine 50 mcg daily (low-dose)  - TFTs at goal, continue  - check lab work every 6 months

## 2025-01-31 ENCOUNTER — TELEPHONE (OUTPATIENT)
Dept: FAMILY MEDICINE | Facility: CLINIC | Age: 80
End: 2025-01-31
Payer: MEDICARE

## 2025-01-31 DIAGNOSIS — N18.6 ESRD ON HEMODIALYSIS: ICD-10-CM

## 2025-01-31 DIAGNOSIS — Z99.2 ESRD ON HEMODIALYSIS: ICD-10-CM

## 2025-01-31 DIAGNOSIS — I50.32 CHRONIC DIASTOLIC HEART FAILURE: Chronic | ICD-10-CM

## 2025-01-31 NOTE — TELEPHONE ENCOUNTER
----- Message from Tech Estela sent at 1/31/2025 11:41 AM CST -----  Regarding: Patient call back  .Type: Patient Call Back    Who called:self     What is the request in detail:calling in regards to discuss if paperwork has been signed and faxed off     Can the clinic reply by [facility]?no    Would the patient rather a call back or a response via [facility]? Call     Best call back number:.[phone]      Additional Information:

## 2025-01-31 NOTE — TELEPHONE ENCOUNTER
Spoke with patient. Patient states it was paperwork for her diabetic shoes. Patient states that she came in on 01/17/25 to see NP Ramonita Weeks and left paperwork. Patient requesting status on shoes.

## 2025-01-31 NOTE — TELEPHONE ENCOUNTER
Spoke with patient. Diabetic shoes were faxed over as requested. Patient verbalized understanding. Patient has no other questions or concerns at this time.

## 2025-02-05 ENCOUNTER — TELEPHONE (OUTPATIENT)
Dept: VASCULAR SURGERY | Facility: CLINIC | Age: 80
End: 2025-02-05
Payer: MEDICARE

## 2025-02-05 NOTE — TELEPHONE ENCOUNTER
Called pt.regarding rescheduling her vascular appt. on 3/3/2025 due to provider unavailable. Appt. rescheduled and ultrasound needed prior to appt.rescheduled. Pt.verbalized understanding and if has any questions or issues prior she is to call our office.

## 2025-02-13 ENCOUNTER — TELEPHONE (OUTPATIENT)
Dept: FAMILY MEDICINE | Facility: CLINIC | Age: 80
End: 2025-02-13
Payer: MEDICARE

## 2025-02-13 NOTE — TELEPHONE ENCOUNTER
----- Message from Bridgette sent at 2/12/2025  2:58 PM CST -----  .Type: Patient Call Back    Who called: Self     What is the request in detail: Calling about some documents for diabetic shoes. Ask that the nurse give her a call     Can the clinic reply by [facility]? No     Would the patient rather a call back or a response via [facility]? Call Back     Best call back number: .[phone] ([place])       Additional Information:

## 2025-02-13 NOTE — TELEPHONE ENCOUNTER
Spoke with patient. Patient states that she has contact the diabetic shoes people and they have not received any paperwork.     Paperwork faxed over to requested number.

## 2025-02-13 NOTE — TELEPHONE ENCOUNTER
Attempted to contact patient. Left a voice mail to call clinic back. Paperwork was fax over multiple times regarding diabetic shoes.

## 2025-02-13 NOTE — TELEPHONE ENCOUNTER
----- Message from Med Assistant Radha sent at 2/13/2025  8:51 AM CST -----  Type:  Patient Returning Call    Who Called: Self    Who Left Message for Patient: Tom Clarkeuyen    Does the patient know what this is regarding?:NO    Would the patient rather a call back or a response via [facility]? Yes, call     Best Call Back Number: [phone] (home)

## 2025-02-26 ENCOUNTER — TELEPHONE (OUTPATIENT)
Dept: FAMILY MEDICINE | Facility: CLINIC | Age: 80
End: 2025-02-26
Payer: MEDICARE

## 2025-02-26 NOTE — TELEPHONE ENCOUNTER
----- Message from Carlos sent at 2/24/2025  1:17 PM CST -----  Regarding: self  Type: Patient Call BackWho called:selfWhat is the request in detail:patient is requesting to speak to nurse about her diabetic supplies. Can the clinic reply by [facility]? NoWould the patient rather a call back or a response via [facility]? Call [facility] call back number:[phone] Information:Thank you.

## 2025-02-26 NOTE — TELEPHONE ENCOUNTER
Spoke with Walk-in Appointment Scheduler Eboni. Eboni advised that Gini is currently in a shoe fitting right now. Left a voicemail to return call back to clinic.     Paperwork faxed over again to number listed.

## 2025-03-12 ENCOUNTER — OFFICE VISIT (OUTPATIENT)
Dept: PODIATRY | Facility: CLINIC | Age: 80
End: 2025-03-12
Payer: MEDICARE

## 2025-03-12 VITALS
WEIGHT: 205.94 LBS | SYSTOLIC BLOOD PRESSURE: 137 MMHG | HEIGHT: 63 IN | BODY MASS INDEX: 36.49 KG/M2 | DIASTOLIC BLOOD PRESSURE: 88 MMHG

## 2025-03-12 DIAGNOSIS — E11.22 TYPE 2 DIABETES MELLITUS WITH ESRD (END-STAGE RENAL DISEASE): Primary | ICD-10-CM

## 2025-03-12 DIAGNOSIS — B35.1 NAIL DERMATOPHYTOSIS: ICD-10-CM

## 2025-03-12 DIAGNOSIS — N18.6 TYPE 2 DIABETES MELLITUS WITH ESRD (END-STAGE RENAL DISEASE): Primary | ICD-10-CM

## 2025-03-12 PROCEDURE — 1126F AMNT PAIN NOTED NONE PRSNT: CPT | Mod: HCNC,CPTII,S$GLB, | Performed by: PODIATRIST

## 2025-03-12 PROCEDURE — 3079F DIAST BP 80-89 MM HG: CPT | Mod: HCNC,CPTII,S$GLB, | Performed by: PODIATRIST

## 2025-03-12 PROCEDURE — 3288F FALL RISK ASSESSMENT DOCD: CPT | Mod: HCNC,CPTII,S$GLB, | Performed by: PODIATRIST

## 2025-03-12 PROCEDURE — 1160F RVW MEDS BY RX/DR IN RCRD: CPT | Mod: HCNC,CPTII,S$GLB, | Performed by: PODIATRIST

## 2025-03-12 PROCEDURE — 99999 PR PBB SHADOW E&M-EST. PATIENT-LVL IV: CPT | Mod: PBBFAC,HCNC,, | Performed by: PODIATRIST

## 2025-03-12 PROCEDURE — 3075F SYST BP GE 130 - 139MM HG: CPT | Mod: HCNC,CPTII,S$GLB, | Performed by: PODIATRIST

## 2025-03-12 PROCEDURE — 1101F PT FALLS ASSESS-DOCD LE1/YR: CPT | Mod: HCNC,CPTII,S$GLB, | Performed by: PODIATRIST

## 2025-03-12 PROCEDURE — 99213 OFFICE O/P EST LOW 20 MIN: CPT | Mod: HCNC,S$GLB,, | Performed by: PODIATRIST

## 2025-03-12 PROCEDURE — 1159F MED LIST DOCD IN RCRD: CPT | Mod: HCNC,CPTII,S$GLB, | Performed by: PODIATRIST

## 2025-03-16 NOTE — PROGRESS NOTES
Subjective:      Patient ID: Erica Leblanc is a 80 y.o. female.    Chief Complaint: Diabetes Mellitus (1/17/25 [facility]) and Nail Care    Erica is a 80 y.o. female who presents to the clinic for evaluation and treatment of high risk feet. Erica has a past medical history of Acute ischemic right PCA stroke (6/6/2021), Acute respiratory failure with hypoxia, Age-related osteoporosis without current pathological fracture (3/8/2021), Anemia of chronic kidney failure, stage 4 (severe) (4/5/2019), Cataracts, bilateral, CHF (congestive heart failure), CKD (chronic kidney disease) stage 3, GFR 30-59 ml/min, CKD (chronic kidney disease) stage 3, GFR 30-59 ml/min, Controlled type 2 diabetes mellitus with proteinuria or albuminuria, Depression, Diabetes with neurologic complications, Diabetic retinopathy of both eyes, Edema, Glaucoma, History of colonic polyps, TIA (transient ischemic attack) (11/2008), Hyperlipidemia LDL goal < 100, Hypertension, Hypothyroidism, Major depressive disorder, single episode, mild (2/17/2016), Mixed incontinence urge and stress, Obesity, Obstructive sleep apnea on CPAP, Osteopenia, Proteinuria, Sickle cell trait, Strabismus, TIA (transient ischemic attack), Trouble in sleeping, Type 2 diabetes mellitus with ophthalmic manifestations, Type 2 diabetes with stage 3 chronic kidney disease GFR 30-59, Type II or unspecified type diabetes mellitus with renal manifestations, uncontrolled(250.42), Uncontrolled type 2 diabetes mellitus with peripheral circulatory disorder (4/5/2019), Urge incontinence (1/11/2016), Urge incontinence, Venous stasis ulcer, and Vitamin D deficiency disease. The patient's chief complaint is history of lower extremity wounds, foot pain.  She is here for routine evaluation, This patient has documented high risk feet requiring routine maintenance secondary to diabetes mellitis and those secondary complications of diabetes, as mentioned..    Presents in casual shoes    PCP:  Sendy Elaine MD    Date Last Seen by PCP:   Chief Complaint   Patient presents with    Diabetes Mellitus     1/17/25 [facility]     Hemoglobin A1C   Date Value Ref Range Status   01/10/2025 5.6 4.0 - 5.6 % Final     Comment:     ADA Screening Guidelines:  5.7-6.4%  Consistent with prediabetes  >or=6.5%  Consistent with diabetes    High levels of fetal hemoglobin interfere with the HbA1C  assay. Heterozygous hemoglobin variants (HbS, HgC, etc)do  not significantly interfere with this assay.   However, presence of multiple variants may affect accuracy.     08/09/2024 5.7 (H) 4.0 - 5.6 % Final     Comment:     ADA Screening Guidelines:  5.7-6.4%  Consistent with prediabetes  >or=6.5%  Consistent with diabetes    High levels of fetal hemoglobin interfere with the HbA1C  assay. Heterozygous hemoglobin variants (HbS, HgC, etc)do  not significantly interfere with this assay.   However, presence of multiple variants may affect accuracy.     03/08/2024 6.1 (H) 4.0 - 5.6 % Final     Comment:     ADA Screening Guidelines:  5.7-6.4%  Consistent with prediabetes  >or=6.5%  Consistent with diabetes    High levels of fetal hemoglobin interfere with the HbA1C  assay. Heterozygous hemoglobin variants (HbS, HgC, etc)do  not significantly interfere with this assay.   However, presence of multiple variants may affect accuracy.     09/14/2023 5.5 % Final   09/13/2022 6.3 (H) 0.0 - 5.6 % Final     Comment:     [facility]   06/14/2022 6.6 (H) 0.0 - 5.6 % Final     Comment:     [facility]           Patient Active Problem List   Diagnosis    Severe obesity (BMI 35.0-39.9) with comorbidity    Sickle cell trait    Hyperlipidemia LDL goal <100    HUMBERTO on CPAP    Hypothyroidism (acquired)    Hx-TIA (transient ischemic attack)    Vitamin D deficiency disease    Pulmonary hypertension    Type 2 diabetes mellitus with ESRD (end-stage renal disease)    Secondary renal hyperparathyroidism    Incomplete bladder emptying     Postmenopausal atrophic vaginitis    Rectocele    Mixed incontinence urge and stress    Chronic diastolic heart failure    Tortuous aorta    ESRD on hemodialysis    Anemia of chronic disease    Debility    Chronic respiratory failure    Type 2 diabetes mellitus with foot ulcer, with long-term current use of insulin    Stable proliferative diabetic retinopathy associated with type 2 diabetes mellitus    Heart failure with preserved ejection fraction    Pseudophakia    Chronic kidney disease-mineral and bone disorder    Age-related osteoporosis without current pathological fracture    H/O: stroke    Walker as ambulation aid    Swelling of lower extremity    Lymphedema of both lower extremities    Impaired functional mobility and activity tolerance    Wound of left leg    Multiple thyroid nodules    Venous ulcer of left lower extremity without varicose veins       Current Outpatient Medications on File Prior to Visit   Medication Sig Dispense Refill    ACCU-CHEK SOFT DEV LANCETS Kit       atorvastatin (LIPITOR) 80 MG tablet Take 1 tablet (80 mg total) by mouth every evening. 90 tablet 3    blood pressure monitor (BLOOD PRESSURE KIT) Kit 1 kit by Misc.(Non-Drug; Combo Route) route As instructed (as instructed). 1 each 0    blood sugar diagnostic Strp One test strip use 2 times a day to check blood glucose,  ICD-10: E11.9, compatible with insurance/glucometer 100 each 11    blood-glucose meter kit One glucometer, use to check blood glucose.   ICD-10: E11.9. Dispense machine covered by insurance 1 each 0    cinacalcet (SENSIPAR) 30 MG Tab       docusate sodium (COLACE) 100 MG capsule Take 200 mg by mouth once daily.       doxercalciferoL (HECTOROL) 4 mcg/2 mL injection Inject 4.5 mcg into the vein 3 (three) times a week. At dialysis unit      dulaglutide (TRULICITY) 1.5 mg/0.5 mL pen injector Inject 1.5 mg into the skin every 7 days. 12 pen 3    epoetin arnel (EPOGEN INJ) Inject 1,000 Units as directed every 7 days. At  the dialysis unit      fluticasone propionate (FLONASE) 50 mcg/actuation nasal spray 1 spray (50 mcg total) by Each Nostril route once daily. 48 g 5    lancets Misc One lancets use 2 times a day to check blood glucose, ICD-10: E11.9 100 each 11    lancing device Misc One device, used to check blood glucose, ICD-10: E11.9 1 each 0    levothyroxine (SYNTHROID) 50 MCG tablet TAKE 1 TABLET BEFORE BREAKFAST 90 tablet 3    LIDOcaine-prilocaine (EMLA) cream Apply topically as needed. 30 g 11    mupirocin (BACTROBAN) 2 % ointment Apply topically 3 (three) times daily. 22 g 0    OLENA-NABIL RX 1- mg-mg-mcg Tab Take 1 tablet by mouth once daily.      aspirin (ECOTRIN) 81 MG EC tablet Take 1 tablet (81 mg total) by mouth once daily. 90 tablet 3    midodrine (PROAMATINE) 5 MG Tab Take 1 tablet (5 mg total) by mouth 3 (three) times daily. 90 tablet 3    sevelamer carbonate (RENVELA) 800 mg Tab Take 3 tablets (2,400 mg total) by mouth 3 (three) times daily with meals. 270 tablet 11     No current facility-administered medications on file prior to visit.       Review of patient's allergies indicates:   Allergen Reactions    Ace inhibitors Other (See Comments)     Other reaction(s): cough       Past Surgical History:   Procedure Laterality Date    AV FISTULA PLACEMENT Left 2019    Procedure: CREATION, AV FISTULA, LEFT UPPER EXTREMITY;  Surgeon: Keron Perez MD;  Location: [facility];  Service: Vascular;  Laterality: Left;    BREAST BIOPSY      breast reduction Bilateral age 31    BREAST SURGERY      CATARACT EXTRACTION Bilateral     cataracts Bilateral      SECTION, LOW TRANSVERSE      x1    CHOLECYSTECTOMY      COLONOSCOPY N/A 2022    Procedure: COLONOSCOPY;  Surgeon: Mahesh Huang MD;  Location: [facility] ENDO ([address] [place] [address]);  Service: Endoscopy;  Laterality: N/A;  fully vaccinated-sm.  RAPID COVID  +cologuard needing ASAP  Dialysis pt T,TH Sat and left UA access  2nd floor - cardiac/dialysis/SOB / LABS /  prep ins. emailed - [facility]    EYE SURGERY  1/2014, 6/2014    vitrectomy    EYE SURGERY Right 08/17/2016    FISTULOGRAM Left 03/06/2020    Procedure: Fistulogram, left upper extremity, with branch ligation;  Surgeon: Keron Perez MD;  Location: [facility] OR [address] [place] [address];  Service: Vascular;  Laterality: Left;  Time 1.1 Minute  42.10 mGy    FISTULOGRAM Left 07/21/2020    Procedure: Fistulogram, left upper extremity, transradial access with possible intervention;  Surgeon: Keron Perez MD;  Location: [facility] OR [address] [place] [address];  Service: Vascular;  Laterality: Left;    FISTULOGRAM Left 08/19/2020    Procedure: Fistulogram, left upper extremity, possible intervention;  Surgeon: Keron Perez MD;  Location: [facility] OR;  Service: Vascular;  Laterality: Left;  930 AM START  RN PRE OP  ---COVID NEGATIVE ON  8-. [place]    FISTULOGRAM Left 01/21/2022    Procedure: Fistulogram, transradial access;  Surgeon: Keron Perez MD;  Location: [facility] OR [address] [place] [address];  Service: Vascular;  Laterality: Left;  mgy-40.16  gycm-8.3905  contrast-34cc  time-12.4min    HYSTERECTOMY  1986    TAHBSO (patient is unsure if ovaries removed)    OOPHORECTOMY      PERCUTANEOUS TRANSLUMINAL ANGIOPLASTY OF ARTERIOVENOUS FISTULA Left 07/21/2020    Procedure: PTA, AV FISTULA;  Surgeon: Keron Perez MD;  Location: [facility] OR [address] [place] [address];  Service: Vascular;  Laterality: Left;  15.9 minutes of fluro  41.12  mGy  7.9060 Gy cm2  32ml  contrast    PHLEBOGRAPHY Left 07/21/2020    Procedure: CENTRAL VENOGRAM;  Surgeon: Keron Perez MD;  Location: [facility] OR [address] [place] [address];  Service: Vascular;  Laterality: Left;    REFRACTIVE SURGERY      TOTAL REDUCTION MAMMOPLASTY      approx 10 yrs ago    VENOPLASTY  01/21/2022    Procedure: ANGIOPLASTY, VEIN;  Surgeon: Keron Perez MD;  Location: [facility] OR [address] [place] [address];  Service: Vascular;;       Family History   Problem Relation Name Age of Onset    Stroke Mother      Diabetes Mother      Hypertension Mother      Cataracts  Mother      Leukemia Father      Cataracts Father      Ovarian cancer Sister Marilee 35    Achondroplasia Sister Hannah     HIV Brother Paul     No Known Problems Daughter x2     No Known Problems Son x3     Breast cancer Maternal Aunt  65    Parkinsonism Maternal Aunt      Esophageal cancer Maternal Uncle          smoker    No Known Problems Paternal Aunt      Cataracts Paternal Uncle      Cataracts Maternal Grandmother      Cataracts Maternal Grandfather      Diabetes Paternal Grandmother      Cataracts Paternal Grandmother      No Known Problems Paternal Grandfather      No Known Problems Other      Amblyopia Neg Hx      Blindness Neg Hx      Glaucoma Neg Hx      Macular degeneration Neg Hx      Retinal detachment Neg Hx      Strabismus Neg Hx      Thyroid disease Neg Hx      Colon cancer Neg Hx      Cancer Neg Hx         Social History     Socioeconomic History    Marital status: Single    Number of children: 5   Occupational History    Occupation:      Employer: [employer]     Comment: part-time   Tobacco Use    Smoking status: Never     Passive exposure: Never    Smokeless tobacco: Never   Substance and Sexual Activity    Alcohol use: No    Drug use: No     Social Drivers of Health     Financial Resource Strain: Medium Risk (10/21/2024)    Overall Financial Resource Strain (CARDIA)     Difficulty of Paying Living Expenses: Somewhat hard   Food Insecurity: Food Insecurity Present (10/21/2024)    Hunger Vital Sign     Worried About Running Out of Food in the Last Year: Sometimes true     Ran Out of Food in the Last Year: Sometimes true   Transportation Needs: No Transportation Needs (10/21/2024)    PRAPARE - Transportation     Lack of Transportation (Medical): No     Lack of Transportation (Non-Medical): No   Physical Activity: Inactive (10/21/2024)    Exercise Vital Sign     Days of Exercise per Week: 0 days     Minutes of Exercise per Session: 0 min   Stress: No Stress Concern  "Present (10/21/2024)    [place] [employer] - Occupational Stress Questionnaire     Feeling of Stress : Only a little   Housing Stability: High Risk (10/21/2024)    Housing Stability Vital Sign     Unable to Pay for Housing in the Last Year: Yes     Homeless in the Last Year: No       Review of Systems   Constitutional: Negative for chills.   Cardiovascular:  Positive for leg swelling. Negative for chest pain and claudication.   Respiratory:  Negative for cough.    Skin:  Positive for color change, dry skin and nail changes.   Musculoskeletal:  Positive for joint pain, myalgias and stiffness.   Gastrointestinal:  Negative for nausea.   Neurological:  Positive for paresthesias. Negative for numbness.   Psychiatric/Behavioral:  The patient is not nervous/anxious.            Objective:       Vitals:    03/12/25 0833   BP: 137/88   Weight: 93.4 kg (205 lb 14.6 oz)   Height: 5' 3" (1.6 m)   PainSc: 0-No pain         Physical Exam  Vitals and nursing note reviewed.   Constitutional:       Appearance: She is not diaphoretic.      Comments: General: Pt. is well-developed, well-nourished, appears stated age, in no acute distress, alert and oriented x 3. No evidence of depression, anxiety, or agitation. Calm, cooperative, and communicative. Appropriate interactions and affect.       Cardiovascular:      Pulses:           Dorsalis pedis pulses are 1+ on the right side and 1+ on the left side.        Posterior tibial pulses are 1+ on the right side and 1+ on the left side.      Comments: There is decreased digital hair.   Musculoskeletal:      Right ankle: Swelling present. No tenderness.      Right Achilles Tendon: No defects.      Left ankle: Swelling present. No tenderness.      Left Achilles Tendon: No defects.      Right foot: No tenderness.      Left foot: No tenderness.      Comments: Muscle strength is 5/5 in all groups bilaterally.    Decreased stride, station of gait.  apropulsive toe off.  " Increased angle and base of gait.    Patient has hammertoes of digits 2-5 bilateral partially reducible without symptom today.    Visible and palpable bunion without pain at dorsomedial 1st metatarsal head right and left.  Hallux abducted right and left partially reducible, tracks laterally without being track bound.  No ecchymosis, erythema, edema, or cardinal signs infection or signs of trauma same foot.    Fat pad atrophy to heels and met heads bilateral     Feet:      Right foot:      Skin integrity: Dry skin present.      Toenail Condition: Fungal disease present.     Left foot:      Skin integrity: Dry skin present.      Toenail Condition: Fungal disease present.  Skin:     General: Skin is warm and dry.      Coloration: Skin is not pale.      Findings: Abrasion (anterior left leg, thin epithelium and granulation) present. No lesion (posterior right leg healed) or rash.      Nails: There is no clubbing.      Comments: Xerosis bilaterally     Toenails 1-5 bilaterally are discolored/yellowed, dystrophic, brittle with subungual debris.     Interdigital Spaces clean, dry and without evidence of break in skin integrity   Neurological:      Sensory: No sensory deficit.      Comments: [place] 5.07 monofilament is intact bilateral feet. Sharp/dull sensation is also intact Bilateral feet.           Psychiatric:         Speech: Speech normal.               Assessment:       Encounter Diagnoses   Name Primary?    Type 2 diabetes mellitus with ESRD (end-stage renal disease) Yes    Nail dermatophytosis            Plan:     Problem List Items Addressed This Visit       Type 2 diabetes mellitus with ESRD (end-stage renal disease) - Primary     Other Visit Diagnoses         Nail dermatophytosis                 I counseled the patient on her conditions, their implications and medical management.           Education about the diabetic foot, neuropathy, and prevention of limb loss.Education about the diabetic foot,  neuropathy, and prevention of limb loss.    Shoe inspection. Diabetic Foot Education. Patient reminded of the importance of good nutrition/healthy diet/weight management and blood sugar control to help prevent podiatric complications of diabetes. Patient instructed on proper foot hygeine. Wear comfortable, proper fitting shoes. Wash feet daily. Dry well. After drying, apply moisturizer to feet (no lotion to webspaces). Inspect feet daily for skin breaks, blisters, swelling, or redness. Wear cotton socks (preferably white)  Change socks every day. Do NOT walk barefoot. Do NOT use heating pads or hot water soaks. We discussed wearing proper shoe gear, daily foot inspections, never walking without protective shoe gear.     Discussed edema control and the importance of daily moisturizer to the skin of the lower extremity    Recommend applying vicks vaporub to thick abnormal toenails daily x 6 months to treat fungal nail infection.    While abrasion is noted to the lower leg there are no active open wounds that are full-thickness or with signs of infection.    She will continue to monitor the areas daily, inspect her feet, wear protective shoe gear when ambulatory, moisturizer to maintain skin integrity and follow in this office in approximately 3-4 months, sooner p.r.n.

## 2025-03-24 ENCOUNTER — HOSPITAL ENCOUNTER (OUTPATIENT)
Dept: WOUND CARE | Facility: HOSPITAL | Age: 80
Discharge: HOME | End: 2025-03-24
Attending: FAMILY MEDICINE
Payer: MEDICARE

## 2025-03-24 VITALS
TEMPERATURE: 97 F | HEART RATE: 75 BPM | DIASTOLIC BLOOD PRESSURE: 79 MMHG | SYSTOLIC BLOOD PRESSURE: 169 MMHG | RESPIRATION RATE: 18 BRPM

## 2025-03-24 DIAGNOSIS — N18.6 ESRD (END STAGE RENAL DISEASE) ON DIALYSIS: ICD-10-CM

## 2025-03-24 DIAGNOSIS — L97.929 VENOUS ULCER OF LEFT LOWER EXTREMITY WITHOUT VARICOSE VEINS: Primary | ICD-10-CM

## 2025-03-24 DIAGNOSIS — S81.802A WOUND OF LEFT LEG: ICD-10-CM

## 2025-03-24 DIAGNOSIS — Z99.2 ESRD (END STAGE RENAL DISEASE) ON DIALYSIS: ICD-10-CM

## 2025-03-24 DIAGNOSIS — I87.2 VENOUS ULCER OF LEFT LOWER EXTREMITY WITHOUT VARICOSE VEINS: Primary | ICD-10-CM

## 2025-03-24 PROCEDURE — 99214 OFFICE O/P EST MOD 30 MIN: CPT | Mod: HCNC,,, | Performed by: FAMILY MEDICINE

## 2025-03-24 NOTE — PROGRESS NOTES
[facility]                Progress Note    Subjective:       Patient ID: Erica Leblanc is a 80 y.o. female.    Chief Complaint: Wound Check    Readmit  wound care visit left distal medial lower leg and left distal lateral lower leg wounds.Patient ambulated  with assistance of rollator to [facility] with Nurse at side. Patient  reports she had a small open area to the left lateral leg in which she applied a betadine dressing. Upon removal of the dressing she obtained a new wound to the left medial leg from the tape being pulled from her skin. Patient denies fever, chills, nausea, vomiting or diarrhea at present. Patient denies pain to wound beds at present. Patient with dressing to left lower leg. Patient reports she cleaned wounds and applied betadine to wounds. Dressing removed & wound cleaned by nurse. Dressing was discarded. LE measurements/circumference: Lt Calf: 35.5 cm,applied size E tubigrip to left lower leg. Topical Lidocaine 5% ointment applied to wound bed and allowed to absorb for 15 minutes for patient comfort. Received dressing change order; applied per MD orders. Patient will return to [facility] in 9 days. AVS printed and given to patient    MD note:  patient presenting today with swelling and blister to E. She states that the blister popped and was dressed by podiatry to prevent it from getting worse.  When she went to remove the dressing, she states the tape tore the skin on another spot of her leg.  There has not been excessive drainage.  She denies eating more salt than normal and has been going to dialysis.  She states she is using lymphatic pumps as directed.            Review of Systems   Constitutional: Negative.    HENT: Negative.     Eyes: Negative.    Respiratory: Negative.     Cardiovascular:  Positive for leg swelling.   Gastrointestinal: Negative.    Skin:  Positive for wound.         Objective:        Physical Exam  Constitutional:        Appearance: Normal appearance.   HENT:      Head: Normocephalic and atraumatic.      Right Ear: External ear normal.      Left Ear: External ear normal.      Mouth/Throat:      Mouth: Mucous membranes are moist.      Pharynx: Oropharynx is clear.   Eyes:      Extraocular Movements: Extraocular movements intact.      Conjunctiva/sclera: Conjunctivae normal.   Pulmonary:      Effort: Pulmonary effort is normal. No respiratory distress.   Musculoskeletal:      Right lower leg: Edema present.      Left lower leg: Edema present.   Skin:     General: Skin is warm.      Comments: +venous ulcer to lateral LLE without slough or maceration  +skin tear to medial LLE without slough and minimal maceration to superior border   Neurological:      Mental Status: She is alert and oriented to person, place, and time. Mental status is at baseline.         Vitals:    03/24/25 1532   BP: (!) 169/79   Pulse: 75   Resp: 18   Temp: 97.2 °F (36.2 °C)       Assessment:           ICD-10-CM ICD-9-CM   1. Venous ulcer of left lower extremity without varicose veins  I87.2 459.81    L97.929    2. Wound of left leg  S81.802A 891.0   3. ESRD (end stage renal disease) on dialysis  N18.6 585.6    Z99.2 V45.11            Wound 03/24/25 Venous Ulcer Left medial;distal;lower Leg (Active)   03/24/25  Leg   Present on Original Admission: Y   Primary Wound Type: Venous ulcer   Side: Left   Orientation: medial;distal;lower   Wound Approximate Age at First Assessment (Weeks):    Wound Number:    Is this injury device related?:    Incision Type:    Closure Method:    Wound Description (Comments):    Type:    Additional Comments:    Ankle-Brachial Index:    Pulses:    Removal Indication and Assessment:    Wound Outcome:    Wound Image   03/24/25 1536   Dressing Appearance Moist drainage 03/24/25 1536   Drainage Amount Moderate 03/24/25 1536   Drainage Characteristics/Odor Serosanguineous;No odor 03/24/25 1536   Appearance Pink;Moist 03/24/25 1536   Tissue loss  description Partial thickness 03/24/25 1536   Black (%), Wound Tissue Color 0 % 03/24/25 1536   Red (%), Wound Tissue Color 100 % 03/24/25 1536   Yellow (%), Wound Tissue Color 0 % 03/24/25 1536   Periwound Area Pale white;Macerated 03/24/25 1536   Wound Edges Defined;Open 03/24/25 1536   Wound Length (cm) 2.8 cm 03/24/25 1536   Wound Width (cm) 4 cm 03/24/25 1536   Wound Depth (cm) 0.01 cm 03/24/25 1536   Wound Volume (cm^3) 0.059 cm^3 03/24/25 1536   Wound Surface Area (cm^2) 8.8 cm^2 03/24/25 1536   Tunneling (depth (cm)/location) 0 03/24/25 1536   Undermining (depth (cm)/location) 0 03/24/25 1536   Care Cleansed with:;Antimicrobial agent;Sterile normal saline 03/24/25 1536   Dressing Applied;Calcium alginate;Rolled gauze;Tubular bandage 03/24/25 1536   Periwound Care Skin barrier film applied;Absorptive dressing applied 03/24/25 1536   Compression Tubular elasticized bandage 03/24/25 1536   Dressing Change Due 04/02/25 03/24/25 1536            Wound 03/24/25 Venous Ulcer Left lateral;distal;lower Leg (Active)   03/24/25  Leg   Present on Original Admission: Y   Primary Wound Type: Venous ulcer   Side: Left   Orientation: lateral;distal;lower   Wound Approximate Age at First Assessment (Weeks):    Wound Number:    Is this injury device related?:    Incision Type:    Closure Method:    Wound Description (Comments):    Type:    Additional Comments:    Ankle-Brachial Index:    Pulses:    Removal Indication and Assessment:    Wound Outcome:    Wound Image   03/24/25 1536   Dressing Appearance Intact;Dry 03/24/25 1536   Drainage Amount None 03/24/25 1536   Appearance Red;Moist 03/24/25 1536   Tissue loss description Partial thickness 03/24/25 1536   Black (%), Wound Tissue Color 0 % 03/24/25 1536   Red (%), Wound Tissue Color 100 % 03/24/25 1536   Yellow (%), Wound Tissue Color 0 % 03/24/25 1536   Periwound Area Dry;Intact 03/24/25 1536   Wound Edges Defined;Open 03/24/25 1536   Wound Length (cm) 0.5 cm 03/24/25 1536    Wound Width (cm) 0.7 cm 03/24/25 1536   Wound Depth (cm) 0.01 cm 03/24/25 1536   Wound Volume (cm^3) 0.002 cm^3 03/24/25 1536   Wound Surface Area (cm^2) 0.27 cm^2 03/24/25 1536   Tunneling (depth (cm)/location) 0 03/24/25 1536   Undermining (depth (cm)/location) 0 03/24/25 1536   Care Cleansed with:;Antimicrobial agent;Sterile normal saline 03/24/25 1536   Dressing Applied;Calcium alginate;Rolled gauze;Tubular bandage 03/24/25 1536   Periwound Care Skin barrier film applied;Absorptive dressing applied 03/24/25 1536   Compression Tubular elasticized bandage 03/24/25 1536   Dressing Change Due 04/02/25 03/24/25 1536           Plan:              Tissue pathology and/or culture taken     [] Yes      [x] No  Sharp debridement performed                   [] Yes       [x] No  Labs ordered     [] Yes       [x] No  Imaging ordered    [] Yes      [x] No    Orders Placed This Encounter   Procedures    Change dressing     Wound Dressing Orders    Dressing change frequency once a week and prn Nurse Visits  Remove old dressing  Cleanse or irrigate with: Wound Cleanser  Protect periwound with:  Cavilon   Primary dressing: WOUND BASE: Calcium Alginate with AG to each wound bed  Secondary dressing: Roll Gauze to secure with paper tape on edge  Compression: Tubi-, single layer, size E (calf size: 35.5 cm)    Return to clinic in 1 week    Please contact Wound Care Clinic on any acute changes.  If after clinic hours or over the weekend, please go to the nearest ER for evaluation.    Patient instructed to stick with renal diet  She is to follow up with vascular and cardiology as scheduled  She is to continue lymphatic pumps as directed  She stated that she falls asleep in her chair with her legs down for several hours at a time, but will try to get a recliner so that her legs are elevated     Follow up in about 9 days (around 4/2/2025) for Wound Care.     Sang Mendes MD

## 2025-04-02 ENCOUNTER — TELEPHONE (OUTPATIENT)
Dept: PULMONOLOGY | Facility: CLINIC | Age: 80
End: 2025-04-02
Payer: MEDICARE

## 2025-04-02 ENCOUNTER — HOSPITAL ENCOUNTER (OUTPATIENT)
Dept: WOUND CARE | Facility: HOSPITAL | Age: 80
Discharge: HOME | End: 2025-04-02
Attending: FAMILY MEDICINE
Payer: MEDICARE

## 2025-04-02 VITALS — TEMPERATURE: 97 F | SYSTOLIC BLOOD PRESSURE: 140 MMHG | HEART RATE: 78 BPM | DIASTOLIC BLOOD PRESSURE: 62 MMHG

## 2025-04-02 DIAGNOSIS — L97.929 VENOUS ULCER OF LEFT LOWER EXTREMITY WITHOUT VARICOSE VEINS: Primary | ICD-10-CM

## 2025-04-02 DIAGNOSIS — S81.802A WOUND OF LEFT LEG: ICD-10-CM

## 2025-04-02 DIAGNOSIS — I87.2 VENOUS ULCER OF LEFT LOWER EXTREMITY WITHOUT VARICOSE VEINS: Primary | ICD-10-CM

## 2025-04-02 PROCEDURE — 99213 OFFICE O/P EST LOW 20 MIN: CPT | Mod: HCNC | Performed by: FAMILY MEDICINE

## 2025-04-02 PROCEDURE — 99214 OFFICE O/P EST MOD 30 MIN: CPT | Mod: HCNC,,, | Performed by: FAMILY MEDICINE

## 2025-04-02 NOTE — TELEPHONE ENCOUNTER
Spoke with patient scheduled an appointment to see provider.    CARISSA Cedeno               ----- Message from Katiuska sent at 4/2/2025  1:34 PM CDT -----  Name of Who is Calling:MICKEY BERTRAND [[id number]]What is the request in detail:pt is requesting a call back regarding sleep cpap machine supplies and pt stated she get it from [facility]. Please contact to further discuss and advise.  Can the clinic reply by [facility]:call What Number to Call Back if not in [facility]:.[phone]

## 2025-04-02 NOTE — PROGRESS NOTES
[facility]                Progress Note    Subjective:       Patient ID: Erica Leblanc is a 80 y.o. female.    Chief Complaint: Wound Check    Follow Up wound care visit for left medial lower leg and left lateral lower leg wounds.  Patient ambulated with assistance of rollator to [facility] with nurse at side. Patient denies fever, chills, nausea, vomiting or diarrhea at present. Patient c/o pain to wound bed at 4/10 present. Patient reports she takes enough medication, she does not take pain medication. Patient reports not having any issues with dressing since last visit. Dressing appears with strike through drainage to left medial lower leg wound. Dressing removed & wound cleaned by nurse. Dressing was discarded. LE measurements/circumference: Lt Calf: 35.5 cm. Changes made to dressing order; applied per MD orders. Applied Promogran to left medial lower leg wound. Left lateral lower leg wound with maroon dry fibrin cap. Fibrin cap removed from left lateral lower leg wound by MD, wound healed. Patient will return to [facility] in 1 week.    AVS printed and given to patient with print out of all future scheduled appointments.         Wound Check    This is an established patient in wound care.   Patient presents in the office today for evaluation of the chronic wound.  Updated HPI is noted below.    The periwound appears non-erythematous, no maceration noted, edematous    The wound appears fat layer exposed. Fibrin and slough in wound bed.     Patient denies fever, chills    Patients reports wound pain    Lymphedema status is contributory to wound status    Pain at the site of the wound is aching    Contributing factors to current wound state include numerous comorbid conditions     Review of Systems   Constitutional:  Negative for activity change, appetite change and fever.   HENT:  Negative for congestion.    Respiratory:  Negative for shortness of breath and wheezing.     Cardiovascular:  Negative for chest pain and leg swelling.   Gastrointestinal:  Negative for abdominal pain, nausea and vomiting.   Skin:  Positive for wound.   Neurological:  Negative for dizziness and headaches.   Psychiatric/Behavioral:  The patient is not nervous/anxious.          Objective:        Physical Exam  Vitals and nursing note reviewed.   Constitutional:       General: She is not in acute distress.     Appearance: She is well-developed.   HENT:      Head: Normocephalic and atraumatic.   Eyes:      Conjunctiva/sclera: Conjunctivae normal.   Pulmonary:      Effort: Pulmonary effort is normal.   Abdominal:      General: There is no distension.   Musculoskeletal:         General: Normal range of motion.      Cervical back: Normal range of motion.   Skin:     General: Skin is warm.      Comments: See wound description   Neurological:      Mental Status: She is alert and oriented to person, place, and time.   Psychiatric:         Behavior: Behavior normal.         Thought Content: Thought content normal.         Judgment: Judgment normal.         Vitals:    04/02/25 1641   BP: (!) 140/62   Pulse: 78   Temp: 97.2 °F (36.2 °C)       Assessment:           ICD-10-CM ICD-9-CM   1. Venous ulcer of left lower extremity without varicose veins  I87.2 459.81    L97.929    2. Wound of left leg  S81.802A 891.0            Wound 03/24/25 Venous Ulcer Left medial;distal;lower Leg (Active)   03/24/25  Leg   Present on Original Admission: Y   Primary Wound Type: Venous ulcer   Side: Left   Orientation: medial;distal;lower   Wound Approximate Age at First Assessment (Weeks):    Wound Number:    Is this injury device related?:    Incision Type:    Closure Method:    Wound Description (Comments):    Type:    Additional Comments:    Ankle-Brachial Index:    Pulses:    Removal Indication and Assessment:    Wound Outcome:    Wound Image   04/02/25 1632   Dressing Appearance Intact;Moist drainage 04/02/25 1632   Drainage Amount  Moderate 04/02/25 1632   Drainage Characteristics/Odor Serosanguineous 04/02/25 1632   Appearance Pink;Red;Moist 04/02/25 1632   Tissue loss description Partial thickness 04/02/25 1632   Black (%), Wound Tissue Color 0 % 04/02/25 1632   Red (%), Wound Tissue Color 100 % 04/02/25 1632   Yellow (%), Wound Tissue Color 0 % 04/02/25 1632   Periwound Area [place] 04/02/25 1632   Wound Edges Open;Defined 04/02/25 1632   Wound Length (cm) 1.1 cm 04/02/25 1632   Wound Width (cm) 2.8 cm 04/02/25 1632   Wound Depth (cm) 0.01 cm 04/02/25 1632   Wound Volume (cm^3) 0.016 cm^3 04/02/25 1632   Wound Surface Area (cm^2) 2.42 cm^2 04/02/25 1632   Tunneling (depth (cm)/location) 0 04/02/25 1632   Undermining (depth (cm)/location) 0 04/02/25 1632   Care Cleansed with:;Antimicrobial agent;Sterile normal saline 04/02/25 1632   Dressing Applied;Other (comment);Methylene blue/gentian violet;Tubular bandage 04/02/25 1632   Periwound Care Moisture barrier applied 04/02/25 1632   Compression Tubular elasticized bandage 04/02/25 1632   Dressing Change Due 04/09/25 04/02/25 1632            Wound 03/24/25 Venous Ulcer Left lateral;distal;lower Leg (Active)   03/24/25  Leg   Present on Original Admission: Y   Primary Wound Type: Venous ulcer   Side: Left   Orientation: lateral;distal;lower   Wound Approximate Age at First Assessment (Weeks):    Wound Number:    Is this injury device related?:    Incision Type:    Closure Method:    Wound Description (Comments):    Type:    Additional Comments:    Ankle-Brachial Index:    Pulses:    Removal Indication and Assessment:    Wound Outcome:    Wound Image   04/02/25 1632   Dressing Appearance Intact;Dry 04/02/25 1632   Drainage Amount None 04/02/25 1632   Appearance Maroon;Fibrin;Dry 04/02/25 1632   Black (%), Wound Tissue Color 0 % 04/02/25 1632   Red (%), Wound Tissue Color 100 % 04/02/25 1632   Yellow (%), Wound Tissue Color 0 % 04/02/25 1632   Periwound Area Intact;Dry 04/02/25 1632   Wound Edges  Rolled/closed 04/02/25 1632   Wound Length (cm) 0 cm 04/02/25 1632   Wound Width (cm) 0 cm 04/02/25 1632   Wound Depth (cm) 0 cm 04/02/25 1632   Wound Volume (cm^3) 0 cm^3 04/02/25 1632   Wound Surface Area (cm^2) 0 cm^2 04/02/25 1632   Tunneling (depth (cm)/location) 0 04/02/25 1632   Undermining (depth (cm)/location) 0 04/02/25 1632   Care Cleansed with:;Antimicrobial agent;Sterile normal saline 04/02/25 1632   Compression Tubular elasticized bandage 04/02/25 1632           Plan:          Debridement not needed and not Done today.   Keep dressing clean and intact  Recommend off-loading  Decrease salt intake   Continue compression   Discussed increase protein intake   Discussed wound expectations and plan   Continue with wound care orders and plan as noted in orders.   Continue to follow current medication regimen as per pcp   Call for any questions / concerns.          Tissue pathology and/or culture taken     [] Yes      [x] No  Sharp debridement performed                   [] Yes       [x] No  Labs ordered     [] Yes       [x] No  Imaging ordered    [] Yes      [x] No    Orders Placed This Encounter   Procedures    Change dressing     Wound Dressing Orders    Dressing change frequency once a week and prn Nurse Visits  Remove old dressing  Cleanse or irrigate with: Wound Cleanser  Protect periwound with:  Gentian Violet   Primary dressing: WOUND BASE: Promogran to  wound bed,  Secondary dressing:  Optiform Gentle, then Roll Gauze to secure with paper tape on edge  Compression: Tubi-, single layer, size E (calf size: 35.5 cm)    Left Lateral Lower Leg Wound- Healed 04/02/25    Return to clinic in 1 week    Please contact Wound Care Clinic on any acute changes.  If after clinic hours or over the weekend, please go to the nearest ER for evaluation.        Follow up in about 1 week (around 4/9/2025) for Wound Care.

## 2025-04-04 ENCOUNTER — TELEPHONE (OUTPATIENT)
Dept: VASCULAR SURGERY | Facility: CLINIC | Age: 80
End: 2025-04-04
Payer: MEDICARE

## 2025-04-07 ENCOUNTER — HOSPITAL ENCOUNTER (OUTPATIENT)
Dept: WOUND CARE | Facility: HOSPITAL | Age: 80
Discharge: HOME | End: 2025-04-07
Attending: INTERNAL MEDICINE
Payer: MEDICARE

## 2025-04-07 VITALS — DIASTOLIC BLOOD PRESSURE: 70 MMHG | SYSTOLIC BLOOD PRESSURE: 151 MMHG | HEART RATE: 75 BPM | TEMPERATURE: 97 F

## 2025-04-07 DIAGNOSIS — L97.929 VENOUS ULCER OF LEFT LOWER EXTREMITY WITHOUT VARICOSE VEINS: Primary | ICD-10-CM

## 2025-04-07 DIAGNOSIS — I87.2 VENOUS ULCER OF LEFT LOWER EXTREMITY WITHOUT VARICOSE VEINS: Primary | ICD-10-CM

## 2025-04-07 DIAGNOSIS — S81.802A WOUND OF LEFT LEG: ICD-10-CM

## 2025-04-07 PROCEDURE — 99213 OFFICE O/P EST LOW 20 MIN: CPT | Mod: HCNC | Performed by: INTERNAL MEDICINE

## 2025-04-07 PROCEDURE — 99214 OFFICE O/P EST MOD 30 MIN: CPT | Mod: HCNC,,, | Performed by: INTERNAL MEDICINE

## 2025-04-07 NOTE — PROGRESS NOTES
[facility]                Progress Note    Subjective:       Patient ID: Erica Leblanc is a 80 y.o. female.    Chief Complaint: Wound Check    Follow Up/ wound care visit for left  medial distal lower leg wound. Patient ambulated with assistance of rollator to [facility] with nurse at side. Patient denies fever, chills, nausea, vomiting or diarrhea at present. Patient denies pain to wound bed at present. Patient reports not having any issues with dressing since last visit. Dressing appears without strike through drainage. Dressing removed & wound cleaned by nurse. Dressing was discarded. LE measurements/circumference: Lt Calf: 36 cm, applied size E to left lower leg. Topical Lidocaine 5% ointment applied to wound bed and allowed to absorb for 15 minutes for patient comfort. No change to dressing order; applied per MD orders. Left medial distal wound red/moist with pink periwound. Patient will return to [facility] in 9 days. AVS printed and given to patient with  print out of all future scheduled appointments.    No issues with compression has been attending full times on dialysis no new skin breakdown or leg swelling        Review of Systems      Objective:        Physical Exam  Constitutional:       General: She is not in acute distress.  Abdominal:      General: There is no distension.      Palpations: Abdomen is soft.   Musculoskeletal:      Right lower leg: No edema.      Left lower leg: No edema.   Skin:     Comments: Left anterior leg ulcer measures smaller with evidence of new pink epithelium to perimeter ulcer base 100% pink granulation without slough or tunneling    Neurological:      Mental Status: She is alert.         Vitals:    04/07/25 1635   BP: (!) 151/70   Pulse: 75   Temp: 96.7 °F (35.9 °C)       Assessment:           ICD-10-CM ICD-9-CM   1. Venous ulcer of left lower extremity without varicose veins  I87.2 459.81    L97.929    2. Wound of left leg  S81.802A  891.0            Wound 03/24/25 Venous Ulcer Left medial;distal;lower Leg (Active)   03/24/25  Leg   Present on Original Admission: Y   Primary Wound Type: Venous ulcer   Side: Left   Orientation: medial;distal;lower   Wound Approximate Age at First Assessment (Weeks):    Wound Number:    Is this injury device related?:    Incision Type:    Closure Method:    Wound Description (Comments):    Type:    Additional Comments:    Ankle-Brachial Index:    Pulses:    Removal Indication and Assessment:    Wound Outcome:    Wound Image   04/07/25 1636   Dressing Appearance Intact;Moist drainage 04/07/25 1636   Drainage Amount Small 04/07/25 1636   Drainage Characteristics/Odor Serosanguineous 04/07/25 1636   Appearance Red;Moist 04/07/25 1636   Tissue loss description Partial thickness 04/07/25 1636   Black (%), Wound Tissue Color 0 % 04/07/25 1636   Red (%), Wound Tissue Color 100 % 04/07/25 1636   Yellow (%), Wound Tissue Color 0 % 04/07/25 1636   Periwound Area [place] 04/07/25 1636   Wound Edges Open;Defined 04/07/25 1636   Wound Length (cm) 1.1 cm 04/07/25 1636   Wound Width (cm) 2.3 cm 04/07/25 1636   Wound Depth (cm) 0.01 cm 04/07/25 1636   Wound Volume (cm^3) 0.013 cm^3 04/07/25 1636   Wound Surface Area (cm^2) 1.99 cm^2 04/07/25 1636   Tunneling (depth (cm)/location) 0 04/07/25 1636   Undermining (depth (cm)/location) 0 04/07/25 1636   Care Cleansed with:;Antimicrobial agent;Sterile normal saline 04/07/25 1636   Dressing Applied;Other (comment);Gauze;Tubular bandage 04/07/25 1636   Periwound Care Moisture barrier applied 04/07/25 1636   Compression Tubular elasticized bandage 04/07/25 1636   Dressing Change Due 04/16/25 04/07/25 1636           Plan:          Wound improving with promogran and local compression continue same folow up for wound check in one week no debridement required today    Tissue pathology and/or culture taken     [] Yes      [x] No  Sharp debridement performed                   [] Yes       [x]  No  Labs ordered     [] Yes       [x] No  Imaging ordered    [] Yes      [x] No    Orders Placed This Encounter   Procedures    Change dressing     Wound Dressing Orders    Dressing change frequency once a week and prn Nurse Visits  Remove old dressing  Cleanse or irrigate with: Wound Cleanser  Protect periwound with:  Gentian Violet  Primary dressing: WOUND BASE: Promogran to  wound bed,  Secondary dressing:  Optiform Gentle, then Roll Gauze to secure with paper tape on edge  Compression: Tubi-, single layer, size E (calf size: 36 cm)    Left Lateral Lower Leg Wound- Healed 04/02/25    Return to clinic in 1 week    Please contact Wound Care Clinic on any acute changes.  If after clinic hours or over the weekend, please go to the nearest ER for evaluation.        Follow up in about 9 days (around 4/16/2025) for Wound Care.

## 2025-04-16 ENCOUNTER — TELEPHONE (OUTPATIENT)
Dept: WOUND CARE | Facility: HOSPITAL | Age: 80
End: 2025-04-16
Payer: MEDICARE

## 2025-04-16 NOTE — TELEPHONE ENCOUNTER
"Patient called in stating she "doesn't feel well" d/t dialysis yesterday. Rescheduled WCC appointment from today to Monday at 4 pm. Patient advised to go to the ER if needed, she verbalized understanding.   "

## 2025-04-21 ENCOUNTER — OFFICE VISIT (OUTPATIENT)
Dept: OPTOMETRY | Facility: CLINIC | Age: 80
End: 2025-04-21
Payer: MEDICARE

## 2025-04-21 DIAGNOSIS — Z96.1 PSEUDOPHAKIA: ICD-10-CM

## 2025-04-21 DIAGNOSIS — H40.1134 PRIMARY OPEN ANGLE GLAUCOMA (POAG) OF BOTH EYES, INDETERMINATE STAGE: Primary | ICD-10-CM

## 2025-04-21 DIAGNOSIS — E11.3553 STABLE PROLIFERATIVE DIABETIC RETINOPATHY OF BOTH EYES ASSOCIATED WITH TYPE 2 DIABETES MELLITUS: ICD-10-CM

## 2025-04-21 PROCEDURE — 99999 PR PBB SHADOW E&M-EST. PATIENT-LVL III: CPT | Mod: PBBFAC,HCNC,, | Performed by: OPTOMETRIST

## 2025-04-21 PROCEDURE — 99214 OFFICE O/P EST MOD 30 MIN: CPT | Mod: HCNC,S$GLB,, | Performed by: OPTOMETRIST

## 2025-04-21 PROCEDURE — 1101F PT FALLS ASSESS-DOCD LE1/YR: CPT | Mod: HCNC,CPTII,S$GLB, | Performed by: OPTOMETRIST

## 2025-04-21 PROCEDURE — 1159F MED LIST DOCD IN RCRD: CPT | Mod: HCNC,CPTII,S$GLB, | Performed by: OPTOMETRIST

## 2025-04-21 PROCEDURE — 3288F FALL RISK ASSESSMENT DOCD: CPT | Mod: HCNC,CPTII,S$GLB, | Performed by: OPTOMETRIST

## 2025-04-21 PROCEDURE — 1160F RVW MEDS BY RX/DR IN RCRD: CPT | Mod: HCNC,CPTII,S$GLB, | Performed by: OPTOMETRIST

## 2025-04-21 PROCEDURE — 92133 CPTRZD OPH DX IMG PST SGM ON: CPT | Mod: HCNC,S$GLB,, | Performed by: OPTOMETRIST

## 2025-04-21 PROCEDURE — G2211 COMPLEX E/M VISIT ADD ON: HCPCS | Mod: HCNC,S$GLB,, | Performed by: OPTOMETRIST

## 2025-04-21 RX ORDER — LATANOPROST 50 UG/ML
1 SOLUTION/ DROPS OPHTHALMIC NIGHTLY
Qty: 7.5 ML | Refills: 3 | Status: SHIPPED | OUTPATIENT
Start: 2025-04-21

## 2025-04-21 NOTE — PROGRESS NOTES
Subjective:       Patient ID: Erica Leblanc is a 80 y.o. female      Chief Complaint   Patient presents with    Concerns About Ocular Health    Diabetic Eye Exam     History of Present Illness  Dls: 2/5/24 Dr. Quiles     79 y/o female presents today for diabetic eye exam.   Pt c/o mucous os>od. Pt states no changes in vision   Pt wears otc readers. Pt does not wants glasses.     LBS ?     No tearing   No itching   No burning   No pain   No ha's   No floaters   No flashes     Eye meds   None     Pohx:   S/p Bilateral CE   S/p lasik Bilateral     Fohx:   Cat - father, grandmother, aunts, uncles     Hemoglobin A1C       Date                     Value               Ref Range             Status                01/10/2025               5.6                 4.0 - 5.6 %           Final                   08/09/2024               5.7 (H)             4.0 - 5.6 %           Final                   03/08/2024               6.1 (H)             4.0 - 5.6 %           Final                 Assessment/Plan:     1. Primary open angle glaucoma (POAG) of both eyes, indeterminate stage (Primary)  Elevated IOP compared to previous. OCT RNFL with thinning OD > OS. Start latanoprost QHS OU. 4 month IOP/HVF/pachymetry, sooner PRN     Discussed with pt progressive nature of glaucoma and possible blindness if left untreated, discussed importance of compliance with drops and follow up care as scheduled. Pt verbalized understanding    - latanoprost 0.005 % ophthalmic solution; Place 1 drop into both eyes every evening.  Dispense: 7.5 mL; Refill: 3  - Posterior Segment OCT Optic Nerve- Both eyes  - Goodwin Visual Field - OU - Extended - Both Eyes; Future    2. Stable proliferative diabetic retinopathy of both eyes associated with type 2 diabetes mellitus   PDR s/p PRP and vitrectomy OU. Stable. Monitor.    3. Pseudophakia  Well-centered. Clear.   Readers PRN    Today's visit is associated with current and anticipated ongoing medical care related  to this patient's single serious/complex condition (glaucoma). Follow up is to be continued indefinitely to monitor the condition.       Follow up in about 4 months (around 8/22/2025) for IOP check, HVF 24-2, pachymetry.

## 2025-04-25 ENCOUNTER — HOSPITAL ENCOUNTER (OUTPATIENT)
Dept: CARDIOLOGY | Facility: HOSPITAL | Age: 80
Discharge: HOME | End: 2025-04-25
Attending: SURGERY
Payer: MEDICARE

## 2025-04-25 DIAGNOSIS — I89.0 LYMPHEDEMA: ICD-10-CM

## 2025-04-25 LAB
HD ANASTAMOSIS VESSEL: NORMAL
HD FISTULA OUTFLOW VEIN VESSEL: NORMAL
HD INFLOW ARTERY VESSEL: NORMAL
RIGHT DIS GRAFT PSV: 121 CM/ SEC
RIGHT INFLOW PSV: 282 CM/S
RIGHT MID GRAFT DIA: 2.1 CM
RIGHT MID GRAFT PSV: 107 CM/S
RIGHT OUTFLOW VEIN PSV: 201 CM/ SEC
RIGHT PROX ANA PSV: 621 CM/S
RIGHT PROX GRAFT DIA: 2.7 CM
RIGHT PROX GRAFT PSV: 75 CM/S
RIGHT VOLUME FLOW PSV: 7405 ML/MIN

## 2025-04-25 PROCEDURE — 93990 DOPPLER FLOW TESTING: CPT | Mod: TC,HCNC

## 2025-04-28 ENCOUNTER — HOSPITAL ENCOUNTER (OUTPATIENT)
Dept: WOUND CARE | Facility: HOSPITAL | Age: 80
Discharge: HOME | End: 2025-04-28
Attending: FAMILY MEDICINE
Payer: MEDICARE

## 2025-04-28 VITALS — SYSTOLIC BLOOD PRESSURE: 130 MMHG | TEMPERATURE: 97 F | HEART RATE: 75 BPM | DIASTOLIC BLOOD PRESSURE: 64 MMHG

## 2025-04-28 DIAGNOSIS — Z99.2 ESRD (END STAGE RENAL DISEASE) ON DIALYSIS: ICD-10-CM

## 2025-04-28 DIAGNOSIS — L97.929 VENOUS ULCER OF LEFT LOWER EXTREMITY WITHOUT VARICOSE VEINS: Primary | ICD-10-CM

## 2025-04-28 DIAGNOSIS — N18.6 ESRD (END STAGE RENAL DISEASE) ON DIALYSIS: ICD-10-CM

## 2025-04-28 DIAGNOSIS — I87.2 VENOUS ULCER OF LEFT LOWER EXTREMITY WITHOUT VARICOSE VEINS: Primary | ICD-10-CM

## 2025-04-28 DIAGNOSIS — S81.802A WOUND OF LEFT LEG: ICD-10-CM

## 2025-04-28 PROCEDURE — 99213 OFFICE O/P EST LOW 20 MIN: CPT | Mod: HCNC | Performed by: INTERNAL MEDICINE

## 2025-04-28 NOTE — PROGRESS NOTES
[facility]                Progress Note    Subjective:       Patient ID: Erica Leblanc is a 80 y.o. female.    Chief Complaint: Wound Check    Follow Up wound care visit for left medial distal lower leg wound. Patient ambulated with assistance of rollator to [facility] with nurse at side. Patient denies fever, chills, nausea, vomiting or diarrhea at present. Patient denies pain to wound bed at present. Patient reports the dressing is from her last wound visit 3 weeks ago. Patient reports not having any issues with dressing since last visit. Dressing appears without strike through drainage. Dressing removed & wound cleaned by nurse. Dressing was discarded. Left medial distal lower leg wound bed pink. MD reports wound healed. LE measurements/circumference: Lt Calf: 37.5, Rt Calf: 36. Topical Lidocaine 5% ointment applied to wound bed and allowed to absorb for 15 minutes for patient comfort. Patient discharged from wound clinic. Patient denied AVS, has MyChart.    Has been keeping same dressing from three weeks ago in place dry no discharge she does use tubi  for compression when using dressing has compression stockings and lymphedema pumps at home (not using since has open wound) running full times on HD no missed sessions no orthopnea or PND      Review of Systems      Objective:        Physical Exam  Constitutional:       General: She is not in acute distress.  Pulmonary:      Effort: Pulmonary effort is normal. No respiratory distress.   Musculoskeletal:      Right lower leg: No edema.      Left lower leg: No edema.   Skin:     Comments: Left anterior leg with new pink epithelium wihtout ulceration or periowound erythema   Neurological:      Mental Status: She is alert.         Vitals:    04/28/25 1554   BP: 130/64   Pulse: 75   Temp: 97.2 °F (36.2 °C)       Assessment:           ICD-10-CM ICD-9-CM   1. Venous ulcer of left lower extremity without varicose veins   I87.2 459.81    L97.929    2. Wound of left leg  S81.802A 891.0            Wound 03/24/25 Venous Ulcer Left medial;distal;lower Leg (Active)   03/24/25  Leg   Present on Original Admission: Y   Primary Wound Type: Venous ulcer   Side: Left   Orientation: medial;distal;lower   Wound Approximate Age at First Assessment (Weeks):    Wound Number:    Is this injury device related?:    Incision Type:    Closure Method:    Wound Description (Comments):    Type:    Additional Comments:    Ankle-Brachial Index:    Pulses:    Removal Indication and Assessment:    Wound Outcome:    Wound Image   04/28/25 1612   Dressing Appearance Intact;Dried drainage 04/28/25 1612   Drainage Amount Scant 04/28/25 1612   Drainage Characteristics/Odor Serous 04/28/25 1612   Appearance Pink;Moist 04/28/25 1612   Black (%), Wound Tissue Color 0 % 04/28/25 1612   Red (%), Wound Tissue Color 100 % 04/28/25 1612   Yellow (%), Wound Tissue Color 0 % 04/28/25 1612   Periwound Area Intact 04/28/25 1612   Wound Edges Rolled/closed 04/28/25 1612   Wound Length (cm) 0 cm 04/28/25 1612   Wound Width (cm) 0 cm 04/28/25 1612   Wound Depth (cm) 0 cm 04/28/25 1612   Wound Volume (cm^3) 0 cm^3 04/28/25 1612   Wound Surface Area (cm^2) 0 cm^2 04/28/25 1612   Tunneling (depth (cm)/location) 0 04/28/25 1612   Undermining (depth (cm)/location) 0 04/28/25 1612   Care Cleansed with:;Antimicrobial agent;Sterile normal saline 04/28/25 1612   Dressing Applied;Tubular bandage 04/28/25 1612   Compression Tubular elasticized bandage 04/28/25 1612           Plan:          Wound healed resume home compression stockings and lymphedema pumps. Discharge from wound care return PRN    Tissue pathology and/or culture taken     [] Yes      [x] No  Sharp debridement performed                   [] Yes       [x] No  Labs ordered     [] Yes       [x] No  Imaging ordered    [] Yes      [x] No    Orders Placed This Encounter   Procedures    Change dressing     Wound Dressing  Orders    Dressing change frequency once a week and prn Nurse Visits  Remove old dressing  Cleanse or irrigate with: Wound Cleanser  Compression: Tubi-, single layer, size E (calf size: 37.5 cm)    Left Lateral Lower Leg Wound- Healed 04/02/25  Left Medial Lower Leg Wound -Healed 04/28/25  Discharged from Wound Clinic    Please contact Wound Care Clinic on any acute changes.  If after clinic hours or over the weekend, please go to the nearest ER for evaluation.        Follow up if symptoms worsen or fail to improve.

## 2025-04-29 ENCOUNTER — TELEPHONE (OUTPATIENT)
Dept: VASCULAR SURGERY | Facility: CLINIC | Age: 80
End: 2025-04-29
Payer: MEDICARE

## 2025-04-29 NOTE — TELEPHONE ENCOUNTER
Medical necessity paperwork filed out and signed by provider and faxed back to [facility] for pt.compression pump. Confirmation of fax received via email.

## 2025-05-12 ENCOUNTER — HOSPITAL ENCOUNTER (OUTPATIENT)
Dept: WOUND CARE | Facility: HOSPITAL | Age: 80
Discharge: HOME | End: 2025-05-12
Attending: INTERNAL MEDICINE
Payer: MEDICARE

## 2025-05-12 VITALS — DIASTOLIC BLOOD PRESSURE: 68 MMHG | TEMPERATURE: 97 F | SYSTOLIC BLOOD PRESSURE: 144 MMHG | HEART RATE: 71 BPM

## 2025-05-12 DIAGNOSIS — I87.2 VENOUS ULCER OF LEFT LOWER EXTREMITY WITHOUT VARICOSE VEINS: Primary | ICD-10-CM

## 2025-05-12 DIAGNOSIS — L97.929 VENOUS ULCER OF LEFT LOWER EXTREMITY WITHOUT VARICOSE VEINS: Primary | ICD-10-CM

## 2025-05-12 PROCEDURE — 99214 OFFICE O/P EST MOD 30 MIN: CPT | Mod: HCNC,,, | Performed by: INTERNAL MEDICINE

## 2025-05-12 PROCEDURE — 99213 OFFICE O/P EST LOW 20 MIN: CPT | Mod: HCNC | Performed by: INTERNAL MEDICINE

## 2025-05-12 NOTE — PROGRESS NOTES
"[facility]                Progress Note    Subjective:       Patient ID: Erica Leblanc is a 80 y.o. female.    Chief Complaint: Wound Check    Follow Up wound care visit for blisters to bilateral anterior lower legs. Patient reports she had lymphedema pumps on for one hour and a half at a higher pressure than what she thought the pumps were on. Patient reports blisters developed to her bilateral lower legs. Patient ambulated with assistance of  rollator to [facility] with nurse at side. Patient denies fever, chills, nausea, vomiting or diarrhea at present. Patient  denies pain to wound beds at present. Patient had gauze to left lower leg wound, and no dressing to the right lower leg blister site. Right lower leg blister intact with 2 small pin pin hole. Right lower leg blister with serous drainage. Left lower leg blister reabsorbed, (measures 14.7 x 1.5 cm) with small pink fibrin area to anterior medial aspect of wound. Scant amount of serous drainage from left anterior lower leg wound. Dressing appears without strike through drainage to left lower leg. Dressing removed & wound cleaned by nurse. Dressing was discarded. LE measurements/circumference: Lt Calf: 36 cm, and  Rt Calf: 38.5 cm. Dressing change order received; applied per MD orders. Patient will return to [facility] in 9 days. AVS printed and given to patient with print out of all future scheduled appointments.       Patient returns for readmission due to "blisters" to bilateral legs. She has ESRD on HD and known venous HTN she began usign sequential compression pumps but did not notice setting was significantly higher (she is set for 50mmHg was treating with 130mmHg). Developed blisters and pain to anteior shins with this. Stopped and pain resolved. Did notice some draiange from right leg blister not using any compression stockings running full time on HD (TRS) no hypotension she will be traveling to son's wedding in " [place] this weekend         Review of Systems      Objective:        Physical Exam  Constitutional:       General: She is not in acute distress.     Appearance: She is obese.   Pulmonary:      Effort: Pulmonary effort is normal. No respiratory distress.   Musculoskeletal:      Right lower leg: Edema present.      Left lower leg: Edema present.   Skin:     Comments: Right anterior shin with intact bullae with clear drainage no purlence    Left leg with intact skin no expressible discharge   Neurological:      Mental Status: She is alert.         Vitals:    05/12/25 1529   BP: (!) 144/68   Pulse: 71   Temp: 96.7 °F (35.9 °C)       Assessment:           ICD-10-CM ICD-9-CM   1. Venous ulcer of left lower extremity without varicose veins  I87.2 459.81    L97.929             Wound 05/12/25 1525 Blister(s) Right anterior;lower Leg (Active)   05/12/25 1525 Leg   Present on Original Admission: Y   Primary Wound Type: Blister(s)   Side: Right   Orientation: anterior;lower   Wound Approximate Age at First Assessment (Weeks):    Wound Number:    Is this injury device related?:    Incision Type:    Closure Method:    Wound Description (Comments):    Type:    Additional Comments:    Ankle-Brachial Index:    Pulses:    Removal Indication and Assessment:    Wound Outcome:    Wound Image   05/12/25 1533   Dressing Appearance No dressing;other (see comments) 05/12/25 1533   Drainage Amount Small 05/12/25 1533   Drainage Characteristics/Odor Serous 05/12/25 1533   Appearance Intact;Blistered 05/12/25 1533   Tissue loss description Partial thickness 05/12/25 1533   Periwound Area Edematous;Redness 05/12/25 1533   Wound Length (cm) 7.3 cm 05/12/25 1533   Wound Width (cm) 3.6 cm 05/12/25 1533   Wound Surface Area (cm^2) 20.64 cm^2 05/12/25 1533   Tunneling (depth (cm)/location) 0 05/12/25 1533   Undermining (depth (cm)/location) 0 05/12/25 1533   Care Cleansed with:;Antimicrobial agent;Sterile normal saline 05/12/25 1533   Dressing  Applied;Other (comment) 05/12/25 1533   Compression Tubular elasticized bandage 05/12/25 1533            Wound 05/12/25 1525 Blister(s) Left anterior;lower Leg (Active)   05/12/25 1525 Leg   Present on Original Admission: Y   Primary Wound Type: Blister(s)   Side: Left   Orientation: anterior;lower   Wound Approximate Age at First Assessment (Weeks):    Wound Number:    Is this injury device related?:    Incision Type:    Closure Method:    Wound Description (Comments):    Type:    Additional Comments:    Ankle-Brachial Index:    Pulses:    Removal Indication and Assessment:    Wound Outcome:    Wound Image   05/12/25 1533   Dressing Appearance Intact;Moist drainage 05/12/25 1533   Drainage Amount Scant 05/12/25 1533   Drainage Characteristics/Odor Serous 05/12/25 1533   Appearance Pink;Fibrin;Blistered 05/12/25 1533   Tissue loss description Partial thickness 05/12/25 1533   Black (%), Wound Tissue Color 0 % 05/12/25 1533   Red (%), Wound Tissue Color 100 % 05/12/25 1533   Yellow (%), Wound Tissue Color 0 % 05/12/25 1533   Periwound Area Intact 05/12/25 1533   Wound Edges Open;Defined 05/12/25 1533   Wound Length (cm) 2.9 cm 05/12/25 1533   Wound Width (cm) 0.6 cm 05/12/25 1533   Wound Depth (cm) 0.1 cm 05/12/25 1533   Wound Volume (cm^3) 0.091 cm^3 05/12/25 1533   Wound Surface Area (cm^2) 1.37 cm^2 05/12/25 1533   Tunneling (depth (cm)/location) 0 05/12/25 1533   Undermining (depth (cm)/location) 0 05/12/25 1533   Care Cleansed with:;Antimicrobial agent;Sterile normal saline 05/12/25 1533   Dressing Tubular bandage;Other (comment) 05/12/25 1533   Compression Tubular elasticized bandage 05/12/25 1533           Plan:          Tubigrip for compressio optimfoam to right for management of drainage elevate legs when seated return one week for wound check no debridment required today    Tissue pathology and/or culture taken     [] Yes      [x] No  Sharp debridement performed                   [] Yes       [x] No  Labs  ordered     [] Yes       [x] No  Imaging ordered    [] Yes      [x] No    Orders Placed This Encounter   Procedures    Change dressing     Wound Dressing Orders    Dressing change frequency once a week and prn Nurse Visits  Remove old dressing  Cleanse or irrigate with: Wound Cleanser    Right Anterior Lower leg   Primary dressing: Optifoam, roll gauze    Bilateral Anterior Lower Legs   Compression:Tubi-: Right Lower Leg, single layer, size E (calf size: 38.5 cm); Left Lower Leg, double layer,size E (calf size: 36 cm)    Please contact Wound Care Clinic on any acute changes.  If after clinic hours or over the weekend, please go to the nearest ER for evaluation.        Follow up in about 9 days (around 5/21/2025) for Wound Care.

## 2025-05-14 ENCOUNTER — OFFICE VISIT (OUTPATIENT)
Dept: PULMONOLOGY | Facility: CLINIC | Age: 80
End: 2025-05-14
Payer: MEDICARE

## 2025-05-14 VITALS
HEART RATE: 79 BPM | SYSTOLIC BLOOD PRESSURE: 147 MMHG | OXYGEN SATURATION: 96 % | HEIGHT: 63 IN | DIASTOLIC BLOOD PRESSURE: 81 MMHG | WEIGHT: 212.5 LBS | BODY MASS INDEX: 37.65 KG/M2

## 2025-05-14 DIAGNOSIS — G47.33 OSA ON CPAP: Chronic | ICD-10-CM

## 2025-05-14 DIAGNOSIS — E66.01 SEVERE OBESITY (BMI 35.0-39.9) WITH COMORBIDITY: ICD-10-CM

## 2025-05-14 DIAGNOSIS — Z71.89 GOALS OF CARE, COUNSELING/DISCUSSION: ICD-10-CM

## 2025-05-14 DIAGNOSIS — G47.33 OSA (OBSTRUCTIVE SLEEP APNEA): Primary | ICD-10-CM

## 2025-05-14 PROCEDURE — 99999 PR PBB SHADOW E&M-EST. PATIENT-LVL IV: CPT | Mod: PBBFAC,HCNC,, | Performed by: INTERNAL MEDICINE
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
## 2025-05-21 ENCOUNTER — HOSPITAL ENCOUNTER (OUTPATIENT)
Dept: WOUND CARE | Facility: HOSPITAL | Age: 80
Discharge: HOME | End: 2025-05-21
Attending: FAMILY MEDICINE
Payer: MEDICARE

## 2025-05-21 VITALS — DIASTOLIC BLOOD PRESSURE: 69 MMHG | SYSTOLIC BLOOD PRESSURE: 142 MMHG | HEART RATE: 76 BPM | TEMPERATURE: 97 F

## 2025-05-21 DIAGNOSIS — Z99.2 ESRD (END STAGE RENAL DISEASE) ON DIALYSIS: ICD-10-CM

## 2025-05-21 DIAGNOSIS — I87.2 VENOUS ULCER OF LEFT LOWER EXTREMITY WITHOUT VARICOSE VEINS: Primary | ICD-10-CM

## 2025-05-21 DIAGNOSIS — L97.929 VENOUS ULCER OF LEFT LOWER EXTREMITY WITHOUT VARICOSE VEINS: Primary | ICD-10-CM

## 2025-05-21 DIAGNOSIS — S81.802A WOUND OF LEFT LEG: ICD-10-CM

## 2025-05-21 DIAGNOSIS — N18.6 ESRD (END STAGE RENAL DISEASE) ON DIALYSIS: ICD-10-CM

## 2025-05-21 PROCEDURE — 99213 OFFICE O/P EST LOW 20 MIN: CPT | Mod: HCNC | Performed by: FAMILY MEDICINE

## 2025-05-21 NOTE — PROGRESS NOTES
[facility]                Progress Note    Subjective:       Patient ID: Erica Leblanc is a 80 y.o. female.    Chief Complaint: Wound Care and Dressing Change    Follow Up wound care visit. Patient ambulated with assistance of rollator to [facility]. Casual shoes - non-athletic on bilateral feet. Patient denies fever, chills, nausea, vomiting or diarrhea at present. Patient denies pain or discomfort to wound beds at present. Patient reports not having any issues with Tubigrip since last visit. Dressing to RLE appears intact without strikethrough drainage. Dressings removed & wound cleaned by nurse. Dressings discarded. BLE Calf Measurement: Left Calf: 36 cm, Right Calf: 38 cm. LLE, Anterior wound appears closed. RLE, Anterior blistered opened upon removal of dressing.    Changes made to dressing order; applied per MD order. Patient will return to [facility] in 1 week. Patient declined AVS, has MyChart.       This is an established patient in wound care.   Patient presents in the office today for evaluation of the chronic wound.  Updated HPI is noted below.    The periwound appears non-erythematous, no maceration noted, edematous    The wound appears wound healing; limited to skin break down.     Patient denies fever, chills    Patients reports wound pain    Lymphedema status is contributory to wound status    Pain at the site of the wound is aching and throbbing    Contributing factors to current wound state include lymphedema, off-loading, co-morbidities      Review of Systems   Constitutional:  Negative for activity change, appetite change and fever.   HENT:  Negative for congestion.    Respiratory:  Negative for shortness of breath and wheezing.    Cardiovascular:  Negative for chest pain and leg swelling.   Gastrointestinal:  Negative for abdominal pain, nausea and vomiting.   Skin:  Positive for wound.   Neurological:  Negative for dizziness and headaches.    Psychiatric/Behavioral:  The patient is not nervous/anxious.          Objective:        Physical Exam  Vitals and nursing note reviewed.   Constitutional:       General: She is not in acute distress.     Appearance: She is well-developed.   HENT:      Head: Normocephalic and atraumatic.   Eyes:      Conjunctiva/sclera: Conjunctivae normal.   Pulmonary:      Effort: Pulmonary effort is normal.   Abdominal:      General: There is no distension.   Musculoskeletal:         General: Normal range of motion.      Cervical back: Normal range of motion.   Skin:     General: Skin is warm.      Comments: See wound description   Neurological:      Mental Status: She is alert and oriented to person, place, and time.   Psychiatric:         Behavior: Behavior normal.         Thought Content: Thought content normal.         Judgment: Judgment normal.         Vitals:    05/21/25 1600   BP: (!) 142/69   Pulse: 76   Temp: 97.3 °F (36.3 °C)       Assessment:           ICD-10-CM ICD-9-CM   1. Venous ulcer of left lower extremity without varicose veins  I87.2 459.81    L97.929    2. Wound of left leg  S81.802A 891.0   3. ESRD (end stage renal disease) on dialysis  N18.6 585.6    Z99.2 V45.11            Wound 05/12/25 1525 Blister(s) Right anterior;lower Leg (Active)   05/12/25 1525 Leg   Present on Original Admission: Y   Primary Wound Type: Blister(s)   Side: Right   Orientation: anterior;lower   Wound Approximate Age at First Assessment (Weeks):    Wound Number:    Is this injury device related?:    Incision Type:    Closure Method:    Wound Description (Comments):    Type:    Additional Comments:    Ankle-Brachial Index:    Pulses:    Removal Indication and Assessment:    Wound Outcome:    Wound Image    05/21/25 1616   Dressing Appearance Intact;Moist drainage 05/21/25 1616   Drainage Amount Small 05/21/25 1616   Drainage Characteristics/Odor Serous 05/21/25 1616   Appearance Blistered 05/21/25 1616   Tissue loss description  Partial thickness 05/21/25 1616   Black (%), Wound Tissue Color 0 % 05/21/25 1616   Red (%), Wound Tissue Color 100 % 05/21/25 1616   Yellow (%), Wound Tissue Color 0 % 05/21/25 1616   Periwound Area Pale white 05/21/25 1616   Wound Length (cm) 7.7 cm 05/21/25 1616   Wound Width (cm) 4 cm 05/21/25 1616   Wound Depth (cm) 0.1 cm 05/21/25 1616   Wound Volume (cm^3) 1.613 cm^3 05/21/25 1616   Wound Surface Area (cm^2) 24.19 cm^2 05/21/25 1616   Tunneling (depth (cm)/location) 0 05/21/25 1616   Undermining (depth (cm)/location) 0 05/21/25 1616   Care Cleansed with:;Antimicrobial agent;Sterile normal saline;Wound cleanser 05/21/25 1616   Dressing Applied;Absorptive Pad;Tubular bandage 05/21/25 1616   Compression Tubular elasticized bandage 05/21/25 1616   Dressing Change Due 05/28/25 05/21/25 1616            Wound 05/12/25 1525 Blister(s) Left anterior;lower Leg (Active)   05/12/25 1525 Leg   Present on Original Admission: Y   Primary Wound Type: Blister(s)   Side: Left   Orientation: anterior;lower   Wound Approximate Age at First Assessment (Weeks):    Wound Number:    Is this injury device related?:    Incision Type:    Closure Method:    Wound Description (Comments):    Type:    Additional Comments:    Ankle-Brachial Index:    Pulses:    Removal Indication and Assessment:    Wound Outcome:    Wound Image   05/21/25 1616   Dressing Appearance Intact;Dry 05/21/25 1616   Drainage Amount None 05/21/25 1616   Appearance Closed/resurfaced 05/21/25 1616   Tissue loss description Not applicable 05/21/25 1616   Periwound Area Dry;Intact 05/21/25 1616   Wound Edges Approximated 05/21/25 1616   Wound Length (cm) 0 cm 05/21/25 1616   Wound Width (cm) 0 cm 05/21/25 1616   Wound Depth (cm) 0 cm 05/21/25 1616   Wound Volume (cm^3) 0 cm^3 05/21/25 1616   Wound Surface Area (cm^2) 0 cm^2 05/21/25 1616   Tunneling (depth (cm)/location) 0 05/21/25 1616   Undermining (depth (cm)/location) 0 05/21/25 1616   Care Cleansed  "with:;Antimicrobial agent;Sterile normal saline;Applied:;Moisturizing agent 05/21/25 1616   Dressing Tubular bandage 05/21/25 1616   Compression Tubular elasticized bandage 05/21/25 1616   Dressing Change Due 05/28/25 05/21/25 1616           Plan:            Debridement not needed and Not Done today.   Removed eczematous skin by me in clinic   Recommend to restart compression pumps at home   Elevate legs   Decrease salt intake   Continue tubigrip   Keep dressing clean and intact  Discussed increase protein intake   Discussed wound expectations and plan   Continue with wound care orders and plan as noted in orders.   Continue to follow current medication regimen as per pcp   Call for any questions / concerns.        Tissue pathology and/or culture taken     [] Yes      [x] No  Sharp debridement performed                   [] Yes       [x] No  Labs ordered     [] Yes       [x] No  Imaging ordered    [] Yes      [x] No    Orders Placed This Encounter   Procedures    Change dressing     Wound Dressing Orders    Dressing change frequency once a week and prn Nurse Visits  Remove old dressing  Cleanse or irrigate with: Wound Cleanser    Right Anterior Lower leg  Periwound: Gentian Violet  Primary dressing: Mextra (5" x 9")    BLE  Compression: Tubi-: size E, Double Layer, Medium Gradient to each leg    Please contact Wound Care Clinic on any acute changes.  If after clinic hours or over the weekend, please go to the nearest ER for evaluation.        Follow up in about 1 week (around 5/28/2025) for Wound Care.         "

## 2025-05-27 DIAGNOSIS — E78.5 HYPERLIPIDEMIA LDL GOAL <100: Chronic | ICD-10-CM

## 2025-05-27 RX ORDER — ATORVASTATIN CALCIUM 80 MG/1
80 TABLET, FILM COATED ORAL NIGHTLY
Qty: 90 TABLET | Refills: 0 | Status: SHIPPED | OUTPATIENT
Start: 2025-05-27

## 2025-05-27 NOTE — TELEPHONE ENCOUNTER
Care Due:                  Date            Visit Type   Department     Provider  --------------------------------------------------------------------------------                                [facility] MED                              PRIMARY      / INTERNAL [facility]  Last Visit: 07-      CARE ([facility])   / PEDS         Tiffanie Elaine                              [facility]                              PRIMARY      / INTERNAL [facility]  Next Visit: 07-      CARE ([facility])   / PEDS         Tiffanie Elaien                                                            Last  Test          Frequency    Reason                     Performed    Due Date  --------------------------------------------------------------------------------    CMP.........  12 months..  atorvastatin.............  10-   09-    Lipid Panel.  12 months..  atorvastatin.............  08- 08-    [facility] Embedded Care Due Messages. Reference number: [id number].   5/27/2025 8:57:30 AM CDT

## 2025-05-27 NOTE — TELEPHONE ENCOUNTER
Copied from CRM #[id number]. Topic: Medications - Medication Refill  >> May 23, 2025 10:11 AM Racquel wrote:  .Type: RX Refill Request    Who Called: Self     Have you contacted your pharmacy: yes     Refill or New Rx: refill for cholesterol     How is the patient currently taking it? (ex. 1XDay): 1 x daily     Is this a 30 day or 90 day RX: 90day     Preferred Pharmacy with phone number:     [facility] [address]  [address] [address]  Phone: [phone] Fax: [phone]       Local or Mail Order: mail order     Ordering Provider: Dr Elaine     Would the patient rather a call back or a response via [facility]? Call     Best Call Back Number: .[phone]      Additional Information:

## 2025-05-28 ENCOUNTER — HOSPITAL ENCOUNTER (OUTPATIENT)
Dept: WOUND CARE | Facility: HOSPITAL | Age: 80
Discharge: HOME | End: 2025-05-28
Attending: INTERNAL MEDICINE
Payer: MEDICARE

## 2025-05-28 VITALS — DIASTOLIC BLOOD PRESSURE: 72 MMHG | SYSTOLIC BLOOD PRESSURE: 157 MMHG | HEART RATE: 80 BPM | TEMPERATURE: 97 F

## 2025-05-28 DIAGNOSIS — I87.303 VENOUS HYPERTENSION OF BOTH LOWER EXTREMITIES: Primary | ICD-10-CM

## 2025-05-28 DIAGNOSIS — I73.9 PVD (PERIPHERAL VASCULAR DISEASE): ICD-10-CM

## 2025-05-28 PROCEDURE — 99213 OFFICE O/P EST LOW 20 MIN: CPT | Mod: HCNC | Performed by: FAMILY MEDICINE

## 2025-05-28 PROCEDURE — 99214 OFFICE O/P EST MOD 30 MIN: CPT | Mod: HCNC,,, | Performed by: FAMILY MEDICINE

## 2025-05-28 NOTE — PROGRESS NOTES
[facility]                Progress Note    Subjective:       Patient ID: Erica Leblanc is a 80 y.o. female.    Chief Complaint: Wound Care    Follow Up wound care visit. Patient ambulated with assistance of rollator to [facility]. Casual shoes - non-athletic on bilateral Feet. Patient denies fever, chills, nausea, vomiting or diarrhea at present. Patient denies pain or discomfort to wound bed at present. Patient reports not having any issues with dressing since last visit. Dressing appears intact without strikethrough drainage. Dressing removed & RLE cleaned by nurse.  Dressing discarded. Calf Measurement: Right Calf: 44.5 cm.  Moisturizer applied then ABD pad that patient will remove tomorrow and double layer Tubigrip in size E. LLE Anterior skin still appears closed.     MD gave patient discharge instructions and discharged patient from wound care clinic.      This is an established patient in wound care.   Patient presents in the office today for evaluation of the chronic wound.  Updated HPI is noted below.    The periwound appears non-erythematous, no maceration noted, edematous    The wound appears wound healing    Patient denies fever, chills    Patients reports she has been using her compression pumps     Lymphedema status is contributory to wound status    Pain at the site of the wound is aching    Contributing factors to current wound state include numerous comorbid conditions       Review of Systems   Constitutional:  Negative for activity change, appetite change and fever.   HENT:  Negative for congestion.    Respiratory:  Negative for shortness of breath and wheezing.    Cardiovascular:  Negative for chest pain and leg swelling.   Gastrointestinal:  Negative for abdominal pain, nausea and vomiting.   Skin:  Positive for wound.   Neurological:  Negative for dizziness and headaches.   Psychiatric/Behavioral:  The patient is not nervous/anxious.           Objective:        Physical Exam  Vitals and nursing note reviewed.   Constitutional:       General: She is not in acute distress.     Appearance: She is well-developed.   HENT:      Head: Normocephalic and atraumatic.   Eyes:      Conjunctiva/sclera: Conjunctivae normal.   Pulmonary:      Effort: Pulmonary effort is normal.   Abdominal:      General: There is no distension.   Musculoskeletal:         General: Normal range of motion.      Cervical back: Normal range of motion.   Skin:     General: Skin is warm.      Comments: See wound description   Neurological:      Mental Status: She is alert and oriented to person, place, and time.   Psychiatric:         Behavior: Behavior normal.         Thought Content: Thought content normal.         Judgment: Judgment normal.         Vitals:    05/28/25 1555   BP: (!) 157/72   Pulse: 80   Temp: 97.1 °F (36.2 °C)       Assessment:           ICD-10-CM ICD-9-CM   1. Venous hypertension of both lower extremities  I87.303 459.30   2. PVD (peripheral vascular disease)  I73.9 443.9            Wound 05/12/25 1525 Blister(s) Right anterior;lower Leg (Active)   05/12/25 1525 Leg   Present on Original Admission: Y   Primary Wound Type: Avulsion   Side: Right   Orientation: anterior;lower   Wound Approximate Age at First Assessment (Weeks):    Wound Number:    Is this injury device related?:    Incision Type:    Closure Method:    Wound Description (Comments):    Type:    Additional Comments:    Ankle-Brachial Index:    Pulses:    Removal Indication and Assessment:    Wound Outcome:    Wound Image   05/28/25 1609   Dressing Appearance Intact;Dried drainage 05/28/25 1609   Drainage Amount Scant 05/28/25 1609   Drainage Characteristics/Odor Serous 05/28/25 1609   Appearance Closed/resurfaced 05/28/25 1609   Tissue loss description Not applicable 05/28/25 1609   Periwound Area Dry;Intact 05/28/25 1609   Wound Edges Approximated 05/28/25 1609   Wound Length (cm) 0 cm 05/28/25 1609    Wound Width (cm) 0 cm 05/28/25 1609   Wound Depth (cm) 0 cm 05/28/25 1609   Wound Volume (cm^3) 0 cm^3 05/28/25 1609   Wound Surface Area (cm^2) 0 cm^2 05/28/25 1609   Tunneling (depth (cm)/location) 0 05/28/25 1609   Undermining (depth (cm)/location) 0 05/28/25 1609   Care Cleansed with:;Antimicrobial agent;Sterile normal saline 05/28/25 1609   Dressing Removed 05/28/25 1609   Compression Tubular elasticized bandage 05/28/25 1609           Plan:            Debridement not needed and Not Done today.   Recommend to continue using compression pumps   Recommend to continue wearing compression socks   Skin precautions discussed  Decrease salt intake.   F/u with pcp   Keep dressing clean and intact  Discussed increase protein intake   Discussed wound expectations and plan   Continue to follow current medication regimen as per pcp   Call for any questions / concerns.          Tissue pathology and/or culture taken     [] Yes      [x] No  Sharp debridement performed                   [] Yes       [x] No  Labs ordered     [] Yes       [x] No  Imaging ordered    [] Yes      [x] No    No orders of the defined types were placed in this encounter.       Follow up if symptoms worsen or fail to improve, for Wound Care.

## 2025-05-29 ENCOUNTER — TELEPHONE (OUTPATIENT)
Dept: PULMONOLOGY | Facility: CLINIC | Age: 80
End: 2025-05-29
Payer: MEDICARE

## 2025-05-29 NOTE — TELEPHONE ENCOUNTER
Message sent to Dr. Linares.    CARISSA Cedeno  Pulm/Sleep [facility]  [phone]               ----- Message from Med Assistant Kelsie sent at 5/29/2025  3:02 PM CDT -----  Who called:Willian with [facility] What is the request in detail:5/21  script needs modified code ,  non heating tubing code needs to  is heated tubing Can the clinic reply by [facility]? NoWould the patient rather a call back or a response via [facility]? Call [facility] call back number:[phone] office Additional Information:[phone] FaxREF# [id number] you.

## 2025-06-04 ENCOUNTER — TELEPHONE (OUTPATIENT)
Dept: PULMONOLOGY | Facility: CLINIC | Age: 80
End: 2025-06-04
Payer: MEDICARE

## 2025-06-08 ENCOUNTER — TELEPHONE (OUTPATIENT)
Dept: PULMONOLOGY | Facility: CLINIC | Age: 80
End: 2025-06-08
Payer: MEDICARE

## 2025-06-08 DIAGNOSIS — G47.33 OSA (OBSTRUCTIVE SLEEP APNEA): Primary | ICD-10-CM

## 2025-06-09 NOTE — TELEPHONE ENCOUNTER
----- Message from Med Assistant Idget sent at 6/4/2025  3:36 PM CDT -----   non heating tubing code needs to  is heated tubing.

## 2025-06-17 ENCOUNTER — TELEPHONE (OUTPATIENT)
Dept: FAMILY MEDICINE | Facility: CLINIC | Age: 80
End: 2025-06-17
Payer: MEDICARE

## 2025-06-17 NOTE — TELEPHONE ENCOUNTER
Copied from CRM #[id number]. Topic: General Inquiry - Return Call  >> Jun 16, 2025  3:08 PM Leigh wrote:  Type: Patient Call Back    Who called: self     What is the request in detail: pt would like to know if she can get some type of medical equipment     Can the clinic reply by [facility]? No     Would the patient rather a call back or a response via [facility]? call    Best call back number:.[phone]  Additional Information:

## 2025-07-03 DIAGNOSIS — E78.5 HYPERLIPIDEMIA LDL GOAL <100: Primary | Chronic | ICD-10-CM

## 2025-07-03 PROBLEM — Z99.2 ESRD (END STAGE RENAL DISEASE) ON DIALYSIS: Status: RESOLVED | Noted: 2025-05-21 | Resolved: 2025-07-03

## 2025-07-03 PROBLEM — N18.6 ESRD (END STAGE RENAL DISEASE) ON DIALYSIS: Status: RESOLVED | Noted: 2025-05-21 | Resolved: 2025-07-03

## 2025-07-07 ENCOUNTER — OFFICE VISIT (OUTPATIENT)
Dept: VASCULAR SURGERY | Facility: CLINIC | Age: 80
End: 2025-07-07
Payer: MEDICARE

## 2025-07-07 VITALS
HEART RATE: 81 BPM | DIASTOLIC BLOOD PRESSURE: 104 MMHG | SYSTOLIC BLOOD PRESSURE: 167 MMHG | WEIGHT: 207 LBS | BODY MASS INDEX: 36.68 KG/M2 | HEIGHT: 63 IN

## 2025-07-07 DIAGNOSIS — T82.858D ARTERIOVENOUS FISTULA STENOSIS, SUBSEQUENT ENCOUNTER: Primary | ICD-10-CM

## 2025-07-07 DIAGNOSIS — I89.0 LYMPHEDEMA: ICD-10-CM

## 2025-07-07 PROCEDURE — 99999 PR PBB SHADOW E&M-EST. PATIENT-LVL III: CPT | Mod: PBBFAC,HCNC,, | Performed by: SURGERY

## 2025-07-07 NOTE — PROGRESS NOTES
[facility]                  HPI:  Erica Leblanc is a 80 y.o. female with   Patient Active Problem List   Diagnosis    Severe obesity (BMI 35.0-39.9) with comorbidity    Sickle cell trait    Hyperlipidemia LDL goal <100    HUMBERTO on CPAP    Hypothyroidism (acquired)    Hx-TIA (transient ischemic attack)    Vitamin D deficiency disease    Pulmonary hypertension    Type 2 diabetes mellitus with ESRD (end-stage renal disease)    Secondary renal hyperparathyroidism    Incomplete bladder emptying    Rectocele    Mixed incontinence urge and stress    Chronic diastolic heart failure    Tortuous aorta    ESRD on hemodialysis    Anemia of chronic disease    Debility    Chronic respiratory failure    Type 2 diabetes mellitus with foot ulcer, with long-term current use of insulin    Stable proliferative diabetic retinopathy associated with type 2 diabetes mellitus    Heart failure with preserved ejection fraction    Goals of care, counseling/discussion    Pseudophakia    Chronic kidney disease-mineral and bone disorder    Age-related osteoporosis without current pathological fracture    H/O: stroke    Walker as ambulation aid    Swelling of lower extremity    Lymphedema of both lower extremities    Impaired functional mobility and activity tolerance    Wound of left leg    Multiple thyroid nodules    Venous ulcer of left lower extremity without varicose veins    Primary open angle glaucoma (POAG) of both eyes, indeterminate stage    Venous hypertension of both lower extremities    PVD (peripheral vascular disease)    being managed by PCP and specialists who is here today for evaluation of long term HD access.  S/p R IJ tunneled HD catheter, HD without issues.  Pt is R handed.  No complaints today.  Does endorse orthopnea, no chest pain.  Unable to climb 1 flight of stairs on own.    no MI  no Stroke  Tobacco use: denies    1/20/20: No new issues.    3/2020: Dialysis without  issues.    4/6/20:  S/p LUE fistulogram, central venogram, ligation of medial side branch cephalic vein, ligation of lateral side branch cephalic vein.  No issues with HD for several weeks since procedure.    7/6/20:  No issues with HD.    8/3/20: s/p L proximal fistula angioplasty 7/21/20.  No issues with HD.    8/31/20:  S/p 8/19/29 Balloon angioplasty of the proximal LUE AVF with a 6x60 Angiosculpt and Stellerex balloon.      10/2020:  No issues with HD    1/2021:  No issues with HD    4/2021:  No new problems.    8/2021:  No issues with HD. C/o fatigue after HD.  Has not seen cardiology recently.    3/2022:  No issues with HD    6/2022:  Doing well, no issues with HD    1/2023:  No issues with HD    04/2023: No issues with HD.     7/2023:  doing well.    10/2023:  No issues with HD.    11/2023:  c/o LLE cellulitis and edema.  +compression with sock.    12/2023:  +compression.      2/2024:  no new issues.    02/28/24: Pt presents with complaints LLE shin blister with swelling after undergoing lymphedema therapy.     03/2024: No new issues    04/2024: No new issues wound healed.    05/22/24: No new issues.    07/2024: Pt called with complaints of LLE wound    9/2024: no issues with HD.  Traumatic wound to LLE.    12/2024:  no issues with HD.  LLE wound healed.    7/2025: no issues with HD, has stopped access to proximal PSA, no bleeding or infection.    Past Medical History:   Diagnosis Date    Acute ischemic right PCA stroke 6/6/2021    Acute respiratory failure with hypoxia     Age-related osteoporosis without current pathological fracture 3/8/2021    Anemia of chronic kidney failure, stage 4 (severe) 4/5/2019    Cataracts, bilateral     CHF (congestive heart failure)     CKD (chronic kidney disease) stage 3, GFR 30-59 ml/min     CKD (chronic kidney disease) stage 3, GFR 30-59 ml/min     Controlled type 2 diabetes mellitus with proteinuria or albuminuria     Depression     Diabetes with neurologic complications      Diabetic retinopathy of both eyes     Edema     Glaucoma     History of colonic polyps     Hx-TIA (transient ischemic attack) 2008    Hyperlipidemia LDL goal < 100     Hypertension     Hypothyroidism     Major depressive disorder, single episode, mild 2016    Mixed incontinence urge and stress     Obesity     Obstructive sleep apnea on CPAP     19:  Home CPAP machine broken, per patient & son    Osteopenia     Proteinuria     PVD (peripheral vascular disease) 2025    Sickle cell trait     Strabismus     TIA (transient ischemic attack)     Trouble in sleeping     Type 2 diabetes mellitus with ophthalmic manifestations     Type 2 diabetes with stage 3 chronic kidney disease GFR 30-59     Type II or unspecified type diabetes mellitus with renal manifestations, uncontrolled(250.42)     Uncontrolled type 2 diabetes mellitus with peripheral circulatory disorder 2019    Urge incontinence 2016    Urge incontinence     Venous stasis ulcer     bilateral lower legs    Vitamin D deficiency disease      Past Surgical History:   Procedure Laterality Date    AV FISTULA PLACEMENT Left 2019    Procedure: CREATION, AV FISTULA, LEFT UPPER EXTREMITY;  Surgeon: Keron Perez MD;  Location: [facility];  Service: Vascular;  Laterality: Left;    BREAST BIOPSY      breast reduction Bilateral age 31    BREAST SURGERY      CATARACT EXTRACTION Bilateral     cataracts Bilateral      SECTION, LOW TRANSVERSE      x1    CHOLECYSTECTOMY      COLONOSCOPY N/A 2022    Procedure: COLONOSCOPY;  Surgeon: Mahesh Huang MD;  Location: [facility] ([address] [place] [address]);  Service: Endoscopy;  Laterality: N/A;  fully vaccinated-sm.  RAPID COVID  +cologuard needing ASAP  Dialysis pt T,TH Sat and left UA access  2nd floor - cardiac/dialysis/SOB / LABS / prep ins. emailed - ERW    EYE SURGERY  2014, 2014    vitrectomy    EYE SURGERY Right 2016    FISTULOGRAM Left 2020    Procedure: Fistulogram, left  upper extremity, with branch ligation;  Surgeon: Keron Perez MD;  Location: [facility] OR [address] [place] [address];  Service: Vascular;  Laterality: Left;  Time 1.1 Minute  42.10 mGy    FISTULOGRAM Left 07/21/2020    Procedure: Fistulogram, left upper extremity, transradial access with possible intervention;  Surgeon: Keron Perez MD;  Location: [facility] OR [address] [place] [address];  Service: Vascular;  Laterality: Left;    FISTULOGRAM Left 08/19/2020    Procedure: Fistulogram, left upper extremity, possible intervention;  Surgeon: Keron Perez MD;  Location: [facility];  Service: Vascular;  Laterality: Left;  930 AM START  RN PRE OP  ---COVID NEGATIVE ON  8-. CA    FISTULOGRAM Left 01/21/2022    Procedure: Fistulogram, transradial access;  Surgeon: Keron Perez MD;  Location: [facility] OR [address] [place] [address];  Service: Vascular;  Laterality: Left;  mgy-40.16  gycm-8.3905  contrast-34cc  time-12.4min    HYSTERECTOMY  1986    TAHBSO (patient is unsure if ovaries removed)    OOPHORECTOMY      PERCUTANEOUS TRANSLUMINAL ANGIOPLASTY OF ARTERIOVENOUS FISTULA Left 07/21/2020    Procedure: PTA, AV FISTULA;  Surgeon: Keron Perez MD;  Location: [facility] OR [address] [place] [address];  Service: Vascular;  Laterality: Left;  15.9 minutes of fluro  41.12  mGy  7.9060 Gy cm2  32ml  contrast    PHLEBOGRAPHY Left 07/21/2020    Procedure: CENTRAL VENOGRAM;  Surgeon: Keron Perez MD;  Location: [facility] OR [address] [place] [address];  Service: Vascular;  Laterality: Left;    REFRACTIVE SURGERY      TOTAL REDUCTION MAMMOPLASTY      approx 10 yrs ago    VENOPLASTY  01/21/2022    Procedure: ANGIOPLASTY, VEIN;  Surgeon: Keron Perez MD;  Location: [facility] OR [address] [place] [address];  Service: Vascular;;     Family History   Problem Relation Name Age of Onset    Stroke Mother      Diabetes Mother      Hypertension Mother      Cataracts Mother      Leukemia Father      Cataracts Father      Ovarian cancer Sister Marilee 35    Achondroplasia Sister Hannah     HIV Brother Paul     No Known Problems  Daughter x2     No Known Problems Son x3     Breast cancer Maternal Aunt  65    Parkinsonism Maternal Aunt      Esophageal cancer Maternal Uncle          smoker    No Known Problems Paternal Aunt      Cataracts Paternal Uncle      Cataracts Maternal Grandmother      Cataracts Maternal Grandfather      Diabetes Paternal Grandmother      Cataracts Paternal Grandmother      No Known Problems Paternal Grandfather      No Known Problems Other      Amblyopia Neg Hx      Blindness Neg Hx      Glaucoma Neg Hx      Macular degeneration Neg Hx      Retinal detachment Neg Hx      Strabismus Neg Hx      Thyroid disease Neg Hx      Colon cancer Neg Hx      Cancer Neg Hx       Social History     Socioeconomic History    Marital status: Single    Number of children: 5   Occupational History    Occupation:      Employer: [employer]     Comment: part-time   Tobacco Use    Smoking status: Never     Passive exposure: Never    Smokeless tobacco: Never   Substance and Sexual Activity    Alcohol use: No    Drug use: No     Social Drivers of Health     Financial Resource Strain: Medium Risk (10/21/2024)    Overall Financial Resource Strain (CARDIA)     Difficulty of Paying Living Expenses: Somewhat hard   Food Insecurity: Food Insecurity Present (10/21/2024)    Hunger Vital Sign     Worried About Running Out of Food in the Last Year: Sometimes true     Ran Out of Food in the Last Year: Sometimes true   Transportation Needs: No Transportation Needs (10/21/2024)    PRAPARE - Transportation     Lack of Transportation (Medical): No     Lack of Transportation (Non-Medical): No   Physical Activity: Inactive (10/21/2024)    Exercise Vital Sign     Days of Exercise per Week: 0 days     Minutes of Exercise per Session: 0 min   Stress: No Stress Concern Present (10/21/2024)    [employer] - Occupational Stress Questionnaire     Feeling of Stress : Only a little   Housing Stability: High Risk  (10/21/2024)    Housing Stability Vital Sign     Unable to Pay for Housing in the Last Year: Yes     Homeless in the Last Year: No       Current Outpatient Medications:     ACCU-CHEK SOFT DEV LANCETS Kit, , Disp: , Rfl:     aspirin (ECOTRIN) 81 MG EC tablet, Take 1 tablet (81 mg total) by mouth once daily., Disp: 90 tablet, Rfl: 3    atorvastatin (LIPITOR) 80 MG tablet, Take 1 tablet (80 mg total) by mouth every evening., Disp: 90 tablet, Rfl: 0    blood pressure monitor (BLOOD PRESSURE KIT) Kit, 1 kit by Misc.(Non-Drug; Combo Route) route As instructed (as instructed)., Disp: 1 each, Rfl: 0    blood sugar diagnostic Strp, One test strip use 2 times a day to check blood glucose,  ICD-10: E11.9, compatible with insurance/glucometer, Disp: 100 each, Rfl: 11    blood-glucose meter kit, One glucometer, use to check blood glucose.   ICD-10: E11.9. Dispense machine covered by insurance, Disp: 1 each, Rfl: 0    cinacalcet (SENSIPAR) 30 MG Tab, , Disp: , Rfl:     docusate sodium (COLACE) 100 MG capsule, Take 200 mg by mouth once daily. , Disp: , Rfl:     doxercalciferoL (HECTOROL) 4 mcg/2 mL injection, Inject 4.5 mcg into the vein 3 (three) times a week. At dialysis unit, Disp: , Rfl:     dulaglutide (TRULICITY) 1.5 mg/0.5 mL pen injector, Inject 1.5 mg into the skin every 7 days., Disp: 12 pen , Rfl: 3    epoetin arnel (EPOGEN INJ), Inject 1,000 Units as directed every 7 days. At the dialysis unit, Disp: , Rfl:     fluticasone propionate (FLONASE) 50 mcg/actuation nasal spray, 1 spray (50 mcg total) by Each Nostril route once daily., Disp: 48 g, Rfl: 5    lancets Misc, One lancets use 2 times a day to check blood glucose, ICD-10: E11.9, Disp: 100 each, Rfl: 11    lancing device Misc, One device, used to check blood glucose, ICD-10: E11.9, Disp: 1 each, Rfl: 0    latanoprost 0.005 % ophthalmic solution, Place 1 drop into both eyes every evening., Disp: 7.5 mL, Rfl: 3    levothyroxine (SYNTHROID) 50 MCG tablet, TAKE 1 TABLET  BEFORE BREAKFAST, Disp: 90 tablet, Rfl: 3    LIDOcaine-prilocaine (EMLA) cream, Apply topically as needed., Disp: 30 g, Rfl: 11    mupirocin (BACTROBAN) 2 % ointment, Apply topically 3 (three) times daily., Disp: 22 g, Rfl: 0    OLENA-NABIL RX 1- mg-mg-mcg Tab, Take 1 tablet by mouth once daily., Disp: , Rfl:     midodrine (PROAMATINE) 5 MG Tab, Take 1 tablet (5 mg total) by mouth 3 (three) times daily., Disp: 90 tablet, Rfl: 3    sevelamer carbonate (RENVELA) 800 mg Tab, Take 3 tablets (2,400 mg total) by mouth 3 (three) times daily with meals., Disp: 270 tablet, Rfl: 11    REVIEW OF SYSTEMS:  General: No fevers or chills; ENT: No sore throat; Allergy and Immunology: no persistent infections; Hematological and Lymphatic: No history of bleeding or easy bruising; Endocrine: negative; Respiratory: no cough, shortness of breath, or wheezing; Cardiovascular: no chest pain or dyspnea on exertion; Gastrointestinal: no abdominal pain/back, change in bowel habits, or bloody stools; Genito-Urinary: no dysuria, trouble voiding, or hematuria; Musculoskeletal: negative; Neurological: no TIA or stroke symptoms; Psychiatric: no nervousness, anxiety or depression.    PHYSICAL EXAM:      Pulse: 81         General appearance:  Alert, well-appearing, and in no distress.  Oriented to person, place, and time                    Neurological: Normal speech, no focal findings noted; CN II - XII grossly intact. All extremities with sensation to light touch.            Musculoskeletal: Digits/nail without cyanosis/clubbing.  Strength 5/5 all extremities.                    Neck: Supple, no significant adenopathy, no carotid bruit can be auscultated                  Chest:  Clear to auscultation, no wheezes, rales or rhonchi, symmetric air entry. No use of accessory muscles               Cardiac: Normal rate and regular rhythm, S1 and S2 normal            Abdomen: Soft, nontender, nondistended, no masses or organomegaly, no hernia     No  "rebound tenderness noted; bowel sounds normal     Pulsatile aortic mass is non palpable.     No groin adenopathy      Extremities:      2+ radial pulse bilaterally, LUE AVF +thrill, inc well healed, 2 PSA with dry scabs, no ulcerations     No BUE edema, Negative Manuel's test BUE    Skin: No tissue loss, 2+ LLE edema , 1+ RLE edema     VCSS 5     CEAP 3/4a    LAB RESULTS:  No results found for: "CBC"  Lab Results   Component Value Date    LABPROT 10.3 06/07/2021    INR 1.0 06/07/2021     Lab Results   Component Value Date     01/10/2025    K 4.8 01/10/2025    CL 99 01/10/2025    CO2 26 01/10/2025    GLU 80 01/10/2025    BUN 35 (H) 01/10/2025    CREATININE 9.95 (H) 03/06/2025    CALCIUM 9.5 01/10/2025    ANIONGAP 13 01/10/2025    EGFRNONAA 3.6 (A) 05/07/2022     Lab Results   Component Value Date    WBC 6.54 10/01/2023    RBC 3.84 (L) 10/01/2023    HGB 10.4 (L) 03/25/2025    HCT 33 (L) 07/18/2024    MCV 85 10/01/2023    MCH 27.1 10/01/2023    MCHC 31.8 (L) 10/01/2023    RDW 13.8 10/01/2023     10/01/2023    MPV 11.4 10/01/2023    GRAN 4.6 10/01/2023    GRAN 70.1 10/01/2023    LYMPH 0.7 (L) 10/01/2023    LYMPH 11.2 (L) 10/01/2023    MONO 1.1 (H) 10/01/2023    MONO 16.4 (H) 10/01/2023    EOS 0.1 10/01/2023    BASO 0.02 10/01/2023    EOSINOPHIL 1.4 10/01/2023    BASOPHIL 0.3 10/01/2023    DIFFMETHOD Automated 10/01/2023     .  Lab Results   Component Value Date    HGBA1C 5.6 01/10/2025       IMAGING:  All pertinent imaging has been reviewed and interpreted independently.    Vein mapping 9/2019:  Adequate R superior basilic vein and axillary vein ; Adequate L basilic vein superior and inferior, adequate L axillary     1/27/20 Left upper extremity dialysis ultrasound shows no hemodynamically significant stenosis.  Flow is 1083 ml/min.  Depth and diameter are appropriate.    3/23/20:  Left upper extremity dialysis ultrasound shows anastomotic and proximal fistula hemodynamically significant stenosis.  Flow is " 955 ml/min.      7/2020: Left upper extremity dialysis ultrasound shows proximal fistula hemodynamically significant stenosis.  Flow is 1257 ml/min.      8/2020: Left upper extremity dialysis ultrasound shows proximal hemodynamically significant stenosis.  Flow is 1999 ml/min.      8/31/20: Left upper extremity dialysis ultrasound shows proximal fistula hemodynamically significant stenosis.  Flow is 2209 ml/min.      10/2020:  Prox stenosis, flow > 3000 ml/min    1/2021:  Proximal stenosis, flow 4414 ml/min    4/2021:  HD US reviewed without significant stenosis, adequate flow.    8/2021:HD US reviewed without significant stenosis, adequate flow.    3/2022:  Left upper extremity dialysis ultrasound shows approx 50% anastomotic and prox fistula stenosis withou hemodynamically significant stenosis.  Flow is 3774 ml/min.      6/2022:  No significant stenosis     04/2023: No significant stenosis. Flow 4,086 ml/min    10/2023:  Left upper extremity dialysis ultrasound shows approx 50% anastomotic stenosis.  Flow is 5000 ml/min.      12/2023:  Color flow evaluation of the right lower extremity demonstrates no evidence of venous thrombosis in the deep or superficial veins, and no reflux.  Color flow evaluation of the left lower extremity demonstrates no evidence of venous thrombosis in the deep or superficial veins, and no reflux.    Arterial US 03/2024:  Conclusion    Right lower extremity arterial ultrasound shows no hemodynamically significant stenosis.    Left lower extremity arterial ultrasound shows no hemodynamically significant stenosis.      PAM 03/2024:      HD US 05/22/24: Flow 2529 ml/min    9/2024: Left upper extremity dialysis ultrasound shows 50-69% stenosis of the anastomosis and proximal fistula.  Flow is 7536 ml/min.      12/2024:  HD US reviewed    4/2024: Left upper extremity dialysis ultrasound shows anastomosis hemodynamically significant stenosis.  Flow is 7405 ml/min.         IMP/PLAN:  80 y.o.  female with   Patient Active Problem List   Diagnosis    Severe obesity (BMI 35.0-39.9) with comorbidity    Sickle cell trait    Hyperlipidemia LDL goal <100    HUMBERTO on CPAP    Hypothyroidism (acquired)    Hx-TIA (transient ischemic attack)    Vitamin D deficiency disease    Pulmonary hypertension    Type 2 diabetes mellitus with ESRD (end-stage renal disease)    Secondary renal hyperparathyroidism    Incomplete bladder emptying    Rectocele    Mixed incontinence urge and stress    Chronic diastolic heart failure    Tortuous aorta    ESRD on hemodialysis    Anemia of chronic disease    Debility    Chronic respiratory failure    Type 2 diabetes mellitus with foot ulcer, with long-term current use of insulin    Stable proliferative diabetic retinopathy associated with type 2 diabetes mellitus    Heart failure with preserved ejection fraction    Goals of care, counseling/discussion    Pseudophakia    Chronic kidney disease-mineral and bone disorder    Age-related osteoporosis without current pathological fracture    H/O: stroke    Walker as ambulation aid    Swelling of lower extremity    Lymphedema of both lower extremities    Impaired functional mobility and activity tolerance    Wound of left leg    Multiple thyroid nodules    Venous ulcer of left lower extremity without varicose veins    Primary open angle glaucoma (POAG) of both eyes, indeterminate stage    Venous hypertension of both lower extremities    PVD (peripheral vascular disease)    being managed by PCP and specialists who is here today for evaluation of long term HD access s/p L RC AV fistula.     -Wound well healed. Ok to use compressions  -s/p L RC AV fistula with proximal fistula measuring 5 mm 1/13/20, adequate flow without issues during HD s/p LUE fistulogram, central venogram, ligation of medial side branch cephalic vein, ligation of lateral side branch cephalic vein 3/2/20 with prox AVF stenosis s/p L proximal fistula angioplasty 7/21/20 with  persistent flow issues s/p proximal angioplasty 8/19/20 with scoring balloon/DCB with improvement in stenosis/flow and asymptomatic anastomotic stenosis with no further issues with HD. Patient undergoing lymphedema therapy developed LLE blister to her shin with associated edema- wound well healed. Vascular studies with adequate blood flow for wound healing and wound healed.  -Cont renal diet  -Rec cont lymphedema clinic and pumps  -Compression and elevation  -RTC in 6 weeks for further evaluation of AVF with HD US and overlying skin    I spent 11 minutes evaluating this patient and greater than 50% of the time was spent counseling, coordinator care and discussing the plan of care.  All questions were answered and patient stated understanding with agreement with the above treatment plan.    Keron Perez M.D., R.P.V.I. [facility]

## 2025-07-11 ENCOUNTER — LAB VISIT (OUTPATIENT)
Dept: LAB | Facility: HOSPITAL | Age: 80
End: 2025-07-11
Attending: HOSPITALIST
Payer: MEDICARE

## 2025-07-11 DIAGNOSIS — E11.22 TYPE 2 DIABETES MELLITUS WITH ESRD (END-STAGE RENAL DISEASE): ICD-10-CM

## 2025-07-11 DIAGNOSIS — N18.6 TYPE 2 DIABETES MELLITUS WITH ESRD (END-STAGE RENAL DISEASE): ICD-10-CM

## 2025-07-11 DIAGNOSIS — E03.9 HYPOTHYROIDISM (ACQUIRED): ICD-10-CM

## 2025-07-11 DIAGNOSIS — E78.5 HYPERLIPIDEMIA LDL GOAL <100: Chronic | ICD-10-CM

## 2025-07-11 LAB
ALBUMIN SERPL BCP-MCNC: 3.7 G/DL (ref 3.5–5.2)
ANION GAP (OHS): 12 MMOL/L (ref 8–16)
BUN SERPL-MCNC: 42 MG/DL (ref 8–23)
CALCIUM SERPL-MCNC: 8.9 MG/DL (ref 8.7–10.5)
CHLORIDE SERPL-SCNC: 96 MMOL/L (ref 95–110)
CHOLEST SERPL-MCNC: 98 MG/DL (ref 120–199)
CHOLEST/HDLC SERPL: 1.8 {RATIO} (ref 2–5)
CO2 SERPL-SCNC: 28 MMOL/L (ref 23–29)
CREAT SERPL-MCNC: 7.2 MG/DL (ref 0.5–1.4)
EAG (OHS): 108 MG/DL (ref 68–131)
GFR SERPLBLD CREATININE-BSD FMLA CKD-EPI: 5 ML/MIN/1.73/M2
GLUCOSE SERPL-MCNC: 83 MG/DL (ref 70–110)
HBA1C MFR BLD: 5.4 % (ref 4–5.6)
HDLC SERPL-MCNC: 55 MG/DL (ref 40–75)
HDLC SERPL: 56.1 % (ref 20–50)
LDLC SERPL CALC-MCNC: 35.2 MG/DL (ref 63–159)
NONHDLC SERPL-MCNC: 43 MG/DL
PHOSPHATE SERPL-MCNC: 3.5 MG/DL (ref 2.7–4.5)
POTASSIUM SERPL-SCNC: 4.4 MMOL/L (ref 3.5–5.1)
SODIUM SERPL-SCNC: 136 MMOL/L (ref 136–145)
T4 FREE SERPL-MCNC: 1.16 NG/DL (ref 0.71–1.51)
TRIGL SERPL-MCNC: 39 MG/DL (ref 30–150)
TSH SERPL-ACNC: 2.6 UIU/ML (ref 0.4–4)

## 2025-07-11 PROCEDURE — 84443 ASSAY THYROID STIM HORMONE: CPT | Mod: HCNC

## 2025-07-11 PROCEDURE — 84439 ASSAY OF FREE THYROXINE: CPT | Mod: HCNC

## 2025-07-11 PROCEDURE — 80061 LIPID PANEL: CPT | Mod: HCNC

## 2025-07-11 PROCEDURE — 36415 COLL VENOUS BLD VENIPUNCTURE: CPT | Mod: HCNC,PO

## 2025-07-11 PROCEDURE — 83036 HEMOGLOBIN GLYCOSYLATED A1C: CPT | Mod: HCNC

## 2025-07-11 PROCEDURE — 80069 RENAL FUNCTION PANEL: CPT | Mod: HCNC

## 2025-07-18 ENCOUNTER — OFFICE VISIT (OUTPATIENT)
Dept: ENDOCRINOLOGY | Facility: CLINIC | Age: 80
End: 2025-07-18
Payer: MEDICARE

## 2025-07-18 VITALS
HEART RATE: 83 BPM | DIASTOLIC BLOOD PRESSURE: 72 MMHG | BODY MASS INDEX: 36.74 KG/M2 | WEIGHT: 207.38 LBS | SYSTOLIC BLOOD PRESSURE: 118 MMHG

## 2025-07-18 DIAGNOSIS — N18.9 CHRONIC KIDNEY DISEASE-MINERAL AND BONE DISORDER: ICD-10-CM

## 2025-07-18 DIAGNOSIS — E83.9 CHRONIC KIDNEY DISEASE-MINERAL AND BONE DISORDER: ICD-10-CM

## 2025-07-18 DIAGNOSIS — E66.01 SEVERE OBESITY (BMI 35.0-39.9) WITH COMORBIDITY: ICD-10-CM

## 2025-07-18 DIAGNOSIS — M89.9 CHRONIC KIDNEY DISEASE-MINERAL AND BONE DISORDER: ICD-10-CM

## 2025-07-18 DIAGNOSIS — E21.3 HYPERPARATHYROIDISM: ICD-10-CM

## 2025-07-18 DIAGNOSIS — N18.6 ESRD ON HEMODIALYSIS: ICD-10-CM

## 2025-07-18 DIAGNOSIS — N18.6 TYPE 2 DIABETES MELLITUS WITH ESRD (END-STAGE RENAL DISEASE): Primary | ICD-10-CM

## 2025-07-18 DIAGNOSIS — E11.22 TYPE 2 DIABETES MELLITUS WITH ESRD (END-STAGE RENAL DISEASE): Primary | ICD-10-CM

## 2025-07-18 DIAGNOSIS — E03.9 HYPOTHYROIDISM (ACQUIRED): ICD-10-CM

## 2025-07-18 DIAGNOSIS — E04.2 MULTIPLE THYROID NODULES: ICD-10-CM

## 2025-07-18 DIAGNOSIS — Z99.2 ESRD ON HEMODIALYSIS: ICD-10-CM

## 2025-07-18 DIAGNOSIS — E55.9 VITAMIN D DEFICIENCY DISEASE: ICD-10-CM

## 2025-07-18 PROCEDURE — 99999 PR PBB SHADOW E&M-EST. PATIENT-LVL IV: CPT | Mod: PBBFAC,HCNC,, | Performed by: HOSPITALIST

## 2025-07-18 RX ORDER — DULAGLUTIDE 3 MG/.5ML
3 INJECTION, SOLUTION SUBCUTANEOUS
Qty: 12 PEN | Refills: 3 | Status: SHIPPED | OUTPATIENT
Start: 2025-07-18

## 2025-07-18 RX ORDER — LEVOTHYROXINE SODIUM 50 UG/1
TABLET ORAL
Qty: 90 TABLET | Refills: 3 | Status: SHIPPED | OUTPATIENT
Start: 2025-07-18

## 2025-07-18 NOTE — ASSESSMENT & PLAN NOTE
- Diabetes is at a goal given current A1C goal A1C for patient is 7% in ESRD  - Diabetic supplies/medications: reviewed no need for refills at this time  - Long discussion with patient about dietary modification, portion size control, decreasing carbohydrates intake  - A1c can be falsely low due to anemia/ESRD  - improvement in hyperglycemia now.  Since on Trulicity  - Fructosamine level of 338 (in good control range)    Plan  - increase Trulicity to 3.0 mg once a week injection.   - Patient unable to check glucose often, will continue checking glucose at dialysis center  - Repeat lab work every 5-6 months  - follow up routinely

## 2025-07-18 NOTE — PATIENT INSTRUCTIONS
Continue Trulicity 3.0 mg once a week injection     Levothyroxine 50 mcg daily      Goal blood sugar in the morning, before breakfast:   Glucose goal AFTER MEALS:    2 Hour after: less than 140  Before going to bed: 100-140  Do not go to bed with glucose less than 100  Have a small snack if glucose is lower than 100      Contact information:  Carl Day M.D  [facility] [address] [facility] [address], [address]  [place] [address]    Office:  [phone]  Fax:  [phone]     ----------------------------------------------------------

## 2025-07-18 NOTE — PROGRESS NOTES
Subjective:      Patient ID: Erica Leblanc is a 80 y.o. female presented to Endocrinology clinic on 7/18/2025.    Chief Complaint:  Diabetes, osteoporosis/metabolic bone disease, hypothyroidism    History of Present Illness: Erica Leblanc is a 80 y.o. female with metabolic bone disease with osteoporosis, hypothyroidism and type 2 diabetes  ESRD on HD on TTS for 1 year, her nephrologist is  Dr Myla Puente  Here for follow-up      INTERVAL HISTORY  Patient is here for follow-up.   Diabetes A1c 5.4% with Fructosamine level of 338 (in good control range)  Report glucose at HD center was 90, 120, 100  She reports appetite suppression on Trulicity, getting supply regularly   Dry weight 95kg  Current in clinic weight today: 207 lb, previous in clinic weight 206>> she is asking for additional weight loss, goal is <200 lb  Hypothyroidism:  Compliance with levothyroxine.  No issue with TSH. No falls  On phosphate binder.  Stable calcium, monitor by her nephrologist during dialysis      1) Type 2 diabetes:  - Diagnosis around 10 years ago, A1C on 6.6%  previous 6/6/2022, 7.1%   - Patient was on insulin prior to HD, has been off of all therapy since that time.  - Checking glucose 1x a day  - Family hx of diabetes  - currently not on medication  - Statin: Taking, ACE/ARB: Not taking    Current reported meds:               Trulicity 1.5 mg once a week injection>> From [facility]    Home glucose checks: checks 1-2 a day, Logs reviewed  - Hypoglycemia symptoms:  DENIES   Diet/Exercise:   - Eating 3 meals per day   - Weight trend: stable  - Diabetes Related Hospitalization:  No  - Hx of pancreatitis: No, denies  - Family history of diabetes: Yes  - Occupation:  Disabled    Eye exam current (within one year):  Yes, DR: unknown  Reports cuts or ulcers on feet:   Denies, has podiatrist  Statin: Taking, ACE/ARB: Not taking    Diabetes lab work  Lab Results   Component Value Date    HGBA1C 5.4 07/11/2025    HGBA1C 5.6 01/10/2025  "   HGBA1C 5.7 (H) 08/09/2024    HGBA1C 6.1 (H) 03/08/2024     No results found for: "CPEPTIDE", "GLUTAMICACID", "ISLETCELLANT"   Lab Results   Component Value Date    FRUCTOSAMINE 338 01/10/2025    FRUCTOSAMINE 330 08/09/2024   At goal >> good glucose control    Lab Results   Component Value Date    MICALBCREAT 3,693.6 (H) 03/17/2014     No results found for: "TIAWEWKQ02"    Diabetes Management Status: Reviewed this office visit  Screening or Prevention Patient's value Goal Complete/Controlled?   Lipid profile : 07/11/2025 Annually Yes     Dilated retinal exam : 04/21/2025 Annually Yes     Foot exam   Most Recent Foot Exam Date: Not Found Annually Yes          2) Chronic kidney disease mineral and bone disorder  - Seen on recent bone density scan.  Patient denies prior history of any bone disorder.  - Chronic history of ESRD on dialysis  - Longstanding chronic issue.  Poorly-controlled bone disease due to ESRD status.  Continues to have hypocalcemia, hyperphosphatemia.  Elevated PTH.  - Lab work review showing chronic hyperphosphatemia, elevated alkaline phosphatase, hypocalcemia, normal vitamin-D.  - Patient reports Sensipar three times a week at HD  - Patient is on phosphate binder:  Sevelamer 1600mg TIDWM, reports better compliance recently  - Patient has stopped soda consumption   - Patient denies back pain, No falls  - Never had fractures  - Use walker to help with gait and ambulation  - NM Parathyroid scan 2021: No abnormal uptake to suggest parathyroid adenoma.  - Vit D 1000 iu daily      DXA done on 10/2020  Comparison study done on 03/13/2018.  Lumbar spine (L1-L4): BMD is 0.943 g/cm2, T-score is -0.7, and Z-score is 1.0.  Total hip: BMD is 0.680 g/cm2, T-score is -2.1, and Z-score is -1.0.  Femoral neck: BMD is 0.511 g/cm2, T-score is -3.0, and Z-score is -1.5.    Impression:  1. Osteoporosis with likely chronic kidney disease mineral and bone disorder  2. Compared with previous DXA, BMD at the lumbar " "spine has declined by 13.9%, and the BMD at the total hip has declined by 23.2%.    Height loss (>2 inches)? no  Family hx of Osteoporosis: no    Asymptomatic menopausal state.  She had a hysterectomy at 40 y/o and menopausal symptoms at 45 y/o.     Lab Results   Component Value Date    CALCIUM 8.9 07/11/2025    CALCIUM 9.5 01/10/2025    CALCIUM 9.2 08/09/2024     (H) 03/06/2025     (H) 02/04/2025    ALBUMIN 3.7 07/11/2025    ALBUMIN 3.0 (L) 08/09/2024     Lab Results   Component Value Date    ALKPHOS 72 10/01/2023    ALKPHOS 90 07/07/2023    PHOS 3.5 07/11/2025    PHOS 6.7 (H) 02/18/2025    MG 2.1 07/07/2023    TSH 2.603 07/11/2025    [id number] 73 08/09/2024    GIZYXNOC32AI 86.4 06/11/2024    [id number] 49 07/07/2023    EGFRNORACEVR 5 (L) 07/11/2025     Lab Results   Component Value Date    ALKALINEPHOS 33.9 (H) 03/01/2021       3) Hypothyroidism  - Chronic  - Restarted in 2021: Levothyroxine 50mcg  - History of thyroid goiter, with thyroid nodule seen on ultrasound     Thyroid lab work  Lab Results   Component Value Date    TSH 2.603 07/11/2025    TSH 2.202 03/08/2024    TSH 2.268 12/06/2023    FREET4 1.16 07/11/2025    FREET4 1.14 03/08/2024    FREET4 1.14 12/06/2023      Antibodies  No results found for: "THYROIDAB", "TSIMMGLBN", "THYROIDSTIMI", "THYROTROPINR"      4) Multiple Thyroid nodule  - denies compressive symptoms.   - discuss with patient previously,  not interested in getting biopsy given stable size  - discussed 2023 and 2024    US thyroid   8/09/2024  The right lobe of the thyroid measures 4.7 x 1.8 x 1.6 cm and demonstrates a 1.3 x 0.8 x 1.5 cm nodule.  Nodule is mixed solid and cystic, hypoechoic, and demonstrates internal echogenic foci.  Nodule is a TR 4 and meets criteria for FNA if not previously performed.    The thyroid isthmus is unremarkable.     The left lobe of the thyroid measures 3.4 x 1.6 x 1.8 cm and demonstrates 3 subcentimeter nodules, the largest measuring 0.9 x " 0.8 x 0.7 cm.  This nodule is solid, hypoechoic, not taller than wide, demonstrates smooth margins, and demonstrates a questionable punctate peripheral calcification.  These nodules do not meet criteria for FNA or follow-up.     Thyroid vascularity is within normal limits.     There are no abnormal lymph nodes within the visualized portions of the neck.     Impression:  Multinodular thyroid, the largest a right 1.5 cm TR 4 nodule which meets criteria for FNA if not previously performed.  Additional nodules do not meet criteria for FNA or follow-up.      07/03/2023  The thyroid is normal in size.  Right lobe of the thyroid measures 4.3 x 1.6 x 1.6 cm.  Left lobe of the thyroid measures 3.2 x 1.7 x 1.5 cm.  Normal thyroid parenchyma.  Nodule RIGHT upper pole 0.3 x 0.3 x 0.2 cm.  Nodule LEFT upper pole 1.3 x 2.2 x 1.1 cm and lower pole 0.9 x 0.7 x 0.7 cm.  Mid to lower pole nodule 0.4 x 0.4 x 0.3 cm. At the level of the isthmus, mixed cystic and solid lesions identified measuring 1.4 x 0.6 x 1.2 cm and 1.0 x 0.9 x 0.4 cm.  1.4 cm RIGHT isthmic nodule demonstrates presence of small microcalcifications.  This nodule meets criteria for TiRads 5 and FNA is recommended.  Cervical lymph nodes demonstrate normal morphology and size.     Impression:  Suspicious nodule RIGHT isthmus, 1.4 cm with small microcalcifications, mildly hypoechoic meets criteria for FNA.  Malignancy not excluded  Additional thyroidal nodules as above.    US thyroid 2021  The thyroid gland is normal in size.  The right thyroid lobe measures 4.1 x 1.9 x 1.8 cm.  The left thyroid lobe measures 4.5 x 1.8 x 1.9 cm.  Thyroid parenchyma is homogeneous, without increased perfusion.  There are 5 measured the small thyroid nodules.  1.5 cm right thyroid lobe/isthmus nodule with TI-RADS category 3, does not qualify for FNA..    Reviewed past surgical, medical, family, social history and updated as appropriate.    Objective:   /72   Pulse 83   Wt 94.1 kg  (207 lb 6.4 oz)   BMI 36.74 kg/m²     Body mass index is 36.74 kg/m².    Physical Exam  Vitals and nursing note reviewed.   Constitutional:       Appearance: She is well-developed.      Comments: Elderly female using walker to help with ambulation   HENT:      Head: Normocephalic.   Eyes:      General: No scleral icterus.     Conjunctiva/sclera: Conjunctivae normal.   Neck:      Thyroid: No thyromegaly.   Cardiovascular:      Rate and Rhythm: Normal rate.      Heart sounds: Normal heart sounds.   Pulmonary:      Effort: Pulmonary effort is normal. No respiratory distress.   Abdominal:      Palpations: Abdomen is soft.      Tenderness: There is no abdominal tenderness.   Musculoskeletal:         General: Normal range of motion.      Cervical back: Neck supple.   Skin:     General: Skin is warm.      Findings: No erythema.   Neurological:      Mental Status: She is alert.      Coordination: Coordination normal.   Psychiatric:         Behavior: Behavior normal.     Lab Reviewed:  See results in subjective  Lab Results   Component Value Date    HGBA1C 5.4 07/11/2025     Lab Results   Component Value Date    CHOL 98 (L) 07/11/2025    HDL 55 07/11/2025    LDLCALC 35.2 (L) 07/11/2025    TRIG 39 07/11/2025    CHOLHDL 56.1 (H) 07/11/2025     Lab Results   Component Value Date     07/11/2025    K 4.4 07/11/2025    CL 96 07/11/2025    CO2 28 07/11/2025    GLUCOSE 85 03/06/2025    GLU 83 07/11/2025    BUN 42 (H) 07/11/2025    CREATININE 7.2 (H) 07/11/2025    CALCIUM 8.9 07/11/2025    PHOS 3.5 07/11/2025    PROT 7.3 10/01/2023    ALBUMIN 3.7 07/11/2025    BILITOT 0.6 10/01/2023    ALKPHOS 72 10/01/2023    AST 32 10/01/2023    ALT 24 10/01/2023    ANIONGAP 12 07/11/2025    EGFRNORACEVR 5 (L) 07/11/2025    TSH 2.603 07/11/2025     (H) 03/06/2025    [id number] 73 08/09/2024         Assessment     1. Type 2 diabetes mellitus with ESRD (end-stage renal disease)  Renal Function Panel    Hemoglobin A1C    Fructosamine     TRULICITY 3 mg/0.5 mL pen injector      2. Hypothyroidism (acquired)  TSH    T4, Free    levothyroxine (SYNTHROID) 50 MCG tablet      3. Severe obesity (BMI 35.0-39.9) with comorbidity        4. Hyperparathyroidism  PTH, Intact      5. Chronic kidney disease-mineral and bone disorder        6. Multiple thyroid nodules        7. ESRD on hemodialysis        8. Vitamin D deficiency disease          Plan     Type 2 diabetes mellitus with ESRD (end-stage renal disease)  - Diabetes is at a goal given current A1C goal A1C for patient is 7% in ESRD  - Diabetic supplies/medications: reviewed no need for refills at this time  - Long discussion with patient about dietary modification, portion size control, decreasing carbohydrates intake  - A1c can be falsely low due to anemia/ESRD  - improvement in hyperglycemia now.  Since on Trulicity  - Fructosamine level of 338 (in good control range)    Plan  - increase Trulicity to 3.0 mg once a week injection.   - Patient unable to check glucose often, will continue checking glucose at dialysis center  - Repeat lab work every 5-6 months  - follow up routinely    Hypothyroidism (acquired)  - Patient with history of hypothyroidism etiology unclear  - On levothyroxine 50 mcg daily (low-dose)  - TFTs at goal, continue  - check lab work every 6 months    Chronic kidney disease-mineral and bone disorder  - Very difficult case, longstanding issue with worsening osteoporosis and continue elevation in PTH leading to osteoporosis  - Risk factors include ESRD on dialysis, hyperphosphatemia, hypocalcemia  - High risk of falls given knee pain, poor gait, the need to use walker  - Patient is more compliant with phosphate binder, and dietary modification  - On vitamin-D analog at dialysis  - sestamibi scan inconclusive for parathyroid adenoma  - now on Sensipar at HD unit  - threshold for parathyroidectomy is above >800 and failing medical therapy option  - given improvement in PTH, normal  calcium, low phosphorus, continue monitoring with lab work    Multiple thyroid nodules  - Denies compressive symptoms.   - Overall patient not interested in getting biopsy given stable size  - US thyroid RIGHT isthmus, 1.4 cm with small microcalcifications  - Check thyroid ultrasound 2023 in 2024, stable in size.    - patient does not want FNA.  As discuss 2024    ESRD on hemodialysis  - at [facility]; Dr. Barrientos  - Tuesday, Thursday and Saturday  - has AV graft in left UE    Vitamin D deficiency disease  - Vitamin-D goal >30  - monitor with Nephrology    Return to clinic in 5-6mo for diabetes management     Visit today included increased complexity associated with the care of the episodic problem addressed and managing the longitudinal care of the patient due to the serious and/or complex managed problem(s).   Including: Type 2 diabetes, ESRD on dialysis, CKD-MB, obesity, hypothyroidism, vitamin-D deficiency    Carl Day MD  Endocrinology- [facility]   7/18/2025      Disclaimer: This note has been generated using voice-recognition software. There may be typographical errors that have been missed during proof-reading.    Fructosamine range

## 2025-07-18 NOTE — ASSESSMENT & PLAN NOTE
- at [facility]; Dr. Barrientos  - Tuesday, Thursday and Saturday  - has AV graft in left UE

## 2025-07-23 ENCOUNTER — OFFICE VISIT (OUTPATIENT)
Dept: FAMILY MEDICINE | Facility: CLINIC | Age: 80
End: 2025-07-23
Payer: MEDICARE

## 2025-07-23 VITALS
TEMPERATURE: 98 F | HEIGHT: 63 IN | DIASTOLIC BLOOD PRESSURE: 68 MMHG | BODY MASS INDEX: 36.39 KG/M2 | OXYGEN SATURATION: 96 % | SYSTOLIC BLOOD PRESSURE: 126 MMHG | WEIGHT: 205.38 LBS | HEART RATE: 96 BPM

## 2025-07-23 DIAGNOSIS — Z99.2 ESRD ON HEMODIALYSIS: ICD-10-CM

## 2025-07-23 DIAGNOSIS — I27.20 PULMONARY HYPERTENSION: ICD-10-CM

## 2025-07-23 DIAGNOSIS — Z00.00 ROUTINE MEDICAL EXAM: Primary | ICD-10-CM

## 2025-07-23 DIAGNOSIS — N18.6 ESRD ON HEMODIALYSIS: ICD-10-CM

## 2025-07-23 DIAGNOSIS — R05.9 COUGH, UNSPECIFIED TYPE: ICD-10-CM

## 2025-07-23 DIAGNOSIS — E66.01 SEVERE OBESITY (BMI 35.0-39.9) WITH COMORBIDITY: ICD-10-CM

## 2025-07-23 DIAGNOSIS — I50.32 CHRONIC DIASTOLIC HEART FAILURE: Chronic | ICD-10-CM

## 2025-07-23 DIAGNOSIS — M81.0 AGE-RELATED OSTEOPOROSIS WITHOUT CURRENT PATHOLOGICAL FRACTURE: ICD-10-CM

## 2025-07-23 DIAGNOSIS — N25.81 SECONDARY RENAL HYPERPARATHYROIDISM: ICD-10-CM

## 2025-07-23 DIAGNOSIS — E78.5 HYPERLIPIDEMIA LDL GOAL <100: Chronic | ICD-10-CM

## 2025-07-23 DIAGNOSIS — G47.33 OSA ON CPAP: Chronic | ICD-10-CM

## 2025-07-23 DIAGNOSIS — I77.1 TORTUOUS AORTA: ICD-10-CM

## 2025-07-23 DIAGNOSIS — D57.3 SICKLE CELL TRAIT: ICD-10-CM

## 2025-07-23 DIAGNOSIS — R53.81 DEBILITY: ICD-10-CM

## 2025-07-23 DIAGNOSIS — E11.22 TYPE 2 DIABETES MELLITUS WITH ESRD (END-STAGE RENAL DISEASE): ICD-10-CM

## 2025-07-23 DIAGNOSIS — Z99.89 WALKER AS AMBULATION AID: ICD-10-CM

## 2025-07-23 DIAGNOSIS — H40.1134 PRIMARY OPEN ANGLE GLAUCOMA (POAG) OF BOTH EYES, INDETERMINATE STAGE: ICD-10-CM

## 2025-07-23 DIAGNOSIS — E03.9 HYPOTHYROIDISM (ACQUIRED): Chronic | ICD-10-CM

## 2025-07-23 DIAGNOSIS — N18.6 TYPE 2 DIABETES MELLITUS WITH ESRD (END-STAGE RENAL DISEASE): ICD-10-CM

## 2025-07-23 PROCEDURE — 3078F DIAST BP <80 MM HG: CPT | Mod: CPTII,HCNC,S$GLB, | Performed by: INTERNAL MEDICINE

## 2025-07-23 PROCEDURE — 3074F SYST BP LT 130 MM HG: CPT | Mod: CPTII,HCNC,S$GLB, | Performed by: INTERNAL MEDICINE

## 2025-07-23 PROCEDURE — 99397 PER PM REEVAL EST PAT 65+ YR: CPT | Mod: HCNC,S$GLB,, | Performed by: INTERNAL MEDICINE

## 2025-07-23 PROCEDURE — 1101F PT FALLS ASSESS-DOCD LE1/YR: CPT | Mod: CPTII,HCNC,S$GLB, | Performed by: INTERNAL MEDICINE

## 2025-07-23 PROCEDURE — 1126F AMNT PAIN NOTED NONE PRSNT: CPT | Mod: CPTII,HCNC,S$GLB, | Performed by: INTERNAL MEDICINE

## 2025-07-23 PROCEDURE — 3288F FALL RISK ASSESSMENT DOCD: CPT | Mod: CPTII,HCNC,S$GLB, | Performed by: INTERNAL MEDICINE

## 2025-07-23 PROCEDURE — 1159F MED LIST DOCD IN RCRD: CPT | Mod: CPTII,HCNC,S$GLB, | Performed by: INTERNAL MEDICINE

## 2025-07-23 PROCEDURE — 99999 PR PBB SHADOW E&M-EST. PATIENT-LVL IV: CPT | Mod: PBBFAC,HCNC,, | Performed by: INTERNAL MEDICINE

## 2025-07-23 PROCEDURE — 1160F RVW MEDS BY RX/DR IN RCRD: CPT | Mod: CPTII,HCNC,S$GLB, | Performed by: INTERNAL MEDICINE

## 2025-07-23 NOTE — PROGRESS NOTES
CHIEF COMPLAINT:   Chief Complaint   Patient presents with    Annual Exam          HISTORY OF PRESENT ILLNESS:  Erica Leblanc is a 80 y.o. female who presents to the clinic today for a routine physical exam. Her last physical exam was approximately 1 years(s) ago.          Patient reports well-controlled blood sugar, cholesterol, and blood pressure. Dr. Day recently increased her Trulicity dosage from 1.5 to 3 mg for weight loss assistance, not diabetes management. Her goal is to get under 200 lbs, with approximately 5 lbs left to lose.    She is no longer taking Midodrine on a regular basis, which was previously used as needed for low blood pressure, as her blood pressure has improved significantly.    She continues to undergo dialysis and receives some medications at the dialysis center. Her overall medication regimen has been reduced, with her doctors indicating that she does not need as much as before due to her improved status.    She reports sinus-related coughing, which she attributes to air quality at the dialysis clinic. The cough is intermittent, with varying severity. She has not been taking any medication for this symptom, preferring to avoid additional pills.  She denies needing any new prescriptions at this time.          Subjective    PAST MEDICAL HISTORY:  Past Medical History:   Diagnosis Date    Acute ischemic right PCA stroke 6/6/2021    Acute respiratory failure with hypoxia     Age-related osteoporosis without current pathological fracture 3/8/2021    Anemia of chronic kidney failure, stage 4 (severe) 4/5/2019    Cataracts, bilateral     CHF (congestive heart failure)     CKD (chronic kidney disease) stage 3, GFR 30-59 ml/min     CKD (chronic kidney disease) stage 3, GFR 30-59 ml/min     Controlled type 2 diabetes mellitus with proteinuria or albuminuria     Depression     Diabetes with neurologic complications     Diabetic retinopathy of both eyes     Edema     Glaucoma     History of  colonic polyps     Hx-TIA (transient ischemic attack) 2008    Hyperlipidemia LDL goal < 100     Hypertension     Hypothyroidism     Major depressive disorder, single episode, mild 2016    Mixed incontinence urge and stress     Obesity     Obstructive sleep apnea on CPAP     19:  Home CPAP machine broken, per patient & son    Osteopenia     Proteinuria     PVD (peripheral vascular disease) 2025    Sickle cell trait     Strabismus     TIA (transient ischemic attack)     Trouble in sleeping     Type 2 diabetes mellitus with ophthalmic manifestations     Type 2 diabetes with stage 3 chronic kidney disease GFR 30-59     Type II or unspecified type diabetes mellitus with renal manifestations, uncontrolled(250.42)     Uncontrolled type 2 diabetes mellitus with peripheral circulatory disorder 2019    Urge incontinence 2016    Urge incontinence     Venous stasis ulcer     bilateral lower legs    Vitamin D deficiency disease        PAST SURGICAL HISTORY:  Past Surgical History:   Procedure Laterality Date    AV FISTULA PLACEMENT Left 2019    Procedure: CREATION, AV FISTULA, LEFT UPPER EXTREMITY;  Surgeon: Keron Perez MD;  Location: [facility];  Service: Vascular;  Laterality: Left;    BREAST BIOPSY      breast reduction Bilateral age 31    BREAST SURGERY      CATARACT EXTRACTION Bilateral     cataracts Bilateral      SECTION, LOW TRANSVERSE      x1    CHOLECYSTECTOMY      COLONOSCOPY N/A 2022    Procedure: COLONOSCOPY;  Surgeon: Mahesh Huang MD;  Location: [facility] ([address] [place] [address]);  Service: Endoscopy;  Laterality: N/A;  fully vaccinated-sm.  RAPID COVID  +cologuard needing ASAP  Dialysis pt T,TH Sat and left UA access  2nd floor - cardiac/dialysis/SOB / LABS / prep ins. emailed - ERW    EYE SURGERY  2014, 2014    vitrectomy    EYE SURGERY Right 2016    FISTULOGRAM Left 2020    Procedure: Fistulogram, left upper extremity, with branch ligation;  Surgeon:  Keron Perez MD;  Location: [address];  Service: Vascular;  Laterality: Left;  Time 1.1 Minute  42.10 mGy    FISTULOGRAM Left 07/21/2020    Procedure: Fistulogram, left upper extremity, transradial access with possible intervention;  Surgeon: Keron Perez MD;  Location: [facility] [address] [place] [address];  Service: Vascular;  Laterality: Left;    FISTULOGRAM Left 08/19/2020    Procedure: Fistulogram, left upper extremity, possible intervention;  Surgeon: Keron Perez MD;  Location: [facility];  Service: Vascular;  Laterality: Left;  930 AM START  RN PRE OP  ---COVID NEGATIVE ON  8-. CA    FISTULOGRAM Left 01/21/2022    Procedure: Fistulogram, transradial access;  Surgeon: Keron Perez MD;  Location: [facility] [address] [place] [address];  Service: Vascular;  Laterality: Left;  mgy-40.16  gycm-8.3905  contrast-34cc  time-12.4min    HYSTERECTOMY  1986    TAHBSO (patient is unsure if ovaries removed)    OOPHORECTOMY      PERCUTANEOUS TRANSLUMINAL ANGIOPLASTY OF ARTERIOVENOUS FISTULA Left 07/21/2020    Procedure: PTA, AV FISTULA;  Surgeon: Keron Perez MD;  Location: [facility] [address] [place] [address];  Service: Vascular;  Laterality: Left;  15.9 minutes of fluro  41.12  mGy  7.9060 Gy cm2  32ml  contrast    PHLEBOGRAPHY Left 07/21/2020    Procedure: CENTRAL VENOGRAM;  Surgeon: Keron Perez MD;  Location: [facility] [address] [place] [address];  Service: Vascular;  Laterality: Left;    REFRACTIVE SURGERY      TOTAL REDUCTION MAMMOPLASTY      approx 10 yrs ago    VENOPLASTY  01/21/2022    Procedure: ANGIOPLASTY, VEIN;  Surgeon: Keron Perez MD;  Location: [facility] [address] [place] [address];  Service: Vascular;;       SOCIAL HISTORY:  Social History[1]    FAMILY HISTORY:  Family History   Problem Relation Name Age of Onset    Stroke Mother      Diabetes Mother      Hypertension Mother      Cataracts Mother      Leukemia Father      Cataracts Father      Ovarian cancer Sister [place] 35    Achondroplasia Sister Hannah     HIV Brother Paul     No Known  Problems Daughter x2     No Known Problems Son x3     Breast cancer Maternal Aunt  65    Parkinsonism Maternal Aunt      Esophageal cancer Maternal Uncle          smoker    No Known Problems Paternal Aunt      Cataracts Paternal Uncle      Cataracts Maternal Grandmother      Cataracts Maternal Grandfather      Diabetes Paternal Grandmother      Cataracts Paternal Grandmother      No Known Problems Paternal Grandfather      No Known Problems Other      Amblyopia Neg Hx      Blindness Neg Hx      Glaucoma Neg Hx      Macular degeneration Neg Hx      Retinal detachment Neg Hx      Strabismus Neg Hx      Thyroid disease Neg Hx      Colon cancer Neg Hx      Cancer Neg Hx         ALLERGIES AND MEDICATIONS: updated and reviewed.  Review of patient's allergies indicates:   Allergen Reactions    Ace inhibitors Other (See Comments)     Other reaction(s): cough     Medication List with Changes/Refills   Current Medications    ACCU-CHEK SOFT DEV LANCETS KIT        ASPIRIN (ECOTRIN) 81 MG EC TABLET    Take 1 tablet (81 mg total) by mouth once daily.    ATORVASTATIN (LIPITOR) 80 MG TABLET    Take 1 tablet (80 mg total) by mouth every evening.    BLOOD PRESSURE MONITOR (BLOOD PRESSURE KIT) KIT    1 kit by Misc.(Non-Drug; Combo Route) route As instructed (as instructed).    BLOOD SUGAR DIAGNOSTIC STRP    One test strip use 2 times a day to check blood glucose,  ICD-10: E11.9, compatible with insurance/glucometer    BLOOD-GLUCOSE METER KIT    One glucometer, use to check blood glucose.   ICD-10: E11.9. Dispense machine covered by insurance    CINACALCET (SENSIPAR) 30 MG TAB        DOCUSATE SODIUM (COLACE) 100 MG CAPSULE    Take 200 mg by mouth once daily.     DOXERCALCIFEROL (HECTOROL) 4 MCG/2 ML INJECTION    Inject 4.5 mcg into the vein 3 (three) times a week. At dialysis unit    EPOETIN BE (EPOGEN INJ)    Inject 1,000 Units as directed every 7 days. At the dialysis unit    FLUTICASONE PROPIONATE (FLONASE) 50 MCG/ACTUATION NASAL  SPRAY    1 spray (50 mcg total) by Each Nostril route once daily.    LANCETS MISC    One lancets use 2 times a day to check blood glucose, ICD-10: E11.9    LANCING DEVICE MISC    One device, used to check blood glucose, ICD-10: E11.9    LATANOPROST 0.005 % OPHTHALMIC SOLUTION    Place 1 drop into both eyes every evening.    LEVOTHYROXINE (SYNTHROID) 50 MCG TABLET    TAKE 1 TABLET BEFORE BREAKFAST    LIDOCAINE-PRILOCAINE (EMLA) CREAM    Apply topically as needed.    MIDODRINE (PROAMATINE) 5 MG TAB    Take 1 tablet (5 mg total) by mouth 3 (three) times daily.    MUPIROCIN (BACTROBAN) 2 % OINTMENT    Apply topically 3 (three) times daily.    OLENA-NABIL RX 1- MG-MG-MCG TAB    Take 1 tablet by mouth once daily.    SEVELAMER CARBONATE (RENVELA) 800 MG TAB    Take 3 tablets (2,400 mg total) by mouth 3 (three) times daily with meals.    TRULICITY 3 MG/0.5 ML PEN INJECTOR    Inject 3 mg into the skin every 7 days.          CARE TEAM:  Patient Care Team:  Sendy Elaine MD as PCP - General (Internal Medicine)  Brittanie Orellana MD (Cardiology)  Nicole Gray DPM as Consulting Physician (Podiatry)  Katia Wong MD as Consulting Physician (Urology)  Coleen Gillis MD as Consulting Physician (Obstetrics)  LILLIANA Coello MD as Consulting Physician (Ophthalmology)  Yolis Rossi MD as Consulting Physician (Endocrinology)  Gianna Hinson LPN as Care Coordinator  Keron Perez MD as Consulting Physician (Vascular Surgery)  Myla Puente MD as Consulting Physician (Nephrology)  Murali Li MD as Consulting Physician (Cardiology)  Carl Day MD as Consulting Physician (Endocrinology)  Nellie Quiles OD as Consulting Physician (Optometry)  Adonis Linares MD as Consulting Physician (Pulmonary Disease)  Tonia Silva NP (Inactive) as Nurse Practitioner (Vascular Surgery)  Kendra Mccartney as ED Navigator  Naya Soler as ED Navigator       SCREENING  "HISTORY:  Health Maintenance         Date Due Completion Date    Influenza Vaccine (1) 09/01/2025 10/22/2024 (Done)    Override on 10/22/2024: Done    Override on 11/14/2018: Done    Override on 9/9/2015: Done    Override on 9/24/2012: Done    Hemoglobin A1c 01/11/2026 7/11/2025    DEXA Scan 01/26/2026 1/26/2024    Diabetic Eye Exam 04/21/2026 4/21/2025    Override on 1/3/2011: Done    Lipid Panel 07/11/2026 7/11/2025    TETANUS VACCINE 08/08/2026 8/8/2016              REVIEW OF SYSTEMS:   The patient reports : good dietary habits.  The patient reports : that they are unable to exercise regularly because  of orthopaedic limitations.  Review of Systems   Constitutional:  Negative for chills and fever.   HENT:  Positive for nasal congestion.    Respiratory:  Positive for cough. Negative for shortness of breath.    Cardiovascular:  Negative for chest pain.   Gastrointestinal:  Negative for abdominal pain.   Genitourinary:  Negative for dysuria.   Musculoskeletal:  Positive for arthralgias and gait problem.   Neurological:  Positive for weakness (chronic; stable).       ROS (Optional)-: no pelvic pain  Breast ROS (Optional)-: negative for breast lumps/discharge          Objective    PHYSICAL EXAMINATION/VITALS:  Vitals:    07/23/25 1347   BP: 126/68   Pulse: 96   Temp: 98 °F (36.7 °C)   TempSrc: Oral   SpO2: 96%   Weight: 93.2 kg (205 lb 5.7 oz)   Height: 5' 3" (1.6 m)        Body mass index is 36.38 kg/m².      General appearance - alert, well appearing, and in no distress, obese, seated in a wheelchair, exam limited as patient could not get onto the examination table  Psychiatric - alert, oriented to person, place, and time, normal behavior, speech, dress, motor activity and thought process, mildly depressed mood (baseline)  Eyes - pupils equal and reactive, extraocular eye movements intact, sclera anicteric  Nose - mild congestion and moderate swelling of the turbinates bilaterally  Neck - carotids upstroke normal " bilaterally, no bruits  Lymphatics - no palpable cervical lymphadenopathy  Chest - clear to auscultation, no wheezes, rales or rhonchi, symmetric air entry  Heart - normal rate and regular rhythm  Neurological - alert, normal speech, no focal findings; cranial nerves II through XII intact  Musculoskeletal - not examined  Extremities - pedal edema trace  Skin - normal coloration, no suspicious skin lesions      LABS:  Lab Results   Component Value Date    HGBA1C 5.4 07/11/2025    HGBA1C 5.6 01/10/2025    HGBA1C 5.7 (H) 08/09/2024      Lab Results   Component Value Date    CHOL 98 (L) 07/11/2025    CHOL 91 (L) 08/09/2024    CHOL 88 (L) 07/07/2023     Lab Results   Component Value Date    LDLCALC 35.2 (L) 07/11/2025    LDLCALC 37.2 (L) 08/09/2024    LDLCALC 37.6 (L) 07/07/2023               ASSESSMENT AND PLAN:   1. Routine medical exam  Counseled on age appropriate medical preventative services including age appropriate cancer screenings, age appropriate eye and dental exams, over all nutritional health, need for a consistent exercise regimen, and an over all push towards maintaining a vigorous and active lifestyle.  Counseled on age appropriate vaccines and discussed upcoming health care needs based on age/gender. Discussed good sleep hygiene and stress management.    2. Type 2 diabetes mellitus with ESRD (end-stage renal disease)  Lab Results   Component Value Date    HGBA1C 5.4 07/11/2025     Current diabetes medications (may already reflect medication changes made today, if any):  Diabetes Medications              TRULICITY 3 mg/0.5 mL pen injector Inject 3 mg into the skin every 7 days.           Goal A1c for age and comorbid conditions: <=7.0  Diabetes is under good control at this time.   We discussed diabetic diet and regular exercise.   We discussed home blood sugar monitoring, if appropriate - the patient does not need to test daily but can test only as needed.   Continue current medication regimen.   "  Patient is followed by endocrinology.  Diabetic complications addressed: None addressed today.  Patient was counseled on the need for yearly eye exam to screen for/monitor diabetic retinopathy and yearly diabetic foot exam.        3. ESRD on hemodialysis  The current medical regimen is effective;  continue present plan and medications.   Followed by: Nephrology.   Overview:  - at [facility]; Dr. Barrientos  - Tuesday, Thursday and Saturday  - has AV graft in left UE      4. Chronic diastolic heart failure  The current medical regimen is effective;  continue present plan and medications.   Followed by: Cardiology.     5. Hyperlipidemia LDL goal <100  Lab Results   Component Value Date    CHOL 98 (L) 07/11/2025     Lab Results   Component Value Date    HDL 55 07/11/2025     Lab Results   Component Value Date    LDLCALC 35.2 (L) 07/11/2025     Lab Results   Component Value Date    TRIG 39 07/11/2025     Current hyperlipidemia medications (may already reflect medication changes made today, if any):  Hyperlipidemia Medications              atorvastatin (LIPITOR) 80 MG tablet Take 1 tablet (80 mg total) by mouth every evening.           We discussed low fat diet and regular exercise.The current medical regimen is effective;  continue present plan and medications.     6. Pulmonary hypertension  Stable. Asymptomatic. Observe.    7. Tortuous aorta  Patient with tortuous/ectatic Aorta.  Stable/asymptomatic. Currently stable on lipid lowering medication and blood pressure monitoring.   Overview:  "There is mild to moderate tortuosity of the thoracic aorta" - Xray Chest 7-8-2011      8. Sickle cell trait  Stable. Asymptomatic. Observe.    9. Age-related osteoporosis without current pathological fracture  We discussed adequate calcium intake (preferably from diet) and vitamin D supplementation. We discussed fall precautions. She is up to date on her BMD. Will discuss treatment options with endocrinology.    10. Hypothyroidism " (acquired)  Lab Results   Component Value Date    TSH 2.603 07/11/2025     Patient is clinically euthyroid. Continue current regimen.    11. Secondary renal hyperparathyroidism  Stable. Asymptomatic. Observe.    12. HUMBERTO on CPAP  CPAP compliance: yes. The patient reports that they continue to benefit from regular use of their CPAP machine..  We discussed the potential ramifications of untreated sleep apnea, which could include daytime sleepiness, hypertension, heart disease including CHF, sudden death while sleeping and/or stroke. The patient was advised to abstain from driving should they feel sleepy or drowsy.  We discussed potential treatment options, which could include weight loss, body positioning, continuous positive airway pressure (CPAP), or referral for surgical consideration.   Overview:  ahi of 18 in 2009  Initial was on 14 cm H20.  Currently with dreamstation apap (5-15) with nasal pillow.   Averaging 3.5 hours per day.  Residual ahi of 3.8.  p90 9.  Patient is benefiting from apap      13. Severe obesity (BMI 35.0-39.9) with comorbidity  BMI Readings from Last 3 Encounters:   07/23/25 36.38 kg/m²   07/18/25 36.74 kg/m²   07/07/25 36.67 kg/m²     The patient is asked to continue to make an attempt to improve diet and exercise patterns to aid in medical management of this problem.   Overview:  Poor appetite with HD.  Losing weight.        14. Debility/15. Walker as ambulation aid  We discussed fall precautions.    16. Primary open angle glaucoma (POAG) of both eyes, indeterminate stage  The current medical regimen is effective;  continue present plan and medications.   Followed by: Ophthalmology.     17. cough   I suspect her cough is mostly from allergies and postnasal drip.  She does not want to start anymore pills.  Discussed using saline nasal rinse at bedtime.  She will let me know if symptoms worsen or persist.            PATIENT EDUCATION:  Discussed physiological effects of saline nasal rinse for  managing sinus-related coughing.    ACTION ITEMS/LIFESTYLE:  Patient to use saline nasal rinse before bed:   Spray saline towards ear while head is forward   Blow nose to remove pollen and reduce congestion.  Patient to obtain vaccinations at clinic or pharmacy.          No orders of the defined types were placed in this encounter.     FOLLOW UP: Follow up in about 6 months (around 1/23/2026), or if symptoms worsen or fail to improve, for follow up chronic medical conditions.. or sooner as needed.    This note was generated with the assistance of ambient listening technology. Verbal consent was obtained by the patient and accompanying visitor(s) for the recording of patient appointment to facilitate this note. I attest to having reviewed and edited the generated note for accuracy, though some syntax or spelling errors may persist. Please contact the author of this note for any clarification.            [1]   Social History  Socioeconomic History    Marital status: Single    Number of children: 5   Occupational History    Occupation:      Employer: [employer]     Comment: part-time   Tobacco Use    Smoking status: Never     Passive exposure: Never    Smokeless tobacco: Never   Substance and Sexual Activity    Alcohol use: No    Drug use: No     Social Drivers of Health     Financial Resource Strain: Medium Risk (10/21/2024)    Overall Financial Resource Strain (CARDIA)     Difficulty of Paying Living Expenses: Somewhat hard   Food Insecurity: Food Insecurity Present (10/21/2024)    Hunger Vital Sign     Worried About Running Out of Food in the Last Year: Sometimes true     Ran Out of Food in the Last Year: Sometimes true   Transportation Needs: No Transportation Needs (10/21/2024)    PRAPARE - Transportation     Lack of Transportation (Medical): No     Lack of Transportation (Non-Medical): No   Physical Activity: Inactive (10/21/2024)    Exercise Vital Sign     Days of Exercise per Week: 0  days     Minutes of Exercise per Session: 0 min   Stress: No Stress Concern Present (10/21/2024)    [employer] [place] [employer] - Occupational Stress Questionnaire     Feeling of Stress : Only a little   Housing Stability: High Risk (10/21/2024)    Housing Stability Vital Sign     Unable to Pay for Housing in the Last Year: Yes     Homeless in the Last Year: No

## 2025-08-22 ENCOUNTER — CLINICAL SUPPORT (OUTPATIENT)
Dept: OPHTHALMOLOGY | Facility: CLINIC | Age: 80
End: 2025-08-22
Payer: MEDICARE

## 2025-08-22 ENCOUNTER — OFFICE VISIT (OUTPATIENT)
Dept: OPTOMETRY | Facility: CLINIC | Age: 80
End: 2025-08-22
Payer: MEDICARE

## 2025-08-22 DIAGNOSIS — H40.1134 PRIMARY OPEN ANGLE GLAUCOMA (POAG) OF BOTH EYES, INDETERMINATE STAGE: ICD-10-CM

## 2025-08-22 DIAGNOSIS — H40.1134 PRIMARY OPEN ANGLE GLAUCOMA (POAG) OF BOTH EYES, INDETERMINATE STAGE: Primary | ICD-10-CM

## 2025-08-22 DIAGNOSIS — Z96.1 PSEUDOPHAKIA: ICD-10-CM

## 2025-08-22 PROCEDURE — 99999 PR PBB SHADOW E&M-EST. PATIENT-LVL III: CPT | Mod: PBBFAC,,, | Performed by: OPTOMETRIST

## 2025-08-29 ENCOUNTER — HOSPITAL ENCOUNTER (OUTPATIENT)
Dept: CARDIOLOGY | Facility: HOSPITAL | Age: 80
Discharge: HOME | End: 2025-08-29
Attending: SURGERY
Payer: MEDICARE

## 2025-08-29 ENCOUNTER — OFFICE VISIT (OUTPATIENT)
Dept: VASCULAR SURGERY | Facility: CLINIC | Age: 80
End: 2025-08-29
Payer: MEDICARE

## 2025-08-29 ENCOUNTER — TELEPHONE (OUTPATIENT)
Dept: VASCULAR SURGERY | Facility: CLINIC | Age: 80
End: 2025-08-29

## 2025-08-29 VITALS
WEIGHT: 209.19 LBS | SYSTOLIC BLOOD PRESSURE: 158 MMHG | DIASTOLIC BLOOD PRESSURE: 92 MMHG | HEIGHT: 63 IN | BODY MASS INDEX: 37.07 KG/M2 | HEART RATE: 78 BPM

## 2025-08-29 DIAGNOSIS — T82.510A PSEUDOANEURYSM OF ARTERIOVENOUS DIALYSIS FISTULA: ICD-10-CM

## 2025-08-29 DIAGNOSIS — T82.510D PSEUDOANEURYSM OF ARTERIOVENOUS DIALYSIS FISTULA, SUBSEQUENT ENCOUNTER: Primary | ICD-10-CM

## 2025-08-29 PROCEDURE — 99999 PR PBB SHADOW E&M-EST. PATIENT-LVL IV: CPT | Mod: PBBFAC,,, | Performed by: SURGERY

## 2025-08-29 RX ORDER — CEFAZOLIN SODIUM 2 G/50ML
2 SOLUTION INTRAVENOUS ONCE
OUTPATIENT
Start: 2025-08-29 | End: 2025-08-29

## 2025-09-04 ENCOUNTER — TELEPHONE (OUTPATIENT)
Dept: VASCULAR SURGERY | Facility: CLINIC | Age: 80
End: 2025-09-04
Payer: MEDICARE

## 2025-09-05 PROBLEM — Z78.9 PROBLEM WITH VASCULAR ACCESS: Status: ACTIVE | Noted: 2025-09-05

## 2025-09-05 PROBLEM — T82.510A PSEUDOANEURYSM OF ARTERIOVENOUS DIALYSIS FISTULA: Status: ACTIVE | Noted: 2025-09-05

## 2025-09-06 PROBLEM — E87.5 HYPERKALEMIA: Status: ACTIVE | Noted: 2025-09-06

## (undated) DEVICE — KIT INTRO MICRO NIT VSI 4FR

## (undated) DEVICE — SET DECANTER MEDICHOICE

## (undated) DEVICE — Device

## (undated) DEVICE — BLANKET LOWER BODY 55.9X40.2IN

## (undated) DEVICE — GAUZE SPONGE 4X4 12 PLY NS

## (undated) DEVICE — SEE MEDLINE ITEM 146292

## (undated) DEVICE — GLOVE SURG BIOGEL LATEX SZ 7.5

## (undated) DEVICE — UNDERGLOVE BIOGEL PI SZ 6.5 LF

## (undated) DEVICE — SUT SILK 3-0 SH 18IN BLACK

## (undated) DEVICE — HEMOSTAT SURGICEL 2X14IN

## (undated) DEVICE — SUT 3-0 12-18IN SILK

## (undated) DEVICE — CATH ULTRAVERSE 035 6X40X75

## (undated) DEVICE — COVERS PROBE NR-48 STERILE

## (undated) DEVICE — SUT 7/0 24IN PROLENE BL MO

## (undated) DEVICE — DRAPE PLASTIC U 60X72

## (undated) DEVICE — STOCKINET 4INX48

## (undated) DEVICE — BAND TR WITH INFLATOR

## (undated) DEVICE — CLIP MED TICALL

## (undated) DEVICE — GUIDEWIRE STF .035X180CM ANG

## (undated) DEVICE — SHEET DRAPE FAN-FOLDED 3/4

## (undated) DEVICE — DRAPE OPTIMA MAJOR PEDIATRIC

## (undated) DEVICE — VISE RADIFOCUS MULTI TORQUE

## (undated) DEVICE — SYR 3CC LUER LOC

## (undated) DEVICE — SEE MEDLINE ITEM 107746

## (undated) DEVICE — SPONGE GAUZE 16PLY 4X4

## (undated) DEVICE — CATH IV JELCO 22GAX1

## (undated) DEVICE — ANGIOGRAPHIC CATHETER

## (undated) DEVICE — CATH INTROCAN SAFETY 20GX1.75

## (undated) DEVICE — HEMOSTAT SURGICEL 4X8IN

## (undated) DEVICE — KIT GLIDESHEATH SLEND 6FR 10CM

## (undated) DEVICE — SYS LABLNG CORECT MED 4 FLG

## (undated) DEVICE — SOL NS 1000CC

## (undated) DEVICE — NDL HYPO REG 25G X 1 1/2

## (undated) DEVICE — CATH DORADO 5X40X80

## (undated) DEVICE — COVER OVERHEAD SURG LT BLUE

## (undated) DEVICE — BOOT SUTURE AID

## (undated) DEVICE — SUT 3-0 MONOCRYL PLUS PS-2

## (undated) DEVICE — DECANTER VIAL ASEPTIC TRANSFER

## (undated) DEVICE — GUIDEWIRE STD .035X180CM ANG

## (undated) DEVICE — APPLICATOR CHLORAPREP ORN 26ML

## (undated) DEVICE — KIT PROBE COVER WITH GEL

## (undated) DEVICE — SUT PROLENE 6-0 24 C-1 C-1

## (undated) DEVICE — SEE MEDLINE ITEM 152622

## (undated) DEVICE — SUT MONOCRYL 4.0 PS2 CP496G

## (undated) DEVICE — GLOVE BIOGEL ECLIPSE SZ 6

## (undated) DEVICE — SET MICROPUNCTURE

## (undated) DEVICE — BLLN MUSTANG 5 X 40 X 75

## (undated) DEVICE — SEE MEDLINE ITEM 152186

## (undated) DEVICE — DRESSING ABSRBNT ISLAND 3.6X8

## (undated) DEVICE — ELECTRODE REM PLYHSV RETURN 9

## (undated) DEVICE — SEE MEDLINE ITEM 157173

## (undated) DEVICE — SUT PROLENE 6-0 24 BV-1

## (undated) DEVICE — ADHESIVE DERMABOND MINI HV

## (undated) DEVICE — INFLATOR ENCORE

## (undated) DEVICE — DRESSING ADHESIVE ISLAND 3 X 6

## (undated) DEVICE — GEL AQUASONIC 100 STERILE20GM

## (undated) DEVICE — SYR 10CC LUER LOCK

## (undated) DEVICE — SEE MEDLINE ITEM 152487

## (undated) DEVICE — DRAPE STERI INSTRUMENT 1018

## (undated) DEVICE — SUT ETHILON 3-0 PS2 18 BLK

## (undated) DEVICE — CATH GLIDE ANGLED 5FR 65CM

## (undated) DEVICE — TAPE MEDIPORE 4IN X 2YDS

## (undated) DEVICE — CONTAINER SPECIMEN STRL 4OZ

## (undated) DEVICE — GAUZE SPONGE 4X4 12PLY

## (undated) DEVICE — BLADE SURG CARBON STEEL SZ11

## (undated) DEVICE — GLOVE BIOGEL PI MICRO SZ 6.5

## (undated) DEVICE — DEV-O-LOOPS MINI BLUE

## (undated) DEVICE — SYR ONLY LUER LOCK 20CC

## (undated) DEVICE — SUT LIGACLIP SMALL XTRA

## (undated) DEVICE — TOWEL OR XRAY WHITE 17X26IN

## (undated) DEVICE — SEE MEDLINE ITEM 157117